# Patient Record
Sex: FEMALE | Race: WHITE | HISPANIC OR LATINO | Employment: OTHER | URBAN - METROPOLITAN AREA
[De-identification: names, ages, dates, MRNs, and addresses within clinical notes are randomized per-mention and may not be internally consistent; named-entity substitution may affect disease eponyms.]

---

## 2018-03-27 ENCOUNTER — TELEPHONE (OUTPATIENT)
Dept: FAMILY MEDICINE CLINIC | Facility: CLINIC | Age: 63
End: 2018-03-27

## 2018-03-27 ENCOUNTER — OFFICE VISIT (OUTPATIENT)
Dept: FAMILY MEDICINE CLINIC | Facility: CLINIC | Age: 63
End: 2018-03-27
Payer: MEDICARE

## 2018-03-27 VITALS
HEIGHT: 64 IN | BODY MASS INDEX: 30.92 KG/M2 | DIASTOLIC BLOOD PRESSURE: 78 MMHG | RESPIRATION RATE: 18 BRPM | OXYGEN SATURATION: 98 % | SYSTOLIC BLOOD PRESSURE: 130 MMHG | TEMPERATURE: 96.4 F | HEART RATE: 92 BPM | WEIGHT: 181.13 LBS

## 2018-03-27 DIAGNOSIS — E11.8 TYPE 2 DIABETES MELLITUS WITH COMPLICATION, WITHOUT LONG-TERM CURRENT USE OF INSULIN (HCC): ICD-10-CM

## 2018-03-27 DIAGNOSIS — R07.9 CHEST PAIN, UNSPECIFIED TYPE: ICD-10-CM

## 2018-03-27 DIAGNOSIS — I10 ESSENTIAL HYPERTENSION: ICD-10-CM

## 2018-03-27 DIAGNOSIS — Z12.31 ENCOUNTER FOR SCREENING MAMMOGRAM FOR BREAST CANCER: ICD-10-CM

## 2018-03-27 DIAGNOSIS — Z12.4 PAP SMEAR FOR CERVICAL CANCER SCREENING: ICD-10-CM

## 2018-03-27 DIAGNOSIS — E03.8 OTHER SPECIFIED HYPOTHYROIDISM: Primary | ICD-10-CM

## 2018-03-27 DIAGNOSIS — J45.20 MILD INTERMITTENT ASTHMA WITHOUT COMPLICATION: ICD-10-CM

## 2018-03-27 DIAGNOSIS — Z12.31 SCREENING MAMMOGRAM, ENCOUNTER FOR: ICD-10-CM

## 2018-03-27 PROBLEM — IMO0002: Status: ACTIVE | Noted: 2018-03-27

## 2018-03-27 LAB — SL AMB POCT HEMOGLOBIN AIC: 10.9

## 2018-03-27 PROCEDURE — 83036 HEMOGLOBIN GLYCOSYLATED A1C: CPT | Performed by: FAMILY MEDICINE

## 2018-03-27 PROCEDURE — 99204 OFFICE O/P NEW MOD 45 MIN: CPT | Performed by: FAMILY MEDICINE

## 2018-03-27 PROCEDURE — 93000 ELECTROCARDIOGRAM COMPLETE: CPT | Performed by: FAMILY MEDICINE

## 2018-03-27 RX ORDER — AMLODIPINE BESYLATE AND ATORVASTATIN CALCIUM 10; 10 MG/1; MG/1
1 TABLET, FILM COATED ORAL DAILY
Qty: 90 TABLET | Refills: 1 | Status: ON HOLD | OUTPATIENT
Start: 2018-03-27 | End: 2018-06-26

## 2018-03-27 RX ORDER — LEVOTHYROXINE SODIUM 0.05 MG/1
50 TABLET ORAL DAILY
Qty: 90 TABLET | Refills: 1 | Status: SHIPPED | OUTPATIENT
Start: 2018-03-27 | End: 2018-09-19 | Stop reason: SDUPTHER

## 2018-03-27 RX ORDER — AMLODIPINE BESYLATE AND ATORVASTATIN CALCIUM 10; 10 MG/1; MG/1
1 TABLET, FILM COATED ORAL
COMMUNITY
End: 2018-03-27 | Stop reason: SDUPTHER

## 2018-03-27 RX ORDER — ENALAPRIL MALEATE 20 MG/1
20 TABLET ORAL
COMMUNITY
End: 2018-03-27 | Stop reason: SDUPTHER

## 2018-03-27 RX ORDER — METFORMIN HYDROCHLORIDE 1000 MG/1
1000 TABLET, FILM COATED, EXTENDED RELEASE ORAL
COMMUNITY
End: 2018-03-27 | Stop reason: SDUPTHER

## 2018-03-27 RX ORDER — LEVOTHYROXINE SODIUM 0.05 MG/1
50 TABLET ORAL
COMMUNITY
End: 2018-03-27 | Stop reason: SDUPTHER

## 2018-03-27 RX ORDER — METFORMIN HYDROCHLORIDE 1000 MG/1
1000 TABLET, FILM COATED, EXTENDED RELEASE ORAL
Qty: 90 TABLET | Refills: 1 | Status: ON HOLD | OUTPATIENT
Start: 2018-03-27 | End: 2018-06-26

## 2018-03-27 RX ORDER — GABAPENTIN 300 MG/1
300 CAPSULE ORAL 3 TIMES DAILY
Qty: 90 CAPSULE | Refills: 1 | Status: SHIPPED | OUTPATIENT
Start: 2018-03-27 | End: 2018-05-02 | Stop reason: SDUPTHER

## 2018-03-27 RX ORDER — RANITIDINE 300 MG/1
300 TABLET ORAL DAILY
COMMUNITY
End: 2018-07-03

## 2018-03-27 RX ORDER — OMEPRAZOLE 20 MG/1
20 CAPSULE, DELAYED RELEASE ORAL DAILY
Qty: 90 CAPSULE | Refills: 1 | Status: SHIPPED | OUTPATIENT
Start: 2018-03-27 | End: 2018-06-30 | Stop reason: SDUPTHER

## 2018-03-27 RX ORDER — GEMFIBROZIL 600 MG/1
600 TABLET, FILM COATED ORAL 2 TIMES DAILY
COMMUNITY
End: 2018-09-19 | Stop reason: SDUPTHER

## 2018-03-27 RX ORDER — ENALAPRIL MALEATE 20 MG/1
20 TABLET ORAL DAILY
Qty: 90 TABLET | Refills: 1 | Status: SHIPPED | OUTPATIENT
Start: 2018-03-27 | End: 2018-09-19 | Stop reason: SDUPTHER

## 2018-03-27 RX ORDER — GLIPIZIDE 10 MG/1
10 TABLET, FILM COATED, EXTENDED RELEASE ORAL
COMMUNITY
End: 2018-03-27 | Stop reason: SDUPTHER

## 2018-03-27 RX ORDER — ALBUTEROL SULFATE 90 UG/1
1 AEROSOL, METERED RESPIRATORY (INHALATION) EVERY 4 HOURS PRN
COMMUNITY
End: 2021-12-19 | Stop reason: SDUPTHER

## 2018-03-27 RX ORDER — GLIPIZIDE 10 MG/1
10 TABLET, FILM COATED, EXTENDED RELEASE ORAL DAILY
Qty: 90 TABLET | Refills: 1 | Status: SHIPPED | OUTPATIENT
Start: 2018-03-27 | End: 2018-09-19 | Stop reason: SDUPTHER

## 2018-03-27 NOTE — ASSESSMENT & PLAN NOTE
Start Toujeo 10 units at night  Increase Humalin to 70 units am and 30 units pm  Continue Glipizide and Metformin

## 2018-03-27 NOTE — ASSESSMENT & PLAN NOTE
Controlled   Continue Amlodipine 10 mg and Enalapril 20 mg daily  BW ordered including kidney function and microalbumin

## 2018-03-27 NOTE — PROGRESS NOTES
Assessment/Plan:    Type II or unspecified type diabetes mellitus with other coma, uncontrolled  Start Toujeo 10 units at night  Increase Humalin to 70 units am and 30 units pm  Continue Glipizide and Metformin     Other specified hypothyroidism  Continue Synthroid 50 mcg  Check TSH with reflex T4    Asthma  Continue Advair     Essential hypertension  Controlled   Continue Amlodipine 10 mg and Enalapril 20 mg daily  BW ordered including kidney function and microalbumin          Problem List Items Addressed This Visit        Endocrine    Type II or unspecified type diabetes mellitus with other coma, uncontrolled     Start Toujeo 10 units at night  Increase Humalin to 70 units am and 30 units pm  Continue Glipizide and Metformin          Relevant Medications    insulin isophane (HumuLIN N,NovoLIN N) 100 Units/mL SUPN injection    Insulin Glargine (TOUJEO) injection pen 300 units/mL    gabapentin (NEURONTIN) 300 mg capsule    glipiZIDE (GLUCOTROL XL) 10 mg 24 hr tablet    metFORMIN (GLUMETZA) 1000 MG (MOD) 24 hr tablet    Other specified hypothyroidism - Primary     Continue Synthroid 50 mcg  Check TSH with reflex T4         Relevant Medications    levothyroxine 50 mcg tablet    Other Relevant Orders    CBC and differential    Comprehensive metabolic panel    Lipid panel    TSH, 3rd generation with T4 reflex       Respiratory    Asthma     Continue Advair          Relevant Medications    albuterol (PROVENTIL HFA,VENTOLIN HFA) 90 mcg/act inhaler    fluticasone-salmeterol (ADVAIR) 100-50 mcg/dose       Cardiovascular and Mediastinum    Essential hypertension     Controlled   Continue Amlodipine 10 mg and Enalapril 20 mg daily  BW ordered including kidney function and microalbumin          Relevant Medications    omeprazole (PriLOSEC) 20 mg delayed release capsule    enalapril (VASOTEC) 20 mg tablet    Other Relevant Orders    CBC and differential    Comprehensive metabolic panel    Lipid panel    Microalbumin / creatinine urine ratio       Other    Chest pain    Relevant Orders    ECG 12 lead      Other Visit Diagnoses     Encounter for screening mammogram for breast cancer        Relevant Orders    Mammo screening bilateral w cad    Pap smear for cervical cancer screening        Relevant Orders    Ambulatory referral to Obstetrics / Gynecology    Screening mammogram, encounter for        Relevant Orders    Mammo screening bilateral w 3d & cad            Subjective:      Patient ID: Dima Martins is a 65 y o  female  66 yo  female with uncontrolled DM HTN Asthma, new patient to the office here today to set up new PCP  Currently complaining of knee pain and tooth infection  She was seen by Dentist last week, who started Amoxicillin and planned a tooth extraction today  Knee Pain    The incident occurred more than 1 week ago  There was no injury mechanism  The pain is present in the right knee  The quality of the pain is described as cramping  The pain is at a severity of 5/10  The pain has been constant since onset  Associated symptoms include numbness  She reports no foreign bodies present  The symptoms are aggravated by movement and weight bearing  She has tried acetaminophen for the symptoms  The treatment provided mild relief  The following portions of the patient's history were reviewed and updated as appropriate:   She  has a past medical history of Asthma; Diabetes mellitus ([address] ); and Hypertension  She   Patient Active Problem List    Diagnosis Date Noted    Type II or unspecified type diabetes mellitus with other coma, uncontrolled 03/27/2018    Other specified hypothyroidism 03/27/2018    Chest pain 03/27/2018    Asthma 09/22/2009    Essential hypertension 09/22/2009     She  has a past surgical history that includes Tubal ligation; Knee surgery (Right); Breast surgery (Left); Cataract extraction, bilateral (Bilateral); and Retinal detachment surgery (Right)    Her family history includes Diabetes in her brother, father, and mother; Hypertension in her brother, father, and mother  She  reports that she has never smoked  She has never used smokeless tobacco  She reports that she does not drink alcohol or use drugs       Review of Systems   Cardiovascular: Positive for chest pain  Negative for palpitations  Gastrointestinal: Positive for constipation  Musculoskeletal: Positive for arthralgias and back pain  B/l knee pain    Neurological: Positive for numbness  All other systems reviewed and are negative  Objective:      /78 (BP Location: Right arm, Patient Position: Sitting, Cuff Size: Large)   Pulse 92   Temp (!) 96 4 °F (35 8 °C) (Tympanic)   Resp 18   Ht 5' 4 2" (1 631 m)   Wt 82 2 kg (181 lb 2 oz)   SpO2 98%   BMI 30 90 kg/m²          Physical Exam   Constitutional: She is oriented to person, place, and time  She appears well-developed  HENT:   Head: Normocephalic  Right Ear: External ear normal    Left Ear: External ear normal    Nose: Nose normal    Mouth/Throat: Oropharynx is clear and moist    Eyes: Conjunctivae and EOM are normal  Pupils are equal, round, and reactive to light  Neck: Normal range of motion  Neck supple  No thyromegaly present  Cardiovascular: Normal rate, regular rhythm and normal heart sounds  Pulmonary/Chest: Effort normal and breath sounds normal    Abdominal: Soft  There is no tenderness  There is no rebound and no guarding  Musculoskeletal: Normal range of motion  Neurological: She is alert and oriented to person, place, and time  She has normal reflexes  Skin: Skin is dry  Psychiatric: She has a normal mood and affect  Nursing note and vitals reviewed

## 2018-04-03 ENCOUNTER — HOSPITAL ENCOUNTER (OUTPATIENT)
Dept: MAMMOGRAPHY | Facility: HOSPITAL | Age: 63
Discharge: HOME | End: 2018-04-03

## 2018-04-03 ENCOUNTER — TELEPHONE (OUTPATIENT)
Dept: FAMILY MEDICINE CLINIC | Facility: CLINIC | Age: 63
End: 2018-04-03

## 2018-04-03 DIAGNOSIS — Z12.31 ENCOUNTER FOR SCREENING MAMMOGRAM FOR BREAST CANCER: ICD-10-CM

## 2018-04-03 DIAGNOSIS — E11.9 TYPE 2 DIABETES MELLITUS WITHOUT COMPLICATION, WITHOUT LONG-TERM CURRENT USE OF INSULIN (HCC): Primary | ICD-10-CM

## 2018-04-04 ENCOUNTER — TELEPHONE (OUTPATIENT)
Dept: FAMILY MEDICINE CLINIC | Facility: CLINIC | Age: 63
End: 2018-04-04

## 2018-04-05 DIAGNOSIS — Z79.4 TYPE 2 DIABETES MELLITUS WITHOUT COMPLICATION, WITH LONG-TERM CURRENT USE OF INSULIN (HCC): ICD-10-CM

## 2018-04-05 DIAGNOSIS — N64.4 BREAST PAIN: Primary | ICD-10-CM

## 2018-04-05 DIAGNOSIS — E11.9 TYPE 2 DIABETES MELLITUS WITHOUT COMPLICATION, WITH LONG-TERM CURRENT USE OF INSULIN (HCC): ICD-10-CM

## 2018-04-05 DIAGNOSIS — J45.20 MILD INTERMITTENT ASTHMA WITHOUT COMPLICATION: Primary | ICD-10-CM

## 2018-04-05 RX ORDER — INSULIN GLARGINE 100 [IU]/ML
10 INJECTION, SOLUTION SUBCUTANEOUS
Qty: 10 ML | Refills: 0 | Status: SHIPPED | OUTPATIENT
Start: 2018-04-05 | End: 2018-07-03

## 2018-04-05 RX ORDER — FLUTICASONE FUROATE AND VILANTEROL 100; 25 UG/1; UG/1
1 POWDER RESPIRATORY (INHALATION) DAILY
Qty: 3 EACH | Refills: 0 | Status: ON HOLD | OUTPATIENT
Start: 2018-04-05 | End: 2018-06-26

## 2018-05-01 ENCOUNTER — APPOINTMENT (OUTPATIENT)
Dept: LAB | Facility: CLINIC | Age: 63
End: 2018-05-01
Payer: MEDICARE

## 2018-05-01 ENCOUNTER — TRANSCRIBE ORDERS (OUTPATIENT)
Dept: LAB | Facility: CLINIC | Age: 63
End: 2018-05-01

## 2018-05-01 LAB
ALBUMIN SERPL BCP-MCNC: 3.3 G/DL (ref 3.5–5)
ALP SERPL-CCNC: 53 U/L (ref 46–116)
ALT SERPL W P-5'-P-CCNC: 23 U/L (ref 12–78)
ANION GAP SERPL CALCULATED.3IONS-SCNC: 5 MMOL/L (ref 4–13)
AST SERPL W P-5'-P-CCNC: 13 U/L (ref 5–45)
BASOPHILS # BLD AUTO: 0.06 THOUSANDS/ΜL (ref 0–0.1)
BASOPHILS NFR BLD AUTO: 1 % (ref 0–1)
BILIRUB SERPL-MCNC: 0.41 MG/DL (ref 0.2–1)
BUN SERPL-MCNC: 16 MG/DL (ref 5–25)
CALCIUM SERPL-MCNC: 9.2 MG/DL (ref 8.3–10.1)
CHLORIDE SERPL-SCNC: 101 MMOL/L (ref 100–108)
CHOLEST SERPL-MCNC: 222 MG/DL (ref 50–200)
CO2 SERPL-SCNC: 31 MMOL/L (ref 21–32)
CREAT SERPL-MCNC: 0.6 MG/DL (ref 0.6–1.3)
CREAT UR-MCNC: 130 MG/DL
EOSINOPHIL # BLD AUTO: 0.13 THOUSAND/ΜL (ref 0–0.61)
EOSINOPHIL NFR BLD AUTO: 2 % (ref 0–6)
ERYTHROCYTE [DISTWIDTH] IN BLOOD BY AUTOMATED COUNT: 13.4 % (ref 11.6–15.1)
GFR SERPL CREATININE-BSD FRML MDRD: 99 ML/MIN/1.73SQ M
GLUCOSE P FAST SERPL-MCNC: 211 MG/DL (ref 65–99)
HCT VFR BLD AUTO: 36 % (ref 34.8–46.1)
HDLC SERPL-MCNC: 48 MG/DL (ref 40–60)
HGB BLD-MCNC: 11.8 G/DL (ref 11.5–15.4)
LDLC SERPL CALC-MCNC: 139 MG/DL (ref 0–100)
LYMPHOCYTES # BLD AUTO: 2.17 THOUSANDS/ΜL (ref 0.6–4.47)
LYMPHOCYTES NFR BLD AUTO: 38 % (ref 14–44)
MCH RBC QN AUTO: 28.8 PG (ref 26.8–34.3)
MCHC RBC AUTO-ENTMCNC: 32.8 G/DL (ref 31.4–37.4)
MCV RBC AUTO: 88 FL (ref 82–98)
MICROALBUMIN UR-MCNC: 57.6 MG/L (ref 0–20)
MICROALBUMIN/CREAT 24H UR: 44 MG/G CREATININE (ref 0–30)
MONOCYTES # BLD AUTO: 0.41 THOUSAND/ΜL (ref 0.17–1.22)
MONOCYTES NFR BLD AUTO: 7 % (ref 4–12)
NEUTROPHILS # BLD AUTO: 2.95 THOUSANDS/ΜL (ref 1.85–7.62)
NEUTS SEG NFR BLD AUTO: 52 % (ref 43–75)
NONHDLC SERPL-MCNC: 174 MG/DL
NRBC BLD AUTO-RTO: 0 /100 WBCS
PLATELET # BLD AUTO: 284 THOUSANDS/UL (ref 149–390)
PMV BLD AUTO: 12.2 FL (ref 8.9–12.7)
POTASSIUM SERPL-SCNC: 4.2 MMOL/L (ref 3.5–5.3)
PROT SERPL-MCNC: 7.7 G/DL (ref 6.4–8.2)
RBC # BLD AUTO: 4.1 MILLION/UL (ref 3.81–5.12)
SODIUM SERPL-SCNC: 137 MMOL/L (ref 136–145)
T4 FREE SERPL-MCNC: 0.96 NG/DL (ref 0.76–1.46)
TRIGL SERPL-MCNC: 173 MG/DL
TSH SERPL DL<=0.05 MIU/L-ACNC: 3.95 UIU/ML (ref 0.36–3.74)
WBC # BLD AUTO: 5.73 THOUSAND/UL (ref 4.31–10.16)

## 2018-05-01 PROCEDURE — 36415 COLL VENOUS BLD VENIPUNCTURE: CPT | Performed by: FAMILY MEDICINE

## 2018-05-01 PROCEDURE — 80053 COMPREHEN METABOLIC PANEL: CPT | Performed by: FAMILY MEDICINE

## 2018-05-01 PROCEDURE — 84439 ASSAY OF FREE THYROXINE: CPT | Performed by: FAMILY MEDICINE

## 2018-05-01 PROCEDURE — 84443 ASSAY THYROID STIM HORMONE: CPT | Performed by: FAMILY MEDICINE

## 2018-05-01 PROCEDURE — 85025 COMPLETE CBC W/AUTO DIFF WBC: CPT | Performed by: FAMILY MEDICINE

## 2018-05-01 PROCEDURE — 80061 LIPID PANEL: CPT | Performed by: FAMILY MEDICINE

## 2018-05-01 PROCEDURE — 82043 UR ALBUMIN QUANTITATIVE: CPT | Performed by: FAMILY MEDICINE

## 2018-05-01 PROCEDURE — 82570 ASSAY OF URINE CREATININE: CPT | Performed by: FAMILY MEDICINE

## 2018-05-02 DIAGNOSIS — E11.8 TYPE 2 DIABETES MELLITUS WITH COMPLICATION, WITHOUT LONG-TERM CURRENT USE OF INSULIN (HCC): ICD-10-CM

## 2018-05-02 RX ORDER — GABAPENTIN 300 MG/1
CAPSULE ORAL
Qty: 90 CAPSULE | Refills: 1 | Status: ON HOLD | OUTPATIENT
Start: 2018-05-02 | End: 2018-06-26

## 2018-05-08 ENCOUNTER — DOCTOR'S OFFICE (OUTPATIENT)
Dept: URBAN - METROPOLITAN AREA CLINIC 136 | Facility: CLINIC | Age: 63
Setting detail: OPHTHALMOLOGY
End: 2018-05-08
Payer: COMMERCIAL

## 2018-05-08 ENCOUNTER — RX ONLY (RX ONLY)
Age: 63
End: 2018-05-08

## 2018-05-08 DIAGNOSIS — H35.379: ICD-10-CM

## 2018-05-08 DIAGNOSIS — Z79.4: ICD-10-CM

## 2018-05-08 DIAGNOSIS — Z96.1: ICD-10-CM

## 2018-05-08 DIAGNOSIS — H01.001: ICD-10-CM

## 2018-05-08 DIAGNOSIS — H01.005: ICD-10-CM

## 2018-05-08 DIAGNOSIS — E11.3293: ICD-10-CM

## 2018-05-08 DIAGNOSIS — H35.341: ICD-10-CM

## 2018-05-08 DIAGNOSIS — H01.004: ICD-10-CM

## 2018-05-08 DIAGNOSIS — H01.002: ICD-10-CM

## 2018-05-08 PROCEDURE — 92134 CPTRZ OPH DX IMG PST SGM RTA: CPT | Performed by: OPHTHALMOLOGY

## 2018-05-08 PROCEDURE — 92004 COMPRE OPH EXAM NEW PT 1/>: CPT | Performed by: OPHTHALMOLOGY

## 2018-05-08 ASSESSMENT — LID EXAM ASSESSMENTS
OS_BLEPHARITIS: 1+
OD_BLEPHARITIS: 1+

## 2018-05-08 ASSESSMENT — CONFRONTATIONAL VISUAL FIELD TEST (CVF)
OS_FINDINGS: SUPEROTEMPORAL DEFECT
OD_FINDINGS: INFEROTEMPORAL DEFECT, SUPERONASAL DEFECT

## 2018-05-09 ENCOUNTER — OFFICE VISIT (OUTPATIENT)
Dept: FAMILY MEDICINE CLINIC | Facility: CLINIC | Age: 63
End: 2018-05-09
Payer: MEDICARE

## 2018-05-09 VITALS
HEART RATE: 96 BPM | TEMPERATURE: 97.5 F | DIASTOLIC BLOOD PRESSURE: 86 MMHG | BODY MASS INDEX: 31.94 KG/M2 | HEIGHT: 64 IN | RESPIRATION RATE: 18 BRPM | WEIGHT: 187.06 LBS | SYSTOLIC BLOOD PRESSURE: 138 MMHG | OXYGEN SATURATION: 96 %

## 2018-05-09 DIAGNOSIS — Z12.11 COLON CANCER SCREENING: ICD-10-CM

## 2018-05-09 DIAGNOSIS — Z12.4 PAP SMEAR FOR CERVICAL CANCER SCREENING: ICD-10-CM

## 2018-05-09 DIAGNOSIS — E03.8 OTHER SPECIFIED HYPOTHYROIDISM: ICD-10-CM

## 2018-05-09 DIAGNOSIS — R07.89 OTHER CHEST PAIN: Primary | ICD-10-CM

## 2018-05-09 DIAGNOSIS — Z01.810 PREOP CARDIOVASCULAR EXAM: ICD-10-CM

## 2018-05-09 DIAGNOSIS — Z12.31 ENCOUNTER FOR SCREENING MAMMOGRAM FOR BREAST CANCER: ICD-10-CM

## 2018-05-09 DIAGNOSIS — I10 ESSENTIAL HYPERTENSION: ICD-10-CM

## 2018-05-09 PROBLEM — R00.2 PALPITATIONS: Status: ACTIVE | Noted: 2018-05-09

## 2018-05-09 PROBLEM — E11.9 TYPE 2 DIABETES MELLITUS WITHOUT COMPLICATION, WITHOUT LONG-TERM CURRENT USE OF INSULIN (HCC): Status: ACTIVE | Noted: 2018-03-27

## 2018-05-09 PROCEDURE — 99214 OFFICE O/P EST MOD 30 MIN: CPT | Performed by: FAMILY MEDICINE

## 2018-05-09 ASSESSMENT — REFRACTION_MANIFEST
OU_VA: 20/
OS_VA3: 20/
OS_VA2: 20/
OS_VA2: 20/
OU_VA: 20/
OD_VA1: 20/
OD_VA3: 20/
OS_VA3: 20/
OD_VA3: 20/
OD_VA2: 20/
OD_VA3: 20/
OD_VA2: 20/
OS_VA1: 20/
OD_VA2: 20/
OS_VA2: 20/
OD_VA1: 20/
OS_VA3: 20/
OS_VA1: 20/
OS_VA1: 20/
OU_VA: 20/
OD_VA1: 20/

## 2018-05-09 ASSESSMENT — REFRACTION_AUTOREFRACTION
OD_AXIS: 102
OD_SPHERE: -3.25
OD_CYLINDER: -2.50
OS_CYLINDER: -1.25
OS_AXIS: 060
OS_SPHERE: +1.50

## 2018-05-09 ASSESSMENT — REFRACTION_CURRENTRX
OD_OVR_VA: 20/
OS_OVR_VA: 20/

## 2018-05-09 ASSESSMENT — SPHEQUIV_DERIVED
OD_SPHEQUIV: -4.5
OS_SPHEQUIV: 0.875

## 2018-05-09 ASSESSMENT — VISUAL ACUITY
OS_BCVA: CF 3FT
OD_BCVA: 20/100

## 2018-05-09 NOTE — PROGRESS NOTES
Assessment/Plan:    No problem-specific Assessment & Plan notes found for this encounter  Problem List Items Addressed This Visit        Endocrine    Other specified hypothyroidism       Cardiovascular and Mediastinum    Essential hypertension    Relevant Orders    Ambulatory referral to Cardiology       Other    Chest pain - Primary    Relevant Orders    Ambulatory referral to Cardiology      Other Visit Diagnoses     Encounter for screening mammogram for breast cancer        Pap smear for cervical cancer screening        Colon cancer screening        Preop cardiovascular exam        Relevant Orders    Ambulatory referral to Cardiology            Subjective:      Patient ID: Awa Ansari is a 65 y o  female  64 yo  female here today for follow up of chronic conditions  She complains of increasing chest pain, defined as pressure in her chest, not radiated, last for several minutes, improves on its own  Accompanied by palpitations  States she often feels like her heart is fluttering in her chest    Patient is also requesting a preop for Right eye PPV/Removal of ILM by Dr Abelino Feng [facility], with general anesthesia on 6/5/18         The following portions of the patient's history were reviewed and updated as appropriate:   She  has a past medical history of Asthma; Diabetes mellitus ([facility] [address] ); and Hypertension  She   Patient Active Problem List    Diagnosis Date Noted    Palpitations 05/09/2018    Type 2 diabetes mellitus without complication, without long-term current use of insulin ([address] ) 03/27/2018    Other specified hypothyroidism 03/27/2018    Chest pain 03/27/2018    Asthma 09/22/2009    Essential hypertension 09/22/2009     She  has a past surgical history that includes Tubal ligation; Knee surgery (Right); Breast surgery (Left); Cataract extraction, bilateral (Bilateral); and Retinal detachment surgery (Right)    Her family history includes Diabetes in her brother, father, and mother; Hypertension in her brother, father, and mother  She  reports that she has never smoked  She has never used smokeless tobacco  She reports that she does not drink alcohol or use drugs    Current Outpatient Prescriptions   Medication Sig Dispense Refill    albuterol (PROVENTIL HFA,VENTOLIN HFA) 90 mcg/act inhaler Inhale 1 puff every 6 (six) hours      amLODIPine-atorvastatin (CADUET) 10-10 MG per tablet Take 1 tablet by mouth daily 90 tablet 1    enalapril (VASOTEC) 20 mg tablet Take 1 tablet (20 mg total) by mouth daily 90 tablet 1    fluticasone furoate-vilanterol (BREO ELLIPTA) Inhale 1 puff daily 3 each 0    fluticasone-salmeterol (ADVAIR) 100-50 mcg/dose Inhale 1 puff 2 (two) times a day 3 Inhaler 1    gabapentin (NEURONTIN) 300 mg capsule take 1 capsule by mouth three times a day 90 capsule 1    gemfibrozil (LOPID) 600 mg tablet Take 600 mg by mouth      glipiZIDE (GLUCOTROL XL) 10 mg 24 hr tablet Take 1 tablet (10 mg total) by mouth daily 90 tablet 1    insulin glargine (LANTUS) 100 units/mL subcutaneous injection Inject 10 Units under the skin daily at bedtime 10 mL 0    Insulin Glargine (TOUJEO) injection pen 300 units/mL Inject 10 Units under the skin daily at bedtime 3 pen 1    insulin isophane (HumuLIN N,NovoLIN N) 100 Units/mL SUPN injection 70 units in the am  30 units in the pm 5 pen 1    Insulin Pen Needle (BD PEN NEEDLE NATALEE U/F) 32G X 4 MM MISC USE ONE NEEDLE WITH INSULIN TOUJEO AT BEDTIME 100 each 3    Insulin Syringe-Needle U-100 (B-D INS SYRINGE 0 5CC/30GX1/2") 30G X 1/2" 0 5 ML MISC USE WITH INSULIN HUMULIN N VIAL TWICE A  each 3    levothyroxine 50 mcg tablet Take 1 tablet (50 mcg total) by mouth daily 90 tablet 1    metFORMIN (GLUMETZA) 1000 MG (MOD) 24 hr tablet Take 1 tablet (1,000 mg total) by mouth daily with breakfast 90 tablet 1    omeprazole (PriLOSEC) 20 mg delayed release capsule Take 1 capsule (20 mg total) by mouth daily 90 capsule 1    ranitidine (ZANTAC) 300 MG tablet Take 300 mg by mouth       No current facility-administered medications for this visit  Current Outpatient Prescriptions on File Prior to Visit   Medication Sig    albuterol (PROVENTIL HFA,VENTOLIN HFA) 90 mcg/act inhaler Inhale 1 puff every 6 (six) hours    amLODIPine-atorvastatin (CADUET) 10-10 MG per tablet Take 1 tablet by mouth daily    enalapril (VASOTEC) 20 mg tablet Take 1 tablet (20 mg total) by mouth daily    fluticasone furoate-vilanterol (BREO ELLIPTA) Inhale 1 puff daily    fluticasone-salmeterol (ADVAIR) 100-50 mcg/dose Inhale 1 puff 2 (two) times a day    gabapentin (NEURONTIN) 300 mg capsule take 1 capsule by mouth three times a day    gemfibrozil (LOPID) 600 mg tablet Take 600 mg by mouth    glipiZIDE (GLUCOTROL XL) 10 mg 24 hr tablet Take 1 tablet (10 mg total) by mouth daily    insulin glargine (LANTUS) 100 units/mL subcutaneous injection Inject 10 Units under the skin daily at bedtime    Insulin Glargine (TOUJEO) injection pen 300 units/mL Inject 10 Units under the skin daily at bedtime    insulin isophane (HumuLIN N,NovoLIN N) 100 Units/mL SUPN injection 70 units in the am  30 units in the pm    Insulin Pen Needle (BD PEN NEEDLE NATALEE U/F) 32G X 4 MM MISC USE ONE NEEDLE WITH INSULIN TOUJEO AT BEDTIME    Insulin Syringe-Needle U-100 (B-D INS SYRINGE 0 5CC/30GX1/2") 30G X 1/2" 0 5 ML MISC USE WITH INSULIN HUMULIN N VIAL TWICE A DAY    levothyroxine 50 mcg tablet Take 1 tablet (50 mcg total) by mouth daily    metFORMIN (GLUMETZA) 1000 MG (MOD) 24 hr tablet Take 1 tablet (1,000 mg total) by mouth daily with breakfast    omeprazole (PriLOSEC) 20 mg delayed release capsule Take 1 capsule (20 mg total) by mouth daily    ranitidine (ZANTAC) 300 MG tablet Take 300 mg by mouth     No current facility-administered medications on file prior to visit         Review of Systems   Constitutional: Positive for fatigue     Eyes: Positive for visual disturbance  Cardiovascular: Positive for chest pain and palpitations  Objective:      /86 (BP Location: Right arm, Patient Position: Sitting, Cuff Size: Standard)   Pulse 96   Temp 97 5 °F (36 4 °C) (Tympanic)   Resp 18   Ht 5' 4" (1 626 m)   Wt 84 9 kg (187 lb 1 oz)   SpO2 96%   BMI 32 11 kg/m²          Physical Exam   Constitutional: She is oriented to person, place, and time  She appears well-developed  HENT:   Head: Normocephalic  Right Ear: External ear normal    Left Ear: External ear normal    Nose: Nose normal    Mouth/Throat: Oropharynx is clear and moist    Eyes: Conjunctivae and EOM are normal  Pupils are equal, round, and reactive to light  Neck: Normal range of motion  Neck supple  No thyromegaly present  Cardiovascular: Normal rate, regular rhythm and normal heart sounds  Pulmonary/Chest: Effort normal and breath sounds normal    Abdominal: Soft  There is no tenderness  There is no rebound and no guarding  Musculoskeletal: Normal range of motion  Neurological: She is alert and oriented to person, place, and time  She has normal reflexes  Skin: Skin is dry  Psychiatric: She has a normal mood and affect  Nursing note and vitals reviewed

## 2018-05-15 ENCOUNTER — HOSPITAL ENCOUNTER (OUTPATIENT)
Dept: MAMMOGRAPHY | Facility: CLINIC | Age: 63
Discharge: HOME | End: 2018-05-15
Payer: MEDICARE

## 2018-05-15 ENCOUNTER — HOSPITAL ENCOUNTER (OUTPATIENT)
Dept: ULTRASOUND IMAGING | Facility: CLINIC | Age: 63
Discharge: HOME | End: 2018-05-15

## 2018-05-15 DIAGNOSIS — N64.4 BREAST PAIN: ICD-10-CM

## 2018-05-15 DIAGNOSIS — N64.4 MASTODYNIA: ICD-10-CM

## 2018-05-15 PROCEDURE — 76642 ULTRASOUND BREAST LIMITED: CPT

## 2018-05-15 PROCEDURE — 77066 DX MAMMO INCL CAD BI: CPT

## 2018-05-15 NOTE — PROGRESS NOTES
Met with patient and patient's daughter Demetrius Art and Dr Dontae Alfredo  regarding recommendation for;      __x___ RIGHT ______LEFT      _____Ultrasound guided  __x____Stereotactic  Breast biopsy  __x___Verbalized understanding        Blood thinners:  _____yes __x___no    Date stopped: ___n/a________    Biopsy teaching sheet given:  ___x____yes ______no; teaching sheet given in [place]

## 2018-05-18 RX ORDER — INSULIN HUMAN 100 [IU]/ML
INJECTION, SUSPENSION SUBCUTANEOUS
Refills: 0 | COMMUNITY
Start: 2018-04-04 | End: 2018-06-13 | Stop reason: SDUPTHER

## 2018-05-18 RX ORDER — METFORMIN HYDROCHLORIDE 500 MG/1
TABLET, EXTENDED RELEASE ORAL
Refills: 0 | COMMUNITY
Start: 2018-03-27 | End: 2018-11-06

## 2018-05-22 ENCOUNTER — CONSULT (OUTPATIENT)
Dept: FAMILY MEDICINE CLINIC | Facility: CLINIC | Age: 63
End: 2018-05-22
Payer: MEDICARE

## 2018-05-22 VITALS
BODY MASS INDEX: 31.67 KG/M2 | OXYGEN SATURATION: 97 % | WEIGHT: 185.5 LBS | RESPIRATION RATE: 18 BRPM | SYSTOLIC BLOOD PRESSURE: 140 MMHG | DIASTOLIC BLOOD PRESSURE: 76 MMHG | HEART RATE: 102 BPM | TEMPERATURE: 97.5 F | HEIGHT: 64 IN

## 2018-05-22 DIAGNOSIS — Z01.810 PREOP CARDIOVASCULAR EXAM: ICD-10-CM

## 2018-05-22 DIAGNOSIS — Z12.4 CERVICAL CANCER SCREENING: ICD-10-CM

## 2018-05-22 DIAGNOSIS — Z12.11 SCREENING FOR COLON CANCER: ICD-10-CM

## 2018-05-22 DIAGNOSIS — G89.29 CHRONIC BILATERAL LOW BACK PAIN WITHOUT SCIATICA: Primary | ICD-10-CM

## 2018-05-22 DIAGNOSIS — M54.50 CHRONIC BILATERAL LOW BACK PAIN WITHOUT SCIATICA: Primary | ICD-10-CM

## 2018-05-22 DIAGNOSIS — E11.9 TYPE 2 DIABETES MELLITUS WITHOUT COMPLICATION, WITHOUT LONG-TERM CURRENT USE OF INSULIN (HCC): ICD-10-CM

## 2018-05-22 PROCEDURE — 99214 OFFICE O/P EST MOD 30 MIN: CPT | Performed by: FAMILY MEDICINE

## 2018-05-22 NOTE — PROGRESS NOTES
Subjective:      Rigo Selby is a 65 y o  female who presents to the office today for a preoperative consultation at the request of surgeon eye doctor who plans on performing R cataract surgery on June 5  This consultation is requested for the specific conditions prompting preoperative evaluation (i e  because of potential affect on operative risk): Diabetes mellitus  The patient has the following known anesthesia issues: none  Patient has a bleeding risk of: no recent abnormal bleeding  Patient does not have objections to receiving blood products if needed  The following portions of the patient's history were reviewed and updated as appropriate:   She  has a past medical history of Asthma; Diabetes mellitus ([facility] ); and Hypertension  She   Patient Active Problem List    Diagnosis Date Noted    Chronic bilateral low back pain without sciatica 05/22/2018    Palpitations 05/09/2018    Preop cardiovascular exam 05/09/2018    Type 2 diabetes mellitus without complication, without long-term current use of insulin ([facility] ) 03/27/2018    Other specified hypothyroidism 03/27/2018    Chest pain 03/27/2018    Asthma 09/22/2009    Essential hypertension 09/22/2009     She  has a past surgical history that includes Tubal ligation; Knee surgery (Right); Breast surgery (Left); Cataract extraction, bilateral (Bilateral); and Retinal detachment surgery (Right)  Her family history includes Diabetes in her brother, father, and mother; Hypertension in her brother, father, and mother  She  reports that she has never smoked  She has never used smokeless tobacco  She reports that she does not drink alcohol or use drugs    Current Outpatient Prescriptions   Medication Sig Dispense Refill    albuterol (PROVENTIL HFA,VENTOLIN HFA) 90 mcg/act inhaler Inhale 1 puff every 6 (six) hours      amLODIPine-atorvastatin (CADUET) 10-10 MG per tablet Take 1 tablet by mouth daily 90 tablet 1    enalapril (VASOTEC) 20 mg tablet Take 1 tablet (20 mg total) by mouth daily 90 tablet 1    fluticasone furoate-vilanterol (BREO ELLIPTA) Inhale 1 puff daily 3 each 0    fluticasone-salmeterol (ADVAIR) 100-50 mcg/dose Inhale 1 puff 2 (two) times a day 3 Inhaler 1    gabapentin (NEURONTIN) 300 mg capsule take 1 capsule by mouth three times a day 90 capsule 1    gemfibrozil (LOPID) 600 mg tablet Take 600 mg by mouth      glipiZIDE (GLUCOTROL XL) 10 mg 24 hr tablet Take 1 tablet (10 mg total) by mouth daily 90 tablet 1    HUMULIN N 100 UNIT/ML subcutaneous injection   0    insulin glargine (LANTUS) 100 units/mL subcutaneous injection Inject 10 Units under the skin daily at bedtime 10 mL 0    Insulin Glargine (TOUJEO) injection pen 300 units/mL Inject 10 Units under the skin daily at bedtime 3 pen 1    insulin isophane (HumuLIN N,NovoLIN N) 100 Units/mL SUPN injection 70 units in the am  30 units in the pm 5 pen 1    Insulin Pen Needle (BD PEN NEEDLE NATALEE U/F) 32G X 4 MM MISC USE ONE NEEDLE WITH INSULIN TOUJEO AT BEDTIME 100 each 3    Insulin Syringe-Needle U-100 (B-D INS SYRINGE 0 5CC/30GX1/2") 30G X 1/2" 0 5 ML MISC USE WITH INSULIN HUMULIN N VIAL TWICE A  each 3    levothyroxine 50 mcg tablet Take 1 tablet (50 mcg total) by mouth daily 90 tablet 1    metFORMIN (GLUCOPHAGE-XR) 500 mg 24 hr tablet take 2 tablets by mouth once daily WITH BREAKFAST  0    metFORMIN (GLUMETZA) 1000 MG (MOD) 24 hr tablet Take 1 tablet (1,000 mg total) by mouth daily with breakfast 90 tablet 1    omeprazole (PriLOSEC) 20 mg delayed release capsule Take 1 capsule (20 mg total) by mouth daily 90 capsule 1    ranitidine (ZANTAC) 300 MG tablet Take 300 mg by mouth       No current facility-administered medications for this visit        Current Outpatient Prescriptions on File Prior to Visit   Medication Sig    albuterol (PROVENTIL HFA,VENTOLIN HFA) 90 mcg/act inhaler Inhale 1 puff every 6 (six) hours    amLODIPine-atorvastatin (CADUET) 10-10 MG per tablet Take 1 tablet by mouth daily    enalapril (VASOTEC) 20 mg tablet Take 1 tablet (20 mg total) by mouth daily    fluticasone furoate-vilanterol (BREO ELLIPTA) Inhale 1 puff daily    fluticasone-salmeterol (ADVAIR) 100-50 mcg/dose Inhale 1 puff 2 (two) times a day    gabapentin (NEURONTIN) 300 mg capsule take 1 capsule by mouth three times a day    gemfibrozil (LOPID) 600 mg tablet Take 600 mg by mouth    glipiZIDE (GLUCOTROL XL) 10 mg 24 hr tablet Take 1 tablet (10 mg total) by mouth daily    insulin glargine (LANTUS) 100 units/mL subcutaneous injection Inject 10 Units under the skin daily at bedtime    Insulin Glargine (TOUJEO) injection pen 300 units/mL Inject 10 Units under the skin daily at bedtime    insulin isophane (HumuLIN N,NovoLIN N) 100 Units/mL SUPN injection 70 units in the am  30 units in the pm    Insulin Pen Needle (BD PEN NEEDLE NATALEE U/F) 32G X 4 MM MISC USE ONE NEEDLE WITH INSULIN TOUJEO AT BEDTIME    Insulin Syringe-Needle U-100 (B-D INS SYRINGE 0 5CC/30GX1/2") 30G X 1/2" 0 5 ML MISC USE WITH INSULIN HUMULIN N VIAL TWICE A DAY    levothyroxine 50 mcg tablet Take 1 tablet (50 mcg total) by mouth daily    metFORMIN (GLUMETZA) 1000 MG (MOD) 24 hr tablet Take 1 tablet (1,000 mg total) by mouth daily with breakfast    omeprazole (PriLOSEC) 20 mg delayed release capsule Take 1 capsule (20 mg total) by mouth daily    ranitidine (ZANTAC) 300 MG tablet Take 300 mg by mouth     No current facility-administered medications on file prior to visit       Review of Systems  Pertinent items are noted in HPI  Objective:      Physical Exam  /76 (BP Location: Right arm, Patient Position: Sitting, Cuff Size: Large)   Pulse 102   Temp 97 5 °F (36 4 °C) (Tympanic)   Resp 18   Ht 5' 4" (1 626 m)   Wt 84 1 kg (185 lb 8 oz)   SpO2 97%   Breastfeeding?  No   BMI 31 84 kg/m²     General Appearance:    Alert, cooperative, no distress, appears stated age   Head:    Normocephalic, without obvious abnormality, atraumatic   Eyes:    PERRL, conjunctiva/corneas clear, EOM's intact, fundi     benign, both eyes   Ears:    Normal TM's and external ear canals, both ears   Nose:   Nares normal, septum midline, mucosa normal, no drainage    or sinus tenderness   Throat:   Lips, mucosa, and tongue normal; teeth and gums normal   Neck:   Supple, symmetrical, trachea midline, no adenopathy;     thyroid:  no enlargement/tenderness/nodules; no carotid    bruit or JVD   Back:     Symmetric, no curvature, ROM normal, no CVA tenderness   Lungs:     Clear to auscultation bilaterally, respirations unlabored   Chest Wall:    No tenderness or deformity    Heart:    Regular rate and rhythm, S1 and S2 normal, no murmur, rub   or gallop   Breast Exam:    No tenderness, masses, or nipple abnormality   Abdomen:     Soft, non-tender, bowel sounds active all four quadrants,     no masses, no organomegaly   Genitalia:    Normal female without lesion, discharge or tenderness   Rectal:    Normal tone, no masses or tenderness; guaiac negative stool   Extremities:   Extremities normal, atraumatic, no cyanosis or edema   Pulses:   2+ and symmetric all extremities   Skin:   Skin color, texture, turgor normal, no rashes or lesions   Lymph nodes:   Cervical, supraclavicular, and axillary nodes normal   Neurologic:   CNII-XII intact, normal strength, sensation and reflexes     throughout       Predictors of intubation difficulty:   Morbid obesity? no   Anatomically abnormal facies?  no   Prominent incisors? no   Receding mandible? no   Short, thick neck? no   Neck range of motion: normal      Cardiographics  ECG: normal sinus rhythm, no blocks or conduction defects, no ischemic changes  Echocardiogram: not done    Imaging  Chest x-ray: not done     Lab Review   Office Visit on 03/27/2018   Component Date Value     HGB A1C 03/27/2018 10 9     WBC 05/01/2018 5 73     RBC 05/01/2018 4 10     Hemoglobin 05/01/2018 11 8     Hematocrit 05/01/2018 36 0     MCV 05/01/2018 88     MCH 05/01/2018 28 8     MCHC 05/01/2018 32 8     RDW 05/01/2018 13 4     MPV 05/01/2018 12 2     Platelets 03/27/2904 284     nRBC 05/01/2018 0     Neutrophils Relative 05/01/2018 52     Lymphocytes Relative 05/01/2018 38     Monocytes Relative 05/01/2018 7     Eosinophils Relative 05/01/2018 2     Basophils Relative 05/01/2018 1     Neutrophils Absolute 05/01/2018 2 95     Lymphocytes Absolute 05/01/2018 2 17     Monocytes Absolute 05/01/2018 0 41     Eosinophils Absolute 05/01/2018 0 13     Basophils Absolute 05/01/2018 0 06     Sodium 05/01/2018 137     Potassium 05/01/2018 4 2     Chloride 05/01/2018 101     CO2 05/01/2018 31     Anion Gap 05/01/2018 5     BUN 05/01/2018 16     Creatinine 05/01/2018 0 60     Glucose, Fasting 05/01/2018 211*    Calcium 05/01/2018 9 2     AST 05/01/2018 13     ALT 05/01/2018 23     Alkaline Phosphatase 05/01/2018 53     Total Protein 05/01/2018 7 7     Albumin 05/01/2018 3 3*    Total Bilirubin 05/01/2018 0 41     eGFR 05/01/2018 99     Cholesterol 05/01/2018 222*    Triglycerides 05/01/2018 173*    HDL, Direct 05/01/2018 48     LDL Calculated 05/01/2018 139*    Non-HDL-Chol (CHOL-HDL) 05/01/2018 174     Creatinine, Ur 05/01/2018 130 0     Microalbum  ,U,Random 05/01/2018 57 6*    Microalb Creat Ratio 05/01/2018 44*    TSH 3RD GENERATON 05/01/2018 3 950*    Free T4 05/01/2018 0 96           Assessment:      65 y o  female with planned surgery as above  Known risk factors for perioperative complications: Diabetes mellitus    Difficulty with intubation is not anticipated  Cardiac Risk Estimation: per the Revised Cardiac Risk Index (Circ  100:1043, 1999), the patient's risk factors for cardiac complications include on insulin, putting her in: RCI RISK CLASS II (1 risk factor, risk of major cardiac compl  appr  1 3%)        Plan:      1  Preoperative workup as follows none    2  Change in medication regimen before surgery: discontinue NSAIDs (no insulin am of surgery ) 14 days before surgery, discontinue Metformin 24 hours before surgery and no insulin am of surgery  3  Prophylaxis for cardiac events with perioperative beta-blockers: should be considered, specific regimen per anesthesia  4  Invasive hemodynamic monitoring perioperatively: not indicated  5  Deep vein thrombosis prophylaxis postoperatively:regimen to be chosen by surgical team   6  Surveillance for postoperative MI with ECG immediately postoperatively and on postoperative days 1 and 2 AND troponin levels 24 hours postoperatively and on day 4 or hospital discharge (whichever comes first): not indicated  7  Other measures: Careful attention to perioperative glycemic control (check postop blood sugar)      At acceptable risk for proposed procedure

## 2018-05-23 ENCOUNTER — HOSPITAL ENCOUNTER (OUTPATIENT)
Dept: MAMMOGRAPHY | Facility: CLINIC | Age: 63
Discharge: HOME | End: 2018-05-23
Payer: MEDICARE

## 2018-05-23 ENCOUNTER — OFFICE VISIT (OUTPATIENT)
Dept: CARDIOLOGY CLINIC | Facility: CLINIC | Age: 63
End: 2018-05-23
Payer: MEDICARE

## 2018-05-23 VITALS — HEART RATE: 84 BPM | DIASTOLIC BLOOD PRESSURE: 72 MMHG | SYSTOLIC BLOOD PRESSURE: 136 MMHG

## 2018-05-23 VITALS
BODY MASS INDEX: 31.76 KG/M2 | HEIGHT: 64 IN | SYSTOLIC BLOOD PRESSURE: 126 MMHG | DIASTOLIC BLOOD PRESSURE: 62 MMHG | WEIGHT: 186 LBS | HEART RATE: 84 BPM | RESPIRATION RATE: 16 BRPM

## 2018-05-23 DIAGNOSIS — Z01.810 PREOP CARDIOVASCULAR EXAM: Primary | ICD-10-CM

## 2018-05-23 DIAGNOSIS — R07.9 CHEST PAIN, UNSPECIFIED TYPE: ICD-10-CM

## 2018-05-23 DIAGNOSIS — R00.2 PALPITATIONS: ICD-10-CM

## 2018-05-23 DIAGNOSIS — R92.0 MAMMOGRAPHIC MICROCALCIFICATION: ICD-10-CM

## 2018-05-23 DIAGNOSIS — E78.2 MIXED HYPERLIPIDEMIA: ICD-10-CM

## 2018-05-23 DIAGNOSIS — R92.1 BREAST CALCIFICATION SEEN ON MAMMOGRAM: ICD-10-CM

## 2018-05-23 DIAGNOSIS — I10 ESSENTIAL HYPERTENSION: ICD-10-CM

## 2018-05-23 PROCEDURE — 88361 TUMOR IMMUNOHISTOCHEM/COMPUT: CPT | Performed by: PATHOLOGY

## 2018-05-23 PROCEDURE — 88342 IMHCHEM/IMCYTCHM 1ST ANTB: CPT | Performed by: PATHOLOGY

## 2018-05-23 PROCEDURE — 19081 BX BREAST 1ST LESION STRTCTC: CPT

## 2018-05-23 PROCEDURE — 88341 IMHCHEM/IMCYTCHM EA ADD ANTB: CPT | Performed by: PATHOLOGY

## 2018-05-23 PROCEDURE — 88305 TISSUE EXAM BY PATHOLOGIST: CPT | Performed by: PATHOLOGY

## 2018-05-23 PROCEDURE — 99205 OFFICE O/P NEW HI 60 MIN: CPT | Performed by: INTERNAL MEDICINE

## 2018-05-23 PROCEDURE — 93000 ELECTROCARDIOGRAM COMPLETE: CPT | Performed by: INTERNAL MEDICINE

## 2018-05-23 RX ORDER — LIDOCAINE HYDROCHLORIDE 10 MG/ML
5 INJECTION, SOLUTION INFILTRATION; PERINEURAL ONCE
Status: COMPLETED | OUTPATIENT
Start: 2018-05-23 | End: 2018-05-23

## 2018-05-23 RX ORDER — LIDOCAINE HYDROCHLORIDE AND EPINEPHRINE BITARTRATE 20; .01 MG/ML; MG/ML
10 INJECTION, SOLUTION SUBCUTANEOUS ONCE
Status: COMPLETED | OUTPATIENT
Start: 2018-05-23 | End: 2018-05-23

## 2018-05-23 RX ORDER — PREDNISOLONE ACETATE 10 MG/ML
SUSPENSION/ DROPS OPHTHALMIC
Refills: 0 | Status: ON HOLD | COMMUNITY
Start: 2018-05-08 | End: 2018-08-02 | Stop reason: ALTCHOICE

## 2018-05-23 RX ORDER — ATROPINE SULFATE 10 MG/ML
2 SOLUTION/ DROPS OPHTHALMIC 2 TIMES DAILY
Refills: 0 | Status: ON HOLD | COMMUNITY
Start: 2018-05-09 | End: 2018-08-02 | Stop reason: ALTCHOICE

## 2018-05-23 RX ORDER — CIPROFLOXACIN HYDROCHLORIDE 3.5 MG/ML
SOLUTION/ DROPS TOPICAL
Refills: 0 | Status: ON HOLD | COMMUNITY
Start: 2018-05-08 | End: 2018-08-02 | Stop reason: ALTCHOICE

## 2018-05-23 RX ADMIN — LIDOCAINE HYDROCHLORIDE AND EPINEPHRINE 10 ML: 20; 10 INJECTION, SOLUTION INFILTRATION; PERINEURAL at 12:41

## 2018-05-23 RX ADMIN — LIDOCAINE HYDROCHLORIDE 5 ML: 10 INJECTION, SOLUTION INFILTRATION; PERINEURAL at 12:41

## 2018-05-23 NOTE — PROGRESS NOTES
Procedure type:    _____ultrasound guided __X___stereotactic    Breast:    _____Left ___X__Right    Location:    Needle:8ga Revolve    # of passes:5    Clip: Yogi    Performed by:Dr oTm Vernon    Pressure held for 5 minutes by:  Osmar Jett(PCA)    Steri Strips:    ___X__yes _____no    Band aid:    ___X__yes_____no    Tape and guaze:    ___X__yes _____no    Tolerated procedure:    __X___yes _____no

## 2018-05-23 NOTE — PROGRESS NOTES
Patient arrived via:    ___X__ambulatory    _____wheelchair    _____stretcher      Domestic violence screen    ____X__negative______positive    Breast Implants:    _______yes ______X__no

## 2018-05-23 NOTE — PROGRESS NOTES
POST LARGE CORE BREAST BIOPSY PATIENT INFORMATION      1  Place an ice pack inside your bra over the top of the dressing every hour for 20 minutes (20 minutes on, 60 minutes off) Do this until bedtime  2  Do not shower or bathe until the following morning       3  You may bathe your breast carefully with the steri-strips in place  Be careful    Not to loosen them  The steri-strips will fall off in 3-5 days  4  You may have mild discomfort, and you may have some bruising where the   Needle entered the skin  This should clear within 5-7 days  5  If you need medicine for discomfort, take acetaminophen products such as   Tylenol  You may also take Advil or Motrin products  6  Do not participate in strenuous activities such as-tennis, aerobics, skiing,  Weight lifting, etc  for 24 hours  Refrain from swimming for 72 hours  7  Wearing a bra for sleeping may be more comfortable for the first 24-48 hours  8  Watch for continued bleeding, pain or fever over 101     For procedures done at the [facility] call:  Ayleen Davenport RN at [phone] or  Francesco Breen RN at [phone]  After 4 PM call the Interventional Radiology Department at [phone] and ask to speak with the nurse on call  For procedures performed at [address] [place] [address] call: The Radiology Nurse at [phone]    After 4 PM call your physician, or go to the Emergency Department  9          The final results of your biopsy are usually available within one week  Ice pack given:    __x___yes _____no    Discharge instructions signed by patient:    __x___yes _____no    Discharge instructions given to patient:    ___x__yes _____no    Discharged via:    __x___amulatory    _____wheelchair    _____stretcher    Stable on discharge:    ___x__yes ____no

## 2018-05-23 NOTE — PROGRESS NOTES
Cardiology Follow Up    [address] [facility]  1955  [id number]  [address]  [address] [address]    1  Preop cardiovascular exam  POCT ECG   2  Chest pain, unspecified type  Echo stress test w contrast if indicated   3  Palpitations  Echo complete with contrast if indicated    Holter monitor - 48 hour   4  Essential hypertension     5  Mixed hyperlipidemia          History:  81-year-old Female with a past medical history significant for hypertension hypercholesterolemia and diabetes mellitus presents for evaluation of chest pain and palpitations  She initially complained to her primary physician on 05/09/2018 at which time cardiology referral was recommend She states the pain is mid and left sternal   It comes on at rest or with activity  It typically lasts up to a minute and resolves on its week ago  It is associated with shortness of breath  It is also associated with palpitations  She states at times she will note her heart beating fast for a number of seconds that comes and goes spontaneously does not occur every day  Denies orthopnea paroxysmal nocturnal dyspnea or syncope  History was obtained through an  and she was a relatively poor historian  Patient Active Problem List   Diagnosis    Type 2 diabetes mellitus without complication, without long-term current use of insulin ([facility] [address]    Asthma    Essential hypertension    Other specified hypothyroidism    Chest pain    Palpitations    Preop cardiovascular exam    Chronic bilateral low back pain without sciatica     Past Medical History:   Diagnosis Date    Asthma     Diabetes mellitus ([address] )     Hypertension      Social History     Social History    Marital status:      Spouse name: N/A    Number of children: N/A    Years of education: N/A     Occupational History    Not on file       Social History Main Topics    Smoking status: Never Smoker    Smokeless tobacco: Never Used    Alcohol use No    Drug use: No    Sexual activity: Not on file     Other Topics Concern    Not on file     Social History Narrative    No narrative on file      Family History   Problem Relation Age of Onset    Diabetes Mother     Hypertension Mother     Diabetes Father     Hypertension Father     Diabetes Brother     Hypertension Brother      Past Surgical History:   Procedure Laterality Date    BREAST SURGERY Left     CATARACT EXTRACTION, BILATERAL Bilateral     KNEE SURGERY Right     RETINAL DETACHMENT SURGERY Right     TUBAL LIGATION         Current Outpatient Prescriptions:     albuterol (PROVENTIL HFA,VENTOLIN HFA) 90 mcg/act inhaler, Inhale 1 puff every 6 (six) hours, Disp: , Rfl:     amLODIPine-atorvastatin (CADUET) 10-10 MG per tablet, Take 1 tablet by mouth daily, Disp: 90 tablet, Rfl: 1    atropine (ISOPTO ATROPINE) 1 % ophthalmic solution, , Disp: , Rfl: 0    ciprofloxacin (CILOXAN) 0 3 % ophthalmic solution, place 1 drop into right eye four times a day AFTER SURGERY, Disp: , Rfl: 0    enalapril (VASOTEC) 20 mg tablet, Take 1 tablet (20 mg total) by mouth daily, Disp: 90 tablet, Rfl: 1    fluticasone furoate-vilanterol (BREO ELLIPTA), Inhale 1 puff daily, Disp: 3 each, Rfl: 0    fluticasone-salmeterol (ADVAIR) 100-50 mcg/dose, Inhale 1 puff 2 (two) times a day, Disp: 3 Inhaler, Rfl: 1    gabapentin (NEURONTIN) 300 mg capsule, take 1 capsule by mouth three times a day, Disp: 90 capsule, Rfl: 1    glipiZIDE (GLUCOTROL XL) 10 mg 24 hr tablet, Take 1 tablet (10 mg total) by mouth daily, Disp: 90 tablet, Rfl: 1    HUMULIN N 100 UNIT/ML subcutaneous injection, , Disp: , Rfl: 0    Insulin Glargine (TOUJEO) injection pen 300 units/mL, Inject 10 Units under the skin daily at bedtime, Disp: 3 pen, Rfl: 1    insulin isophane (HumuLIN N,NovoLIN N) 100 Units/mL SUPN injection, 70 units in the am 30 units in the pm, Disp: 5 pen, Rfl: 1    Insulin Pen Needle (BD PEN NEEDLE NATALEE U/F) 32G X 4 MM MISC, USE ONE NEEDLE WITH INSULIN TOUJEO AT BEDTIME, Disp: 100 each, Rfl: 3    Insulin Syringe-Needle U-100 (B-D INS SYRINGE 0 5CC/30GX1/2") 30G X 1/2" 0 5 ML MISC, USE WITH INSULIN HUMULIN N VIAL TWICE A DAY, Disp: 100 each, Rfl: 3    levothyroxine 50 mcg tablet, Take 1 tablet (50 mcg total) by mouth daily, Disp: 90 tablet, Rfl: 1    metFORMIN (GLUCOPHAGE-XR) 500 mg 24 hr tablet, take 2 tablets by mouth once daily WITH BREAKFAST, Disp: , Rfl: 0    omeprazole (PriLOSEC) 20 mg delayed release capsule, Take 1 capsule (20 mg total) by mouth daily, Disp: 90 capsule, Rfl: 1    prednisoLONE acetate (PRED FORTE) 1 % ophthalmic suspension, instill 1 drop into right eye four times a day AFTER SURGERY, Disp: , Rfl: 0    gemfibrozil (LOPID) 600 mg tablet, Take 600 mg by mouth, Disp: , Rfl:     insulin glargine (LANTUS) 100 units/mL subcutaneous injection, Inject 10 Units under the skin daily at bedtime, Disp: 10 mL, Rfl: 0    metFORMIN (GLUMETZA) 1000 MG (MOD) 24 hr tablet, Take 1 tablet (1,000 mg total) by mouth daily with breakfast, Disp: 90 tablet, Rfl: 1    ranitidine (ZANTAC) 300 MG tablet, Take 300 mg by mouth, Disp: , Rfl:   No current facility-administered medications for this visit     Allergies   Allergen Reactions    Aspirin Hives    Tylenol With Codeine #3 [Acetaminophen-Codeine] Rash       Labs:  Office Visit on 03/27/2018   Component Date Value     HGB A1C 03/27/2018 10 9     WBC 05/01/2018 5 73     RBC 05/01/2018 4 10     Hemoglobin 05/01/2018 11 8     Hematocrit 05/01/2018 36 0     MCV 05/01/2018 88     MCH 05/01/2018 28 8     MCHC 05/01/2018 32 8     RDW 05/01/2018 13 4     MPV 05/01/2018 12 2     Platelets 44/50/4257 284     nRBC 05/01/2018 0     Neutrophils Relative 05/01/2018 52     Lymphocytes Relative 05/01/2018 38     Monocytes Relative 05/01/2018 7     Eosinophils Relative 05/01/2018 2     Basophils Relative 05/01/2018 1     Neutrophils Absolute 05/01/2018 2 95     Lymphocytes Absolute 05/01/2018 2 17     Monocytes Absolute 05/01/2018 0 41     Eosinophils Absolute 05/01/2018 0 13     Basophils Absolute 05/01/2018 0 06     Sodium 05/01/2018 137     Potassium 05/01/2018 4 2     Chloride 05/01/2018 101     CO2 05/01/2018 31     Anion Gap 05/01/2018 5     BUN 05/01/2018 16     Creatinine 05/01/2018 0 60     Glucose, Fasting 05/01/2018 211*    Calcium 05/01/2018 9 2     AST 05/01/2018 13     ALT 05/01/2018 23     Alkaline Phosphatase 05/01/2018 53     Total Protein 05/01/2018 7 7     Albumin 05/01/2018 3 3*    Total Bilirubin 05/01/2018 0 41     eGFR 05/01/2018 99     Cholesterol 05/01/2018 222*    Triglycerides 05/01/2018 173*    HDL, Direct 05/01/2018 48     LDL Calculated 05/01/2018 139*    Non-HDL-Chol (CHOL-HDL) 05/01/2018 174     Creatinine, Ur 05/01/2018 130 0     Microalbum  ,U,Random 05/01/2018 57 6*    Microalb Creat Ratio 05/01/2018 44*    TSH 3RD GENERATON 05/01/2018 3 950*    Free T4 05/01/2018 0 96      Imaging: Mammo Stereotactic Breast Biopsy Right (all Inc)    Result Date: 5/23/2018  Narrative: 71-year-old female, with increasing upper outer right breast calcifications  Mammo Stereotactic Breast Biopsy Right: May 23, 2018 - Check In #: [de-identified] Technologist: SANTIAGO Crystal (SANTIAGO)(M) Prior study comparison: May 15, 2018, mammo diagnostic bilateral W CAD performed at [address] [place] [address]  IMPRESSION:Stereotactic vacuum assisted biopsy of indeterminate upper outer right breast calcifications was performed as described above without incident  After informed consent was obtained, the patient was positioned on the stereotactic biopsy table and the upper outer right breast calcifications were identified with imaging in the craniocaudal projection  Digital stereotactic images were obtained and the coordinates for biopsy were set on the computer  Betadine was used as sterile prep and 1% lidocaine as local anesthetic  After a small skin incision was made, 8 gauge Mammotome needle was advanced into the breast from a superior approach  Pre and post-fire images were obtained revealing satisfactory positioning of the needle  The vacuum assisted rotation device was then used to obtain multiple core specimens  Radiography of the core specimens reveal calcification within several core samples  A MammoMARK ( bowtie shape) clip was deployed  The specimens were sent to Pathology for evaluation  The needle was removed and and adequate hemostasis applied to the puncture wound  The patient was taken to the mammography suite where lateral and craniocaudal views were obtained  These confirm satisfactory positioning of the biopsy clip  The biopsy was cleansed and a sterile Steri-strip was applied to the small puncture wound  Post-biopsy instructions were given to the patient  The patient tolerated the procedure well and left the department in satisfactory condition  Pathology Results: Pending Mammo Post Biopsy: Right Breast - May 23, 2018 - Check In #: [de-identified] CC and ML view(s) were taken of the right breast  Technologist: SANTIAGO Lazo (SANTIAGO)(FEDERICO) IMPRESSION:Post stereotactic biopsy  Recommendation: Pathology pending  Transcription Location: [address] [place] [address]: [id number]    Mammo Post Biopsy    Result Date: 5/23/2018  Narrative: 27-year-old female, with increasing upper outer right breast calcifications  Mammo Stereotactic Breast Biopsy Right: May 23, 2018 - Check In #: [de-identified] Technologist: SANTIAGO Lazo (SANTIAGO)(FEDERICO) Prior study comparison: May 15, 2018, mammo diagnostic bilateral W CAD performed at [address] [place] [address]  IMPRESSION:Stereotactic vacuum assisted biopsy of indeterminate upper outer right breast calcifications was performed as described above without incident   After informed consent was obtained, the patient was positioned on the stereotactic biopsy table and the upper outer right breast calcifications were identified with imaging in the craniocaudal projection  Digital stereotactic images were obtained and the coordinates for biopsy were set on the computer  Betadine was used as sterile prep and 1% lidocaine as local anesthetic  After a small skin incision was made, 8 gauge Mammotome needle was advanced into the breast from a superior approach  Pre and post-fire images were obtained revealing satisfactory positioning of the needle  The vacuum assisted rotation device was then used to obtain multiple core specimens  Radiography of the core specimens reveal calcification within several core samples  A MammoMARK ( bowtie shape) clip was deployed  The specimens were sent to Pathology for evaluation  The needle was removed and and adequate hemostasis applied to the puncture wound  The patient was taken to the mammography suite where lateral and craniocaudal views were obtained  These confirm satisfactory positioning of the biopsy clip  The biopsy was cleansed and a sterile Steri-strip was applied to the small puncture wound  Post-biopsy instructions were given to the patient  The patient tolerated the procedure well and left the department in satisfactory condition  Pathology Results: Pending Mammo Post Biopsy: Right Breast - May 23, 2018 - Check In #: [de-identified] CC and ML view(s) were taken of the right breast  Technologist: SANTIAGO Long (R)(M) IMPRESSION:Post stereotactic biopsy  Recommendation: Pathology pending  Transcription Location: [address] [place] [address]: [id number]    Mammo Diagnostic Bilateral W Cad    Result Date: 5/15/2018  Narrative: Patient History: Patient is postmenopausal  Family history of prostate cancer in brother, unknown cancer in maternal grandfather  Benign excisional biopsy of the left breast, 1980  Took hormonal contraceptives for 3 years  Patient has never smoked  Patient's BMI is 31 1   Reason for exam: clinical finding  Mammo Diagnostic Bilateral W CAD: May 15, 2018 - Check In #: [de-identified] Bilateral MLO and CC view(s) were taken  ML, spot compression MLO, spot compression CC, spot compression CC with magnification, and spot compression ML with magnification view(s) were taken of the left breast  Technologist: SANTIAGO Mcgovern T (R)(M) There are scattered fibroglandular densities  There is a palpable marker over the upper outer left breast without evident underlying abnormality  No suspicious mass, microcalcification, skin thickening or architectural distortion in the left breast  There is a grouping of calcifications in the upper outer right breast radiating to the nipple over an extent of 7 4 cm  These are coarse and heterogeneous in appearance and in a segmental distribution  In the absence of prior studies for comparison stereotactic biopsy of these calcifications is recommended  Targeted sonographic evaluation of the area of pain in the left breast 12 to 3 o'clock position left breast area of pain demonstrates a prominent island of fibroglandular tissue without solid mass or abnormal shadowing  I discussed these findings with the patient at time of examination  If prior studies arrive for comparison an addendum will be made to this report  US Breast Left Limited: May 15, 2018 - Check In #: [de-identified] Standard views  Technologist: RT Chau (R), RDMS ACR BI-RADS® Assessments: BiRad:4 - Suspicious (Overall) Diag Mammogram: BiRad:4 - suspicious abnormality in the right breast  Left breast Left Brst US: Left breast is BiRad:1 - negative  Recommendation: Stereotactic biopsy of the right breast  Clinical management of the left breast  Analyzed by CAD The patient is scheduled in a reminder system for screening mammography   Transcription Location: [address] [place], [place] [address]way: [id number] Risk Value(s): Tyrer-Cuzick 10 Year: 3 200%, Tyrer-Cuzick Lifetime: 7 600%, Myriad Table: 1 5%, DEBBIE 5 Year: 1 2%, NCI Lifetime: 6 1%    Us Breast Left Limited (diagnostic)    Result Date: 5/15/2018  Narrative: Patient History: Patient is postmenopausal  Family history of prostate cancer in brother, unknown cancer in maternal grandfather  Benign excisional biopsy of the left breast, 1980  Took hormonal contraceptives for 3 years  Patient has never smoked  Patient's BMI is 31 1  Reason for exam: clinical finding  Mammo Diagnostic Bilateral W CAD: May 15, 2018 - Check In #: [de-identified] Bilateral MLO and CC view(s) were taken  ML, spot compression MLO, spot compression CC, spot compression CC with magnification, and spot compression ML with magnification view(s) were taken of the left breast  Technologist: SANTIAGO Elliott (R)(M) There are scattered fibroglandular densities  There is a palpable marker over the upper outer left breast without evident underlying abnormality  No suspicious mass, microcalcification, skin thickening or architectural distortion in the left breast  There is a grouping of calcifications in the upper outer right breast radiating to the nipple over an extent of 7 4 cm  These are coarse and heterogeneous in appearance and in a segmental distribution  In the absence of prior studies for comparison stereotactic biopsy of these calcifications is recommended  Targeted sonographic evaluation of the area of pain in the left breast 12 to 3 o'clock position left breast area of pain demonstrates a prominent island of fibroglandular tissue without solid mass or abnormal shadowing  I discussed these findings with the patient at time of examination  If prior studies arrive for comparison an addendum will be made to this report  US Breast Left Limited: May 15, 2018 - Check In #: [de-identified] Standard views  Technologist: RT Kwasi (R), RDMS ACR BI-RADS® Assessments: BiRad:4 - Suspicious (Overall) Diag Mammogram: BiRad:4 - suspicious abnormality in the right breast  Left breast Left Brst US: Left breast is BiRad:1 - negative  Recommendation: Stereotactic biopsy of the right breast  Clinical management of the left breast  Analyzed by CAD The patient is scheduled in a reminder system for screening mammography  Transcription Location: [facility]: [id number] Risk Value(s): Tyrer-Cuzick 10 Year: 3 200%, Tyrer-Cuzick Lifetime: 7 600%, Myriad Table: 1 5%, DEBBIE 5 Year: 1 2%, NCI Lifetime: 6 1%      Review of Systems:  Review of Systems   Constitutional: Positive for appetite change and fatigue  HENT: Negative for hearing loss  Eyes: Positive for visual disturbance  Negative for discharge  Cardiovascular: Positive for chest pain  Negative for palpitations and leg swelling  Gastrointestinal: Positive for constipation  Negative for abdominal pain  Endocrine: Negative for polyuria  Genitourinary: Positive for frequency  Negative for dysuria  Musculoskeletal: Positive for back pain  Negative for myalgias  Skin: Negative for rash  Neurological: Negative for syncope  Hematological: Does not bruise/bleed easily  Psychiatric/Behavioral: Positive for decreased concentration and sleep disturbance  Negative for confusion  The patient is nervous/anxious  Physical Exam:  Physical Exam   Constitutional: She is oriented to person, place, and time  She appears well-developed and well-nourished  HENT:   Head: Normocephalic and atraumatic  Mouth/Throat: Oropharynx is clear and moist    Eyes: EOM are normal    Neck: Normal range of motion  Neck supple  No JVD present  Cardiovascular: Normal rate, regular rhythm, normal heart sounds and intact distal pulses  Pulmonary/Chest: Effort normal and breath sounds normal    Abdominal: Soft  Bowel sounds are normal    Musculoskeletal: Normal range of motion  Neurological: She is alert and oriented to person, place, and time  Skin: Skin is warm and dry  Psychiatric: She has a normal mood and affect   Her behavior is normal  Judgment and thought content normal  Nursing note and vitals reviewed  Discussion/Summary:  Claudio Avitia has multiple risk factors for coronary artery disease  She is a diabetic and is maintained on metformin and insulin  She has hypertension controlled by amlodipine and enalapril  She is also on June 5 resolved  Her chest pain has both typical and atypical features  In the setting of her risk factors I will order a stress echocardiogram to rule out obstructive coronary artery disease  I will also check a regular 2D echocardiogram at that time  In light of her palpitations I will place a 48 hour Holter monitor  I will then see her back to review all studies  I should note she had a preoperative assessment for a cataract removal June 5th  As this is relatively minor surgery I am not concerned that she have a workup pre or post cataract removal however we will get the studies as soon as possible  I will see her back to follow-up and make further recommendations

## 2018-05-25 NOTE — PROGRESS NOTES
Post procedure call completed 5/24/18 by Sina Zuniga      Bleeding: _____yes __x___no    Pain: _____yes ___x___no    Redness/Swelling: ______yes __x____no    Band aid removed: _x____yes _____no    Steri-Strips intact: ___x___yes _____no

## 2018-05-29 ENCOUNTER — TELEPHONE (OUTPATIENT)
Dept: FAMILY MEDICINE CLINIC | Facility: CLINIC | Age: 63
End: 2018-05-29

## 2018-05-29 ENCOUNTER — TELEPHONE (OUTPATIENT)
Dept: MAMMOGRAPHY | Facility: CLINIC | Age: 63
End: 2018-05-29

## 2018-05-29 NOTE — PROGRESS NOTES
Patient Past Medical History:  Asthma, HTN, DM, Hypothyroid, High cholesterol, Depression        Allergies: Tylenol w/codeine - HIVES        Past Breast History:     Previous Biopsy:   Yes___x___ No________(excisional)      Breast: Right____ Left___x___       Malignant______ Benign___x____(1980)      Treatments if applicable        Present Breast History:     Right____x______    Left_____________     Density_________    Calcifications______x______   Palpable______________      Patient notified of results on:  5/29/18    Date of Appointment with Surgeon Dr Sally Andres on 6/8/18 @ 2pm

## 2018-05-31 ENCOUNTER — TELEPHONE (OUTPATIENT)
Dept: FAMILY MEDICINE CLINIC | Facility: CLINIC | Age: 63
End: 2018-05-31

## 2018-05-31 DIAGNOSIS — G89.29 CHRONIC BILATERAL LOW BACK PAIN WITHOUT SCIATICA: Primary | ICD-10-CM

## 2018-05-31 DIAGNOSIS — M54.50 CHRONIC BILATERAL LOW BACK PAIN WITHOUT SCIATICA: Primary | ICD-10-CM

## 2018-05-31 NOTE — TELEPHONE ENCOUNTER
Radiology called RE: Physical Therapy Associated Dx is: Cervical Cancer Screening Z12 4 can you please change it on the order    Thank you

## 2018-06-05 ENCOUNTER — HOSPITAL ENCOUNTER (OUTPATIENT)
Dept: NON INVASIVE DIAGNOSTICS | Facility: HOSPITAL | Age: 63
Discharge: HOME | End: 2018-06-05
Attending: INTERNAL MEDICINE
Payer: MEDICARE

## 2018-06-05 DIAGNOSIS — R00.2 PALPITATIONS: ICD-10-CM

## 2018-06-05 DIAGNOSIS — R07.9 CHEST PAIN, UNSPECIFIED TYPE: ICD-10-CM

## 2018-06-05 PROCEDURE — 93225 XTRNL ECG REC<48 HRS REC: CPT

## 2018-06-05 PROCEDURE — 93350 STRESS TTE ONLY: CPT

## 2018-06-05 PROCEDURE — 93351 STRESS TTE COMPLETE: CPT | Performed by: INTERNAL MEDICINE

## 2018-06-05 PROCEDURE — 93306 TTE W/DOPPLER COMPLETE: CPT

## 2018-06-05 PROCEDURE — 93226 XTRNL ECG REC<48 HR SCAN A/R: CPT

## 2018-06-05 PROCEDURE — 93306 TTE W/DOPPLER COMPLETE: CPT | Performed by: INTERNAL MEDICINE

## 2018-06-06 ENCOUNTER — OFFICE VISIT (OUTPATIENT)
Dept: CARDIOLOGY CLINIC | Facility: CLINIC | Age: 63
End: 2018-06-06
Payer: MEDICARE

## 2018-06-06 ENCOUNTER — TELEPHONE (OUTPATIENT)
Dept: GASTROENTEROLOGY | Facility: MEDICAL CENTER | Age: 63
End: 2018-06-06

## 2018-06-06 VITALS
SYSTOLIC BLOOD PRESSURE: 128 MMHG | DIASTOLIC BLOOD PRESSURE: 60 MMHG | HEIGHT: 64 IN | HEART RATE: 88 BPM | WEIGHT: 191 LBS | BODY MASS INDEX: 32.61 KG/M2 | RESPIRATION RATE: 16 BRPM

## 2018-06-06 DIAGNOSIS — R07.9 CHEST PAIN, UNSPECIFIED TYPE: Primary | ICD-10-CM

## 2018-06-06 DIAGNOSIS — I10 ESSENTIAL HYPERTENSION: ICD-10-CM

## 2018-06-06 LAB
ARRHY DURING EX: NORMAL
CHEST PAIN STATEMENT: NORMAL
MAX DIASTOLIC BP: 67 MMHG
MAX HEART RATE: 153 BPM
MAX PREDICTED HEART RATE: 159 BPM
MAX. SYSTOLIC BP: 175 MMHG
PROTOCOL NAME: NORMAL
TARGET HR FORMULA: NORMAL
TIME IN EXERCISE PHASE: NORMAL

## 2018-06-06 PROCEDURE — 99214 OFFICE O/P EST MOD 30 MIN: CPT | Performed by: INTERNAL MEDICINE

## 2018-06-06 NOTE — PROGRESS NOTES
Cardiology Follow Up    [address] [facility]  1955  [phone]  [address]  [address]    1  Chest pain, unspecified type     2  Essential hypertension         Interval History:  She has no further chest pain  She has very infrequent palpitations  She denies shortness of breath orthopnea paroxysmal nocturnal dyspnea or syncope    Patient Active Problem List   Diagnosis    Type 2 diabetes mellitus without complication, without long-term current use of insulin ([facility] )    Asthma    Essential hypertension    Other specified hypothyroidism    Chest pain    Palpitations    Preop cardiovascular exam    Chronic bilateral low back pain without sciatica     Past Medical History:   Diagnosis Date    Asthma     Diabetes mellitus ([facility] )     Hypertension      Social History     Social History    Marital status:      Spouse name: N/A    Number of children: N/A    Years of education: N/A     Occupational History    Not on file       Social History Main Topics    Smoking status: Never Smoker    Smokeless tobacco: Never Used    Alcohol use No    Drug use: No    Sexual activity: Not on file     Other Topics Concern    Not on file     Social History Narrative    No narrative on file      Family History   Problem Relation Age of Onset    Diabetes Mother     Hypertension Mother     Diabetes Father     Hypertension Father     Diabetes Brother     Hypertension Brother      Past Surgical History:   Procedure Laterality Date    BREAST SURGERY Left     CATARACT EXTRACTION, BILATERAL Bilateral     KNEE SURGERY Right     RETINAL DETACHMENT SURGERY Right     TUBAL LIGATION         Current Outpatient Prescriptions:     albuterol (PROVENTIL HFA,VENTOLIN HFA) 90 mcg/act inhaler, Inhale 1 puff every 6 (six) hours, Disp: , Rfl:     amLODIPine-atorvastatin (CADUET) 10-10 MG per tablet, Take 1 tablet by mouth daily, Disp: 90 tablet, Rfl: 1    atropine (ISOPTO ATROPINE) 1 % ophthalmic solution, , Disp: , Rfl: 0    ciprofloxacin (CILOXAN) 0 3 % ophthalmic solution, place 1 drop into right eye four times a day AFTER SURGERY, Disp: , Rfl: 0    enalapril (VASOTEC) 20 mg tablet, Take 1 tablet (20 mg total) by mouth daily, Disp: 90 tablet, Rfl: 1    fluticasone furoate-vilanterol (BREO ELLIPTA), Inhale 1 puff daily, Disp: 3 each, Rfl: 0    fluticasone-salmeterol (ADVAIR) 100-50 mcg/dose, Inhale 1 puff 2 (two) times a day, Disp: 3 Inhaler, Rfl: 1    gabapentin (NEURONTIN) 300 mg capsule, take 1 capsule by mouth three times a day, Disp: 90 capsule, Rfl: 1    gemfibrozil (LOPID) 600 mg tablet, Take 600 mg by mouth, Disp: , Rfl:     glipiZIDE (GLUCOTROL XL) 10 mg 24 hr tablet, Take 1 tablet (10 mg total) by mouth daily, Disp: 90 tablet, Rfl: 1    HUMULIN N 100 UNIT/ML subcutaneous injection, , Disp: , Rfl: 0    insulin glargine (LANTUS) 100 units/mL subcutaneous injection, Inject 10 Units under the skin daily at bedtime, Disp: 10 mL, Rfl: 0    Insulin Pen Needle (BD PEN NEEDLE NATALEE U/F) 32G X 4 MM MISC, USE ONE NEEDLE WITH INSULIN TOUJEO AT BEDTIME, Disp: 100 each, Rfl: 3    Insulin Syringe-Needle U-100 (B-D INS SYRINGE 0 5CC/30GX1/2") 30G X 1/2" 0 5 ML MISC, USE WITH INSULIN HUMULIN N VIAL TWICE A DAY, Disp: 100 each, Rfl: 3    levothyroxine 50 mcg tablet, Take 1 tablet (50 mcg total) by mouth daily, Disp: 90 tablet, Rfl: 1    metFORMIN (GLUCOPHAGE-XR) 500 mg 24 hr tablet, take 2 tablets by mouth once daily WITH BREAKFAST, Disp: , Rfl: 0    metFORMIN (GLUMETZA) 1000 MG (MOD) 24 hr tablet, Take 1 tablet (1,000 mg total) by mouth daily with breakfast, Disp: 90 tablet, Rfl: 1    omeprazole (PriLOSEC) 20 mg delayed release capsule, Take 1 capsule (20 mg total) by mouth daily, Disp: 90 capsule, Rfl: 1    prednisoLONE acetate (PRED FORTE) 1 % ophthalmic suspension, instill 1 drop into right eye four times a day AFTER SURGERY, Disp: , Rfl: 0    ranitidine (ZANTAC) 300 MG tablet, Take 300 mg by mouth, Disp: , Rfl:     Insulin Glargine (TOUJEO) injection pen 300 units/mL, Inject 10 Units under the skin daily at bedtime, Disp: 3 pen, Rfl: 1  Allergies   Allergen Reactions    Aspirin Hives    Tylenol With Codeine #3 [Acetaminophen-Codeine] Rash       Labs:  Hospital Outpatient Visit on 06/05/2018   Component Date Value    Protocol Name 06/05/2018 ECHO SAVANA     Time In Exercise Phase 06/05/2018 00:04:46     MAX  SYSTOLIC BP 67/15/2928 247     Max Diastolic Bp 52/66/5919 67     Max Heart Rate 06/05/2018 153     Max Predicted Heart Rate 06/05/2018 159     Reason for Termination 06/05/2018                      Value:Fatigue  Dyspnea  Target Heart Rate Achieved      Test Indication 06/05/2018                      Value:Dyspnea with Exercise  Chest Discomfort      Target Hr Formular 06/05/2018 (220 - Age)*85%     Arrhy During Ex 06/05/2018 ventricular premature beats     Chest Pain Statement 06/05/2018 none    Hospital Outpatient Visit on 05/23/2018   Component Date Value    Case Report 05/23/2018                      Value:Surgical Pathology Report                         Case: [id number]                                   Authorizing Provider:  Jorgito Vieira MD         Collected:           05/23/2018 1259              Ordering Location:     [address] [place] [address] Breast Received:            05/23/2018  [facility]                                                                       Pathologist:           Ernie Cage MD                                                        Specimen:    Breast, Right Upper Outer, 8ga;sup cc;all cores with calcs;bowtie clip                     Final Diagnosis 05/23/2018                      Value: This result contains rich text formatting which cannot be displayed here   Note 05/23/2018                      Value: This result contains rich text formatting which cannot be displayed here   Additional Information 05/23/2018                      Value: This result contains rich text formatting which cannot be displayed here  [employer] Gross Description 05/23/2018                      Value: This result contains rich text formatting which cannot be displayed here   Clinical Information 05/23/2018                      Value:Fixed in formalin     Imaging: Mammo Stereotactic Breast Biopsy Right (all Inc)    Result Date: 5/23/2018  Narrative: 20-year-old female, with increasing upper outer right breast calcifications  Mammo Stereotactic Breast Biopsy Right: May 23, 2018 - Check In #: [de-identified] Technologist: SANTIAGO Chavez (SANTIAGO)(M) Prior study comparison: May 15, 2018, mammo diagnostic bilateral W CAD performed at [address] [place] [address]  IMPRESSION:Stereotactic vacuum assisted biopsy of indeterminate upper outer right breast calcifications was performed as described above without incident  After informed consent was obtained, the patient was positioned on the stereotactic biopsy table and the upper outer right breast calcifications were identified with imaging in the craniocaudal projection  Digital stereotactic images were obtained and the coordinates for biopsy were set on the computer  Betadine was used as sterile prep and 1% lidocaine as local anesthetic  After a small skin incision was made, 8 gauge Mammotome needle was advanced into the breast from a superior approach  Pre and post-fire images were obtained revealing satisfactory positioning of the needle  The vacuum assisted rotation device was then used to obtain multiple core specimens  Radiography of the core specimens reveal calcification within several core samples  A MammoMARK ( bowtie shape) clip was deployed  The specimens were sent to Pathology for evaluation  The needle was removed and and adequate hemostasis applied to the puncture wound    The patient was taken to the mammography suite where lateral and craniocaudal views were obtained  These confirm satisfactory positioning of the biopsy clip  The biopsy was cleansed and a sterile Steri-strip was applied to the small puncture wound  Post-biopsy instructions were given to the patient  The patient tolerated the procedure well and left the department in satisfactory condition  Pathology Results: Pending Mammo Post Biopsy: Right Breast - May 23, 2018 - Check In #: [de-identified] CC and ML view(s) were taken of the right breast  Technologist: SANTIAGO Mullen (SANTIAGO)(M) IMPRESSION:Post stereotactic biopsy  Recommendation: Pathology pending  Transcription Location: [address] [place] [address]: [address] [place] [address] Pathology Report    Result Date: 5/25/2018  Narrative: Mammo Pathology Letter: Right Breast - May 23, 2018 - Check In #: [de-identified] DEAR DR Alba Hanna Thank you for the recent referral of the above named patient to [place] [address] for right breast stereotactic biopsy  The results of her recent procedure have been made available to me from pathology  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, micropapillary type, 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  The pathology is concordant with the radiographic appearance  The recommendation is for  surgical consultation  Our nurse navigators, depending on your known office preferences, will ensure that the results and recommendations have been communicated to the patient  If there is any clarification needed, please do not hesitate to contact the [address] at [phone]  Thank you, FEDERICO Raphael  Transcription Location: [address] [place] [address]: [id number]    Mammo Post Biopsy    Result Date: 5/23/2018  Narrative: 61-year-old female, with increasing upper outer right breast calcifications   Mammo Stereotactic Breast Biopsy Right: May 23, 2018 - Check In #: [de-identified] Technologist: SANTIAGO Mullen (SANTIAGO)(M) Prior study comparison: May 15, 2018, mammo diagnostic bilateral W CAD performed at [address] [place] [address]  IMPRESSION:Stereotactic vacuum assisted biopsy of indeterminate upper outer right breast calcifications was performed as described above without incident  After informed consent was obtained, the patient was positioned on the stereotactic biopsy table and the upper outer right breast calcifications were identified with imaging in the craniocaudal projection  Digital stereotactic images were obtained and the coordinates for biopsy were set on the computer  Betadine was used as sterile prep and 1% lidocaine as local anesthetic  After a small skin incision was made, 8 gauge Mammotome needle was advanced into the breast from a superior approach  Pre and post-fire images were obtained revealing satisfactory positioning of the needle  The vacuum assisted rotation device was then used to obtain multiple core specimens  Radiography of the core specimens reveal calcification within several core samples  A MammoMARK ( bowtie shape) clip was deployed  The specimens were sent to Pathology for evaluation  The needle was removed and and adequate hemostasis applied to the puncture wound  The patient was taken to the mammography suite where lateral and craniocaudal views were obtained  These confirm satisfactory positioning of the biopsy clip  The biopsy was cleansed and a sterile Steri-strip was applied to the small puncture wound  Post-biopsy instructions were given to the patient  The patient tolerated the procedure well and left the department in satisfactory condition  Pathology Results: Pending Mammo Post Biopsy: Right Breast - May 23, 2018 - Check In #: [de-identified] CC and ML view(s) were taken of the right breast  Technologist: SANTIAGO Floyd (R)(M) IMPRESSION:Post stereotactic biopsy  Recommendation: Pathology pending   Transcription Location: [address] [place] [address]: [id number]    Mammo Diagnostic Bilateral W Cad    Addendum Date: 5/24/2018 Addendum:   ADDENDUM: ADDENDUM:Outside studies from 9/11/2015 are now available for comparison  There is no change to the original report following review of these images  Patient History: Patient is postmenopausal  Family history of prostate cancer in brother, unknown cancer in maternal grandfather  Benign excisional biopsy of the left breast, 1980  Took hormonal contraceptives for 3 years  Patient has never smoked  Patient's BMI is 31 1  Reason for exam: clinical finding  Mammo Diagnostic Bilateral W CAD: May 15, 2018 - Check In #: [de-identified] Bilateral MLO and CC view(s) were taken  ML, spot compression MLO, spot compression CC, spot compression CC with magnification, and spot compression ML with magnification view(s) were taken of the left breast  Technologist: SANTIAGO Hermosillo (R)(M) There are scattered fibroglandular densities  There is a palpable marker over the upper outer left breast without evident underlying abnormality  No suspicious mass, microcalcification, skin thickening or architectural distortion in the left breast  There is a grouping of calcifications in the upper outer right breast radiating to the nipple over an extent of 7 4 cm  These are coarse and heterogeneous in appearance and in a segmental distribution  In the absence of prior studies for comparison stereotactic biopsy of these calcifications is recommended  Targeted sonographic evaluation of the area of pain in the left breast 12 to 3 o'clock position left breast area of pain demonstrates a prominent island of fibroglandular tissue without solid mass or abnormal shadowing  I discussed these findings with the patient at time of examination  If prior studies arrive for comparison an addendum will be made to this report  US Breast Left Limited: May 15, 2018 - Check In #: [de-identified] Standard views   Technologist: RT Consuelo (R), KRYSTAMS ACR BI-RADS® Assessments: BiRad:4 - Suspicious (Overall) Diag Mammogram: BiRad:4 - suspicious abnormality in the right breast  Left breast Left Brst US: Left breast is BiRad:1 - negative  Recommendation: Stereotactic biopsy of the right breast  Clinical management of the left breast  Analyzed by CAD The patient is scheduled in a reminder system for screening mammography  Transcription Location: [address] Signing Station: [id number] Risk Value(s): Tyrer-Cuzick 10 Year: 3 200%, Tyrer-Cuzick Lifetime: 7 600%, Myriad Table: 1 5%, DEBBIE 5 Year: 1 2%, NCI Lifetime: 6 1%    Result Date: 5/24/2018  Narrative: Patient History: Patient is postmenopausal  Family history of prostate cancer in brother, unknown cancer in maternal grandfather  Benign excisional biopsy of the left breast, 1980  Took hormonal contraceptives for 3 years  Patient has never smoked  Patient's BMI is 31 1  Reason for exam: clinical finding  Mammo Diagnostic Bilateral W CAD: May 15, 2018 - Check In #: [de-identified] Bilateral MLO and CC view(s) were taken  ML, spot compression MLO, spot compression CC, spot compression CC with magnification, and spot compression ML with magnification view(s) were taken of the left breast  Technologist: SANTIAGO Mckeon (R)(M) There are scattered fibroglandular densities  There is a palpable marker over the upper outer left breast without evident underlying abnormality  No suspicious mass, microcalcification, skin thickening or architectural distortion in the left breast  There is a grouping of calcifications in the upper outer right breast radiating to the nipple over an extent of 7 4 cm  These are coarse and heterogeneous in appearance and in a segmental distribution  In the absence of prior studies for comparison stereotactic biopsy of these calcifications is recommended  Targeted sonographic evaluation of the area of pain in the left breast 12 to 3 o'clock position left breast area of pain demonstrates a prominent island of fibroglandular tissue without solid mass or abnormal shadowing   I discussed these findings with the patient at time of examination  If prior studies arrive for comparison an addendum will be made to this report  US Breast Left Limited: May 15, 2018 - Check In #: [de-identified] Standard views  Technologist: RT Lyla (R), GUME ACR BI-RADS® Assessments: BiRad:4 - Suspicious (Overall) Diag Mammogram: BiRad:4 - suspicious abnormality in the right breast  Left breast Left Brst US: Left breast is BiRad:1 - negative  Recommendation: Stereotactic biopsy of the right breast  Clinical management of the left breast  Analyzed by CAD The patient is scheduled in a reminder system for screening mammography  Transcription Location: [address] [place] [address] [place]: [id number] Risk Value(s): Tyrer-Cuzick 10 Year: 3 200%, Tyrer-Cuzick Lifetime: 7 600%, Myriad Table: 1 5%, DEBBIE 5 Year: 1 2%, NCI Lifetime: 6 1%    Us Breast Left Limited (diagnostic)    Addendum Date: 5/24/2018 Addendum:   ADDENDUM: ADDENDUM:Outside studies from 9/11/2015 are now available for comparison  There is no change to the original report following review of these images  Patient History: Patient is postmenopausal  Family history of prostate cancer in brother, unknown cancer in maternal grandfather  Benign excisional biopsy of the left breast, 1980  Took hormonal contraceptives for 3 years  Patient has never smoked  Patient's BMI is 31 1  Reason for exam: clinical finding  Mammo Diagnostic Bilateral W CAD: May 15, 2018 - Check In #: [de-identified] Bilateral MLO and CC view(s) were taken  ML, spot compression MLO, spot compression CC, spot compression CC with magnification, and spot compression ML with magnification view(s) were taken of the left breast  Technologist: SANTIAGO Nugent (R)(M) There are scattered fibroglandular densities  There is a palpable marker over the upper outer left breast without evident underlying abnormality    No suspicious mass, microcalcification, skin thickening or architectural distortion in the left breast  There is a grouping of calcifications in the upper outer right breast radiating to the nipple over an extent of 7 4 cm  These are coarse and heterogeneous in appearance and in a segmental distribution  In the absence of prior studies for comparison stereotactic biopsy of these calcifications is recommended  Targeted sonographic evaluation of the area of pain in the left breast 12 to 3 o'clock position left breast area of pain demonstrates a prominent island of fibroglandular tissue without solid mass or abnormal shadowing  I discussed these findings with the patient at time of examination  If prior studies arrive for comparison an addendum will be made to this report  US Breast Left Limited: May 15, 2018 - Check In #: [de-identified] Standard views  Technologist: Naseem Oliveira, RT (R), RDMS ACR BI-RADS® Assessments: BiRad:4 - Suspicious (Overall) Diag Mammogram: BiRad:4 - suspicious abnormality in the right breast  Left breast Left Brst US: Left breast is BiRad:1 - negative  Recommendation: Stereotactic biopsy of the right breast  Clinical management of the left breast  Analyzed by CAD The patient is scheduled in a reminder system for screening mammography  Transcription Location: [facility] Signing Station: [id number] Risk Value(s): Tyrer-Cuzick 10 Year: 3 200%, Tyrer-Cuzick Lifetime: 7 600%, Myriad Table: 1 5%, DEBBIE 5 Year: 1 2%, NCI Lifetime: 6 1%    Result Date: 5/24/2018  Narrative: Patient History: Patient is postmenopausal  Family history of prostate cancer in brother, unknown cancer in maternal grandfather  Benign excisional biopsy of the left breast, 1980  Took hormonal contraceptives for 3 years  Patient has never smoked  Patient's BMI is 31 1  Reason for exam: clinical finding  Mammo Diagnostic Bilateral W CAD: May 15, 2018 - Check In #: [de-identified] Bilateral MLO and CC view(s) were taken    ML, spot compression MLO, spot compression CC, spot compression CC with magnification, and spot compression ML with magnification view(s) were taken of the left breast  Technologist: SANTIAGO Nicholson T (R)(M) There are scattered fibroglandular densities  There is a palpable marker over the upper outer left breast without evident underlying abnormality  No suspicious mass, microcalcification, skin thickening or architectural distortion in the left breast  There is a grouping of calcifications in the upper outer right breast radiating to the nipple over an extent of 7 4 cm  These are coarse and heterogeneous in appearance and in a segmental distribution  In the absence of prior studies for comparison stereotactic biopsy of these calcifications is recommended  Targeted sonographic evaluation of the area of pain in the left breast 12 to 3 o'clock position left breast area of pain demonstrates a prominent island of fibroglandular tissue without solid mass or abnormal shadowing  I discussed these findings with the patient at time of examination  If prior studies arrive for comparison an addendum will be made to this report  US Breast Left Limited: May 15, 2018 - Check In #: [de-identified] Standard views  Technologist: RT Debbie (R), GUME ACR BI-RADS® Assessments: BiRad:4 - Suspicious (Overall) Diag Mammogram: BiRad:4 - suspicious abnormality in the right breast  Left breast Left Brst US: Left breast is BiRad:1 - negative  Recommendation: Stereotactic biopsy of the right breast  Clinical management of the left breast  Analyzed by CAD The patient is scheduled in a reminder system for screening mammography  Transcription Location: [address] [place] [address]: [id number] Risk Value(s): Tyrer-Cuzick 10 Year: 3 200%, Tyrer-Cuzick Lifetime: 7 600%, Myriad Table: 1 5%, DEBBIE 5 Year: 1 2%, NCI Lifetime: 6 1%      Review of Systems:  Review of Systems   Constitutional: Positive for fatigue  HENT: Negative for hearing loss  Eyes: Negative for discharge  Respiratory: Negative for shortness of breath      Cardiovascular: Negative for chest pain, palpitations and leg swelling  Gastrointestinal: Negative for abdominal pain  Endocrine: Negative for polyuria  Genitourinary: Positive for frequency  Negative for dysuria  Musculoskeletal: Negative for back pain and myalgias  Skin: Negative for rash  Neurological: Positive for headaches  Negative for syncope  Hematological: Does not bruise/bleed easily  Psychiatric/Behavioral: Negative for confusion and sleep disturbance  Physical Exam:  Physical Exam   Constitutional: She is oriented to person, place, and time  She appears well-developed and well-nourished  HENT:   Head: Normocephalic and atraumatic  Mouth/Throat: Oropharynx is clear and moist    Eyes: EOM are normal    Neck: Normal range of motion  Neck supple  No JVD present  Cardiovascular: Normal rate, regular rhythm, normal heart sounds and intact distal pulses  Pulmonary/Chest: Effort normal and breath sounds normal    Abdominal: Soft  Bowel sounds are normal    Musculoskeletal: Normal range of motion  Neurological: She is alert and oriented to person, place, and time  Skin: Skin is warm and dry  Psychiatric: She has a normal mood and affect  Her behavior is normal  Judgment and thought content normal    Nursing note and vitals reviewed  Discussion/Summary:  I reviewed her echocardiogram and her stress test with her  Echocardiogram showed normal left her size and function and no significant valvular pathology  Stress echocardiogram was negative for ischemia  She currently has her Holter monitor on  I told her I would look at this and call her by phone to review  I will see her again as needed

## 2018-06-08 ENCOUNTER — OFFICE VISIT (OUTPATIENT)
Dept: SURGICAL ONCOLOGY | Facility: CLINIC | Age: 63
End: 2018-06-08
Payer: MEDICARE

## 2018-06-08 VITALS
SYSTOLIC BLOOD PRESSURE: 120 MMHG | HEART RATE: 94 BPM | DIASTOLIC BLOOD PRESSURE: 78 MMHG | RESPIRATION RATE: 16 BRPM | HEIGHT: 64 IN | WEIGHT: 187 LBS | TEMPERATURE: 98.6 F | BODY MASS INDEX: 31.92 KG/M2

## 2018-06-08 DIAGNOSIS — D05.11 DUCTAL CARCINOMA IN SITU (DCIS) OF RIGHT BREAST: Primary | ICD-10-CM

## 2018-06-08 PROCEDURE — 99205 OFFICE O/P NEW HI 60 MIN: CPT | Performed by: SURGERY

## 2018-06-08 NOTE — PROGRESS NOTES
Surgical Oncology Follow Up       [facility]  [address]  1955  [id number]  [facility]  [address] [place] [address]    Chief Complaint   Patient presents with    Breast Problem       Assessment/Plan:    No problem-specific Assessment & Plan notes found for this encounter  Diagnoses and all orders for this visit:    Ductal carcinoma in situ (DCIS) of right breast  -     Case request operating room: LUMPECTOMY BREAST NEEDLE LOCALIZED; Standing  -     Basic metabolic panel; Future  -     APTT; Future  -     CBC and Platelet; Future  -     Protime-INR; Future  -     EKG 12 lead; Future  -     XR chest pa & lateral; Future  -     Case request operating room: LUMPECTOMY BREAST NEEDLE LOCALIZED    Other orders  -     Incentive spirometry; Standing  -     Insert and maintain IV line; Standing  -     Void On-Call to O R ; Standing  -     Place sequential compression device; Standing        Advance Care Planning/Advance Directives:  Discussed disease status, cancer treatment plans and/or cancer treatment goals with the patient  No history exists  History of Present Illness:   Patient is 20-year-old woman who was being worked up for breast pain  She underwent mammogram which cleared the left breast which is where she initially reported the symptoms  However she was found to have areas of calcification in the upper outer quadrant of the right breast   This led to biopsies which confirmed DCIS  She has had a history of left-sided breast biopsy in the past   She was 9 at age of menarche  Four pregnancies 4 live births  She is 22 refer strap was born  She is birth control pills in the past   She has never used hormone replacement therapy  Review of Systems   Constitutional: Negative  HENT: Negative      Eyes: Negative  Respiratory: Negative  Cardiovascular: Negative  Gastrointestinal: Negative  Endocrine: Negative  Genitourinary: Negative  Musculoskeletal: Negative  Skin: Negative  Allergic/Immunologic: Negative  Neurological: Negative  Hematological: Negative  Psychiatric/Behavioral: Negative  Patient Active Problem List   Diagnosis    Type 2 diabetes mellitus without complication, without long-term current use of insulin ([facility] )    Asthma    Essential hypertension    Other specified hypothyroidism    Chest pain    Palpitations    Preop cardiovascular exam    Chronic bilateral low back pain without sciatica     Past Medical History:   Diagnosis Date    Asthma     Diabetes mellitus ([facility] )     Hypertension      Past Surgical History:   Procedure Laterality Date    BREAST SURGERY Left     CATARACT EXTRACTION, BILATERAL Bilateral     KNEE SURGERY Right     RETINAL DETACHMENT SURGERY Right     TUBAL LIGATION       Family History   Problem Relation Age of Onset    Diabetes Mother     Hypertension Mother     Diabetes Father     Hypertension Father     Diabetes Brother     Hypertension Brother      Social History     Social History    Marital status:      Spouse name: N/A    Number of children: N/A    Years of education: N/A     Occupational History    Not on file       Social History Main Topics    Smoking status: Never Smoker    Smokeless tobacco: Never Used    Alcohol use No    Drug use: No    Sexual activity: Not on file     Other Topics Concern    Not on file     Social History Narrative    No narrative on file       Current Outpatient Prescriptions:     albuterol (PROVENTIL HFA,VENTOLIN HFA) 90 mcg/act inhaler, Inhale 1 puff every 6 (six) hours, Disp: , Rfl:     amLODIPine-atorvastatin (CADUET) 10-10 MG per tablet, Take 1 tablet by mouth daily, Disp: 90 tablet, Rfl: 1    enalapril (VASOTEC) 20 mg tablet, Take 1 tablet (20 mg total) by mouth daily, Disp: 90 tablet, Rfl: 1    fluticasone furoate-vilanterol (BREO ELLIPTA), Inhale 1 puff daily, Disp: 3 each, Rfl: 0    fluticasone-salmeterol (ADVAIR) 100-50 mcg/dose, Inhale 1 puff 2 (two) times a day, Disp: 3 Inhaler, Rfl: 1    gabapentin (NEURONTIN) 300 mg capsule, take 1 capsule by mouth three times a day, Disp: 90 capsule, Rfl: 1    gemfibrozil (LOPID) 600 mg tablet, Take 600 mg by mouth, Disp: , Rfl:     glipiZIDE (GLUCOTROL XL) 10 mg 24 hr tablet, Take 1 tablet (10 mg total) by mouth daily, Disp: 90 tablet, Rfl: 1    HUMULIN N 100 UNIT/ML subcutaneous injection, , Disp: , Rfl: 0    Insulin Glargine (TOUJEO) injection pen 300 units/mL, Inject 10 Units under the skin daily at bedtime, Disp: 3 pen, Rfl: 1    Insulin Pen Needle (BD PEN NEEDLE NATALEE U/F) 32G X 4 MM MISC, USE ONE NEEDLE WITH INSULIN TOUJEO AT BEDTIME, Disp: 100 each, Rfl: 3    Insulin Syringe-Needle U-100 (B-D INS SYRINGE 0 5CC/30GX1/2") 30G X 1/2" 0 5 ML MISC, USE WITH INSULIN HUMULIN N VIAL TWICE A DAY, Disp: 100 each, Rfl: 3    levothyroxine 50 mcg tablet, Take 1 tablet (50 mcg total) by mouth daily, Disp: 90 tablet, Rfl: 1    metFORMIN (GLUCOPHAGE-XR) 500 mg 24 hr tablet, take 2 tablets by mouth once daily WITH BREAKFAST, Disp: , Rfl: 0    metFORMIN (GLUMETZA) 1000 MG (MOD) 24 hr tablet, Take 1 tablet (1,000 mg total) by mouth daily with breakfast, Disp: 90 tablet, Rfl: 1    omeprazole (PriLOSEC) 20 mg delayed release capsule, Take 1 capsule (20 mg total) by mouth daily, Disp: 90 capsule, Rfl: 1    ranitidine (ZANTAC) 300 MG tablet, Take 300 mg by mouth, Disp: , Rfl:     atropine (ISOPTO ATROPINE) 1 % ophthalmic solution, , Disp: , Rfl: 0    ciprofloxacin (CILOXAN) 0 3 % ophthalmic solution, place 1 drop into right eye four times a day AFTER SURGERY, Disp: , Rfl: 0    insulin glargine (LANTUS) 100 units/mL subcutaneous injection, Inject 10 Units under the skin daily at bedtime, Disp: 10 mL, Rfl: 0    prednisoLONE acetate (PRED FORTE) 1 % ophthalmic suspension, instill 1 drop into right eye four times a day AFTER SURGERY, Disp: , Rfl: 0  Allergies   Allergen Reactions    Aspirin Hives    Tylenol With Codeine #3 [Acetaminophen-Codeine] Rash     Vitals:    06/08/18 1351   BP: 120/78   Pulse: 94   Resp: 16   Temp: 98 6 °F (37 °C)       Physical Exam   Constitutional: She is oriented to person, place, and time  She appears well-developed and well-nourished  HENT:   Head: Normocephalic and atraumatic  Right Ear: External ear normal    Left Ear: External ear normal    Eyes: Conjunctivae and EOM are normal  Pupils are equal, round, and reactive to light  Neck: Normal range of motion  Neck supple  Cardiovascular: Normal rate, regular rhythm, normal heart sounds and intact distal pulses  Pulmonary/Chest: Effort normal and breath sounds normal    Abdominal: Soft  Bowel sounds are normal    Musculoskeletal: Normal range of motion  Neurological: She is alert and oriented to person, place, and time  Skin: Skin is warm  Psychiatric: She has a normal mood and affect  Her behavior is normal  Judgment and thought content normal    Breasts: breasts appear normal, no suspicious masses, no skin or nipple changes or axillary nodes  Pathology:  Case Report   Surgical Pathology Report                         Case: [id number]                                    Authorizing Provider: Yeyo Boyle MD         Collected:           05/23/2018 1259               Ordering Location:     [facility] Received:            05/23/2018 1646                                      Center                                                                        Pathologist:           Patricia Villegas MD                                                         Specimen:    Breast, Right Upper Outer, 8ga;sup cc;all cores with calcs;bowtie clip                     Final Diagnosis   A    Breast, right upper outer quadrant with calcifications, biopsies:  -  Ductal carcinoma in situ, micropapillary type, low-intermediate nuclear grade, involving 5 of 7 cores, measuring up to 10mm  -  Comedonecrosis is not identified   -  Negative for features of invasive carcinoma  -  Immunohistochemical stains performed with appropriate controls highly intact myoepithelial cells staining for p63, calponin, and CK5/6 with absence of CK 5/6 staining in the atypical epithelium, supporting the diagnosis  Electronically signed by Agueda Benitez MD on 5/25/2018 at 12:29 PM   Note   Intradepartmental consultation was obtained with diagnostic agreement      The findings were communicated by telephone conversation to the [address] [place] [address] on 5/25/18 at 1225  CBC, Coags, BMP, Mg, Phos     Labs:      Imaging  Mammo Stereotactic Breast Biopsy Right (all Inc)    Result Date: 5/23/2018  Narrative: 69-year-old female, with increasing upper outer right breast calcifications  Mammo Stereotactic Breast Biopsy Right: May 23, 2018 - Check In #: [de-identified] Technologist: SANTIAGO Crystal (SANTIAGO)(M) Prior study comparison: May 15, 2018, mammo diagnostic bilateral W CAD performed at [address] [place] [address]  IMPRESSION:Stereotactic vacuum assisted biopsy of indeterminate upper outer right breast calcifications was performed as described above without incident  After informed consent was obtained, the patient was positioned on the stereotactic biopsy table and the upper outer right breast calcifications were identified with imaging in the craniocaudal projection  Digital stereotactic images were obtained and the coordinates for biopsy were set on the computer  Betadine was used as sterile prep and 1% lidocaine as local anesthetic  After a small skin incision was made, 8 gauge Mammotome needle was advanced into the breast from a superior approach  Pre and post-fire images were obtained revealing satisfactory positioning of the needle    The vacuum assisted rotation device was then used to obtain multiple core specimens  Radiography of the core specimens reveal calcification within several core samples  A MammoMARK ( bowtie shape) clip was deployed  The specimens were sent to Pathology for evaluation  The needle was removed and and adequate hemostasis applied to the puncture wound  The patient was taken to the mammography suite where lateral and craniocaudal views were obtained  These confirm satisfactory positioning of the biopsy clip  The biopsy was cleansed and a sterile Steri-strip was applied to the small puncture wound  Post-biopsy instructions were given to the patient  The patient tolerated the procedure well and left the department in satisfactory condition  Pathology Results: Pending Mammo Post Biopsy: Right Breast - May 23, 2018 - Check In #: [de-identified] CC and ML view(s) were taken of the right breast  Technologist: SANTIAGO Huber (SANTIAGO)(M) IMPRESSION:Post stereotactic biopsy  Recommendation: Pathology pending  Transcription Location: [address] [place], [place] [address]way: [address] [place] [address] Pathology Report    Result Date: 5/25/2018  Narrative: Mammo Pathology Letter: Right Breast - May 23, 2018 - Check In #: [de-identified] DEAR DR Clarice Callahan Thank you for the recent referral of the above named patient to [place] [address] for right breast stereotactic biopsy  The results of her recent procedure have been made available to me from pathology  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, micropapillary type, 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  The pathology is concordant with the radiographic appearance  The recommendation is for  surgical consultation  Our nurse navigators, depending on your known office preferences, will ensure that the results and recommendations have been communicated to the patient  If there is any clarification needed, please do not hesitate to contact the [address] at [phone]   Thank you, FEDERICO Nolasco  Transcription Location: [address] [place] [address]: [id number]    Mammo Post Biopsy    Result Date: 5/23/2018  Narrative: 67-year-old female, with increasing upper outer right breast calcifications  Mammo Stereotactic Breast Biopsy Right: May 23, 2018 - Check In #: [de-identified] Technologist: SANTIAGO Capps (SANTIAGO)(M) Prior study comparison: May 15, 2018, mammo diagnostic bilateral W CAD performed at [address] [place] [address]  IMPRESSION:Stereotactic vacuum assisted biopsy of indeterminate upper outer right breast calcifications was performed as described above without incident  After informed consent was obtained, the patient was positioned on the stereotactic biopsy table and the upper outer right breast calcifications were identified with imaging in the craniocaudal projection  Digital stereotactic images were obtained and the coordinates for biopsy were set on the computer  Betadine was used as sterile prep and 1% lidocaine as local anesthetic  After a small skin incision was made, 8 gauge Mammotome needle was advanced into the breast from a superior approach  Pre and post-fire images were obtained revealing satisfactory positioning of the needle  The vacuum assisted rotation device was then used to obtain multiple core specimens  Radiography of the core specimens reveal calcification within several core samples  A MammoMARK ( bowtie shape) clip was deployed  The specimens were sent to Pathology for evaluation  The needle was removed and and adequate hemostasis applied to the puncture wound  The patient was taken to the mammography suite where lateral and craniocaudal views were obtained  These confirm satisfactory positioning of the biopsy clip  The biopsy was cleansed and a sterile Steri-strip was applied to the small puncture wound  Post-biopsy instructions were given to the patient  The patient tolerated the procedure well and left the department in satisfactory condition   Pathology Results: Pending Mammo Post Biopsy: Right Breast - May 23, 2018 - Check In #: [de-identified] CC and ML view(s) were taken of the right breast  Technologist: SANTIAGO Callahan (SANTIAGO)(M) IMPRESSION:Post stereotactic biopsy  Recommendation: Pathology pending  Transcription Location: [address] [place] [address]: [id number]    Mammo Diagnostic Bilateral W Cad    Addendum Date: 5/24/2018 Addendum:   ADDENDUM: ADDENDUM:Outside studies from 9/11/2015 are now available for comparison  There is no change to the original report following review of these images  Patient History: Patient is postmenopausal  Family history of prostate cancer in brother, unknown cancer in maternal grandfather  Benign excisional biopsy of the left breast, 1980  Took hormonal contraceptives for 3 years  Patient has never smoked  Patient's BMI is 31 1  Reason for exam: clinical finding  Mammo Diagnostic Bilateral W CAD: May 15, 2018 - Check In #: [de-identified] Bilateral MLO and CC view(s) were taken  ML, spot compression MLO, spot compression CC, spot compression CC with magnification, and spot compression ML with magnification view(s) were taken of the left breast  Technologist: SANTIAGO Callahan (SANTIAGO)(M) There are scattered fibroglandular densities  There is a palpable marker over the upper outer left breast without evident underlying abnormality  No suspicious mass, microcalcification, skin thickening or architectural distortion in the left breast  There is a grouping of calcifications in the upper outer right breast radiating to the nipple over an extent of 7 4 cm  These are coarse and heterogeneous in appearance and in a segmental distribution  In the absence of prior studies for comparison stereotactic biopsy of these calcifications is recommended  Targeted sonographic evaluation of the area of pain in the left breast 12 to 3 o'clock position left breast area of pain demonstrates a prominent island of fibroglandular tissue without solid mass or abnormal shadowing   I discussed these findings with the patient at time of examination  If prior studies arrive for comparison an addendum will be made to this report  US Breast Left Limited: May 15, 2018 - Check In #: [de-identified] Standard views  Technologist: RT Kwasi (R), GUME ACR BI-RADS® Assessments: BiRad:4 - Suspicious (Overall) Diag Mammogram: BiRad:4 - suspicious abnormality in the right breast  Left breast Left Brst US: Left breast is BiRad:1 - negative  Recommendation: Stereotactic biopsy of the right breast  Clinical management of the left breast  Analyzed by CAD The patient is scheduled in a reminder system for screening mammography  Transcription Location: [facility] Signing Station: [id number] Risk Value(s): Tyrer-Cuzick 10 Year: 3 200%, Tyrer-Cuzick Lifetime: 7 600%, Myriad Table: 1 5%, DEBBIE 5 Year: 1 2%, NCI Lifetime: 6 1%    Result Date: 5/24/2018  Narrative: Patient History: Patient is postmenopausal  Family history of prostate cancer in brother, unknown cancer in maternal grandfather  Benign excisional biopsy of the left breast, 1980  Took hormonal contraceptives for 3 years  Patient has never smoked  Patient's BMI is 31 1  Reason for exam: clinical finding  Mammo Diagnostic Bilateral W CAD: May 15, 2018 - Check In #: [de-identified] Bilateral MLO and CC view(s) were taken  ML, spot compression MLO, spot compression CC, spot compression CC with magnification, and spot compression ML with magnification view(s) were taken of the left breast  Technologist: SANTIAGO Elliott (R)(M) There are scattered fibroglandular densities  There is a palpable marker over the upper outer left breast without evident underlying abnormality  No suspicious mass, microcalcification, skin thickening or architectural distortion in the left breast  There is a grouping of calcifications in the upper outer right breast radiating to the nipple over an extent of 7 4 cm    These are coarse and heterogeneous in appearance and in a segmental distribution  In the absence of prior studies for comparison stereotactic biopsy of these calcifications is recommended  Targeted sonographic evaluation of the area of pain in the left breast 12 to 3 o'clock position left breast area of pain demonstrates a prominent island of fibroglandular tissue without solid mass or abnormal shadowing  I discussed these findings with the patient at time of examination  If prior studies arrive for comparison an addendum will be made to this report  US Breast Left Limited: May 15, 2018 - Check In #: [de-identified] Standard views  Technologist: Phuong Matos, RT (R), RDMS ACR BI-RADS® Assessments: BiRad:4 - Suspicious (Overall) Diag Mammogram: BiRad:4 - suspicious abnormality in the right breast  Left breast Left Brst US: Left breast is BiRad:1 - negative  Recommendation: Stereotactic biopsy of the right breast  Clinical management of the left breast  Analyzed by CAD The patient is scheduled in a reminder system for screening mammography  Transcription Location: [address] [place] [address] [place]: [id number] Risk Value(s): Tyrer-Cuzick 10 Year: 3 200%, Tyrer-Cuzick Lifetime: 7 600%, Myriad Table: 1 5%, DEBBIE 5 Year: 1 2%, NCI Lifetime: 6 1%    Us Breast Left Limited (diagnostic)    Addendum Date: 5/24/2018 Addendum:   ADDENDUM: ADDENDUM:Outside studies from 9/11/2015 are now available for comparison  There is no change to the original report following review of these images  Patient History: Patient is postmenopausal  Family history of prostate cancer in brother, unknown cancer in maternal grandfather  Benign excisional biopsy of the left breast, 1980  Took hormonal contraceptives for 3 years  Patient has never smoked  Patient's BMI is 31 1  Reason for exam: clinical finding  Mammo Diagnostic Bilateral W CAD: May 15, 2018 - Check In #: [de-identified] Bilateral MLO and CC view(s) were taken    ML, spot compression MLO, spot compression CC, spot compression CC with magnification, and spot compression ML with magnification view(s) were taken of the left breast  Technologist: SANTIAGO Chin T (R)(M) There are scattered fibroglandular densities  There is a palpable marker over the upper outer left breast without evident underlying abnormality  No suspicious mass, microcalcification, skin thickening or architectural distortion in the left breast  There is a grouping of calcifications in the upper outer right breast radiating to the nipple over an extent of 7 4 cm  These are coarse and heterogeneous in appearance and in a segmental distribution  In the absence of prior studies for comparison stereotactic biopsy of these calcifications is recommended  Targeted sonographic evaluation of the area of pain in the left breast 12 to 3 o'clock position left breast area of pain demonstrates a prominent island of fibroglandular tissue without solid mass or abnormal shadowing  I discussed these findings with the patient at time of examination  If prior studies arrive for comparison an addendum will be made to this report  US Breast Left Limited: May 15, 2018 - Check In #: [de-identified] Standard views  Technologist: RT Aster (R), GUME ACR BI-RADS® Assessments: BiRad:4 - Suspicious (Overall) Diag Mammogram: BiRad:4 - suspicious abnormality in the right breast  Left breast Left Brst US: Left breast is BiRad:1 - negative  Recommendation: Stereotactic biopsy of the right breast  Clinical management of the left breast  Analyzed by CAD The patient is scheduled in a reminder system for screening mammography  Transcription Location: [facility] Signing Station: [id number] Risk Value(s): Justuser-Cuzick 10 Year: 3 200%, Tyrer-Cuzick Lifetime: 7 600%, Myriad Table: 1 5%, DEBBIE 5 Year: 1 2%, NCI Lifetime: 6 1%    Result Date: 5/24/2018  Narrative: Patient History: Patient is postmenopausal  Family history of prostate cancer in brother, unknown cancer in maternal grandfather  Benign excisional biopsy of the left breast, 1980   Took hormonal contraceptives for 3 years  Patient has never smoked  Patient's BMI is 31 1  Reason for exam: clinical finding  Mammo Diagnostic Bilateral W CAD: May 15, 2018 - Check In #: [de-identified] Bilateral MLO and CC view(s) were taken  ML, spot compression MLO, spot compression CC, spot compression CC with magnification, and spot compression ML with magnification view(s) were taken of the left breast  Technologist: SANTIAGO Joy T (R)(M) There are scattered fibroglandular densities  There is a palpable marker over the upper outer left breast without evident underlying abnormality  No suspicious mass, microcalcification, skin thickening or architectural distortion in the left breast  There is a grouping of calcifications in the upper outer right breast radiating to the nipple over an extent of 7 4 cm  These are coarse and heterogeneous in appearance and in a segmental distribution  In the absence of prior studies for comparison stereotactic biopsy of these calcifications is recommended  Targeted sonographic evaluation of the area of pain in the left breast 12 to 3 o'clock position left breast area of pain demonstrates a prominent island of fibroglandular tissue without solid mass or abnormal shadowing  I discussed these findings with the patient at time of examination  If prior studies arrive for comparison an addendum will be made to this report  US Breast Left Limited: May 15, 2018 - Check In #: [de-identified] Standard views  Technologist: RT Vee (R), GUME ACR BI-RADS® Assessments: BiRad:4 - Suspicious (Overall) Diag Mammogram: BiRad:4 - suspicious abnormality in the right breast  Left breast Left Brst US: Left breast is BiRad:1 - negative  Recommendation: Stereotactic biopsy of the right breast  Clinical management of the left breast  Analyzed by CAD The patient is scheduled in a reminder system for screening mammography   Transcription Location: [facility]: [id number] Risk Value(s): Tyrer-Cuzick 10 Year: 3 200%, Marquis Lifetime: 7 600%, Myriad Table: 1 5%, DEBBIE 5 Year: 1 2%, NCI Lifetime: 6 1%    I reviewed the above laboratory and imaging data  Discussion/Summary:  58-year-old woman with newly diagnosed right breast DCIS  Will set up for lumpectomy using needle localization, right breast   Risks and benefits of surgery along with rationale for this approach were discussed with patient and her daughter  They understand the plan and wish to proceed as outlined  All questions answered at this time

## 2018-06-08 NOTE — PATIENT INSTRUCTIONS
Lumpectomía del seno   LO QUE NECESITA SABER:   ¿Qué necesito saber sobre felicita lumpectomía? Felicita lumpectomía es felicita cirugía para extirpar felicita masa de pruitt seno  Es probable que [employer] extirpen el tejido del seno que rodea la masa  Felicita lumpectomía también se conoce nelli cirugía para conservar el seno o mastectomía parcial o por segmentos  ¿Cómo me preparo para felicita lumpectomía? · Es probable que usted necesite [place] o un ultrasonido antes de la [facility]  Estos exámenes podrían [employer] mismo día que pruitt cirugía o antes  Pruitt médico podría usar imágenes de estos exámenes para marcar la ubicación de la masa  La shaq le mostrará dónde hacer la incisión  · Pruitt médico le explicará cómo debe prepararse para la [facility]  Le puede indicar que no consuma ningún alimento ni bebida después de la medianoche del día de la [facility]  Hemphill Leader qué medicamentos puede ganesh el día de la [facility]  Es probable que usted tenga que dejar de ganesh anticoagulantes o aspirina varios días antes de pruitt cirugía  Póngase de acuerdo con alguien para que lo lleve a pruitt casa y que se quede con usted por 24 horas después de la [facility]  Esta persona puede ayudar a cuidarla y monitorearla en solo de algún problema  ¿Qué pasará Addis Awe lumpectomía? A usted le administrarán anestesia general para mantenerlo dormido y [place] de dolor fabio la [facility]  Podrían administrarle un antibiótico por vía intravenosa para evitar que contraiga felicita infección bacteriana  Pruitt médico le hará felicita incisión en pruitt seno y le quitará la masa  Es probable que [employer] quite tejido del seno o nódulos linfáticos que están cerca de la masa  Puede que le coloquen un drenaje cerca de la incisión para eliminar líquido adicional  [place] disminuirá la inflamación y le ayudará a pruitt incisión a sanar debidamente  Pruitt médico cerrará la incisión con puntos de sutura o cinta médica y la cubrirá con felicita venda  Es probable que también envuelva ambos senos con un vendaje ajustado  [place] podría disminuir la inflamación, el sangrado y el dolor  ¿Qué pasará después de felicita lumpectomía? Los médicos lo mantendrán bajo observación hasta que se despierte  Lo más probable es [facility] permitan irse a casa cuando se despierte y ya allen dolor esté controlado  También es probable que usted necesite pasar la noche en el hospital    ¿Cuáles son los riesgos de Laverda Tonya lumpectomía? Usted podría sangrar más de lo esperado o contraer felicita infección  Los nervios, vasos sanguíneos y músculos podrían sufrir daños fabio la [place]  Puede que usted tenga inflamación en el brazo más cercano a la lumpectomía o en el área donde le extirparon los nódulos linfáticos  Esta inflamación se conoce nelli línfedema  La linfedema podría causar hormigueo, adormecimiento, rigidez y debilidad en el brazo  [place] podría ser [place]  Se le podría formar un coágulo sanguíneo en el brazo o la pierna  El coágulo puede viajar al corazón, los pulmones o el cerebro  [place] podría poner en peligro allen ramsey  ACUERDOS SOBRE ALLEN CUIDADO:   Usted tiene el derecho de ayudar a planear allen cuidado  Aprenda todo lo que pueda sobre allen condición y nelli darle tratamiento  Discuta joe opciones de tratamiento con joe médicos para decidir el cuidado que usted desea recibir  Usted siempre tiene el derecho de rechazar el tratamiento  Esta información es sólo para uso en educación  [place] intención no es darle un consejo médico sobre enfermedades o tratamientos  Colsulte con allen Stormy Binder farmacéutico antes de seguir cualquier régimen médico para saber si es seguro y efectivo para usted  © 2017 [address] Information is for End User's use only and may not be sold, redistributed or otherwise used for commercial purposes  All illustrations and images included in CareNotes® are the copyrighted property of A D A M , Inc  or Zi Aragon    Lumpectomía del seno   LO QUE NECESITA SABER:   Después de felicita lumpectomía usted podría [employer]  inflamación o moretones en pruitt seno o el área donde se removieron los ganglios lifáticos  Usted podría tener dolor o dificultad para  pruitt brazo u hombro mas cercano a la lumpectomía  Es posible que usted necesite hacer ejercicios de estiramiento de brazos para mejorar joe síntomas y prevenir problemas de [place]  INSTRUCCIONES SOBRE EL ALLEGRA HOSPITALARIA:   Llame al 911 en solo de presentar lo siguiente:   · Usted se siente mareada, le hace falta el aire y tiene dolor de [place]  · Usted expectora teresa  · Usted tiene dificultad para respirar  Busque atención médica de inmediato si:   · La teresa empapa el vendaje  · Se desprenden los puntos de sutura  · El moretón de repente se vuelve más alfred  · Pruitt pierna o brazo está más alfred que lo normal y adolorido  Pregúntele a pruitt [place] vitaminas y minerales son adecuados para usted  · Usted tiene fiebre o escalofríos  · Pruitt herida está jose, inflamada o drena pus  · Usted tiene náuseas o está vomitando  · Usted tiene comezón en la piel, inflamación o un sarpullido  · Pruitt dolor no mejora después de ganesh pruitt medicamento para el dolor  · El drenaje se sale o kash de drenar líquido  · Pruitt drenaje tiene pus o un líquido maloliente saliendo del mismo  · Usted tiene preguntas o inquietudes acerca de pruitt condición o cuidado  Medicamentos:  Es posible que usted necesite alguno de los siguientes:  · Antibióticos  ayudan a evitar felicita infección bacteriana  · Un medicamento con receta para el dolor  podrían ser Kenyatta Sample  Pregunte al médico cómo debe ganesh carolin medicamento de forma panchal  Algunos medicamentos recetados para el dolor contienen acetaminofén  No tome otros medicamentos que contengan acetaminofén sin consultarlo con pruitt médico  Demasiado acetaminofeno puede causar daño al hígado  Los medicamentos recetados para el dolor podrían causar estreñimiento   Pregunte a pruitt médico nelli prevenir o tratar estreñimiento  · AINEs (Analgésicos antiinflamatorios no esteroides) nelli el ibuprofeno, ayudan a disminuir la inflamación, el dolor y la [place]  Los AINEs pueden causar sangrado estomacal o problemas renales en ciertas personas  Si usted gilmar un medicamento anticoagulante, siempre pregúntele a pruitt médico si los CHAD son seguros para usted  Siempre chin la etiqueta de carolin medicamento y [place] instrucciones  · [place] joe medicamentos nelli se le haya indicado  Consulte con pruitt médico si usted giselle que pruitt medicamento no le está ayudando o si presenta efectos secundarios  Infórmele si es alérgico a cualquier medicamento  Mantenga felicita lista actualizada de los [place], las vitaminas y los productos herbales que gilmar  Incluya los siguientes datos de los medicamentos: cantidad, frecuencia y motivo de administración  Traiga con usted la lista o los envases de la píldoras a joe citas de seguimiento  Lleve la lista de los medicamentos con usted en solo de felicita emergencia  Siga las instrucciones de pruitt médico sobre el cuidado de joe heridas:  Si le pusieron felicita venda [place], se la puede quitar dentro de 24 a 48 horas o según indicaciones  Pregúntele a pruitt médico cuándo puede mojarse la incisión  Es posible que deba ganesh un baño de esponja hasta que le retiren los drenajes  Lave cuidadosamente el área alrededor de la incisión con [place] y [place]  No hay problema si kash correr el agua y el jabón libremente por la incisión  Séquese el área suavemente y póngase felicita venda nueva y limpia según indicaciones  Cambie joe vendajes cuando se mojen o ensucien  Revise pruitt incisión todos los días para goyo si tiene enrojecimiento, pus o inflamación  Cuidados personales:   · Aplique hielo  en el seno de 15 a 20 minutos [address] indicaciones  Use un paquete de hielo o ponga hielo molido dentro de [employer]  Cúbrala con felicita toalla  El hielo ayuda a evitar daño al tejido y a disminuir la inflamación y el dolor  · Descanse  según las indicaciones  No levante nada pesado  No empuje ni jale con joe brazos  Empiece a caminar un poco alrededor de la casa  Camine gradualmente más cada día  Pregunte a pruitt médico cuándo puede retomar joe [employer]  · Vacíe pruitt [address] indicaciones  Es probable que tenga que anotar la cantidad que vacía de pruitt drenaje a diario  Pídale a pruitt médico más información sobre cómo vaciar pruitt drenaje  · Use un brasier de soporte  según las indicaciones  Espere hasta quitarse la venda ajustada para usar sostén  Es posible que le suministren un sostén de soporte o le indiquen que use un sostén deportivo  Un brasier de apoyo podría ayudarle a sostener las vendas en pruitt lugar  También puede ayudar con la inflamación y el dolor  No  use un sostén con encajes ni con varilla  Estos le pueden rozar contra pruitt incisión y causarle molestias  Ejercicios de estiramiento para los brazos:  Pruitt médico le mostrara cómo hacer el estiramiento para los brazos  Los ejercicios de estiramiento para el brazo pueden prevenir la rigidez [employer] brazos o los hombros  Es posible que tenga que esperar hasta después de que le retiren los drenajes para empezar con el estiramiento  No edgar ejercicios de estiramiento para el brazo hasta que pruitt médico lo autorice  Solicite a pruitt médico mayor información sobre los ejercicios de estiramiento para los brazos  Acuda a joe consultas de control con pruitt médico según le indicaron  Anote joe preguntas para que se acuerde de hacerlas fabio joe visitas  © 2017 [address] Information is for End User's use only and may not be sold, redistributed or otherwise used for commercial purposes  All illustrations and images included in CareNotes® are the copyrighted property of A D A M , Inc  or Zi Aragon  Esta información es sólo para uso en educación  Pruitt intención no es darle un consejo médico sobre enfermedades o tratamientos   Colsulte con pruitt Jonnie Gongora farmacéutico antes de seguir cualquier régimen médico para saber si es seguro y efectivo para usted  Lumpectomía del seno   CUIDADO AMBULATORIO:   Lo que usted necesita saber sobre felicita lumpectomía:  Lois Blade lumpectomía es felicita cirugía para extirpar felicita masa de pruitt seno  Es probable que [employer] extirpen el tejido del seno que rodea la masa  Felicita lumpectomía también se conoce nelli cirugía para conservar el seno o mastectomía parcial o por segmentos  Cómo prepararse para felicita lumpectomía:   · Es probable que usted necesite [place] o un ultrasonido antes de la [facility]  Estos exámenes podrían [employer] mismo día que pruitt cirugía o antes  Pruitt médico podría usar imágenes de estos exámenes para marcar la ubicación de la masa  La shaq le mostrará dónde hacer la incisión  · Pruitt médico le explicará cómo debe prepararse para la [facility]  Le puede indicar que no consuma ningún alimento ni bebida después de la medianoche del día de la [facility]  Carrolyn Marvin qué medicamentos puede ganesh el día de la [facility]  Es probable que usted tenga que dejar de ganesh anticoagulantes o aspirina varios días antes de pruitt cirugía  Póngase de acuerdo con alguien para que lo lleve a pruitt casa y que se quede con usted por 24 horas después de la [facility]  Esta persona puede ayudar a cuidarla y monitorearla en solo de algún problema  Qué pasará Towanda Crosby lumpectomía:  A usted le administrarán anestesia general para mantenerlo dormido y [place] de dolor fabio la [facility]  Podrían administrarle un antibiótico por vía intravenosa para evitar que contraiga felicita infección bacteriana  Pruitt médico le hará felicita incisión en pruitt seno y le quitará la masa  Es probable que [employer] quite tejido del seno o nódulos linfáticos que están cerca de la masa  Puede que le coloquen un drenaje cerca de la incisión para eliminar líquido adicional  [place] disminuirá la inflamación y le ayudará a pruitt incisión a sanar debidamente  Pruitt médico cerrará la incisión con puntos de sutura o cinta médica y la cubrirá con felicita venda  Es probable que también envuelva ambos senos con un vendaje ajustado  [place] podría disminuir la inflamación, el sangrado y el dolor  Qué pasará después de felicita lumpectomía:  Los médicos lo mantendrán bajo observación hasta que se despierte  Lo más probable es [facility] permitan irse a casa cuando se despierte y ya pruitt dolor esté controlado  También es probable que usted necesite pasar la noche en el hospital    Riesgos de Emmit Pattee lumpectomía:  Usted podría sangrar más de lo esperado o contraer felicita infección  Los nervios, vasos sanguíneos y músculos podrían sufrir daños fabio la [place]  Puede que usted tenga inflamación en el brazo más cercano a la lumpectomía o en el área donde le extirparon los nódulos linfáticos  Esta inflamación se conoce nelli línfedema  La linfedema podría causar hormigueo, adormecimiento, rigidez y debilidad en el brazo  [place] podría ser [place]  Se le podría formar un coágulo sanguíneo en el brazo o la pierna  El coágulo puede viajar al corazón, los pulmones o el cerebro  [place] podría poner en peligro pruitt ramsey  Llame al 911 en solo de presentar lo siguiente:   · Usted se siente mareada, le hace falta el aire y tiene dolor de [place]  · Usted expectora teresa  · Usted tiene dificultad para respirar  Busque atención médica de inmediato si:   · La teresa empapa el vendaje  · Se desprenden los puntos de sutura  · El moretón de repente se vuelve más alfred  · Pruitt pierna o brazo está más alfred que lo normal y adolorido  Pregúntele a pruitt [place] vitaminas y minerales son adecuados para usted  · Usted tiene fiebre o escalofríos  · Pruitt herida está jose, inflamada o drena pus  · Usted tiene náuseas o está vomitando  · Usted tiene comezón en la piel, inflamación o un sarpullido  · Pruitt dolor no mejora después de ganesh pruitt medicamento para el dolor  · El drenaje se sale o kash de drenar líquido       · Pruitt drenaje tiene pus o un líquido maloliente saliendo del mismo  · Usted tiene preguntas o inquietudes acerca de pruitt condición o cuidado  Medicamentos:  Es posible que usted necesite alguno de los siguientes:  · Antibióticos  ayudan a evitar felicita infección bacteriana  · Un medicamento con receta para el dolor  podrían ser Terry Clutter  Pregunte al médico cómo debe ganesh carolin medicamento de forma panchal  Algunos medicamentos recetados para el dolor contienen acetaminofén  No tome otros medicamentos que contengan acetaminofén sin consultarlo con pruitt médico  Demasiado acetaminofeno puede causar daño al hígado  Los medicamentos recetados para el dolor podrían causar estreñimiento  Pregunte a pruitt médico nelli prevenir o tratar estreñimiento  · AINEs (Analgésicos antiinflamatorios no esteroides) nelli el ibuprofeno, ayudan a disminuir la inflamación, el dolor y la [place]  Los AINEs pueden causar sangrado estomacal o problemas renales en ciertas personas  Si usted gilmar un medicamento anticoagulante, siempre pregúntele a pruitt médico si los CHAD son seguros para usted  Siempre chin la etiqueta de carolin medicamento y [place] instrucciones  · [place] joe medicamentos nelli se le haya indicado  Consulte con pruitt médico si usted giselle que pruitt medicamento no le está ayudando o si presenta efectos secundarios  Infórmele si es alérgico a cualquier medicamento  Mantenga felicita lista actualizada de los [place], las vitaminas y los productos herbales que gilmar  Incluya los siguientes datos de los medicamentos: cantidad, frecuencia y motivo de administración  Traiga con usted la lista o los envases de la píldoras a joe citas de seguimiento  Lleve la lista de los medicamentos con usted en solo de felicita emergencia  Siga las instrucciones de pruitt médico sobre el cuidado de joe heridas:  Si le pusieron felicita venda [place], se la puede quitar dentro de 24 a 48 horas o según indicaciones  Pregúntele a pruitt médico cuándo puede mojarse la incisión   Es posible que deba ganesh un baño de [employer] retiren los drenajes  Lave cuidadosamente el área alrededor de la incisión con [facility] y [place]  No hay problema si kash correr el agua y el jabón libremente por la incisión  Séquese el área suavemente y póngase felicita venda nueva y limpia según indicaciones  Cambie joe vendajes cuando se mojen o ensucien  Revise pruitt incisión todos los días para goyo si tiene enrojecimiento, pus o inflamación  Cuidados personales:   · Aplique hielo  en el seno de 15 a 20 minutos [address] indicaciones  Use un paquete de hielo o ponga hielo molido dentro de [employer]  Cúbrala con felicita toalla  El hielo ayuda a evitar daño al tejido y a disminuir la inflamación y el dolor  · Descanse  según las indicaciones  No levante nada pesado  No empuje ni jale con joe brazos  Empiece a caminar un poco alrededor de la casa  Camine gradualmente más cada día  Pregunte a pruitt médico cuándo puede retomar joe [employer]  · Vacíe pruitt [address]  indicaciones  Es probable que tenga que anotar la cantidad que vacía de pruitt drenaje a diario  Pídale a pruitt médico más información sobre cómo vaciar pruitt drenaje  · Use un brasier de soporte  según las indicaciones  Espere hasta quitarse la venda ajustada para usar sostén  Es posible que le suministren un sostén de soporte o le indiquen que use un sostén deportivo  Un brasier de apoyo podría ayudarle a sostener las vendas en pruitt lugar  También puede ayudar con la inflamación y el dolor  No  use un sostén con encajes ni con varilla  Estos le pueden rozar contra pruitt incisión y causarle molestias  Ejercicios de estiramiento para los brazos:  Pruitt médico le mostrara cómo hacer el estiramiento para los brazos  Los ejercicios de estiramiento para el brazo pueden prevenir la rigidez [employer] brazos o los hombros  Es posible que tenga que esperar hasta después de que le retiren los drenajes para empezar con el estiramiento   No edgar ejercicios de estiramiento para el brazo hasta que mccormick médico lo autorice  Solicite a mccormick médico mayor información sobre los ejercicios de estiramiento para los brazos  Acuda a joe consultas de control con mccormick médico según le indicaron  Anote joe preguntas para que se acuerde de hacerlas fabio joe visitas  © 2017 [address] Information is for End User's use only and may not be sold, redistributed or otherwise used for commercial purposes  All illustrations and images included in CareNotes® are the copyrighted property of A D A M , Inc  or Zi Aragon  Esta información es sólo para uso en educación  Mccormick intención no es darle un consejo médico sobre enfermedades o tratamientos  Colsulte con mccormick Hamer Guess farmacéutico antes de seguir cualquier régimen médico para saber si es seguro y efectivo para usted

## 2018-06-08 NOTE — LETTER
June 8, 2018     Chi Vicente, [address]    Patient: Renny Doshi   YOB: 1955   Date of Visit: 6/8/2018       Dear Dr Kali Sadler:    Thank you for referring Renny Doshi to me for evaluation  Below are my notes for this consultation  If you have questions, please do not hesitate to call me  I look forward to following your patient along with you  Sincerely,        Deborah Cho MD        CC: No Recipients  Deborah Cho MD  6/8/2018  2:33 PM  Sign at close encounter               Surgical Oncology Follow Up       [address]  1955  [id number]  [address] [facility] [address]    Chief Complaint   Patient presents with    Breast Problem       Assessment/Plan:    No problem-specific Assessment & Plan notes found for this encounter  Diagnoses and all orders for this visit:    Ductal carcinoma in situ (DCIS) of right breast  -     Case request operating room: LUMPECTOMY BREAST NEEDLE LOCALIZED; Standing  -     Basic metabolic panel; Future  -     APTT; Future  -     CBC and Platelet; Future  -     Protime-INR; Future  -     EKG 12 lead; Future  -     XR chest pa & lateral; Future  -     Case request operating room: LUMPECTOMY BREAST NEEDLE LOCALIZED    Other orders  -     Incentive spirometry; Standing  -     Insert and maintain IV line; Standing  -     Void On-Call to O R ; Standing  -     Place sequential compression device; Standing        Advance Care Planning/Advance Directives:  Discussed disease status, cancer treatment plans and/or cancer treatment goals with the patient  No history exists  History of Present Illness:   Patient is 59-year-old woman who was being worked up for breast pain   She underwent mammogram which cleared the left breast which is where she initially reported the symptoms  However she was found to have areas of calcification in the upper outer quadrant of the right breast   This led to biopsies which confirmed DCIS  She has had a history of left-sided breast biopsy in the past   She was 9 at age of menarche  Four pregnancies 4 live births  She is 22 refer strap was born  She is birth control pills in the past   She has never used hormone replacement therapy  Review of Systems   Constitutional: Negative  HENT: Negative  Eyes: Negative  Respiratory: Negative  Cardiovascular: Negative  Gastrointestinal: Negative  Endocrine: Negative  Genitourinary: Negative  Musculoskeletal: Negative  Skin: Negative  Allergic/Immunologic: Negative  Neurological: Negative  Hematological: Negative  Psychiatric/Behavioral: Negative  Patient Active Problem List   Diagnosis    Type 2 diabetes mellitus without complication, without long-term current use of insulin ([address] )    Asthma    Essential hypertension    Other specified hypothyroidism    Chest pain    Palpitations    Preop cardiovascular exam    Chronic bilateral low back pain without sciatica     Past Medical History:   Diagnosis Date    Asthma     Diabetes mellitus ([address] )     Hypertension      Past Surgical History:   Procedure Laterality Date    BREAST SURGERY Left     CATARACT EXTRACTION, BILATERAL Bilateral     KNEE SURGERY Right     RETINAL DETACHMENT SURGERY Right     TUBAL LIGATION       Family History   Problem Relation Age of Onset    Diabetes Mother     Hypertension Mother     Diabetes Father     Hypertension Father     Diabetes Brother     Hypertension Brother      Social History     Social History    Marital status:      Spouse name: N/A    Number of children: N/A    Years of education: N/A     Occupational History    Not on file       Social History Main Topics    Smoking status: Never Smoker    Smokeless tobacco: Never Used    Alcohol use No    Drug use: No    Sexual activity: Not on file     Other Topics Concern    Not on file     Social History Narrative    No narrative on file       Current Outpatient Prescriptions:     albuterol (PROVENTIL HFA,VENTOLIN HFA) 90 mcg/act inhaler, Inhale 1 puff every 6 (six) hours, Disp: , Rfl:     amLODIPine-atorvastatin (CADUET) 10-10 MG per tablet, Take 1 tablet by mouth daily, Disp: 90 tablet, Rfl: 1    enalapril (VASOTEC) 20 mg tablet, Take 1 tablet (20 mg total) by mouth daily, Disp: 90 tablet, Rfl: 1    fluticasone furoate-vilanterol (BREO ELLIPTA), Inhale 1 puff daily, Disp: 3 each, Rfl: 0    fluticasone-salmeterol (ADVAIR) 100-50 mcg/dose, Inhale 1 puff 2 (two) times a day, Disp: 3 Inhaler, Rfl: 1    gabapentin (NEURONTIN) 300 mg capsule, take 1 capsule by mouth three times a day, Disp: 90 capsule, Rfl: 1    gemfibrozil (LOPID) 600 mg tablet, Take 600 mg by mouth, Disp: , Rfl:     glipiZIDE (GLUCOTROL XL) 10 mg 24 hr tablet, Take 1 tablet (10 mg total) by mouth daily, Disp: 90 tablet, Rfl: 1    HUMULIN N 100 UNIT/ML subcutaneous injection, , Disp: , Rfl: 0    Insulin Glargine (TOUJEO) injection pen 300 units/mL, Inject 10 Units under the skin daily at bedtime, Disp: 3 pen, Rfl: 1    Insulin Pen Needle (BD PEN NEEDLE NATALEE U/F) 32G X 4 MM MISC, USE ONE NEEDLE WITH INSULIN TOUJEO AT BEDTIME, Disp: 100 each, Rfl: 3    Insulin Syringe-Needle U-100 (B-D INS SYRINGE 0 5CC/30GX1/2") 30G X 1/2" 0 5 ML MISC, USE WITH INSULIN HUMULIN N VIAL TWICE A DAY, Disp: 100 each, Rfl: 3    levothyroxine 50 mcg tablet, Take 1 tablet (50 mcg total) by mouth daily, Disp: 90 tablet, Rfl: 1    metFORMIN (GLUCOPHAGE-XR) 500 mg 24 hr tablet, take 2 tablets by mouth once daily WITH BREAKFAST, Disp: , Rfl: 0    metFORMIN (GLUMETZA) 1000 MG (MOD) 24 hr tablet, Take 1 tablet (1,000 mg total) by mouth daily with breakfast, Disp: 90 tablet, Rfl: 1   omeprazole (PriLOSEC) 20 mg delayed release capsule, Take 1 capsule (20 mg total) by mouth daily, Disp: 90 capsule, Rfl: 1    ranitidine (ZANTAC) 300 MG tablet, Take 300 mg by mouth, Disp: , Rfl:     atropine (ISOPTO ATROPINE) 1 % ophthalmic solution, , Disp: , Rfl: 0    ciprofloxacin (CILOXAN) 0 3 % ophthalmic solution, place 1 drop into right eye four times a day AFTER SURGERY, Disp: , Rfl: 0    insulin glargine (LANTUS) 100 units/mL subcutaneous injection, Inject 10 Units under the skin daily at bedtime, Disp: 10 mL, Rfl: 0    prednisoLONE acetate (PRED FORTE) 1 % ophthalmic suspension, instill 1 drop into right eye four times a day AFTER SURGERY, Disp: , Rfl: 0  Allergies   Allergen Reactions    Aspirin Hives    Tylenol With Codeine #3 [Acetaminophen-Codeine] Rash     Vitals:    06/08/18 1351   BP: 120/78   Pulse: 94   Resp: 16   Temp: 98 6 °F (37 °C)       Physical Exam   Constitutional: She is oriented to person, place, and time  She appears well-developed and well-nourished  HENT:   Head: Normocephalic and atraumatic  Right Ear: External ear normal    Left Ear: External ear normal    Eyes: Conjunctivae and EOM are normal  Pupils are equal, round, and reactive to light  Neck: Normal range of motion  Neck supple  Cardiovascular: Normal rate, regular rhythm, normal heart sounds and intact distal pulses  Pulmonary/Chest: Effort normal and breath sounds normal    Abdominal: Soft  Bowel sounds are normal    Musculoskeletal: Normal range of motion  Neurological: She is alert and oriented to person, place, and time  Skin: Skin is warm  Psychiatric: She has a normal mood and affect  Her behavior is normal  Judgment and thought content normal    Breasts: breasts appear normal, no suspicious masses, no skin or nipple changes or axillary nodes      Pathology:  Case Report   Surgical Pathology Report                         Case: [id number]                                    Authorizing Provider: Jason PresMD maria luz         Collected:           05/23/2018 1259               Ordering Location:     [facility] Breast Received:            05/23/2018 1646                                      Center                                                                        Pathologist:           Jaime Morris MD                                                         Specimen:    Breast, Right Upper Outer, 8ga;sup cc;all cores with calcs;bowtie clip                     Final Diagnosis   A  Breast, right upper outer quadrant with calcifications, biopsies:  -  Ductal carcinoma in situ, micropapillary type, lowintermediate nuclear grade, involving 5 of 7 cores, measuring up to 10mm  -  Comedonecrosis is not identified   -  Negative for features of invasive carcinoma  -  Immunohistochemical stains performed with appropriate controls highly intact myoepithelial cells staining for p63, calponin, and CK5/6 with absence of CK 5/6 staining in the atypical epithelium, supporting the diagnosis  Electronically signed by Jaime Morris MD on 5/25/2018 at 12:29 PM   Note   Intradepartmental consultation was obtained with diagnostic agreement      The findings were communicated by telephone conversation to the [address] [place] [address] on 5/25/18 at 1225  CBC, Coags, BMP, Mg, Phos     Labs:      Imaging  Mammo Stereotactic Breast Biopsy Right (all Inc)    Result Date: 5/23/2018  Narrative: 52-year-old female, with increasing upper outer right breast calcifications  Mammo Stereotactic Breast Biopsy Right: May 23, 2018 - Check In #: [de-identified] Technologist: SANTIAGO Chin (SANTIAGO)(M) Prior study comparison: May 15, 2018, mammo diagnostic bilateral W CAD performed at [address] [place] [address]  IMPRESSION:Stereotactic vacuum assisted biopsy of indeterminate upper outer right breast calcifications was performed as described above without incident   After informed consent was obtained, the patient was positioned on the stereotactic biopsy table and the upper outer right breast calcifications were identified with imaging in the craniocaudal projection  Digital stereotactic images were obtained and the coordinates for biopsy were set on the computer  Betadine was used as sterile prep and 1% lidocaine as local anesthetic  After a small skin incision was made, 8 gauge Mammotome needle was advanced into the breast from a superior approach  Pre and post-fire images were obtained revealing satisfactory positioning of the needle  The vacuum assisted rotation device was then used to obtain multiple core specimens  Radiography of the core specimens reveal calcification within several core samples  A MammoMARK ( bowtie shape) clip was deployed  The specimens were sent to Pathology for evaluation  The needle was removed and and adequate hemostasis applied to the puncture wound  The patient was taken to the mammography suite where lateral and craniocaudal views were obtained  These confirm satisfactory positioning of the biopsy clip  The biopsy was cleansed and a sterile Steri-strip was applied to the small puncture wound  Post-biopsy instructions were given to the patient  The patient tolerated the procedure well and left the department in satisfactory condition  Pathology Results: Pending Mammo Post Biopsy: Right Breast - May 23, 2018 - Check In #: [de-identified] CC and ML view(s) were taken of the right breast  Technologist: SANTIAGO Chahal (SANTIAGO)(M) IMPRESSION:Post stereotactic biopsy  Recommendation: Pathology pending  Transcription Location: [address] [place], [place] [address]way: [address] [place] [address] Pathology Report    Result Date: 5/25/2018  Narrative: Mammo Pathology Letter: Right Breast - May 23, 2018 - Check In #: [de-identified] DEAR DR Nicolasa Winchester Thank you for the recent referral of the above named patient to [facility] for right breast stereotactic biopsy   The results of her recent procedure have been made available to me from pathology  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, micropapillary type, 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  The pathology is concordant with the radiographic appearance  The recommendation is for  surgical consultation  Our nurse navigators, depending on your known office preferences, will ensure that the results and recommendations have been communicated to the patient  If there is any clarification needed, please do not hesitate to contact the [address] at [phone]  Thank you, FEDERICO Castañeda  Transcription Location: [address] [place] [address]: [id number]    Mammo Post Biopsy    Result Date: 5/23/2018  Narrative: 61-year-old female, with increasing upper outer right breast calcifications  Mammo Stereotactic Breast Biopsy Right: May 23, 2018 - Check In #: [de-identified] Technologist: SANTIAGO Cote)(M) Prior study comparison: May 15, 2018, mammo diagnostic bilateral W CAD performed at [address] [place] [address]  IMPRESSION:Stereotactic vacuum assisted biopsy of indeterminate upper outer right breast calcifications was performed as described above without incident  After informed consent was obtained, the patient was positioned on the stereotactic biopsy table and the upper outer right breast calcifications were identified with imaging in the craniocaudal projection  Digital stereotactic images were obtained and the coordinates for biopsy were set on the computer  Betadine was used as sterile prep and 1% lidocaine as local anesthetic  After a small skin incision was made, 8 gauge Mammotome needle was advanced into the breast from a superior approach  Pre and post-fire images were obtained revealing satisfactory positioning of the needle  The vacuum assisted rotation device was then used to obtain multiple core specimens  Radiography of the core specimens reveal calcification within several core samples   A MammoMARK ( bowtie shape) clip was deployed  The specimens were sent to Pathology for evaluation  The needle was removed and and adequate hemostasis applied to the puncture wound  The patient was taken to the mammography suite where lateral and craniocaudal views were obtained  These confirm satisfactory positioning of the biopsy clip  The biopsy was cleansed and a sterile Steri-strip was applied to the small puncture wound  Post-biopsy instructions were given to the patient  The patient tolerated the procedure well and left the department in satisfactory condition  Pathology Results: Pending Mammo Post Biopsy: Right Breast - May 23, 2018 - Check In #: [de-identified] CC and ML view(s) were taken of the right breast  Technologist: SANTIAGO Huber (SANTIAGO)(M) IMPRESSION:Post stereotactic biopsy  Recommendation: Pathology pending  Transcription Location: [address] [place] [address]: [id number]    Mammo Diagnostic Bilateral W Cad    Addendum Date: 5/24/2018 Addendum:   ADDENDUM: ADDENDUM:Outside studies from 9/11/2015 are now available for comparison  There is no change to the original report following review of these images  Patient History: Patient is postmenopausal  Family history of prostate cancer in brother, unknown cancer in maternal grandfather  Benign excisional biopsy of the left breast, 1980  Took hormonal contraceptives for 3 years  Patient has never smoked  Patient's BMI is 31 1  Reason for exam: clinical finding  Mammo Diagnostic Bilateral W CAD: May 15, 2018 - Check In #: [de-identified] Bilateral MLO and CC view(s) were taken  ML, spot compression MLO, spot compression CC, spot compression CC with magnification, and spot compression ML with magnification view(s) were taken of the left breast  Technologist: SANTIAGO Huber (SANTIAGO)(M) There are scattered fibroglandular densities  There is a palpable marker over the upper outer left breast without evident underlying abnormality    No suspicious mass, microcalcification, skin thickening or architectural distortion in the left breast  There is a grouping of calcifications in the upper outer right breast radiating to the nipple over an extent of 7 4 cm  These are coarse and heterogeneous in appearance and in a segmental distribution  In the absence of prior studies for comparison stereotactic biopsy of these calcifications is recommended  Targeted sonographic evaluation of the area of pain in the left breast 12 to 3 o'clock position left breast area of pain demonstrates a prominent island of fibroglandular tissue without solid mass or abnormal shadowing  I discussed these findings with the patient at time of examination  If prior studies arrive for comparison an addendum will be made to this report  US Breast Left Limited: May 15, 2018 - Check In #: [de-identified] Standard views  Technologist: Adriane Schwab, RT (R), RDMS ACR BI-RADS® Assessments: BiRad:4 - Suspicious (Overall) Diag Mammogram: BiRad:4 - suspicious abnormality in the right breast  Left breast Left Brst US: Left breast is BiRad:1 - negative  Recommendation: Stereotactic biopsy of the right breast  Clinical management of the left breast  Analyzed by CAD The patient is scheduled in a reminder system for screening mammography  Transcription Location: [facility] Signing Station: [id number] Risk Value(s): Tyrer-Cuzick 10 Year: 3 200%, Tyrer-Cuzick Lifetime: 7 600%, Myriad Table: 1 5%, DEBBIE 5 Year: 1 2%, NCI Lifetime: 6 1%    Result Date: 5/24/2018  Narrative: Patient History: Patient is postmenopausal  Family history of prostate cancer in brother, unknown cancer in maternal grandfather  Benign excisional biopsy of the left breast, 1980  Took hormonal contraceptives for 3 years  Patient has never smoked  Patient's BMI is 31 1  Reason for exam: clinical finding  Mammo Diagnostic Bilateral W CAD: May 15, 2018 - Check In #: [de-identified] Bilateral MLO and CC view(s) were taken    ML, spot compression MLO, spot compression CC, spot compression CC with magnification, and spot compression ML with magnification view(s) were taken of the left breast  Technologist: SANTIAGO Elliott (R)(M) There are scattered fibroglandular densities  There is a palpable marker over the upper outer left breast without evident underlying abnormality  No suspicious mass, microcalcification, skin thickening or architectural distortion in the left breast  There is a grouping of calcifications in the upper outer right breast radiating to the nipple over an extent of 7 4 cm  These are coarse and heterogeneous in appearance and in a segmental distribution  In the absence of prior studies for comparison stereotactic biopsy of these calcifications is recommended  Targeted sonographic evaluation of the area of pain in the left breast 12 to 3 o'clock position left breast area of pain demonstrates a prominent island of fibroglandular tissue without solid mass or abnormal shadowing  I discussed these findings with the patient at time of examination  If prior studies arrive for comparison an addendum will be made to this report  US Breast Left Limited: May 15, 2018 - Check In #: [de-identified] Standard views  Technologist: RT Kwasi (R), KRYSTAMS ACR BI-RADS® Assessments: BiRad:4 - Suspicious (Overall) Diag Mammogram: BiRad:4 - suspicious abnormality in the right breast  Left breast Left Brst US: Left breast is BiRad:1 - negative  Recommendation: Stereotactic biopsy of the right breast  Clinical management of the left breast  Analyzed by CAD The patient is scheduled in a reminder system for screening mammography  Transcription Location: [address] [place], [place] [address]way: [id number] Risk Value(s): Tyrer-Cuzick 10 Year: 3 200%, Tyrer-Cuzick Lifetime: 7 600%, Myriad Table: 1 5%, DEBBIE 5 Year: 1 2%, NCI Lifetime: 6 1%    Us Breast Left Limited (diagnostic)    Addendum Date: 5/24/2018 Addendum:   ADDENDUM: ADDENDUM:Outside studies from 9/11/2015 are now available for comparison    There is no change to the original report following review of these images  Patient History: Patient is postmenopausal  Family history of prostate cancer in brother, unknown cancer in maternal grandfather  Benign excisional biopsy of the left breast, 1980  Took hormonal contraceptives for 3 years  Patient has never smoked  Patient's BMI is 31 1  Reason for exam: clinical finding  Mammo Diagnostic Bilateral W CAD: May 15, 2018 - Check In #: [de-identified] Bilateral MLO and CC view(s) were taken  ML, spot compression MLO, spot compression CC, spot compression CC with magnification, and spot compression ML with magnification view(s) were taken of the left breast  Technologist: SANTIAGO Mullen T (R)(M) There are scattered fibroglandular densities  There is a palpable marker over the upper outer left breast without evident underlying abnormality  No suspicious mass, microcalcification, skin thickening or architectural distortion in the left breast  There is a grouping of calcifications in the upper outer right breast radiating to the nipple over an extent of 7 4 cm  These are coarse and heterogeneous in appearance and in a segmental distribution  In the absence of prior studies for comparison stereotactic biopsy of these calcifications is recommended  Targeted sonographic evaluation of the area of pain in the left breast 12 to 3 o'clock position left breast area of pain demonstrates a prominent island of fibroglandular tissue without solid mass or abnormal shadowing  I discussed these findings with the patient at time of examination  If prior studies arrive for comparison an addendum will be made to this report  US Breast Left Limited: May 15, 2018 - Check In #: [de-identified] Standard views  Technologist: RT Trena (R), KRYSTAMS ACR BI-RADS® Assessments: BiRad:4 - Suspicious (Overall) Diag Mammogram: BiRad:4 - suspicious abnormality in the right breast  Left breast Left Brst US: Left breast is BiRad:1 - negative   Recommendation: Stereotactic biopsy of the right breast  Clinical management of the left breast  Analyzed by CAD The patient is scheduled in a reminder system for screening mammography  Transcription Location: [facility] Signing Station: [id number] Risk Value(s): Tyrer-Cuzick 10 Year: 3 200%, Tyrer-Cuzick Lifetime: 7 600%, Myriad Table: 1 5%, DEBBIE 5 Year: 1 2%, NCI Lifetime: 6 1%    Result Date: 5/24/2018  Narrative: Patient History: Patient is postmenopausal  Family history of prostate cancer in brother, unknown cancer in maternal grandfather  Benign excisional biopsy of the left breast, 1980  Took hormonal contraceptives for 3 years  Patient has never smoked  Patient's BMI is 31 1  Reason for exam: clinical finding  Mammo Diagnostic Bilateral W CAD: May 15, 2018 - Check In #: [de-identified] Bilateral MLO and CC view(s) were taken  ML, spot compression MLO, spot compression CC, spot compression CC with magnification, and spot compression ML with magnification view(s) were taken of the left breast  Technologist: SANTIAGO Floyd (R)(M) There are scattered fibroglandular densities  There is a palpable marker over the upper outer left breast without evident underlying abnormality  No suspicious mass, microcalcification, skin thickening or architectural distortion in the left breast  There is a grouping of calcifications in the upper outer right breast radiating to the nipple over an extent of 7 4 cm  These are coarse and heterogeneous in appearance and in a segmental distribution  In the absence of prior studies for comparison stereotactic biopsy of these calcifications is recommended  Targeted sonographic evaluation of the area of pain in the left breast 12 to 3 o'clock position left breast area of pain demonstrates a prominent island of fibroglandular tissue without solid mass or abnormal shadowing  I discussed these findings with the patient at time of examination  If prior studies arrive for comparison an addendum will be made to this report  US Breast Left Limited:  May 15, 2018 - Check In #: [de-identified] Standard views  Technologist: Charles Ro RT (R), RDMS ACR BI-RADS® Assessments: BiRad:4 - Suspicious (Overall) Diag Mammogram: BiRad:4 - suspicious abnormality in the right breast  Left breast Left Brst US: Left breast is BiRad:1 - negative  Recommendation: Stereotactic biopsy of the right breast  Clinical management of the left breast  Analyzed by CAD The patient is scheduled in a reminder system for screening mammography  Transcription Location: [address] [place], [place] [address]way: [id number] Risk Value(s): Tyrer-Cuzick 10 Year: 3 200%, Tyrer-Cuzick Lifetime: 7 600%, Myriad Table: 1 5%, DEBBIE 5 Year: 1 2%, NCI Lifetime: 6 1%    I reviewed the above laboratory and imaging data  Discussion/Summary:  27-year-old woman with newly diagnosed right breast DCIS  Will set up for lumpectomy using needle localization, right breast   Risks and benefits of surgery along with rationale for this approach were discussed with patient and her daughter  They understand the plan and wish to proceed as outlined  All questions answered at this time

## 2018-06-11 ENCOUNTER — TELEPHONE (OUTPATIENT)
Dept: FAMILY MEDICINE CLINIC | Facility: CLINIC | Age: 63
End: 2018-06-11

## 2018-06-11 DIAGNOSIS — D05.11 DUCTAL CARCINOMA IN SITU (DCIS) OF RIGHT BREAST: Primary | ICD-10-CM

## 2018-06-12 ENCOUNTER — EVALUATION (OUTPATIENT)
Dept: PHYSICAL THERAPY | Facility: CLINIC | Age: 63
End: 2018-06-12
Payer: MEDICARE

## 2018-06-12 DIAGNOSIS — G89.29 CHRONIC BILATERAL LOW BACK PAIN WITHOUT SCIATICA: Primary | ICD-10-CM

## 2018-06-12 DIAGNOSIS — M54.50 CHRONIC BILATERAL LOW BACK PAIN WITHOUT SCIATICA: Primary | ICD-10-CM

## 2018-06-12 PROCEDURE — 97162 PT EVAL MOD COMPLEX 30 MIN: CPT | Performed by: PHYSICAL THERAPIST

## 2018-06-12 PROCEDURE — G8990 OTHER PT/OT CURRENT STATUS: HCPCS | Performed by: PHYSICAL THERAPIST

## 2018-06-12 PROCEDURE — G8991 OTHER PT/OT GOAL STATUS: HCPCS | Performed by: PHYSICAL THERAPIST

## 2018-06-12 NOTE — PROGRESS NOTES
PT Evaluation     Today's date: 2018  Patient name: Karin Celeste  : 1955  MRN: [id number]  Referring provider: Matilde Garcia MD  Dx:   Encounter Diagnosis     ICD-10-CM    1  Chronic bilateral low back pain without sciatica M54 5 Ambulatory referral to Physical Therapy    G89 29                   Assessment  Impairments: abnormal coordination, abnormal or restricted ROM, activity intolerance, impaired physical strength and pain with function    Assessment details: Pt is a pleasant 65 y o  female presenting to outpatient physical therapy with Chronic bilateral low back pain without sciatica  (primary encounter diagnosis)   Pt presents with pain, decreased range of motion, decreased strength, and decreased tolerance to activity  Pt displays probable movement impairment diagnoses of right hip hypomobility dysfunction and lumbar motor coordination dysfunction, owing to symptoms of low back pain  Pt reports cervical spine pain is another primary complaint, therefore, advised patient to discuss this with physician to discuss appropriateness of physical therapy treatments  Pt would benefit from skilled physical therapy to address limitations and to achieve goals  Thank you for this referral    Understanding of Dx/Px/POC: good   Prognosis: good    Goals  ST  Patient will report 25% decrease in pain in 4 weeks  2  Patient will demonstrate 25% improvement in ROM in 4 weeks  3  Patient will demonstrate 1/2 grade improvement in strength in 4 weeks  LT  Patient will be able to perform IADLS without restriction or pain by discharge  2  Patient will be independent in HEP by discharge  3  Patient will be able to return to recreational/work duties without restriction or pain by discharge        Plan  Patient would benefit from: PT eval and skilled PT  Planned modality interventions: cryotherapy and thermotherapy: hydrocollator packs  Planned therapy interventions: IADL retraining, body mechanics training, flexibility, functional ROM exercises, home exercise program, neuromuscular re-education, manual therapy, postural training, strengthening, stretching, therapeutic activities and therapeutic exercise  Frequency: 2x week  Duration in visits: 8  Duration in weeks: 4        Subjective Evaluation    History of Present Illness  Mechanism of injury: Pt presents to therapy with daughter for translation assistance during evaluation  States she has been having consistent low back pain over the past 5 5 years of unknown etiology, denying injury or trauma  Denies diagnostic imaging of spine  Reports she experiences pain while sleeping, which wakes her out of a sleep, with or without sleep  Reports she has trouble voiding bowels and bladder over the past several months  Reports she has had weight changes up and down, mostly increases in weight  Denies radiating symptoms, however, reports concurrent right knee pain  Reports she also experiences numbness/tingling in right knee > keft when sitting long periods of time  Pain  Current pain ratin  At best pain ratin  At worst pain rating: 10 (10/10 or more)  Location: central lumbar region  Quality: tight  Relieving factors: medications and ice          Objective     Postural Observations  Seated posture: fair        Palpation   Left   Hypertonic in the erector spinae, cervical paraspinals, levator scapulae, lumbar paraspinals and upper trapezius  Tenderness of the erector spinae, cervical paraspinals, levator scapulae, lumbar paraspinals, quadratus lumborum and upper trapezius  Right   No palpable tenderness to the erector spinae, lumbar paraspinals, quadratus lumborum and upper trapezius  Hypertonic in the erector spinae, cervical paraspinals, levator scapulae and lumbar paraspinals  Tenderness of the cervical paraspinals, levator scapulae and upper trapezius  Tenderness     Lumbar Spine  Tenderness in the spinous process       Left Hip   No tenderness in the PSIS  Right Hip   No tenderness in the PSIS  Neurological Testing     Reflexes   Left   Patellar (L4): trace (1+)  Achilles (S1): trace (1+)  Clonus sign: negative    Right   Patellar (L4): trace (1+)  Achilles (S1): trace (1+)  Clonus sign: negative    Additional Neurological Details  LOWER EXTREMITY DERMATOMES: Decreased right L3, decreased left L5, decreased right S1  LOWER EXTREMITY MYOTOMES: Decreased strength R > L, attributed to pain in low back    Active Range of Motion   Cervical/Thoracic Spine   Cervical    Flexion: 30 degrees with pain  Extension: 25 degrees   Left lateral flexion: 50 degrees   Right lateral flexion: 50 degrees   Left rotation: 58 degrees   Right rotation: 45 degrees     Lumbar   Flexion: 80 degrees with pain  Extension: 20 degrees   Left lateral flexion: 10 degrees with pain  Right lateral flexion: 13 degrees     Additional Active Range of Motion Details  CERVICAL AROM -  Flexion, extension, bilat lateral flexion measured with bubble inclinometer on crown; Rotation measured with goniometer and patient seated    LUMBAR AROM -  Flexion, extension, bilat lateral flexion measured with bubble inclinometer at L1; Rotation measured with goniometer and patient seated      Passive Range of Motion     Additional Passive Range of Motion Details  At IE 6/12: Decreased right hip passive mobility compared to left    Tests     Lumbar     Left   Positive passive SLR  Negative crossed SLR  Right   Positive passive SLR  Negative crossed SLR       Additional Tests Details  At IE 6/12:   SLR positive for bilat hamstring hypomobility; R > L HARRY tests for hip hypomobility  PA segmental mobility testing: WFL mobility, however, pain reported L1-5      Flowsheet Rows      Most Recent Value   PT/OT G-Codes   Current Score  38   Projected Score  44   Assessment Type  Evaluation   G code set  Other PT/OT Primary   Other PT Primary Current Status ()  CL   Other PT Primary Goal Status ()  CK          Precautions: asthma, rheumatoid arthritis, HTN, DMII, hypothyroidism    Daily Treatment Diary     Manual              Passive right hip ROM                                                                     Exercise Diary              bike                          Lumbar roll outs             Seated H/S str                          BKFO             SKTC             Ab brace             Ab brace w/alt UE & LE lifts             Bridges                          Quadruped alt UE & LE lifts             Quadruped UTR                                                                     Modalities              MHP (PRN)

## 2018-06-13 ENCOUNTER — CONSULT (OUTPATIENT)
Dept: FAMILY MEDICINE CLINIC | Facility: CLINIC | Age: 63
End: 2018-06-13
Payer: MEDICARE

## 2018-06-13 VITALS
RESPIRATION RATE: 18 BRPM | BODY MASS INDEX: 32.33 KG/M2 | SYSTOLIC BLOOD PRESSURE: 136 MMHG | WEIGHT: 189.38 LBS | OXYGEN SATURATION: 95 % | DIASTOLIC BLOOD PRESSURE: 72 MMHG | HEIGHT: 64 IN | TEMPERATURE: 96.8 F | HEART RATE: 84 BPM

## 2018-06-13 DIAGNOSIS — E11.8 TYPE 2 DIABETES MELLITUS WITH COMPLICATION, WITH LONG-TERM CURRENT USE OF INSULIN (HCC): Primary | ICD-10-CM

## 2018-06-13 DIAGNOSIS — F51.01 PRIMARY INSOMNIA: ICD-10-CM

## 2018-06-13 DIAGNOSIS — Z79.4 TYPE 2 DIABETES MELLITUS WITH COMPLICATION, WITH LONG-TERM CURRENT USE OF INSULIN (HCC): Primary | ICD-10-CM

## 2018-06-13 DIAGNOSIS — Z01.818 PREOP EXAMINATION: ICD-10-CM

## 2018-06-13 DIAGNOSIS — Z12.11 SCREENING FOR COLON CANCER: ICD-10-CM

## 2018-06-13 DIAGNOSIS — M54.2 NECK PAIN: ICD-10-CM

## 2018-06-13 DIAGNOSIS — I10 ESSENTIAL HYPERTENSION: ICD-10-CM

## 2018-06-13 DIAGNOSIS — K59.01 SLOW TRANSIT CONSTIPATION: ICD-10-CM

## 2018-06-13 DIAGNOSIS — E11.9 TYPE 2 DIABETES MELLITUS WITHOUT COMPLICATION, WITHOUT LONG-TERM CURRENT USE OF INSULIN (HCC): ICD-10-CM

## 2018-06-13 PROBLEM — K59.00 CONSTIPATION: Status: ACTIVE | Noted: 2018-06-13

## 2018-06-13 PROCEDURE — 99215 OFFICE O/P EST HI 40 MIN: CPT | Performed by: FAMILY MEDICINE

## 2018-06-13 PROCEDURE — 93227 XTRNL ECG REC<48 HR R&I: CPT | Performed by: INTERNAL MEDICINE

## 2018-06-13 RX ORDER — METOCLOPRAMIDE 5 MG/1
5 TABLET ORAL 4 TIMES DAILY
Qty: 120 TABLET | Refills: 2 | Status: SHIPPED | OUTPATIENT
Start: 2018-06-13 | End: 2018-06-21 | Stop reason: ALTCHOICE

## 2018-06-13 RX ORDER — DOCUSATE SODIUM 100 MG/1
100 CAPSULE, LIQUID FILLED ORAL 2 TIMES DAILY
Qty: 60 CAPSULE | Refills: 1 | Status: ON HOLD | OUTPATIENT
Start: 2018-06-13 | End: 2018-06-26

## 2018-06-13 RX ORDER — TRAZODONE HYDROCHLORIDE 50 MG/1
50 TABLET ORAL
Qty: 90 TABLET | Refills: 0 | Status: ON HOLD | OUTPATIENT
Start: 2018-06-13 | End: 2018-06-26

## 2018-06-13 RX ORDER — INSULIN HUMAN 100 [IU]/ML
INJECTION, SUSPENSION SUBCUTANEOUS
Qty: 30 ML | Refills: 5 | Status: SHIPPED | OUTPATIENT
Start: 2018-06-13 | End: 2019-05-29

## 2018-06-13 NOTE — PROGRESS NOTES
Subjective:      Renny Doshi is a 65 y o  female who presents to the office today for a preoperative consultation at the request of surgeon Angy Sherwood who plans on performing R eye PPV/Removal of ILM on June 19  This consultation is requested for the specific conditions prompting preoperative evaluation (i e  because of potential affect on operative risk): Uncontrolled DM  Planned anesthesia is general  The patient has the following known anesthesia issues: no history of complications with anesthesia  Patient has a bleeding risk of: no recent abnormal bleeding  Patient does not have objections to receiving blood products if needed  The following portions of the patient's history were reviewed and updated as appropriate:   She  has a past medical history of Asthma; Cancer ([facility] [address] ); Diabetes mellitus ([facility] [address] ); Hypercholesterolemia; Hypertension; Hypothyroid; and Rheumatoid arthritis ([facility] [address] )  She   Patient Active Problem List    Diagnosis Date Noted    Neck pain 06/13/2018    Constipation 06/13/2018    Primary insomnia 06/13/2018    Ductal carcinoma in situ (DCIS) of right breast 06/11/2018    Chronic bilateral low back pain without sciatica 05/22/2018    Palpitations 05/09/2018    Preop examination 05/09/2018    Type 2 diabetes mellitus with complication, with long-term current use of insulin ([facility] [address] ) 03/27/2018    Other specified hypothyroidism 03/27/2018    Chest pain 03/27/2018    Asthma 09/22/2009    Essential hypertension 09/22/2009     She  has a past surgical history that includes Tubal ligation; Knee surgery (Right); Breast surgery (Left); Cataract extraction, bilateral (Bilateral); and Retinal detachment surgery (Right)  Her family history includes Diabetes in her brother, father, and mother; Hypertension in her brother, father, and mother  She  reports that she has never smoked  She has never used smokeless tobacco  She reports that she does not drink alcohol or use drugs    Current Outpatient Prescriptions   Medication Sig Dispense Refill    albuterol (PROVENTIL HFA,VENTOLIN HFA) 90 mcg/act inhaler Inhale 1 puff every 6 (six) hours      amLODIPine-atorvastatin (CADUET) 10-10 MG per tablet Take 1 tablet by mouth daily 90 tablet 1    ciprofloxacin (CILOXAN) 0 3 % ophthalmic solution place 1 drop into right eye four times a day AFTER SURGERY  0    enalapril (VASOTEC) 20 mg tablet Take 1 tablet (20 mg total) by mouth daily 90 tablet 1    fluticasone furoate-vilanterol (BREO ELLIPTA) Inhale 1 puff daily 3 each 0    fluticasone-salmeterol (ADVAIR) 100-50 mcg/dose Inhale 1 puff 2 (two) times a day 3 Inhaler 1    gabapentin (NEURONTIN) 300 mg capsule take 1 capsule by mouth three times a day 90 capsule 1    gemfibrozil (LOPID) 600 mg tablet Take 600 mg by mouth      glipiZIDE (GLUCOTROL XL) 10 mg 24 hr tablet Take 1 tablet (10 mg total) by mouth daily 90 tablet 1    HUMULIN N 100 UNIT/ML subcutaneous injection 70 units in the am 30 units in the pm 30 mL 5    insulin glargine (LANTUS) 100 units/mL subcutaneous injection Inject 10 Units under the skin daily at bedtime 10 mL 0    Insulin Glargine (TOUJEO) injection pen 300 units/mL Inject 10 Units under the skin daily at bedtime 3 pen 1    Insulin Pen Needle (BD PEN NEEDLE NATALEE U/F) 32G X 4 MM MISC USE ONE NEEDLE WITH INSULIN TOUJEO AT BEDTIME 100 each 0    Insulin Syringe-Needle U-100 (B-D INS SYRINGE 0 5CC/30GX1/2") 30G X 1/2" 0 5 ML MISC USE WITH INSULIN HUMULIN N VIAL TWICE A  each 0    levothyroxine 50 mcg tablet Take 1 tablet (50 mcg total) by mouth daily 90 tablet 1    metFORMIN (GLUCOPHAGE-XR) 500 mg 24 hr tablet take 2 tablets by mouth once daily WITH BREAKFAST  0    omeprazole (PriLOSEC) 20 mg delayed release capsule Take 1 capsule (20 mg total) by mouth daily 90 capsule 1    prednisoLONE acetate (PRED FORTE) 1 % ophthalmic suspension instill 1 drop into right eye four times a day AFTER SURGERY  0    atropine (ISOPTO ATROPINE) 1 % ophthalmic solution   0    docusate sodium (COLACE) 100 mg capsule Take 1 capsule (100 mg total) by mouth 2 (two) times a day 60 capsule 1    metFORMIN (GLUMETZA) 1000 MG (MOD) 24 hr tablet Take 1 tablet (1,000 mg total) by mouth daily with breakfast 90 tablet 1    metoclopramide (REGLAN) 5 mg tablet Take 1 tablet (5 mg total) by mouth 4 (four) times a day 120 tablet 2    ranitidine (ZANTAC) 300 MG tablet Take 300 mg by mouth      traZODone (DESYREL) 50 mg tablet Take 1 tablet (50 mg total) by mouth daily at bedtime 90 tablet 0     No current facility-administered medications for this visit        Current Outpatient Prescriptions on File Prior to Visit   Medication Sig    albuterol (PROVENTIL HFA,VENTOLIN HFA) 90 mcg/act inhaler Inhale 1 puff every 6 (six) hours    amLODIPine-atorvastatin (CADUET) 10-10 MG per tablet Take 1 tablet by mouth daily    ciprofloxacin (CILOXAN) 0 3 % ophthalmic solution place 1 drop into right eye four times a day AFTER SURGERY    enalapril (VASOTEC) 20 mg tablet Take 1 tablet (20 mg total) by mouth daily    fluticasone furoate-vilanterol (BREO ELLIPTA) Inhale 1 puff daily    fluticasone-salmeterol (ADVAIR) 100-50 mcg/dose Inhale 1 puff 2 (two) times a day    gabapentin (NEURONTIN) 300 mg capsule take 1 capsule by mouth three times a day    gemfibrozil (LOPID) 600 mg tablet Take 600 mg by mouth    glipiZIDE (GLUCOTROL XL) 10 mg 24 hr tablet Take 1 tablet (10 mg total) by mouth daily    insulin glargine (LANTUS) 100 units/mL subcutaneous injection Inject 10 Units under the skin daily at bedtime    Insulin Glargine (TOUJEO) injection pen 300 units/mL Inject 10 Units under the skin daily at bedtime    levothyroxine 50 mcg tablet Take 1 tablet (50 mcg total) by mouth daily    metFORMIN (GLUCOPHAGE-XR) 500 mg 24 hr tablet take 2 tablets by mouth once daily WITH BREAKFAST    omeprazole (PriLOSEC) 20 mg delayed release capsule Take 1 capsule (20 mg total) by mouth daily    prednisoLONE acetate (PRED FORTE) 1 % ophthalmic suspension instill 1 drop into right eye four times a day AFTER SURGERY    [DISCONTINUED] HUMULIN N 100 UNIT/ML subcutaneous injection     [DISCONTINUED] Insulin Pen Needle (BD PEN NEEDLE NATALEE U/F) 32G X 4 MM MISC USE ONE NEEDLE WITH INSULIN TOUJEO AT BEDTIME    [DISCONTINUED] Insulin Syringe-Needle U-100 (B-D INS SYRINGE 0 5CC/30GX1/2") 30G X 1/2" 0 5 ML MISC USE WITH INSULIN HUMULIN N VIAL TWICE A DAY    atropine (ISOPTO ATROPINE) 1 % ophthalmic solution     metFORMIN (GLUMETZA) 1000 MG (MOD) 24 hr tablet Take 1 tablet (1,000 mg total) by mouth daily with breakfast    ranitidine (ZANTAC) 300 MG tablet Take 300 mg by mouth     No current facility-administered medications on file prior to visit           Review of Systems  A comprehensive review of systems was negative except for: Gastrointestinal: positive for constipation, dyspepsia and nausea       Objective:      Physical Exam  /72 (BP Location: Right arm, Patient Position: Sitting, Cuff Size: Large)   Pulse 84   Temp (!) 96 8 °F (36 °C) (Tympanic)   Resp 18   Ht 5' 4" (1 626 m)   Wt 85 9 kg (189 lb 6 oz)   SpO2 95%   BMI 32 51 kg/m²     General Appearance:    Alert, cooperative, no distress, appears stated age   Head:    Normocephalic, without obvious abnormality, atraumatic   Eyes:    PERRL, conjunctiva/corneas clear, EOM's intact, fundi     benign, both eyes   Ears:    Normal TM's and external ear canals, both ears   Nose:   Nares normal, septum midline, mucosa normal, no drainage    or sinus tenderness   Throat:   Lips, mucosa, and tongue normal; teeth and gums normal   Neck:   Supple, symmetrical, trachea midline, no adenopathy;     thyroid:  no enlargement/tenderness/nodules; no carotid    bruit or JVD   Back:     Symmetric, no curvature, ROM normal, no CVA tenderness   Lungs:     Clear to auscultation bilaterally, respirations unlabored   Chest Wall:    No tenderness or deformity Heart:    Regular rate and rhythm, S1 and S2 normal, no murmur, rub   or gallop   Breast Exam:    No tenderness, masses, or nipple abnormality   Abdomen:     Soft, non-tender, bowel sounds active all four quadrants,     no masses, no organomegaly   Genitalia:    Normal female without lesion, discharge or tenderness   Rectal:    Normal tone, no masses or tenderness; guaiac negative stool   Extremities:   Extremities normal, atraumatic, no cyanosis or edema   Pulses:   2+ and symmetric all extremities   Skin:   Skin color, texture, turgor normal, no rashes or lesions   Lymph nodes:   Cervical, supraclavicular, and axillary nodes normal   Neurologic:   CNII-XII intact, normal strength, sensation and reflexes     throughout       Predictors of intubation difficulty:   Morbid obesity? no   Anatomically abnormal facies? no   Prominent incisors? no   Receding mandible? no   Short, thick neck? no   Neck range of motion: normal   Dentition: Loose teeth present (top left ) and Missing teeth (bottom )    Cardiographics  EC18 Normal sinus rythm   Echocardiogram: normal and reviewed by myself        Lab Review   Hospital Outpatient Visit on 2018   Component Date Value    Protocol Name 2018 ECHO SAVANA     Time In Exercise Phase 2018 00:04:46     MAX   SYSTOLIC BP  036     Max Diastolic Bp  67     Max Heart Rate 2018 153     Max Predicted Heart Rate 2018 159     Reason for Termination 2018                      Value:Fatigue  Dyspnea  Target Heart Rate Achieved      Test Indication 2018                      Value:Dyspnea with Exercise  Chest Discomfort      Target Hr Formular 2018 (220 - Age)*85%     Arrhy During Ex 2018 ventricular premature beats     Chest Pain Statement 2018 none    Hospital Outpatient Visit on 2018   Component Date Value    Case Report 2018                      Value:Surgical Pathology Report Case: [id number]                                   Authorizing Provider:  Valentin Plummer MD         Collected:           05/23/2018 1259              Ordering Location:     [address] [place] [address] Breast Received:            05/23/2018 1519 [facility]                                                                       Pathologist:           Ely Sheldon MD                                                        Specimen:    Breast, Right Upper Outer, 8ga;sup cc;all cores with calcs;bowtie clip                     Final Diagnosis 05/23/2018                      Value: This result contains rich text formatting which cannot be displayed here   Note 05/23/2018                      Value: This result contains rich text formatting which cannot be displayed here   Additional Information 05/23/2018                      Value: This result contains rich text formatting which cannot be displayed here  Jack Jackson Gross Description 05/23/2018                      Value: This result contains rich text formatting which cannot be displayed here   Clinical Information 05/23/2018                      Value:Fixed in formalin          Assessment:      65 y o  female with planned surgery as above  Known risk factors for perioperative complications: Diabetes mellitus    Difficulty with intubation is not anticipated  Cardiac Risk Estimation: per the Revised Cardiac Risk Index (Circ  100:[id number], 1999), the patient's risk factors for cardiac complications include on insulin, putting her in: RCI RISK CLASS II (1 risk factor, risk of major cardiac compl  appr  1 3%)        Plan:      1  Preoperative workup as follows echocardiography to exclude impaired ventricular function, ECG, hemoglobin, hematocrit  2  Change in medication regimen before surgery: discontinue ASA 14 days before surgery and no Humalog on the am of surgery   3   Prophylaxis for cardiac events with perioperative beta-blockers: should be considered, specific regimen per anesthesia  4  Invasive hemodynamic monitoring perioperatively: not indicated  5  Deep vein thrombosis prophylaxis postoperatively:regimen to be chosen by surgical team   6  Surveillance for postoperative MI with ECG immediately postoperatively and on postoperative days 1 and 2 AND troponin levels 24 hours postoperatively and on day 4 or hospital discharge (whichever comes first): not indicated    7  Other measures: check BG

## 2018-06-19 ENCOUNTER — AMBUL SURGICAL CARE (OUTPATIENT)
Dept: URBAN - METROPOLITAN AREA SURGERY 32 | Facility: SURGERY | Age: 63
Setting detail: OPHTHALMOLOGY
End: 2018-06-19
Payer: COMMERCIAL

## 2018-06-19 ENCOUNTER — APPOINTMENT (OUTPATIENT)
Dept: PHYSICAL THERAPY | Facility: CLINIC | Age: 63
End: 2018-06-19
Payer: MEDICARE

## 2018-06-19 DIAGNOSIS — H35.341: ICD-10-CM

## 2018-06-19 PROCEDURE — G8907 PT DOC NO EVENTS ON DISCHARG: HCPCS | Performed by: OPHTHALMOLOGY

## 2018-06-19 PROCEDURE — G8918 PT W/O PREOP ORDER IV AB PRO: HCPCS | Performed by: OPHTHALMOLOGY

## 2018-06-19 PROCEDURE — 67042 VIT FOR MACULAR HOLE: CPT | Performed by: OPHTHALMOLOGY

## 2018-06-20 ENCOUNTER — RX ONLY (RX ONLY)
Age: 63
End: 2018-06-20

## 2018-06-20 ENCOUNTER — DOCTOR'S OFFICE (OUTPATIENT)
Dept: URBAN - METROPOLITAN AREA CLINIC 136 | Facility: CLINIC | Age: 63
Setting detail: OPHTHALMOLOGY
End: 2018-06-20
Payer: COMMERCIAL

## 2018-06-20 DIAGNOSIS — H35.341: ICD-10-CM

## 2018-06-20 PROCEDURE — 99024 POSTOP FOLLOW-UP VISIT: CPT | Performed by: OPHTHALMOLOGY

## 2018-06-21 ENCOUNTER — APPOINTMENT (OUTPATIENT)
Dept: LAB | Facility: HOSPITAL | Age: 63
End: 2018-06-21
Attending: SURGERY
Payer: MEDICARE

## 2018-06-21 ENCOUNTER — HOSPITAL ENCOUNTER (OUTPATIENT)
Dept: RADIOLOGY | Facility: HOSPITAL | Age: 63
Discharge: HOME | End: 2018-06-21
Attending: SURGERY
Payer: MEDICARE

## 2018-06-21 DIAGNOSIS — D05.11 DUCTAL CARCINOMA IN SITU (DCIS) OF RIGHT BREAST: ICD-10-CM

## 2018-06-21 LAB
ANION GAP SERPL CALCULATED.3IONS-SCNC: 9 MMOL/L (ref 4–13)
APTT PPP: 26 SECONDS (ref 24–36)
BUN SERPL-MCNC: 18 MG/DL (ref 5–25)
CALCIUM SERPL-MCNC: 9.3 MG/DL (ref 8.3–10.1)
CHLORIDE SERPL-SCNC: 101 MMOL/L (ref 100–108)
CO2 SERPL-SCNC: 28 MMOL/L (ref 21–32)
CREAT SERPL-MCNC: 0.78 MG/DL (ref 0.6–1.3)
ERYTHROCYTE [DISTWIDTH] IN BLOOD BY AUTOMATED COUNT: 13.4 % (ref 11.6–15.1)
GFR SERPL CREATININE-BSD FRML MDRD: 82 ML/MIN/1.73SQ M
GLUCOSE SERPL-MCNC: 350 MG/DL (ref 65–140)
HCT VFR BLD AUTO: 36.9 % (ref 34.8–46.1)
HGB BLD-MCNC: 12.1 G/DL (ref 11.5–15.4)
INR PPP: 0.97 (ref 0.86–1.17)
MCH RBC QN AUTO: 29.2 PG (ref 26.8–34.3)
MCHC RBC AUTO-ENTMCNC: 32.8 G/DL (ref 31.4–37.4)
MCV RBC AUTO: 89 FL (ref 82–98)
PLATELET # BLD AUTO: 266 THOUSANDS/UL (ref 149–390)
PMV BLD AUTO: 11.7 FL (ref 8.9–12.7)
POTASSIUM SERPL-SCNC: 3.8 MMOL/L (ref 3.5–5.3)
PROTHROMBIN TIME: 13 SECONDS (ref 11.8–14.2)
RBC # BLD AUTO: 4.15 MILLION/UL (ref 3.81–5.12)
SODIUM SERPL-SCNC: 138 MMOL/L (ref 136–145)
WBC # BLD AUTO: 8.53 THOUSAND/UL (ref 4.31–10.16)

## 2018-06-21 PROCEDURE — 71046 X-RAY EXAM CHEST 2 VIEWS: CPT

## 2018-06-21 PROCEDURE — 85730 THROMBOPLASTIN TIME PARTIAL: CPT

## 2018-06-21 PROCEDURE — 80048 BASIC METABOLIC PNL TOTAL CA: CPT

## 2018-06-21 PROCEDURE — 36415 COLL VENOUS BLD VENIPUNCTURE: CPT

## 2018-06-21 PROCEDURE — 85610 PROTHROMBIN TIME: CPT

## 2018-06-21 PROCEDURE — 85027 COMPLETE CBC AUTOMATED: CPT

## 2018-06-21 ASSESSMENT — VISUAL ACUITY
OS_BCVA: 20/HM
OD_BCVA: 20/100

## 2018-06-21 ASSESSMENT — REFRACTION_AUTOREFRACTION
OD_SPHERE: -3.25
OS_SPHERE: +1.50
OS_CYLINDER: -1.25
OD_AXIS: 102
OS_AXIS: 060
OD_CYLINDER: -2.50

## 2018-06-21 ASSESSMENT — SPHEQUIV_DERIVED
OS_SPHEQUIV: 0.875
OD_SPHEQUIV: -4.5

## 2018-06-21 NOTE — PRE-PROCEDURE INSTRUCTIONS
Pre-Surgery Instructions:   Medication Instructions    albuterol (PROVENTIL HFA,VENTOLIN HFA) 90 mcg/act inhaler Instructed patient per Anesthesia Guidelines   amLODIPine-atorvastatin (CADUET) 10-10 MG per tablet Instructed patient per Anesthesia Guidelines   atropine (ISOPTO ATROPINE) 1 % ophthalmic solution Instructed patient per Anesthesia Guidelines   ciprofloxacin (CILOXAN) 0 3 % ophthalmic solution Instructed patient per Anesthesia Guidelines   docusate sodium (COLACE) 100 mg capsule Instructed patient per Anesthesia Guidelines   enalapril (VASOTEC) 20 mg tablet Instructed patient per Anesthesia Guidelines   fluticasone furoate-vilanterol (BREO ELLIPTA) Instructed patient per Anesthesia Guidelines   gabapentin (NEURONTIN) 300 mg capsule Instructed patient per Anesthesia Guidelines   gemfibrozil (LOPID) 600 mg tablet Instructed patient per Anesthesia Guidelines   glipiZIDE (GLUCOTROL XL) 10 mg 24 hr tablet Instructed patient per Anesthesia Guidelines   HUMULIN N 100 UNIT/ML subcutaneous injection Instructed patient per Anesthesia Guidelines   insulin glargine (LANTUS) 100 units/mL subcutaneous injection Instructed patient per Anesthesia Guidelines   Insulin Glargine (TOUJEO) injection pen 300 units/mL Instructed patient per Anesthesia Guidelines   levothyroxine 50 mcg tablet Instructed patient per Anesthesia Guidelines   metFORMIN (GLUCOPHAGE-XR) 500 mg 24 hr tablet Instructed patient per Anesthesia Guidelines   metFORMIN (GLUMETZA) 1000 MG (MOD) 24 hr tablet Instructed patient per Anesthesia Guidelines   omeprazole (PriLOSEC) 20 mg delayed release capsule Instructed patient per Anesthesia Guidelines   prednisoLONE acetate (PRED FORTE) 1 % ophthalmic suspension Instructed patient per Anesthesia Guidelines   ranitidine (ZANTAC) 300 MG tablet Instructed patient per Anesthesia Guidelines      traZODone (DESYREL) 50 mg tablet Instructed patient per Anesthesia Guidelines  TOLD TO TAKE FULL DOSE LONG ACTING INSULIN NIGHT BEFORE PROCEDURE  WILL TAKE OMEPRAZOLE, LEVOTHYROXINE, AMLODIPINE, INHALERS AM OF SURG TINY SIP WATER  INSTR ON YASMANY CALL,  ASC LOC , BRING PHOTO ID/MED LIST/INS   INFO ,SHOWER REV , STOP ASA/NSAID/VIT 7 DAY PREOP

## 2018-06-25 ENCOUNTER — TELEPHONE (OUTPATIENT)
Dept: CARDIOLOGY CLINIC | Facility: CLINIC | Age: 63
End: 2018-06-25

## 2018-06-25 ENCOUNTER — ANESTHESIA EVENT (OUTPATIENT)
Dept: PERIOP | Facility: HOSPITAL | Age: 63
End: 2018-06-25
Payer: MEDICARE

## 2018-06-25 NOTE — TELEPHONE ENCOUNTER
Evangelista Holland said she had a note that Jolie saw Dr Sarah Roland on 5/23 and 6/6  Created form & paged doctor  Rerouting to diagnostic doc - Please review and if you can, append the cardiac clearance letter and sign

## 2018-06-26 ENCOUNTER — HOSPITAL ENCOUNTER (OUTPATIENT)
Dept: RADIOLOGY | Facility: HOSPITAL | Age: 63
Discharge: HOME | End: 2018-06-26
Payer: MEDICARE

## 2018-06-26 ENCOUNTER — HOSPITAL ENCOUNTER (OUTPATIENT)
Dept: RADIOLOGY | Facility: HOSPITAL | Age: 63
Discharge: HOME | End: 2018-06-26
Attending: SURGERY
Payer: MEDICARE

## 2018-06-26 ENCOUNTER — APPOINTMENT (OUTPATIENT)
Dept: MAMMOGRAPHY | Facility: HOSPITAL | Age: 63
End: 2018-06-26
Payer: MEDICARE

## 2018-06-26 ENCOUNTER — HOSPITAL ENCOUNTER (OUTPATIENT)
Facility: HOSPITAL | Age: 63
Setting detail: OUTPATIENT SURGERY
Discharge: HOME | End: 2018-06-26
Attending: SURGERY | Admitting: SURGERY
Payer: MEDICARE

## 2018-06-26 ENCOUNTER — ANESTHESIA (OUTPATIENT)
Dept: PERIOP | Facility: HOSPITAL | Age: 63
End: 2018-06-26
Payer: MEDICARE

## 2018-06-26 VITALS
HEART RATE: 89 BPM | TEMPERATURE: 98.4 F | RESPIRATION RATE: 18 BRPM | HEIGHT: 64 IN | SYSTOLIC BLOOD PRESSURE: 151 MMHG | BODY MASS INDEX: 31.92 KG/M2 | WEIGHT: 187 LBS | DIASTOLIC BLOOD PRESSURE: 64 MMHG | OXYGEN SATURATION: 94 %

## 2018-06-26 DIAGNOSIS — D05.11 DUCTAL CARCINOMA IN SITU (DCIS) OF RIGHT BREAST: ICD-10-CM

## 2018-06-26 LAB
GLUCOSE SERPL-MCNC: 128 MG/DL (ref 65–140)
GLUCOSE SERPL-MCNC: 150 MG/DL (ref 65–140)

## 2018-06-26 PROCEDURE — 19301 PARTIAL MASTECTOMY: CPT | Performed by: SURGERY

## 2018-06-26 PROCEDURE — 88361 TUMOR IMMUNOHISTOCHEM/COMPUT: CPT | Performed by: PATHOLOGY

## 2018-06-26 PROCEDURE — 88307 TISSUE EXAM BY PATHOLOGIST: CPT | Performed by: PATHOLOGY

## 2018-06-26 PROCEDURE — 19281 PERQ DEVICE BREAST 1ST IMAG: CPT

## 2018-06-26 PROCEDURE — 82948 REAGENT STRIP/BLOOD GLUCOSE: CPT

## 2018-06-26 RX ORDER — SODIUM CHLORIDE, SODIUM LACTATE, POTASSIUM CHLORIDE, CALCIUM CHLORIDE 600; 310; 30; 20 MG/100ML; MG/100ML; MG/100ML; MG/100ML
125 INJECTION, SOLUTION INTRAVENOUS CONTINUOUS
Status: DISCONTINUED | OUTPATIENT
Start: 2018-06-26 | End: 2018-06-26 | Stop reason: HOSPADM

## 2018-06-26 RX ORDER — LIDOCAINE HYDROCHLORIDE 10 MG/ML
INJECTION, SOLUTION INFILTRATION; PERINEURAL AS NEEDED
Status: DISCONTINUED | OUTPATIENT
Start: 2018-06-26 | End: 2018-06-26 | Stop reason: SURG

## 2018-06-26 RX ORDER — CEFAZOLIN SODIUM 1 G/3ML
INJECTION, POWDER, FOR SOLUTION INTRAMUSCULAR; INTRAVENOUS AS NEEDED
Status: DISCONTINUED | OUTPATIENT
Start: 2018-06-26 | End: 2018-06-26 | Stop reason: SURG

## 2018-06-26 RX ORDER — OXYCODONE HYDROCHLORIDE AND ACETAMINOPHEN 5; 325 MG/1; MG/1
1 TABLET ORAL EVERY 4 HOURS PRN
Status: DISCONTINUED | OUTPATIENT
Start: 2018-06-26 | End: 2018-06-26 | Stop reason: HOSPADM

## 2018-06-26 RX ORDER — ONDANSETRON 2 MG/ML
4 INJECTION INTRAMUSCULAR; INTRAVENOUS EVERY 4 HOURS PRN
Status: DISCONTINUED | OUTPATIENT
Start: 2018-06-26 | End: 2018-06-26 | Stop reason: HOSPADM

## 2018-06-26 RX ORDER — ONDANSETRON 2 MG/ML
INJECTION INTRAMUSCULAR; INTRAVENOUS AS NEEDED
Status: DISCONTINUED | OUTPATIENT
Start: 2018-06-26 | End: 2018-06-26 | Stop reason: SURG

## 2018-06-26 RX ORDER — PROPOFOL 10 MG/ML
INJECTION, EMULSION INTRAVENOUS AS NEEDED
Status: DISCONTINUED | OUTPATIENT
Start: 2018-06-26 | End: 2018-06-26 | Stop reason: SURG

## 2018-06-26 RX ORDER — LIDOCAINE HYDROCHLORIDE 10 MG/ML
5 INJECTION, SOLUTION INFILTRATION; PERINEURAL ONCE
Status: DISCONTINUED | OUTPATIENT
Start: 2018-06-26 | End: 2018-06-27 | Stop reason: HOSPADM

## 2018-06-26 RX ORDER — MAGNESIUM HYDROXIDE 1200 MG/15ML
LIQUID ORAL AS NEEDED
Status: DISCONTINUED | OUTPATIENT
Start: 2018-06-26 | End: 2018-06-26 | Stop reason: HOSPADM

## 2018-06-26 RX ORDER — FENTANYL CITRATE 50 UG/ML
INJECTION, SOLUTION INTRAMUSCULAR; INTRAVENOUS AS NEEDED
Status: DISCONTINUED | OUTPATIENT
Start: 2018-06-26 | End: 2018-06-26 | Stop reason: SURG

## 2018-06-26 RX ORDER — BUPIVACAINE HYDROCHLORIDE 2.5 MG/ML
INJECTION, SOLUTION INFILTRATION; PERINEURAL AS NEEDED
Status: DISCONTINUED | OUTPATIENT
Start: 2018-06-26 | End: 2018-06-26 | Stop reason: HOSPADM

## 2018-06-26 RX ORDER — EPHEDRINE SULFATE 50 MG/ML
INJECTION, SOLUTION INTRAVENOUS AS NEEDED
Status: DISCONTINUED | OUTPATIENT
Start: 2018-06-26 | End: 2018-06-26 | Stop reason: SURG

## 2018-06-26 RX ORDER — LIDOCAINE HYDROCHLORIDE 10 MG/ML
INJECTION, SOLUTION EPIDURAL; INFILTRATION; INTRACAUDAL; PERINEURAL AS NEEDED
Status: DISCONTINUED | OUTPATIENT
Start: 2018-06-26 | End: 2018-06-26 | Stop reason: HOSPADM

## 2018-06-26 RX ORDER — FENTANYL CITRATE/PF 50 MCG/ML
25 SYRINGE (ML) INJECTION
Status: DISCONTINUED | OUTPATIENT
Start: 2018-06-26 | End: 2018-06-26 | Stop reason: HOSPADM

## 2018-06-26 RX ADMIN — FENTANYL CITRATE 25 MCG: 50 INJECTION, SOLUTION INTRAMUSCULAR; INTRAVENOUS at 13:53

## 2018-06-26 RX ADMIN — LIDOCAINE HYDROCHLORIDE 50 MG: 10 INJECTION, SOLUTION INFILTRATION; PERINEURAL at 12:08

## 2018-06-26 RX ADMIN — EPHEDRINE SULFATE 5 MG: 50 INJECTION, SOLUTION INTRAMUSCULAR; INTRAVENOUS; SUBCUTANEOUS at 12:41

## 2018-06-26 RX ADMIN — FENTANYL CITRATE 25 MCG: 50 INJECTION, SOLUTION INTRAMUSCULAR; INTRAVENOUS at 12:06

## 2018-06-26 RX ADMIN — SODIUM CHLORIDE, SODIUM LACTATE, POTASSIUM CHLORIDE, AND CALCIUM CHLORIDE: .6; .31; .03; .02 INJECTION, SOLUTION INTRAVENOUS at 13:45

## 2018-06-26 RX ADMIN — ONDANSETRON 4 MG: 2 INJECTION INTRAMUSCULAR; INTRAVENOUS at 12:30

## 2018-06-26 RX ADMIN — EPHEDRINE SULFATE 5 MG: 50 INJECTION, SOLUTION INTRAMUSCULAR; INTRAVENOUS; SUBCUTANEOUS at 12:13

## 2018-06-26 RX ADMIN — CEFAZOLIN 2000 MG: 1 INJECTION, POWDER, FOR SOLUTION INTRAVENOUS at 12:18

## 2018-06-26 RX ADMIN — PROPOFOL 180 MG: 10 INJECTION, EMULSION INTRAVENOUS at 12:08

## 2018-06-26 RX ADMIN — OXYCODONE HYDROCHLORIDE AND ACETAMINOPHEN 1 TABLET: 5; 325 TABLET ORAL at 16:22

## 2018-06-26 RX ADMIN — DEXAMETHASONE SODIUM PHOSPHATE 5 MG: 10 INJECTION INTRAMUSCULAR; INTRAVENOUS at 12:30

## 2018-06-26 RX ADMIN — FENTANYL CITRATE 50 MCG: 50 INJECTION, SOLUTION INTRAMUSCULAR; INTRAVENOUS at 12:46

## 2018-06-26 RX ADMIN — SODIUM CHLORIDE, SODIUM LACTATE, POTASSIUM CHLORIDE, AND CALCIUM CHLORIDE 125 ML/HR: .6; .31; .03; .02 INJECTION, SOLUTION INTRAVENOUS at 10:58

## 2018-06-26 RX ADMIN — FENTANYL CITRATE 25 MCG: 50 INJECTION, SOLUTION INTRAMUSCULAR; INTRAVENOUS at 12:30

## 2018-06-26 RX ADMIN — FENTANYL CITRATE 25 MCG: 50 INJECTION, SOLUTION INTRAMUSCULAR; INTRAVENOUS at 12:12

## 2018-06-26 NOTE — ANESTHESIA PREPROCEDURE EVALUATION
Review of Systems/Medical History    Chart reviewed  No history of anesthetic complications     Cardiovascular  Hyperlipidemia, Hypertension controlled,    Pulmonary  Asthma ,        GI/Hepatic  Negative GI/hepatic ROS          Negative  ROS        Endo/Other  Diabetes poorly controlled Insulin, History of thyroid disease , hypothyroidism,   Comment: Recent eye surgery (PPV) on 6/19/18 Obesity (BMI 32)    GYN       Hematology  Negative hematology ROS      Musculoskeletal  Rheumatoid arthritis , Back pain , cervical pain and lumbar pain,   Arthritis     Neurology  Negative neurology ROS      Psychology   Negative psychology ROS              Physical Exam    Airway    Mallampati score: II  TM Distance: >3 FB  Neck ROM: full     Dental   Comment: Left bottom teeth secure as per patient  ,     Cardiovascular      Pulmonary      Other Findings        Anesthesia Plan  ASA Score- 3     Anesthesia Type- general with ASA Monitors  Additional Monitors:   Airway Plan: LMA  Comment: Called Dr Hugo Falcon's office regarding her recent eye surgery (PPV with ILM removal)  Dr Elliot Julian says to continue eye drops today and it is OK for patient to be in supine, upright position for the surgery  Patient has appointment with Dr Elliot Julian tomorrow  GA with LMA, IV, antiemetics  Plan Factors-    Induction- intravenous  Postoperative Plan-     Informed Consent- Anesthetic plan and risks discussed with patient  I personally reviewed this patient with the CRNA  Discussed and agreed on the Anesthesia Plan with the CRNA  Nikita Pope

## 2018-06-26 NOTE — OP NOTE
OPERATIVE REPORT  PATIENT NAME: Marta Dietz    :  1955  MRN: [id number]  Pt Location: [facility] OR ROOM 06    SURGERY DATE: 2018    Surgeon(s) and Role:     * Sky Mancuso MD - Primary     * Alethea Love MD - Assisting    Preop Diagnosis:  Ductal carcinoma in situ (DCIS) of right breast [D05 11]    Post-Op Diagnosis Codes:     * Ductal carcinoma in situ (DCIS) of right breast [D05 11]    Procedure(s) (LRB):  RIGHT BREAST NEEDLE LOCALIZATION X2 WITH RIGHT BREAST LUMPECTOMY ( NEEDLE LOC @ 1000) (Right)   ONCOPLASTIC CLOSURE OF LUMPECTOMY CAVITY    Specimen(s):  ID Type Source Tests Collected by Time Destination   1 : RIGHT BREAST LUMPECTOMY Tissue Breast, Right TISSUE EXAM Sky Mancuso MD 2018 [id number]        Estimated Blood Loss:   Minimal    Drains:       Anesthesia Type:   General    Operative Indications:  Ductal carcinoma in situ (DCIS) of right breast [D05 11]      Operative Findings:  Bracketed needle localization performed  Both wires in their entirety removed along with area in question between them  Complications:   None    Procedure and Technique:  Patient is a 58-year-old woman recently diagnosed with DCIS involving the right breast, extending from the area behind the nipple area complex at the 10 o'clock position back towards the axilla for expanse of about 7 cm  She comes in for bracketed lumpectomy  Once the wires were placed in the radiology department, she is brought to the OR and identified by proper time-out  Following this she was intubated by the anesthesia team and prepped draped usual fashion  Given the span of the areas to be excised we opted to excise the breast tissue along the course of the involved duct  This was at approximately the 10 o'clock aspect radially respect to the nipple-areolar complex  We therefore elected to excise the skin surrounding both wires along the 10 o'clock axis    The skin was anesthetized and incised sharply to include an ellipse of skin with the 2 wires way through it  We then performed the lumpectomy in hemostatic fashion using cautery as well as appropriate traction and counter traction to remove a fairly large wedge shape lumpectomy specimen including both wires  Upon removal, the specimen was oriented with a margin market and sent to [facility] to confirm resection of the areas in question  Cautery was used to control bleeding in the lumpectomy cavity  We then closed in oncoplastic fashion  This was done by closing the deeper layers with 2-0 Vicryl sutures to reapproximate the radial excision  This was followed by 3-0 Vicryl suture used for the more superficial layers  This was followed by 3-0 Vicryl for the dermis  This was followed by 4-0 Monocryl to close the skin in running subcuticular fashion  The Histacryl dressing was then applied in the usual fashion before the patient was awakened and transferred to the recovery room in stable condition  Sponge and instrument count were correct at the end of the case     I was present for the entire procedure    Patient Disposition:  PACU     SIGNATURE: Jayson Quesada MD  DATE: June 26, 2018  TIME: 2:47 PM

## 2018-06-26 NOTE — PERIOPERATIVE NURSING NOTE
VSS, pt denies pain or nausea, resting quietly, assessment unchanged, report called, no questions, pt transferred to [facility]

## 2018-06-26 NOTE — DISCHARGE INSTRUCTIONS
POST-OPERATIVE BREAST CARE INSTRUCTIONS    Wound Closure/Dressing: You have skin glue closing your incision and two small sutures which will be removed in the office  You may shower normally, do not scrub your incision      Call the office with    1  Redness, swelling, heat, unusual drainage or heavy bleeding from wound  2  Fever greater than 101 °  3  Pain not relieved by medication      Please call to follow up with Dr Shruti Mcnulty in 2 weeks  Do not drive 20FG after anesthesia  Do not drive while on narcotic pain medication

## 2018-06-26 NOTE — H&P (VIEW-ONLY)
Surgical Oncology Follow Up       [facility] SURGICAL ONCOLOGY [place]  [address]  1955  [id number]  [facility] SURGICAL ONCOLOGY [place]  [address] [place] [address]    Chief Complaint   Patient presents with    Breast Problem       Assessment/Plan:    No problem-specific Assessment & Plan notes found for this encounter  Diagnoses and all orders for this visit:    Ductal carcinoma in situ (DCIS) of right breast  -     Case request operating room: LUMPECTOMY BREAST NEEDLE LOCALIZED; Standing  -     Basic metabolic panel; Future  -     APTT; Future  -     CBC and Platelet; Future  -     Protime-INR; Future  -     EKG 12 lead; Future  -     XR chest pa & lateral; Future  -     Case request operating room: LUMPECTOMY BREAST NEEDLE LOCALIZED    Other orders  -     Incentive spirometry; Standing  -     Insert and maintain IV line; Standing  -     Void On-Call to O R ; Standing  -     Place sequential compression device; Standing        Advance Care Planning/Advance Directives:  Discussed disease status, cancer treatment plans and/or cancer treatment goals with the patient  No history exists  History of Present Illness:   Patient is 71-year-old woman who was being worked up for breast pain  She underwent mammogram which cleared the left breast which is where she initially reported the symptoms  However she was found to have areas of calcification in the upper outer quadrant of the right breast   This led to biopsies which confirmed DCIS  She has had a history of left-sided breast biopsy in the past   She was 9 at age of menarche  Four pregnancies 4 live births  She is 22 refer strap was born  She is birth control pills in the past   She has never used hormone replacement therapy  Review of Systems   Constitutional: Negative  HENT: Negative      Eyes: Negative  Respiratory: Negative  Cardiovascular: Negative  Gastrointestinal: Negative  Endocrine: Negative  Genitourinary: Negative  Musculoskeletal: Negative  Skin: Negative  Allergic/Immunologic: Negative  Neurological: Negative  Hematological: Negative  Psychiatric/Behavioral: Negative  Patient Active Problem List   Diagnosis    Type 2 diabetes mellitus without complication, without long-term current use of insulin ([facility] )    Asthma    Essential hypertension    Other specified hypothyroidism    Chest pain    Palpitations    Preop cardiovascular exam    Chronic bilateral low back pain without sciatica     Past Medical History:   Diagnosis Date    Asthma     Diabetes mellitus ([facility] )     Hypertension      Past Surgical History:   Procedure Laterality Date    BREAST SURGERY Left     CATARACT EXTRACTION, BILATERAL Bilateral     KNEE SURGERY Right     RETINAL DETACHMENT SURGERY Right     TUBAL LIGATION       Family History   Problem Relation Age of Onset    Diabetes Mother     Hypertension Mother     Diabetes Father     Hypertension Father     Diabetes Brother     Hypertension Brother      Social History     Social History    Marital status:      Spouse name: N/A    Number of children: N/A    Years of education: N/A     Occupational History    Not on file       Social History Main Topics    Smoking status: Never Smoker    Smokeless tobacco: Never Used    Alcohol use No    Drug use: No    Sexual activity: Not on file     Other Topics Concern    Not on file     Social History Narrative    No narrative on file       Current Outpatient Prescriptions:     albuterol (PROVENTIL HFA,VENTOLIN HFA) 90 mcg/act inhaler, Inhale 1 puff every 6 (six) hours, Disp: , Rfl:     amLODIPine-atorvastatin (CADUET) 10-10 MG per tablet, Take 1 tablet by mouth daily, Disp: 90 tablet, Rfl: 1    enalapril (VASOTEC) 20 mg tablet, Take 1 tablet (20 mg total) by mouth daily, Disp: 90 tablet, Rfl: 1    fluticasone furoate-vilanterol (BREO ELLIPTA), Inhale 1 puff daily, Disp: 3 each, Rfl: 0    fluticasone-salmeterol (ADVAIR) 100-50 mcg/dose, Inhale 1 puff 2 (two) times a day, Disp: 3 Inhaler, Rfl: 1    gabapentin (NEURONTIN) 300 mg capsule, take 1 capsule by mouth three times a day, Disp: 90 capsule, Rfl: 1    gemfibrozil (LOPID) 600 mg tablet, Take 600 mg by mouth, Disp: , Rfl:     glipiZIDE (GLUCOTROL XL) 10 mg 24 hr tablet, Take 1 tablet (10 mg total) by mouth daily, Disp: 90 tablet, Rfl: 1    HUMULIN N 100 UNIT/ML subcutaneous injection, , Disp: , Rfl: 0    Insulin Glargine (TOUJEO) injection pen 300 units/mL, Inject 10 Units under the skin daily at bedtime, Disp: 3 pen, Rfl: 1    Insulin Pen Needle (BD PEN NEEDLE NATALEE U/F) 32G X 4 MM MISC, USE ONE NEEDLE WITH INSULIN TOUJEO AT BEDTIME, Disp: 100 each, Rfl: 3    Insulin Syringe-Needle U-100 (B-D INS SYRINGE 0 5CC/30GX1/2") 30G X 1/2" 0 5 ML MISC, USE WITH INSULIN HUMULIN N VIAL TWICE A DAY, Disp: 100 each, Rfl: 3    levothyroxine 50 mcg tablet, Take 1 tablet (50 mcg total) by mouth daily, Disp: 90 tablet, Rfl: 1    metFORMIN (GLUCOPHAGE-XR) 500 mg 24 hr tablet, take 2 tablets by mouth once daily WITH BREAKFAST, Disp: , Rfl: 0    metFORMIN (GLUMETZA) 1000 MG (MOD) 24 hr tablet, Take 1 tablet (1,000 mg total) by mouth daily with breakfast, Disp: 90 tablet, Rfl: 1    omeprazole (PriLOSEC) 20 mg delayed release capsule, Take 1 capsule (20 mg total) by mouth daily, Disp: 90 capsule, Rfl: 1    ranitidine (ZANTAC) 300 MG tablet, Take 300 mg by mouth, Disp: , Rfl:     atropine (ISOPTO ATROPINE) 1 % ophthalmic solution, , Disp: , Rfl: 0    ciprofloxacin (CILOXAN) 0 3 % ophthalmic solution, place 1 drop into right eye four times a day AFTER SURGERY, Disp: , Rfl: 0    insulin glargine (LANTUS) 100 units/mL subcutaneous injection, Inject 10 Units under the skin daily at bedtime, Disp: 10 mL, Rfl: 0    prednisoLONE acetate (PRED FORTE) 1 % ophthalmic suspension, instill 1 drop into right eye four times a day AFTER SURGERY, Disp: , Rfl: 0  Allergies   Allergen Reactions    Aspirin Hives    Tylenol With Codeine #3 [Acetaminophen-Codeine] Rash     Vitals:    06/08/18 1351   BP: 120/78   Pulse: 94   Resp: 16   Temp: 98 6 °F (37 °C)       Physical Exam   Constitutional: She is oriented to person, place, and time  She appears well-developed and well-nourished  HENT:   Head: Normocephalic and atraumatic  Right Ear: External ear normal    Left Ear: External ear normal    Eyes: Conjunctivae and EOM are normal  Pupils are equal, round, and reactive to light  Neck: Normal range of motion  Neck supple  Cardiovascular: Normal rate, regular rhythm, normal heart sounds and intact distal pulses  Pulmonary/Chest: Effort normal and breath sounds normal    Abdominal: Soft  Bowel sounds are normal    Musculoskeletal: Normal range of motion  Neurological: She is alert and oriented to person, place, and time  Skin: Skin is warm  Psychiatric: She has a normal mood and affect  Her behavior is normal  Judgment and thought content normal    Breasts: breasts appear normal, no suspicious masses, no skin or nipple changes or axillary nodes  Pathology:  Case Report   Surgical Pathology Report                         Case: [id number]                                    Authorizing Provider: Garry Dent MD         Collected:           05/23/2018 1259               Ordering Location:     [facility] Received:            05/23/2018 1646                                      Center                                                                        Pathologist:           Lluvia Alcaraz MD                                                         Specimen:    Breast, Right Upper Outer, 8ga;sup cc;all cores with calcs;bowtie clip                     Final Diagnosis   A    Breast, right upper outer quadrant with calcifications, biopsies:  -  Ductal carcinoma in situ, micropapillary type, lowintermediate nuclear grade, involving 5 of 7 cores, measuring up to 10mm  -  Comedonecrosis is not identified   -  Negative for features of invasive carcinoma  -  Immunohistochemical stains performed with appropriate controls highly intact myoepithelial cells staining for p63, calponin, and CK5/6 with absence of CK 5/6 staining in the atypical epithelium, supporting the diagnosis  Electronically signed by Lluvia Alcaraz MD on 5/25/2018 at 12:29 PM   Note   Intradepartmental consultation was obtained with diagnostic agreement      The findings were communicated by telephone conversation to the [address] [place] [address] on 5/25/18 at 1225  CBC, Coags, BMP, Mg, Phos     Labs:      Imaging  Mammo Stereotactic Breast Biopsy Right (all Inc)    Result Date: 5/23/2018  Narrative: 29-year-old female, with increasing upper outer right breast calcifications  Mammo Stereotactic Breast Biopsy Right: May 23, 2018 - Check In #: [de-identified] Technologist: SANTIAGO Francis (SANTIAGO)(M) Prior study comparison: May 15, 2018, mammo diagnostic bilateral W CAD performed at [address] [place] [address]  IMPRESSION:Stereotactic vacuum assisted biopsy of indeterminate upper outer right breast calcifications was performed as described above without incident  After informed consent was obtained, the patient was positioned on the stereotactic biopsy table and the upper outer right breast calcifications were identified with imaging in the craniocaudal projection  Digital stereotactic images were obtained and the coordinates for biopsy were set on the computer  Betadine was used as sterile prep and 1% lidocaine as local anesthetic  After a small skin incision was made, 8 gauge Mammotome needle was advanced into the breast from a superior approach  Pre and post-fire images were obtained revealing satisfactory positioning of the needle    The vacuum assisted rotation device was then used to obtain multiple core specimens  Radiography of the core specimens reveal calcification within several core samples  A MammoMARK ( bowtie shape) clip was deployed  The specimens were sent to Pathology for evaluation  The needle was removed and and adequate hemostasis applied to the puncture wound  The patient was taken to the mammography suite where lateral and craniocaudal views were obtained  These confirm satisfactory positioning of the biopsy clip  The biopsy was cleansed and a sterile Steri-strip was applied to the small puncture wound  Post-biopsy instructions were given to the patient  The patient tolerated the procedure well and left the department in satisfactory condition  Pathology Results: Pending Mammo Post Biopsy: Right Breast - May 23, 2018 - Check In #: [de-identified] CC and ML view(s) were taken of the right breast  Technologist: SANTIAGO Elliott)(FEDERICO) IMPRESSION:Post stereotactic biopsy  Recommendation: Pathology pending  Transcription Location: [address] [place], [place] [address]way: [address] [place] [address] Pathology Report    Result Date: 5/25/2018  Narrative: Mammo Pathology Letter: Right Breast - May 23, 2018 - Check In #: [de-identified] DEAR DR Britta Duff Thank you for the recent referral of the above named patient to [facility] for right breast stereotactic biopsy  The results of her recent procedure have been made available to me from pathology  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, micropapillary type, 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  The pathology is concordant with the radiographic appearance  The recommendation is for  surgical consultation  Our nurse navigators, depending on your known office preferences, will ensure that the results and recommendations have been communicated to the patient  If there is any clarification needed, please do not hesitate to contact the [address] at [phone]   Thank you, FEDERICO [place]  Transcription Location: [address]: [id number]    Mammo Post Biopsy    Result Date: 5/23/2018  Narrative: 52-year-old female, with increasing upper outer right breast calcifications  Mammo Stereotactic Breast Biopsy Right: May 23, 2018 - Check In #: [de-identified] Technologist: Gaetana Meckel, R T (R)(M) Prior study comparison: May 15, 2018, mammo diagnostic bilateral W CAD performed at [address] [place] [address]  IMPRESSION:Stereotactic vacuum assisted biopsy of indeterminate upper outer right breast calcifications was performed as described above without incident  After informed consent was obtained, the patient was positioned on the stereotactic biopsy table and the upper outer right breast calcifications were identified with imaging in the craniocaudal projection  Digital stereotactic images were obtained and the coordinates for biopsy were set on the computer  Betadine was used as sterile prep and 1% lidocaine as local anesthetic  After a small skin incision was made, 8 gauge Mammotome needle was advanced into the breast from a superior approach  Pre and post-fire images were obtained revealing satisfactory positioning of the needle  The vacuum assisted rotation device was then used to obtain multiple core specimens  Radiography of the core specimens reveal calcification within several core samples  A MammoMARK ( bowtie shape) clip was deployed  The specimens were sent to Pathology for evaluation  The needle was removed and and adequate hemostasis applied to the puncture wound  The patient was taken to the mammography suite where lateral and craniocaudal views were obtained  These confirm satisfactory positioning of the biopsy clip  The biopsy was cleansed and a sterile Steri-strip was applied to the small puncture wound  Post-biopsy instructions were given to the patient  The patient tolerated the procedure well and left the department in satisfactory condition   Pathology Results: Pending Mammo Post Biopsy: Right Breast - May 23, 2018 - Check In #: [de-identified] CC and ML view(s) were taken of the right breast  Technologist: SANTIAGO Mckeon (SANTIAGO)(M) IMPRESSION:Post stereotactic biopsy  Recommendation: Pathology pending  Transcription Location: [address] [place] [address]: [id number]    Mammo Diagnostic Bilateral W Cad    Addendum Date: 5/24/2018 Addendum:   ADDENDUM: ADDENDUM:Outside studies from 9/11/2015 are now available for comparison  There is no change to the original report following review of these images  Patient History: Patient is postmenopausal  Family history of prostate cancer in brother, unknown cancer in maternal grandfather  Benign excisional biopsy of the left breast, 1980  Took hormonal contraceptives for 3 years  Patient has never smoked  Patient's BMI is 31 1  Reason for exam: clinical finding  Mammo Diagnostic Bilateral W CAD: May 15, 2018 - Check In #: [de-identified] Bilateral MLO and CC view(s) were taken  ML, spot compression MLO, spot compression CC, spot compression CC with magnification, and spot compression ML with magnification view(s) were taken of the left breast  Technologist: SANTIAGO Mckeon (SANTIAGO)(M) There are scattered fibroglandular densities  There is a palpable marker over the upper outer left breast without evident underlying abnormality  No suspicious mass, microcalcification, skin thickening or architectural distortion in the left breast  There is a grouping of calcifications in the upper outer right breast radiating to the nipple over an extent of 7 4 cm  These are coarse and heterogeneous in appearance and in a segmental distribution  In the absence of prior studies for comparison stereotactic biopsy of these calcifications is recommended  Targeted sonographic evaluation of the area of pain in the left breast 12 to 3 o'clock position left breast area of pain demonstrates a prominent island of fibroglandular tissue without solid mass or abnormal shadowing   I discussed these findings with the patient at time of examination  If prior studies arrive for comparison an addendum will be made to this report  US Breast Left Limited: May 15, 2018 - Check In #: [de-identified] Standard views  Technologist: RT Sj (SANTIAGO), GUME ACR BI-RADS® Assessments: BiRad:4 - Suspicious (Overall) Diag Mammogram: BiRad:4 - suspicious abnormality in the right breast  Left breast Left Brst US: Left breast is BiRad:1 - negative  Recommendation: Stereotactic biopsy of the right breast  Clinical management of the left breast  Analyzed by CAD The patient is scheduled in a reminder system for screening mammography  Transcription Location: Pending sale to [facility] Signing Station: [id number] Risk Value(s): Tyrer-Cuzick 10 Year: 3 200%, Tyrer-Cuzick Lifetime: 7 600%, Myriad Table: 1 5%, DEBBIE 5 Year: 1 2%, NCI Lifetime: 6 1%    Result Date: 5/24/2018  Narrative: Patient History: Patient is postmenopausal  Family history of prostate cancer in brother, unknown cancer in maternal grandfather  Benign excisional biopsy of the left breast, 1980  Took hormonal contraceptives for 3 years  Patient has never smoked  Patient's BMI is 31 1  Reason for exam: clinical finding  Mammo Diagnostic Bilateral W CAD: May 15, 2018 - Check In #: [de-identified] Bilateral MLO and CC view(s) were taken  ML, spot compression MLO, spot compression CC, spot compression CC with magnification, and spot compression ML with magnification view(s) were taken of the left breast  Technologist: SANTIAGO Whiteside (R)(M) There are scattered fibroglandular densities  There is a palpable marker over the upper outer left breast without evident underlying abnormality  No suspicious mass, microcalcification, skin thickening or architectural distortion in the left breast  There is a grouping of calcifications in the upper outer right breast radiating to the nipple over an extent of 7 4 cm    These are coarse and heterogeneous in appearance and in a segmental distribution  In the absence of prior studies for comparison stereotactic biopsy of these calcifications is recommended  Targeted sonographic evaluation of the area of pain in the left breast 12 to 3 o'clock position left breast area of pain demonstrates a prominent island of fibroglandular tissue without solid mass or abnormal shadowing  I discussed these findings with the patient at time of examination  If prior studies arrive for comparison an addendum will be made to this report  US Breast Left Limited: May 15, 2018 - Check In #: [de-identified] Standard views  Technologist: Ernestina Armstrong, RT (R), RDMS ACR BI-RADS® Assessments: BiRad:4 - Suspicious (Overall) Diag Mammogram: BiRad:4 - suspicious abnormality in the right breast  Left breast Left Brst US: Left breast is BiRad:1 - negative  Recommendation: Stereotactic biopsy of the right breast  Clinical management of the left breast  Analyzed by CAD The patient is scheduled in a reminder system for screening mammography  Transcription Location: [facility]: [id number] Risk Value(s): Tyrer-Cuzick 10 Year: 3 200%, Tyrer-Cuzick Lifetime: 7 600%, Myriad Table: 1 5%, DEBBIE 5 Year: 1 2%, NCI Lifetime: 6 1%    Us Breast Left Limited (diagnostic)    Addendum Date: 5/24/2018 Addendum:   ADDENDUM: ADDENDUM:Outside studies from 9/11/2015 are now available for comparison  There is no change to the original report following review of these images  Patient History: Patient is postmenopausal  Family history of prostate cancer in brother, unknown cancer in maternal grandfather  Benign excisional biopsy of the left breast, 1980  Took hormonal contraceptives for 3 years  Patient has never smoked  Patient's BMI is 31 1  Reason for exam: clinical finding  Mammo Diagnostic Bilateral W CAD: May 15, 2018 - Check In #: [de-identified] Bilateral MLO and CC view(s) were taken    ML, spot compression MLO, spot compression CC, spot compression CC with magnification, and spot compression ML with magnification view(s) were taken of the left breast  Technologist: SANTIAGO Joy T (R)(M) There are scattered fibroglandular densities  There is a palpable marker over the upper outer left breast without evident underlying abnormality  No suspicious mass, microcalcification, skin thickening or architectural distortion in the left breast  There is a grouping of calcifications in the upper outer right breast radiating to the nipple over an extent of 7 4 cm  These are coarse and heterogeneous in appearance and in a segmental distribution  In the absence of prior studies for comparison stereotactic biopsy of these calcifications is recommended  Targeted sonographic evaluation of the area of pain in the left breast 12 to 3 o'clock position left breast area of pain demonstrates a prominent island of fibroglandular tissue without solid mass or abnormal shadowing  I discussed these findings with the patient at time of examination  If prior studies arrive for comparison an addendum will be made to this report  US Breast Left Limited: May 15, 2018 - Check In #: [de-identified] Standard views  Technologist: RT Vee (R), GUME ACR BI-RADS® Assessments: BiRad:4 - Suspicious (Overall) Diag Mammogram: BiRad:4 - suspicious abnormality in the right breast  Left breast Left Brst US: Left breast is BiRad:1 - negative  Recommendation: Stereotactic biopsy of the right breast  Clinical management of the left breast  Analyzed by CAD The patient is scheduled in a reminder system for screening mammography  Transcription Location: [facility] Signing Station: [id number] Risk Value(s): Justuser-Cutimck 10 Year: 3 200%, Justuser-Cutimck Lifetime: 7 600%, Myriad Table: 1 5%, DEBBIE 5 Year: 1 2%, NCI Lifetime: 6 1%    Result Date: 5/24/2018  Narrative: Patient History: Patient is postmenopausal  Family history of prostate cancer in brother, unknown cancer in maternal grandfather  Benign excisional biopsy of the left breast, 1980   Took hormonal contraceptives for 3 years  Patient has never smoked  Patient's BMI is 31 1  Reason for exam: clinical finding  Mammo Diagnostic Bilateral W CAD: May 15, 2018 - Check In #: [de-identified] Bilateral MLO and CC view(s) were taken  ML, spot compression MLO, spot compression CC, spot compression CC with magnification, and spot compression ML with magnification view(s) were taken of the left breast  Technologist: SANTIAGO Hermosillo (R)(M) There are scattered fibroglandular densities  There is a palpable marker over the upper outer left breast without evident underlying abnormality  No suspicious mass, microcalcification, skin thickening or architectural distortion in the left breast  There is a grouping of calcifications in the upper outer right breast radiating to the nipple over an extent of 7 4 cm  These are coarse and heterogeneous in appearance and in a segmental distribution  In the absence of prior studies for comparison stereotactic biopsy of these calcifications is recommended  Targeted sonographic evaluation of the area of pain in the left breast 12 to 3 o'clock position left breast area of pain demonstrates a prominent island of fibroglandular tissue without solid mass or abnormal shadowing  I discussed these findings with the patient at time of examination  If prior studies arrive for comparison an addendum will be made to this report  US Breast Left Limited: May 15, 2018 - Check In #: [de-identified] Standard views  Technologist: RT Consuelo (R), GUME ACR BI-RADS® Assessments: BiRad:4 - Suspicious (Overall) Diag Mammogram: BiRad:4 - suspicious abnormality in the right breast  Left breast Left Brst US: Left breast is BiRad:1 - negative  Recommendation: Stereotactic biopsy of the right breast  Clinical management of the left breast  Analyzed by CAD The patient is scheduled in a reminder system for screening mammography   Transcription Location: [address] [place] [address]: [id number] Risk Value(s): Marquis 10 Year: 3 200%, Marquis Lifetime: 7 600%, Myriad Table: 1 5%, DEBBIE 5 Year: 1 2%, NCI Lifetime: 6 1%    I reviewed the above laboratory and imaging data  Discussion/Summary:  62-year-old woman with newly diagnosed right breast DCIS  Will set up for lumpectomy using needle localization, right breast   Risks and benefits of surgery along with rationale for this approach were discussed with patient and her daughter  They understand the plan and wish to proceed as outlined  All questions answered at this time

## 2018-06-26 NOTE — ANESTHESIA POSTPROCEDURE EVALUATION
Post-Op Assessment Note      CV Status:  Stable    Mental Status:  Alert and awake    Hydration Status:  Euvolemic    PONV Controlled:  Controlled    Airway Patency:  Patent    Post Op Vitals Reviewed: Yes          Staff: CRNA           /62 (06/26/18 1419)    Temp (!) 96 8 °F (36 °C) (06/26/18 1419)    Pulse 80 (06/26/18 1419)   Resp 21 (06/26/18 1419)    SpO2 100 % (06/26/18 1419)

## 2018-06-27 ENCOUNTER — RX ONLY (RX ONLY)
Age: 63
End: 2018-06-27

## 2018-06-27 ENCOUNTER — DOCTOR'S OFFICE (OUTPATIENT)
Dept: URBAN - METROPOLITAN AREA CLINIC 136 | Facility: CLINIC | Age: 63
Setting detail: OPHTHALMOLOGY
End: 2018-06-27
Payer: COMMERCIAL

## 2018-06-27 DIAGNOSIS — D05.11 BREAST NEOPLASM, TIS (DCIS), RIGHT: Primary | ICD-10-CM

## 2018-06-27 DIAGNOSIS — H35.341: ICD-10-CM

## 2018-06-27 PROCEDURE — 99024 POSTOP FOLLOW-UP VISIT: CPT | Performed by: OPHTHALMOLOGY

## 2018-06-27 RX ORDER — HYDROCODONE BITARTRATE AND ACETAMINOPHEN 5; 325 MG/1; MG/1
1 TABLET ORAL EVERY 6 HOURS PRN
Qty: 12 TABLET | Refills: 0 | Status: SHIPPED | OUTPATIENT
Start: 2018-06-27 | End: 2018-07-24

## 2018-06-27 ASSESSMENT — REFRACTION_MANIFEST
OD_VA2: 20/
OS_VA3: 20/
OS_VA3: 20/
OD_VA3: 20/
OD_VA2: 20/
OU_VA: 20/
OS_VA1: 20/
OU_VA: 20/
OD_VA2: 20/
OD_VA1: 20/
OD_VA3: 20/
OS_VA2: 20/
OU_VA: 20/
OD_VA1: 20/
OS_VA3: 20/
OS_VA1: 20/
OD_VA3: 20/
OD_VA1: 20/
OS_VA2: 20/
OS_VA2: 20/
OS_VA1: 20/

## 2018-06-27 ASSESSMENT — REFRACTION_AUTOREFRACTION
OD_AXIS: 102
OD_CYLINDER: -2.50
OD_SPHERE: -3.25
OS_SPHERE: +1.50
OS_CYLINDER: -1.25
OS_AXIS: 060

## 2018-06-27 ASSESSMENT — CONFRONTATIONAL VISUAL FIELD TEST (CVF)
OS_FINDINGS: SUPEROTEMPORAL DEFECT
OD_FINDINGS: INFEROTEMPORAL DEFECT, SUPERONASAL DEFECT

## 2018-06-27 ASSESSMENT — SPHEQUIV_DERIVED
OD_SPHEQUIV: -4.5
OS_SPHEQUIV: 0.875

## 2018-06-27 ASSESSMENT — LID EXAM ASSESSMENTS
OS_BLEPHARITIS: 1+
OD_BLEPHARITIS: 1+

## 2018-06-27 ASSESSMENT — REFRACTION_CURRENTRX
OD_OVR_VA: 20/
OS_OVR_VA: 20/
OS_OVR_VA: 20/
OD_OVR_VA: 20/
OS_OVR_VA: 20/
OD_OVR_VA: 20/

## 2018-06-27 ASSESSMENT — VISUAL ACUITY
OS_BCVA: 20/HM
OD_BCVA: 20/100

## 2018-06-30 DIAGNOSIS — I10 ESSENTIAL HYPERTENSION: ICD-10-CM

## 2018-07-03 ENCOUNTER — OFFICE VISIT (OUTPATIENT)
Dept: SURGICAL ONCOLOGY | Facility: CLINIC | Age: 63
End: 2018-07-03

## 2018-07-03 VITALS
HEIGHT: 64 IN | DIASTOLIC BLOOD PRESSURE: 80 MMHG | HEART RATE: 88 BPM | RESPIRATION RATE: 16 BRPM | TEMPERATURE: 98.8 F | SYSTOLIC BLOOD PRESSURE: 142 MMHG | WEIGHT: 180 LBS | BODY MASS INDEX: 30.73 KG/M2

## 2018-07-03 DIAGNOSIS — D05.11 DUCTAL CARCINOMA IN SITU (DCIS) OF RIGHT BREAST: Primary | ICD-10-CM

## 2018-07-03 PROCEDURE — 99024 POSTOP FOLLOW-UP VISIT: CPT | Performed by: SURGERY

## 2018-07-03 NOTE — PROGRESS NOTES
Surgical Oncology Follow Up       [address] [facility] [address] [address]  [address] [place] [address]  1955  [id number]  [address] [facility]ER [facility] [address] [place] [address] Road [address] [phone]    Chief Complaint   Patient presents with    Post-op     Patient is here for post op breast lumpectomy       Assessment/Plan:    No problem-specific Assessment & Plan notes found for this encounter  There are no diagnoses linked to this encounter  Advance Care Planning/Advance Directives:  Discussed disease status, cancer treatment plans and/or cancer treatment goals with the patient  No history exists  History of Present Illness:   Right breas DCIS status post lumpectomy June 2018   -Interval History:  Patient was concerned about some fluid at the surgical site over the last several days  No fevers or chills  Review of Systems:  Review of Systems   Skin: Positive for wound  Patient Active Problem List   Diagnosis    Type 2 diabetes mellitus with complication, with long-term current use of insulin ([address] )    Asthma    Essential hypertension    Other specified hypothyroidism    Chest pain    Palpitations    Preop examination    Chronic bilateral low back pain without sciatica    Ductal carcinoma in situ (DCIS) of right breast    Neck pain    Constipation    Primary insomnia     Past Medical History:   Diagnosis Date    Asthma     Cancer ([address] )     BREAST    Diabetes mellitus ([address] )     Hypercholesterolemia     Hypertension     Hypothyroid     Rheumatoid arthritis ([address] )      Past Surgical History:   Procedure Laterality Date    BREAST LUMPECTOMY Right 6/26/2018    Procedure: RIGHT BREAST NEEDLE LOCALIZATION X2 WITH RIGHT BREAST LUMPECTOMY ( NEEDLE LOC @ 1000);   Surgeon: Juanis Morejon MD; [facility]: [facility];  Service: Surgical Oncology    BREAST SURGERY Left     CATARACT EXTRACTION, BILATERAL Bilateral     KNEE SURGERY Right     RETINAL DETACHMENT SURGERY Right     TUBAL LIGATION       Family History   Problem Relation Age of Onset    Diabetes Mother     Hypertension Mother     Diabetes Father     Hypertension Father     Diabetes Brother     Hypertension Brother      Social History     Social History    Marital status:      Spouse name: N/A    Number of children: N/A    Years of education: N/A     Occupational History    Not on file       Social History Main Topics    Smoking status: Never Smoker    Smokeless tobacco: Never Used    Alcohol use No    Drug use: No    Sexual activity: Yes     Other Topics Concern    Not on file     Social History Narrative    No narrative on file       Current Outpatient Prescriptions:     albuterol (PROVENTIL HFA,VENTOLIN HFA) 90 mcg/act inhaler, Inhale 1 puff 2 (two) times a day  , Disp: , Rfl:     atropine (ISOPTO ATROPINE) 1 % ophthalmic solution, Administer 2 drops to the right eye 2 (two) times a day  , Disp: , Rfl: 0    ciprofloxacin (CILOXAN) 0 3 % ophthalmic solution, place 1 drop into right eye four times a day AFTER SURGERY, Disp: , Rfl: 0    enalapril (VASOTEC) 20 mg tablet, Take 1 tablet (20 mg total) by mouth daily, Disp: 90 tablet, Rfl: 1    gemfibrozil (LOPID) 600 mg tablet, Take 600 mg by mouth daily  , Disp: , Rfl:     glipiZIDE (GLUCOTROL XL) 10 mg 24 hr tablet, Take 1 tablet (10 mg total) by mouth daily, Disp: 90 tablet, Rfl: 1    HUMULIN N 100 UNIT/ML subcutaneous injection, 70 units in the am 30 units in the pm, Disp: 30 mL, Rfl: 5    HYDROcodone-acetaminophen (NORCO) 5-325 mg per tablet, Take 1 tablet by mouth every 6 (six) hours as needed for pain Max Daily Amount: 4 tablets, Disp: 12 tablet, Rfl: 0    Insulin Glargine (TOUJEO) injection pen 300 units/mL, Inject 10 Units under the skin daily at bedtime, Disp: 3 pen, Rfl: 1    levothyroxine 50 mcg tablet, Take 1 tablet (50 mcg total) by mouth daily, Disp: 90 tablet, Rfl: 1    metFORMIN (GLUCOPHAGE-XR) 500 mg 24 hr tablet, take 2 tablets by mouth once daily WITH BREAKFAST, Disp: , Rfl: 0    omeprazole (PriLOSEC) 20 mg delayed release capsule, Take 1 capsule (20 mg total) by mouth daily, Disp: 90 capsule, Rfl: 1    prednisoLONE acetate (PRED FORTE) 1 % ophthalmic suspension, instill 1 drop into right eye four times a day AFTER SURGERY, Disp: , Rfl: 0  Allergies   Allergen Reactions    Aspirin Hives    Tylenol With Codeine #3 [Acetaminophen-Codeine] Rash     Vitals:    07/03/18 1413   BP: 142/80   Pulse: 88   Resp: 16   Temp: 98 8 °F (37 1 °C)       Physical Exam   Skin:   Right breast incision c/d/i         Results:  Labs:  CBC, Coags, BMP, Mg, Phos     Imaging  Xr Chest Pa & Lateral    Result Date: 6/25/2018  Narrative: CHEST INDICATION:   D05 11: Intraductal carcinoma in situ of right breast  COMPARISON:  Two-view chest 8/4/2010 EXAM PERFORMED/VIEWS:  XR CHEST PA & LATERAL FINDINGS: Cardiomediastinal silhouette appears unremarkable  The lungs are clear  No pneumothorax or pleural effusion  Osseous structures appear within normal limits for patient age  Impression: No acute cardiopulmonary disease  Workstation performed: [id number]     Mammo Needle Localization Right [employer])    Result Date: 6/27/2018  Narrative: Mammo Needle Localization Right [employer]): June 26, 2018 - Check In #: [de-identified] CC and LM view(s) were taken of the right breast  Technologist: Zoila Camilo RT (R)(M) Indication: 65year old; right breast DCIS with extensive calcifications for bracketing procedure  Preoperative needle localization with bracketing  Comparison: Prior imaging studies of the right breast from 5/23/2018   Procedure: After verifying patient and side of interest and initialing side of concern (time out procedure), utilizing digital mammographic guidance and sterile technique, a needle localization bracketing procedure of the calcifications and stereotactic clip in the right upper outer quadrant was performed from a lateral approach  The localizing wires were positioned in order to bracket the calcifications  The patient tolerated the procedure well, leaving the department in satisfactory condition, with annotated guidance images available on PACS  Impression: Successful needle localization bracketing procedure of multiple scattered right upper outer breast calcifications  Transcription Location: [address] [place] [address]: [id number]    Mammo Breast Specimen (no Charge)    Result Date: 6/27/2018  Narrative: Mammo Breast Specimen No Charge: Right Breast - June 26, 2018 - Check In #: [de-identified] CC and ML view(s) were taken of the right breast  Technologist: RT Tomeka (SANTIAGO)(M) Digital mammography of right breast surgical specimen reveals that the stereotactic clip in question and all calcifications are located within the biopsy specimen  The specimen also includes both localizing wires  These findings were immediately called to the operating room upon receipt of the specimen radiographs  Transcription Location: [address] [place] [address]: [id number]    I reviewed the above laboratory and imaging data  Discussion/Summary:  Status post right breast lumpectomy  Wound looks good  Plan on 1 week follow-up for path review

## 2018-07-03 NOTE — LETTER
July 3, 2018     Paulo Griffin MD  [address] [facility]  [address] [facility]  [address]    Patient: Anant Olivia   YOB: 1955   Date of Visit: 7/3/2018       Dear Dr Sunitha Martinez:    Thank you for referring Anant Olivia to me for evaluation  Below are my notes for this consultation  If you have questions, please do not hesitate to call me  I look forward to following your patient along with you  Sincerely,        Mayuri Watts MD        CC: No Recipients  Mayuri Watts MD  7/3/2018  3:06 PM  Sign at close encounter     Surgical Oncology Follow Up       [address]  [place] [id number]  [address]  1955  [id number]  [address] [place] [address] [facility]ER [facility] Kandi Haver  [address]  [address] [id number] [phone]    Chief Complaint   Patient presents with    Post-op     Patient is here for post op breast lumpectomy       Assessment/Plan:    No problem-specific Assessment & Plan notes found for this encounter  There are no diagnoses linked to this encounter  Advance Care Planning/Advance Directives:  Discussed disease status, cancer treatment plans and/or cancer treatment goals with the patient  No history exists  History of Present Illness:   Right breas DCIS status post lumpectomy June 2018   -Interval History:  Patient was concerned about some fluid at the surgical site over the last several days  No fevers or chills  Review of Systems:  Review of Systems   Skin: Positive for wound         Patient Active Problem List   Diagnosis    Type 2 diabetes mellitus with complication, with long-term current use of insulin ([address] )    Asthma    Essential hypertension    Other specified hypothyroidism    Chest pain    Palpitations    Preop examination    Chronic bilateral low back pain without sciatica    Ductal carcinoma in situ (DCIS) of right breast    Neck pain    Constipation    Primary insomnia     Past Medical History:   Diagnosis Date    Asthma     Cancer ([facility] )     BREAST    Diabetes mellitus ([facility] )     Hypercholesterolemia     Hypertension     Hypothyroid     Rheumatoid arthritis ([facility] )      Past Surgical History:   Procedure Laterality Date    BREAST LUMPECTOMY Right 6/26/2018    Procedure: RIGHT BREAST NEEDLE LOCALIZATION X2 WITH RIGHT BREAST LUMPECTOMY ( NEEDLE LOC @ 1000); Surgeon: Celestino Hilton MD;  [facility];  Service: Surgical Oncology    BREAST SURGERY Left     CATARACT EXTRACTION, BILATERAL Bilateral     KNEE SURGERY Right     RETINAL DETACHMENT SURGERY Right     TUBAL LIGATION       Family History   Problem Relation Age of Onset    Diabetes Mother     Hypertension Mother     Diabetes Father     Hypertension Father     Diabetes Brother     Hypertension Brother      Social History     Social History    Marital status:      Spouse name: N/A    Number of children: N/A    Years of education: N/A     Occupational History    Not on file       Social History Main Topics    Smoking status: Never Smoker    Smokeless tobacco: Never Used    Alcohol use No    Drug use: No    Sexual activity: Yes     Other Topics Concern    Not on file     Social History Narrative    No narrative on file       Current Outpatient Prescriptions:     albuterol (PROVENTIL HFA,VENTOLIN HFA) 90 mcg/act inhaler, Inhale 1 puff 2 (two) times a day  , Disp: , Rfl:     atropine (ISOPTO ATROPINE) 1 % ophthalmic solution, Administer 2 drops to the right eye 2 (two) times a day  , Disp: , Rfl: 0    ciprofloxacin (CILOXAN) 0 3 % ophthalmic solution, place 1 drop into right eye four times a day AFTER SURGERY, Disp: , Rfl: 0    enalapril (VASOTEC) 20 mg tablet, Take 1 tablet (20 mg total) by mouth daily, Disp: 90 tablet, Rfl: 1    gemfibrozil (LOPID) 600 mg tablet, Take 600 mg by mouth daily  , Disp: , Rfl:     glipiZIDE (GLUCOTROL XL) 10 mg 24 hr tablet, Take 1 tablet (10 mg total) by mouth daily, Disp: 90 tablet, Rfl: 1    HUMULIN N 100 UNIT/ML subcutaneous injection, 70 units in the am 30 units in the pm, Disp: 30 mL, Rfl: 5    HYDROcodone-acetaminophen (NORCO) 5-325 mg per tablet, Take 1 tablet by mouth every 6 (six) hours as needed for pain Max Daily Amount: 4 tablets, Disp: 12 tablet, Rfl: 0    Insulin Glargine (TOUJEO) injection pen 300 units/mL, Inject 10 Units under the skin daily at bedtime, Disp: 3 pen, Rfl: 1    levothyroxine 50 mcg tablet, Take 1 tablet (50 mcg total) by mouth daily, Disp: 90 tablet, Rfl: 1    metFORMIN (GLUCOPHAGE-XR) 500 mg 24 hr tablet, take 2 tablets by mouth once daily WITH BREAKFAST, Disp: , Rfl: 0    omeprazole (PriLOSEC) 20 mg delayed release capsule, Take 1 capsule (20 mg total) by mouth daily, Disp: 90 capsule, Rfl: 1    prednisoLONE acetate (PRED FORTE) 1 % ophthalmic suspension, instill 1 drop into right eye four times a day AFTER SURGERY, Disp: , Rfl: 0  Allergies   Allergen Reactions    Aspirin Hives    Tylenol With Codeine #3 [Acetaminophen-Codeine] Rash     Vitals:    07/03/18 1413   BP: 142/80   Pulse: 88   Resp: 16   Temp: 98 8 °F (37 1 °C)       Physical Exam   Skin:   Right breast incision c/d/i         Results:  Labs:  CBC, Coags, BMP, Mg, Phos     Imaging  Xr Chest Pa & Lateral    Result Date: 6/25/2018  Narrative: CHEST INDICATION:   D05 11: Intraductal carcinoma in situ of right breast  COMPARISON:  Two-view chest 8/4/2010 EXAM PERFORMED/VIEWS:  XR CHEST PA & LATERAL FINDINGS: Cardiomediastinal silhouette appears unremarkable  The lungs are clear  No pneumothorax or pleural effusion  Osseous structures appear within normal limits for patient age  Impression: No acute cardiopulmonary disease   Workstation performed: [id number]     Mammo Needle Localization Right [employer]    Result Date: 6/27/2018  Narrative: Mammo Needle Localization Right [employer]): June 26, 2018 - Check In #: [de-identified] CC and LM view(s) were taken of the right breast  Technologist: RT Tomeka (R)(M) Indication: 65year old; right breast DCIS with extensive calcifications for bracketing procedure  Preoperative needle localization with bracketing  Comparison: Prior imaging studies of the right breast from 5/23/2018  Procedure: After verifying patient and side of interest and initialing side of concern (time out procedure), utilizing digital mammographic guidance and sterile technique, a needle localization bracketing procedure of the calcifications and stereotactic clip in the right upper outer quadrant was performed from a lateral approach  The localizing wires were positioned in order to bracket the calcifications  The patient tolerated the procedure well, leaving the department in satisfactory condition, with annotated guidance images available on PACS  Impression: Successful needle localization bracketing procedure of multiple scattered right upper outer breast calcifications  Transcription Location: [address] [place] [address]: [id number]    Mammo Breast Specimen (no Charge)    Result Date: 6/27/2018  Narrative: Mammo Breast Specimen No Charge: Right Breast - June 26, 2018 - Check In #: [de-identified] CC and ML view(s) were taken of the right breast  Technologist: RT Tomeka (R)(M) Digital mammography of right breast surgical specimen reveals that the stereotactic clip in question and all calcifications are located within the biopsy specimen  The specimen also includes both localizing wires  These findings were immediately called to the operating room upon receipt of the specimen radiographs  Transcription Location: [address] [place] [address]: [id number]    I reviewed the above laboratory and imaging data  Discussion/Summary:  Status post right breast lumpectomy  Wound looks good  Plan on 1 week follow-up for path review

## 2018-07-05 DIAGNOSIS — E11.8 TYPE 2 DIABETES MELLITUS WITH COMPLICATION, WITHOUT LONG-TERM CURRENT USE OF INSULIN (HCC): ICD-10-CM

## 2018-07-05 RX ORDER — OMEPRAZOLE 20 MG/1
CAPSULE, DELAYED RELEASE ORAL
Qty: 90 CAPSULE | Refills: 2 | Status: SHIPPED | OUTPATIENT
Start: 2018-07-05 | End: 2018-11-08 | Stop reason: HOSPADM

## 2018-07-05 RX ORDER — GABAPENTIN 300 MG/1
CAPSULE ORAL
Qty: 90 CAPSULE | Refills: 1 | Status: SHIPPED | OUTPATIENT
Start: 2018-07-05 | End: 2018-12-05

## 2018-07-10 PROBLEM — C50.911 MALIGNANT NEOPLASM OF RIGHT FEMALE BREAST (HCC): Status: ACTIVE | Noted: 2018-06-26

## 2018-07-13 ENCOUNTER — OFFICE VISIT (OUTPATIENT)
Dept: SURGICAL ONCOLOGY | Facility: CLINIC | Age: 63
End: 2018-07-13

## 2018-07-13 VITALS
TEMPERATURE: 98.8 F | HEIGHT: 64 IN | HEART RATE: 106 BPM | RESPIRATION RATE: 16 BRPM | BODY MASS INDEX: 31.58 KG/M2 | DIASTOLIC BLOOD PRESSURE: 70 MMHG | SYSTOLIC BLOOD PRESSURE: 130 MMHG | WEIGHT: 185 LBS

## 2018-07-13 DIAGNOSIS — C50.411 MALIGNANT NEOPLASM OF UPPER-OUTER QUADRANT OF RIGHT BREAST IN FEMALE, ESTROGEN RECEPTOR POSITIVE (HCC): Primary | ICD-10-CM

## 2018-07-13 DIAGNOSIS — Z17.0 MALIGNANT NEOPLASM OF UPPER-OUTER QUADRANT OF RIGHT BREAST IN FEMALE, ESTROGEN RECEPTOR POSITIVE (HCC): Primary | ICD-10-CM

## 2018-07-13 PROCEDURE — 99024 POSTOP FOLLOW-UP VISIT: CPT | Performed by: SURGERY

## 2018-07-13 NOTE — PROGRESS NOTES
Surgical Oncology Follow Up       [address] [facility]ICAL [facility]COLOGY [place]  [address]  1955  [id number]  [facility] SURGICAL ONCOLOGY [place]  [address] [facility] [address]    Chief Complaint   Patient presents with    Post-op     Patient is here for a post-op lumpectomy right breast       Assessment/Plan:    No problem-specific Assessment & Plan notes found for this encounter  Diagnoses and all orders for this visit:    Malignant neoplasm of upper-outer quadrant of right breast in female, estrogen receptor positive ([facility] )  -     Case request operating room: LUMPECTOMY BREAST WITH BIOPSY LYMPH NODE SENTINEL; Standing  -     Basic metabolic panel; Future  -     APTT; Future  -     CBC and Platelet; Future  -     Protime-INR; Future  -     NM lymphatic breast; Future  -     Case request operating room: LUMPECTOMY BREAST WITH BIOPSY LYMPH NODE SENTINEL    Other orders  -     Incentive spirometry; Standing  -     Insert and maintain IV line; Standing  -     Void On-Call to O R ; Standing  -     Place sequential compression device; Standing        Advance Care Planning/Advance Directives:  Discussed disease status, cancer treatment plans and/or cancer treatment goals with the patient  Malignant neoplasm of right female breast ([facility] )    5/23/2018 Biopsy     Right breast core biopsy         6/26/2018 Initial Diagnosis     Malignant neoplasm of right female breast ([facility] )         6/26/2018 Surgery     RIGHT BREAST NEEDLE LOCALIZATION X2 WITH RIGHT BREAST LUMPECTOMY ( NEEDLE LOC @ 1000) (Right)   ONCOPLASTIC CLOSURE OF LUMPECTOMY CAVITY            -Interval History: Patient is here for a postop check s/p right breast lumpectomy  No complaints to report  Review of Systems:  Review of Systems   Constitutional: Negative  HENT: Negative  Eyes: Negative  Respiratory: Negative  Cardiovascular: Negative  Gastrointestinal: Negative  Endocrine: Negative  Genitourinary: Negative  Musculoskeletal: Negative  Skin: Negative  Allergic/Immunologic: Negative  Neurological: Negative  Hematological: Negative  Psychiatric/Behavioral: Negative  Patient Active Problem List   Diagnosis    Type 2 diabetes mellitus with complication, with long-term current use of insulin ([facility] )    Asthma    Essential hypertension    Other specified hypothyroidism    Chest pain    Palpitations    Preop examination    Chronic bilateral low back pain without sciatica    Ductal carcinoma in situ (DCIS) of right breast    Neck pain    Constipation    Primary insomnia    Malignant neoplasm of right female breast [facility])     Past Medical History:   Diagnosis Date    Asthma     Cancer ([facility] )     BREAST    Diabetes mellitus ([facility] )     Hypercholesterolemia     Hypertension     Hypothyroid     Rheumatoid arthritis ([place] [address] )      Past Surgical History:   Procedure Laterality Date    BREAST LUMPECTOMY Right 6/26/2018    Procedure: RIGHT BREAST NEEDLE LOCALIZATION X2 WITH RIGHT BREAST LUMPECTOMY ( NEEDLE LOC @ 1000); Surgeon: Tracy Blake MD;  [facility]: [facility];  Service: Surgical Oncology    BREAST SURGERY Left     CATARACT EXTRACTION, BILATERAL Bilateral     KNEE SURGERY Right     RETINAL DETACHMENT SURGERY Right     TUBAL LIGATION       Family History   Problem Relation Age of Onset    Diabetes Mother     Hypertension Mother     Diabetes Father     Hypertension Father     Diabetes Brother     Hypertension Brother      Social History     Social History    Marital status:      Spouse name: N/A    Number of children: N/A    Years of education: N/A     Occupational History    Not on file       Social History Main Topics    Smoking status: Never Smoker    Smokeless tobacco: Never Used    Alcohol use No    Drug use: No    Sexual activity: Yes     Other Topics Concern    Not on file     Social History Narrative    No narrative on file       Current Outpatient Prescriptions:     albuterol (PROVENTIL HFA,VENTOLIN HFA) 90 mcg/act inhaler, Inhale 1 puff 2 (two) times a day  , Disp: , Rfl:     atropine (ISOPTO ATROPINE) 1 % ophthalmic solution, Administer 2 drops to the right eye 2 (two) times a day  , Disp: , Rfl: 0    ciprofloxacin (CILOXAN) 0 3 % ophthalmic solution, place 1 drop into right eye four times a day AFTER SURGERY, Disp: , Rfl: 0    enalapril (VASOTEC) 20 mg tablet, Take 1 tablet (20 mg total) by mouth daily, Disp: 90 tablet, Rfl: 1    gabapentin (NEURONTIN) 300 mg capsule, take 1 capsule by mouth three times a day, Disp: 90 capsule, Rfl: 1    gemfibrozil (LOPID) 600 mg tablet, Take 600 mg by mouth daily  , Disp: , Rfl:     glipiZIDE (GLUCOTROL XL) 10 mg 24 hr tablet, Take 1 tablet (10 mg total) by mouth daily, Disp: 90 tablet, Rfl: 1    HUMULIN N 100 UNIT/ML subcutaneous injection, 70 units in the am 30 units in the pm, Disp: 30 mL, Rfl: 5    HYDROcodone-acetaminophen (NORCO) 5-325 mg per tablet, Take 1 tablet by mouth every 6 (six) hours as needed for pain Max Daily Amount: 4 tablets, Disp: 12 tablet, Rfl: 0    Insulin Glargine (TOUJEO) injection pen 300 units/mL, Inject 10 Units under the skin daily at bedtime, Disp: 3 pen, Rfl: 1    levothyroxine 50 mcg tablet, Take 1 tablet (50 mcg total) by mouth daily, Disp: 90 tablet, Rfl: 1    metFORMIN (GLUCOPHAGE-XR) 500 mg 24 hr tablet, take 2 tablets by mouth once daily WITH BREAKFAST, Disp: , Rfl: 0    omeprazole (PriLOSEC) 20 mg delayed release capsule, take 1 capsule by mouth once daily, Disp: 90 capsule, Rfl: 2    prednisoLONE acetate (PRED FORTE) 1 % ophthalmic suspension, instill 1 drop into right eye four times a day AFTER SURGERY, Disp: , Rfl: 0  Allergies   Allergen Reactions    Aspirin Hives    Tylenol With Codeine #3 [Acetaminophen-Codeine] Rash     Vitals:    07/13/18 1338   BP: 130/70 Pulse: (!) 106   Resp: 16   Temp: 98 8 °F (37 1 °C)       Physical Exam   Cardiovascular: Normal rate, regular rhythm, normal heart sounds and intact distal pulses  Pulmonary/Chest: Effort normal and breath sounds normal    Skin:   Right breast incision well healed  Results:  Labs:    History of Present Illness:   Case Report   Surgical Pathology Report                         Case: [id number]                                    Authorizing Provider: Emanuel Boothe MD        Collected:           06/26/2018 1235               Ordering Location:     [address]      Received:            06/26/2018 [facility]                                      Hospital Operating Room                                                       Pathologist:           Essie Pond MD                                                          Specimen:    Breast, Right, RIGHT BREAST LUMPECTOMY                                                     Addendum   THE FINAL DIAGNOSIS REMAINS UNCHANGED - this is to update the synoptic report with receptor studies given below and to state that the 8th ed   AJCC Prognostic Stage (use AJCC update) is [place]      Results of immunohistochemical assay performed on invasive carcinoma block A89  are as follows:  Test Description                          Result                             Prognostic Interpretation  Estrogen Receptor/ER                 100%                               Positive    Primary Antibody: SP1     Internal control: Positive                        Staining Intensity: Strong    External control: Positive                         Raymundo Score*: 8  Progesterone Receptor/Pg           45-55 %                           Positive    Primary Antibody: 1E2    Internal control: Positive                         Staining Intensity: Strong    External control: Positive/Negative        Raymundo Score*: 6  HER2 by IHC                                     3+ Positive    Primary Antibody: 4B5  HER2 by Sayra Herrera                Not Indicated  Appropriate positive and negative controls were reviewed  Fixative: Formalin   Duration of fixation (hours): 80 hours, does not meet specified requirements of latest ASCO/CAP guidelines (6  72 hours)  Cold Ischemia Time (minutes): Not stated  Sample Adequacy: Adequate  These immunohistochemical tests are FDA-cleared and performed at [facility] in [place], [place] and interpreted by Dr Alanna Baltazar  An electronic copy of this report will be kept on file in the Medical Records Department at [facility]   * The Raymundo score is a semi quantitative system that takes into consideration the proportion of positive cells (scored on a scale of 0-5) and staining intensity (scored on a scale of (0-3)  The proportion and intensity are summed to produce total scores of 0 or 2 through 8  A score of 0-2 is regarded as negative while 3-8 as positive  Addendum electronically signed by Bharath Velez MD on 7/6/2018 at  4:17 PM   Final Diagnosis   INVASIVE BREAST CARCINOMA STAGING SUMMARY  1  Specimen Identification  - Procedure: wire-guided lumpectomy  - Lymph node sampling: no lymph nodes submitted, no lymph nodes found  - Specimen laterality: right  - Tumor site (quadrant; position/o'clock):  upper outer quadrant  2  Invasive Tumor:  - Histologic type: Invasive breast carcinoma of no special type (ductal NST/invasive ductal carcinoma)  - Tumor size (pT1c):  14 millimeters (A89-1)  - Grade (G2): [place] histologic score = 3+2+2 = 7 of 9; Grade II of III      * glandular (acinar) / tubular differentiation: < 10%, score 3   * nuclear pleomorphism: nuclear grade 2, score 2   * mitoses ~ 4/mm2, score 2   - Tumor focality:  multifocal   - Macroscopic and microscopic extent of tumor: invasive carcinoma is confined to breast glandular parenchyma   * skin:  present, uninvolved by invasive carcinoma   * nipple: not present for evaluation  * skeletal muscle:  not present for evaluation  3  Lymphovascular invasion:  no unequivocal lymph-vascular invasion is identified  4  Dermal lymphovascular invasion: not seen  5  In situ tumor  - Ductal carcinoma in situ (DCIS): Present as an extensive component (~85% of total tumor)  * size (extent):  DCIS spans 2 6 cm maximal dimension (A62-1) and is present on 27 of 136 total slides examined  * architectural pattern(s):  cribriform & micropapillary patterns   * nuclear grade: nuclear grade 2 of 3   * necrosis:  central comedo type necrosis is present  - Lobular carcinoma in situ (LCIS):  not seen  6  Margins:  - Invasive carcinoma:   * invasive carcinoma is present at the superior lumpectomy margin (orange-inked, A84-1)  * all other lumpectomy margins are free of invasive carcinoma  - DCIS:    * DCIS is present within 0 2 millimeters of the superior lumpectomy margin (orange-inked, A26-1)   * DCIS is present within 0 2 millimeters of the medial lumpectomy margin (yellow-inked, A3-1)   * all other lumpectomy margins are free of DCIS by > 2 millimeters  7  Lymph nodes (pNX): no lymph nodes submitted, no lymph nodes found   - Number of lymph nodes with macrometastases (>2 mm): N/A  - Number of lymph nodes with micrometastases (>0 2 mm to 2 mm and/or >200 cells): N/A  - Number of lymph nodes with isolated tumor cells (<0 2 mm and <200 cells): N/A  - Size of largest metastatic deposit (mm):  N/A  - Extranodal extension: N/A  - Number of lymph nodes examined: none (0)  - Number of sentinel lymph nodes examined: none (0)  - Method of evaluation of sentinel lymph nodes: N/A  8  Treatment effect, response to presurgical (neoadjuvant) therapy: N/A  - In the breast: N/A  - In the lymph nodes: N/A  9  Additional pathologic findings: non-proliferative fibrocystic changes, without atypia, including microcysts, apocrine metaplasia & fibroadenomatoid stromal hyperplasia    10  Microcalcifications: present in DCIS  11  Ancillary studies:  estrogen, progesterone & Her-2/negrita assays of invasive carcinoma are currently pending and will be described in an addendum report  - Repeat HER2 testing (2013 ASCO/CAP Recommendations):  response is pending receptor assay completion   - Best representative tumor block:  A84, A89  - Sufficient tumor present for:   * Agendia Mammaprint/Blueprint (1 cm2 of invasive tumor in aggregate): Yes  * MI Profile/Foundation One (at least 5 x 5 mm of tumor): Yes  12  Clinical history:  DCIS by core needle biopsy  13  Pathologic stage classification (pTNM, AJCC 8th Edition): at least Stage IA - pT1c, pNX, G2   14  8th ed  AJCC Prognostic Stage (use AJCC update):  to be stated in the receptor addendum report  Final Diagnoses Listed for each Specimen of this Case  A  Right breast, wire-guided & oriented lumpectomy (243 g): - Invasive breast carcinoma, NST (aka ductal)  * [place] grade 2 of 3 (total score = 2+2+2 = 6 of 9)   -- tubule formation < 10%, score 3   -- nuclear grade 2 of 3, score 2   -- mitoses ~ 4/mm2, score 2    * invasive carcinoma is multifocally present (A23-1, A32-1, A84-1, A89-1, A100-1), largest focus is 14 millimeters in greatest dimension (A89-1, this focus is graded)  * superior lumpectomy margin (orange-inked) is POSITIVE for invasive carcinoma (A84-1)   * all other lumpectomy margins are free of invasive carcinoma  * estrogen, progesterone & Her-2/negrita receptor studies are undertaken, to be described in an addendum report  - Ductal carcinoma in-situ (DCIS): Present as an extensive component (~85% of total tumor)  * DCIS is co-located with invasive carcinoma surrounding prior needle biopsy site  * DCIS spans 2 6 cm maximal dimension (A62-1) and is present on 27 of 136 total slides examined  * DCIS has cribriform & micropapillary patterns, nuclear grade 2 of 3, with central comedo-type necrosis     * DCIS is present within 0 2 millimeters of the superior lumpectomy margin (orange-inked, A26-1)   * DCIS is present within 0 2 millimeters of the medial lumpectomy margin (yellow-inked, A3-1)   * all other lumpectomy margins are free of DCIS by > 2 millimeters  - Lymph-vascular invasion: no lymph-vascular invasion is unequivocally identified  - Microcalcifications: present in DCIS  - Best representative tumor block: A84 & A89  - Sufficient tumor present for:   * Agend[place] Mammaprint/Blueprint (1 cm2 of invasive tumor in aggregate): Yes    * MI Profile/Foundation One (at least 5 x 5 mm of tumor): Yes   - Additional pathologic findings:  non-proliferative fibrocystic changes, without atypia, including microcysts, apocrine metaplasia & fibroadenomatoid stromal hyperplasia  Electronically signed by Renu Herbert MD on 7/3/2018 at  4:07 PM         Imaging  Xr Chest Pa & Lateral    Result Date: 6/25/2018  Narrative: CHEST INDICATION:   D05 11: Intraductal carcinoma in situ of right breast  COMPARISON:  Two-view chest 8/4/2010 EXAM PERFORMED/VIEWS:  XR CHEST PA & LATERAL FINDINGS: Cardiomediastinal silhouette appears unremarkable  The lungs are clear  No pneumothorax or pleural effusion  Osseous structures appear within normal limits for patient age  Impression: No acute cardiopulmonary disease  Workstation performed: [id number]     Mammo Needle Localization Right [employer])    Result Date: 6/27/2018  Narrative: Mammo Needle Localization Right [employer]): June 26, 2018 - Check In #: [de-identified] CC and LM view(s) were taken of the right breast  Technologist: RT Tomeka (R)(M) Indication: 65year old; right breast DCIS with extensive calcifications for bracketing procedure  Preoperative needle localization with bracketing  Comparison: Prior imaging studies of the right breast from 5/23/2018   Procedure: After verifying patient and side of interest and initialing side of concern (time out procedure), utilizing digital mammographic guidance and sterile technique, a needle localization bracketing procedure of the calcifications and stereotactic clip in the right upper outer quadrant was performed from a lateral approach  The localizing wires were positioned in order to bracket the calcifications  The patient tolerated the procedure well, leaving the department in satisfactory condition, with annotated guidance images available on PACS  Impression: Successful needle localization bracketing procedure of multiple scattered right upper outer breast calcifications  Transcription Location: [address] [place] [address]: [id number]    Mammo Breast Specimen (no Charge)    Result Date: 6/27/2018  Narrative: Mammo Breast Specimen No Charge: Right Breast - June 26, 2018 - Check In #: [de-identified] CC and ML view(s) were taken of the right breast  Technologist: RT Zara (SANTIAGO)(M) Digital mammography of right breast surgical specimen reveals that the stereotactic clip in question and all calcifications are located within the biopsy specimen  The specimen also includes both localizing wires  These findings were immediately called to the operating room upon receipt of the specimen radiographs  Transcription Location: [address] [place] [address]: [id number]    I reviewed the above laboratory and imaging data  Discussion/Summary:  Stage I right breast cancer status post lumpectomy for DCIS  Incidentally found cancer, with positive superior margin  Will set up for re-excision of positive margin, along with sentinel node biopsy  Rationale for this along with risks and benefits of procedure discussed with patient and family  They understand the plan and wish to proceed as outlined

## 2018-07-13 NOTE — LETTER
July 13, 2018     Tracey Tijerina MD  [address] [facility]  [address] [facility]  [address]    Patient: Billy Bobo   YOB: 1955   Date of Visit: 7/13/2018       Dear Dr Coronado Marker:    Thank you for referring Billy Bobo to me for evaluation  Below are my notes for this consultation  If you have questions, please do not hesitate to call me  I look forward to following your patient along with you  Sincerely,        Mundo Siddiqi MD        CC: No Recipients  Mundo Siddiqi MD  7/13/2018  2:30 PM  Sign at close encounter     Surgical Oncology Follow Up       [address]  1955  [id number]  [address] [facility] [address]    Chief Complaint   Patient presents with    Post-op     Patient is here for a post-op lumpectomy right breast       Assessment/Plan:    No problem-specific Assessment & Plan notes found for this encounter  Diagnoses and all orders for this visit:    Malignant neoplasm of upper-outer quadrant of right breast in female, estrogen receptor positive ([facility] [address] )  -     Case request operating room: LUMPECTOMY BREAST WITH BIOPSY LYMPH NODE SENTINEL; Standing  -     Basic metabolic panel; Future  -     APTT; Future  -     CBC and Platelet; Future  -     Protime-INR; Future  -     NM lymphatic breast; Future  -     Case request operating room: LUMPECTOMY BREAST WITH BIOPSY LYMPH NODE SENTINEL    Other orders  -     Incentive spirometry; Standing  -     Insert and maintain IV line; Standing  -     Void On-Call to O R ; Standing  -     Place sequential compression device; Standing        Advance Care Planning/Advance Directives:  Discussed disease status, cancer treatment plans and/or cancer treatment goals with the patient          Malignant neoplasm of right female breast ([address] ) 5/23/2018 Biopsy     Right breast core biopsy         6/26/2018 Initial Diagnosis     Malignant neoplasm of right female breast ([facility] [address] )         6/26/2018 Surgery     RIGHT BREAST NEEDLE LOCALIZATION X2 WITH RIGHT BREAST LUMPECTOMY ( NEEDLE LOC @ 1000) (Right)   ONCOPLASTIC CLOSURE OF LUMPECTOMY CAVITY            -Interval History: Patient is here for a postop check s/p right breast lumpectomy  No complaints to report  Review of Systems:  Review of Systems   Constitutional: Negative  HENT: Negative  Eyes: Negative  Respiratory: Negative  Cardiovascular: Negative  Gastrointestinal: Negative  Endocrine: Negative  Genitourinary: Negative  Musculoskeletal: Negative  Skin: Negative  Allergic/Immunologic: Negative  Neurological: Negative  Hematological: Negative  Psychiatric/Behavioral: Negative  Patient Active Problem List   Diagnosis    Type 2 diabetes mellitus with complication, with long-term current use of insulin ([facility] [address] )    Asthma    Essential hypertension    Other specified hypothyroidism    Chest pain    Palpitations    Preop examination    Chronic bilateral low back pain without sciatica    Ductal carcinoma in situ (DCIS) of right breast    Neck pain    Constipation    Primary insomnia    Malignant neoplasm of right female breast [facility])     Past Medical History:   Diagnosis Date    Asthma     Cancer ([facility] [address] )     BREAST    Diabetes mellitus ([facility] [address] )     Hypercholesterolemia     Hypertension     Hypothyroid     Rheumatoid arthritis ([facility] [address] )      Past Surgical History:   Procedure Laterality Date    BREAST LUMPECTOMY Right 6/26/2018    Procedure: RIGHT BREAST NEEDLE LOCALIZATION X2 WITH RIGHT BREAST LUMPECTOMY ( NEEDLE LOC @ 1000);   Surgeon: Mayuri Watts MD;  [facility]: [facility];  Service: Surgical Oncology    BREAST SURGERY Left     CATARACT EXTRACTION, BILATERAL Bilateral     KNEE SURGERY Right     RETINAL DETACHMENT SURGERY Right     TUBAL LIGATION       Family History   Problem Relation Age of Onset    Diabetes Mother     Hypertension Mother     Diabetes Father     Hypertension Father     Diabetes Brother     Hypertension Brother      Social History     Social History    Marital status:      Spouse name: N/A    Number of children: N/A    Years of education: N/A     Occupational History    Not on file       Social History Main Topics    Smoking status: Never Smoker    Smokeless tobacco: Never Used    Alcohol use No    Drug use: No    Sexual activity: Yes     Other Topics Concern    Not on file     Social History Narrative    No narrative on file       Current Outpatient Prescriptions:     albuterol (PROVENTIL HFA,VENTOLIN HFA) 90 mcg/act inhaler, Inhale 1 puff 2 (two) times a day  , Disp: , Rfl:     atropine (ISOPTO ATROPINE) 1 % ophthalmic solution, Administer 2 drops to the right eye 2 (two) times a day  , Disp: , Rfl: 0    ciprofloxacin (CILOXAN) 0 3 % ophthalmic solution, place 1 drop into right eye four times a day AFTER SURGERY, Disp: , Rfl: 0    enalapril (VASOTEC) 20 mg tablet, Take 1 tablet (20 mg total) by mouth daily, Disp: 90 tablet, Rfl: 1    gabapentin (NEURONTIN) 300 mg capsule, take 1 capsule by mouth three times a day, Disp: 90 capsule, Rfl: 1    gemfibrozil (LOPID) 600 mg tablet, Take 600 mg by mouth daily  , Disp: , Rfl:     glipiZIDE (GLUCOTROL XL) 10 mg 24 hr tablet, Take 1 tablet (10 mg total) by mouth daily, Disp: 90 tablet, Rfl: 1    HUMULIN N 100 UNIT/ML subcutaneous injection, 70 units in the am 30 units in the pm, Disp: 30 mL, Rfl: 5    HYDROcodone-acetaminophen (NORCO) 5-325 mg per tablet, Take 1 tablet by mouth every 6 (six) hours as needed for pain Max Daily Amount: 4 tablets, Disp: 12 tablet, Rfl: 0    Insulin Glargine (TOUJEO) injection pen 300 units/mL, Inject 10 Units under the skin daily at bedtime, Disp: 3 pen, Rfl: 1    levothyroxine 50 mcg tablet, Take 1 tablet (50 mcg total) by mouth daily, Disp: 90 tablet, Rfl: 1    metFORMIN (GLUCOPHAGE-XR) 500 mg 24 hr tablet, take 2 tablets by mouth once daily WITH BREAKFAST, Disp: , Rfl: 0    omeprazole (PriLOSEC) 20 mg delayed release capsule, take 1 capsule by mouth once daily, Disp: 90 capsule, Rfl: 2    prednisoLONE acetate (PRED FORTE) 1 % ophthalmic suspension, instill 1 drop into right eye four times a day AFTER SURGERY, Disp: , Rfl: 0  Allergies   Allergen Reactions    Aspirin Hives    Tylenol With Codeine #3 [Acetaminophen-Codeine] Rash     Vitals:    07/13/18 1338   BP: 130/70   Pulse: (!) 106   Resp: 16   Temp: 98 8 °F (37 1 °C)       Physical Exam   Cardiovascular: Normal rate, regular rhythm, normal heart sounds and intact distal pulses  Pulmonary/Chest: Effort normal and breath sounds normal    Skin:   Right breast incision well healed  Results:  Labs:    History of Present Illness:   Case Report   Surgical Pathology Report                         Case: [id number]                                    Authorizing Provider: Abbe Saab MD        Collected:           06/26/2018 [facility]               Ordering Location:     [address]      Received:            06/26/2018 [address] [place] [address] Operating Room                                                       Pathologist:           Jose Mcgraw MD                                                          Specimen:    Breast, Right, RIGHT BREAST LUMPECTOMY                                                     Addendum   THE FINAL DIAGNOSIS REMAINS UNCHANGED - this is to update the synoptic report with receptor studies given below and to state that the 8th ed   AJCC Prognostic Stage (use AJCC update) is IA      Results of immunohistochemical assay performed on invasive carcinoma block A89  are as follows:  Test Description                          Result                             Prognostic Interpretation  Estrogen Receptor/ER 100%                               Positive    Primary Antibody: SP1     Internal control: Positive                        Staining Intensity: Strong    External control: Positive                         Raymundo Score*: 8  Progesterone Receptor/Pg           45-55 %                           Positive    Primary Antibody: 1E2    Internal control: Positive                         Staining Intensity: Strong    External control: Positive/Negative        Raymundo Score*: 6  HER2 by IHC                                     3+                                Positive    Primary Antibody: 4B5  HER2 by FISH                Not Indicated  Appropriate positive and negative controls were reviewed  Fixative: Formalin   Duration of fixation (hours): 80 hours, does not meet specified requirements of latest ASCO/CAP guidelines (6  72 hours)  Cold Ischemia Time (minutes): Not stated  Sample Adequacy: Adequate  These immunohistochemical tests are FDA-cleared and performed at [facility] in [place], [place] and interpreted by Dr Bonnie Gallagher  An electronic copy of this report will be kept on file in the Medical Records Department at Located within [facility]   * The Raymundo score is a semi quantitative system that takes into consideration the proportion of positive cells (scored on a scale of 0-5) and staining intensity (scored on a scale of (0-3)  The proportion and intensity are summed to produce total scores of 0 or 2 through 8  A score of 0-2 is regarded as negative while 3-8 as positive  Addendum electronically signed by Juanis Rock MD on 7/6/2018 at  4:17 PM   Final Diagnosis   INVASIVE BREAST CARCINOMA STAGING SUMMARY  1  Specimen Identification  - Procedure: wire-guided lumpectomy  - Lymph node sampling: no lymph nodes submitted, no lymph nodes found  - Specimen laterality: right  - Tumor site (quadrant; position/o'clock):  upper outer quadrant  2  Invasive Tumor:  - Histologic type:  Invasive breast carcinoma of no special type (ductal NST/invasive ductal carcinoma)  - Tumor size (pT1c):  14 millimeters (A89-1)  - Grade (G2): Carlisle histologic score = 3+2+2 = 7 of 9; Grade II of III  * glandular (acinar) / tubular differentiation: < 10%, score 3   * nuclear pleomorphism: nuclear grade 2, score 2   * mitoses ~ 4/mm2, score 2   - Tumor focality:  multifocal   - Macroscopic and microscopic extent of tumor: invasive carcinoma is confined to breast glandular parenchyma   * skin:  present, uninvolved by invasive carcinoma   * nipple: not present for evaluation  * skeletal muscle:  not present for evaluation  3  Lymphovascular invasion:  no unequivocal lymph-vascular invasion is identified  4  Dermal lymphovascular invasion: not seen  5  In situ tumor  - Ductal carcinoma in situ (DCIS): Present as an extensive component (~85% of total tumor)  * size (extent):  DCIS spans 2 6 cm maximal dimension (A62-1) and is present on 27 of 136 total slides examined  * architectural pattern(s):  cribriform & micropapillary patterns   * nuclear grade: nuclear grade 2 of 3   * necrosis:  central comedo type necrosis is present  - Lobular carcinoma in situ (LCIS):  not seen  6  Margins:  - Invasive carcinoma:   * invasive carcinoma is present at the superior lumpectomy margin (orange-inked, A84-1)  * all other lumpectomy margins are free of invasive carcinoma  - DCIS:    * DCIS is present within 0 2 millimeters of the superior lumpectomy margin (orange-inked, A26-1)   * DCIS is present within 0 2 millimeters of the medial lumpectomy margin (yellow-inked, A3-1)   * all other lumpectomy margins are free of DCIS by > 2 millimeters    7  Lymph nodes (pNX): no lymph nodes submitted, no lymph nodes found   - Number of lymph nodes with macrometastases (>2 mm): N/A  - Number of lymph nodes with micrometastases (>0 2 mm to 2 mm and/or >200 cells): N/A  - Number of lymph nodes with isolated tumor cells (<0 2 mm and <200 cells): N/A  - Size of largest metastatic deposit (mm):  N/A  - Extranodal extension: N/A  - Number of lymph nodes examined: none (0)  - Number of sentinel lymph nodes examined: none (0)  - Method of evaluation of sentinel lymph nodes: N/A  8  Treatment effect, response to presurgical (neoadjuvant) therapy: N/A  - In the breast: N/A  - In the lymph nodes: N/A  9  Additional pathologic findings: non-proliferative fibrocystic changes, without atypia, including microcysts, apocrine metaplasia & fibroadenomatoid stromal hyperplasia  10  Microcalcifications: present in DCIS  11  Ancillary studies:  estrogen, progesterone & Her-2/negrita assays of invasive carcinoma are currently pending and will be described in an addendum report  - Repeat HER2 testing (2013 ASCO/CAP Recommendations):  response is pending receptor assay completion   - Best representative tumor block:  A84, A89  - Sufficient tumor present for:   * Agendia Mammaprint/Blueprint (1 cm2 of invasive tumor in aggregate): Yes  * MI Profile/Foundation One (at least 5 x 5 mm of tumor): Yes  12  Clinical history:  DCIS by core needle biopsy  13  Pathologic stage classification (pTNM, AJCC 8th Edition): at least Stage IA - pT1c, pNX, G2   14  8th ed  AJCC Prognostic Stage (use AJCC update):  to be stated in the receptor addendum report  Final Diagnoses Listed for each Specimen of this Case  A  Right breast, wire-guided & oriented lumpectomy (243 g): - Invasive breast carcinoma, NST (aka ductal)  * Ana Paula grade 2 of 3 (total score = 2+2+2 = 6 of 9)   -- tubule formation < 10%, score 3   -- nuclear grade 2 of 3, score 2   -- mitoses ~ 4/mm2, score 2    * invasive carcinoma is multifocally present (A23-1, A32-1, A84-1, A89-1, A100-1), largest focus is 14 millimeters in greatest dimension (A89-1, this focus is graded)     * superior lumpectomy margin (orange-inked) is POSITIVE for invasive carcinoma (A84-1)   * all other lumpectomy margins are free of invasive carcinoma  * estrogen, progesterone & Her-2/negrita receptor studies are undertaken, to be described in an addendum report  - Ductal carcinoma in-situ (DCIS): Present as an extensive component (~85% of total tumor)  * DCIS is co-located with invasive carcinoma surrounding prior needle biopsy site  * DCIS spans 2 6 cm maximal dimension (A62-1) and is present on 27 of 136 total slides examined  * DCIS has cribriform & micropapillary patterns, nuclear grade 2 of 3, with central comedo-type necrosis  * DCIS is present within 0 2 millimeters of the superior lumpectomy margin (orange-inked, A26-1)   * DCIS is present within 0 2 millimeters of the medial lumpectomy margin (yellow-inked, A3-1)   * all other lumpectomy margins are free of DCIS by > 2 millimeters  - Lymph-vascular invasion: no lymph-vascular invasion is unequivocally identified  - Microcalcifications: present in DCIS  - Best representative tumor block: A84 & A89  - Sufficient tumor present for:   * Agendia Mammaprint/Blueprint (1 cm2 of invasive tumor in aggregate): Yes    * MI Profile/Foundation One (at least 5 x 5 mm of tumor): Yes   - Additional pathologic findings:  non-proliferative fibrocystic changes, without atypia, including microcysts, apocrine metaplasia & fibroadenomatoid stromal hyperplasia  Electronically signed by Silviano Ross MD on 7/3/2018 at  4:07 PM         Imaging  Xr Chest Pa & Lateral    Result Date: 6/25/2018  Narrative: CHEST INDICATION:   D05 11: Intraductal carcinoma in situ of right breast  COMPARISON:  Two-view chest 8/4/2010 EXAM PERFORMED/VIEWS:  XR CHEST PA & LATERAL FINDINGS: Cardiomediastinal silhouette appears unremarkable  The lungs are clear  No pneumothorax or pleural effusion  Osseous structures appear within normal limits for patient age  Impression: No acute cardiopulmonary disease   Workstation performed: [id number]     Mammo Needle Localization Right [employer])    Result Date: 6/27/2018  Narrative: Mammo Needle Localization Right [employer]): June 26, 2018 - Check In #: [de-identified] CC and LM view(s) were taken of the right breast  Technologist: RT Christopher (R)(M) Indication: 65year old; right breast DCIS with extensive calcifications for bracketing procedure  Preoperative needle localization with bracketing  Comparison: Prior imaging studies of the right breast from 5/23/2018  Procedure: After verifying patient and side of interest and initialing side of concern (time out procedure), utilizing digital mammographic guidance and sterile technique, a needle localization bracketing procedure of the calcifications and stereotactic clip in the right upper outer quadrant was performed from a lateral approach  The localizing wires were positioned in order to bracket the calcifications  The patient tolerated the procedure well, leaving the department in satisfactory condition, with annotated guidance images available on PACS  Impression: Successful needle localization bracketing procedure of multiple scattered right upper outer breast calcifications  Transcription Location: [address] [place] [address]: [id number]    Mammo Breast Specimen (no Charge)    Result Date: 6/27/2018  Narrative: Mammo Breast Specimen No Charge: Right Breast - June 26, 2018 - Check In #: [de-identified] CC and ML view(s) were taken of the right breast  Technologist: RT Christopher (R)(M) Digital mammography of right breast surgical specimen reveals that the stereotactic clip in question and all calcifications are located within the biopsy specimen  The specimen also includes both localizing wires  These findings were immediately called to the operating room upon receipt of the specimen radiographs  Transcription Location: [address] [place] [address]: [id number]    I reviewed the above laboratory and imaging data  Discussion/Summary:  Stage I right breast cancer status post lumpectomy for DCIS  Incidentally found cancer, with positive superior margin    Will set up for re-excision of positive margin, along with sentinel node biopsy  Rationale for this along with risks and benefits of procedure discussed with patient and family  They understand the plan and wish to proceed as outlined

## 2018-07-13 NOTE — PATIENT INSTRUCTIONS
Lumpectomía del seno   LO QUE NECESITA SABER:   ¿Qué necesito saber sobre felicita lumpectomía? Felicita lumpectomía es felicita cirugía para extirpar felicita masa de pruitt seno  Es probable que [employer] extirpen el tejido del seno que rodea la masa  Felicita lumpectomía también se conoce nelli cirugía para conservar el seno o mastectomía parcial o por segmentos  ¿Cómo me preparo para felicita lumpectomía? · Es probable que usted necesite [address] o un ultrasonido antes de la [facility] [place]  Estos exámenes podrían [employer] mismo día que pruitt cirugía o antes  Pruitt médico podría usar imágenes de estos exámenes para marcar la ubicación de la masa  La shaq le mostrará dónde hacer la incisión  · Pruitt médico le explicará cómo debe prepararse para la [facility] [place]  Le puede indicar que no consuma ningún alimento ni bebida después de la medianoche del día de la [facility] [place]  [place] qué medicamentos puede ganesh el día de la [facility] [place]  Es probable que usted tenga que dejar de ganesh anticoagulantes o aspirina varios días antes de pruitt cirugía  Póngase de acuerdo con alguien para que lo lleve a pruitt casa y que se quede con usted por 24 horas después de la [facility] [place]  Esta persona puede ayudar a cuidarla y monitorearla en solo de algún problema  ¿Qué pasará [place] lumpectomía? A usted le administrarán anestesia general para mantenerlo dormido y [place] de dolor fabio la [facility] [place]  Podrían administrarle un antibiótico por vía intravenosa para evitar que contraiga felicita infección bacteriana  Pruitt médico le hará felicita incisión en pruitt seno y le quitará la masa  Es probable que [employer] quite tejido del seno o nódulos linfáticos que están cerca de la masa  Puede que le coloquen un drenaje cerca de la incisión para eliminar líquido adicional  [place] disminuirá la inflamación y le ayudará a pruitt incisión a sanar debidamente  Pruitt médico cerrará la incisión con puntos de sutura o cinta médica y la cubrirá con felicita venda  Es probable que también envuelva ambos senos con un vendaje ajustado  [place] podría disminuir la inflamación, el sangrado y el dolor  ¿Qué pasará después de felicita lumpectomía? Los médicos lo mantendrán bajo observación hasta que se despierte  Lo más probable es [facility] permitan irse a casa cuando se despierte y ya allen dolor esté controlado  También es probable que usted necesite pasar la noche en el hospital    ¿Cuáles son los riesgos de [place] lumpectomía? Usted podría sangrar más de lo esperado o contraer felicita infección  Los nervios, vasos sanguíneos y músculos podrían sufrir daños fabio la [place]  Puede que usted tenga inflamación en el brazo más cercano a la lumpectomía o en el área donde le extirparon los nódulos linfáticos  Esta inflamación se conoce nelli línfedema  La linfedema podría causar hormigueo, adormecimiento, rigidez y debilidad en el brazo  [place] podría ser [place]  Se le podría formar un coágulo sanguíneo en el brazo o la pierna  El coágulo puede viajar al corazón, los pulmones o el cerebro  [place] podría poner en peligro allen ramsey  ACUERDOS SOBRE ALLEN CUIDADO:   Usted tiene el derecho de ayudar a planear allen cuidado  Aprenda todo lo que pueda sobre allen condición y nelli darle tratamiento  Discuta joe opciones de tratamiento con joe médicos para decidir el cuidado que usted desea recibir  Usted siempre tiene el derecho de rechazar el tratamiento  Esta información es sólo para uso en educación  [place] intención no es darle un consejo médico sobre enfermedades o tratamientos  Colsulte con allen Catherne Clement farmacéutico antes de seguir cualquier régimen médico para saber si es seguro y efectivo para usted  © 2017 [address] Information is for End User's use only and may not be sold, redistributed or otherwise used for commercial purposes  All illustrations and images included in CareNotes® are the copyrighted property of A D A M , Inc  or Zi Aragon    Biopsia de ganglio linfático centinela en pacientes con cáncer de mama   LO QUE NECESITA SABER: La información que se obtiene de felicita biopsia de ganglio linfático centinela ([address]) puede ayudar a pruitt médico a decidir qué otros tratamientos necesita  Usted podría presentar inflamación y moretones en el sitio de la biopsia  [place] es normal y es de [place]  El brazo más cercano al área de la biopsia podría estar adolorido  [place] debería mejorar dentro de 48 a 72 horas  INSTRUCCIONES SOBRE EL ALLEGRA HOSPITALARIA:   Busque atención médica de inmediato si:   · La teresa empapa el vendaje  · Se desprenden los puntos de sutura  · Pruitt moretón repentinamente se hace más alfred y se siente firme  Pregúntele a pruitt [place] vitaminas y minerales son adecuados para usted  · Usted tiene fiebre o escalofríos  · Pruitt herida está jose, inflamada o drena pus  · Usted tiene náuseas o está vomitando  · Usted tiene comezón en la piel, inflamación o un sarpullido  · El dolor no mejora después de ganesh medicamentos para el dolor  · Usted tiene preguntas o inquietudes acerca de pruitt condición o cuidado  Medicamentos:  Es posible que usted necesite alguno de los siguientes:  · AINEs (Analgésicos antiinflamatorios no esteroides) nelli el ibuprofeno, ayudan a disminuir la inflamación, el dolor y la fiebre  [place] medicamento esta disponible con o sin felicita receta médica  Los AINEs pueden causar sangrado estomacal o problemas renales en ciertas personas  Si usted gilmar un medicamento anticoagulante, siempre pregúntele a pruitt médico si los CHAD son seguros para usted  Siempre fabiola la etiqueta de carolin medicamento y [place] instrucciones  · Acetaminofeno:  maryan el dolor y baja la fiebre  Está disponible sin receta médica  Pregunte la cantidad y la frecuencia con que debe tomarlos  [facility]   Fabiola las etiquetas de todos los demás medicamentos que esté usando para saber si también contienen acetaminofén, o pregunte a pruitt médico o farmacéutico  El acetaminofén puede causar daño en el hígado cuando no se gilmar de forma correcta  No use más de 4 gramos (4000 miligramos) en total de acetaminofeno en un día  · Un medicamento con receta para el dolor  podrían ser Alessandro Adams  Pregunte al médico cómo debe ganesh carolin medicamento de forma panchal  Algunos medicamentos recetados para el dolor contienen acetaminofén  No tome otros medicamentos que contengan acetaminofén sin consultarlo con pruitt médico  Demasiado acetaminofeno puede causar daño al hígado  Los medicamentos recetados para el dolor podrían causar estreñimiento  Pregunte a pruitt médico nelli prevenir o tratar estreñimiento  · [place] joe medicamentos nelli se le haya indicado  Consulte con pruitt médico si usted giselle que pruitt medicamento no le está ayudando o si presenta efectos secundarios  Infórmele si es alérgico a cualquier medicamento  Mantenga felicita lista actualizada de los [place], las vitaminas y los productos herbales que gilmar  Incluya los siguientes datos de los medicamentos: cantidad, frecuencia y motivo de administración  Traiga con usted la lista o los envases de la píldoras a joe citas de seguimiento  Lleve la lista de los medicamentos con usted en solo de felicita emergencia  Cuide la herida de pruitt incisión nelli le indiquen:  Pregunte a pruitt médico cuándo se puede mojar la herida  Lave cuidadosamente el área alrededor de la herida con [place] Fisher y [place]  Puede dejar correr ligeramente el agua y jabón sobre la herida  No  frote pruitt herida  Séquese el área suavemente y póngase felicita venda nueva y limpia según indicaciones  [place] joe vendajes cuando se mojen o ensucien  Si usted tiene tiras de [address], déjelas caerse por si solas  Pueden pasar de 10 a 14 días para que se caigan  Revise pruitt herida diariamente en busca de signos de infección, nelli enrojecimiento, inflamación o pus  No aplique polvos ni lociones en la incisión  Si le extrajeron un ganglio linfático de pruitt axila, pregúntele a pruitt médico cuándo puede usar desodorante     Cuidados personales:   · Aplique hielo  en la herida de 15 a 20 minutos cada hora o nelli se le indique  Use un paquete de hielo o ponga hielo molido dentro de [employer]  Cúbralo con felicita [place]alla antes de aplicarlo sobre pruitt piel  El hielo ayuda a evitar daño al tejido y a disminuir la inflamación y el dolor  · Eleve  el brazo que esté más cerca del área de la biopsia con la mayor frecuencia posible  [place] va a disminuir inflamación y el dolor  Ponga el brazo sobre almohadas o sábanas para mantenerlo elevado por encima del nivel del corazón cómodamente  · No realice actividades agotadoras  por 24 a 48 horas  Las actividades agotadoras incluyen levantar cosas pesadas, practicar deportes o correr  Si le extrajeron ganglios linfáticos de pruitt axila, no use pruitt brazo para empujar o jalar algo  Estas actividades podrían ejercer demasiada presión [employer] herida  Descanse y camine un poco alrededor de la casa  Pregunte a pruitt médico cuándo puede retomar joe [employer]  · [place] bastantes líquidos  según las indicaciones  [place] le ayudará a eliminar el líquido de contraste de pruitt cuerpo  Pregunte cuánto líquido debe ganesh cada día y cuáles líquidos son los más adecuados para usted  Pregunte a pruitt médico nelli evitar el linfedema y la infección:  El linfedema es la acumulación de líquido en los tejido de grasa debajo de pruitt piel  El linfedema podría ocurrir en el brazo más cercano al área donde se extirparon los ganglios linfáticos  [place] en pruitt piel puede empeorar el linfedema  Pregunte a pruitt médico nelli puede disminuir pruitt riesgo de infecciones en la piel y el linfedema  Acuda a joe consultas de control con pruitt médico según le indicaron  [place] joe preguntas para que se acuerde de hacerlas fabio joe visitas  © 2017 [address] Information is for End User's use only and may not be sold, redistributed or otherwise used for commercial purposes   All illustrations and images included in CareNotes® are the copyrighted property of A  D A M , Inc  or Zi Margo  Esta información es sólo para uso en educación  Pruitt intención no es darle un consejo médico sobre enfermedades o tratamientos  Colsulte con pruitt Dewain Racer farmacéutico antes de seguir cualquier régimen médico para saber si es seguro y efectivo para usted  Biopsia de ganglio linfático centinela en pacientes con cáncer de mama   LO QUE NECESITA SABER:   ¿Qué roseanne saber acerca de felicita biopsia de ganglio linfático centinela ([address])? Un ganglio linfático centinela (GLC) generalmente es el ganglio linfático más cercano al tumor  Normalmente se encuentra en la axila o a lo janae del esternón o de la clavícula  Felicita biopsia es un procedimiento que se [place] para encontrar y extraer un [address]  Fabio la biopsia, el GLC será examinado para células de cáncer  Si la prueba es positiva, es posible que el cáncer de mama se haya extendido fuera de pruitt seno  Esta información puede ayudar a pruitt médico a decidir qué otros tratamientos usted necesita  ¿Cómo me preparo para felicita BGLC?   · Usted podría necesitar un escán nuclear antes de pruitt procedimiento  Fabio un escán nuclear, los médicos inyectarán felicita pequeña cantidad de líquido radioactivo en el seno  El líquido radioactivo se moverá a la ubicación de joe ganglios linfáticos y ayudará a que se vean mejor en las imágenes  Felicita cámara tomará imágenes de los ganglios linfáticos  Verneita Gabriela a pruitt médico a planear pruitt procedimiento  · Pruitt médico hablará con usted acerca de cómo prepararse para pruitt procedimiento  Es posible que le diga que no coma o tome nada después de la medianoche del día de pruitt procedimiento  Le dirán qué medicamentos ganesh o no ganesh el día de pruitt procedimiento  Podrían administrarle líquido de contraste fabio pruitt biopsia  Informe a pruitt médico si alguna vez tuvo felicita reacción alérgica a un medio de [place]  [place] a alguien que lo lleve a casa y se quede con usted después de pruitt procedimiento    Margurette Half fabio felicita [address]?   · Podrían administrarle un antibiótico por vía intravenosa para evitar que contraiga felicita infección bacteriana  Informe a pruitt médico si usted ha tenido felicita reacción alérgica a algún antibiótico  Es posible que le administren anestesia general para mantenerlo dormido y sin dolor fabio el procedimiento  O en pruitt lugar podrían administrarle anestesia local para adormecer el área  Con la anestesia local, usted todavía podría sentir presión o molestia fabio el procedimiento, kashif no debería sentir dolor  · Pruitt médico le inyectará un líquido de [place]  park, líquido radioactivo o ambos cerca del tumor  El líquido se moverá hacia el ganglio linfático centinela ([address])  Pruitt médico podría usar un instrumento que le ayude a encontrar el [address]  Él realizará esto moviendo suavemente el instrumento sobre pruitt piel  El instrumento mostrará imágenes del GLC en un monitor  Pruitt médico hará felicita incisión pequeña sobre la piel que cubre al GLC  La incisión generalmente es en la axila o el pecho  El [address] y revisado por células cancerosas  Si se encuentra cáncer, pruitt médico podría extraer varios ganglios linfáticos adicionales para examinarlos  Es posible que le cierren la incisión con puntos de sutura o tiras de cinta médica y que la cubran con un vendaje  ¿Qué sucederá después de felicita [address]? Los médicos lo mantendrán bajo observación hasta que se despierte  Es posible que usted pueda ir a casa felicita vez que se despierte y que el dolor esté controlado  Pruitt orina y joe evacuaciones intestinales podrían estar azules por 24 a 50 horas después de pruitt procedimiento  [place] se debe al líquido de [employer] juan fabio el procedimiento  Usted podría presentar inflamación y moretones en el sitio de la biopsia  [place] es normal y es de [place]  El brazo más cercano al área de la biopsia podría estar adolorido  [place] debería mejorar dentro de 48 a 72 horas  ¿Cuáles son los riesgos de Philemon Shane [facility]?   Usted podría sangrar más de lo esperado o contraer felicita infección  Usted podría presentar felicita condición que se llama linfedema  El linfedema es la inflamación del tejido en el brazo más cercano al sitio donde se extrajo el GLC  Es posible que usted tenga molestias o dolor de larga duración en el [place]  La piel del brazo podría volverse gruesa o dura de [employer]  Joe nervios podrían sufrir daños fabio el procedimiento  [place] podría causar entumecimiento o sensación de hormigueo en el brazo  También podría causar dificultad para  el [place]  Usted puede sufrir felicita reacción alérgica al líquido de [place]  [place] podría requerir [place] u otros tratamientos  ACUERDOS SOBRE PRUITT CUIDADO:   Usted tiene el derecho de ayudar a planear pruitt cuidado  Aprenda todo lo que pueda sobre pruitt condición y nelli darle tratamiento  Discuta joe opciones de tratamiento con joe médicos para decidir el cuidado que usted desea recibir  Usted siempre tiene el derecho de rechazar el tratamiento  Esta información es sólo para uso en educación  Pruitt intención no es darle un consejo médico sobre enfermedades o tratamientos  Colsulte con pruitt Dorna Mumtaz farmacéutico antes de seguir cualquier régimen médico para saber si es seguro y efectivo para usted  © 2017 [address] [facility] Information is for End User's use only and may not be sold, redistributed or otherwise used for commercial purposes  All illustrations and images included in CareNotes® are the copyrighted property of A D A M , Inc  or Zi Aragon  Biopsia de ganglio linfático centinela en pacientes con cáncer de mama   CUIDADO AMBULATORIO:   Qué roseanne saber acerca de felicita biopsia de ganglio linfático centinela ([facility]):  Un ganglio linfático centinela (GLC) generalmente es el ganglio linfático más cercano al tumor  Normalmente se encuentra en la axila o a lo janae del esternón o de la clavícula  Felicita biopsia es un procedimiento que se [place] para encontrar y extraer un [address]  Fabio la biopsia, el GLC será examinado para células de cáncer  Si la prueba es positiva, es posible que el cáncer de mama se haya extendido fuera de pruitt seno  Esta información puede ayudar a pruitt médico a decidir qué otros tratamientos usted necesita  Cómo prepararse para felicita BGLC:   · Usted podría necesitar un escán nuclear antes de pruitt procedimiento  Fabio un escán nuclear, los médicos inyectarán felicita pequeña cantidad de líquido radioactivo en el seno  El líquido radioactivo se moverá a la ubicación de joe ganglios linfáticos y ayudará a que se vean mejor en las imágenes  Felicita cámara tomará imágenes de los ganglios linfáticos  Priyanka [place] a pruitt médico a planear pruitt procedimiento  · Pruitt médico hablará con usted acerca de cómo prepararse para pruitt procedimiento  Es posible que le diga que no coma o tome nada después de la medianoche del día de pruitt procedimiento  Le dirán qué medicamentos ganesh o no ganesh el día de pruitt procedimiento  Podrían administrarle líquido de contraste fabio pruitt biopsia  Informe a pruitt médico si alguna vez tuvo felicita reacción alérgica a un medio de [place]  [place] a alguien que lo lleve a casa y se quede con usted después de pruitt procedimiento  Isela Fortdio fabio felicita BGLC:   · Podrían administrarle un antibiótico por vía intravenosa para evitar que contraiga felicita infección bacteriana  Informe a pruitt médico si usted ha tenido felicita reacción alérgica a algún antibiótico  Es posible que le administren anestesia general para mantenerlo dormido y sin dolor fabio el procedimiento  O en pruitt lugar podrían administrarle anestesia local para adormecer el área  Con la anestesia local, usted todavía podría sentir presión o molestia fabio el procedimiento, kashif no debería sentir dolor  · Pruitt médico le inyectará un líquido de [place]  park, líquido radioactivo o ambos cerca del tumor  El líquido se moverá hacia el ganglio linfático centinela ([address])   Pruitt médico podría usar un instrumento que le ayude a encontrar el [address]  Él realizará esto moviendo suavemente el instrumento sobre pruitt piel  El instrumento mostrará imágenes del GLC en un monitor  Pruitt médico hará felicita incisión pequeña sobre la piel que cubre al GLC  La incisión generalmente es en la axila o el pecho  El [address] y revisado por células cancerosas  Si se encuentra cáncer, pruitt médico podría extraer varios ganglios linfáticos adicionales para examinarlos  Es posible que le cierren la incisión con puntos de sutura o tiras de cinta médica y que la cubran con un vendaje  Mehdi Ok después de felicita BGLC:  Los médicos lo mantendrán bajo observación hasta que se despierte  Es posible que usted pueda ir a casa felicita vez que se despierte y que el dolor esté controlado  Pruitt orina y tia evacuaciones intestinales podrían estar azules por 24 a 50 horas después de pruitt procedimiento  [place] se debe al líquido de [employer] juan fabio el procedimiento  Usted podría presentar inflamación y moretones en el sitio de la biopsia  [place] es normal y es de [place]  El brazo más cercano al área de la biopsia podría estar adolorido  [place] debería mejorar dentro de 48 a 72 horas  Riesgos de la BGLC:  Usted podría sangrar más de lo esperado o contraer felicita infección  Usted podría presentar felicita condición que se llama linfedema  El linfedema es la inflamación del tejido en el brazo más cercano al sitio donde se extrajo el GLC  Es posible que usted tenga molestias o dolor de larga duración en el [place]  La piel del brazo podría volverse gruesa o dura de [employer]  Tia nervios podrían sufrir daños fabio el procedimiento  [place] podría causar entumecimiento o sensación de hormigueo en el brazo  También podría causar dificultad para  el [place]  Usted puede sufrir felicita reacción alérgica al líquido de [place]  [place] podría requerir [place] u otros tratamientos  Busque atención médica de inmediato si:   · La teresa empapa el vendaje      · Se desprenden los puntos de sutura  · Pruitt moretón repentinamente se hace más alfred y se siente firme  Pregúntele a pruitt Felisha Wal[place] vitaminas y minerales son adecuados para usted  · Usted tiene fiebre o escalofríos  · Pruitt herida está jose, inflamada o drena pus  · Usted tiene náuseas o está vomitando  · Usted tiene comezón en la piel, inflamación o un sarpullido  · El dolor no mejora después de ganesh medicamentos para el dolor  · Usted tiene preguntas o inquietudes acerca de pruitt condición o cuidado  Medicamentos:  Es posible que usted necesite alguno de los siguientes:  · AINEs (Analgésicos antiinflamatorios no esteroides) nelli el ibuprofeno, ayudan a disminuir la inflamación, el dolor y la fiebre  Zaria medicamento esta disponible con o sin felicita receta médica  Los AINEs pueden causar sangrado estomacal o problemas renales en ciertas personas  Si usted gilmar un medicamento anticoagulante, siempre pregúntele a pruitt médico si los CHAD son seguros para usted  Siempre fabiola la etiqueta de zaria medicamento y [place] instrucciones  · Acetaminofeno:  maryan el dolor y baja la fiebre  Está disponible sin receta médica  Pregunte la cantidad y la frecuencia con que debe tomarlos  [address]  Fabiola las etiquetas de todos los demás medicamentos que esté usando para saber si también contienen acetaminofén, o pregunte a pruitt médico o farmacéutico  El acetaminofén puede causar daño en el hígado cuando no se gilmar de forma correcta  No use más de 4 gramos (4000 miligramos) en total de acetaminofeno en un día  · Un medicamento con receta para el dolor  podrían ser Krystal Gibbs  Pregunte al médico cómo debe ganesh zaria medicamento de forma panchal  Algunos medicamentos recetados para el dolor contienen acetaminofén  No tome otros medicamentos que contengan acetaminofén sin consultarlo con pruitt médico  Demasiado acetaminofeno puede causar daño al hígado  Los medicamentos recetados para el dolor podrían causar estreñimiento   Pregunte a pruitt médico nelli prevenir o tratar estreñimiento  · [place] joe medicamentos nelli se le haya indicado  Consulte con pruitt médico si usted giselle que pruitt medicamento no le está ayudando o si presenta efectos secundarios  Infórmele si es alérgico a cualquier medicamento  Mantenga felicita lista actualizada de los [place], las vitaminas y los productos herbales que gilmar  Incluya los siguientes datos de los medicamentos: cantidad, frecuencia y motivo de administración  Traiga con usted la lista o los envases de la píldoras a joe citas de seguimiento  Lleve la lista de los medicamentos con usted en solo de felicita emergencia  Cuide la herida de pruitt incisión nelli le indiquen:  Pregunte a pruitt médico cuándo se puede mojar la herida  Lave cuidadosamente el área alrededor de la herida con [place] y [place]  Puede dejar correr ligeramente el agua y jabón sobre la herida  No  frote pruitt herida  Séquese el área suavemente y póngase felicita venda nueva y limpia según indicaciones  [place] joe vendajes cuando se mojen o ensucien  Si usted tiene tiras de [address], déjelas caerse por si solas  Pueden pasar de 10 a 14 días para que se caigan  Revise pruitt herida diariamente en busca de signos de infección, nelli enrojecimiento, inflamación o pus  No aplique polvos ni lociones en la incisión  Si le extrajeron un ganglio linfático de pruitt axila, pregúntele a pruitt médico cuándo puede usar desodorante  Cuidados personales:   · Aplique hielo  en la herida de 15 a 20 minutos cada hora o nelli se le indique  Use un paquete de hielo o ponga hielo molido dentro de [employer]  Cúbralo con felicita toalla antes de aplicarlo sobre pruitt piel  El hielo ayuda a evitar daño al tejido y a disminuir la inflamación y el dolor  · Eleve  el brazo que esté más cerca del área de la biopsia con la mayor frecuencia posible  [place] va a disminuir inflamación y el dolor  Ponga el brazo sobre almohadas o sábanas para mantenerlo elevado por encima del nivel del corazón cómodamente  · No realice actividades agotadoras  por 24 a 48 horas  Las actividades agotadoras incluyen levantar cosas pesadas, practicar deportes o correr  Si le extrajeron ganglios linfáticos de mccormick axila, no use mccormick brazo para empujar o jalar algo  Estas actividades podrían ejercer demasiada presión [employer] herida  Descanse y camine un poco alrededor de la casa  Pregunte a mccormick médico cuándo puede retomar joe [employer]  · [place] bastantes líquidos  según las indicaciones  [place] le ayudará a eliminar el líquido de contraste de mccormick cuerpo  Pregunte cuánto líquido debe ganesh cada día y cuáles líquidos son los más adecuados para usted  Pregunte a mccormick médico nelli evitar el linfedema y la infección:  El linfedema es la acumulación de líquido en los tejido de grasa debajo de mccormick piel  El linfedema podría ocurrir en el brazo más cercano al área donde se extirparon los ganglios linfáticos  Pitney Seymour en mccormick piel puede empeorar el linfedema  Pregunte a mccormick médico nelli puede disminuir mccormick riesgo de infecciones en la piel y el linfedema  Acuda a joe consultas de control con mccormick médico según le indicaron  Anote joe preguntas para que se acuerde de hacerlas fabio joe visitas  © 2017 [facility] [place] [employer] Information is for End User's use only and may not be sold, redistributed or otherwise used for commercial purposes  All illustrations and images included in CareNotes® are the copyrighted property of A D A M , Inc  or Zi Aragon  Esta información es sólo para uso en educación  Mccormick intención no es darle un consejo médico sobre enfermedades o tratamientos  Colsulte con mccormick Joaquim Healy farmacéutico antes de seguir cualquier régimen médico para saber si es seguro y efectivo para usted  Linfogammagrafía   LO QUE NECESITA SABER:   ¿Qué necesito saber sobre la linfogammagrafía?   La linfogammagrafía es un procedimiento para observar el sistema linfático  Zaria procedimiento identifica el primer ganglio linfático que recibe el drenaje del sitio de pruitt tumor  [place] se conoce nelli ganglio centinela  El ganglio centinela le indica a los médicos si el cáncer se ha propagado  La linfogammagrafía puede mostrar si hay felicita obstrucción del sistema linfático que está causando linfedema  El procedimiento [employer] ayuda a pruitt médico a planear la cirugía u otros tratamientos que pueda necesitar  La linfogammagrafía se puede realizar antes o fabio la [place]  ¿Cómo me puedo preparar para la linfogammagrafía? No entre a la darrin de centellograma con ningún objeto metálico  El metal interfiere con las imágenes  Informe al médico y técnico de radiología si usted giselle o sabe que está [place] si usted está dando de lactar  ¿Qué sucederá fabio la linfogammagrafía? Un material radioactivo (trazador o marcador radioactivo) se inyecta en diferentes partes del cuerpo  Es posible que lo inyecten alrededor del tumor o entre joe dedos de las acrlotta y pies  El sitio de la inyección depende de la razón del procedimiento  Se usará felicita cámara gamma o tomografía por emisión de positrones (PET) para capturar las imágenes del trazador radioactivo  A usted le indicarán que permanezca quieto lo más que pueda mientras romeo las imágenes  La cámara puede lou vueltas alrededor [de-identified] o puede que le indiquen que se mueva en diferentes posiciones  Es posible que sea necesario que la cámara esté muy cerca de pruitt cuerpo para poder obtener imágenes claras  La linfogammagrafía puede demorarse varias horas  ACUERDOS SOBRE PRUITT CUIDADO:   Usted tiene el derecho de ayudar a planear pruitt cuidado  Aprenda todo lo que pueda sobre pruitt condición y nelli darle tratamiento  Discuta joe opciones de tratamiento con joe médicos para decidir el cuidado que usted desea recibir  Usted siempre tiene el derecho de rechazar el tratamiento  Esta información es sólo para uso en educación   Pruitt intención no es darle un consejo [facility] o tratamientos  Colsulte con pruitt Wolf Cranker farmacéutico antes de seguir cualquier régimen médico para saber si es seguro y efectivo para usted  © 2017 [address] Information is for End User's use only and may not be sold, redistributed or otherwise used for commercial purposes  All illustrations and images included in CareNotes® are the copyrighted property of A D A M , Inc  or Zi Aragon  Linfogammagrafía   LO QUE NECESITA SABER:   La linfogammagrafía es un procedimiento para observar el sistema linfático  Zaria procedimiento identifica el primer ganglio linfático que recibe el drenaje del sitio de pruitt tumor  [place] se conoce nelli ganglio centinela  El ganglio centinela le indica a los médicos si el cáncer se ha propagado  La linfogammagrafía puede mostrar si hay felicita obstrucción del sistema linfático que está causando linfedema  El procedimiento [employer] ayuda a pruitt médico a planear la cirugía u otros tratamientos que pueda necesitar  La linfogammagrafía se puede realizar antes o fabio la [place]  INSTRUCCIONES SOBRE EL ALLEGRA HOSPITALARIA:   No alimente a pruitt bebé con [place] por las siguientes 12 horas después del procedimiento:  K[place]die permite que el trazador radioactivo sea eliminado de pruitt cuerpo completamente  La radiación puede ser transmitida a pruitt bebé a través de [place]  Consuma abundantes líquidos y orine con frecuencia:  [place] le ayuda a eliminar el trazador radioactivo de pruitt cuerpo  Acuda a joe consultas de control con pruitt médico según le indicaron  Anote joe preguntas para que se acuerde de hacerlas fabio joe visitas  Pregúntele a pruitt Brenda Rumps vitaminas y minerales son adecuados para usted  · En el sitio de la inyección usted tiene enrojecimiento o dolor que no se mejora  · Usted tiene preguntas o inquietudes acerca de pruitt condición o cuidado    © 2017 [address] Information is for End User's use only and may not be sold, redistributed or otherwise used for commercial purposes  All illustrations and images included in CareNotes® are the copyrighted property of A D A M , Inc  or Zi Aragon  Esta información es sólo para uso en educación  Pruitt intención no es darle un consejo médico sobre enfermedades o tratamientos  Colsulte con pruitt Padilla Ahle farmacéutico antes de seguir cualquier régimen médico para saber si es seguro y efectivo para usted

## 2018-07-16 ENCOUNTER — APPOINTMENT (EMERGENCY)
Dept: RADIOLOGY | Facility: HOSPITAL | Age: 63
End: 2018-07-16
Payer: MEDICARE

## 2018-07-16 ENCOUNTER — HOSPITAL ENCOUNTER (EMERGENCY)
Facility: HOSPITAL | Age: 63
Discharge: HOME | End: 2018-07-16
Attending: EMERGENCY MEDICINE | Admitting: EMERGENCY MEDICINE
Payer: MEDICARE

## 2018-07-16 VITALS
OXYGEN SATURATION: 96 % | WEIGHT: 184.97 LBS | DIASTOLIC BLOOD PRESSURE: 77 MMHG | SYSTOLIC BLOOD PRESSURE: 171 MMHG | RESPIRATION RATE: 20 BRPM | BODY MASS INDEX: 31.75 KG/M2 | TEMPERATURE: 97.8 F | HEART RATE: 105 BPM

## 2018-07-16 DIAGNOSIS — M25.561 CHRONIC PAIN OF RIGHT KNEE: ICD-10-CM

## 2018-07-16 DIAGNOSIS — J45.901 ASTHMA EXACERBATION: Primary | ICD-10-CM

## 2018-07-16 DIAGNOSIS — G89.29 CHRONIC PAIN OF RIGHT KNEE: ICD-10-CM

## 2018-07-16 PROCEDURE — 71046 X-RAY EXAM CHEST 2 VIEWS: CPT

## 2018-07-16 PROCEDURE — 99283 EMERGENCY DEPT VISIT LOW MDM: CPT

## 2018-07-16 PROCEDURE — 94640 AIRWAY INHALATION TREATMENT: CPT

## 2018-07-16 PROCEDURE — 73560 X-RAY EXAM OF KNEE 1 OR 2: CPT

## 2018-07-16 RX ORDER — ALBUTEROL SULFATE 2.5 MG/3ML
5 SOLUTION RESPIRATORY (INHALATION) ONCE
Status: COMPLETED | OUTPATIENT
Start: 2018-07-16 | End: 2018-07-16

## 2018-07-16 RX ORDER — ALBUTEROL SULFATE 2.5 MG/3ML
2.5 SOLUTION RESPIRATORY (INHALATION) EVERY 4 HOURS PRN
Qty: 75 ML | Refills: 0 | Status: SHIPPED | OUTPATIENT
Start: 2018-07-16 | End: 2018-07-24 | Stop reason: SDUPTHER

## 2018-07-16 RX ORDER — PREDNISONE 20 MG/1
40 TABLET ORAL DAILY
Qty: 10 TABLET | Refills: 0 | Status: SHIPPED | OUTPATIENT
Start: 2018-07-16 | End: 2018-08-17

## 2018-07-16 RX ADMIN — ALBUTEROL SULFATE 5 MG: 2.5 SOLUTION RESPIRATORY (INHALATION) at 17:16

## 2018-07-16 RX ADMIN — IPRATROPIUM BROMIDE 0.5 MG: 0.5 SOLUTION RESPIRATORY (INHALATION) at 17:16

## 2018-07-16 RX ADMIN — PREDNISONE 50 MG: 20 TABLET ORAL at 17:15

## 2018-07-16 NOTE — DISCHARGE INSTRUCTIONS
Asma   LO QUE NECESITA SABER:   El asma es felicita enfermedad pulmonar que dificulta la respiración  La inflamación crónica y las reacciones a los desencadenantes estrechan las vías respiratorias en los pulmones  El asma puede ser de peligro mortal si no se mantiene bajo control  INSTRUCCIONES SOBRE EL ALLEGRA HOSPITALARIA:   Regrese a la darrin de emergencias si:   · Usted tiene falta de aliento severa  · Tia labios y [place] se ponen azules o grises  · La piel alrededor de pruitt jayda y costillas se hunde con cada respiración  · Usted tiene falta de [place], incluso después de ganesh pruitt medicamento a corto plazo según lo indicado  · Las lecturas numéricas del medidor de pruitt flujo arsen están en la harsh jose de pruitt plan de acción para el asma  Pregúntele a pruitt Zhane Embs vitaminas y minerales son adecuados para usted  · A usted se le acaba el medicamento antes de la fecha de pruitt próximo abastecimiento  · Tia síntomas empeoran  · Usted necesita ganesh más medicamento que lo acostumbrado para controlar tia síntomas  · Usted tiene preguntas o inquietudes acerca de pruitt condición o cuidado  Medicamentos:   · Medicamentos,  disminuyen la inflamación, abren las vías respiratorias y facilitan pruitt respiración  Los medicamentos se pueden inhalar, ganesh en forma de píldora o ser inyectados  Los medicamentos a corto plazo alivian tia síntomas con Vivi Cables  Los medicamentos a janae plazo sirven para evitar ataques en un futuro  Es posible que además necesite medicamento para ayudar a controlar las alergias  Pregúntele a pruitt médico por más información sobre el medicamento que le están dando y cómo tomarlo de felicita forma [place]  · [place] tia medicamentos nelli se le haya indicado  Consulte con pruitt médico si usted giselle que pruitt medicamento no le está ayudando o si presenta efectos secundarios  Infórmele si es alérgico a algún medicamento   Mantenga felicita lista actualizada de los [place], las vitaminas y los productos herbales que gilmar  Incluya los siguientes datos de los medicamentos: cantidad, frecuencia y motivo de administración  Traiga con usted la lista o los envases de la píldoras a joe citas de seguimiento  Lleve la lista de los medicamentos con usted en solo de felicita emergencia  Acuda a joe consultas de control con pruitt médico según le indicaron  Usted necesitará regresar para asegurarse que pruitt medicamento está funcionando y joe síntomas están bajo control  Es posible que lo refieran a un especialista del asma  A usted podrían pedirle que [address] [place] [address] Street un registro de los valores de pruitt flujo arsen y que lo traiga a joe citas  Anote joe preguntas para que se acuerde de hacerlas fabio joe visitas  Controle joe síntomas y evite ataques futuros:   · Siga pruitt plan de acción para el asma  [place] es un plan por escrito que usted y pruitt médico alba creado  Explica cuáles medicamentos necesita usted y cuándo cambiar las dosis si fuera necesario  Además explica nelli usted puede monitorear joe síntomas y usar un medidor del flujo arsen  El medidor mide qué tan elaina están funcionando los pulmones  · Controle otras afecciones de [place] , nelli las [place], el reflujo estomacal y la apnea de sueño  · Identifique y evite factores desencadenantes  Estos podrían [employer], los ácaros del [facility], el moho y las cucarachas  · No fume o esté alrededor de personas que fuman  La nicotina y otras sustancias químicas que contienen los cigarrillos y cigarros pueden dañar los pulmones  Pida información a pruitt médico si usted actualmente fuma y necesita ayuda para dejar de fumar  Los cigarrillos electrónicos o tabaco sin humo todavía contienen nicotina  Consulte con pruitt médico antes de [employer]  · Pregunte sobre la vacuna contra la gripe  La gripe puede empeorar pruitt asma  Puede que usted necesite recibir felicita vacuna anual contra la gripe    © 2017 [address] Information is for End User's use only and may not be sold, redistributed or otherwise used for commercial purposes  All illustrations and images included in CareNotes® are the copyrighted property of A D A M , Inc  or Zi Aragon  Esta información es sólo para uso en educación  Pruitt intención no es darle un consejo médico sobre enfermedades o tratamientos  Colsulte con pruitt [place] farmacéutico antes de seguir cualquier régimen médico para saber si es seguro y efectivo para usted

## 2018-07-16 NOTE — ED PROVIDER NOTES
History  Chief Complaint   Patient presents with    Asthma     asthma symptoms for about 1 week  used inhaler breathing treatment around 1pm without relief  chest congestion   Knee Swelling     right knee swelling  difficulty bearing weight  no injuries/falls     64 yo female with asthma, last required admission 4 years at [facility], and has had occasional ED visits while living [place] since then, c/o onset of cough, dyspnea, chest tightness  No fever, no chills  Patient also asked for evaluation of right knee pain, chronically painful, seems intermittently swollen, no acute change today  History provided by:  Patient  Cough   Cough characteristics:  Harsh  Sputum characteristics:  Nondescript  Severity:  Moderate  Onset quality:  Gradual  Duration:  1 week  Timing:  Sporadic  Progression:  Worsening  Chronicity:  New  Relieved by:  Nothing  Ineffective treatments:  Beta-agonist inhaler  Associated symptoms: shortness of breath    Associated symptoms: no chest pain, no chills, no fever, no headaches, no rash, no sore throat and no wheezing    Shortness of breath:     Severity:  Moderate    Onset quality:  Gradual    Duration:  1 week    Timing:  Constant    Progression:  Worsening      Prior to Admission Medications   Prescriptions Last Dose Informant Patient Reported? Taking?    HUMULIN N 100 UNIT/ML subcutaneous injection   No No   Si units in the am 30 units in the pm   HYDROcodone-acetaminophen (NORCO) 5-325 mg per tablet   No No   Sig: Take 1 tablet by mouth every 6 (six) hours as needed for pain Max Daily Amount: 4 tablets   Insulin Glargine (TOUJEO) injection pen 300 units/mL  Self No No   Sig: Inject 10 Units under the skin daily at bedtime   albuterol (PROVENTIL HFA,VENTOLIN HFA) 90 mcg/act inhaler  Self Yes No   Sig: Inhale 1 puff 2 (two) times a day     atropine (ISOPTO ATROPINE) 1 % ophthalmic solution  Self Yes No   Sig: Administer 2 drops to the right eye 2 (two) times a day ciprofloxacin (CILOXAN) 0 3 % ophthalmic solution  Self Yes No   Sig: place 1 drop into right eye four times a day AFTER SURGERY   enalapril (VASOTEC) 20 mg tablet  Self No No   Sig: Take 1 tablet (20 mg total) by mouth daily   gabapentin (NEURONTIN) 300 mg capsule   No No   Sig: take 1 capsule by mouth three times a day   gemfibrozil (LOPID) 600 mg tablet  Self Yes No   Sig: Take 600 mg by mouth daily     glipiZIDE (GLUCOTROL XL) 10 mg 24 hr tablet  Self No No   Sig: Take 1 tablet (10 mg total) by mouth daily   levothyroxine 50 mcg tablet  Self No No   Sig: Take 1 tablet (50 mcg total) by mouth daily   metFORMIN (GLUCOPHAGE-XR) 500 mg 24 hr tablet  Self Yes No   Sig: take 2 tablets by mouth once daily WITH BREAKFAST   omeprazole (PriLOSEC) 20 mg delayed release capsule   No No   Sig: take 1 capsule by mouth once daily   prednisoLONE acetate (PRED FORTE) 1 % ophthalmic suspension  Self Yes No   Sig: instill 1 drop into right eye four times a day AFTER SURGERY      Facility-Administered Medications: None       Past Medical History:   Diagnosis Date    Asthma     Cancer ([facility]     BREAST    Diabetes mellitus ([facility] [facility]     Hypercholesterolemia     Hypertension     Hypothyroid     Rheumatoid arthritis ([facility] [address] [facility] )        Past Surgical History:   Procedure Laterality Date    BREAST LUMPECTOMY Right 6/26/2018    Procedure: RIGHT BREAST NEEDLE LOCALIZATION X2 WITH RIGHT BREAST LUMPECTOMY ( NEEDLE LOC @ 1000); Surgeon: Giovana Herrera MD;  [facility];  Service: Surgical Oncology    BREAST SURGERY Left     CATARACT EXTRACTION, BILATERAL Bilateral     KNEE SURGERY Right     RETINAL DETACHMENT SURGERY Right     TUBAL LIGATION         Family History   Problem Relation Age of Onset    Diabetes Mother     Hypertension Mother     Diabetes Father     Hypertension Father     Diabetes Brother     Hypertension Brother      I have reviewed and agree with the history as documented      Social History   Substance Use Topics    Smoking status: Never Smoker    Smokeless tobacco: Never Used    Alcohol use No        Review of Systems   Constitutional: Negative for appetite change, chills and fever  HENT: Negative for sore throat  Respiratory: Positive for cough and shortness of breath  Negative for wheezing  Cardiovascular: Negative for chest pain and palpitations  Gastrointestinal: Negative for abdominal pain, diarrhea, nausea and vomiting  Genitourinary: Negative for dysuria and hematuria  Musculoskeletal: Positive for arthralgias (right knee)  Negative for neck pain  Skin: Negative for rash  Neurological: Negative for dizziness, weakness and headaches  Psychiatric/Behavioral: Negative for suicidal ideas  All other systems reviewed and are negative  Physical Exam  Physical Exam   Constitutional: She is oriented to person, place, and time  She appears well-developed and well-nourished  No distress  HENT:   Head: Normocephalic and atraumatic  Right Ear: External ear normal    Left Ear: External ear normal    Nose: Nose normal    Eyes: Conjunctivae and EOM are normal  Pupils are equal, round, and reactive to light  Neck: Normal range of motion  Neck supple  Cardiovascular: Normal rate and regular rhythm  Pulmonary/Chest: Effort normal  Tachypnea noted  She has decreased breath sounds  She has wheezes  Abdominal: Soft  Musculoskeletal: Normal range of motion  Right knee: She exhibits effusion (although similar to that on the left)  She exhibits normal range of motion, no deformity, no erythema and no bony tenderness  Tenderness (around patella) found  Neurological: She is alert and oriented to person, place, and time  She has normal strength  Gait normal    Skin: Skin is warm and dry  No rash noted  She is not diaphoretic  Psychiatric: She has a normal mood and affect  Her behavior is normal  Judgment and thought content normal    Nursing note and vitals reviewed        Vital Signs  ED Triage Vitals [07/16/18 1700]   Temperature Pulse Respirations Blood Pressure SpO2   97 8 °F (36 6 °C) 105 20 (!) 171/77 96 %      Temp Source Heart Rate Source Patient Position - Orthostatic VS BP Location FiO2 (%)   Temporal Monitor Sitting Right arm --      Pain Score       8           Vitals:    07/16/18 1700   BP: (!) 171/77   Pulse: 105   Patient Position - Orthostatic VS: Sitting       Visual Acuity      ED Medications  Medications   ipratropium (ATROVENT) 0 02 % inhalation solution 0 5 mg (0 5 mg Nebulization Given 7/16/18 1716)   albuterol inhalation solution 5 mg (5 mg Nebulization Given 7/16/18 1716)   predniSONE tablet 50 mg (50 mg Oral Given 7/16/18 1715)       Diagnostic Studies  Results Reviewed     None                 XR knee 1 or 2 vw right   ED Interpretation by Randolph Campbell MD (07/16 1829)   Patellar tendinitis      XR chest 2 views    (Results Pending)              Procedures  Procedures       Phone Contacts  ED Phone Contact    ED Course  ED Course as of Jul 16 1831 Mon Jul 16, 2018   1807 She feels much better, and air movement is much improved after single treatment  MDM  CritCare Time    Disposition  Final diagnoses:   Asthma exacerbation   Chronic pain of right knee     Time reflects when diagnosis was documented in both MDM as applicable and the Disposition within this note     Time User Action Codes Description Comment    7/16/2018  6:10 PM Keyona Flores [[id number]] Asthma exacerbation     7/16/2018  6:11 PM Keyona Flores [[id number],  [id number]] Chronic pain of right knee       ED Disposition     ED Disposition Condition Comment    Discharge  [facility] discharge to home/self care      Condition at discharge: Good        Follow-up Information     Follow up With Specialties Details Why [address] [facility] Call For followup [address]  [facility] [place] [address]  [phone]          Patient's Medications   Discharge Prescriptions    ALBUTEROL (2 5 MG/3 ML) 0 083 % NEBULIZER SOLUTION    Take 1 vial (2 5 mg total) by nebulization every 4 (four) hours as needed for wheezing or shortness of breath       Start Date: 7/16/2018 End Date: --       Order Dose: 2 5 mg       Quantity: 75 mL    Refills: 0    PREDNISONE 20 MG TABLET    Take 2 tablets (40 mg total) by mouth daily       Start Date: 7/16/2018 End Date: --       Order Dose: 40 mg       Quantity: 10 tablet    Refills: 0     No discharge procedures on file      ED Provider  Electronically Signed by           Bernard Mendoza MD  07/16/18 7861

## 2018-07-18 ENCOUNTER — APPOINTMENT (OUTPATIENT)
Dept: LAB | Facility: CLINIC | Age: 63
End: 2018-07-18
Payer: MEDICARE

## 2018-07-18 ENCOUNTER — TRANSCRIBE ORDERS (OUTPATIENT)
Dept: LAB | Facility: CLINIC | Age: 63
End: 2018-07-18

## 2018-07-18 DIAGNOSIS — Z17.0 MALIGNANT NEOPLASM OF UPPER-OUTER QUADRANT OF RIGHT BREAST IN FEMALE, ESTROGEN RECEPTOR POSITIVE (HCC): ICD-10-CM

## 2018-07-18 DIAGNOSIS — C50.411 MALIGNANT NEOPLASM OF UPPER-OUTER QUADRANT OF RIGHT BREAST IN FEMALE, ESTROGEN RECEPTOR POSITIVE (HCC): ICD-10-CM

## 2018-07-18 LAB
ANION GAP SERPL CALCULATED.3IONS-SCNC: 3 MMOL/L (ref 4–13)
APTT PPP: 26 SECONDS (ref 24–36)
BUN SERPL-MCNC: 16 MG/DL (ref 5–25)
CALCIUM SERPL-MCNC: 8.8 MG/DL (ref 8.3–10.1)
CHLORIDE SERPL-SCNC: 107 MMOL/L (ref 100–108)
CO2 SERPL-SCNC: 30 MMOL/L (ref 21–32)
CREAT SERPL-MCNC: 0.67 MG/DL (ref 0.6–1.3)
ERYTHROCYTE [DISTWIDTH] IN BLOOD BY AUTOMATED COUNT: 13.4 % (ref 11.6–15.1)
GFR SERPL CREATININE-BSD FRML MDRD: 95 ML/MIN/1.73SQ M
GLUCOSE P FAST SERPL-MCNC: 126 MG/DL (ref 65–99)
HCT VFR BLD AUTO: 37.9 % (ref 34.8–46.1)
HGB BLD-MCNC: 11.8 G/DL (ref 11.5–15.4)
INR PPP: 1 (ref 0.86–1.17)
MCH RBC QN AUTO: 28.4 PG (ref 26.8–34.3)
MCHC RBC AUTO-ENTMCNC: 31.1 G/DL (ref 31.4–37.4)
MCV RBC AUTO: 91 FL (ref 82–98)
PLATELET # BLD AUTO: 282 THOUSANDS/UL (ref 149–390)
PMV BLD AUTO: 12.2 FL (ref 8.9–12.7)
POTASSIUM SERPL-SCNC: 3.6 MMOL/L (ref 3.5–5.3)
PROTHROMBIN TIME: 13.3 SECONDS (ref 11.8–14.2)
RBC # BLD AUTO: 4.16 MILLION/UL (ref 3.81–5.12)
SODIUM SERPL-SCNC: 140 MMOL/L (ref 136–145)
WBC # BLD AUTO: 7.28 THOUSAND/UL (ref 4.31–10.16)

## 2018-07-18 PROCEDURE — 80048 BASIC METABOLIC PNL TOTAL CA: CPT

## 2018-07-18 PROCEDURE — 85027 COMPLETE CBC AUTOMATED: CPT

## 2018-07-18 PROCEDURE — 36415 COLL VENOUS BLD VENIPUNCTURE: CPT

## 2018-07-18 PROCEDURE — 85730 THROMBOPLASTIN TIME PARTIAL: CPT

## 2018-07-18 PROCEDURE — 85610 PROTHROMBIN TIME: CPT

## 2018-07-19 PROBLEM — C50.411 MALIGNANT NEOPLASM OF UPPER-OUTER QUADRANT OF RIGHT BREAST IN FEMALE, ESTROGEN RECEPTOR POSITIVE (HCC): Status: ACTIVE | Noted: 2018-07-19

## 2018-07-19 PROBLEM — Z17.0 MALIGNANT NEOPLASM OF UPPER-OUTER QUADRANT OF RIGHT BREAST IN FEMALE, ESTROGEN RECEPTOR POSITIVE (HCC): Status: ACTIVE | Noted: 2018-07-19

## 2018-07-24 ENCOUNTER — OFFICE VISIT (OUTPATIENT)
Dept: FAMILY MEDICINE CLINIC | Facility: CLINIC | Age: 63
End: 2018-07-24
Payer: MEDICARE

## 2018-07-24 VITALS
RESPIRATION RATE: 18 BRPM | BODY MASS INDEX: 31.45 KG/M2 | SYSTOLIC BLOOD PRESSURE: 136 MMHG | DIASTOLIC BLOOD PRESSURE: 80 MMHG | HEART RATE: 85 BPM | OXYGEN SATURATION: 97 % | HEIGHT: 64 IN | WEIGHT: 184.25 LBS | TEMPERATURE: 97.6 F

## 2018-07-24 DIAGNOSIS — J45.21 MILD INTERMITTENT ASTHMA WITH ACUTE EXACERBATION: Primary | ICD-10-CM

## 2018-07-24 PROCEDURE — 99214 OFFICE O/P EST MOD 30 MIN: CPT | Performed by: FAMILY MEDICINE

## 2018-07-24 RX ORDER — METHYLPREDNISOLONE SODIUM SUCCINATE 125 MG/2ML
125 INJECTION, POWDER, LYOPHILIZED, FOR SOLUTION INTRAMUSCULAR; INTRAVENOUS ONCE
Status: COMPLETED | OUTPATIENT
Start: 2018-07-24 | End: 2018-07-24

## 2018-07-24 RX ORDER — METHYLPREDNISOLONE SODIUM SUCCINATE 125 MG/2ML
125 INJECTION, POWDER, LYOPHILIZED, FOR SOLUTION INTRAMUSCULAR; INTRAVENOUS ONCE
Status: DISCONTINUED | OUTPATIENT
Start: 2018-07-24 | End: 2018-07-24

## 2018-07-24 RX ORDER — ALBUTEROL SULFATE 2.5 MG/3ML
2.5 SOLUTION RESPIRATORY (INHALATION) EVERY 4 HOURS PRN
Qty: 75 ML | Refills: 0 | Status: SHIPPED | OUTPATIENT
Start: 2018-07-24 | End: 2019-03-22 | Stop reason: SDUPTHER

## 2018-07-24 RX ORDER — IPRATROPIUM BROMIDE AND ALBUTEROL SULFATE 2.5; .5 MG/3ML; MG/3ML
3 SOLUTION RESPIRATORY (INHALATION) ONCE
Status: COMPLETED | OUTPATIENT
Start: 2018-07-24 | End: 2018-07-24

## 2018-07-24 RX ORDER — FLUTICASONE FUROATE AND VILANTEROL 100; 25 UG/1; UG/1
1 POWDER RESPIRATORY (INHALATION) DAILY
Qty: 1 INHALER | Refills: 3 | Status: SHIPPED | OUTPATIENT
Start: 2018-07-24 | End: 2018-08-21

## 2018-07-24 RX ADMIN — METHYLPREDNISOLONE SODIUM SUCCINATE 125 MG: 125 INJECTION, POWDER, LYOPHILIZED, FOR SOLUTION INTRAMUSCULAR; INTRAVENOUS at 14:03

## 2018-07-24 RX ADMIN — IPRATROPIUM BROMIDE AND ALBUTEROL SULFATE 3 ML: 2.5; .5 SOLUTION RESPIRATORY (INHALATION) at 12:07

## 2018-07-24 NOTE — PRE-PROCEDURE INSTRUCTIONS
Pre-Surgery Instructions:   Medication Instructions    albuterol (2 5 mg/3 mL) 0 083 % nebulizer solution Instructed patient per Anesthesia Guidelines   albuterol (PROVENTIL HFA,VENTOLIN HFA) 90 mcg/act inhaler Instructed patient per Anesthesia Guidelines   atropine (ISOPTO ATROPINE) 1 % ophthalmic solution Instructed patient per Anesthesia Guidelines   ciprofloxacin (CILOXAN) 0 3 % ophthalmic solution Instructed patient per Anesthesia Guidelines   enalapril (VASOTEC) 20 mg tablet Instructed patient per Anesthesia Guidelines   fluticasone-vilanterol (BREO ELLIPTA) 100-25 mcg/inh inhaler Instructed patient per Anesthesia Guidelines   gabapentin (NEURONTIN) 300 mg capsule ANESTHESIA GUIDELINES     gemfibrozil (LOPID) 600 mg tablet Instructed patient per Anesthesia Guidelines   glipiZIDE (GLUCOTROL XL) 10 mg 24 hr tablet Instructed patient per Anesthesia Guidelines   HUMULIN N 100 UNIT/ML subcutaneous injection Instructed patient per Anesthesia Guidelines   Insulin Glargine (TOUJEO) injection pen 300 units/mL Instructed patient per Anesthesia Guidelines   levothyroxine 50 mcg tablet Instructed patient per Anesthesia Guidelines   metFORMIN (GLUCOPHAGE-XR) 500 mg 24 hr tablet Instructed patient per Anesthesia Guidelines   omeprazole (PriLOSEC) 20 mg delayed release capsule Instructed patient per Anesthesia Guidelines   prednisoLONE acetate (PRED FORTE) 1 % ophthalmic suspension Instructed patient per Anesthesia Guidelines   predniSONE 20 mg tablet Instructed patient per Anesthesia Guidelines  Spoke to pt daughter who translated  Medication list reviewed & instructed  As of 7 26 18 pt instructed on tylenol only  Last dose of enalapril Tuesday 7 31 18  Am DOS pt to take gabapentin, levothyroxine,omeprazole, prednisone, breo, neb or inhaler PRN with 1-2 sips of water  Showering instructions given by office, reviewed at time of call     All instructions verbally understood by pt daughter  No further questions  ACE/ARB Med Class     Do not take this medication the day before and the morning of the day of surgery/procedure  Antiepileptic Med Class     Continue to take this medication on your normal schedule  If this is an oral medication and you take it in the morning, then you may take this medicine with a sip of water  Inhalational Med Class     Continue to take these inhaler medications on your normal schedule up to and including the day of surgery  Insulin Med Class     Pre-Surgery/Procedure Instructions for Adult Patients who Take Medicine for Diabetes or to Control their Blood Sugar     Day Before Surgery/Procedure  Use the directions based on the type of medicine you take for your diabetes  1  If you are having a procedure that does not require a bowel prep:  ? Pre-Mixed Insulin (Intermediate Acting: Humalog 75/25, Humulin 70/30  Novolog 70/30, Regular Insulin)  § Take ½ your regular dose the evening before your procedure  ? Rapid/Fast Acting Insulin/Long Acting Insulin (Humalog U200, NovoLog, Apidra, Lantus, Levemir, Caroline Parsley, [place])  § Take your FULL regular dose the day before procedure  ? Oral Diabetic Medicines including Glipizide/Glimepiride/Glucotrol (sulfonylurea)  § Take your regular dose with dinner the evening before your procedure  2  If you are having a procedure (e g  Colonoscopy) that requires a bowel prep and you are allowed to have at least a clear liquid diet:  ? Pre-Mixed Insulin (Intermediate Acting: Humalog 75/25, Humulin 70/30, Novolog 70/30, Regular Insulin)  § Take ½ your regular dose the evening before your procedure  ? Rapid/Fast Acting Insulin (Humalog U200, NovoLog, Apidra, Fiasp)  § Take ½ your regular dose the evening before your procedure  ? Long Acting Insulin (Lantus, Levemir, Caroline Parsley)  § Take your FULL regular dose the day before procedure    ? Oral Glipizide/Glimepiride/Glucotrol (sulfonylurea)  § Take ½ your regular dose the evening before your procedure  ? Oral Diabetic Medicines that are NOT Glipizide/Glimepiride/Glucotrol  § Take your regular dose with dinner in the evening before your procedure      Day of Surgery/Procedure  · Long Acting Insulin (Lantus, Levemir, Pam Good)  ? If you usually take your Long-Acting Insulin in the morning, take the full dose as scheduled  · With the exception of the morning Long-Acting Insulin noted above, DO NOT take ANY diabetic medicine on the day of your procedure unless you were instructed by the doctor who manages your diabetic medicines  · Continue to check your blood sugars  · If you have an insulin pump then consult with your Endocrinologist for instructions  · If you cannot see your Endocrinologist, on the day of the procedure set your insulin pump to your basal rate only  Please bring your insulin pump supplies to the hospital      This Educational material has been approved by the Patient Education Advisory Committee  Date prepared: 1/17/2018          Expiration date: 1/17/2019        Approval Number:                     NonStatin Med Class     Continue to take this medication on your normal schedule  If this is an oral medication and you take it in the morning, then you may take this medicine with a sip of water  Steroids Med Class     Continue to take this medication on your normal schedule  If this is an oral medication and you take it in the morning, then you may take this medicine with a sip of water  Thyroxine Med Class     Continue to take this medication on your normal schedule  If this is an oral medication and you take it in the morning, then you may take this medicine with a sip of water

## 2018-07-24 NOTE — PROGRESS NOTES
Assessment/Plan:    Asthma  DuoNeb given today  Solumedrol 125 mg IM given today  Refilled Albuterol solution  Start Breo  Referred to Pulmonary          Problem List Items Addressed This Visit        Respiratory    Asthma - Primary     DuoNeb given today  Solumedrol 125 mg IM given today  Refilled Albuterol solution  Start Breo  Referred to Pulmonary          Relevant Medications    ipratropium-albuterol (DUO-NEB) 0 5-2 5 mg/3 mL inhalation solution 3 mL (Completed)    methylPREDNISolone sodium succinate (Solu-MEDROL) injection 125 mg    Other Relevant Orders    Spirometry    Ambulatory referral to Pulmonology            Subjective:      Patient ID: Akash Reyes is a 65 y o  female  66 yo  female seen in the ED for Asthma exacerbation here today for follow up  Currently complains of dry cough and SOB  Has been using albuterol inhaler 3 times a day  No fever, chills, chest pain  The following portions of the patient's history were reviewed and updated as appropriate:   She  has a past medical history of Asthma; Cancer ([facility] [address] ); Diabetes mellitus ([address] ); Hypercholesterolemia; Hypertension; Hypothyroid; and Rheumatoid arthritis ([address] )    She   Patient Active Problem List    Diagnosis Date Noted    Malignant neoplasm of upper-outer quadrant of right breast in female, estrogen receptor positive ([address] ) 07/19/2018    Malignant neoplasm of right female breast ([address] ) 06/26/2018    Neck pain 06/13/2018    Constipation 06/13/2018    Primary insomnia 06/13/2018    Ductal carcinoma in situ (DCIS) of right breast 06/11/2018    Chronic bilateral low back pain without sciatica 05/22/2018    Palpitations 05/09/2018    Preop examination 05/09/2018    Type 2 diabetes mellitus with complication, with long-term current use of insulin ([address] ) 03/27/2018    Other specified hypothyroidism 03/27/2018    Chest pain 03/27/2018    Asthma 09/22/2009    Essential hypertension 09/22/2009     She  has a past surgical history that includes Tubal ligation; Knee surgery (Right); Cataract extraction, bilateral (Bilateral); Retinal detachment surgery (Right); Breast surgery (Left); and Breast lumpectomy (Right, 6/26/2018)  Her family history includes Diabetes in her brother, father, and mother; Hypertension in her brother, father, and mother  She  reports that she has never smoked  She has never used smokeless tobacco  She reports that she does not drink alcohol or use drugs    Current Outpatient Prescriptions   Medication Sig Dispense Refill    albuterol (2 5 mg/3 mL) 0 083 % nebulizer solution Take 1 vial (2 5 mg total) by nebulization every 4 (four) hours as needed for wheezing or shortness of breath 75 mL 0    albuterol (PROVENTIL HFA,VENTOLIN HFA) 90 mcg/act inhaler Inhale 1 puff 2 (two) times a day        atropine (ISOPTO ATROPINE) 1 % ophthalmic solution Administer 2 drops to the right eye 2 (two) times a day    0    ciprofloxacin (CILOXAN) 0 3 % ophthalmic solution place 1 drop into right eye four times a day AFTER SURGERY  0    enalapril (VASOTEC) 20 mg tablet Take 1 tablet (20 mg total) by mouth daily 90 tablet 1    gabapentin (NEURONTIN) 300 mg capsule take 1 capsule by mouth three times a day 90 capsule 1    gemfibrozil (LOPID) 600 mg tablet Take 600 mg by mouth daily        glipiZIDE (GLUCOTROL XL) 10 mg 24 hr tablet Take 1 tablet (10 mg total) by mouth daily 90 tablet 1    HUMULIN N 100 UNIT/ML subcutaneous injection 70 units in the am 30 units in the pm 30 mL 5    HYDROcodone-acetaminophen (NORCO) 5-325 mg per tablet Take 1 tablet by mouth every 6 (six) hours as needed for pain Max Daily Amount: 4 tablets 12 tablet 0    Insulin Glargine (TOUJEO) injection pen 300 units/mL Inject 10 Units under the skin daily at bedtime 3 pen 1    levothyroxine 50 mcg tablet Take 1 tablet (50 mcg total) by mouth daily 90 tablet 1    metFORMIN (GLUCOPHAGE-XR) 500 mg 24 hr tablet take 2 tablets by mouth once daily WITH BREAKFAST  0    omeprazole (PriLOSEC) 20 mg delayed release capsule take 1 capsule by mouth once daily 90 capsule 2    prednisoLONE acetate (PRED FORTE) 1 % ophthalmic suspension instill 1 drop into right eye four times a day AFTER SURGERY  0    predniSONE 20 mg tablet Take 2 tablets (40 mg total) by mouth daily 10 tablet 0     Current Facility-Administered Medications   Medication Dose Route Frequency Provider Last Rate Last Dose    methylPREDNISolone sodium succinate (Solu-MEDROL) injection 125 mg  125 mg Intravenous Once Tracey Tijerina MD         Current Outpatient Prescriptions on File Prior to Visit   Medication Sig    albuterol (2 5 mg/3 mL) 0 083 % nebulizer solution Take 1 vial (2 5 mg total) by nebulization every 4 (four) hours as needed for wheezing or shortness of breath    albuterol (PROVENTIL HFA,VENTOLIN HFA) 90 mcg/act inhaler Inhale 1 puff 2 (two) times a day      atropine (ISOPTO ATROPINE) 1 % ophthalmic solution Administer 2 drops to the right eye 2 (two) times a day      ciprofloxacin (CILOXAN) 0 3 % ophthalmic solution place 1 drop into right eye four times a day AFTER SURGERY    enalapril (VASOTEC) 20 mg tablet Take 1 tablet (20 mg total) by mouth daily    gabapentin (NEURONTIN) 300 mg capsule take 1 capsule by mouth three times a day    gemfibrozil (LOPID) 600 mg tablet Take 600 mg by mouth daily      glipiZIDE (GLUCOTROL XL) 10 mg 24 hr tablet Take 1 tablet (10 mg total) by mouth daily    HUMULIN N 100 UNIT/ML subcutaneous injection 70 units in the am 30 units in the pm    HYDROcodone-acetaminophen (NORCO) 5-325 mg per tablet Take 1 tablet by mouth every 6 (six) hours as needed for pain Max Daily Amount: 4 tablets    Insulin Glargine (TOUJEO) injection pen 300 units/mL Inject 10 Units under the skin daily at bedtime    levothyroxine 50 mcg tablet Take 1 tablet (50 mcg total) by mouth daily    metFORMIN (GLUCOPHAGE-XR) 500 mg 24 hr tablet take 2 tablets by mouth once daily WITH BREAKFAST    omeprazole (PriLOSEC) 20 mg delayed release capsule take 1 capsule by mouth once daily    prednisoLONE acetate (PRED FORTE) 1 % ophthalmic suspension instill 1 drop into right eye four times a day AFTER SURGERY    predniSONE 20 mg tablet Take 2 tablets (40 mg total) by mouth daily     No current facility-administered medications on file prior to visit           Review of Systems      Objective:      /80 (BP Location: Left arm, Patient Position: Sitting, Cuff Size: Standard)   Pulse 85   Temp 97 6 °F (36 4 °C) (Tympanic)   Resp 18   Ht 5' 4" (1 626 m)   Wt 83 6 kg (184 lb 4 oz)   SpO2 97%   BMI 31 63 kg/m²          Physical Exam

## 2018-07-24 NOTE — ASSESSMENT & PLAN NOTE
DuoNeb given today  Solumedrol 125 mg IM given today  Refilled Albuterol solution  Start Breo  Referred to Pulmonary

## 2018-08-01 ENCOUNTER — ANESTHESIA EVENT (OUTPATIENT)
Dept: PERIOP | Facility: HOSPITAL | Age: 63
End: 2018-08-01
Payer: MEDICARE

## 2018-08-02 ENCOUNTER — APPOINTMENT (OUTPATIENT)
Dept: RADIOLOGY | Facility: HOSPITAL | Age: 63
End: 2018-08-02
Attending: SURGERY
Payer: MEDICARE

## 2018-08-02 ENCOUNTER — HOSPITAL ENCOUNTER (OUTPATIENT)
Facility: HOSPITAL | Age: 63
Setting detail: OUTPATIENT SURGERY
Discharge: HOME | End: 2018-08-02
Attending: SURGERY | Admitting: SURGERY
Payer: MEDICARE

## 2018-08-02 ENCOUNTER — ANESTHESIA (OUTPATIENT)
Dept: PERIOP | Facility: HOSPITAL | Age: 63
End: 2018-08-02
Payer: MEDICARE

## 2018-08-02 VITALS
HEIGHT: 64 IN | WEIGHT: 184 LBS | SYSTOLIC BLOOD PRESSURE: 140 MMHG | DIASTOLIC BLOOD PRESSURE: 62 MMHG | OXYGEN SATURATION: 95 % | BODY MASS INDEX: 31.41 KG/M2 | TEMPERATURE: 97.8 F | HEART RATE: 75 BPM | RESPIRATION RATE: 16 BRPM

## 2018-08-02 DIAGNOSIS — Z17.0 MALIGNANT NEOPLASM OF UPPER-OUTER QUADRANT OF RIGHT BREAST IN FEMALE, ESTROGEN RECEPTOR POSITIVE (HCC): ICD-10-CM

## 2018-08-02 DIAGNOSIS — C50.411 MALIGNANT NEOPLASM OF UPPER-OUTER QUADRANT OF RIGHT FEMALE BREAST, UNSPECIFIED ESTROGEN RECEPTOR STATUS (HCC): Primary | ICD-10-CM

## 2018-08-02 DIAGNOSIS — C50.411 MALIGNANT NEOPLASM OF UPPER-OUTER QUADRANT OF RIGHT BREAST IN FEMALE, ESTROGEN RECEPTOR POSITIVE (HCC): ICD-10-CM

## 2018-08-02 LAB
GLUCOSE SERPL-MCNC: 119 MG/DL (ref 65–140)
GLUCOSE SERPL-MCNC: 135 MG/DL (ref 65–140)

## 2018-08-02 PROCEDURE — 38900 IO MAP OF SENT LYMPH NODE: CPT | Performed by: SURGERY

## 2018-08-02 PROCEDURE — 38792 RA TRACER ID OF SENTINL NODE: CPT | Performed by: SURGERY

## 2018-08-02 PROCEDURE — 38525 BIOPSY/REMOVAL LYMPH NODES: CPT | Performed by: SURGERY

## 2018-08-02 PROCEDURE — 82948 REAGENT STRIP/BLOOD GLUCOSE: CPT

## 2018-08-02 PROCEDURE — 88307 TISSUE EXAM BY PATHOLOGIST: CPT | Performed by: PATHOLOGY

## 2018-08-02 PROCEDURE — A9541 TC99M SULFUR COLLOID: HCPCS

## 2018-08-02 PROCEDURE — 78195 LYMPH SYSTEM IMAGING: CPT

## 2018-08-02 PROCEDURE — 19301 PARTIAL MASTECTOMY: CPT | Performed by: SURGERY

## 2018-08-02 RX ORDER — ONDANSETRON 2 MG/ML
4 INJECTION INTRAMUSCULAR; INTRAVENOUS ONCE AS NEEDED
Status: DISCONTINUED | OUTPATIENT
Start: 2018-08-02 | End: 2018-08-02 | Stop reason: HOSPADM

## 2018-08-02 RX ORDER — SODIUM CHLORIDE, SODIUM LACTATE, POTASSIUM CHLORIDE, CALCIUM CHLORIDE 600; 310; 30; 20 MG/100ML; MG/100ML; MG/100ML; MG/100ML
20 INJECTION, SOLUTION INTRAVENOUS CONTINUOUS
Status: DISCONTINUED | OUTPATIENT
Start: 2018-08-02 | End: 2018-08-03 | Stop reason: HOSPADM

## 2018-08-02 RX ORDER — MIDAZOLAM HYDROCHLORIDE 1 MG/ML
INJECTION INTRAMUSCULAR; INTRAVENOUS AS NEEDED
Status: DISCONTINUED | OUTPATIENT
Start: 2018-08-02 | End: 2018-08-02 | Stop reason: SURG

## 2018-08-02 RX ORDER — PROPOFOL 10 MG/ML
INJECTION, EMULSION INTRAVENOUS AS NEEDED
Status: DISCONTINUED | OUTPATIENT
Start: 2018-08-02 | End: 2018-08-02 | Stop reason: SURG

## 2018-08-02 RX ORDER — ISOSULFAN BLUE 50 MG/5ML
INJECTION, SOLUTION SUBCUTANEOUS AS NEEDED
Status: DISCONTINUED | OUTPATIENT
Start: 2018-08-02 | End: 2018-08-02 | Stop reason: HOSPADM

## 2018-08-02 RX ORDER — FENTANYL CITRATE 50 UG/ML
INJECTION, SOLUTION INTRAMUSCULAR; INTRAVENOUS AS NEEDED
Status: DISCONTINUED | OUTPATIENT
Start: 2018-08-02 | End: 2018-08-02 | Stop reason: SURG

## 2018-08-02 RX ORDER — MAGNESIUM HYDROXIDE 1200 MG/15ML
LIQUID ORAL AS NEEDED
Status: DISCONTINUED | OUTPATIENT
Start: 2018-08-02 | End: 2018-08-02 | Stop reason: HOSPADM

## 2018-08-02 RX ORDER — CEFAZOLIN SODIUM 1 G/3ML
INJECTION, POWDER, FOR SOLUTION INTRAMUSCULAR; INTRAVENOUS AS NEEDED
Status: DISCONTINUED | OUTPATIENT
Start: 2018-08-02 | End: 2018-08-02 | Stop reason: SURG

## 2018-08-02 RX ORDER — ONDANSETRON 2 MG/ML
INJECTION INTRAMUSCULAR; INTRAVENOUS AS NEEDED
Status: DISCONTINUED | OUTPATIENT
Start: 2018-08-02 | End: 2018-08-02 | Stop reason: SURG

## 2018-08-02 RX ORDER — LIDOCAINE HYDROCHLORIDE 10 MG/ML
INJECTION, SOLUTION INFILTRATION; PERINEURAL AS NEEDED
Status: DISCONTINUED | OUTPATIENT
Start: 2018-08-02 | End: 2018-08-02 | Stop reason: SURG

## 2018-08-02 RX ORDER — ALBUMIN, HUMAN INJ 5% 5 %
SOLUTION INTRAVENOUS CONTINUOUS PRN
Status: DISCONTINUED | OUTPATIENT
Start: 2018-08-02 | End: 2018-08-02 | Stop reason: SURG

## 2018-08-02 RX ORDER — OXYCODONE HYDROCHLORIDE AND ACETAMINOPHEN 5; 325 MG/1; MG/1
1 TABLET ORAL EVERY 4 HOURS PRN
Status: DISCONTINUED | OUTPATIENT
Start: 2018-08-02 | End: 2018-08-03 | Stop reason: HOSPADM

## 2018-08-02 RX ORDER — FENTANYL CITRATE/PF 50 MCG/ML
25 SYRINGE (ML) INJECTION
Status: DISCONTINUED | OUTPATIENT
Start: 2018-08-02 | End: 2018-08-02 | Stop reason: HOSPADM

## 2018-08-02 RX ORDER — SODIUM CHLORIDE, SODIUM LACTATE, POTASSIUM CHLORIDE, CALCIUM CHLORIDE 600; 310; 30; 20 MG/100ML; MG/100ML; MG/100ML; MG/100ML
75 INJECTION, SOLUTION INTRAVENOUS CONTINUOUS
Status: DISCONTINUED | OUTPATIENT
Start: 2018-08-02 | End: 2018-08-03 | Stop reason: HOSPADM

## 2018-08-02 RX ORDER — TRAMADOL HYDROCHLORIDE 50 MG/1
50 TABLET ORAL EVERY 6 HOURS PRN
Qty: 20 TABLET | Refills: 0 | Status: SHIPPED | OUTPATIENT
Start: 2018-08-02 | End: 2018-08-12

## 2018-08-02 RX ORDER — ROCURONIUM BROMIDE 10 MG/ML
INJECTION, SOLUTION INTRAVENOUS AS NEEDED
Status: DISCONTINUED | OUTPATIENT
Start: 2018-08-02 | End: 2018-08-02 | Stop reason: SURG

## 2018-08-02 RX ORDER — GLYCOPYRROLATE 0.2 MG/ML
INJECTION INTRAMUSCULAR; INTRAVENOUS AS NEEDED
Status: DISCONTINUED | OUTPATIENT
Start: 2018-08-02 | End: 2018-08-02 | Stop reason: SURG

## 2018-08-02 RX ADMIN — FENTANYL CITRATE 50 MCG: 50 INJECTION, SOLUTION INTRAMUSCULAR; INTRAVENOUS at 10:12

## 2018-08-02 RX ADMIN — ALBUMIN (HUMAN): 12.5 SOLUTION INTRAVENOUS at 08:58

## 2018-08-02 RX ADMIN — DEXAMETHASONE SODIUM PHOSPHATE 10 MG: 10 INJECTION INTRAMUSCULAR; INTRAVENOUS at 08:20

## 2018-08-02 RX ADMIN — MIDAZOLAM 2 MG: 1 INJECTION INTRAMUSCULAR; INTRAVENOUS at 07:58

## 2018-08-02 RX ADMIN — LIDOCAINE HYDROCHLORIDE 100 MG: 10 INJECTION, SOLUTION INFILTRATION; PERINEURAL at 08:09

## 2018-08-02 RX ADMIN — GLYCOPYRROLATE 0.4 MG: 0.2 INJECTION, SOLUTION INTRAMUSCULAR; INTRAVENOUS at 09:59

## 2018-08-02 RX ADMIN — CEFAZOLIN 2000 MG: 1 INJECTION, POWDER, FOR SOLUTION INTRAVENOUS at 08:05

## 2018-08-02 RX ADMIN — FENTANYL CITRATE 25 MCG: 50 INJECTION, SOLUTION INTRAMUSCULAR; INTRAVENOUS at 10:18

## 2018-08-02 RX ADMIN — SODIUM CHLORIDE, SODIUM LACTATE, POTASSIUM CHLORIDE, AND CALCIUM CHLORIDE: .6; .31; .03; .02 INJECTION, SOLUTION INTRAVENOUS at 07:55

## 2018-08-02 RX ADMIN — NEOSTIGMINE METHYLSULFATE 4 MG: 1 INJECTION, SOLUTION INTRAMUSCULAR; INTRAVENOUS; SUBCUTANEOUS at 09:59

## 2018-08-02 RX ADMIN — OXYCODONE HYDROCHLORIDE AND ACETAMINOPHEN 1 TABLET: 5; 325 TABLET ORAL at 11:49

## 2018-08-02 RX ADMIN — PROPOFOL 180 MG: 10 INJECTION, EMULSION INTRAVENOUS at 08:09

## 2018-08-02 RX ADMIN — FENTANYL CITRATE 100 MCG: 50 INJECTION, SOLUTION INTRAMUSCULAR; INTRAVENOUS at 08:09

## 2018-08-02 RX ADMIN — ONDANSETRON 4 MG: 2 INJECTION INTRAMUSCULAR; INTRAVENOUS at 07:58

## 2018-08-02 RX ADMIN — FENTANYL CITRATE 25 MCG: 50 INJECTION, SOLUTION INTRAMUSCULAR; INTRAVENOUS at 10:15

## 2018-08-02 RX ADMIN — ROCURONIUM BROMIDE 40 MG: 10 INJECTION INTRAVENOUS at 08:09

## 2018-08-02 RX ADMIN — ONDANSETRON 4 MG: 2 INJECTION INTRAMUSCULAR; INTRAVENOUS at 09:59

## 2018-08-02 RX ADMIN — GLYCOPYRROLATE 0.1 MG: 0.2 INJECTION, SOLUTION INTRAMUSCULAR; INTRAVENOUS at 07:58

## 2018-08-02 NOTE — OP NOTE
OPERATIVE REPORT  PATIENT NAME: Colleen Vides    :  1955  MRN: [id number]  Pt Location: BE OR ROOM 07    SURGERY DATE: 2018    Surgeon(s) and Role:     * Marlene Luna MD - Primary     * Baldo Mark MD - Assisting    Preop Diagnosis:  Malignant neoplasm of upper-outer quadrant of right breast in female, estrogen receptor positive ([address] ) [C50 411, Z17 0]    Post-Op Diagnosis Codes:     * Malignant neoplasm of upper-outer quadrant of right breast in female, estrogen receptor positive ([address] ) [C50 411, Z17 0]    Procedure(s) (LRB):  LYMPHOSCINITGRAPHY INTRAOPERATIVE LYMPHATIC MAPPING , RIGHT SENTINEL NODE BIOPSY, REEXCISION  RIGHT BREAST LUMPECTOMY CAVITY (SUPERIOR MARGIN) (Right)  INJECTION OF LYMPHAZURIN BLUE    Specimen(s):  ID Type Source Tests Collected by Time Destination   1 : right axillia sentinel node, stitch at hottest part Tissue Lymph Node, [place] TISSUE EXAM Marlene Luna MD 2018 0900    2 : right breast lumpectomy reexcision Tissue Breast, Right TISSUE EXAM Marlene Luna MD 2018 1243        Estimated Blood Loss:   50 mL    Drains:       Anesthesia Type:   Choice    Operative Indications:  Malignant neoplasm of upper-outer quadrant of right breast in female, estrogen receptor positive ([address] ) [C50 411, Z17 0]      Operative Findings:  Right axillary sentinel node maximum count 329    Complications:   None    Procedure and Technique:  Patient is a 45-year-old woman who recently underwent right breast lumpectomy for DCIS  She was found have a focus of invasive cancer along the superior margin, which was positive  She therefore comes in for reexcision  Additionally given this diagnosis she comes in for sentinel node biopsy staging  She had been seen in the nuclear medicine suite where she underwent injection of radioactive technetium in the breast   Lymphoscintigraphy revealed an area of uptake in the right axilla      He was then brought into the OR and identified by a proper time-out  Following this she was intubated by the anesthesia team   She was then prepped and draped with the right breast axilla exposed  0 5 cc of ice soft and blue was injected in the dermal compartment right breast   5 minutes massage was then performed  We then made an incision in the right axilla over the site identified by lymphoscintigraphy  Cautery was used to dissect through the dermis and subcu tissue  Using a gamma probe were able to localize the sentinel node packet with a maximum count of 145  This was dissected out from the surrounding tissue in hemostatic fashion  Closure was then performed using 3-0 Vicryl for the dermis followed by 4-0 Monocryl for the subcuticular skin closure  Histacryl was applied to the incision  There is no significant radio activity in the axilla upon removal of the node packet  We then turned our attention to the lumpectomy site  The old incision was reincised sharply  Cautery was used to dissect through the dermis and subcutaneous tissue to get us down to the lumpectomy cavity  The walls of the cavity were visualized  We then excised the superior wall/margin in hemostatic fashion with cautery and sent it off for pathology  Was marked in the usual way with the margin marker kit  Hemostasis was achieved in the cavity using cautery  We then closed using 2-0 Vicryl close the deep layers followed by 3-0 Vicryl to close the dermis followed by 4-0 Monocryl to close the skin in subcuticular fashion  Benzoin and Steri-Strips were then applied on this incision before the patient was awakened and transferred to the recovery room in stable condition  Sponge instrument count were correct at the end the case     I was present for the entire procedure    Patient Disposition:  PACU     SIGNATURE: Giovana Herrera MD  DATE: August 2, 2018  TIME: 10:03 AM

## 2018-08-02 NOTE — DISCHARGE INSTRUCTIONS
Take Tramadol as prescribed for post op pain    The white steri strips over your incision will fall off on their own  It is okay to get them wet, just pat dry gently  Return to hospital for fever, vomiting, increasing redness or pain around your incision, or purulent or foul smelling drainage from your incision  Lumpectomía del seno   LO QUE NECESITA SABER:   Después de felicita lumpectomía usted podría tener inflamación o moretones en pruitt seno o el área donde se removieron los ganglios lifáticos  Usted podría tener dolor o dificultad para  pruitt brazo u hombro mas cercano a la lumpectomía  Es posible que usted necesite hacer ejercicios de estiramiento de brazos para mejorar joe síntomas y prevenir problemas de Mindy Peggy  INSTRUCCIONES SOBRE EL ALLEGRA HOSPITALARIA:   Llame al 911 en solo de presentar lo siguiente:   · Usted se siente mareada, le hace falta el aire y tiene dolor de [place]  · Usted expectora teresa  · Usted tiene dificultad para respirar  Busque atención médica de inmediato si:   · La teresa empapa el vendaje  · Se desprenden los puntos de sutura  · El moretón de repente se vuelve más alfred  · Pruitt pierna o brazo está más alfred que lo normal y adolorido  Pregúntele a pruitt [place] vitaminas y minerales son adecuados para usted  · Usted tiene fiebre o escalofríos  · Pruitt herida está jose, inflamada o drena pus  · Usted tiene náuseas o está vomitando  · Usted tiene comezón en la piel, inflamación o un sarpullido  · Pruitt dolor no mejora después de ganesh pruitt medicamento para el dolor  · El drenaje se sale o kash de drenar líquido  · Pruitt drenaje tiene pus o un líquido maloliente saliendo del mismo  · Usted tiene preguntas o inquietudes acerca de pruitt condición o cuidado  Medicamentos:  Es posible que usted necesite alguno de los siguientes:  · Antibióticos  ayudan a evitar felicita infección bacteriana       · Un medicamento con receta para el dolor  podrían ser administrados  Pregunte al médico cómo debe ganesh carolin medicamento de forma panchal  Algunos medicamentos recetados para el dolor contienen acetaminofén  No tome otros medicamentos que contengan acetaminofén sin consultarlo con pruitt médico  Demasiado acetaminofeno puede causar daño al hígado  Los medicamentos recetados para el dolor podrían causar estreñimiento  Pregunte a pruitt médico nelli prevenir o tratar estreñimiento  · AINEs (Analgésicos antiinflamatorios no esteroides) nelli el ibuprofeno, ayudan a disminuir la inflamación, el dolor y la [place]  Los AINEs pueden causar sangrado estomacal o problemas renales en ciertas personas  Si usted gilmar un medicamento anticoagulante, siempre pregúntele a pruitt médico si los CHAD son seguros para usted  Siempre chin la etiqueta de carolin medicamento y [place] instrucciones  · [place] joe medicamentos nelli se le haya indicado  Consulte con pruitt médico si usted giselle que pruitt medicamento no le está ayudando o si presenta efectos secundarios  Infórmele si es alérgico a cualquier medicamento  Mantenga felicita lista actualizada de los [place], las vitaminas y los productos herbales que gilmar  Incluya los siguientes datos de los medicamentos: cantidad, frecuencia y motivo de administración  Traiga con usted la lista o los envases de la píldoras a joe citas de seguimiento  Lleve la lista de los medicamentos con usted en solo de felicita emergencia  Siga las instrucciones de pruitt médico sobre el cuidado de joe heridas:  Si le pusieron felicita venda [place], se la puede quitar dentro de 24 a 48 horas o según indicaciones  Pregúntele a pruitt médico cuándo puede mojarse la incisión  Es posible que deba ganesh un baño de esponja hasta que le retiren los drenajes  Lave cuidadosamente el área alrededor de la incisión con Jovani Pinky y [place]  No hay problema si kash correr el agua y el jabón libremente por la incisión  Séquese el área suavemente y póngase felicita venda nueva y limpia según indicaciones   Cambie joe vendajes cuando se mojen o ensucien  Revise pruitt incisión todos los días para goyo si tiene enrojecimiento, pus o inflamación  Cuidados personales:   · Aplique hielo  en el seno de 15 a 20 minutos [address] indicaciones  Use un paquete de hielo o ponga hielo molido dentro de [employer]  Cúbrala con felicita toalla  El hielo ayuda a evitar daño al tejido y a disminuir la inflamación y el dolor  · Descanse  según las indicaciones  No levante nada pesado  No empuje ni jale con joe brazos  Empiece a caminar un poco alrededor de la casa  Camine gradualmente más cada día  Pregunte a pruitt médico cuándo puede retomar joe [employer]  · Vacíe pruitt [address] indicaciones  Es probable que tenga que anotar la cantidad que vacía de pruitt drenaje a diario  [place]dale a pruitt médico más información sobre cómo vaciar pruitt drenaje  · Use un brasier de soporte  según las indicaciones  Espere hasta quitarse la venda ajustada para usar sostén  Es posible que le suministren un sostén de soporte o le indiquen que use un sostén deportivo  Un brasier de apoyo podría ayudarle a sostener las vendas en pruitt lugar  También puede ayudar con la inflamación y el dolor  No  use un sostén con encajes ni con varilla  Estos le pueden rozar contra pruitt incisión y causarle molestias  Ejercicios de estiramiento para los brazos:  Pruitt médico le mostrara cómo hacer el estiramiento para los brazos  Los ejercicios de estiramiento para el brazo pueden prevenir la rigidez [employer] brazos o los hombros  Es posible que tenga que esperar hasta después de que le retiren los drenajes para empezar con el estiramiento  No edgar ejercicios de estiramiento para el brazo hasta que pruitt médico lo autorice  Solicite a pruitt médico mayor información sobre los ejercicios de estiramiento para los brazos  Acuda a joe consultas de control con pruitt médico según le indicaron  Anote joe preguntas para que se acuerde de hacerlas fabio joe visitas     © 2017 [address]  Information is for End User's use only and may not be sold, redistributed or otherwise used for commercial purposes  All illustrations and images included in CareNotes® are the copyrighted property of [employer]  or Zi Aragon  Esta información es sólo para uso en educación  Pruitt intención no es darle un consejo médico sobre enfermedades o tratamientos  Colsulte con pruitt Estela Boers farmacéutico antes de seguir cualquier régimen médico para saber si es seguro y efectivo para usted

## 2018-08-02 NOTE — H&P (VIEW-ONLY)
Surgical Oncology Follow Up       [facility] SURGICAL ONCOLOGY [place]  [address]  1955  [id number]  [facility] SURGICAL ONCOLOGY [place]  [address] [place] [address]    Chief Complaint   Patient presents with    Post-op     Patient is here for a post-op lumpectomy right breast       Assessment/Plan:    No problem-specific Assessment & Plan notes found for this encounter  Diagnoses and all orders for this visit:    Malignant neoplasm of upper-outer quadrant of right breast in female, estrogen receptor positive ([facility] )  -     Case request operating room: LUMPECTOMY BREAST WITH BIOPSY LYMPH NODE SENTINEL; Standing  -     Basic metabolic panel; Future  -     APTT; Future  -     CBC and Platelet; Future  -     Protime-INR; Future  -     NM lymphatic breast; Future  -     Case request operating room: LUMPECTOMY BREAST WITH BIOPSY LYMPH NODE SENTINEL    Other orders  -     Incentive spirometry; Standing  -     Insert and maintain IV line; Standing  -     Void On-Call to O R ; Standing  -     Place sequential compression device; Standing        Advance Care Planning/Advance Directives:  Discussed disease status, cancer treatment plans and/or cancer treatment goals with the patient  Malignant neoplasm of right female breast ([facility] )    5/23/2018 Biopsy     Right breast core biopsy         6/26/2018 Initial Diagnosis     Malignant neoplasm of right female breast ([facility] )         6/26/2018 Surgery     RIGHT BREAST NEEDLE LOCALIZATION X2 WITH RIGHT BREAST LUMPECTOMY ( NEEDLE LOC @ 1000) (Right)   ONCOPLASTIC CLOSURE OF LUMPECTOMY CAVITY            -Interval History: Patient is here for a postop check s/p right breast lumpectomy  No complaints to report  Review of Systems:  Review of Systems   Constitutional: Negative  HENT: Negative  Eyes: Negative  Respiratory: Negative  Cardiovascular: Negative  Gastrointestinal: Negative  Endocrine: Negative  Genitourinary: Negative  Musculoskeletal: Negative  Skin: Negative  Allergic/Immunologic: Negative  Neurological: Negative  Hematological: Negative  Psychiatric/Behavioral: Negative  Patient Active Problem List   Diagnosis    Type 2 diabetes mellitus with complication, with long-term current use of insulin ([facility] )    Asthma    Essential hypertension    Other specified hypothyroidism    Chest pain    Palpitations    Preop examination    Chronic bilateral low back pain without sciatica    Ductal carcinoma in situ (DCIS) of right breast    Neck pain    Constipation    Primary insomnia    Malignant neoplasm of right female breast [facility])     Past Medical History:   Diagnosis Date    Asthma     Cancer ([facility] )     BREAST    Diabetes mellitus ([facility] )     Hypercholesterolemia     Hypertension     Hypothyroid     Rheumatoid arthritis ([place] [address] )      Past Surgical History:   Procedure Laterality Date    BREAST LUMPECTOMY Right 6/26/2018    Procedure: RIGHT BREAST NEEDLE LOCALIZATION X2 WITH RIGHT BREAST LUMPECTOMY ( NEEDLE LOC @ 1000); Surgeon: Jami Yo MD;  [facility]: [facility];  Service: Surgical Oncology    BREAST SURGERY Left     CATARACT EXTRACTION, BILATERAL Bilateral     KNEE SURGERY Right     RETINAL DETACHMENT SURGERY Right     TUBAL LIGATION       Family History   Problem Relation Age of Onset    Diabetes Mother     Hypertension Mother     Diabetes Father     Hypertension Father     Diabetes Brother     Hypertension Brother      Social History     Social History    Marital status:      Spouse name: N/A    Number of children: N/A    Years of education: N/A     Occupational History    Not on file       Social History Main Topics    Smoking status: Never Smoker    Smokeless tobacco: Never Used    Alcohol use No    Drug use: No    Sexual activity: Yes     Other Topics Concern    Not on file     Social History Narrative    No narrative on file       Current Outpatient Prescriptions:     albuterol (PROVENTIL HFA,VENTOLIN HFA) 90 mcg/act inhaler, Inhale 1 puff 2 (two) times a day  , Disp: , Rfl:     atropine (ISOPTO ATROPINE) 1 % ophthalmic solution, Administer 2 drops to the right eye 2 (two) times a day  , Disp: , Rfl: 0    ciprofloxacin (CILOXAN) 0 3 % ophthalmic solution, place 1 drop into right eye four times a day AFTER SURGERY, Disp: , Rfl: 0    enalapril (VASOTEC) 20 mg tablet, Take 1 tablet (20 mg total) by mouth daily, Disp: 90 tablet, Rfl: 1    gabapentin (NEURONTIN) 300 mg capsule, take 1 capsule by mouth three times a day, Disp: 90 capsule, Rfl: 1    gemfibrozil (LOPID) 600 mg tablet, Take 600 mg by mouth daily  , Disp: , Rfl:     glipiZIDE (GLUCOTROL XL) 10 mg 24 hr tablet, Take 1 tablet (10 mg total) by mouth daily, Disp: 90 tablet, Rfl: 1    HUMULIN N 100 UNIT/ML subcutaneous injection, 70 units in the am 30 units in the pm, Disp: 30 mL, Rfl: 5    HYDROcodone-acetaminophen (NORCO) 5-325 mg per tablet, Take 1 tablet by mouth every 6 (six) hours as needed for pain Max Daily Amount: 4 tablets, Disp: 12 tablet, Rfl: 0    Insulin Glargine (TOUJEO) injection pen 300 units/mL, Inject 10 Units under the skin daily at bedtime, Disp: 3 pen, Rfl: 1    levothyroxine 50 mcg tablet, Take 1 tablet (50 mcg total) by mouth daily, Disp: 90 tablet, Rfl: 1    metFORMIN (GLUCOPHAGE-XR) 500 mg 24 hr tablet, take 2 tablets by mouth once daily WITH BREAKFAST, Disp: , Rfl: 0    omeprazole (PriLOSEC) 20 mg delayed release capsule, take 1 capsule by mouth once daily, Disp: 90 capsule, Rfl: 2    prednisoLONE acetate (PRED FORTE) 1 % ophthalmic suspension, instill 1 drop into right eye four times a day AFTER SURGERY, Disp: , Rfl: 0  Allergies   Allergen Reactions    Aspirin Hives    Tylenol With Codeine #3 [Acetaminophen-Codeine] Rash     Vitals:    07/13/18 1338   BP: 130/70 Pulse: (!) 106   Resp: 16   Temp: 98 8 °F (37 1 °C)       Physical Exam   Cardiovascular: Normal rate, regular rhythm, normal heart sounds and intact distal pulses  Pulmonary/Chest: Effort normal and breath sounds normal    Skin:   Right breast incision well healed  Results:  Labs:    History of Present Illness:   Case Report   Surgical Pathology Report                         Case: [id number]                                    Authorizing Provider: Kezia Johnson MD        Collected:           06/26/2018 1235               Ordering Location:     [address]      Received:            06/26/2018 [address] [place] [address] Operating Room                                                       Pathologist:           Betzy Hess MD                                                          Specimen:    Breast, Right, RIGHT BREAST LUMPECTOMY                                                     Addendum   THE FINAL DIAGNOSIS REMAINS UNCHANGED - this is to update the synoptic report with receptor studies given below and to state that the 8th ed   AJCC Prognostic Stage (use AJCC update) is IA      Results of immunohistochemical assay performed on invasive carcinoma block A89  are as follows:  Test Description                          Result                             Prognostic Interpretation  Estrogen Receptor/ER                 100%                               Positive    Primary Antibody: SP1     Internal control: Positive                        Staining Intensity: Strong    External control: Positive                         Raymundo Score*: 8  Progesterone Receptor/Pg           45-55 %                           Positive    Primary Antibody: 1E2    Internal control: Positive                         Staining Intensity: Strong    External control: Positive/Negative        Raymundo Score*: 6  HER2 by IHC                                     3+ Positive    Primary Antibody: 4B5  HER2 by Sayra Herrera                Not Indicated  Appropriate positive and negative controls were reviewed  Fixative: Formalin   Duration of fixation (hours): 80 hours, does not meet specified requirements of latest ASCO/CAP guidelines (6  72 hours)  Cold Ischemia Time (minutes): Not stated  Sample Adequacy: Adequate  These immunohistochemical tests are FDA-cleared and performed at [facility] in [place], [place] and interpreted by Dr Yris Mejia  An electronic copy of this report will be kept on file in the Medical Records Department at [facility]   * The Raymundo score is a semi quantitative system that takes into consideration the proportion of positive cells (scored on a scale of 0-5) and staining intensity (scored on a scale of (0-3)  The proportion and intensity are summed to produce total scores of 0 or 2 through 8  A score of 0-2 is regarded as negative while 3-8 as positive  Addendum electronically signed by Suzy Chauhan MD on 7/6/2018 at  4:17 PM   Final Diagnosis   INVASIVE BREAST CARCINOMA STAGING SUMMARY  1  Specimen Identification  - Procedure: wire-guided lumpectomy  - Lymph node sampling: no lymph nodes submitted, no lymph nodes found  - Specimen laterality: right  - Tumor site (quadrant; position/o'clock):  upper outer quadrant  2  Invasive Tumor:  - Histologic type: Invasive breast carcinoma of no special type (ductal NST/invasive ductal carcinoma)  - Tumor size (pT1c):  14 millimeters (A89-1)  - Grade (G2): Bartlett histologic score = 3+2+2 = 7 of 9; Grade II of III      * glandular (acinar) / tubular differentiation: < 10%, score 3   * nuclear pleomorphism: nuclear grade 2, score 2   * mitoses ~ 4/mm2, score 2   - Tumor focality:  multifocal   - Macroscopic and microscopic extent of tumor: invasive carcinoma is confined to breast glandular parenchyma   * skin:  present, uninvolved by invasive carcinoma   * nipple: not present for evaluation  * skeletal muscle:  not present for evaluation  3  Lymphovascular invasion:  no unequivocal lymph-vascular invasion is identified  4  Dermal lymphovascular invasion: not seen  5  In situ tumor  - Ductal carcinoma in situ (DCIS): Present as an extensive component (~85% of total tumor)  * size (extent):  DCIS spans 2 6 cm maximal dimension (A62-1) and is present on 27 of 136 total slides examined  * architectural pattern(s):  cribriform & micropapillary patterns   * nuclear grade: nuclear grade 2 of 3   * necrosis:  central comedo type necrosis is present  - Lobular carcinoma in situ (LCIS):  not seen  6  Margins:  - Invasive carcinoma:   * invasive carcinoma is present at the superior lumpectomy margin (orange-inked, A84-1)  * all other lumpectomy margins are free of invasive carcinoma  - DCIS:    * DCIS is present within 0 2 millimeters of the superior lumpectomy margin (orange-inked, A26-1)   * DCIS is present within 0 2 millimeters of the medial lumpectomy margin (yellow-inked, A3-1)   * all other lumpectomy margins are free of DCIS by > 2 millimeters  7  Lymph nodes (pNX): no lymph nodes submitted, no lymph nodes found   - Number of lymph nodes with macrometastases (>2 mm): N/A  - Number of lymph nodes with micrometastases (>0 2 mm to 2 mm and/or >200 cells): N/A  - Number of lymph nodes with isolated tumor cells (<0 2 mm and <200 cells): N/A  - Size of largest metastatic deposit (mm):  N/A  - Extranodal extension: N/A  - Number of lymph nodes examined: none (0)  - Number of sentinel lymph nodes examined: none (0)  - Method of evaluation of sentinel lymph nodes: N/A  8  Treatment effect, response to presurgical (neoadjuvant) therapy: N/A  - In the breast: N/A  - In the lymph nodes: N/A  9  Additional pathologic findings: non-proliferative fibrocystic changes, without atypia, including microcysts, apocrine metaplasia & fibroadenomatoid stromal hyperplasia    10  Microcalcifications: present in DCIS  11  Ancillary studies:  estrogen, progesterone & Her-2/negrita assays of invasive carcinoma are currently pending and will be described in an addendum report  - Repeat HER2 testing (2013 ASCO/CAP Recommendations):  response is pending receptor assay completion   - Best representative tumor block:  A84, A89  - Sufficient tumor present for:   * Agendia Mammaprint/Blueprint (1 cm2 of invasive tumor in aggregate): Yes  * MI Profile/Foundation One (at least 5 x 5 mm of tumor): Yes  12  Clinical history:  DCIS by core needle biopsy  13  Pathologic stage classification (pTNM, AJCC 8th Edition): at least Stage IA - pT1c, pNX, G2   14  8th ed  AJCC Prognostic Stage (use AJCC update):  to be stated in the receptor addendum report  Final Diagnoses Listed for each Specimen of this Case  A  Right breast, wire-guided & oriented lumpectomy (243 g): - Invasive breast carcinoma, NST (aka ductal)  * Litchfield grade 2 of 3 (total score = 2+2+2 = 6 of 9)   -- tubule formation < 10%, score 3   -- nuclear grade 2 of 3, score 2   -- mitoses ~ 4/mm2, score 2    * invasive carcinoma is multifocally present (A23-1, A32-1, A84-1, A89-1, A100-1), largest focus is 14 millimeters in greatest dimension (A89-1, this focus is graded)  * superior lumpectomy margin (orange-inked) is POSITIVE for invasive carcinoma (A84-1)   * all other lumpectomy margins are free of invasive carcinoma  * estrogen, progesterone & Her-2/negrita receptor studies are undertaken, to be described in an addendum report  - Ductal carcinoma in-situ (DCIS): Present as an extensive component (~85% of total tumor)  * DCIS is co-located with invasive carcinoma surrounding prior needle biopsy site  * DCIS spans 2 6 cm maximal dimension (A62-1) and is present on 27 of 136 total slides examined  * DCIS has cribriform & micropapillary patterns, nuclear grade 2 of 3, with central comedo-type necrosis     * DCIS is present within 0 2 millimeters of the superior lumpectomy margin (orange-inked, A26-1)   * DCIS is present within 0 2 millimeters of the medial lumpectomy margin (yellow-inked, A3-1)   * all other lumpectomy margins are free of DCIS by > 2 millimeters  - Lymph-vascular invasion: no lymph-vascular invasion is unequivocally identified  - Microcalcifications: present in DCIS  - Best representative tumor block: A84 & A89  - Sufficient tumor present for:   * Agendia Mammaprint/Blueprint (1 cm2 of invasive tumor in aggregate): Yes    * MI Profile/Foundation One (at least 5 x 5 mm of tumor): Yes   - Additional pathologic findings:  non-proliferative fibrocystic changes, without atypia, including microcysts, apocrine metaplasia & fibroadenomatoid stromal hyperplasia  Electronically signed by Manuelito Rivera MD on 7/3/2018 at  4:07 PM         Imaging  Xr Chest Pa & Lateral    Result Date: 6/25/2018  Narrative: CHEST INDICATION:   D05 11: Intraductal carcinoma in situ of right breast  COMPARISON:  Two-view chest 8/4/2010 EXAM PERFORMED/VIEWS:  XR CHEST PA & LATERAL FINDINGS: Cardiomediastinal silhouette appears unremarkable  The lungs are clear  No pneumothorax or pleural effusion  Osseous structures appear within normal limits for patient age  Impression: No acute cardiopulmonary disease  Workstation performed: [id number]     Mammo Needle Localization Right [employer])    Result Date: 6/27/2018  Narrative: Mammo Needle Localization Right [employer]): June 26, 2018 - Check In #: [de-identified] CC and LM view(s) were taken of the right breast  Technologist: Blake Haines, RT (R)(M) Indication: 65year old; right breast DCIS with extensive calcifications for bracketing procedure  Preoperative needle localization with bracketing  Comparison: Prior imaging studies of the right breast from 5/23/2018   Procedure: After verifying patient and side of interest and initialing side of concern (time out procedure), utilizing digital mammographic guidance and sterile technique, a needle localization bracketing procedure of the calcifications and stereotactic clip in the right upper outer quadrant was performed from a lateral approach  The localizing wires were positioned in order to bracket the calcifications  The patient tolerated the procedure well, leaving the department in satisfactory condition, with annotated guidance images available on PACS  Impression: Successful needle localization bracketing procedure of multiple scattered right upper outer breast calcifications  Transcription Location: [address] [place] [address]: [id number]    Mammo Breast Specimen (no Charge)    Result Date: 6/27/2018  Narrative: Mammo Breast Specimen No Charge: Right Breast - June 26, 2018 - Check In #: [de-identified] CC and ML view(s) were taken of the right breast  Technologist: Sherri Sanders, RT (R)(M) Digital mammography of right breast surgical specimen reveals that the stereotactic clip in question and all calcifications are located within the biopsy specimen  The specimen also includes both localizing wires  These findings were immediately called to the operating room upon receipt of the specimen radiographs  Transcription Location: [address] [place] [address]: [id number]    I reviewed the above laboratory and imaging data  Discussion/Summary:  Stage I right breast cancer status post lumpectomy for DCIS  Incidentally found cancer, with positive superior margin  Will set up for re-excision of positive margin, along with sentinel node biopsy  Rationale for this along with risks and benefits of procedure discussed with patient and family  They understand the plan and wish to proceed as outlined

## 2018-08-02 NOTE — ANESTHESIA PREPROCEDURE EVALUATION
Review of Systems/Medical History  Patient summary reviewed  Chart reviewed  No history of anesthetic complications     Cardiovascular  EKG reviewed, Hyperlipidemia, Hypertension controlled,   Comment: ECG CONCLUSIONS: The stress ECG was negative for ischemia  Arrhythmia during stress: isolated premature ventricular beats      STRESS 2D ECHO RESULTS:     BASELINE: There were no regional wall motion abnormalities  Left ventricular size was normal  Overall left ventricular systolic function was normal  Estimated left ventricular ejection fraction was 65 %       PEAK STRESS: There were no regional wall motion abnormalities  There was an appropriate reduction in left ventricular size  There was an appropriate augmentation in LV function      ECHO CONCLUSIONS: There was no echocardiographic evidence for stress-induced ischemia  The image quality was good  ,  Pulmonary  Negative pulmonary ROS Asthma ,        GI/Hepatic  Negative GI/hepatic ROS          Negative  ROS        Endo/Other  Negative endo/other ROS Diabetes poorly controlled Insulin, History of thyroid disease , hypothyroidism,   Obesity (BMI 32)    GYN  Negative gynecology ROS          Hematology  Negative hematology ROS      Musculoskeletal  Negative musculoskeletal ROS Rheumatoid arthritis , Back pain , cervical pain and lumbar pain,   Arthritis     Neurology  Negative neurology ROS      Psychology     Chronic pain,            Physical Exam    Airway    Mallampati score: II  TM Distance: >3 FB  Neck ROM: full     Dental   Comment: Left bottom teeth secure as per patient  ,     Cardiovascular      Pulmonary      Other Findings        Anesthesia Plan  ASA Score- 3     Anesthesia Type- general with ASA Monitors  Additional Monitors:   Airway Plan:         Plan Factors-    Induction- intravenous  Postoperative Plan-     Informed Consent- Anesthetic plan and risks discussed with patient  I personally reviewed this patient with the CRNA   Discussed and agreed on the Anesthesia Plan with the CRNA  Cindi Patel

## 2018-08-08 ENCOUNTER — TELEPHONE (OUTPATIENT)
Dept: GASTROENTEROLOGY | Facility: MEDICAL CENTER | Age: 63
End: 2018-08-08

## 2018-08-08 ENCOUNTER — OFFICE VISIT (OUTPATIENT)
Dept: GASTROENTEROLOGY | Facility: MEDICAL CENTER | Age: 63
End: 2018-08-08
Payer: MEDICARE

## 2018-08-08 VITALS
DIASTOLIC BLOOD PRESSURE: 60 MMHG | WEIGHT: 185 LBS | BODY MASS INDEX: 31.58 KG/M2 | SYSTOLIC BLOOD PRESSURE: 120 MMHG | TEMPERATURE: 98.2 F | HEART RATE: 80 BPM | HEIGHT: 64 IN

## 2018-08-08 DIAGNOSIS — Z12.11 SCREENING FOR COLON CANCER: ICD-10-CM

## 2018-08-08 DIAGNOSIS — R13.19 ESOPHAGEAL DYSPHAGIA: ICD-10-CM

## 2018-08-08 DIAGNOSIS — R10.13 EPIGASTRIC PAIN: Primary | ICD-10-CM

## 2018-08-08 DIAGNOSIS — K59.04 CHRONIC IDIOPATHIC CONSTIPATION: ICD-10-CM

## 2018-08-08 DIAGNOSIS — K44.9 HIATAL HERNIA: ICD-10-CM

## 2018-08-08 PROCEDURE — 99204 OFFICE O/P NEW MOD 45 MIN: CPT | Performed by: INTERNAL MEDICINE

## 2018-08-08 RX ORDER — POLYETHYLENE GLYCOL 3350 17 G/17G
17 POWDER, FOR SOLUTION ORAL 2 TIMES DAILY
Qty: 60 EACH | Refills: 5 | Status: SHIPPED | OUTPATIENT
Start: 2018-08-08 | End: 2018-09-11

## 2018-08-08 NOTE — LETTER
August 12, 2018     Melvi Hill MD  [address] [facility]  [address] [facility]  [address] [facility]    Patient: Juve Keenan   YOB: 1955   Date of Visit: 8/8/2018       Dear Dr Bienvenido Gillespie:    Thank you for referring Juve Keenan to me for evaluation  Below are my notes for this consultation  If you have questions, please do not hesitate to call me  I look forward to following your patient along with you  Sincerely,        Mikhail Lacey MD        CC: No Recipients  Mikhail Lacey MD  8/12/2018  5:18 PM  Sign at close encounter  [facility] - Outpatient Consultation  Robi Hodges 65 y o  female MRN: [id number]  Encounter: [id number]          ASSESSMENT AND PLAN:      1  Screening for colon cancer- she will need further evaluation of colorectal cancer screening  She is due for screening colonoscopy  This will be scheduled at our outpatient endoscopy center  - Ambulatory referral to Gastroenterology  - Case request operating room: EGD AND COLONOSCOPY; Standing  - polyethylene glycol (GOLYTELY) 4000 mL solution; Take 4,000 mL by mouth once for 1 dose  Dispense: 4000 mL; Refill: 0  - Case request operating room: EGD AND COLONOSCOPY    2  Epigastric pain  3  Hiatal hernia  4  Esophageal dysphagia    Due to history of epigastric pain in setting of history breast cancer she needs endoscopic evaluation especially due to dysphagia which is esophageal in origin more associated with solids  She has been on steroids for asthma this may be as cause for her dysphagia due to candidiasis  Will plan for EGD evaluation  Due to epigastric pain which may be secondary to peptic ulcer disease we will plan for EGD evaluation to rule out H pylori  A epigastric pain also may be from biliary origin so will check for ultrasound of right upper quadrant      She does have history of hiatal hernia this will be reexamined during her EGD evaluation to see if she needs any further management for this as she does have long standing history of reflux   - US liver; Future  - Case request operating room: EGD AND COLONOSCOPY; Standing  - polyethylene glycol (GOLYTELY) 4000 mL solution; Take 4,000 mL by mouth once for 1 dose  Dispense: 4000 mL; Refill: 0  - Case request operating room: EGD AND COLONOSCOPY    5  Chronic idiopathic constipation-she does have history of chronic constipation associated straining and [place] stool type 1 bowel movement  Will plan to treat her conservatively with MiraLax b i d  and up titrate to other medication if MiraLax is not effective  I would like for her to start MiraLax several days prior to her colonoscopy to help with her bowel preparation  She does have history of hypothyroidism last free T4 levels are within normal limits  - polyethylene glycol (MIRALAX) 17 g packet; Take 17 g by mouth 2 (two) times a day  Dispense: 60 each; Refill: 5    ______________________________________________________________________    HPI:     She is a 61-year-old female with history of breast cancer, history of diabetes, history of chronic constipation, new onset epigastric pain, history of reflux/hiatal hernia, history of new onset dysphagia presents to establish care  She is also due for her screening colonoscopy  Denies any family history of colon cancer  She does have constipation associated straining and bowel movement every 2 days  She has [place] stool type 1  She was recently diagnosed with hypothyroidism which may have been playing a role in her constipation  Last free T4 levels are within normal limits  She also has history of hiatal hernia unclear the size of the hernia  She does have history of longstanding reflux and currently on PPI therapy omeprazole 20 mg daily  She also has new onset epigastric pain and dysphagia  Dysphagia is more to solids than liquids    She is has used steroids recently so candidiasis may be also potential cause for dysphagia  Otherwise doing well  REVIEW OF SYSTEMS:    CONSTITUTIONAL: Denies any fever, chills, rigors, and weight loss  HEENT: No earache or tinnitus  Denies hearing loss or visual disturbances  CARDIOVASCULAR: No chest pain or palpitations  RESPIRATORY: Denies any cough, hemoptysis, shortness of breath or dyspnea on exertion  GASTROINTESTINAL: As noted in the History of Present Illness  GENITOURINARY: No problems with urination  Denies any hematuria or dysuria  NEUROLOGIC: No dizziness or vertigo, denies headaches  MUSCULOSKELETAL: Denies any muscle or joint pain  SKIN: Denies skin rashes or itching  ENDOCRINE: Denies excessive thirst  Denies intolerance to heat or cold  PSYCHOSOCIAL: Denies depression or anxiety  Denies any recent memory loss  Historical Information   Past Medical History:   Diagnosis Date    Asthma     Cancer [facility] )     BREAST    [facility]     Hypercholesterolemia     Hypertension     Hypothyroid     Rheumatoid arthritis ([facility] )      Past Surgical History:   Procedure Laterality Date    BREAST LUMPECTOMY Right 6/26/2018    Procedure: RIGHT BREAST NEEDLE LOCALIZATION X2 WITH RIGHT BREAST LUMPECTOMY ( NEEDLE LOC @ 1000); Surgeon: Cristy Ballard MD;  [facility];  Service: Surgical Oncology    BREAST LUMPECTOMY Right 8/2/2018    Procedure: LYMPHOSCINITGRAPHY INTRAOPERATIVE LYMPHATIC MAPPING , RIGHT SENTINEL NODE BIOPSY, REEXCISION  RIGHT BREAST LUMPECTOMY CAVITY (SUPERIOR MARGIN);   Surgeon: Cristy Ballard MD;  [facility];  Service: Surgical Oncology    BREAST SURGERY Left     CATARACT EXTRACTION, BILATERAL Bilateral     KNEE SURGERY Right     RETINAL DETACHMENT SURGERY Right     TUBAL LIGATION       Social History   History   Alcohol Use No     History   Drug Use No     History   Smoking Status    Never Smoker   Smokeless Tobacco    Never Used     Family History   Problem Relation Age of Onset    Diabetes Mother    [facility] January Hypertension Mother     Diabetes Father     Hypertension Father     Diabetes Brother     Hypertension Brother        Meds/Allergies       Current Outpatient Prescriptions:     albuterol (2 5 mg/3 mL) 0 083 % nebulizer solution    albuterol (PROVENTIL HFA,VENTOLIN HFA) 90 mcg/act inhaler    enalapril (VASOTEC) 20 mg tablet    fluticasone-vilanterol (BREO ELLIPTA) 100-25 mcg/inh inhaler    gabapentin (NEURONTIN) 300 mg capsule    gemfibrozil (LOPID) 600 mg tablet    glipiZIDE (GLUCOTROL XL) 10 mg 24 hr tablet    HUMULIN N 100 UNIT/ML subcutaneous injection    Insulin Glargine (TOUJEO) injection pen 300 units/mL    levothyroxine 50 mcg tablet    metFORMIN (GLUCOPHAGE-XR) 500 mg 24 hr tablet    omeprazole (PriLOSEC) 20 mg delayed release capsule    polyethylene glycol (GOLYTELY) 4000 mL solution    polyethylene glycol (MIRALAX) 17 g packet    predniSONE 20 mg tablet    traMADol (ULTRAM) 50 mg tablet    Allergies   Allergen Reactions    Aspirin Hives    Tylenol With Codeine #3 [Acetaminophen-Codeine] Rash           Objective     Blood pressure 120/60, pulse 80, temperature 98 2 °F (36 8 °C), temperature source Tympanic, height 5' 4" (1 626 m), weight 83 9 kg (185 lb), not currently breastfeeding  Body mass index is 31 76 kg/m²  PHYSICAL EXAM:      General Appearance:   Alert, cooperative, no distress   HEENT:   Normocephalic, atraumatic, anicteric      Neck:  Supple, symmetrical, trachea midline   Lungs:   Clear to auscultation bilaterally; no rales, rhonchi or wheezing; respirations unlabored    Heart[de-identified]   Regular rate and rhythm; no murmur, rub, or gallop     Abdomen:   Soft, non-tender, non-distended; normal bowel sounds; no masses, no organomegaly    Genitalia:   Deferred    Rectal:   Deferred    Extremities:  No cyanosis, clubbing or edema    Pulses:  2+ and symmetric    Skin:  No jaundice, rashes, or lesions    Lymph nodes:  No palpable cervical lymphadenopathy        Lab Results:   No visits with results within 1 Day(s) from this visit  Latest known visit with results is:   Admission on 08/02/2018, Discharged on 08/02/2018   Component Date Value    POC Glucose 08/02/2018 135     Case Report 08/02/2018                      Value:Surgical Pathology Report                         Case: [id number]                                   Authorizing Provider:  Familia Tolbert MD        Collected:           08/02/2018 0900              Ordering Location:     [address] [place] [address]      Received:            08/02/2018 [address] Operating Room                                                      Pathologist:           Pastor Bonnie MD                                                            Specimens:   A) - Lymph Node, [place], right axillia sentinel node, stitch at hottest part                      B) - Breast, Right, right breast lumpectomy reexcision                                     Final Diagnosis 08/02/2018                      Value: This result contains rich text formatting which cannot be displayed here   Additional Information 08/02/2018                      Value: This result contains rich text formatting which cannot be displayed here  [facility] Description 08/02/2018                      Value: This result contains rich text formatting which cannot be displayed here   POC Glucose 08/02/2018 119          Radiology Results:   Xr Chest 2 Views    Result Date: 7/16/2018  Narrative: CHEST INDICATION:   asthma   "congestion x 1 week  hx of surgery to the right breast " COMPARISON:  Two-view chest 6/21/2018  EXAM PERFORMED/VIEWS:  XR CHEST PA & LATERAL  The frontal view was performed utilizing dual energy radiographic technique  FINDINGS: Cardiomediastinal silhouette appears unremarkable  The lungs are clear  No pneumothorax or pleural effusion  Osseous structures appear within normal limits for patient age       Impression: No acute cardiopulmonary disease  Workstation performed: [id number]     Xr Knee 1 Or 2 Vw Right    Result Date: 7/16/2018  Narrative: RIGHT KNEE INDICATION:   knee pain  COMPARISON:  Right knee x-ray dated July 21, 2009  VIEWS:  XR KNEE 1 OR 2 VW RIGHT FINDINGS: There is no acute fracture or dislocation  There is no joint effusion  No significant degenerative changes  No lytic or blastic lesions are seen  There are atherosclerotic calcifications  Soft tissues are otherwise unremarkable  Impression: No acute osseous abnormality  Workstation performed: [id number]     Nm Lymphatic Breast    Result Date: 8/2/2018  Narrative: SENTINEL NODE LYMPHOSCINTIGRAPHY INDICATION: Right breast carcinoma FINDINGS: 0 55 mCi Tc-99m sulfur colloid (0 6 cc volume) was administered in divided doses by Dr Len Aguilera in the right breast   Scintigraphic images were obtained over the right hemithorax and axilla in multiple projections  Right axillary node identified  Using scintigraphic guidance, the corresponding skin site was marked with an indelible marker by Dr Len Aguilera  The patient was transferred to the operating room in satisfactory condition  Impression: [place] lymph node localized to right axilla   Workstation performed: [id number]

## 2018-08-08 NOTE — PATIENT INSTRUCTIONS
Estreñimiento   LO QUE NECESITA SABER:   El estreñimiento sucede cuando usted tiene evacuaciones intestinales duras y secas o pasa más tiempo del normal entre ellas  INSTRUCCIONES SOBRE EL ALLEGRA HOSPITALARIA:   Regrese a la darrin de emergencias si:   · Usted tiene teresa en pruitt evacuaciones intestinales  · Usted tiene fiebre y dolor abdominal con el estreñimiento  Pregúntele a pruitt Berneice Penta vitaminas y minerales son adecuados para usted  · El estreñimiento empeora  · Usted comienza a vomitar  · Usted tiene preguntas o inquietudes acerca de pruitt condición o cuidado  Medicamentos:   · Un medicamento o un suplemento de fibra  podría ayudarle a ablandar las evacuaciones intestinales  Un laxante podría aflojar o ayudar a relajar joe intestinos para que pueda tener felicita evacuación intestinal  También es probable que le den medicamentos para aumentar el líquido en joe intestinos  El líquido puede llegar a movilizar las evacuaciones intestinales a través de joe intestinos  · IXL joe medicamentos nelli se le haya indicado  Consulte con pruitt médico si usted giselle que pruitt medicamento no le está ayudando o si presenta efectos secundarios  Infórmele si es alérgico a algún medicamento  Mantenga felicita lista actualizada de los [place], las vitaminas y los productos herbales que gilmar  Incluya los siguientes datos de los medicamentos: cantidad, frecuencia y motivo de administración  Traiga con usted la lista o los envases de la píldoras a joe citas de seguimiento  Lleve la lista de los medicamentos con usted en solo de felicita emergencia  Controle pruitt estreñimiento:   · IXL líquidos nelli se le haya indicado  Puede que necesite beber más líquidos para ayudar a ablandar las evacuaciones y evacuar el intestino  Pregunte cuánto líquido debe ganesh cada día y cuáles líquidos son los más adecuados para usted  · Coma alimentos altos en fibras    [place] podría ayudar a disminuir el estreñimiento al aumentar el volumen de las evacuaciones intestinales  Alimentos altos en [address] frutas, vegetales, panes y cereales integrales, y frijoles  Pruitt médico o dietista puede ayudarle a crear un plan alimenticio con alto contenido de [place]  · Realice actividad física con regularidad  La actividad física regular puede ayudarle a estimular joe intestinos  Pregunte cuáles son los ejercicios más adecuados para usted  · Programe felicita hora cada día para tener felicita evacuación intestinal   [place] podría ayudar a pruitt cuerpo a acostumbrarse a tener evacuaciones intestinales regulares  Inclínese hacia adelante mientras está sentado en el inodoro para facilitar la expulsión de la evacuación intestinal  [place]nt[place] en el inodoro al menos por 10 minutos, incluso si usted no tiene felicita evacuación intestinal   Acuda a joe consultas de control con pruitt médico según le indicaron  Anote joe preguntas para que se acuerde de hacerlas fabio joe visitas  © 2017 [address] Information is for End User's use only and may not be sold, redistributed or otherwise used for commercial purposes  All illustrations and images included in CareNotes® are the copyrighted property of A D A M , Inc  or Zi Margo  Esta información es sólo para uso en educación  Pruitt intención no es darle un consejo médico sobre enfermedades o tratamientos  Colsulte con pruitt Bary Tanya farmacéutico antes de seguir cualquier régimen médico para saber si es seguro y efectivo para usted  Take MiraLax once daily than 2x daily if once daily does not help   Metamucil 15- 20g daily       Pt is scheduled with dr Lexi Gonzalez 9/5/18 at [facility] col/egd , pt was given instruction for golytely in room by ma  Pt is diabetic needs am appt, is aware she will get a call with time of arrival day before

## 2018-08-12 NOTE — PROGRESS NOTES
[facility] - Outpatient Consultation  Mook Hodges 65 y o  female MRN: [id number]  Encounter: [id number]          ASSESSMENT AND PLAN:      1  Screening for colon cancer- she will need further evaluation of colorectal cancer screening  She is due for screening colonoscopy  This will be scheduled at our outpatient endoscopy center  - Ambulatory referral to Gastroenterology  - Case request operating room: EGD AND COLONOSCOPY; Standing  - polyethylene glycol (GOLYTELY) 4000 mL solution; Take 4,000 mL by mouth once for 1 dose  Dispense: 4000 mL; Refill: 0  - Case request operating room: EGD AND COLONOSCOPY    2  Epigastric pain  3  Hiatal hernia  4  Esophageal dysphagia    Due to history of epigastric pain in setting of history breast cancer she needs endoscopic evaluation especially due to dysphagia which is esophageal in origin more associated with solids  She has been on steroids for asthma this may be as cause for her dysphagia due to candidiasis  Will plan for EGD evaluation  Due to epigastric pain which may be secondary to peptic ulcer disease we will plan for EGD evaluation to rule out H pylori  A epigastric pain also may be from biliary origin so will check for ultrasound of right upper quadrant  She does have history of hiatal hernia this will be reexamined during her EGD evaluation to see if she needs any further management for this as she does have long standing history of reflux   - US liver; Future  - Case request operating room: EGD AND COLONOSCOPY; Standing  - polyethylene glycol (GOLYTELY) 4000 mL solution; Take 4,000 mL by mouth once for 1 dose  Dispense: 4000 mL; Refill: 0  - Case request operating room: EGD AND COLONOSCOPY    5  Chronic idiopathic constipation-she does have history of chronic constipation associated straining and [place] stool type 1 bowel movement    Will plan to treat her conservatively with MiraLax b i d  and up titrate to other medication if MiraLax is not effective  I would like for her to start MiraLax several days prior to her colonoscopy to help with her bowel preparation  She does have history of hypothyroidism last free T4 levels are within normal limits  - polyethylene glycol (MIRALAX) 17 g packet; Take 17 g by mouth 2 (two) times a day  Dispense: 60 each; Refill: 5  -discussed increasing Metamucil 15-20 g daily and getting adequate fluid intake 6-8 cups daily  ______________________________________________________________________    HPI:     She is a 61-year-old female with history of breast cancer, history of diabetes, history of chronic constipation, new onset epigastric pain, history of reflux/hiatal hernia, history of new onset dysphagia presents to establish care  She is also due for her screening colonoscopy  Denies any family history of colon cancer  She does have constipation associated straining and bowel movement every 2 days  She has [place] stool type 1  She was recently diagnosed with hypothyroidism which may have been playing a role in her constipation  Last free T4 levels are within normal limits  She also has history of hiatal hernia unclear the size of the hernia  She does have history of longstanding reflux and currently on PPI therapy omeprazole 20 mg daily  She also has new onset epigastric pain and dysphagia  Dysphagia is more to solids than liquids  She is has used steroids recently so candidiasis may be also potential cause for dysphagia  Otherwise doing well  REVIEW OF SYSTEMS:    CONSTITUTIONAL: Denies any fever, chills, rigors, and weight loss  HEENT: No earache or tinnitus  Denies hearing loss or visual disturbances  CARDIOVASCULAR: No chest pain or palpitations  RESPIRATORY: Denies any cough, hemoptysis, shortness of breath or dyspnea on exertion  GASTROINTESTINAL: As noted in the History of Present Illness  GENITOURINARY: No problems with urination   Denies any hematuria or dysuria  NEUROLOGIC: No dizziness or vertigo, denies headaches  MUSCULOSKELETAL: Denies any muscle or joint pain  SKIN: Denies skin rashes or itching  ENDOCRINE: Denies excessive thirst  Denies intolerance to heat or cold  PSYCHOSOCIAL: Denies depression or anxiety  Denies any recent memory loss  Historical Information   Past Medical History:   Diagnosis Date    Asthma     Cancer ([facility] [address] )     BREAST    Diabetes mellitus ([facility] )     Hypercholesterolemia     Hypertension     Hypothyroid     Rheumatoid arthritis ([facility] )      Past Surgical History:   Procedure Laterality Date    BREAST LUMPECTOMY Right 6/26/2018    Procedure: RIGHT BREAST NEEDLE LOCALIZATION X2 WITH RIGHT BREAST LUMPECTOMY ( NEEDLE LOC @ 1000); Surgeon: Sky Mancuso MD;  Location: [facility];  Service: Surgical Oncology    BREAST LUMPECTOMY Right 8/2/2018    Procedure: LYMPHOSCINITGRAPHY INTRAOPERATIVE LYMPHATIC MAPPING , RIGHT SENTINEL NODE BIOPSY, REEXCISION  RIGHT BREAST LUMPECTOMY CAVITY (SUPERIOR MARGIN);   Surgeon: Sky Mancuso MD;  Location: [facility];  Service: Surgical Oncology    BREAST SURGERY Left     CATARACT EXTRACTION, BILATERAL Bilateral     KNEE SURGERY Right     RETINAL DETACHMENT SURGERY Right     TUBAL LIGATION       Social History   History   Alcohol Use No     History   Drug Use No     History   Smoking Status    Never Smoker   Smokeless Tobacco    Never Used     Family History   Problem Relation Age of Onset    Diabetes Mother     Hypertension Mother     Diabetes Father     Hypertension Father     Diabetes Brother     Hypertension Brother        Meds/Allergies       Current Outpatient Prescriptions:     albuterol (2 5 mg/3 mL) 0 083 % nebulizer solution    albuterol (PROVENTIL HFA,VENTOLIN HFA) 90 mcg/act inhaler    enalapril (VASOTEC) 20 mg tablet    fluticasone-vilanterol (BREO ELLIPTA) 100-25 mcg/inh inhaler    gabapentin (NEURONTIN) 300 mg capsule    gemfibrozil (LOPID) 600 mg tablet    glipiZIDE (GLUCOTROL XL) 10 mg 24 hr tablet    HUMULIN N 100 UNIT/ML subcutaneous injection    Insulin Glargine (TOUJEO) injection pen 300 units/mL    levothyroxine 50 mcg tablet    metFORMIN (GLUCOPHAGE-XR) 500 mg 24 hr tablet    omeprazole (PriLOSEC) 20 mg delayed release capsule    polyethylene glycol (GOLYTELY) 4000 mL solution    polyethylene glycol (MIRALAX) 17 g packet    predniSONE 20 mg tablet    traMADol (ULTRAM) 50 mg tablet    Allergies   Allergen Reactions    Aspirin Hives    Tylenol With Codeine #3 [Acetaminophen-Codeine] Rash           Objective     Blood pressure 120/60, pulse 80, temperature 98 2 °F (36 8 °C), temperature source Tympanic, height 5' 4" (1 626 m), weight 83 9 kg (185 lb), not currently breastfeeding  Body mass index is 31 76 kg/m²  PHYSICAL EXAM:      General Appearance:   Alert, cooperative, no distress   HEENT:   Normocephalic, atraumatic, anicteric      Neck:  Supple, symmetrical, trachea midline   Lungs:   Clear to auscultation bilaterally; no rales, rhonchi or wheezing; respirations unlabored    Heart[de-identified]   Regular rate and rhythm; no murmur, rub, or gallop  Abdomen:   Soft, non-tender, non-distended; normal bowel sounds; no masses, no organomegaly    Genitalia:   Deferred    Rectal:   Deferred    Extremities:  No cyanosis, clubbing or edema    Pulses:  2+ and symmetric    Skin:  No jaundice, rashes, or lesions    Lymph nodes:  No palpable cervical lymphadenopathy        Lab Results:   No visits with results within 1 Day(s) from this visit     Latest known visit with results is:   Admission on 08/02/2018, Discharged on 08/02/2018   Component Date Value    POC Glucose 08/02/2018 135     Case Report 08/02/2018                      Value:Surgical Pathology Report                         Case: [id number]                                   Authorizing Provider:  Darion Tanner MD        Collected:           08/02/2018 0900              Ordering Location: [address] [facility]      Received:            08/02/2018 [address]  Operating Room                                                      Pathologist:           Jesus Oliva MD                                                            Specimens:   A) - Lymph Node, [place], right axillia sentinel node, stitch at hottest part                      B) - Breast, Right, right breast lumpectomy reexcision                                     Final Diagnosis 08/02/2018                      Value: This result contains rich text formatting which cannot be displayed here   Additional Information 08/02/2018                      Value: This result contains rich text formatting which cannot be displayed here  Levora Hunter Gross Description 08/02/2018                      Value: This result contains rich text formatting which cannot be displayed here   POC Glucose 08/02/2018 119          Radiology Results:   Xr Chest 2 Views    Result Date: 7/16/2018  Narrative: CHEST INDICATION:   asthma   "congestion x 1 week  hx of surgery to the right breast " COMPARISON:  Two-view chest 6/21/2018  EXAM PERFORMED/VIEWS:  XR CHEST PA & LATERAL  The frontal view was performed utilizing dual energy radiographic technique  FINDINGS: Cardiomediastinal silhouette appears unremarkable  The lungs are clear  No pneumothorax or pleural effusion  Osseous structures appear within normal limits for patient age  Impression: No acute cardiopulmonary disease  Workstation performed: [id number]     Xr Knee 1 Or 2 Vw Right    Result Date: 7/16/2018  Narrative: RIGHT KNEE INDICATION:   knee pain  COMPARISON:  Right knee x-ray dated July 21, 2009  VIEWS:  XR KNEE 1 OR 2 VW RIGHT FINDINGS: There is no acute fracture or dislocation  There is no joint effusion  No significant degenerative changes  No lytic or blastic lesions are seen  There are atherosclerotic calcifications  Soft tissues are otherwise unremarkable  Impression: No acute osseous abnormality  Workstation performed: [id number]     Nm Lymphatic Breast    Result Date: 8/2/2018  Narrative: SENTINEL NODE LYMPHOSCINTIGRAPHY INDICATION: Right breast carcinoma FINDINGS: 0 55 mCi Tc-99m sulfur colloid (0 6 cc volume) was administered in divided doses by Dr Ivana Lazo in the right breast   Scintigraphic images were obtained over the right hemithorax and axilla in multiple projections  Right axillary node identified  Using scintigraphic guidance, the corresponding skin site was marked with an indelible marker by Dr Ivana Lazo  The patient was transferred to the operating room in satisfactory condition  Impression: [place] lymph node localized to right axilla   Workstation performed: [id number]

## 2018-08-14 ENCOUNTER — RX ONLY (RX ONLY)
Age: 63
End: 2018-08-14

## 2018-08-14 ENCOUNTER — DOCTOR'S OFFICE (OUTPATIENT)
Dept: URBAN - METROPOLITAN AREA CLINIC 136 | Facility: CLINIC | Age: 63
Setting detail: OPHTHALMOLOGY
End: 2018-08-14
Payer: COMMERCIAL

## 2018-08-14 DIAGNOSIS — H35.341: ICD-10-CM

## 2018-08-14 DIAGNOSIS — Z96.1: ICD-10-CM

## 2018-08-14 DIAGNOSIS — E11.3293: ICD-10-CM

## 2018-08-14 DIAGNOSIS — H35.379: ICD-10-CM

## 2018-08-14 PROBLEM — H01.004 BLEPHARITIS; RIGHT UPPER LID, RIGHT LOWER LID, LEFT UPPER LID, LEFT LOWER LID: Status: ACTIVE | Noted: 2018-05-08

## 2018-08-14 PROBLEM — H01.001 BLEPHARITIS; RIGHT UPPER LID, RIGHT LOWER LID, LEFT UPPER LID, LEFT LOWER LID: Status: ACTIVE | Noted: 2018-05-08

## 2018-08-14 PROBLEM — H01.005 BLEPHARITIS; RIGHT UPPER LID, RIGHT LOWER LID, LEFT UPPER LID, LEFT LOWER LID: Status: ACTIVE | Noted: 2018-05-08

## 2018-08-14 PROBLEM — H01.002 BLEPHARITIS; RIGHT UPPER LID, RIGHT LOWER LID, LEFT UPPER LID, LEFT LOWER LID: Status: ACTIVE | Noted: 2018-05-08

## 2018-08-14 PROCEDURE — 99024 POSTOP FOLLOW-UP VISIT: CPT | Performed by: OPHTHALMOLOGY

## 2018-08-14 PROCEDURE — 92134 CPTRZ OPH DX IMG PST SGM RTA: CPT | Performed by: OPHTHALMOLOGY

## 2018-08-14 ASSESSMENT — CONFRONTATIONAL VISUAL FIELD TEST (CVF)
OD_FINDINGS: INFEROTEMPORAL DEFECT, SUPERONASAL DEFECT
OS_FINDINGS: SUPEROTEMPORAL DEFECT

## 2018-08-14 ASSESSMENT — LID EXAM ASSESSMENTS
OD_BLEPHARITIS: 1+
OS_BLEPHARITIS: 1+

## 2018-08-15 ASSESSMENT — REFRACTION_MANIFEST
OS_VA1: 20/
OU_VA: 20/
OD_VA1: 20/
OD_VA2: 20/
OD_VA1: 20/
OS_VA2: 20/
OS_VA3: 20/
OS_VA1: 20/
OD_VA2: 20/
OU_VA: 20/
OS_VA3: 20/
OD_VA2: 20/
OD_VA3: 20/
OS_VA2: 20/
OD_VA1: 20/
OD_VA3: 20/
OD_VA3: 20/
OS_VA1: 20/
OS_VA3: 20/
OU_VA: 20/
OS_VA2: 20/

## 2018-08-15 ASSESSMENT — REFRACTION_CURRENTRX
OS_OVR_VA: 20/
OD_OVR_VA: 20/
OS_OVR_VA: 20/
OS_OVR_VA: 20/

## 2018-08-15 ASSESSMENT — VISUAL ACUITY
OD_BCVA: 20/50-1
OS_BCVA: 20/250

## 2018-08-15 ASSESSMENT — REFRACTION_AUTOREFRACTION
OD_AXIS: 102
OS_AXIS: 060
OD_CYLINDER: -2.50
OS_SPHERE: +1.50
OD_SPHERE: -3.25
OS_CYLINDER: -1.25

## 2018-08-15 ASSESSMENT — SPHEQUIV_DERIVED
OD_SPHEQUIV: -4.5
OS_SPHEQUIV: 0.875

## 2018-08-17 ENCOUNTER — OFFICE VISIT (OUTPATIENT)
Dept: SURGICAL ONCOLOGY | Facility: CLINIC | Age: 63
End: 2018-08-17

## 2018-08-17 VITALS
HEART RATE: 94 BPM | TEMPERATURE: 98.6 F | DIASTOLIC BLOOD PRESSURE: 84 MMHG | HEIGHT: 64 IN | WEIGHT: 185 LBS | SYSTOLIC BLOOD PRESSURE: 130 MMHG | BODY MASS INDEX: 31.58 KG/M2 | RESPIRATION RATE: 16 BRPM

## 2018-08-17 DIAGNOSIS — C50.911 CANCER OF RIGHT BREAST, STAGE 1, ESTROGEN RECEPTOR POSITIVE (HCC): Primary | ICD-10-CM

## 2018-08-17 DIAGNOSIS — Z17.0 CANCER OF RIGHT BREAST, STAGE 1, ESTROGEN RECEPTOR POSITIVE (HCC): Primary | ICD-10-CM

## 2018-08-17 PROCEDURE — 99024 POSTOP FOLLOW-UP VISIT: CPT | Performed by: SURGERY

## 2018-08-17 NOTE — PROGRESS NOTES
Surgical Oncology Follow Up       [address] [place]  [address]  1955  [id number]  [facility] [place]  [address] [address]    Chief Complaint   Patient presents with    Post-op     Patient is here post-op re-excision right breast lumpectomy  Assessment/Plan:    No problem-specific Assessment & Plan notes found for this encounter  There are no diagnoses linked to this encounter  Advance Care Planning/Advance Directives:  Discussed disease status, cancer treatment plans and/or cancer treatment goals with the patient  Malignant neoplasm of right female breast ([facility] [address] )    5/23/2018 Biopsy     Right breast core biopsy         6/26/2018 Initial Diagnosis     Malignant neoplasm of right female breast ([facility] [address] )         6/26/2018 Surgery     RIGHT BREAST NEEDLE LOCALIZATION X2 WITH RIGHT BREAST LUMPECTOMY ( NEEDLE LOC @ 1000) (Right)   ONCOPLASTIC CLOSURE OF LUMPECTOMY CAVITY            History of Present Illness:  Stage IA right breast cancer status post lumpectomy, then lumpectomy re-excision and sentinel node biopsy July/August 2018    -Interval History:  Patient is here for postop check  She has no complaints to report after her surgery  She is doing well  Review of Systems:  Review of Systems   Constitutional: Negative  HENT: Negative  Eyes: Negative  Respiratory: Negative  Cardiovascular: Negative  Gastrointestinal: Negative  Endocrine: Negative  Genitourinary: Negative  Musculoskeletal: Negative  Skin: Negative  Allergic/Immunologic: Negative  Neurological: Negative  Hematological: Negative  Psychiatric/Behavioral: Negative          Patient Active Problem List   Diagnosis    Type 2 diabetes mellitus with complication, with long-term current use of insulin ([facility] [address] [facility] )    Asthma    Essential hypertension    Other specified hypothyroidism    Chest pain    Palpitations    Preop examination    Chronic bilateral low back pain without sciatica    Ductal carcinoma in situ (DCIS) of right breast    Neck pain    Constipation    Primary insomnia    Malignant neoplasm of right female breast (HCC)    Malignant neoplasm of upper-outer quadrant of right breast in female, estrogen receptor positive ([facility] )    Epigastric pain    Hiatal hernia    Screening for colon cancer    Esophageal dysphagia     Past Medical History:   Diagnosis Date    Asthma     Cancer ([facility] )     BREAST    Diabetes mellitus ([facility] )     Hypercholesterolemia     Hypertension     Hypothyroid     Rheumatoid arthritis ([facility] )      Past Surgical History:   Procedure Laterality Date    BREAST LUMPECTOMY Right 6/26/2018    Procedure: RIGHT BREAST NEEDLE LOCALIZATION X2 WITH RIGHT BREAST LUMPECTOMY ( NEEDLE LOC @ 1000); Surgeon: Giovana Herrera MD;  [facility];  Service: Surgical Oncology    BREAST LUMPECTOMY Right 8/2/2018    Procedure: LYMPHOSCINITGRAPHY INTRAOPERATIVE LYMPHATIC MAPPING , RIGHT SENTINEL NODE BIOPSY, REEXCISION  RIGHT BREAST LUMPECTOMY CAVITY (SUPERIOR MARGIN); Surgeon: Giovana Herrera MD;  [facility];  Service: Surgical Oncology    BREAST SURGERY Left     CATARACT EXTRACTION, BILATERAL Bilateral     KNEE SURGERY Right     RETINAL DETACHMENT SURGERY Right     TUBAL LIGATION       Family History   Problem Relation Age of Onset    Diabetes Mother     Hypertension Mother     Diabetes Father     Hypertension Father     Diabetes Brother     Hypertension Brother      Social History     Social History    Marital status:      Spouse name: N/A    Number of children: N/A    Years of education: N/A     Occupational History    Not on file       Social History Main Topics    Smoking status: Never Smoker    Smokeless tobacco: Never Used    Alcohol use No    Drug use: No    Sexual activity: Yes     Other Topics Concern    Not on file     Social History Narrative    No narrative on file       Current Outpatient Prescriptions:     albuterol (2 5 mg/3 mL) 0 083 % nebulizer solution, Take 1 vial (2 5 mg total) by nebulization every 4 (four) hours as needed for wheezing or shortness of breath, Disp: 75 mL, Rfl: 0    albuterol (PROVENTIL HFA,VENTOLIN HFA) 90 mcg/act inhaler, Inhale 1 puff 2 (two) times a day  , Disp: , Rfl:     enalapril (VASOTEC) 20 mg tablet, Take 1 tablet (20 mg total) by mouth daily, Disp: 90 tablet, Rfl: 1    fluticasone-vilanterol (BREO ELLIPTA) 100-25 mcg/inh inhaler, Inhale 1 puff daily Rinse mouth after use , Disp: 1 Inhaler, Rfl: 3    gabapentin (NEURONTIN) 300 mg capsule, take 1 capsule by mouth three times a day, Disp: 90 capsule, Rfl: 1    gemfibrozil (LOPID) 600 mg tablet, Take 600 mg by mouth daily  , Disp: , Rfl:     glipiZIDE (GLUCOTROL XL) 10 mg 24 hr tablet, Take 1 tablet (10 mg total) by mouth daily, Disp: 90 tablet, Rfl: 1    HUMULIN N 100 UNIT/ML subcutaneous injection, 70 units in the am 30 units in the pm, Disp: 30 mL, Rfl: 5    Insulin Glargine (TOUJEO) injection pen 300 units/mL, Inject 10 Units under the skin daily at bedtime, Disp: 3 pen, Rfl: 1    levothyroxine 50 mcg tablet, Take 1 tablet (50 mcg total) by mouth daily, Disp: 90 tablet, Rfl: 1    metFORMIN (GLUCOPHAGE-XR) 500 mg 24 hr tablet, take 2 tablets by mouth once daily WITH BREAKFAST, Disp: , Rfl: 0    omeprazole (PriLOSEC) 20 mg delayed release capsule, take 1 capsule by mouth once daily, Disp: 90 capsule, Rfl: 2    polyethylene glycol (MIRALAX) 17 g packet, Take 17 g by mouth 2 (two) times a day, Disp: 60 each, Rfl: 5  Allergies   Allergen Reactions    Aspirin Hives    Tylenol With Codeine #3 [Acetaminophen-Codeine] Rash     Vitals:    08/17/18 1514   BP: 130/84   Pulse: 94   Resp: 16   Temp: 98 6 °F (37 °C)       Physical Exam   Skin:   Right breast incision and right axillary incisions clean dry and intact  Vitals reviewed  Results:  Labs:  Case Report   Surgical Pathology Report                         Case: [id number]                                    Authorizing Provider: Jolanta Oneill MD        Collected:           08/02/2018 0900               Ordering Location:     [facility]      Received:            08/02/2018 [facility]                                      Hospital Operating Room                                                       Pathologist:           Lillie Grissom MD                                                             Specimens:   A) - Lymph Node, [place], right axillia sentinel node, stitch at hottest part                       B) - Breast, Right, right breast lumpectomy reexcision                                     Final Diagnosis   A  Submitted as right axillary sentinel node:  - Seven lymph nodes are identified showing no metastatic tumor      B  Right breast lumpectomy reexcision:  - Abundant reactive changes are seen around the previous lumpectomy cavity   - No residual atypia or malignancy is seen      Note: These findings, taken together with those in the prior lumpectomy ([id number]) yield the following:  - Pathologic stage classification (pTNM, AJCC 8th Edition): at least Stage IA - pT1c, pN0, G2   - 8th ed  AJCC Prognostic Stage (use AJCC update): IA     Refer to synoptic report on [id number] for additional specifics on the prior lumpectomy containing tumor      Interpretation performed at [facility], [address] [facility], [facility], [address] [facility]   Electronically signed by Lillie Grissom MD on 8/7/2018 at  3:12 PM   Additional Information   All controls performed with the immunohistochemical stains reported above reacted appropriately  These tests were developed and their performance characteristics determined by [facility] or [facility]  They may not be cleared or approved by the [place]   Food and Drug Administration  The FDA has determined that such clearance or approval is not necessary  These tests are used for clinical purposes  They should not be regarded as investigational or for research  This laboratory has been approved by [facility], designated as a high-complexity laboratory and is qualified to perform these tests  Case Report   Surgical Pathology Report                         Case: [id number]                                    Authorizing Provider: Kezia Johnson MD        Collected:           06/26/2018 1235               Ordering Location:     [address]      Received:            06/26/2018 1418                                      Hospital Operating Room                                                       Pathologist:           Betzy Hess MD                                                          Specimen:    Breast, Right, RIGHT BREAST LUMPECTOMY                                                     Addendum   THE FINAL DIAGNOSIS REMAINS UNCHANGED - this is to update the synoptic report with receptor studies given below and to state that the 8th ed   AJCC Prognostic Stage (use AJCC update) is IA      Results of immunohistochemical assay performed on invasive carcinoma block A89  are as follows:  Test Description                          Result                             Prognostic Interpretation  Estrogen Receptor/ER                 100%                               Positive    Primary Antibody: SP1     Internal control: Positive                        Staining Intensity: Strong    External control: Positive                         Raymundo Score*: 8  Progesterone Receptor/Pg           45-55 %                           Positive    Primary Antibody: 1E2    Internal control: Positive                         Staining Intensity: Strong    External control: Positive/Negative        Raymundo Score*: 6  HER2 by IHC                                     3+ Positive    Primary Antibody: 4B5  HER2 by [facility]                Not Indicated  Appropriate positive and negative controls were reviewed  Fixative: Formalin   Duration of fixation (hours): 80 hours, does not meet specified requirements of latest ASCO/CAP guidelines (6 - 72 hours)  Cold Ischemia Time (minutes): Not stated  Sample Adequacy: Adequate  These immunohistochemical tests are FDA-cleared and performed at [facility] in [place], [place] and interpreted by Dr Roman Cullen  An electronic copy of this report will be kept on file in the Medical Records Department at [facility]   * The Raymundo score is a semi quantitative system that takes into consideration the proportion of positive cells (scored on a scale of 0-5) and staining intensity (scored on a scale of (0-3)  The proportion and intensity are summed to produce total scores of 0 or 2 through 8  A score of 0-2 is regarded as negative while 3-8 as positive  Addendum electronically signed by Deon Garcia MD on 7/6/2018 at  4:17 PM   Final Diagnosis   INVASIVE BREAST CARCINOMA STAGING SUMMARY  1  Specimen Identification  - Procedure: wire-guided lumpectomy  - Lymph node sampling: no lymph nodes submitted, no lymph nodes found  - Specimen laterality: right  - Tumor site (quadrant; position/o'clock):  upper outer quadrant  2  Invasive Tumor:  - Histologic type: Invasive breast carcinoma of no special type (ductal NST/invasive ductal carcinoma)  - Tumor size (pT1c):  14 millimeters (A89-1)  - Grade (G2): Ana Paula histologic score = 3+2+2 = 7 of 9; Grade II of III      * glandular (acinar) / tubular differentiation: < 10%, score 3   * nuclear pleomorphism: nuclear grade 2, score 2   * mitoses ~ 4/mm2, score 2   - Tumor focality:  multifocal   - Macroscopic and microscopic extent of tumor: invasive carcinoma is confined to breast glandular parenchyma   * skin:  present, uninvolved by invasive carcinoma   * nipple: not present for evaluation  * skeletal muscle:  not present for evaluation  3  Lymphovascular invasion:  no unequivocal lymph-vascular invasion is identified  4  Dermal lymphovascular invasion: not seen  5  In situ tumor  - Ductal carcinoma in situ (DCIS): Present as an extensive component (~85% of total tumor)  * size (extent):  DCIS spans 2 6 cm maximal dimension (A62-1) and is present on 27 of 136 total slides examined  * architectural pattern(s):  cribriform & micropapillary patterns   * nuclear grade: nuclear grade 2 of 3   * necrosis:  central comedo type necrosis is present  - Lobular carcinoma in situ (LCIS):  not seen  6  Margins:  - Invasive carcinoma:   * invasive carcinoma is present at the superior lumpectomy margin (orange-inked, A84-1)  * all other lumpectomy margins are free of invasive carcinoma  - DCIS:    * DCIS is present within 0 2 millimeters of the superior lumpectomy margin (orange-inked, A26-1)   * DCIS is present within 0 2 millimeters of the medial lumpectomy margin (yellow-inked, A3-1)   * all other lumpectomy margins are free of DCIS by > 2 millimeters  7  Lymph nodes (pNX): no lymph nodes submitted, no lymph nodes found   - Number of lymph nodes with macrometastases (>2 mm): N/A  - Number of lymph nodes with micrometastases (>0 2 mm to 2 mm and/or >200 cells): N/A  - Number of lymph nodes with isolated tumor cells (<0 2 mm and <200 cells): N/A  - Size of largest metastatic deposit (mm):  N/A  - Extranodal extension: N/A  - Number of lymph nodes examined: none (0)  - Number of sentinel lymph nodes examined: none (0)  - Method of evaluation of sentinel lymph nodes: N/A  8  Treatment effect, response to presurgical (neoadjuvant) therapy: N/A  - In the breast: N/A  - In the lymph nodes: N/A  9  Additional pathologic findings: non-proliferative fibrocystic changes, without atypia, including microcysts, apocrine metaplasia & fibroadenomatoid stromal hyperplasia    10  Microcalcifications: present in DCIS  11  Ancillary studies:  estrogen, progesterone & Her-2/negrita assays of invasive carcinoma are currently pending and will be described in an addendum report  - Repeat HER2 testing (2013 ASCO/CAP Recommendations):  response is pending receptor assay completion   - Best representative tumor block:  A84, A89  - Sufficient tumor present for:   * Agendia Mammaprint/Blueprint (1 cm2 of invasive tumor in aggregate): Yes  * MI Profile/Foundation One (at least 5 x 5 mm of tumor): Yes  12  Clinical history:  DCIS by core needle biopsy  13  Pathologic stage classification (pTNM, AJCC 8th Edition): at least Stage IA - pT1c, pNX, G2   14  8th ed  AJCC Prognostic Stage (use AJCC update):  to be stated in the receptor addendum report  Final Diagnoses Listed for each Specimen of this Case  A  Right breast, wire-guided & oriented lumpectomy (243 g): - Invasive breast carcinoma, NST (aka ductal)  * Solomon grade 2 of 3 (total score = 2+2+2 = 6 of 9)   -- tubule formation < 10%, score 3   -- nuclear grade 2 of 3, score 2   -- mitoses ~ 4/mm2, score 2    * invasive carcinoma is multifocally present (A23-1, A32-1, A84-1, A89-1, A100-1), largest focus is 14 millimeters in greatest dimension (A89-1, this focus is graded)  * superior lumpectomy margin (orange-inked) is POSITIVE for invasive carcinoma (A84-1)   * all other lumpectomy margins are free of invasive carcinoma  * estrogen, progesterone & Her-2/negrita receptor studies are undertaken, to be described in an addendum report  - Ductal carcinoma in-situ (DCIS): Present as an extensive component (~85% of total tumor)  * DCIS is co-located with invasive carcinoma surrounding prior needle biopsy site  * DCIS spans 2 6 cm maximal dimension (A62-1) and is present on 27 of 136 total slides examined  * DCIS has cribriform & micropapillary patterns, nuclear grade 2 of 3, with central comedo-type necrosis     * DCIS is present within 0 2 millimeters of the superior lumpectomy margin (orange-inked, A26-1)   * DCIS is present within 0 2 millimeters of the medial lumpectomy margin (yellow-inked, A3-1)   * all other lumpectomy margins are free of DCIS by > 2 millimeters  - Lymph-vascular invasion: no lymph-vascular invasion is unequivocally identified  - Microcalcifications: present in DCIS  - Best representative tumor block: A84 & A89  - Sufficient tumor present for:   * Agendia Mammaprint/Blueprint (1 cm2 of invasive tumor in aggregate): Yes    * MI Profile/Foundation One (at least 5 x 5 mm of tumor): Yes   - Additional pathologic findings:  non-proliferative fibrocystic changes, without atypia, including microcysts, apocrine metaplasia & fibroadenomatoid stromal hyperplasia  Electronically signed by Tonya Guillen MD on 7/3/2018 at  4:07 PM         Imaging  Nm Lymphatic Breast    Result Date: 8/2/2018  Narrative: SENTINEL NODE LYMPHOSCINTIGRAPHY INDICATION: Right breast carcinoma FINDINGS: 0 55 mCi Tc-99m sulfur colloid (0 6 cc volume) was administered in divided doses by Dr Kris Driscoll in the right breast   Scintigraphic images were obtained over the right hemithorax and axilla in multiple projections  Right axillary node identified  Using scintigraphic guidance, the corresponding skin site was marked with an indelible marker by Dr Kris Driscoll  The patient was transferred to the operating room in satisfactory condition  Impression: [place] lymph node localized to right axilla  Workstation performed: [id number]     I reviewed the above laboratory and imaging data  Discussion/Summary:  New diagnosis stage IA right breast cancer, status post lumpectomy initially for DCIS, subsequently found to have foci breast cancer with positive margins mandating reexcision as well as sentinel node biopsy  She is doing well after surgery  Plan on follow-up in 6 months  Will make appropriate referral to medical and radiation oncology for adjuvant treatment

## 2018-08-17 NOTE — LETTER
August 17, 2018     Michelle Nugent MD  [address] [facility]  [address] [facility]  [address] [facility]    Patient: Lina Tran   YOB: 1955   Date of Visit: 8/17/2018       Dear Dr Radha Tan:    Thank you for referring Lina Tran to me for evaluation  Below are my notes for this consultation  If you have questions, please do not hesitate to call me  I look forward to following your patient along with you  Sincerely,        Tony Schwartz MD        CC: No Recipients  Tony Schwartz MD  8/17/2018  3:35 PM  Sign at close encounter     Surgical Oncology Follow Up       [address]  1955  [id number]  [address] [facility] [address]    Chief Complaint   Patient presents with    Post-op     Patient is here post-op re-excision right breast lumpectomy  Assessment/Plan:    No problem-specific Assessment & Plan notes found for this encounter  There are no diagnoses linked to this encounter  Advance Care Planning/Advance Directives:  Discussed disease status, cancer treatment plans and/or cancer treatment goals with the patient  Malignant neoplasm of right female breast ([facility] [address] )    5/23/2018 Biopsy     Right breast core biopsy         6/26/2018 Initial Diagnosis     Malignant neoplasm of right female breast ([facility] [address] )         6/26/2018 Surgery     RIGHT BREAST NEEDLE LOCALIZATION X2 WITH RIGHT BREAST LUMPECTOMY ( NEEDLE LOC @ 1000) (Right)   ONCOPLASTIC CLOSURE OF LUMPECTOMY CAVITY            History of Present Illness:  Stage IA right breast cancer status post lumpectomy, then lumpectomy re-excision and sentinel node biopsy July/August 2018    -Interval History:  Patient is here for postop check  She has no complaints to report after her surgery  She is doing well      Review of Systems:  Review of Systems   Constitutional: Negative  HENT: Negative  Eyes: Negative  Respiratory: Negative  Cardiovascular: Negative  Gastrointestinal: Negative  Endocrine: Negative  Genitourinary: Negative  Musculoskeletal: Negative  Skin: Negative  Allergic/Immunologic: Negative  Neurological: Negative  Hematological: Negative  Psychiatric/Behavioral: Negative  Patient Active Problem List   Diagnosis    Type 2 diabetes mellitus with complication, with long-term current use of insulin ([facility] [address] )    Asthma    Essential hypertension    Other specified hypothyroidism    Chest pain    Palpitations    Preop examination    Chronic bilateral low back pain without sciatica    Ductal carcinoma in situ (DCIS) of right breast    Neck pain    Constipation    Primary insomnia    Malignant neoplasm of right female breast ([facility] [address] )    Malignant neoplasm of upper-outer quadrant of right breast in female, estrogen receptor positive ([facility] )    Epigastric pain    Hiatal hernia    Screening for colon cancer    Esophageal dysphagia     Past Medical History:   Diagnosis Date    Asthma     Cancer ([facility] )     BREAST    Diabetes mellitus ([facility] )     Hypercholesterolemia     Hypertension     Hypothyroid     Rheumatoid arthritis ([facility] )      Past Surgical History:   Procedure Laterality Date    BREAST LUMPECTOMY Right 6/26/2018    Procedure: RIGHT BREAST NEEDLE LOCALIZATION X2 WITH RIGHT BREAST LUMPECTOMY ( NEEDLE LOC @ 1000); Surgeon: Mundo Siddiqi MD;  [facility]: [facility];  Service: Surgical Oncology    BREAST LUMPECTOMY Right 8/2/2018    Procedure: LYMPHOSCINITGRAPHY INTRAOPERATIVE LYMPHATIC MAPPING , RIGHT SENTINEL NODE BIOPSY, REEXCISION  RIGHT BREAST LUMPECTOMY CAVITY (SUPERIOR MARGIN);   Surgeon: Mundo Siddiqi MD;  [facility];  Service: Surgical Oncology    BREAST SURGERY Left     CATARACT EXTRACTION, BILATERAL Bilateral     KNEE SURGERY Right     RETINAL DETACHMENT SURGERY Right     TUBAL LIGATION       Family History   Problem Relation Age of Onset    Diabetes Mother     Hypertension Mother     Diabetes Father     Hypertension Father     Diabetes Brother     Hypertension Brother      Social History     Social History    Marital status:      Spouse name: N/A    Number of children: N/A    Years of education: N/A     Occupational History    Not on file       Social History Main Topics    Smoking status: Never Smoker    Smokeless tobacco: Never Used    Alcohol use No    Drug use: No    Sexual activity: Yes     Other Topics Concern    Not on file     Social History Narrative    No narrative on file       Current Outpatient Prescriptions:     albuterol (2 5 mg/3 mL) 0 083 % nebulizer solution, Take 1 vial (2 5 mg total) by nebulization every 4 (four) hours as needed for wheezing or shortness of breath, Disp: 75 mL, Rfl: 0    albuterol (PROVENTIL HFA,VENTOLIN HFA) 90 mcg/act inhaler, Inhale 1 puff 2 (two) times a day  , Disp: , Rfl:     enalapril (VASOTEC) 20 mg tablet, Take 1 tablet (20 mg total) by mouth daily, Disp: 90 tablet, Rfl: 1    fluticasone-vilanterol (BREO ELLIPTA) 100-25 mcg/inh inhaler, Inhale 1 puff daily Rinse mouth after use , Disp: 1 Inhaler, Rfl: 3    gabapentin (NEURONTIN) 300 mg capsule, take 1 capsule by mouth three times a day, Disp: 90 capsule, Rfl: 1    gemfibrozil (LOPID) 600 mg tablet, Take 600 mg by mouth daily  , Disp: , Rfl:     glipiZIDE (GLUCOTROL XL) 10 mg 24 hr tablet, Take 1 tablet (10 mg total) by mouth daily, Disp: 90 tablet, Rfl: 1    HUMULIN N 100 UNIT/ML subcutaneous injection, 70 units in the am 30 units in the pm, Disp: 30 mL, Rfl: 5    Insulin Glargine (TOUJEO) injection pen 300 units/mL, Inject 10 Units under the skin daily at bedtime, Disp: 3 pen, Rfl: 1    levothyroxine 50 mcg tablet, Take 1 tablet (50 mcg total) by mouth daily, Disp: 90 tablet, Rfl: 1    metFORMIN (GLUCOPHAGE-XR) 500 mg 24 hr tablet, take 2 tablets by mouth once daily WITH BREAKFAST, Disp: , Rfl: 0    omeprazole (PriLOSEC) 20 mg delayed release capsule, take 1 capsule by mouth once daily, Disp: 90 capsule, Rfl: 2    polyethylene glycol (MIRALAX) 17 g packet, Take 17 g by mouth 2 (two) times a day, Disp: 60 each, Rfl: 5  Allergies   Allergen Reactions    Aspirin Hives    Tylenol With Codeine #3 [Acetaminophen-Codeine] Rash     Vitals:    08/17/18 1514   BP: 130/84   Pulse: 94   Resp: 16   Temp: 98 6 °F (37 °C)       Physical Exam   Skin:   Right breast incision and right axillary incisions clean dry and intact  Vitals reviewed  Results:  Labs:  Case Report   Surgical Pathology Report                         Case: [id number]                                    Authorizing Provider: Arpan Orozco MD        Collected:           08/02/2018 0900               Ordering Location:     [facility]      Received:            08/02/2018 1059                                      Hospital Operating Room                                                       Pathologist:           Felice Polk MD                                                             Specimens:   A) - Lymph Node, [place], right axillia sentinel node, stitch at hottest part                       B) - Breast, Right, right breast lumpectomy reexcision                                     Final Diagnosis   A  Submitted as right axillary sentinel node:  - Seven lymph nodes are identified showing no metastatic tumor      B  Right breast lumpectomy reexcision:  - Abundant reactive changes are seen around the previous lumpectomy cavity   - No residual atypia or malignancy is seen      Note: These findings, taken together with those in the prior lumpectomy ([id number]) yield the following:  - Pathologic stage classification (pTNM, AJCC 8th Edition): at least Stage IA - pT1c, pN0, G2   - 8th ed   AJCC Prognostic Stage (use AJCC update): IA     Refer to synoptic report on [id number] for additional specifics on the prior lumpectomy containing tumor      Interpretation performed at [facility], [address] [facility], [facility]   Electronically signed by Kodi Yoon MD on 8/7/2018 at  3:12 PM   Additional Information   All controls performed with the immunohistochemical stains reported above reacted appropriately  These tests were developed and their performance characteristics determined by [facility] or [facility]  They may not be cleared or approved by the [place]  Food and Drug Administration  The FDA has determined that such clearance or approval is not necessary  These tests are used for clinical purposes  They should not be regarded as investigational or for research  This laboratory has been approved by [place] [address], designated as a high-complexity laboratory and is qualified to perform these tests  Case Report   Surgical Pathology Report                         Case: [id number]                                    Authorizing Provider: Ghassan Church MD        Collected:           06/26/2018 1235               Ordering Location:     [address]      Received:            06/26/2018 [facility]                                      Hospital Operating Room                                                       Pathologist:           Tejinder Coker MD                                                          Specimen:    Breast, Right, RIGHT BREAST LUMPECTOMY                                                     Addendum   THE FINAL DIAGNOSIS REMAINS UNCHANGED - this is to update the synoptic report with receptor studies given below and to state that the 8th ed   AJCC Prognostic Stage (use AJCC update) is IA      Results of immunohistochemical assay performed on invasive carcinoma block A89  are as follows:  Test Description                          Result                             Prognostic Interpretation  Estrogen Receptor/ER 100%                               Positive    Primary Antibody: SP1     Internal control: Positive                        Staining Intensity: Strong    External control: Positive                         Raymundo Score*: 8  Progesterone Receptor/Pg           45-55 %                           Positive    Primary Antibody: 1E2    Internal control: Positive                         Staining Intensity: Strong    External control: Positive/Negative        Raymundo Score*: 6  HER2 by IHC                                     3+                                Positive    Primary Antibody: 4B5  HER2 by FISH                Not Indicated  Appropriate positive and negative controls were reviewed  Fixative: Formalin   Duration of fixation (hours): 80 hours, does not meet specified requirements of latest ASCO/CAP guidelines (6  72 hours)  Cold Ischemia Time (minutes): Not stated  Sample Adequacy: Adequate  These immunohistochemical tests are FDA-cleared and performed at [facility] in [place], [place] and interpreted by Dr Omari Padron  An electronic copy of this report will be kept on file in the Medical Records Department at [facility]   * The Raymundo score is a semi quantitative system that takes into consideration the proportion of positive cells (scored on a scale of 0-5) and staining intensity (scored on a scale of (0-3)  The proportion and intensity are summed to produce total scores of 0 or 2 through 8  A score of 0-2 is regarded as negative while 3-8 as positive  Addendum electronically signed by Renu Herbert MD on 7/6/2018 at  4:17 PM   Final Diagnosis   INVASIVE BREAST CARCINOMA STAGING SUMMARY  1  Specimen Identification  - Procedure: wire-guided lumpectomy  - Lymph node sampling: no lymph nodes submitted, no lymph nodes found  - Specimen laterality: right  - Tumor site (quadrant; position/o'clock):  upper outer quadrant  2  Invasive Tumor:  - Histologic type:  Invasive breast carcinoma of no special type (ductal NST/invasive ductal carcinoma)  - Tumor size (pT1c):  14 millimeters (A89-1)  - Grade (G2): [place] histologic score = 3+2+2 = 7 of 9; Grade II of III  * glandular (acinar) / tubular differentiation: < 10%, score 3   * nuclear pleomorphism: nuclear grade 2, score 2   * mitoses ~ 4/mm2, score 2   - Tumor focality:  multifocal   - Macroscopic and microscopic extent of tumor: invasive carcinoma is confined to breast glandular parenchyma   * skin:  present, uninvolved by invasive carcinoma   * nipple: not present for evaluation  * skeletal muscle:  not present for evaluation  3  Lymphovascular invasion:  no unequivocal lymph-vascular invasion is identified  4  Dermal lymphovascular invasion: not seen  5  In situ tumor  - Ductal carcinoma in situ (DCIS): Present as an extensive component (~85% of total tumor)  * size (extent):  DCIS spans 2 6 cm maximal dimension (A62-1) and is present on 27 of 136 total slides examined  * architectural pattern(s):  cribriform & micropapillary patterns   * nuclear grade: nuclear grade 2 of 3   * necrosis:  central comedo type necrosis is present  - Lobular carcinoma in situ (LCIS):  not seen  6  Margins:  - Invasive carcinoma:   * invasive carcinoma is present at the superior lumpectomy margin (orange-inked, A84-1)  * all other lumpectomy margins are free of invasive carcinoma  - DCIS:    * DCIS is present within 0 2 millimeters of the superior lumpectomy margin (orange-inked, A26-1)   * DCIS is present within 0 2 millimeters of the medial lumpectomy margin (yellow-inked, A3-1)   * all other lumpectomy margins are free of DCIS by > 2 millimeters    7  Lymph nodes (pNX): no lymph nodes submitted, no lymph nodes found   - Number of lymph nodes with macrometastases (>2 mm): N/A  - Number of lymph nodes with micrometastases (>0 2 mm to 2 mm and/or >200 cells): N/A  - Number of lymph nodes with isolated tumor cells (<0 2 mm and <200 cells): N/A  - Size of largest metastatic deposit (mm):  N/A  - Extranodal extension: N/A  - Number of lymph nodes examined: none (0)  - Number of sentinel lymph nodes examined: none (0)  - Method of evaluation of sentinel lymph nodes: N/A  8  Treatment effect, response to presurgical (neoadjuvant) therapy: N/A  - In the breast: N/A  - In the lymph nodes: N/A  9  Additional pathologic findings: non-proliferative fibrocystic changes, without atypia, including microcysts, apocrine metaplasia & fibroadenomatoid stromal hyperplasia  10  Microcalcifications: present in DCIS  11  Ancillary studies:  estrogen, progesterone & Her-2/negrita assays of invasive carcinoma are currently pending and will be described in an addendum report  - Repeat HER2 testing (2013 ASCO/CAP Recommendations):  response is pending receptor assay completion   - Best representative tumor block:  A84, A89  - Sufficient tumor present for:   * Agendia Mammaprint/Blueprint (1 cm2 of invasive tumor in aggregate): Yes  * MI Profile/Foundation One (at least 5 x 5 mm of tumor): Yes  12  Clinical history:  DCIS by core needle biopsy  13  Pathologic stage classification (pTNM, AJCC 8th Edition): at least Stage IA - pT1c, pNX, G2   14  8th ed  AJCC Prognostic Stage (use AJCC update):  to be stated in the receptor addendum report  Final Diagnoses Listed for each Specimen of this Case  A  Right breast, wire-guided & oriented lumpectomy (243 g): - Invasive breast carcinoma, NST (aka ductal)  * [place] grade 2 of 3 (total score = 2+2+2 = 6 of 9)   -- tubule formation < 10%, score 3   -- nuclear grade 2 of 3, score 2   -- mitoses ~ 4/mm2, score 2    * invasive carcinoma is multifocally present (A23-1, A32-1, A84-1, A89-1, A100-1), largest focus is 14 millimeters in greatest dimension (A89-1, this focus is graded)     * superior lumpectomy margin (orange-inked) is POSITIVE for invasive carcinoma (A84-1)   * all other lumpectomy margins are free of invasive carcinoma  * estrogen, progesterone & Her-2/negrita receptor studies are undertaken, to be described in an addendum report  - Ductal carcinoma in-situ (DCIS): Present as an extensive component (~85% of total tumor)  * DCIS is co-located with invasive carcinoma surrounding prior needle biopsy site  * DCIS spans 2 6 cm maximal dimension (A62-1) and is present on 27 of 136 total slides examined  * DCIS has cribriform & micropapillary patterns, nuclear grade 2 of 3, with central comedo-type necrosis  * DCIS is present within 0 2 millimeters of the superior lumpectomy margin (orange-inked, A26-1)   * DCIS is present within 0 2 millimeters of the medial lumpectomy margin (yellow-inked, A3-1)   * all other lumpectomy margins are free of DCIS by > 2 millimeters  - Lymph-vascular invasion: no lymph-vascular invasion is unequivocally identified  - Microcalcifications: present in DCIS  - Best representative tumor block: A84 & A89  - Sufficient tumor present for:   * Agendia Mammaprint/Blueprint (1 cm2 of invasive tumor in aggregate): Yes    * MI Profile/Foundation One (at least 5 x 5 mm of tumor): Yes   - Additional pathologic findings:  non-proliferative fibrocystic changes, without atypia, including microcysts, apocrine metaplasia & fibroadenomatoid stromal hyperplasia  Electronically signed by Antonio Pena MD on 7/3/2018 at  4:07 PM         Imaging  Nm Lymphatic Breast    Result Date: 8/2/2018  Narrative: SENTINEL NODE LYMPHOSCINTIGRAPHY INDICATION: Right breast carcinoma FINDINGS: 0 55 mCi Tc-99m sulfur colloid (0 6 cc volume) was administered in divided doses by Dr Miller Blanchard in the right breast   Scintigraphic images were obtained over the right hemithorax and axilla in multiple projections  Right axillary node identified  Using scintigraphic guidance, the corresponding skin site was marked with an indelible marker by Dr Miller Blanchard    The patient was transferred to the operating room in satisfactory condition  Impression: [place] lymph node localized to right axilla  Workstation performed: [id number]     I reviewed the above laboratory and imaging data  Discussion/Summary:  New diagnosis stage IA right breast cancer, status post lumpectomy initially for DCIS, subsequently found to have foci breast cancer with positive margins mandating reexcision as well as sentinel node biopsy  She is doing well after surgery  Plan on follow-up in 6 months  Will make appropriate referral to medical and radiation oncology for adjuvant treatment

## 2018-08-21 ENCOUNTER — RADIATION ONCOLOGY CONSULT (OUTPATIENT)
Dept: RADIATION ONCOLOGY | Facility: CLINIC | Age: 63
End: 2018-08-21
Attending: RADIOLOGY
Payer: MEDICARE

## 2018-08-21 ENCOUNTER — TELEPHONE (OUTPATIENT)
Dept: SURGICAL ONCOLOGY | Facility: CLINIC | Age: 63
End: 2018-08-21

## 2018-08-21 ENCOUNTER — CLINICAL SUPPORT (OUTPATIENT)
Dept: RADIATION ONCOLOGY | Facility: CLINIC | Age: 63
End: 2018-08-21
Payer: MEDICARE

## 2018-08-21 VITALS
SYSTOLIC BLOOD PRESSURE: 112 MMHG | BODY MASS INDEX: 31.09 KG/M2 | HEART RATE: 82 BPM | WEIGHT: 186.6 LBS | TEMPERATURE: 98 F | HEIGHT: 65 IN | OXYGEN SATURATION: 97 % | DIASTOLIC BLOOD PRESSURE: 62 MMHG | RESPIRATION RATE: 18 BRPM

## 2018-08-21 DIAGNOSIS — C50.411 MALIGNANT NEOPLASM OF UPPER-OUTER QUADRANT OF RIGHT BREAST IN FEMALE, ESTROGEN RECEPTOR POSITIVE (HCC): Primary | ICD-10-CM

## 2018-08-21 DIAGNOSIS — Z17.0 MALIGNANT NEOPLASM OF UPPER-OUTER QUADRANT OF RIGHT BREAST IN FEMALE, ESTROGEN RECEPTOR POSITIVE (HCC): Primary | ICD-10-CM

## 2018-08-21 DIAGNOSIS — Z17.0 MALIGNANT NEOPLASM OF RIGHT BREAST IN FEMALE, ESTROGEN RECEPTOR POSITIVE, UNSPECIFIED SITE OF BREAST (HCC): Primary | ICD-10-CM

## 2018-08-21 DIAGNOSIS — C50.911 MALIGNANT NEOPLASM OF RIGHT BREAST IN FEMALE, ESTROGEN RECEPTOR POSITIVE, UNSPECIFIED SITE OF BREAST (HCC): Primary | ICD-10-CM

## 2018-08-21 PROCEDURE — 99215 OFFICE O/P EST HI 40 MIN: CPT | Performed by: RADIOLOGY

## 2018-08-21 NOTE — PROGRESS NOTES
Freddie Agarwaltron  1955  Ms Yolie Oseguera is a 65 y o  female    Chief Complaint   Patient presents with    Consult     radiation oncology       Cancer Staging  No matching staging information was found for the patient  [facility] # [id number]    65year old patient recently diagnosed with right breast cancer, seen for radiation consult  Referred by Dr Dai Art  History of benign excisional biopsy left breast in   Family history of prostate cancer in brother, unknown cancer in maternal grandfather  No family history of breast cancer  The patient underwent bilateral diagnostic  mammogram and ultrasound on 5/15/18 for complaints of left breast pain  The left breast was cleared, but calcifications were found in the upper outer quadrant of the right breast and stereotactic biopsy was recommended  18 Right breast biopsy: DCIS, ER/TX positive    18 Dr Dai Art consult    18 Right breast lumpectomy  ER/TX positive, HER2 3+ positive  Invasive carcinoma, 14 mm, grade 2, no LVI and extensive component of DCIS (~85% of total tumor) spans 2 6 cm  No LCIS  IDC present at superior margin  18 Right breast lumpectomy reexcision, SLN biopsy:  No residual malignancy seen, 0/7 negative LN's     18 Dr Dai Art post-op f/u  Refer to medical and radiation oncology    She has had right breast pinkness for the past 2 days  She states the right breast feels heavy  Small open area to distal aspect right axillary incision  No discharge/drainage noted by patient         18 scheduled to see Dr Verlena Apgar          Malignant neoplasm of right female breast [facility])    2018 Initial Diagnosis     Malignant neoplasm of right female breast ([address] )         2018 Biopsy     Right breast core biopsy:  DCIS, micropapillary type, lowintermediate nuclear grade  ER 90-95% positive,  TX 75-80% positive           2018 Surgery     RIGHT BREAST NEEDLE LOCALIZATION X2 WITH RIGHT BREAST LUMPECTOMY ( NEEDLE LOC @ 1000) (Right)   ONCOPLASTIC CLOSURE OF LUMPECTOMY CAVITY    Invasive breast carcinoma, NST (aka ductal)  * [place] grade 2 of 3 (total score = 2+2+2 = 6 of 9)   -- tubule formation < 10%, score 3   -- nuclear grade 2 of 3, score 2   -- mitoses ~ 4/mm2, score 2    * invasive carcinoma is multifocally present (A23-1, A32-1, A84-1, A89-1, A100-1), largest focus is 14 millimeters in greatest dimension (A89-1, this focus is graded)  * superior lumpectomy margin (orange-inked) is POSITIVE for invasive carcinoma (A84-1)   * all other lumpectomy margins are free of invasive carcinoma  * estrogen, progesterone & Her-2/negrita receptor studies are undertaken, to be described in an addendum report  - Ductal carcinoma in-situ (DCIS): Present as an extensive component (~85% of total tumor)  * DCIS is co-located with invasive carcinoma surrounding prior needle biopsy site  * DCIS spans 2 6 cm maximal dimension (A62-1) and is present on 27 of 136 total slides examined  * DCIS has cribriform & micropapillary patterns, nuclear grade 2 of 3, with central comedo-type necrosis  * DCIS is present within 0 2 millimeters of the superior lumpectomy margin (orange-inked, A26-1)   * DCIS is present within 0 2 millimeters of the medial lumpectomy margin (yellow-inked, A3-1)   * all other lumpectomy margins are free of DCIS by > 2 millimeters  - Lymph-vascular invasion: no lymph-vascular invasion is unequivocally identified  - Microcalcifications: present in DCIS  % positive, ER 45-55% positive, HER2 by IHC 3+ positive    - Dr Reveles [place]           8/2/2018 Surgery     Right breast lumpectomy reexcision, right axilla SLN biopsy:  A  Submitted as right axillary sentinel node:  - Seven lymph nodes are identified showing no metastatic tumor      B   Right breast lumpectomy reexcision:  - Abundant reactive changes are seen around the previous lumpectomy cavity   - No residual atypia or malignancy is seen               8/2/2018 -  Cancer Staged     Stage IA - pT1c, pN0, G2  Clinical Trial: no    Screening  Tobacco  Current tobacco user: no  If yes, brief counseling provided: NA    Hypertension  Hypertension screening performed: yes  Normotensive:  yes  If no, referred to PCP: n/a    Depression Screening  Screened for depression using PHQ-2: yes    Screened for depression using PHQ-9:  no  Screening positive or negative:  negative  If score >4, was any of the following actions taken? Additional evaluation for depression, suicide risk assesment, referral to PCP or psychiatry, medication started:  n/a    Advanced Care Planning for Patients >65 years  Advanced Care Planning Discussed:  n/a  Patient named surrogate decision maker or care plan in chart: n/a    OB/GYN History:  The patient underwent menarche at 7 years  Menopause Status Pre, Bobbi, Post, Unknown and No Answer  No LMP recorded  Patient is postmenopausal   Menopause at unknown   Menopause Reason  Hormone replacement therapy: no   Years used n/a   4   Para 4   Age at first delivery being 25 years  Nursing: not applicable  Birth control pills: yes    Years used x3 years  Gyncological comment   [unfilled]      Health Maintenance   Topic Date Due    HIV SCREENING  1955    Hepatitis C Screening  1955    CRC Screening: Colonoscopy  1955    Diabetic Foot Exam  10/29/1966    DM Eye Exam  10/29/1966    Pneumococcal PPSV23 Highest Risk Adult (1 of 3 - PCV13) 10/29/1975    DTaP,Tdap,and Td Vaccines (1 - Tdap) 10/29/1977    PAP SMEAR  10/29/1977    PT PLAN OF CARE  2018    INFLUENZA VACCINE  2018    HEMOGLOBIN A1C  2018    URINE MICROALBUMIN  2019    Depression Screening PHQ-9  2019    MAMMOGRAM  05/15/2020       Patient Active Problem List   Diagnosis    Type 2 diabetes mellitus with complication, with long-term current use of insulin (HCC)    Asthma    Essential hypertension    Other specified hypothyroidism    Chest pain    Palpitations    Preop examination    Chronic bilateral low back pain without sciatica    Ductal carcinoma in situ (DCIS) of right breast    Neck pain    Constipation    Primary insomnia    Malignant neoplasm of right female breast (HCC)    Malignant neoplasm of upper-outer quadrant of right breast in female, estrogen receptor positive ([facility] [address]    Epigastric pain    Hiatal hernia    Screening for colon cancer    Esophageal dysphagia     Past Medical History:   Diagnosis Date    Asthma     Breast cancer ([facility]     Cancer ([facility]     BREAST    Diabetes mellitus ([facility]     Hiatal hernia     Hypercholesterolemia     Hypertension     Hypothyroid     Rheumatoid arthritis ([facility] [address] )      Past Surgical History:   Procedure Laterality Date    BREAST BIOPSY      BREAST LUMPECTOMY Right 6/26/2018    Procedure: RIGHT BREAST NEEDLE LOCALIZATION X2 WITH RIGHT BREAST LUMPECTOMY ( NEEDLE LOC @ 1000); Surgeon: Mar Bone MD;  Location: [facility];  Service: Surgical Oncology    BREAST LUMPECTOMY Right 8/2/2018    Procedure: LYMPHOSCINITGRAPHY INTRAOPERATIVE LYMPHATIC MAPPING , RIGHT SENTINEL NODE BIOPSY, REEXCISION  RIGHT BREAST LUMPECTOMY CAVITY (SUPERIOR MARGIN); Surgeon: Mar Bone MD;  Location: [facility];  Service: Surgical Oncology    BREAST SURGERY Left     CATARACT EXTRACTION, BILATERAL Bilateral     KNEE SURGERY Right     RETINAL DETACHMENT SURGERY Right     TUBAL LIGATION       Family History   Problem Relation Age of Onset    Diabetes Mother     Hypertension Mother     Diabetes Father     Hypertension Father     Diabetes Brother     Hypertension Brother     Prostate cancer Brother     Cancer Maternal Grandfather      Social History     Social History    Marital status:      Spouse name: N/A    Number of children: N/A    Years of education: N/A     Occupational History    Not on file       Social History Main Topics    Smoking status: Never Smoker    Smokeless tobacco: Never Used    Alcohol use No    Drug use: No    Sexual activity: Yes     Other Topics Concern    Not on file     Social History Narrative    No narrative on file       Current Outpatient Prescriptions:     albuterol (2 5 mg/3 mL) 0 083 % nebulizer solution, Take 1 vial (2 5 mg total) by nebulization every 4 (four) hours as needed for wheezing or shortness of breath, Disp: 75 mL, Rfl: 0    albuterol (PROVENTIL HFA,VENTOLIN HFA) 90 mcg/act inhaler, Inhale 1 puff 2 (two) times a day  , Disp: , Rfl:     enalapril (VASOTEC) 20 mg tablet, Take 1 tablet (20 mg total) by mouth daily, Disp: 90 tablet, Rfl: 1    gemfibrozil (LOPID) 600 mg tablet, Take 600 mg by mouth daily  , Disp: , Rfl:     glipiZIDE (GLUCOTROL XL) 10 mg 24 hr tablet, Take 1 tablet (10 mg total) by mouth daily, Disp: 90 tablet, Rfl: 1    HUMULIN N 100 UNIT/ML subcutaneous injection, 70 units in the am 30 units in the pm, Disp: 30 mL, Rfl: 5    Insulin Glargine (TOUJEO) injection pen 300 units/mL, Inject 10 Units under the skin daily at bedtime, Disp: 3 pen, Rfl: 1    levothyroxine 50 mcg tablet, Take 1 tablet (50 mcg total) by mouth daily, Disp: 90 tablet, Rfl: 1    metFORMIN (GLUCOPHAGE-XR) 500 mg 24 hr tablet, take 2 tablets by mouth once daily WITH BREAKFAST, Disp: , Rfl: 0    omeprazole (PriLOSEC) 20 mg delayed release capsule, take 1 capsule by mouth once daily, Disp: 90 capsule, Rfl: 2    polyethylene glycol (MIRALAX) 17 g packet, Take 17 g by mouth 2 (two) times a day, Disp: 60 each, Rfl: 5    gabapentin (NEURONTIN) 300 mg capsule, take 1 capsule by mouth three times a day (Patient not taking: Reported on 8/21/2018), Disp: 90 capsule, Rfl: 1    Allergies   Allergen Reactions    Aspirin Hives    Tylenol With Codeine #3 [Acetaminophen-Codeine] Rash       Review of Systems:  Review of Systems   Constitutional: Positive for appetite change (decreased due to anxiety) and fatigue (#6/10)     HENT: Negative  Eyes: Negative  Respiratory: Negative  Cardiovascular: Negative  Gastrointestinal: Positive for constipation  Endocrine: Negative  Genitourinary: Negative  Musculoskeletal: Positive for arthralgias and joint swelling (right knee)  Skin: Positive for color change (right breast pinkness; redness to right axilla incision)  Allergic/Immunologic: Negative  Neurological: Positive for headaches (occasional)  Hematological: Negative  Psychiatric/Behavioral: Negative  Vitals:    08/21/18 1325   BP: 112/62   Pulse: 82   Resp: 18   Temp: 98 °F (36 7 °C)   TempSrc: Tympanic   SpO2: 97%   Weight: 84 6 kg (186 lb 9 6 oz)   Height: 5' 5 25" (1 657 m)            Imaging:Nm Lymphatic Breast    Result Date: 8/2/2018  Narrative: SENTINEL NODE LYMPHOSCINTIGRAPHY INDICATION: Right breast carcinoma FINDINGS: 0 55 mCi Tc-99m sulfur colloid (0 6 cc volume) was administered in divided doses by Dr Radha Brooks in the right breast   Scintigraphic images were obtained over the right hemithorax and axilla in multiple projections  Right axillary node identified  Using scintigraphic guidance, the corresponding skin site was marked with an indelible marker by Dr Radha Brooks  The patient was transferred to the operating room in satisfactory condition  Impression: Gans lymph node localized to right axilla   Workstation performed: [id number]       Teaching:  NCI RT packet with RT to breast information given

## 2018-08-21 NOTE — PROGRESS NOTES
Consultation - Radiation Oncology     PWY:[id number] : 1955  Encounter: [id number]  Patient Information: [address]  Chief Complaint   Patient presents with    Consult     radiation oncology     Cancer Staging  Malignant neoplasm of upper-outer quadrant of right breast in female, estrogen receptor positive ([facility] [address] )  Staging form: Breast, AJCC 8th Edition  - Clinical: No stage assigned - Unsigned  - Pathologic: Stage IA (pT1c, pN0, cM0, G2, ER: Positive, UT: Positive, HER2: Positive) - Signed by Alban Park MD on 2018           History of Present Illness   Billy Bobo is a 65y o  year old female who presented with abnormal mammogram in May of 2018 when she presented with left breast pain  Diagnostic mammogram and ultrasound was performed on 5/15/2018              There is a grouping of calcifications in the upper outer right   breast radiating to the nipple over an extent of 7 4 cm  These   are coarse and heterogeneous in appearance and in a segmental   distribution  In the absence of prior studies for comparison   stereotactic biopsy of these calcifications is recommended      Targeted sonographic evaluation of the area of pain in the left   breast 12 to 3 o'clock position left breast area of pain   demonstrates a prominent island of fibroglandular tissue without   solid mass or abnormal shadowing            18 Right breast biopsy: DCIS, ER/UT positive     18 Dr Chaudhary Minus consult     18 Right breast lumpectomy  ER/UT positive, HER2 3+ positive  Invasive carcinoma, 14 mm, grade 2, no LVI and extensive component of DCIS (~85% of total tumor) spans 2 6 cm  No LCIS  IDC present at superior margin       18 Right breast lumpectomy reexcision, SLN biopsy:  No residual malignancy seen, 0/7 negative LN's      18 Dr Chaudhary Minus post-op f/u  Refer to medical and radiation oncology     She has had right breast pinkness for the past 2 days   She states the right breast feels heavy  Small open area to distal aspect right axillary incision  No discharge/drainage noted by patient          9/7/18 scheduled to see Dr Pack Stage       Family history of prostate cancer in brother, unknown cancer in maternal grandfather  No family history of breast cancer  CicerOOs # [id number] use for Albanian interpretation      Historical Information      Malignant neoplasm of right female breast ([facility] [address] )    5/23/2018 Initial Diagnosis     M[facility] right [facility] breast [facility]         5/23/2018 Biopsy     Right breast core biopsy:  DCIS, micropapillary type, lowintermediate nuclear grade  ER 90-95% positive,  NH 75-80% positive           6/26/2018 Surgery     RIGHT BREAST NEEDLE LOCALIZATION X2 WITH RIGHT BREAST LUMPECTOMY ( NEEDLE LOC @ 1000) (Right)   ONCOPLASTIC CLOSURE OF LUMPECTOMY CAVITY    Invasive breast carcinoma, NST (aka ductal)  * [place] grade 2 of 3 (total score = 2+2+2 = 6 of 9)   -- tubule formation < 10%, score 3   -- nuclear grade 2 of 3, score 2   -- mitoses ~ 4/mm2, score 2    * invasive carcinoma is multifocally present (A23-1, A32-1, A84-1, A89-1, A100-1), largest focus is 14 millimeters in greatest dimension (A89-1, this focus is graded)  * superior lumpectomy margin (orange-inked) is POSITIVE for invasive carcinoma (A84-1)   * all other lumpectomy margins are free of invasive carcinoma  * estrogen, progesterone & Her-2/negrita receptor studies are undertaken, to be described in an addendum report  - Ductal carcinoma in-situ (DCIS): Present as an extensive component (~85% of total tumor)  * DCIS is co-located with invasive carcinoma surrounding prior needle biopsy site  * DCIS spans 2 6 cm maximal dimension (A62-1) and is present on 27 of 136 total slides examined  * DCIS has cribriform & micropapillary patterns, nuclear grade 2 of 3, with central comedo-type necrosis     * DCIS is present within 0 2 millimeters of the superior lumpectomy margin (orange-inked, A26-1)   * DCIS is present within 0 2 millimeters of the medial lumpectomy margin (yellow-inked, A3-1)   * all other lumpectomy margins are free of DCIS by > 2 millimeters  - Lymph-vascular invasion: no lymph-vascular invasion is unequivocally identified  - Microcalcifications: present in DCIS  % positive, ER 45-55% positive, HER2 by IHC 3+ positive    - Dr Dai Art           8/2/2018 Surgery     Right breast lumpectomy reexcision, right axilla SLN biopsy:  A  Submitted as right axillary sentinel node:  - Seven lymph nodes are identified showing no metastatic tumor      B  Right breast lumpectomy reexcision:  - Abundant reactive changes are seen around the previous lumpectomy cavity   - No residual atypia or malignancy is seen               8/2/2018 -  Cancer Staged     Stage IA - pT1c, pN0, G2  Past Medical History:   Diagnosis Date    Asthma     [facility]     Cancer ([facility] )     BREAST    Diabetes mellitus ([facility] )     Hiatal hernia     Hypercholesterolemia     Hypertension     Hypothyroid     Rheumatoid arthritis ([facility] )      Past Surgical History:   Procedure Laterality Date    BREAST BIOPSY      BREAST LUMPECTOMY Right 6/26/2018    Procedure: RIGHT BREAST NEEDLE LOCALIZATION X2 WITH RIGHT BREAST LUMPECTOMY ( NEEDLE LOC @ 1000); Surgeon: Giovana Herrera MD;  Location: [facility];  Service: Surgical Oncology    BREAST LUMPECTOMY Right 8/2/2018    Procedure: LYMPHOSCINITGRAPHY INTRAOPERATIVE LYMPHATIC MAPPING , RIGHT SENTINEL NODE BIOPSY, REEXCISION  RIGHT BREAST LUMPECTOMY CAVITY (SUPERIOR MARGIN);   Surgeon: Giovana Herrera MD;  Location: [facility];  Service: Surgical Oncology    BREAST SURGERY Left     CATARACT EXTRACTION, BILATERAL Bilateral     KNEE SURGERY Right     RETINAL DETACHMENT SURGERY Right     TUBAL LIGATION         Family History   Problem Relation Age of Onset    Diabetes Mother     Hypertension Mother     Diabetes Father    Deeprudencio H[place] Hypertension Father     Diabetes Brother     Hypertension Brother     Prostate cancer Brother     Cancer Maternal Grandfather        Social History   History   Alcohol Use No     History   Drug Use No     History   Smoking Status    Never Smoker   Smokeless Tobacco    Never Used         Meds/Allergies     Current Outpatient Prescriptions:     albuterol (2 5 mg/3 mL) 0 083 % nebulizer solution, Take 1 vial (2 5 mg total) by nebulization every 4 (four) hours as needed for wheezing or shortness of breath, Disp: 75 mL, Rfl: 0    albuterol (PROVENTIL HFA,VENTOLIN HFA) 90 mcg/act inhaler, Inhale 1 puff 2 (two) times a day  , Disp: , Rfl:     enalapril (VASOTEC) 20 mg tablet, Take 1 tablet (20 mg total) by mouth daily, Disp: 90 tablet, Rfl: 1    gemfibrozil (LOPID) 600 mg tablet, Take 600 mg by mouth daily  , Disp: , Rfl:     glipiZIDE (GLUCOTROL XL) 10 mg 24 hr tablet, Take 1 tablet (10 mg total) by mouth daily, Disp: 90 tablet, Rfl: 1    HUMULIN N 100 UNIT/ML subcutaneous injection, 70 units in the am 30 units in the pm, Disp: 30 mL, Rfl: 5    Insulin Glargine (TOUJEO) injection pen 300 units/mL, Inject 10 Units under the skin daily at bedtime, Disp: 3 pen, Rfl: 1    levothyroxine 50 mcg tablet, Take 1 tablet (50 mcg total) by mouth daily, Disp: 90 tablet, Rfl: 1    metFORMIN (GLUCOPHAGE-XR) 500 mg 24 hr tablet, take 2 tablets by mouth once daily WITH BREAKFAST, Disp: , Rfl: 0    omeprazole (PriLOSEC) 20 mg delayed release capsule, take 1 capsule by mouth once daily, Disp: 90 capsule, Rfl: 2    polyethylene glycol (MIRALAX) 17 g packet, Take 17 g by mouth 2 (two) times a day, Disp: 60 each, Rfl: 5    gabapentin (NEURONTIN) 300 mg capsule, take 1 capsule by mouth three times a day (Patient not taking: Reported on 8/21/2018), Disp: 90 capsule, Rfl: 1  Allergies   Allergen Reactions    Aspirin Hives    Tylenol With Codeine #3 [Acetaminophen-Codeine] Rash         Review of Systems  Constitutional: Positive for appetite change (decreased due to anxiety) and fatigue (#6/10)  HENT: Negative  Eyes: Negative  Respiratory: Negative  Cardiovascular: Negative  Gastrointestinal: Positive for constipation  Endocrine: Negative  Genitourinary: Negative  Musculoskeletal: Positive for arthralgias and joint swelling (right knee)  Skin: Positive for color change (right breast pinkness; redness to right axilla incision)  Allergic/Immunologic: Negative  Neurological: Positive for headaches (occasional)  Hematological: Negative  Psychiatric/Behavioral: Negative    Screening  Tobacco  Current tobacco user: no  If yes, brief counseling provided: NA     Hypertension  Hypertension screening performed: yes  Normotensive:  yes  If no, referred to PCP: n/a     Depression Screening  Screened for depression using PHQ-2: yes     Screened for depression using PHQ-9:  no  Screening positive or negative:  negative  If score >4, was any of the following actions taken? Additional evaluation for depression, suicide risk assesment, referral to PCP or psychiatry, medication started:  n/a     Advanced Care Planning for Patients >65 years  Advanced Care Planning Discussed:  n/a  Patient named surrogate decision maker or care plan in chart: n/a     OB/GYN History:  The patient underwent menarche at 7 years  Menopause Status Pre, Bobbi, Post, Unknown and No Answer  No LMP recorded  Patient is postmenopausal   Menopause at unknown   Menopause Reason  Hormone replacement therapy: no   Years used n/a   4   Para 4   Age at first delivery being 25 years  Nursing: not applicable  Birth control pills: yes    Years used x3 years    OBJECTIVE:   /62   Pulse 82   Temp 98 °F (36 7 °C) (Tympanic)   Resp 18   Ht 5' 5 25" (1 657 m)   Wt 84 6 kg (186 lb 9 6 oz)   SpO2 97%   BMI 30 81 kg/m²   Pain Assessment:  1  Performance Status: ECOG/Zubrod/WHO: 1 - Symptomatic but completely ambulatory    Physical Exam Constitutional: She appears well-developed  HENT:   Head: Normocephalic  Hair is normal    Mouth/Throat: Oropharynx is clear and moist    Eyes: EOM are normal  No scleral icterus  Neck: Neck supple  No spinous process tenderness present  No edema present  Cardiovascular: Normal rate and regular rhythm  Pulmonary/Chest: Effort normal and breath sounds normal  No respiratory distress  She has no wheezes  She exhibits no tenderness  The patient has edema of the right breast   She has some diffuse mild erythema in the breast with an area around her axillary incision with +2 erythema over approximately 2 centimeters  The distal portion of her scar in the axilla is open however no drainage  Abdominal: Soft  Bowel sounds are normal  She exhibits no distension, no ascites and no mass  There is no hepatosplenomegaly  There is no tenderness  There is no rebound  Genitourinary: No breast swelling or tenderness  Musculoskeletal: Normal range of motion  She exhibits no edema or tenderness  Lymphadenopathy:     She has no cervical adenopathy  She has no axillary adenopathy  Neurological: She is alert  She has normal strength  No cranial nerve deficit  Coordination and gait normal    Skin: Skin is intact  Psychiatric: She has a normal mood and affect   Her speech is normal and behavior is normal           RESULTS  Lab Results    Chemistry        Component Value Date/Time     07/18/2018 0806    K 3 6 07/18/2018 0806     07/18/2018 0806    CO2 30 07/18/2018 0806    BUN 16 07/18/2018 0806    CREATININE 0 67 07/18/2018 0806        Component Value Date/Time    CALCIUM 8 8 07/18/2018 0806    ALKPHOS 53 05/01/2018 0857    AST 13 05/01/2018 0857    ALT 23 05/01/2018 0857    BILITOT 0 41 05/01/2018 0857            Lab Results   Component Value Date    WBC 7 28 07/18/2018    HGB 11 8 07/18/2018    HCT 37 9 07/18/2018    MCV 91 07/18/2018     07/18/2018         Imaging Studies  Nm Lymphatic Breast    Result Date: 8/2/2018  Narrative: SENTINEL NODE LYMPHOSCINTIGRAPHY INDICATION: Right breast carcinoma FINDINGS: 0 55 mCi Tc-99m sulfur colloid (0 6 cc volume) was administered in divided doses by Dr Yong Gonsales in the right breast   Scintigraphic images were obtained over the right hemithorax and axilla in multiple projections  Right axillary node identified  Using scintigraphic guidance, the corresponding skin site was marked with an indelible marker by Dr Yong Gonsales  The patient was transferred to the operating room in satisfactory condition  Impression: [place] lymph node localized to right axilla  Workstation performed: [id number]         Pathology:    Final Diagnosis   A  Submitted as right axillary sentinel node:  - Seven lymph nodes are identified showing no metastatic tumor      B  Right breast lumpectomy reexcision:  - Abundant reactive changes are seen around the previous lumpectomy cavity   - No residual atypia or malignancy is seen      Note: These findings, taken together with those in the prior lumpectomy ([id number]) yield the following:  - Pathologic stage classification (pTNM, AJCC 8th Edition): at least Stage IA - pT1c, pN0, G2   - 8th ed  AJCC Prognostic Stage (use AJCC update): IA     THE FINAL DIAGNOSIS REMAINS UNCHANGED - this is to update the synoptic report with receptor studies given below and to state that the 8th ed   AJCC Prognostic Stage (use AJCC update) is IA      Results of immunohistochemical assay performed on invasive carcinoma block A89  are as follows:  Test Description                          Result                             Prognostic Interpretation  Estrogen Receptor/ER                 100%                               Positive    Primary Antibody: SP1     Internal control: Positive                        Staining Intensity: Strong    External control: Positive                         Raymundo Score*: 8  Progesterone Receptor/Pg           45-55 % Positive    Primary Antibody: 1E2    Internal control: Positive                         Staining Intensity: Strong    External control: Positive/Negative        Raymundo Score*: 6  HER2 by IHC                                     3+                                Positive  INVASIVE BREAST CARCINOMA STAGING SUMMARY  1  Specimen Identification  - Procedure: wire-guided lumpectomy  - Lymph node sampling: no lymph nodes submitted, no lymph nodes found  - Specimen laterality: right  - Tumor site (quadrant; position/o'clock):  upper outer quadrant  2  Invasive Tumor:  - Histologic type: Invasive breast carcinoma of no special type (ductal NST/invasive ductal carcinoma)  - Tumor size (pT1c):  14 millimeters (A89-1)  - Grade (G2): [place] histologic score = 3+2+2 = 7 of 9; Grade II of III  * glandular (acinar) / tubular differentiation: < 10%, score 3   * nuclear pleomorphism: nuclear grade 2, score 2   * mitoses ~ 4/mm2, score 2   - Tumor focality:  multifocal   - Macroscopic and microscopic extent of tumor: invasive carcinoma is confined to breast glandular parenchyma   * skin:  present, uninvolved by invasive carcinoma   * nipple: not present for evaluation  * skeletal muscle:  not present for evaluation  3  Lymphovascular invasion:  no unequivocal lymph-vascular invasion is identified  4  Dermal lymphovascular invasion: not seen  5  In situ tumor  - Ductal carcinoma in situ (DCIS): Present as an extensive component (~85% of total tumor)  * size (extent):  DCIS spans 2 6 cm maximal dimension (A62-1) and is present on 27 of 136 total slides examined  * architectural pattern(s):  cribriform & micropapillary patterns   * nuclear grade: nuclear grade 2 of 3   * necrosis:  central comedo type necrosis is present  - Lobular carcinoma in situ (LCIS):  not seen  6  Margins:  - Invasive carcinoma:   * invasive carcinoma is present at the superior lumpectomy margin (orange-inked, A84-1)     * all other lumpectomy margins are free of invasive carcinoma  - DCIS:    * DCIS is present within 0 2 millimeters of the superior lumpectomy margin (orange-inked, A26-1)   * DCIS is present within 0 2 millimeters of the medial lumpectomy margin (yellow-inked, A3-1)   * all other lumpectomy margins are free of DCIS by > 2 millimeters  7  Lymph nodes (pNX): no lymph nodes submitted, no lymph nodes found   - Number of lymph nodes with macrometastases (>2 mm): N/A  - Number of lymph nodes with micrometastases (>0 2 mm to 2 mm and/or >200 cells): N/A  - Number of lymph nodes with isolated tumor cells (<0 2 mm and <200 cells): N/A  - Size of largest metastatic deposit (mm):  N/A  - Extranodal extension: N/A  - Number of lymph nodes examined: none (0)  - Number of sentinel lymph nodes examined: none (0)  - Method of evaluation of sentinel lymph nodes: N/A  8  Treatment effect, response to presurgical (neoadjuvant) therapy: N/A  - In the breast: N/A  - In the lymph nodes: N/A  9  Additional pathologic findings: non-proliferative fibrocystic changes, without atypia, including microcysts, apocrine metaplasia & fibroadenomatoid stromal hyperplasia  10  Microcalcifications: present in DCIS  11  Ancillary studies:  estrogen, progesterone & Her-2/negrita assays of invasive carcinoma are currently pending and will be described in an addendum report  - Repeat HER2 testing (2013 ASCO/CAP Recommendations):  response is pending receptor assay completion   - Best representative tumor block:  A84, A89  - Sufficient tumor present for:   * Agendia Mammaprint/Blueprint (1 cm2 of invasive tumor in aggregate): Yes  * MI Profile/Foundation One (at least 5 x 5 mm of tumor): Yes  12  Clinical history:  DCIS by core needle biopsy  13  Pathologic stage classification (pTNM, AJCC 8th Edition): at least Stage IA - pT1c, pNX, G2   14  8th ed  AJCC Prognostic Stage (use AJCC update):  to be stated in the receptor addendum report    Final Diagnoses Listed for each Specimen of this Case  A  Right breast, wire-guided & oriented lumpectomy (243 g): - Invasive breast carcinoma, NST (aka ductal)  * [place] grade 2 of 3 (total score = 2+2+2 = 6 of 9)   -- tubule formation < 10%, score 3   -- nuclear grade 2 of 3, score 2   -- mitoses ~ 4/mm2, score 2    * invasive carcinoma is multifocally present (A23-1, A32-1, A84-1, A89-1, A100-1), largest focus is 14 millimeters in greatest dimension (A89-1, this focus is graded)  * superior lumpectomy margin (orange-inked) is POSITIVE for invasive carcinoma (A84-1)   * all other lumpectomy margins are free of invasive carcinoma  * estrogen, progesterone & Her-2/negrita receptor studies are undertaken, to be described in an addendum report  - Ductal carcinoma in-situ (DCIS): Present as an extensive component (~85% of total tumor)  * DCIS is co-located with invasive carcinoma surrounding prior needle biopsy site  * DCIS spans 2 6 cm maximal dimension (A62-1) and is present on 27 of 136 total slides examined  * DCIS has cribriform & micropapillary patterns, nuclear grade 2 of 3, with central comedo-type necrosis  * DCIS is present within 0 2 millimeters of the superior lumpectomy margin (orange-inked, A26-1)   * DCIS is present within 0 2 millimeters of the medial lumpectomy margin (yellow-inked, A3-1)   * all other lumpectomy margins are free of DCIS by > 2 millimeters  - Lymph-vascular invasion: no lymph-vascular invasion is unequivocally identified  - Microcalcifications: present in DCIS  - Best representative tumor block: A84 & A89  - Sufficient tumor present for:   * [place] Mammaprint/Blueprint (1 cm2 of invasive tumor in aggregate): Yes    * MI Profile/Foundation One (at least 5 x 5 mm of tumor): Yes   - Additional pathologic findings:  non-proliferative fibrocystic changes, without atypia, including microcysts, apocrine metaplasia & fibroadenomatoid stromal hyperplasia  ASSESSMENT  1   Malignant neoplasm of upper-outer quadrant of right breast in female, estrogen receptor positive ([facility] [address] [facility]       Cancer Staging  Malignant neoplasm of upper-outer quadrant of right breast in female, estrogen receptor positive ([facility]  Staging form: Breast, AJCC 8th Edition  - Clinical: No stage assigned - Unsigned  - Pathologic: Stage IA (pT1c, pN0, cM0, G2, ER: Positive, IN: Positive, HER2: Positive) - Signed by Cherie Castaneda MD on 8/21/2018        PLAN/DISCUSSION  No orders of the defined types were placed in this encounter  Leslee Roca is a 65y o  year old female with T1c N0 grade 2 invasive ductal carcinoma  Her tumor is ERPR and her 2 positive  She has evidence of extensive DCIS spanning approximately 2 6 centimeters  Her initial margin of resection was positive however she underwent re-excision with final negative margins  In addition 7 lymph nodes return negative for malignancy  I reviewed with the patient and her daughter utilizing the  line the rationale and procedure involved with adjuvant radiation therapy  We discussed treatment of the right breast   Both standard fractionation as well as a hypo fractionated course was reviewed with her depending on her dosimetric parameters  A boost would be applied to the primary site for an additional 1000 centigray  The possible side effects were reviewed with her  We did discuss in light of her 2 positivity she may be offered chemotherapy and has an appointment to see Dr Lamar Herrera  on 9/7/2018  She understands that should she require chemotherapy this would proceed her radiation treatment  She is agreeable to the above outlined plan  Of note I have referred her back to Dr Barragan as he saw the patient several days ago and the patient notes that she has erythema and swelling of the breast   We did discuss antibiotic treatment  However she will be seen by Dr Abdirizak Mcmanus tomorrow morning    She does have a small area of her incision which is open however no drainage noted  We will await the recommendation from Medical Oncology prior to scheduling her simulation  She is aware that should she require chemotherapy she will return for simulation 2 weeks after her last dose of chemotherapy and initiate radiation treatment 4 weeks post chemo  Should she not receive systemic therapy when she is healed from her surgery she can return for simulation planning  Aayush Garcia MD  8/21/2018,2:36 PM      Portions of the record may have been created with voice recognition software   Occasional wrong word or "sound a like" substitutions may have occurred due to the inherent limitations of voice recognition software   Read the chart carefully and recognize, using context, where substitutions have occurred

## 2018-08-22 ENCOUNTER — OFFICE VISIT (OUTPATIENT)
Dept: SURGICAL ONCOLOGY | Facility: CLINIC | Age: 63
End: 2018-08-22

## 2018-08-22 VITALS
TEMPERATURE: 98.4 F | RESPIRATION RATE: 14 BRPM | DIASTOLIC BLOOD PRESSURE: 84 MMHG | WEIGHT: 186 LBS | SYSTOLIC BLOOD PRESSURE: 136 MMHG | BODY MASS INDEX: 30.99 KG/M2 | HEIGHT: 65 IN | HEART RATE: 84 BPM

## 2018-08-22 DIAGNOSIS — C50.411 MALIGNANT NEOPLASM OF UPPER-OUTER QUADRANT OF RIGHT BREAST IN FEMALE, ESTROGEN RECEPTOR POSITIVE (HCC): ICD-10-CM

## 2018-08-22 DIAGNOSIS — Z17.0 MALIGNANT NEOPLASM OF UPPER-OUTER QUADRANT OF RIGHT BREAST IN FEMALE, ESTROGEN RECEPTOR POSITIVE (HCC): ICD-10-CM

## 2018-08-22 DIAGNOSIS — L03.111 CELLULITIS OF RIGHT AXILLA: Primary | ICD-10-CM

## 2018-08-22 PROCEDURE — 99024 POSTOP FOLLOW-UP VISIT: CPT | Performed by: NURSE PRACTITIONER

## 2018-08-22 RX ORDER — CEPHALEXIN 500 MG/1
500 CAPSULE ORAL EVERY 6 HOURS SCHEDULED
Qty: 28 CAPSULE | Refills: 0 | Status: SHIPPED | OUTPATIENT
Start: 2018-08-22 | End: 2018-08-29

## 2018-08-22 NOTE — PROGRESS NOTES
Surgical Oncology Follow Up       [address] [place] [address],[address] [place] [address]  CANCER CARE [facility] SURGICAL ONCOLOGY [address] [place] [address] [place]  1955  [id number]  [address] [place] [address],[address] [place] [address]  CANCER [facility] SURGICAL ONCOLOGY [place]  [place] [id number]    Chief Complaint   Patient presents with    Follow-up     breast redness       Assessment/Plan:  1  Malignant neoplasm of upper-outer quadrant of right breast in female, estrogen receptor positive ([address] )  - f/u with Dr Jorge Alberto Grossman as previously scheduled    2  Cellulitis of right axilla    - cephalexin (KEFLEX) 500 mg capsule; Take 1 capsule (500 mg total) by mouth every 6 (six) hours for 7 days  Dispense: 28 capsule; Refill: 0  - Call if develop fevers/chills, or if redness does not improve  - Apply antibiotic ointment to right axillary incision    Discussion/Summary:  Patient is a 30-year-old female who underwent a right lumpectomy and sentinel node biopsy by Dr Jorge Alberto Grossman on 6/26/18  Two days ago, she reports that her right breast began to turn a pink color  She was seen by radiation oncology yesterday and was encouraged to follow up with our office  She denies fevers or chills  She does state that the breast feels warm and is slightly painful  On exam, there is erythema of the right breast as well as edema  The right axillary incision skin is not well-approximated at the distal aspect of the incision  I have recommended a course of oral antibiotics  I have also recommended that the patient wash the right axillary incision with soap and water, dry the area, and apply antibiotic ointment  I have instructed the patient to call if her symptoms do not improve or if she were to up to develops fevers or chills  She does have a follow-up appointment scheduled with Medical Oncology in a couple of weeks and will have an exam performed again at that time   She will contact us with any new symptoms or if the erythema does not resolve  All of her and her daughter's questions were answered   Paige Brandt used- #[id number]    History of Present Illness:        Malignant neoplasm of right female breast ([facility] )    5/23/2018 Initial Diagnosis     Malignant neoplasm of right female breast ([facility] [address] )         5/23/2018 Biopsy     Right breast core biopsy:  DCIS, micropapillary type, low-intermediate nuclear grade  ER 90-95% positive,  NH 75-80% positive           6/26/2018 Surgery     RIGHT BREAST NEEDLE LOCALIZATION X2 WITH RIGHT BREAST LUMPECTOMY ( NEEDLE LOC @ 1000) (Right)   ONCOPLASTIC CLOSURE OF LUMPECTOMY CAVITY    Invasive breast carcinoma, NST (aka ductal)  * [place] grade 2 of 3 (total score = 2+2+2 = 6 of 9)   -- tubule formation < 10%, score 3   -- nuclear grade 2 of 3, score 2   -- mitoses ~ 4/mm2, score 2    * invasive carcinoma is multifocally present (A23-1, A32-1, A84-1, A89-1, A100-1), largest focus is 14 millimeters in greatest dimension (A89-1, this focus is graded)  * superior lumpectomy margin (orange-inked) is POSITIVE for invasive carcinoma (A84-1)   * all other lumpectomy margins are free of invasive carcinoma  * estrogen, progesterone & Her-2/negrita receptor studies are undertaken, to be described in an addendum report  - Ductal carcinoma in-situ (DCIS): Present as an extensive component (~85% of total tumor)  * DCIS is co-located with invasive carcinoma surrounding prior needle biopsy site  * DCIS spans 2 6 cm maximal dimension (A62-1) and is present on 27 of 136 total slides examined  * DCIS has cribriform & micropapillary patterns, nuclear grade 2 of 3, with central comedo-type necrosis  * DCIS is present within 0 2 millimeters of the superior lumpectomy margin (orange-inked, A26-1)   * DCIS is present within 0 2 millimeters of the medial lumpectomy margin (yellow-inked, A3-1)   * all other lumpectomy margins are free of DCIS by > 2 millimeters    - Lymph-vascular invasion: no lymph-vascular invasion is unequivocally identified  - Microcalcifications: present in DCIS  % positive, ER 45-55% positive, HER2 by IHC 3+ positive    - Dr Juid Valladares           8/2/2018 Surgery     Right breast lumpectomy reexcision, right axilla SLN biopsy:  A  Submitted as right axillary sentinel node:  - Seven lymph nodes are identified showing no metastatic tumor      B  Right breast lumpectomy reexcision:  - Abundant reactive changes are seen around the previous lumpectomy cavity   - No residual atypia or malignancy is seen               8/2/2018 -  Cancer Staged     Stage IA - pT1c, pN0, G2                -Interval History: Patient presents today after being referred back by radiation oncology for a reddnened right breast  She underwent a right lumpectomy by Dr Judi Valladares on 8/2  Review of Systems:  Review of Systems   Constitutional: Negative for chills and fever  Skin: Positive for color change and wound         Patient Active Problem List   Diagnosis    Type 2 diabetes mellitus with complication, with long-term current use of insulin ([address] )    Asthma    Essential hypertension    Other specified hypothyroidism    Chest pain    Palpitations    Preop examination    Chronic bilateral low back pain without sciatica    Ductal carcinoma in situ (DCIS) of right breast    Neck pain    Constipation    Primary insomnia    Malignant neoplasm of right female breast ([address] )    Malignant neoplasm of upper-outer quadrant of right breast in female, estrogen receptor positive ([address] )    Epigastric pain    Hiatal hernia    Screening for colon cancer    Esophageal dysphagia     Past Medical History:   Diagnosis Date    Asthma     Breast cancer ([address] )     Cancer ([address] )     BREAST    Diabetes mellitus ([address] )     Hiatal hernia     Hypercholesterolemia     Hypertension     Hypothyroid     Rheumatoid arthritis ([address] )      Past Surgical History:   Procedure Laterality Date    BREAST BIOPSY      BREAST LUMPECTOMY Right 6/26/2018    Procedure: RIGHT BREAST NEEDLE LOCALIZATION X2 WITH RIGHT BREAST LUMPECTOMY ( NEEDLE LOC @ 1000); Surgeon: Obi Mccauley MD;  Location: [facility];  Service: Surgical Oncology    BREAST LUMPECTOMY Right 8/2/2018    Procedure: LYMPHOSCINITGRAPHY INTRAOPERATIVE LYMPHATIC MAPPING , RIGHT SENTINEL NODE BIOPSY, REEXCISION  RIGHT BREAST LUMPECTOMY CAVITY (SUPERIOR MARGIN); Surgeon: Obi Mccauley MD;  [facility]: [facility];  Service: Surgical Oncology    BREAST SURGERY Left     CATARACT EXTRACTION, BILATERAL Bilateral     KNEE SURGERY Right     RETINAL DETACHMENT SURGERY Right     TUBAL LIGATION       Family History   Problem Relation Age of Onset    Diabetes Mother     Hypertension Mother     Diabetes Father     Hypertension Father     Diabetes Brother     Hypertension Brother     Prostate cancer Brother     Cancer Maternal Grandfather      Social History     Social History    Marital status:      Spouse name: N/A    Number of children: N/A    Years of education: N/A     Occupational History    Not on file       Social History Main Topics    Smoking status: Never Smoker    Smokeless tobacco: Never Used    Alcohol use No    Drug use: No    Sexual activity: Yes     Other Topics Concern    Not on file     Social History Narrative    No narrative on file       Current Outpatient Prescriptions:     albuterol (2 5 mg/3 mL) 0 083 % nebulizer solution, Take 1 vial (2 5 mg total) by nebulization every 4 (four) hours as needed for wheezing or shortness of breath, Disp: 75 mL, Rfl: 0    albuterol (PROVENTIL HFA,VENTOLIN HFA) 90 mcg/act inhaler, Inhale 1 puff 2 (two) times a day  , Disp: , Rfl:     enalapril (VASOTEC) 20 mg tablet, Take 1 tablet (20 mg total) by mouth daily, Disp: 90 tablet, Rfl: 1    gabapentin (NEURONTIN) 300 mg capsule, take 1 capsule by mouth three times a day, Disp: 90 capsule, Rfl: 1    gemfibrozil (LOPID) 600 mg tablet, Take 600 mg by mouth daily  , Disp: , Rfl:     glipiZIDE (GLUCOTROL XL) 10 mg 24 hr tablet, Take 1 tablet (10 mg total) by mouth daily, Disp: 90 tablet, Rfl: 1    HUMULIN N 100 UNIT/ML subcutaneous injection, 70 units in the am 30 units in the pm, Disp: 30 mL, Rfl: 5    Insulin Glargine (TOUJEO) injection pen 300 units/mL, Inject 10 Units under the skin daily at bedtime, Disp: 3 pen, Rfl: 1    levothyroxine 50 mcg tablet, Take 1 tablet (50 mcg total) by mouth daily, Disp: 90 tablet, Rfl: 1    metFORMIN (GLUCOPHAGE-XR) 500 mg 24 hr tablet, take 2 tablets by mouth once daily WITH BREAKFAST, Disp: , Rfl: 0    omeprazole (PriLOSEC) 20 mg delayed release capsule, take 1 capsule by mouth once daily, Disp: 90 capsule, Rfl: 2    polyethylene glycol (MIRALAX) 17 g packet, Take 17 g by mouth 2 (two) times a day, Disp: 60 each, Rfl: 5  Allergies   Allergen Reactions    Aspirin Hives    Tylenol With Codeine #3 [Acetaminophen-Codeine] Rash     Vitals:    08/22/18 1246   BP: 136/84   Pulse: 84   Resp: 14   Temp: 98 4 °F (36 9 °C)       Physical Exam   Constitutional: She is oriented to person, place, and time  She appears well-developed and well-nourished  No distress  HENT:   Head: Normocephalic  Pulmonary/Chest: Effort normal    Right breast and right axilla surgical incision  Right breast edematous with mild erythema noted  Prominent erythema right axillary incision  The distal aspect of the skin is not well approximated  Wound intact beneath skin surface  No tunneling  Scant amount of what appears to be granulation tissue noted at small open area  No purulent discharge noted  Examined by myself and Dr Dameon Cardoza  Neurological: She is alert and oriented to person, place, and time  Skin: Skin is warm  No rash noted  There is erythema  Psychiatric: She has a normal mood and affect  Vitals reviewed  Advance Care Planning/Advance Directives:  Discussed disease status, cancer treatment plans and/or cancer treatment goals with the patient

## 2018-09-02 DIAGNOSIS — E11.8 TYPE 2 DIABETES MELLITUS WITH COMPLICATION, WITHOUT LONG-TERM CURRENT USE OF INSULIN (HCC): ICD-10-CM

## 2018-09-02 RX ORDER — GABAPENTIN 300 MG/1
CAPSULE ORAL
Qty: 90 CAPSULE | Refills: 1 | Status: CANCELLED | OUTPATIENT
Start: 2018-09-02

## 2018-09-04 ENCOUNTER — OFFICE VISIT (OUTPATIENT)
Dept: FAMILY MEDICINE CLINIC | Facility: CLINIC | Age: 63
End: 2018-09-04
Payer: MEDICARE

## 2018-09-04 ENCOUNTER — ANESTHESIA EVENT (OUTPATIENT)
Dept: GASTROENTEROLOGY | Facility: MEDICAL CENTER | Age: 63
End: 2018-09-04
Payer: MEDICARE

## 2018-09-04 VITALS
OXYGEN SATURATION: 97 % | BODY MASS INDEX: 31.33 KG/M2 | RESPIRATION RATE: 18 BRPM | TEMPERATURE: 97.5 F | SYSTOLIC BLOOD PRESSURE: 126 MMHG | HEART RATE: 89 BPM | HEIGHT: 64 IN | DIASTOLIC BLOOD PRESSURE: 74 MMHG | WEIGHT: 183.5 LBS

## 2018-09-04 DIAGNOSIS — M25.561 CHRONIC PAIN OF RIGHT KNEE: ICD-10-CM

## 2018-09-04 DIAGNOSIS — G89.29 CHRONIC PAIN OF RIGHT KNEE: ICD-10-CM

## 2018-09-04 DIAGNOSIS — E11.8 TYPE 2 DIABETES MELLITUS WITH COMPLICATION, WITH LONG-TERM CURRENT USE OF INSULIN (HCC): ICD-10-CM

## 2018-09-04 DIAGNOSIS — E11.8 TYPE 2 DIABETES MELLITUS WITH COMPLICATION, WITHOUT LONG-TERM CURRENT USE OF INSULIN (HCC): Primary | ICD-10-CM

## 2018-09-04 DIAGNOSIS — Z12.4 PAP SMEAR FOR CERVICAL CANCER SCREENING: ICD-10-CM

## 2018-09-04 DIAGNOSIS — Z79.4 TYPE 2 DIABETES MELLITUS WITH COMPLICATION, WITH LONG-TERM CURRENT USE OF INSULIN (HCC): ICD-10-CM

## 2018-09-04 DIAGNOSIS — E11.9 TYPE 2 DIABETES MELLITUS WITHOUT COMPLICATION, WITHOUT LONG-TERM CURRENT USE OF INSULIN (HCC): ICD-10-CM

## 2018-09-04 DIAGNOSIS — Z12.11 COLON CANCER SCREENING: ICD-10-CM

## 2018-09-04 LAB — SL AMB POCT HEMOGLOBIN AIC: 11.5

## 2018-09-04 PROCEDURE — 83036 HEMOGLOBIN GLYCOSYLATED A1C: CPT | Performed by: FAMILY MEDICINE

## 2018-09-04 PROCEDURE — 99215 OFFICE O/P EST HI 40 MIN: CPT | Performed by: FAMILY MEDICINE

## 2018-09-04 NOTE — ASSESSMENT & PLAN NOTE
Lab Results   Component Value Date    HGBA1C 11 5 09/04/2018       No results for input(s): POCGLU in the last 72 hours  Blood Sugar Average: Last 72 hrs:  Discussed low carbohydrate diet  Counseled on portion control  Counseled on how to read food labels  Counseled on keep a food and BG journal  Counseled on importance of exercise  Start with 10 minute walk and slowly increase both duration and intensity   Consider joining the [facility] that is income based so you can do aqua aerobics or use the gym  Over 50% of 40 minute encounter was spent counseling on above  Referred to Endocrine  Referred to diabetic educator and nutritionist   Increase nighttime dose of Toujeo to 35, every 2 days if FBG above 150 increase insulin by 2 units

## 2018-09-04 NOTE — PROGRESS NOTES
Diabetic Foot Exam    Patient's shoes and socks removed  Right Foot/Ankle   Right Foot Inspection  Skin Exam: dry skin and erythema no warmth, no callus, no maceration, no abnormal color, no pre-ulcer, no ulcer and no callus                          Toe Exam: ROM and strength within normal limits  Sensory   Vibration: intact  Proprioception: intact   Monofilament testing: intact  Vascular  Capillary refills: < 3 seconds  The right DP pulse is 2+  The right PT pulse is 2+  Left Foot/Ankle  Left Foot Inspection  Skin Exam: dry skin and erythemano warmth, no maceration, normal color, no pre-ulcer, no ulcer and no callus                         Toe Exam: ROM and strength within normal limits                   Sensory   Vibration: intact  Proprioception: intact  Monofilament: intact  Vascular  Capillary refills: < 3 seconds  The left DP pulse is 1+  The left PT pulse is 1+  Assessment/Plan:    No problem-specific Assessment & Plan notes found for this encounter  Problem List Items Addressed This Visit        Endocrine    Type 2 diabetes mellitus with complication, with long-term current use of insulin ([facility] )     Lab Results   Component Value Date    HGBA1C 11 5 09/04/2018       No results for input(s): POCGLU in the last 72 hours  Blood Sugar Average: Last 72 hrs:  Discussed low carbohydrate diet  Counseled on portion control  Counseled on how to read food labels  Counseled on keep a food and BG journal  Counseled on importance of exercise  Start with 10 minute walk and slowly increase both duration and intensity   Consider joining the [facility] that is income based so you can do aqua aerobics or use the gym  Over 50% of 40 minute encounter was spent counseling on above  Referred to Endocrine  Referred to diabetic educator and nutritionist   Increase nighttime dose of Toujeo to 35, every 2 days if FBG above 150 increase insulin by 2 units          Relevant Medications    Insulin Glargine (TOUJEO) 300 units/mL CONCETRATED U-300 injection pen       Other    Chronic pain of right knee     Rest  Elevate  Ice 15 minutes BID  Knee sleeve   Referred to Ortho          Relevant Orders    Ambulatory referral to Orthopedic Surgery      Other Visit Diagnoses     Type 2 diabetes mellitus with complication, without long-term current use of insulin (HCC)    -  Primary    Relevant Medications    Insulin Glargine (TOUJEO) 300 units/mL CONCETRATED U-300 injection pen    Other Relevant Orders    Ambulatory referral to Diabetic Education    Ambulatory referral to medical nutrition therapy for diabetes    Glucometer test strips    Colon cancer screening        Relevant Orders    Ambulatory referral to Gastroenterology    Pap smear for cervical cancer screening        Relevant Orders    Ambulatory referral to Obstetrics / Gynecology    Type 2 diabetes mellitus without complication, without long-term current use of insulin (HCC)        Relevant Medications    Insulin Glargine (TOUJEO) 300 units/mL CONCETRATED U-300 injection pen    Other Relevant Orders    POCT hemoglobin A1c (Completed)            Subjective:      Patient ID: Laura Conrad is a 65 y o  female  HPI    The following portions of the patient's history were reviewed and updated as appropriate:   She  has a past medical history of Asthma; Breast cancer ([address] ); Cancer ([address] ); Diabetes mellitus ([address] ); Hiatal hernia; Hypercholesterolemia; Hypertension; Hypothyroid; and Rheumatoid arthritis ([address] )    She   Patient Active Problem List    Diagnosis Date Noted    Chronic pain of right knee 09/04/2018    Encounter for diabetic foot exam ([address] ) 09/04/2018    Cellulitis of right axilla 08/22/2018    Epigastric pain 08/08/2018    Hiatal hernia 08/08/2018    Screening for colon cancer 08/08/2018    Esophageal dysphagia 08/08/2018    Malignant neoplasm of upper-outer quadrant of right breast in female, estrogen receptor positive ([address] ) 07/19/2018    [facility] of [facility] ) 06/26/2018    Neck pain 06/13/2018    Constipation 06/13/2018    Primary insomnia 06/13/2018    Ductal carcinoma in situ (DCIS) of right breast 06/11/2018    Chronic bilateral low back pain without sciatica 05/22/2018    Palpitations 05/09/2018    Preop examination 05/09/2018    Type 2 diabetes mellitus with complication, with long-term current use of insulin ([facility] ) 03/27/2018    Other specified hypothyroidism 03/27/2018    Chest pain 03/27/2018    Asthma 09/22/2009    Essential hypertension 09/22/2009     She  has a past surgical history that includes Tubal ligation; Knee surgery (Right); Cataract extraction, bilateral (Bilateral); Retinal detachment surgery (Right); Breast surgery (Left); Breast lumpectomy (Right, 6/26/2018); Breast lumpectomy (Right, 8/2/2018); and Breast biopsy  Her family history includes Cancer in her maternal grandfather; Diabetes in her brother, father, and mother; Hypertension in her brother, father, and mother; Prostate cancer in her brother  She  reports that she has never smoked  She has never used smokeless tobacco  She reports that she does not drink alcohol or use drugs    Current Outpatient Prescriptions   Medication Sig Dispense Refill    albuterol (2 5 mg/3 mL) 0 083 % nebulizer solution Take 1 vial (2 5 mg total) by nebulization every 4 (four) hours as needed for wheezing or shortness of breath 75 mL 0    albuterol (PROVENTIL HFA,VENTOLIN HFA) 90 mcg/act inhaler Inhale 1 puff 2 (two) times a day        enalapril (VASOTEC) 20 mg tablet Take 1 tablet (20 mg total) by mouth daily 90 tablet 1    gabapentin (NEURONTIN) 300 mg capsule take 1 capsule by mouth three times a day 90 capsule 1    gemfibrozil (LOPID) 600 mg tablet Take 600 mg by mouth daily        glipiZIDE (GLUCOTROL XL) 10 mg 24 hr tablet Take 1 tablet (10 mg total) by mouth daily 90 tablet 1    HUMULIN N 100 UNIT/ML subcutaneous injection 70 units in the am 30 units in the pm 30 mL 5  Insulin Glargine (TOUJEO) 300 units/mL CONCETRATED U-300 injection pen Inject 35 Units under the skin daily at bedtime 5 pen 3    levothyroxine 50 mcg tablet Take 1 tablet (50 mcg total) by mouth daily 90 tablet 1    metFORMIN (GLUCOPHAGE-XR) 500 mg 24 hr tablet take 2 tablets by mouth once daily WITH BREAKFAST  0    omeprazole (PriLOSEC) 20 mg delayed release capsule take 1 capsule by mouth once daily 90 capsule 2    polyethylene glycol (MIRALAX) 17 g packet Take 17 g by mouth 2 (two) times a day 60 each 5     No current facility-administered medications for this visit  Current Outpatient Prescriptions on File Prior to Visit   Medication Sig    albuterol (2 5 mg/3 mL) 0 083 % nebulizer solution Take 1 vial (2 5 mg total) by nebulization every 4 (four) hours as needed for wheezing or shortness of breath    albuterol (PROVENTIL HFA,VENTOLIN HFA) 90 mcg/act inhaler Inhale 1 puff 2 (two) times a day      enalapril (VASOTEC) 20 mg tablet Take 1 tablet (20 mg total) by mouth daily    gabapentin (NEURONTIN) 300 mg capsule take 1 capsule by mouth three times a day    gemfibrozil (LOPID) 600 mg tablet Take 600 mg by mouth daily      glipiZIDE (GLUCOTROL XL) 10 mg 24 hr tablet Take 1 tablet (10 mg total) by mouth daily    HUMULIN N 100 UNIT/ML subcutaneous injection 70 units in the am 30 units in the pm    levothyroxine 50 mcg tablet Take 1 tablet (50 mcg total) by mouth daily    metFORMIN (GLUCOPHAGE-XR) 500 mg 24 hr tablet take 2 tablets by mouth once daily WITH BREAKFAST    omeprazole (PriLOSEC) 20 mg delayed release capsule take 1 capsule by mouth once daily    polyethylene glycol (MIRALAX) 17 g packet Take 17 g by mouth 2 (two) times a day    [DISCONTINUED] Insulin Glargine (TOUJEO) injection pen 300 units/mL Inject 10 Units under the skin daily at bedtime     No current facility-administered medications on file prior to visit           Review of Systems      Objective:      /74 (BP Location: Right arm, Patient Position: Sitting, Cuff Size: Standard)   Pulse 89   Temp 97 5 °F (36 4 °C) (Tympanic)   Resp 18   Ht 5' 4" (1 626 m)   Wt 83 2 kg (183 lb 8 oz)   SpO2 97%   BMI 31 50 kg/m²          Physical Exam   Cardiovascular:   Pulses:       Dorsalis pedis pulses are 2+ on the right side, and 1+ on the left side  Posterior tibial pulses are 2+ on the right side, and 1+ on the left side  Feet:   Right Foot:   Skin Integrity: Positive for erythema and dry skin  Negative for ulcer, skin breakdown, warmth or callus  Left Foot:   Skin Integrity: Positive for erythema and dry skin  Negative for ulcer, skin breakdown, warmth or callus

## 2018-09-04 NOTE — PROGRESS NOTES
Assessment/Plan:    Chronic pain of right knee  Rest  Elevate  Ice 15 minutes BID  Knee sleeve   Referred to Ortho     Type 2 diabetes mellitus with complication, with long-term current use of insulin ([facility])  Lab Results   Component Value Date    HGBA1C 11 5 09/04/2018       No results for input(s): POCGLU in the last 72 hours  Blood Sugar Average: Last 72 hrs:  Discussed low carbohydrate diet  Counseled on portion control  Counseled on how to read food labels  Counseled on keep a food and BG journal  Counseled on importance of exercise  Start with 10 minute walk and slowly increase both duration and intensity  Consider joining the [facility] that is income based so you can do aqua aerobics or use the gym  Over 50% of 40 minute encounter was spent counseling on above  Referred to Endocrine  Referred to diabetic educator and nutritionist   Increase nighttime dose of Toujeo to 35, every 2 days if FBG above 150 increase insulin by 2 units          Problem List Items Addressed This Visit        Endocrine    Type 2 diabetes mellitus with complication, with long-term current use of insulin ([facility] )     Lab Results   Component Value Date    HGBA1C 11 5 09/04/2018       No results for input(s): POCGLU in the last 72 hours  Blood Sugar Average: Last 72 hrs:  Discussed low carbohydrate diet  Counseled on portion control  Counseled on how to read food labels  Counseled on keep a food and BG journal  Counseled on importance of exercise  Start with 10 minute walk and slowly increase both duration and intensity   Consider joining [facility] that is income based so you can do aqua aerobics or use the gym  Over 50% of 40 minute encounter was spent counseling on above  Referred to Endocrine  Referred to diabetic educator and nutritionist   Increase nighttime dose of Toujeo to 35, every 2 days if FBG above 150 increase insulin by 2 units          Relevant Medications    Insulin Glargine (TOUJEO) 300 units/mL CONCETRATED U-300 injection pen Other    Chronic pain of right knee     Rest  Elevate  Ice 15 minutes BID  Knee sleeve   Referred to Ortho          Relevant Orders    Ambulatory referral to Orthopedic Surgery      Other Visit Diagnoses     Type 2 diabetes mellitus with complication, without long-term current use of insulin (HCC)    -  Primary    Relevant Medications    Insulin Glargine (TOUJEO) 300 units/mL CONCETRATED U-300 injection pen    Other Relevant Orders    Ambulatory referral to Diabetic Education    Ambulatory referral to medical nutrition therapy for diabetes    Glucometer test strips    Colon cancer screening        Relevant Orders    Ambulatory referral to Gastroenterology    Pap smear for cervical cancer screening        Relevant Orders    Ambulatory referral to Obstetrics / Gynecology    Type 2 diabetes mellitus without complication, without long-term current use of insulin (HCC)        Relevant Medications    Insulin Glargine (TOUJEO) 300 units/mL CONCETRATED U-300 injection pen    Other Relevant Orders    POCT hemoglobin A1c (Completed)            Subjective:      Patient ID: Akash Reyes is a 65 y o  female  Diabetes   She presents for her follow-up diabetic visit  She has type 2 diabetes mellitus  Her disease course has been worsening  There are no hypoglycemic associated symptoms  Associated symptoms include foot paresthesias and polyuria  There are no hypoglycemic complications  Symptoms are stable  Risk factors for coronary artery disease include dyslipidemia, hypertension and sedentary lifestyle  Current diabetic treatment includes insulin injections  She is compliant with treatment most of the time  Her weight is stable  When asked about meal planning, she reported none  She has not had a previous visit with a dietitian  She rarely participates in exercise  An ACE inhibitor/angiotensin II receptor blocker is being taken  She does not see a podiatrist Eye exam is current     Knee Pain    The incident occurred more than 1 week ago  There was no injury mechanism  The pain is present in the right knee  The quality of the pain is described as aching  The pain is at a severity of 7/10  The pain has been constant since onset  She reports no foreign bodies present  The symptoms are aggravated by movement, weight bearing and palpation  She has tried acetaminophen and NSAIDs for the symptoms  The treatment provided mild relief  The following portions of the patient's history were reviewed and updated as appropriate:   She  has a past medical history of Asthma; Breast cancer ([facility] ); Cancer ([facility] ); Diabetes mellitus ([facility] ); Hiatal hernia; Hypercholesterolemia; Hypertension; Hypothyroid; and Rheumatoid arthritis ([facility] )  She   Patient Active Problem List    Diagnosis Date Noted    Chronic pain of right knee 09/04/2018    Encounter for diabetic foot exam ([facility] ) 09/04/2018    Cellulitis of right axilla 08/22/2018    Epigastric pain 08/08/2018    Hiatal hernia 08/08/2018    Screening for colon cancer 08/08/2018    Esophageal dysphagia 08/08/2018    Malignant neoplasm of upper-outer quadrant of right breast in female, estrogen receptor positive ([facility] ) 07/19/2018    Malignant neoplasm of right female breast ([facility] ) 06/26/2018    Neck pain 06/13/2018    Constipation 06/13/2018    Primary insomnia 06/13/2018    Ductal carcinoma in situ (DCIS) of right breast 06/11/2018    Chronic bilateral low back pain without sciatica 05/22/2018    Palpitations 05/09/2018    Preop examination 05/09/2018    Type 2 diabetes mellitus with complication, with long-term current use of insulin ([facility] ) 03/27/2018    Other specified hypothyroidism 03/27/2018    Chest pain 03/27/2018    Asthma 09/22/2009    Essential hypertension 09/22/2009     She  has a past surgical history that includes Tubal ligation; Knee surgery (Right); Cataract extraction, bilateral (Bilateral); Retinal detachment surgery (Right); Breast surgery (Left);  Breast lumpectomy (Right, 6/26/2018); Breast lumpectomy (Right, 8/2/2018); and Breast biopsy  Her family history includes Cancer in her maternal grandfather; Diabetes in her brother, father, and mother; Hypertension in her brother, father, and mother; Prostate cancer in her brother  She  reports that she has never smoked  She has never used smokeless tobacco  She reports that she does not drink alcohol or use drugs  Current Outpatient Prescriptions   Medication Sig Dispense Refill    albuterol (2 5 mg/3 mL) 0 083 % nebulizer solution Take 1 vial (2 5 mg total) by nebulization every 4 (four) hours as needed for wheezing or shortness of breath 75 mL 0    albuterol (PROVENTIL HFA,VENTOLIN HFA) 90 mcg/act inhaler Inhale 1 puff 2 (two) times a day        enalapril (VASOTEC) 20 mg tablet Take 1 tablet (20 mg total) by mouth daily 90 tablet 1    gabapentin (NEURONTIN) 300 mg capsule take 1 capsule by mouth three times a day 90 capsule 1    gemfibrozil (LOPID) 600 mg tablet Take 600 mg by mouth daily        glipiZIDE (GLUCOTROL XL) 10 mg 24 hr tablet Take 1 tablet (10 mg total) by mouth daily 90 tablet 1    HUMULIN N 100 UNIT/ML subcutaneous injection 70 units in the am 30 units in the pm 30 mL 5    Insulin Glargine (TOUJEO) 300 units/mL CONCETRATED U-300 injection pen Inject 35 Units under the skin daily at bedtime 5 pen 3    levothyroxine 50 mcg tablet Take 1 tablet (50 mcg total) by mouth daily 90 tablet 1    metFORMIN (GLUCOPHAGE-XR) 500 mg 24 hr tablet take 2 tablets by mouth once daily WITH BREAKFAST  0    omeprazole (PriLOSEC) 20 mg delayed release capsule take 1 capsule by mouth once daily 90 capsule 2    polyethylene glycol (MIRALAX) 17 g packet Take 17 g by mouth 2 (two) times a day 60 each 5     No current facility-administered medications for this visit        Current Outpatient Prescriptions on File Prior to Visit   Medication Sig    albuterol (2 5 mg/3 mL) 0 083 % nebulizer solution Take 1 vial (2 5 mg total) by nebulization every 4 (four) hours as needed for wheezing or shortness of breath    albuterol (PROVENTIL HFA,VENTOLIN HFA) 90 mcg/act inhaler Inhale 1 puff 2 (two) times a day      enalapril (VASOTEC) 20 mg tablet Take 1 tablet (20 mg total) by mouth daily    gabapentin (NEURONTIN) 300 mg capsule take 1 capsule by mouth three times a day    gemfibrozil (LOPID) 600 mg tablet Take 600 mg by mouth daily      glipiZIDE (GLUCOTROL XL) 10 mg 24 hr tablet Take 1 tablet (10 mg total) by mouth daily    HUMULIN N 100 UNIT/ML subcutaneous injection 70 units in the am 30 units in the pm    levothyroxine 50 mcg tablet Take 1 tablet (50 mcg total) by mouth daily    metFORMIN (GLUCOPHAGE-XR) 500 mg 24 hr tablet take 2 tablets by mouth once daily WITH BREAKFAST    omeprazole (PriLOSEC) 20 mg delayed release capsule take 1 capsule by mouth once daily    polyethylene glycol (MIRALAX) 17 g packet Take 17 g by mouth 2 (two) times a day    [DISCONTINUED] Insulin Glargine (TOUJEO) injection pen 300 units/mL Inject 10 Units under the skin daily at bedtime     No current facility-administered medications on file prior to visit       Review of Systems   Endocrine: Positive for polyuria  Musculoskeletal: Positive for arthralgias and back pain  All other systems reviewed and are negative  Objective:      /74 (BP Location: Right arm, Patient Position: Sitting, Cuff Size: Standard)   Pulse 89   Temp 97 5 °F (36 4 °C) (Tympanic)   Resp 18   Ht 5' 4" (1 626 m)   Wt 83 2 kg (183 lb 8 oz)   SpO2 97%   BMI 31 50 kg/m²          Physical Exam   Constitutional: She is oriented to person, place, and time  She appears well-developed  HENT:   Head: Normocephalic  Right Ear: External ear normal    Left Ear: External ear normal    Nose: Nose normal    Mouth/Throat: Oropharynx is clear and moist    Eyes: Conjunctivae and EOM are normal  Pupils are equal, round, and reactive to light     Neck: Normal range of motion  Neck supple  No thyromegaly present  Cardiovascular: Normal rate, regular rhythm and normal heart sounds  Pulmonary/Chest: Effort normal and breath sounds normal    Abdominal: Soft  There is no tenderness  There is no rebound and no guarding  Musculoskeletal: She exhibits tenderness  Right knee: She exhibits swelling  Tenderness found  Medial joint line and lateral joint line tenderness noted  Neurological: She is alert and oriented to person, place, and time  She has normal reflexes  Skin: Skin is dry  Psychiatric: She has a normal mood and affect  Nursing note and vitals reviewed

## 2018-09-05 ENCOUNTER — ANESTHESIA (OUTPATIENT)
Dept: GASTROENTEROLOGY | Facility: MEDICAL CENTER | Age: 63
End: 2018-09-05
Payer: MEDICARE

## 2018-09-05 ENCOUNTER — HOSPITAL ENCOUNTER (OUTPATIENT)
Facility: MEDICAL CENTER | Age: 63
Setting detail: OUTPATIENT SURGERY
Discharge: HOME | End: 2018-09-05
Attending: INTERNAL MEDICINE | Admitting: INTERNAL MEDICINE
Payer: MEDICARE

## 2018-09-05 VITALS
RESPIRATION RATE: 16 BRPM | DIASTOLIC BLOOD PRESSURE: 56 MMHG | HEART RATE: 86 BPM | SYSTOLIC BLOOD PRESSURE: 114 MMHG | TEMPERATURE: 98.8 F | OXYGEN SATURATION: 94 %

## 2018-09-05 DIAGNOSIS — R10.13 EPIGASTRIC PAIN: ICD-10-CM

## 2018-09-05 DIAGNOSIS — Z12.11 SCREENING FOR COLON CANCER: ICD-10-CM

## 2018-09-05 DIAGNOSIS — K44.9 HIATAL HERNIA: ICD-10-CM

## 2018-09-05 DIAGNOSIS — R13.19 ESOPHAGEAL DYSPHAGIA: ICD-10-CM

## 2018-09-05 LAB — GLUCOSE SERPL-MCNC: 199 MG/DL (ref 65–140)

## 2018-09-05 PROCEDURE — 82948 REAGENT STRIP/BLOOD GLUCOSE: CPT

## 2018-09-05 PROCEDURE — 88305 TISSUE EXAM BY PATHOLOGIST: CPT | Performed by: PATHOLOGY

## 2018-09-05 PROCEDURE — 43239 EGD BIOPSY SINGLE/MULTIPLE: CPT | Performed by: INTERNAL MEDICINE

## 2018-09-05 PROCEDURE — 45385 COLONOSCOPY W/LESION REMOVAL: CPT | Performed by: INTERNAL MEDICINE

## 2018-09-05 PROCEDURE — 45380 COLONOSCOPY AND BIOPSY: CPT | Performed by: INTERNAL MEDICINE

## 2018-09-05 PROCEDURE — 88342 IMHCHEM/IMCYTCHM 1ST ANTB: CPT | Performed by: PATHOLOGY

## 2018-09-05 RX ORDER — SODIUM CHLORIDE 9 MG/ML
125 INJECTION, SOLUTION INTRAVENOUS CONTINUOUS
Status: DISCONTINUED | OUTPATIENT
Start: 2018-09-05 | End: 2018-09-05 | Stop reason: HOSPADM

## 2018-09-05 RX ORDER — PROPOFOL 10 MG/ML
INJECTION, EMULSION INTRAVENOUS AS NEEDED
Status: DISCONTINUED | OUTPATIENT
Start: 2018-09-05 | End: 2018-09-05 | Stop reason: SURG

## 2018-09-05 RX ADMIN — PROPOFOL 50 MG: 10 INJECTION, EMULSION INTRAVENOUS at 07:43

## 2018-09-05 RX ADMIN — PROPOFOL 150 MG: 10 INJECTION, EMULSION INTRAVENOUS at 07:39

## 2018-09-05 RX ADMIN — PROPOFOL 50 MG: 10 INJECTION, EMULSION INTRAVENOUS at 08:05

## 2018-09-05 RX ADMIN — PROPOFOL 50 MG: 10 INJECTION, EMULSION INTRAVENOUS at 08:01

## 2018-09-05 RX ADMIN — SODIUM CHLORIDE 125 ML/HR: 0.9 INJECTION, SOLUTION INTRAVENOUS at 07:36

## 2018-09-05 RX ADMIN — PROPOFOL 50 MG: 10 INJECTION, EMULSION INTRAVENOUS at 07:56

## 2018-09-05 RX ADMIN — PROPOFOL 50 MG: 10 INJECTION, EMULSION INTRAVENOUS at 07:52

## 2018-09-05 NOTE — ANESTHESIA PREPROCEDURE EVALUATION
Review of Systems/Medical History          Cardiovascular  Hyperlipidemia, Hypertension ,    Pulmonary  Asthma ,        GI/Hepatic     Hiatal hernia,             Endo/Other  Diabetes Insulin, History of thyroid disease , hypothyroidism,      GYN       Hematology  Negative hematology ROS      Musculoskeletal  Rheumatoid arthritis ,        Neurology   Psychology           Physical Exam    Airway    Mallampati score: II  TM Distance: >3 FB  Neck ROM: full     Dental   No notable dental hx     Cardiovascular  Rhythm: regular, Rate: normal, Cardiovascular exam normal    Pulmonary  Pulmonary exam normal     Other Findings        Anesthesia Plan  ASA Score- 3     Anesthesia Type- IV sedation with anesthesia with ASA Monitors  Additional Monitors:   Airway Plan:         Plan Factors-    Induction- intravenous  Postoperative Plan-     Informed Consent- Anesthetic plan and risks discussed with patient

## 2018-09-05 NOTE — PROCEDURES
Colonoscopy Procedure Note    Procedure: Colonoscopy    Sedation: Monitored anesthesia care, check anesthesia records      ASA Class: 2    INDICATIONS: screening for colon cancer     POST-OP DIAGNOSIS: See the impression below    Procedure Details     Prior colonoscopy: No prior colonoscopy  Informed consent was obtained for the procedure, including sedation  Risks of perforation, hemorrhage, adverse drug reaction and aspiration were discussed  The patient was placed in the left lateral decubitus position  Based on the pre-procedure assessment, including review of the patient's medical history, medications, allergies, and review of systems, she had been deemed to be an appropriate candidate for conscious sedation; she was therefore sedated with the medications listed below  The patient was monitored continuously with telemetry, pulse oximetry, blood pressure monitoring, and direct observations  A rectal examination was performed  The colonoscope was inserted into the rectum and advanced under direct vision to the cecum, which was identified by the ileocecal valve and appendiceal orifice  The quality of the colonic preparation was good  A careful inspection was made as the colonoscope was withdrawn, including a retroflexed view of the rectum; findings and interventions are described below  Findings:  16 mm  Ascending colon polyp found removed by snare cautery polypectomy  Clip placed at the site of the polypectomy  3 mm polyp seen in the ascending colon removed by cold biopsy polypectomy  5 mm polyps in the rectum removed by cold snare polypectomy  2 mm polyp seen in rectum removed by cold biopsy polypectomy           Complications: None; patient tolerated the procedure well  Impression:     multiple colonic polyps removed     Recommendations:  Repeat colonoscopy in 3 years if polyp is an adenoma

## 2018-09-05 NOTE — PROCEDURES
ESOPHAGOGASTRODUODENOSCOPY    PROCEDURE: EGD    SEDATION: Monitored anesthesia care, check anesthesia records    ASA Class: 2    INDICATIONS:  Epigastric pain, dysphagia    CONSENT:  Informed consent was obtained for the procedure, including sedation after explaining the risks and benefits of the procedure  Risks including but not limited to bleeding, perforation, infection, and missed lesion  PREPARATION:   Telemetry, pulse oximetry, blood pressure were monitored throughout the procedure  Patient was identified by myself both verbally and by visual inspection of ID band  DESCRIPTION:   Patient was placed in the left lateral decubitus position and was sedated with the above medication  The gastroscope was introduced in to the oropharynx and the esophagus was intubated under direct visualization  Scope was passed down the esophagus up to 2nd part of the duodenum  A careful inspection was made as the gastroscope was withdrawn, including a retroflexed view of the stomach; findings and interventions are described below  FINDINGS:    #1  Esophagus- normal with small hiatal hernia measuring approximately 1-2 cm  Proximal esophageal biopsies performed for eosinophilic esophagitis  #2  Stomach-  Normal biopsy performed for further evaluation for H pylori  Biopsies taken from stomach and antrum  #3  Duodenum-   Normal random biopsies performed from D1 and D2  IMPRESSIONS:        Normal with small hiatal hernia    RECOMMENDATIONS:      follow-up biopsy results    COMPLICATIONS:  None; patient tolerated the procedure well            DISPOSITION: PACU           CONDITION: Stable

## 2018-09-05 NOTE — H&P
History and Physical - [facility]  Hung Cartagena Carroll 65 y o  female MRN: [id number]                  HPI: Awa Ansari is a 65y o  year old female who presents for EGD and colonoscopy evaluation  REVIEW OF SYSTEMS: Per the HPI, and otherwise unremarkable  Historical Information   Past Medical History:   Diagnosis Date    Asthma     Breast cancer ([facility] )     Cancer ([facility] )     BREAST    Diabetes mellitus ([place] [address] )     Hiatal hernia     Hypercholesterolemia     Hypertension     Hypothyroid     Rheumatoid arthritis ([place] [address] )      Past Surgical History:   Procedure Laterality Date    BREAST BIOPSY      BREAST LUMPECTOMY Right 6/26/2018    Procedure: RIGHT BREAST NEEDLE LOCALIZATION X2 WITH RIGHT BREAST LUMPECTOMY ( NEEDLE LOC @ 1000); Surgeon: Nimo Greco MD;  Location: [facility];  Service: Surgical Oncology    BREAST LUMPECTOMY Right 8/2/2018    Procedure: LYMPHOSCINITGRAPHY INTRAOPERATIVE LYMPHATIC MAPPING , RIGHT SENTINEL NODE BIOPSY, REEXCISION  RIGHT BREAST LUMPECTOMY CAVITY (SUPERIOR MARGIN);   Surgeon: Nimo Greco MD;  Location: [facility];  Service: Surgical Oncology    BREAST SURGERY Left     CATARACT EXTRACTION, BILATERAL Bilateral     KNEE SURGERY Right     RETINAL DETACHMENT SURGERY Right     TUBAL LIGATION       Social History   History   Alcohol Use No     History   Drug Use No     History   Smoking Status    Never Smoker   Smokeless Tobacco    Never Used     Family History   Problem Relation Age of Onset    Diabetes Mother     Hypertension Mother     Diabetes Father     Hypertension Father     Diabetes Brother     Hypertension Brother     Prostate cancer Brother     Cancer Maternal Grandfather        Meds/Allergies     Prescriptions Prior to Admission   Medication    enalapril (VASOTEC) 20 mg tablet    gabapentin (NEURONTIN) 300 mg capsule    gemfibrozil (LOPID) 600 mg tablet    glipiZIDE (GLUCOTROL XL) 10 mg 24 hr tablet    HUMULIN N 100 UNIT/ML subcutaneous injection    Insulin Glargine (TOUJEO) 300 units/mL CONCETRATED U-300 injection pen    levothyroxine 50 mcg tablet    metFORMIN (GLUCOPHAGE-XR) 500 mg 24 hr tablet    omeprazole (PriLOSEC) 20 mg delayed release capsule    polyethylene glycol (MIRALAX) 17 g packet    albuterol (2 5 mg/3 mL) 0 083 % nebulizer solution    albuterol (PROVENTIL HFA,VENTOLIN HFA) 90 mcg/act inhaler       Allergies   Allergen Reactions    Aspirin Hives    Tylenol With Codeine #3 [Acetaminophen-Codeine] Rash       Objective     not currently breastfeeding  PHYSICAL EXAM    Gen: NAD  CV: RRR  CHEST: Clear  ABD: soft, NT/ND  EXT: no edema      ASSESSMENT/PLAN:  This is a 65y o  year old female here for   Epigastric pain and screening colonoscopy and she is stable and optimized for her procedure

## 2018-09-05 NOTE — DISCHARGE INSTRUCTIONS
Normal with small hiatal hernia   follow-up biopsy results   repeat colonoscopy in 3 years   if there is any rectal bleeding please  Go to ER immediately                Endoscopia superior   LO QUE NECESITA SABER:   Michelle endoscopia superior también se conoce nelli endoscopia gastrointestinal (GI) superior o esofagogastroduodenoscopia (EGD)  Usted podría sentirse inflamado, con gases o sentir molestia abdominal después de pruitt procedimiento  Pruitt garganta podría estar adolorida por 24 a 36 horas  Usted podría eructar o expulsar gas debido al aire que todavía está en pruitt cuerpo  INSTRUCCIONES SOBRE EL ALLEGRA HOSPITALARIA:   Llame al 911 en solo de presentar lo siguiente:   · Tiene dolor en el pecho o dificultad para respirar de forma repentina  Busque atención médica de inmediato si:   · Está mareado o siente que se va a desmayar  · Usted tiene dificultad para tragar  · Tia evacuaciones intestinales son Lugene Downer o negras  · Pruitt abdomen está bob y firme y usted siente dolor intenso  · Usted vomita teresa  Pregúntele a pruitt Zhane Embs vitaminas y minerales son adecuados para usted  · Usted se siente lleno o hinchado y no puede eructar o expulsar gas  · Usted no ha tenido michelle evacuación intestinal después de 3 días de pruitt procedimiento  · Usted tiene dolor de jayda  · Usted tiene fiebre o escalofríos  · Usted tiene náuseas o está vomitando  · Usted tiene salpullido o urticaria  · Usted tiene preguntas o inquietudes acerca de pruitt endoscopia  Alivie el dolor de garganta:  Chupe pastillas para la garganta o hielo triturado  Charles gárgaras con michelle pequeña cantidad de agua tibia con sal  Mezcle 1 cucharadita de sal y 1 taza de agua tibia para hacer agua salada  Alivie el gas y la molestia de la inflamación:  Acuéstese sobre pruitt costado derecho con michelle almohada térmica sobre pruitt abdomen  Camine un poco para ayudar a expulsar el gas   Coma comidas pequeñas hasta que se alivie de la inflamación  Descanse después de mccormick procedimiento:  A usted le alba administrado medicamento para relajarse  No  maneje o tome decisiones importantes hasta el día siguiente de mccormick procedimiento  Regrese a joe actividades normales según le indiquen  Usted generalmente puede regresar al Bartolo Frankel al día siguiente de mccormick procedimiento  Acuda a joe consultas de control con mccormick médico según le indicaron  Anote joe preguntas para que se acuerde de hacerlas fabio joe visitas  © 2017 chandra Vicente Hannikamlesh  Information is for End User's use only and may not be sold, redistributed or otherwise used for commercial purposes  All illustrations and images included in CareNotes® are the copyrighted property of A D A M , Inc  or Zi Aragon  Esta información es sólo para uso en educación  Mccormick intención no es darle un consejo médico sobre enfermedades o tratamientos  Colsulte con mccormick Dewain High farmacéutico antes de seguir cualquier régimen médico para saber si es seguro y efectivo para usted  Colonoscopia   LO QUE NECESITA SABER:   Felicita colonoscopia es un procedimiento para examinar con un endoscopio el interior de mccormick colon (intestino)  Fabio felicita colonoscopia, es posible que le retiren pólipos o crecimientos de tejidos  Es normal que se sienta inflamado o que tenga molestia abdominal  Usted debería estar expulsando los gases  Si tiene hemorroides o si le removieron pólipos, usted podría presentar felicita pequeña cantidad de sangrado  INSTRUCCIONES SOBRE EL ALLEGRA HOSPITALARIA:   Busque atención médica de inmediato si:   · Usted presenta felicita cantidad alfred de teresa jose brillante en joe evacuaciones intestinales  · Mccormick abdomen está bob y firme y usted siente dolor intenso  · Usted tiene dificultad repentina para respirar  Pregúntele a mccormick Ailyn Juan Antonio vitaminas y minerales son adecuados para usted  · Usted presenta sarpullido o urticaria      · Usted tiene fiebre dentro de las 24 horas después de pruitt procedimiento  · Usted no ha tenido felicita evacuación intestinal después de 3 días de pruitt procedimiento  · Usted tiene preguntas o inquietudes acerca de pruitt condición o cuidado  Actividad:   · No levante nada, no se esfuerce o corra  por 3 días después de pruitt procedimiento  · Descanse después de pruitt procedimiento  A usted le alba administrado medicamento para relajarse  No  maneje o tome decisiones importantes hasta el día siguiente de pruitt procedimiento  Regrese a joe actividades normales según le indiquen  · Alivie los gases y la incomodidad de la inflamación  acostándose en pruitt costado derecho con felicita almohada térmica sobre pruitt abdomen  Es posible que necesite caminar un poco para ayudar a eliminar los gases  Coma comidas pequeñas hasta que se alivie de la inflamación  Si a usted le removieron pólipos:  Por 7 días después de pruitt procedimiento:  · No  tome aspirina  · No  realice paseos largos en shayy  Ayude a prevenir el estreñimiento:   · Consuma alimentos saludables y variados  Los alimentos saludables incluyen fruta, vegetales, panes integrales, productos lácteos bajo en grasa, frijoles, emer sin grasa, y pescado  Pregunte si necesita seguir felicita dieta especial  Pruitt médico puede recomendarle que coma alimentos ricos en fibra, nelli frijoles cocidos  La fibra lo ayuda a tener evacuaciones intestinales regulares  · [address] se diana haya indicado  Los adultos deberían de beber entre 9 a 13 vasos de 8 onzas de líquidos cada día  Pregunte cuál es la cantidad [facility] para usted  Para [facility], los mejores líquidos son Gazelle Risen, y [place]  · Ejercítese según indicaciones  Consulte con pruitt médico acerca de cuál es el mejor régimen de ejercicio para usted  El ejercicio puede ayudar a prevenir estreñimiento, reducir pruitt presión arterial y [employer]  Acuda a joe consultas de control con pruitt médico según le indicaron    Anote joe preguntas para que se acuerde de hacerlas fabio tia visitas  © 2017 [address] Information is for End User's use only and may not be sold, redistributed or otherwise used for commercial purposes  All illustrations and images included in CareNotes® are the copyrighted property of A D A M , Inc  or Zi Aragon  Esta información es sólo para uso en educación  Mccormick intención no es darle un consejo médico sobre enfermedades o tratamientos  Colsulte con mccormick Joaquim Healy farmacéutico antes de seguir cualquier régimen médico para saber si es seguro y efectivo para usted  Hernia hiatal   LO QUE NECESITA SABER:   Felicita hernia hiatal es felicita condición que provoca que parte de mccormick estómago se abulte a través del hiato (apertura pequeña) en mccormick diafragma  La parte del estómago podría moverse hacia arriba y [place], o podría quedarse atrapado en el diafragma  INSTRUCCIONES SOBRE EL ALLEGRA HOSPITALARIA:   Busque atención médica de inmediato si:   · Usted tiene dolor abdominal intenso  · Usted trata de vomitar kashif no sale nada  · Usted siente dolor jerry en el pecho y dificultad repentina para respirar  · Tia heces son negras o tienen teresa  · Mccormick vómito parece nelli café molido o contiene teresa  Pregúntele a mccormick Sasha Remedies vitaminas y minerales son adecuados para usted  · Tia síntomas están empeorando  · Usted tiene náusea y está vomitando  · Usted pierde peso sin proponérselo  · Usted tiene preguntas o inquietudes acerca de mccormick condición o cuidado  Medicamentos:   · Medicamentos,  podrían administrase para [employer] síntomas de la [place]  Estos medicamentos ayudan a disminuir u obstruir el ácido estomacal  Es posible que también le den medicamentos ayudan a hacer más estrecho el esfínter esofágico     · [place] tia medicamentos nelli se le haya indicado  Consulte con mccormick médico si usted giselle que mccormick medicamento no le está ayudando o si presenta efectos secundarios   Infórmele si es alérgico a cualquier medicamento  Mantenga felicita lista actualizada de los [employer], las vitaminas y los productos herbales que gilmar  Incluya los siguientes datos de los medicamentos: cantidad, frecuencia y motivo de administración  Traiga con usted la lista o los envases de la píldoras a joe citas de seguimiento  Lleve la lista de los medicamentos con usted en solo de felicita emergencia  Acuda a joe consultas de control con mccormick médico según le indicaron  Anote joe preguntas para que se acuerde de hacerlas fabio joe visitas  Cuidado personal:   · Evite los alimentos que empeoran joe síntomas  Estos podrían [employer], jugos de fruta, alcohol, cafeína, chocolate y [place]  · Coma porciones pequeñas fabio el día  Las porciones [employer] dan a mccormick estómago menos alimentos que digerir  · Evite acostarse e inclinarse después de comer  No consuma alimentos entre 2 y 3 horas antes de [place]  Scio disminuye mccormick riesgo de reflujo  · Mantenga un peso saludable  Si usted tiene sobrepeso, la pérdida de peso podría ayudarle a [employer]  · Duerma con mccormick hector elevada  al menos 6 pulgadas  · No fume  El fumar puede aumentar joe síntomas de [place]  © 2017 [address]  Information is for End User's use only and may not be sold, redistributed or otherwise used for commercial purposes  All illustrations and images included in CareNotes® are the copyrighted property of A D A M , Inc  or Zi Aragon  Esta información es sólo para uso en educación  Mccormick intención no es darle un consejo médico sobre enfermedades o tratamientos  Colsulte con mccormick Cheryl Harrison farmacéutico antes de seguir cualquier régimen médico para saber si es seguro y efectivo para usted  Pólipos colorrectales   LO QUE NECESITA SABER:   Los pólipos colorrectales son pequeñas protuberancias de tejido que se encuentran en el forro del colon y recto   Sofia Nicho de los pólipos son hiperplásticos y típicamente son benignos (no cancerosos)  Ciertos tipos de pólipos llamados adenomatosos, podrían convertirse en cáncer  INSTRUCCIONES SOBRE EL ALLEGRA HOSPITALARIA:   Edgar felicita manish de seguimiento con pruitt médico o gastroenterólogo nelli le indiquen:  Es probable que usted tenga que regresar para hacerse pruebas adicionales nelli otra colonoscopia  Anote joe preguntas para que se acuerde de hacerlas fabio joe visitas  Reduzca pruitt riesgo de pólipos colorrectales:   · Consuma alimentos saludables y variados:  Los alimentos saludables incluyen fruta, vegetales, panes integrales, productos lácteos bajo en grasa, frijoles, emre sin grasa, y pescado  Pregunte si necesita seguir felicita dieta especial     · Mantenga un peso saludable:  Pregunte al médico si necesita perder peso y cuánto  Pida ayuda con un programa para bajar de [place]  · Ejercicio:  Honey Maser lentamente y edgar más a medida que se sienta más jerry  Consulte con pruitt médico antes de empezar un régimen de ejercicios  · Limite el consumo de alcohol  Pruitt riesgo de tener pólipos aumenta cuanto más se narinder  · No fume:  Si usted fuma, nunca es demasiado tarde para dejar de hacerlo  Pida información para dejar de fumar  Para [de-identified] y más información:   · [address]  [address], [place] [address]  Phone: [phone]  Web Address: Radha Jordan  [facility] gov  Comuníquese con pruitt médico o gastroenterólogo si:   · Usted tiene fiebre  · Usted tiene escalofríos, tos o se siente débil y adolorido  · Usted tiene dolor abdominal que no desaparece o aumenta después de ganesh pruitt medicamento  · Pruitt abdomen se encuentra inflamado  · Usted pierde peso sin proponérselo  · Usted tiene preguntas o inquietudes acerca de pruitt condición o cuidado  Busque atención médica de inmediato o llame al 911 si:   · Usted tiene falta de aire repentina       · Tiene el ritmo cardíaco acelerado, la respiración acelerada o está demasiado mareado o débil para pararse  · Usted tiene dolor abdominal intenso  · Usted ve teresa en joe deposiciones  © 2017 [address] Information is for End User's use only and may not be sold, redistributed or otherwise used for commercial purposes  All illustrations and images included in CareNotes® are the copyrighted property of A D A M , Inc  or Zi Aragon  Esta información es sólo para uso en educación  Mccormick intención no es darle un consejo médico sobre enfermedades o tratamientos  Colsulte con mccormick Zelpha Roch farmacéutico antes de seguir cualquier régimen médico para saber si es seguro y efectivo para usted

## 2018-09-07 ENCOUNTER — OFFICE VISIT (OUTPATIENT)
Dept: HEMATOLOGY ONCOLOGY | Facility: CLINIC | Age: 63
End: 2018-09-07
Payer: MEDICARE

## 2018-09-07 ENCOUNTER — DOCUMENTATION (OUTPATIENT)
Dept: HEMATOLOGY ONCOLOGY | Facility: CLINIC | Age: 63
End: 2018-09-07

## 2018-09-07 ENCOUNTER — OFFICE VISIT (OUTPATIENT)
Dept: SURGICAL ONCOLOGY | Facility: CLINIC | Age: 63
End: 2018-09-07

## 2018-09-07 ENCOUNTER — PREP FOR PROCEDURE (OUTPATIENT)
Dept: SURGICAL ONCOLOGY | Facility: CLINIC | Age: 63
End: 2018-09-07

## 2018-09-07 VITALS
DIASTOLIC BLOOD PRESSURE: 70 MMHG | BODY MASS INDEX: 30.49 KG/M2 | RESPIRATION RATE: 18 BRPM | OXYGEN SATURATION: 94 % | SYSTOLIC BLOOD PRESSURE: 128 MMHG | WEIGHT: 183 LBS | TEMPERATURE: 97.3 F | HEIGHT: 65 IN | HEART RATE: 87 BPM

## 2018-09-07 DIAGNOSIS — Z17.0 MALIGNANT NEOPLASM OF UPPER-OUTER QUADRANT OF RIGHT BREAST IN FEMALE, ESTROGEN RECEPTOR POSITIVE (HCC): Primary | ICD-10-CM

## 2018-09-07 DIAGNOSIS — Z17.0 CANCER OF RIGHT BREAST, STAGE 1, ESTROGEN RECEPTOR POSITIVE (HCC): ICD-10-CM

## 2018-09-07 DIAGNOSIS — C50.911 CANCER OF RIGHT BREAST, STAGE 1, ESTROGEN RECEPTOR POSITIVE (HCC): ICD-10-CM

## 2018-09-07 DIAGNOSIS — C50.411 MALIGNANT NEOPLASM OF UPPER-OUTER QUADRANT OF RIGHT BREAST IN FEMALE, ESTROGEN RECEPTOR POSITIVE (HCC): Primary | ICD-10-CM

## 2018-09-07 DIAGNOSIS — C50.919 MALIGNANT NEOPLASM OF FEMALE BREAST, UNSPECIFIED ESTROGEN RECEPTOR STATUS, UNSPECIFIED LATERALITY, UNSPECIFIED SITE OF BREAST (HCC): Primary | ICD-10-CM

## 2018-09-07 DIAGNOSIS — D05.11 DUCTAL CARCINOMA IN SITU (DCIS) OF RIGHT BREAST: Primary | ICD-10-CM

## 2018-09-07 DIAGNOSIS — C50.911 MALIGNANT NEOPLASM OF RIGHT FEMALE BREAST, UNSPECIFIED ESTROGEN RECEPTOR STATUS, UNSPECIFIED SITE OF BREAST (HCC): Primary | ICD-10-CM

## 2018-09-07 PROCEDURE — 99024 POSTOP FOLLOW-UP VISIT: CPT | Performed by: SURGERY

## 2018-09-07 PROCEDURE — 99205 OFFICE O/P NEW HI 60 MIN: CPT | Performed by: INTERNAL MEDICINE

## 2018-09-07 NOTE — PROGRESS NOTES
Chemo teach and consenting done with daughter present  All Chemotherapy instructions given to patient in [place] and [facility] [place]

## 2018-09-07 NOTE — PROGRESS NOTES
Hematology / Oncology Outpatient Consult Note    Chacha Zepeda 65 y o  female [id number]         Date:  9/7/2018    Assessment / Plan:  A 67-year-old postmenopausal woman with newly diagnosed stage IA right breast cancer, grade 2, % positive, [place] 45% positive, HER2 3+ disease  She underwent lumpectomy with sentinel lymph node biopsy, resulting in LILIANA  She presents today with her daughter to discuss adjuvant treatment options  We had extensive discussion regarding the diagnosis, staging information, tumor phenotype, prognosis and treatment options  Since she has HER2 positive disease, adjuvant chemotherapy and trastuzumab is highly indicated  I recommended her to have weekly paclitaxel and trastuzumab x12, followed by trastuzumab monotherapy for a year  Side effects of this regimen was thoroughly discussed, including but not limited to some alopecia, minimal nausea, minimal risk of infection, neuropathy, allergic reaction as well as cardiac toxicity  She and her daughter are in agreement with my recommendation  I am going to ask Dr Pedro Fish to place a Port-A-Cath  I am going to obtain MUGA scan for initial cardiac monitoring  She is going to start 1st cycle of adjuvant chemotherapy in September 25, 2018 at [facility]  All the patient and her daughter's questions were answered to their satisfaction  Subjective:     HPI:  A 67-year-old postmenopausal woman who was recently found to have abnormality in her right breast   She lived in [facility]   After she found breast abnormality, she came to [facility] to be with her daughter  She underwent right breast biopsy in May 23, 2018 which showed ductal carcinoma in situ, ER 90% positive, KS 75% positive  She underwent lumpectomy by Dr Pedro Fish in June 26, 2018  She had 1 4 cm of invasive ductal carcinoma, grade 2  Lymphovascular invasion was not present   This was % positive, KS 45% positive, HER2 3+ disease  Because of the invasive carcinoma was found, she underwent sentinel lymph node biopsy as well as reexcision in August 2, 2018  She had no evidence of malignancy in the reexcised specimen  Seven lymph nodes were all negative for metastatic disease  She presents today to discuss adjuvant treatment options with her daughter  She feels well  She has no complaint of pain  Her weight is stable  She has no respiratory symptoms  She has many comorbidities, including diabetes, hypertension, asthma as well as hypothyroidism  She has no family history of breast or ovarian cancer  She is a lifetime never smoker  Interval History:          Objective:     Primary Diagnosis:    Right breast cancer, stage IA (pT1c, pN0, M0) grade 2, % positive, [place] 45% positive, HER2 3+ disease  Diagnosed in June 2018  Cancer Staging:  Cancer Staging  Malignant neoplasm of upper-outer quadrant of right breast in female, estrogen receptor positive ([facility] [address] )  Staging form: Breast, AJCC 8th Edition  - Clinical: No stage assigned - Unsigned  - Pathologic: Stage IA (pT1c, pN0, cM0, G2, ER: Positive, NY: Positive, HER2: Positive) - Signed by Obed England MD on 8/21/2018        Previous Hematologic/ Oncologic Treatment:         Current Hematologic/ Oncologic Treatment:      Adjuvant chemotherapy with weekly paclitaxel and trastuzumab x12  First cycle to be started in September 25, 2018  Disease Status:     LILIANA status post lumpectomy with sentinel lymph node biopsy  Test Results:    Pathology:    1 4 cm of invasive ductal carcinoma, grade 2  No evidence of lymphovascular invasion  Seven sentinel lymph nodes were all negative for metastatic disease  % positive, [place] 45% positive, her 2 3+ disease  Stage IA (pT1c, pN0, M0)    Radiology:    Chest x-ray was negative for pulmonary disease  Laboratory:    See below for CBC and CMP      Physical Exam:      General Appearance:    Alert, oriented        Eyes:    PERRL Ears:    Normal external ear canals, both ears   Nose:   Nares normal, septum midline   Throat:   Mucosa moist  Pharynx without injection  Neck:   Supple       Lungs:     Clear to auscultation bilaterally   Chest Wall:    No tenderness or deformity    Heart:    Regular rate and rhythm       Abdomen:     Soft, non-tender, bowel sounds +, no organomegaly           Extremities:   Extremities no cyanosis or edema       Skin:   no rash or icterus  Lymph nodes:   Cervical, supraclavicular, and axillary nodes normal   Neurologic:   CNII-XII intact, normal strength, sensation and reflexes     Throughout          Breast exam:   Lumpectomy scar at outer upper quadrant of the right breast with no palpable abnormality  Left breast exam is negative  ROS: Review of Systems   All other systems reviewed and are negative  Imaging: No results found  Labs:   Lab Results   Component Value Date    WBC 7 28 07/18/2018    HGB 11 8 07/18/2018    HCT 37 9 07/18/2018    MCV 91 07/18/2018     07/18/2018     Lab Results   Component Value Date     07/18/2018    K 3 6 07/18/2018     07/18/2018    CO2 30 07/18/2018    BUN 16 07/18/2018    CREATININE 0 67 07/18/2018    GLUF 126 (H) 07/18/2018    CALCIUM 8 8 07/18/2018    AST 13 05/01/2018    ALT 23 05/01/2018    ALKPHOS 53 05/01/2018    EGFR 95 07/18/2018         Vital Sign:    Body surface area is 1 88 meters squared      Wt Readings from Last 3 Encounters:   09/07/18 83 kg (183 lb)   09/04/18 83 2 kg (183 lb 8 oz)   08/22/18 84 4 kg (186 lb)        Temp Readings from Last 3 Encounters:   09/07/18 (!) 97 3 °F (36 3 °C) (Tympanic)   09/05/18 98 8 °F (37 1 °C) (Temporal)   09/04/18 97 5 °F (36 4 °C) (Tympanic)        BP Readings from Last 3 Encounters:   09/07/18 128/70   09/05/18 114/56   09/04/18 126/74         Pulse Readings from Last 3 Encounters:   09/07/18 87   09/05/18 86   09/04/18 89     @LASTSAO2(3)@    Active Problems:   Patient Active Problem List   Diagnosis    Type 2 diabetes mellitus with complication, with long-term current use of insulin ([facility] )    Asthma    Essential hypertension    Other specified hypothyroidism    Chest pain    Palpitations    Preop examination    Chronic bilateral low back pain without sciatica    Ductal carcinoma in situ (DCIS) of right breast    Neck pain    Constipation    Primary insomnia    Malignant neoplasm of right female breast ([facility] [address] )    Malignant neoplasm of upper-outer quadrant of right breast in female, estrogen receptor positive ([facility] )    Epigastric pain    Hiatal hernia    Screening for colon cancer    Esophageal dysphagia    Cellulitis of right axilla    Chronic pain of right knee    Encounter for diabetic foot exam ([place] [address] )       Past Medical History:   Past Medical History:   Diagnosis Date    Asthma     Breast cancer ([place] [address] )     Cancer ([place] [address] )     BREAST    Diabetes mellitus ([place] [address] )     blood sugar 199 @ 735    Hiatal hernia     Hypercholesterolemia     Hypertension     Hypothyroid     Rheumatoid arthritis ([place] [address] )        Surgical History:   Past Surgical History:   Procedure Laterality Date    BREAST BIOPSY      BREAST LUMPECTOMY Right 6/26/2018    Procedure: RIGHT BREAST NEEDLE LOCALIZATION X2 WITH RIGHT BREAST LUMPECTOMY ( NEEDLE LOC @ 1000); Surgeon: Cristy Ballard MD;  [facility];  Service: Surgical Oncology    BREAST LUMPECTOMY Right 8/2/2018    Procedure: LYMPHOSCINITGRAPHY INTRAOPERATIVE LYMPHATIC MAPPING , RIGHT SENTINEL NODE BIOPSY, REEXCISION  RIGHT BREAST LUMPECTOMY CAVITY (SUPERIOR MARGIN); Surgeon: Cristy Ballard MD;  Location:  MAIN OR;  Service: Surgical Oncology    BREAST SURGERY Left     CATARACT EXTRACTION, BILATERAL Bilateral     EGD AND COLONOSCOPY N/A 9/5/2018    Procedure: EGD AND COLONOSCOPY;  Surgeon: Shorty Pearson MD;  Location: [facility];   Service: Gastroenterology    KNEE SURGERY Right     RETINAL DETACHMENT SURGERY Right     TUBAL LIGATION         Family History:    Family History   Problem Relation Age of Onset    Diabetes Mother     Hypertension Mother     Diabetes Father     Hypertension Father     Diabetes Brother     Hypertension Brother     Prostate cancer Brother     Cancer Maternal Grandfather        Cancer-related family history includes Cancer in her maternal grandfather; Prostate cancer in her brother  Social History:   Social History     Social History    Marital status:      Spouse name: N/A    Number of children: N/A    Years of education: N/A     Occupational History    Not on file       Social History Main Topics    Smoking status: Never Smoker    Smokeless tobacco: Never Used    Alcohol use No    Drug use: No    Sexual activity: Yes     Other Topics Concern    Not on file     Social History Narrative    No narrative on file       Current Medications:   Current Outpatient Prescriptions   Medication Sig Dispense Refill    albuterol (2 5 mg/3 mL) 0 083 % nebulizer solution Take 1 vial (2 5 mg total) by nebulization every 4 (four) hours as needed for wheezing or shortness of breath 75 mL 0    albuterol (PROVENTIL HFA,VENTOLIN HFA) 90 mcg/act inhaler Inhale 1 puff 2 (two) times a day        enalapril (VASOTEC) 20 mg tablet Take 1 tablet (20 mg total) by mouth daily 90 tablet 1    gabapentin (NEURONTIN) 300 mg capsule take 1 capsule by mouth three times a day 90 capsule 1    gemfibrozil (LOPID) 600 mg tablet Take 600 mg by mouth daily        glipiZIDE (GLUCOTROL XL) 10 mg 24 hr tablet Take 1 tablet (10 mg total) by mouth daily 90 tablet 1    HUMULIN N 100 UNIT/ML subcutaneous injection 70 units in the am 30 units in the pm 30 mL 5    Insulin Glargine (TOUJEO) 300 units/mL CONCETRATED U-300 injection pen Inject 35 Units under the skin daily at bedtime 5 pen 3    levothyroxine 50 mcg tablet Take 1 tablet (50 mcg total) by mouth daily 90 tablet 1    metFORMIN (GLUCOPHAGE-XR) 500 mg 24 hr tablet take 2 tablets by mouth once daily WITH BREAKFAST  0    omeprazole (PriLOSEC) 20 mg delayed release capsule take 1 capsule by mouth once daily 90 capsule 2    polyethylene glycol (MIRALAX) 17 g packet Take 17 g by mouth 2 (two) times a day 60 each 5     No current facility-administered medications for this visit  Allergies:    Allergies   Allergen Reactions    Aspirin Hives    Tylenol With Codeine #3 [Acetaminophen-Codeine] Rash

## 2018-09-07 NOTE — LETTER
September 7, 2018     Juanis Morejon MD  [place] [address]    Patient: Christopher Griffin   YOB: 1955   Date of Visit: 9/7/2018       Dear Dr April Razo: Thank you for referring Christopher Griffin to me for evaluation  Below are my notes for this consultation  If you have questions, please do not hesitate to call me  I look forward to following your patient along with you  Sincerely,        Mirtha Nguyen MD        CC: MD Morena Michel MD Royanne Flatter, MD  9/7/2018 11:29 AM  Sign at close encounter  Hematology / Oncology Outpatient Consult Note    Gian Hodges 65 y o  female [id number]         Date:  9/7/2018    Assessment / Plan:  A 61-year-old postmenopausal woman with newly diagnosed stage IA right breast cancer, grade 2, % positive, ND 45% positive, HER2 3+ disease  She underwent lumpectomy with sentinel lymph node biopsy, resulting in LILIANA  She presents today with her daughter to discuss adjuvant treatment options  We had extensive discussion regarding the diagnosis, staging information, tumor phenotype, prognosis and treatment options  Since she has HER2 positive disease, adjuvant chemotherapy and trastuzumab is highly indicated  I recommended her to have weekly paclitaxel and trastuzumab x12, followed by trastuzumab monotherapy for a year  Side effects of this regimen was thoroughly discussed, including but not limited to some alopecia, minimal nausea, minimal risk of infection, neuropathy, allergic reaction as well as cardiac toxicity  She and her daughter are in agreement with my recommendation  I am going to ask Dr April Razo to place a Port-A-Cath  I am going to obtain MUGA scan for initial cardiac monitoring  She is going to start 1st cycle of adjuvant chemotherapy in September 25, 2018 at [facility]    All the patient and her daughter's questions were answered to their satisfaction  Subjective:     HPI:  A 28-year-old postmenopausal woman who was recently found to have abnormality in her right breast   She lived in [facility]   After she found breast abnormality, she came to [facility] to be with her daughter  She underwent right breast biopsy in May 23, 2018 which showed ductal carcinoma in situ, ER 90% positive, ME 75% positive  She underwent lumpectomy by Dr Mary Zaidi in June 26, 2018  She had 1 4 cm of invasive ductal carcinoma, grade 2  Lymphovascular invasion was not present  This was % positive, ME 45% positive, HER2 3+ disease  Because of the invasive carcinoma was found, she underwent sentinel lymph node biopsy as well as reexcision in August 2, 2018  She had no evidence of malignancy in the reexcised specimen  Seven lymph nodes were all negative for metastatic disease  She presents today to discuss adjuvant treatment options with her daughter  She feels well  She has no complaint of pain  Her weight is stable  She has no respiratory symptoms  She has many comorbidities, including diabetes, hypertension, asthma as well as hypothyroidism  She has no family history of breast or ovarian cancer  She is a lifetime never smoker  Interval History:          Objective:     Primary Diagnosis:    Right breast cancer, stage IA (pT1c, pN0, M0) grade 2, % positive, ME 45% positive, HER2 3+ disease  Diagnosed in June 2018  Cancer Staging:  Cancer Staging  Malignant neoplasm of upper-outer quadrant of right breast in female, estrogen receptor positive ([facility] [address] )  Staging form: Breast, AJCC 8th Edition  - Clinical: No stage assigned - Unsigned  - Pathologic: Stage IA (pT1c, pN0, cM0, G2, ER: Positive, ME: Positive, HER2: Positive) - Signed by Paige Yoon MD on 8/21/2018        Previous Hematologic/ Oncologic Treatment:         Current Hematologic/ Oncologic Treatment:      Adjuvant chemotherapy with weekly paclitaxel and trastuzumab x12     First cycle to be started in September 25, 2018  Disease Status:     LILIANA status post lumpectomy with sentinel lymph node biopsy  Test Results:    Pathology:    1 4 cm of invasive ductal carcinoma, grade 2  No evidence of lymphovascular invasion  Seven sentinel lymph nodes were all negative for metastatic disease  % positive, CT 45% positive, her 2 3+ disease  Stage IA (pT1c, pN0, M0)    Radiology:    Chest x-ray was negative for pulmonary disease  Laboratory:    See below for CBC and CMP  Physical Exam:      General Appearance:    Alert, oriented        Eyes:    PERRL   Ears:    Normal external ear canals, both ears   Nose:   Nares normal, septum midline   Throat:   Mucosa moist  Pharynx without injection  Neck:   Supple       Lungs:     Clear to auscultation bilaterally   Chest Wall:    No tenderness or deformity    Heart:    Regular rate and rhythm       Abdomen:     Soft, non-tender, bowel sounds +, no organomegaly           Extremities:   Extremities no cyanosis or edema       Skin:   no rash or icterus  Lymph nodes:   Cervical, supraclavicular, and axillary nodes normal   Neurologic:   CNII-XII intact, normal strength, sensation and reflexes     Throughout          Breast exam:   Lumpectomy scar at outer upper quadrant of the right breast with no palpable abnormality  Left breast exam is negative  ROS: Review of Systems   All other systems reviewed and are negative  Imaging: No results found        Labs:   Lab Results   Component Value Date    WBC 7 28 07/18/2018    HGB 11 8 07/18/2018    HCT 37 9 07/18/2018    MCV 91 07/18/2018     07/18/2018     Lab Results   Component Value Date     07/18/2018    K 3 6 07/18/2018     07/18/2018    CO2 30 07/18/2018    BUN 16 07/18/2018    CREATININE 0 67 07/18/2018    GLUF 126 (H) 07/18/2018    CALCIUM 8 8 07/18/2018    AST 13 05/01/2018    ALT 23 05/01/2018    ALKPHOS 53 05/01/2018    EGFR 95 07/18/2018         Vital Sign:    Body surface area is 1 88 meters squared  Wt Readings from Last 3 Encounters:   09/07/18 83 kg (183 lb)   09/04/18 83 2 kg (183 lb 8 oz)   08/22/18 84 4 kg (186 lb)        Temp Readings from Last 3 Encounters:   09/07/18 (!) 97 3 °F (36 3 °C) (Tympanic)   09/05/18 98 8 °F (37 1 °C) (Temporal)   09/04/18 97 5 °F (36 4 °C) (Tympanic)        BP Readings from Last 3 Encounters:   09/07/18 128/70   09/05/18 114/56   09/04/18 126/74         Pulse Readings from Last 3 Encounters:   09/07/18 87   09/05/18 86   09/04/18 89     @LASTSAO2(3)@    Active Problems:   Patient Active Problem List   Diagnosis    Type 2 diabetes mellitus with complication, with long-term current use of insulin (HCC)    Asthma    Essential hypertension    Other specified hypothyroidism    Chest pain    Palpitations    Preop examination    Chronic bilateral low back pain without sciatica    Ductal carcinoma in situ (DCIS) of right breast    Neck pain    Constipation    Primary insomnia    Malignant neoplasm of right female breast (HCC)    Malignant neoplasm of upper-outer quadrant of right breast in female, estrogen receptor positive ([address] )    Epigastric pain    Hiatal hernia    Screening for colon cancer    Esophageal dysphagia    Cellulitis of right axilla    Chronic pain of right knee    Encounter for diabetic foot exam ([address] )       Past Medical History:   Past Medical History:   Diagnosis Date    Asthma     Breast cancer ([address] )     Cancer ([address] )     BREAST    Diabetes mellitus ([address] )     blood sugar 199 @ 735    Hiatal hernia     Hypercholesterolemia     Hypertension     Hypothyroid     Rheumatoid arthritis ([address] )        Surgical History:   Past Surgical History:   Procedure Laterality Date    BREAST BIOPSY      BREAST LUMPECTOMY Right 6/26/2018    Procedure: RIGHT BREAST NEEDLE LOCALIZATION X2 WITH RIGHT BREAST LUMPECTOMY ( NEEDLE LOC @ 1000);   Surgeon: Giovana Herrera MD;  Location: [facility];  Service: Surgical Oncology    BREAST LUMPECTOMY Right 8/2/2018    Procedure: LYMPHOSCINITGRAPHY INTRAOPERATIVE LYMPHATIC MAPPING , RIGHT SENTINEL NODE BIOPSY, REEXCISION  RIGHT BREAST LUMPECTOMY CAVITY (SUPERIOR MARGIN); Surgeon: Angella Gilmore MD;  Location:  MAIN OR;  Service: Surgical Oncology    BREAST SURGERY Left     CATARACT EXTRACTION, BILATERAL Bilateral     EGD AND COLONOSCOPY N/A 9/5/2018    Procedure: EGD AND COLONOSCOPY;  Surgeon: Ramirez Lares MD;  Location: [facility] GI LAB; Service: Gastroenterology    KNEE SURGERY Right     RETINAL DETACHMENT SURGERY Right     TUBAL LIGATION         Family History:    Family History   Problem Relation Age of Onset    Diabetes Mother     Hypertension Mother     Diabetes Father     Hypertension Father     Diabetes Brother     Hypertension Brother     Prostate cancer Brother     Cancer Maternal Grandfather        Cancer-related family history includes Cancer in her maternal grandfather; Prostate cancer in her brother  Social History:   Social History     Social History    Marital status:      Spouse name: N/A    Number of children: N/A    Years of education: N/A     Occupational History    Not on file       Social History Main Topics    Smoking status: Never Smoker    Smokeless tobacco: Never Used    Alcohol use No    Drug use: No    Sexual activity: Yes     Other Topics Concern    Not on file     Social History Narrative    No narrative on file       Current Medications:   Current Outpatient Prescriptions   Medication Sig Dispense Refill    albuterol (2 5 mg/3 mL) 0 083 % nebulizer solution Take 1 vial (2 5 mg total) by nebulization every 4 (four) hours as needed for wheezing or shortness of breath 75 mL 0    albuterol (PROVENTIL HFA,VENTOLIN HFA) 90 mcg/act inhaler Inhale 1 puff 2 (two) times a day        enalapril (VASOTEC) 20 mg tablet Take 1 tablet (20 mg total) by mouth daily 90 tablet 1    gabapentin (NEURONTIN) 300 mg capsule take 1 capsule by mouth three times a day 90 capsule 1    gemfibrozil (LOPID) 600 mg tablet Take 600 mg by mouth daily        glipiZIDE (GLUCOTROL XL) 10 mg 24 hr tablet Take 1 tablet (10 mg total) by mouth daily 90 tablet 1    HUMULIN N 100 UNIT/ML subcutaneous injection 70 units in the am 30 units in the pm 30 mL 5    Insulin Glargine (TOUJEO) 300 units/mL CONCETRATED U-300 injection pen Inject 35 Units under the skin daily at bedtime 5 pen 3    levothyroxine 50 mcg tablet Take 1 tablet (50 mcg total) by mouth daily 90 tablet 1    metFORMIN (GLUCOPHAGE-XR) 500 mg 24 hr tablet take 2 tablets by mouth once daily WITH BREAKFAST  0    omeprazole (PriLOSEC) 20 mg delayed release capsule take 1 capsule by mouth once daily 90 capsule 2    polyethylene glycol (MIRALAX) 17 g packet Take 17 g by mouth 2 (two) times a day 60 each 5     No current facility-administered medications for this visit  Allergies:    Allergies   Allergen Reactions    Aspirin Hives    Tylenol With Codeine #3 [Acetaminophen-Codeine] Rash

## 2018-09-07 NOTE — PROGRESS NOTES
Surgical Oncology Follow Up       [address] [facility] [place]  [address] Hodges  1955  [id number]  [facility] SURGICAL ONCOLOGY [facility] [address]    No chief complaint on file  Assessment/Plan:    No problem-specific Assessment & Plan notes found for this encounter  There are no diagnoses linked to this encounter  Advance Care Planning/Advance Directives:  Discussed disease status, cancer treatment plans and/or cancer treatment goals with the patient  Malignant neoplasm of right female breast ([facility] [address] )    5/23/2018 Initial Diagnosis     Malignant neoplasm of right female breast ([facility] [address] )         5/23/2018 Biopsy     Right breast core biopsy:  DCIS, micropapillary type, low-intermediate nuclear grade  ER 90-95% positive,  VA 75-80% positive           6/26/2018 Surgery     RIGHT BREAST NEEDLE LOCALIZATION X2 WITH RIGHT BREAST LUMPECTOMY ( NEEDLE LOC @ 1000) (Right)   ONCOPLASTIC CLOSURE OF LUMPECTOMY CAVITY    Invasive breast carcinoma, NST (aka ductal)  * Ana Paula grade 2 of 3 (total score = 2+2+2 = 6 of 9)   -- tubule formation < 10%, score 3   -- nuclear grade 2 of 3, score 2   -- mitoses ~ 4/mm2, score 2    * invasive carcinoma is multifocally present (A23-1, A32-1, A84-1, A89-1, A100-1), largest focus is 14 millimeters in greatest dimension (A89-1, this focus is graded)  * superior lumpectomy margin (orange-inked) is POSITIVE for invasive carcinoma (A84-1)   * all other lumpectomy margins are free of invasive carcinoma  * estrogen, progesterone & Her-2/negrita receptor studies are undertaken, to be described in an addendum report  - Ductal carcinoma in-situ (DCIS): Present as an extensive component (~85% of total tumor)  * DCIS is co-located with invasive carcinoma surrounding prior needle biopsy site     * DCIS spans 2 6 cm maximal dimension (A62-1) and is present on 27 of 136 total slides examined  * DCIS has cribriform & micropapillary patterns, nuclear grade 2 of 3, with central comedo-type necrosis  * DCIS is present within 0 2 millimeters of the superior lumpectomy margin (orange-inked, A26-1)   * DCIS is present within 0 2 millimeters of the medial lumpectomy margin (yellow-inked, A3-1)   * all other lumpectomy margins are free of DCIS by > 2 millimeters  - Lymph-vascular invasion: no lymph-vascular invasion is unequivocally identified  - Microcalcifications: present in DCIS  % positive, ER 45-55% positive, HER2 by IHC 3+ positive    - Dr Jenni Vaughn           8/2/2018 Surgery     Right breast lumpectomy reexcision, right axilla SLN biopsy:  A  Submitted as right axillary sentinel node:  - Seven lymph nodes are identified showing no metastatic tumor      B  Right breast lumpectomy reexcision:  - Abundant reactive changes are seen around the previous lumpectomy cavity   - No residual atypia or malignancy is seen               8/2/2018 -  Cancer Staged     Stage IA - pT1c, pN0, G2  History of Present Illness:   Right breast cancer  -Interval History:  Patient here discussed port placement  She saw Dr Taco Sanford edema this morning  He plans to start chemotherapy  Review of Systems:  Review of Systems   Constitutional: Negative  HENT: Negative  Eyes: Negative  Respiratory: Negative  Cardiovascular: Negative  Gastrointestinal: Negative  Endocrine: Negative  Genitourinary: Negative  Musculoskeletal: Negative  Skin: Negative  Allergic/Immunologic: Negative  Neurological: Negative  Hematological: Negative  Psychiatric/Behavioral: Negative  All other systems reviewed and are negative        Patient Active Problem List   Diagnosis    Type 2 diabetes mellitus with complication, with long-term current use of insulin ([facility] [address] )    Asthma    Essential hypertension    Other specified hypothyroidism    Chest pain    Palpitations    Preop examination    Chronic bilateral low back pain without sciatica    Ductal carcinoma in situ (DCIS) of right breast    Neck pain    Constipation    Primary insomnia    Malignant neoplasm of right female breast (HCC)    Malignant neoplasm of upper-outer quadrant of right breast in female, estrogen receptor positive ([facility] [address] )    Epigastric pain    Hiatal hernia    Screening for colon cancer    Esophageal dysphagia    Cellulitis of right axilla    Chronic pain of right knee    Encounter for diabetic foot exam ([facility] [address] )     Past Medical History:   Diagnosis Date    Asthma     Breast cancer ([facility] [address] )     Cancer ([facility] [address] )     BREAST    Diabetes mellitus ([facility] [address] )     blood sugar 199 @ 735    Hiatal hernia     Hypercholesterolemia     Hypertension     Hypothyroid     Rheumatoid arthritis ([facility] [address] )      Past Surgical History:   Procedure Laterality Date    BREAST BIOPSY      BREAST LUMPECTOMY Right 6/26/2018    Procedure: RIGHT BREAST NEEDLE LOCALIZATION X2 WITH RIGHT BREAST LUMPECTOMY ( NEEDLE LOC @ 1000); Surgeon: Tony Schwartz MD;  Location: [facility] OR;  Service: Surgical Oncology    BREAST LUMPECTOMY Right 8/2/2018    Procedure: LYMPHOSCINITGRAPHY INTRAOPERATIVE LYMPHATIC MAPPING , RIGHT SENTINEL NODE BIOPSY, REEXCISION  RIGHT BREAST LUMPECTOMY CAVITY (SUPERIOR MARGIN); Surgeon: Tony Schwartz MD;  Location: [facility] OR;  Service: Surgical Oncology    BREAST SURGERY Left     CATARACT EXTRACTION, BILATERAL Bilateral     EGD AND COLONOSCOPY N/A 9/5/2018    Procedure: EGD AND COLONOSCOPY;  Surgeon: Autry Jeans, MD;  Location: [facility] GI LAB;   Service: Gastroenterology    KNEE SURGERY Right     RETINAL DETACHMENT SURGERY Right     TUBAL LIGATION       Family History   Problem Relation Age of Onset    Diabetes Mother     Hypertension Mother     Diabetes Father     Hypertension Father     Diabetes Brother     Hypertension Brother     Prostate cancer Brother     Cancer Maternal Grandfather      Social History     Social History    Marital status:      Spouse name: N/A    Number of children: N/A    Years of education: N/A     Occupational History    Not on file       Social History Main Topics    Smoking status: Never Smoker    Smokeless tobacco: Never Used    Alcohol use No    Drug use: No    Sexual activity: Yes     Other Topics Concern    Not on file     Social History Narrative    No narrative on file       Current Outpatient Prescriptions:     albuterol (2 5 mg/3 mL) 0 083 % nebulizer solution, Take 1 vial (2 5 mg total) by nebulization every 4 (four) hours as needed for wheezing or shortness of breath, Disp: 75 mL, Rfl: 0    albuterol (PROVENTIL HFA,VENTOLIN HFA) 90 mcg/act inhaler, Inhale 1 puff 2 (two) times a day  , Disp: , Rfl:     enalapril (VASOTEC) 20 mg tablet, Take 1 tablet (20 mg total) by mouth daily, Disp: 90 tablet, Rfl: 1    gabapentin (NEURONTIN) 300 mg capsule, take 1 capsule by mouth three times a day, Disp: 90 capsule, Rfl: 1    gemfibrozil (LOPID) 600 mg tablet, Take 600 mg by mouth daily  , Disp: , Rfl:     glipiZIDE (GLUCOTROL XL) 10 mg 24 hr tablet, Take 1 tablet (10 mg total) by mouth daily, Disp: 90 tablet, Rfl: 1    HUMULIN N 100 UNIT/ML subcutaneous injection, 70 units in the am 30 units in the pm, Disp: 30 mL, Rfl: 5    Insulin Glargine (TOUJEO) 300 units/mL CONCETRATED U-300 injection pen, Inject 35 Units under the skin daily at bedtime, Disp: 5 pen, Rfl: 3    levothyroxine 50 mcg tablet, Take 1 tablet (50 mcg total) by mouth daily, Disp: 90 tablet, Rfl: 1    metFORMIN (GLUCOPHAGE-XR) 500 mg 24 hr tablet, take 2 tablets by mouth once daily WITH BREAKFAST, Disp: , Rfl: 0    omeprazole (PriLOSEC) 20 mg delayed release capsule, take 1 capsule by mouth once daily, Disp: 90 capsule, Rfl: 2    polyethylene glycol (MIRALAX) 17 g packet, Take 17 g by mouth 2 (two) times a day, Disp: 60 each, Rfl: 5  Allergies   Allergen Reactions    Aspirin Hives    Tylenol With Codeine #3 [Acetaminophen-Codeine] Rash     There were no vitals filed for this visit  Physical Exam   Constitutional: She appears well-developed and well-nourished  HENT:   Head: Normocephalic and atraumatic  Right Ear: External ear normal    Left Ear: External ear normal    Eyes: Conjunctivae are normal  Pupils are equal, round, and reactive to light  Neck: Normal range of motion  Neck supple  Cardiovascular: Normal rate, regular rhythm and normal heart sounds  Pulmonary/Chest: Effort normal and breath sounds normal    Abdominal: Soft  Bowel sounds are normal          Results:  Labs:  none    Imaging  No results found  I reviewed the above laboratory and imaging data  Discussion/Summary:  Right breast cancer, in need of left port  Risks and benefits of surgery including infection, bleeding, pneumothorax, discussed with patient  She understands the plan wishes to proceed with left chest port placement

## 2018-09-08 ENCOUNTER — APPOINTMENT (OUTPATIENT)
Dept: LAB | Facility: HOSPITAL | Age: 63
End: 2018-09-08
Attending: INTERNAL MEDICINE
Payer: MEDICARE

## 2018-09-08 LAB
BASOPHILS # BLD AUTO: 0.08 THOUSANDS/ΜL (ref 0–0.1)
BASOPHILS NFR BLD AUTO: 1 % (ref 0–1)
EOSINOPHIL # BLD AUTO: 0.23 THOUSAND/ΜL (ref 0–0.61)
EOSINOPHIL NFR BLD AUTO: 3 % (ref 0–6)
ERYTHROCYTE [DISTWIDTH] IN BLOOD BY AUTOMATED COUNT: 12.9 % (ref 11.6–15.1)
HCT VFR BLD AUTO: 38.8 % (ref 34.8–46.1)
HGB BLD-MCNC: 12.6 G/DL (ref 11.5–15.4)
IMM GRANULOCYTES # BLD AUTO: 0.01 THOUSAND/UL (ref 0–0.2)
IMM GRANULOCYTES NFR BLD AUTO: 0 % (ref 0–2)
LYMPHOCYTES # BLD AUTO: 2.76 THOUSANDS/ΜL (ref 0.6–4.47)
LYMPHOCYTES NFR BLD AUTO: 35 % (ref 14–44)
MCH RBC QN AUTO: 28.7 PG (ref 26.8–34.3)
MCHC RBC AUTO-ENTMCNC: 32.5 G/DL (ref 31.4–37.4)
MCV RBC AUTO: 88 FL (ref 82–98)
MONOCYTES # BLD AUTO: 0.51 THOUSAND/ΜL (ref 0.17–1.22)
MONOCYTES NFR BLD AUTO: 7 % (ref 4–12)
NEUTROPHILS # BLD AUTO: 4.27 THOUSANDS/ΜL (ref 1.85–7.62)
NEUTS SEG NFR BLD AUTO: 54 % (ref 43–75)
NRBC BLD AUTO-RTO: 0 /100 WBCS
PLATELET # BLD AUTO: 279 THOUSANDS/UL (ref 149–390)
PMV BLD AUTO: 11.8 FL (ref 8.9–12.7)
RBC # BLD AUTO: 4.39 MILLION/UL (ref 3.81–5.12)
WBC # BLD AUTO: 7.86 THOUSAND/UL (ref 4.31–10.16)

## 2018-09-08 PROCEDURE — 85025 COMPLETE CBC W/AUTO DIFF WBC: CPT | Performed by: INTERNAL MEDICINE

## 2018-09-08 PROCEDURE — 36415 COLL VENOUS BLD VENIPUNCTURE: CPT | Performed by: INTERNAL MEDICINE

## 2018-09-11 DIAGNOSIS — A04.8 H. PYLORI INFECTION: Primary | ICD-10-CM

## 2018-09-11 RX ORDER — CLARITHROMYCIN 500 MG/1
500 TABLET, COATED ORAL EVERY 12 HOURS SCHEDULED
Qty: 28 TABLET | Refills: 0 | Status: SHIPPED | OUTPATIENT
Start: 2018-09-11 | End: 2018-09-25

## 2018-09-11 RX ORDER — OMEPRAZOLE 40 MG/1
40 CAPSULE, DELAYED RELEASE ORAL 2 TIMES DAILY
Qty: 28 CAPSULE | Refills: 0 | Status: SHIPPED | OUTPATIENT
Start: 2018-09-11 | End: 2018-10-31 | Stop reason: SDUPTHER

## 2018-09-11 RX ORDER — AMOXICILLIN 500 MG/1
1000 CAPSULE ORAL EVERY 12 HOURS SCHEDULED
Qty: 56 CAPSULE | Refills: 0 | Status: SHIPPED | OUTPATIENT
Start: 2018-09-11 | End: 2018-09-25

## 2018-09-11 NOTE — PRE-PROCEDURE INSTRUCTIONS
Pre-Surgery Instructions:   Medication Instructions    albuterol (2 5 mg/3 mL) 0 083 % nebulizer solution Instructed patient per Anesthesia Guidelines   albuterol (PROVENTIL HFA,VENTOLIN HFA) 90 mcg/act inhaler Instructed patient per Anesthesia Guidelines   enalapril (VASOTEC) 20 mg tablet Instructed patient per Anesthesia Guidelines   gabapentin (NEURONTIN) 300 mg capsule Instructed patient per Anesthesia Guidelines   gemfibrozil (LOPID) 600 mg tablet Instructed patient per Anesthesia Guidelines   glipiZIDE (GLUCOTROL XL) 10 mg 24 hr tablet Instructed patient per Anesthesia Guidelines   HUMULIN N 100 UNIT/ML subcutaneous injection Instructed patient per Anesthesia Guidelines   Insulin Glargine (TOUJEO) 300 units/mL CONCETRATED U-300 injection pen Instructed patient per Anesthesia Guidelines   levothyroxine 50 mcg tablet Instructed patient per Anesthesia Guidelines   metFORMIN (GLUCOPHAGE-XR) 500 mg 24 hr tablet Instructed patient per Anesthesia Guidelines   omeprazole (PriLOSEC) 20 mg delayed release capsule Instructed patient per Anesthesia Guidelines  Do not take this medication the day before and the morning of the day of surgery/procedure  Antiepileptic Med Class     Continue to take this medication on your normal schedule  If this is an oral medication and you take it in the morning, then you may take this medicine with a sip of water  Inhalational Med Class     Continue to take these inhaler medications on your normal schedule up to and including the day of surgery  Insulin Med Class     Pre-Surgery/Procedure Instructions for Adult Patients who Take Medicine for Diabetes or to Control their Blood Sugar     Day Before Surgery/Procedure  Use the directions based on the type of medicine you take for your diabetes  1  If you are having a procedure that does not require a bowel prep:  ? Pre-Mixed Insulin (Intermediate Acting: Humalog 75/25, Humulin 70/30   Novolog 70/30, Regular Insulin)  § Take ½ your regular dose the evening before your procedure  ? Rapid/Fast Acting Insulin/Long Acting Insulin (Humalog U200, NovoLog, Apidra, Lantus, Levemir, Hildy Filbert, [place])  § Take your FULL regular dose the day before procedure  ? Oral Diabetic Medicines including Glipizide/Glimepiride/Glucotrol (sulfonylurea)  § Take your regular dose with dinner the evening before your procedure  2  If you are having a procedure (e g  Colonoscopy) that requires a bowel prep and you are allowed to have at least a clear liquid diet:  ? Pre-Mixed Insulin (Intermediate Acting: Humalog 75/25, Humulin 70/30, Novolog 70/30, Regular Insulin)  § Take ½ your regular dose the evening before your procedure  ? Rapid/Fast Acting Insulin (Humalog U200, NovoLog, Apidra, Fiasp)  § Take ½ your regular dose the evening before your procedure  ? Long Acting Insulin (Lantus, Levemir, Hildy Filbert)  § Take your FULL regular dose the day before procedure  ? Oral Glipizide/Glimepiride/Glucotrol (sulfonylurea)  § Take ½ your regular dose the evening before your procedure  ? Oral Diabetic Medicines that are NOT Glipizide/Glimepiride/Glucotrol  § Take your regular dose with dinner in the evening before your procedure      Day of Surgery/Procedure  · Long Acting Insulin (Lantus, Levemir, Hildy Filbert)  ? If you usually take your Long-Acting Insulin in the morning, take the full dose as scheduled  · With the exception of the morning Long-Acting Insulin noted above, DO NOT take ANY diabetic medicine on the day of your procedure unless you were instructed by the doctor who manages your diabetic medicines  · Continue to check your blood sugars  · If you have an insulin pump then consult with your Endocrinologist for instructions  · If you cannot see your Endocrinologist, on the day of the procedure set your insulin pump to your basal rate only   Please bring your insulin pump supplies to the hospital      This Educational material has been approved by the Patient Education Advisory Committee  Date prepared: 1/17/2018          Expiration date: 1/17/2019        Approval Number:                     NonStatin Med Class     Continue to take this medication on your normal schedule  If this is an oral medication and you take it in the morning, then you may take this medicine with a sip of water  Thyroxine Med Class     Continue to take this medication on your normal schedule  If this is an oral medication and you take it in the morning, then you may take this medicine with a sip of water

## 2018-09-11 NOTE — PRE-PROCEDURE INSTRUCTIONS
Pre-Surgery Instructions:   Medication Instructions    albuterol (2 5 mg/3 mL) 0 083 % nebulizer solution Instructed patient per Anesthesia Guidelines   albuterol (PROVENTIL HFA,VENTOLIN HFA) 90 mcg/act inhaler Instructed patient per Anesthesia Guidelines   enalapril (VASOTEC) 20 mg tablet Instructed patient per Anesthesia Guidelines   gabapentin (NEURONTIN) 300 mg capsule Instructed patient per Anesthesia Guidelines   gemfibrozil (LOPID) 600 mg tablet Instructed patient per Anesthesia Guidelines   glipiZIDE (GLUCOTROL XL) 10 mg 24 hr tablet Instructed patient per Anesthesia Guidelines   HUMULIN N 100 UNIT/ML subcutaneous injection Instructed patient per Anesthesia Guidelines   Insulin Glargine (TOUJEO) 300 units/mL CONCETRATED U-300 injection pen Instructed patient per Anesthesia Guidelines   levothyroxine 50 mcg tablet Instructed patient per Anesthesia Guidelines   metFORMIN (GLUCOPHAGE-XR) 500 mg 24 hr tablet Instructed patient per Anesthesia Guidelines   omeprazole (PriLOSEC) 20 mg delayed release capsule Instructed patient per Anesthesia Guidelines

## 2018-09-12 ENCOUNTER — ANESTHESIA EVENT (OUTPATIENT)
Dept: PERIOP | Facility: HOSPITAL | Age: 63
End: 2018-09-12
Payer: MEDICARE

## 2018-09-13 ENCOUNTER — APPOINTMENT (OUTPATIENT)
Dept: RADIOLOGY | Facility: HOSPITAL | Age: 63
End: 2018-09-13
Attending: SURGERY
Payer: MEDICARE

## 2018-09-13 ENCOUNTER — HOSPITAL ENCOUNTER (OUTPATIENT)
Facility: HOSPITAL | Age: 63
Setting detail: OUTPATIENT SURGERY
Discharge: HOME | End: 2018-09-13
Attending: SURGERY | Admitting: SURGERY
Payer: MEDICARE

## 2018-09-13 ENCOUNTER — APPOINTMENT (OUTPATIENT)
Dept: RADIOLOGY | Facility: HOSPITAL | Age: 63
End: 2018-09-13
Payer: MEDICARE

## 2018-09-13 ENCOUNTER — ANESTHESIA (OUTPATIENT)
Dept: PERIOP | Facility: HOSPITAL | Age: 63
End: 2018-09-13
Payer: MEDICARE

## 2018-09-13 VITALS
DIASTOLIC BLOOD PRESSURE: 65 MMHG | SYSTOLIC BLOOD PRESSURE: 124 MMHG | BODY MASS INDEX: 30.82 KG/M2 | OXYGEN SATURATION: 95 % | HEART RATE: 82 BPM | WEIGHT: 185 LBS | RESPIRATION RATE: 18 BRPM | HEIGHT: 65 IN | TEMPERATURE: 97 F

## 2018-09-13 LAB
GLUCOSE SERPL-MCNC: 130 MG/DL (ref 65–140)
GLUCOSE SERPL-MCNC: 163 MG/DL (ref 65–140)
HCV AB SER QL: NORMAL

## 2018-09-13 PROCEDURE — 86803 HEPATITIS C AB TEST: CPT | Performed by: SURGERY

## 2018-09-13 PROCEDURE — 77001 FLUOROGUIDE FOR VEIN DEVICE: CPT

## 2018-09-13 PROCEDURE — 82948 REAGENT STRIP/BLOOD GLUCOSE: CPT

## 2018-09-13 PROCEDURE — C1788 PORT, INDWELLING, IMP: HCPCS | Performed by: SURGERY

## 2018-09-13 PROCEDURE — 36561 INSERT TUNNELED CV CATH: CPT | Performed by: SURGERY

## 2018-09-13 PROCEDURE — 71045 X-RAY EXAM CHEST 1 VIEW: CPT

## 2018-09-13 DEVICE — PORT HEMODIAL INTERMED PROFILE 8FR KIT: Type: IMPLANTABLE DEVICE | Site: CHEST | Status: FUNCTIONAL

## 2018-09-13 RX ORDER — BUPIVACAINE HYDROCHLORIDE 5 MG/ML
INJECTION, SOLUTION EPIDURAL; INTRACAUDAL AS NEEDED
Status: DISCONTINUED | OUTPATIENT
Start: 2018-09-13 | End: 2018-09-13 | Stop reason: HOSPADM

## 2018-09-13 RX ORDER — FENTANYL CITRATE/PF 50 MCG/ML
25 SYRINGE (ML) INJECTION
Status: DISCONTINUED | OUTPATIENT
Start: 2018-09-13 | End: 2018-09-13 | Stop reason: HOSPADM

## 2018-09-13 RX ORDER — SODIUM CHLORIDE, SODIUM LACTATE, POTASSIUM CHLORIDE, CALCIUM CHLORIDE 600; 310; 30; 20 MG/100ML; MG/100ML; MG/100ML; MG/100ML
125 INJECTION, SOLUTION INTRAVENOUS CONTINUOUS
Status: DISCONTINUED | OUTPATIENT
Start: 2018-09-13 | End: 2018-09-13 | Stop reason: HOSPADM

## 2018-09-13 RX ORDER — METOCLOPRAMIDE HYDROCHLORIDE 5 MG/ML
10 INJECTION INTRAMUSCULAR; INTRAVENOUS ONCE AS NEEDED
Status: DISCONTINUED | OUTPATIENT
Start: 2018-09-13 | End: 2018-09-13 | Stop reason: HOSPADM

## 2018-09-13 RX ORDER — ACETAMINOPHEN 325 MG/1
650 TABLET ORAL ONCE
Status: COMPLETED | OUTPATIENT
Start: 2018-09-13 | End: 2018-09-13

## 2018-09-13 RX ORDER — CEFAZOLIN SODIUM 1 G/3ML
INJECTION, POWDER, FOR SOLUTION INTRAMUSCULAR; INTRAVENOUS AS NEEDED
Status: DISCONTINUED | OUTPATIENT
Start: 2018-09-13 | End: 2018-09-13 | Stop reason: SURG

## 2018-09-13 RX ORDER — PROPOFOL 10 MG/ML
INJECTION, EMULSION INTRAVENOUS AS NEEDED
Status: DISCONTINUED | OUTPATIENT
Start: 2018-09-13 | End: 2018-09-13 | Stop reason: SURG

## 2018-09-13 RX ORDER — ONDANSETRON 2 MG/ML
INJECTION INTRAMUSCULAR; INTRAVENOUS AS NEEDED
Status: DISCONTINUED | OUTPATIENT
Start: 2018-09-13 | End: 2018-09-13 | Stop reason: SURG

## 2018-09-13 RX ORDER — MIDAZOLAM HYDROCHLORIDE 1 MG/ML
INJECTION INTRAMUSCULAR; INTRAVENOUS AS NEEDED
Status: DISCONTINUED | OUTPATIENT
Start: 2018-09-13 | End: 2018-09-13 | Stop reason: SURG

## 2018-09-13 RX ADMIN — CEFAZOLIN 2000 MG: 330 INJECTION, POWDER, FOR SOLUTION INTRAMUSCULAR; INTRAVENOUS at 08:04

## 2018-09-13 RX ADMIN — MIDAZOLAM 2 MG: 1 INJECTION INTRAMUSCULAR; INTRAVENOUS at 07:51

## 2018-09-13 RX ADMIN — SODIUM CHLORIDE, SODIUM LACTATE, POTASSIUM CHLORIDE, AND CALCIUM CHLORIDE: .6; .31; .03; .02 INJECTION, SOLUTION INTRAVENOUS at 07:44

## 2018-09-13 RX ADMIN — ACETAMINOPHEN 650 MG: 325 TABLET, FILM COATED ORAL at 10:32

## 2018-09-13 RX ADMIN — PROPOFOL 150 MG: 10 INJECTION, EMULSION INTRAVENOUS at 07:57

## 2018-09-13 RX ADMIN — ONDANSETRON 4 MG: 2 INJECTION INTRAMUSCULAR; INTRAVENOUS at 08:14

## 2018-09-13 NOTE — PERIOPERATIVE NURSING NOTE
VSS, pt denies pain or nausea, assessment unchanged, report called, no questions, pt transferred to [facility]

## 2018-09-13 NOTE — ANESTHESIA POSTPROCEDURE EVALUATION
Post-Op Assessment Note      CV Status:  Stable    Mental Status:  Alert and awake    Hydration Status:  Euvolemic    PONV Controlled:  Controlled    Airway Patency:  Patent    Post Op Vitals Reviewed: Yes          Staff: CRNA           /70 (09/13/18 0839)    Temp (!) 97 1 °F (36 2 °C) (09/13/18 0839)    Pulse 68 (09/13/18 0839)   Resp 15 (09/13/18 0839)    SpO2 95 % (09/13/18 0839)

## 2018-09-13 NOTE — OP NOTE
OPERATIVE REPORT  PATIENT NAME: Buzz Marin    :  1955  MRN: [id number]  Pt Location: BE OR ROOM 07    SURGERY DATE: 2018    Surgeon(s) and Role:     * Liyah Shin MD - Primary    Preop Diagnosis:  Malignant neoplasm of right female breast, unspecified estrogen receptor status, unspecified site of breast ([facility] [address] [address] ) [C50 911]    Post-Op Diagnosis Codes:     * Malignant neoplasm of right female breast, unspecified estrogen receptor status, unspecified site of breast ([facility] [address] ) [C50 911]    Procedure(s) (LRB):  INSERTION VENOUS PORT (PORT-A-CATH) (Left)    Specimen(s):  * No specimens in log *    Estimated Blood Loss:   Minimal    Drains:       Anesthesia Type:   General    Operative Indications:  Malignant neoplasm of right female breast, unspecified estrogen receptor status, unspecified site of breast ([facility] ) [C50 911]      Operative Findings:  n/a    Complications:   None    Procedure and Technique:  The patient was brought into the operating room and identified by a proper timeout  Following this, she was sedated by the anesthesia team   A shoulder roll was positioned between the scapulae  The patient was then put in Tredelenburg position  The patient was then prepped and draped with the left chest and neck exposed  The left deltopectoral groove was palpated  It was then anesthetized with 30 cc of lidocaine  Following this, a left subclavian vein stick was performed  A wire was threaded through the needle into the vein  This was confirmed fluoroscopically  The needle was withdrawn leaving the wire in place  An incision was then made to include the wire, and to allow for creation of a port pocket inferomedially  A dilator sheath was then threaded over the wire into the vessel  The wire and the dilator were then withdrawn leaving the sheath in place  A pre-flushed catheter was then threaded through the sheath into the SVC    Fluoroscopic guidance was used to position the catheter at the SVC/right atrial junction  The catheter was then cut that the correct length and attached to a pre-flushed port  The port pocket was created along the inferomedial aspect of the incision  The port was then placed in the pocket and anchored using 3-0 prolene sutures  The system was flushed with heparinized saline  Once we were sure of hemostasis, closure was performed using 3-0 Vicryl to close the subcutaneous fat, followed by 3-0 Vicryl to close the dermis in interrupted fashion, followed by 4-0 Monocryl placed in running subcuticular fashion to close the skin  Benzoin and Steri-Strips were applied followed by a 4 x 4 dressing and Tegaderm gauze  The patient tolerated the procedure well without any difficulties     I was present for the entire procedure    Patient Disposition:  PACU     SIGNATURE: Sky Mancuso MD  DATE: September 13, 2018  TIME: 8:43 AM

## 2018-09-13 NOTE — DISCHARGE INSTRUCTIONS
Remove Tegaderm after 48 hr   OK to take a shower now mild Tegaderm in place  Okay to get Steri-Strips wet after Tegaderm was removed afterwards  Use ice pack for 24-48 hours

## 2018-09-13 NOTE — H&P (VIEW-ONLY)
Surgical Oncology Follow Up       [address] [place]  [address]  1955  [id number]  [facility] [place]  [address] [place] [address]    No chief complaint on file  Assessment/Plan:    No problem-specific Assessment & Plan notes found for this encounter  There are no diagnoses linked to this encounter  Advance Care Planning/Advance Directives:  Discussed disease status, cancer treatment plans and/or cancer treatment goals with the patient  Malignant neoplasm of right female breast ([facility] )    5/23/2018 Initial Diagnosis     Malignant neoplasm of right female breast ([facility] )         5/23/2018 Biopsy     Right breast core biopsy:  DCIS, micropapillary type, lowintermediate nuclear grade  ER 90-95% positive,  MI 75-80% positive           6/26/2018 Surgery     RIGHT BREAST NEEDLE LOCALIZATION X2 WITH RIGHT BREAST LUMPECTOMY ( NEEDLE LOC @ 1000) (Right)   ONCOPLASTIC CLOSURE OF LUMPECTOMY CAVITY    Invasive breast carcinoma, NST (aka ductal)  * Ana Paula grade 2 of 3 (total score = 2+2+2 = 6 of 9)   -- tubule formation < 10%, score 3   -- nuclear grade 2 of 3, score 2   -- mitoses ~ 4/mm2, score 2    * invasive carcinoma is multifocally present (A23-1, A32-1, A84-1, A89-1, A100-1), largest focus is 14 millimeters in greatest dimension (A89-1, this focus is graded)  * superior lumpectomy margin (orange-inked) is POSITIVE for invasive carcinoma (A84-1)   * all other lumpectomy margins are free of invasive carcinoma  * estrogen, progesterone & Her-2/negrita receptor studies are undertaken, to be described in an addendum report  - Ductal carcinoma in-situ (DCIS): Present as an extensive component (~85% of total tumor)  * DCIS is co-located with invasive carcinoma surrounding prior needle biopsy site     * DCIS spans 2 6 cm maximal dimension (A62-1) and is present on 27 of 136 total slides examined  * DCIS has cribriform & micropapillary patterns, nuclear grade 2 of 3, with central comedo-type necrosis  * DCIS is present within 0 2 millimeters of the superior lumpectomy margin (orange-inked, A26-1)   * DCIS is present within 0 2 millimeters of the medial lumpectomy margin (yellow-inked, A3-1)   * all other lumpectomy margins are free of DCIS by > 2 millimeters  - Lymph-vascular invasion: no lymph-vascular invasion is unequivocally identified  - Microcalcifications: present in DCIS  % positive, ER 45-55% positive, HER2 by IHC 3+ positive    - Dr George Camargo           8/2/2018 Surgery     Right breast lumpectomy reexcision, right axilla SLN biopsy:  A  Submitted as right axillary sentinel node:  - Seven lymph nodes are identified showing no metastatic tumor      B  Right breast lumpectomy reexcision:  - Abundant reactive changes are seen around the previous lumpectomy cavity   - No residual atypia or malignancy is seen               8/2/2018 -  Cancer Staged     Stage IA - pT1c, pN0, G2  History of Present Illness:   Right breast cancer  -Interval History:  Patient here discussed port placement  She saw Dr Shikha cesar this morning  He plans to start chemotherapy  Review of Systems:  Review of Systems   Constitutional: Negative  HENT: Negative  Eyes: Negative  Respiratory: Negative  Cardiovascular: Negative  Gastrointestinal: Negative  Endocrine: Negative  Genitourinary: Negative  Musculoskeletal: Negative  Skin: Negative  Allergic/Immunologic: Negative  Neurological: Negative  Hematological: Negative  Psychiatric/Behavioral: Negative  All other systems reviewed and are negative        Patient Active Problem List   Diagnosis    Type 2 diabetes mellitus with complication, with long-term current use of insulin ([facility] )    Asthma    Essential hypertension    Other specified hypothyroidism    Chest pain    Palpitations    Preop examination    Chronic bilateral low back pain without sciatica    Ductal carcinoma in situ (DCIS) of right breast    Neck pain    Constipation    Primary insomnia    Malignant neoplasm of right female breast (HCC)    Malignant neoplasm of upper-outer quadrant of right breast in female, estrogen receptor positive ([facility] )    Epigastric pain    Hiatal hernia    Screening for colon cancer    Esophageal dysphagia    Cellulitis of right axilla    Chronic pain of right knee    Encounter for diabetic foot exam ([facility] )     Past Medical History:   Diagnosis Date    Asthma     Breast cancer ([facility] )     Cancer ([facility] )     BREAST    Diabetes mellitus ([facility] )     blood sugar 199 @ 735    Hiatal hernia     Hypercholesterolemia     Hypertension     Hypothyroid     Rheumatoid arthritis ([facility] )      Past Surgical History:   Procedure Laterality Date    BREAST BIOPSY      BREAST LUMPECTOMY Right 6/26/2018    Procedure: RIGHT BREAST NEEDLE LOCALIZATION X2 WITH RIGHT BREAST LUMPECTOMY ( NEEDLE LOC @ 1000); Surgeon: Mayuri Watts MD;  Location: [facility] OR;  Service: Surgical Oncology    BREAST LUMPECTOMY Right 8/2/2018    Procedure: LYMPHOSCINITGRAPHY INTRAOPERATIVE LYMPHATIC MAPPING , RIGHT SENTINEL NODE BIOPSY, REEXCISION  RIGHT BREAST LUMPECTOMY CAVITY (SUPERIOR MARGIN); Surgeon: Mayuri Watts MD;  Location:  MAIN OR;  Service: Surgical Oncology    BREAST SURGERY Left     CATARACT EXTRACTION, BILATERAL Bilateral     EGD AND COLONOSCOPY N/A 9/5/2018    Procedure: EGD AND COLONOSCOPY;  Surgeon: Quinton Damon MD;  Location: [facility] GI LAB;   Service: Gastroenterology    KNEE SURGERY Right     RETINAL DETACHMENT SURGERY Right     TUBAL LIGATION       Family History   Problem Relation Age of Onset    Diabetes Mother     Hypertension Mother     Diabetes Father     Hypertension Father     Diabetes Brother     Hypertension Brother     Prostate cancer Brother     Cancer Maternal Grandfather      Social History     Social History    Marital status:      Spouse name: N/A    Number of children: N/A    Years of education: N/A     Occupational History    Not on file       Social History Main Topics    Smoking status: Never Smoker    Smokeless tobacco: Never Used    Alcohol use No    Drug use: No    Sexual activity: Yes     Other Topics Concern    Not on file     Social History Narrative    No narrative on file       Current Outpatient Prescriptions:     albuterol (2 5 mg/3 mL) 0 083 % nebulizer solution, Take 1 vial (2 5 mg total) by nebulization every 4 (four) hours as needed for wheezing or shortness of breath, Disp: 75 mL, Rfl: 0    albuterol (PROVENTIL HFA,VENTOLIN HFA) 90 mcg/act inhaler, Inhale 1 puff 2 (two) times a day  , Disp: , Rfl:     enalapril (VASOTEC) 20 mg tablet, Take 1 tablet (20 mg total) by mouth daily, Disp: 90 tablet, Rfl: 1    gabapentin (NEURONTIN) 300 mg capsule, take 1 capsule by mouth three times a day, Disp: 90 capsule, Rfl: 1    gemfibrozil (LOPID) 600 mg tablet, Take 600 mg by mouth daily  , Disp: , Rfl:     glipiZIDE (GLUCOTROL XL) 10 mg 24 hr tablet, Take 1 tablet (10 mg total) by mouth daily, Disp: 90 tablet, Rfl: 1    HUMULIN N 100 UNIT/ML subcutaneous injection, 70 units in the am 30 units in the pm, Disp: 30 mL, Rfl: 5    Insulin Glargine (TOUJEO) 300 units/mL CONCETRATED U-300 injection pen, Inject 35 Units under the skin daily at bedtime, Disp: 5 pen, Rfl: 3    levothyroxine 50 mcg tablet, Take 1 tablet (50 mcg total) by mouth daily, Disp: 90 tablet, Rfl: 1    metFORMIN (GLUCOPHAGE-XR) 500 mg 24 hr tablet, take 2 tablets by mouth once daily WITH BREAKFAST, Disp: , Rfl: 0    omeprazole (PriLOSEC) 20 mg delayed release capsule, take 1 capsule by mouth once daily, Disp: 90 capsule, Rfl: 2    polyethylene glycol (MIRALAX) 17 g packet, Take 17 g by mouth 2 (two) times a day, Disp: 60 each, Rfl: 5  Allergies   Allergen Reactions    Aspirin Hives    Tylenol With Codeine #3 [Acetaminophen-Codeine] Rash     There were no vitals filed for this visit  Physical Exam   Constitutional: She appears well-developed and well-nourished  HENT:   Head: Normocephalic and atraumatic  Right Ear: External ear normal    Left Ear: External ear normal    Eyes: Conjunctivae are normal  Pupils are equal, round, and reactive to light  Neck: Normal range of motion  Neck supple  Cardiovascular: Normal rate, regular rhythm and normal heart sounds  Pulmonary/Chest: Effort normal and breath sounds normal    Abdominal: Soft  Bowel sounds are normal          Results:  Labs:  none    Imaging  No results found  I reviewed the above laboratory and imaging data  Discussion/Summary:  Right breast cancer, in need of left port  Risks and benefits of surgery including infection, bleeding, pneumothorax, discussed with patient  She understands the plan wishes to proceed with left chest port placement

## 2018-09-13 NOTE — ANESTHESIA PREPROCEDURE EVALUATION
Review of Systems/Medical History  Patient summary reviewed  Chart reviewed  History of anesthetic complications PONV    Cardiovascular  Hyperlipidemia, Hypertension ,   Comment: Stress test neg  Echo 65%EF,  Pulmonary  Asthma , well controlled/ stable ,        GI/Hepatic     Hiatal hernia,             Endo/Other  Diabetes well controlled , History of thyroid disease , hypothyroidism,      GYN    Breast cancer        Hematology  Negative hematology ROS      Musculoskeletal  Rheumatoid arthritis Severity: mild,   Arthritis     Neurology  Negative neurology ROS      Psychology   Negative psychology ROS              Physical Exam    Airway    Mallampati score: III  TM Distance: >3 FB  Neck ROM: full     Dental       Cardiovascular      Pulmonary      Other Findings  Mult missing teeth  Poor dentition  Denies loose teetjh      Anesthesia Plan  ASA Score- 2     Anesthesia Type- general with ASA Monitors  Additional Monitors:   Airway Plan: LMA  Plan Factors-  Patient did not smoke on day of surgery  Induction- intravenous  Postoperative Plan-     Informed Consent- Anesthetic plan and risks discussed with patient  I personally reviewed this patient with the CRNA  Discussed and agreed on the Anesthesia Plan with the CRNA  Rk Huston

## 2018-09-15 DIAGNOSIS — E11.8 TYPE 2 DIABETES MELLITUS WITH COMPLICATION, WITHOUT LONG-TERM CURRENT USE OF INSULIN (HCC): ICD-10-CM

## 2018-09-15 DIAGNOSIS — F51.01 PRIMARY INSOMNIA: ICD-10-CM

## 2018-09-17 RX ORDER — TRAZODONE HYDROCHLORIDE 50 MG/1
TABLET ORAL
Qty: 90 TABLET | Refills: 0 | Status: SHIPPED | OUTPATIENT
Start: 2018-09-17 | End: 2018-12-10

## 2018-09-17 RX ORDER — METFORMIN HYDROCHLORIDE 500 MG/1
TABLET, EXTENDED RELEASE ORAL
Qty: 180 TABLET | Refills: 1 | Status: SHIPPED | OUTPATIENT
Start: 2018-09-17 | End: 2019-03-19 | Stop reason: SDUPTHER

## 2018-09-18 DIAGNOSIS — E78.5 HYPERLIPIDEMIA, UNSPECIFIED HYPERLIPIDEMIA TYPE: Primary | ICD-10-CM

## 2018-09-18 DIAGNOSIS — E11.8 TYPE 2 DIABETES MELLITUS WITH COMPLICATION, WITHOUT LONG-TERM CURRENT USE OF INSULIN (HCC): ICD-10-CM

## 2018-09-19 ENCOUNTER — HOSPITAL ENCOUNTER (OUTPATIENT)
Dept: NUCLEAR MEDICINE | Facility: HOSPITAL | Age: 63
Discharge: HOME | End: 2018-09-19
Attending: INTERNAL MEDICINE
Payer: MEDICARE

## 2018-09-19 ENCOUNTER — APPOINTMENT (OUTPATIENT)
Dept: LAB | Facility: HOSPITAL | Age: 63
End: 2018-09-19
Attending: INTERNAL MEDICINE
Payer: MEDICARE

## 2018-09-19 DIAGNOSIS — C50.911 CANCER OF RIGHT BREAST, STAGE 1, ESTROGEN RECEPTOR POSITIVE (HCC): ICD-10-CM

## 2018-09-19 DIAGNOSIS — E11.8 TYPE 2 DIABETES MELLITUS WITH COMPLICATION, WITHOUT LONG-TERM CURRENT USE OF INSULIN (HCC): ICD-10-CM

## 2018-09-19 DIAGNOSIS — E03.8 OTHER SPECIFIED HYPOTHYROIDISM: ICD-10-CM

## 2018-09-19 DIAGNOSIS — Z17.0 CANCER OF RIGHT BREAST, STAGE 1, ESTROGEN RECEPTOR POSITIVE (HCC): ICD-10-CM

## 2018-09-19 DIAGNOSIS — I10 ESSENTIAL HYPERTENSION: ICD-10-CM

## 2018-09-19 DIAGNOSIS — C50.911 MALIGNANT NEOPLASM OF RIGHT FEMALE BREAST, UNSPECIFIED ESTROGEN RECEPTOR STATUS, UNSPECIFIED SITE OF BREAST (HCC): ICD-10-CM

## 2018-09-19 LAB
ALBUMIN SERPL BCP-MCNC: 3.3 G/DL (ref 3.5–5)
ALP SERPL-CCNC: 74 U/L (ref 46–116)
ALT SERPL W P-5'-P-CCNC: 23 U/L (ref 12–78)
ANION GAP SERPL CALCULATED.3IONS-SCNC: 7 MMOL/L (ref 4–13)
APTT PPP: 27 SECONDS (ref 24–36)
AST SERPL W P-5'-P-CCNC: 11 U/L (ref 5–45)
BILIRUB SERPL-MCNC: 0.39 MG/DL (ref 0.2–1)
BUN SERPL-MCNC: 16 MG/DL (ref 5–25)
CALCIUM SERPL-MCNC: 9.1 MG/DL (ref 8.3–10.1)
CHLORIDE SERPL-SCNC: 102 MMOL/L (ref 100–108)
CO2 SERPL-SCNC: 29 MMOL/L (ref 21–32)
CREAT SERPL-MCNC: 0.69 MG/DL (ref 0.6–1.3)
GFR SERPL CREATININE-BSD FRML MDRD: 94 ML/MIN/1.73SQ M
GLUCOSE P FAST SERPL-MCNC: 274 MG/DL (ref 65–99)
INR PPP: 1.01 (ref 0.86–1.17)
POTASSIUM SERPL-SCNC: 4.1 MMOL/L (ref 3.5–5.3)
PROT SERPL-MCNC: 7.6 G/DL (ref 6.4–8.2)
PROTHROMBIN TIME: 13.4 SECONDS (ref 11.8–14.2)
SODIUM SERPL-SCNC: 138 MMOL/L (ref 136–145)

## 2018-09-19 PROCEDURE — 85610 PROTHROMBIN TIME: CPT

## 2018-09-19 PROCEDURE — 78472 GATED HEART PLANAR SINGLE: CPT

## 2018-09-19 PROCEDURE — 80053 COMPREHEN METABOLIC PANEL: CPT

## 2018-09-19 PROCEDURE — 85730 THROMBOPLASTIN TIME PARTIAL: CPT

## 2018-09-19 PROCEDURE — A9560 TC99M LABELED RBC: HCPCS

## 2018-09-20 RX ORDER — GLIPIZIDE 10 MG/1
TABLET, FILM COATED, EXTENDED RELEASE ORAL
Qty: 90 TABLET | Refills: 1 | Status: SHIPPED | OUTPATIENT
Start: 2018-09-20 | End: 2019-03-19 | Stop reason: SDUPTHER

## 2018-09-20 RX ORDER — AMLODIPINE BESYLATE AND ATORVASTATIN CALCIUM 10; 10 MG/1; MG/1
TABLET, FILM COATED ORAL
Qty: 90 TABLET | Refills: 1 | Status: SHIPPED | OUTPATIENT
Start: 2018-09-20 | End: 2020-07-24

## 2018-09-20 RX ORDER — GEMFIBROZIL 600 MG/1
600 TABLET, FILM COATED ORAL 2 TIMES DAILY
Qty: 60 TABLET | Refills: 3 | Status: SHIPPED | OUTPATIENT
Start: 2018-09-20 | End: 2019-01-18 | Stop reason: SDUPTHER

## 2018-09-20 RX ORDER — ENALAPRIL MALEATE 20 MG/1
TABLET ORAL
Qty: 90 TABLET | Refills: 1 | Status: SHIPPED | OUTPATIENT
Start: 2018-09-20 | End: 2019-03-19 | Stop reason: SDUPTHER

## 2018-09-20 RX ORDER — LEVOTHYROXINE SODIUM 0.05 MG/1
TABLET ORAL
Qty: 90 TABLET | Refills: 1 | Status: SHIPPED | OUTPATIENT
Start: 2018-09-20 | End: 2019-03-19 | Stop reason: SDUPTHER

## 2018-09-20 NOTE — TELEPHONE ENCOUNTER
Pt has medicare and Pharmacy is faxing us form Medicare part B detailed written order needs to be sign by PCP  Also pharmacist informe me that Amlodipine-atorvastatin is not cover by insurance requires [place]    Could be cover separately  [facility] pharmacy benefit # [phone]  ID # [id number]

## 2018-09-21 ENCOUNTER — TELEPHONE (OUTPATIENT)
Dept: FAMILY MEDICINE CLINIC | Facility: CLINIC | Age: 63
End: 2018-09-21

## 2018-09-21 DIAGNOSIS — I10 ESSENTIAL HYPERTENSION: Primary | ICD-10-CM

## 2018-09-21 DIAGNOSIS — E78.00 HYPERCHOLESTEROLEMIA: ICD-10-CM

## 2018-09-21 RX ORDER — ATORVASTATIN CALCIUM 10 MG/1
10 TABLET, FILM COATED ORAL DAILY
Qty: 90 TABLET | Refills: 1 | Status: SHIPPED | OUTPATIENT
Start: 2018-09-21 | End: 2018-11-08 | Stop reason: HOSPADM

## 2018-09-21 RX ORDER — AMLODIPINE BESYLATE 10 MG/1
10 TABLET ORAL DAILY
Qty: 90 TABLET | Refills: 1 | Status: SHIPPED | OUTPATIENT
Start: 2018-09-21 | End: 2018-09-25 | Stop reason: ALTCHOICE

## 2018-09-21 NOTE — TELEPHONE ENCOUNTER
Medicare Part B Detailed Written order 3 forms were faxed back sign by Dr Meenakshi Ying this morning to [employer]

## 2018-09-21 NOTE — TELEPHONE ENCOUNTER
Atorvastatin treats cholesterol  Gemfibrozil targets tryglicerides  At low dose of atorvastatin risk for rhabdo is low  Sent amlodipine and atorvastatin separately

## 2018-09-24 RX ORDER — SODIUM CHLORIDE 9 MG/ML
20 INJECTION, SOLUTION INTRAVENOUS CONTINUOUS
Status: DISCONTINUED | OUTPATIENT
Start: 2018-09-25 | End: 2018-09-28 | Stop reason: HOSPADM

## 2018-09-25 ENCOUNTER — HOSPITAL ENCOUNTER (OUTPATIENT)
Dept: INFUSION CENTER | Facility: CLINIC | Age: 63
Discharge: HOME | End: 2018-09-25
Payer: MEDICARE

## 2018-09-25 ENCOUNTER — TELEPHONE (OUTPATIENT)
Dept: HEMATOLOGY ONCOLOGY | Facility: CLINIC | Age: 63
End: 2018-09-25

## 2018-09-25 VITALS
RESPIRATION RATE: 18 BRPM | DIASTOLIC BLOOD PRESSURE: 79 MMHG | WEIGHT: 185.41 LBS | SYSTOLIC BLOOD PRESSURE: 146 MMHG | TEMPERATURE: 98.7 F | BODY MASS INDEX: 30.89 KG/M2 | HEART RATE: 83 BPM | HEIGHT: 65 IN

## 2018-09-25 DIAGNOSIS — Z45.2 ENCOUNTER FOR CARE RELATED TO VASCULAR ACCESS PORT: Primary | ICD-10-CM

## 2018-09-25 PROCEDURE — 96417 CHEMO IV INFUS EACH ADDL SEQ: CPT

## 2018-09-25 PROCEDURE — 96367 TX/PROPH/DG ADDL SEQ IV INF: CPT

## 2018-09-25 PROCEDURE — 96413 CHEMO IV INFUSION 1 HR: CPT

## 2018-09-25 PROCEDURE — 96415 CHEMO IV INFUSION ADDL HR: CPT

## 2018-09-25 RX ORDER — LIDOCAINE AND PRILOCAINE 25; 25 MG/G; MG/G
CREAM TOPICAL AS NEEDED
Qty: 30 G | Refills: 0 | Status: SHIPPED | OUTPATIENT
Start: 2018-09-25 | End: 2018-12-26

## 2018-09-25 RX ADMIN — SODIUM CHLORIDE 20 ML/HR: 0.9 INJECTION, SOLUTION INTRAVENOUS at 08:58

## 2018-09-25 RX ADMIN — TRASTUZUMAB 332 MG: 150 INJECTION, POWDER, LYOPHILIZED, FOR SOLUTION INTRAVENOUS at 11:37

## 2018-09-25 RX ADMIN — PACLITAXEL 153 MG: 6 INJECTION, SOLUTION INTRAVENOUS at 10:34

## 2018-09-25 RX ADMIN — SODIUM CHLORIDE, PRESERVATIVE FREE 300 UNITS: 5 INJECTION INTRAVENOUS at 13:45

## 2018-09-25 RX ADMIN — DEXAMETHASONE SODIUM PHOSPHATE 10 MG: 10 INJECTION, SOLUTION INTRAMUSCULAR; INTRAVENOUS at 09:20

## 2018-09-25 RX ADMIN — FAMOTIDINE 20 MG: 10 INJECTION INTRAVENOUS at 08:58

## 2018-09-25 RX ADMIN — DIPHENHYDRAMINE HYDROCHLORIDE 25 MG: 50 INJECTION, SOLUTION INTRAMUSCULAR; INTRAVENOUS at 09:39

## 2018-09-25 NOTE — PROGRESS NOTES
Patient tolerated her first chemotherapy well without adverse reaction  She did report some tingling in her hands and feet as well as a headache about an hour into her herceptin infusion  Patient is also diabetic  The herceptin was stopped  Patient ate some chicken fingers provided by her daughters and reported that her hands and feet felt better and her head was improving  Earnestine Gomes RN was notified  Herceptin was continued and tolerated  Patient left ambulatory without a headache or tingling in hands or feet

## 2018-09-25 NOTE — PLAN OF CARE

## 2018-09-26 ENCOUNTER — DOCUMENTATION (OUTPATIENT)
Dept: HEMATOLOGY ONCOLOGY | Facility: CLINIC | Age: 63
End: 2018-09-26

## 2018-09-26 NOTE — PROGRESS NOTES
Oncology RN Navigator:   Referral received from [address] [place] [address] , Cancer counselor  Called patient and daughter for purpose of navigation outreach  Spoke to "Mary Robledo" pt's daughter regarding medications, specifically antibiotic prescribed recently, the proper way to take, the schedule, etc  Also discussed appointments, transportation concerns and cancer support services  Sent patient education material and cancer support services in both [place] and [place] to the patients home  Encouraged to call with any questions or concerns  My direct office contact information was offered  Will follow up with patient prn and as needed  Assessment:    Potential Barriers:    Care Coordination: XX    Communication/Education: XX    Cultural/Christian/Spiritual:    Health Promotion:    Insurance/Medical Costs:    Logistical: XX    Psychosocial/Distress/Behavioral:    Work/School:    Interventions:    Referrals:  GEORGE Lauren      Comments:

## 2018-09-26 NOTE — CANCER SUPPORT CONFERENCE
This MSW was out of the office yesterday  MSW [place] a message for [place]  MSW asked patient to call her regarding first chemo treatment  Laurel FRIEND  Infusion nurse request that patient be guided and supported by MSW also contacted Breast Cancer Navigator Blake FRIEND, MSW provided Blake Zarate with patient's information

## 2018-09-27 ENCOUNTER — HOSPITAL ENCOUNTER (EMERGENCY)
Facility: HOSPITAL | Age: 63
Discharge: HOME | End: 2018-09-27
Attending: EMERGENCY MEDICINE | Admitting: EMERGENCY MEDICINE
Payer: MEDICARE

## 2018-09-27 VITALS
BODY MASS INDEX: 31.09 KG/M2 | DIASTOLIC BLOOD PRESSURE: 72 MMHG | OXYGEN SATURATION: 97 % | HEART RATE: 73 BPM | SYSTOLIC BLOOD PRESSURE: 144 MMHG | TEMPERATURE: 97.6 F | RESPIRATION RATE: 16 BRPM | WEIGHT: 185 LBS

## 2018-09-27 DIAGNOSIS — T50.905A MEDICATION SIDE EFFECT, INITIAL ENCOUNTER: Primary | ICD-10-CM

## 2018-09-27 DIAGNOSIS — M79.10 MYALGIA: ICD-10-CM

## 2018-09-27 LAB
ALBUMIN SERPL BCP-MCNC: 3.4 G/DL (ref 3.5–5)
ALP SERPL-CCNC: 73 U/L (ref 46–116)
ALT SERPL W P-5'-P-CCNC: 35 U/L (ref 12–78)
ANION GAP SERPL CALCULATED.3IONS-SCNC: 8 MMOL/L (ref 4–13)
AST SERPL W P-5'-P-CCNC: 34 U/L (ref 5–45)
BASOPHILS # BLD AUTO: 0.06 THOUSANDS/ΜL (ref 0–0.1)
BASOPHILS NFR BLD AUTO: 1 % (ref 0–1)
BILIRUB SERPL-MCNC: 0.4 MG/DL (ref 0.2–1)
BUN SERPL-MCNC: 25 MG/DL (ref 5–25)
CALCIUM SERPL-MCNC: 8.9 MG/DL (ref 8.3–10.1)
CHLORIDE SERPL-SCNC: 103 MMOL/L (ref 100–108)
CO2 SERPL-SCNC: 28 MMOL/L (ref 21–32)
CREAT SERPL-MCNC: 0.57 MG/DL (ref 0.6–1.3)
EOSINOPHIL # BLD AUTO: 0.04 THOUSAND/ΜL (ref 0–0.61)
EOSINOPHIL NFR BLD AUTO: 1 % (ref 0–6)
ERYTHROCYTE [DISTWIDTH] IN BLOOD BY AUTOMATED COUNT: 13 % (ref 11.6–15.1)
GFR SERPL CREATININE-BSD FRML MDRD: 100 ML/MIN/1.73SQ M
GLUCOSE SERPL-MCNC: 168 MG/DL (ref 65–140)
GLUCOSE SERPL-MCNC: 199 MG/DL (ref 65–140)
HCT VFR BLD AUTO: 35.1 % (ref 34.8–46.1)
HGB BLD-MCNC: 11.7 G/DL (ref 11.5–15.4)
IMM GRANULOCYTES # BLD AUTO: 0.01 THOUSAND/UL (ref 0–0.2)
IMM GRANULOCYTES NFR BLD AUTO: 0 % (ref 0–2)
LYMPHOCYTES # BLD AUTO: 3.24 THOUSANDS/ΜL (ref 0.6–4.47)
LYMPHOCYTES NFR BLD AUTO: 53 % (ref 14–44)
MCH RBC QN AUTO: 28.9 PG (ref 26.8–34.3)
MCHC RBC AUTO-ENTMCNC: 33.3 G/DL (ref 31.4–37.4)
MCV RBC AUTO: 87 FL (ref 82–98)
MONOCYTES # BLD AUTO: 0.23 THOUSAND/ΜL (ref 0.17–1.22)
MONOCYTES NFR BLD AUTO: 4 % (ref 4–12)
NEUTROPHILS # BLD AUTO: 2.55 THOUSANDS/ΜL (ref 1.85–7.62)
NEUTS SEG NFR BLD AUTO: 41 % (ref 43–75)
NRBC BLD AUTO-RTO: 0 /100 WBCS
PLATELET # BLD AUTO: 233 THOUSANDS/UL (ref 149–390)
PMV BLD AUTO: 11.9 FL (ref 8.9–12.7)
POTASSIUM SERPL-SCNC: 4.1 MMOL/L (ref 3.5–5.3)
PROT SERPL-MCNC: 7.5 G/DL (ref 6.4–8.2)
RBC # BLD AUTO: 4.05 MILLION/UL (ref 3.81–5.12)
SODIUM SERPL-SCNC: 139 MMOL/L (ref 136–145)
WBC # BLD AUTO: 6.13 THOUSAND/UL (ref 4.31–10.16)

## 2018-09-27 PROCEDURE — 85025 COMPLETE CBC W/AUTO DIFF WBC: CPT | Performed by: EMERGENCY MEDICINE

## 2018-09-27 PROCEDURE — 82948 REAGENT STRIP/BLOOD GLUCOSE: CPT

## 2018-09-27 PROCEDURE — 99283 EMERGENCY DEPT VISIT LOW MDM: CPT

## 2018-09-27 PROCEDURE — 96361 HYDRATE IV INFUSION ADD-ON: CPT

## 2018-09-27 PROCEDURE — 96375 TX/PRO/DX INJ NEW DRUG ADDON: CPT

## 2018-09-27 PROCEDURE — 36415 COLL VENOUS BLD VENIPUNCTURE: CPT | Performed by: EMERGENCY MEDICINE

## 2018-09-27 PROCEDURE — 96374 THER/PROPH/DIAG INJ IV PUSH: CPT

## 2018-09-27 PROCEDURE — 80053 COMPREHEN METABOLIC PANEL: CPT | Performed by: EMERGENCY MEDICINE

## 2018-09-27 RX ORDER — HYDROMORPHONE HCL/PF 1 MG/ML
0.5 SYRINGE (ML) INJECTION ONCE
Status: COMPLETED | OUTPATIENT
Start: 2018-09-27 | End: 2018-09-27

## 2018-09-27 RX ORDER — HYDROCODONE BITARTRATE AND ACETAMINOPHEN 5; 325 MG/1; MG/1
1 TABLET ORAL EVERY 4 HOURS PRN
Qty: 15 TABLET | Refills: 0 | Status: SHIPPED | OUTPATIENT
Start: 2018-09-27 | End: 2018-11-19

## 2018-09-27 RX ORDER — ONDANSETRON 2 MG/ML
4 INJECTION INTRAMUSCULAR; INTRAVENOUS ONCE
Status: COMPLETED | OUTPATIENT
Start: 2018-09-27 | End: 2018-09-27

## 2018-09-27 RX ADMIN — HYDROMORPHONE HYDROCHLORIDE 0.5 MG: 1 INJECTION, SOLUTION INTRAMUSCULAR; INTRAVENOUS; SUBCUTANEOUS at 20:17

## 2018-09-27 RX ADMIN — ONDANSETRON 4 MG: 2 INJECTION INTRAMUSCULAR; INTRAVENOUS at 20:17

## 2018-09-27 RX ADMIN — SODIUM CHLORIDE 1000 ML: 0.9 INJECTION, SOLUTION INTRAVENOUS at 20:20

## 2018-09-28 NOTE — ED PROVIDER NOTES
History  Chief Complaint   Patient presents with    Headache     patient reports HA and body aches since chemotherapy on Tuesday  patient denies nausea and vomiting  denies sensitivity to light and sound  20-year-old female with a history of asthma, diabetes, breast cancer presents to the emergency department complaining of a 2 day history of diffuse body aches and headache  Patient started chemotherapy 2 days ago  She received Taxol and herceptin  During the herceptin infusion she did complain of headache which eventually resolved  Her headache started up almost upon returning home and has been present for the last 2 days along with diffuse body aches  No fevers or chills, nausea or vomiting  No chest pain or shortness of breath  History provided by:  Patient and relative   used: No    Headache   Pain location:  Occipital  Quality:  Unable to specify  Radiates to:  Does not radiate  Severity currently:  10/10  Severity at highest:  10/10  Onset quality:  Gradual  Duration:  2 days  Timing:  Constant  Progression:  Waxing and waning  Chronicity:  New  Relieved by:  Nothing  Worsened by:  Nothing  Ineffective treatments:  Acetaminophen  Associated symptoms: myalgias    Associated symptoms: no abdominal pain, no back pain, no blurred vision, no congestion, no cough, no diarrhea, no dizziness, no drainage, no ear pain, no eye pain, no facial pain, no fatigue, no fever, no focal weakness, no hearing loss, no loss of balance, no nausea, no near-syncope, no neck pain, no neck stiffness, no numbness, no paresthesias, no photophobia, no seizures, no sinus pressure, no sore throat, no swollen glands, no syncope, no tingling, no URI, no visual change, no vomiting and no weakness        Prior to Admission Medications   Prescriptions Last Dose Informant Patient Reported? Taking?    SANG MICROLET LANCETS lancets   No Yes   Sig: Testing three times a day   Blood Glucose Monitoring Suppl (SANG CONTOUR MONITOR) w/Device KIT   No Yes   Sig: Testing blood sugars three times a day   HUMULIN N 100 UNIT/ML subcutaneous injection  Family Member No Yes   Si units in the am 30 units in the pm   Insulin Glargine (TOUJEO) 300 units/mL CONCETRATED U-300 injection pen   No Yes   Sig: Inject 35 Units under the skin daily at bedtime   Patient taking differently: Inject 10 Units under the skin daily at bedtime     Insulin Syringe-Needle U-100 30G X 1/2" 1 ML MISC   No Yes   Sig: Using twice a day   albuterol (2 5 mg/3 mL) 0 083 % nebulizer solution  Family Member No Yes   Sig: Take 1 vial (2 5 mg total) by nebulization every 4 (four) hours as needed for wheezing or shortness of breath   albuterol (PROVENTIL HFA,VENTOLIN HFA) 90 mcg/act inhaler  Family Member Yes Yes   Sig: Inhale 1 puff every 4 (four) hours as needed     amLODIPine-atorvastatin (CADUET) 10-10 MG per tablet   No Yes   Sig: take 1 tablet by mouth once daily   atorvastatin (LIPITOR) 10 mg tablet   No Yes   Sig: Take 1 tablet (10 mg total) by mouth daily   enalapril (VASOTEC) 20 mg tablet   No Yes   Sig: take 1 tablet by mouth once daily   gabapentin (NEURONTIN) 300 mg capsule  Family Member No Yes   Sig: take 1 capsule by mouth three times a day   Patient taking differently: as needed   gemfibrozil (LOPID) 600 mg tablet   No Yes   Sig: Take 1 tablet (600 mg total) by mouth 2 (two) times a day   glipiZIDE (GLUCOTROL XL) 10 mg 24 hr tablet   No Yes   Sig: take 1 tablet by mouth once daily   glucose blood (SANG CONTOUR TEST) test strip   No Yes   Sig: Testing three times a day   levothyroxine 50 mcg tablet   No Yes   Sig: take 1 tablet by mouth once daily   lidocaine-prilocaine (EMLA) cream   No Yes   Sig: Apply topically as needed for mild pain Apply prior to port access     metFORMIN (GLUCOPHAGE-XR) 500 mg 24 hr tablet  Family Member Yes Yes   Sig: take 2 tablets by mouth once daily WITH BREAKFAST   metFORMIN (GLUCOPHAGE-XR) 500 mg 24 hr tablet No Yes   Sig: take 2 tablets by mouth once daily WITH BREAKFAST   omeprazole (PriLOSEC) 20 mg delayed release capsule  Family Member No Yes   Sig: take 1 capsule by mouth once daily   omeprazole (PriLOSEC) 40 MG capsule   No Yes   Sig: Take 1 capsule (40 mg total) by mouth 2 (two) times a day for 14 days   traZODone (DESYREL) 50 mg tablet   No Yes   Sig: take 1 tablet by mouth once daily at bedtime      Facility-Administered Medications: None       Past Medical History:   Diagnosis Date    Abdominal infection ([facility] [place] [facility] )     h-pylori    Asthma     Breast cancer ([facility] [place] [facility] )     Cancer ([facility] [place] [facility] )     BREAST    Diabetes mellitus ([place] [address] )     blood sugar 199 @ 735    Hiatal hernia     Hypercholesterolemia     Hypertension     Hypothyroid     Renal disorder     Rheumatoid arthritis ([place] [address] )        Past Surgical History:   Procedure Laterality Date    BREAST BIOPSY      BREAST LUMPECTOMY Right 6/26/2018    Procedure: RIGHT BREAST NEEDLE LOCALIZATION X2 WITH RIGHT BREAST LUMPECTOMY ( NEEDLE LOC @ 1000); Surgeon: Lynn Jain MD;  [facility];  Service: Surgical Oncology    BREAST LUMPECTOMY Right 8/2/2018    Procedure: LYMPHOSCINITGRAPHY INTRAOPERATIVE LYMPHATIC MAPPING , RIGHT SENTINEL NODE BIOPSY, REEXCISION  RIGHT BREAST LUMPECTOMY CAVITY (SUPERIOR MARGIN); Surgeon: Lynn Jain MD;  [facility];  Service: Surgical Oncology    BREAST SURGERY Left     CATARACT EXTRACTION, BILATERAL Bilateral     EGD AND COLONOSCOPY N/A 9/5/2018    Procedure: EGD AND COLONOSCOPY;  Surgeon: Opal Cooks, MD;  Location: [facility]; Service: Gastroenterology    [place] GUIDED CENTRAL VENOUS ACCESS REPLACEMENT  9/13/2018    KNEE SURGERY Right     RETINAL DETACHMENT SURGERY Right     TUBAL LIGATION      TUNNELED VENOUS PORT PLACEMENT Left 9/13/2018    Procedure: INSERTION VENOUS PORT (PORT-A-CATH);   Surgeon: Lynn Jain MD;  [facility];  Service: Surgical Oncology       Family History   Problem Relation Age of Onset    Diabetes Mother     Hypertension Mother     Diabetes Father     Hypertension Father     Diabetes Brother     Hypertension Brother     Prostate cancer Brother     Cancer Maternal Grandfather      I have reviewed and agree with the history as documented  Social History   Substance Use Topics    Smoking status: Never Smoker    Smokeless tobacco: Never Used    Alcohol use No        Review of Systems   Constitutional: Negative  Negative for chills, diaphoresis, fatigue and fever  HENT: Negative  Negative for congestion, ear pain, hearing loss, postnasal drip, rhinorrhea, sinus pressure and sore throat  Eyes: Negative  Negative for blurred vision, photophobia, pain, discharge, redness and itching  Respiratory: Negative  Negative for apnea, cough, chest tightness, shortness of breath and wheezing  Cardiovascular: Negative for chest pain, palpitations, leg swelling, syncope and near-syncope  Gastrointestinal: Negative  Negative for abdominal pain, diarrhea, nausea and vomiting  Endocrine: Negative  Genitourinary: Negative  Negative for flank pain, frequency and urgency  Musculoskeletal: Positive for myalgias  Negative for arthralgias, back pain, joint swelling, neck pain and neck stiffness  Skin: Negative  Allergic/Immunologic: Negative  Neurological: Positive for headaches  Negative for dizziness, tremors, focal weakness, seizures, syncope, facial asymmetry, speech difficulty, weakness, light-headedness, numbness, paresthesias and loss of balance  Hematological: Negative  All other systems reviewed and are negative  Physical Exam  Physical Exam   Constitutional: She is oriented to person, place, and time  She appears well-developed and well-nourished  Non-toxic appearance  She does not have a sickly appearance  She does not appear ill  No distress  HENT:   Head: Normocephalic and atraumatic     Right Ear: Tympanic membrane and external ear normal    Left Ear: Tympanic membrane and external ear normal    Nose: Nose normal    Mouth/Throat: Oropharynx is clear and moist  No oropharyngeal exudate  Eyes: Pupils are equal, round, and reactive to light  Conjunctivae and EOM are normal  Right eye exhibits no discharge  Left eye exhibits no discharge  No scleral icterus  Neck: Normal range of motion  Neck supple  Cardiovascular: Normal rate, regular rhythm and normal heart sounds  Exam reveals no gallop and no friction rub  No murmur heard  Pulmonary/Chest: Effort normal and breath sounds normal  No stridor  No respiratory distress  She has no wheezes  She has no rales  She exhibits no tenderness  Abdominal: Soft  Bowel sounds are normal  She exhibits no distension and no mass  There is no tenderness  No hernia  Musculoskeletal: Normal range of motion  She exhibits no edema, tenderness or deformity  Lymphadenopathy:     She has no cervical adenopathy  Neurological: She is alert and oriented to person, place, and time  She has normal reflexes  She displays normal reflexes  She exhibits normal muscle tone  Skin: Skin is warm and dry  No rash noted  She is not diaphoretic  No erythema  No pallor  Psychiatric: She has a normal mood and affect  Nursing note and vitals reviewed        Vital Signs  ED Triage Vitals   Temperature Pulse Respirations Blood Pressure SpO2   09/27/18 1919 09/27/18 1919 09/27/18 1919 09/27/18 1919 09/27/18 1919   97 9 °F (36 6 °C) 88 19 137/69 97 %      Temp Source Heart Rate Source Patient Position - Orthostatic VS BP Location FiO2 (%)   09/27/18 1919 09/27/18 2109 09/27/18 1919 09/27/18 1919 --   Oral Monitor Sitting Left arm       Pain Score       09/27/18 2016       Worst Possible Pain           Vitals:    09/27/18 1919 09/27/18 2109   BP: 137/69 144/72   Pulse: 88 73   Patient Position - Orthostatic VS: Sitting Lying       Visual Acuity      ED Medications  Medications   sodium chloride 0 9 % bolus 1,000 mL (0 mL Intravenous Stopped 9/27/18 2111)   HYDROmorphone (DILAUDID) injection 0 5 mg (0 5 mg Intravenous Given 9/27/18 2017)   ondansetron (ZOFRAN) injection 4 mg (4 mg Intravenous Given 9/27/18 2017)       Diagnostic Studies  Results Reviewed     Procedure Component Value Units Date/Time    Fingerstick Glucose (POCT) [[id number]]  (Abnormal) Collected:  09/27/18 2123    Lab Status:  Final result Updated:  09/27/18 2124     POC Glucose 168 (H) mg/dl     Comprehensive metabolic panel [[id number]]  (Abnormal) Collected:  09/27/18 2016    Lab Status:  Final result Specimen:  Blood from Hand, Left Updated:  09/27/18 2101     Sodium 139 mmol/L      Potassium 4 1 mmol/L      Chloride 103 mmol/L      CO2 28 mmol/L      ANION GAP 8 mmol/L      BUN 25 mg/dL      Creatinine 0 57 (L) mg/dL      Glucose 199 (H) mg/dL      Calcium 8 9 mg/dL      AST 34 U/L      ALT 35 U/L      Alkaline Phosphatase 73 U/L      Total Protein 7 5 g/dL      Albumin 3 4 (L) g/dL      Total Bilirubin 0 40 mg/dL      eGFR 100 ml/min/1 73sq m     Narrative:         National Kidney Disease Education Program recommendations are as follows:  GFR calculation is accurate only with a steady state creatinine  Chronic Kidney disease less than 60 ml/min/1 73 sq  meters  Kidney failure less than 15 ml/min/1 73 sq  meters      CBC and differential [[id number]]  (Abnormal) Collected:  09/27/18 2016    Lab Status:  Final result Specimen:  Blood from Hand, Left Updated:  09/27/18 2027     WBC 6 13 Thousand/uL      RBC 4 05 Million/uL      Hemoglobin 11 7 g/dL      Hematocrit 35 1 %      MCV 87 fL      MCH 28 9 pg      MCHC 33 3 g/dL      RDW 13 0 %      MPV 11 9 fL      Platelets 916 Thousands/uL      nRBC 0 /100 WBCs      Neutrophils Relative 41 (L) %      Immat GRANS % 0 %      Lymphocytes Relative 53 (H) %      Monocytes Relative 4 %      Eosinophils Relative 1 %      Basophils Relative 1 %      Neutrophils Absolute 2 55 Thousands/µL      Immature Grans Absolute 0 01 Thousand/uL      Lymphocytes Absolute 3 24 Thousands/µL      Monocytes Absolute 0 23 Thousand/µL      Eosinophils Absolute 0 04 Thousand/µL      Basophils Absolute 0 06 Thousands/µL     POCT urinalysis dipstick [[id number]]     Lab Status:  No result Specimen:  Urine                  No orders to display              Procedures  Procedures       Phone Contacts  ED Phone Contact    ED Course  ED Course as of Sep 27 2210   Thu Sep 27, 2018   2206 Patient's pain is improved  Stable for discharge  Patient and patient's daughter updated regarding lab results  MDM  Number of Diagnoses or Management Options  Diagnosis management comments: 60-year-old female recently started chemotherapy for breast cancer 2 days ago  She started having headaches and muscle aches  She did initially turned red after the infusion according to her daughter  Currently she has no rash  She has had no fevers  No vomiting  On exam she is alert in no acute distress  Her physical exam here is normal  On review of Taxol and herceptin, some of the side effects seem to be part of the herceptin  Will do basic labs to rule out any acute infection and treat pain       Amount and/or Complexity of Data Reviewed  Clinical lab tests: ordered and reviewed  Independent visualization of images, tracings, or specimens: yes      CritCare Time    Disposition  Final diagnoses:   Medication side effect, initial encounter   Myalgia     Time reflects when diagnosis was documented in both MDM as applicable and the Disposition within this note     Time User Action Codes Description Comment    9/27/2018 10:08 PM Brendon Sharma 27  7XXA] Medication side effect, initial encounter     9/27/2018 10:08 PM Mehnaz BOONE Add [M79 1] Myalgia       ED Disposition     ED Disposition Condition Comment    Discharge  [facility] discharge to home/self care      Condition at discharge: Good        Follow-up Information Follow up With Specialties Details Why Fabby Hazel MD Family Medicine Schedule an appointment as soon as possible for a visit in 2 days If symptoms worsen or return to the emergency department [facility]  [address]  [facility] [address] [place] [address]            Patient's Medications   Discharge Prescriptions    HYDROCODONE-ACETAMINOPHEN (NORCO) 5-325 MG PER TABLET    Take 1 tablet by mouth every 4 (four) hours as needed for pain Max Daily Amount: 6 tablets       Start Date: 9/27/2018 End Date: --       Order Dose: 1 tablet       Quantity: 15 tablet    Refills: 0     No discharge procedures on file      ED Provider  Electronically Signed by           Khanh Adames DO  09/27/18 8738

## 2018-09-28 NOTE — DISCHARGE INSTRUCTIONS
Dolor músculoesquelético   LO QUE NECESITA SABER:   El dolor muscular puede ser sordo, molesto o [place]  También uede sentir dolor o sensibilidad al tacto  Puede ocurrir en cualquier harsh del cuerpo y a menudo es por valdez hecho ejercicio  El dolor muscular puede proceder de felicita lesión, nelli un esguince, tendinitis o felicita fractura ósea  El dolor muscular puede ser también resultado de condiciones médicas nelli polimiositis (miopatía inflamatoria idiopática), fibromialgia y trastornos del tejido conjuntivo  INSTRUCCIONES SOBRE EL [facility]RA HOSPITALARIA:   Cuidado personal:   · Descanse  nelli se le indique y evite la actividad que le cause dolor  Podrá volver a la actividad normal cuando pueda moverse sin sentir dolor  Siga las indicaciones adecuadas para el descanso y la [place]  Felicita vez desaparecidos los síntomas, tendrá de sufrir lesiones fabio 3 semanas después  · El hielo  a la harsh del músculo que le duele para disminuir el dolor y la hinchazón  Use un paquete de hielo o ponga cubitos de hielo en felicita bolsa plástica y envuélvala con felicita toalla  Siempre debe valdez felicita elana entre el hielo y la piel  Aplique el hielo con la frecuencia que se le indique fabio las primeras 24 a 48 horas  · La compresión  con felicita tablilla, soporte o venda elástica ayuda a disminuir el dolor y la hinchazón  Puede que la necesite para el dolor muscular en los brazos o piernas  Deborah Carola, soporte o venda elástica también ayudarán a proteger la harsh dolorida cuando usted se mueve  · Eleve  la pierna o el brazo que le duele para reducir la hinchazón y el dolor  Eleve el miembro mientras esté sentado o acostado  Apoye la pierna con dolor sobre almohadones para mantenerla por encima del corazón  Medicamentos:   · AINEs (Analgésicos antiinflamatorios no esteroides)  pueden disminuir la inflamación y el dolor o la fiebre  Zaria medicamento esta disponible con o sin felicita receta médica   Los AINEs pueden causar sangrado estomacal o problemas renales en ciertas personas  Si usted gilmar un medicamento anticoagulante, siempre pregúntele a pruitt médico si los CHAD son seguros para usted  Siempre chin la etiqueta de carolin medicamento y [place] instrucciones  · El acetaminofén  sirve para reducir el dolor  Está disponible sin receta médica  Pregunte a pruitt médico cuánto ganesh y cuándo tomarlos  [phone]  El acetaminofén puede causar daño en el hígado cuando no se gilmar de forma correcta  No tome más de un medicamento que contenga acetaminofén a menos que se lo indiquen  · Relajantes musculares  ayudar a relajar los músculos y reducir el dolor y los espasmos musculares  · Esteroides  se pueden administrar para reducir el enrojecimiento, el dolor y la hinchazón  · Los [place] joe medicamentos nelli se le haya indicado  Consulte con pruitt médico si usted giselle que pruitt medicamento no le está ayudando o si presenta efectos secundarios  Infórmele si es alérgico a cualquier medicamento  Mantenga felicita lista actualizada de los [place], las vitaminas y los productos herbales que gilmar  Incluya los siguientes datos de los medicamentos: cantidad, frecuencia y motivo de administración  Traiga con usted la lista o los envases de la píldoras a joe citas de seguimiento  Lleve la lista de los medicamentos con usted en solo de felicita emergencia  Acuda a joe consultas de control con pruitt médico según le indicaron  Jaime vez tenga que hacerse más pruebas para que los médicos puedan descubrir la causa de pruitt dolor muscular  Es posible que necesite fisioterapia para aprender algunos ejercicios de fortalecimiento muscular  Anote joe preguntas para que se acuerde de hacerlas fabio joe visitas  Pregúntele a pruitt [place] vitaminas y minerales son adecuados para usted  · Usted tiene fiebre  · Kristi Diego dormir a causa del dolor  · La harsh dolorida se hace más sensible, rojiza y caliente al tacto       · Usted tiene marquez capacidad de movimiento en la harsh dolorida  · Usted tiene preguntas o inquietudes acerca de mccormick condición o cuidado  Regrese a la darrin de emergencias si:   · Nota un dolor de mayor intensidad al  la harsh muscular que le duele  · Ha perdido sensibilidad en la harsh muscular que le duele  · Se le ha hinchado la harsh dolorida o la hinchazón ha aumentado  Puede que sienta la piel tirante  · Ha aumentado el dolor muscular o el dolor no mejora con el [place]  © 2017 [address] Information is for End User's use only and may not be sold, redistributed or otherwise used for commercial purposes  All illustrations and images included in CareNotes® are the copyrighted property of A D A M , Inc  or Zi Aragon  Esta información es sólo para uso en educación  Mccormick intención no es darle un consejo médico sobre enfermedades o tratamientos  Colsulte con mccormick Loly Son farmacéutico antes de seguir cualquier régimen médico para saber si es seguro y efectivo para usted

## 2018-10-01 ENCOUNTER — APPOINTMENT (OUTPATIENT)
Dept: LAB | Facility: HOSPITAL | Age: 63
End: 2018-10-01
Attending: INTERNAL MEDICINE
Payer: MEDICARE

## 2018-10-01 RX ORDER — SODIUM CHLORIDE 9 MG/ML
20 INJECTION, SOLUTION INTRAVENOUS CONTINUOUS
Status: DISCONTINUED | OUTPATIENT
Start: 2018-10-02 | End: 2018-10-05 | Stop reason: HOSPADM

## 2018-10-02 ENCOUNTER — HOSPITAL ENCOUNTER (OUTPATIENT)
Dept: INFUSION CENTER | Facility: CLINIC | Age: 63
Discharge: HOME | End: 2018-10-02
Payer: MEDICARE

## 2018-10-02 ENCOUNTER — TELEPHONE (OUTPATIENT)
Dept: HEMATOLOGY ONCOLOGY | Facility: CLINIC | Age: 63
End: 2018-10-02

## 2018-10-02 VITALS
RESPIRATION RATE: 18 BRPM | DIASTOLIC BLOOD PRESSURE: 79 MMHG | SYSTOLIC BLOOD PRESSURE: 129 MMHG | TEMPERATURE: 98.9 F | BODY MASS INDEX: 30.74 KG/M2 | WEIGHT: 184.53 LBS | HEIGHT: 65 IN | HEART RATE: 91 BPM

## 2018-10-02 PROCEDURE — 96367 TX/PROPH/DG ADDL SEQ IV INF: CPT

## 2018-10-02 PROCEDURE — 96417 CHEMO IV INFUS EACH ADDL SEQ: CPT

## 2018-10-02 PROCEDURE — 96413 CHEMO IV INFUSION 1 HR: CPT

## 2018-10-02 RX ADMIN — DIPHENHYDRAMINE HYDROCHLORIDE 25 MG: 50 INJECTION, SOLUTION INTRAMUSCULAR; INTRAVENOUS at 08:36

## 2018-10-02 RX ADMIN — Medication 300 UNITS: at 12:16

## 2018-10-02 RX ADMIN — SODIUM CHLORIDE 20 ML/HR: 0.9 INJECTION, SOLUTION INTRAVENOUS at 08:36

## 2018-10-02 RX ADMIN — FAMOTIDINE 20 MG: 10 INJECTION INTRAVENOUS at 09:24

## 2018-10-02 RX ADMIN — DEXAMETHASONE SODIUM PHOSPHATE 10 MG: 10 INJECTION, SOLUTION INTRAMUSCULAR; INTRAVENOUS at 08:59

## 2018-10-02 RX ADMIN — PACLITAXEL 153 MG: 6 INJECTION, SOLUTION INTRAVENOUS at 10:07

## 2018-10-02 RX ADMIN — TRASTUZUMAB 166 MG: 150 INJECTION, POWDER, LYOPHILIZED, FOR SOLUTION INTRAVENOUS at 11:34

## 2018-10-02 NOTE — TELEPHONE ENCOUNTER
Pt has chemo tues and has a question about her procedure she said she is having done the next day and is this ok   There is a language barrier so I had a hard time understanding

## 2018-10-02 NOTE — TELEPHONE ENCOUNTER
Pt scheduled for Day 15 of chemo on 10/9, and scheduled to have biopsy with [facility] on 10/10  Dr Claude Junes would like patient to receive Herceptin ONLY on 10/9 and to hold Taxol  Pt to resume normal Day 15 cycle the next week  Michel Stable verbalized understanding  No further questions

## 2018-10-02 NOTE — TELEPHONE ENCOUNTER
Per Dr Katie Eubanks will hold Олег Broussard on Day 8 on 10/9 AND Day 15 on 10/16 in preparation for biopsy on 10/17  Timeout done with [place] infusion

## 2018-10-02 NOTE — PLAN OF CARE
Problem: Potential for Falls  Goal: Patient will remain free of falls  INTERVENTIONS:  - Assess patient frequently for physical needs  -  Identify cognitive and physical deficits and behaviors that affect risk of falls    -  [place] fall precautions as indicated by assessment   - Educate patient/family on patient safety including physical limitations  - Instruct patient to call for assistance with activity based on assessment  - Modify environment to reduce risk of injury  - Consider OT/PT consult to assist with strengthening/mobility   Outcome: Progressing

## 2018-10-02 NOTE — PROGRESS NOTES
Pt tolerated treatment today without incident  Taxol was titrated according to protocol due to pt having a delayed reaction at home to Taxol and daughter took her to the ER  Pt and daughter understand that if pt has symptoms of reaction at home again, she should again report to the ER

## 2018-10-03 ENCOUNTER — OFFICE VISIT (OUTPATIENT)
Dept: OBGYN CLINIC | Facility: MEDICAL CENTER | Age: 63
End: 2018-10-03
Payer: MEDICARE

## 2018-10-03 ENCOUNTER — APPOINTMENT (OUTPATIENT)
Dept: RADIOLOGY | Facility: CLINIC | Age: 63
End: 2018-10-03
Payer: MEDICARE

## 2018-10-03 VITALS
BODY MASS INDEX: 30.26 KG/M2 | SYSTOLIC BLOOD PRESSURE: 121 MMHG | HEIGHT: 65 IN | DIASTOLIC BLOOD PRESSURE: 68 MMHG | HEART RATE: 99 BPM | WEIGHT: 181.6 LBS

## 2018-10-03 DIAGNOSIS — M25.561 CHRONIC PAIN OF RIGHT KNEE: ICD-10-CM

## 2018-10-03 DIAGNOSIS — M25.561 CHRONIC PAIN OF RIGHT KNEE: Primary | ICD-10-CM

## 2018-10-03 DIAGNOSIS — G89.29 CHRONIC PAIN OF RIGHT KNEE: ICD-10-CM

## 2018-10-03 DIAGNOSIS — G89.29 CHRONIC PAIN OF RIGHT KNEE: Primary | ICD-10-CM

## 2018-10-03 PROCEDURE — 99203 OFFICE O/P NEW LOW 30 MIN: CPT | Performed by: FAMILY MEDICINE

## 2018-10-03 PROCEDURE — 73564 X-RAY EXAM KNEE 4 OR MORE: CPT

## 2018-10-03 NOTE — PATIENT INSTRUCTIONS
Explained the patient that she has evidence of arthritis as well as iliotibial band syndrome  I recommended trial physical therapy prior to consider injection as her pain today mostly is over her iliotibial band  At next visit we have reassessment we will consider corticosteroid injection  Treatment for knee osteoarthritis depends on severity of cartilage wear and pain levels  First line for mild arthritis is exercise to strengthen the knee and allow better joint movement, 5-10% weight loss if overweight, and topical anti-inflammatories such as diclofenac gel or topical analgesic pain relief creams such as capsaicin  Symptoms at this stage usually improve in 3 months  Moderate to severe arthritis or arthritis not responding to first line treatments may require oral medicine such as anti-inflammatories (NSAIDs) such as ibuprofen/motrin, and if failing treatment with oral NSAIDs, then may consider depression medicines such as duloxetine (cymbalta) which also have been shown to work on pain receptors and help to control pain  Other analgesics such as tylenol (acetaminophen) may be used but do not appear to offer significant pain relief  Supplements such as glucosamine and chondroitin supplements  Some studies do show benefit from taking glucosamine sulfate (1500 mg/day) and chondroitin (800 mg/day) but evidence is controversial  I recommend against a type of glucosamine known as "glucosamine hydrochloride" which appears not as effective as glucosamine sulfate  If no improvement with oral medicines, then patients may consider steroid injection into the knee joint which provides pain relief  Steroid injections can be given every 3 months  Any sooner than that can  result in possible deterioration or cartilage in your joint  Other injections are available such as as viscosupplementation or gel shots into the knee which provide nutrients for the cartilage and can result in pain reduction    These viscosupplementation injections are generally considered every 6 months but are controversial   The American Orthopedic Society recommends against viscosupplementation injection; however, the [employer] recommends for these injections  Beyond these injections there are other controversial treatments including stem cell as well as platelet rich plasma injection which are out of pocket usually up to a 1000 dollars per injection  Lastly, if you fail conservative measures and have persistent pain, then we may consider referral to surgeon for knee replacement  (JULIEN Byers Held 2018)

## 2018-10-03 NOTE — PROGRESS NOTES
1  Chronic pain of right knee  Ambulatory referral to Orthopedic Surgery    XR knee 4+ vw right injury    [facility]     Orders Placed This Encounter   Procedures    XR knee 4+ vw right injury    [facility]        Imaging Studies (I personally reviewed images in PACS and report):  X-ray right knee 10/03/2018:  Mild to moderate medial compartment osteoarthritis  IMPRESSION:  IT band syndrome  Right knee arthritis    Will follow up after physical therapy trial and evaluate for contribution of arthritis to her knee pain and consider corticosteroid injection at that time  Return in about 4 weeks (around 10/31/2018)  Patient Instructions   Explained the patient that she has evidence of arthritis as well as iliotibial band syndrome  I recommended trial physical therapy prior to consider injection as her pain today mostly is over her iliotibial band  At next visit we have reassessment we will consider corticosteroid injection  Treatment for knee osteoarthritis depends on severity of cartilage wear and pain levels  First line for mild arthritis is exercise to strengthen the knee and allow better joint movement, 5-10% weight loss if overweight, and topical anti-inflammatories such as diclofenac gel or topical analgesic pain relief creams such as capsaicin  Symptoms at this stage usually improve in 3 months  Moderate to severe arthritis or arthritis not responding to first line treatments may require oral medicine such as anti-inflammatories (NSAIDs) such as ibuprofen/motrin, and if failing treatment with oral NSAIDs, then may consider depression medicines such as duloxetine (cymbalta) which also have been shown to work on pain receptors and help to control pain  Other analgesics such as tylenol (acetaminophen) may be used but do not appear to offer significant pain relief  Supplements such as glucosamine and chondroitin supplements   Some studies do show benefit from taking glucosamine sulfate (1500 mg/day) and chondroitin (800 mg/day) but evidence is controversial  I recommend against a type of glucosamine known as "glucosamine hydrochloride" which appears not as effective as glucosamine sulfate  If no improvement with oral medicines, then patients may consider steroid injection into the knee joint which provides pain relief  Steroid injections can be given every 3 months  Any sooner than that can  result in possible deterioration or cartilage in your joint  Other injections are available such as as viscosupplementation or gel shots into the knee which provide nutrients for the cartilage and can result in pain reduction  These viscosupplementation injections are generally considered every 6 months but are controversial   The [address] recommends against viscosupplementation injection; however, the [employer] [facility] recommends for these injections  Beyond these injections there are other controversial treatments including stem cell as well as platelet rich plasma injection which are out of pocket usually up to a 1000 dollars per injection  Lastly, if you fail conservative measures and have persistent pain, then we may consider referral to surgeon for knee replacement  ([facility] Giselle Limb 2018)  CHIEF COMPLAINT:  Right Knee Pain     HPI:  Stefanie Aguila is a 65 y o  female  who presents for       Visit  10/3/18  Patient presents with 6 month history of right knee pain located lateral knee associated with swelling but no erythema or warmth  Patient reports knee pain is aggravated when walking up a flight of stairs or getting in and out of a motor vehicle  She also states feeling intermittent catching and locking of the knee most notable after prolonged sitting  Patient reports history of meniscal repair about 30 years ago after an injury sustained while running     Currently patient is applying heat and using gabapentin for pain with minimal relief  Review of Systems   Constitutional: Negative for chills, fever and unexpected weight change  HENT: Negative for hearing loss, nosebleeds and sore throat  Eyes: Negative for pain, redness and visual disturbance  Respiratory: Negative for cough, shortness of breath and wheezing  Cardiovascular: Negative for chest pain, palpitations and leg swelling  Gastrointestinal: Negative for abdominal distention, nausea and vomiting  Endocrine: Negative for polydipsia and polyuria  Genitourinary: Negative for dysuria and hematuria  Skin: Negative for rash and wound  Neurological: Negative for dizziness, numbness and headaches  Psychiatric/Behavioral: Negative for decreased concentration and suicidal ideas  Following history reviewed and update:    Past Medical History:   Diagnosis Date    Abdominal infection ([facility] )     h-pylori    Asthma     Breast cancer ([place] [address] )     Cancer ([place] [address] )     BREAST    Diabetes mellitus ([facility] )     blood sugar 199 @ 735    Hiatal hernia     Hypercholesterolemia     Hypertension     Hypothyroid     Renal disorder     Rheumatoid arthritis ([facility] )      Past Surgical History:   Procedure Laterality Date    BREAST BIOPSY      BREAST LUMPECTOMY Right 6/26/2018    Procedure: RIGHT BREAST NEEDLE LOCALIZATION X2 WITH RIGHT BREAST LUMPECTOMY ( NEEDLE LOC @ 1000); Surgeon: Memo Hernandez MD;  [facility];  Service: Surgical Oncology    BREAST LUMPECTOMY Right 8/2/2018    Procedure: LYMPHOSCINITGRAPHY INTRAOPERATIVE LYMPHATIC MAPPING , RIGHT SENTINEL NODE BIOPSY, REEXCISION  RIGHT BREAST LUMPECTOMY CAVITY (SUPERIOR MARGIN); Surgeon: Memo Hernandez MD;  [facility];  Service: Surgical Oncology    BREAST SURGERY Left     CATARACT EXTRACTION, BILATERAL Bilateral     EGD AND COLONOSCOPY N/A 9/5/2018    Procedure: EGD AND COLONOSCOPY;  Surgeon: Renan Tsai MD;  [facility];   Service: Gastroenterology    [facility] GUIDED CENTRAL VENOUS ACCESS REPLACEMENT  9/13/2018    KNEE SURGERY Right     RETINAL DETACHMENT SURGERY Right     TUBAL LIGATION      TUNNELED VENOUS PORT PLACEMENT Left 9/13/2018    Procedure: INSERTION VENOUS PORT (PORT-A-CATH); Surgeon: Tunde Abarca MD;  [facility];  Service: Surgical Oncology     Social History   History   Alcohol Use No     History   Drug Use No     History   Smoking Status    Never Smoker   Smokeless Tobacco    Never Used     Family History   Problem Relation Age of Onset    Diabetes Mother     Hypertension Mother     Diabetes Father     Hypertension Father     Diabetes Brother     Hypertension Brother     Prostate cancer Brother    R[address]ch Dr  in [address] told patient not to take aspirin r/t kidneys     Tylenol With Codeine #3 [Acetaminophen-Codeine] Rash          Physical Exam  Constitutional:  see vital signs  Gen: well-developed, normocephalic/atraumatic, well-groomed  Eyes: No inflammation or discharge of conjunctiva or lids; sclera clear   Pharynx: no inflammation, lesion, or mass of lips  Neck: supple, no masses, non-distended  MSK: no inflammation, lesion, mass, or clubbing of nails and digits except for other than mentioned below  SKIN: no visible rashes or skin lesions  Pulmonary/Chest: Effort normal  No respiratory distress     NEURO: cranial nerves grossly intact  PSYCH:  Alert and oriented to person, place, and time; recent and remote memory intact; mood normal, no depression, anxiety, or agitation, judgment and insight good and intact     Ortho Exam    RIGHT KNEE:  Erythema: no  Swelling: no  Increased Warmth: no  Tenderness:   Lateral joint line and IT band at the femoral condyle   Flexion: intact  Extension: intact  Patellar Displacement:  Patellar Tilt:  Patellar Apprehension: negative  Patellar Grind Diaz's: negative  Lachman's: negative  Drawer: negative  Varus laxity: negative  Valgus laxity: negative  [facility]:   Negative        Procedures

## 2018-10-08 RX ORDER — SODIUM CHLORIDE 9 MG/ML
20 INJECTION, SOLUTION INTRAVENOUS CONTINUOUS
Status: DISCONTINUED | OUTPATIENT
Start: 2018-10-09 | End: 2018-10-12 | Stop reason: HOSPADM

## 2018-10-09 ENCOUNTER — HOSPITAL ENCOUNTER (OUTPATIENT)
Dept: INFUSION CENTER | Facility: CLINIC | Age: 63
Discharge: HOME | End: 2018-10-09
Payer: MEDICARE

## 2018-10-09 VITALS
HEART RATE: 89 BPM | HEIGHT: 65 IN | TEMPERATURE: 99.4 F | SYSTOLIC BLOOD PRESSURE: 135 MMHG | BODY MASS INDEX: 30.52 KG/M2 | RESPIRATION RATE: 18 BRPM | DIASTOLIC BLOOD PRESSURE: 74 MMHG | WEIGHT: 183.2 LBS

## 2018-10-09 LAB
ALBUMIN SERPL BCP-MCNC: 3 G/DL (ref 3.5–5.7)
ALP SERPL-CCNC: 64 U/L (ref 46–116)
ALT SERPL W P-5'-P-CCNC: 30 U/L (ref 12–78)
ANION GAP SERPL CALCULATED.3IONS-SCNC: 14 MMOL/L (ref 4–13)
AST SERPL W P-5'-P-CCNC: 22 U/L (ref 5–45)
BILIRUB SERPL-MCNC: 0.5 MG/DL (ref 0.2–1)
BUN SERPL-MCNC: 14 MG/DL (ref 5–25)
CALCIUM SERPL-MCNC: 9 MG/DL (ref 8.3–10.1)
CHLORIDE SERPL-SCNC: 100 MMOL/L (ref 98–108)
CO2 SERPL-SCNC: 27 MMOL/L (ref 21–32)
CREAT SERPL-MCNC: 0.3 MG/DL (ref 0.6–1.3)
EOSINOPHIL # BLD AUTO: 0.11 THOUSAND/UL (ref 0–0.61)
EOSINOPHIL NFR BLD MANUAL: 2 % (ref 0–6)
ERYTHROCYTE [DISTWIDTH] IN BLOOD BY AUTOMATED COUNT: 13.2 % (ref 11.6–15.1)
GFR SERPL CREATININE-BSD FRML MDRD: 124 ML/MIN/1.73SQ M
GLUCOSE SERPL-MCNC: 336 MG/DL (ref 65–140)
HCT VFR BLD AUTO: 36.4 % (ref 34.8–46.1)
HGB BLD-MCNC: 11.4 G/DL (ref 11.5–15.4)
LYMPHOCYTES # BLD AUTO: 1.68 THOUSAND/UL (ref 0.6–4.47)
LYMPHOCYTES # BLD AUTO: 30 % (ref 14–44)
MCH RBC QN AUTO: 27.3 PG (ref 26.8–34.3)
MCHC RBC AUTO-ENTMCNC: 31.3 G/DL (ref 31.4–37.4)
MCV RBC AUTO: 87 FL (ref 82–98)
MONOCYTES # BLD AUTO: 0.34 THOUSAND/UL (ref 0–1.22)
MONOCYTES NFR BLD AUTO: 6 % (ref 4–12)
NEUTS BAND NFR BLD MANUAL: 0 % (ref 0–8)
NEUTS SEG # BLD: 3.47 THOUSAND/UL (ref 1.81–6.82)
NEUTS SEG NFR BLD AUTO: 62 % (ref 43–75)
PLATELET # BLD AUTO: 274 THOUSANDS/UL (ref 149–390)
PLATELET BLD QL SMEAR: ADEQUATE
PMV BLD AUTO: 8.6 FL (ref 8.9–12.7)
POTASSIUM SERPL-SCNC: 4.1 MMOL/L (ref 3.5–5.3)
PROT SERPL-MCNC: 6.7 G/DL (ref 6.4–8.2)
RBC # BLD AUTO: 4.17 MILLION/UL (ref 3.81–5.12)
RBC MORPH BLD: NORMAL
SODIUM SERPL-SCNC: 141 MMOL/L (ref 136–145)
TOTAL CELLS COUNTED SPEC: 100
WBC # BLD AUTO: 5.6 THOUSAND/UL (ref 4.31–10.16)
WBC NRBC COR # BLD: 5.6 THOUSAND/UL (ref 4.31–10.16)

## 2018-10-09 PROCEDURE — 85007 BL SMEAR W/DIFF WBC COUNT: CPT | Performed by: INTERNAL MEDICINE

## 2018-10-09 PROCEDURE — 96413 CHEMO IV INFUSION 1 HR: CPT

## 2018-10-09 PROCEDURE — 80053 COMPREHEN METABOLIC PANEL: CPT | Performed by: INTERNAL MEDICINE

## 2018-10-09 PROCEDURE — 85027 COMPLETE CBC AUTOMATED: CPT | Performed by: INTERNAL MEDICINE

## 2018-10-09 PROCEDURE — 96417 CHEMO IV INFUS EACH ADDL SEQ: CPT

## 2018-10-09 PROCEDURE — 96367 TX/PROPH/DG ADDL SEQ IV INF: CPT

## 2018-10-09 RX ADMIN — SODIUM CHLORIDE 20 ML/HR: 0.9 INJECTION, SOLUTION INTRAVENOUS at 08:25

## 2018-10-09 RX ADMIN — DEXAMETHASONE SODIUM PHOSPHATE 10 MG: 10 INJECTION, SOLUTION INTRAMUSCULAR; INTRAVENOUS at 10:20

## 2018-10-09 RX ADMIN — DIPHENHYDRAMINE HYDROCHLORIDE 25 MG: 50 INJECTION, SOLUTION INTRAMUSCULAR; INTRAVENOUS at 09:30

## 2018-10-09 RX ADMIN — Medication 300 UNITS: at 12:50

## 2018-10-09 RX ADMIN — FAMOTIDINE 20 MG: 10 INJECTION INTRAVENOUS at 09:57

## 2018-10-09 RX ADMIN — TRASTUZUMAB 166 MG: 150 INJECTION, POWDER, LYOPHILIZED, FOR SOLUTION INTRAVENOUS at 12:06

## 2018-10-09 RX ADMIN — PACLITAXEL 153 MG: 6 INJECTION, SOLUTION INTRAVENOUS at 10:45

## 2018-10-09 NOTE — PLAN OF CARE
Problem: Potential for Falls  Goal: Patient will remain free of falls  INTERVENTIONS:  - Assess patient frequently for physical needs  -  Identify cognitive and physical deficits and behaviors that affect risk of falls    -  [place] fall precautions as indicated by assessment   - Educate patient/family on patient safety including physical limitations  - Instruct patient to call for assistance with activity based on assessment  - Modify environment to reduce risk of injury  - Consider OT/PT consult to assist with strengthening/mobility   Outcome: Progressing

## 2018-10-09 NOTE — PROGRESS NOTES
Patient here for Herceptin only per order  Patient's daughter called and spoke to RN at [facility] Dr Domingo Kenny office ([phone]) to get clarification regarding appt 10/17  Per RN I spoke to, she stated that patient has appt on 10/17 for pre-op consultation with doctor only, no surgery would be completed that date  Called and spoke to Lara RN at Dr Emmie Bonner office, explained the conversation I had with [facility] RN and that it was for consultation only  Per Lara, we will give full cycle of chemo today, will send order  Central labs drawn, awaiting results prior to starting chemo  Explained to both patient and daughter that we will be giving both taxel and herceptin today per Dr Emmie Bonner office since she will not be having biopsy on 10/17  Patient and daughter understand

## 2018-10-15 ENCOUNTER — APPOINTMENT (OUTPATIENT)
Dept: LAB | Facility: HOSPITAL | Age: 63
End: 2018-10-15
Attending: INTERNAL MEDICINE
Payer: MEDICARE

## 2018-10-15 DIAGNOSIS — C50.411 PRIMARY MALIGNANT NEOPLASM OF UPPER OUTER QUADRANT OF FEMALE BREAST, RIGHT (HCC): Primary | ICD-10-CM

## 2018-10-15 LAB
ALBUMIN SERPL BCP-MCNC: 3.3 G/DL (ref 3.5–5)
ALP SERPL-CCNC: 82 U/L (ref 46–116)
ALT SERPL W P-5'-P-CCNC: 46 U/L (ref 12–78)
ANION GAP SERPL CALCULATED.3IONS-SCNC: 7 MMOL/L (ref 4–13)
AST SERPL W P-5'-P-CCNC: 21 U/L (ref 5–45)
BASOPHILS # BLD AUTO: 0.06 THOUSANDS/ΜL (ref 0–0.1)
BASOPHILS NFR BLD AUTO: 1 % (ref 0–1)
BILIRUB SERPL-MCNC: 0.16 MG/DL (ref 0.2–1)
BUN SERPL-MCNC: 15 MG/DL (ref 5–25)
CALCIUM SERPL-MCNC: 9.6 MG/DL (ref 8.3–10.1)
CHLORIDE SERPL-SCNC: 101 MMOL/L (ref 100–108)
CO2 SERPL-SCNC: 28 MMOL/L (ref 21–32)
CREAT SERPL-MCNC: 0.78 MG/DL (ref 0.6–1.3)
EOSINOPHIL # BLD AUTO: 0.08 THOUSAND/ΜL (ref 0–0.61)
EOSINOPHIL NFR BLD AUTO: 1 % (ref 0–6)
ERYTHROCYTE [DISTWIDTH] IN BLOOD BY AUTOMATED COUNT: 13.2 % (ref 11.6–15.1)
GFR SERPL CREATININE-BSD FRML MDRD: 82 ML/MIN/1.73SQ M
GLUCOSE SERPL-MCNC: 345 MG/DL (ref 65–140)
HCT VFR BLD AUTO: 35.7 % (ref 34.8–46.1)
HGB BLD-MCNC: 11.7 G/DL (ref 11.5–15.4)
IMM GRANULOCYTES # BLD AUTO: 0.08 THOUSAND/UL (ref 0–0.2)
IMM GRANULOCYTES NFR BLD AUTO: 1 % (ref 0–2)
LYMPHOCYTES # BLD AUTO: 2.34 THOUSANDS/ΜL (ref 0.6–4.47)
LYMPHOCYTES NFR BLD AUTO: 37 % (ref 14–44)
MCH RBC QN AUTO: 28.9 PG (ref 26.8–34.3)
MCHC RBC AUTO-ENTMCNC: 32.8 G/DL (ref 31.4–37.4)
MCV RBC AUTO: 88 FL (ref 82–98)
MONOCYTES # BLD AUTO: 0.31 THOUSAND/ΜL (ref 0.17–1.22)
MONOCYTES NFR BLD AUTO: 5 % (ref 4–12)
NEUTROPHILS # BLD AUTO: 3.47 THOUSANDS/ΜL (ref 1.85–7.62)
NEUTS SEG NFR BLD AUTO: 55 % (ref 43–75)
NRBC BLD AUTO-RTO: 0 /100 WBCS
PLATELET # BLD AUTO: 378 THOUSANDS/UL (ref 149–390)
PMV BLD AUTO: 11 FL (ref 8.9–12.7)
POTASSIUM SERPL-SCNC: 4.3 MMOL/L (ref 3.5–5.3)
PROT SERPL-MCNC: 7.5 G/DL (ref 6.4–8.2)
RBC # BLD AUTO: 4.05 MILLION/UL (ref 3.81–5.12)
SODIUM SERPL-SCNC: 136 MMOL/L (ref 136–145)
WBC # BLD AUTO: 6.34 THOUSAND/UL (ref 4.31–10.16)

## 2018-10-15 PROCEDURE — 85025 COMPLETE CBC W/AUTO DIFF WBC: CPT

## 2018-10-15 PROCEDURE — 80053 COMPREHEN METABOLIC PANEL: CPT

## 2018-10-15 PROCEDURE — 36415 COLL VENOUS BLD VENIPUNCTURE: CPT

## 2018-10-15 RX ORDER — SODIUM CHLORIDE 9 MG/ML
20 INJECTION, SOLUTION INTRAVENOUS CONTINUOUS
Status: DISCONTINUED | OUTPATIENT
Start: 2018-10-16 | End: 2018-10-19 | Stop reason: HOSPADM

## 2018-10-16 ENCOUNTER — TELEPHONE (OUTPATIENT)
Dept: FAMILY MEDICINE CLINIC | Facility: CLINIC | Age: 63
End: 2018-10-16

## 2018-10-16 ENCOUNTER — HOSPITAL ENCOUNTER (OUTPATIENT)
Dept: INFUSION CENTER | Facility: CLINIC | Age: 63
Discharge: HOME | End: 2018-10-16
Payer: MEDICARE

## 2018-10-16 VITALS
DIASTOLIC BLOOD PRESSURE: 77 MMHG | RESPIRATION RATE: 18 BRPM | BODY MASS INDEX: 30.38 KG/M2 | HEART RATE: 91 BPM | SYSTOLIC BLOOD PRESSURE: 127 MMHG | WEIGHT: 180.78 LBS | TEMPERATURE: 98.5 F

## 2018-10-16 PROCEDURE — 96367 TX/PROPH/DG ADDL SEQ IV INF: CPT

## 2018-10-16 PROCEDURE — 96417 CHEMO IV INFUS EACH ADDL SEQ: CPT

## 2018-10-16 PROCEDURE — 96413 CHEMO IV INFUSION 1 HR: CPT

## 2018-10-16 RX ADMIN — TRASTUZUMAB 166 MG: 150 INJECTION, POWDER, LYOPHILIZED, FOR SOLUTION INTRAVENOUS at 11:10

## 2018-10-16 RX ADMIN — FAMOTIDINE 20 MG: 10 INJECTION INTRAVENOUS at 09:28

## 2018-10-16 RX ADMIN — SODIUM CHLORIDE 20 ML/HR: 0.9 INJECTION, SOLUTION INTRAVENOUS at 08:44

## 2018-10-16 RX ADMIN — PACLITAXEL 153 MG: 6 INJECTION, SOLUTION INTRAVENOUS at 09:59

## 2018-10-16 RX ADMIN — DEXAMETHASONE SODIUM PHOSPHATE 10 MG: 10 INJECTION, SOLUTION INTRAMUSCULAR; INTRAVENOUS at 08:44

## 2018-10-16 RX ADMIN — Medication 300 UNITS: at 11:49

## 2018-10-16 RX ADMIN — DIPHENHYDRAMINE HYDROCHLORIDE 25 MG: 50 INJECTION, SOLUTION INTRAMUSCULAR; INTRAVENOUS at 09:07

## 2018-10-16 NOTE — PROGRESS NOTES
She can use an insulin sliding scale with her Humulin as follows  Inject under the skin 3 (three) times a day with meals 6 units if -200 8 units for -250 10 units for -300 12 units for -350 14 units for -400 16 units 401-450 18 units for  and above

## 2018-10-16 NOTE — PROGRESS NOTES
Pt arrived to unit today without complaint  Pt's CBC results from 10/15/18 within approved parameters for chemotherapy today  It was noted that pt's serum glucose=345 on 10/16 and 336 on 10/9  Pt is diabetic  Pt states she is administering insulin and taking oral antidiabetes medications as prescribed by her family MD, Dr Karen Vela  Pt does admit to forgetting to take her AM insulin today  Pt's daughter called and she did bring in pt's insulin and glucose monitor  Pt checked her BS at 1020 and it was 406  Pt then self administered insulin from home  Pt is receiving Dexamethasone 10mg IVPB weekly as a premed for her chemotherapy  Pt and her daughter were educated re: potential for elevated blood sugar as a result  Pt encouraged to consult with her primary MD to evaluate need for adjustments of insulin and oral antidiabetics  Additionally, this RN called office of Dr Karen Vela  I spoke with medical assistant, Jah Veliz, and brought to her attention pt's recent blood sugar results and current chemo regimen including weekly Dexamethasone  Jah Veliz stated she would pass all information to Dr Karen Vela and Dr Karen Vela would be in touch with the patient or myself  This was all communicated to pt and her daughter today in infusion center  Both verbalized understanding  Pt's chemotherapy was administered today as ordered, pt tolerated well without adverse reaction  Pt left unit in stable condition, AVS in Bahamian given to pt

## 2018-10-16 NOTE — PLAN OF CARE
Problem: Potential for Falls  Goal: Patient will remain free of falls  INTERVENTIONS:  - Assess patient frequently for physical needs  -  Identify cognitive and physical deficits and behaviors that affect risk of falls    -  [place] fall precautions as indicated by assessment   - Educate patient/family on patient safety including physical limitations  - Instruct patient to call for assistance with activity based on assessment  - Modify environment to reduce risk of injury  - Consider OT/PT consult to assist with strengthening/mobility   Outcome: Progressing

## 2018-10-17 ENCOUNTER — TELEPHONE (OUTPATIENT)
Dept: HEMATOLOGY ONCOLOGY | Facility: CLINIC | Age: 63
End: 2018-10-17

## 2018-10-17 NOTE — TELEPHONE ENCOUNTER
Received call from patient that she is having procedure on 11/30 and wanted us to be aware  Due to language barrier, I called [address]  I spoke to one of the RN's, Ernestine Abbasi who verified that patient is having Hysterscopy, and D&C done on 11/30  Based on that, Dr Jamal Cevallos will hold patients dose of Taxol on 11/27  Pt will be safe to be given Hercepin that day  Will call [facility] and time out order and give Dr Ankit Ramos recommendations

## 2018-10-17 NOTE — TELEPHONE ENCOUNTER
CALL FROM DR MEEHAN    GIVING US A HEADS UP   PT WILL BE HAVING SURG ON 11/30/18   AND HE JUST WANTS US TO BE AWARE BC OF SCHED CHEMO FOR THE PT

## 2018-10-22 ENCOUNTER — APPOINTMENT (OUTPATIENT)
Dept: LAB | Facility: HOSPITAL | Age: 63
End: 2018-10-22
Attending: INTERNAL MEDICINE
Payer: MEDICARE

## 2018-10-22 DIAGNOSIS — C50.411 MALIGNANT NEOPLASM OF UPPER-OUTER QUADRANT OF RIGHT FEMALE BREAST, UNSPECIFIED ESTROGEN RECEPTOR STATUS (HCC): Primary | ICD-10-CM

## 2018-10-22 LAB
ALBUMIN SERPL BCP-MCNC: 3.5 G/DL (ref 3.5–5)
ALP SERPL-CCNC: 75 U/L (ref 46–116)
ALT SERPL W P-5'-P-CCNC: 45 U/L (ref 12–78)
ANION GAP SERPL CALCULATED.3IONS-SCNC: 8 MMOL/L (ref 4–13)
AST SERPL W P-5'-P-CCNC: 30 U/L (ref 5–45)
BASOPHILS # BLD AUTO: 0.07 THOUSANDS/ΜL (ref 0–0.1)
BASOPHILS NFR BLD AUTO: 1 % (ref 0–1)
BILIRUB SERPL-MCNC: 0.28 MG/DL (ref 0.2–1)
BUN SERPL-MCNC: 22 MG/DL (ref 5–25)
CALCIUM SERPL-MCNC: 9.6 MG/DL (ref 8.3–10.1)
CHLORIDE SERPL-SCNC: 100 MMOL/L (ref 100–108)
CO2 SERPL-SCNC: 26 MMOL/L (ref 21–32)
CREAT SERPL-MCNC: 0.78 MG/DL (ref 0.6–1.3)
EOSINOPHIL # BLD AUTO: 0.07 THOUSAND/ΜL (ref 0–0.61)
EOSINOPHIL NFR BLD AUTO: 1 % (ref 0–6)
ERYTHROCYTE [DISTWIDTH] IN BLOOD BY AUTOMATED COUNT: 13.8 % (ref 11.6–15.1)
GFR SERPL CREATININE-BSD FRML MDRD: 82 ML/MIN/1.73SQ M
GLUCOSE SERPL-MCNC: 412 MG/DL (ref 65–140)
HCT VFR BLD AUTO: 35.1 % (ref 34.8–46.1)
HGB BLD-MCNC: 11.4 G/DL (ref 11.5–15.4)
IMM GRANULOCYTES # BLD AUTO: 0.08 THOUSAND/UL (ref 0–0.2)
IMM GRANULOCYTES NFR BLD AUTO: 1 % (ref 0–2)
LYMPHOCYTES # BLD AUTO: 2.46 THOUSANDS/ΜL (ref 0.6–4.47)
LYMPHOCYTES NFR BLD AUTO: 36 % (ref 14–44)
MCH RBC QN AUTO: 28.7 PG (ref 26.8–34.3)
MCHC RBC AUTO-ENTMCNC: 32.5 G/DL (ref 31.4–37.4)
MCV RBC AUTO: 88 FL (ref 82–98)
MONOCYTES # BLD AUTO: 0.35 THOUSAND/ΜL (ref 0.17–1.22)
MONOCYTES NFR BLD AUTO: 5 % (ref 4–12)
NEUTROPHILS # BLD AUTO: 3.9 THOUSANDS/ΜL (ref 1.85–7.62)
NEUTS SEG NFR BLD AUTO: 56 % (ref 43–75)
NRBC BLD AUTO-RTO: 0 /100 WBCS
PLATELET # BLD AUTO: 374 THOUSANDS/UL (ref 149–390)
PMV BLD AUTO: 11.4 FL (ref 8.9–12.7)
POTASSIUM SERPL-SCNC: 4.3 MMOL/L (ref 3.5–5.3)
PROT SERPL-MCNC: 7.7 G/DL (ref 6.4–8.2)
RBC # BLD AUTO: 3.97 MILLION/UL (ref 3.81–5.12)
SODIUM SERPL-SCNC: 134 MMOL/L (ref 136–145)
WBC # BLD AUTO: 6.93 THOUSAND/UL (ref 4.31–10.16)

## 2018-10-22 PROCEDURE — 36415 COLL VENOUS BLD VENIPUNCTURE: CPT

## 2018-10-22 PROCEDURE — 85025 COMPLETE CBC W/AUTO DIFF WBC: CPT

## 2018-10-22 PROCEDURE — 80053 COMPREHEN METABOLIC PANEL: CPT

## 2018-10-22 RX ORDER — SODIUM CHLORIDE 9 MG/ML
20 INJECTION, SOLUTION INTRAVENOUS CONTINUOUS
Status: DISCONTINUED | OUTPATIENT
Start: 2018-10-23 | End: 2018-10-26 | Stop reason: HOSPADM

## 2018-10-23 ENCOUNTER — HOSPITAL ENCOUNTER (OUTPATIENT)
Dept: INFUSION CENTER | Facility: CLINIC | Age: 63
Discharge: HOME | End: 2018-10-23
Payer: MEDICARE

## 2018-10-23 VITALS
BODY MASS INDEX: 30.23 KG/M2 | SYSTOLIC BLOOD PRESSURE: 107 MMHG | RESPIRATION RATE: 18 BRPM | WEIGHT: 181.44 LBS | HEIGHT: 65 IN | HEART RATE: 93 BPM | TEMPERATURE: 99 F | DIASTOLIC BLOOD PRESSURE: 71 MMHG

## 2018-10-23 PROCEDURE — 96413 CHEMO IV INFUSION 1 HR: CPT

## 2018-10-23 PROCEDURE — 96367 TX/PROPH/DG ADDL SEQ IV INF: CPT

## 2018-10-23 PROCEDURE — 96417 CHEMO IV INFUS EACH ADDL SEQ: CPT

## 2018-10-23 RX ADMIN — FAMOTIDINE 20 MG: 10 INJECTION INTRAVENOUS at 09:16

## 2018-10-23 RX ADMIN — DIPHENHYDRAMINE HYDROCHLORIDE 25 MG: 50 INJECTION, SOLUTION INTRAMUSCULAR; INTRAVENOUS at 08:31

## 2018-10-23 RX ADMIN — TRASTUZUMAB 166 MG: 150 INJECTION, POWDER, LYOPHILIZED, FOR SOLUTION INTRAVENOUS at 11:25

## 2018-10-23 RX ADMIN — DEXAMETHASONE SODIUM PHOSPHATE 10 MG: 10 INJECTION, SOLUTION INTRAMUSCULAR; INTRAVENOUS at 08:56

## 2018-10-23 RX ADMIN — SODIUM CHLORIDE 20 ML/HR: 0.9 INJECTION, SOLUTION INTRAVENOUS at 08:31

## 2018-10-23 RX ADMIN — PACLITAXEL 153 MG: 6 INJECTION, SOLUTION INTRAVENOUS at 10:15

## 2018-10-23 RX ADMIN — Medication 300 UNITS: at 12:07

## 2018-10-23 NOTE — PROGRESS NOTES
Pt's daughter states that pt is on a new insulin sliding scale by the family doctor  Pt states her blood sugar this morning was 305 and she had 12 units of insulin  Pt's labs within parameters for treatment today

## 2018-10-23 NOTE — PLAN OF CARE
Problem: Potential for Falls  Goal: Patient will remain free of falls  INTERVENTIONS:  - Assess patient frequently for physical needs  -  Identify cognitive and physical deficits and behaviors that affect risk of falls    -  [place] fall precautions as indicated by assessment   - Educate patient/family on patient safety including physical limitations  - Instruct patient to call for assistance with activity based on assessment  - Modify environment to reduce risk of injury  - Consider OT/PT consult to assist with strengthening/mobility   Outcome: Progressing

## 2018-10-25 ENCOUNTER — OFFICE VISIT (OUTPATIENT)
Dept: HEMATOLOGY ONCOLOGY | Facility: CLINIC | Age: 63
End: 2018-10-25
Payer: MEDICARE

## 2018-10-25 VITALS
HEIGHT: 65 IN | HEART RATE: 94 BPM | TEMPERATURE: 97 F | BODY MASS INDEX: 30.66 KG/M2 | DIASTOLIC BLOOD PRESSURE: 84 MMHG | OXYGEN SATURATION: 95 % | WEIGHT: 184 LBS | SYSTOLIC BLOOD PRESSURE: 144 MMHG | RESPIRATION RATE: 18 BRPM

## 2018-10-25 DIAGNOSIS — C50.911 MALIGNANT NEOPLASM OF RIGHT BREAST IN FEMALE, ESTROGEN RECEPTOR POSITIVE, UNSPECIFIED SITE OF BREAST (HCC): Primary | ICD-10-CM

## 2018-10-25 DIAGNOSIS — Z17.0 MALIGNANT NEOPLASM OF RIGHT BREAST IN FEMALE, ESTROGEN RECEPTOR POSITIVE, UNSPECIFIED SITE OF BREAST (HCC): Primary | ICD-10-CM

## 2018-10-25 PROCEDURE — 99214 OFFICE O/P EST MOD 30 MIN: CPT | Performed by: INTERNAL MEDICINE

## 2018-10-25 NOTE — PROGRESS NOTES
Hematology / Oncology Outpatient Follow Up Note    Herb Hodges 65 y o  female :1955 [facility]:[id number]         Date:  10/25/2018    Assessment / Plan:  A 72-year-old postmenopausal woman with stage IA right breast cancer, grade 2, % positive, TX 45% positive, HER2 3+ disease  She underwent lumpectomy with sentinel lymph node biopsy, resulting in LILIANA  She is currently on adjuvant chemotherapy with weekly paclitaxel and trastuzumab with minimal toxicity  Clinically, she has no evidence recurrent disease  I recommended her to continue with current regimen weekly  I will see her again in a month for routine follow-up  She and her daughter are in agreement with my recommendations                          Subjective:      HPI:  A 72-year-old postmenopausal woman who was recently found to have abnormality in her right breast   She lived in [facility]   After she found breast abnormality, she came to [facility] to be with her daughter  She underwent right breast biopsy in May 23, 2018 which showed ductal carcinoma in situ, ER 90% positive, TX 75% positive  She underwent lumpectomy by Dr Elda Ackerman in 2018  She had 1 4 cm of invasive ductal carcinoma, grade 2  Lymphovascular invasion was not present  This was % positive, TX 45% positive, HER2 3+ disease  Because of the invasive carcinoma was found, she underwent sentinel lymph node biopsy as well as reexcision in 2018  She had no evidence of malignancy in the reexcised specimen  Seven lymph nodes were all negative for metastatic disease  She presents today to discuss adjuvant treatment options with her daughter  She feels well  She has no complaint of pain  Her weight is stable  She has no respiratory symptoms  She has many comorbidities, including diabetes, hypertension, asthma as well as hypothyroidism  She has no family history of breast or ovarian cancer    She is a lifetime never smoker            Interval History:  A 67-year-old postmenopausal woman with stage IA right breast cancer, grade 2, % positive, GA 45% positive, HER2 3+ disease  She underwent lumpectomy with sentinel lymph node biopsy, resulting in LILIANA  She started adjuvant chemotherapy with paclitaxel and trastuzumab in late September 2018  So far, she had 5 weekly treatment  She has been tolerating treatment very well  She has very minimal nausea without any vomiting  She has no history of neutropenic fever  She has no skin rash  She recently notice minimal neuropathy in the finger tips  She denied bone pain  She has no respiratory symptoms  Her performance status is normal            Objective:      Primary Diagnosis:     Right breast cancer, stage IA (pT1c, pN0, M0) grade 2, % positive, GA 45% positive, HER2 3+ disease  Diagnosed in June 2018      Cancer Staging:  Cancer Staging  Malignant neoplasm of upper-outer quadrant of right breast in female, estrogen receptor positive ([facility] )  Staging form: Breast, AJCC 8th Edition  - Clinical: No stage assigned - Unsigned  - Pathologic: Stage IA (pT1c, pN0, cM0, G2, ER: Positive, GA: Positive, HER2: Positive) - Signed by Miya Irving MD on 8/21/2018           Previous Hematologic/ Oncologic Treatment:            Current Hematologic/ Oncologic Treatment:       Adjuvant chemotherapy with weekly paclitaxel and trastuzumab x12    6th cycle to be given in October 30th 2018        Disease Status:      LILIANA status post lumpectomy with sentinel lymph node biopsy      Test Results:     Pathology:     1 4 cm of invasive ductal carcinoma, grade 2  No evidence of lymphovascular invasion  Seven sentinel lymph nodes were all negative for metastatic disease  % positive, GA 45% positive, her 2 3+ disease  Stage IA (pT1c, pN0, M0)     Radiology:     Chest x-ray was negative for pulmonary disease    MUGA scan showed ejection fraction 70% in September 2018      Laboratory:     See below for CBC and CMP      Physical Exam:        General Appearance:    Alert, oriented          Eyes:    PERRL   Ears:    Normal external ear canals, both ears   Nose:   Nares normal, septum midline   Throat:   Mucosa moist  Pharynx without injection  Neck:   Supple         Lungs:     Clear to auscultation bilaterally   Chest Wall:    No tenderness or deformity    Heart:    Regular rate and rhythm         Abdomen:     Soft, non-tender, bowel sounds +, no organomegaly               Extremities:   Extremities no cyanosis or edema         Skin:   no rash or icterus  Lymph nodes:   Cervical, supraclavicular, and axillary nodes normal   Neurologic:   CNII-XII intact, normal strength, sensation and reflexes     Throughout             Breast exam:   Lumpectomy scar at outer upper quadrant of the right breast with no palpable abnormality  Left breast exam is negative  ROS: Review of Systems   Neurological:        Minimal neuropathy   All other systems reviewed and are negative  Imaging: Xr Knee 4+ Vw Right Injury    Result Date: 10/5/2018  Narrative: RIGHT KNEE INDICATION:   M25 561: Pain in right knee  G89 29: Other chronic pain  COMPARISON:  7/16/2018 VIEWS:  XR KNEE 4+ VW RIGHT INJURY FINDINGS: There is no acute fracture or dislocation  There is no joint effusion  No significant degenerative changes  Enthesophyte formation quadriceps insertion on the patella  No lytic or blastic lesions are seen  Soft tissues are unremarkable  Impression: No acute osseous abnormality   Workstation performed: [id number]         Labs:   Lab Results   Component Value Date    WBC 6 93 10/22/2018    HGB 11 4 (L) 10/22/2018    HCT 35 1 10/22/2018    MCV 88 10/22/2018     10/22/2018     Lab Results   Component Value Date     (L) 10/22/2018    K 4 3 10/22/2018     10/22/2018    CO2 26 10/22/2018    BUN 22 10/22/2018    CREATININE 0 78 10/22/2018    GLUF 274 (H) 09/19/2018    CALCIUM 9 6 10/22/2018    AST 30 10/22/2018    ALT 45 10/22/2018    ALKPHOS 75 10/22/2018    EGFR 82 10/22/2018         Current Medications: Reviewed  Allergies: Reviewed  PMH/FH/SH:  Reviewed      Vital Sign:    Body surface area is 1 9 meters squared      Wt Readings from Last 3 Encounters:   10/25/18 83 5 kg (184 lb)   10/23/18 82 3 kg (181 lb 7 oz)   10/16/18 82 kg (180 lb 12 4 oz)        Temp Readings from Last 3 Encounters:   10/25/18 (!) 97 °F (36 1 °C) (Tympanic)   10/23/18 99 °F (37 2 °C) (Temporal)   10/16/18 98 5 °F (36 9 °C) (Temporal)        BP Readings from Last 3 Encounters:   10/25/18 144/84   10/23/18 107/71   10/16/18 127/77         Pulse Readings from Last 3 Encounters:   10/25/18 94   10/23/18 93   10/16/18 91     @LASTSAO2(3)@

## 2018-10-29 ENCOUNTER — APPOINTMENT (OUTPATIENT)
Dept: LAB | Facility: HOSPITAL | Age: 63
End: 2018-10-29
Attending: INTERNAL MEDICINE
Payer: MEDICARE

## 2018-10-29 DIAGNOSIS — Z17.0 MALIGNANT NEOPLASM OF RIGHT BREAST IN FEMALE, ESTROGEN RECEPTOR POSITIVE, UNSPECIFIED SITE OF BREAST (HCC): ICD-10-CM

## 2018-10-29 DIAGNOSIS — C50.911 MALIGNANT NEOPLASM OF RIGHT BREAST IN FEMALE, ESTROGEN RECEPTOR POSITIVE, UNSPECIFIED SITE OF BREAST (HCC): ICD-10-CM

## 2018-10-29 DIAGNOSIS — A04.8 H. PYLORI INFECTION: ICD-10-CM

## 2018-10-29 LAB
ALBUMIN SERPL BCP-MCNC: 3.4 G/DL (ref 3.5–5)
ALP SERPL-CCNC: 73 U/L (ref 46–116)
ALT SERPL W P-5'-P-CCNC: 31 U/L (ref 12–78)
ANION GAP SERPL CALCULATED.3IONS-SCNC: 7 MMOL/L (ref 4–13)
AST SERPL W P-5'-P-CCNC: 11 U/L (ref 5–45)
BILIRUB SERPL-MCNC: 0.27 MG/DL (ref 0.2–1)
BUN SERPL-MCNC: 21 MG/DL (ref 5–25)
CALCIUM SERPL-MCNC: 9.7 MG/DL (ref 8.3–10.1)
CHLORIDE SERPL-SCNC: 99 MMOL/L (ref 100–108)
CO2 SERPL-SCNC: 28 MMOL/L (ref 21–32)
CREAT SERPL-MCNC: 0.81 MG/DL (ref 0.6–1.3)
GFR SERPL CREATININE-BSD FRML MDRD: 78 ML/MIN/1.73SQ M
GLUCOSE SERPL-MCNC: 462 MG/DL (ref 65–140)
POTASSIUM SERPL-SCNC: 4.5 MMOL/L (ref 3.5–5.3)
PROT SERPL-MCNC: 7.4 G/DL (ref 6.4–8.2)
SODIUM SERPL-SCNC: 134 MMOL/L (ref 136–145)

## 2018-10-29 PROCEDURE — 87338 HPYLORI STOOL AG IA: CPT

## 2018-10-29 PROCEDURE — 80053 COMPREHEN METABOLIC PANEL: CPT

## 2018-10-29 RX ORDER — SODIUM CHLORIDE 9 MG/ML
20 INJECTION, SOLUTION INTRAVENOUS CONTINUOUS
Status: DISCONTINUED | OUTPATIENT
Start: 2018-10-30 | End: 2018-11-02 | Stop reason: HOSPADM

## 2018-10-30 ENCOUNTER — HOSPITAL ENCOUNTER (OUTPATIENT)
Dept: INFUSION CENTER | Facility: CLINIC | Age: 63
Discharge: HOME | End: 2018-10-30
Payer: MEDICARE

## 2018-10-30 VITALS
DIASTOLIC BLOOD PRESSURE: 68 MMHG | HEART RATE: 103 BPM | BODY MASS INDEX: 29.72 KG/M2 | WEIGHT: 178.35 LBS | RESPIRATION RATE: 18 BRPM | HEIGHT: 65 IN | TEMPERATURE: 97.6 F | SYSTOLIC BLOOD PRESSURE: 128 MMHG

## 2018-10-30 LAB — H PYLORI AG STL QL IA: POSITIVE

## 2018-10-30 PROCEDURE — 96417 CHEMO IV INFUS EACH ADDL SEQ: CPT

## 2018-10-30 PROCEDURE — 96367 TX/PROPH/DG ADDL SEQ IV INF: CPT

## 2018-10-30 PROCEDURE — 96375 TX/PRO/DX INJ NEW DRUG ADDON: CPT

## 2018-10-30 PROCEDURE — 36593 DECLOT VASCULAR DEVICE: CPT

## 2018-10-30 PROCEDURE — 96413 CHEMO IV INFUSION 1 HR: CPT

## 2018-10-30 RX ADMIN — DEXAMETHASONE SODIUM PHOSPHATE 10 MG: 10 INJECTION, SOLUTION INTRAMUSCULAR; INTRAVENOUS at 09:01

## 2018-10-30 RX ADMIN — PACLITAXEL 153 MG: 6 INJECTION, SOLUTION INTRAVENOUS at 11:07

## 2018-10-30 RX ADMIN — TRASTUZUMAB 166 MG: 150 INJECTION, POWDER, LYOPHILIZED, FOR SOLUTION INTRAVENOUS at 12:12

## 2018-10-30 RX ADMIN — SODIUM CHLORIDE 20 ML/HR: 0.9 INJECTION, SOLUTION INTRAVENOUS at 08:50

## 2018-10-30 RX ADMIN — Medication 300 UNITS: at 12:45

## 2018-10-30 RX ADMIN — ALTEPLASE 2 MG: 2.2 INJECTION, POWDER, LYOPHILIZED, FOR SOLUTION INTRAVENOUS at 10:06

## 2018-10-30 RX ADMIN — DIPHENHYDRAMINE HYDROCHLORIDE 25 MG: 50 INJECTION, SOLUTION INTRAMUSCULAR; INTRAVENOUS at 09:14

## 2018-10-30 RX ADMIN — FAMOTIDINE 20 MG: 10 INJECTION INTRAVENOUS at 09:31

## 2018-10-30 NOTE — PLAN OF CARE
Problem: PAIN - ADULT  Goal: Verbalizes/displays adequate comfort level or baseline comfort level  Interventions:  - Encourage patient to monitor pain and request assistance  - Assess pain using appropriate pain scale  - Administer analgesics based on type and severity of pain and evaluate response  - Implement non-pharmacological measures as appropriate and evaluate response  - Consider cultural and social influences on pain and pain management  - Notify physician/advanced practitioner if interventions unsuccessful or patient reports new pain  Outcome: Progressing      Problem: INFECTION - ADULT  Goal: Absence or prevention of progression during hospitalization  INTERVENTIONS:  - Assess and monitor for signs and symptoms of infection  - Monitor lab/diagnostic results  - Monitor all insertion sites, i e  indwelling lines, tubes, and drains  - Monitor endotracheal (as able) and nasal secretions for changes in amount and color  - [place] appropriate cooling/warming therapies per order  - Administer medications as ordered  - Instruct and encourage patient and family to use good hand hygiene technique  - Identify and instruct in appropriate isolation precautions for identified infection/condition  Outcome: Progressing    Goal: Absence of fever/infection during neutropenic period  INTERVENTIONS:  - Monitor WBC  - Implement neutropenic guidelines  Outcome: Progressing      Problem: Knowledge Deficit  Goal: Patient/family/caregiver demonstrates understanding of disease process, treatment plan, medications, and discharge instructions  Complete learning assessment and assess knowledge base    Interventions:  - Provide teaching at level of understanding  - Provide teaching via preferred learning methods  Outcome: Progressing

## 2018-10-30 NOTE — PROGRESS NOTES
Pt noted with itchy mildly raised pink rash only on head  Pt  States this rash started a few days ago  Pt  Also states she is not sleeping well  RN spoke to Christopher Garcia and instructed to tell pt  To see PCP for insomnia  Christopher Garcia will note rash  Pt  Instructed to call physician with increasing rash  Pt  Will try Benadryl for itch  Labs reviewed and within normal parameters for treatment  Port accessed noting sluggish blood return

## 2018-10-30 NOTE — PROGRESS NOTES
No blood return noted in [address] a cath  Peripheral IV access obtained in Lt  Hand  Taxol infusing at this time

## 2018-10-30 NOTE — PROGRESS NOTES
Pt  Tolerated Taxol and Herceptin without adverse event  Future appointments reviewed  AVS provided

## 2018-10-31 ENCOUNTER — OFFICE VISIT (OUTPATIENT)
Dept: OBGYN CLINIC | Facility: MEDICAL CENTER | Age: 63
End: 2018-10-31
Payer: MEDICARE

## 2018-10-31 ENCOUNTER — TELEPHONE (OUTPATIENT)
Dept: GASTROENTEROLOGY | Facility: CLINIC | Age: 63
End: 2018-10-31

## 2018-10-31 VITALS
BODY MASS INDEX: 29.66 KG/M2 | SYSTOLIC BLOOD PRESSURE: 143 MMHG | HEIGHT: 65 IN | WEIGHT: 178 LBS | DIASTOLIC BLOOD PRESSURE: 84 MMHG | HEART RATE: 102 BPM

## 2018-10-31 DIAGNOSIS — Z85.3 HISTORY OF BREAST CANCER: ICD-10-CM

## 2018-10-31 DIAGNOSIS — A04.8 H. PYLORI INFECTION: ICD-10-CM

## 2018-10-31 DIAGNOSIS — M25.561 RIGHT KNEE PAIN, UNSPECIFIED CHRONICITY: Primary | ICD-10-CM

## 2018-10-31 PROCEDURE — 99214 OFFICE O/P EST MOD 30 MIN: CPT | Performed by: FAMILY MEDICINE

## 2018-10-31 RX ORDER — LANCETS 30 GAUGE
EACH MISCELLANEOUS
COMMUNITY
Start: 2018-09-20 | End: 2020-01-14 | Stop reason: CLARIF

## 2018-10-31 RX ORDER — METRONIDAZOLE 250 MG/1
250 TABLET ORAL
Qty: 56 TABLET | Refills: 0 | Status: SHIPPED | OUTPATIENT
Start: 2018-10-31 | End: 2018-11-06

## 2018-10-31 RX ORDER — TETRACYCLINE HYDROCHLORIDE 500 MG/1
500 CAPSULE ORAL
Qty: 56 CAPSULE | Refills: 0 | Status: SHIPPED | OUTPATIENT
Start: 2018-10-31 | End: 2018-11-14

## 2018-10-31 RX ORDER — LIDOCAINE AND PRILOCAINE 25; 25 MG/G; MG/G
CREAM TOPICAL
COMMUNITY
Start: 2018-09-25 | End: 2018-11-08 | Stop reason: HOSPADM

## 2018-10-31 RX ORDER — BISMUTH SUBSALICYLATE 262 MG/1
524 TABLET, CHEWABLE ORAL
Qty: 112 TABLET | Refills: 0 | Status: SHIPPED | OUTPATIENT
Start: 2018-10-31 | End: 2018-11-06

## 2018-10-31 RX ORDER — OMEPRAZOLE 40 MG/1
40 CAPSULE, DELAYED RELEASE ORAL 2 TIMES DAILY
Qty: 28 CAPSULE | Refills: 0 | Status: SHIPPED | OUTPATIENT
Start: 2018-10-31 | End: 2018-12-05 | Stop reason: SDUPTHER

## 2018-10-31 NOTE — PATIENT INSTRUCTIONS
Explained the patient that due to her history of current breast cancer and right-sided knee pain without any significant arthritis on her x-rays I will order MRI to evaluate for occult metastasis  At this time I recommended against any corticosteroid injection due to unclear etiology of pain but also due to history of elevated blood sugars and due to her current history of cancer  I did explain that corticosteroid injection could result in diminished immune system response and reduced ability to fight cancer  Explained that she will require clearance from Oncology in the future prior to corticosteroid injection  Patient and daughter expressed understanding agreed to plan

## 2018-11-05 ENCOUNTER — APPOINTMENT (OUTPATIENT)
Dept: LAB | Facility: HOSPITAL | Age: 63
DRG: 176 | End: 2018-11-05
Attending: INTERNAL MEDICINE
Payer: MEDICARE

## 2018-11-05 RX ORDER — SODIUM CHLORIDE 9 MG/ML
20 INJECTION, SOLUTION INTRAVENOUS CONTINUOUS
Status: DISCONTINUED | OUTPATIENT
Start: 2018-11-06 | End: 2018-11-09 | Stop reason: HOSPADM

## 2018-11-06 ENCOUNTER — DOCUMENTATION (OUTPATIENT)
Dept: HEMATOLOGY ONCOLOGY | Facility: CLINIC | Age: 63
End: 2018-11-06

## 2018-11-06 ENCOUNTER — APPOINTMENT (INPATIENT)
Dept: NON INVASIVE DIAGNOSTICS | Facility: HOSPITAL | Age: 63
DRG: 176 | End: 2018-11-06
Payer: MEDICARE

## 2018-11-06 ENCOUNTER — HOSPITAL ENCOUNTER (INPATIENT)
Facility: HOSPITAL | Age: 63
LOS: 2 days | Discharge: HOME | DRG: 176 | End: 2018-11-08
Attending: EMERGENCY MEDICINE | Admitting: INTERNAL MEDICINE
Payer: MEDICARE

## 2018-11-06 ENCOUNTER — APPOINTMENT (EMERGENCY)
Dept: CT IMAGING | Facility: HOSPITAL | Age: 63
DRG: 176 | End: 2018-11-06
Payer: MEDICARE

## 2018-11-06 ENCOUNTER — HOSPITAL ENCOUNTER (OUTPATIENT)
Dept: INFUSION CENTER | Facility: CLINIC | Age: 63
Discharge: HOME | DRG: 176 | End: 2018-11-06
Payer: MEDICARE

## 2018-11-06 VITALS
WEIGHT: 176.59 LBS | SYSTOLIC BLOOD PRESSURE: 123 MMHG | HEIGHT: 65 IN | HEART RATE: 106 BPM | RESPIRATION RATE: 16 BRPM | DIASTOLIC BLOOD PRESSURE: 78 MMHG | TEMPERATURE: 97.1 F | BODY MASS INDEX: 29.42 KG/M2

## 2018-11-06 DIAGNOSIS — I26.99 MULTIPLE PULMONARY EMBOLI (HCC): Primary | ICD-10-CM

## 2018-11-06 DIAGNOSIS — I26.99 BILATERAL PULMONARY EMBOLISM (HCC): ICD-10-CM

## 2018-11-06 DIAGNOSIS — R10.9 ABDOMINAL PAIN: ICD-10-CM

## 2018-11-06 LAB
ALBUMIN SERPL BCP-MCNC: 3.7 G/DL (ref 3.5–5)
ALP SERPL-CCNC: 77 U/L (ref 46–116)
ALT SERPL W P-5'-P-CCNC: 54 U/L (ref 12–78)
ANION GAP SERPL CALCULATED.3IONS-SCNC: 9 MMOL/L (ref 4–13)
AST SERPL W P-5'-P-CCNC: 17 U/L (ref 5–45)
BACTERIA UR QL AUTO: ABNORMAL /HPF
BASOPHILS # BLD AUTO: 0.1 THOUSANDS/ΜL (ref 0–0.1)
BASOPHILS NFR BLD AUTO: 2 % (ref 0–1)
BILIRUB SERPL-MCNC: 0.22 MG/DL (ref 0.2–1)
BILIRUB UR QL STRIP: NEGATIVE
BUN SERPL-MCNC: 20 MG/DL (ref 5–25)
CALCIUM SERPL-MCNC: 9.8 MG/DL (ref 8.3–10.1)
CHLORIDE SERPL-SCNC: 99 MMOL/L (ref 100–108)
CLARITY UR: CLEAR
CO2 SERPL-SCNC: 27 MMOL/L (ref 21–32)
COLOR UR: YELLOW
CREAT SERPL-MCNC: 0.84 MG/DL (ref 0.6–1.3)
EOSINOPHIL # BLD AUTO: 0.11 THOUSAND/ΜL (ref 0–0.61)
EOSINOPHIL NFR BLD AUTO: 2 % (ref 0–6)
ERYTHROCYTE [DISTWIDTH] IN BLOOD BY AUTOMATED COUNT: 14.3 % (ref 11.6–15.1)
GFR SERPL CREATININE-BSD FRML MDRD: 75 ML/MIN/1.73SQ M
GLUCOSE SERPL-MCNC: 120 MG/DL (ref 65–140)
GLUCOSE SERPL-MCNC: 194 MG/DL (ref 65–140)
GLUCOSE SERPL-MCNC: 419 MG/DL (ref 65–140)
GLUCOSE UR STRIP-MCNC: ABNORMAL MG/DL
HCT VFR BLD AUTO: 36.2 % (ref 34.8–46.1)
HGB BLD-MCNC: 11.7 G/DL (ref 11.5–15.4)
HGB UR QL STRIP.AUTO: NEGATIVE
IMM GRANULOCYTES # BLD AUTO: 0.12 THOUSAND/UL (ref 0–0.2)
IMM GRANULOCYTES NFR BLD AUTO: 2 % (ref 0–2)
KETONES UR STRIP-MCNC: NEGATIVE MG/DL
LACTATE SERPL-SCNC: 1.4 MMOL/L (ref 0.5–2)
LACTATE SERPL-SCNC: 2.4 MMOL/L (ref 0.5–2)
LEUKOCYTE ESTERASE UR QL STRIP: ABNORMAL
LIPASE SERPL-CCNC: 97 U/L (ref 73–393)
LYMPHOCYTES # BLD AUTO: 2.07 THOUSANDS/ΜL (ref 0.6–4.47)
LYMPHOCYTES NFR BLD AUTO: 39 % (ref 14–44)
MCH RBC QN AUTO: 28.6 PG (ref 26.8–34.3)
MCHC RBC AUTO-ENTMCNC: 32.3 G/DL (ref 31.4–37.4)
MCV RBC AUTO: 89 FL (ref 82–98)
MONOCYTES # BLD AUTO: 0.29 THOUSAND/ΜL (ref 0.17–1.22)
MONOCYTES NFR BLD AUTO: 5 % (ref 4–12)
NEUTROPHILS # BLD AUTO: 2.66 THOUSANDS/ΜL (ref 1.85–7.62)
NEUTS SEG NFR BLD AUTO: 50 % (ref 43–75)
NITRITE UR QL STRIP: NEGATIVE
NON-SQ EPI CELLS URNS QL MICRO: ABNORMAL /HPF
NRBC BLD AUTO-RTO: 0 /100 WBCS
PH UR STRIP.AUTO: 5 [PH] (ref 4.5–8)
PLATELET # BLD AUTO: 442 THOUSANDS/UL (ref 149–390)
PMV BLD AUTO: 11.5 FL (ref 8.9–12.7)
POTASSIUM SERPL-SCNC: 3.8 MMOL/L (ref 3.5–5.3)
PROT SERPL-MCNC: 8 G/DL (ref 6.4–8.2)
PROT UR STRIP-MCNC: NEGATIVE MG/DL
RBC # BLD AUTO: 4.09 MILLION/UL (ref 3.81–5.12)
RBC #/AREA URNS AUTO: ABNORMAL /HPF
SODIUM SERPL-SCNC: 135 MMOL/L (ref 136–145)
SP GR UR STRIP.AUTO: 1.01 (ref 1–1.03)
TROPONIN I SERPL-MCNC: <0.02 NG/ML
UROBILINOGEN UR QL STRIP.AUTO: 0.2 E.U./DL
WBC # BLD AUTO: 5.35 THOUSAND/UL (ref 4.31–10.16)
WBC #/AREA URNS AUTO: ABNORMAL /HPF

## 2018-11-06 PROCEDURE — 71275 CT ANGIOGRAPHY CHEST: CPT

## 2018-11-06 PROCEDURE — 85025 COMPLETE CBC W/AUTO DIFF WBC: CPT | Performed by: EMERGENCY MEDICINE

## 2018-11-06 PROCEDURE — 93005 ELECTROCARDIOGRAM TRACING: CPT

## 2018-11-06 PROCEDURE — 80053 COMPREHEN METABOLIC PANEL: CPT | Performed by: EMERGENCY MEDICINE

## 2018-11-06 PROCEDURE — 36415 COLL VENOUS BLD VENIPUNCTURE: CPT | Performed by: EMERGENCY MEDICINE

## 2018-11-06 PROCEDURE — 83690 ASSAY OF LIPASE: CPT | Performed by: EMERGENCY MEDICINE

## 2018-11-06 PROCEDURE — 96374 THER/PROPH/DIAG INJ IV PUSH: CPT

## 2018-11-06 PROCEDURE — 81001 URINALYSIS AUTO W/SCOPE: CPT

## 2018-11-06 PROCEDURE — 99285 EMERGENCY DEPT VISIT HI MDM: CPT

## 2018-11-06 PROCEDURE — 84484 ASSAY OF TROPONIN QUANT: CPT | Performed by: EMERGENCY MEDICINE

## 2018-11-06 PROCEDURE — 96375 TX/PRO/DX INJ NEW DRUG ADDON: CPT

## 2018-11-06 PROCEDURE — 82948 REAGENT STRIP/BLOOD GLUCOSE: CPT

## 2018-11-06 PROCEDURE — 99223 1ST HOSP IP/OBS HIGH 75: CPT | Performed by: INTERNAL MEDICINE

## 2018-11-06 PROCEDURE — 74177 CT ABD & PELVIS W/CONTRAST: CPT

## 2018-11-06 PROCEDURE — 93970 EXTREMITY STUDY: CPT

## 2018-11-06 PROCEDURE — 83605 ASSAY OF LACTIC ACID: CPT | Performed by: EMERGENCY MEDICINE

## 2018-11-06 PROCEDURE — 96361 HYDRATE IV INFUSION ADD-ON: CPT

## 2018-11-06 RX ORDER — ONDANSETRON 2 MG/ML
4 INJECTION INTRAMUSCULAR; INTRAVENOUS EVERY 6 HOURS PRN
Status: DISCONTINUED | OUTPATIENT
Start: 2018-11-06 | End: 2018-11-08 | Stop reason: HOSPADM

## 2018-11-06 RX ORDER — LISINOPRIL 20 MG/1
20 TABLET ORAL DAILY
Status: DISCONTINUED | OUTPATIENT
Start: 2018-11-07 | End: 2018-11-08 | Stop reason: HOSPADM

## 2018-11-06 RX ORDER — KETOROLAC TROMETHAMINE 30 MG/ML
15 INJECTION, SOLUTION INTRAMUSCULAR; INTRAVENOUS ONCE
Status: COMPLETED | OUTPATIENT
Start: 2018-11-06 | End: 2018-11-06

## 2018-11-06 RX ORDER — ALBUTEROL SULFATE 90 UG/1
1 AEROSOL, METERED RESPIRATORY (INHALATION) EVERY 4 HOURS PRN
Status: DISCONTINUED | OUTPATIENT
Start: 2018-11-06 | End: 2018-11-08 | Stop reason: HOSPADM

## 2018-11-06 RX ORDER — HYDROCODONE BITARTRATE AND ACETAMINOPHEN 5; 325 MG/1; MG/1
1 TABLET ORAL EVERY 4 HOURS PRN
Status: DISCONTINUED | OUTPATIENT
Start: 2018-11-06 | End: 2018-11-08 | Stop reason: HOSPADM

## 2018-11-06 RX ORDER — TRAZODONE HYDROCHLORIDE 50 MG/1
50 TABLET ORAL
Status: DISCONTINUED | OUTPATIENT
Start: 2018-11-06 | End: 2018-11-08 | Stop reason: HOSPADM

## 2018-11-06 RX ORDER — DOCUSATE SODIUM 100 MG/1
100 CAPSULE, LIQUID FILLED ORAL 2 TIMES DAILY
Status: DISCONTINUED | OUTPATIENT
Start: 2018-11-06 | End: 2018-11-08 | Stop reason: HOSPADM

## 2018-11-06 RX ORDER — PANTOPRAZOLE SODIUM 40 MG/1
40 TABLET, DELAYED RELEASE ORAL
Status: DISCONTINUED | OUTPATIENT
Start: 2018-11-06 | End: 2018-11-08 | Stop reason: HOSPADM

## 2018-11-06 RX ORDER — LEVOTHYROXINE SODIUM 0.05 MG/1
50 TABLET ORAL DAILY
Status: DISCONTINUED | OUTPATIENT
Start: 2018-11-07 | End: 2018-11-08 | Stop reason: HOSPADM

## 2018-11-06 RX ORDER — ONDANSETRON 2 MG/ML
4 INJECTION INTRAMUSCULAR; INTRAVENOUS ONCE
Status: COMPLETED | OUTPATIENT
Start: 2018-11-06 | End: 2018-11-06

## 2018-11-06 RX ORDER — SODIUM CHLORIDE 9 MG/ML
75 INJECTION, SOLUTION INTRAVENOUS CONTINUOUS
Status: DISCONTINUED | OUTPATIENT
Start: 2018-11-06 | End: 2018-11-07

## 2018-11-06 RX ORDER — INSULIN GLARGINE 100 [IU]/ML
5 INJECTION, SOLUTION SUBCUTANEOUS EVERY EVENING
Status: DISCONTINUED | OUTPATIENT
Start: 2018-11-06 | End: 2018-11-08 | Stop reason: HOSPADM

## 2018-11-06 RX ORDER — ALBUTEROL SULFATE 2.5 MG/3ML
2.5 SOLUTION RESPIRATORY (INHALATION) EVERY 4 HOURS PRN
Status: DISCONTINUED | OUTPATIENT
Start: 2018-11-06 | End: 2018-11-08 | Stop reason: HOSPADM

## 2018-11-06 RX ORDER — POLYETHYLENE GLYCOL 3350 17 G/17G
17 POWDER, FOR SOLUTION ORAL DAILY PRN
Status: DISCONTINUED | OUTPATIENT
Start: 2018-11-06 | End: 2018-11-07

## 2018-11-06 RX ADMIN — IOHEXOL 50 ML: 240 INJECTION, SOLUTION INTRATHECAL; INTRAVASCULAR; INTRAVENOUS; ORAL at 12:07

## 2018-11-06 RX ADMIN — ONDANSETRON 4 MG: 2 INJECTION INTRAMUSCULAR; INTRAVENOUS at 10:16

## 2018-11-06 RX ADMIN — INSULIN LISPRO 1 UNITS: 100 INJECTION, SOLUTION INTRAVENOUS; SUBCUTANEOUS at 17:40

## 2018-11-06 RX ADMIN — HYDROCODONE BITARTRATE AND ACETAMINOPHEN 1 TABLET: 5; 325 TABLET ORAL at 17:58

## 2018-11-06 RX ADMIN — DOCUSATE SODIUM 100 MG: 100 CAPSULE, LIQUID FILLED ORAL at 17:40

## 2018-11-06 RX ADMIN — KETOROLAC TROMETHAMINE 15 MG: 30 INJECTION, SOLUTION INTRAMUSCULAR at 10:16

## 2018-11-06 RX ADMIN — ENOXAPARIN SODIUM 80 MG: 80 INJECTION SUBCUTANEOUS at 21:05

## 2018-11-06 RX ADMIN — PANTOPRAZOLE SODIUM 40 MG: 40 TABLET, DELAYED RELEASE ORAL at 17:40

## 2018-11-06 RX ADMIN — IOHEXOL 100 ML: 350 INJECTION, SOLUTION INTRAVENOUS at 12:07

## 2018-11-06 RX ADMIN — SODIUM CHLORIDE 1000 ML: 0.9 INJECTION, SOLUTION INTRAVENOUS at 10:18

## 2018-11-06 RX ADMIN — ALBUTEROL SULFATE 1 PUFF: 90 AEROSOL, METERED RESPIRATORY (INHALATION) at 17:40

## 2018-11-06 RX ADMIN — SODIUM CHLORIDE 1000 ML: 0.9 INJECTION, SOLUTION INTRAVENOUS at 10:54

## 2018-11-06 RX ADMIN — INSULIN GLARGINE 5 UNITS: 100 INJECTION, SOLUTION SUBCUTANEOUS at 21:06

## 2018-11-06 RX ADMIN — SODIUM CHLORIDE 75 ML/HR: 0.9 INJECTION, SOLUTION INTRAVENOUS at 14:30

## 2018-11-06 RX ADMIN — ENOXAPARIN SODIUM 120 MG: 120 INJECTION SUBCUTANEOUS at 13:26

## 2018-11-06 NOTE — SOCIAL WORK
Met with patient at bedside in order to discuss d/c role  Patient is Lebanese speaking only  Patient admitted as a result of abdominal pain  Patient is currently receiving chemofor breast cancer  At the time of this assessment patient was alert and oriented times 3  Patient lives with her 2 daughters, third floor apartment   Patient identify her oldest daughter Mary Castaneda as emergency   Patient has been on disability SSD for the last 5 years due to chronic medical problems  Patient is independent with all ADL;S, does not drives, de[ends on family  Patient denies any use of illicit drugs and/or alcohol intake  Patient denies any psychiatric diagnosis,  Patient is currently under medical care at [address] [place] [address], [address] [place] [address] and uses [facility]  Patient's daughter will assist with transport at d/c       CM reviewed d/c planning process including the following: identifying help at home, patient preference for d/c planning needs, availability of treatment team to discuss questions or concerns patient and/or family may have regarding understanding medications and recognizing signs and symptoms once discharged  CM also encouraged patient to follow up with all recommended appointments after discharge  Patient advised of importance for patient and family to participate in managing patients medical well being

## 2018-11-06 NOTE — H&P
History and Physical - [facility]    Patient Information: Ivory Hodges 59 y o  female MRN: [id number]  Unit/Bed#: ED 08 Encounter: [id number]  Admitting Physician: Rk Diana DO  PCP: Shaista Goldman MD  Date of Admission:  11/06/18    Assessment/Plan:  1  Bilateral scattered segmental and subsegmental pulmonary embolism- likely related to hypercoagulable state due to cancer  Unable to get hypercoag panel as pt already received lovenox  Pt is luckily hemodynamically stable with out evidence of heart strain on ct  Will continue lovenox  Will likely use xarelto or eliquis depending on which is covered by insurance  Will continue lovenox for until know the price of medications  Will also check lower ext dopplers  2  Abdominal fullness/constipation- will start bowel regimen    3  Lactic acidosis- resolved  Likely due to clinical dehydration    4  Hyponatremia- likely hypovolemic hyponatremia  Will continue low rate of Iv fluids and check sodium in am    5  Type 2 diabetes- will write for insulin sliding scale  Hold glucotrol and metformin  Continue tuojeo of 5 units  Adjust as needed    6  htn- will continue home medications    7  Hypothyroid- continue synthroid    8  H/o bca- pt is following with Dr Oziel Morris for chemo  Will follow up outpt  VTE Prophylaxis: Enoxaparin (Lovenox)  / sequential compression device   Code Status: full code    Anticipated Length of Stay:  Patient will be admitted on an Inpatient basis with an anticipated length of stay of  Greater than 2 midnights  Chief Complaint:   Abdominal bloating with shortness of breath    History of Present Illness:    Ori Leslie is a 59 y o  female who presents with mainly abdominal complaints  She states she feels very bloated and has been constipated  She did have a bm yesterday  She states its not really pain but more just bloating  She also was a little nauseated   In Addition to this she has also been experiencing sob  She was found to have bilateral PEs  She was started on lovenox in the ed  Pt states she still feels very bloated but now it is due to drinking the contrast for the ct abd  She does have a history of bca and is currently on chemo  She follows with Dr Fiona Falcon  No f/c no cp no v/d  Review of Systems:    Review of Systems   Constitutional: Positive for fatigue  HENT: Negative  Eyes: Negative  Respiratory: Positive for shortness of breath  Cardiovascular: Negative for chest pain  Gastrointestinal: Positive for constipation and nausea  Bloated   Endocrine: Negative  Genitourinary: Negative  Musculoskeletal: Negative  Skin: Negative  Allergic/Immunologic: Negative  Neurological: Negative  Hematological: Negative  Psychiatric/Behavioral: Negative  Past Medical and Surgical History:     Past Medical History:   Diagnosis Date    Abdominal infection ([facility] )     h-pylori    Asthma     Breast cancer ([facility] )     Cancer ([facility] )     BREAST    Diabetes mellitus ([facility] )     blood sugar 199 @ 735    Hiatal hernia     Hypercholesterolemia     Hypertension     Hypothyroid     Renal disorder     Rheumatoid arthritis ([facility] )        Past Surgical History:   Procedure Laterality Date    BREAST BIOPSY      BREAST LUMPECTOMY Right 6/26/2018    Procedure: RIGHT BREAST NEEDLE LOCALIZATION X2 WITH RIGHT BREAST LUMPECTOMY ( NEEDLE LOC @ 1000); Surgeon: Apoorva Guillen MD;  Location: [facility];  Service: Surgical Oncology    BREAST LUMPECTOMY Right 8/2/2018    Procedure: LYMPHOSCINITGRAPHY INTRAOPERATIVE LYMPHATIC MAPPING , RIGHT SENTINEL NODE BIOPSY, REEXCISION  RIGHT BREAST LUMPECTOMY CAVITY (SUPERIOR MARGIN);   Surgeon: Apoorva Guillen MD;  Location: [facility];  Service: Surgical Oncology    BREAST SURGERY Left     CATARACT EXTRACTION, BILATERAL Bilateral     EGD AND COLONOSCOPY N/A 9/5/2018    Procedure: EGD AND COLONOSCOPY;  Surgeon: Mario Zarco MD; Location: [facility]; Service: Gastroenterology    [facility] GUIDED CENTRAL VENOUS ACCESS REPLACEMENT  9/13/2018    KNEE SURGERY Right     RETINAL DETACHMENT SURGERY Right     TUBAL LIGATION      TUNNELED VENOUS PORT PLACEMENT Left 9/13/2018    Procedure: INSERTION VENOUS PORT (PORT-A-CATH); Surgeon: Shahla Cullen MD;  [facility]: [facility];  Service: Surgical Oncology       Meds/Allergies:    Prior to Admission medications    Medication Sig Start Date End Date Taking?  Authorizing Provider   albuterol (2 5 mg/3 mL) 0 083 % nebulizer solution Take 1 vial (2 5 mg total) by nebulization every 4 (four) hours as needed for wheezing or shortness of breath 7/24/18  Yes Genesis Wells MD   albuterol (PROVENTIL HFA,VENTOLIN HFA) 90 mcg/act inhaler Inhale 1 puff every 4 (four) hours as needed     Yes Historical Provider, MD   amLODIPine-atorvastatin (CADUET) 10-10 MG per tablet take 1 tablet by mouth once daily 9/20/18  Yes Genesis Wells MD   atorvastatin (LIPITOR) 10 mg tablet Take 1 tablet (10 mg total) by mouth daily 9/21/18  Yes Genesis Wells MD   SANG MICROLET LANCETS lancets Testing three times a day 9/20/18  Yes Genesis Wells MD   Blood Glucose Monitoring Suppl [facility] BLOOD GLUCOSE METER) w/Device KIT Testing blood sugars three times a day 9/20/18  Yes Historical Provider, MD   Blood Glucose Monitoring Suppl (SANG CONTOUR MONITOR) w/Device KIT Testing blood sugars three times a day 9/20/18  Yes Genesis Wells MD   enalapril (VASOTEC) 20 mg tablet take 1 tablet by mouth once daily 9/20/18  Yes Genesis Wells MD   gabapentin (NEURONTIN) 300 mg capsule take 1 capsule by mouth three times a day  Patient taking differently: as needed 7/5/18  Yes Genesis Wells MD   gemfibrozil (LOPID) 600 mg tablet Take 1 tablet (600 mg total) by mouth 2 (two) times a day 9/20/18  Yes Genesis Wells MD   glipiZIDE (GLUCOTROL XL) 10 mg 24 hr tablet take 1 tablet by mouth once daily 9/20/18  Yes Genesis Wells MD   glucose blood (SANG CONTOUR TEST) test strip Testing three times a day 9/20/18  Yes Erin Pond MD   HUMULIN N 100 UNIT/ML subcutaneous injection 70 units in the am 30 units in the pm 6/13/18  Yes Erin Pond MD   HYDROcodone-acetaminophen [facility]) 5-325 mg per tablet Take 1 tablet by mouth every 4 (four) hours as needed for pain Max Daily Amount: 6 tablets 9/27/18  Yes Shaheen Lighter, DO   Insulin Glargine (TOUJEO) 300 units/mL CONCETRATED U-300 injection pen Inject 35 Units under the skin daily at bedtime  Patient taking differently: Inject 10 Units under the skin daily at bedtime   9/4/18  Yes Erin Pond MD   Insulin Syringe-Needle U-100 (B-D INS SYRINGE 0 5CC/30GX1/2") 30G X 1/2" 0 5 ML MISC  9/21/18  Yes Historical Provider, MD   Insulin Syringe-Needle U-100 30G 0 3 ML MISC Using twice a day 9/20/18  Yes Historical Provider, MD   Insulin Syringe-Needle U-100 30G X 1/2" 1 ML MISC Using twice a day 9/20/18  Yes Erin Pond MD   Lancets MISC Testing three times a day 9/20/18  Yes Historical Provider, MD   levothyroxine 50 mcg tablet take 1 tablet by mouth once daily 9/20/18  Yes Erin Pond MD   lidocaine-prilocaine (EMLA) cream Apply topically as needed for mild pain Apply prior to port access   9/25/18  Yes Ulices Estrada MD   lidocaine-prilocaine (EMLA) cream Apply topically 9/25/18  Yes Historical Provider, MD   metFORMIN (GLUCOPHAGE-XR) 500 mg 24 hr tablet take 2 tablets by mouth once daily WITH BREAKFAST 9/17/18  Yes Erin Pond MD   omeprazole (PriLOSEC) 20 mg delayed release capsule take 1 capsule by mouth once daily 7/5/18  Yes Erin Pond MD   omeprazole (PriLOSEC) 40 MG capsule Take 1 capsule (40 mg total) by mouth 2 (two) times a day for 14 days 10/31/18 11/14/18 Yes Valdene Robin, PA-C   tetracycline (ACHROMYCIN,SUMYCIN) 500 MG capsule Take 1 capsule (500 mg total) by mouth 4 (four) times a day (before meals and at bedtime) for 14 days 10/31/18 11/14/18 Yes Lenora Kelly, OSCAR   traZODone (DESYREL) 50 mg tablet take 1 tablet by mouth once daily at bedtime 9/17/18  Yes Charan Lemons MD   bismuth subsalicylate (PEPTO BISMOL) 262 MG chewable tablet Chew 2 tablets (524 mg total) 4 (four) times a day (before meals and at bedtime) for 14 days 10/31/18 11/6/18  Nasim Johnson PA-C   glucose blood test strip USE TO TEST BLOOD SUGARS 3 TIMES DAILY 9/21/18 11/6/18  Historical Provider, MD   metFORMIN (GLUCOPHAGE-XR) 500 mg 24 hr tablet take 2 tablets by mouth once daily WITH BREAKFAST 3/27/18 11/6/18  Historical Provider, MD   metroNIDAZOLE (FLAGYL) 250 mg tablet Take 1 tablet (250 mg total) by mouth 4 (four) times a day (before meals and at bedtime) for 14 days 10/31/18 11/6/18  Nasim Johnson PA-C     I have reviewed home medications with patient personally  Allergies: Allergies   Allergen Reactions    Aspirin Hives     Dr  in [address] told patient not to take aspirin r/t kidneys     Tylenol With Codeine #3 [Acetaminophen-Codeine] Rash       Social History:     Marital Status:      Substance Use History:   History   Alcohol Use No     History   Smoking Status    Never Smoker   Smokeless Tobacco    Never Used     History   Drug Use No       Family History:    Family History   Problem Relation Age of Onset    Diabetes Mother     Hypertension Mother     Diabetes Father     Hypertension Father     Diabetes Brother     Hypertension Brother     Prostate cancer Brother     Cancer Maternal Grandfather        Physical Exam:     Vitals:   Blood Pressure: 148/65 (11/06/18 1226)  Pulse: 82 (11/06/18 1226)  Temperature: 98 3 °F (36 8 °C) (11/06/18 0945)  Temp Source: Oral (11/06/18 0945)  Respirations: 16 (11/06/18 1226)  Weight - Scale: 80 3 kg (177 lb 0 5 oz) (11/06/18 0945)  SpO2: 98 % (11/06/18 1226)    Physical Exam   Constitutional: She is oriented to person, place, and time  No distress  HENT:   Head: Normocephalic and atraumatic     Eyes: Pupils are equal, round, and reactive to light  EOM are normal    Neck: Normal range of motion  Neck supple  Cardiovascular: Normal rate, regular rhythm and normal heart sounds  Exam reveals no gallop and no friction rub  No murmur heard  Pulmonary/Chest: Effort normal    Slight decrease at bases bilateral     Abdominal: Soft  Bowel sounds are normal  She exhibits no distension  There is no tenderness  Musculoskeletal: Normal range of motion  Neurological: She is alert and oriented to person, place, and time  Skin: Skin is warm and dry  She is not diaphoretic  Psychiatric: She has a normal mood and affect  Additional Data:     Lab Results: I have personally reviewed pertinent reports  Lactic acid was 2 4 now 1 4  Lipase 97  Trop 0 02    Results from last 7 days  Lab Units 11/06/18  1009   WBC Thousand/uL 5 35   HEMOGLOBIN g/dL 11 7   HEMATOCRIT % 36 2   PLATELETS Thousands/uL 442*   NEUTROS PCT % 50   LYMPHS PCT % 39   MONOS PCT % 5   EOS PCT % 2       Results from last 7 days  Lab Units 11/06/18  1009   POTASSIUM mmol/L 3 8   CHLORIDE mmol/L 99*   CO2 mmol/L 27   BUN mg/dL 20   CREATININE mg/dL 0 84   CALCIUM mg/dL 9 8   ALK PHOS U/L 77   ALT U/L 54   AST U/L 17           Imaging: I have personally reviewed pertinent reports  Ct Pe Study W Abdomen Pelvis W Contrast    Result Date: 11/6/2018  Narrative: CT PULMONARY ANGIOGRAM OF THE CHEST AND CT ABDOMEN AND PELVIS WITH INTRAVENOUS CONTRAST INDICATION:   Dyspnea on exertion  Abdominal pain  History of breast cancer  COMPARISON:  None  TECHNIQUE:  CT examination of the chest, abdomen and pelvis was performed  Thin section CT angiographic technique was used in the chest in order to evaluate for pulmonary embolus and coronal 3D MIP postprocessing was performed on the acquisition scanner  Axial, sagittal, and coronal 2D reformatted images were created from the source data and submitted for interpretation   Radiation dose length product (DLP) for this visit: 1202 mGy-cm   This examination, like all CT scans performed in the [facility], was performed utilizing techniques to minimize radiation dose exposure, including the use of iterative reconstruction and automated exposure control  IV Contrast:  50 mL of iohexol (OMNIPAQUE) 100 mL of iohexol (OMNIPAQUE) Enteric Contrast:  Enteric contrast was not administered  FINDINGS: CHEST PULMONARY ARTERIAL TREE:  Image numbers reported below are taken from series 2  Multiple segmental filling defects in the right upper lobe (image 122)  Segmental pulmonary arterial filling defect in the superior right lower lobe (image 144)  Subsegmental filling defects in the lateral and posterior basal right lower lobe (image 188)  Segmental filling defect in the posterior lingula (image 118)  Subsegmental filling defect within the posterior basal left lower lobe (image 165)  LUNGS: No acute consolidation  No interstitial thickening  No endobronchial lesions  No suspicious pulmonary nodules or masses  PLEURA:  Unremarkable  HEART/AORTA: Normal heart size  No disproportionate enlargement of the right heart  No abnormal bowing of the interventricular septum  Normal RV to LV ratio  No pericardial thickening or effusion  Thoracic aorta is normal for age  MEDIASTINUM AND BEATRIZ:  Unremarkable  CHEST WALL AND LOWER NECK:   Postsurgical/posttreatment changes at the right breast and axilla  No pathologically enlarged axillary lymphadenopathy  ABDOMEN LIVER/BILIARY TREE:  Mild hepatomegaly with diffuse hepatic steatosis  Contour remains smooth  No focal hepatic masses or biliary ductal dilatation  GALLBLADDER:  No calcified gallstones  No pericholecystic inflammatory change  SPLEEN:  Unremarkable  PANCREAS:  Focal, disproportionate fatty involution at the pancreatic neck  No pancreatic masses  No ductal dilatation  ADRENAL GLANDS:  Unremarkable  KIDNEYS/URETERS:  Unremarkable  No hydronephrosis   STOMACH AND BOWEL:  There is mild distal colonic diverticulosis without evidence of acute diverticulitis  APPENDIX:  A normal appendix was visualized  ABDOMINOPELVIC CAVITY:  No ascites or free intraperitoneal air  No lymphadenopathy  VESSELS:  Unremarkable for patient's age  PELVIS REPRODUCTIVE ORGANS:  Enlarged uterus  Posterior subserosal fibroid measuring 2 5 cm at the body  No suspicious adnexal masses  URINARY BLADDER: Normal when accounting for degree of nondistention  ABDOMINAL WALL/INGUINAL REGIONS:  Unremarkable  OSSEOUS STRUCTURES:  No acute fracture or destructive osseous lesion  Impression: 1  Scattered bilateral segmental and subsegmental pulmonary emboli  No CT evidence for right heart strain  2   No other acute thoracic or abdominopelvic findings  3   Posttreatment changes at the right breast and axilla  No evidence for metastatic disease  I personally discussed this study with [id number] on 11/6/2018 at 12:35 PM  Workstation performed: [id number]       Allscripts / Epic Records Reviewed:  Yes

## 2018-11-06 NOTE — PLAN OF CARE
Problem: PAIN - ADULT  Goal: Verbalizes/displays adequate comfort level or baseline comfort level  Interventions:  - Encourage patient to monitor pain and request assistance  - Assess pain using appropriate pain scale  - Administer analgesics based on type and severity of pain and evaluate response  - Implement non-pharmacological measures as appropriate and evaluate response  - Consider cultural and social influences on pain and pain management  - Notify physician/advanced practitioner if interventions unsuccessful or patient reports new pain  Outcome: Progressing      Problem: INFECTION - ADULT  Goal: Absence or prevention of progression during hospitalization  INTERVENTIONS:  - Assess and monitor for signs and symptoms of infection  - Monitor lab/diagnostic results  - Monitor all insertion sites, i e  indwelling lines, tubes, and drains  - Monitor endotracheal (as able) and nasal secretions for changes in amount and color  - [place] appropriate cooling/warming therapies per order  - Administer medications as ordered  - Instruct and encourage patient and family to use good hand hygiene technique  - Identify and instruct in appropriate isolation precautions for identified infection/condition  Outcome: Progressing    Goal: Absence of fever/infection during neutropenic period  INTERVENTIONS:  - Monitor WBC  - Implement neutropenic guidelines  Outcome: Progressing      Problem: Knowledge Deficit  Goal: Patient/family/caregiver demonstrates understanding of disease process, treatment plan, medications, and discharge instructions  Complete learning assessment and assess knowledge base    Interventions:  - Provide teaching at level of understanding  - Provide teaching via preferred learning methods  Outcome: Progressing

## 2018-11-06 NOTE — PROGRESS NOTES
TIME OUT    Hold chemotherapy today due to abdominal pain 9/10 and send patient to ED for evaluation  Received call from Sky Myers at [phone] on 11/6/2018  Order received  Pharmacist made aware

## 2018-11-06 NOTE — PROGRESS NOTES
Pt  Verbalized continued itchy rash on head, and now patchy pink areas on bilateral hands and a patch on Lt arm above antecubital area  Pt  Also verbalized continuous pain in abdomen stating it feels like a deep muscular pain  Pt  States it started 5 days ago  Pt  Also states she is not sleeping  Call to office of Dr Rashmi Dent who instructed to hold treatment and send pt to ER  Pt  Agreed to go to ER also reported BARNETT that started the same time as abdomen pain  Report called to ER  Pt  Discharged at this time to [facility] ER with daughter  Spoke with Shani Love,  who will follow up with  at [facility] to discuss any needs or concerns

## 2018-11-06 NOTE — ED NOTES
Patient transported to [facility] [address], RN  11/06/18 1145

## 2018-11-06 NOTE — ED PROVIDER NOTES
History  Chief Complaint   Patient presents with    Abdominal Pain     Pt has been complaining of abdominal pain x5 days  Pt denies any naisea/vomiting but is reporting having trouble moving her bowls  Pt is also complaining of dyspnea upon exertion  Pt is currently recieving chemo for breast cancer     59 y o  F w/ h/o breast CA on chemo, DM, HTN p/w abd pain x 5 days  Pt was supposed to have chemo today, but was referred to the ED instead for her abd pain  Also reports she feels constipated  Last normal BM was yesterday  Also having nausea  History provided by:  Relative and patient   used: No    Abdominal Pain   Pain location: Upper  Pain quality: bloating and pressure    Pain radiates to:  Does not radiate  Duration:  5 days  Timing:  Constant  Progression:  Unchanged  Chronicity:  New  Relieved by:  None tried  Worsened by:  Nothing  Ineffective treatments:  None tried  Associated symptoms: constipation (Last normal BM was yesterday), nausea and shortness of breath    Associated symptoms: no chills, no cough, no diarrhea, no dysuria, no fever, no hematuria, no vaginal bleeding, no vaginal discharge and no vomiting        Prior to Admission Medications   Prescriptions Last Dose Informant Patient Reported? Taking?    CINEPASS MICROLET LANCETS lancets   No Yes   Sig: Testing three times a day   Blood Glucose Monitoring Suppl ([employer] BLOOD GLUCOSE METER) w/Device KIT   Yes Yes   Sig: Testing blood sugars three times a day   Blood Glucose Monitoring Suppl (SANG CONTOUR MONITOR) w/Device KIT   No Yes   Sig: Testing blood sugars three times a day   HUMULIN N 100 UNIT/ML subcutaneous injection  Family Member No Yes   Si units in the am 30 units in the pm   HYDROcodone-acetaminophen (NORCO) 5-325 mg per tablet   No Yes   Sig: Take 1 tablet by mouth every 4 (four) hours as needed for pain Max Daily Amount: 6 tablets   Insulin Glargine (TOUJEO) 300 units/mL CONCETRATED U-300 injection pen   No Yes   Sig: Inject 35 Units under the skin daily at bedtime   Patient taking differently: Inject 10 Units under the skin daily at bedtime     Insulin Syringe-Needle U-100 (B-D INS SYRINGE 0 5CC/30GX1/2") 30G X 1/2" 0 5 ML MISC   Yes Yes   Insulin Syringe-Needle U-100 30G 0 3 ML MISC   Yes Yes   Sig: Using twice a day   Insulin Syringe-Needle U-100 30G X 1/2" 1 ML MISC   No Yes   Sig: Using twice a day   Lancets MISC   Yes Yes   Sig: Testing three times a day   albuterol (2 5 mg/3 mL) 0 083 % nebulizer solution  Family Member No Yes   Sig: Take 1 vial (2 5 mg total) by nebulization every 4 (four) hours as needed for wheezing or shortness of breath   albuterol (PROVENTIL HFA,VENTOLIN HFA) 90 mcg/act inhaler  Family Member Yes Yes   Sig: Inhale 1 puff every 4 (four) hours as needed     amLODIPine-atorvastatin (CADUET) 10-10 MG per tablet   No Yes   Sig: take 1 tablet by mouth once daily   atorvastatin (LIPITOR) 10 mg tablet   No Yes   Sig: Take 1 tablet (10 mg total) by mouth daily   enalapril (VASOTEC) 20 mg tablet   No Yes   Sig: take 1 tablet by mouth once daily   gabapentin (NEURONTIN) 300 mg capsule  Family Member No Yes   Sig: take 1 capsule by mouth three times a day   Patient taking differently: as needed   gemfibrozil (LOPID) 600 mg tablet   No Yes   Sig: Take 1 tablet (600 mg total) by mouth 2 (two) times a day   glipiZIDE (GLUCOTROL XL) 10 mg 24 hr tablet   No Yes   Sig: take 1 tablet by mouth once daily   glucose blood (SANG CONTOUR TEST) test strip   No Yes   Sig: Testing three times a day   levothyroxine 50 mcg tablet   No Yes   Sig: take 1 tablet by mouth once daily   lidocaine-prilocaine (EMLA) cream   No Yes   Sig: Apply topically as needed for mild pain Apply prior to port access     lidocaine-prilocaine (EMLA) cream   Yes Yes   Sig: Apply topically   metFORMIN (GLUCOPHAGE-XR) 500 mg 24 hr tablet   No Yes   Sig: take 2 tablets by mouth once daily WITH BREAKFAST   omeprazole (PriLOSEC) 20 mg delayed release capsule  Family Member No Yes   Sig: take 1 capsule by mouth once daily   omeprazole (PriLOSEC) 40 MG capsule   No Yes   Sig: Take 1 capsule (40 mg total) by mouth 2 (two) times a day for 14 days   tetracycline (ACHROMYCIN,SUMYCIN) 500 MG capsule   No Yes   Sig: Take 1 capsule (500 mg total) by mouth 4 (four) times a day (before meals and at bedtime) for 14 days   traZODone (DESYREL) 50 mg tablet   No Yes   Sig: take 1 tablet by mouth once daily at bedtime      Facility-Administered Medications: None       Past Medical History:   Diagnosis Date    Abdominal infection ([facility] )     h-pylori    Asthma     Breast cancer ([facility] )     Cancer ([facility] )     BREAST    Diabetes mellitus ([facility] )     blood sugar 199 @ 735    Hiatal hernia     Hypercholesterolemia     Hypertension     Hypothyroid     Renal disorder     Rheumatoid arthritis ([facility] )        Past Surgical History:   Procedure Laterality Date    BREAST BIOPSY      BREAST LUMPECTOMY Right 6/26/2018    Procedure: RIGHT BREAST NEEDLE LOCALIZATION X2 WITH RIGHT BREAST LUMPECTOMY ( NEEDLE LOC @ 1000); Surgeon: Darlin Moore MD;  Location: [facility];  Service: Surgical Oncology    BREAST LUMPECTOMY Right 8/2/2018    Procedure: LYMPHOSCINITGRAPHY INTRAOPERATIVE LYMPHATIC MAPPING , RIGHT SENTINEL NODE BIOPSY, REEXCISION  RIGHT BREAST LUMPECTOMY CAVITY (SUPERIOR MARGIN); Surgeon: Darlin Moore MD;  [facility]: [facility];  Service: Surgical Oncology    BREAST SURGERY Left     CATARACT EXTRACTION, BILATERAL Bilateral     EGD AND COLONOSCOPY N/A 9/5/2018    Procedure: EGD AND COLONOSCOPY;  Surgeon: Alba Xavier MD;  Location: [facility]; Service: Gastroenterology    [facility] GUIDED CENTRAL VENOUS ACCESS REPLACEMENT  9/13/2018    KNEE SURGERY Right     RETINAL DETACHMENT SURGERY Right     TUBAL LIGATION      TUNNELED VENOUS PORT PLACEMENT Left 9/13/2018    Procedure: INSERTION VENOUS PORT (PORT-A-CATH);   Surgeon: Darlin Moore MD;  [facility];  Service: Surgical Oncology       Family History   Problem Relation Age of Onset    Diabetes Mother     Hypertension Mother     Diabetes Father     Hypertension Father     Diabetes Brother     Hypertension Brother     Prostate cancer Brother     Cancer Maternal Grandfather      I have reviewed and agree with the history as documented  Social History   Substance Use Topics    Smoking status: Never Smoker    Smokeless tobacco: Never Used    Alcohol use No        Review of Systems   Constitutional: Negative for appetite change, chills and fever  HENT: Negative for congestion and rhinorrhea  Respiratory: Positive for shortness of breath  Negative for cough  Gastrointestinal: Positive for abdominal pain, constipation (Last normal BM was yesterday) and nausea  Negative for abdominal distention, blood in stool, diarrhea and vomiting  Genitourinary: Negative for dysuria, flank pain, frequency, hematuria, urgency, vaginal bleeding and vaginal discharge  Musculoskeletal: Negative for back pain  All other systems reviewed and are negative  Physical Exam  Physical Exam   Constitutional: She is oriented to person, place, and time  She appears well-developed and well-nourished  No distress  HENT:   Head: Normocephalic  Mouth/Throat: Oropharynx is clear and moist and mucous membranes are normal    Neck: Normal range of motion  Neck supple  Cardiovascular: Regular rhythm, normal heart sounds and intact distal pulses  Tachycardia present  Exam reveals no gallop and no friction rub  No murmur heard  Pulmonary/Chest: Effort normal and breath sounds normal  No respiratory distress  She has no wheezes  She has no rales  Abdominal: Soft  Normal appearance and bowel sounds are normal  She exhibits no distension and no mass  There is no hepatosplenomegaly  There is tenderness in the right upper quadrant, epigastric area and left upper quadrant   There is no rigidity, no rebound, no guarding, no CVA tenderness, no tenderness at McBurney's point and negative Mooney's sign  Lymphadenopathy:     She has no cervical adenopathy  Neurological: She is alert and oriented to person, place, and time  Skin: Skin is warm, dry and intact  No rash noted  No pallor  Nursing note and vitals reviewed  Vital Signs  ED Triage Vitals   Temperature Pulse Respirations Blood Pressure SpO2   11/06/18 0945 11/06/18 0945 11/06/18 0945 11/06/18 0945 11/06/18 0945   98 3 °F (36 8 °C) (!) 114 16 166/73 96 %      Temp Source Heart Rate Source Patient Position - Orthostatic VS BP Location FiO2 (%)   11/06/18 0945 11/06/18 0945 11/06/18 0945 11/06/18 0945 --   Oral Monitor Sitting Left arm       Pain Score       11/06/18 1016       9           Vitals:    11/06/18 0945 11/06/18 1102 11/06/18 1115 11/06/18 1226   BP: 166/73 156/68 156/68 148/65   Pulse: (!) 114 80 86 82   Patient Position - Orthostatic VS: Sitting Lying Lying Lying       Visual Acuity      ED Medications  Medications   sodium chloride 0 9 % bolus 1,000 mL (0 mL Intravenous Stopped 11/6/18 1322)   ondansetron (ZOFRAN) injection 4 mg (4 mg Intravenous Given 11/6/18 1016)   ketorolac (TORADOL) injection 15 mg (15 mg Intravenous Given 11/6/18 1016)   sodium chloride 0 9 % bolus 1,000 mL (0 mL Intravenous Stopped 11/6/18 1323)   iohexol (OMNIPAQUE) 350 MG/ML injection (MULTI-DOSE) 100 mL (100 mL Intravenous Given 11/6/18 1207)   iohexol (OMNIPAQUE) 240 MG/ML solution 50 mL (50 mL Oral Given 11/6/18 1207)   enoxaparin (LOVENOX) subcutaneous injection 120 mg (120 mg Subcutaneous Given 11/6/18 1326)       Diagnostic Studies  Results Reviewed     Procedure Component Value Units Date/Time    Lactic acid, plasma [[id number]]  (Normal) Collected:  11/06/18 1216    Lab Status:  Final result Specimen:  Blood from Arm, Left Updated:  11/06/18 1239     LACTIC ACID 1 4 mmol/L     Narrative:         Result may be elevated if tourniquet was used during collection  Urine Microscopic [[id number]]  (Abnormal) Collected:  11/06/18 1128    Lab Status:  Final result Specimen:  Urine from Urine, Clean Catch Updated:  11/06/18 1102     RBC, UA 0-1 (A) /hpf      WBC, UA 0-1 (A) /hpf      Epithelial Cells Occasional /hpf      Bacteria, UA Occasional /hpf     Lactic acid, plasma [[id number]]  (Abnormal) Collected:  11/06/18 1009    Lab Status:  Final result Specimen:  Blood from Arm, Left Updated:  11/06/18 1039     LACTIC ACID 2 4 (HH) mmol/L     Narrative:         Result may be elevated if tourniquet was used during collection  Comprehensive metabolic panel [[id number]]  (Abnormal) Collected:  11/06/18 1009    Lab Status:  Final result Specimen:  Blood from Arm, Left Updated:  11/06/18 1037     Sodium 135 (L) mmol/L      Potassium 3 8 mmol/L      Chloride 99 (L) mmol/L      CO2 27 mmol/L      ANION GAP 9 mmol/L      BUN 20 mg/dL      Creatinine 0 84 mg/dL      Glucose 419 (H) mg/dL      Calcium 9 8 mg/dL      AST 17 U/L      ALT 54 U/L      Alkaline Phosphatase 77 U/L      Total Protein 8 0 g/dL      Albumin 3 7 g/dL      Total Bilirubin 0 22 mg/dL      eGFR 75 ml/min/1 73sq m     Narrative:         National Kidney Disease Education Program recommendations are as follows:  GFR calculation is accurate only with a steady state creatinine  Chronic Kidney disease less than 60 ml/min/1 73 sq  meters  Kidney failure less than 15 ml/min/1 73 sq  meters      Lipase [[id number]]  (Normal) Collected:  11/06/18 1009    Lab Status:  Final result Specimen:  Blood from Arm, Left Updated:  11/06/18 1037     Lipase 97 u/L     Troponin I [[id number]]  (Normal) Collected:  11/06/18 1009    Lab Status:  Final result Specimen:  Blood from Arm, Left Updated:  11/06/18 1037     Troponin I <0 02 ng/mL     POCT urinalysis dipstick [[id number]]  (Abnormal) Resulted:  11/06/18 1018    Lab Status:  Final result Specimen:  Urine Updated:  11/06/18 1018    CBC and differential [[id number]]  (Abnormal) Collected:  11/06/18 1009    Lab Status:  Final result Specimen:  Blood from Arm, Left Updated:  11/06/18 1016     WBC 5 35 Thousand/uL      RBC 4 09 Million/uL      Hemoglobin 11 7 g/dL      Hematocrit 36 2 %      MCV 89 fL      MCH 28 6 pg      MCHC 32 3 g/dL      RDW 14 3 %      MPV 11 5 fL      Platelets 812 (H) Thousands/uL      nRBC 0 /100 WBCs      Neutrophils Relative 50 %      Immat GRANS % 2 %      Lymphocytes Relative 39 %      Monocytes Relative 5 %      Eosinophils Relative 2 %      Basophils Relative 2 (H) %      Neutrophils Absolute 2 66 Thousands/µL      Immature Grans Absolute 0 12 Thousand/uL      Lymphocytes Absolute 2 07 Thousands/µL      Monocytes Absolute 0 29 Thousand/µL      Eosinophils Absolute 0 11 Thousand/µL      Basophils Absolute 0 10 Thousands/µL     ED Urine Macroscopic [[id number]]  (Abnormal) Collected:  11/06/18 1128    Lab Status:  Final result Specimen:  Urine Updated:  11/06/18 1015     Color, UA Yellow     Clarity, UA Clear     pH, UA 5 0     Leukocytes, UA Elevated glucose may cause decreased leukocyte values  See urine microscopic for [facility] result/ (A)     Nitrite, UA Negative     Protein, UA Negative mg/dl      Glucose, UA >=1000 (1%) (A) mg/dl      Ketones, UA Negative mg/dl      Urobilinogen, UA 0 2 E U /dl      Bilirubin, UA Negative     Blood, UA Negative     Specific Gravity, UA 1 015    Narrative:       [facility] RESULT                 CT pe study w abdomen pelvis w contrast   Final Result by Mercedes Gipson MD (11/06 1240)      1  Scattered bilateral segmental and subsegmental pulmonary emboli  No CT evidence for right heart strain  2   No other acute thoracic or abdominopelvic findings  3   Posttreatment changes at the right breast and axilla  No evidence for metastatic disease        I personally discussed this study with Yash Jauregui on 11/6/2018 at 12:35 PM       Workstation performed: [id number]                    Procedures  ECG 12 Lead Documentation  Date/Time: 11/6/2018 9:55 AM  Performed by: Ghanshyam Ackerman by: Brittany Bui     Indications / Diagnosis:  Abd pain  ECG reviewed by me, the ED Provider: yes    Patient location:  Bedside  Previous ECG:     Previous ECG:  Compared to current    Comparison ECG info:  8/4/2010  Rate:     ECG rate:  102    ECG rate assessment: tachycardic    Rhythm:     Rhythm: sinus tachycardia    Ectopy:     Ectopy: none    QRS:     QRS axis:  Normal    QRS intervals:  Normal  ST segments:     ST segments:  Normal  T waves:     T waves: normal      CriticalCare Time  Performed by: Ghanshyam Ackerman by: Brittany Bui     Critical care provider statement:     Critical care time (minutes):  45    Critical care time was exclusive of:  Separately billable procedures and treating other patients and teaching time    Critical care was necessary to treat or prevent imminent or life-threatening deterioration of the following conditions:  Respiratory failure    Critical care was time spent personally by me on the following activities:  Blood draw for specimens, obtaining history from patient or surrogate, development of treatment plan with patient or surrogate, discussions with consultants, evaluation of patient's response to treatment, examination of patient, ordering and performing treatments and interventions, ordering and review of laboratory studies and ordering and review of radiographic studies    I assumed direction of critical care for this patient from another provider in my specialty: no    Comments:      Pt with multiple PEs requiring anticoagulation           Phone Contacts  ED Phone Contact    ED Course  ED Course as of Nov 06 1352   Tue Nov 06, 2018   1040 LACTIC ACID: (!!) 2 4   1246 Tiger Text sent to KIERA                                [facility]  Number of Diagnoses or Management Options     Amount and/or Complexity of Data Reviewed  Clinical lab tests: reviewed and ordered  Tests in the radiology section of CPT®: ordered and reviewed  Tests in the medicine section of CPT®: ordered and reviewed      CritCare Time    Disposition  Final diagnoses:   Multiple pulmonary emboli (HCC)   Abdominal pain     Time reflects when diagnosis was documented in both MDM as applicable and the Disposition within this note     Time User Action Codes Description Comment    11/6/2018 12:46 PM Candelaria Bourgeois [I26 [id number]] Multiple pulmonary emboli ([address] )     11/6/2018 12:46 PM Cecy Bourgeois [R10 9] Abdominal pain       ED Disposition     ED Disposition Condition Comment    Admit  Case was discussed with Dr Nikko Ventura and the patient's admission status was agreed to be Admission Status: inpatient status to the service of Dr Nikko Ventura  Follow-up Information    None         Patient's Medications   Discharge Prescriptions    No medications on file     No discharge procedures on file      ED Provider  Electronically Signed by           Augustin Abel 25, DO  11/06/18 8725

## 2018-11-06 NOTE — PROGRESS NOTES
Timeout done on 11/6/2018 with Ruth Ann Skinner RN  Pt to hold Day 22 of treatment today (11/6) due to 9/10 abdominal pain and go to ED for evaluation, per Dr Siu

## 2018-11-06 NOTE — PLAN OF CARE
Problem: DISCHARGE PLANNING - CARE MANAGEMENT  Goal: Discharge to post-acute care or home with appropriate resources  INTERVENTIONS:  - Conduct assessment to determine patient/family and health care team treatment goals, and need for post-acute services based on payer coverage, community resources, and patient preferences, and barriers to discharge  - Address psychosocial, clinical, and financial barriers to discharge as identified in assessment in conjunction with the patient/family and health care team  - Arrange appropriate level of post-acute services according to patient's   needs and preference and payer coverage in collaboration with the physician and health care team  - Communicate with and update the patient/family, physician, and health care team regarding progress on the discharge plan  - Arrange appropriate transportation to post-acute venues  - Patient d/c with apropriate resources om=nce reaches medical stability  Outcome: Progressing

## 2018-11-07 LAB
ANION GAP SERPL CALCULATED.3IONS-SCNC: 8 MMOL/L (ref 4–13)
ATRIAL RATE: 102 BPM
BUN SERPL-MCNC: 13 MG/DL (ref 5–25)
CALCIUM SERPL-MCNC: 8.5 MG/DL (ref 8.3–10.1)
CHLORIDE SERPL-SCNC: 106 MMOL/L (ref 100–108)
CO2 SERPL-SCNC: 25 MMOL/L (ref 21–32)
CREAT SERPL-MCNC: 0.56 MG/DL (ref 0.6–1.3)
ERYTHROCYTE [DISTWIDTH] IN BLOOD BY AUTOMATED COUNT: 14.6 % (ref 11.6–15.1)
GFR SERPL CREATININE-BSD FRML MDRD: 100 ML/MIN/1.73SQ M
GLUCOSE SERPL-MCNC: 115 MG/DL (ref 65–140)
GLUCOSE SERPL-MCNC: 125 MG/DL (ref 65–140)
GLUCOSE SERPL-MCNC: 134 MG/DL (ref 65–140)
GLUCOSE SERPL-MCNC: 225 MG/DL (ref 65–140)
GLUCOSE SERPL-MCNC: 95 MG/DL (ref 65–140)
HCT VFR BLD AUTO: 28.6 % (ref 34.8–46.1)
HGB BLD-MCNC: 9.3 G/DL (ref 11.5–15.4)
INR PPP: 1.03 (ref 0.86–1.17)
MCH RBC QN AUTO: 29.1 PG (ref 26.8–34.3)
MCHC RBC AUTO-ENTMCNC: 32.5 G/DL (ref 31.4–37.4)
MCV RBC AUTO: 89 FL (ref 82–98)
P AXIS: 64 DEGREES
PLATELET # BLD AUTO: 347 THOUSANDS/UL (ref 149–390)
PMV BLD AUTO: 11 FL (ref 8.9–12.7)
POTASSIUM SERPL-SCNC: 3.3 MMOL/L (ref 3.5–5.3)
PR INTERVAL: 142 MS
PROTHROMBIN TIME: 13.6 SECONDS (ref 11.8–14.2)
QRS AXIS: 3 DEGREES
QRSD INTERVAL: 74 MS
QT INTERVAL: 314 MS
QTC INTERVAL: 409 MS
RBC # BLD AUTO: 3.2 MILLION/UL (ref 3.81–5.12)
SODIUM SERPL-SCNC: 139 MMOL/L (ref 136–145)
T WAVE AXIS: 57 DEGREES
VENTRICULAR RATE: 102 BPM
WBC # BLD AUTO: 5.46 THOUSAND/UL (ref 4.31–10.16)

## 2018-11-07 PROCEDURE — 80048 BASIC METABOLIC PNL TOTAL CA: CPT | Performed by: INTERNAL MEDICINE

## 2018-11-07 PROCEDURE — 85027 COMPLETE CBC AUTOMATED: CPT | Performed by: INTERNAL MEDICINE

## 2018-11-07 PROCEDURE — 85610 PROTHROMBIN TIME: CPT | Performed by: INTERNAL MEDICINE

## 2018-11-07 PROCEDURE — 93010 ELECTROCARDIOGRAM REPORT: CPT | Performed by: INTERNAL MEDICINE

## 2018-11-07 PROCEDURE — 82948 REAGENT STRIP/BLOOD GLUCOSE: CPT

## 2018-11-07 PROCEDURE — 99232 SBSQ HOSP IP/OBS MODERATE 35: CPT | Performed by: INTERNAL MEDICINE

## 2018-11-07 PROCEDURE — 93970 EXTREMITY STUDY: CPT | Performed by: SURGERY

## 2018-11-07 RX ORDER — POLYETHYLENE GLYCOL 3350 17 G/17G
17 POWDER, FOR SOLUTION ORAL DAILY
Status: DISCONTINUED | OUTPATIENT
Start: 2018-11-07 | End: 2018-11-08 | Stop reason: HOSPADM

## 2018-11-07 RX ORDER — POTASSIUM CHLORIDE 20 MEQ/1
40 TABLET, EXTENDED RELEASE ORAL ONCE
Status: COMPLETED | OUTPATIENT
Start: 2018-11-07 | End: 2018-11-07

## 2018-11-07 RX ADMIN — PANTOPRAZOLE SODIUM 40 MG: 40 TABLET, DELAYED RELEASE ORAL at 17:45

## 2018-11-07 RX ADMIN — ENOXAPARIN SODIUM 80 MG: 80 INJECTION SUBCUTANEOUS at 09:40

## 2018-11-07 RX ADMIN — PANTOPRAZOLE SODIUM 40 MG: 40 TABLET, DELAYED RELEASE ORAL at 05:54

## 2018-11-07 RX ADMIN — ATORVASTATIN CALCIUM: 10 TABLET, FILM COATED ORAL at 09:42

## 2018-11-07 RX ADMIN — DOCUSATE SODIUM 100 MG: 100 CAPSULE, LIQUID FILLED ORAL at 17:45

## 2018-11-07 RX ADMIN — INSULIN LISPRO 2 UNITS: 100 INJECTION, SOLUTION INTRAVENOUS; SUBCUTANEOUS at 22:28

## 2018-11-07 RX ADMIN — INSULIN GLARGINE 5 UNITS: 100 INJECTION, SOLUTION SUBCUTANEOUS at 22:29

## 2018-11-07 RX ADMIN — ENOXAPARIN SODIUM 80 MG: 80 INJECTION SUBCUTANEOUS at 22:28

## 2018-11-07 RX ADMIN — POTASSIUM CHLORIDE 40 MEQ: 1500 TABLET, EXTENDED RELEASE ORAL at 09:39

## 2018-11-07 RX ADMIN — POLYETHYLENE GLYCOL (3350) 17 G: 17 POWDER, FOR SOLUTION ORAL at 09:41

## 2018-11-07 RX ADMIN — LISINOPRIL 20 MG: 20 TABLET ORAL at 09:39

## 2018-11-07 RX ADMIN — DOCUSATE SODIUM 100 MG: 100 CAPSULE, LIQUID FILLED ORAL at 09:37

## 2018-11-07 RX ADMIN — LEVOTHYROXINE SODIUM 50 MCG: 50 TABLET ORAL at 05:54

## 2018-11-07 NOTE — UTILIZATION REVIEW
Initial Clinical Review    Admission: Date/Time/Statement: 11/6/18 @ 1253     Orders Placed This Encounter   Procedures    Inpatient Admission (expected length of stay for this patient is greater than two midnights)     Standing Status:   Standing     Number of Occurrences:   1     Order Specific Question:   Admitting Physician     Answer:   Patrick Pederson     Order Specific Question:   Level of Care     Answer:   Med Surg [16]     Order Specific Question:   Estimated length of stay     Answer:   More than 2 Midnights     Order Specific Question:   Certification     Answer:   I certify that inpatient services are medically necessary for this patient for a duration of greater than two midnights  See H&P and MD Progress Notes for additional information about the patient's course of treatment  ED: Date/Time/Mode of Arrival:   ED Arrival Information     Expected Arrival Acuity Means of Arrival Escorted By Service Admission Type    - 11/6/2018 09:37 Urgent Walk-In Family Member General Medicine Urgent    Arrival Complaint    abd pain          Chief Complaint:   Chief Complaint   Patient presents with    Abdominal Pain     Pt has been complaining of abdominal pain x5 days  Pt denies any naisea/vomiting but is reporting having trouble moving her bowls  Pt is also complaining of dyspnea upon exertion  Pt is currently recieving chemo for breast cancer       History of Illness: Fahad Camarena is a 59 y o  female who presents with mainly abdominal complaints  She states she feels very bloated and has been constipated  She did have a bm yesterday  She states its not really pain but more just bloating  She also was a little nauseated  In Addition to this she has also been experiencing sob  She was found to have bilateral PEs  She was started on lovenox in the ed  Pt states she still feels very bloated but now it is due to drinking the contrast for the ct abd                 She does have a history of bca and is currently on chemo  She follows with Dr Darrell Uriostegui  No f/c no cp no v/d      ED Vital Signs:   ED Triage Vitals   Temperature Pulse Respirations Blood Pressure SpO2   11/06/18 0945 11/06/18 0945 11/06/18 0945 11/06/18 0945 11/06/18 0945   98 3 °F (36 8 °C) (!) 114 16 166/73 96 %      Temp Source Heart Rate Source Patient Position - Orthostatic VS BP Location FiO2 (%)   11/06/18 0945 11/06/18 0945 11/06/18 0945 11/06/18 0945 --   Oral Monitor Sitting Left arm       Pain Score       11/06/18 1016       9        Wt Readings from Last 1 Encounters:   11/06/18 79 8 kg (176 lb)       Vital Signs (abnormal):    above    Abnormal Labs/Diagnostic Test Results:   Lactic  Acid   2 4  Na  135  Chloride   99  BS  419  CT  abd/pelvis:    Scattered bilateral segmental and subsegmental pulmonary emboli   No CT evidence for right heart strain  2   No other acute thoracic or abdominopelvic findings  3   Posttreatment changes at the right breast and axilla   No evidence for metastatic disease      ED Treatment:   Medication Administration from 11/06/2018 0937 to 11/06/2018 1708       Date/Time Order Dose Route Action Action by Comments     11/06/2018 1322 sodium chloride 0 9 % bolus 1,000 mL 0 mL Intravenous Stopped Holly Brooks RN      11/06/2018 1018 sodium chloride 0 9 % bolus 1,000 mL 1,000 mL Intravenous New Bag Holly Brooks RN      11/06/2018 1016 ondansetron (ZOFRAN) injection 4 mg 4 mg Intravenous Given Holly Brooks RN      11/06/2018 1016 ketorolac (TORADOL) injection 15 mg 15 mg Intravenous Given Holly Brooks RN      11/06/2018 1323 sodium chloride 0 9 % bolus 1,000 mL 0 mL Intravenous Stopped Holly Brooks RN      11/06/2018 1054 sodium chloride 0 9 % bolus 1,000 mL 1,000 mL Intravenous New Bag Holly Brooks RN      11/06/2018 1207 iohexol (OMNIPAQUE) 350 MG/ML injection (MULTI-DOSE) 100 mL 100 mL Intravenous Given Shabnam Funk      11/06/2018 1207 iohexol (OMNIPAQUE) 240 MG/ML solution 50 mL 50 mL Oral Given Brian Андрей      11/06/2018 1326 enoxaparin (LOVENOX) subcutaneous injection 120 mg 120 mg Subcutaneous Given Ottoniel Christianson RN      11/06/2018 1430 sodium chloride 0 9 % infusion 75 mL/hr Intravenous New Bag Ottoniel Christianson RN           Past Medical/Surgical History: Active Ambulatory Problems     Diagnosis Date Noted    Type 2 diabetes mellitus with complication, with long-term current use of insulin ([facility] ) 03/27/2018    Asthma 09/22/2009    Essential hypertension 09/22/2009    Other specified hypothyroidism 03/27/2018    Chest pain 03/27/2018    Palpitations 05/09/2018    Preop examination 05/09/2018    Chronic bilateral low back pain without sciatica 05/22/2018    Ductal carcinoma in situ (DCIS) of right breast 06/11/2018    Neck pain 06/13/2018    Constipation 06/13/2018    Primary insomnia 06/13/2018    Malignant neoplasm of right female breast ([facility] ) 06/26/2018    Malignant neoplasm of upper-outer quadrant of right breast in female, estrogen receptor positive ([facility] ) 07/19/2018    Epigastric pain 08/08/2018    Hiatal hernia 08/08/2018    Screening for colon cancer 08/08/2018    Esophageal dysphagia 08/08/2018    Cellulitis of right axilla 08/22/2018    Chronic pain of right knee 09/04/2018    Encounter for diabetic foot exam ([facility] ) 09/04/2018    Hypercholesterolemia 09/21/2018    Hypothyroid     Hypertension      Resolved Ambulatory Problems     Diagnosis Date Noted    No Resolved Ambulatory Problems     Past Medical History:   Diagnosis Date    Abdominal infection ([facility] )     Asthma     Breast cancer ([facility] )     Cancer ([facility] )     Diabetes mellitus ([facility] )     Hiatal hernia     Hypercholesterolemia     Hypertension     Hypothyroid     Renal disorder     Rheumatoid arthritis ([facility] )        Admitting Diagnosis: Abdominal pain [R10 9]  Multiple pulmonary emboli ([facility] ) [I26 99]    Age/Sex: 59 y o  female    Assessment/Plan:   Bilateral scattered segmental and subsegmental pulmonary embolism- likely related to hypercoagulable state due to cancer  Unable to get hypercoag panel as pt already received lovenox  Pt is luckily hemodynamically stable with out evidence of heart strain on ct  Will continue lovenox  Will likely use xarelto or eliquis depending on which is covered by insurance  Will continue lovenox for until know the price of medications  Will also check lower ext dopplers       2  Abdominal fullness/constipation- will start bowel regimen     3  Lactic acidosis- resolved  Likely due to clinical dehydration     4  Hyponatremia- likely hypovolemic hyponatremia  Will continue low rate of Iv fluids and check sodium in am     5  Type 2 diabetes- will write for insulin sliding scale  Hold glucotrol and metformin  Continue tuojeo of 5 units  Adjust as needed     6  htn- will continue home medications     7  Hypothyroid- continue synthroid     8  H/o bca- pt is following with Dr Barbara Sharif for chemo  Will follow up outpt  Anticipated Length of Stay:  Patient will be admitted on an Inpatient basis with an anticipated length of stay of  Greater than 2 midnights        Admission Orders:   IP   11/6  @   1253  Scheduled Meds:   Current Facility-Administered Medications:  albuterol 1 puff Inhalation Q4H PRN Mckenzie Ambron, DO   albuterol 2 5 mg Nebulization Q4H PRN Mckenzie Ambron, DO   amLODIPine-atorvastatin (CADUET 10/10) combo dose  Oral Daily Mckenzie Ambron, DO   docusate sodium 100 mg Oral BID Mckenzie Ambron, DO   enoxaparin 1 mg/kg Subcutaneous Q12H AARON Mckenzie Ambron, DO   HYDROcodone-acetaminophen 1 tablet Oral Q4H PRN Mckenzie Ambron, DO   insulin glargine 5 Units Subcutaneous QPM Mckenzie Ambron, DO   insulin lispro 1-5 Units Subcutaneous TID AC Mckenzie Ambron, DO   insulin lispro 1-5 Units Subcutaneous HS Mckenzie Ambron, DO   levothyroxine 50 mcg Oral Daily Mckenzie Ambron, DO   lisinopril 20 mg Oral Daily Mckenzie Wong, DO   ondansetron 4 mg Intravenous Q6H PRN Mckenzie Ambron, DO   pantoprazole 40 mg Oral BID AC Mckenzie Ambron, DO   polyethylene glycol 17 g Oral Daily Mckenzie Wong, DO   traZODone 50 mg Oral HS Mckenzie Wong DO     Facility-Administered Medications Ordered in Other Encounters:  dexamethasone (DECADRON) IVPB (ONC use only) 10 mg Intravenous Once Toshia Todd MD   diphenhydrAMINE (BENADRYL) IVPB (ONC use only) 25 mg Intravenous Once Toshia Todd MD   famotidine 20 mg Intravenous Once Toshia Todd MD   PACLItaxel (TAXOL) chemo IVPB 153 mg Intravenous Once Toshia Todd MD   sodium chloride 20 mL/hr Intravenous Continuous Toshia Todd MD   trastuzumab (HERCEPTIN) chemo infusion 166 mg Intravenous Once Toshia Todd MD     Continuous Infusions:    PRN Meds:   albuterol    albuterol    HYDROcodone-acetaminophen    ondansetron     VAS  B/L  LE    PROGRESS  NOTE   11/7  Bilateral scattered segmental and subsegmental pulmonary embolism- likely related to hypercoagulable state due to cancer  Unable to get hypercoag panel as pt already received lovenox  Pt will need ac life long  Pt is luckily hemodynamically stable with out evidence of heart strain on ct  Will continue lovenox  Lower ext dopplers positive for acute dvt bilateral lower ext in the paired peroneal veins  Question if can transition to xarelto or eliquis  Pt may have to use lovenox for the next 3 to 6 months prior to transition to xarelto or eliquis       2  Constipation- continue bowel regimen  Pt to use miralax today  Will change it to daily     3  Lactic acidosis- resolved  Likely due to clinical dehydration     4  Hyponatremia due to hypovolemic hyponatremia- resolved with IV fluids  Will d/c further iv fluids     5  Type 2 diabetes- continue insulin sliding scale  Hold glucotrol and metformin  Continue tuojeo of 5 units  Adjust as needed     6  htn- stable continue current treatment     7   Hypothyroid- continue synthroid     8  H/o bca- pt is following with Dr Shamika Melgoza for chemo  Will follow up outpt

## 2018-11-07 NOTE — PROGRESS NOTES
[address] Internal Medicine Progress Note  Patient: Jordin Claros 59 y o  female   MRN: [id number]  PCP: Charan Lemons MD  Unit/Bed#: E2 -01 Encounter: [id number]  Date Of Visit: 11/07/18     Addendum: spoke with her oncologist, Dr Jeri Arevalo  Okay to use eliquis as she does not have met bca and thrombosis could be due to treatment  Spoke with case management to evaluate cost for pt    Addendum: eliquis is not covered  Will see if xarelto is covered  If it isn't lovenox/coumadin will be last choice      Assessment/plan  1  Bilateral scattered segmental and subsegmental pulmonary embolism- likely related to hypercoagulable state due to cancer  Unable to get hypercoag panel as pt already received lovenox  Pt will need ac life long  Pt is luckily hemodynamically stable with out evidence of heart strain on ct  Will continue lovenox  Lower ext dopplers positive for acute dvt bilateral lower ext in the paired peroneal veins  Question if can transition to xarelto or eliquis  Pt may have to use lovenox for the next 3 to 6 months prior to transition to xarelto or eliquis       2  Constipation- continue bowel regimen  Pt to use miralax today  Will change it to daily     3  Lactic acidosis- resolved  Likely due to clinical dehydration     4  Hyponatremia due to hypovolemic hyponatremia- resolved with IV fluids  Will d/c further iv fluids     5  Type 2 diabetes- continue insulin sliding scale  Hold glucotrol and metformin  Continue tuojeo of 5 units  Adjust as needed     6  htn- stable continue current treatment     7  Hypothyroid- continue synthroid     8  H/o bca- pt is following with Dr Jeri Arevalo for chemo  Will follow up outpt  9  Anemia- likely dilutional  Will monitor    10  Hypokalemia- will replace    Subjective:   Pt seen and examined  Used interpretation line number [id number]  Pt still constipated today  She still feels bloated  No f/c no cp no worsening sob  She is currently on room air   No n/v/d no true abdominal pain  Objective:     Vitals: Blood pressure 138/66, pulse 80, temperature (!) 97 2 °F (36 2 °C), temperature source Tympanic, resp  rate 16, height 5' 5" (1 651 m), weight 79 8 kg (176 lb), SpO2 97 %, not currently breastfeeding  ,Body mass index is 29 29 kg/m²  Lab, Imaging and other studies:    Results from last 7 days  Lab Units 11/07/18  0437   WBC Thousand/uL 5 46   HEMOGLOBIN g/dL 9 3*   HEMATOCRIT % 28 6*   PLATELETS Thousands/uL 347       Results from last 7 days  Lab Units 11/07/18  0437 11/06/18  1009   POTASSIUM mmol/L 3 3* 3 8   CHLORIDE mmol/L 106 99*   CO2 mmol/L 25 27   BUN mg/dL 13 20   CREATININE mg/dL 0 56* 0 84   CALCIUM mg/dL 8 5 9 8   ALK PHOS U/L  --  77   ALT U/L  --  54   AST U/L  --  17       Results from last 7 days  Lab Units 11/06/18  1009   TROPONIN I ng/mL <0 02     No results found for: Rosi Gabriel, SPUTUMCULTUR      Ct Pe Study W Abdomen Pelvis W Contrast    Result Date: 11/6/2018  Narrative: CT PULMONARY ANGIOGRAM OF THE CHEST AND CT ABDOMEN AND PELVIS WITH INTRAVENOUS CONTRAST INDICATION:   Dyspnea on exertion  Abdominal pain  History of breast cancer  COMPARISON:  None  TECHNIQUE:  CT examination of the chest, abdomen and pelvis was performed  Thin section CT angiographic technique was used in the chest in order to evaluate for pulmonary embolus and coronal 3D MIP postprocessing was performed on the acquisition scanner  Axial, sagittal, and coronal 2D reformatted images were created from the source data and submitted for interpretation  Radiation dose length product (DLP) for this visit:  1202 mGy-cm   This examination, like all CT scans performed in the [facility], was performed utilizing techniques to minimize radiation dose exposure, including the use of iterative reconstruction and automated exposure control   IV Contrast:  50 mL of iohexol (OMNIPAQUE) 100 mL of iohexol (OMNIPAQUE) Enteric Contrast:  Enteric contrast was not administered  FINDINGS: CHEST PULMONARY ARTERIAL TREE:  Image numbers reported below are taken from series 2  Multiple segmental filling defects in the right upper lobe (image 122)  Segmental pulmonary arterial filling defect in the superior right lower lobe (image 144)  Subsegmental filling defects in the lateral and posterior basal right lower lobe (image 188)  Segmental filling defect in the posterior lingula (image 118)  Subsegmental filling defect within the posterior basal left lower lobe (image 165)  LUNGS: No acute consolidation  No interstitial thickening  No endobronchial lesions  No suspicious pulmonary nodules or masses  PLEURA:  Unremarkable  HEART/AORTA: Normal heart size  No disproportionate enlargement of the right heart  No abnormal bowing of the interventricular septum  Normal RV to LV ratio  No pericardial thickening or effusion  Thoracic aorta is normal for age  MEDIASTINUM AND BEATRIZ:  Unremarkable  CHEST WALL AND LOWER NECK:   Postsurgical/posttreatment changes at the right breast and axilla  No pathologically enlarged axillary lymphadenopathy  ABDOMEN LIVER/BILIARY TREE:  Mild hepatomegaly with diffuse hepatic steatosis  Contour remains smooth  No focal hepatic masses or biliary ductal dilatation  GALLBLADDER:  No calcified gallstones  No pericholecystic inflammatory change  SPLEEN:  Unremarkable  PANCREAS:  Focal, disproportionate fatty involution at the pancreatic neck  No pancreatic masses  No ductal dilatation  ADRENAL GLANDS:  Unremarkable  KIDNEYS/URETERS:  Unremarkable  No hydronephrosis  STOMACH AND BOWEL:  There is mild distal colonic diverticulosis without evidence of acute diverticulitis  APPENDIX:  A normal appendix was visualized  ABDOMINOPELVIC CAVITY:  No ascites or free intraperitoneal air  No lymphadenopathy  VESSELS:  Unremarkable for patient's age  PELVIS REPRODUCTIVE ORGANS:  Enlarged uterus  Posterior subserosal fibroid measuring 2 5 cm at the body    No suspicious adnexal masses  URINARY BLADDER: Normal when accounting for degree of nondistention  ABDOMINAL WALL/INGUINAL REGIONS:  Unremarkable  OSSEOUS STRUCTURES:  No acute fracture or destructive osseous lesion  Impression: 1  Scattered bilateral segmental and subsegmental pulmonary emboli  No CT evidence for right heart strain  2   No other acute thoracic or abdominopelvic findings  3   Posttreatment changes at the right breast and axilla  No evidence for metastatic disease  I personally discussed this study with [id number] Ler[address]ura Shania on 11/6/2018 at 12:35 PM  Workstation performed: [id number]     Vas Lower Limb Venous Duplex Study, Complete Bilateral    Result Date: 11/6/2018  Narrative:  [facility] REPORT CLINICAL: Indications: Patient presents with recent discovery of pulmonary embolism and physician wants to determine potential source  Patient reports  bilateral lower extremity pain (right>left)  She is currently on chemo  Risk Factors The patient has history of malignancy, HTN, Diabetes (NIDDM) and HLD  FINDINGS:  Segment   Right                                        Left                Impression              Valve   Reflux Time  Impression              FV Prox                           Reflux      [id number]                          Peroneal  Occlusive Subsegmental                       Occlusive Subsegmental     CONCLUSION: RIGHT LOWER LIMB: Evidence of acute occlusive deep vein thrombosis in the paired peroneal veins from the distal to proximal calf  No evidence of superficial thrombophlebitis noted  Popliteal, posterior tibial and anterior tibial arterial Doppler waveforms are triphasic/biphasic  Incidental reflux is noted in the proximal femoral vein  LEFT LOWER LIMB: Evidence of acute occlusive deep vein thrombosis in the paired peroneal veins from the distal to mid calf  No evidence of superficial thrombophlebitis noted   Popliteal, posterior tibial and anterior tibial arterial Doppler waveforms are triphasic/biphasic    Technical findings were given to Dr Soniya Salvador at the time of the exam       Scheduled Meds:   Current Facility-Administered Medications:  albuterol 1 puff Inhalation Q4H PRN Mckenzie Ambron, DO    albuterol 2 5 mg Nebulization Q4H PRN Mckenzie Ambron, DO    amLODIPine-atorvastatin (CADUET 10/10) combo dose  Oral Daily Mckenzie Ambron, DO    docusate sodium 100 mg Oral BID Mckenzie Ambron, DO    enoxaparin 1 mg/kg Subcutaneous Q12H [address] Mckenzie Ambron, DO    HYDROcodone-acetaminophen 1 tablet Oral Q4H PRN Mckenzie Ambron, DO    insulin glargine 5 Units Subcutaneous QPM Mckenzie Ambron, DO    insulin lispro 1-5 Units Subcutaneous TID AC Mckenzie Ambron, DO    insulin lispro 1-5 Units Subcutaneous HS Mckenzie Ambron, DO    levothyroxine 50 mcg Oral Daily Mckenzie Ambron, DO    lisinopril 20 mg Oral Daily Mckenzie Ambron, DO    ondansetron 4 mg Intravenous Q6H PRN Mckenzie Ambron, DO    pantoprazole 40 mg Oral BID AC Mckenzie Ambron, DO    polyethylene glycol 17 g Oral Daily PRN Mckenzie Ambron, DO    sodium chloride 75 mL/hr Intravenous Continuous Mckenzie Ambron, DO Last Rate: 75 mL/hr (11/06/18 1430)   traZODone 50 mg Oral HS Mckenzie Ambron, DO      Facility-Administered Medications Ordered in Other Encounters:  dexamethasone (DECADRON) IVPB (ONC use only) 10 mg Intravenous Once Talha Canales MD   diphenhydrAMINE (BENADRYL) IVPB (ONC use only) 25 mg Intravenous Once Talha Canales MD   famotidine 20 mg Intravenous Once Talha Canales MD   PACLItaxel (TAXOL) chemo IVPB 153 mg Intravenous Once Talha Canales MD   sodium chloride 20 mL/hr Intravenous Continuous Talha Canales MD   trastuzumab (HERCEPTIN) chemo infusion 166 mg Intravenous Once Talha Canales MD     Continuous Infusions:   sodium chloride 75 mL/hr Last Rate: 75 mL/hr (11/06/18 1430)     PRN Meds:   albuterol    albuterol    HYDROcodone-acetaminophen    ondansetron    polyethylene glycol      Physical exam:  Physical Exam  General appearance: alert and oriented, in no acute distress  Head: Normocephalic, without obvious abnormality, atraumatic  Eyes: conjunctivae/corneas clear  PERRL, EOM's intact  Fundi benign    Neck: no adenopathy, no carotid bruit, no JVD, supple, symmetrical, trachea midline and thyroid not enlarged, symmetric, no tenderness/mass/nodules  Lungs: clear to auscultation bilaterally  Heart: regular rate and rhythm, S1, S2 normal, no murmur, click, rub or gallop  Abdomen: soft, non-tender; bowel sounds normal; no masses,  no organomegaly  Extremities: extremities normal, warm and well-perfused; no cyanosis, clubbing, or edema  Pulses: 2+ and symmetric  Skin: Skin color, texture, turgor normal  No rashes or lesions  Neurologic: Grossly normal      VTE Pharmacologic Prophylaxis: Enoxaparin (Lovenox)  VTE Mechanical Prophylaxis: sequential compression device    Counseling / Coordination of Care  Total floor / unit time spent today 20 minutes      Current Length of Stay: 1 day(s)    Current Patient Status: Inpatient     Code Status: Level 1 - Full Code

## 2018-11-08 ENCOUNTER — TRANSITIONAL CARE MANAGEMENT (OUTPATIENT)
Dept: FAMILY MEDICINE CLINIC | Facility: CLINIC | Age: 63
End: 2018-11-08

## 2018-11-08 VITALS
SYSTOLIC BLOOD PRESSURE: 156 MMHG | RESPIRATION RATE: 16 BRPM | WEIGHT: 176 LBS | BODY MASS INDEX: 29.32 KG/M2 | HEART RATE: 79 BPM | HEIGHT: 65 IN | OXYGEN SATURATION: 98 % | TEMPERATURE: 96.9 F | DIASTOLIC BLOOD PRESSURE: 79 MMHG

## 2018-11-08 PROBLEM — R10.9 ABDOMINAL PAIN: Status: ACTIVE | Noted: 2018-11-08

## 2018-11-08 LAB
ALBUMIN SERPL BCP-MCNC: 2.8 G/DL (ref 3.5–5)
ALP SERPL-CCNC: 64 U/L (ref 46–116)
ALT SERPL W P-5'-P-CCNC: 39 U/L (ref 12–78)
ANION GAP SERPL CALCULATED.3IONS-SCNC: 9 MMOL/L (ref 4–13)
AST SERPL W P-5'-P-CCNC: 17 U/L (ref 5–45)
BILIRUB SERPL-MCNC: 0.15 MG/DL (ref 0.2–1)
BUN SERPL-MCNC: 11 MG/DL (ref 5–25)
CALCIUM SERPL-MCNC: 8.9 MG/DL (ref 8.3–10.1)
CHLORIDE SERPL-SCNC: 104 MMOL/L (ref 100–108)
CO2 SERPL-SCNC: 25 MMOL/L (ref 21–32)
CREAT SERPL-MCNC: 0.6 MG/DL (ref 0.6–1.3)
ERYTHROCYTE [DISTWIDTH] IN BLOOD BY AUTOMATED COUNT: 14.6 % (ref 11.6–15.1)
GFR SERPL CREATININE-BSD FRML MDRD: 98 ML/MIN/1.73SQ M
GLUCOSE SERPL-MCNC: 269 MG/DL (ref 65–140)
GLUCOSE SERPL-MCNC: 285 MG/DL (ref 65–140)
GLUCOSE SERPL-MCNC: 298 MG/DL (ref 65–140)
HCT VFR BLD AUTO: 29.7 % (ref 34.8–46.1)
HGB BLD-MCNC: 9.6 G/DL (ref 11.5–15.4)
MCH RBC QN AUTO: 28.7 PG (ref 26.8–34.3)
MCHC RBC AUTO-ENTMCNC: 32.3 G/DL (ref 31.4–37.4)
MCV RBC AUTO: 89 FL (ref 82–98)
PLATELET # BLD AUTO: 387 THOUSANDS/UL (ref 149–390)
PMV BLD AUTO: 10.9 FL (ref 8.9–12.7)
POTASSIUM SERPL-SCNC: 3.8 MMOL/L (ref 3.5–5.3)
PROT SERPL-MCNC: 6.4 G/DL (ref 6.4–8.2)
RBC # BLD AUTO: 3.35 MILLION/UL (ref 3.81–5.12)
SODIUM SERPL-SCNC: 138 MMOL/L (ref 136–145)
WBC # BLD AUTO: 5.41 THOUSAND/UL (ref 4.31–10.16)

## 2018-11-08 PROCEDURE — 85027 COMPLETE CBC AUTOMATED: CPT | Performed by: INTERNAL MEDICINE

## 2018-11-08 PROCEDURE — 99239 HOSP IP/OBS DSCHRG MGMT >30: CPT | Performed by: NURSE PRACTITIONER

## 2018-11-08 PROCEDURE — 82948 REAGENT STRIP/BLOOD GLUCOSE: CPT

## 2018-11-08 PROCEDURE — 80053 COMPREHEN METABOLIC PANEL: CPT | Performed by: INTERNAL MEDICINE

## 2018-11-08 RX ORDER — AMLODIPINE BESYLATE 10 MG/1
10 TABLET ORAL DAILY
Status: DISCONTINUED | OUTPATIENT
Start: 2018-11-09 | End: 2018-11-08 | Stop reason: HOSPADM

## 2018-11-08 RX ORDER — BISMUTH SUBSALICYLATE 262 MG/1
524 TABLET, CHEWABLE ORAL
Qty: 112 TABLET | Refills: 0 | Status: SHIPPED | OUTPATIENT
Start: 2018-11-08 | End: 2019-01-21 | Stop reason: ALTCHOICE

## 2018-11-08 RX ORDER — METRONIDAZOLE 250 MG/1
250 TABLET ORAL 4 TIMES DAILY
Qty: 56 TABLET | Refills: 0 | Status: SHIPPED | OUTPATIENT
Start: 2018-11-08 | End: 2018-11-23

## 2018-11-08 RX ORDER — AMLODIPINE BESYLATE 10 MG/1
10 TABLET ORAL DAILY
Status: DISCONTINUED | OUTPATIENT
Start: 2018-11-08 | End: 2018-11-08

## 2018-11-08 RX ORDER — POLYETHYLENE GLYCOL 3350 17 G/17G
17 POWDER, FOR SOLUTION ORAL DAILY
Qty: 14 EACH | Refills: 0 | Status: SHIPPED | OUTPATIENT
Start: 2018-11-09 | End: 2019-05-14 | Stop reason: ALTCHOICE

## 2018-11-08 RX ORDER — CLARITHROMYCIN 500 MG/1
500 TABLET, COATED ORAL EVERY 12 HOURS SCHEDULED
Status: DISCONTINUED | OUTPATIENT
Start: 2018-11-08 | End: 2018-11-08

## 2018-11-08 RX ORDER — AMOXICILLIN 500 MG/1
1000 CAPSULE ORAL EVERY 12 HOURS SCHEDULED
Status: DISCONTINUED | OUTPATIENT
Start: 2018-11-08 | End: 2018-11-08

## 2018-11-08 RX ORDER — AMOXICILLIN 500 MG/1
1000 TABLET, FILM COATED ORAL 2 TIMES DAILY
Qty: 56 TABLET | Refills: 0 | Status: SHIPPED | OUTPATIENT
Start: 2018-11-08 | End: 2018-11-23

## 2018-11-08 RX ORDER — DOCUSATE SODIUM 100 MG/1
100 CAPSULE, LIQUID FILLED ORAL 2 TIMES DAILY
Qty: 10 CAPSULE | Refills: 0 | Status: SHIPPED | OUTPATIENT
Start: 2018-11-08 | End: 2018-12-26

## 2018-11-08 RX ADMIN — INSULIN LISPRO 2 UNITS: 100 INJECTION, SOLUTION INTRAVENOUS; SUBCUTANEOUS at 12:18

## 2018-11-08 RX ADMIN — CLARITHROMYCIN 500 MG: 500 TABLET ORAL at 12:20

## 2018-11-08 RX ADMIN — LISINOPRIL 20 MG: 20 TABLET ORAL at 08:31

## 2018-11-08 RX ADMIN — AMOXICILLIN 1000 MG: 500 CAPSULE ORAL at 12:20

## 2018-11-08 RX ADMIN — POLYETHYLENE GLYCOL (3350) 17 G: 17 POWDER, FOR SOLUTION ORAL at 08:33

## 2018-11-08 RX ADMIN — ENOXAPARIN SODIUM 80 MG: 80 INJECTION SUBCUTANEOUS at 08:32

## 2018-11-08 RX ADMIN — DOCUSATE SODIUM 100 MG: 100 CAPSULE, LIQUID FILLED ORAL at 08:30

## 2018-11-08 RX ADMIN — ATORVASTATIN CALCIUM: 10 TABLET, FILM COATED ORAL at 08:28

## 2018-11-08 RX ADMIN — INSULIN LISPRO 2 UNITS: 100 INJECTION, SOLUTION INTRAVENOUS; SUBCUTANEOUS at 09:27

## 2018-11-08 RX ADMIN — PANTOPRAZOLE SODIUM 40 MG: 40 TABLET, DELAYED RELEASE ORAL at 05:38

## 2018-11-08 RX ADMIN — LEVOTHYROXINE SODIUM 50 MCG: 50 TABLET ORAL at 05:38

## 2018-11-08 NOTE — NURSING NOTE
Patient discharged via wheelchair accompanied by PCA and family  IV DC and intact  Discharge instructions reviewed with daughter and patient  Daughter was able to translate to patient  Both parties understood instructions  Prescriptions given and reviewed  No further questions at this time  All belongings accounted for and sent with patient

## 2018-11-08 NOTE — ASSESSMENT & PLAN NOTE
· Currently treated with paclitaxel and trastuzumab   · Last dose on 11/6 held due to abdominal pain  · S/p right breast lumpectomy, Stage 1a

## 2018-11-08 NOTE — DISCHARGE INSTR - AVS FIRST PAGE
Complete your treatment for the H  Pylori infection with Omeprazole, Pepto Bismol, Tetracycline, and Metronidazole for 14 days before having your stool rechecked  Follow up with the GI doctor as scheduled  Follow up with your oncologist as scheduled  Drink plenty of water and stay hydrated  You can continue to take Colace for a stool softener and Miralax as needed for constipation  Remain on the Xarelto (blood thinner) as prescribed   15 mg every 12 hours for 21 days, then start 20 mg daily  Do not stop taking this medication unless instructed to do so by a physician

## 2018-11-08 NOTE — DISCHARGE INSTRUCTIONS
Helicobacter pylori   CUIDADO AMBULATORIO:   El Helicobacter pylori (H  pylori)  es felicita clase de bacteria que causa felicita infección en el revestimiento del estómago y parte angie del intestino  Las personas generalmente contraen la infección bacteriana en la [facility] no presentan síntomas hasta la edad HUNDVÅG  Podría propagarse al ganesh agua sucia o consumir alimentos que no están limpios o elaina cocinados  Signos y síntomas de felicita infección por H  pylori:  La mayoría de las personas que contraen la infección por H  pylori nunca tienen síntomas  En solo de presentar síntomas, éstos pueden ir y venir, y durar de unos minutos a horas  Es posible que se sienta mejor por un corto tiempo después de comer un alimento o ganesh pruitt medicamento  Puede presentar cualquiera de los siguientes signos o síntomas:  · Dolor sordo o quemante en pruitt estómago o garganta    · Náuseas, vómito, sensación de llenura o eruptar    · Pérdida del apetito o de peso    · Dolor en la noche o cuando tiene pruitt estómago vacío  Busque atención médica de inmediato si:   · Usted tiene evacuaciones intestinales sanguinolentas, vómito con teresa, o vómito parecido al café molido  · Usted de repente tiene un dolor de estómago melissa que no desaparece o se propaga a pruitt espalda  Pregúntele a pruitt Analy [place] vitaminas y minerales son adecuados para usted  · Tia síntomas no mejoran con el tratamiento  · Usted pierde peso sin proponérselo  · Usted tiene sensación de llenura después de comer solo felicita pequeña cantidad de comida  · Usted tiene preguntas o inquietudes acerca de pruitt condición o cuidado  Medicamentos:  No tome ningún medicamento que contenga aspirina o ibuprofeno  Estos medicamentos pueden provocar sangrado estomacal  Es posible que usted necesite alguno de los siguientes:  · Antibióticos  ayudan a combatir la bacteria  Es posible que necesite ganesh carolin medicamento fabio 10 a 14 días   Pruitt médico le recetará por lo menos 2 antibióticos al mismo tiempo  · Los medicamentos contra las úlceras  ayudan a disminuir la cantidad de ácido que normalmente es producido por el estómago  Estos medicamentos ayudan a aliviar el dolor y curar o prevenir las úlceras  · El bismuto  es un líquido o comprimido que se puede utilizar para disminuir la acidez,el malestar estomacal, o la diarrea  También puede disminuir la inflamación del estómago y ayudar a combatir la infección si otros medicamentos no alba funcionado  [place] también puede proteger las úlceras del ácido estomacal para que puedan sanar  Maneje o prevenga felicita infección por H  pylori:   · Lávese elaina las mano con agua tibia y Mark  Lávese las carlotta antes de comer y después de ir al baño  · Maneje los alimentos adecuadamente  Enjuague los alimentos muy elaina antes de cocinarlos o consumirlos  Cocine elaina los alimentos  El manejo adecuado ayudará a aniquilar cualquier bacteria que pueda estar en los alimentos  · Hanna agua no contaminada de un sitio seguro  Solo hanna agua que ha sido filtrada o purificada  · Pregunte [address] [place]  Cuando no se romeo de la [employer], los medicamentos antiinflamatorios no esteroides pueden causar sangrado estomacal y problemas renales  Pruitt médico le puede solicitar que evite los analgésicos debido a que estos pueden empeorar joe síntomas  · No fume  La nicotina y otros químicos en cigarrillos y cigarros podría empeorar joe síntomas y [place] causar daño a los pulmones  Pida información a pruitt médico si usted actualmente fuma y necesita ayuda para dejar de fumar  Los cigarrillos electrónicos o tabaco sin humo todavía contienen nicotina  Consulte con pruitt médico antes de [employer]  · No consuma alcohol  El alcohol podría empeorar joe síntomas de [place]  Acuda a joe consultas de control con pruitt médico según le indicaron    Es posible que necesite que le sudheer más exámenes después del tratamiento para revisar si pruitt infección williamson desaparecido  Podría necesitar diferentes antibióticos para combatir la infección  Anote joe preguntas para que se acuerde de hacerlas fabio joe visitas  © 2017 [address] Information is for End User's use only and may not be sold, redistributed or otherwise used for commercial purposes  All illustrations and images included in CareNotes® are the copyrighted property of A D A M  Inc  or Zi Aragon  Esta información es sólo para uso en educación  Mccormick intención no es darle un consejo médico sobre enfermedades o tratamientos  Colsulte con mccormick Ladena Creed farmacéutico antes de seguir cualquier régimen médico para saber si es seguro y efectivo para usted  Estreñimiento   LO QUE NECESITA SABER:   El estreñimiento sucede cuando usted tiene evacuaciones intestinales duras y secas o pasa más tiempo del normal entre ellas  INSTRUCCIONES SOBRE EL ALLEGRA HOSPITALARIA:   Busque atención médica de inmediato si:   · Usted tiene teresa en mccormick evacuaciones intestinales  · Usted tiene fiebre y dolor abdominal con el estreñimiento  Pregúntele a mccormick Reyne Rave vitaminas y minerales son adecuados para usted  · El estreñimiento empeora  · Usted comienza a vomitar  · Usted tiene preguntas o inquietudes acerca de mccormick condición o cuidado  Medicamentos:   · Un medicamento o un suplemento de [place]  El jugo de ciruelas podría ayudarle a ablandar las evacuaciones intestinales  Un laxante podría aflojar o ayudar a relajar joe intestinos para que pueda tener felicita evacuación intestinal  También es probable que le den medicamentos para aumentar el líquido en joe intestinos  El líquido puede llegar a movilizar las evacuaciones intestinales a través de joe intestinos  · [place] joe medicamentos nelli se le haya indicado  Consulte con mccormick médico si usted giselle que mccormick medicamento no le está ayudando o si presenta efectos secundarios  Infórmele si es alérgico a algún medicamento  Mantenga felicita lista actualizada de los [place], las vitaminas y los productos herbales que gilmar  Incluya los siguientes datos de los medicamentos: cantidad, frecuencia y motivo de administración  Traiga con usted la lista o los envases de la píldoras a joe citas de seguimiento  Lleve la lista de los medicamentos con usted en solo de felicita emergencia  Controle pruitt estreñimiento:   · [place] líquidos nelli se le haya indicado  Puede que necesite beber más líquidos para ayudar a ablandar las evacuaciones y evacuar el intestino  Pregunte cuánto líquido debe ganesh cada día y cuáles líquidos son los más adecuados para usted  · Coma alimentos altos en fibras  [place] podría ayudar a disminuir el estreñimiento al aumentar el volumen de las evacuaciones intestinales  Alimentos altos en [address] frutas, vegetales, panes y cereales integrales, y frijoles  Pruitt médico o dietista puede ayudarle a crear un plan alimenticio con alto contenido de [place]  · Realice actividad física con regularidad  La actividad física regular puede ayudarle a estimular joe intestinos  Pregunte cuáles son los ejercicios más adecuados para usted  · Programe felicita hora cada día para tener felicita evacuación intestinal   [place] podría ayudar a pruitt cuerpo a acostumbrarse a tener evacuaciones intestinales regulares  Inclínese hacia adelante mientras está sentado en el inodoro para facilitar la expulsión de la evacuación intestinal  Siéntese en el inodoro al menos por 10 minutos, incluso si usted no tiene felicita evacuación intestinal   Acuda a joe consultas de control con pruitt médico según le indicaron  Anote joe preguntas para que se acuerde de hacerlas fabio joe visitas  © 2017 [address] Information is for End User's use only and may not be sold, redistributed or otherwise used for commercial purposes   All illustrations and images included in CareNotes® are the copyrighted property of Celine SORIANO  or [employer]  Esta información es sólo para uso en educación  Pruitt intención no es darle un consejo médico sobre enfermedades o tratamientos  Colsulte con pruitt [place] farmacéutico antes de seguir cualquier régimen médico para saber si es seguro y efectivo para usted  La hipercoagulabilidad   LO QUE NECESITA SABER:   La hipercoagulabilidad es felicita afección que hace que la teresa se coagule más fácilmente de lo normal  La hipercoagulabilidad puede ser Lorelle Lease afección adquirida o heredada  La hipercoagulabilidad adquirida está causada por felicita enfermedad u otra afección  Los ejemplos incluyen obesidad, [place], uso de píldoras anticonceptivas o cáncer  La coagulación hereditaria es causada por genes que se williamson heredado de rosa de joe Gladis  Estos genes causan problemas en la coagulación de la teresa  Es posible que no tenga ningún tipo de signo o síntoma de hipercoagulabilidad hasta tener un coágulo de [place]  INSTRUCCIONES SOBRE EL ALLEGRA HOSPITALARIA:   Llame al 911 o pídale a alguien más que llame en cualquiera de los siguientes casos:   · Usted tiene alguno de los siguientes signos de derrame cerebral:      ¨ Adormecimiento o caída de un lado de pruitt morro     ¨ Debilidad en un brazo o felicita pierna    ¨ Confusión o debilidad para hablar    ¨ Mareos o dolor de hector intenso, o pérdida de la visión  · Usted tiene alguno de los siguientes signos de un ataque cardíaco:      ¨ Estornudos, presión, o dolor en pruitt pecho que dura mas de 5 minutos o regresa  ¨ Malestar o dolor en pruitt espalda, jayda, mandíbula, abdomen, o brazo     ¨ Dificultad para respirar    ¨ Náuseas o vómito    ¨ Siente un desvanecimiento o tiene sudores fríos especialmente en el pecho o dificultad para respirar  Busque atención médica de inmediato si:   · Pruitt brazo o pierna se siente caliente, sensible y [place]  Se podría goyo inflamado y meza  Pregúntele a pruitt Gregg Rob vitaminas y minerales son adecuados para usted     · Usted tiene fiebre o escalofríos  · Usted tiene venas duras o protuberantes en joe brazos o piernas  · Usted tiene cambios en la piel nelli lesiones o manchas rojizas-azuladas  · Usted tiene preguntas o inquietudes acerca de pruitt condición o cuidado  Medicamentos:  Es posible que usted necesite alguno de los siguientes:  · Los anticoagulantes    ayudan a prevenir la formación de coágulos  Unos ejemplos de anticoagulantes incluyen la heparina y Loral Jakeugenia  Los coágulos pueden ocasionar derrames cerebrales, ataques al corazón y [place]  Karyn Darby son pautas generales de seguridad para seguir mientras está tomando un anticoagulante:    ¨ Esté pendiente de sangrados y moretones mientras esté tomando anticoagulantes  Esté atento a cualquier sangrado de las encías o nariz  Vigile que no haya teresa en pruitt orina y movimientos intestinales  Use un paño o toalla suave para pruitt piel y un cepillo de dientes de cerdas suaves para cepillarse joe dientes  [place] puede evitar que pruitt piel o encías sangren  Si usted se afeita, use felicita rasuradora eléctrica  No practique deportes de contacto  ¨ Infórmele a pruitt odontólogo y a los médicos que lo atienden que usted gilmar anticoagulantes  Lleve un brazalete o un collar que indique que usted gilmar [employer]  ¨ No empiece ni suspenda ningún medicamento a menos que pruitt médico se lo indique  Muchos medicamentos no se pueden usar junto con los anticoagulantes  ¨ Infórmele a pruitt médico de inmediato si olvida ganesh el medicamento o gilmar demasiado  ¨ La warfarina  es un anticoagulante que podría  tener que ganesh  Usted debería estar consiente de las siguientes cosas en solo que usted tome Loral Jaksch  § Existen algunos alimentos y [employer] que pueden afectar la cantidad de warfarina en pruitt teresa  No realice cambios mayores a pruitt alimentación mientras gilmar warfarina (warfarin)   La warfarina funciona mejor si usted come aproximadamente la misma cantidad de vitamina K todos los días  La vitamina K se encuentra en las hortalizas de hoja julius y algunos otros alimentos  Solicite más información acerca de lo que tiene que comer cuando usted gilmar warfarina  § Usted necesitará acudir con pruitt médico para programar citas de seguimiento cuando usted esté tomando Dellis Iha  Usted deberá hacerse análisis de teresa periódicamente  Estos exámenes se usan para determinar la cantidad de medicamento que usted necesita  · Los medicamentos antiplaquetarios , nelli la aspirina, [facility] a prevenir coágulos de [place]  Gause joe antiplaquetarios exactamente nelli se le haya indicado  Estos medicamentos podrían causar mas probabilidad para sangrado y para desarrollar moretones  Si le alba indicado el uso de aspirina, no tome acetaminofén o ibuprofeno en pruitt lugar  · Gause joe medicamentos nelli se le haya indicado  Consulte con pruitt médico si usted giselle que pruitt medicamento no le está ayudando o si presenta efectos secundarios  Infórmele si es alérgico a cualquier medicamento  Mantenga felicita lista actualizada de los [place], las vitaminas y los productos herbales que gilmar  Incluya los siguientes datos de los medicamentos: cantidad, frecuencia y motivo de administración  Traiga con usted la lista o los envases de la píldoras a joe citas de seguimiento  Lleve la lista de los medicamentos con usted en solo de felicita emergencia  Prevenga un coágulo:   · Cambiar la posición del cuerpo a menudo fabio viajes y en el [place]  Si viaja en avión, levántese y camine felicita vez por hora  Moverse y estirarse en el asiento varias veces por hora  Ejercite las piernas en el trabajo y [place] viaje para aumentar el flujo de teresa a través de las piernas    ¨ Apunte los dedos del pie hacia la espinilla  Luego apúntelos al suelo  [place] esto 10 veces cada hora  ¨ Acerque las rodillas a pruitt pecho y sosténgalas fabio 15 segundos  Luego enderece la pierna  [place] esto 10 veces cada hora      · Cambie de posición en la cama cada 2 horas  Si usted necesita ayuda para moverse, pregúntele a otros miembros de la zachary para ayudarle a cambiar de posición  · Consuma alimentos saludables y variados  Alimentos saludables pueden ayudarle a controlar pruitt peso y [de-identified] problemas de [place]  Los ejemplos incluyen a las frutas, verduras, panes de grano entero, productos lácteos bajos en grasa, frijoles, emre [place] y pescado  Pregunte si necesita seguir felicita dieta especial     · [place] suficiente líquidos  Los líquidos pueden ayudar a evitar la deshidratación  Pregunte cuánto líquido debe ganesh cada día y cuáles líquidos son los más adecuados para usted  Debe beber líquidos extras antes de viajes en avión o en auto largos  · [place] suficiente ejercicio  Consulte con pruitt médico acerca de cuál es el mejor régimen de ejercicio para usted  El ejercicio puede ayudarle a perder peso, a mantener pruitt corazón saludable y a ayudar a controlar otras enfermedades  · No fume  La nicotina y otros químicos en los cigarrillos y cigarros pueden provocar daño al corazón y a los pulmones  Pida información a pruitt médico si usted actualmente fuma y necesita ayuda para dejar de fumar  Los cigarrillos electrónicos o tabaco sin humo todavía contienen nicotina  Consulte con pruitt médico antes de [employer]  · Use medias de compresión nelli se le indique  Estas son medias elásticas compresivas que aprietan la parte inferior de joe piernas para mejorar la circulación  [place] ayuda a prevenir que se formen coágulos  Pregúntele a pruitt médico dónde comprar estos productos  · Hable con pruitt médico antes de [place]  Puede que necesite ganesh medicamentos para prevenir coágulos de teresa fabio el Amauri Gabriela  Acuda a joe consultas de control con pruitt médico según le indicaron  Anote joe preguntas para que se acuerde de hacerlas fabio joe visitas     © 2017 [address] Information is for End User's use only and may not be sold, redistributed or otherwise used for commercial purposes  All illustrations and images included in CareNotes® are the copyrighted property of A D A M [employer] Inc  or Zi Aragon  Esta información es sólo para uso en educación  Pruitt intención no es darle un consejo médico sobre enfermedades o tratamientos  Colsulte con pruitt [place] farmacéutico antes de seguir cualquier régimen médico para saber si es seguro y efectivo para usted

## 2018-11-08 NOTE — ASSESSMENT & PLAN NOTE
Lab Results   Component Value Date    HGBA1C 11 5 09/04/2018       Recent Labs      11/07/18   1114  11/07/18   1623  11/07/18   2117  11/08/18   0729   POCGLU  125  134  225*  269*       Blood Sugar Average: Last 72 hrs:  (P) 166   · Home regimen with Toujeo / Humulin N / Glipizide / Metformin  · Continue home regimen

## 2018-11-08 NOTE — DISCHARGE SUMMARY
[address] Internal Medicine  Discharge- [address] [facility] 1955, 59 y o  female MRN: [id number]    Unit/Bed#: E2 -01 Encounter: [id number]    Primary Care Provider: Ofelia Syed MD   Date and time admitted to hospital: 11/6/2018  9:40 AM        * Bilateral pulmonary embolism ([address] )   Assessment & Plan    · POA shortness of breath  · Chest CT + bilateral PE / LE venous doppler + bilateral DVT  · Initiated therapy with Lovenox  · Xarelto rx approved by [employer], will continue on this for anti-coagulation     Abdominal pain   Assessment & Plan    · POA abdominal pain / bloating / constipation  · H  Pylori + on 10/29  · Took one dose of treatment (Omeprazole and Tetracycline) prior to arrival  · On bowel regimen here, had small stool x 2  · GI note reflects patient is to be on quadruple therapy (PPI / bismuth salicylate / metronidazole / tetracycline)  Patient reports only on tetracycline and omeprazole  ASA listed as allergy for patient  Patient clarified that she is not allergic    Added bismuth subsalicylate and metronidazole to her regimen and instructed her to complete the 14 days of therapy before repeating stool antigen test   · To follow up with GI as an outpatient  · Continue stool softener and osmotic laxative as needed     Ductal carcinoma in situ (DCIS) of right breast   Assessment & Plan    · Currently treated with paclitaxel and trastuzumab   · Last dose on 11/6 held due to abdominal pain  · S/p right breast lumpectomy, Stage 1a     Other specified hypothyroidism   Assessment & Plan    · Maintained on levothyroxine 50 mcg daily       Essential hypertension   Assessment & Plan    · On enalapril / amlodipine (Caduet) per home regimen       Type 2 diabetes mellitus with complication, with long-term current use of insulin [facility])   Assessment & Plan    Lab Results   Component Value Date    HGBA1C 11 5 09/04/2018       Recent Labs      11/07/18   1114  11/07/18   1623  11/07/18   2117 11/08/18   0729   POCGLU  125  134  225*  269*       Blood Sugar Average: Last 72 hrs:  (P) 166   · Home regimen with Toujeo / Humulin N / Glipizide / Metformin  · Continue home regimen           Discharging Physician / Practitioner: Sylvia Bella  PCP: Princess Ansari MD  Admission Date:   Admission Orders     Ordered        11/06/18 1253  Inpatient Admission (expected length of stay for this patient is greater than two midnights)  Once             Discharge Date: 11/08/18    Resolved Problems  Date Reviewed: 11/8/2018    None          Consultations During Hospital Stay:  · None    Procedures Performed:     · CT PE study w/ abdomen & pelvis w/ contrast:   IMPRESSION:     1   Scattered bilateral segmental and subsegmental pulmonary emboli  No CT evidence for right heart strain      2  No other acute thoracic or abdominopelvic findings      3  Posttreatment changes at the right breast and axilla  No evidence for metastatic disease  · Venous doppler, bilateral lower extremities:  Impression:  RIGHT LOWER LIMB:  Evidence of acute occlusive deep vein thrombosis in the paired peroneal veins  from the distal to proximal calf  No evidence of superficial thrombophlebitis noted  Popliteal, posterior tibial and anterior tibial arterial Doppler waveforms are  triphasic/biphasic  Incidental reflux is noted in the proximal femoral vein  LEFT LOWER LIMB:  Evidence of acute occlusive deep vein thrombosis in the paired peroneal veins  from the distal to mid calf  No evidence of superficial thrombophlebitis noted  Popliteal, posterior tibial and anterior tibial arterial Doppler waveforms are  triphasic/biphasic  Significant Findings / Test Results:     · Bilateral PE, bilateral lower extremity DVT  · + stool antigen for H  Pylori on 10/29/18    Incidental Findings:   · None     Test Results Pending at Discharge (will require follow up):    · None     Outpatient Tests Requested:  · Repeat H  Pylori stool antigen after completion of therapy    Complications:  None    Reason for Admission: Abdominal pain / shortness of breath    Hospital Course:     Stefanie Aguila is a 59 y o  female patient who originally presented to the hospital on 11/6/2018 due to abdominal pain and dyspnea  Patient presented to the ER with a five day history of abdominal pain and constipation  She denied any nausea or vomiting  She also endorsed dyspnea with exertion  Pertinent to her history, she is currently undergoing chemotherapy for Stage 1a right IDC of the right breast   She presented for her scheduled chemo on 11/6 with 9 out of 10 abdominal pain and was sent to the emergency room and her chemo was held  Also pertinent, she had a recent GI evaluation with endoscopy in September and it appears was treated with triple therapy for H  Pylori  She had a repeat stool antigen that was positive, and quadruple therapy was ordered  Per patient, she was only prescribed tetracycline and omeprazole  She took one dose of this therapy prior to her ER arrival   Her evaluation in the ER revealed bilateral pulmonary emboli  Her CT had no findings to explain her abdominal pain  She also had an elevated lactic acid with no other findings to suggest sepsis  She was admitted for further evaluation and management  She was hydrated with IV fluids  Her anticoagulation therapy was initiated with weight based Lovenox therapy  Case Management was consulted for appropriate therapy that would be covered by her insurance  A venous doppler of the lower extremities was performed which demonstrated bilateral DVT's  A diabetic diet was offered  Her last A1c in September was 11 5  She was given basal insulin with a sliding scale while here  A bowel regimen with colace and miralax were initiated  The patient did pass some small stools while here  She was tolerated her diet  Today, she felt well enough to be discharged home    Case management reported that Xarelto would be covered by her insurance with a minimal co-pay  The prescriptions were submitted  She was also prescribed metronidazole and bismuth subsalicylate to be taken with her omeprazole and tetracycline for quadruple therapy for her H  Pylori  She was encouraged to continue to OTC bowel regimen and follow up with GI  She is to follow up with oncology as well as scheduled  Please see above list of diagnoses and related plan for additional information  Condition at Discharge: stable     Discharge Day Visit / Exam:     Subjective:  Patient has residual continued abdominal discomfort and bloating  She reports two small stools  Denies nausea and is tolerating her diet  Denies shortness of breath or pain  Vitals: Blood Pressure: 156/79 (11/08/18 0700)  Pulse: 79 (11/08/18 0700)  Temperature: (!) 96 9 °F (36 1 °C) (11/08/18 0700)  Temp Source: Tympanic (11/08/18 0700)  Respirations: 16 (11/08/18 0700)  Height: 5' 5" (165 1 cm) (11/06/18 1758)  Weight - Scale: 79 8 kg (176 lb) (11/06/18 1758)  SpO2: 98 % (11/08/18 0700)  Exam:   Physical Exam   Constitutional: She is oriented to person, place, and time  She appears well-developed and well-nourished  No distress  Eyes: Conjunctivae are normal  No scleral icterus  Cardiovascular: Normal rate, regular rhythm, normal heart sounds and intact distal pulses  No murmur heard  Pulmonary/Chest: Effort normal and breath sounds normal  No respiratory distress  She has no wheezes  She has no rales  Abdominal: Soft  Bowel sounds are normal  She exhibits no distension  There is no tenderness  Musculoskeletal: Normal range of motion  She exhibits no edema or tenderness  Neurological: She is alert and oriented to person, place, and time  Skin: Skin is warm and dry  She is not diaphoretic  Psychiatric: She has a normal mood and affect  Her behavior is normal    Nursing note and vitals reviewed        Discussion with Family: plan of care with patient and daughter    Discharge instructions/Information to patient and family:   See after visit summary for information provided to patient and family  Provisions for Follow-Up Care:  See after visit summary for information related to follow-up care and any pertinent home health orders  Disposition:     Home    For Discharges to [facility]:   · Not Applicable to this Patient - Not Applicable to this Patient    Planned Readmission: None     Discharge Statement:  I spent 60 minutes discharging the patient  This time was spent on the day of discharge  I had direct contact with the patient on the day of discharge  Greater than 50% of the total time was spent examining patient, answering all patient questions, arranging and discussing plan of care with patient as well as directly providing post-discharge instructions  Additional time then spent on discharge activities  Discharge Medications:  See after visit summary for reconciled discharge medications provided to patient and family        ** Please Note: This note has been constructed using a voice recognition system **

## 2018-11-08 NOTE — ASSESSMENT & PLAN NOTE
· POA abdominal pain / bloating / constipation  · H  Pylori + on 10/29  · Took one dose of treatment (Omeprazole and Tetracycline) prior to arrival  · On bowel regimen here, had small stool x 2  · GI note reflects patient is to be on quadruple therapy (PPI / bismuth salicylate / metronidazole / tetracycline)  Patient reports only on tetracycline and omeprazole  ASA listed as allergy for patient  Patient clarified that she is not allergic    Added bismuth subsalicylate and metronidazole to her regimen and instructed her to complete the 14 days of therapy before repeating stool antigen test   · To follow up with GI as an outpatient  · Continue stool softener and osmotic laxative as needed

## 2018-11-08 NOTE — ASSESSMENT & PLAN NOTE
· POA shortness of breath  · Chest CT + bilateral PE / LE venous doppler + bilateral DVT  · Initiated therapy with Lovenox  · Xarelto rx approved by [employer], will continue on this for anti-coagulation

## 2018-11-09 ENCOUNTER — TELEPHONE (OUTPATIENT)
Dept: HEMATOLOGY ONCOLOGY | Facility: CLINIC | Age: 63
End: 2018-11-09

## 2018-11-09 NOTE — TELEPHONE ENCOUNTER
Spoke with Mandi England  Surgery for 11/30 cancelled due to anticoagulant use  Pt to see us 11/29 and will discuss with dr Josie Delgado when she could possibly have surgery  I stated I would call her following that appointment

## 2018-11-09 NOTE — TELEPHONE ENCOUNTER
Spoke to daughter, Adair Isidro  Pt was recently discharged from hospital and is getting treated for a blood clot  Pt currently on anticoagulants  Dr Jessica Walton does not think the patient should have surgical procedure she is scheduled for, and would like the daughter to call [facility] to reschedule this  Adair Isidro states she has already reached out to that office and is waiting for a call back  She said she would call me back when she hears from them

## 2018-11-09 NOTE — TELEPHONE ENCOUNTER
Abdirizak Wood called from the DR  Office she needs some kind of document updating the descion on not having the surgery   Please call Abdirizak Wood at [phone] opt @ 2

## 2018-11-12 ENCOUNTER — APPOINTMENT (OUTPATIENT)
Dept: LAB | Facility: HOSPITAL | Age: 63
End: 2018-11-12
Attending: INTERNAL MEDICINE
Payer: MEDICARE

## 2018-11-12 LAB
BASOPHILS # BLD AUTO: 0.12 THOUSANDS/ΜL (ref 0–0.1)
BASOPHILS NFR BLD AUTO: 2 % (ref 0–1)
EOSINOPHIL # BLD AUTO: 0.15 THOUSAND/ΜL (ref 0–0.61)
EOSINOPHIL NFR BLD AUTO: 2 % (ref 0–6)
ERYTHROCYTE [DISTWIDTH] IN BLOOD BY AUTOMATED COUNT: 14.7 % (ref 11.6–15.1)
HCT VFR BLD AUTO: 33.6 % (ref 34.8–46.1)
HGB BLD-MCNC: 10.8 G/DL (ref 11.5–15.4)
IMM GRANULOCYTES # BLD AUTO: 0.08 THOUSAND/UL (ref 0–0.2)
IMM GRANULOCYTES NFR BLD AUTO: 1 % (ref 0–2)
LYMPHOCYTES # BLD AUTO: 2.54 THOUSANDS/ΜL (ref 0.6–4.47)
LYMPHOCYTES NFR BLD AUTO: 39 % (ref 14–44)
MCH RBC QN AUTO: 28.7 PG (ref 26.8–34.3)
MCHC RBC AUTO-ENTMCNC: 32.1 G/DL (ref 31.4–37.4)
MCV RBC AUTO: 89 FL (ref 82–98)
MONOCYTES # BLD AUTO: 0.57 THOUSAND/ΜL (ref 0.17–1.22)
MONOCYTES NFR BLD AUTO: 9 % (ref 4–12)
NEUTROPHILS # BLD AUTO: 2.98 THOUSANDS/ΜL (ref 1.85–7.62)
NEUTS SEG NFR BLD AUTO: 47 % (ref 43–75)
NRBC BLD AUTO-RTO: 0 /100 WBCS
PLATELET # BLD AUTO: 409 THOUSANDS/UL (ref 149–390)
PMV BLD AUTO: 11.1 FL (ref 8.9–12.7)
RBC # BLD AUTO: 3.76 MILLION/UL (ref 3.81–5.12)
WBC # BLD AUTO: 6.44 THOUSAND/UL (ref 4.31–10.16)

## 2018-11-12 PROCEDURE — 36415 COLL VENOUS BLD VENIPUNCTURE: CPT | Performed by: INTERNAL MEDICINE

## 2018-11-12 PROCEDURE — 85025 COMPLETE CBC W/AUTO DIFF WBC: CPT | Performed by: INTERNAL MEDICINE

## 2018-11-12 RX ORDER — SODIUM CHLORIDE 9 MG/ML
20 INJECTION, SOLUTION INTRAVENOUS CONTINUOUS
Status: DISCONTINUED | OUTPATIENT
Start: 2018-11-13 | End: 2018-11-16 | Stop reason: HOSPADM

## 2018-11-13 ENCOUNTER — TELEPHONE (OUTPATIENT)
Dept: HEMATOLOGY ONCOLOGY | Facility: CLINIC | Age: 63
End: 2018-11-13

## 2018-11-13 ENCOUNTER — HOSPITAL ENCOUNTER (OUTPATIENT)
Dept: INFUSION CENTER | Facility: CLINIC | Age: 63
Discharge: HOME | End: 2018-11-13
Payer: MEDICARE

## 2018-11-13 VITALS
WEIGHT: 180.78 LBS | RESPIRATION RATE: 16 BRPM | BODY MASS INDEX: 30.08 KG/M2 | DIASTOLIC BLOOD PRESSURE: 76 MMHG | HEART RATE: 92 BPM | TEMPERATURE: 98.9 F | SYSTOLIC BLOOD PRESSURE: 128 MMHG

## 2018-11-13 PROCEDURE — 96413 CHEMO IV INFUSION 1 HR: CPT

## 2018-11-13 PROCEDURE — 96417 CHEMO IV INFUS EACH ADDL SEQ: CPT

## 2018-11-13 PROCEDURE — 96375 TX/PRO/DX INJ NEW DRUG ADDON: CPT

## 2018-11-13 PROCEDURE — 96367 TX/PROPH/DG ADDL SEQ IV INF: CPT

## 2018-11-13 RX ADMIN — SODIUM CHLORIDE 20 ML/HR: 0.9 INJECTION, SOLUTION INTRAVENOUS at 08:35

## 2018-11-13 RX ADMIN — PACLITAXEL 153 MG: 6 INJECTION, SOLUTION INTRAVENOUS at 09:40

## 2018-11-13 RX ADMIN — DIPHENHYDRAMINE HYDROCHLORIDE 25 MG: 50 INJECTION, SOLUTION INTRAMUSCULAR; INTRAVENOUS at 09:05

## 2018-11-13 RX ADMIN — Medication 300 UNITS: at 11:21

## 2018-11-13 RX ADMIN — TRASTUZUMAB 166 MG: 150 INJECTION, POWDER, LYOPHILIZED, FOR SOLUTION INTRAVENOUS at 10:46

## 2018-11-13 RX ADMIN — DEXAMETHASONE SODIUM PHOSPHATE 10 MG: 10 INJECTION, SOLUTION INTRAMUSCULAR; INTRAVENOUS at 08:52

## 2018-11-13 RX ADMIN — FAMOTIDINE 20 MG: 10 INJECTION, SOLUTION INTRAVENOUS at 09:24

## 2018-11-13 NOTE — TELEPHONE ENCOUNTER
Yoselin Rizo from [facility] needs to know if the patient can be treated once she just left the hospital, she said  Please call and advise  Thanks!

## 2018-11-13 NOTE — PROGRESS NOTES
Pt  Verbalizes constipation; states LBM 3 days ago and will take laxative ordered when she gets home today  Pt  States she is taking Xarelto as ordered  Labs reviewed as ordered and within normal parameters  Spoke with Dr Keena Lima who stated ok to proceed with treatment as long as lab parameters met

## 2018-11-13 NOTE — PLAN OF CARE
Problem: PAIN - ADULT  Goal: Verbalizes/displays adequate comfort level or baseline comfort level  Interventions:  - Encourage patient to monitor pain and request assistance  - Assess pain using appropriate pain scale  - Administer analgesics based on type and severity of pain and evaluate response  - Implement non-pharmacological measures as appropriate and evaluate response  - Consider cultural and social influences on pain and pain management  - Notify physician/advanced practitioner if interventions unsuccessful or patient reports new pain  Outcome: Progressing      Problem: INFECTION - ADULT  Goal: Absence or prevention of progression during hospitalization  INTERVENTIONS:  - Assess and monitor for signs and symptoms of infection  - Monitor lab/diagnostic results  - Monitor all insertion sites, i e  indwelling lines, tubes, and drains  - Monitor endotracheal (as able) and nasal secretions for changes in amount and color  - [place] appropriate cooling/warming therapies per order  - Administer medications as ordered  - Instruct and encourage patient and family to use good hand hygiene technique  - Identify and instruct in appropriate isolation precautions for identified infection/condition  Outcome: Progressing    Goal: Absence of fever/infection during neutropenic period  INTERVENTIONS:  - Monitor WBC  - Implement neutropenic guidelines  Outcome: Progressing      Problem: Knowledge Deficit  Goal: Patient/family/caregiver demonstrates understanding of disease process, treatment plan, medications, and discharge instructions  Complete learning assessment and assess knowledge base    Interventions:  - Provide teaching at level of understanding  - Provide teaching via preferred learning methods  Outcome: Progressing

## 2018-11-14 ENCOUNTER — HOSPITAL ENCOUNTER (OUTPATIENT)
Dept: MRI IMAGING | Facility: HOSPITAL | Age: 63
Discharge: HOME | End: 2018-11-14
Attending: FAMILY MEDICINE
Payer: MEDICARE

## 2018-11-14 DIAGNOSIS — Z85.3 HISTORY OF BREAST CANCER: ICD-10-CM

## 2018-11-14 DIAGNOSIS — M25.561 RIGHT KNEE PAIN, UNSPECIFIED CHRONICITY: ICD-10-CM

## 2018-11-14 PROCEDURE — 73721 MRI JNT OF LWR EXTRE W/O DYE: CPT

## 2018-11-19 ENCOUNTER — APPOINTMENT (EMERGENCY)
Dept: RADIOLOGY | Facility: HOSPITAL | Age: 63
End: 2018-11-19
Payer: MEDICARE

## 2018-11-19 ENCOUNTER — DOCUMENTATION (OUTPATIENT)
Dept: HEMATOLOGY ONCOLOGY | Facility: CLINIC | Age: 63
End: 2018-11-19

## 2018-11-19 ENCOUNTER — APPOINTMENT (EMERGENCY)
Dept: CT IMAGING | Facility: HOSPITAL | Age: 63
End: 2018-11-19
Payer: MEDICARE

## 2018-11-19 ENCOUNTER — HOSPITAL ENCOUNTER (EMERGENCY)
Facility: HOSPITAL | Age: 63
Discharge: HOME | End: 2018-11-19
Attending: EMERGENCY MEDICINE
Payer: MEDICARE

## 2018-11-19 VITALS
TEMPERATURE: 97.9 F | OXYGEN SATURATION: 96 % | SYSTOLIC BLOOD PRESSURE: 107 MMHG | DIASTOLIC BLOOD PRESSURE: 59 MMHG | WEIGHT: 180 LBS | BODY MASS INDEX: 29.95 KG/M2 | RESPIRATION RATE: 18 BRPM | HEART RATE: 91 BPM

## 2018-11-19 DIAGNOSIS — R07.89 CHEST WALL PAIN: Primary | ICD-10-CM

## 2018-11-19 DIAGNOSIS — M79.10 MYALGIA: ICD-10-CM

## 2018-11-19 DIAGNOSIS — Z17.0 MALIGNANT NEOPLASM OF RIGHT BREAST IN FEMALE, ESTROGEN RECEPTOR POSITIVE, UNSPECIFIED SITE OF BREAST (HCC): Primary | ICD-10-CM

## 2018-11-19 DIAGNOSIS — C50.911 MALIGNANT NEOPLASM OF RIGHT BREAST IN FEMALE, ESTROGEN RECEPTOR POSITIVE, UNSPECIFIED SITE OF BREAST (HCC): Primary | ICD-10-CM

## 2018-11-19 DIAGNOSIS — R07.9 CHEST PAIN: ICD-10-CM

## 2018-11-19 LAB
ALBUMIN SERPL BCP-MCNC: 3.6 G/DL (ref 3.5–5)
ALP SERPL-CCNC: 87 U/L (ref 46–116)
ALT SERPL W P-5'-P-CCNC: 39 U/L (ref 12–78)
ANION GAP SERPL CALCULATED.3IONS-SCNC: 11 MMOL/L (ref 4–13)
APTT PPP: 30 SECONDS (ref 26–38)
AST SERPL W P-5'-P-CCNC: 14 U/L (ref 5–45)
BASOPHILS # BLD AUTO: 0.13 THOUSANDS/ΜL (ref 0–0.1)
BASOPHILS NFR BLD AUTO: 2 % (ref 0–1)
BILIRUB SERPL-MCNC: 0.34 MG/DL (ref 0.2–1)
BUN SERPL-MCNC: 21 MG/DL (ref 5–25)
CALCIUM SERPL-MCNC: 10.1 MG/DL (ref 8.3–10.1)
CHLORIDE SERPL-SCNC: 99 MMOL/L (ref 100–108)
CO2 SERPL-SCNC: 24 MMOL/L (ref 21–32)
CREAT SERPL-MCNC: 0.83 MG/DL (ref 0.6–1.3)
EOSINOPHIL # BLD AUTO: 0.17 THOUSAND/ΜL (ref 0–0.61)
EOSINOPHIL NFR BLD AUTO: 2 % (ref 0–6)
ERYTHROCYTE [DISTWIDTH] IN BLOOD BY AUTOMATED COUNT: 14.5 % (ref 11.6–15.1)
GFR SERPL CREATININE-BSD FRML MDRD: 76 ML/MIN/1.73SQ M
GLUCOSE SERPL-MCNC: 368 MG/DL (ref 65–140)
HCT VFR BLD AUTO: 36.1 % (ref 34.8–46.1)
HGB BLD-MCNC: 11.8 G/DL (ref 11.5–15.4)
IMM GRANULOCYTES # BLD AUTO: 0.07 THOUSAND/UL (ref 0–0.2)
IMM GRANULOCYTES NFR BLD AUTO: 1 % (ref 0–2)
INR PPP: 1.66 (ref 0.86–1.17)
LYMPHOCYTES # BLD AUTO: 3.03 THOUSANDS/ΜL (ref 0.6–4.47)
LYMPHOCYTES NFR BLD AUTO: 40 % (ref 14–44)
MCH RBC QN AUTO: 29 PG (ref 26.8–34.3)
MCHC RBC AUTO-ENTMCNC: 32.7 G/DL (ref 31.4–37.4)
MCV RBC AUTO: 89 FL (ref 82–98)
MONOCYTES # BLD AUTO: 0.41 THOUSAND/ΜL (ref 0.17–1.22)
MONOCYTES NFR BLD AUTO: 5 % (ref 4–12)
NEUTROPHILS # BLD AUTO: 3.75 THOUSANDS/ΜL (ref 1.85–7.62)
NEUTS SEG NFR BLD AUTO: 50 % (ref 43–75)
NRBC BLD AUTO-RTO: 0 /100 WBCS
PLATELET # BLD AUTO: 387 THOUSANDS/UL (ref 149–390)
PMV BLD AUTO: 11.7 FL (ref 8.9–12.7)
POTASSIUM SERPL-SCNC: 4.1 MMOL/L (ref 3.5–5.3)
PROT SERPL-MCNC: 7.7 G/DL (ref 6.4–8.2)
PROTHROMBIN TIME: 19.7 SECONDS (ref 11.8–14.2)
RBC # BLD AUTO: 4.07 MILLION/UL (ref 3.81–5.12)
SODIUM SERPL-SCNC: 134 MMOL/L (ref 136–145)
TROPONIN I SERPL-MCNC: <0.02 NG/ML
TROPONIN I SERPL-MCNC: <0.02 NG/ML
WBC # BLD AUTO: 7.56 THOUSAND/UL (ref 4.31–10.16)

## 2018-11-19 PROCEDURE — 80053 COMPREHEN METABOLIC PANEL: CPT | Performed by: EMERGENCY MEDICINE

## 2018-11-19 PROCEDURE — 85610 PROTHROMBIN TIME: CPT | Performed by: EMERGENCY MEDICINE

## 2018-11-19 PROCEDURE — 96374 THER/PROPH/DIAG INJ IV PUSH: CPT

## 2018-11-19 PROCEDURE — 96361 HYDRATE IV INFUSION ADD-ON: CPT

## 2018-11-19 PROCEDURE — 93005 ELECTROCARDIOGRAM TRACING: CPT

## 2018-11-19 PROCEDURE — 36415 COLL VENOUS BLD VENIPUNCTURE: CPT | Performed by: EMERGENCY MEDICINE

## 2018-11-19 PROCEDURE — 71275 CT ANGIOGRAPHY CHEST: CPT

## 2018-11-19 PROCEDURE — 99285 EMERGENCY DEPT VISIT HI MDM: CPT

## 2018-11-19 PROCEDURE — 84484 ASSAY OF TROPONIN QUANT: CPT | Performed by: EMERGENCY MEDICINE

## 2018-11-19 PROCEDURE — 85730 THROMBOPLASTIN TIME PARTIAL: CPT | Performed by: EMERGENCY MEDICINE

## 2018-11-19 PROCEDURE — 71046 X-RAY EXAM CHEST 2 VIEWS: CPT

## 2018-11-19 PROCEDURE — 85025 COMPLETE CBC W/AUTO DIFF WBC: CPT | Performed by: EMERGENCY MEDICINE

## 2018-11-19 RX ORDER — HYDROCODONE BITARTRATE AND ACETAMINOPHEN 5; 325 MG/1; MG/1
1 TABLET ORAL EVERY 4 HOURS PRN
Qty: 15 TABLET | Refills: 0 | Status: SHIPPED | OUTPATIENT
Start: 2018-11-19 | End: 2018-11-29

## 2018-11-19 RX ORDER — SODIUM CHLORIDE 9 MG/ML
20 INJECTION, SOLUTION INTRAVENOUS CONTINUOUS
Status: DISCONTINUED | OUTPATIENT
Start: 2018-11-20 | End: 2018-11-23 | Stop reason: HOSPADM

## 2018-11-19 RX ORDER — TETRACYCLINE HYDROCHLORIDE 500 MG/1
500 CAPSULE ORAL 4 TIMES DAILY
COMMUNITY
End: 2018-12-10

## 2018-11-19 RX ADMIN — SODIUM CHLORIDE 1000 ML: 0.9 INJECTION, SOLUTION INTRAVENOUS at 17:03

## 2018-11-19 RX ADMIN — IOHEXOL 85 ML: 350 INJECTION, SOLUTION INTRAVENOUS at 18:36

## 2018-11-19 RX ADMIN — MORPHINE SULFATE 2 MG: 2 INJECTION, SOLUTION INTRAMUSCULAR; INTRAVENOUS at 17:05

## 2018-11-19 NOTE — PROGRESS NOTES
TIME OUT   Spoke with Ely Gordon RN this am, [phone]  She reported originally paclitaxol to be held from treatment 11/20/2018, R/T patient having a D&C performed  Patient reported she will not be having procedure, so therefore Dr Lluvia King would like her to have both the Paclitaxol & Herceptin 11/19/2018, New orders will follow  Spoke with Pharmacy, Kiara Augustin and informed of same

## 2018-11-19 NOTE — ED PROVIDER NOTES
History  Chief Complaint   Patient presents with    Chest Pain     Pt reports "squeezing" CP and shortness of breath since this afternoon, denies NV, abdominal pain ongoing x2 weeks, Hx PE and DVT taking anticoagulant as prescribed, Hx cancer  59-year-old female with a history of breast cancer currently being treated with chemotherapy, last chemotherapy 1 week ago, history of bilateral lower extremity DVT and pulmonary embolus on Xarelto presents to the emergency department with acute onset of substernal chest pressure and shortness of breath that started at 2:00 p m  while at rest   The pain seems to be worse with deep inspiration  No diaphoresis  Patient has been taking her Xarelto  She did not take anything for the pain at home          History provided by:  Patient and relative   used: Yes    Chest Pain   Pain location:  Substernal area  Pain quality: tightness    Pain radiates to:  Does not radiate  Pain radiates to the back: no    Pain severity:  Unable to specify  Onset quality:  Sudden  Duration:  2 hours  Timing:  Constant  Progression:  Unchanged  Chronicity:  New  Context: at rest    Context: not breathing, no movement and no stress    Relieved by:  None tried  Worsened by:  Deep breathing  Ineffective treatments:  None tried  Associated symptoms: shortness of breath    Associated symptoms: no abdominal pain, no altered mental status, no anorexia, no anxiety, no back pain, no claudication, no cough, no diaphoresis, no dizziness, no dysphagia, no fatigue, no fever, no headache, no heartburn, no lower extremity edema, no nausea, no near-syncope, no numbness, no orthopnea, no palpitations, no PND, no syncope, not vomiting and no weakness    Shortness of breath:     Severity:  Unable to specify    Onset quality:  Sudden    Duration:  2 hours    Timing:  Constant    Progression:  Unchanged  Risk factors: diabetes mellitus, hypertension and prior DVT/PE    Risk factors: no coronary artery disease        Prior to Admission Medications   Prescriptions Last Dose Informant Patient Reported? Taking?    SANG MICROLET LANCETS lancets   No Yes   Sig: Testing three times a day   Blood Glucose Monitoring Suppl ([employer] BLOOD GLUCOSE METER) w/Device KIT   Yes Yes   Sig: Testing blood sugars three times a day   Blood Glucose Monitoring Suppl (SANG CONTOUR MONITOR) w/Device KIT   No Yes   Sig: Testing blood sugars three times a day   HUMULIN N 100 UNIT/ML subcutaneous injection  Family Member No Yes   Si units in the am 30 units in the pm   HYDROcodone-acetaminophen (NORCO) 5-325 mg per tablet   No Yes   Sig: Take 1 tablet by mouth every 4 (four) hours as needed for pain Max Daily Amount: 6 tablets   Insulin Glargine (TOUJEO) 300 units/mL CONCETRATED U-300 injection pen   No Yes   Sig: Inject 35 Units under the skin daily at bedtime   Patient taking differently: Inject 10 Units under the skin daily at bedtime     Insulin Syringe-Needle U-100 (B-D INS SYRINGE 0 5CC/30GX1/2") 30G X 1/2" 0 5 ML MISC   Yes Yes   Insulin Syringe-Needle U-100 30G 0 3 ML MISC   Yes Yes   Sig: Using twice a day   Insulin Syringe-Needle U-100 30G X 1/2" 1 ML MISC   No Yes   Sig: Using twice a day   Lancets MISC   Yes Yes   Sig: Testing three times a day   albuterol (2 5 mg/3 mL) 0 083 % nebulizer solution  Family Member No Yes   Sig: Take 1 vial (2 5 mg total) by nebulization every 4 (four) hours as needed for wheezing or shortness of breath   albuterol (PROVENTIL HFA,VENTOLIN HFA) 90 mcg/act inhaler  Family Member Yes Yes   Sig: Inhale 1 puff every 4 (four) hours as needed     amLODIPine-atorvastatin (CADUET) 10-10 MG per tablet   No Yes   Sig: take 1 tablet by mouth once daily   amoxicillin (AMOXIL) 500 MG tablet   No Yes   Sig: Take 2 tablets (1,000 mg total) by mouth 2 (two) times a day for 15 days   bismuth subsalicylate (PEPTO BISMOL) 262 MG chewable tablet   No Yes   Sig: Chew 2 tablets (524 mg total) 4 (four) times a day (before meals and at bedtime)   docusate sodium (COLACE) 100 mg capsule   No Yes   Sig: Take 1 capsule (100 mg total) by mouth 2 (two) times a day   enalapril (VASOTEC) 20 mg tablet   No Yes   Sig: take 1 tablet by mouth once daily   gabapentin (NEURONTIN) 300 mg capsule  Family Member No Yes   Sig: take 1 capsule by mouth three times a day   Patient taking differently: as needed   gemfibrozil (LOPID) 600 mg tablet   No Yes   Sig: Take 1 tablet (600 mg total) by mouth 2 (two) times a day   glipiZIDE (GLUCOTROL XL) 10 mg 24 hr tablet   No Yes   Sig: take 1 tablet by mouth once daily   glucose blood (SANG CONTOUR TEST) test strip   No Yes   Sig: Testing three times a day   levothyroxine 50 mcg tablet   No Yes   Sig: take 1 tablet by mouth once daily   lidocaine-prilocaine (EMLA) cream   No Yes   Sig: Apply topically as needed for mild pain Apply prior to port access     metFORMIN (GLUCOPHAGE-XR) 500 mg 24 hr tablet   No Yes   Sig: take 2 tablets by mouth once daily WITH BREAKFAST   metroNIDAZOLE (FLAGYL) 250 mg tablet   No Yes   Sig: Take 1 tablet (250 mg total) by mouth 4 (four) times a day for 15 days   omeprazole (PriLOSEC) 40 MG capsule   No Yes   Sig: Take 1 capsule (40 mg total) by mouth 2 (two) times a day for 14 days   polyethylene glycol (MIRALAX) 17 g packet   No Yes   Sig: Take 17 g by mouth daily   rivaroxaban (XARELTO) 15 mg tablet   No Yes   Sig: Take 1 tablet (15 mg total) by mouth 2 (two) times a day with meals for 21 days   rivaroxaban (XARELTO) 20 mg tablet   No Yes   Sig: Take 1 tablet (20 mg total) by mouth daily with breakfast   tetracycline (ACHROMYCIN,SUMYCIN) 500 MG capsule   Yes Yes   Sig: Take 500 mg by mouth 4 (four) times a day   traZODone (DESYREL) 50 mg tablet   No Yes   Sig: take 1 tablet by mouth once daily at bedtime      Facility-Administered Medications: None       Past Medical History:   Diagnosis Date    Abdominal infection ([address] )     h-pylori    Asthma     [facility] [address] [facility]     Cancer [facility] [address] [facility]     BREAST    Diabetes mellitus ([facility] [address] )     blood sugar 199 @ 735    Hiatal hernia     Hypercholesterolemia     Hypertension     Hypothyroid     Renal disorder     Rheumatoid arthritis ([facility] [address] )        Past Surgical History:   Procedure Laterality Date    BREAST BIOPSY      BREAST LUMPECTOMY Right 6/26/2018    Procedure: RIGHT BREAST NEEDLE LOCALIZATION X2 WITH RIGHT BREAST LUMPECTOMY ( NEEDLE LOC @ 1000); Surgeon: Maddison Tam MD;  [facility];  Service: Surgical Oncology    BREAST LUMPECTOMY Right 8/2/2018    Procedure: LYMPHOSCINITGRAPHY INTRAOPERATIVE LYMPHATIC MAPPING , RIGHT SENTINEL NODE BIOPSY, REEXCISION  RIGHT BREAST LUMPECTOMY CAVITY (SUPERIOR MARGIN); Surgeon: Maddison Tam MD;  [facility];  Service: Surgical Oncology    BREAST SURGERY Left     CATARACT EXTRACTION, BILATERAL Bilateral     EGD AND COLONOSCOPY N/A 9/5/2018    Procedure: EGD AND COLONOSCOPY;  Surgeon: Mitzi Car MD;  [facility]; Service: Gastroenterology    [facility] GUIDED CENTRAL VENOUS ACCESS REPLACEMENT  9/13/2018    KNEE SURGERY Right     MAMMO NEEDLE LOCALIZATION RIGHT (ALL INC) Right 6/26/2018    MAMMO STEREOTACTIC BREAST BIOPSY RIGHT (ALL INC) Right 5/23/2018    RETINAL DETACHMENT SURGERY Right     TUBAL LIGATION      TUNNELED VENOUS PORT PLACEMENT Left 9/13/2018    Procedure: INSERTION VENOUS PORT (PORT-A-CATH); Surgeon: Maddison Tam MD;  [facility];  Service: Surgical Oncology       Family History   Problem Relation Age of Onset    Diabetes Mother     Hypertension Mother     Diabetes Father     Hypertension Father     Diabetes Brother     Hypertension Brother     Prostate cancer Brother     Cancer Maternal Grandfather      I have reviewed and agree with the history as documented      Social History   Substance Use Topics    Smoking status: Never Smoker    Smokeless tobacco: Never Used    Alcohol use No        Review of Systems Constitutional: Negative  Negative for chills, diaphoresis, fatigue and fever  HENT: Negative  Negative for congestion, rhinorrhea, sore throat and trouble swallowing  Eyes: Negative  Negative for discharge, redness and itching  Respiratory: Positive for shortness of breath  Negative for apnea, cough, chest tightness and wheezing  Cardiovascular: Positive for chest pain  Negative for palpitations, orthopnea, claudication, leg swelling, syncope, PND and near-syncope  Gastrointestinal: Negative  Negative for abdominal pain, anorexia, heartburn, nausea and vomiting  Endocrine: Negative  Genitourinary: Negative  Negative for flank pain, frequency and urgency  Musculoskeletal: Negative  Negative for back pain  Skin: Negative  Allergic/Immunologic: Negative  Neurological: Negative  Negative for dizziness, syncope, weakness, light-headedness, numbness and headaches  Hematological: Negative  All other systems reviewed and are negative  Physical Exam  Physical Exam   Constitutional: She is oriented to person, place, and time  She appears well-developed and well-nourished  Non-toxic appearance  She does not have a sickly appearance  She does not appear ill  No distress  HENT:   Head: Normocephalic and atraumatic  Right Ear: External ear normal    Left Ear: External ear normal    Mouth/Throat: Oropharynx is clear and moist    Eyes: Pupils are equal, round, and reactive to light  Conjunctivae are normal  Right eye exhibits no discharge  Left eye exhibits no discharge  No scleral icterus  Neck: Normal range of motion  Neck supple  No JVD present  Cardiovascular: Regular rhythm and normal heart sounds  Tachycardia present  Exam reveals no gallop and no friction rub  No murmur heard  Pulmonary/Chest: Effort normal and breath sounds normal  No stridor  No respiratory distress  She has no wheezes  She has no rales  She exhibits no tenderness  Abdominal: Soft   Bowel sounds are normal  She exhibits no distension and no mass  There is no tenderness  No hernia  Musculoskeletal: Normal range of motion  She exhibits no edema, tenderness or deformity  Lymphadenopathy:     She has no cervical adenopathy  Neurological: She is alert and oriented to person, place, and time  She has normal reflexes  She displays normal reflexes  She exhibits normal muscle tone  Skin: Skin is warm and dry  No rash noted  She is not diaphoretic  No erythema  No pallor  Psychiatric: She has a normal mood and affect  Nursing note and vitals reviewed        Vital Signs  ED Triage Vitals [11/19/18 1556]   Temperature Pulse Respirations Blood Pressure SpO2   97 9 °F (36 6 °C) 99 18 144/66 97 %      Temp Source Heart Rate Source Patient Position - Orthostatic VS BP Location FiO2 (%)   Oral Monitor Lying Left arm --      Pain Score       8           Vitals:    11/19/18 1556 11/19/18 1658 11/19/18 1816 11/19/18 1923   BP: 144/66 138/66 143/66 155/70   Pulse: 99 92 83 88   Patient Position - Orthostatic VS: Lying Lying Lying Lying       Visual Acuity      ED Medications  Medications   sodium chloride 0 9 % bolus 1,000 mL (0 mL Intravenous Stopped 11/19/18 1817)   morphine injection 2 mg (2 mg Intravenous Given 11/19/18 1705)   iohexol (OMNIPAQUE) 350 MG/ML injection (SINGLE-DOSE) 85 mL (85 mL Intravenous Given 11/19/18 1836)       Diagnostic Studies  Results Reviewed     Procedure Component Value Units Date/Time    Troponin I [[id number]]  (Normal) Collected:  11/19/18 2019    Lab Status:  Final result Specimen:  Blood from Arm, Left Updated:  11/19/18 2042     Troponin I <0 02 ng/mL     Troponin I [[id number]]  (Normal) Collected:  11/19/18 1613    Lab Status:  Final result Specimen:  Blood from Arm, Left Updated:  11/19/18 1639     Troponin I <0 02 ng/mL     Comprehensive metabolic panel [[id number]]  (Abnormal) Collected:  11/19/18 1613    Lab Status:  Final result Specimen:  Blood from Arm, Left Updated: 11/19/18 1637     Sodium 134 (L) mmol/L      Potassium 4 1 mmol/L      Chloride 99 (L) mmol/L      CO2 24 mmol/L      ANION GAP 11 mmol/L      BUN 21 mg/dL      Creatinine 0 83 mg/dL      Glucose 368 (H) mg/dL      Calcium 10 1 mg/dL      AST 14 U/L      ALT 39 U/L      Alkaline Phosphatase 87 U/L      Total Protein 7 7 g/dL      Albumin 3 6 g/dL      Total Bilirubin 0 34 mg/dL      eGFR 76 ml/min/1 73sq m     Narrative:         National Kidney Disease Education Program recommendations are as follows:  GFR calculation is accurate only with a steady state creatinine  Chronic Kidney disease less than 60 ml/min/1 73 sq  meters  Kidney failure less than 15 ml/min/1 73 sq  meters      Protime-INR [[id number]]  (Abnormal) Collected:  11/19/18 1613    Lab Status:  Final result Specimen:  Blood from Arm, Left Updated:  11/19/18 1634     Protime 19 7 (H) seconds      INR 1 66 (H)    APTT [[id number]]  (Normal) Collected:  11/19/18 1613    Lab Status:  Final result Specimen:  Blood from Arm, Left Updated:  11/19/18 1634     PTT 30 seconds     CBC and differential [[id number]]  (Abnormal) Collected:  11/19/18 1613    Lab Status:  Final result Specimen:  Blood from Arm, Left Updated:  11/19/18 1623     WBC 7 56 Thousand/uL      RBC 4 07 Million/uL      Hemoglobin 11 8 g/dL      Hematocrit 36 1 %      MCV 89 fL      MCH 29 0 pg      MCHC 32 7 g/dL      RDW 14 5 %      MPV 11 7 fL      Platelets 405 Thousands/uL      nRBC 0 /100 WBCs      Neutrophils Relative 50 %      Immat GRANS % 1 %      Lymphocytes Relative 40 %      Monocytes Relative 5 %      Eosinophils Relative 2 %      Basophils Relative 2 (H) %      Neutrophils Absolute 3 75 Thousands/µL      Immature Grans Absolute 0 07 Thousand/uL      Lymphocytes Absolute 3 03 Thousands/µL      Monocytes Absolute 0 41 Thousand/µL      Eosinophils Absolute 0 17 Thousand/µL      Basophils Absolute 0 13 (H) Thousands/µL                  CTA ED chest PE study   Final Result by Elisa Negrete Juan F Johnson MD (11/19 1939)      1  Scattered bilateral segmental and subsegmental pulmonary emboli without significant interval change  No findings for new pulmonary embolus  2  3 mm pulmonary nodule in the left lower lobe, stable  Based on current Fleischner Society 2017 Guidelines on incidental pulmonary nodule, patients with a known malignancy are at increased risk of metastasis and should receive followup CT at intervals    appropriate for the type of cancer and its risk of pulmonary metastases  A follow-up chest CT may be considered in 3 months  Workstation performed: [id number]         X-ray chest 2 views   Final Result by Belle Gambino MD (11/19 1648)      No acute cardiopulmonary disease  Workstation performed: [id number]                    Procedures  Procedures       Phone Contacts  ED Phone Contact    ED Course         HEART Risk Score      Most Recent Value   History  0 Filed at: 11/19/2018 1952   ECG  0 Filed at: 11/19/2018 1952   Age  1 Filed at: 11/19/2018 1952   Risk Factors  1 Filed at: 11/19/2018 1952   Troponin  0 Filed at: 11/19/2018 1952   Heart Score Risk Calculator   History  0 Filed at: 11/19/2018 1952   ECG  0 Filed at: 11/19/2018 1952   Age  1 Filed at: 11/19/2018 1952   Risk Factors  1 Filed at: 11/19/2018 1952   Troponin  0 Filed at: 11/19/2018 1952   HEART Score  2 Filed at: 11/19/2018 1952   HEART Score  2 Filed at: 11/19/2018 1952                            MDM  Number of Diagnoses or Management Options  Diagnosis management comments: 28-year-old female presents with sudden onset of substernal chest pressure and shortness of breath while at rest at about 2:00 p m  [place] Patient does have a history of DVT and PE and is currently on Xarelto  On exam she appears well in no distress    She is slightly tachycardic on exam   Will do cardiac workup, given her history will also do chest CT to rule out the possibility of new pulmonary embolus       Amount and/or Complexity of Data Reviewed  Clinical lab tests: ordered and reviewed  Tests in the radiology section of CPT®: ordered and reviewed  Review and summarize past medical records: yes  Independent visualization of images, tracings, or specimens: yes      CritCare Time    Disposition  Final diagnoses:   Chest wall pain   Chest pain     Time reflects when diagnosis was documented in both MDM as applicable and the Disposition within this note     Time User Action Codes Description Comment    11/19/2018  8:44 PM Alvetonny Melendez Add [R07 89] Chest wall pain     11/19/2018  8:44 PM Shaina Melendez Add [A69 90] Myalgia     11/19/2018  8:45 PM Aneita Counts A Add [R07 9] Chest pain       ED Disposition     ED Disposition Condition Comment    Discharge  [address] [facility] Ext discharge to home/self care  Condition at discharge: Good        Follow-up Information     Follow up With Specialties Details Why Elva Guevara MD [facility] Schedule an appointment as soon as possible for a visit in 1 day  [address]  [address] [facility]            Patient's Medications   Discharge Prescriptions    No medications on file       Outpatient Discharge Orders  Stress test only, exercise   Standing Status: Future  Standing Exp   Date: 11/19/22         ED Provider  Electronically Signed by           Boris Sky DO  11/19/18 2048

## 2018-11-19 NOTE — PROGRESS NOTES
Timeout [facility] with Fabrizio St[facility] RN [facility]19[facility]2018 [facility] [address]  Pt's surgery with [facility] cancelled  Pt to receive Paclitaxel and Herceptin doses tomorrow 11/2018  Order obtained and sent to [place] infusion

## 2018-11-19 NOTE — ED PROCEDURE NOTE
PROCEDURE  ECG 12 Lead Documentation  Date/Time: 11/19/2018 5:02 PM  Performed by: Arlyn Booker  Authorized by: Arlyn Booker     Indications / Diagnosis:  Chest pain/shortness of breath  ECG reviewed by me, the ED Provider: yes    Patient location:  ED  Interpretation:     Interpretation: abnormal    Rate:     ECG rate:  100    ECG rate assessment: tachycardic    Rhythm:     Rhythm: sinus tachycardia    Ectopy:     Ectopy: none    Conduction:     Conduction: normal    ST segments:     ST segments:  Normal  T waves:     T waves: normal           Abdi Justin DO  11/19/18 1702

## 2018-11-20 ENCOUNTER — HOSPITAL ENCOUNTER (OUTPATIENT)
Dept: INFUSION CENTER | Facility: CLINIC | Age: 63
Discharge: HOME | End: 2018-11-20
Payer: MEDICARE

## 2018-11-20 ENCOUNTER — OFFICE VISIT (OUTPATIENT)
Dept: FAMILY MEDICINE CLINIC | Facility: CLINIC | Age: 63
End: 2018-11-20
Payer: MEDICARE

## 2018-11-20 VITALS
SYSTOLIC BLOOD PRESSURE: 130 MMHG | RESPIRATION RATE: 18 BRPM | HEART RATE: 97 BPM | TEMPERATURE: 98 F | WEIGHT: 176.48 LBS | HEIGHT: 65 IN | BODY MASS INDEX: 29.4 KG/M2 | DIASTOLIC BLOOD PRESSURE: 74 MMHG

## 2018-11-20 VITALS
DIASTOLIC BLOOD PRESSURE: 88 MMHG | SYSTOLIC BLOOD PRESSURE: 156 MMHG | TEMPERATURE: 97.6 F | HEART RATE: 114 BPM | WEIGHT: 176.5 LBS | OXYGEN SATURATION: 96 % | BODY MASS INDEX: 29.41 KG/M2 | HEIGHT: 65 IN | RESPIRATION RATE: 18 BRPM

## 2018-11-20 DIAGNOSIS — E11.8 TYPE 2 DIABETES MELLITUS WITH COMPLICATION, WITH LONG-TERM CURRENT USE OF INSULIN (HCC): ICD-10-CM

## 2018-11-20 DIAGNOSIS — K59.01 SLOW TRANSIT CONSTIPATION: ICD-10-CM

## 2018-11-20 DIAGNOSIS — I26.99 BILATERAL PULMONARY EMBOLISM (HCC): Primary | ICD-10-CM

## 2018-11-20 DIAGNOSIS — Z79.4 TYPE 2 DIABETES MELLITUS WITH COMPLICATION, WITH LONG-TERM CURRENT USE OF INSULIN (HCC): ICD-10-CM

## 2018-11-20 DIAGNOSIS — R10.84 GENERALIZED ABDOMINAL PAIN: ICD-10-CM

## 2018-11-20 PROBLEM — Z01.818 PREOP EXAMINATION: Status: RESOLVED | Noted: 2018-05-09 | Resolved: 2018-11-20

## 2018-11-20 PROCEDURE — 96417 CHEMO IV INFUS EACH ADDL SEQ: CPT

## 2018-11-20 PROCEDURE — 96367 TX/PROPH/DG ADDL SEQ IV INF: CPT

## 2018-11-20 PROCEDURE — 99495 TRANSJ CARE MGMT MOD F2F 14D: CPT | Performed by: PHYSICIAN ASSISTANT

## 2018-11-20 PROCEDURE — 96413 CHEMO IV INFUSION 1 HR: CPT

## 2018-11-20 RX ADMIN — DEXAMETHASONE SODIUM PHOSPHATE 10 MG: 10 INJECTION, SOLUTION INTRAMUSCULAR; INTRAVENOUS at 09:48

## 2018-11-20 RX ADMIN — SODIUM CHLORIDE 20 ML/HR: 0.9 INJECTION, SOLUTION INTRAVENOUS at 08:59

## 2018-11-20 RX ADMIN — FAMOTIDINE 20 MG: 10 INJECTION, SOLUTION INTRAVENOUS at 09:01

## 2018-11-20 RX ADMIN — DIPHENHYDRAMINE HYDROCHLORIDE 25 MG: 50 INJECTION, SOLUTION INTRAMUSCULAR; INTRAVENOUS at 09:17

## 2018-11-20 RX ADMIN — TRASTUZUMAB 160 MG: 150 INJECTION, POWDER, LYOPHILIZED, FOR SOLUTION INTRAVENOUS at 11:21

## 2018-11-20 RX ADMIN — Medication 300 UNITS: at 12:01

## 2018-11-20 RX ADMIN — PACLITAXEL 150 MG: 6 INJECTION, SOLUTION INTRAVENOUS at 10:09

## 2018-11-20 NOTE — ASSESSMENT & PLAN NOTE
She will be seeing pulmonology tomorrow  She did not take loading dose of xarelto and is doing 20mg daily  I did resend the loading dose over

## 2018-11-20 NOTE — ASSESSMENT & PLAN NOTE
Lab Results   Component Value Date    HGBA1C 11 5 09/04/2018       No results for input(s): POCGLU in the last 72 hours  Blood Sugar Average: Last 72 hrs:  I reviewed with her that she is supposed to be taking toujeo 35 units  We discussed taking gabapentin TID for neuropathy

## 2018-11-20 NOTE — PLAN OF CARE

## 2018-11-20 NOTE — ASSESSMENT & PLAN NOTE
Continue on colace and I discussed with her that she should be taking miralax also    She was given medication list print out to make sure she has medications

## 2018-11-20 NOTE — PROGRESS NOTES
Assessment/Plan:     Constipation  Continue on colace and I discussed with her that she should be taking miralax also  She was given medication list print out to make sure she has medications    Bilateral pulmonary embolism [facility])  She will be seeing pulmonology tomorrow  She did not take loading dose of xarelto and is doing 20mg daily  I did resend the loading dose over  Abdominal pain  Discussed that abdomen pain is likely from h  Pylori but it may be related to chemo also  She needs to complete therapy for h  Pylori  She refuses to take pepto-bismol with it however  Type 2 diabetes mellitus with complication, with long-term current use of insulin ([facility])  Lab Results   Component Value Date    HGBA1C 11 5 09/04/2018       No results for input(s): POCGLU in the last 72 hours  Blood Sugar Average: Last 72 hrs:  I reviewed with her that she is supposed to be taking toujeo 35 units  We discussed taking gabapentin TID for neuropathy  Problem List Items Addressed This Visit        Endocrine    Type 2 diabetes mellitus with complication, with long-term current use of insulin ([facility] )     Lab Results   Component Value Date    HGBA1C 11 5 09/04/2018       No results for input(s): POCGLU in the last 72 hours  Blood Sugar Average: Last 72 hrs:  I reviewed with her that she is supposed to be taking toujeo 35 units  We discussed taking gabapentin TID for neuropathy  Respiratory    Bilateral pulmonary embolism ([facility] ) - Primary     She will be seeing pulmonology tomorrow  She did not take loading dose of xarelto and is doing 20mg daily  I did resend the loading dose over  Relevant Medications    rivaroxaban (XARELTO) 15 mg tablet       Other    Constipation     Continue on colace and I discussed with her that she should be taking miralax also    She was given medication list print out to make sure she has medications         Abdominal pain     Discussed that abdomen pain is likely from h  Pylori but it may be related to chemo also  She needs to complete therapy for h  Pylori  She refuses to take pepto-bismol with it however  Subjective:     Patient ID: Laurie Hernandez is a 59 y o  female  TCM Call (since 10/21/2018)     None      TCM Call (since 10/21/2018)     None      Addendum call was made on 11/8 to patient and appointment was scheduled at that time and the SHEREEN VINCENT note is under episode  HPI  60 y/o F here for follow up from [facility] [place] from 11/6-11/8 for abdominal pain and dyspnea  She was having constipation for 5 days prior  She is undergoing chemotherapy right now for 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  She had positive EGD for h  Pylori in September and stool antigen was positive again and quadruple therapy started  She was also place on miralax and colace for constipation  She thinks she is taking the colace  but unsure about the miralax  She is getting constipation from the pepto so she is not taking it  She had venous dopler that showed bilateral DVT  She was rx xarelot  Yesterday she was seen in ED for chest pain  She did not have any new  PE found on CT  CT did still show scattered bilateral pulmonoary emboli  She did have 3mm nodule in left lower lobe though and CT of chest was recommended in 3 months  She is getting SOB with walking currently and wants to use PRN oxygen  She is getting pain in both legs but right leg is worse  It is worse at the knee  She has appointment tomorrow to review MRI of right knee  She did have surgery years ago  She was rx hydrocodone but did not start it yet  She gets swelling at knee and weakness      She also has diabetes  Blood sugar is 200-300 with food  It is higher after doing chemotherapy  She is doing 10 units of toujeo and 70 units humulin AM dn 30 Pm  She does have numbness of hands and feet which started with chemo    She thinks she may have banged left foot into something and 3rd toe has a healing cut  Review of Systems   Constitutional: Positive for fatigue  Negative for chills and fever  Eyes: Positive for visual disturbance  Respiratory: Positive for shortness of breath  Negative for cough  Cardiovascular: Positive for chest pain  Negative for leg swelling  Gastrointestinal: Positive for abdominal pain, constipation and nausea  Genitourinary: Negative for difficulty urinating  Musculoskeletal: Positive for arthralgias  Skin: Negative for rash  Neurological: Positive for dizziness and numbness  Objective:     Physical Exam   Constitutional: She is oriented to person, place, and time  She appears well-developed and well-nourished  No distress  HENT:   Head: Normocephalic and atraumatic  Right Ear: External ear normal    Left Ear: External ear normal    Eyes: Conjunctivae are normal    Neck: Normal range of motion  Neck supple  No thyromegaly present  Cardiovascular: Normal rate, regular rhythm and normal heart sounds  Pulses are no weak pulses  No murmur heard  Pulses:       Dorsalis pedis pulses are 2+ on the right side, and 2+ on the left side  Posterior tibial pulses are 2+ on the right side  Pulmonary/Chest: Effort normal and breath sounds normal  No respiratory distress  She has no wheezes  Abdominal: She exhibits no distension and no mass  There is tenderness (mild epigastric tenderness)  There is no rebound and no guarding  Musculoskeletal: Normal range of motion  She exhibits tenderness (tender over right calf and right knee medially with mild swelling right knee)  Feet:   Right Foot:   Skin Integrity: Negative for ulcer, skin breakdown, erythema, warmth, callus or dry skin  Left Foot:   Skin Integrity: Negative for ulcer, skin breakdown, erythema, warmth, callus or dry skin  Lymphadenopathy:     She has no cervical adenopathy  Neurological: She is alert and oriented to person, place, and time     Psychiatric: She has a normal mood and affect  Her behavior is normal    Nursing note and vitals reviewed  Patient's shoes and socks removed  Right Foot/Ankle   Right Foot Inspection  Skin Exam: skin normal and skin intact no dry skin, no warmth, no callus, no erythema, no maceration, no abnormal color, no pre-ulcer, no ulcer and no callus                          Toe Exam: ROM and strength within normal limits  Sensory   Vibration: absent    Monofilament testing: absent  Vascular    The right DP pulse is 2+  The right PT pulse is 2+  Left Foot/Ankle  Left Foot Inspection  Skin Exam: skin normal and skin intactno dry skin, no warmth, no erythema, no maceration, normal color, no pre-ulcer, no ulcer and no callus                         Toe Exam: ROM and strength within normal limits                   Sensory   Vibration: absent    Monofilament: absent  Vascular    The left DP pulse is 2+  Assign Risk Category:  No deformity present; Loss of protective sensation; No weak pulses       Risk: 2    Vitals:    11/20/18 1427   BP: 156/88   BP Location: Left arm   Patient Position: Sitting   Cuff Size: Standard   Pulse: (!) 114   Resp: 18   Temp: 97 6 °F (36 4 °C)   TempSrc: Tympanic   SpO2: 96%   Weight: 80 1 kg (176 lb 8 oz)   Height: 5' 5" (1 651 m)       Transitional Care Management Review:  Louis Horowitz is a 59 y o  female here for TCM follow up       During the TCM phone call patient stated:    TCM Call (since 10/21/2018)     None      TCM Call (since 10/21/2018)     None          Emily Mtz PA-C

## 2018-11-20 NOTE — ED PROCEDURE NOTE
PROCEDURE  ECG 12 Lead Documentation  Date/Time: 11/19/2018 8:30 PM  Performed by: Parish Mckeon  Authorized by: Parish Mckeon     Indications / Diagnosis:  [place]  ECG reviewed by me, the ED Provider: yes    Patient location:  ED  Interpretation:     Interpretation: normal    Rate:     ECG rate assessment: normal    Rhythm:     Rhythm: sinus rhythm    Ectopy:     Ectopy: none    Conduction:     Conduction: normal    ST segments:     ST segments:  Normal  T waves:     T waves: normal           Vidya Mar DO  11/19/18 2031

## 2018-11-20 NOTE — ASSESSMENT & PLAN NOTE
Discussed that abdomen pain is likely from h  Pylori but it may be related to chemo also  She needs to complete therapy for h  Pylori  She refuses to take pepto-bismol with it however

## 2018-11-20 NOTE — DISCHARGE INSTRUCTIONS
Dolor de pecho   LO QUE NECESITA SABER:   El dolor en el pecho puede ser provocado por felicita variedad de condiciones, algunas no tan serias y otras que son de peligro mortal  Ector Fellers ser un síntoma de un problema digestivo, nelli la acidez o felicita úlcera estomacal  Un ataque de ansiedad o felicita emoción jerry, nelli el enojo, también pueden provocar dolor de [place]  Felicita infección, inflamación o fractura en un hueso o cartílago en el pecho podría provocar dolor o molestia  En ocasiones el dolor torácico o la presión en el pecho pueden ser el resultado de myrna circulación de la teresa al corazón (angina)  El dolor de pecho también puede ser causado por trastornos potencialmente mortales nelli un ataque al corazón o un coágulo de [employer]  INSTRUCCIONES SOBRE EL ALLEGRA HOSPITALARIA:   Llame al 911 si presenta:   · Usted tiene alguno de los siguientes signos de un ataque cardíaco:      ¨ Estornudos, presión, o dolor en pruitt pecho que dura mas de 5 minutos o regresa  ¨ Malestar o dolor en pruitt espalda, jayda, mandíbula, abdomen, o brazo     ¨ Dificultad para respirar    ¨ Náuseas o vómito    ¨ Siente un desvanecimiento o tiene sudores fríos especialmente en el pecho o dificultad para respirar  Regrese a la darrin de emergencias si:   · La inflamación en pruitt pecho empeora, aun con tratamiento  · Usted tose o vomita teresa  · Tia heces son negras o tienen teresa  · Usted no puede dejar de vomitar o le duele al tragar  Pregúntele a pruitt [place] vitaminas y minerales son adecuados para usted  · Usted tiene preguntas o inquietudes acerca de pruitt condición o cuidado  Medicamentos:   · Medicamentos,  pueden administrarse para tratar la causa del dolor de pecho  Por ejemplo, analgésicos, medicamentos para la ansiedad o medicamentos para aumentar el flujo de teresa al corazón       · No tome ciertos medicamentos sin antes preguntarle a pruitt médico   Estos incluyen CHAD, suplementos vitamínicos o a base de hierbas u hormonas (estrógeno o progestágeno)  · Babb joe medicamentos nelli se le haya indicado  Consulte con pruitt médico si usted giselle que pruitt medicamento no le está ayudando o si presenta efectos secundarios  Infórmele si es alérgico a cualquier medicamento  Mantenga felicita lista actualizada de los [place], las vitaminas y los productos herbales que gilmar  Incluya los siguientes datos de los medicamentos: cantidad, frecuencia y motivo de administración  Traiga con usted la lista o los envases de la píldoras a joe citas de seguimiento  Lleve la lista de los medicamentos con usted en solo de felicita emergencia  Programe felicita manish con pruitt médico dentro de 67 horas o nelli se le indique:  Es posible que deba regresar para hacerse más pruebas para encontrar la causa del dolor de [place]  Es probable que lo refieran a un especialista, nelli un cardiólogo o un gastroenterólogo  Anote joe preguntas para que se acuerde de hacerlas fabio joe visitas  Consejos para vivir saludable:  Los siguientes son consejos generales de julia  Si pruitt dolor de pecho es causado por un problema cardíaco, pruitt médico le dará consejos específicos a seguir  · No fume  La nicotina y otros químicos contenidos en los cigarrillos y cigarros pueden causar daño a joe pulmones y el corazón  Pida información a pruitt médico si usted actualmente fuma y necesita ayuda para dejar de fumar  Los cigarrillos electrónicos o tabaco sin humo todavía contienen nicotina  Consulte con pruitt médico antes de [employer]  · Consuma felicita variedad de alimentos saludables y bajos en grasas  Los alimentos saludables incluyen frutas, verduras, pan integral, productos lácteos bajos en grasa, frijoles, emre magras y pescado  Pida más información acerca de felicita dieta saludable para el corazón  · Pregunte acerca de la [place]  Pruitt médico le dirá cuáles actividades limitar y cuáles evitar   Pregunte cuándo [employer], regresar a pruitt trabajo y [employer]  Pida más información acerca de un plan de ejercicio adecuado para usted  · Mantenga un peso saludable  Consulte con mccormick médico cuánto debería pesar  [place] que lo ayude a crear un plan para bajar de peso si tiene sobrepeso  · Póngase la vacuna de la gripe y la neumonía  Todos los adultos deberían recibir la vacuna de la influenza (gripe)  Vacúnese cada año cheema pronto nelli la vacuna esté disponible  La vacuna neumocócica se le aplica a adultos de 72 o más años de [place]  La vacuna se aplica cada 5 años para prevenir enfermedades neumocócicas, nelli la neumonía  © 2017 [address] Information is for End User's use only and may not be sold, redistributed or otherwise used for commercial purposes  All illustrations and images included in CareNotes® are the copyrighted property of A D A M , Inc  or Zi Aragon  Esta información es sólo para uso en educación  Mccormick intención no es darle un consejo médico sobre enfermedades o tratamientos  Colsulte con mccormick Lesleigh Madonna farmacéutico antes de seguir cualquier régimen médico para saber si es seguro y efectivo para usted

## 2018-11-21 ENCOUNTER — OFFICE VISIT (OUTPATIENT)
Dept: PULMONOLOGY | Facility: CLINIC | Age: 63
End: 2018-11-21
Payer: MEDICARE

## 2018-11-21 ENCOUNTER — TRANSITIONAL CARE MANAGEMENT (OUTPATIENT)
Dept: FAMILY MEDICINE CLINIC | Facility: CLINIC | Age: 63
End: 2018-11-21

## 2018-11-21 ENCOUNTER — OFFICE VISIT (OUTPATIENT)
Dept: OBGYN CLINIC | Facility: MEDICAL CENTER | Age: 63
End: 2018-11-21
Payer: MEDICARE

## 2018-11-21 VITALS
HEART RATE: 97 BPM | WEIGHT: 178 LBS | SYSTOLIC BLOOD PRESSURE: 149 MMHG | HEIGHT: 65 IN | DIASTOLIC BLOOD PRESSURE: 82 MMHG | BODY MASS INDEX: 29.66 KG/M2

## 2018-11-21 VITALS
RESPIRATION RATE: 16 BRPM | DIASTOLIC BLOOD PRESSURE: 80 MMHG | HEART RATE: 96 BPM | WEIGHT: 172 LBS | HEIGHT: 65 IN | OXYGEN SATURATION: 97 % | TEMPERATURE: 97.6 F | SYSTOLIC BLOOD PRESSURE: 132 MMHG | BODY MASS INDEX: 28.66 KG/M2

## 2018-11-21 DIAGNOSIS — J45.40 MODERATE PERSISTENT ASTHMA WITHOUT COMPLICATION: Primary | ICD-10-CM

## 2018-11-21 DIAGNOSIS — S83.281A ACUTE LATERAL MENISCUS TEAR OF RIGHT KNEE, INITIAL ENCOUNTER: Primary | ICD-10-CM

## 2018-11-21 LAB
ATRIAL RATE: 100 BPM
ATRIAL RATE: 92 BPM
P AXIS: 14 DEGREES
P AXIS: 50 DEGREES
PR INTERVAL: 148 MS
PR INTERVAL: 154 MS
QRS AXIS: 1 DEGREES
QRS AXIS: 5 DEGREES
QRSD INTERVAL: 72 MS
QRSD INTERVAL: 74 MS
QT INTERVAL: 340 MS
QT INTERVAL: 342 MS
QTC INTERVAL: 422 MS
QTC INTERVAL: 438 MS
T WAVE AXIS: 54 DEGREES
T WAVE AXIS: 61 DEGREES
VENTRICULAR RATE: 100 BPM
VENTRICULAR RATE: 92 BPM

## 2018-11-21 PROCEDURE — 99213 OFFICE O/P EST LOW 20 MIN: CPT | Performed by: FAMILY MEDICINE

## 2018-11-21 PROCEDURE — 99215 OFFICE O/P EST HI 40 MIN: CPT | Performed by: INTERNAL MEDICINE

## 2018-11-21 PROCEDURE — 93010 ELECTROCARDIOGRAM REPORT: CPT | Performed by: INTERNAL MEDICINE

## 2018-11-21 RX ORDER — FLUTICASONE FUROATE AND VILANTEROL 100; 25 UG/1; UG/1
1 POWDER RESPIRATORY (INHALATION) DAILY
Qty: 2 INHALER | Refills: 8 | Status: SHIPPED | OUTPATIENT
Start: 2018-11-21 | End: 2019-01-21 | Stop reason: ALTCHOICE

## 2018-11-21 NOTE — PATIENT INSTRUCTIONS
Explained the patient that she does have evidence of a posterior lateral meniscal tear of her knee  This does appear to be the source of her pain at this time  Unfortunately she is going through breast cancer is on chemotherapy currently  As such I recommended against corticosteroid injection at this time  Explained that she may follow up with her oncologist to determine if she would be a candidate for intra-articular corticosteroid injection and that this would not be contraindicated in the setting of current breast cancer  I have recommended she go to physical therapy as well as start hinged knee brace  Alternatively if not a candidate for corticosteroid injection I explained she may have some relief with viscosupplementation injection or gel shots  Patient also voices concern today as starting physical therapy in the setting of recent bilateral lower extremity acute DVTs as evidence by ultrasound on 11/06/2018  She explains that she is worried if clots will move  I recommend she follow-up with her primary care doctor to determine safety of starting physical therapy for her knee

## 2018-11-21 NOTE — PROGRESS NOTES
Pulmonary outpatient note   Lalita Morelos 59 y o  female MRN: [id number]  11/21/2018      Assessment and plan:  Lalita Morelos has the following medical problems: 1  Asthma  Patient had moderate persistent asthma for many years  She is not taking inhaled cortical steroids  She has multiple hospitalizations for her asthma without ICU  I prescribed Breo, gave her showed and review her technique which was good  I referred her to pulmonary function test and 6 min walk test to evaluate the severity of the airflow obstruction, the response to bronchodilators and the need of oxygen  Also referred to Manjinderu allergy test since he has a dog in her house and she is saying she is probably allergic to dose  2   Lung nodule  Seen on a CT scan on November 2018   3 mm  Follow-up in few months will be considered in her next appointment  3   Pulmonary embolism  Was diagnosed in November 2018  She is taking Xarelto  Patient has breast cancer and is under chemotherapy  That is probably a provoked PE secondary to cancer  Length of therapy for the PE should be decided by Hematology  Follow-up in 3 months with PFTs, response to Breo, blood tests  Sheron Wu MD/PhD,  [facility]      Return in about 3 months (around 2/21/2019)  History of Present Illness   This is 59y o  year old female with previous medical history of asthma since early childhood with multiple hospitalizations in [facility] none of them in ICU allergic to    She is taking albuterol 2 to 3 times a week  She is having daily symptoms  She has seasonal allergies  She has a dog in the house that she is saying she might be allergic to  She has breast cancer and she is status post lumpectomy and lymph node dissection in September 2018 and currently chemotherapy  This is stage IA right side, grade 2, ER positive     She was admitted to the hospital in November 2018 due to shortness of breath and found to have pulmonary embolism  She is currently taking Xarelto  Social history:  She never smoked, she does not consume alcohol    Occupational history:  She worked in a [address]  in [place] which [employer]  Review of Systems   Constitutional: Negative  HENT: Negative  Eyes: Negative  Respiratory: Positive for chest tightness, shortness of breath and wheezing  Cardiovascular: Negative  Gastrointestinal: Negative  Endocrine: Negative  Genitourinary: Negative  Musculoskeletal: Negative  Allergic/Immunologic: Negative  Neurological: Negative  Hematological: Negative  Psychiatric/Behavioral: Negative  Historical Information   Past Medical History:   Diagnosis Date    Abdominal infection ([facility] )     h-pylori    Asthma     Breast cancer ([facility] )     [facility]     BREAST    Diabetes mellitus ([place] [address] )     blood sugar 199 @ 735    Hiatal hernia     Hypercholesterolemia     Hypertension     Hypothyroid     Renal disorder     Rheumatoid arthritis ([facility] )      Past Surgical History:   Procedure Laterality Date    BREAST BIOPSY      BREAST LUMPECTOMY Right 6/26/2018    Procedure: RIGHT BREAST NEEDLE LOCALIZATION X2 WITH RIGHT BREAST LUMPECTOMY ( NEEDLE LOC @ 1000); Surgeon: Marcela Louie MD;  Location:  MAIN OR;  Service: Surgical Oncology    BREAST LUMPECTOMY Right 8/2/2018    Procedure: LYMPHOSCINITGRAPHY INTRAOPERATIVE LYMPHATIC MAPPING , RIGHT SENTINEL NODE BIOPSY, REEXCISION  RIGHT BREAST LUMPECTOMY CAVITY (SUPERIOR MARGIN); Surgeon: Marcela Louie MD;  Location:  MAIN OR;  Service: Surgical Oncology    BREAST SURGERY Left     CATARACT EXTRACTION, BILATERAL Bilateral     EGD AND COLONOSCOPY N/A 9/5/2018    Procedure: EGD AND COLONOSCOPY;  Surgeon: Tommie Holstein, MD;  Location[facility];   Service: Gastroenterology    [facility] GUIDED CENTRAL VENOUS ACCESS REPLACEMENT  9/13/2018    KNEE SURGERY Right     MAMMO NEEDLE LOCALIZATION RIGHT (ALL INC) Right 6/26/2018    MAMMO STEREOTACTIC BREAST BIOPSY RIGHT (ALL INC) Right 5/23/2018    RETINAL DETACHMENT SURGERY Right     TUBAL LIGATION      TUNNELED VENOUS PORT PLACEMENT Left 9/13/2018    Procedure: INSERTION VENOUS PORT (PORT-A-CATH);   Surgeon: Bessie Anderson MD;  [facility]: [facility] MAIN [facility];  Service: Surgical Oncology     Family History   Problem Relation Age of Onset    Diabetes Mother     Hypertension Mother     Diabetes Father     Hypertension Father     Diabetes Brother     Hypertension Brother     Prostate cancer Brother     Cancer Maternal Grandfather        Meds/Allergies     Current Outpatient Prescriptions:     albuterol (2 5 mg/3 mL) 0 083 % nebulizer solution, Take 1 vial (2 5 mg total) by nebulization every 4 (four) hours as needed for wheezing or shortness of breath, Disp: 75 mL, Rfl: 0    albuterol (PROVENTIL HFA,VENTOLIN HFA) 90 mcg/act inhaler, Inhale 1 puff every 4 (four) hours as needed  , Disp: , Rfl:     amLODIPine-atorvastatin (CADUET) 10-10 MG per tablet, take 1 tablet by mouth once daily, Disp: 90 tablet, Rfl: 1    amoxicillin (AMOXIL) 500 MG tablet, Take 2 tablets (1,000 mg total) by mouth 2 (two) times a day for 15 days, Disp: 56 tablet, Rfl: 0    SANG MICROLET LANCETS lancets, Testing three times a day, Disp: 100 each, Rfl: 3    bismuth subsalicylate (PEPTO BISMOL) 262 MG chewable tablet, Chew 2 tablets (524 mg total) 4 (four) times a day (before meals and at bedtime), Disp: 112 tablet, Rfl: 0    Blood Glucose Monitoring Suppl (ACURA BLOOD GLUCOSE METER) w/Device KIT, Testing blood sugars three times a day, Disp: , Rfl:     Blood Glucose Monitoring Suppl (SANG CONTOUR MONITOR) w/Device KIT, Testing blood sugars three times a day, Disp: 1 kit, Rfl: 0    docusate sodium (COLACE) 100 mg capsule, Take 1 capsule (100 mg total) by mouth 2 (two) times a day, Disp: 10 capsule, Rfl: 0    enalapril (VASOTEC) 20 mg tablet, take 1 tablet by mouth once daily, Disp: 90 tablet, Rfl: 1    gabapentin (NEURONTIN) 300 mg capsule, take 1 capsule by mouth three times a day (Patient taking differently: as needed), Disp: 90 capsule, Rfl: 1    gemfibrozil (LOPID) 600 mg tablet, Take 1 tablet (600 mg total) by mouth 2 (two) times a day, Disp: 60 tablet, Rfl: 3    glipiZIDE (GLUCOTROL XL) 10 mg 24 hr tablet, take 1 tablet by mouth once daily, Disp: 90 tablet, Rfl: 1    glucose blood (SANG CONTOUR TEST) test strip, Testing three times a day, Disp: 100 each, Rfl: 3    HUMULIN N 100 UNIT/ML subcutaneous injection, 70 units in the am 30 units in the pm, Disp: 30 mL, Rfl: 5    HYDROcodone-acetaminophen (NORCO) 5-325 mg per tablet, Take 1 tablet by mouth every 4 (four) hours as needed for pain for up to 10 days Max Daily Amount: 6 tablets, Disp: 15 tablet, Rfl: 0    Insulin Glargine (TOUJEO) 300 units/mL CONCETRATED U-300 injection pen, Inject 35 Units under the skin daily at bedtime (Patient taking differently: Inject 10 Units under the skin daily at bedtime  ), Disp: 5 pen, Rfl: 3    Insulin Syringe-Needle U-100 (B-D INS SYRINGE 0 5CC/30GX1/2") 30G X 1/2" 0 5 ML MISC, , Disp: , Rfl:     Insulin Syringe-Needle U-100 30G 0 3 ML MISC, Using twice a day, Disp: , Rfl:     Insulin Syringe-Needle U-100 30G X 1/2" 1 ML MISC, Using twice a day, Disp: 100 each, Rfl: 3    Lancets MISC, Testing three times a day, Disp: , Rfl:     levothyroxine 50 mcg tablet, take 1 tablet by mouth once daily, Disp: 90 tablet, Rfl: 1    lidocaine-prilocaine (EMLA) cream, Apply topically as needed for mild pain Apply prior to port access  , Disp: 30 g, Rfl: 0    metFORMIN (GLUCOPHAGE-XR) 500 mg 24 hr tablet, take 2 tablets by mouth once daily WITH BREAKFAST, Disp: 180 tablet, Rfl: 1    metroNIDAZOLE (FLAGYL) 250 mg tablet, Take 1 tablet (250 mg total) by mouth 4 (four) times a day for 15 days, Disp: 56 tablet, Rfl: 0    polyethylene glycol (MIRALAX) 17 g packet, Take 17 g by mouth daily, Disp: 14 each, Rfl: 0    rivaroxaban (XARELTO) 15 mg tablet, Take 1 tablet (15 mg total) by mouth 2 (two) times a day with meals for 21 days, Disp: 42 tablet, Rfl: 0    [START ON 11/29/2018] rivaroxaban (XARELTO) 20 mg tablet, Take 1 tablet (20 mg total) by mouth daily with breakfast, Disp: 30 tablet, Rfl: 3    tetracycline (ACHROMYCIN,SUMYCIN) 500 MG capsule, Take 500 mg by mouth 4 (four) times a day, Disp: , Rfl:     traZODone (DESYREL) 50 mg tablet, take 1 tablet by mouth once daily at bedtime, Disp: 90 tablet, Rfl: 0    fluticasone-vilanterol (BREO ELLIPTA) 100-25 mcg/inh inhaler, Inhale 1 puff daily Rinse mouth after use , Disp: 2 Inhaler, Rfl: 8    omeprazole (PriLOSEC) 40 MG capsule, Take 1 capsule (40 mg total) by mouth 2 (two) times a day for 14 days, Disp: 28 capsule, Rfl: 0  No current facility-administered medications for this visit  Facility-Administered Medications Ordered in Other Visits:     sodium chloride 0 9 % infusion, 20 mL/hr, Intravenous, Continuous, Kiran Harrell MD, Stopped at 11/20/18 1204  Allergies   Allergen Reactions    Aspirin Hives     Dr  in [address] told patient not to take aspirin r/t kidneys     Tylenol With Codeine #3 [Acetaminophen-Codeine] Rash       Vitals: Blood pressure 132/80, pulse 96, temperature 97 6 °F (36 4 °C), temperature source Tympanic, resp  rate 16, height 5' 5" (1 651 m), weight 78 kg (172 lb), SpO2 97 %, not currently breastfeeding  Body mass index is 28 62 kg/m²  Oxygen Therapy  SpO2: 97 %    Physical Exam  Physical Exam   Constitutional: She is oriented to person, place, and time  She appears well-developed and well-nourished  HENT:   Head: Normocephalic  Eyes: Pupils are equal, round, and reactive to light  Neck: Normal range of motion  Cardiovascular: Normal rate, regular rhythm and normal heart sounds  Exam reveals no gallop and no friction rub  No murmur heard  Pulmonary/Chest: Effort normal and breath sounds normal  No respiratory distress  She has no wheezes  She has no rales  Abdominal: Soft  Musculoskeletal: Normal range of motion  She exhibits no edema  Neurological: She is alert and oriented to person, place, and time  Skin: Skin is warm  Psychiatric: She has a normal mood and affect  Labs: I have personally reviewed pertinent lab results  Lab Results   Component Value Date    WBC 7 56 11/19/2018    HGB 11 8 11/19/2018    HCT 36 1 11/19/2018    MCV 89 11/19/2018     11/19/2018     Lab Results   Component Value Date    CALCIUM 10 1 11/19/2018    K 4 1 11/19/2018    CO2 24 11/19/2018    CL 99 (L) 11/19/2018    BUN 21 11/19/2018    CREATININE 0 83 11/19/2018     No results found for: IGE  Lab Results   Component Value Date    ALT 39 11/19/2018    AST 14 11/19/2018    ALKPHOS 87 11/19/2018       Imaging and other studies:   I have personally reviewed pertinent imaging studies in PACS  The patient had chest CT in November 2018 that showed scattered bilateral segmental and subsegmental pulmonary emboli and 3 mm pulmonary nodule in the left lower lobe, stable        Lashanda Caballero MD/PhD,  [facility]

## 2018-11-21 NOTE — PROGRESS NOTES
1  Acute lateral meniscus tear of right knee, initial encounter   Physical Therapy     Orders Placed This Encounter   Procedures    [facility]        Imaging Studies (I personally reviewed images in PACS and report):  MRI right knee:  IMPRESSION:   Large complex of the lateral meniscus posterior horn and body (series 6 images 13 through 17 and series 4 images 8 through 11 )   Bony structures are normal without evidence of metastases  Past diagnostics:  X-ray right knee 10/03/2018:  Mild to moderate medial compartment osteoarthritis  IMPRESSION:  Right knee complex posterior lateral meniscal tear    PMH significant:  current breast cancer on chemotherapy  Recent bilateral lower extremity DVT by ultrasound 11/06/2018    Return in about 6 weeks (around 1/2/2019)  Patient Instructions   Explained the patient that she does have evidence of a posterior lateral meniscal tear of her knee  This does appear to be the source of her pain at this time  Unfortunately she is going through breast cancer is on chemotherapy currently  As such I recommended against corticosteroid injection at this time  Explained that she may follow up with her oncologist to determine if she would be a candidate for intra-articular corticosteroid injection and that this would not be contraindicated in the setting of current breast cancer  I have recommended she go to physical therapy as well as start hinged knee brace  Alternatively if not a candidate for corticosteroid injection I explained she may have some relief with viscosupplementation injection or gel shots  Patient also voices concern today as starting physical therapy in the setting of recent bilateral lower extremity acute DVTs as evidence by ultrasound on 11/06/2018  She explains that she is worried if clots will move  I recommend she follow-up with her primary care doctor to determine safety of starting physical therapy for her knee            CHIEF COMPLAINT:  Right Knee Pain follow-up    HPI:  Laurie Hernandez is a 59 y o  female  who presents for       Visit  10/3/18  Patient presents with 6 month history of right knee pain located lateral knee associated with swelling but no erythema or warmth  Patient reports knee pain is aggravated when walking up a flight of stairs or getting in and out of a motor vehicle  She also states feeling intermittent catching and locking of the knee most notable after prolonged sitting  Patient reports history of meniscal repair about 30 years ago after an injury sustained while running  Currently patient is applying heat and using gabapentin for pain with minimal relief  10/31/2018: Follow-up evaluation of right knee pain:  Uncontrolled  Patient has persistent pain  Last visit patient was sent to physical therapy but has been noncompliant  Points anterior knee as source of her pain  Patient does have past medical history significant for current breast cancer on chemotherapy  Interpretation today provided by medical assistance Prasanna Levi    11/21/2018: Follow-up evaluation right knee pain:  Controlled  Patient continues to point to the lateral aspect of her knee as source of her pain  Last visit she had MRI ordered which did confirm a posterior lateral meniscal tear of her knee  She does have past medical history significant for ongoing cancer under current treatment with chemotherapy as well as recent development of acute bilateral DVTs found on base duplex Doppler 11/06/2018  Review of Systems   Constitutional: Negative for chills and fever  Respiratory: Negative for shortness of breath  Neurological: Negative for weakness           Following history reviewed and update:    Past Medical History:   Diagnosis Date    [facility] )     h-pylori    Asthma     [facility] cancer [facility]     Cancer [facility]     BREAST    Diabetes mellitus ([facility] )     blood sugar 199 @ 735    Hiatal hernia  Hypercholesterolemia     Hypertension     Hypothyroid     Renal disorder     [facility]      Past Surgical History:   Procedure Laterality Date    BREAST BIOPSY      BREAST LUMPECTOMY Right 6/26/2018    Procedure: RIGHT BREAST NEEDLE LOCALIZATION X2 WITH RIGHT BREAST LUMPECTOMY ( NEEDLE LOC @ 1000); Surgeon: Judith Nicholas MD;  [facility]: [facility];  Service: Surgical Oncology    BREAST LUMPECTOMY Right 8/2/2018    Procedure: LYMPHOSCINITGRAPHY INTRAOPERATIVE LYMPHATIC MAPPING , RIGHT SENTINEL NODE BIOPSY, REEXCISION  RIGHT BREAST LUMPECTOMY CAVITY (SUPERIOR MARGIN); Surgeon: Judith Nicholas MD;  Location: [facility];  Service: Surgical Oncology    BREAST SURGERY Left     CATARACT EXTRACTION, BILATERAL Bilateral     EGD AND COLONOSCOPY N/A 9/5/2018    Procedure: EGD AND COLONOSCOPY;  Surgeon: Sugey Eldridge MD;  Location[facility]; Service: Gastroenterology    [place] GUIDED CENTRAL VENOUS ACCESS REPLACEMENT  9/13/2018    KNEE SURGERY Right     MAMMO NEEDLE LOCALIZATION RIGHT (ALL INC) Right 6/26/2018    MAMMO STEREOTACTIC BREAST BIOPSY RIGHT (ALL INC) Right 5/23/2018    RETINAL DETACHMENT SURGERY Right     TUBAL LIGATION      TUNNELED VENOUS PORT PLACEMENT Left 9/13/2018    Procedure: INSERTION VENOUS PORT (PORT-A-CATH);   Surgeon: Judith Nicholas MD;  [facility]: [facility];  Service: Surgical Oncology     Social History   History   Alcohol Use No     History   Drug Use No     History   Smoking Status    Never Smoker   Smokeless Tobacco    Never Used     Family History   Problem Relation Age of Onset    Diabetes Mother     Hypertension Mother     Diabetes Father     Hypertension Father     Diabetes Brother     Hypertension Brother     Prostate cancer Brother    Mya Jarvis Dr  in [facility] told patient not to take aspirin r/t kidneys     Tylenol With Codeine #3 [Acetaminophen-Codeine] Rash          Physical Exam  Constitutional:  see vital signs  Gen: well-developed, normocephalic/atraumatic, well-groomed  Eyes: No inflammation or discharge of conjunctiva or lids; sclera clear   Pharynx: no inflammation, lesion, or mass of lips  Neck: supple, no masses, non-distended  MSK: no inflammation, lesion, mass, or clubbing of nails and digits except for other than mentioned below  SKIN: no visible rashes or skin lesions  Pulmonary/Chest: Effort normal  No respiratory distress     NEURO: cranial nerves grossly intact  PSYCH:  Alert and oriented to person, place, and time; recent and remote memory intact; mood normal, no depression, anxiety, or agitation, judgment and insight good and intact      Ortho Exam    RIGHT KNEE:  Erythema: no  Swelling: no  Increased Warmth: no  Tenderness: + greatest tenderness lateral joint line  Flexion: intact  Extension: intact  Patellar Displacement:  Patellar Tilt:  Patellar Apprehension: negative  Patellar Grind Diaz's: negative  Lachman's: negative  Drawer: negative  Varus laxity: negative  Valgus laxity: negative  Jessica: + pain lateral joint line     Procedures

## 2018-11-21 NOTE — LETTER
November 21, 2018     Cristobal Bo MD  [facility]  [address]    Patient: Amairani Perdue   YOB: 1955   Date of Visit: 11/21/2018       Dear Dr Jason Lai:    Thank you for referring Amairani Perdue to me for evaluation  Below are my notes for this consultation  If you have questions, please do not hesitate to call me  I look forward to following your patient along with you  Sincerely,        Jose Woods MD        CC: No Recipients  Jose Woods MD  11/21/2018 11:48 AM  Sign at close encounter  Pulmonary outpatient note   Amairani Perdue 59 y o  female MRN: [id number]  11/21/2018      Assessment and plan:  Amairani Perdue has the following medical problems: 1  Asthma  Patient had moderate persistent asthma for many years  She is not taking inhaled cortical steroids  She has multiple hospitalizations for her asthma without ICU  I prescribed Breo, gave her showed and review her technique which was good  I referred her to pulmonary function test and 6 min walk test to evaluate the severity of the airflow obstruction, the response to bronchodilators and the need of oxygen  Also referred to [place] allergy test since he has a dog in her house and she is saying she is probably allergic to dose  2   Lung nodule  Seen on a CT scan on November 2018   3 mm  Follow-up in few months will be considered in her next appointment  3   Pulmonary embolism  Was diagnosed in November 2018  She is taking Xarelto  Patient has breast cancer and is under chemotherapy  That is probably a provoked PE secondary to cancer  Length of therapy for the PE should be decided by Hematology  Follow-up in 3 months with PFTs, response to Breo, blood tests  Jose Woods MD/PhD,  [facility]      Return in about 3 months (around 2/21/2019)      History of Present Illness   This is 59y o  year old female with previous medical history of asthma since early childhood with multiple hospitalizations in [facility] none of them in ICU allergic to    She is taking albuterol 2 to 3 times a week  She is having daily symptoms  She has seasonal allergies  She has a dog in the house that she is saying she might be allergic to  She has breast cancer and she is status post lumpectomy and lymph node dissection in September 2018 and currently chemotherapy  This is stage IA right side, grade 2, ER positive  She was admitted to the hospital in November 2018 due to shortness of breath and found to have pulmonary embolism  She is currently taking Xarelto  Social history:  She never smoked, she does not consume alcohol    Occupational history:  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  Review of Systems   Constitutional: Negative  HENT: Negative  Eyes: Negative  Respiratory: Positive for chest tightness, shortness of breath and wheezing  Cardiovascular: Negative  Gastrointestinal: Negative  Endocrine: Negative  Genitourinary: Negative  Musculoskeletal: Negative  Allergic/Immunologic: Negative  Neurological: Negative  Hematological: Negative  Psychiatric/Behavioral: Negative  Historical Information   Past Medical History:   Diagnosis Date    [facility]dominal infection ([email or web address]     h-pylori    Asthma     [facility] cancer [facility]     Cancer ([facility] )     BREAST    Diabetes mellitus ([facility] )     blood sugar 199 @ 735    Hiatal hernia     Hypercholesterolemia     Hypertension     Hypothyroid     Renal disorder     Rheumatoid arthritis ([facility] )      Past Surgical History:   Procedure Laterality Date    BREAST BIOPSY      BREAST LUMPECTOMY Right 6/26/2018    Procedure: RIGHT BREAST NEEDLE LOCALIZATION X2 WITH RIGHT BREAST LUMPECTOMY ( NEEDLE LOC @ 1000);   Surgeon: Fox Sandoval MD;  Location:  MAIN OR;  Service: Surgical Oncology    BREAST LUMPECTOMY Right 8/2/2018    Procedure: LYMPHOSCINITGRAPHY INTRAOPERATIVE LYMPHATIC MAPPING , RIGHT SENTINEL NODE BIOPSY, REEXCISION  RIGHT BREAST LUMPECTOMY CAVITY (SUPERIOR MARGIN); Surgeon: Mark Cyr MD;  Location: [facility] MAIN [facility];  Service: Surgical Oncology    BREAST SURGERY Left     CATARACT EXTRACTION, BILATERAL Bilateral     EGD AND COLONOSCOPY N/A 9/5/2018    Procedure: EGD AND COLONOSCOPY;  Surgeon: Ofe Leslie MD;  Location: [facility] GI [facility]; Service: Gastroenterology    [place] GUIDED CENTRAL VENOUS ACCESS REPLACEMENT  9/13/2018    KNEE SURGERY Right     MAMMO NEEDLE LOCALIZATION RIGHT (ALL INC) Right 6/26/2018    MAMMO STEREOTACTIC BREAST BIOPSY RIGHT (ALL INC) Right 5/23/2018    RETINAL DETACHMENT SURGERY Right     TUBAL LIGATION      TUNNELED VENOUS PORT PLACEMENT Left 9/13/2018    Procedure: INSERTION VENOUS PORT (PORT-A-CATH);   Surgeon: Mark Cyr MD;  [facility]: [facility];  Service: Surgical Oncology     Family History   Problem Relation Age of Onset    Diabetes Mother     Hypertension Mother     Diabetes Father     Hypertension Father     Diabetes Brother     Hypertension Brother     Prostate cancer Brother     Cancer Maternal Grandfather        Meds/Allergies     Current Outpatient Prescriptions:     albuterol (2 5 mg/3 mL) 0 083 % nebulizer solution, Take 1 vial (2 5 mg total) by nebulization every 4 (four) hours as needed for wheezing or shortness of breath, Disp: 75 mL, Rfl: 0    albuterol (PROVENTIL HFA,VENTOLIN HFA) 90 mcg/act inhaler, Inhale 1 puff every 4 (four) hours as needed  , Disp: , Rfl:     amLODIPine-atorvastatin (CADUET) 10-10 MG per tablet, take 1 tablet by mouth once daily, Disp: 90 tablet, Rfl: 1    amoxicillin (AMOXIL) 500 MG tablet, Take 2 tablets (1,000 mg total) by mouth 2 (two) times a day for 15 days, Disp: 56 tablet, Rfl: 0    SANG MICROLET LANCETS lancets, Testing three times a day, Disp: 100 each, Rfl: 3    bismuth subsalicylate (PEPTO BISMOL) 262 MG chewable tablet, Chew 2 tablets (524 mg total) 4 (four) times a day (before meals and at bedtime), Disp: 112 tablet, Rfl: 0    Blood Glucose Monitoring Suppl (ACURA BLOOD GLUCOSE METER) w/Device KIT, Testing blood sugars three times a day, Disp: , Rfl:     Blood Glucose Monitoring Suppl (SANG CONTOUR MONITOR) w/Device KIT, Testing blood sugars three times a day, Disp: 1 kit, Rfl: 0    docusate sodium (COLACE) 100 mg capsule, Take 1 capsule (100 mg total) by mouth 2 (two) times a day, Disp: 10 capsule, Rfl: 0    enalapril (VASOTEC) 20 mg tablet, take 1 tablet by mouth once daily, Disp: 90 tablet, Rfl: 1    gabapentin (NEURONTIN) 300 mg capsule, take 1 capsule by mouth three times a day (Patient taking differently: as needed), Disp: 90 capsule, Rfl: 1    gemfibrozil (LOPID) 600 mg tablet, Take 1 tablet (600 mg total) by mouth 2 (two) times a day, Disp: 60 tablet, Rfl: 3    glipiZIDE (GLUCOTROL XL) 10 mg 24 hr tablet, take 1 tablet by mouth once daily, Disp: 90 tablet, Rfl: 1    glucose blood (SANG CONTOUR TEST) test strip, Testing three times a day, Disp: 100 each, Rfl: 3    HUMULIN N 100 UNIT/ML subcutaneous injection, 70 units in the am 30 units in the pm, Disp: 30 mL, Rfl: 5    HYDROcodone-acetaminophen (NORCO) 5-325 mg per tablet, Take 1 tablet by mouth every 4 (four) hours as needed for pain for up to 10 days Max Daily Amount: 6 tablets, Disp: 15 tablet, Rfl: 0    Insulin Glargine (TOUJEO) 300 units/mL CONCETRATED U-300 injection pen, Inject 35 Units under the skin daily at bedtime (Patient taking differently: Inject 10 Units under the skin daily at bedtime  ), Disp: 5 pen, Rfl: 3    Insulin Syringe-Needle U-100 (B-D INS SYRINGE 0 5CC/30GX1/2") 30G X 1/2" 0 5 ML MISC, , Disp: , Rfl:     Insulin Syringe-Needle U-100 30G 0 3 ML MISC, Using twice a day, Disp: , Rfl:     Insulin Syringe-Needle U-100 30G X 1/2" 1 ML MISC, Using twice a day, Disp: 100 each, Rfl: 3    Lancets MISC, Testing three times a day, Disp: , Rfl:    levothyroxine 50 mcg tablet, take 1 tablet by mouth once daily, Disp: 90 tablet, Rfl: 1    lidocaine-prilocaine (EMLA) cream, Apply topically as needed for mild pain Apply prior to port access  , Disp: 30 g, Rfl: 0    metFORMIN (GLUCOPHAGE-XR) 500 mg 24 hr tablet, take 2 tablets by mouth once daily WITH BREAKFAST, Disp: 180 tablet, Rfl: 1    metroNIDAZOLE (FLAGYL) 250 mg tablet, Take 1 tablet (250 mg total) by mouth 4 (four) times a day for 15 days, Disp: 56 tablet, Rfl: 0    polyethylene glycol (MIRALAX) 17 g packet, Take 17 g by mouth daily, Disp: 14 each, Rfl: 0    rivaroxaban (XARELTO) 15 mg tablet, Take 1 tablet (15 mg total) by mouth 2 (two) times a day with meals for 21 days, Disp: 42 tablet, Rfl: 0    [START ON 11/29/2018] rivaroxaban (XARELTO) 20 mg tablet, Take 1 tablet (20 mg total) by mouth daily with breakfast, Disp: 30 tablet, Rfl: 3    tetracycline (ACHROMYCIN,SUMYCIN) 500 MG capsule, Take 500 mg by mouth 4 (four) times a day, Disp: , Rfl:     traZODone (DESYREL) 50 mg tablet, take 1 tablet by mouth once daily at bedtime, Disp: 90 tablet, Rfl: 0    fluticasone-vilanterol (BREO ELLIPTA) 100-25 mcg/inh inhaler, Inhale 1 puff daily Rinse mouth after use , Disp: 2 Inhaler, Rfl: 8    omeprazole (PriLOSEC) 40 MG capsule, Take 1 capsule (40 mg total) by mouth 2 (two) times a day for 14 days, Disp: 28 capsule, Rfl: 0  No current facility-administered medications for this visit  Facility-Administered Medications Ordered in Other Visits:     sodium chloride 0 9 % infusion, 20 mL/hr, Intravenous, Continuous, Liz Kirk MD, Stopped at 11/20/18 1204  Allergies   Allergen Reactions    Aspirin Hives     Dr  in [address] told patient not to take aspirin r/t kidneys     Tylenol With Codeine #3 [Acetaminophen-Codeine] Rash       Vitals: Blood pressure 132/80, pulse 96, temperature 97 6 °F (36 4 °C), temperature source Tympanic, resp   rate 16, height 5' 5" (1 651 m), weight 78 kg (172 lb), SpO2 97 %, not currently breastfeeding  Body mass index is 28 62 kg/m²  Oxygen Therapy  SpO2: 97 %    Physical Exam  Physical Exam   Constitutional: She is oriented to person, place, and time  She appears well-developed and well-nourished  HENT:   Head: Normocephalic  Eyes: Pupils are equal, round, and reactive to light  Neck: Normal range of motion  Cardiovascular: Normal rate, regular rhythm and normal heart sounds  Exam reveals no gallop and no friction rub  No murmur heard  Pulmonary/Chest: Effort normal and breath sounds normal  No respiratory distress  She has no wheezes  She has no rales  Abdominal: Soft  Musculoskeletal: Normal range of motion  She exhibits no edema  Neurological: She is alert and oriented to person, place, and time  Skin: Skin is warm  Psychiatric: She has a normal mood and affect  Labs: I have personally reviewed pertinent lab results  Lab Results   Component Value Date    WBC 7 56 11/19/2018    HGB 11 8 11/19/2018    HCT 36 1 11/19/2018    MCV 89 11/19/2018     11/19/2018     Lab Results   Component Value Date    CALCIUM 10 1 11/19/2018    K 4 1 11/19/2018    CO2 24 11/19/2018    CL 99 (L) 11/19/2018    BUN 21 11/19/2018    CREATININE 0 83 11/19/2018     No results found for: IGE  Lab Results   Component Value Date    ALT 39 11/19/2018    AST 14 11/19/2018    ALKPHOS 87 11/19/2018       Imaging and other studies:   I have personally reviewed pertinent imaging studies in PACS  The patient had chest CT in November 2018 that showed scattered bilateral segmental and subsegmental pulmonary emboli and 3 mm pulmonary nodule in the left lower lobe, stable        Jeanette Irving MD/PhD,  [facility]

## 2018-11-26 ENCOUNTER — APPOINTMENT (OUTPATIENT)
Dept: LAB | Facility: HOSPITAL | Age: 63
End: 2018-11-26
Attending: INTERNAL MEDICINE
Payer: MEDICARE

## 2018-11-26 DIAGNOSIS — J45.40 MODERATE PERSISTENT ASTHMA WITHOUT COMPLICATION: ICD-10-CM

## 2018-11-26 LAB
ALBUMIN SERPL BCP-MCNC: 3.7 G/DL (ref 3.5–5)
ALP SERPL-CCNC: 75 U/L (ref 46–116)
ALT SERPL W P-5'-P-CCNC: 30 U/L (ref 12–78)
ANION GAP SERPL CALCULATED.3IONS-SCNC: 11 MMOL/L (ref 4–13)
AST SERPL W P-5'-P-CCNC: 12 U/L (ref 5–45)
BASOPHILS # BLD AUTO: 0.08 THOUSANDS/ΜL (ref 0–0.1)
BASOPHILS NFR BLD AUTO: 2 % (ref 0–1)
BILIRUB SERPL-MCNC: 0.4 MG/DL (ref 0.2–1)
BUN SERPL-MCNC: 22 MG/DL (ref 5–25)
CALCIUM SERPL-MCNC: 10 MG/DL (ref 8.3–10.1)
CHLORIDE SERPL-SCNC: 97 MMOL/L (ref 100–108)
CO2 SERPL-SCNC: 27 MMOL/L (ref 21–32)
CREAT SERPL-MCNC: 0.89 MG/DL (ref 0.6–1.3)
EOSINOPHIL # BLD AUTO: 0.12 THOUSAND/ΜL (ref 0–0.61)
EOSINOPHIL NFR BLD AUTO: 2 % (ref 0–6)
ERYTHROCYTE [DISTWIDTH] IN BLOOD BY AUTOMATED COUNT: 14.8 % (ref 11.6–15.1)
GFR SERPL CREATININE-BSD FRML MDRD: 70 ML/MIN/1.73SQ M
GLUCOSE SERPL-MCNC: 477 MG/DL (ref 65–140)
HCT VFR BLD AUTO: 35.2 % (ref 34.8–46.1)
HGB BLD-MCNC: 11.2 G/DL (ref 11.5–15.4)
IMM GRANULOCYTES # BLD AUTO: 0.03 THOUSAND/UL (ref 0–0.2)
IMM GRANULOCYTES NFR BLD AUTO: 1 % (ref 0–2)
LYMPHOCYTES # BLD AUTO: 2.61 THOUSANDS/ΜL (ref 0.6–4.47)
LYMPHOCYTES NFR BLD AUTO: 48 % (ref 14–44)
MCH RBC QN AUTO: 29.1 PG (ref 26.8–34.3)
MCHC RBC AUTO-ENTMCNC: 31.8 G/DL (ref 31.4–37.4)
MCV RBC AUTO: 91 FL (ref 82–98)
MONOCYTES # BLD AUTO: 0.22 THOUSAND/ΜL (ref 0.17–1.22)
MONOCYTES NFR BLD AUTO: 4 % (ref 4–12)
NEUTROPHILS # BLD AUTO: 2.35 THOUSANDS/ΜL (ref 1.85–7.62)
NEUTS SEG NFR BLD AUTO: 43 % (ref 43–75)
NRBC BLD AUTO-RTO: 0 /100 WBCS
PLATELET # BLD AUTO: 315 THOUSANDS/UL (ref 149–390)
PMV BLD AUTO: 12.1 FL (ref 8.9–12.7)
POTASSIUM SERPL-SCNC: 4.3 MMOL/L (ref 3.5–5.3)
PROT SERPL-MCNC: 7.8 G/DL (ref 6.4–8.2)
RBC # BLD AUTO: 3.85 MILLION/UL (ref 3.81–5.12)
SODIUM SERPL-SCNC: 135 MMOL/L (ref 136–145)
WBC # BLD AUTO: 5.41 THOUSAND/UL (ref 4.31–10.16)

## 2018-11-26 PROCEDURE — 86003 ALLG SPEC IGE CRUDE XTRC EA: CPT

## 2018-11-26 PROCEDURE — 80053 COMPREHEN METABOLIC PANEL: CPT

## 2018-11-26 PROCEDURE — 82785 ASSAY OF IGE: CPT

## 2018-11-26 PROCEDURE — 85025 COMPLETE CBC W/AUTO DIFF WBC: CPT

## 2018-11-26 PROCEDURE — 36415 COLL VENOUS BLD VENIPUNCTURE: CPT

## 2018-11-26 RX ORDER — SODIUM CHLORIDE 9 MG/ML
20 INJECTION, SOLUTION INTRAVENOUS CONTINUOUS
Status: DISCONTINUED | OUTPATIENT
Start: 2018-11-27 | End: 2018-11-30 | Stop reason: HOSPADM

## 2018-11-27 ENCOUNTER — TELEPHONE (OUTPATIENT)
Dept: HEMATOLOGY ONCOLOGY | Facility: CLINIC | Age: 63
End: 2018-11-27

## 2018-11-27 ENCOUNTER — HOSPITAL ENCOUNTER (OUTPATIENT)
Dept: INFUSION CENTER | Facility: CLINIC | Age: 63
Discharge: HOME | End: 2018-11-27
Payer: MEDICARE

## 2018-11-27 VITALS
SYSTOLIC BLOOD PRESSURE: 118 MMHG | RESPIRATION RATE: 18 BRPM | WEIGHT: 173.72 LBS | TEMPERATURE: 99.6 F | HEIGHT: 65 IN | BODY MASS INDEX: 28.94 KG/M2 | DIASTOLIC BLOOD PRESSURE: 72 MMHG | HEART RATE: 93 BPM

## 2018-11-27 LAB
A ALTERNATA IGE QN: <0.1 KUA/I
A FUMIGATUS IGE QN: <0.1 KUA/I
ALLERGEN COMMENT: ABNORMAL
BERMUDA GRASS IGE QN: 0.77 KUA/I
BOXELDER IGE QN: 0.76 KUA/I
C HERBARUM IGE QN: <0.1 KUA/I
CAT DANDER IGE QN: 1.7 KUA/I
CMN PIGWEED IGE QN: 0.68 KUA/I
COMMON RAGWEED IGE QN: 0.76 KUA/I
COTTONWOOD IGE QN: 0.79 KUA/I
D FARINAE IGE QN: 47.9 KUA/I
D PTERONYSS IGE QN: 73.8 KUA/I
DOG DANDER IGE QN: 38 KUA/I
LONDON PLANE IGE QN: 0.73 KUA/I
MOUSE URINE PROT IGE QN: <0.1 KUA/I
MT JUNIPER IGE QN: 0.57 KUA/I
MUGWORT IGE QN: 0.63 KUA/I
P NOTATUM IGE QN: <0.1 KUA/I
ROACH IGE QN: 5.5 KUA/I
SHEEP SORREL IGE QN: 0.68 KUA/I
SILVER BIRCH IGE QN: 0.49 KUA/I
TIMOTHY IGE QN: 0.71 KUA/I
TOTAL IGE SMQN RAST: 863 KU/L (ref 0–113)
WALNUT IGE QN: 0.8 KUA/I
WHITE ASH IGE QN: 0.79 KUA/I
WHITE ELM IGE QN: 0.82 KUA/I
WHITE MULBERRY IGE QN: 0.46 KUA/I
WHITE OAK IGE QN: 0.68 KUA/I

## 2018-11-27 PROCEDURE — 96413 CHEMO IV INFUSION 1 HR: CPT

## 2018-11-27 PROCEDURE — 96417 CHEMO IV INFUS EACH ADDL SEQ: CPT

## 2018-11-27 PROCEDURE — 96367 TX/PROPH/DG ADDL SEQ IV INF: CPT

## 2018-11-27 RX ADMIN — TRASTUZUMAB 160 MG: 150 INJECTION, POWDER, LYOPHILIZED, FOR SOLUTION INTRAVENOUS at 11:28

## 2018-11-27 RX ADMIN — DIPHENHYDRAMINE HYDROCHLORIDE 25 MG: 50 INJECTION, SOLUTION INTRAMUSCULAR; INTRAVENOUS at 09:09

## 2018-11-27 RX ADMIN — DEXAMETHASONE SODIUM PHOSPHATE 10 MG: 10 INJECTION, SOLUTION INTRAMUSCULAR; INTRAVENOUS at 09:46

## 2018-11-27 RX ADMIN — Medication 300 UNITS: at 12:01

## 2018-11-27 RX ADMIN — FAMOTIDINE 20 MG: 10 INJECTION, SOLUTION INTRAVENOUS at 09:30

## 2018-11-27 RX ADMIN — SODIUM CHLORIDE 20 ML/HR: 0.9 INJECTION, SOLUTION INTRAVENOUS at 09:08

## 2018-11-27 RX ADMIN — PACLITAXEL 150 MG: 6 INJECTION, SOLUTION INTRAVENOUS at 10:23

## 2018-11-27 NOTE — TELEPHONE ENCOUNTER
Paperwork refaxed to the number provided by pt's daughter  Pt's daughter aware if Elma Croft does not get it to call me and I will call them

## 2018-11-27 NOTE — PROGRESS NOTES
Patient tolerated her chemotherapy well without adverse reaction  [address] [place] [address] will let Dr Keena Lima know that pt may need a procedure on her knee  Preston Hyde also came to help that patient and her daughter with possible FMLA

## 2018-11-27 NOTE — TELEPHONE ENCOUNTER
Daughter stopped by and said that her work never received the paperwork at [facility] # [phone] fax #[phone]

## 2018-11-29 ENCOUNTER — OFFICE VISIT (OUTPATIENT)
Dept: HEMATOLOGY ONCOLOGY | Facility: CLINIC | Age: 63
End: 2018-11-29
Payer: MEDICARE

## 2018-11-29 VITALS
WEIGHT: 176 LBS | TEMPERATURE: 97.8 F | OXYGEN SATURATION: 98 % | BODY MASS INDEX: 29.32 KG/M2 | HEART RATE: 97 BPM | DIASTOLIC BLOOD PRESSURE: 82 MMHG | SYSTOLIC BLOOD PRESSURE: 140 MMHG | RESPIRATION RATE: 18 BRPM | HEIGHT: 65 IN

## 2018-11-29 DIAGNOSIS — Z17.0 MALIGNANT NEOPLASM OF RIGHT BREAST IN FEMALE, ESTROGEN RECEPTOR POSITIVE, UNSPECIFIED SITE OF BREAST (HCC): Primary | ICD-10-CM

## 2018-11-29 DIAGNOSIS — C50.911 MALIGNANT NEOPLASM OF RIGHT BREAST IN FEMALE, ESTROGEN RECEPTOR POSITIVE, UNSPECIFIED SITE OF BREAST (HCC): Primary | ICD-10-CM

## 2018-11-29 PROCEDURE — 99215 OFFICE O/P EST HI 40 MIN: CPT | Performed by: INTERNAL MEDICINE

## 2018-11-29 NOTE — PROGRESS NOTES
Hematology / Oncology Outpatient Follow Up Note    Jolie Mulligan 59 y o  female :1955 ROSA ISELA:[id number]         Date:  2018    Assessment / Plan:  A 20-year-old postmenopausal woman with stage IA right breast cancer, grade 2, % positive, CA 45% positive, HER2 3+ disease   She underwent lumpectomy with sentinel lymph node biopsy, resulting in LILIANA  She is currently on adjuvant chemotherapy with weekly paclitaxel and trastuzumab with minimal toxicity except slowly progressive neuropathy  She developed bilateral pulmonary embolism as well as bilateral distal lower extremity DVT which could be provoked by systemic chemotherapy  She is on rivaroxaban  Clinically, she has no evidence recurrent disease  I recommended her to continue with weekly paclitaxel and trastuzumab until 2018 followed by trastuzumab monotherapy 6 milligram/kilogram every 3 weeks  I am going to order echocardiogram for cardiac monitoring  I will refer her to radiation oncologist for adjuvant radiation therapy  I recommended her to continue with rivaroxaban 20 mg daily until she see me in late 2019  She may not need to stay on anticoagulation indefinitely since this appeared to be provoked  She delayed D and C  she is also considering right knee surgery  Surgical procedure should be delayed after the at least 3-4 months of anticoagulation is completed  When I see her again in 2019, I would put her on aromatase inhibitor    She and her daughter are in agreement with my recommendations         Subjective:      HPI:  A 20-year-old postmenopausal woman who was recently found to have abnormality in her right breast   She lived in [facility] she found breast abnormality, she came to [facility] to be with her daughter  Maco Mack underwent right breast biopsy in May 23, 2018 which showed ductal carcinoma in situ, ER 90% positive, CA 75% positive   She underwent lumpectomy by Dr Ayde Bonilla in  26, 2018   She had 1 4 cm of invasive ductal carcinoma, grade 2   Lymphovascular invasion was not present  This was % positive, CA 45% positive, HER2 3+ disease   Because of the invasive carcinoma was found, she underwent sentinel lymph node biopsy as well as reexcision in August 2, 2018   She had no evidence of malignancy in the reexcised specimen   Seven lymph nodes were all negative for metastatic disease  Carter Kessler presents today to discuss adjuvant treatment options with her daughter  Carter Kessler feels well  She has no complaint of pain  Her weight is stable   She has no respiratory symptoms   She has many comorbidities, including diabetes, hypertension, asthma as well as hypothyroidism   She has no family history of breast or ovarian cancer  Carter Kessler is a lifetime never smoker            Interval History:  A 58-year-old postmenopausal woman with stage IA right breast cancer, grade 2, % positive, CA 45% positive, HER2 3+ disease   She underwent lumpectomy with sentinel lymph node biopsy, resulting in LILIANA  She started adjuvant chemotherapy with paclitaxel and trastuzumab in late September 2018  In 2nd week of November 2018, she developed chest pain  She was hospitalized and found to have bilateral pulmonary embolism as well as bilateral distal lower extremity DVT  Since then, she has been on rivaroxaban  She is currently on rivaroxaban 20 mg daily with no bleeding symptoms  She has mild exertional shortness of breath without any cough, sputum production or hemoptysis  She is maintaining her weight  She has gradually progressing neuropathy in upper lower extremity  She has no skin rash  Her performance status is 0 to 1/4 on the ECOG scale                                        Objective:      Primary Diagnosis:     1  Right breast cancer, stage IA (pT1c, pN0, M0) grade 2, % positive, CA 45% positive, HER2 3+ disease   Diagnosed in June 2018    2    Pulmonary embolism and bilateral distal lower extremity DVT, diagnosed in November 2018      Cancer Staging:  Cancer Staging  Malignant neoplasm of upper-outer quadrant of right breast in female, estrogen receptor positive ([facility] [address] )  Staging form: Breast, AJCC 8th Edition  - Clinical: No stage assigned - Unsigned  - Pathologic: Stage IA (pT1c, pN0, cM0, G2, ER: Positive, MS: Positive, HER2: Positive) - Signed by Melissa Nobles MD on 8/21/2018           Previous Hematologic/ Oncologic Treatment:            Current Hematologic/ Oncologic Treatment:       1  Adjuvant chemotherapy with weekly paclitaxel and trastuzumab x12   10th cycle to be given in December 4, 2018  2    Rivaroxaban since November 2018              Disease Status:      LILIANA status post lumpectomy with sentinel lymph node biopsy      Test Results:     Pathology:     1 4 cm of invasive ductal carcinoma, grade 2   No evidence of lymphovascular invasion   Seven sentinel lymph nodes were all negative for metastatic disease   % positive, MS 45% positive, HER2 3+ disease   Stage IA (pT1c, pN0, M0)     Radiology:     CT scan of chest with PE protocol in early November 2018 showed bilateral scattered pulmonary embolism  Doppler ultrasound showed bilateral distal lower extremity DVT  MUGA scan showed ejection fraction 70% in September 2018      Laboratory:     See below for CBC and CMP      Physical Exam:        General Appearance:    Alert, oriented          Eyes:    PERRL   Ears:    Normal external ear canals, both ears   Nose:   Nares normal, septum midline   Throat:   Mucosa moist  Pharynx without injection  Neck:   Supple         Lungs:     Clear to auscultation bilaterally   Chest Wall:    No tenderness or deformity    Heart:    Regular rate and rhythm         Abdomen:     Soft, non-tender, bowel sounds +, no organomegaly               Extremities:   Extremities no cyanosis or edema         Skin:   no rash or icterus      Lymph nodes:   Cervical, supraclavicular, and axillary nodes normal Neurologic:   CNII-XII intact, normal strength, sensation and reflexes     Throughout             Breast exam:   Lumpectomy scar at outer upper quadrant of the right breast with no palpable abnormality   Left breast exam is negative  ROS: Review of Systems   Respiratory:        Mild exertional shortness of breath  All other systems reviewed and are negative  Imaging: X-ray Chest 2 Views    Result Date: 11/19/2018  Narrative: CHEST INDICATION:   chest pain  COMPARISON:  September 13, 2018  EXAM PERFORMED/VIEWS:  XR CHEST PA & LATERAL FINDINGS:  Left subclavian chest port with its tip at the cavoatrial junction  Cardiomediastinal silhouette appears unremarkable  The lungs are clear  No pneumothorax or pleural effusion  Osseous structures appear within normal limits for patient age  Impression: No acute cardiopulmonary disease  Workstation performed: [id number]     Mri Knee Right  Wo Contrast    Result Date: 11/15/2018  Narrative: MRI RIGHT KNEE INDICATION:   M25 561: Pain in right knee Z85 3: Personal history of malignant neoplasm of breast   Dr Ortega Edu note from 10/31/2018 was reviewed, which states patient has history of breast cancer, and lateral knee pain with swelling  This MR was performed to evaluate for source of pain, particularly for possible metastasis  Patient has a remote history of meniscal repair about 30 years ago  COMPARISON: Right knee plain films from 10/3/2018  TECHNIQUE:    MR sequences were obtained of the right knee including:  Localizer, axial T2 fat sat, coronal T1/T2 fat sat, sagittal PD/T2 fat sat  Images were acquired on a 1 5 Lida unit  Gadolinium was not used  FINDINGS: SUBCUTANEOUS TISSUES: Normal JOINT EFFUSION: None  BAKER'S CYST: None  MENISCI: Large complex of the lateral meniscus posterior horn and body (series 6 images 13 through 17 and series 4 images 8 through 11 ) Medial meniscus is normal  CRUCIATE LIGAMENTS: Intact   EXTENSOR APPARATUS: Intact  COLLATERAL LIGAMENTS: Intact  ARTICULAR SURFACES: Normal  BONES: Normal   Specifically, there is no evidence of bone metastases  MUSCULATURE:  Intact  Impression: Large complex of the lateral meniscus posterior horn and body (series 6 images 13 through 17 and series 4 images 8 through 11 ) Bony structures are normal without evidence of metastases  Workstation performed: [id number]     Ct Pe Study W Abdomen Pelvis W Contrast    Result Date: 11/6/2018  Narrative: CT PULMONARY ANGIOGRAM OF THE CHEST AND CT ABDOMEN AND PELVIS WITH INTRAVENOUS CONTRAST INDICATION:   Dyspnea on exertion  Abdominal pain  History of breast cancer  COMPARISON:  None  TECHNIQUE:  CT examination of the chest, abdomen and pelvis was performed  Thin section CT angiographic technique was used in the chest in order to evaluate for pulmonary embolus and coronal 3D MIP postprocessing was performed on the acquisition scanner  Axial, sagittal, and coronal 2D reformatted images were created from the source data and submitted for interpretation  Radiation dose length product (DLP) for this visit:  1202 mGy-cm   This examination, like all CT scans performed in the [facility], was performed utilizing techniques to minimize radiation dose exposure, including the use of iterative reconstruction and automated exposure control  IV Contrast:  50 mL of iohexol (OMNIPAQUE) 100 mL of iohexol (OMNIPAQUE) Enteric Contrast:  Enteric contrast was not administered  FINDINGS: CHEST PULMONARY ARTERIAL TREE:  Image numbers reported below are taken from series 2  Multiple segmental filling defects in the right upper lobe (image 122)  Segmental pulmonary arterial filling defect in the superior right lower lobe (image 144)  Subsegmental filling defects in the lateral and posterior basal right lower lobe (image 188)  Segmental filling defect in the posterior lingula (image 118)    Subsegmental filling defect within the posterior basal left lower lobe (image 165)  LUNGS: No acute consolidation  No interstitial thickening  No endobronchial lesions  No suspicious pulmonary nodules or masses  PLEURA:  Unremarkable  HEART/AORTA: Normal heart size  No disproportionate enlargement of the right heart  No abnormal bowing of the interventricular septum  Normal RV to LV ratio  No pericardial thickening or effusion  Thoracic aorta is normal for age  MEDIASTINUM AND BEATRIZ:  Unremarkable  CHEST WALL AND LOWER NECK:   Postsurgical/posttreatment changes at the right breast and axilla  No pathologically enlarged axillary lymphadenopathy  ABDOMEN LIVER/BILIARY TREE:  Mild hepatomegaly with diffuse hepatic steatosis  Contour remains smooth  No focal hepatic masses or biliary ductal dilatation  GALLBLADDER:  No calcified gallstones  No pericholecystic inflammatory change  SPLEEN:  Unremarkable  PANCREAS:  Focal, disproportionate fatty involution at the pancreatic neck  No pancreatic masses  No ductal dilatation  ADRENAL GLANDS:  Unremarkable  KIDNEYS/URETERS:  Unremarkable  No hydronephrosis  STOMACH AND BOWEL:  There is mild distal colonic diverticulosis without evidence of acute diverticulitis  APPENDIX:  A normal appendix was visualized  ABDOMINOPELVIC CAVITY:  No ascites or free intraperitoneal air  No lymphadenopathy  VESSELS:  Unremarkable for patient's age  PELVIS REPRODUCTIVE ORGANS:  Enlarged uterus  Posterior subserosal fibroid measuring 2 5 cm at the body  No suspicious adnexal masses  URINARY BLADDER: Normal when accounting for degree of nondistention  ABDOMINAL WALL/INGUINAL REGIONS:  Unremarkable  OSSEOUS STRUCTURES:  No acute fracture or destructive osseous lesion  Impression: 1  Scattered bilateral segmental and subsegmental pulmonary emboli  No CT evidence for right heart strain  2   No other acute thoracic or abdominopelvic findings  3   Posttreatment changes at the right breast and axilla  No evidence for metastatic disease   I personally discussed this study with Yash Jauregui on 11/6/2018 at 12:35 PM  Workstation performed: [id number]     Cta Ed Chest Pe Study    Result Date: 11/19/2018  Narrative: CTA - CHEST WITH IV CONTRAST - PULMONARY ANGIOGRAM INDICATION:   Chest pain/shortness of breath/history of PE  History of breast cancer  COMPARISON: CT pulmonary angiogram 11/6/2018 TECHNIQUE: CTA examination of the chest was performed using angiographic technique according to a protocol specifically tailored to evaluate for pulmonary embolism  Axial, sagittal, and coronal 2D reformatted images were created from the source data and  submitted for interpretation  In addition, coronal 3D MIP postprocessing was performed on the acquisition scanner  Radiation dose length product (DLP) for this visit:  480 mGy-cm   This examination, like all CT scans performed in the [facility], was performed utilizing techniques to minimize radiation dose exposure, including the use of iterative reconstruction and automated exposure control  IV Contrast:  85 mL of iohexol (OMNIPAQUE)  FINDINGS: PULMONARY ARTERIAL TREE:  Multiple segmental and subsegmental filling defects compatible with bilateral pulmonary emboli  For example, see images 84, 87, 88, 110, 128, 135 and 152 series 3  Findings are not significantly changed from the prior exam   No findings for new pulmonary embolus  No saddle embolus  Main pulmonary artery is mildly prominent at 3 1 cm in diameter which suggests pulmonary artery hypertension  LUNGS: 3 mm nodule in the left lower lobe laterally, image 76 series 3, stable  A few scattered calcified granulomata  There is no tracheal or endobronchial lesion  PLEURA:  Small calcified pleural plaque in the right lower lobe posterior medially  HEART/GREAT VESSELS:  The heart is normal size  No RV dilatation  No pericardial effusion  Thoracic aorta is normal caliber  MEDIASTINUM AND BEATRIZ:  Unremarkable   CHEST WALL AND LOWER NECK:   Skin thickening along the right breast, similar to the prior exam   No significant axillary lymphadenopathy  VISUALIZED STRUCTURES IN THE UPPER ABDOMEN:  Unremarkable  OSSEOUS STRUCTURES:  No acute fracture or destructive osseous lesion  Impression: 1  Scattered bilateral segmental and subsegmental pulmonary emboli without significant interval change  No findings for new pulmonary embolus  2  3 mm pulmonary nodule in the left lower lobe, stable  Based on current Fleischner Society 2017 Guidelines on incidental pulmonary nodule, patients with a known malignancy are at increased risk of metastasis and should receive followup CT at intervals  appropriate for the type of cancer and its risk of pulmonary metastases  A follow-up chest CT may be considered in 3 months  Workstation performed: [id number]     Vas Lower Limb Venous Duplex Study, Complete Bilateral    Result Date: 11/7/2018  Narrative:  [facility] REPORT CLINICAL: Indications: Patient presents with recent discovery of pulmonary embolism and physician wants to determine potential source  Patient reports  bilateral lower extremity pain (right>left)  She is currently on chemo  Risk Factors The patient has history of malignancy, HTN, Diabetes (NIDDM) and HLD  FINDINGS:  Segment   Right                                        Left                Impression              Valve   Reflux Time  Impression              FV Prox                           Reflux      0 [id number]                          Peroneal  Occlusive Subsegmental                       Occlusive Subsegmental     CONCLUSION:  Impression: RIGHT LOWER LIMB: Evidence of acute occlusive deep vein thrombosis in the paired peroneal veins from the distal to proximal calf  No evidence of superficial thrombophlebitis noted  Popliteal, posterior tibial and anterior tibial arterial Doppler waveforms are triphasic/biphasic  Incidental reflux is noted in the proximal femoral vein    LEFT LOWER LIMB: Evidence of acute occlusive deep vein thrombosis in the paired peroneal veins from the distal to mid calf  No evidence of superficial thrombophlebitis noted  Popliteal, posterior tibial and anterior tibial arterial Doppler waveforms are triphasic/biphasic  Technical findings were given to Dr Gregg Almanzar at the time of the exam   SIGNATURE: Electronically Signed by: Emelia Anderson MD, [address] on 2018-11-07 09:26:58 PM        Labs:   Lab Results   Component Value Date    WBC 5 41 11/26/2018    HGB 11 2 (L) 11/26/2018    HCT 35 2 11/26/2018    MCV 91 11/26/2018     11/26/2018     Lab Results   Component Value Date    K 4 3 11/26/2018    CL 97 (L) 11/26/2018    CO2 27 11/26/2018    BUN 22 11/26/2018    CREATININE 0 89 11/26/2018    GLUF 274 (H) 09/19/2018    CALCIUM 10 0 11/26/2018    AST 12 11/26/2018    ALT 30 11/26/2018    ALKPHOS 75 11/26/2018    EGFR 70 11/26/2018       Current Medications: Reviewed  Allergies: Reviewed  PMH/FH/SH:  Reviewed      Vital Sign:    Body surface area is 1 87 meters squared      Wt Readings from Last 3 Encounters:   11/29/18 79 8 kg (176 lb)   11/27/18 78 8 kg (173 lb 11 6 oz)   11/21/18 80 7 kg (178 lb)        Temp Readings from Last 3 Encounters:   11/29/18 97 8 °F (36 6 °C) (Tympanic)   11/27/18 99 6 °F (37 6 °C) (Temporal)   11/21/18 97 6 °F (36 4 °C) (Tympanic)        BP Readings from Last 3 Encounters:   11/29/18 140/82   11/27/18 118/72   11/21/18 149/82         Pulse Readings from Last 3 Encounters:   11/29/18 97   11/27/18 93   11/21/18 97     @LASTSAO2(3)@

## 2018-12-03 ENCOUNTER — APPOINTMENT (OUTPATIENT)
Dept: LAB | Facility: HOSPITAL | Age: 63
End: 2018-12-03
Attending: INTERNAL MEDICINE
Payer: MEDICARE

## 2018-12-03 LAB
BASOPHILS # BLD AUTO: 0.05 THOUSANDS/ΜL (ref 0–0.1)
BASOPHILS NFR BLD AUTO: 1 % (ref 0–1)
EOSINOPHIL # BLD AUTO: 0.05 THOUSAND/ΜL (ref 0–0.61)
EOSINOPHIL NFR BLD AUTO: 1 % (ref 0–6)
ERYTHROCYTE [DISTWIDTH] IN BLOOD BY AUTOMATED COUNT: 15.2 % (ref 11.6–15.1)
HCT VFR BLD AUTO: 33.5 % (ref 34.8–46.1)
HGB BLD-MCNC: 10.6 G/DL (ref 11.5–15.4)
IMM GRANULOCYTES # BLD AUTO: 0.02 THOUSAND/UL (ref 0–0.2)
IMM GRANULOCYTES NFR BLD AUTO: 1 % (ref 0–2)
LYMPHOCYTES # BLD AUTO: 2.33 THOUSANDS/ΜL (ref 0.6–4.47)
LYMPHOCYTES NFR BLD AUTO: 55 % (ref 14–44)
MCH RBC QN AUTO: 28.8 PG (ref 26.8–34.3)
MCHC RBC AUTO-ENTMCNC: 31.6 G/DL (ref 31.4–37.4)
MCV RBC AUTO: 91 FL (ref 82–98)
MONOCYTES # BLD AUTO: 0.22 THOUSAND/ΜL (ref 0.17–1.22)
MONOCYTES NFR BLD AUTO: 5 % (ref 4–12)
NEUTROPHILS # BLD AUTO: 1.58 THOUSANDS/ΜL (ref 1.85–7.62)
NEUTS SEG NFR BLD AUTO: 37 % (ref 43–75)
NRBC BLD AUTO-RTO: 0 /100 WBCS
PLATELET # BLD AUTO: 310 THOUSANDS/UL (ref 149–390)
PMV BLD AUTO: 11.5 FL (ref 8.9–12.7)
RBC # BLD AUTO: 3.68 MILLION/UL (ref 3.81–5.12)
WBC # BLD AUTO: 4.25 THOUSAND/UL (ref 4.31–10.16)

## 2018-12-03 PROCEDURE — 85025 COMPLETE CBC W/AUTO DIFF WBC: CPT | Performed by: INTERNAL MEDICINE

## 2018-12-03 PROCEDURE — 36415 COLL VENOUS BLD VENIPUNCTURE: CPT | Performed by: INTERNAL MEDICINE

## 2018-12-03 RX ORDER — SODIUM CHLORIDE 9 MG/ML
20 INJECTION, SOLUTION INTRAVENOUS CONTINUOUS
Status: DISCONTINUED | OUTPATIENT
Start: 2018-12-04 | End: 2018-12-07 | Stop reason: HOSPADM

## 2018-12-04 ENCOUNTER — DOCUMENTATION (OUTPATIENT)
Dept: HEMATOLOGY ONCOLOGY | Facility: CLINIC | Age: 63
End: 2018-12-04

## 2018-12-04 ENCOUNTER — TELEPHONE (OUTPATIENT)
Dept: HEMATOLOGY ONCOLOGY | Facility: CLINIC | Age: 63
End: 2018-12-04

## 2018-12-04 ENCOUNTER — TELEPHONE (OUTPATIENT)
Dept: OBGYN CLINIC | Facility: MEDICAL CENTER | Age: 63
End: 2018-12-04

## 2018-12-04 ENCOUNTER — HOSPITAL ENCOUNTER (OUTPATIENT)
Dept: INFUSION CENTER | Facility: CLINIC | Age: 63
Discharge: HOME | End: 2018-12-04
Payer: MEDICARE

## 2018-12-04 VITALS
TEMPERATURE: 99.7 F | RESPIRATION RATE: 18 BRPM | SYSTOLIC BLOOD PRESSURE: 123 MMHG | DIASTOLIC BLOOD PRESSURE: 78 MMHG | HEART RATE: 101 BPM

## 2018-12-04 DIAGNOSIS — R52 PAIN: Primary | ICD-10-CM

## 2018-12-04 PROCEDURE — 96413 CHEMO IV INFUSION 1 HR: CPT

## 2018-12-04 PROCEDURE — 96417 CHEMO IV INFUS EACH ADDL SEQ: CPT

## 2018-12-04 PROCEDURE — 96367 TX/PROPH/DG ADDL SEQ IV INF: CPT

## 2018-12-04 PROCEDURE — 96375 TX/PRO/DX INJ NEW DRUG ADDON: CPT

## 2018-12-04 RX ORDER — HYDROCODONE BITARTRATE AND ACETAMINOPHEN 5; 325 MG/1; MG/1
1 TABLET ORAL EVERY 6 HOURS PRN
COMMUNITY
End: 2018-12-10

## 2018-12-04 RX ADMIN — SODIUM CHLORIDE 20 ML/HR: 0.9 INJECTION, SOLUTION INTRAVENOUS at 09:46

## 2018-12-04 RX ADMIN — DEXAMETHASONE SODIUM PHOSPHATE 10 MG: 10 INJECTION, SOLUTION INTRAMUSCULAR; INTRAVENOUS at 10:27

## 2018-12-04 RX ADMIN — PACLITAXEL 150 MG: 6 INJECTION, SOLUTION INTRAVENOUS at 11:03

## 2018-12-04 RX ADMIN — TRASTUZUMAB 160 MG: 150 INJECTION, POWDER, LYOPHILIZED, FOR SOLUTION INTRAVENOUS at 12:09

## 2018-12-04 RX ADMIN — DIPHENHYDRAMINE HYDROCHLORIDE 25 MG: 50 INJECTION, SOLUTION INTRAMUSCULAR; INTRAVENOUS at 10:12

## 2018-12-04 RX ADMIN — FAMOTIDINE 20 MG: 10 INJECTION, SOLUTION INTRAVENOUS at 09:47

## 2018-12-04 RX ADMIN — Medication 300 UNITS: at 12:55

## 2018-12-04 NOTE — PLAN OF CARE
Problem: INFECTION - ADULT  Goal: Absence or prevention of progression during hospitalization  INTERVENTIONS:  - Assess and monitor for signs and symptoms of infection  - Monitor lab/diagnostic results  - Monitor all insertion sites, i e  indwelling lines, tubes, and drains  - Monitor endotracheal (as able) and nasal secretions for changes in amount and color  - [place] appropriate cooling/warming therapies per order  - Administer medications as ordered  - Instruct and encourage patient and family to use good hand hygiene technique  - Identify and instruct in appropriate isolation precautions for identified infection/condition  Outcome: Progressing    Goal: Absence of fever/infection during neutropenic period  INTERVENTIONS:  - Monitor WBC  - Implement neutropenic guidelines  Outcome: Progressing

## 2018-12-04 NOTE — TELEPHONE ENCOUNTER
Ashley please see documentation noted from today regarding report of worsening PN, abdominal pain, constipation etc

## 2018-12-04 NOTE — TELEPHONE ENCOUNTER
Received and e-mail from ANDREW Hendricks requesting that [address] speaking only patient be contacted in needs of supportive care and assistance with pain management, emotional distress, education and had other medical issues  Called patient, no answer, left voicemail message with information on my role as Nurse Navigator and that I received information that she is requesting assistance as noted above  Left my contact information and requested return call  Awaiting return call  If patient does not return call I will meet with her during her scheduled infusion next week

## 2018-12-04 NOTE — TELEPHONE ENCOUNTER
Spoke with Dr Char Johns  Palliative Referral made  Can you contact the patient and let her know I placed this?

## 2018-12-04 NOTE — PROGRESS NOTES
Patient is complaining of neuropathy, Dr Dean Srivastava made aware via his nurse Elizabeth Montilla RN  Ok to proceed with treatment today  Patient only has two more doses of Paclitaxel

## 2018-12-04 NOTE — PROGRESS NOTES
Oncology Navigator Visit  Breast Nurse Navigator    Received return call from patient, daughter on call as well  Spoke directly to patient who is Swedish speaking only, she is reporting 10/10 pain to right knee meniscus tear site  Pt reporting surgery is on hold until cancer treatment has been completed  Pt reports OTC pain medications, acetaminophen and ibuprofen have not helped with pain at all  Also feels the pain is even worse with worsening peripheral neuropathy that is reported to be running further up her legs as worse in hands causing her to have very little to no sensation, per patient at times she feels like her feet are hard and wrinkled when she touches them  Having issues with feeling she is going to trip and fall  Informed patient that I will call her Orthopedic doctor Dr Cleopatra Kramer to report and request prescription strength pain medication, possibly Tramadol or MS Contin as pt reports she did get knee pain relief with Morphine while she was in the hospital       I did call Dr Cleopatra Kramer and left detailed message with phone Brien Moyer as noted above, message to be given to doctor who will call me or patient with response  I did advise that patient is Cam[place] speaking only and to call with  on line, can also call me to relay message or daughter to discuss  Request acknowledged  Also patient asking for better management of worsening and functionally limiting peripheral neuropathy  I advised that I will report to Dr Cassandria Lombard and request intervention to assist with better management (may consider increasing Gabapentin dose, OT referral or palliative care referral for management)    Patient also reporting abdominal discomfort for about the last 3 weeks, describing pain location in abdomen around low rib area feels like someone has their hand in there and squeezing and causing her to have difficulty with eating  Pt denies nausea   Early satiety reported, can tolerate light foods and julia at this time  Advised that I will report to Dr Miguel Tomas  Patient with constipation, unable to report on when she had last bowel movement but did say its been an issues for a while  I advised to purchase Senna S and Miralax,  Instructed to start with 2 Senna S in the morning since its been a while with no bowel movement  Instructed if no BM take 2 Senna S then next day in the morning with 1 dose of miralax, if still no BM 2 Senna in the evening as well  Advised once has BM as long as does not get diarrhea should continue 1 to 2 SennaS daily or every other day to manage constipation, if stool loose of diarrhea discontinue until return to normal then resume as needed  Instructed if these medications do not work or starts to have increasing severe abdominal pain to call to report and or seek immediate medical attention Pt agreeable and verbalized understanding            Potential Barriers:    Care Coordination:    Communication/Education:    Cultural/Islam/Spiritual:    Health Promotion:    Insurance/Medical Costs:    Logistical:    Psychosocial/Distress/Behavioral:  Work/School:    Interventions:    Referrals:        Comments:

## 2018-12-04 NOTE — TELEPHONE ENCOUNTER
Linda Dirver  [phone]  Dr Fuller is calling in saying that the patient is in a great deal of pain  She said that patient has been taking over the counter meds for her Rt knee pain but nothing is working  The patient is requesting something stronger please send to pharmacy on file

## 2018-12-05 ENCOUNTER — OFFICE VISIT (OUTPATIENT)
Dept: FAMILY MEDICINE CLINIC | Facility: CLINIC | Age: 63
End: 2018-12-05
Payer: MEDICARE

## 2018-12-05 ENCOUNTER — TELEPHONE (OUTPATIENT)
Dept: HEMATOLOGY ONCOLOGY | Facility: CLINIC | Age: 63
End: 2018-12-05

## 2018-12-05 VITALS
HEIGHT: 65 IN | OXYGEN SATURATION: 99 % | SYSTOLIC BLOOD PRESSURE: 120 MMHG | RESPIRATION RATE: 18 BRPM | WEIGHT: 172.56 LBS | HEART RATE: 101 BPM | DIASTOLIC BLOOD PRESSURE: 90 MMHG | BODY MASS INDEX: 28.75 KG/M2 | TEMPERATURE: 96.7 F

## 2018-12-05 DIAGNOSIS — A04.8 H. PYLORI INFECTION: ICD-10-CM

## 2018-12-05 DIAGNOSIS — Z79.4 TYPE 2 DIABETES MELLITUS WITH COMPLICATION, WITH LONG-TERM CURRENT USE OF INSULIN (HCC): ICD-10-CM

## 2018-12-05 DIAGNOSIS — M25.561 CHRONIC PAIN OF RIGHT KNEE: ICD-10-CM

## 2018-12-05 DIAGNOSIS — E11.9 TYPE 2 DIABETES MELLITUS WITHOUT COMPLICATION, WITHOUT LONG-TERM CURRENT USE OF INSULIN (HCC): Primary | ICD-10-CM

## 2018-12-05 DIAGNOSIS — E03.8 OTHER SPECIFIED HYPOTHYROIDISM: ICD-10-CM

## 2018-12-05 DIAGNOSIS — E11.8 TYPE 2 DIABETES MELLITUS WITH COMPLICATION, WITH LONG-TERM CURRENT USE OF INSULIN (HCC): ICD-10-CM

## 2018-12-05 DIAGNOSIS — G89.29 CHRONIC PAIN OF RIGHT KNEE: ICD-10-CM

## 2018-12-05 DIAGNOSIS — E11.8 TYPE 2 DIABETES MELLITUS WITH COMPLICATION, WITHOUT LONG-TERM CURRENT USE OF INSULIN (HCC): ICD-10-CM

## 2018-12-05 LAB — SL AMB POCT HEMOGLOBIN AIC: 12.9

## 2018-12-05 PROCEDURE — 99213 OFFICE O/P EST LOW 20 MIN: CPT | Performed by: FAMILY MEDICINE

## 2018-12-05 PROCEDURE — 83036 HEMOGLOBIN GLYCOSYLATED A1C: CPT | Performed by: FAMILY MEDICINE

## 2018-12-05 RX ORDER — DULOXETIN HYDROCHLORIDE 20 MG/1
20 CAPSULE, DELAYED RELEASE ORAL 2 TIMES DAILY
Qty: 60 CAPSULE | Refills: 1 | Status: SHIPPED | OUTPATIENT
Start: 2018-12-05 | End: 2018-12-10

## 2018-12-05 RX ORDER — OMEPRAZOLE 40 MG/1
40 CAPSULE, DELAYED RELEASE ORAL 2 TIMES DAILY
Qty: 28 CAPSULE | Refills: 0 | Status: SHIPPED | OUTPATIENT
Start: 2018-12-05 | End: 2019-04-09

## 2018-12-05 NOTE — PROGRESS NOTES
Assessment/Plan:    Type 2 diabetes mellitus with complication, with long-term current use of insulin ([facility])  Lab Results   Component Value Date    HGBA1C 12 9 12/05/2018       No results for input(s): POCGLU in the last 72 hours  Blood Sugar Average: Last 72 hrs:  Reviewed importance of using insulin daily as prescribed and not less units at her will and checking BG daily  Insisted on need for low carbohydrate diet          Problem List Items Addressed This Visit        Endocrine    Type 2 diabetes mellitus with complication, with long-term current use of insulin ([facility])     Lab Results   Component Value Date    HGBA1C 12 9 12/05/2018       No results for input(s): POCGLU in the last 72 hours  Blood Sugar Average: Last 72 hrs:  Reviewed importance of using insulin daily as prescribed and not less units at her will and checking BG daily  Insisted on need for low carbohydrate diet          Relevant Medications    DULoxetine (CYMBALTA) 20 mg capsule    Insulin Glargine (TOUJEO) 300 units/mL CONCETRATED U-300 injection pen      Other Visit Diagnoses     Type 2 diabetes mellitus without complication, without long-term current use of insulin ([facility])    -  Primary    Relevant Medications    DULoxetine (CYMBALTA) 20 mg capsule    Insulin Glargine (TOUJEO) 300 units/mL CONCETRATED U-300 injection pen    Other Relevant Orders    POCT hemoglobin A1c (Completed)    H  pylori infection        Relevant Medications    omeprazole (PriLOSEC) 40 MG capsule    Type 2 diabetes mellitus with complication, without long-term current use of insulin ([facility])        Relevant Medications    Insulin Glargine (TOUJEO) 300 units/mL CONCETRATED U-300 injection pen            Subjective:      Patient ID: Tracie Neil is a 59 y o  female      A 60-year-old postmenopausal female with multiple comorbidity icluding HTN, uncontrolled DM, Hyperlipidemia, hypothyroidism, found to have right breast    She underwent right breast biopsy in May 23, 2018 which showed ductal carcinoma in situ, ER 90% positive, ME 75% positive  She underwent lumpectomy by Dr Po Cleveland in June 26, 2018  She had 1 4 cm of invasive ductal carcinoma, grade 2  Lymphovascular invasion was not present  Because of the invasive carcinoma was found, she underwent sentinel lymph node biopsy as well as reexcision in August 2, 2018  She had no evidence of malignancy in the reexcised specimen  Seven lymph nodes were all negative for metastatic disease  She is receiving chemo therapy at this time and will likely start adjuvant radiation  She complains of pain in her R knee, for which she has seen Ortho, and found to have a posterior lateral meniscal tear of her knee  She has not started PT and is not using the knee brace as recommended by Ortho  Her weight is stable  She complains of constipation with a bowel movement every 3 days  The following portions of the patient's history were reviewed and updated as appropriate:   She  has a past medical history of Abdominal infection ([address] ); Asthma; Breast cancer ([address] ); Cancer ([address] ); Diabetes mellitus ([address] ); Hiatal hernia; Hypercholesterolemia; Hypertension; Hypothyroid; Renal disorder; and Rheumatoid arthritis ([address] )    She   Patient Active Problem List    Diagnosis Date Noted    Abdominal pain 11/08/2018    Bilateral pulmonary embolism ([address] ) 11/06/2018    Hypothyroid     Hypertension     Hypercholesterolemia 09/21/2018    Chronic pain of right knee 09/04/2018    Encounter for diabetic foot exam ([facility] ) 09/04/2018    Cellulitis of right axilla 08/22/2018    Epigastric pain 08/08/2018    Hiatal hernia 08/08/2018    Screening for colon cancer 08/08/2018    Esophageal dysphagia 08/08/2018    Malignant neoplasm of upper-outer quadrant of right breast in female, estrogen receptor positive ([address] ) 07/19/2018    Malignant neoplasm of right female breast ([address] ) 06/26/2018    Neck pain 06/13/2018    Constipation 06/13/2018    Primary insomnia 06/13/2018    Ductal carcinoma in situ (DCIS) of right breast 06/11/2018    Chronic bilateral low back pain without sciatica 05/22/2018    Palpitations 05/09/2018    Type 2 diabetes mellitus with complication, with long-term current use of insulin ([facility] [address] ) 03/27/2018    Other specified hypothyroidism 03/27/2018    Chest pain 03/27/2018    Asthma 09/22/2009    Essential hypertension 09/22/2009     She  has a past surgical history that includes Tubal ligation; Knee surgery (Right); Cataract extraction, bilateral (Bilateral); Retinal detachment surgery (Right); Breast surgery (Left); Breast lumpectomy (Right, 6/26/2018); Breast lumpectomy (Right, 8/2/2018); Breast biopsy; EGD AND COLONOSCOPY (N/A, 9/5/2018); Tunneled venous port placement (Left, 9/13/2018); FL guided central venous access device insertion (9/13/2018); Mammo stereotactic breast biopsy right (all inc) (Right, 5/23/2018); and Mammo needle localization right (all inc) (Right, 6/26/2018)  Her family history includes Cancer in her maternal grandfather; Diabetes in her brother, father, and mother; Hypertension in her brother, father, and mother; Prostate cancer in her brother  She  reports that she has never smoked  She has never used smokeless tobacco  She reports that she does not drink alcohol or use drugs    Current Outpatient Prescriptions   Medication Sig Dispense Refill    albuterol (2 5 mg/3 mL) 0 083 % nebulizer solution Take 1 vial (2 5 mg total) by nebulization every 4 (four) hours as needed for wheezing or shortness of breath 75 mL 0    albuterol (PROVENTIL HFA,VENTOLIN HFA) 90 mcg/act inhaler Inhale 1 puff every 4 (four) hours as needed        amLODIPine-atorvastatin (CADUET) 10-10 MG per tablet take 1 tablet by mouth once daily 90 tablet 1    SANG MICROLET LANCETS lancets Testing three times a day 100 each 3    bismuth subsalicylate (PEPTO BISMOL) 262 MG chewable tablet Chew 2 tablets (524 mg total) 4 (four) times a day (before meals and at bedtime) 112 tablet 0    Blood Glucose Monitoring Suppl (ACURA BLOOD GLUCOSE METER) w/Device KIT Testing blood sugars three times a day      Blood Glucose Monitoring Suppl (SANG CONTOUR MONITOR) w/Device KIT Testing blood sugars three times a day 1 kit 0    docusate sodium (COLACE) 100 mg capsule Take 1 capsule (100 mg total) by mouth 2 (two) times a day 10 capsule 0    DULoxetine (CYMBALTA) 20 mg capsule Take 1 capsule (20 mg total) by mouth 2 (two) times a day 60 capsule 1    enalapril (VASOTEC) 20 mg tablet take 1 tablet by mouth once daily 90 tablet 1    fluticasone-vilanterol (BREO ELLIPTA) 100-25 mcg/inh inhaler Inhale 1 puff daily Rinse mouth after use  2 Inhaler 8    gemfibrozil (LOPID) 600 mg tablet Take 1 tablet (600 mg total) by mouth 2 (two) times a day 60 tablet 3    glipiZIDE (GLUCOTROL XL) 10 mg 24 hr tablet take 1 tablet by mouth once daily 90 tablet 1    glucose blood (SANG CONTOUR TEST) test strip Testing three times a day 100 each 3    HUMULIN N 100 UNIT/ML subcutaneous injection 70 units in the am 30 units in the pm 30 mL 5    HYDROcodone-acetaminophen (NORCO) 5-325 mg per tablet Take 1 tablet by mouth every 6 (six) hours as needed for pain Ordered by Galina Ash      Insulin Glargine (TOUJEO) 300 units/mL CONCETRATED U-300 injection pen Inject 35 Units under the skin daily at bedtime 5 pen 0    Insulin Syringe-Needle U-100 (B-D INS SYRINGE 0 5CC/30GX1/2") 30G X 1/2" 0 5 ML MISC       Insulin Syringe-Needle U-100 30G 0 3 ML MISC Using twice a day      Insulin Syringe-Needle U-100 30G X 1/2" 1 ML MISC Using twice a day 100 each 3    Lancets MISC Testing three times a day      levothyroxine 50 mcg tablet take 1 tablet by mouth once daily 90 tablet 1    lidocaine-prilocaine (EMLA) cream Apply topically as needed for mild pain Apply prior to port access   30 g 0    metFORMIN (GLUCOPHAGE-XR) 500 mg 24 hr tablet take 2 tablets by mouth once daily WITH BREAKFAST 180 tablet 1    omeprazole (PriLOSEC) 40 MG capsule Take 1 capsule (40 mg total) by mouth 2 (two) times a day for 14 days 28 capsule 0    polyethylene glycol (MIRALAX) 17 g packet Take 17 g by mouth daily 14 each 0    rivaroxaban (XARELTO) 15 mg tablet Take 1 tablet (15 mg total) by mouth 2 (two) times a day with meals for 21 days 42 tablet 0    rivaroxaban (XARELTO) 20 mg tablet Take 1 tablet (20 mg total) by mouth daily with breakfast 30 tablet 3    tetracycline (ACHROMYCIN,SUMYCIN) 500 MG capsule Take 500 mg by mouth 4 (four) times a day      traZODone (DESYREL) 50 mg tablet take 1 tablet by mouth once daily at bedtime 90 tablet 0     No current facility-administered medications for this visit        Facility-Administered Medications Ordered in Other Visits   Medication Dose Route Frequency Provider Last Rate Last Dose    sodium chloride 0 9 % infusion  20 mL/hr Intravenous Continuous Oneida Ponce MD   Stopped at 12/04/18 1254     Current Outpatient Prescriptions on File Prior to Visit   Medication Sig    albuterol (2 5 mg/3 mL) 0 083 % nebulizer solution Take 1 vial (2 5 mg total) by nebulization every 4 (four) hours as needed for wheezing or shortness of breath    albuterol (PROVENTIL HFA,VENTOLIN HFA) 90 mcg/act inhaler Inhale 1 puff every 4 (four) hours as needed      amLODIPine-atorvastatin (CADUET) 10-10 MG per tablet take 1 tablet by mouth once daily    SANG MICROLET LANCETS lancets Testing three times a day    bismuth subsalicylate (PEPTO BISMOL) 262 MG chewable tablet Chew 2 tablets (524 mg total) 4 (four) times a day (before meals and at bedtime)    Blood Glucose Monitoring Suppl (ACURA BLOOD GLUCOSE METER) w/Device KIT Testing blood sugars three times a day    Blood Glucose Monitoring Suppl (SANG CONTOUR MONITOR) w/Device KIT Testing blood sugars three times a day    docusate sodium (COLACE) 100 mg capsule Take 1 capsule (100 mg total) by mouth 2 (two) times a day    enalapril (VASOTEC) 20 mg tablet take 1 tablet by mouth once daily    fluticasone-vilanterol (BREO ELLIPTA) 100-25 mcg/inh inhaler Inhale 1 puff daily Rinse mouth after use   gemfibrozil (LOPID) 600 mg tablet Take 1 tablet (600 mg total) by mouth 2 (two) times a day    glipiZIDE (GLUCOTROL XL) 10 mg 24 hr tablet take 1 tablet by mouth once daily    glucose blood (SANG CONTOUR TEST) test strip Testing three times a day    HUMULIN N 100 UNIT/ML subcutaneous injection 70 units in the am 30 units in the pm    HYDROcodone-acetaminophen (NORCO) 5-325 mg per tablet Take 1 tablet by mouth every 6 (six) hours as needed for pain Ordered by Nida Blair    Insulin Syringe-Needle U-100 (B-D INS SYRINGE 0 5CC/30GX1/2") 30G X 1/2" 0 5 ML MISC     Insulin Syringe-Needle U-100 30G 0 3 ML MISC Using twice a day    Insulin Syringe-Needle U-100 30G X 1/2" 1 ML MISC Using twice a day    Lancets MISC Testing three times a day    levothyroxine 50 mcg tablet take 1 tablet by mouth once daily    lidocaine-prilocaine (EMLA) cream Apply topically as needed for mild pain Apply prior to port access      metFORMIN (GLUCOPHAGE-XR) 500 mg 24 hr tablet take 2 tablets by mouth once daily WITH BREAKFAST    polyethylene glycol (MIRALAX) 17 g packet Take 17 g by mouth daily    rivaroxaban (XARELTO) 15 mg tablet Take 1 tablet (15 mg total) by mouth 2 (two) times a day with meals for 21 days    rivaroxaban (XARELTO) 20 mg tablet Take 1 tablet (20 mg total) by mouth daily with breakfast    tetracycline (ACHROMYCIN,SUMYCIN) 500 MG capsule Take 500 mg by mouth 4 (four) times a day    traZODone (DESYREL) 50 mg tablet take 1 tablet by mouth once daily at bedtime    [DISCONTINUED] Insulin Glargine (TOUJEO) 300 units/mL CONCETRATED U-300 injection pen Inject 35 Units under the skin daily at bedtime (Patient taking differently: Inject 10 Units under the skin daily at bedtime  )     Current Facility-Administered Medications on File Prior to Visit   Medication    sodium chloride 0 9 % infusion     Review of Systems   Constitutional: Positive for appetite change and fatigue  Musculoskeletal: Positive for arthralgias  All other systems reviewed and are negative  Objective:      /90 (BP Location: Right arm, Patient Position: Sitting, Cuff Size: Standard)   Pulse 101   Temp (!) 96 7 °F (35 9 °C) (Tympanic)   Resp 18   Ht 5' 4 5" (1 638 m)   Wt 78 3 kg (172 lb 9 oz)   SpO2 99%   BMI 29 16 kg/m²          Physical Exam   Constitutional: She is oriented to person, place, and time  She appears well-developed  HENT:   Head: Normocephalic  Right Ear: External ear normal    Left Ear: External ear normal    Nose: Nose normal    Mouth/Throat: Oropharynx is clear and moist    Eyes: Pupils are equal, round, and reactive to light  Conjunctivae and EOM are normal    Neck: Normal range of motion  Neck supple  No thyromegaly present  Cardiovascular: Normal rate, regular rhythm and normal heart sounds  Pulmonary/Chest: Effort normal and breath sounds normal    Abdominal: Soft  There is no tenderness  There is no rebound and no guarding  Musculoskeletal: Normal range of motion  Neurological: She is alert and oriented to person, place, and time  She has normal reflexes  Skin: Skin is dry  Psychiatric: She has a normal mood and affect  Nursing note and vitals reviewed

## 2018-12-05 NOTE — TELEPHONE ENCOUNTER
Daughter called to say that they never received her paperwork and can we please not use a 1 when faxing it and to just start with a 8   Can we please refax it

## 2018-12-05 NOTE — TELEPHONE ENCOUNTER
Called patient to make aware of Palliative Care referral made for assistance with pain management  Also to discuss what was ordered for pain last as Dr Reyna Beatty noted pt was ordered Hydrocodone yesterday by another provider, Called to clarify with pt as I noted pt last given hydrocodone by ED physician on 11/19/2018  No answer, left voicemail message to return call to discuss  Awaiting return call

## 2018-12-05 NOTE — TELEPHONE ENCOUNTER
Sent [facility] an e-mail this morning regarding pt status and referral for palliative care  Received call from [facility], update on patient status and needs as well as referral and request made for appointment as soon as possible to assist with pain management for knee pain as well as neuropathy  [place] to check with scheduling for appointment

## 2018-12-06 NOTE — ASSESSMENT & PLAN NOTE
Lab Results   Component Value Date    HGBA1C 12 9 12/05/2018       No results for input(s): POCGLU in the last 72 hours      Blood Sugar Average: Last 72 hrs:  Reviewed importance of using insulin daily as prescribed and not less units at her will and checking BG daily  Insisted on need for low carbohydrate diet

## 2018-12-06 NOTE — TELEPHONE ENCOUNTER
Forms were faxed to the number provided again and emailed to them  We have to use 9 and 1 to fax on our fax machine

## 2018-12-10 ENCOUNTER — APPOINTMENT (OUTPATIENT)
Dept: LAB | Facility: HOSPITAL | Age: 63
End: 2018-12-10
Attending: INTERNAL MEDICINE
Payer: MEDICARE

## 2018-12-10 ENCOUNTER — OFFICE VISIT (OUTPATIENT)
Dept: PALLIATIVE MEDICINE | Facility: CLINIC | Age: 63
End: 2018-12-10
Payer: MEDICARE

## 2018-12-10 ENCOUNTER — SOCIAL WORK (OUTPATIENT)
Dept: PALLIATIVE MEDICINE | Facility: CLINIC | Age: 63
End: 2018-12-10
Payer: MEDICARE

## 2018-12-10 VITALS
RESPIRATION RATE: 18 BRPM | SYSTOLIC BLOOD PRESSURE: 100 MMHG | BODY MASS INDEX: 28.92 KG/M2 | OXYGEN SATURATION: 98 % | HEIGHT: 64 IN | HEART RATE: 103 BPM | DIASTOLIC BLOOD PRESSURE: 62 MMHG | WEIGHT: 169.4 LBS | TEMPERATURE: 97.7 F

## 2018-12-10 DIAGNOSIS — F51.01 PRIMARY INSOMNIA: ICD-10-CM

## 2018-12-10 DIAGNOSIS — K59.01 SLOW TRANSIT CONSTIPATION: ICD-10-CM

## 2018-12-10 DIAGNOSIS — C50.911 MALIGNANT NEOPLASM OF RIGHT BREAST IN FEMALE, ESTROGEN RECEPTOR POSITIVE, UNSPECIFIED SITE OF BREAST (HCC): Primary | ICD-10-CM

## 2018-12-10 DIAGNOSIS — C50.911 MALIGNANT NEOPLASM OF RIGHT BREAST IN FEMALE, ESTROGEN RECEPTOR POSITIVE, UNSPECIFIED SITE OF BREAST (HCC): ICD-10-CM

## 2018-12-10 DIAGNOSIS — Z51.5 PALLIATIVE CARE PATIENT: ICD-10-CM

## 2018-12-10 DIAGNOSIS — M25.561 CHRONIC PAIN OF RIGHT KNEE: ICD-10-CM

## 2018-12-10 DIAGNOSIS — G89.29 CHRONIC PAIN OF RIGHT KNEE: ICD-10-CM

## 2018-12-10 DIAGNOSIS — Z71.89 COMPLEX CARE COORDINATION: Primary | ICD-10-CM

## 2018-12-10 DIAGNOSIS — G89.3 CANCER RELATED PAIN: ICD-10-CM

## 2018-12-10 DIAGNOSIS — F32.A DEPRESSION, UNSPECIFIED DEPRESSION TYPE: ICD-10-CM

## 2018-12-10 DIAGNOSIS — Z17.0 MALIGNANT NEOPLASM OF RIGHT BREAST IN FEMALE, ESTROGEN RECEPTOR POSITIVE, UNSPECIFIED SITE OF BREAST (HCC): Primary | ICD-10-CM

## 2018-12-10 DIAGNOSIS — Z17.0 MALIGNANT NEOPLASM OF RIGHT BREAST IN FEMALE, ESTROGEN RECEPTOR POSITIVE, UNSPECIFIED SITE OF BREAST (HCC): ICD-10-CM

## 2018-12-10 LAB
ALBUMIN SERPL BCP-MCNC: 3.5 G/DL (ref 3.5–5)
ALP SERPL-CCNC: 66 U/L (ref 46–116)
ALT SERPL W P-5'-P-CCNC: 29 U/L (ref 12–78)
ANION GAP SERPL CALCULATED.3IONS-SCNC: 11 MMOL/L (ref 4–13)
AST SERPL W P-5'-P-CCNC: 11 U/L (ref 5–45)
BILIRUB SERPL-MCNC: 0.38 MG/DL (ref 0.2–1)
BUN SERPL-MCNC: 21 MG/DL (ref 5–25)
CALCIUM SERPL-MCNC: 9.8 MG/DL (ref 8.3–10.1)
CHLORIDE SERPL-SCNC: 100 MMOL/L (ref 100–108)
CO2 SERPL-SCNC: 25 MMOL/L (ref 21–32)
CREAT SERPL-MCNC: 0.93 MG/DL (ref 0.6–1.3)
GFR SERPL CREATININE-BSD FRML MDRD: 66 ML/MIN/1.73SQ M
GLUCOSE SERPL-MCNC: 425 MG/DL (ref 65–140)
POTASSIUM SERPL-SCNC: 4 MMOL/L (ref 3.5–5.3)
PROT SERPL-MCNC: 7.2 G/DL (ref 6.4–8.2)
SODIUM SERPL-SCNC: 136 MMOL/L (ref 136–145)

## 2018-12-10 PROCEDURE — 80053 COMPREHEN METABOLIC PANEL: CPT

## 2018-12-10 PROCEDURE — 99499 UNLISTED E&M SERVICE: CPT

## 2018-12-10 PROCEDURE — 99215 OFFICE O/P EST HI 40 MIN: CPT | Performed by: INTERNAL MEDICINE

## 2018-12-10 PROCEDURE — 36415 COLL VENOUS BLD VENIPUNCTURE: CPT

## 2018-12-10 RX ORDER — OXYCODONE HYDROCHLORIDE 5 MG/1
5 TABLET ORAL EVERY 6 HOURS PRN
Qty: 45 TABLET | Refills: 0 | Status: SHIPPED | OUTPATIENT
Start: 2018-12-10 | End: 2019-01-02 | Stop reason: SDUPTHER

## 2018-12-10 RX ORDER — SODIUM CHLORIDE 9 MG/ML
20 INJECTION, SOLUTION INTRAVENOUS CONTINUOUS
Status: DISCONTINUED | OUTPATIENT
Start: 2018-12-11 | End: 2018-12-14 | Stop reason: HOSPADM

## 2018-12-10 RX ORDER — LACTULOSE 20 G/30ML
10 SOLUTION ORAL 2 TIMES DAILY
Qty: 1 BOTTLE | Refills: 0 | Status: SHIPPED | OUTPATIENT
Start: 2018-12-10 | End: 2019-05-14 | Stop reason: ALTCHOICE

## 2018-12-10 RX ORDER — MIRTAZAPINE 15 MG/1
15 TABLET, FILM COATED ORAL
Qty: 30 TABLET | Refills: 0 | Status: SHIPPED | OUTPATIENT
Start: 2018-12-10 | End: 2019-01-21 | Stop reason: ALTCHOICE

## 2018-12-10 NOTE — PROGRESS NOTES
Palliative and Supportive Care   Jolie Mulligan 59 y o  female [id number]    Assessment/Plan:  1  Malignant neoplasm of right breast in female, estrogen receptor positive, unspecified site of breast ([address] )    2  Slow transit constipation    3  Primary insomnia    4  Cancer related pain    5  Depression, unspecified depression type    6  Palliative care patient    7  Chronic pain of right knee      - D/C Vicodin, Trazodone and Cymbalta (was not taking the latter 2 medications)  - Start OxyIR 5 mg- 1/2 tablet to a full tablet q6H PRN for pain  - Lidocaine gel to affected area  - Start Remeron qHS for sleep, appetite, and depressive symptoms  - Lactulose for constipation BID until regular and then PRN  - Reviewed medications at length  She has not been using mediations as prescribed and she has multiple duplicate prescriptions and 2 unfinished bottles of antibiotics from early this fall  Discussed use of pill box and marking medications to ease in use     -  Reviewed home safety as she resides on the 3rd floor- will continue to work with [employer] Thomason to ensure safety in the home  - Follow up in 2 weeks  ( [id number] used for this encounter)    Requested Prescriptions     Signed Prescriptions Disp Refills    oxyCODONE (ROXICODONE) 5 mg immediate release tablet 45 tablet 0     Sig: Take 1 tablet (5 mg total) by mouth every 6 (six) hours as needed for moderate pain Max Daily Amount: 20 mg    mirtazapine (REMERON) 15 mg tablet 30 tablet 0     Sig: Take 1 tablet (15 mg total) by mouth daily at bedtime    lactulose 20 g/30 mL 1 Bottle 0     Sig: Take 15 mL (10 g total) by mouth 2 (two) times a day    lidocaine 4 % topical gel 10 g 0     Sig: Apply topically 4 (four) times a day as needed (to affected area for pain)     Medications Discontinued During This Encounter   Medication Reason    tetracycline (ACHROMYCIN,SUMYCIN) 500 MG capsule     HYDROcodone-acetaminophen (NORCO) 5-325 mg per tablet     DULoxetine (CYMBALTA) 20 mg capsule     traZODone (DESYREL) 50 mg tablet        Representatives have queried the patient's controlled substance dispensing history in the Prescription Drug Monitoring Program in compliance with regulations before I have prescribed any controlled substances  The prescription history is consistent with prescribed therapy and our practice policies  65+ minutes were spent face to face with Brittani Colon and her daughter with greater than 50% of the time spent in counseling or coordination of care including discussions of diagnostic results, impression, and recommendations, risks and benefits of treatment, instructions for disease self management, treatment instructions, follow up requirements, risk factors and risk reduction of disease, patient and family counseling/involvement in care and compliance with treatment regimen   All of the patient's questions were answered during this discussion  No Follow-up on file  Subjective:   Chief Complaint  New consultation for:  symptom management, emotional support in the setting of serious illness  HPI     Brittani Colon is a 59 y o  female referred to palliative medicine for pain, depression, and insomnia in the setting of breast cancer  She recently moved here from [facility] and is primarily [place] speaking  Her daughter is with her but requests the use of the language line for this encounter  Patient has multiple complaints  She has chest pain around the site of her lumpectomy and lymph node resection  It is described as pressure deep in her chest pain with some surrounding achy feeling  She also has b/l leg pain and a known meniscal tear of her right knee  She has been prescribed Vicodin and it helps with t he pain but she feels "light headed" and "dizzy" when she takes it  She has been quite confused with her medications and after detailed medication review she is not taking her medications as directed   She has not been taking her Cymbalta or Trazodone as she was unsure what they were for and she had 2 bottles of uncompleted antibiotics that were prescribed in early fall  Long time spent doing medication reconciliation and devising safety plan for her medications  Patient currently uses a cane for ambulation but struggles with this  She resides in a 3rd floor walk-up apartment  Her daughter is working on [employer] paperwork to be able to assist her to and from doctor's appointments and treatments  Did not discuss advanced care planning at this time  The following portions of the medical history were reviewed: past medical history, problem list, medication list, and social history      Current Outpatient Prescriptions:     albuterol (2 5 mg/3 mL) 0 083 % nebulizer solution, Take 1 vial (2 5 mg total) by nebulization every 4 (four) hours as needed for wheezing or shortness of breath, Disp: 75 mL, Rfl: 0    albuterol (PROVENTIL HFA,VENTOLIN HFA) 90 mcg/act inhaler, Inhale 1 puff every 4 (four) hours as needed  , Disp: , Rfl:     amLODIPine-atorvastatin (CADUET) 10-10 MG per tablet, take 1 tablet by mouth once daily, Disp: 90 tablet, Rfl: 1    SANG MICROLET LANCETS lancets, Testing three times a day, Disp: 100 each, Rfl: 3    bismuth subsalicylate (PEPTO BISMOL) 262 MG chewable tablet, Chew 2 tablets (524 mg total) 4 (four) times a day (before meals and at bedtime), Disp: 112 tablet, Rfl: 0    Blood Glucose Monitoring Suppl (ACURA BLOOD GLUCOSE METER) w/Device KIT, Testing blood sugars three times a day, Disp: , Rfl:     Blood Glucose Monitoring Suppl (SANG CONTOUR MONITOR) w/Device KIT, Testing blood sugars three times a day, Disp: 1 kit, Rfl: 0    docusate sodium (COLACE) 100 mg capsule, Take 1 capsule (100 mg total) by mouth 2 (two) times a day, Disp: 10 capsule, Rfl: 0    enalapril (VASOTEC) 20 mg tablet, take 1 tablet by mouth once daily, Disp: 90 tablet, Rfl: 1    fluticasone-vilanterol (BREO ELLIPTA) 100-25 mcg/inh inhaler, Inhale 1 puff daily Rinse mouth after use , Disp: 2 Inhaler, Rfl: 8    gemfibrozil (LOPID) 600 mg tablet, Take 1 tablet (600 mg total) by mouth 2 (two) times a day, Disp: 60 tablet, Rfl: 3    glipiZIDE (GLUCOTROL XL) 10 mg 24 hr tablet, take 1 tablet by mouth once daily, Disp: 90 tablet, Rfl: 1    glucose blood (SANG CONTOUR TEST) test strip, Testing three times a day, Disp: 100 each, Rfl: 3    HUMULIN N 100 UNIT/ML subcutaneous injection, 70 units in the am 30 units in the pm, Disp: 30 mL, Rfl: 5    Insulin Glargine (TOUJEO) 300 units/mL CONCETRATED U-300 injection pen, Inject 35 Units under the skin daily at bedtime, Disp: 5 pen, Rfl: 0    Insulin Syringe-Needle U-100 (B-D INS SYRINGE 0 5CC/30GX1/2") 30G X 1/2" 0 5 ML MISC, , Disp: , Rfl:     Insulin Syringe-Needle U-100 30G 0 3 ML MISC, Using twice a day, Disp: , Rfl:     Insulin Syringe-Needle U-100 30G X 1/2" 1 ML MISC, Using twice a day, Disp: 100 each, Rfl: 3    lactulose 20 g/30 mL, Take 15 mL (10 g total) by mouth 2 (two) times a day, Disp: 1 Bottle, Rfl: 0    Lancets MISC, Testing three times a day, Disp: , Rfl:     levothyroxine 50 mcg tablet, take 1 tablet by mouth once daily, Disp: 90 tablet, Rfl: 1    lidocaine 4 % topical gel, Apply topically 4 (four) times a day as needed (to affected area for pain), Disp: 10 g, Rfl: 0    lidocaine-prilocaine (EMLA) cream, Apply topically as needed for mild pain Apply prior to port access  , Disp: 30 g, Rfl: 0    metFORMIN (GLUCOPHAGE-XR) 500 mg 24 hr tablet, take 2 tablets by mouth once daily WITH BREAKFAST, Disp: 180 tablet, Rfl: 1    mirtazapine (REMERON) 15 mg tablet, Take 1 tablet (15 mg total) by mouth daily at bedtime, Disp: 30 tablet, Rfl: 0    omeprazole (PriLOSEC) 40 MG capsule, Take 1 capsule (40 mg total) by mouth 2 (two) times a day for 14 days, Disp: 28 capsule, Rfl: 0    oxyCODONE (ROXICODONE) 5 mg immediate release tablet, Take 1 tablet (5 mg total) by mouth every 6 (six) hours as needed for moderate pain Max Daily Amount: 20 mg, Disp: 45 tablet, Rfl: 0    polyethylene glycol (MIRALAX) 17 g packet, Take 17 g by mouth daily, Disp: 14 each, Rfl: 0    rivaroxaban (XARELTO) 15 mg tablet, Take 1 tablet (15 mg total) by mouth 2 (two) times a day with meals for 21 days, Disp: 42 tablet, Rfl: 0    rivaroxaban (XARELTO) 20 mg tablet, Take 1 tablet (20 mg total) by mouth daily with breakfast, Disp: 30 tablet, Rfl: 3  No current facility-administered medications for this visit  Facility-Administered Medications Ordered in Other Visits:     [START ON 12/11/2018] dexamethasone (DECADRON) 10 mg in sodium chloride 0 9 % 50 mL IVPB, 10 mg, Intravenous, Once, Mukesh Cleary MD  [facility]  [START ON 12/11/2018] diphenhydrAMINE (BENADRYL) 25 mg in sodium chloride 0 9 % 50 mL IVPB, 25 mg, Intravenous, Once, Mukesh Cleary MD  [facility]  [START ON 12/11/2018] famotidine (PEPCID) 20 mg in sodium chloride 0 9 % 52 mL IVPB, 20 mg, Intravenous, Once, Mukesh Cleary MD  [facility]  [START ON 12/11/2018] heparin lock flush 100 units/mL injection 300 Units, 300 Units, Intracatheter, Q1H PRN, Mukesh Cleary MD  [facility]  [START ON 12/11/2018] PACLitaxel (TAXOL) 150 mg in sodium chloride 0 9 % 250 mL chemo IVPB, 150 mg, Intravenous, Once, Mukesh Cleary MD  [facility]  [START ON 12/11/2018] sodium chloride 0 9 % infusion, 20 mL/hr, Intravenous, Continuous, Mukesh Cleary MD  [facility]  [START ON 12/11/2018] trastuzumab (HERCEPTIN) 160 mg in sodium chloride 0 9 % 250 mL chemo infusion, 160 mg, Intravenous, Once, Mukesh Cleary MD  Review of Systems   Constitutional: Positive for activity change and appetite change  Gastrointestinal: Positive for constipation  Negative for nausea  Musculoskeletal: Positive for gait problem and joint swelling  Neurological: Positive for weakness  Psychiatric/Behavioral: Positive for sleep disturbance  The patient is nervous/anxious  All other systems reviewed and are negative        All other systems negative    Objective:  Vital Signs  /62 (BP Location: Left arm, Patient Position: Sitting, Cuff Size: Standard)   Pulse 103   Temp 97 7 °F (36 5 °C) (Oral)   Resp 18   Ht 5' 4" (1 626 m)   Wt 76 8 kg (169 lb 6 4 oz)   SpO2 98%   BMI 29 08 kg/m²    Physical Exam   Constitutional: She is oriented to person, place, and time  She appears well-nourished  No distress  Chronically ill appearing   HENT:   Head: Normocephalic and atraumatic  Right Ear: External ear normal    Left Ear: External ear normal    Nose: Nose normal    Mouth/Throat: Oropharynx is clear and moist    Eyes: EOM are normal  Right eye exhibits no discharge  Left eye exhibits no discharge  No scleral icterus  Neck: Neck supple  No JVD present  No tracheal deviation present  Cardiovascular: Normal rate, regular rhythm and intact distal pulses  Pulmonary/Chest: Effort normal and breath sounds normal  No respiratory distress  She has no wheezes  She has no rales  She exhibits tenderness  Abdominal: Bowel sounds are normal  She exhibits no distension and no mass  There is no tenderness  Musculoskeletal: She exhibits tenderness  She exhibits no edema or deformity  Neurological: She is alert and oriented to person, place, and time  No cranial nerve deficit  Coordination normal    Skin: Skin is warm and dry  She is not diaphoretic  Psychiatric: She has a normal mood and affect  Her behavior is normal  Judgment and thought content normal    Nursing note and vitals reviewed

## 2018-12-10 NOTE — PATIENT INSTRUCTIONS
STOP duloxetine (Cymbalta), STOP Trazodone, Stop Vicodin  Start Oxycodone- please cut pills in half  You can take 1/2 a tablet for moderate pain or a full pill for severe pain  Start Remeron at bedtime  Need to get a pill box!

## 2018-12-11 ENCOUNTER — HOSPITAL ENCOUNTER (OUTPATIENT)
Dept: INFUSION CENTER | Facility: CLINIC | Age: 63
Discharge: HOME | End: 2018-12-11
Payer: MEDICARE

## 2018-12-11 ENCOUNTER — APPOINTMENT (OUTPATIENT)
Dept: RADIATION ONCOLOGY | Facility: CLINIC | Age: 63
End: 2018-12-11
Attending: RADIOLOGY
Payer: MEDICARE

## 2018-12-11 ENCOUNTER — DOCUMENTATION (OUTPATIENT)
Dept: HEMATOLOGY ONCOLOGY | Facility: CLINIC | Age: 63
End: 2018-12-11

## 2018-12-11 ENCOUNTER — CLINICAL SUPPORT (OUTPATIENT)
Dept: RADIATION ONCOLOGY | Facility: CLINIC | Age: 63
End: 2018-12-11
Payer: MEDICARE

## 2018-12-11 ENCOUNTER — TRANSCRIBE ORDERS (OUTPATIENT)
Dept: ADMINISTRATIVE | Facility: HOSPITAL | Age: 63
End: 2018-12-11

## 2018-12-11 VITALS
TEMPERATURE: 98.5 F | WEIGHT: 171 LBS | RESPIRATION RATE: 18 BRPM | SYSTOLIC BLOOD PRESSURE: 132 MMHG | BODY MASS INDEX: 29.19 KG/M2 | OXYGEN SATURATION: 96 % | DIASTOLIC BLOOD PRESSURE: 68 MMHG | HEIGHT: 64 IN | HEART RATE: 98 BPM

## 2018-12-11 VITALS
WEIGHT: 168.87 LBS | BODY MASS INDEX: 28.14 KG/M2 | HEART RATE: 91 BPM | RESPIRATION RATE: 18 BRPM | HEIGHT: 65 IN | SYSTOLIC BLOOD PRESSURE: 143 MMHG | TEMPERATURE: 97.8 F | DIASTOLIC BLOOD PRESSURE: 63 MMHG

## 2018-12-11 DIAGNOSIS — Z17.0 MALIGNANT NEOPLASM OF RIGHT BREAST IN FEMALE, ESTROGEN RECEPTOR POSITIVE, UNSPECIFIED SITE OF BREAST (HCC): Primary | ICD-10-CM

## 2018-12-11 DIAGNOSIS — C50.911 MALIGNANT NEOPLASM OF RIGHT BREAST IN FEMALE, ESTROGEN RECEPTOR POSITIVE, UNSPECIFIED SITE OF BREAST (HCC): Primary | ICD-10-CM

## 2018-12-11 PROCEDURE — 96367 TX/PROPH/DG ADDL SEQ IV INF: CPT

## 2018-12-11 PROCEDURE — 96413 CHEMO IV INFUSION 1 HR: CPT

## 2018-12-11 PROCEDURE — 77334 RADIATION TREATMENT AID(S): CPT | Performed by: RADIOLOGY

## 2018-12-11 PROCEDURE — 96417 CHEMO IV INFUS EACH ADDL SEQ: CPT

## 2018-12-11 PROCEDURE — 96375 TX/PRO/DX INJ NEW DRUG ADDON: CPT

## 2018-12-11 PROCEDURE — 77290 THER RAD SIMULAJ FIELD CPLX: CPT | Performed by: RADIOLOGY

## 2018-12-11 PROCEDURE — 99215 OFFICE O/P EST HI 40 MIN: CPT | Performed by: RADIOLOGY

## 2018-12-11 RX ADMIN — DEXAMETHASONE SODIUM PHOSPHATE 10 MG: 10 INJECTION, SOLUTION INTRAMUSCULAR; INTRAVENOUS at 09:29

## 2018-12-11 RX ADMIN — DIPHENHYDRAMINE HYDROCHLORIDE 25 MG: 50 INJECTION, SOLUTION INTRAMUSCULAR; INTRAVENOUS at 09:45

## 2018-12-11 RX ADMIN — TRASTUZUMAB 160 MG: 150 INJECTION, POWDER, LYOPHILIZED, FOR SOLUTION INTRAVENOUS at 11:55

## 2018-12-11 RX ADMIN — FAMOTIDINE 20 MG: 10 INJECTION, SOLUTION INTRAVENOUS at 10:07

## 2018-12-11 RX ADMIN — PACLITAXEL 150 MG: 6 INJECTION, SOLUTION INTRAVENOUS at 10:44

## 2018-12-11 RX ADMIN — Medication 300 UNITS: at 12:30

## 2018-12-11 RX ADMIN — SODIUM CHLORIDE 20 ML/HR: 0.9 INJECTION, SOLUTION INTRAVENOUS at 09:27

## 2018-12-11 NOTE — PROGRESS NOTES
Pt was scheduled to have an ECHO performed yesterday, but did not show for appointment d/t a conflicting doctor's appointment with palliative care  Gretta Joiner RN made aware  Pt's daughter rescheduled ECHO for 12/27/18

## 2018-12-11 NOTE — PROGRESS NOTES
Oncology Navigator Visit  Breast Nurse Navigator    Met patient today at [facility] during treatment, daughter also present  Patient was seen by Palliative care 12/10/2018  Assessed patients understanding of medication changes and when and how to take new medications ordered for pain management, depression and constipation  Patient has not yet picked medications from the pharmacy as they were just ordered yesterday and has not yet heard from the pharmacy  I advised that she call the pharmacy today to see if they are ready to   Instructed on purpose, dosing and frequency, possible side effects on Oxycodone, as well as how to manage possible side effects and maintain safety  Also instructed on Lactulose and why it was ordered to assist with constipation  Patient verbalized understanding and stated she will use until she is able to have a bowel movement, reporting she is having infrequent small hard bowel movements  I reinforced use of lactulose or stool softener ongoing as needed to manage constipation  Instructed patient that with chemotherapy, decreased activity second to pain and decreased food intake constipation may continue to be a problem and its better to maintain a bowel regimen to avoid worsening constipation that can leader to bigger concerns  Patient aware to take Remeron at bedtime to help with appetite, depressive symptoms and sleep  Teach-back, patient was able to repeat  correct reason ordered as well as dosing and frequency for oxycodone, remeron and lactulose  Provided my contact information to patient again and encouraged to call me as needed with any questions, needs or concerns  I will follow up with patient before the end of this week to assure she picked up medications and taking as ordered, tolerating with no issues            Potential Barriers:    Care Coordination:    Communication/Education:    Cultural/[place]/Spiritual:    Health Promotion:    Insurance/Medical Costs:    Logistical:    Psychosocial/Distress/Behavioral:    Work/School:    Interventions:    Referrals:        Comments:

## 2018-12-11 NOTE — PROGRESS NOTES
Follow-up - Radiation Oncology   Jolie Mulligan 1955 59 y o  female [id number]      History of Present Illness   Cancer Staging  Malignant neoplasm of upper-outer quadrant of right breast in female, estrogen receptor positive ([facility] [address] )  Staging form: Breast, AJCC 8th Edition  - Clinical: No stage assigned - Unsigned  - Pathologic: Stage IA (pT1c, pN0, cM0, G2, ER: Positive, SD: Positive, HER2: Positive) - Signed by Annita Burgos MD on 8/21/2018      Vita Mcallister is a 59y o  year old female with a history of right breast carcinoma      Interval History:    58 y/o female initially seen on 8/21/18 by Dr Arnaud Unger for radiation therapy consult for right breast cancer       9/7/18 consultation with Dr Inder Hoff   -  Recommended weekly paclitaxel and trastuzumab x12, followed by trastuzumab monotherapy for a year      She initiated systemic therapy on 9/25/18 11/6 - 11/8/18 presented to ED with shortness of breath and abdominal pain, diagnosed with bilateral pulmonary emboli and bilateral distal lower extremity DVT        12/10/18 Initial visit with palliative and supportive care with Dr Eva Celaya  - seen for constipation, insomnia, cancer related pain, depression, chronic right knee pain     She has finished her weekly Taxol and Herceptin today     Al Wiggins states that she has peripheral neuropathy in her hands and feet from chemotherapy    She denies any arm edema    She is seen today to initiate radiation planning          She has also been followed by Nena Boyd Breast Nurse Navigator         Future:  12/28/18 Dr Cassy Hayden  2/15/19 Dr Jim Forrest  2/28/19 Dr Inder Hoff           Screening  Tobacco  Current tobacco user: no  If yes, brief counseling provided: NA     Hypertension  Hypertension screening performed: yes  Normotensive:  yes  If no, referred to PCP: n/a     Depression Screening  Screened for depression using PHQ-2: yes     Screened for depression using PHQ-9:  yes  Screening positive or negative: negative  If score >4, was any of the following actions taken? Additional evaluation for depression, suicide risk assesment, referral to PCP or psychiatry, medication started:  n/a     Advanced Care Planning for Patients >65 years  Advanced Care Planning Discussed:  n/a  Patient named surrogate decision maker or care plan in chart: n/a      Historical Information      Malignant neoplasm of right female breast ([facility] [address] )    5/23/2018 Initial Diagnosis     Malignant neoplasm of right female breast ([facility] )         5/23/2018 Biopsy     Right breast core biopsy:  DCIS, micropapillary type, lowintermediate nuclear grade  ER 90-95% positive,  NV 75-80% positive           6/26/2018 Surgery     RIGHT BREAST NEEDLE LOCALIZATION X2 WITH RIGHT BREAST LUMPECTOMY ( NEEDLE LOC @ 1000) (Right)   ONCOPLASTIC CLOSURE OF LUMPECTOMY CAVITY    Invasive breast carcinoma, NST (aka ductal)  * Ana Paula grade 2 of 3 (total score = 2+2+2 = 6 of 9)   -- tubule formation < 10%, score 3   -- nuclear grade 2 of 3, score 2   -- mitoses ~ 4/mm2, score 2    * invasive carcinoma is multifocally present (A23-1, A32-1, A84-1, A89-1, A100-1), largest focus is 14 millimeters in greatest dimension (A89-1, this focus is graded)  * superior lumpectomy margin (orange-inked) is POSITIVE for invasive carcinoma (A84-1)   * all other lumpectomy margins are free of invasive carcinoma  * estrogen, progesterone & Her-2/negrita receptor studies are undertaken, to be described in an addendum report  - Ductal carcinoma in-situ (DCIS): Present as an extensive component (~85% of total tumor)  * DCIS is co-located with invasive carcinoma surrounding prior needle biopsy site  * DCIS spans 2 6 cm maximal dimension (A62-1) and is present on 27 of 136 total slides examined  * DCIS has cribriform & micropapillary patterns, nuclear grade 2 of 3, with central comedo-type necrosis     * DCIS is present within 0 2 millimeters of the superior lumpectomy margin (orange-inked, A26-1)   * DCIS is present within 0 2 millimeters of the medial lumpectomy margin (yellow-inked, A3-1)   * all other lumpectomy margins are free of DCIS by > 2 millimeters  - Lymph-vascular invasion: no lymph-vascular invasion is unequivocally identified  - Microcalcifications: present in DCIS  % positive, ER 45-55% positive, HER2 by IHC 3+ positive    - Dr Erica Stapleton           8/2/2018 Surgery     Right breast lumpectomy reexcision, right axilla SLN biopsy:  A  Submitted as right axillary sentinel node:  - Seven lymph nodes are identified showing no metastatic tumor      B  Right breast lumpectomy reexcision:  - Abundant reactive changes are seen around the previous lumpectomy cavity   - No residual atypia or malignancy is seen               8/2/2018 -  Cancer Staged     Stage IA - pT1c, pN0, G2            9/25/2018 -  Chemotherapy     Weekly Paclitaxol and trastuzumab x12, until December 11, 2018 followed by trastuzumab monotherapy 6 milligram/kilogram every 3 weeks    - Dr Eric Bo              Past Medical History:   Diagnosis Date    Abdominal infection ([facility] [address] )     h-pylori    Asthma     Breast cancer ([facility] [address] )     Cancer ([facility] [address] )     BREAST    Diabetes mellitus ([facility] [address] )     blood sugar 199 @ 735    Hiatal hernia     Hypercholesterolemia     Hypertension     Hypothyroid     Renal disorder     Rheumatoid arthritis ([facility] [address] )      Past Surgical History:   Procedure Laterality Date    BREAST BIOPSY      BREAST LUMPECTOMY Right 6/26/2018    Procedure: RIGHT BREAST NEEDLE LOCALIZATION X2 WITH RIGHT BREAST LUMPECTOMY ( NEEDLE LOC @ 1000); Surgeon: Darlin Moore MD;  Location: [facility];  Service: Surgical Oncology    BREAST LUMPECTOMY Right 8/2/2018    Procedure: LYMPHOSCINITGRAPHY INTRAOPERATIVE LYMPHATIC MAPPING , RIGHT SENTINEL NODE BIOPSY, REEXCISION  RIGHT BREAST LUMPECTOMY CAVITY (SUPERIOR MARGIN);   Surgeon: Darlin Moore MD;  [facility]: [facility];  Service: Surgical Oncology    BREAST SURGERY Left     CATARACT EXTRACTION, BILATERAL Bilateral     EGD AND COLONOSCOPY N/A 9/5/2018    Procedure: EGD AND COLONOSCOPY;  Surgeon: Leslie Richmond MD;  Location: [facility]; Service: Gastroenterology    [place] GUIDED CENTRAL VENOUS ACCESS REPLACEMENT  9/13/2018    KNEE SURGERY Right     MAMMO NEEDLE LOCALIZATION RIGHT (ALL INC) Right 6/26/2018    MAMMO STEREOTACTIC BREAST BIOPSY RIGHT (ALL INC) Right 5/23/2018    RETINAL DETACHMENT SURGERY Right     TUBAL LIGATION      TUNNELED VENOUS PORT PLACEMENT Left 9/13/2018    Procedure: INSERTION VENOUS PORT (PORT-A-CATH);   Surgeon: Zack Alaniz MD;  [facility]: [facility];  Service: Surgical Oncology       Social History   History   Alcohol Use No     History   Drug Use No     History   Smoking Status    Never Smoker   Smokeless Tobacco    Never Used         Meds/Allergies     Current Outpatient Prescriptions:     albuterol (2 5 mg/3 mL) 0 083 % nebulizer solution, Take 1 vial (2 5 mg total) by nebulization every 4 (four) hours as needed for wheezing or shortness of breath, Disp: 75 mL, Rfl: 0    albuterol (PROVENTIL HFA,VENTOLIN HFA) 90 mcg/act inhaler, Inhale 1 puff every 4 (four) hours as needed  , Disp: , Rfl:     amLODIPine-atorvastatin (CADUET) 10-10 MG per tablet, take 1 tablet by mouth once daily, Disp: 90 tablet, Rfl: 1    docusate sodium (COLACE) 100 mg capsule, Take 1 capsule (100 mg total) by mouth 2 (two) times a day, Disp: 10 capsule, Rfl: 0    enalapril (VASOTEC) 20 mg tablet, take 1 tablet by mouth once daily, Disp: 90 tablet, Rfl: 1    fluticasone-vilanterol (BREO ELLIPTA) 100-25 mcg/inh inhaler, Inhale 1 puff daily Rinse mouth after use , Disp: 2 Inhaler, Rfl: 8    gemfibrozil (LOPID) 600 mg tablet, Take 1 tablet (600 mg total) by mouth 2 (two) times a day, Disp: 60 tablet, Rfl: 3    glipiZIDE (GLUCOTROL XL) 10 mg 24 hr tablet, take 1 tablet by mouth once daily, Disp: 90 tablet, Rfl: 1    HUMULIN N 100 UNIT/ML subcutaneous injection, 70 units in the am 30 units in the pm, Disp: 30 mL, Rfl: 5    Insulin Glargine (TOUJEO) 300 units/mL CONCETRATED U-300 injection pen, Inject 35 Units under the skin daily at bedtime, Disp: 5 pen, Rfl: 0    levothyroxine 50 mcg tablet, take 1 tablet by mouth once daily, Disp: 90 tablet, Rfl: 1    lidocaine-prilocaine (EMLA) cream, Apply topically as needed for mild pain Apply prior to port access  , Disp: 30 g, Rfl: 0    metFORMIN (GLUCOPHAGE-XR) 500 mg 24 hr tablet, take 2 tablets by mouth once daily WITH BREAKFAST, Disp: 180 tablet, Rfl: 1    mirtazapine (REMERON) 15 mg tablet, Take 1 tablet (15 mg total) by mouth daily at bedtime, Disp: 30 tablet, Rfl: 0    omeprazole (PriLOSEC) 40 MG capsule, Take 1 capsule (40 mg total) by mouth 2 (two) times a day for 14 days, Disp: 28 capsule, Rfl: 0    oxyCODONE (ROXICODONE) 5 mg immediate release tablet, Take 1 tablet (5 mg total) by mouth every 6 (six) hours as needed for moderate pain Max Daily Amount: 20 mg, Disp: 45 tablet, Rfl: 0    polyethylene glycol (MIRALAX) 17 g packet, Take 17 g by mouth daily, Disp: 14 each, Rfl: 0    rivaroxaban (XARELTO) 15 mg tablet, Take 1 tablet (15 mg total) by mouth 2 (two) times a day with meals for 21 days, Disp: 42 tablet, Rfl: 0    SANG MICROLET LANCETS lancets, Testing three times a day, Disp: 100 each, Rfl: 3    bismuth subsalicylate (PEPTO BISMOL) 262 MG chewable tablet, Chew 2 tablets (524 mg total) 4 (four) times a day (before meals and at bedtime) (Patient not taking: Reported on 12/11/2018 ), Disp: 112 tablet, Rfl: 0    Blood Glucose Monitoring Suppl (ACURA BLOOD GLUCOSE METER) w/Device KIT, Testing blood sugars three times a day, Disp: , Rfl:     Blood Glucose Monitoring Suppl (SANG CONTOUR MONITOR) w/Device KIT, Testing blood sugars three times a day, Disp: 1 kit, Rfl: 0    glucose blood (SANG CONTOUR TEST) test strip, Testing three times a day, Disp: 100 each, Rfl: 3    Insulin Syringe-Needle U-100 (B-D INS SYRINGE 0 5CC/30GX1/2") 30G X 1/2" 0 5 ML MISC, , Disp: , Rfl:     Insulin Syringe-Needle U-100 30G 0 3 ML MISC, Using twice a day, Disp: , Rfl:     Insulin Syringe-Needle U-100 30G X 1/2" 1 ML MISC, Using twice a day, Disp: 100 each, Rfl: 3    lactulose 20 g/30 mL, Take 15 mL (10 g total) by mouth 2 (two) times a day, Disp: 1 Bottle, Rfl: 0    Lancets MISC, Testing three times a day, Disp: , Rfl:     lidocaine 4 % topical gel, Apply topically 4 (four) times a day as needed (to affected area for pain), Disp: 10 g, Rfl: 0  No current facility-administered medications for this visit  Facility-Administered Medications Ordered in Other Visits:     heparin lock flush 100 units/mL injection 300 Units, 300 Units, Intracatheter, Q1H PRN, Paula Son MD, 300 Units at 12/11/18 1230    sodium chloride 0 9 % infusion, 20 mL/hr, Intravenous, Continuous, Paula Son MD, Stopped at 12/11/18 1230  Allergies   Allergen Reactions    Aspirin Hives       in [address] told patient not to take aspirin r/t kidneys     Tylenol With Codeine #3 [Acetaminophen-Codeine] Rash         Review of Systems      Constitutional: Positive for fatigue and unexpected weight change (decreased approximately 15 pounds during chemotherapy)  HENT: Positive for trouble swallowing (dry foods and some pills)  Eyes: Positive for visual disturbance (right eye with blurred vision)  Respiratory: Positive for shortness of breath (with walking) and wheezing (sometimes)  Cardiovascular: Positive for leg swelling (occasional mild b/l LE )  Gastrointestinal: Positive for constipation  BM's every 4-5 days   Endocrine: Negative  Genitourinary: Negative  Musculoskeletal: Positive for gait problem (ambulates with cane)  Right knee pain   Skin: Negative  alopecia   Allergic/Immunologic: Negative  Neurological: Positive for dizziness (sometimes) and numbness (fingertips and feet)  Hematological: Negative  Psychiatric/Behavioral: Negative  OBJECTIVE:   /68   Pulse 98   Temp 98 5 °F (36 9 °C) (Tympanic)   Resp 18   Ht 5' 4" (1 626 m)   Wt 77 6 kg (171 lb)   SpO2 96%   BMI 29 35 kg/m²   Pain Assessment:  0  ECOG/Zubrod/WHO: 1 - Symptomatic but completely ambulatory    Physical Exam   Constitutional: She appears well-developed  Alopecia   HENT:   Head: Normocephalic  Hair is normal    Mouth/Throat: Oropharynx is clear and moist    Eyes: EOM are normal  No scleral icterus  Neck: Neck supple  No spinous process tenderness present  No edema present  Cardiovascular: Normal rate and regular rhythm  Pulmonary/Chest: Effort normal and breath sounds normal  No respiratory distress  She has no wheezes  She exhibits no tenderness  There is no supraclavicular axillary adenopathy palpable bilaterally  No suspicious lesions palpable in either breast   There is no right breast edema  She has good range of motion the right upper extremity without evidence of lymphedema  Abdominal: Soft  Bowel sounds are normal  She exhibits no distension, no ascites and no mass  There is no hepatosplenomegaly  There is no tenderness  There is no rebound  Genitourinary: No breast swelling or tenderness  Musculoskeletal: Normal range of motion  She exhibits no edema or tenderness  She is ambulating with a cane   Lymphadenopathy:     She has no cervical adenopathy  She has no axillary adenopathy  Neurological: She is alert  She has normal strength  No cranial nerve deficit  Coordination and gait normal    Skin: Skin is intact  Psychiatric: She has a normal mood and affect   Her speech is normal and behavior is normal            RESULTS    Lab Results:   Recent Results (from the past 672 hour(s))   ECG 12 lead    Collection Time: 11/19/18  3:55 PM   Result Value Ref Range    Ventricular Rate 100 BPM    Atrial Rate 100 BPM    OK Interval 148 ms    QRSD Interval 72 ms    QT Interval 340 ms    QTC Interval 438 ms    P [place] 14 degrees    QRS Axis 5 degrees    T Wave Axis 54 degrees   Comprehensive metabolic panel    Collection Time: 11/19/18  4:13 PM   Result Value Ref Range    Sodium 134 (L) 136 - 145 mmol/L    Potassium 4 1 3 5 - 5 3 mmol/L    Chloride 99 (L) 100 - 108 mmol/L    CO2 24 21 - 32 mmol/L    ANION GAP 11 4 - 13 mmol/L    BUN 21 5 - 25 mg/dL    Creatinine 0 83 0 60 - 1 30 mg/dL    Glucose 368 (H) 65 - 140 mg/dL    Calcium 10 1 8 3 - 10 1 mg/dL    AST 14 5 - 45 U/L    ALT 39 12 - 78 U/L    Alkaline Phosphatase 87 46 - 116 U/L    Total Protein 7 7 6 4 - 8 2 g/dL    Albumin 3 6 3 5 - 5 0 g/dL    Total Bilirubin 0 34 0 20 - 1 00 mg/dL    eGFR 76 ml/min/1 73sq m   CBC and differential    Collection Time: 11/19/18  4:13 PM   Result Value Ref Range    WBC 7 56 4 31 - 10 16 Thousand/uL    RBC 4 07 3 81 - 5 12 Million/uL    Hemoglobin 11 8 11 5 - 15 4 g/dL    Hematocrit 36 1 34 8 - 46 1 %    MCV 89 82 - 98 fL    MCH 29 0 26 8 - 34 3 pg    MCHC 32 7 31 4 - 37 4 g/dL    RDW 14 5 11 6 - 15 1 %    MPV 11 7 8 9 - 12 7 fL    Platelets 633 993 - 549 Thousands/uL    nRBC 0 /100 WBCs    Neutrophils Relative 50 43 - 75 %    Immat GRANS % 1 0 - 2 %    Lymphocytes Relative 40 14 - 44 %    Monocytes Relative 5 4 - 12 %    Eosinophils Relative 2 0 - 6 %    Basophils Relative 2 (H) 0 - 1 %    Neutrophils Absolute 3 75 1 85 - 7 62 Thousands/µL    Immature Grans Absolute 0 07 0 00 - 0 20 Thousand/uL    Lymphocytes Absolute 3 03 0 60 - 4 47 Thousands/µL    Monocytes Absolute 0 41 0 17 - 1 22 Thousand/µL    Eosinophils Absolute 0 17 0 00 - 0 61 Thousand/µL    Basophils Absolute 0 13 (H) 0 00 - 0 10 Thousands/µL   Troponin I    Collection Time: 11/19/18  4:13 PM   Result Value Ref Range    Troponin I <0 02 <=0 04 ng/mL   Protime-INR    Collection Time: 11/19/18  4:13 PM   Result Value Ref Range    Protime 19 7 (H) 11 8 - 14 2 seconds    INR 1 66 (H) 0 86 - 1 17   APTT    Collection Time: 11/19/18  4:13 PM   Result Value Ref Range    PTT 30 26 - 38 seconds   Troponin I    Collection Time: 11/19/18  8:19 PM   Result Value Ref Range    Troponin I <0 02 <=0 04 ng/mL   ECG 12 lead    Collection Time: 11/19/18  8:26 PM   Result Value Ref Range    Ventricular Rate 92 BPM    Atrial Rate 92 BPM    OK Interval 154 ms    QRSD Interval 74 ms    QT Interval 342 ms    QTC Interval 422 ms    P Axis 50 degrees    QRS Axis 1 degrees    T Wave Axis 61 degrees   CBC and differential    Collection Time: 11/26/18  4:53 PM   Result Value Ref Range    WBC 5 41 4 31 - 10 16 Thousand/uL    RBC 3 85 3 81 - 5 12 Million/uL    Hemoglobin 11 2 (L) 11 5 - 15 4 g/dL    Hematocrit 35 2 34 8 - 46 1 %    MCV 91 82 - 98 fL    MCH 29 1 26 8 - 34 3 pg    MCHC 31 8 31 4 - 37 4 g/dL    RDW 14 8 11 6 - 15 1 %    MPV 12 1 8 9 - 12 7 fL    Platelets 258 617 - 667 Thousands/uL    nRBC 0 /100 WBCs    Neutrophils Relative 43 43 - 75 %    Immat GRANS % 1 0 - 2 %    Lymphocytes Relative 48 (H) 14 - 44 %    Monocytes Relative 4 4 - 12 %    Eosinophils Relative 2 0 - 6 %    Basophils Relative 2 (H) 0 - 1 %    Neutrophils Absolute 2 35 1 85 - 7 62 Thousands/µL    Immature Grans Absolute 0 03 0 00 - 0 20 Thousand/uL    Lymphocytes Absolute 2 61 0 60 - 4 47 Thousands/µL    Monocytes Absolute 0 22 0 17 - 1 22 Thousand/µL    Eosinophils Absolute 0 12 0 00 - 0 61 Thousand/µL    Basophils Absolute 0 08 0 00 - 0 10 Thousands/µL   Comprehensive metabolic panel    Collection Time: 11/26/18  4:53 PM   Result Value Ref Range    Sodium 135 (L) 136 - 145 mmol/L    Potassium 4 3 3 5 - 5 3 mmol/L    Chloride 97 (L) 100 - 108 mmol/L    CO2 27 21 - 32 mmol/L    ANION GAP 11 4 - 13 mmol/L    BUN 22 5 - 25 mg/dL    Creatinine 0 89 0 60 - 1 30 mg/dL    Glucose 477 (H) 65 - 140 mg/dL    Calcium 10 0 8 3 - 10 1 mg/dL    AST 12 5 - 45 U/L    ALT 30 12 - 78 U/L    Alkaline Phosphatase 75 46 - 116 U/L    Total Protein 7 8 6 4 - 8 2 g/dL    Albumin 3 7 3 5 - 5 0 g/dL    Total Bilirubin 0 40 0 20 - 1 00 mg/dL    eGFR 70 ml/min/1 73sq m   [facility] Allergy Panel, Adult    Collection Time: 11/26/18  4:53 PM   Result Value Ref Range    A  ALTERNATA <0 10 0 00 - 0 09 kUA/I    A  FUMIGATUS <0 10 0 00 - 0 09 kUA/I    [place] Grass 0 77 (H) 0 00 - 0 09 kUA/I    [place]  0 76 (H) 0 00 - 0 09 kUA/I    Cat Epithellium-Dander 1 70 (H) 0 00 - 0 09 kUA/I    C HERBARUM <0 10 0 00 - 0 09 kUA/I    Cockroach 5 50 (H) 0 00 - 0 09 kUA/I    Common Silver Birch 0 49 (H) 0 00 - 0 09 kUA/I    [place] 0 79 (H) 0 00 - 0 09 kUA/I    D  farinae 47 90 (H) 0 00 - 0 09 kUA/I    D  pteronyssinus 73 80 (H) 0 00 - 0 09 kUA/I    Dog Dander 38 00 (H) 0 00 - 0 09 kUA/I    Elm IgE 0 82 (H) 0 00 - 0 09 kUA/I    [place]ountain [place] Tree 0 57 (H) 0 00 - 0 09 kUA/I    Mugwort 0 63 (H) 0 00 - 0 09 kUA/I    [place] Tree 0 46 (H) 0 00 - 0 09 kUA/I    Oak 0 68 (H) 0 00 - 0 09 kUA/I    P CHRYSOGENUM <0 10 0 00 - 0 09 kUA/I    Rough Pigweed  IgE 0 68 (H) 0 00 - 0 09 kUA/I    Common Ragweed 0 76 (H) 0 00 - 0 09 kUA/I    Sheep Sorrel IgE 0 68 (H) 0 00 - 0 09 kUA/I    [place] Tree 0 73 (H) 0 00 - 0 09 kUA/I    [place] Grass 0 71 (H) 0 00 - 0 09 kUA/I    [place] Tree 0 80 (H) 0 00 - 0 09 kUA/I    White [place] Tree 0 79 (H) 0 00 - 0 09 kUA/I    IgE 863 (H) 0 - 113 kU/l    Allergen Comment See Below     MOUSE URINE <0 10 0 00 - 0 09 kUA/I   CBC and differential    Collection Time: 12/03/18  2:22 PM   Result Value Ref Range    WBC 4 25 (L) 4 31 - 10 16 Thousand/uL    RBC 3 68 (L) 3 81 - 5 12 Million/uL    Hemoglobin 10 6 (L) 11 5 - 15 4 g/dL    Hematocrit 33 5 (L) 34 8 - 46 1 %    MCV 91 82 - 98 fL    MCH 28 8 26 8 - 34 3 pg    MCHC 31 6 31 4 - 37 4 g/dL    RDW 15 2 (H) 11 6 - 15 1 %    MPV 11 5 8 9 - 12 7 fL    Platelets 159 051 - 643 Thousands/uL    nRBC 0 /100 WBCs    Neutrophils Relative 37 (L) 43 - 75 %    Immat GRANS % 1 0 - 2 %    Lymphocytes Relative 55 (H) 14 - 44 %    Monocytes Relative 5 4 - 12 %    Eosinophils Relative 1 0 - 6 %    Basophils Relative 1 0 - 1 %    Neutrophils Absolute 1 58 (L) 1 85 - 7 62 Thousands/µL    Immature Grans Absolute 0 02 0 00 - 0 20 Thousand/uL    Lymphocytes Absolute 2 33 0 60 - 4 47 Thousands/µL    Monocytes Absolute 0 22 0 17 - 1 22 Thousand/µL    Eosinophils Absolute 0 05 0 00 - 0 61 Thousand/µL    Basophils Absolute 0 05 0 00 - 0 10 Thousands/µL   POCT hemoglobin A1c    Collection Time: 12/05/18  9:12 AM   Result Value Ref Range    Hemoglobin A1C 12 9    CBC and differential    Collection Time: 12/10/18  2:54 PM   Result Value Ref Range    WBC 5 22 4 31 - 10 16 Thousand/uL    RBC 3 91 3 81 - 5 12 Million/uL    Hemoglobin 11 5 11 5 - 15 4 g/dL    Hematocrit 35 6 34 8 - 46 1 %    MCV 91 82 - 98 fL    MCH 29 4 26 8 - 34 3 pg    MCHC 32 3 31 4 - 37 4 g/dL    RDW 15 3 (H) 11 6 - 15 1 %    MPV 11 0 8 9 - 12 7 fL    Platelets 592 (H) 735 - 390 Thousands/uL    nRBC 0 /100 WBCs    Neutrophils Relative 44 43 - 75 %    Immat GRANS % 1 0 - 2 %    Lymphocytes Relative 46 (H) 14 - 44 %    Monocytes Relative 6 4 - 12 %    Eosinophils Relative 1 0 - 6 %    Basophils Relative 2 (H) 0 - 1 %    Neutrophils Absolute 2 28 1 85 - 7 62 Thousands/µL    Immature Grans Absolute 0 05 0 00 - 0 20 Thousand/uL    Lymphocytes Absolute 2 46 0 60 - 4 47 Thousands/µL    Monocytes Absolute 0 31 0 17 - 1 22 Thousand/µL    Eosinophils Absolute 0 04 0 00 - 0 61 Thousand/µL    Basophils Absolute 0 08 0 00 - 0 10 Thousands/µL   Comprehensive metabolic panel    Collection Time: 12/10/18  2:54 PM   Result Value Ref Range    Sodium 136 136 - 145 mmol/L    Potassium 4 0 3 5 - 5 3 mmol/L    Chloride 100 100 - 108 mmol/L    CO2 25 21 - 32 mmol/L    ANION GAP 11 4 - 13 mmol/L    BUN 21 5 - 25 mg/dL    Creatinine 0 93 0 60 - 1 30 mg/dL    Glucose 425 (H) 65 - 140 mg/dL    Calcium 9 8 8 3 - 10 1 mg/dL    AST 11 5 - 45 U/L    ALT 29 12 - 78 U/L    Alkaline Phosphatase 66 46 - 116 U/L    Total Protein 7 2 6 4 - 8 2 g/dL    Albumin 3 5 3 5 - 5 0 g/dL    Total Bilirubin 0 38 0 20 - 1 00 mg/dL    eGFR 66 ml/min/1 73sq m       Imaging Studies:X-ray Chest 2 Views    Result Date: 11/19/2018  Narrative: CHEST INDICATION:   chest pain  COMPARISON:  September 13, 2018  EXAM PERFORMED/VIEWS:  XR CHEST PA & LATERAL FINDINGS:  Left subclavian chest port with its tip at the cavoatrial junction  Cardiomediastinal silhouette appears unremarkable  The lungs are clear  No pneumothorax or pleural effusion  Osseous structures appear within normal limits for patient age  Impression: No acute cardiopulmonary disease  Workstation performed: [id number]     Mri Knee Right  Wo Contrast    Result Date: 11/15/2018  Narrative: MRI RIGHT KNEE INDICATION:   M25 561: Pain in right knee Z85 3: Personal history of malignant neoplasm of breast   Dr Dae Cortez note from 10/31/2018 was reviewed, which states patient has history of breast cancer, and lateral knee pain with swelling  This MR was performed to evaluate for source of pain, particularly for possible metastasis  Patient has a remote history of meniscal repair about 30 years ago  COMPARISON: Right knee plain films from 10/3/2018  TECHNIQUE:    MR sequences were obtained of the right knee including:  Localizer, axial T2 fat sat, coronal T1/T2 fat sat, sagittal PD/T2 fat sat  Images were acquired on a 1 5 Lida unit  Gadolinium was not used  FINDINGS: SUBCUTANEOUS TISSUES: Normal JOINT EFFUSION: None  BAKER'S CYST: None  MENISCI: Large complex of the lateral meniscus posterior horn and body (series 6 images 13 through 17 and series 4 images 8 through 11 ) Medial meniscus is normal  CRUCIATE LIGAMENTS: Intact  EXTENSOR APPARATUS: Intact  COLLATERAL LIGAMENTS: Intact  ARTICULAR SURFACES: Normal  BONES: Normal   Specifically, there is no evidence of bone metastases  MUSCULATURE:  Intact       Impression: Large complex of the lateral meniscus posterior horn and body (series 6 images 13 through 17 and series 4 images 8 through 11 ) Bony structures are normal without evidence of metastases  Workstation performed: [id number]     Cta Ed Chest Pe Study    Result Date: 11/19/2018  Narrative: CTA - CHEST WITH IV CONTRAST - PULMONARY ANGIOGRAM INDICATION:   Chest pain/shortness of breath/history of PE  History of breast cancer  COMPARISON: CT pulmonary angiogram 11/6/2018 TECHNIQUE: CTA examination of the chest was performed using angiographic technique according to a protocol specifically tailored to evaluate for pulmonary embolism  Axial, sagittal, and coronal 2D reformatted images were created from the source data and  submitted for interpretation  In addition, coronal 3D MIP postprocessing was performed on the acquisition scanner  Radiation dose length product (DLP) for this visit:  480 mGy-cm   This examination, like all CT scans performed in the [facility], was performed utilizing techniques to minimize radiation dose exposure, including the use of iterative reconstruction and automated exposure control  IV Contrast:  85 mL of iohexol (OMNIPAQUE)  FINDINGS: PULMONARY ARTERIAL TREE:  Multiple segmental and subsegmental filling defects compatible with bilateral pulmonary emboli  For example, see images 84, 87, 88, 110, 128, 135 and 152 series 3  Findings are not significantly changed from the prior exam   No findings for new pulmonary embolus  No saddle embolus  Main pulmonary artery is mildly prominent at 3 1 cm in diameter which suggests pulmonary artery hypertension  LUNGS: 3 mm nodule in the left lower lobe laterally, image 76 series 3, stable  A few scattered calcified granulomata  There is no tracheal or endobronchial lesion  PLEURA:  Small calcified pleural plaque in the right lower lobe posterior medially  HEART/GREAT VESSELS:  The heart is normal size  No RV dilatation  No pericardial effusion  Thoracic aorta is normal caliber  MEDIASTINUM AND BEATRIZ:  Unremarkable   CHEST WALL AND LOWER NECK:   Skin thickening along the right breast, similar to the prior exam   No significant axillary lymphadenopathy  VISUALIZED STRUCTURES IN THE UPPER ABDOMEN:  Unremarkable  OSSEOUS STRUCTURES:  No acute fracture or destructive osseous lesion  Impression: 1  Scattered bilateral segmental and subsegmental pulmonary emboli without significant interval change  No findings for new pulmonary embolus  2  3 mm pulmonary nodule in the left lower lobe, stable  Based on current Fleischner Society 2017 Guidelines on incidental pulmonary nodule, patients with a known malignancy are at increased risk of metastasis and should receive followup CT at intervals  appropriate for the type of cancer and its risk of pulmonary metastases  A follow-up chest CT may be considered in 3 months  Workstation performed: [id number]           Assessment/Plan:  No orders of the defined types were placed in this encounter  Evangelista Hugo is a 59y o  year old female with stage I right breast carcinoma status post breast conservation surgery  Her tumor is ER/IN positive  Her 2 also positive  She completed her adjuvant chemotherapy course today  She will go on to continue with Herceptin  She is being seen today for evaluation and simulation for her upcoming adjuvant radiation therapy directed to the right breast   I have once again reviewed with her along with her  the plan for adjuvant radiation therapy  She will have treatment to the right whole breast followed by a boost   The possible side effects have been reviewed with her  She will undergo CT simulation planning today  She understands that her radiation treatment will initiate 4 weeks post chemotherapy  She is agreeable to the above outlined plan        Sulema Horne MD  12/11/2018,1:50 PM    Portions of the record may have been created with voice recognition software   Occasional wrong word or "sound a like" substitutions may have occurred due to the inherent limitations of voice recognition software   Read the chart carefully and recognize, using context, where substitutions have occurred

## 2018-12-11 NOTE — PLAN OF CARE
Problem: Potential for Falls  Goal: Patient will remain free of falls  INTERVENTIONS:  - Assess patient frequently for physical needs  -  Identify cognitive and physical deficits and behaviors that affect risk of falls    -  [place] fall precautions as indicated by assessment   - Educate patient/family on patient safety including physical limitations  - Instruct patient to call for assistance with activity based on assessment  - Modify environment to reduce risk of injury  - Consider OT/PT consult to assist with strengthening/mobility   Outcome: Progressing

## 2018-12-11 NOTE — PROGRESS NOTES
[facility] joined Dr Verito Jacques, patient, and daughter to offer support and guidance  Patient lives with her two daughters and grandson (8)  Patient lives in a third floor apartment that has not elevators  Patient experiences significant pain in her right knee which leads to difficulty walking/doing stairs  Patient recently acquired a 4 point cane but this has not adequately supported her and she requires a Rolator  A Rolator would allow her to take rests when needed  Daughter is trying to acquire a new residence for patient  [facility] will draft a letter stating the medical necessity of the patient to move residence  [facility] will mail patient information on how to schedule an appointment with the  [employer]  [facility] also gave patient information for the [facility]; that has a Nepalese speaking support group  Daughter has requested time off of work to provide care for mom  She will know soon if this time off request has been granted  Daughter and patient are agreeable to start utilizing a pill container to verify that patient is taking medications correctly

## 2018-12-11 NOTE — PROGRESS NOTES
Jadon Johnnie  1955   Ms Giovanni Conrad is a 59 y o  female       Chief Complaint   Patient presents with    Follow-up     radiation oncology       Cancer Staging  Malignant neoplasm of upper-outer quadrant of right breast in female, estrogen receptor positive ([facility] )  Staging form: Breast, AJCC 8th Edition  - Clinical: No stage assigned - Unsigned  - Pathologic: Stage IA (pT1c, pN0, cM0, G2, ER: Positive, FL: Positive, HER2: Positive) - Signed by Adan Prieto MD on 8/21/2018         Malignant neoplasm of right female breast ([facility] )    5/23/2018 Initial Diagnosis     Malignant neoplasm of right female breast ([facility] )         5/23/2018 Biopsy     Right breast core biopsy:  DCIS, micropapillary type, lowintermediate nuclear grade  ER 90-95% positive,  FL 75-80% positive           6/26/2018 Surgery     RIGHT BREAST NEEDLE LOCALIZATION X2 WITH RIGHT BREAST LUMPECTOMY ( NEEDLE LOC @ 1000) (Right)   ONCOPLASTIC CLOSURE OF LUMPECTOMY CAVITY    Invasive breast carcinoma, NST (aka ductal)  * [place] grade 2 of 3 (total score = 2+2+2 = 6 of 9)   -- tubule formation < 10%, score 3   -- nuclear grade 2 of 3, score 2   -- mitoses ~ 4/mm2, score 2    * invasive carcinoma is multifocally present (A23-1, A32-1, A84-1, A89-1, A100-1), largest focus is 14 millimeters in greatest dimension (A89-1, this focus is graded)  * superior lumpectomy margin (orange-inked) is POSITIVE for invasive carcinoma (A84-1)   * all other lumpectomy margins are free of invasive carcinoma  * estrogen, progesterone & Her-2/negrita receptor studies are undertaken, to be described in an addendum report  - Ductal carcinoma in-situ (DCIS): Present as an extensive component (~85% of total tumor)  * DCIS is co-located with invasive carcinoma surrounding prior needle biopsy site  * DCIS spans 2 6 cm maximal dimension (A62-1) and is present on 27 of 136 total slides examined     * DCIS has cribriform & micropapillary patterns, nuclear grade 2 of 3, with central comedo-type necrosis  * DCIS is present within 0 2 millimeters of the superior lumpectomy margin (orange-inked, A26-1)   * DCIS is present within 0 2 millimeters of the medial lumpectomy margin (yellow-inked, A3-1)   * all other lumpectomy margins are free of DCIS by > 2 millimeters  - Lymph-vascular invasion: no lymph-vascular invasion is unequivocally identified  - Microcalcifications: present in DCIS  % positive, ER 45-55% positive, HER2 by IHC 3+ positive    - Dr Annabelle Yen           8/2/2018 Surgery     Right breast lumpectomy reexcision, right axilla SLN biopsy:  A  Submitted as right axillary sentinel node:  - Seven lymph nodes are identified showing no metastatic tumor      B  Right breast lumpectomy reexcision:  - Abundant reactive changes are seen around the previous lumpectomy cavity   - No residual atypia or malignancy is seen               8/2/2018 -  Cancer Staged     Stage IA - pT1c, pN0, G2            9/25/2018 -  Chemotherapy     Weekly Paclitaxol and trastuzumab x12, until December 11, 2018 followed by trastuzumab monotherapy 6 milligram/kilogram every 3 weeks    - Dr Manohar Royal              Clinical Trial: no      Interval History:  58 y/o female initially seen on 8/21/18 by Dr Melchor Bell for radiation therapy consult for right breast cancer  9/7/18 consultation with Dr Manohar Royal   -  Recommended weekly paclitaxel and trastuzumab x12, followed by trastuzumab monotherapy for a year  She initiated systemic therapy on 9/25/18 11/6 - 11/8/18 presented to ED with shortness of breath and abdominal pain, diagnosed with bilateral pulmonary emboli and bilateral distal lower extremity DVT  12/10/18 Initial visit with palliative and supportive care with Dr Katrin Feliciano  - seen for constipation, insomnia, cancer related pain, depression, chronic right knee pain    She has finished her weekly Taxol and Herceptin today  She is seen today to initiate radiation planning         She has also been followed by Jennifer Mckeon, Breast Nurse Navigator  Future:  12/28/18 Dr Monique Irvin  2/15/19 Dr Concetta Florez  2/28/19 Dr Josep Kamara        Screening  Tobacco  Current tobacco user: no  If yes, brief counseling provided: NA    Hypertension  Hypertension screening performed: yes  Normotensive:  yes  If no, referred to PCP: n/a    Depression Screening  Screened for depression using PHQ-2: yes    Screened for depression using PHQ-9:  yes  Screening positive or negative:  negative  If score >4, was any of the following actions taken?    Additional evaluation for depression, suicide risk assesment, referral to PCP or psychiatry, medication started:  [id number] [place] [address] for Patients >65 years  Advanced Care Planning Discussed:  n/a  Patient named surrogate decision maker or care plan in chart: n/a    [unfilled]  Health Maintenance   Topic Date Due    DM Eye Exam  10/29/1966    Pneumococcal PPSV23 Highest Risk Adult (1 of 3 - PCV13) 10/29/1975    DTaP,Tdap,and Td Vaccines (1 - Tdap) 10/29/1977    PAP SMEAR  10/29/1977    INFLUENZA VACCINE  12/05/2019 (Originally 7/1/2018)    URINE MICROALBUMIN  05/01/2019    HEMOGLOBIN A1C  06/05/2019    Diabetic Foot Exam  11/26/2019    MAMMOGRAM  05/15/2020    CRC Screening: Colonoscopy  09/05/2028    Hepatitis C Screening  Completed       Patient Active Problem List   Diagnosis    Type 2 diabetes mellitus with complication, with long-term current use of insulin ([address] )    Asthma    Essential hypertension    Other specified hypothyroidism    Chest pain    Palpitations    Chronic bilateral low back pain without sciatica    Ductal carcinoma in situ (DCIS) of right breast    Neck pain    Constipation    Primary insomnia    Malignant neoplasm of right female breast ([address] )    Malignant neoplasm of upper-outer quadrant of right breast in female, estrogen receptor positive ([address] )    Epigastric pain    Hiatal hernia    Screening for colon cancer    Esophageal dysphagia    Cellulitis of right axilla    Chronic pain of right knee    Encounter for diabetic foot exam ([facility] )    Hypercholesterolemia    Hypothyroid    Hypertension    Bilateral pulmonary embolism (HCC)    Abdominal pain    Palliative care patient     Past Medical History:   Diagnosis Date    Abdominal infection ([facility] )     h-pylori    Asthma     Breast cancer ([facility]     Cancer ([facility] )     BREAST    Diabetes mellitus ([facility] )     blood sugar 199 @ 735    Hiatal hernia     Hypercholesterolemia     Hypertension     Hypothyroid     Renal disorder     Rheumatoid arthritis ([facility] )      Past Surgical History:   Procedure Laterality Date    BREAST BIOPSY      BREAST LUMPECTOMY Right 6/26/2018    Procedure: RIGHT BREAST NEEDLE LOCALIZATION X2 WITH RIGHT BREAST LUMPECTOMY ( NEEDLE LOC @ 1000); Surgeon: Tony Luna MD;  [facility]: [facility];  Service: Surgical Oncology    BREAST LUMPECTOMY Right 8/2/2018    Procedure: LYMPHOSCINITGRAPHY INTRAOPERATIVE LYMPHATIC MAPPING , RIGHT SENTINEL NODE BIOPSY, REEXCISION  RIGHT BREAST LUMPECTOMY CAVITY (SUPERIOR MARGIN); Surgeon: Tony Luna MD;  [facility];  Service: Surgical Oncology    BREAST SURGERY Left     CATARACT EXTRACTION, BILATERAL Bilateral     EGD AND COLONOSCOPY N/A 9/5/2018    Procedure: EGD AND COLONOSCOPY;  Surgeon: Anselmo Jeffries MD;  Location: [facility]; Service: Gastroenterology    [facility] GUIDED CENTRAL VENOUS ACCESS REPLACEMENT  9/13/2018    KNEE SURGERY Right     MAMMO NEEDLE LOCALIZATION RIGHT (ALL INC) Right 6/26/2018    MAMMO STEREOTACTIC BREAST BIOPSY RIGHT (ALL INC) Right 5/23/2018    RETINAL DETACHMENT SURGERY Right     TUBAL LIGATION      TUNNELED VENOUS PORT PLACEMENT Left 9/13/2018    Procedure: INSERTION VENOUS PORT (PORT-A-CATH);   Surgeon: Tony Luna MD;  [facility];  Service: Surgical Oncology     Family History   Problem Relation Age of Onset    Diabetes Mother     Hypertension Mother     Diabetes Father     Hypertension Father     Diabetes Brother     Hypertension Brother     Prostate cancer Brother     Cancer Maternal Grandfather      Social History     Social History    Marital status:      Spouse name: N/A    Number of children: N/A    Years of education: N/A     Occupational History    Not on file       Social History Main Topics    Smoking status: Never Smoker    Smokeless tobacco: Never Used    Alcohol use No    Drug use: No    Sexual activity: Not on file     Other Topics Concern    Not on file     Social History Narrative    No narrative on file       Current Outpatient Prescriptions:     albuterol (2 5 mg/3 mL) 0 083 % nebulizer solution, Take 1 vial (2 5 mg total) by nebulization every 4 (four) hours as needed for wheezing or shortness of breath, Disp: 75 mL, Rfl: 0    albuterol (PROVENTIL HFA,VENTOLIN HFA) 90 mcg/act inhaler, Inhale 1 puff every 4 (four) hours as needed  , Disp: , Rfl:     amLODIPine-atorvastatin (CADUET) 10-10 MG per tablet, take 1 tablet by mouth once daily, Disp: 90 tablet, Rfl: 1    docusate sodium (COLACE) 100 mg capsule, Take 1 capsule (100 mg total) by mouth 2 (two) times a day, Disp: 10 capsule, Rfl: 0    enalapril (VASOTEC) 20 mg tablet, take 1 tablet by mouth once daily, Disp: 90 tablet, Rfl: 1    fluticasone-vilanterol (BREO ELLIPTA) 100-25 mcg/inh inhaler, Inhale 1 puff daily Rinse mouth after use , Disp: 2 Inhaler, Rfl: 8    gemfibrozil (LOPID) 600 mg tablet, Take 1 tablet (600 mg total) by mouth 2 (two) times a day, Disp: 60 tablet, Rfl: 3    glipiZIDE (GLUCOTROL XL) 10 mg 24 hr tablet, take 1 tablet by mouth once daily, Disp: 90 tablet, Rfl: 1    HUMULIN N 100 UNIT/ML subcutaneous injection, 70 units in the am 30 units in the pm, Disp: 30 mL, Rfl: 5    Insulin Glargine (TOUJEO) 300 units/mL CONCETRATED U-300 injection pen, Inject 35 Units under the skin daily at bedtime, Disp: 5 pen, Rfl: 0    levothyroxine 50 mcg tablet, take 1 tablet by mouth once daily, Disp: 90 tablet, Rfl: 1    lidocaine-prilocaine (EMLA) cream, Apply topically as needed for mild pain Apply prior to port access  , Disp: 30 g, Rfl: 0    metFORMIN (GLUCOPHAGE-XR) 500 mg 24 hr tablet, take 2 tablets by mouth once daily WITH BREAKFAST, Disp: 180 tablet, Rfl: 1    mirtazapine (REMERON) 15 mg tablet, Take 1 tablet (15 mg total) by mouth daily at bedtime, Disp: 30 tablet, Rfl: 0    omeprazole (PriLOSEC) 40 MG capsule, Take 1 capsule (40 mg total) by mouth 2 (two) times a day for 14 days, Disp: 28 capsule, Rfl: 0    oxyCODONE (ROXICODONE) 5 mg immediate release tablet, Take 1 tablet (5 mg total) by mouth every 6 (six) hours as needed for moderate pain Max Daily Amount: 20 mg, Disp: 45 tablet, Rfl: 0    polyethylene glycol (MIRALAX) 17 g packet, Take 17 g by mouth daily, Disp: 14 each, Rfl: 0    rivaroxaban (XARELTO) 15 mg tablet, Take 1 tablet (15 mg total) by mouth 2 (two) times a day with meals for 21 days, Disp: 42 tablet, Rfl: 0    SANG MICROLET LANCETS lancets, Testing three times a day, Disp: 100 each, Rfl: 3    bismuth subsalicylate (PEPTO BISMOL) 262 MG chewable tablet, Chew 2 tablets (524 mg total) 4 (four) times a day (before meals and at bedtime) (Patient not taking: Reported on 12/11/2018 ), Disp: 112 tablet, Rfl: 0    Blood Glucose Monitoring Suppl (ACURA BLOOD GLUCOSE METER) w/Device KIT, Testing blood sugars three times a day, Disp: , Rfl:     Blood Glucose Monitoring Suppl (SANG CONTOUR MONITOR) w/Device KIT, Testing blood sugars three times a day, Disp: 1 kit, Rfl: 0    glucose blood (SANG CONTOUR TEST) test strip, Testing three times a day, Disp: 100 each, Rfl: 3    Insulin Syringe-Needle U-100 (B-D INS SYRINGE 0 5CC/30GX1/2") 30G X 1/2" 0 5 ML MISC, , Disp: , Rfl:     Insulin Syringe-Needle U-100 30G 0 3 ML MISC, Using twice a day, Disp: , Rfl:     Insulin Syringe-Needle U-100 30G X 1/2" 1 ML MISC, Using twice a day, Disp: 100 each, Rfl: 3    lactulose 20 g/30 mL, Take 15 mL (10 g total) by mouth 2 (two) times a day, Disp: 1 Bottle, Rfl: 0    Lancets MISC, Testing three times a day, Disp: , Rfl:     lidocaine 4 % topical gel, Apply topically 4 (four) times a day as needed (to affected area for pain), Disp: 10 g, Rfl: 0  No current facility-administered medications for this visit  Facility-Administered Medications Ordered in Other Visits:     heparin lock flush 100 units/mL injection 300 Units, 300 Units, Intracatheter, Q1H PRN, Oracio Brown MD, 300 Units at 12/11/18 1230    sodium chloride 0 9 % infusion, 20 mL/hr, Intravenous, Continuous, Oracio Brown MD, Stopped at 12/11/18 1230  Allergies   Allergen Reactions    Aspirin Hives       in [address] told patient not to take aspirin r/t kidneys     Tylenol With Codeine #3 [Acetaminophen-Codeine] Rash       Review of Systems:  Review of Systems   Constitutional: Positive for fatigue and unexpected weight change (decreased approximately 15 pounds during chemotherapy)  HENT: Positive for trouble swallowing (dry foods and some pills)  Eyes: Positive for visual disturbance (right eye with blurred vision)  Respiratory: Positive for shortness of breath (with walking) and wheezing (sometimes)  Cardiovascular: Positive for leg swelling (occasional mild b/l LE )  Gastrointestinal: Positive for constipation  BM's every 4-5 days   Endocrine: Negative  Genitourinary: Negative  Musculoskeletal: Positive for gait problem (ambulates with cane)  Right knee pain   Skin: Negative  alopecia   Allergic/Immunologic: Negative  Neurological: Positive for dizziness (sometimes) and numbness (fingertips and feet)  Hematological: Negative  Psychiatric/Behavioral: Negative          Vitals:    12/11/18 1245   BP: 132/68   Pulse: 98   Resp: 18   Temp: 98 5 °F (36 9 °C)   TempSrc: Tympanic   SpO2: 96%   Weight: 77 6 kg (171 lb)   Height: 5' 4" (1 626 m)            Imaging:X-ray Chest 2 Views    Result Date: 11/19/2018  Narrative: CHEST INDICATION:   chest pain  COMPARISON:  September 13, 2018  EXAM PERFORMED/VIEWS:  XR CHEST PA & LATERAL FINDINGS:  Left subclavian chest port with its tip at the cavoatrial junction  Cardiomediastinal silhouette appears unremarkable  The lungs are clear  No pneumothorax or pleural effusion  Osseous structures appear within normal limits for patient age  Impression: No acute cardiopulmonary disease  Workstation performed: [id number]     Mri Knee Right  Wo Contrast    Result Date: 11/15/2018  Narrative: MRI RIGHT KNEE INDICATION:   M25 561: Pain in right knee Z85 3: Personal history of malignant neoplasm of breast   Dr Ortega Edu note from 10/31/2018 was reviewed, which states patient has history of breast cancer, and lateral knee pain with swelling  This MR was performed to evaluate for source of pain, particularly for possible metastasis  Patient has a remote history of meniscal repair about 30 years ago  COMPARISON: Right knee plain films from 10/3/2018  TECHNIQUE:    MR sequences were obtained of the right knee including:  Localizer, axial T2 fat sat, coronal T1/T2 fat sat, sagittal PD/T2 fat sat  Images were acquired on a 1 5 Lida unit  Gadolinium was not used  FINDINGS: SUBCUTANEOUS TISSUES: Normal JOINT EFFUSION: None  BAKER'S CYST: None  MENISCI: Large complex of the lateral meniscus posterior horn and body (series 6 images 13 through 17 and series 4 images 8 through 11 ) Medial meniscus is normal  CRUCIATE LIGAMENTS: Intact  EXTENSOR APPARATUS: Intact  COLLATERAL LIGAMENTS: Intact  ARTICULAR SURFACES: Normal  BONES: Normal   Specifically, there is no evidence of bone metastases  MUSCULATURE:  Intact  Impression: Large complex of the lateral meniscus posterior horn and body (series 6 images 13 through 17 and series 4 images 8 through 11 ) Bony structures are normal without evidence of metastases  Workstation performed: [id number]     Cta Ed Chest Pe Study    Result Date: 11/19/2018  Narrative: CTA - CHEST WITH IV CONTRAST - PULMONARY ANGIOGRAM INDICATION:   Chest pain/shortness of breath/history of PE  History of breast cancer  COMPARISON: CT pulmonary angiogram 11/6/2018 TECHNIQUE: CTA examination of the chest was performed using angiographic technique according to a protocol specifically tailored to evaluate for pulmonary embolism  Axial, sagittal, and coronal 2D reformatted images were created from the source data and  submitted for interpretation  In addition, coronal 3D MIP postprocessing was performed on the acquisition scanner  Radiation dose length product (DLP) for this visit:  480 mGy-cm   This examination, like all CT scans performed in the [facility], was performed utilizing techniques to minimize radiation dose exposure, including the use of iterative reconstruction and automated exposure control  IV Contrast:  85 mL of iohexol (OMNIPAQUE)  FINDINGS: PULMONARY ARTERIAL TREE:  Multiple segmental and subsegmental filling defects compatible with bilateral pulmonary emboli  For example, see images 84, 87, 88, 110, 128, 135 and 152 series 3  Findings are not significantly changed from the prior exam   No findings for new pulmonary embolus  No saddle embolus  Main pulmonary artery is mildly prominent at 3 1 cm in diameter which suggests pulmonary artery hypertension  LUNGS: 3 mm nodule in the left lower lobe laterally, image 76 series 3, stable  A few scattered calcified granulomata  There is no tracheal or endobronchial lesion  PLEURA:  Small calcified pleural plaque in the right lower lobe posterior medially  HEART/GREAT VESSELS:  The heart is normal size  No RV dilatation  No pericardial effusion  Thoracic aorta is normal caliber  MEDIASTINUM AND BEATRIZ:  Unremarkable   CHEST WALL AND LOWER NECK:   Skin thickening along the right breast, similar to the prior exam   No significant axillary lymphadenopathy  VISUALIZED STRUCTURES IN THE UPPER ABDOMEN:  Unremarkable  OSSEOUS STRUCTURES:  No acute fracture or destructive osseous lesion  Impression: 1  Scattered bilateral segmental and subsegmental pulmonary emboli without significant interval change  No findings for new pulmonary embolus  2  3 mm pulmonary nodule in the left lower lobe, stable  Based on current Fleischner Society 2017 Guidelines on incidental pulmonary nodule, patients with a known malignancy are at increased risk of metastasis and should receive followup CT at intervals  appropriate for the type of cancer and its risk of pulmonary metastases  A follow-up chest CT may be considered in 3 months   Workstation performed: [id number]       Teaching:  NCI RT packet in Frisian given

## 2018-12-11 NOTE — PROGRESS NOTES
Pt tolerated chemotherapy treatment well without complications  Pt discharged in stable condition and pt and daughter both aware to return next week for maintenance herceptin  AVS provided

## 2018-12-12 ENCOUNTER — HOSPITAL ENCOUNTER (OUTPATIENT)
Dept: PULMONOLOGY | Facility: HOSPITAL | Age: 63
Discharge: HOME | End: 2018-12-12
Attending: INTERNAL MEDICINE
Payer: MEDICARE

## 2018-12-12 DIAGNOSIS — J45.40 MODERATE PERSISTENT ASTHMA WITHOUT COMPLICATION: ICD-10-CM

## 2018-12-12 PROCEDURE — 94010 BREATHING CAPACITY TEST: CPT | Performed by: INTERNAL MEDICINE

## 2018-12-12 PROCEDURE — 94726 PLETHYSMOGRAPHY LUNG VOLUMES: CPT | Performed by: INTERNAL MEDICINE

## 2018-12-12 PROCEDURE — 94726 PLETHYSMOGRAPHY LUNG VOLUMES: CPT

## 2018-12-12 PROCEDURE — 94729 DIFFUSING CAPACITY: CPT

## 2018-12-12 PROCEDURE — 94010 BREATHING CAPACITY TEST: CPT

## 2018-12-12 PROCEDURE — 94760 N-INVAS EAR/PLS OXIMETRY 1: CPT

## 2018-12-12 PROCEDURE — 94729 DIFFUSING CAPACITY: CPT | Performed by: INTERNAL MEDICINE

## 2018-12-12 RX ORDER — ALBUTEROL SULFATE 2.5 MG/3ML
2.5 SOLUTION RESPIRATORY (INHALATION) ONCE AS NEEDED
Status: DISCONTINUED | OUTPATIENT
Start: 2018-12-12 | End: 2018-12-12

## 2018-12-14 ENCOUNTER — TELEPHONE (OUTPATIENT)
Dept: HEMATOLOGY ONCOLOGY | Facility: CLINIC | Age: 63
End: 2018-12-14

## 2018-12-14 PROCEDURE — 77334 RADIATION TREATMENT AID(S): CPT | Performed by: RADIOLOGY

## 2018-12-14 PROCEDURE — 77300 RADIATION THERAPY DOSE PLAN: CPT | Performed by: RADIOLOGY

## 2018-12-14 PROCEDURE — 77295 3-D RADIOTHERAPY PLAN: CPT | Performed by: RADIOLOGY

## 2018-12-14 NOTE — TELEPHONE ENCOUNTER
Breast Nurse Navigator Call    Called patient to follow up on management of previously reported symptoms of pain, constipation and depression, sleep disturbance  Patient reporting that her knee pain is much more tolerable since she started taking Oxycodone  Constipation also managed with taking Lactulose which she reported she is taking daily and having regular bowel movements at this time  Encourage to continue use as needed to maintain regular bowel movements and avoid constipation  Pt reporting she is still having some abdominal discomfort feels like her abdomen is bloated, which usually happens a few days after chemo and improves on its own  Patient denies feeling like she has gas, she does report she is taking omeprazole as ordered 40 mg twice a day  Instructed to call to report if discomfort worsens or does not improve  Patient reports she is NOT taking Remeron due to fear of feeling dizzy and confused  Pt reports she is sleeping better and in regards to depression, states that since her pain has improved and her symptoms overall are improving as well as her desire to leave the house more and socialized and be in a different environment that her depressive symptoms are minimal   I encouraged patient to try medication at bedtime as ordered but if unwilling to take medication as ordered, needs to be attentive to emotions and report any worsening symptoms  Encouraged patient to continue to walk and move about as tolerated as well as leaving home for social purposes other than just treatment, patient is looking forward to spending time with family members over the holidays  Patient denies any other symptoms or concerns at this time  She states she does have my contact information  I advised patient to call me as needed with any needs or concerns and that I will call her after the holidays to follow up,  Patient agreeable

## 2018-12-17 RX ORDER — SODIUM CHLORIDE 9 MG/ML
20 INJECTION, SOLUTION INTRAVENOUS CONTINUOUS
Status: DISCONTINUED | OUTPATIENT
Start: 2018-12-18 | End: 2018-12-21 | Stop reason: HOSPADM

## 2018-12-18 ENCOUNTER — HOSPITAL ENCOUNTER (OUTPATIENT)
Dept: INFUSION CENTER | Facility: CLINIC | Age: 63
Discharge: HOME | End: 2018-12-18
Payer: MEDICARE

## 2018-12-18 VITALS
BODY MASS INDEX: 28.61 KG/M2 | OXYGEN SATURATION: 99 % | RESPIRATION RATE: 16 BRPM | DIASTOLIC BLOOD PRESSURE: 71 MMHG | SYSTOLIC BLOOD PRESSURE: 153 MMHG | HEART RATE: 106 BPM | TEMPERATURE: 98.4 F | WEIGHT: 166.67 LBS

## 2018-12-18 PROCEDURE — 96413 CHEMO IV INFUSION 1 HR: CPT

## 2018-12-18 RX ADMIN — SODIUM CHLORIDE 20 ML/HR: 0.9 INJECTION, SOLUTION INTRAVENOUS at 08:50

## 2018-12-18 RX ADMIN — Medication 300 UNITS: at 10:46

## 2018-12-18 RX ADMIN — TRASTUZUMAB 450 MG: 150 INJECTION, POWDER, LYOPHILIZED, FOR SOLUTION INTRAVENOUS at 09:35

## 2018-12-18 NOTE — PROGRESS NOTES
Pt  Denies new symptoms or concerns at this time  BARNETT noted   02 Sat on RA 99 %  Echo scheduled for 12/27/18

## 2018-12-18 NOTE — PLAN OF CARE
Problem: PAIN - ADULT  Goal: Verbalizes/displays adequate comfort level or baseline comfort level  Interventions:  - Encourage patient to monitor pain and request assistance  - Assess pain using appropriate pain scale  - Administer analgesics based on type and severity of pain and evaluate response  - Implement non-pharmacological measures as appropriate and evaluate response  - Consider cultural and social influences on pain and pain management  - Notify physician/advanced practitioner if interventions unsuccessful or patient reports new pain  Outcome: Progressing      Problem: INFECTION - ADULT  Goal: Absence or prevention of progression during hospitalization  INTERVENTIONS:  - Assess and monitor for signs and symptoms of infection  - Monitor lab/diagnostic results  - Monitor all insertion sites, i e  indwelling lines, tubes, and drains  - Monitor endotracheal (as able) and nasal secretions for changes in amount and color  - [place] appropriate cooling/warming therapies per order  - Administer medications as ordered  - Instruct and encourage patient and family to use good hand hygiene technique  - Identify and instruct in appropriate isolation precautions for identified infection/condition  Outcome: Progressing    Goal: Absence of fever/infection during neutropenic period  INTERVENTIONS:  - Monitor WBC  - Implement neutropenic guidelines  Outcome: Progressing      Problem: Knowledge Deficit  Goal: Patient/family/caregiver demonstrates understanding of disease process, treatment plan, medications, and discharge instructions  Complete learning assessment and assess knowledge base    Interventions:  - Provide teaching at level of understanding  - Provide teaching via preferred learning methods  Outcome: Progressing

## 2018-12-26 ENCOUNTER — SOCIAL WORK (OUTPATIENT)
Dept: PALLIATIVE MEDICINE | Facility: CLINIC | Age: 63
End: 2018-12-26
Payer: MEDICARE

## 2018-12-26 ENCOUNTER — OFFICE VISIT (OUTPATIENT)
Dept: PALLIATIVE MEDICINE | Facility: CLINIC | Age: 63
End: 2018-12-26
Payer: MEDICARE

## 2018-12-26 VITALS
DIASTOLIC BLOOD PRESSURE: 76 MMHG | HEIGHT: 64 IN | WEIGHT: 169.4 LBS | TEMPERATURE: 98 F | BODY MASS INDEX: 28.92 KG/M2 | SYSTOLIC BLOOD PRESSURE: 140 MMHG | HEART RATE: 98 BPM | OXYGEN SATURATION: 97 % | RESPIRATION RATE: 20 BRPM

## 2018-12-26 DIAGNOSIS — K59.01 SLOW TRANSIT CONSTIPATION: ICD-10-CM

## 2018-12-26 DIAGNOSIS — G89.3 CANCER RELATED PAIN: Primary | ICD-10-CM

## 2018-12-26 DIAGNOSIS — G89.29 CHRONIC PAIN OF RIGHT KNEE: ICD-10-CM

## 2018-12-26 DIAGNOSIS — R10.13 EPIGASTRIC PAIN: ICD-10-CM

## 2018-12-26 DIAGNOSIS — M25.561 CHRONIC PAIN OF RIGHT KNEE: ICD-10-CM

## 2018-12-26 DIAGNOSIS — D05.11 DUCTAL CARCINOMA IN SITU (DCIS) OF RIGHT BREAST: ICD-10-CM

## 2018-12-26 DIAGNOSIS — Z51.5 PALLIATIVE CARE PATIENT: ICD-10-CM

## 2018-12-26 DIAGNOSIS — Z71.89 COMPLEX CARE COORDINATION: Primary | ICD-10-CM

## 2018-12-26 PROCEDURE — 99499 UNLISTED E&M SERVICE: CPT

## 2018-12-26 PROCEDURE — 99214 OFFICE O/P EST MOD 30 MIN: CPT | Performed by: INTERNAL MEDICINE

## 2018-12-26 NOTE — PROGRESS NOTES
LSW met with patient and daughter to continue to provide resources and support  They do not believe that they have received a call back from [address] [facility]  LSW contacted [facility] and they verified that Jonh Paredesbush does financially qualify for services  Patient/daughter needs to contact [facility] to schedule a home visit  Patient informed of this information via letter    LSW and Dr Kristian Alamo wrote a letter on patient's behalf regarding the medical need to have an apartment that has no stair or has an elevator  This letter was also mailed to patient        Requested that patient/daughter contact office for further support

## 2018-12-26 NOTE — PROGRESS NOTES
Palliative and Supportive Care   Jolie Mulligan 59 y o  female [id number]    Assessment/Plan:  1  Cancer related pain    2  Ductal carcinoma in situ (DCIS) of right breast    3  Slow transit constipation    4  Epigastric pain    5  Chronic pain of right knee    6  Palliative care patient      - reviewed medications- better understanding now that she has a pill box for organization  - PRN lidocaine to knee  - continue other medications as is  - okay to use OTC antacids for gas pains if needed  - follow up in 1 month    Requested Prescriptions     Signed Prescriptions Disp Refills    lidocaine 4 % topical gel 10 g 0     Sig: Apply topically 4 (four) times a day as needed (to affected area for pain)     Medications Discontinued During This Encounter   Medication Reason    docusate sodium (COLACE) 100 mg capsule     lidocaine-prilocaine (EMLA) cream     lidocaine 4 % topical gel Reorder       Representatives have queried the patient's controlled substance dispensing history in the Prescription Drug Monitoring Program in compliance with regulations before I have prescribed any controlled substances  The prescription history is consistent with prescribed therapy and our practice policies  30 minutes were spent face to face with Tracie Neil and her daughter with greater than 50% of the time spent in counseling or coordination of care including discussions of risks and benefits of treatment, instructions for disease self management, treatment instructions, follow up requirements and compliance with treatment regimen   All of the patient's questions were answered during this discussion  No Follow-up on file  Subjective:   Chief Complaint  Follow up visit for:  symptom management  HPI     Tracie Neil is a 59 y o  female with breast cancer seen today for follow up on symptom management  Overall, improvement since our initial visit   Her bowel movements are regular and mood is improved (although, not taking the Remeron)  She has used the oxycodone a few times with relief  She questions if she could use a knee stabilizer for her chronic pain  Still using a cane for ambulation  She is hopeful for orthopaedic intervention when she is done with chemotherapy  Reinforced medication regimen and addressed questions/concerns to their satisfaction  The following portions of the medical history were reviewed: past medical history, problem list, medication list, and social history      Current Outpatient Prescriptions:     albuterol (2 5 mg/3 mL) 0 083 % nebulizer solution, Take 1 vial (2 5 mg total) by nebulization every 4 (four) hours as needed for wheezing or shortness of breath, Disp: 75 mL, Rfl: 0    albuterol (PROVENTIL HFA,VENTOLIN HFA) 90 mcg/act inhaler, Inhale 1 puff every 4 (four) hours as needed  , Disp: , Rfl:     amLODIPine-atorvastatin (CADUET) 10-10 MG per tablet, take 1 tablet by mouth once daily, Disp: 90 tablet, Rfl: 1    SANG MICROLET LANCETS lancets, Testing three times a day, Disp: 100 each, Rfl: 3    bismuth subsalicylate (PEPTO BISMOL) 262 MG chewable tablet, Chew 2 tablets (524 mg total) 4 (four) times a day (before meals and at bedtime) (Patient not taking: Reported on 12/11/2018 ), Disp: 112 tablet, Rfl: 0    Blood Glucose Monitoring Suppl (SimpleDeal BLOOD GLUCOSE METER) w/Device KIT, Testing blood sugars three times a day, Disp: , Rfl:     Blood Glucose Monitoring Suppl (SANG CONTOUR MONITOR) w/Device KIT, Testing blood sugars three times a day, Disp: 1 kit, Rfl: 0    enalapril (VASOTEC) 20 mg tablet, take 1 tablet by mouth once daily, Disp: 90 tablet, Rfl: 1    fluticasone-vilanterol (BREO ELLIPTA) 100-25 mcg/inh inhaler, Inhale 1 puff daily Rinse mouth after use , Disp: 2 Inhaler, Rfl: 8    gemfibrozil (LOPID) 600 mg tablet, Take 1 tablet (600 mg total) by mouth 2 (two) times a day, Disp: 60 tablet, Rfl: 3    glipiZIDE (GLUCOTROL XL) 10 mg 24 hr tablet, take 1 tablet by mouth once daily, Disp: 90 tablet, Rfl: 1    glucose blood (SANG CONTOUR TEST) test strip, Testing three times a day, Disp: 100 each, Rfl: 3    HUMULIN N 100 UNIT/ML subcutaneous injection, 70 units in the am 30 units in the pm, Disp: 30 mL, Rfl: 5    Insulin Glargine (TOUJEO) 300 units/mL CONCETRATED U-300 injection pen, Inject 35 Units under the skin daily at bedtime, Disp: 5 pen, Rfl: 0    Insulin Syringe-Needle U-100 (B-D INS SYRINGE 0 5CC/30GX1/2") 30G X 1/2" 0 5 ML MISC, , Disp: , Rfl:     Insulin Syringe-Needle U-100 30G 0 3 ML MISC, Using twice a day, Disp: , Rfl:     Insulin Syringe-Needle U-100 30G X 1/2" 1 ML MISC, Using twice a day, Disp: 100 each, Rfl: 3    lactulose 20 g/30 mL, Take 15 mL (10 g total) by mouth 2 (two) times a day, Disp: 1 Bottle, Rfl: 0    Lancets MISC, Testing three times a day, Disp: , Rfl:     levothyroxine 50 mcg tablet, take 1 tablet by mouth once daily, Disp: 90 tablet, Rfl: 1    lidocaine 4 % topical gel, Apply topically 4 (four) times a day as needed (to affected area for pain), Disp: 10 g, Rfl: 0    metFORMIN (GLUCOPHAGE-XR) 500 mg 24 hr tablet, take 2 tablets by mouth once daily WITH BREAKFAST, Disp: 180 tablet, Rfl: 1    mirtazapine (REMERON) 15 mg tablet, Take 1 tablet (15 mg total) by mouth daily at bedtime, Disp: 30 tablet, Rfl: 0    omeprazole (PriLOSEC) 40 MG capsule, Take 1 capsule (40 mg total) by mouth 2 (two) times a day for 14 days, Disp: 28 capsule, Rfl: 0    oxyCODONE (ROXICODONE) 5 mg immediate release tablet, Take 1 tablet (5 mg total) by mouth every 6 (six) hours as needed for moderate pain Max Daily Amount: 20 mg, Disp: 45 tablet, Rfl: 0    polyethylene glycol (MIRALAX) 17 g packet, Take 17 g by mouth daily, Disp: 14 each, Rfl: 0    rivaroxaban (XARELTO) 15 mg tablet, Take 1 tablet (15 mg total) by mouth 2 (two) times a day with meals for 21 days, Disp: 42 tablet, Rfl: 0  Review of Systems   Gastrointestinal: Negative for constipation  Musculoskeletal: Positive for arthralgias  All other systems reviewed and are negative  All other systems negative    Objective:  Vital Signs  There were no vitals taken for this visit  Physical Exam   Constitutional: She is oriented to person, place, and time  She appears well-nourished  No distress  Chronically ill appearing   HENT:   Head: Normocephalic and atraumatic  Right Ear: External ear normal    Left Ear: External ear normal    Nose: Nose normal    Mouth/Throat: Oropharynx is clear and moist    Eyes: EOM are normal  Right eye exhibits no discharge  Left eye exhibits no discharge  No scleral icterus  Neck: Neck supple  No JVD present  No tracheal deviation present  Cardiovascular: Normal rate, regular rhythm and intact distal pulses  Pulmonary/Chest: Effort normal and breath sounds normal  No respiratory distress  She has no wheezes  She has no rales  Abdominal: Bowel sounds are normal  She exhibits no distension and no mass  There is no tenderness  Musculoskeletal: She exhibits tenderness  She exhibits no edema or deformity  +crepitus of right knee   Neurological: She is alert and oriented to person, place, and time  No cranial nerve deficit  Coordination normal    Skin: Skin is warm and dry  She is not diaphoretic  Psychiatric: She has a normal mood and affect  Her behavior is normal  Judgment and thought content normal    Nursing note and vitals reviewed

## 2018-12-27 ENCOUNTER — HOSPITAL ENCOUNTER (OUTPATIENT)
Dept: NON INVASIVE DIAGNOSTICS | Facility: HOSPITAL | Age: 63
Discharge: HOME | End: 2018-12-27
Payer: MEDICARE

## 2018-12-27 DIAGNOSIS — C50.911 MALIGNANT NEOPLASM OF RIGHT BREAST IN FEMALE, ESTROGEN RECEPTOR POSITIVE, UNSPECIFIED SITE OF BREAST (HCC): ICD-10-CM

## 2018-12-27 DIAGNOSIS — Z17.0 MALIGNANT NEOPLASM OF RIGHT BREAST IN FEMALE, ESTROGEN RECEPTOR POSITIVE, UNSPECIFIED SITE OF BREAST (HCC): ICD-10-CM

## 2018-12-27 PROCEDURE — 93306 TTE W/DOPPLER COMPLETE: CPT | Performed by: INTERNAL MEDICINE

## 2018-12-27 PROCEDURE — 93306 TTE W/DOPPLER COMPLETE: CPT

## 2019-01-02 ENCOUNTER — APPOINTMENT (OUTPATIENT)
Dept: RADIATION ONCOLOGY | Facility: CLINIC | Age: 64
End: 2019-01-02
Attending: RADIOLOGY
Payer: MEDICARE

## 2019-01-02 ENCOUNTER — TELEPHONE (OUTPATIENT)
Dept: PALLIATIVE MEDICINE | Facility: CLINIC | Age: 64
End: 2019-01-02

## 2019-01-02 DIAGNOSIS — Z17.0 MALIGNANT NEOPLASM OF RIGHT BREAST IN FEMALE, ESTROGEN RECEPTOR POSITIVE, UNSPECIFIED SITE OF BREAST (HCC): ICD-10-CM

## 2019-01-02 DIAGNOSIS — G89.3 CANCER RELATED PAIN: ICD-10-CM

## 2019-01-02 DIAGNOSIS — C50.911 MALIGNANT NEOPLASM OF RIGHT BREAST IN FEMALE, ESTROGEN RECEPTOR POSITIVE, UNSPECIFIED SITE OF BREAST (HCC): ICD-10-CM

## 2019-01-02 RX ORDER — OXYCODONE HYDROCHLORIDE 5 MG/1
5 TABLET ORAL EVERY 6 HOURS PRN
Qty: 45 TABLET | Refills: 0 | Status: SHIPPED | OUTPATIENT
Start: 2019-01-02 | End: 2019-01-21 | Stop reason: ALTCHOICE

## 2019-01-02 NOTE — TELEPHONE ENCOUNTER
Patient on oxycodone not hydrocodone/acetaminophen  However, she is due for a refill on this and this was e-prescribed to the pharmacy listed

## 2019-01-07 RX ORDER — SODIUM CHLORIDE 9 MG/ML
20 INJECTION, SOLUTION INTRAVENOUS CONTINUOUS
Status: DISCONTINUED | OUTPATIENT
Start: 2019-01-08 | End: 2019-01-11 | Stop reason: HOSPADM

## 2019-01-08 ENCOUNTER — TELEPHONE (OUTPATIENT)
Dept: HEMATOLOGY ONCOLOGY | Facility: CLINIC | Age: 64
End: 2019-01-08

## 2019-01-08 ENCOUNTER — HOSPITAL ENCOUNTER (OUTPATIENT)
Dept: INFUSION CENTER | Facility: CLINIC | Age: 64
Discharge: HOME | End: 2019-01-08
Payer: MEDICARE

## 2019-01-08 ENCOUNTER — APPOINTMENT (OUTPATIENT)
Dept: RADIATION ONCOLOGY | Facility: CLINIC | Age: 64
End: 2019-01-08
Attending: RADIOLOGY
Payer: MEDICARE

## 2019-01-08 VITALS
TEMPERATURE: 98.7 F | HEART RATE: 97 BPM | RESPIRATION RATE: 18 BRPM | WEIGHT: 166.45 LBS | BODY MASS INDEX: 28.57 KG/M2 | DIASTOLIC BLOOD PRESSURE: 65 MMHG | SYSTOLIC BLOOD PRESSURE: 111 MMHG

## 2019-01-08 PROCEDURE — 96413 CHEMO IV INFUSION 1 HR: CPT

## 2019-01-08 PROCEDURE — 77280 THER RAD SIMULAJ FIELD SMPL: CPT | Performed by: RADIOLOGY

## 2019-01-08 RX ADMIN — SODIUM CHLORIDE 20 ML/HR: 0.9 INJECTION, SOLUTION INTRAVENOUS at 13:25

## 2019-01-08 RX ADMIN — TRASTUZUMAB 450 MG: 150 INJECTION, POWDER, LYOPHILIZED, FOR SOLUTION INTRAVENOUS at 13:36

## 2019-01-08 NOTE — PLAN OF CARE
Problem: PAIN - ADULT  Goal: Verbalizes/displays adequate comfort level or baseline comfort level  Interventions:  - Encourage patient to monitor pain and request assistance  - Assess pain using appropriate pain scale  - Administer analgesics based on type and severity of pain and evaluate response  - Implement non-pharmacological measures as appropriate and evaluate response  - Consider cultural and social influences on pain and pain management  - Notify physician/advanced practitioner if interventions unsuccessful or patient reports new pain  Outcome: Progressing      Problem: INFECTION - ADULT  Goal: Absence or prevention of progression during hospitalization  INTERVENTIONS:  - Assess and monitor for signs and symptoms of infection  - Monitor lab/diagnostic results  - Monitor all insertion sites, i e  indwelling lines, tubes, and drains  - Monitor endotracheal (as able) and nasal secretions for changes in amount and color  - [place] appropriate cooling/warming therapies per order  - Administer medications as ordered  - Instruct and encourage patient and family to use good hand hygiene technique  - Identify and instruct in appropriate isolation precautions for identified infection/condition  Outcome: Progressing    Goal: Absence of fever/infection during neutropenic period  INTERVENTIONS:  - Monitor WBC  - Implement neutropenic guidelines  Outcome: Progressing      Problem: Knowledge Deficit  Goal: Patient/family/caregiver demonstrates understanding of disease process, treatment plan, medications, and discharge instructions  Complete learning assessment and assess knowledge base    Interventions:  - Provide teaching at level of understanding  - Provide teaching via preferred learning methods  Outcome: Progressing

## 2019-01-08 NOTE — TELEPHONE ENCOUNTER
Breast Nurse Navigator    Planned to meet patient to follow up with current condition and see how she is going during scheduled infusion appointment today  Patient had an appointment with RT at 0730 then 0800 with infusion but did not show up  I called the patient, no answer, left VM message regarding missed appointment, I then called daughter, who stated she forgot patient had an appointment today and would like to reschedule  After this call when I went back to infusion daughter had already called infusion center and rescheduled appointment for Infusion today at 1pm     I asked daughter how the patient is doing  Per Luciana Link her mother is not feeling so well  Reporting her knee pain is really bad and the medication she is taking at 5 mg is not working, she is stating and requesting something stronger, doesn't feel that 5 mg is enough  I advised that I will report ineffective pain control to palliative care so they can re-evaluate pain management  Also daughter reporting patient having stomach pressure, pain again up by her ribs and around abdomen, patient unable to eat much  I asked how her constipation is, again very constipated with small infrequent bowel movements  Patient reporting she is not taking lactulose as it is very sweet and causes her blood sugar levels to increase  Asked what patient is taking for constipation, patient reports not taking anything  I instructed on the importance on maintaining a bowel regimen to avoid ongoing constipation, abdominal discomfort and to improve food intake  Instructed patient to drink more water and start morning bowel regimen of Miralax 1 dose and to purchase Senna S which I explained is both a stool softener and mild laxative    I advised to take 2 along with Miralax in the morning and if no bowel movement take 2 in the evening until has a bowel movement, instructed once bowels are moving regularly can decrease senna s down to 1 or 2 a day whichever works best to maintain regular bowel movements  I explained that even though she starts to have bowel movements daily that she should not discontinue bowel regimen as constipation will return, best to manage by taking something to manage constipation daily  Did advised if has diarrhea, stop stool softener/laxative until resolves then resume  Daughter verbalized understanding  Patient is also reporting increased anxiety, denies depression  Is not taking Remeron, as per patient when she did try it she got very dizzy and couldn't do anything for a while until is left her system  Patient also with increased shortness of breathe with activity, may also be due or worsened by reported anxiety  Patient also with edema to hands and feet  Is scheduled to see Pulmonary doctor for evaluation on 01/23/2019  Daughter feels she can benefit from oxygen as needed  I educated on deep breathing exercises and why  Advised to take 10 slow deep breathes every 2 hours throughout the day to exercise and expand the lungs  Explained since patient is minimally active the lungs do not get the opportunity to fully expand which often times makes minimal activity difficult and results in some shortness of breath because lungs are mostly at rest throughout the day and at night  Advised to start slow with the deep breathing exercises with caution not to go too fast and cause hyperventilation dizziness  Daughter verbalized understanding  This conversation occurred with both patient and daughter via daughters speaker phone  Advised that I will update palliative care with her concerns on information discussed  Agreeable

## 2019-01-09 ENCOUNTER — APPOINTMENT (OUTPATIENT)
Dept: RADIATION ONCOLOGY | Facility: CLINIC | Age: 64
End: 2019-01-09
Attending: RADIOLOGY
Payer: MEDICARE

## 2019-01-09 PROCEDURE — 77412 RADIATION TX DELIVERY LVL 3: CPT | Performed by: RADIOLOGY

## 2019-01-09 NOTE — TELEPHONE ENCOUNTER
Thank you for this update  Angus Kern- would you be able to make a home visit to assess how they are keeping/dispensing medications and to get a better idea of how things are going at home  I have had 2 appointments with her and I worry about her health literacy being prohibitive to a lot of her understanding of how to manage her medication regimen and her understanding of her diseases  Micheline Ulloa- can we get a follow up- sooner than later? Thank you all

## 2019-01-10 PROCEDURE — 77331 SPECIAL RADIATION DOSIMETRY: CPT | Performed by: RADIOLOGY

## 2019-01-10 PROCEDURE — 77412 RADIATION TX DELIVERY LVL 3: CPT | Performed by: RADIOLOGY

## 2019-01-10 NOTE — TELEPHONE ENCOUNTER
Tony Stanton, can you make a hv appt  for me at 1pm on Wednesday the 16th? I can test her for oxygen qualification then as well   Meryle Nares

## 2019-01-11 PROCEDURE — 77412 RADIATION TX DELIVERY LVL 3: CPT | Performed by: RADIOLOGY

## 2019-01-14 ENCOUNTER — APPOINTMENT (OUTPATIENT)
Dept: RADIATION ONCOLOGY | Facility: CLINIC | Age: 64
End: 2019-01-14
Attending: RADIOLOGY
Payer: MEDICARE

## 2019-01-14 PROCEDURE — 77412 RADIATION TX DELIVERY LVL 3: CPT | Performed by: RADIOLOGY

## 2019-01-15 DIAGNOSIS — C50.411 MALIGNANT NEOPLASM OF UPPER-OUTER QUADRANT OF RIGHT FEMALE BREAST, UNSPECIFIED ESTROGEN RECEPTOR STATUS (HCC): Primary | ICD-10-CM

## 2019-01-15 PROCEDURE — 77417 THER RADIOLOGY PORT IMAGE(S): CPT | Performed by: RADIOLOGY

## 2019-01-15 PROCEDURE — 77336 RADIATION PHYSICS CONSULT: CPT | Performed by: RADIOLOGY

## 2019-01-15 PROCEDURE — 77412 RADIATION TX DELIVERY LVL 3: CPT | Performed by: RADIOLOGY

## 2019-01-16 ENCOUNTER — APPOINTMENT (OUTPATIENT)
Dept: LAB | Facility: CLINIC | Age: 64
End: 2019-01-16
Attending: RADIOLOGY
Payer: MEDICARE

## 2019-01-16 DIAGNOSIS — C50.411 MALIGNANT NEOPLASM OF UPPER-OUTER QUADRANT OF RIGHT FEMALE BREAST, UNSPECIFIED ESTROGEN RECEPTOR STATUS (HCC): ICD-10-CM

## 2019-01-16 LAB
BASOPHILS # BLD AUTO: 0.06 THOUSANDS/ΜL (ref 0–0.1)
BASOPHILS NFR BLD AUTO: 1 % (ref 0–1)
EOSINOPHIL # BLD AUTO: 0.19 THOUSAND/ΜL (ref 0–0.61)
EOSINOPHIL NFR BLD AUTO: 3 % (ref 0–6)
ERYTHROCYTE [DISTWIDTH] IN BLOOD BY AUTOMATED COUNT: 13.9 % (ref 11.6–15.1)
HCT VFR BLD AUTO: 36.3 % (ref 34.8–46.1)
HGB BLD-MCNC: 12 G/DL (ref 11.5–15.4)
LYMPHOCYTES # BLD AUTO: 2.02 THOUSANDS/ΜL (ref 0.6–4.47)
LYMPHOCYTES NFR BLD AUTO: 29 % (ref 14–44)
MCH RBC QN AUTO: 29.3 PG (ref 26.8–34.3)
MCHC RBC AUTO-ENTMCNC: 33.1 G/DL (ref 31.4–37.4)
MCV RBC AUTO: 89 FL (ref 82–98)
MONOCYTES # BLD AUTO: 0.41 THOUSAND/ΜL (ref 0.17–1.22)
MONOCYTES NFR BLD AUTO: 6 % (ref 4–12)
NEUTROPHILS # BLD AUTO: 4.18 THOUSANDS/ΜL (ref 1.85–7.62)
NEUTS SEG NFR BLD AUTO: 61 % (ref 43–75)
PLATELET # BLD AUTO: 273 THOUSANDS/UL (ref 149–390)
PMV BLD AUTO: 12 FL (ref 8.9–12.7)
RBC # BLD AUTO: 4.09 MILLION/UL (ref 3.81–5.12)
WBC # BLD AUTO: 6.86 THOUSAND/UL (ref 4.31–10.16)

## 2019-01-16 PROCEDURE — 85025 COMPLETE CBC W/AUTO DIFF WBC: CPT

## 2019-01-16 PROCEDURE — 36415 COLL VENOUS BLD VENIPUNCTURE: CPT

## 2019-01-16 PROCEDURE — 77412 RADIATION TX DELIVERY LVL 3: CPT | Performed by: RADIOLOGY

## 2019-01-17 ENCOUNTER — APPOINTMENT (OUTPATIENT)
Dept: RADIATION ONCOLOGY | Facility: CLINIC | Age: 64
End: 2019-01-17
Attending: RADIOLOGY
Payer: MEDICARE

## 2019-01-17 PROCEDURE — 77412 RADIATION TX DELIVERY LVL 3: CPT | Performed by: RADIOLOGY

## 2019-01-18 ENCOUNTER — APPOINTMENT (OUTPATIENT)
Dept: RADIATION ONCOLOGY | Facility: CLINIC | Age: 64
End: 2019-01-18
Attending: RADIOLOGY
Payer: MEDICARE

## 2019-01-18 DIAGNOSIS — E78.5 HYPERLIPIDEMIA, UNSPECIFIED HYPERLIPIDEMIA TYPE: ICD-10-CM

## 2019-01-18 PROCEDURE — 77412 RADIATION TX DELIVERY LVL 3: CPT | Performed by: RADIOLOGY

## 2019-01-18 RX ORDER — GEMFIBROZIL 600 MG/1
TABLET, FILM COATED ORAL
Qty: 60 TABLET | Refills: 3 | Status: ON HOLD | OUTPATIENT
Start: 2019-01-18 | End: 2019-05-19 | Stop reason: SDUPTHER

## 2019-01-21 ENCOUNTER — APPOINTMENT (OUTPATIENT)
Dept: LAB | Facility: CLINIC | Age: 64
End: 2019-01-21
Payer: MEDICARE

## 2019-01-21 ENCOUNTER — APPOINTMENT (OUTPATIENT)
Dept: RADIATION ONCOLOGY | Facility: CLINIC | Age: 64
End: 2019-01-21
Attending: RADIOLOGY
Payer: MEDICARE

## 2019-01-21 ENCOUNTER — OFFICE VISIT (OUTPATIENT)
Dept: PULMONOLOGY | Facility: CLINIC | Age: 64
End: 2019-01-21
Payer: MEDICARE

## 2019-01-21 VITALS
HEIGHT: 64 IN | WEIGHT: 164 LBS | OXYGEN SATURATION: 95 % | HEART RATE: 95 BPM | RESPIRATION RATE: 16 BRPM | BODY MASS INDEX: 28 KG/M2 | SYSTOLIC BLOOD PRESSURE: 108 MMHG | DIASTOLIC BLOOD PRESSURE: 70 MMHG | TEMPERATURE: 97.9 F

## 2019-01-21 DIAGNOSIS — R91.1 LUNG NODULE: ICD-10-CM

## 2019-01-21 DIAGNOSIS — J45.40 MODERATE PERSISTENT ASTHMA WITHOUT COMPLICATION: Primary | ICD-10-CM

## 2019-01-21 DIAGNOSIS — Z17.0 MALIGNANT NEOPLASM OF RIGHT BREAST IN FEMALE, ESTROGEN RECEPTOR POSITIVE, UNSPECIFIED SITE OF BREAST (HCC): ICD-10-CM

## 2019-01-21 DIAGNOSIS — C50.911 MALIGNANT NEOPLASM OF RIGHT BREAST IN FEMALE, ESTROGEN RECEPTOR POSITIVE, UNSPECIFIED SITE OF BREAST (HCC): ICD-10-CM

## 2019-01-21 LAB
ALBUMIN SERPL BCP-MCNC: 3.4 G/DL (ref 3.5–5.7)
ALP SERPL-CCNC: 68 U/L (ref 46–116)
ALT SERPL W P-5'-P-CCNC: 25 U/L (ref 12–78)
ANION GAP SERPL CALCULATED.3IONS-SCNC: 10 MMOL/L (ref 4–13)
AST SERPL W P-5'-P-CCNC: 22 U/L (ref 5–45)
BILIRUB SERPL-MCNC: 0.6 MG/DL (ref 0.2–1)
BUN SERPL-MCNC: 15 MG/DL (ref 5–25)
CALCIUM SERPL-MCNC: 9.8 MG/DL (ref 8.3–10.1)
CHLORIDE SERPL-SCNC: 103 MMOL/L (ref 98–108)
CO2 SERPL-SCNC: 30 MMOL/L (ref 21–32)
CREAT SERPL-MCNC: 0.6 MG/DL (ref 0.6–1.3)
GFR SERPL CREATININE-BSD FRML MDRD: 98 ML/MIN/1.73SQ M
GLUCOSE SERPL-MCNC: 281 MG/DL (ref 65–140)
POTASSIUM SERPL-SCNC: 4 MMOL/L (ref 3.5–5.3)
PROT SERPL-MCNC: 7.1 G/DL (ref 6.4–8.2)
SODIUM SERPL-SCNC: 143 MMOL/L (ref 136–145)

## 2019-01-21 PROCEDURE — 77412 RADIATION TX DELIVERY LVL 3: CPT | Performed by: RADIOLOGY

## 2019-01-21 PROCEDURE — 94618 PULMONARY STRESS TESTING: CPT | Performed by: INTERNAL MEDICINE

## 2019-01-21 PROCEDURE — 36415 COLL VENOUS BLD VENIPUNCTURE: CPT

## 2019-01-21 PROCEDURE — 99215 OFFICE O/P EST HI 40 MIN: CPT | Performed by: INTERNAL MEDICINE

## 2019-01-21 PROCEDURE — 80053 COMPREHEN METABOLIC PANEL: CPT

## 2019-01-21 RX ORDER — LORATADINE 10 MG/1
10 TABLET ORAL DAILY
Qty: 30 TABLET | Refills: 3 | Status: SHIPPED | OUTPATIENT
Start: 2019-01-21 | End: 2019-04-09

## 2019-01-21 NOTE — PROGRESS NOTES
Pulmonary outpatient note   Martina Cortez 59 y o  female MRN: [id number]  1/21/2019      Assessment and plan:  Martina Cortez has the following medical problems:  1  Asthma  Moderate persistent, partially controlled  Patient is taking Breo  She is highly allergic to dust and dogs  She has 2 dogs in her house  She removed of the carpets from her house  I told her that she will need to limit her exposure to the dogs and maybe even give them away if she is staying symptomatic  I told her to stop the Rande Seller and I gave her trilogy  Reviewed her technique which was not good  I corrected her and reviewed her technique which was better  Also prescribed Claritin  If she remains symptomatic despite this therapy we will consider anti IgE treatment  I will have to check if this is not contraindicated in patients with radiation/chemotherapy  2   Lung nodules  Small nodules seen on chest CT November 2018, 3 mm  I will schedule another chest CT to evaluate this lung nodules given her history of breast cancer and her symptoms with shortness of breath  3   Pulmonary embolism  Taking Xarelto  She is being followed by Hematology/Oncology for this  Length of therapy will be decided by them  4   Chest pain  The patient complaints of chest pain or chest tightness during exercise  We tried 6 minutes walk test then the office but she could not complete it due to chest pain  She previously had echocardiograph which was normal and stress test in June 2010 did not show any signs of ischemia  She did not see her cardiologist after this tests  I told her she needs to see her cardiologist regarding this symptoms and summary of the tests she had  Dexter Martínez MD/PhD,  [facility]      No Follow-up on file      History of Present Illness  This is 59y o  year old female with previous medical history of asthma since early childhood with multiple hospitalizations in [facility] none of them in ICU  She was diagnosed with stage IA right breast cancer  She underwent lumpectomy and sentinel lymph node biopsy  She was receiving adjuvant chemotherapy until December 2018  Currently she is receiving radiation therapy  I prescribed in the last appointment Breo that she is taking twice daily  She is still complaining of shortness of breath when doing physical activity including walking  She is complaining of wheezing  I referred her to allergy test that showed severe allergy to dust, dogs, cats, cockroaches, trees and grass with high IgE level 850  She was also diagnosed with pulmonary embolism in November 2018 and is taking Xarelto                                                Social history:  She never smoked, she does not consume alcohol      Occupational history:  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  Review of Systems   Constitutional: Positive for fatigue  HENT: Negative  Eyes: Negative  Respiratory: Positive for shortness of breath and wheezing  Cardiovascular: Negative  Gastrointestinal: Positive for diarrhea  Endocrine: Negative  Genitourinary: Negative  Musculoskeletal:        Right knee pain   Skin: Negative  Allergic/Immunologic: Positive for environmental allergies and immunocompromised state  Neurological: Negative  Hematological: Negative  Psychiatric/Behavioral: Negative          Historical Information   Past Medical History:   Diagnosis Date    Abdominal infection ([facility] )     h-pylori    Asthma     Breast cancer ([facility] )     Cancer ([facility] )     BREAST    [facility]     blood sugar 199 @ 735    Hiatal hernia     Hypercholesterolemia     Hypertension     Hypothyroid     Renal disorder     Rheumatoid arthritis ([facility] )      Past Surgical History:   Procedure Laterality Date    BREAST BIOPSY      BREAST LUMPECTOMY Right 6/26/2018    Procedure: RIGHT BREAST NEEDLE LOCALIZATION X2 WITH RIGHT BREAST LUMPECTOMY ( NEEDLE LOC @ 1000); Surgeon: Memo Hernandez MD;  [facility];  Service: Surgical Oncology    BREAST LUMPECTOMY Right 8/2/2018    Procedure: LYMPHOSCINITGRAPHY INTRAOPERATIVE LYMPHATIC MAPPING , RIGHT SENTINEL NODE BIOPSY, REEXCISION  RIGHT BREAST LUMPECTOMY CAVITY (SUPERIOR MARGIN); Surgeon: Memo Hernandez MD;  [facility];  Service: Surgical Oncology    BREAST SURGERY Left     CATARACT EXTRACTION, BILATERAL Bilateral     EGD AND COLONOSCOPY N/A 9/5/2018    Procedure: EGD AND COLONOSCOPY;  Surgeon: Renan Tsai MD;  Location: [facility] LAB; Service: Gastroenterology    [facility] GUIDED CENTRAL VENOUS ACCESS REPLACEMENT  9/13/2018    KNEE SURGERY Right     MAMMO NEEDLE LOCALIZATION RIGHT (ALL INC) Right 6/26/2018    MAMMO STEREOTACTIC BREAST BIOPSY RIGHT (ALL INC) Right 5/23/2018    RETINAL DETACHMENT SURGERY Right     TUBAL LIGATION      TUNNELED VENOUS PORT PLACEMENT Left 9/13/2018    Procedure: INSERTION VENOUS PORT (PORT-A-CATH);   Surgeon: Memo Hernandez MD;  [facility];  Service: Surgical Oncology     Family History   Problem Relation Age of Onset    Diabetes Mother     Hypertension Mother     Diabetes Father     Hypertension Father     Diabetes Brother     Hypertension Brother     Prostate cancer Brother     Cancer Maternal Grandfather        Meds/Allergies     Current Outpatient Prescriptions:     albuterol (2 5 mg/3 mL) 0 083 % nebulizer solution, Take 1 vial (2 5 mg total) by nebulization every 4 (four) hours as needed for wheezing or shortness of breath, Disp: 75 mL, Rfl: 0    albuterol (PROVENTIL HFA,VENTOLIN HFA) 90 mcg/act inhaler, Inhale 1 puff every 4 (four) hours as needed  , Disp: , Rfl:     amLODIPine-atorvastatin (CADUET) 10-10 MG per tablet, take 1 tablet by mouth once daily, Disp: 90 tablet, Rfl: 1    SANG MICROLET LANCETS lancets, Testing three times a day, Disp: 100 each, Rfl: 3    Blood Glucose Monitoring Suppl [facility] BLOOD GLUCOSE METER) w/Device KIT, Testing blood sugars three times a day, Disp: , Rfl:     Blood Glucose Monitoring Suppl (SANG CONTOUR MONITOR) w/Device KIT, Testing blood sugars three times a day, Disp: 1 kit, Rfl: 0    enalapril (VASOTEC) 20 mg tablet, take 1 tablet by mouth once daily, Disp: 90 tablet, Rfl: 1    fluticasone-vilanterol (BREO ELLIPTA) 100-25 mcg/inh inhaler, Inhale 1 puff daily Rinse mouth after use , Disp: 2 Inhaler, Rfl: 8    gemfibrozil (LOPID) 600 mg tablet, take 1 tablet by mouth twice a day, Disp: 60 tablet, Rfl: 3    glipiZIDE (GLUCOTROL XL) 10 mg 24 hr tablet, take 1 tablet by mouth once daily, Disp: 90 tablet, Rfl: 1    glucose blood (SANG CONTOUR TEST) test strip, Testing three times a day, Disp: 100 each, Rfl: 3    HUMULIN N 100 UNIT/ML subcutaneous injection, 70 units in the am 30 units in the pm, Disp: 30 mL, Rfl: 5    Insulin Glargine (TOUJEO) 300 units/mL CONCETRATED U-300 injection pen, Inject 35 Units under the skin daily at bedtime, Disp: 5 pen, Rfl: 0    Insulin Syringe-Needle U-100 (B-D INS SYRINGE 0 5CC/30GX1/2") 30G X 1/2" 0 5 ML MISC, , Disp: , Rfl:     Insulin Syringe-Needle U-100 30G 0 3 ML MISC, Using twice a day, Disp: , Rfl:     Insulin Syringe-Needle U-100 30G X 1/2" 1 ML MISC, Using twice a day, Disp: 100 each, Rfl: 3    lactulose 20 g/30 mL, Take 15 mL (10 g total) by mouth 2 (two) times a day, Disp: 1 Bottle, Rfl: 0    Lancets MISC, Testing three times a day, Disp: , Rfl:     levothyroxine 50 mcg tablet, take 1 tablet by mouth once daily, Disp: 90 tablet, Rfl: 1    metFORMIN (GLUCOPHAGE-XR) 500 mg 24 hr tablet, take 2 tablets by mouth once daily WITH BREAKFAST, Disp: 180 tablet, Rfl: 1    polyethylene glycol (MIRALAX) 17 g packet, Take 17 g by mouth daily, Disp: 14 each, Rfl: 0    lidocaine 4 % topical gel, Apply topically 4 (four) times a day as needed (to affected area for pain) (Patient not taking: Reported on 1/21/2019 ), Disp: 10 g, Rfl: 0    omeprazole (PriLOSEC) 40 MG capsule, Take 1 capsule (40 mg total) by mouth 2 (two) times a day for 14 days, Disp: 28 capsule, Rfl: 0    rivaroxaban (XARELTO) 15 mg tablet, Take 1 tablet (15 mg total) by mouth 2 (two) times a day with meals for 21 days, Disp: 42 tablet, Rfl: 0  Allergies   Allergen Reactions    Aspirin Hives       in [address] told patient not to take aspirin r/t kidneys     Tylenol With Codeine #3 [Acetaminophen-Codeine] Rash       Vitals: Blood pressure 108/70, pulse 95, temperature 97 9 °F (36 6 °C), resp  rate 16, height 5' 4" (1 626 m), weight 74 4 kg (164 lb), SpO2 95 %, not currently breastfeeding  Body mass index is 28 15 kg/m²  Oxygen Therapy  SpO2: 95 %  Oxygen Therapy: None (Room air)    Physical Exam  Physical Exam   Constitutional: She is oriented to person, place, and time  She appears well-developed and well-nourished  No distress  HENT:   Head: Normocephalic and atraumatic  Eyes: Pupils are equal, round, and reactive to light  EOM are normal    Neck: Normal range of motion  Neck supple  No JVD present  Cardiovascular: Normal rate, regular rhythm and normal heart sounds  Pulmonary/Chest: Effort normal  No respiratory distress  She has wheezes  She has no rales  Abdominal: Soft  She exhibits no distension  Musculoskeletal: Normal range of motion  She exhibits no edema  Neurological: She is alert and oriented to person, place, and time  Skin: Skin is warm  Psychiatric: She has a normal mood and affect  Labs: I have personally reviewed pertinent lab results    Lab Results   Component Value Date    WBC 6 86 01/16/2019    HGB 12 0 01/16/2019    HCT 36 3 01/16/2019    MCV 89 01/16/2019     01/16/2019     Lab Results   Component Value Date    CALCIUM 9 8 12/10/2018    K 4 0 12/10/2018    CO2 25 12/10/2018     12/10/2018    BUN 21 12/10/2018    CREATININE 0 93 12/10/2018     Lab Results   Component Value Date     (H) 11/26/2018 Lab Results   Component Value Date    ALT 29 12/10/2018    AST 11 12/10/2018    ALKPHOS 66 12/10/2018       Imaging and other studies:   I have personally reviewed pertinent imaging studies in PACS  IMPRESSIONS:  1  Normal left ventricular size and systolic and diastolic function, EF around 60%  2  No significant chamber hypertrophy or enlargement  3  Aortic valve sclerosis, no aortic valve stenosis or regurgitation  4  Mild mitral valve leaflet sclerosis, trace mitral valve regurgitation  5  Trace to mild tricuspid valve regurgitation  6  No obvious pulmonary hypertension  7  No pericardial effusion  IMPRESSIONS:  Normal study after maximal exercise without reproduction of symptoms  Left ventricular systolic function was normal   Pulmonary function testing: The patient had pulmonary function test on December 2018 that showed hyperinflated lungs with % of predicted and % of predicted, normal spirometry and normal diffusion capacity  She could not complete 6 minutes walk test due to her knee pain      Eliseo Garcia MD/PhD,  [facility]

## 2019-01-22 ENCOUNTER — APPOINTMENT (OUTPATIENT)
Dept: RADIATION ONCOLOGY | Facility: CLINIC | Age: 64
End: 2019-01-22
Attending: RADIOLOGY
Payer: MEDICARE

## 2019-01-22 PROCEDURE — 77417 THER RADIOLOGY PORT IMAGE(S): CPT | Performed by: RADIOLOGY

## 2019-01-22 PROCEDURE — 77336 RADIATION PHYSICS CONSULT: CPT | Performed by: RADIOLOGY

## 2019-01-22 PROCEDURE — 77412 RADIATION TX DELIVERY LVL 3: CPT | Performed by: RADIOLOGY

## 2019-01-23 ENCOUNTER — APPOINTMENT (OUTPATIENT)
Dept: RADIATION ONCOLOGY | Facility: CLINIC | Age: 64
End: 2019-01-23
Attending: RADIOLOGY
Payer: MEDICARE

## 2019-01-23 PROCEDURE — 77412 RADIATION TX DELIVERY LVL 3: CPT | Performed by: RADIOLOGY

## 2019-01-24 ENCOUNTER — APPOINTMENT (OUTPATIENT)
Dept: RADIATION ONCOLOGY | Facility: CLINIC | Age: 64
End: 2019-01-24
Attending: RADIOLOGY
Payer: MEDICARE

## 2019-01-24 PROCEDURE — 77412 RADIATION TX DELIVERY LVL 3: CPT | Performed by: RADIOLOGY

## 2019-01-25 ENCOUNTER — APPOINTMENT (OUTPATIENT)
Dept: RADIATION ONCOLOGY | Facility: CLINIC | Age: 64
End: 2019-01-25
Attending: RADIOLOGY
Payer: MEDICARE

## 2019-01-25 PROCEDURE — 77412 RADIATION TX DELIVERY LVL 3: CPT | Performed by: RADIOLOGY

## 2019-01-28 ENCOUNTER — APPOINTMENT (OUTPATIENT)
Dept: RADIATION ONCOLOGY | Facility: CLINIC | Age: 64
End: 2019-01-28
Attending: RADIOLOGY
Payer: MEDICARE

## 2019-01-28 PROCEDURE — 77412 RADIATION TX DELIVERY LVL 3: CPT | Performed by: RADIOLOGY

## 2019-01-28 RX ORDER — SODIUM CHLORIDE 9 MG/ML
20 INJECTION, SOLUTION INTRAVENOUS CONTINUOUS
Status: DISCONTINUED | OUTPATIENT
Start: 2019-01-29 | End: 2019-02-01 | Stop reason: HOSPADM

## 2019-01-29 ENCOUNTER — HOSPITAL ENCOUNTER (OUTPATIENT)
Dept: INFUSION CENTER | Facility: CLINIC | Age: 64
Discharge: HOME | End: 2019-01-29
Payer: MEDICARE

## 2019-01-29 ENCOUNTER — APPOINTMENT (OUTPATIENT)
Dept: RADIATION ONCOLOGY | Facility: CLINIC | Age: 64
End: 2019-01-29
Attending: RADIOLOGY
Payer: MEDICARE

## 2019-01-29 ENCOUNTER — DOCUMENTATION (OUTPATIENT)
Dept: HEMATOLOGY ONCOLOGY | Facility: CLINIC | Age: 64
End: 2019-01-29

## 2019-01-29 VITALS
DIASTOLIC BLOOD PRESSURE: 71 MMHG | SYSTOLIC BLOOD PRESSURE: 124 MMHG | HEART RATE: 91 BPM | BODY MASS INDEX: 27.47 KG/M2 | RESPIRATION RATE: 18 BRPM | TEMPERATURE: 97.3 F | WEIGHT: 160.05 LBS

## 2019-01-29 PROCEDURE — 77334 RADIATION TREATMENT AID(S): CPT | Performed by: RADIOLOGY

## 2019-01-29 PROCEDURE — 96413 CHEMO IV INFUSION 1 HR: CPT

## 2019-01-29 PROCEDURE — 77417 THER RADIOLOGY PORT IMAGE(S): CPT | Performed by: RADIOLOGY

## 2019-01-29 PROCEDURE — 77412 RADIATION TX DELIVERY LVL 3: CPT | Performed by: RADIOLOGY

## 2019-01-29 PROCEDURE — 77290 THER RAD SIMULAJ FIELD CPLX: CPT | Performed by: RADIOLOGY

## 2019-01-29 PROCEDURE — 77336 RADIATION PHYSICS CONSULT: CPT | Performed by: RADIOLOGY

## 2019-01-29 PROCEDURE — 77387 GUIDANCE FOR RADJ TX DLVR: CPT | Performed by: RADIOLOGY

## 2019-01-29 RX ADMIN — TRASTUZUMAB 450 MG: 150 INJECTION, POWDER, LYOPHILIZED, FOR SOLUTION INTRAVENOUS at 09:08

## 2019-01-29 RX ADMIN — SODIUM CHLORIDE 20 ML/HR: 0.9 INJECTION, SOLUTION INTRAVENOUS at 08:46

## 2019-01-29 NOTE — PROGRESS NOTES
Met patient prior to treatment today  Patient stating she is doing fair  Continues with knee pain and limitations as well as abdominal pain but that it is not as bad as previously reported but still limits her  Patient was taken back to radiation for treatment  I continued to speak with her daughter who is reporting that an application was filled out and submitted so she can move in with her mother temporarily and assist her with all her needs as a caregiver but that it was denied and she was told it can be appealed but a letter of medical necessity was needed  Daughter unsure who this letter should come from  I advised that due to her multiple health issues this letter should be coming from patients primary care doctor  I provided daughter with my contact information again which she saved on her cell phone, encouraged to call with any questions, concerns or needed  I will follow up with patient and daughter as needed

## 2019-01-29 NOTE — PLAN OF CARE
Problem: Potential for Falls  Goal: Patient will remain free of falls  INTERVENTIONS:  - Assess patient frequently for physical needs  -  Identify cognitive and physical deficits and behaviors that affect risk of falls    -  [place] fall precautions as indicated by assessment   - Educate patient/family on patient safety including physical limitations  - Instruct patient to call for assistance with activity based on assessment  - Modify environment to reduce risk of injury  - Consider OT/PT consult to assist with strengthening/mobility   Outcome: Progressing

## 2019-01-30 ENCOUNTER — APPOINTMENT (OUTPATIENT)
Dept: RADIATION ONCOLOGY | Facility: CLINIC | Age: 64
End: 2019-01-30
Attending: RADIOLOGY
Payer: MEDICARE

## 2019-01-30 PROCEDURE — 77412 RADIATION TX DELIVERY LVL 3: CPT | Performed by: RADIOLOGY

## 2019-01-30 PROCEDURE — 77387 GUIDANCE FOR RADJ TX DLVR: CPT | Performed by: RADIOLOGY

## 2019-01-31 ENCOUNTER — APPOINTMENT (OUTPATIENT)
Dept: RADIATION ONCOLOGY | Facility: CLINIC | Age: 64
End: 2019-01-31
Payer: MEDICARE

## 2019-01-31 ENCOUNTER — APPOINTMENT (OUTPATIENT)
Dept: RADIATION ONCOLOGY | Facility: CLINIC | Age: 64
End: 2019-01-31
Attending: RADIOLOGY
Payer: MEDICARE

## 2019-01-31 DIAGNOSIS — E11.9 TYPE 2 DIABETES MELLITUS WITHOUT COMPLICATION, WITHOUT LONG-TERM CURRENT USE OF INSULIN (HCC): ICD-10-CM

## 2019-01-31 PROCEDURE — 77412 RADIATION TX DELIVERY LVL 3: CPT | Performed by: RADIOLOGY

## 2019-01-31 RX ORDER — DULOXETIN HYDROCHLORIDE 20 MG/1
CAPSULE, DELAYED RELEASE ORAL
Qty: 60 CAPSULE | Refills: 1 | Status: SHIPPED | OUTPATIENT
Start: 2019-01-31 | End: 2019-03-30 | Stop reason: SDUPTHER

## 2019-02-01 ENCOUNTER — RADIATION THERAPY TREATMENT (OUTPATIENT)
Dept: RADIATION ONCOLOGY | Facility: CLINIC | Age: 64
End: 2019-02-01
Attending: RADIOLOGY
Payer: MEDICARE

## 2019-02-01 PROCEDURE — 77412 RADIATION TX DELIVERY LVL 3: CPT | Performed by: RADIOLOGY

## 2019-02-04 ENCOUNTER — APPOINTMENT (OUTPATIENT)
Dept: RADIATION ONCOLOGY | Facility: CLINIC | Age: 64
End: 2019-02-04
Attending: RADIOLOGY
Payer: MEDICARE

## 2019-02-04 PROCEDURE — 77412 RADIATION TX DELIVERY LVL 3: CPT | Performed by: RADIOLOGY

## 2019-02-05 ENCOUNTER — APPOINTMENT (OUTPATIENT)
Dept: RADIATION ONCOLOGY | Facility: CLINIC | Age: 64
End: 2019-02-05
Attending: RADIOLOGY
Payer: MEDICARE

## 2019-02-05 PROCEDURE — 77417 THER RADIOLOGY PORT IMAGE(S): CPT | Performed by: RADIOLOGY

## 2019-02-05 PROCEDURE — 77336 RADIATION PHYSICS CONSULT: CPT | Performed by: RADIOLOGY

## 2019-02-05 PROCEDURE — 77412 RADIATION TX DELIVERY LVL 3: CPT | Performed by: RADIOLOGY

## 2019-02-06 ENCOUNTER — APPOINTMENT (OUTPATIENT)
Dept: RADIATION ONCOLOGY | Facility: CLINIC | Age: 64
End: 2019-02-06
Attending: RADIOLOGY
Payer: MEDICARE

## 2019-02-06 ENCOUNTER — OFFICE VISIT (OUTPATIENT)
Dept: FAMILY MEDICINE CLINIC | Facility: CLINIC | Age: 64
End: 2019-02-06

## 2019-02-06 VITALS
HEIGHT: 64 IN | TEMPERATURE: 97 F | OXYGEN SATURATION: 95 % | SYSTOLIC BLOOD PRESSURE: 110 MMHG | HEART RATE: 88 BPM | RESPIRATION RATE: 18 BRPM | BODY MASS INDEX: 27.49 KG/M2 | DIASTOLIC BLOOD PRESSURE: 68 MMHG | WEIGHT: 161 LBS

## 2019-02-06 DIAGNOSIS — B37.9 YEAST INFECTION: ICD-10-CM

## 2019-02-06 DIAGNOSIS — R35.0 URINARY FREQUENCY: Primary | ICD-10-CM

## 2019-02-06 LAB
SL AMB  POCT GLUCOSE, UA: 2000
SL AMB LEUKOCYTE ESTERASE,UA: NEGATIVE
SL AMB POCT BILIRUBIN,UA: ABNORMAL
SL AMB POCT BLOOD,UA: ABNORMAL
SL AMB POCT CLARITY,UA: CLEAR
SL AMB POCT COLOR,UA: YELLOW
SL AMB POCT KETONES,UA: ABNORMAL
SL AMB POCT NITRITE,UA: NEGATIVE
SL AMB POCT PH,UA: 6
SL AMB POCT SPECIFIC GRAVITY,UA: 1.03
SL AMB POCT URINE PROTEIN: ABNORMAL
SL AMB POCT UROBILINOGEN: 0.2

## 2019-02-06 PROCEDURE — 81002 URINALYSIS NONAUTO W/O SCOPE: CPT | Performed by: PHYSICIAN ASSISTANT

## 2019-02-06 PROCEDURE — 77412 RADIATION TX DELIVERY LVL 3: CPT | Performed by: RADIOLOGY

## 2019-02-06 PROCEDURE — 87086 URINE CULTURE/COLONY COUNT: CPT | Performed by: PHYSICIAN ASSISTANT

## 2019-02-06 PROCEDURE — 77295 3-D RADIOTHERAPY PLAN: CPT | Performed by: RADIOLOGY

## 2019-02-06 PROCEDURE — 77300 RADIATION THERAPY DOSE PLAN: CPT | Performed by: RADIOLOGY

## 2019-02-06 PROCEDURE — 99213 OFFICE O/P EST LOW 20 MIN: CPT | Performed by: PHYSICIAN ASSISTANT

## 2019-02-06 PROCEDURE — 77334 RADIATION TREATMENT AID(S): CPT | Performed by: RADIOLOGY

## 2019-02-06 RX ORDER — FLUCONAZOLE 150 MG/1
150 TABLET ORAL ONCE
Qty: 1 TABLET | Refills: 0 | Status: SHIPPED | OUTPATIENT
Start: 2019-02-06 | End: 2019-02-06

## 2019-02-06 NOTE — PROGRESS NOTES
Assessment/Plan:    Informed patient that she had a lot of sugar in her urine today  This is likely the cause of her urinary symptoms  Dysuria may be from a yeast infection, so will treat for that at this time  Will send the urine out for culture to see if there is a bacterial infection, that this time believe that her symptoms are more likely caused by her diabetes rather than infection  Diagnoses and all orders for this visit:    Urinary frequency  -     POCT urine dip  -     Urine culture    Yeast infection  -     fluconazole (DIFLUCAN) 150 mg tablet; Take 1 tablet (150 mg total) by mouth once for 1 dose          Subjective:      Patient ID: Venice Nissen is a 59 y o  female  81-year-old female presenting with daughter who translated for concerns of increased urinary frequency and urgency  Patient is a diabetic, and is currently on insulin  For the last week patient has noticed an increased in urinary frequency  States when the symptoms started she did have symptoms of dysuria  She is getting episodes of urgency with her urination  Denies any hematuria, suprapubic pain, flank pain  Denies any fevers or chills, nausea, vomiting  Has not been taking anything over-the-counter  The following portions of the patient's history were reviewed and updated as appropriate:   She  has a past medical history of Abdominal infection ([address] ); Asthma; Breast cancer ([address] ); Cancer ([address] ); Diabetes mellitus ([address] ); Hiatal hernia; Hypercholesterolemia; Hypertension; Hypothyroid; Renal disorder; and Rheumatoid arthritis ([address] )    She   Patient Active Problem List    Diagnosis Date Noted    Palliative care patient 12/10/2018    Abdominal pain 11/08/2018    Bilateral pulmonary embolism ([address] ) 11/06/2018    Hypothyroid     Hypertension     Hypercholesterolemia 09/21/2018    Chronic pain of right knee 09/04/2018    Encounter for diabetic foot exam ([address] ) 09/04/2018    Cellulitis of right axilla 08/22/2018    Epigastric pain 08/08/2018    Hiatal hernia 08/08/2018    Screening for colon cancer 08/08/2018    Esophageal dysphagia 08/08/2018    Malignant neoplasm of upper-outer quadrant of right breast in female, estrogen receptor positive ([facility] ) 07/19/2018    [facility] 06/26/2018    Neck pain 06/13/2018    Constipation 06/13/2018    Primary insomnia 06/13/2018    Ductal carcinoma in situ (DCIS) of right breast 06/11/2018    Chronic bilateral low back pain without sciatica 05/22/2018    Palpitations 05/09/2018    Type 2 diabetes mellitus with complication, with long-term current use of insulin ([facility] ) 03/27/2018    Other specified hypothyroidism 03/27/2018    Chest pain 03/27/2018    Asthma 09/22/2009    Essential hypertension 09/22/2009     She  has a past surgical history that includes Tubal ligation; Knee surgery (Right); Cataract extraction, bilateral (Bilateral); Retinal detachment surgery (Right); Breast surgery (Left); Breast lumpectomy (Right, 6/26/2018); Breast lumpectomy (Right, 8/2/2018); Breast biopsy; EGD AND COLONOSCOPY (N/A, 9/5/2018); Tunneled venous port placement (Left, 9/13/2018); FL guided central venous access device insertion (9/13/2018); Mammo stereotactic breast biopsy right (all inc) (Right, 5/23/2018); and Mammo needle localization right (all inc) (Right, 6/26/2018)  Her family history includes Cancer in her maternal grandfather; Diabetes in her brother, father, and mother; Hypertension in her brother, father, and mother; Prostate cancer in her brother  She  reports that she has never smoked  She has never used smokeless tobacco  She reports that she does not drink alcohol or use drugs    Current Outpatient Prescriptions   Medication Sig Dispense Refill    albuterol (2 5 mg/3 mL) 0 083 % nebulizer solution Take 1 vial (2 5 mg total) by nebulization every 4 (four) hours as needed for wheezing or shortness of breath 75 mL 0    albuterol (PROVENTIL HFA,VENTOLIN HFA) 90 mcg/act inhaler Inhale 1 puff every 4 (four) hours as needed        amLODIPine-atorvastatin (CADUET) 10-10 MG per tablet take 1 tablet by mouth once daily 90 tablet 1    SANG MICROLET LANCETS lancets Testing three times a day 100 each 3    Blood Glucose Monitoring Suppl (ACURA BLOOD GLUCOSE METER) w/Device KIT Testing blood sugars three times a day      Blood Glucose Monitoring Suppl (SANG CONTOUR MONITOR) w/Device KIT Testing blood sugars three times a day 1 kit 0    DULoxetine (CYMBALTA) 20 mg capsule take 1 capsule by mouth twice a day 60 capsule 1    enalapril (VASOTEC) 20 mg tablet take 1 tablet by mouth once daily 90 tablet 1    fluconazole (DIFLUCAN) 150 mg tablet Take 1 tablet (150 mg total) by mouth once for 1 dose 1 tablet 0    fluticasone-umeclidinium-vilanterol (TRELEGY ELLIPTA) 100-62 5-25 MCG/INH inhaler Inhale 1 puff daily Rinse mouth after use   2 Inhaler 4    gemfibrozil (LOPID) 600 mg tablet take 1 tablet by mouth twice a day 60 tablet 3    glipiZIDE (GLUCOTROL XL) 10 mg 24 hr tablet take 1 tablet by mouth once daily 90 tablet 1    glucose blood (SANG CONTOUR TEST) test strip Testing three times a day 100 each 3    HUMULIN N 100 UNIT/ML subcutaneous injection 70 units in the am 30 units in the pm 30 mL 5    Insulin Glargine (TOUJEO) 300 units/mL CONCETRATED U-300 injection pen Inject 35 Units under the skin daily at bedtime 5 pen 0    Insulin Syringe-Needle U-100 (B-D INS SYRINGE 0 5CC/30GX1/2") 30G X 1/2" 0 5 ML MISC       Insulin Syringe-Needle U-100 30G 0 3 ML MISC Using twice a day      Insulin Syringe-Needle U-100 30G X 1/2" 1 ML MISC Using twice a day 100 each 3    lactulose 20 g/30 mL Take 15 mL (10 g total) by mouth 2 (two) times a day 1 Bottle 0    Lancets MISC Testing three times a day      levothyroxine 50 mcg tablet take 1 tablet by mouth once daily 90 tablet 1    lidocaine 4 % topical gel Apply topically 4 (four) times a day as needed (to affected area for pain) (Patient not taking: Reported on 1/21/2019 ) 10 g 0    loratadine (CLARITIN) 10 mg tablet Take 1 tablet (10 mg total) by mouth daily 30 tablet 3    metFORMIN (GLUCOPHAGE-XR) 500 mg 24 hr tablet take 2 tablets by mouth once daily WITH BREAKFAST 180 tablet 1    omeprazole (PriLOSEC) 40 MG capsule Take 1 capsule (40 mg total) by mouth 2 (two) times a day for 14 days 28 capsule 0    polyethylene glycol (MIRALAX) 17 g packet Take 17 g by mouth daily 14 each 0    rivaroxaban (XARELTO) 15 mg tablet Take 1 tablet (15 mg total) by mouth 2 (two) times a day with meals for 21 days 42 tablet 0     No current facility-administered medications for this visit  She is allergic to aspirin and tylenol with codeine #3 [acetaminophen-codeine]       Review of Systems   Constitutional: Negative for appetite change, chills, diaphoresis, fatigue and fever  Gastrointestinal: Negative for abdominal pain, nausea and vomiting  Genitourinary: Positive for dysuria, frequency and urgency  Negative for flank pain, hematuria and vaginal discharge  Neurological: Negative for dizziness, light-headedness and headaches  Objective:      /68   Pulse 88   Temp (!) 97 °F (36 1 °C) (Tympanic)   Resp 18   Ht 5' 4" (1 626 m)   Wt 73 kg (161 lb)   SpO2 95%   BMI 27 64 kg/m²          Physical Exam   Constitutional: She appears well-developed and well-nourished  No distress  Cardiovascular: Normal rate, regular rhythm and normal heart sounds  Exam reveals no gallop and no friction rub  No murmur heard  Pulmonary/Chest: Effort normal and breath sounds normal  No respiratory distress  She has no wheezes  She has no rales  Abdominal: Soft  Bowel sounds are normal  She exhibits no distension  There is no tenderness  There is no rebound and no guarding  Skin: She is not diaphoretic  Nursing note and vitals reviewed

## 2019-02-07 ENCOUNTER — APPOINTMENT (OUTPATIENT)
Dept: RADIATION ONCOLOGY | Facility: CLINIC | Age: 64
End: 2019-02-07
Attending: RADIOLOGY
Payer: MEDICARE

## 2019-02-07 LAB — BACTERIA UR CULT: NORMAL

## 2019-02-07 PROCEDURE — 77412 RADIATION TX DELIVERY LVL 3: CPT | Performed by: RADIOLOGY

## 2019-02-08 ENCOUNTER — APPOINTMENT (OUTPATIENT)
Dept: RADIATION ONCOLOGY | Facility: CLINIC | Age: 64
End: 2019-02-08
Payer: MEDICARE

## 2019-02-08 PROCEDURE — 77412 RADIATION TX DELIVERY LVL 3: CPT | Performed by: RADIOLOGY

## 2019-02-11 ENCOUNTER — APPOINTMENT (OUTPATIENT)
Dept: RADIATION ONCOLOGY | Facility: CLINIC | Age: 64
End: 2019-02-11
Attending: RADIOLOGY
Payer: MEDICARE

## 2019-02-11 PROCEDURE — 77412 RADIATION TX DELIVERY LVL 3: CPT | Performed by: RADIOLOGY

## 2019-02-12 ENCOUNTER — APPOINTMENT (OUTPATIENT)
Dept: RADIATION ONCOLOGY | Facility: CLINIC | Age: 64
End: 2019-02-12
Attending: RADIOLOGY
Payer: MEDICARE

## 2019-02-12 PROCEDURE — 77336 RADIATION PHYSICS CONSULT: CPT | Performed by: RADIOLOGY

## 2019-02-12 PROCEDURE — 77412 RADIATION TX DELIVERY LVL 3: CPT | Performed by: RADIOLOGY

## 2019-02-13 ENCOUNTER — APPOINTMENT (OUTPATIENT)
Dept: RADIATION ONCOLOGY | Facility: CLINIC | Age: 64
End: 2019-02-13
Attending: RADIOLOGY
Payer: MEDICARE

## 2019-02-13 PROCEDURE — 77331 SPECIAL RADIATION DOSIMETRY: CPT | Performed by: RADIOLOGY

## 2019-02-13 PROCEDURE — 77412 RADIATION TX DELIVERY LVL 3: CPT | Performed by: RADIOLOGY

## 2019-02-14 ENCOUNTER — APPOINTMENT (OUTPATIENT)
Dept: RADIATION ONCOLOGY | Facility: CLINIC | Age: 64
End: 2019-02-14
Attending: RADIOLOGY
Payer: MEDICARE

## 2019-02-14 PROCEDURE — 77412 RADIATION TX DELIVERY LVL 3: CPT | Performed by: RADIOLOGY

## 2019-02-15 ENCOUNTER — APPOINTMENT (OUTPATIENT)
Dept: RADIATION ONCOLOGY | Facility: CLINIC | Age: 64
End: 2019-02-15
Attending: RADIOLOGY
Payer: MEDICARE

## 2019-02-15 ENCOUNTER — OFFICE VISIT (OUTPATIENT)
Dept: SURGICAL ONCOLOGY | Facility: CLINIC | Age: 64
End: 2019-02-15
Payer: MEDICARE

## 2019-02-15 VITALS
RESPIRATION RATE: 16 BRPM | SYSTOLIC BLOOD PRESSURE: 112 MMHG | WEIGHT: 158 LBS | TEMPERATURE: 97.2 F | DIASTOLIC BLOOD PRESSURE: 80 MMHG | HEIGHT: 64 IN | HEART RATE: 76 BPM | BODY MASS INDEX: 26.98 KG/M2

## 2019-02-15 DIAGNOSIS — C50.911 MALIGNANT NEOPLASM OF RIGHT BREAST IN FEMALE, ESTROGEN RECEPTOR POSITIVE, UNSPECIFIED SITE OF BREAST (HCC): Primary | ICD-10-CM

## 2019-02-15 DIAGNOSIS — Z17.0 MALIGNANT NEOPLASM OF RIGHT BREAST IN FEMALE, ESTROGEN RECEPTOR POSITIVE, UNSPECIFIED SITE OF BREAST (HCC): Primary | ICD-10-CM

## 2019-02-15 DIAGNOSIS — D05.11 DUCTAL CARCINOMA IN SITU (DCIS) OF RIGHT BREAST: ICD-10-CM

## 2019-02-15 PROCEDURE — 77412 RADIATION TX DELIVERY LVL 3: CPT | Performed by: RADIOLOGY

## 2019-02-15 PROCEDURE — 99213 OFFICE O/P EST LOW 20 MIN: CPT | Performed by: SURGERY

## 2019-02-15 NOTE — PROGRESS NOTES
Surgical Oncology Follow Up       [address] [facility] SURGICAL ONCOLOGY [place]  [address] [facility] [address]    Jolie Gastontron  1955  [id number]  [address] [facility] SURGICAL ONCOLOGY [place]  [address] [id number]    Chief Complaint   Patient presents with    Follow-up     Patient is here for a 6 month breast follow up  Assessment/Plan:    No problem-specific Assessment & Plan notes found for this encounter  Diagnoses and all orders for this visit:    Malignant neoplasm of right breast in female, estrogen receptor positive, unspecified site of breast ([facility] 75 )  -     Mammo diagnostic bilateral w 3d & cad; Future    Ductal carcinoma in situ (DCIS) of right breast        Advance Care Planning/Advance Directives:  Discussed disease status, cancer treatment plans and/or cancer treatment goals with the patient  Malignant neoplasm of right female breast ([facility] )    5/23/2018 Initial Diagnosis     Malignant neoplasm of right female breast ([place] [address] )         5/23/2018 Biopsy     Right breast core biopsy:  DCIS, micropapillary type, low-intermediate nuclear grade  ER 90-95% positive,  LA 75-80% positive           6/26/2018 Surgery     RIGHT BREAST NEEDLE LOCALIZATION X2 WITH RIGHT BREAST LUMPECTOMY ( NEEDLE LOC @ 1000) (Right)   ONCOPLASTIC CLOSURE OF LUMPECTOMY CAVITY    Invasive breast carcinoma, NST (aka ductal)  * [place] grade 2 of 3 (total score = 2+2+2 = 6 of 9)   -- tubule formation < 10%, score 3   -- nuclear grade 2 of 3, score 2   -- mitoses ~ 4/mm2, score 2    * invasive carcinoma is multifocally present (A23-1, A32-1, A84-1, A89-1, A100-1), largest focus is 14 millimeters in greatest dimension (A89-1, this focus is graded)  * superior lumpectomy margin (orange-inked) is POSITIVE for invasive carcinoma (A84-1)   * all other lumpectomy margins are free of invasive carcinoma     * estrogen, progesterone & Her-2/negrita receptor studies are undertaken, to be described in an addendum report  - Ductal carcinoma in-situ (DCIS): Present as an extensive component (~85% of total tumor)  * DCIS is co-located with invasive carcinoma surrounding prior needle biopsy site  * DCIS spans 2 6 cm maximal dimension (A62-1) and is present on 27 of 136 total slides examined  * DCIS has cribriform & micropapillary patterns, nuclear grade 2 of 3, with central comedo-type necrosis  * DCIS is present within 0 2 millimeters of the superior lumpectomy margin (orange-inked, A26-1)   * DCIS is present within 0 2 millimeters of the medial lumpectomy margin (yellow-inked, A3-1)   * all other lumpectomy margins are free of DCIS by > 2 millimeters  - Lymph-vascular invasion: no lymph-vascular invasion is unequivocally identified  - Microcalcifications: present in DCIS  % positive, ER 45-55% positive, HER2 by IHC 3+ positive    - Dr Iris Manuel           8/2/2018 Surgery     Right breast lumpectomy reexcision, right axilla SLN biopsy:  A  Submitted as right axillary sentinel node:  - Seven lymph nodes are identified showing no metastatic tumor      B  Right breast lumpectomy reexcision:  - Abundant reactive changes are seen around the previous lumpectomy cavity   - No residual atypia or malignancy is seen               8/2/2018 -  Cancer Staged     Stage IA - pT1c, pN0, G2            9/25/2018 -  Chemotherapy     Weekly Paclitaxol and trastuzumab x12, until December 11, 2018 followed by trastuzumab monotherapy 6 milligram/kilogram every 3 weeks    - Dr Flo Acevedo              History of Present Illness:  Stage I right breast cancer status post lumpectomy June 2018, followed by re-excision of lumpectomy cavity August 2018, status post chemotherapy, presently on last week of radiation therapy     -Interval History:  Patient is here for surveillance visit with no new complaints to report  She is to complete radiation therapy next week      Review of Systems:  Review of Systems   Constitutional: Negative  HENT: Negative  Eyes: Negative  Respiratory: Negative  Cardiovascular: Negative  Gastrointestinal: Negative  Endocrine: Negative  Genitourinary: Negative  Allergic/Immunologic: Negative  Neurological: Negative  Hematological: Negative  Psychiatric/Behavioral: Negative  Patient Active Problem List   Diagnosis    Type 2 diabetes mellitus with complication, with long-term current use of insulin ([facility] )    Asthma    Essential hypertension    Other specified hypothyroidism    Chest pain    Palpitations    Chronic bilateral low back pain without sciatica    Neck pain    Constipation    Primary insomnia    Malignant neoplasm of right female breast ([facility] )    Malignant neoplasm of upper-outer quadrant of right breast in female, estrogen receptor positive ([facility] )    Epigastric pain    Hiatal hernia    Screening for colon cancer    Esophageal dysphagia    Cellulitis of right axilla    Chronic pain of right knee    Encounter for diabetic foot exam ([place] [address] )    Hypercholesterolemia    Hypothyroid    Hypertension    Bilateral pulmonary embolism (HCC)    Abdominal pain    Palliative care patient     Past Medical History:   Diagnosis Date    Abdominal infection ([place] [address] )     h-pylori    Asthma     Breast cancer ([place] [address] )     Cancer ([place] [address] )     BREAST    Diabetes mellitus ([place] [address] )     blood sugar 199 @ 735    Hiatal hernia     Hypercholesterolemia     Hypertension     Hypothyroid     Renal disorder     Rheumatoid arthritis ([facility] )      Past Surgical History:   Procedure Laterality Date    BREAST BIOPSY      BREAST LUMPECTOMY Right 6/26/2018    Procedure: RIGHT BREAST NEEDLE LOCALIZATION X2 WITH RIGHT BREAST LUMPECTOMY ( NEEDLE LOC @ 1000);   Surgeon: Brina Bhatti MD;  [facility]: [facility];  Service: Surgical Oncology    BREAST LUMPECTOMY Right 8/2/2018    Procedure: LYMPHOSCINITGRAPHY INTRAOPERATIVE LYMPHATIC MAPPING , RIGHT SENTINEL NODE BIOPSY, REEXCISION  RIGHT BREAST LUMPECTOMY CAVITY (SUPERIOR MARGIN); Surgeon: Judith Nicholas MD;  [facility]: [facility];  Service: Surgical Oncology    BREAST SURGERY Left     CATARACT EXTRACTION, BILATERAL Bilateral     EGD AND COLONOSCOPY N/A 9/5/2018    Procedure: EGD AND COLONOSCOPY;  Surgeon: Sugey Eldridge MD;  Location: [facility] GI LAB; Service: Gastroenterology    [place] GUIDED CENTRAL VENOUS ACCESS REPLACEMENT  9/13/2018    KNEE SURGERY Right     MAMMO NEEDLE LOCALIZATION RIGHT (ALL INC) Right 6/26/2018    MAMMO STEREOTACTIC BREAST BIOPSY RIGHT (ALL INC) Right 5/23/2018    RETINAL DETACHMENT SURGERY Right     TUBAL LIGATION      TUNNELED VENOUS PORT PLACEMENT Left 9/13/2018    Procedure: INSERTION VENOUS PORT (PORT-A-CATH);   Surgeon: Judith Nicholas MD;  [facility];  Service: Surgical Oncology     Family History   Problem Relation Age of Onset    Diabetes Mother     Hypertension Mother     Diabetes Father     Hypertension Father     Diabetes Brother     Hypertension Brother     Prostate cancer Brother     Cancer Maternal Grandfather      Social History     Socioeconomic History    Marital status:      Spouse name: Not on file    Number of children: Not on file    Years of education: Not on file    Highest education level: Not on file   Occupational History    Not on file   Social Needs    Financial resource strain: Not on file    Food insecurity:     Worry: Not on file     Inability: Not on file    Transportation needs:     Medical: Not on file     Non-medical: Not on file   Tobacco Use    Smoking status: Never Smoker    Smokeless tobacco: Never Used   Substance and Sexual Activity    Alcohol use: No    Drug use: No    Sexual activity: Not on file   Lifestyle    Physical activity:     Days per week: Not on file     Minutes per session: Not on file    Stress: Not on file   Relationships    Social connections:     Talks on phone: Not on file     Gets together: Not on file     Attends Faith service: Not on file     Active member of club or organization: Not on file     Attends meetings of clubs or organizations: Not on file     Relationship status: Not on file    Intimate partner violence:     Fear of current or ex partner: Not on file     Emotionally abused: Not on file     Physically abused: Not on file     Forced sexual activity: Not on file   Other Topics Concern    Not on file   Social History Narrative    Not on file       Current Outpatient Medications:     albuterol (2 5 mg/3 mL) 0 083 % nebulizer solution, Take 1 vial (2 5 mg total) by nebulization every 4 (four) hours as needed for wheezing or shortness of breath, Disp: 75 mL, Rfl: 0    albuterol (PROVENTIL HFA,VENTOLIN HFA) 90 mcg/act inhaler, Inhale 1 puff every 4 (four) hours as needed  , Disp: , Rfl:     amLODIPine-atorvastatin (CADUET) 10-10 MG per tablet, take 1 tablet by mouth once daily, Disp: 90 tablet, Rfl: 1    SANG MICROLET LANCETS lancets, Testing three times a day, Disp: 100 each, Rfl: 3    Blood Glucose Monitoring Suppl ([employer] BLOOD GLUCOSE METER) w/Device KIT, Testing blood sugars three times a day, Disp: , Rfl:     Blood Glucose Monitoring Suppl (SANG CONTOUR MONITOR) w/Device KIT, Testing blood sugars three times a day, Disp: 1 kit, Rfl: 0    DULoxetine (CYMBALTA) 20 mg capsule, take 1 capsule by mouth twice a day, Disp: 60 capsule, Rfl: 1    enalapril (VASOTEC) 20 mg tablet, take 1 tablet by mouth once daily, Disp: 90 tablet, Rfl: 1    fluticasone-umeclidinium-vilanterol (TRELEGY ELLIPTA) 100-62 5-25 MCG/INH inhaler, Inhale 1 puff daily Rinse mouth after use , Disp: 2 Inhaler, Rfl: 4    gemfibrozil (LOPID) 600 mg tablet, take 1 tablet by mouth twice a day, Disp: 60 tablet, Rfl: 3    glipiZIDE (GLUCOTROL XL) 10 mg 24 hr tablet, take 1 tablet by mouth once daily, Disp: 90 tablet, Rfl: 1    glucose blood (SANG CONTOUR TEST) test strip, Testing three times a day, Disp: 100 each, Rfl: 3    HUMULIN N 100 UNIT/ML subcutaneous injection, 70 units in the am 30 units in the pm, Disp: 30 mL, Rfl: 5    Insulin Glargine (TOUJEO) 300 units/mL CONCETRATED U-300 injection pen, Inject 35 Units under the skin daily at bedtime, Disp: 5 pen, Rfl: 0    Insulin Syringe-Needle U-100 (B-D INS SYRINGE 0 5CC/30GX1/2") 30G X 1/2" 0 5 ML MISC, , Disp: , Rfl:     Insulin Syringe-Needle U-100 30G 0 3 ML MISC, Using twice a day, Disp: , Rfl:     Insulin Syringe-Needle U-100 30G X 1/2" 1 ML MISC, Using twice a day, Disp: 100 each, Rfl: 3    lactulose 20 g/30 mL, Take 15 mL (10 g total) by mouth 2 (two) times a day, Disp: 1 Bottle, Rfl: 0    Lancets MISC, Testing three times a day, Disp: , Rfl:     levothyroxine 50 mcg tablet, take 1 tablet by mouth once daily, Disp: 90 tablet, Rfl: 1    lidocaine 4 % topical gel, Apply topically 4 (four) times a day as needed (to affected area for pain), Disp: 10 g, Rfl: 0    loratadine (CLARITIN) 10 mg tablet, Take 1 tablet (10 mg total) by mouth daily, Disp: 30 tablet, Rfl: 3    metFORMIN (GLUCOPHAGE-XR) 500 mg 24 hr tablet, take 2 tablets by mouth once daily WITH BREAKFAST, Disp: 180 tablet, Rfl: 1    polyethylene glycol (MIRALAX) 17 g packet, Take 17 g by mouth daily, Disp: 14 each, Rfl: 0    omeprazole (PriLOSEC) 40 MG capsule, Take 1 capsule (40 mg total) by mouth 2 (two) times a day for 14 days, Disp: 28 capsule, Rfl: 0    rivaroxaban (XARELTO) 15 mg tablet, Take 1 tablet (15 mg total) by mouth 2 (two) times a day with meals for 21 days, Disp: 42 tablet, Rfl: 0  Allergies   Allergen Reactions    Aspirin Hives     Dr  in [place] told patient not to take aspirin r/t kidneys     Tylenol With Codeine #3 [Acetaminophen-Codeine] Rash     Vitals:    02/15/19 1406   BP: 112/80   Pulse: 76   Resp: 16   Temp: (!) 97 2 °F (36 2 °C)       Physical Exam   Constitutional: She is oriented to person, place, and time   She appears well-developed and well-nourished  HENT:   Head: Normocephalic and atraumatic  Right Ear: External ear normal    Left Ear: External ear normal    Eyes: Pupils are equal, round, and reactive to light  EOM are normal    Neck: Normal range of motion  Neck supple  Cardiovascular: Normal rate, regular rhythm, normal heart sounds and intact distal pulses  Pulmonary/Chest: Effort normal and breath sounds normal    Abdominal: Bowel sounds are normal    Neurological: She is alert and oriented to person, place, and time  Skin: Skin is warm and dry  Breasts: breasts appear normal, no suspicious masses, no skin or nipple changes or axillary nodes  Right breast shows evidence of radiation therapy, with some erythema of the skin consistent with treatment  Psychiatric: She has a normal mood and affect  Her behavior is normal  Judgment and thought content normal          Results:  Labs:  none    Imaging  No results found  I reviewed the above laboratory and imaging data  Discussion/Summary:  Stage I right breast cancer completing radiation therapy  She had uneventful lumpectomy and chemotherapy last year  She is due for mammogram in May of this year  Given that she is just completing radiation therapy, will hold off on the mammogram till June of this year  Will follow up in 6 months for surveillance

## 2019-02-15 NOTE — LETTER
February 15, 2019     Erin Pond MD  [address] [facility]  [address] [facility]  [address]    Patient: Jesus Garibay   YOB: 1955   Date of Visit: 2/15/2019       Dear Dr Serafin Barfield:    Thank you for referring Jesus Garibay to me for evaluation  Below are my notes for this consultation  If you have questions, please do not hesitate to call me  I look forward to following your patient along with you  Sincerely,        Alyssa Townsend MD        CC: MD Yomaira Cedeno MD Lawence Alfred, MD  2/15/2019  2:28 PM  Sign at close encounter     Surgical Oncology Follow Up       [address]  1955  [id number]  [facility]  [address] [facility]  [phone]  [phone]    Chief Complaint   Patient presents with    Follow-up     Patient is here for a 6 month breast follow up  Assessment/Plan:    No problem-specific Assessment & Plan notes found for this encounter  Diagnoses and all orders for this visit:    Malignant neoplasm of right breast in female, estrogen receptor positive, unspecified site of breast ([facility] [address] )  -     Mammo diagnostic bilateral w 3d & cad; Future    Ductal carcinoma in situ (DCIS) of right breast        Advance Care Planning/Advance Directives:  Discussed disease status, cancer treatment plans and/or cancer treatment goals with the patient          Malignant neoplasm of right female breast ([facility] [address] )    5/23/2018 Initial Diagnosis     Malignant neoplasm of right female breast ([facility] [address] )         5/23/2018 Biopsy     Right breast core biopsy:  DCIS, micropapillary type, lowintermediate nuclear grade  ER 90-95% positive,  LA 75-80% positive           6/26/2018 Surgery     RIGHT BREAST NEEDLE LOCALIZATION X2 WITH RIGHT BREAST LUMPECTOMY ( NEEDLE LOC @ 1000) (Right) ONCOPLASTIC CLOSURE OF LUMPECTOMY CAVITY    Invasive breast carcinoma, NST (aka ductal)  * [place] grade 2 of 3 (total score = 2+2+2 = 6 of 9)   -- tubule formation < 10%, score 3   -- nuclear grade 2 of 3, score 2   -- mitoses ~ 4/mm2, score 2    * invasive carcinoma is multifocally present (A23-1, A32-1, A84-1, A89-1, A100-1), largest focus is 14 millimeters in greatest dimension (A89-1, this focus is graded)  * superior lumpectomy margin (orange-inked) is POSITIVE for invasive carcinoma (A84-1)   * all other lumpectomy margins are free of invasive carcinoma  * estrogen, progesterone & Her-2/negrita receptor studies are undertaken, to be described in an addendum report  - Ductal carcinoma in-situ (DCIS): Present as an extensive component (~85% of total tumor)  * DCIS is co-located with invasive carcinoma surrounding prior needle biopsy site  * DCIS spans 2 6 cm maximal dimension (A62-1) and is present on 27 of 136 total slides examined  * DCIS has cribriform & micropapillary patterns, nuclear grade 2 of 3, with central comedo-type necrosis  * DCIS is present within 0 2 millimeters of the superior lumpectomy margin (orange-inked, A26-1)   * DCIS is present within 0 2 millimeters of the medial lumpectomy margin (yellow-inked, A3-1)   * all other lumpectomy margins are free of DCIS by > 2 millimeters  - Lymph-vascular invasion: no lymph-vascular invasion is unequivocally identified  - Microcalcifications: present in DCIS  % positive, ER 45-55% positive, HER2 by IHC 3+ positive    - Dr Maricruz Harrison           8/2/2018 Surgery     Right breast lumpectomy reexcision, right axilla SLN biopsy:  A  Submitted as right axillary sentinel node:  - Seven lymph nodes are identified showing no metastatic tumor      B   Right breast lumpectomy reexcision:  - Abundant reactive changes are seen around the previous lumpectomy cavity   - No residual atypia or malignancy is seen               8/2/2018 -  Cancer Staged     Stage IA - pT1c, pN0, G2            9/25/2018 -  Chemotherapy     Weekly Paclitaxol and trastuzumab x12, until December 11, 2018 followed by trastuzumab monotherapy 6 milligram/kilogram every 3 weeks    - Dr Rachell Lovell              History of Present Illness:  Stage I right breast cancer status post lumpectomy June 2018, followed by re-excision of lumpectomy cavity August 2018, status post chemotherapy, presently on last week of radiation therapy     -Interval History:  Patient is here for surveillance visit with no new complaints to report  She is to complete radiation therapy next week  Review of Systems:  Review of Systems   Constitutional: Negative  HENT: Negative  Eyes: Negative  Respiratory: Negative  Cardiovascular: Negative  Gastrointestinal: Negative  Endocrine: Negative  Genitourinary: Negative  Allergic/Immunologic: Negative  Neurological: Negative  Hematological: Negative  Psychiatric/Behavioral: Negative          Patient Active Problem List   Diagnosis    Type 2 diabetes mellitus with complication, with long-term current use of insulin ([address] )    Asthma    Essential hypertension    Other specified hypothyroidism    Chest pain    Palpitations    Chronic bilateral low back pain without sciatica    Neck pain    Constipation    Primary insomnia    Malignant neoplasm of right female breast ([address]    Malignant neoplasm of upper-outer quadrant of right breast in female, estrogen receptor positive ([address] )    Epigastric pain    Hiatal hernia    Screening for colon cancer    Esophageal dysphagia    Cellulitis of right axilla    Chronic pain of right knee    Encounter for diabetic foot exam ([address] )    Hypercholesterolemia    Hypothyroid    Hypertension    Bilateral pulmonary embolism (HCC)    Abdominal pain    Palliative care patient     Past Medical History:   Diagnosis Date    Abdominal infection ([address] )     h-pylori    Asthma     Breast cancer ([facility] [address] )     Cancer ([facility] [address] )     BREAST    Diabetes mellitus ([facility] [address] )     blood sugar 199 @ 735    Hiatal hernia     Hypercholesterolemia     Hypertension     Hypothyroid     Renal disorder     Rheumatoid arthritis ([facility] [address] )      Past Surgical History:   Procedure Laterality Date    BREAST BIOPSY      BREAST LUMPECTOMY Right 6/26/2018    Procedure: RIGHT BREAST NEEDLE LOCALIZATION X2 WITH RIGHT BREAST LUMPECTOMY ( NEEDLE LOC @ 1000); Surgeon: Leida Martínez MD;  [facility];  Service: Surgical Oncology    BREAST LUMPECTOMY Right 8/2/2018    Procedure: LYMPHOSCINITGRAPHY INTRAOPERATIVE LYMPHATIC MAPPING , RIGHT SENTINEL NODE BIOPSY, REEXCISION  RIGHT BREAST LUMPECTOMY CAVITY (SUPERIOR MARGIN); Surgeon: Leida Martínez MD;  [facility];  Service: Surgical Oncology    BREAST SURGERY Left     CATARACT EXTRACTION, BILATERAL Bilateral     EGD AND COLONOSCOPY N/A 9/5/2018    Procedure: EGD AND COLONOSCOPY;  Surgeon: Shilpi Morrissey MD;  Location: [facility]; Service: Gastroenterology    [facility] GUIDED CENTRAL VENOUS ACCESS REPLACEMENT  9/13/2018    KNEE SURGERY Right     MAMMO NEEDLE LOCALIZATION RIGHT (ALL INC) Right 6/26/2018    MAMMO STEREOTACTIC BREAST BIOPSY RIGHT (ALL INC) Right 5/23/2018    RETINAL DETACHMENT SURGERY Right     TUBAL LIGATION      TUNNELED VENOUS PORT PLACEMENT Left 9/13/2018    Procedure: INSERTION VENOUS PORT (PORT-A-CATH);   Surgeon: Leida Martínez MD;  [facility];  Service: Surgical Oncology     Family History   Problem Relation Age of Onset    Diabetes Mother     Hypertension Mother     Diabetes Father     Hypertension Father     Diabetes Brother     Hypertension Brother     Prostate cancer Brother     Cancer Maternal Grandfather      Social History     Socioeconomic History    Marital status:      Spouse name: Not on file    Number of children: Not on file    Years of education: Not on file    Highest education level: Not on file Occupational History    Not on file   Social Needs    Financial resource strain: Not on file    Food insecurity:     Worry: Not on file     Inability: Not on file    Transportation needs:     Medical: Not on file     Non-medical: Not on file   Tobacco Use    Smoking status: Never Smoker    Smokeless tobacco: Never Used   Substance and Sexual Activity    Alcohol use: No    Drug use: No    Sexual activity: Not on file   Lifestyle    Physical activity:     Days per week: Not on file     Minutes per session: Not on file    Stress: Not on file   Relationships    Social connections:     Talks on phone: Not on file     Gets together: Not on file     Attends Congregation service: Not on file     Active member of club or organization: Not on file     Attends meetings of clubs or organizations: Not on file     Relationship status: Not on file    Intimate partner violence:     Fear of current or ex partner: Not on file     Emotionally abused: Not on file     Physically abused: Not on file     Forced sexual activity: Not on file   Other Topics Concern    Not on file   Social History Narrative    Not on file       Current Outpatient Medications:     albuterol (2 5 mg/3 mL) 0 083 % nebulizer solution, Take 1 vial (2 5 mg total) by nebulization every 4 (four) hours as needed for wheezing or shortness of breath, Disp: 75 mL, Rfl: 0    albuterol (PROVENTIL HFA,VENTOLIN HFA) 90 mcg/act inhaler, Inhale 1 puff every 4 (four) hours as needed  , Disp: , Rfl:     amLODIPine-atorvastatin (CADUET) 10-10 MG per tablet, take 1 tablet by mouth once daily, Disp: 90 tablet, Rfl: 1    SANG MICROLET LANCETS lancets, Testing three times a day, Disp: 100 each, Rfl: 3    Blood Glucose Monitoring Suppl (ACURA BLOOD GLUCOSE METER) w/Device KIT, Testing blood sugars three times a day, Disp: , Rfl:     Blood Glucose Monitoring Suppl (SANG CONTOUR MONITOR) w/Device KIT, Testing blood sugars three times a day, Disp: 1 kit, Rfl: 0   DULoxetine (CYMBALTA) 20 mg capsule, take 1 capsule by mouth twice a day, Disp: 60 capsule, Rfl: 1    enalapril (VASOTEC) 20 mg tablet, take 1 tablet by mouth once daily, Disp: 90 tablet, Rfl: 1    fluticasone-umeclidinium-vilanterol (TRELEGY ELLIPTA) 100-62 5-25 MCG/INH inhaler, Inhale 1 puff daily Rinse mouth after use , Disp: 2 Inhaler, Rfl: 4    gemfibrozil (LOPID) 600 mg tablet, take 1 tablet by mouth twice a day, Disp: 60 tablet, Rfl: 3    glipiZIDE (GLUCOTROL XL) 10 mg 24 hr tablet, take 1 tablet by mouth once daily, Disp: 90 tablet, Rfl: 1    glucose blood (SANG CONTOUR TEST) test strip, Testing three times a day, Disp: 100 each, Rfl: 3    HUMULIN N 100 UNIT/ML subcutaneous injection, 70 units in the am 30 units in the pm, Disp: 30 mL, Rfl: 5    Insulin Glargine (TOUJEO) 300 units/mL CONCETRATED U-300 injection pen, Inject 35 Units under the skin daily at bedtime, Disp: 5 pen, Rfl: 0    Insulin Syringe-Needle U-100 (B-D INS SYRINGE 0 5CC/30GX1/2") 30G X 1/2" 0 5 ML MISC, , Disp: , Rfl:     Insulin Syringe-Needle U-100 30G 0 3 ML MISC, Using twice a day, Disp: , Rfl:     Insulin Syringe-Needle U-100 30G X 1/2" 1 ML MISC, Using twice a day, Disp: 100 each, Rfl: 3    lactulose 20 g/30 mL, Take 15 mL (10 g total) by mouth 2 (two) times a day, Disp: 1 Bottle, Rfl: 0    Lancets MISC, Testing three times a day, Disp: , Rfl:     levothyroxine 50 mcg tablet, take 1 tablet by mouth once daily, Disp: 90 tablet, Rfl: 1    lidocaine 4 % topical gel, Apply topically 4 (four) times a day as needed (to affected area for pain), Disp: 10 g, Rfl: 0    loratadine (CLARITIN) 10 mg tablet, Take 1 tablet (10 mg total) by mouth daily, Disp: 30 tablet, Rfl: 3    metFORMIN (GLUCOPHAGE-XR) 500 mg 24 hr tablet, take 2 tablets by mouth once daily WITH BREAKFAST, Disp: 180 tablet, Rfl: 1    polyethylene glycol (MIRALAX) 17 g packet, Take 17 g by mouth daily, Disp: 14 each, Rfl: 0    omeprazole (PriLOSEC) 40 MG capsule, Take 1 capsule (40 mg total) by mouth 2 (two) times a day for 14 days, Disp: 28 capsule, Rfl: 0    rivaroxaban (XARELTO) 15 mg tablet, Take 1 tablet (15 mg total) by mouth 2 (two) times a day with meals for 21 days, Disp: 42 tablet, Rfl: 0  Allergies   Allergen Reactions    Aspirin Hives     Dr  in [place] told patient not to take aspirin r/t kidneys     Tylenol With Codeine #3 [Acetaminophen-Codeine] Rash     Vitals:    02/15/19 1406   BP: 112/80   Pulse: 76   Resp: 16   Temp: (!) 97 2 °F (36 2 °C)       Physical Exam   Constitutional: She is oriented to person, place, and time  She appears well-developed and well-nourished  HENT:   Head: Normocephalic and atraumatic  Right Ear: External ear normal    Left Ear: External ear normal    Eyes: Pupils are equal, round, and reactive to light  EOM are normal    Neck: Normal range of motion  Neck supple  Cardiovascular: Normal rate, regular rhythm, normal heart sounds and intact distal pulses  Pulmonary/Chest: Effort normal and breath sounds normal    Abdominal: Bowel sounds are normal    Neurological: She is alert and oriented to person, place, and time  Skin: Skin is warm and dry  Breasts: breasts appear normal, no suspicious masses, no skin or nipple changes or axillary nodes  Right breast shows evidence of radiation therapy, with some erythema of the skin consistent with treatment  Psychiatric: She has a normal mood and affect  Her behavior is normal  Judgment and thought content normal          Results:  Labs:  none    Imaging  No results found  I reviewed the above laboratory and imaging data  Discussion/Summary:  Stage I right breast cancer completing radiation therapy  She had uneventful lumpectomy and chemotherapy last year  She is due for mammogram in May of this year  Given that she is just completing radiation therapy, will hold off on the mammogram till June of this year  Will follow up in 6 months for surveillance

## 2019-02-18 ENCOUNTER — APPOINTMENT (OUTPATIENT)
Dept: RADIATION ONCOLOGY | Facility: CLINIC | Age: 64
End: 2019-02-18
Attending: RADIOLOGY
Payer: MEDICARE

## 2019-02-18 PROCEDURE — 77412 RADIATION TX DELIVERY LVL 3: CPT | Performed by: RADIOLOGY

## 2019-02-19 ENCOUNTER — APPOINTMENT (OUTPATIENT)
Dept: RADIATION ONCOLOGY | Facility: CLINIC | Age: 64
End: 2019-02-19
Attending: RADIOLOGY
Payer: MEDICARE

## 2019-02-19 ENCOUNTER — HOSPITAL ENCOUNTER (OUTPATIENT)
Dept: INFUSION CENTER | Facility: CLINIC | Age: 64
Discharge: HOME | End: 2019-02-19
Payer: MEDICARE

## 2019-02-19 VITALS
HEART RATE: 87 BPM | DIASTOLIC BLOOD PRESSURE: 79 MMHG | TEMPERATURE: 98.1 F | RESPIRATION RATE: 16 BRPM | BODY MASS INDEX: 27.93 KG/M2 | WEIGHT: 162.7 LBS | SYSTOLIC BLOOD PRESSURE: 134 MMHG

## 2019-02-19 PROCEDURE — 77336 RADIATION PHYSICS CONSULT: CPT | Performed by: RADIOLOGY

## 2019-02-19 PROCEDURE — 36593 DECLOT VASCULAR DEVICE: CPT

## 2019-02-19 PROCEDURE — 96413 CHEMO IV INFUSION 1 HR: CPT

## 2019-02-19 PROCEDURE — 77412 RADIATION TX DELIVERY LVL 3: CPT | Performed by: RADIOLOGY

## 2019-02-19 RX ORDER — SODIUM CHLORIDE 9 MG/ML
20 INJECTION, SOLUTION INTRAVENOUS CONTINUOUS
Status: DISCONTINUED | OUTPATIENT
Start: 2019-02-19 | End: 2019-02-22 | Stop reason: HOSPADM

## 2019-02-19 RX ADMIN — SODIUM CHLORIDE 20 ML/HR: 0.9 INJECTION, SOLUTION INTRAVENOUS at 08:15

## 2019-02-19 RX ADMIN — TRASTUZUMAB 450 MG: 150 INJECTION, POWDER, LYOPHILIZED, FOR SOLUTION INTRAVENOUS at 09:17

## 2019-02-19 RX ADMIN — ALTEPLASE 2 MG: 2.2 INJECTION, POWDER, LYOPHILIZED, FOR SOLUTION INTRAVENOUS at 08:30

## 2019-02-19 NOTE — PLAN OF CARE
Problem: PAIN - ADULT  Goal: Verbalizes/displays adequate comfort level or baseline comfort level  Description  Interventions:  - Encourage patient to monitor pain and request assistance  - Assess pain using appropriate pain scale  - Administer analgesics based on type and severity of pain and evaluate response  - Implement non-pharmacological measures as appropriate and evaluate response  - Consider cultural and social influences on pain and pain management  - Notify physician/advanced practitioner if interventions unsuccessful or patient reports new pain  Outcome: Progressing     Problem: INFECTION - ADULT  Goal: Absence or prevention of progression during hospitalization  Description  INTERVENTIONS:  - Assess and monitor for signs and symptoms of infection  - Monitor lab/diagnostic results  - Monitor all insertion sites, i e  indwelling lines, tubes, and drains  - Monitor endotracheal (as able) and nasal secretions for changes in amount and color  - [place] appropriate cooling/warming therapies per order  - Administer medications as ordered  - Instruct and encourage patient and family to use good hand hygiene technique  - Identify and instruct in appropriate isolation precautions for identified infection/condition  Outcome: Progressing  Goal: Absence of fever/infection during neutropenic period  Description  INTERVENTIONS:  - Monitor WBC  - Implement neutropenic guidelines  Outcome: Progressing     Problem: Knowledge Deficit  Goal: Patient/family/caregiver demonstrates understanding of disease process, treatment plan, medications, and discharge instructions  Description  Complete learning assessment and assess knowledge base    Interventions:  - Provide teaching at level of understanding  - Provide teaching via preferred learning methods  Outcome: Progressing

## 2019-02-19 NOTE — PROGRESS NOTES
Pt  Tolerated Herceptin without adverse event  Future appointments reviewed  AVS provided  Pt  Discharged with her daughter in stable condition  Pt  Has appointment with radiation oncology

## 2019-02-27 ENCOUNTER — OFFICE VISIT (OUTPATIENT)
Dept: CARDIOLOGY CLINIC | Facility: CLINIC | Age: 64
End: 2019-02-27
Payer: MEDICARE

## 2019-02-27 VITALS
HEART RATE: 94 BPM | SYSTOLIC BLOOD PRESSURE: 122 MMHG | DIASTOLIC BLOOD PRESSURE: 70 MMHG | HEIGHT: 64 IN | BODY MASS INDEX: 27.31 KG/M2 | WEIGHT: 160 LBS

## 2019-02-27 DIAGNOSIS — R07.9 CHEST PAIN, UNSPECIFIED TYPE: Primary | ICD-10-CM

## 2019-02-27 DIAGNOSIS — R00.2 PALPITATIONS: ICD-10-CM

## 2019-02-27 DIAGNOSIS — I10 ESSENTIAL HYPERTENSION: ICD-10-CM

## 2019-02-27 PROCEDURE — 99214 OFFICE O/P EST MOD 30 MIN: CPT | Performed by: INTERNAL MEDICINE

## 2019-02-27 PROCEDURE — 93000 ELECTROCARDIOGRAM COMPLETE: CPT | Performed by: INTERNAL MEDICINE

## 2019-02-27 NOTE — PROGRESS NOTES
Cardiology Follow Up    Jolie Mulligan  1955  [id number]  [address]  [address] [id number] [phone]    1  Chest pain, unspecified type  POCT ECG    NM myocardial perfusion spect (rx stress and/or rest)   2  Essential hypertension     3  Palpitations         Interval History:  Presents for re-evaluation of chest pain  History obtained from daughter who acted as an  for her mother  Had a stress echocardiogram in June of 2018 for atypical chest pain and this was negative for ischemia  Recent recently complained or pulmonologist about chest pain with exertion  Upon speaking with her today she also has chest pain with rest   Chest pain with lying flat  And at times exerts herself without any discomfort  She describes it as pressure-like  He I should also note that she has a history of asthma breast cancer and a pulmonary embolism in November    She denies orthopnea paroxysmal    Patient Active Problem List   Diagnosis    Type 2 diabetes mellitus with complication, with long-term current use of insulin ([address] )    Asthma    Essential hypertension    Other specified hypothyroidism    Chest pain    Palpitations    Chronic bilateral low back pain without sciatica    Neck pain    Constipation    Primary insomnia    Malignant neoplasm of right female breast ([address] )    Malignant neoplasm of upper-outer quadrant of right breast in female, estrogen receptor positive ([address] )    Epigastric pain    Hiatal hernia    Screening for colon cancer    Esophageal dysphagia    Cellulitis of right axilla    Chronic pain of right knee    Encounter for diabetic foot exam ([address] )    Hypercholesterolemia    Hypothyroid    Hypertension    Bilateral pulmonary embolism ([address] )    Abdominal pain    Palliative care patient     Past Medical History:   Diagnosis Date    Abdominal infection ([address] )     h-pylori    Asthma     Breast cancer ([place] [address] )     Cancer ([place] [address] )     BREAST    Diabetes mellitus ([place] [address] )     blood sugar 199 @ 735    Hiatal hernia     Hypercholesterolemia     Hypertension     Hypothyroid     Renal disorder     Rheumatoid arthritis ([place] [address] )      Social History     Socioeconomic History    Marital status:      Spouse name: Not on file    Number of children: Not on file    Years of education: Not on file    Highest education level: Not on file   Occupational History    Not on file   Social Needs    Financial resource strain: Not on file    Food insecurity:     Worry: Not on file     Inability: Not on file    Transportation needs:     Medical: Not on file     Non-medical: Not on file   Tobacco Use    Smoking status: Never Smoker    Smokeless tobacco: Never Used   Substance and Sexual Activity    Alcohol use: No    Drug use: No    Sexual activity: Not on file   Lifestyle    Physical activity:     Days per week: Not on file     Minutes per session: Not on file    Stress: Not on file   Relationships    Social connections:     Talks on phone: Not on file     Gets together: Not on file     Attends Tenriism service: Not on file     Active member of club or organization: Not on file     Attends meetings of clubs or organizations: Not on file     Relationship status: Not on file    Intimate partner violence:     Fear of current or ex partner: Not on file     Emotionally abused: Not on file     Physically abused: Not on file     Forced sexual activity: Not on file   Other Topics Concern    Not on file   Social History Narrative    Not on file      Family History   Problem Relation Age of Onset    Diabetes Mother     Hypertension Mother     Diabetes Father     Hypertension Father     Diabetes Brother     Hypertension Brother     Prostate cancer Brother     Cancer Maternal Grandfather      Past Surgical History:   Procedure Laterality Date    BREAST BIOPSY      BREAST LUMPECTOMY Right 6/26/2018    Procedure: RIGHT BREAST NEEDLE LOCALIZATION X2 WITH RIGHT BREAST LUMPECTOMY ( NEEDLE LOC @ 1000); Surgeon: Donis Cespedes MD;  [facility];  Service: Surgical Oncology    BREAST LUMPECTOMY Right 8/2/2018    Procedure: LYMPHOSCINITGRAPHY INTRAOPERATIVE LYMPHATIC MAPPING , RIGHT SENTINEL NODE BIOPSY, REEXCISION  RIGHT BREAST LUMPECTOMY CAVITY (SUPERIOR MARGIN); Surgeon: Donis Cespedes MD;  [facility];  Service: Surgical Oncology    BREAST SURGERY Left     CATARACT EXTRACTION, BILATERAL Bilateral     EGD AND COLONOSCOPY N/A 9/5/2018    Procedure: EGD AND COLONOSCOPY;  Surgeon: Kodak Fisher MD;  Location[facility]; Service: Gastroenterology    [facility] GUIDED CENTRAL VENOUS ACCESS REPLACEMENT  9/13/2018    KNEE SURGERY Right     MAMMO NEEDLE LOCALIZATION RIGHT (ALL INC) Right 6/26/2018    MAMMO STEREOTACTIC BREAST BIOPSY RIGHT (ALL INC) Right 5/23/2018    RETINAL DETACHMENT SURGERY Right     TUBAL LIGATION      TUNNELED VENOUS PORT PLACEMENT Left 9/13/2018    Procedure: INSERTION VENOUS PORT (PORT-A-CATH);   Surgeon: Donis Cespedes MD;  [facility];  Service: Surgical Oncology       Current Outpatient Medications:     albuterol (2 5 mg/3 mL) 0 083 % nebulizer solution, Take 1 vial (2 5 mg total) by nebulization every 4 (four) hours as needed for wheezing or shortness of breath, Disp: 75 mL, Rfl: 0    albuterol (PROVENTIL HFA,VENTOLIN HFA) 90 mcg/act inhaler, Inhale 1 puff every 4 (four) hours as needed  , Disp: , Rfl:     amLODIPine-atorvastatin (CADUET) 10-10 MG per tablet, take 1 tablet by mouth once daily, Disp: 90 tablet, Rfl: 1    enalapril (VASOTEC) 20 mg tablet, take 1 tablet by mouth once daily, Disp: 90 tablet, Rfl: 1    glipiZIDE (GLUCOTROL XL) 10 mg 24 hr tablet, take 1 tablet by mouth once daily, Disp: 90 tablet, Rfl: 1    HUMULIN N 100 UNIT/ML subcutaneous injection, 70 units in the am 30 units in the pm, Disp: 30 mL, Rfl: 5    Insulin Glargine (TOUJEO) 300 units/mL CONCETRATED U-300 injection pen, Inject 35 Units under the skin daily at bedtime, Disp: 5 pen, Rfl: 0    lactulose 20 g/30 mL, Take 15 mL (10 g total) by mouth 2 (two) times a day, Disp: 1 Bottle, Rfl: 0    levothyroxine 50 mcg tablet, take 1 tablet by mouth once daily, Disp: 90 tablet, Rfl: 1    metFORMIN (GLUCOPHAGE-XR) 500 mg 24 hr tablet, take 2 tablets by mouth once daily WITH BREAKFAST, Disp: 180 tablet, Rfl: 1    omeprazole (PriLOSEC) 40 MG capsule, Take 1 capsule (40 mg total) by mouth 2 (two) times a day for 14 days, Disp: 28 capsule, Rfl: 0    polyethylene glycol (MIRALAX) 17 g packet, Take 17 g by mouth daily, Disp: 14 each, Rfl: 0    rivaroxaban (XARELTO) 20 mg tablet, Take 20 mg by mouth daily, Disp: , Rfl:     SANG MICROLET LANCETS lancets, Testing three times a day, Disp: 100 each, Rfl: 3    Blood Glucose Monitoring Suppl (Singly BLOOD GLUCOSE METER) w/Device KIT, Testing blood sugars three times a day, Disp: , Rfl:     Blood Glucose Monitoring Suppl (SANG CONTOUR MONITOR) w/Device KIT, Testing blood sugars three times a day, Disp: 1 kit, Rfl: 0    DULoxetine (CYMBALTA) 20 mg capsule, take 1 capsule by mouth twice a day (Patient not taking: Reported on 2/27/2019), Disp: 60 capsule, Rfl: 1    fluticasone-umeclidinium-vilanterol (TRELEGY ELLIPTA) 100-62 5-25 MCG/INH inhaler, Inhale 1 puff daily Rinse mouth after use   (Patient not taking: Reported on 2/27/2019), Disp: 2 Inhaler, Rfl: 4    gemfibrozil (LOPID) 600 mg tablet, take 1 tablet by mouth twice a day (Patient not taking: Reported on 2/27/2019), Disp: 60 tablet, Rfl: 3    glucose blood (SANG CONTOUR TEST) test strip, Testing three times a day, Disp: 100 each, Rfl: 3    Insulin Syringe-Needle U-100 (B-D INS SYRINGE 0 5CC/30GX1/2") 30G X 1/2" 0 5 ML MISC, , Disp: , Rfl:     Insulin Syringe-Needle U-100 30G 0 3 ML MISC, Using twice a day, Disp: , Rfl:     Insulin Syringe-Needle U-100 30G X 1/2" 1 ML MISC, Using twice a day, Disp: 100 each, Rfl: 3    Lancets MISC, Testing three times a day, Disp: , Rfl:     lidocaine 4 % topical gel, Apply topically 4 (four) times a day as needed (to affected area for pain) (Patient not taking: Reported on 2/27/2019), Disp: 10 g, Rfl: 0    loratadine (CLARITIN) 10 mg tablet, Take 1 tablet (10 mg total) by mouth daily (Patient not taking: Reported on 2/27/2019), Disp: 30 tablet, Rfl: 3    rivaroxaban (XARELTO) 15 mg tablet, Take 1 tablet (15 mg total) by mouth 2 (two) times a day with meals for 21 days (Patient not taking: Reported on 2/27/2019), Disp: 42 tablet, Rfl: 0  Allergies   Allergen Reactions    Aspirin Hives       in [address] told patient not to take aspirin r/t kidneys     Tylenol With Codeine #3 [Acetaminophen-Codeine] Rash       Labs:  Office Visit on 02/06/2019   Component Date Value    LEUKOCYTE ESTERASE,UA 02/06/2019 NEGATIVE     NITRITE,UA 02/06/2019 NEGATIVE     SL AMB POCT UROBILINOGEN 02/06/2019 0 2     POCT URINE PROTEIN 02/06/2019 TRACE      PH,UA 02/06/2019 6 0     BLOOD,UA 02/06/2019 TRACE     SPECIFIC GRAVITY,UA 02/06/2019 1 030     KETONES,UA 02/06/2019 MODERATE     BILIRUBIN,UA 02/06/2019 +     GLUCOSE, UA 02/06/2019 2,000      COLOR,UA 02/06/2019 YELLOW     CLARITY,UA 02/06/2019 CLEAR     Urine Culture 02/06/2019 No Growth <1000 cfu/mL    Appointment on 01/21/2019   Component Date Value    Sodium 01/21/2019 143     Potassium 01/21/2019 4 0     Chloride 01/21/2019 103     CO2 01/21/2019 30     ANION GAP 01/21/2019 10     BUN 01/21/2019 15     Creatinine 01/21/2019 0 60     Glucose 01/21/2019 281*    Calcium 01/21/2019 9 8     AST 01/21/2019 22     ALT 01/21/2019 25     Alkaline Phosphatase 01/21/2019 68     Total Protein 01/21/2019 7 1     Albumin 01/21/2019 3 4*    Total Bilirubin 01/21/2019 0 60     eGFR 01/21/2019 98    Ancillary Orders on 01/18/2019   Component Date Value    WBC 01/21/2019 5 92     RBC 01/21/2019 3 97     Hemoglobin 01/21/2019 11 6     Hematocrit 01/21/2019 35 5     MCV 01/21/2019 89     MCH 01/21/2019 29 2     MCHC 01/21/2019 32 7     RDW 01/21/2019 13 2     MPV 01/21/2019 11 9     Platelets 90/55/9993 264    Appointment on 01/16/2019   Component Date Value    WBC 01/16/2019 6 86     RBC 01/16/2019 4 09     Hemoglobin 01/16/2019 12 0     Hematocrit 01/16/2019 36 3     MCV 01/16/2019 89     MCH 01/16/2019 29 3     MCHC 01/16/2019 33 1     RDW 01/16/2019 13 9     MPV 01/16/2019 12 0     Platelets 91/96/1792 273     Neutrophils Relative 01/16/2019 61     Lymphocytes Relative 01/16/2019 29     Monocytes Relative 01/16/2019 6     Eosinophils Relative 01/16/2019 3     Basophils Relative 01/16/2019 1     Neutrophils Absolute 01/16/2019 4 18     Lymphocytes Absolute 01/16/2019 2 02     Monocytes Absolute 01/16/2019 0 41     Eosinophils Absolute 01/16/2019 0 19     Basophils Absolute 01/16/2019 0 06      Imaging: No results found  Review of Systems:  Review of Systems   Constitutional: Positive for fatigue  HENT: Negative for hearing loss  Eyes: Negative for discharge  Respiratory: Negative for shortness of breath  Cardiovascular: Negative for chest pain, palpitations and leg swelling  Gastrointestinal: Negative for abdominal pain  Endocrine: Negative for polyuria  Genitourinary: Positive for frequency  Negative for dysuria  Musculoskeletal: Negative for back pain and myalgias  Skin: Negative for rash  Neurological: Positive for headaches  Negative for syncope  Hematological: Does not bruise/bleed easily  Psychiatric/Behavioral: Negative for confusion and sleep disturbance  Physical Exam:  Physical Exam   Constitutional: She is oriented to person, place, and time  She appears well-developed and well-nourished  HENT:   Head: Normocephalic and atraumatic     Mouth/Throat: Oropharynx is clear and moist    Eyes: EOM are normal    Neck: Normal range of motion  Neck supple  No JVD present  Cardiovascular: Normal rate, regular rhythm, normal heart sounds and intact distal pulses  Pulmonary/Chest: Effort normal and breath sounds normal    Abdominal: Soft  Bowel sounds are normal    Musculoskeletal: Normal range of motion  Neurological: She is alert and oriented to person, place, and time  Skin: Skin is warm and dry  Psychiatric: She has a normal mood and affect  Her behavior is normal  Judgment and thought content normal    Nursing note and vitals reviewed  Discussion/Summary:  Chest pain has features that are predominantly atypical   Episode of chest discomfort when doing 6 minutes walk for pulmonologist   Normal stress echocardiogram in June of 2018  At this point I will have her do a adenosine and nuclear stress test to make sure or the exercise echocardiogram was not false-positive I feel relatively strongly that this pain is noncardiac although it occurs at times with exertion and I would prefer not to do a cardiac catheterization  If the adenosine nuclear stress test is negative we will then status for with more certainty that this pain is noncardiac  Her Holter monitor results had showed some PVCs and she gets occasional palpitations  On if the stress test is normal I think at this time no further treatment other than avoiding caffeine will be recommended  I will call her with the results and make further recommendations following the study

## 2019-02-28 ENCOUNTER — OFFICE VISIT (OUTPATIENT)
Dept: HEMATOLOGY ONCOLOGY | Facility: CLINIC | Age: 64
End: 2019-02-28
Payer: MEDICARE

## 2019-02-28 VITALS
SYSTOLIC BLOOD PRESSURE: 120 MMHG | HEIGHT: 64 IN | DIASTOLIC BLOOD PRESSURE: 70 MMHG | HEART RATE: 100 BPM | BODY MASS INDEX: 27.31 KG/M2 | WEIGHT: 160 LBS | RESPIRATION RATE: 16 BRPM | TEMPERATURE: 96.7 F | OXYGEN SATURATION: 96 %

## 2019-02-28 DIAGNOSIS — Z17.0 MALIGNANT NEOPLASM OF UPPER-OUTER QUADRANT OF RIGHT BREAST IN FEMALE, ESTROGEN RECEPTOR POSITIVE (HCC): Primary | ICD-10-CM

## 2019-02-28 DIAGNOSIS — C50.411 MALIGNANT NEOPLASM OF UPPER-OUTER QUADRANT OF RIGHT BREAST IN FEMALE, ESTROGEN RECEPTOR POSITIVE (HCC): Primary | ICD-10-CM

## 2019-02-28 PROCEDURE — 99215 OFFICE O/P EST HI 40 MIN: CPT | Performed by: INTERNAL MEDICINE

## 2019-02-28 RX ORDER — ANASTROZOLE 1 MG/1
1 TABLET ORAL DAILY
Qty: 90 TABLET | Refills: 1 | Status: SHIPPED | OUTPATIENT
Start: 2019-02-28 | End: 2019-11-08 | Stop reason: SDUPTHER

## 2019-02-28 NOTE — PROGRESS NOTES
Hematology / Oncology Outpatient Follow Up Note    Jolie Mulligan 59 y o  female :1955 [id number]         Date:  2019    Assessment / Plan:  A 44-year-old postmenopausal woman with stage IA right breast cancer, grade 2, % positive, CO 45% positive, HER2 3+ disease   She underwent lumpectomy with sentinel lymph node biopsy, resulting in LILIANA  She completed adjuvant chemotherapy with weekly paclitaxel and trastuzumab in 2018 without cardiac toxicity  However, she has neuropathy  She is currently on trastuzumab monotherapy every 3 weeks with absolutely no cardiac toxicity  Clinically, she has no evidence recurrent disease  I recommended her to start adjuvant hormonal therapy with anastrozole 1 mg once a day  Side effects of anastrozole was thoroughly discussed, including but not limited to hot flashes, musculoskeletal symptom  She understood and wished to proceed  Regarding the anticoagulation for pulmonary embolism which is likely to be provoked, I recommended her to stay on rivaroxaban 20 mg daily until we are able to remove the Port-A-Cath  If she need to have surgical procedure, she after hold rivaroxaban for 2 days prior to the procedure  She and her daughter understood  I will see her again in 3 months with another echocardiogram for cardiac monitoring                                                                         Subjective:      HPI:  A 44-year-old postmenopausal woman who was recently found to have abnormality in her right breast   She lived in [place] [address] she found breast abnormality, she came to [facility] to be with her daughter  Kathy Donis underwent right breast biopsy in May 23, 2018 which showed ductal carcinoma in situ, ER 90% positive, CO 75% positive   She underwent lumpectomy by Dr Annabelle Yen in 2018   She had 1 4 cm of invasive ductal carcinoma, grade 2   Lymphovascular invasion was not present   This was % positive, CO 45% positive, HER2 3+ disease   Because of the invasive carcinoma was found, she underwent sentinel lymph node biopsy as well as reexcision in August 2, 2018   She had no evidence of malignancy in the reexcised specimen   Seven lymph nodes were all negative for metastatic disease  Betsy De La Cruz presents today to discuss adjuvant treatment options with her daughter  Betsy De La Cruz feels well  She has no complaint of pain  Her weight is stable   She has no respiratory symptoms   She has many comorbidities, including diabetes, hypertension, asthma as well as hypothyroidism   She has no family history of breast or ovarian cancer  Betsy De La Cruz is a lifetime never smoker            Interval History:  A 67-year-old postmenopausal woman with stage IA right breast cancer, grade 2, % positive, ID 45% positive, HER2 3+ disease   She underwent lumpectomy with sentinel lymph node biopsy, resulting in LILIANA  She was treated with adjuvant chemotherapy with weekly paclitaxel and trastuzumab which was completed in December 2018  During the adjuvant chemotherapy, she developed pulmonary embolism  Therefore, she is currently on rivaroxaban  Her respiratory symptom has resolved  She is currently on trastuzumab monotherapy as adjuvant treatment  She recently finished adjuvant radiation therapy with moderate skin toxicity  She presents today for follow-up  She has no cardiac toxicity with recent ejection fraction of 60%  She denied any pain  She is eating well  She is maintaining her weight  Her performance status is normal                                                                        Objective:      Primary Diagnosis:     1  Right breast cancer, stage IA (pT1c, pN0, M0) grade 2, % positive, ID 45% positive, HER2 3+ disease   Diagnosed in June 2018    2    Pulmonary embolism and bilateral distal lower extremity DVT, diagnosed in November 2018      Cancer Staging:  Cancer Staging  Malignant neoplasm of upper-outer quadrant of right breast in female, estrogen receptor positive ([facility] [address] )  Staging form: Breast, AJCC 8th Edition  - Clinical: No stage assigned - Unsigned  - Pathologic: Stage IA (pT1c, pN0, cM0, G2, ER: Positive, WA: Positive, HER2: Positive) - Signed by Emile Husain MD on 8/21/2018           Previous Hematologic/ Oncologic Treatment:       Adjuvant chemotherapy with weekly paclitaxel and trastuzumab times 12  Completed in December 2018      Current Hematologic/ Oncologic Treatment:       1  Rivaroxaban since November 2018  2    Adjuvant trastuzumab monotherapy since December 2018  3    Adjuvant hormonal therapy with anastrozole 1 mg once a day starting on March 2019         Disease Status:      LILIANA status post lumpectomy with sentinel lymph node biopsy      Test Results:     Pathology:     1 4 cm of invasive ductal carcinoma, grade 2   No evidence of lymphovascular invasion   Seven sentinel lymph nodes were all negative for metastatic disease   % positive, WA 45% positive, HER2 3+ disease   Stage IA (pT1c, pN0, M0)     Radiology:     CT scan of chest with PE protocol in early November 2018 showed bilateral scattered pulmonary embolism  Doppler ultrasound showed bilateral distal lower extremity DVT  Echocardiogram in December 2018 showed ejection fraction 60%     Laboratory:     See below for CBC and CMP      Physical Exam:        General Appearance:    Alert, oriented          Eyes:    PERRL   Ears:    Normal external ear canals, both ears   Nose:   Nares normal, septum midline   Throat:   Mucosa moist  Pharynx without injection  Neck:   Supple         Lungs:     Clear to auscultation bilaterally   Chest Wall:    No tenderness or deformity    Heart:    Regular rate and rhythm         Abdomen:     Soft, non-tender, bowel sounds +, no organomegaly               Extremities:   Extremities no cyanosis or edema         Skin:   no rash or icterus      Lymph nodes:   Cervical, supraclavicular, and axillary nodes normal   Neurologic:   CNII-XII intact, normal strength, sensation and reflexes     Throughout             Breast exam:   Lumpectomy scar at outer upper quadrant of the right breast with no palpable abnormality   Left breast exam is negative                 ROS: Review of Systems   Neurological:        Neuropathy   All other systems reviewed and are negative  Imaging: No results found  Labs:   Lab Results   Component Value Date    WBC 5 92 01/21/2019    HGB 11 6 01/21/2019    HCT 35 5 01/21/2019    MCV 89 01/21/2019     01/21/2019     Lab Results   Component Value Date    K 4 0 01/21/2019     01/21/2019    CO2 30 01/21/2019    BUN 15 01/21/2019    CREATININE 0 60 01/21/2019    GLUF 274 (H) 09/19/2018    CALCIUM 9 8 01/21/2019    AST 22 01/21/2019    ALT 25 01/21/2019    ALKPHOS 68 01/21/2019    EGFR 98 01/21/2019           Current Medications: Reviewed  Allergies: Reviewed  PMH/FH/SH:  Reviewed      Vital Sign:    Body surface area is 1 78 meters squared      Wt Readings from Last 3 Encounters:   02/28/19 72 6 kg (160 lb)   02/27/19 72 6 kg (160 lb)   02/19/19 73 8 kg (162 lb 11 2 oz)        Temp Readings from Last 3 Encounters:   02/28/19 (!) 96 7 °F (35 9 °C) (Tympanic)   02/19/19 98 1 °F (36 7 °C) (Tympanic)   02/15/19 (!) 97 2 °F (36 2 °C)        BP Readings from Last 3 Encounters:   02/28/19 120/70   02/27/19 122/70   02/19/19 134/79         Pulse Readings from Last 3 Encounters:   02/28/19 100   02/27/19 94   02/19/19 87     @LASTSAO2(3)@

## 2019-03-05 ENCOUNTER — OFFICE VISIT (OUTPATIENT)
Dept: PULMONOLOGY | Facility: CLINIC | Age: 64
End: 2019-03-05
Payer: MEDICARE

## 2019-03-05 VITALS
SYSTOLIC BLOOD PRESSURE: 118 MMHG | BODY MASS INDEX: 26.66 KG/M2 | OXYGEN SATURATION: 97 % | TEMPERATURE: 97.8 F | HEIGHT: 65 IN | DIASTOLIC BLOOD PRESSURE: 64 MMHG | WEIGHT: 160 LBS | HEART RATE: 89 BPM | RESPIRATION RATE: 16 BRPM

## 2019-03-05 DIAGNOSIS — J45.40 MODERATE PERSISTENT ASTHMA WITHOUT COMPLICATION: Primary | ICD-10-CM

## 2019-03-05 PROCEDURE — 99215 OFFICE O/P EST HI 40 MIN: CPT | Performed by: INTERNAL MEDICINE

## 2019-03-05 RX ORDER — LORATADINE 10 MG/1
10 TABLET ORAL DAILY
Qty: 30 TABLET | Refills: 3 | Status: SHIPPED | OUTPATIENT
Start: 2019-03-05 | End: 2019-05-14 | Stop reason: ALTCHOICE

## 2019-03-05 RX ORDER — BUDESONIDE AND FORMOTEROL FUMARATE DIHYDRATE 160; 4.5 UG/1; UG/1
2 AEROSOL RESPIRATORY (INHALATION) 2 TIMES DAILY
Qty: 2 INHALER | Refills: 6 | Status: SHIPPED | OUTPATIENT
Start: 2019-03-05 | End: 2019-05-20 | Stop reason: HOSPADM

## 2019-03-05 NOTE — PROGRESS NOTES
Pulmonary outpatient note   Myles Kim 59 y o  female MRN: [id number]  3/5/2019      Assessment and plan:  Myles Kim has the following medical problems:  1  Asthma  Moderate persistent partially controlled  Patient was prescribed with trilogy but took it only every other day  She is highly allergic to dust and dogs  I switched trilogy to Symbicort, gave her samples, showed and reviewed her technique which is not very good  Also prescribed Claritin  I told her that if she is not controlled on the Symbicort we can reconsider anti IgE treatment since her IgE level is 800 and she is allergic to a lot of agents  I told her to call me if she is not doing better with the Symbicort  I also added Claritin and told her to take it once daily  2   Lung nodules  Seen on chest CT from November 2018  With the patient history of breast cancer she is scheduled for a chest CT on March 12, 2019     3   Chest pain  The patient was seen by Cardiology who was thought that her symptoms are not secondary to cardiac disease and referred the patient to adenosine and nuclear stress tests  4   Pulmonary embolism  On Xarelto, continue per Hematology Oncology until at least the rose catheter is out  Follow-up in 3 months with new chest CT, clinical response to Symbicort, Cardiology results  Kiel Alva MD/PhD,  [facility]      No follow-ups on file  History of Present Illness  This is 59y o  year old female with previous medical history of moderate persistent asthma, stage IA right breast cancer status post lumpectomy, sentinel lymph node biopsy, chemotherapy and radiation  I saw her on January 2019 and switch her from [place] to [facility]  Added Claritin  Referred for chest CT for lung nodules that were seen on November 2018 that was not done yet  And referred to cardiologist that saw her and recommended stress test for chest pain    Apparently she is taking the trilogy only 3 times a week since she does not like the powder  She is also using the albuterol 3 times a day  Social history:   Never smoked  Does not drink alcohol  Occupational history:   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[facility]   No occupational risk factors  Review of Systems   Constitutional: Negative  HENT: Negative  Eyes: Negative  Respiratory: Positive for cough and shortness of breath  Cardiovascular: Positive for chest pain  Gastrointestinal: Negative  Endocrine: Negative  Genitourinary: Negative  Musculoskeletal: Negative  Skin: Negative  Allergic/Immunologic: Negative  Neurological: Negative  Hematological: Negative  Psychiatric/Behavioral: Negative  Historical Information   Past Medical History:   Diagnosis Date    [facility]     h-pylori    Asthma     [facility]     Cancer [facility] )     BREAST    Diabetes mellitus ([facility] )     blood sugar 199 @ 735    Hiatal hernia     Hypercholesterolemia     Hypertension     Hypothyroid     Renal disorder     Rheumatoid arthritis ([facility] )      Past Surgical History:   Procedure Laterality Date    BREAST BIOPSY      BREAST LUMPECTOMY Right 6/26/2018    Procedure: RIGHT BREAST NEEDLE LOCALIZATION X2 WITH RIGHT BREAST LUMPECTOMY ( NEEDLE LOC @ 1000); Surgeon: Tony Luna MD;  [facility];  Service: Surgical Oncology    BREAST LUMPECTOMY Right 8/2/2018    Procedure: LYMPHOSCINITGRAPHY INTRAOPERATIVE LYMPHATIC MAPPING , RIGHT SENTINEL NODE BIOPSY, REEXCISION  RIGHT BREAST LUMPECTOMY CAVITY (SUPERIOR MARGIN); Surgeon: Tony Luna MD;  [facility];  Service: Surgical Oncology    BREAST SURGERY Left     CATARACT EXTRACTION, BILATERAL Bilateral     EGD AND COLONOSCOPY N/A 9/5/2018    Procedure: EGD AND COLONOSCOPY;  Surgeon: Anselmo Jeffries MD;  Location: [facility] LAB;   Service: Gastroenterology    [facility] GUIDED CENTRAL VENOUS ACCESS REPLACEMENT  9/13/2018    KNEE SURGERY Right     MAMMO NEEDLE LOCALIZATION RIGHT (ALL INC) Right 6/26/2018    MAMMO STEREOTACTIC BREAST BIOPSY RIGHT (ALL INC) Right 5/23/2018    RETINAL DETACHMENT SURGERY Right     TUBAL LIGATION      TUNNELED VENOUS PORT PLACEMENT Left 9/13/2018    Procedure: INSERTION VENOUS PORT (PORT-A-CATH);   Surgeon: Milton Julian MD;  [facility];  Service: Surgical Oncology     Family History   Problem Relation Age of Onset    Diabetes Mother     Hypertension Mother     Diabetes Father     Hypertension Father     Diabetes Brother     Hypertension Brother     Prostate cancer Brother     Cancer Maternal Grandfather        Meds/Allergies     Current Outpatient Medications:     albuterol (2 5 mg/3 mL) 0 083 % nebulizer solution, Take 1 vial (2 5 mg total) by nebulization every 4 (four) hours as needed for wheezing or shortness of breath, Disp: 75 mL, Rfl: 0    albuterol (PROVENTIL HFA,VENTOLIN HFA) 90 mcg/act inhaler, Inhale 1 puff every 4 (four) hours as needed  , Disp: , Rfl:     amLODIPine-atorvastatin (CADUET) 10-10 MG per tablet, take 1 tablet by mouth once daily, Disp: 90 tablet, Rfl: 1    anastrozole (ARIMIDEX) 1 mg tablet, Take 1 tablet (1 mg total) by mouth daily, Disp: 90 tablet, Rfl: 1    SANG MICROLET LANCETS lancets, Testing three times a day, Disp: 100 each, Rfl: 3    Blood Glucose Monitoring Suppl ([employer] BLOOD GLUCOSE METER) w/Device KIT, Testing blood sugars three times a day, Disp: , Rfl:     Blood Glucose Monitoring Suppl (SANG CONTOUR MONITOR) w/Device KIT, Testing blood sugars three times a day, Disp: 1 kit, Rfl: 0    DULoxetine (CYMBALTA) 20 mg capsule, take 1 capsule by mouth twice a day, Disp: 60 capsule, Rfl: 1    enalapril (VASOTEC) 20 mg tablet, take 1 tablet by mouth once daily, Disp: 90 tablet, Rfl: 1    fluticasone-umeclidinium-vilanterol (TRELEGY ELLIPTA) 100-62 5-25 MCG/INH inhaler, Inhale 1 puff daily Rinse mouth after use , Disp: 2 Inhaler, Rfl: 4   gemfibrozil (LOPID) 600 mg tablet, take 1 tablet by mouth twice a day, Disp: 60 tablet, Rfl: 3    glipiZIDE (GLUCOTROL XL) 10 mg 24 hr tablet, take 1 tablet by mouth once daily, Disp: 90 tablet, Rfl: 1    glucose blood (SANG CONTOUR TEST) test strip, Testing three times a day, Disp: 100 each, Rfl: 3    HUMULIN N 100 UNIT/ML subcutaneous injection, 70 units in the am 30 units in the pm, Disp: 30 mL, Rfl: 5    Insulin Glargine (TOUJEO) 300 units/mL CONCETRATED U-300 injection pen, Inject 35 Units under the skin daily at bedtime, Disp: 5 pen, Rfl: 0    Insulin Syringe-Needle U-100 (B-D INS SYRINGE 0 5CC/30GX1/2") 30G X 1/2" 0 5 ML MISC, , Disp: , Rfl:     Insulin Syringe-Needle U-100 30G 0 3 ML MISC, Using twice a day, Disp: , Rfl:     Insulin Syringe-Needle U-100 30G X 1/2" 1 ML MISC, Using twice a day, Disp: 100 each, Rfl: 3    lactulose 20 g/30 mL, Take 15 mL (10 g total) by mouth 2 (two) times a day, Disp: 1 Bottle, Rfl: 0    Lancets MISC, Testing three times a day, Disp: , Rfl:     levothyroxine 50 mcg tablet, take 1 tablet by mouth once daily, Disp: 90 tablet, Rfl: 1    lidocaine 4 % topical gel, Apply topically 4 (four) times a day as needed (to affected area for pain), Disp: 10 g, Rfl: 0    loratadine (CLARITIN) 10 mg tablet, Take 1 tablet (10 mg total) by mouth daily, Disp: 30 tablet, Rfl: 3    metFORMIN (GLUCOPHAGE-XR) 500 mg 24 hr tablet, take 2 tablets by mouth once daily WITH BREAKFAST, Disp: 180 tablet, Rfl: 1    omeprazole (PriLOSEC) 40 MG capsule, Take 1 capsule (40 mg total) by mouth 2 (two) times a day for 14 days, Disp: 28 capsule, Rfl: 0    polyethylene glycol (MIRALAX) 17 g packet, Take 17 g by mouth daily, Disp: 14 each, Rfl: 0    rivaroxaban (XARELTO) 15 mg tablet, Take 1 tablet (15 mg total) by mouth 2 (two) times a day with meals for 21 days (Patient not taking: Reported on 2/27/2019), Disp: 42 tablet, Rfl: 0    rivaroxaban (XARELTO) 20 mg tablet, Take 20 mg by mouth daily, Disp: , Rfl:   Allergies   Allergen Reactions    Aspirin Hives     Dr  in [facility] [place] told patient not to take aspirin r/t kidneys     Tylenol With Codeine #3 [Acetaminophen-Codeine] Rash       Vitals: not currently breastfeeding  There is no height or weight on file to calculate BMI  Physical Exam  Physical Exam   Constitutional: She is oriented to person, place, and time  She appears well-developed and well-nourished  No distress  HENT:   Head: Normocephalic and atraumatic  Eyes: Pupils are equal, round, and reactive to light  EOM are normal    Neck: Normal range of motion  Neck supple  No JVD present  Cardiovascular: Normal rate, regular rhythm and normal heart sounds  Exam reveals no friction rub  No murmur heard  Pulmonary/Chest: Effort normal and breath sounds normal  No stridor  No respiratory distress  She has no wheezes  She has no rales  Abdominal: Soft  She exhibits no distension  Musculoskeletal: Normal range of motion  She exhibits no edema or deformity  Neurological: She is alert and oriented to person, place, and time  Skin: Skin is warm  She is not diaphoretic  Psychiatric: She has a normal mood and affect  Labs: I have personally reviewed pertinent lab results    Lab Results   Component Value Date    WBC 5 92 01/21/2019    HGB 11 6 01/21/2019    HCT 35 5 01/21/2019    MCV 89 01/21/2019     01/21/2019     Lab Results   Component Value Date    CALCIUM 9 8 01/21/2019    K 4 0 01/21/2019    CO2 30 01/21/2019     01/21/2019    BUN 15 01/21/2019    CREATININE 0 60 01/21/2019     Lab Results   Component Value Date     (H) 11/26/2018     Lab Results   Component Value Date    ALT 25 01/21/2019    AST 22 01/21/2019    ALKPHOS 68 01/21/2019       Imaging and other studies:   I have personally reviewed pertinent imaging studies in PACS:  Chest CT November 2018:  Scattered bilateral segmental and subsegmental pulmonary emboli without significant interval change  No findings for new pulmonary embolus  Also seen scattered 3 mm pulmonary nodules  Pulmonary function testing:   PFTs December 2018:  Normal spirometry, increased TLC and RV and normal diffusion capacity      Sheron Wu MD/PhD,  [facility]

## 2019-03-05 NOTE — LETTER
March 5, 2019     Shun Pandya MD  [facility]  [address]    Patient: Evangelista Hugo   YOB: 1955   Date of Visit: 3/5/2019       Dear Dr Nanci Guan:    Thank you for referring Evangelista Hugo to me for evaluation  Below are my notes for this consultation  If you have questions, please do not hesitate to call me  I look forward to following your patient along with you  Sincerely,        Bg Gale MD        CC: No Recipients  Bg Gale MD  3/5/2019 11:46 AM  Sign at close encounter  Pulmonary outpatient note   Evangelista Hugo 59 y o  female MRN: [id number]  3/5/2019      Assessment and plan:  Evangelista Hugo has the following medical problems:  1  Asthma  Moderate persistent partially controlled  Patient was prescribed with trilogy but took it only every other day  She is highly allergic to dust and dogs  I switched trilogy to Symbicort, gave her samples, showed and reviewed her technique which is not very good  Also prescribed Claritin  I told her that if she is not controlled on the Symbicort we can reconsider anti IgE treatment since her IgE level is 800 and she is allergic to a lot of agents  I told her to call me if she is not doing better with the Symbicort  2   Lung nodules  Seen on chest CT from November 2018  With the patient history of breast cancer she is scheduled for a chest CT on March 12, 2019     3   Chest pain  The patient was seen by Cardiology who was thought that her symptoms are not secondary to cardiac disease and referred the patient to adenosine and nuclear stress tests  4   Pulmonary embolism  On Xarelto, continue per Hematology Oncology until at least the rose catheter is out  Follow-up in 3 months with new chest CT, clinical response to Symbicort, Cardiology results  Bg Gale MD/PhD,  [facility]      No follow-ups on file      History of Present Illness  This is 59y o  year old female with previous medical history of moderate persistent asthma, stage IA right breast cancer status post lumpectomy, sentinel lymph node biopsy, chemotherapy and radiation  I saw her on January 2019 and switch her from [place] to [facility]  Added Claritin  Referred for chest CT for lung nodules that were seen on November 2018 that was not done yet  And referred to cardiologist that saw her and recommended stress test for chest pain  Apparently she is taking the trilogy only 3 times a week since she does not like the powder  She is also using the albuterol 3 times a day  Social history:   Never smoked  Does not drink alcohol  Occupational history:   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[employer]   No occupational risk factors  Review of Systems   Constitutional: Negative  HENT: Negative  Eyes: Negative  Respiratory: Positive for cough and shortness of breath  Cardiovascular: Positive for chest pain  Gastrointestinal: Negative  Endocrine: Negative  Genitourinary: Negative  Musculoskeletal: Negative  Skin: Negative  Allergic/Immunologic: Negative  Neurological: Negative  Hematological: Negative  Psychiatric/Behavioral: Negative  Historical Information   Past Medical History:   Diagnosis Date    [facility] )     h-pylori    Asthma     Breast cancer [facility]     Cancer ([facility] )     BREAST    Diabetes mellitus ([facility] )     blood sugar 199 @ 735    Hiatal hernia     Hypercholesterolemia     Hypertension     Hypothyroid     Renal disorder     Rheumatoid arthritis ([facility] )      Past Surgical History:   Procedure Laterality Date    BREAST BIOPSY      BREAST LUMPECTOMY Right 6/26/2018    Procedure: RIGHT BREAST NEEDLE LOCALIZATION X2 WITH RIGHT BREAST LUMPECTOMY ( NEEDLE LOC @ 1000);   Surgeon: Kaye Jacobo MD;  [facility];  Service: Surgical Oncology    BREAST LUMPECTOMY Right 8/2/2018    Procedure: Billie Santos INTRAOPERATIVE LYMPHATIC MAPPING , RIGHT SENTINEL NODE BIOPSY, REEXCISION  RIGHT BREAST LUMPECTOMY CAVITY (SUPERIOR MARGIN); Surgeon: Miriam Atkinson MD;  [facility];  Service: Surgical Oncology    BREAST SURGERY Left     CATARACT EXTRACTION, BILATERAL Bilateral     EGD AND COLONOSCOPY N/A 9/5/2018    Procedure: EGD AND COLONOSCOPY;  Surgeon: Danica Elizondo MD;  Location: [facility] LAB; Service: Gastroenterology    [facility] GUIDED CENTRAL VENOUS ACCESS REPLACEMENT  9/13/2018    KNEE SURGERY Right     MAMMO NEEDLE LOCALIZATION RIGHT (ALL INC) Right 6/26/2018    MAMMO STEREOTACTIC BREAST BIOPSY RIGHT (ALL INC) Right 5/23/2018    RETINAL DETACHMENT SURGERY Right     TUBAL LIGATION      TUNNELED VENOUS PORT PLACEMENT Left 9/13/2018    Procedure: INSERTION VENOUS PORT (PORT-A-CATH);   Surgeon: Miriam Atkinson MD;  [facility];  Service: Surgical Oncology     Family History   Problem Relation Age of Onset    Diabetes Mother     Hypertension Mother     Diabetes Father     Hypertension Father     Diabetes Brother     Hypertension Brother     Prostate cancer Brother     Cancer Maternal Grandfather        Meds/Allergies     Current Outpatient Medications:     albuterol (2 5 mg/3 mL) 0 083 % nebulizer solution, Take 1 vial (2 5 mg total) by nebulization every 4 (four) hours as needed for wheezing or shortness of breath, Disp: 75 mL, Rfl: 0    albuterol (PROVENTIL HFA,VENTOLIN HFA) 90 mcg/act inhaler, Inhale 1 puff every 4 (four) hours as needed  , Disp: , Rfl:     amLODIPine-atorvastatin (CADUET) 10-10 MG per tablet, take 1 tablet by mouth once daily, Disp: 90 tablet, Rfl: 1    anastrozole (ARIMIDEX) 1 mg tablet, Take 1 tablet (1 mg total) by mouth daily, Disp: 90 tablet, Rfl: 1    SANG MICROLET LANCETS lancets, Testing three times a day, Disp: 100 each, Rfl: 3    Blood Glucose Monitoring Suppl ([employer] BLOOD GLUCOSE METER) w/Device KIT, Testing blood sugars three times a day, Disp: , Rfl:     Blood Glucose Monitoring Suppl ([employer] CONTOUR MONITOR) w/Device KIT, Testing blood sugars three times a day, Disp: 1 kit, Rfl: 0    DULoxetine (CYMBALTA) 20 mg capsule, take 1 capsule by mouth twice a day, Disp: 60 capsule, Rfl: 1    enalapril (VASOTEC) 20 mg tablet, take 1 tablet by mouth once daily, Disp: 90 tablet, Rfl: 1    fluticasone-umeclidinium-vilanterol (TRELEGY ELLIPTA) 100-62 5-25 MCG/INH inhaler, Inhale 1 puff daily Rinse mouth after use , Disp: 2 Inhaler, Rfl: 4    gemfibrozil (LOPID) 600 mg tablet, take 1 tablet by mouth twice a day, Disp: 60 tablet, Rfl: 3    glipiZIDE (GLUCOTROL XL) 10 mg 24 hr tablet, take 1 tablet by mouth once daily, Disp: 90 tablet, Rfl: 1    glucose blood (SANG CONTOUR TEST) test strip, Testing three times a day, Disp: 100 each, Rfl: 3    HUMULIN N 100 UNIT/ML subcutaneous injection, 70 units in the am 30 units in the pm, Disp: 30 mL, Rfl: 5    Insulin Glargine (TOUJEO) 300 units/mL CONCETRATED U-300 injection pen, Inject 35 Units under the skin daily at bedtime, Disp: 5 pen, Rfl: 0    Insulin Syringe-Needle U-100 (B-D INS SYRINGE 0 5CC/30GX1/2") 30G X 1/2" 0 5 ML MISC, , Disp: , Rfl:     Insulin Syringe-Needle U-100 30G 0 3 ML MISC, Using twice a day, Disp: , Rfl:     Insulin Syringe-Needle U-100 30G X 1/2" 1 ML MISC, Using twice a day, Disp: 100 each, Rfl: 3    lactulose 20 g/30 mL, Take 15 mL (10 g total) by mouth 2 (two) times a day, Disp: 1 Bottle, Rfl: 0    Lancets MISC, Testing three times a day, Disp: , Rfl:     levothyroxine 50 mcg tablet, take 1 tablet by mouth once daily, Disp: 90 tablet, Rfl: 1    lidocaine 4 % topical gel, Apply topically 4 (four) times a day as needed (to affected area for pain), Disp: 10 g, Rfl: 0    loratadine (CLARITIN) 10 mg tablet, Take 1 tablet (10 mg total) by mouth daily, Disp: 30 tablet, Rfl: 3    metFORMIN (GLUCOPHAGE-XR) 500 mg 24 hr tablet, take 2 tablets by mouth once daily WITH BREAKFAST, Disp: 180 tablet, Rfl: 1    omeprazole (PriLOSEC) 40 MG capsule, Take 1 capsule (40 mg total) by mouth 2 (two) times a day for 14 days, Disp: 28 capsule, Rfl: 0    polyethylene glycol (MIRALAX) 17 g packet, Take 17 g by mouth daily, Disp: 14 each, Rfl: 0    rivaroxaban (XARELTO) 15 mg tablet, Take 1 tablet (15 mg total) by mouth 2 (two) times a day with meals for 21 days (Patient not taking: Reported on 2/27/2019), Disp: 42 tablet, Rfl: 0    rivaroxaban (XARELTO) 20 mg tablet, Take 20 mg by mouth daily, Disp: , Rfl:   Allergies   Allergen Reactions    Aspirin Hives     Dr  in [address] told patient not to take aspirin r/t kidneys     Tylenol With Codeine #3 [Acetaminophen-Codeine] Rash       Vitals: not currently breastfeeding  There is no height or weight on file to calculate BMI  Physical Exam  Physical Exam   Constitutional: She is oriented to person, place, and time  She appears well-developed and well-nourished  No distress  HENT:   Head: Normocephalic and atraumatic  Eyes: Pupils are equal, round, and reactive to light  EOM are normal    Neck: Normal range of motion  Neck supple  No JVD present  Cardiovascular: Normal rate, regular rhythm and normal heart sounds  Exam reveals no friction rub  No murmur heard  Pulmonary/Chest: Effort normal and breath sounds normal  No stridor  No respiratory distress  She has no wheezes  She has no rales  Abdominal: Soft  She exhibits no distension  Musculoskeletal: Normal range of motion  She exhibits no edema or deformity  Neurological: She is alert and oriented to person, place, and time  Skin: Skin is warm  She is not diaphoretic  Psychiatric: She has a normal mood and affect  Labs: I have personally reviewed pertinent lab results    Lab Results   Component Value Date    WBC 5 92 01/21/2019    HGB 11 6 01/21/2019    HCT 35 5 01/21/2019    MCV 89 01/21/2019     01/21/2019     Lab Results   Component Value Date    CALCIUM 9 8 01/21/2019    K 4 0 01/21/2019    CO2 30 01/21/2019     01/21/2019    BUN 15 01/21/2019    CREATININE 0 60 01/21/2019     Lab Results   Component Value Date     (H) 11/26/2018     Lab Results   Component Value Date    ALT 25 01/21/2019    AST 22 01/21/2019    ALKPHOS 68 01/21/2019       Imaging and other studies:   I have personally reviewed pertinent imaging studies in PACS:  Chest CT November 2018:  Scattered bilateral segmental and subsegmental pulmonary emboli without significant interval change  No findings for new pulmonary embolus  Also seen scattered 3 mm pulmonary nodules  Pulmonary function testing:   PFTs December 2018:  Normal spirometry, increased TLC and RV and normal diffusion capacity      Libra Simmons MD/PhD,  [facility]

## 2019-03-11 RX ORDER — SODIUM CHLORIDE 9 MG/ML
20 INJECTION, SOLUTION INTRAVENOUS CONTINUOUS
Status: DISCONTINUED | OUTPATIENT
Start: 2019-03-12 | End: 2019-03-15 | Stop reason: HOSPADM

## 2019-03-12 ENCOUNTER — HOSPITAL ENCOUNTER (OUTPATIENT)
Dept: INFUSION CENTER | Facility: CLINIC | Age: 64
Discharge: HOME | End: 2019-03-12
Payer: MEDICARE

## 2019-03-12 VITALS
TEMPERATURE: 97.6 F | BODY MASS INDEX: 26.47 KG/M2 | RESPIRATION RATE: 20 BRPM | DIASTOLIC BLOOD PRESSURE: 82 MMHG | WEIGHT: 158.07 LBS | SYSTOLIC BLOOD PRESSURE: 125 MMHG | HEART RATE: 89 BPM

## 2019-03-12 DIAGNOSIS — E78.00 HYPERCHOLESTEROLEMIA: ICD-10-CM

## 2019-03-12 PROCEDURE — 96413 CHEMO IV INFUSION 1 HR: CPT

## 2019-03-12 RX ORDER — ATORVASTATIN CALCIUM 10 MG/1
TABLET, FILM COATED ORAL
Qty: 90 TABLET | Refills: 1 | OUTPATIENT
Start: 2019-03-12

## 2019-03-12 RX ADMIN — TRASTUZUMAB 450 MG: 150 INJECTION, POWDER, LYOPHILIZED, FOR SOLUTION INTRAVENOUS at 08:30

## 2019-03-12 RX ADMIN — SODIUM CHLORIDE 20 ML/HR: 0.9 INJECTION, SOLUTION INTRAVENOUS at 08:26

## 2019-03-12 NOTE — PLAN OF CARE
Problem: Potential for Falls  Goal: Patient will remain free of falls  Description  INTERVENTIONS:  - Assess patient frequently for physical needs  -  Identify cognitive and physical deficits and behaviors that affect risk of falls    -  [place] fall precautions as indicated by assessment   - Educate patient/family on patient safety including physical limitations  - Instruct patient to call for assistance with activity based on assessment  - Modify environment to reduce risk of injury  - Consider OT/PT consult to assist with strengthening/mobility  Outcome: Progressing

## 2019-03-13 ENCOUNTER — OFFICE VISIT (OUTPATIENT)
Dept: OBGYN CLINIC | Facility: MEDICAL CENTER | Age: 64
End: 2019-03-13
Payer: MEDICARE

## 2019-03-13 VITALS
HEIGHT: 65 IN | DIASTOLIC BLOOD PRESSURE: 77 MMHG | BODY MASS INDEX: 26.3 KG/M2 | HEART RATE: 93 BPM | SYSTOLIC BLOOD PRESSURE: 115 MMHG

## 2019-03-13 DIAGNOSIS — E11.8 TYPE 2 DIABETES MELLITUS WITH COMPLICATION, WITH LONG-TERM CURRENT USE OF INSULIN (HCC): ICD-10-CM

## 2019-03-13 DIAGNOSIS — Z79.4 TYPE 2 DIABETES MELLITUS WITH COMPLICATION, WITH LONG-TERM CURRENT USE OF INSULIN (HCC): ICD-10-CM

## 2019-03-13 DIAGNOSIS — S83.281A ACUTE LATERAL MENISCUS TEAR OF RIGHT KNEE, INITIAL ENCOUNTER: Primary | ICD-10-CM

## 2019-03-13 PROCEDURE — 99213 OFFICE O/P EST LOW 20 MIN: CPT | Performed by: FAMILY MEDICINE

## 2019-03-13 NOTE — PATIENT INSTRUCTIONS
Recommended f/u with PCP for better A1C control and also provided referral for Endocrinology  Explained that she does have evidence of a complex lateral meniscal tear which does appear to be the root cause of her pain on her examination today; however, I explained that her severely uncontrolled diabetes with A1c of 12 could result in severe complication including infection of her knee with surgical intervention  I recommended she seek specialty care for better control of her diabetes  I explained that I will speak with while the surgeons about her case and will have her follow-up if surgery is an option

## 2019-03-13 NOTE — PROGRESS NOTES
1  Acute lateral meniscus tear of right knee, initial encounter     2  Type 2 diabetes mellitus with complication, with long-term current use of insulin ([facility] )  Ambulatory referral to Endocrinology     Orders Placed This Encounter   Procedures    Ambulatory referral to Endocrinology        Imaging Studies (I personally reviewed images in PACS and report):    Past diagnostics:  MRI right knee:  IMPRESSION:   Large complex of the lateral meniscus posterior horn and body (series 6 images 13 through 17 and series 4 images 8 through 11 )   Bony structures are normal without evidence of metastases  X-ray right knee 10/03/2018:  Mild to moderate medial compartment osteoarthritis  IMPRESSION:  Right knee complex posterior lateral meniscal tear    PMH significant:  Severely uncontrolled diabetes-hemoglobin A1c 12  current breast cancer on chemotherapy  Recent bilateral lower extremity DVT by ultrasound 11/06/2018    Return if symptoms worsen or fail to improve  Patient Instructions   Recommended f/u with PCP for better A1C control and also provided referral for Endocrinology  Explained that she does have evidence of a complex lateral meniscal tear which does appear to be the root cause of her pain on her examination today; however, I explained that her severely uncontrolled diabetes with A1c of 12 could result in severe complication including infection of her knee with surgical intervention  I recommended she seek specialty care for better control of her diabetes  I explained that I will speak with while the surgeons about her case and will have her follow-up if surgery is an option  CHIEF COMPLAINT:  Right Knee Pain follow-up and follow-up diabetes    HPI:  Tommie Summers is a 59 y o  female  who presents for       Visit  10/3/18  Patient presents with 6 month history of right knee pain located lateral knee associated with swelling but no erythema or warmth     Patient reports knee pain is aggravated when walking up a flight of stairs or getting in and out of a motor vehicle  She also states feeling intermittent catching and locking of the knee most notable after prolonged sitting  Patient reports history of meniscal repair about 30 years ago after an injury sustained while running  Currently patient is applying heat and using gabapentin for pain with minimal relief  10/31/2018: Follow-up evaluation of right knee pain:  Uncontrolled  Patient has persistent pain  Last visit patient was sent to physical therapy but has been noncompliant  Points anterior knee as source of her pain  Patient does have past medical history significant for current breast cancer on chemotherapy  Interpretation today provided by medical assistance Dorrie Phalen    11/21/2018: Follow-up evaluation right knee pain:  Controlled  Patient continues to point to the lateral aspect of her knee as source of her pain  Last visit she had MRI ordered which did confirm a posterior lateral meniscal tear of her knee  She does have past medical history significant for ongoing cancer under current treatment with chemotherapy as well as recent development of acute bilateral DVTs found on base duplex Doppler 11/06/2018 03/13/2019: Follow-up evaluation right knee pain:  Uncontrolled  Points to lateral as well as medial aspect as source of pain  Lateral greater than medial   She has had MRI in the past showing lateral complex tear of her meniscus  She is currently past medical history significant for chemotherapy for current cancer as well as bilateral DVT history and severely uncontrolled diabetes with A1c from December 2018 of 12  She denies any new injury since her last visit  Today she inquires about surgical intervention  translation provided in examination room  Follow-up diabetes:  Severely uncontrolled  Hemoglobin A1c 12  Currently taking insulin twice a day  Seen primary care doctor    States not seeing specialist for diabetes  Review of Systems   Constitutional: Negative for chills and fever  Neurological: Positive for numbness (Bilateral lower extremities)  Negative for weakness  Following history reviewed and update:    Past Medical History:   Diagnosis Date    Abdominal infection ([facility] )     h-pylori    Asthma     Breast cancer ([facility] )     Cancer ([facility] )     BREAST    [facility]     blood sugar 199 @ 735    Hiatal hernia     Hypercholesterolemia     Hypertension     Hypothyroid     Renal disorder     Rheumatoid arthritis ([facility] )      Past Surgical History:   Procedure Laterality Date    BREAST BIOPSY      BREAST LUMPECTOMY Right 6/26/2018    Procedure: RIGHT BREAST NEEDLE LOCALIZATION X2 WITH RIGHT BREAST LUMPECTOMY ( NEEDLE LOC @ 1000); Surgeon: Leida Martínez MD;  Location: [facility];  Service: Surgical Oncology    BREAST LUMPECTOMY Right 8/2/2018    Procedure: LYMPHOSCINITGRAPHY INTRAOPERATIVE LYMPHATIC MAPPING , RIGHT SENTINEL NODE BIOPSY, REEXCISION  RIGHT BREAST LUMPECTOMY CAVITY (SUPERIOR MARGIN); Surgeon: Leida Martínez MD;  Location: [facility];  Service: Surgical Oncology    BREAST SURGERY Left     CATARACT EXTRACTION, BILATERAL Bilateral     EGD AND COLONOSCOPY N/A 9/5/2018    Procedure: EGD AND COLONOSCOPY;  Surgeon: Shilpi Morrissey MD;  Location: [facility] GI LAB; Service: Gastroenterology    [place]in GUIDED CENTRAL VENOUS ACCESS REPLACEMENT  9/13/2018    KNEE SURGERY Right     MAMMO NEEDLE LOCALIZATION RIGHT (ALL INC) Right 6/26/2018    MAMMO STEREOTACTIC BREAST BIOPSY RIGHT (ALL INC) Right 5/23/2018    RETINAL DETACHMENT SURGERY Right     TUBAL LIGATION      TUNNELED VENOUS PORT PLACEMENT Left 9/13/2018    Procedure: INSERTION VENOUS PORT (PORT-A-CATH);   Surgeon: Leida Martínez MD;  [facility];  Service: Surgical Oncology     Social History   Social History     Substance and Sexual Activity   Alcohol Use No     Social History     Substance and Sexual Activity   Drug Use No     Social History     Tobacco Use   Smoking Status Never Smoker   Smokeless Tobacco Never Used     Family History   Problem Relation Age of Onset    Diabetes Mother     Hypertension Mother     Diabetes Father     Hypertension Father     Diabetes Brother     Hypertension Brother     Prostate cancer Brother    Lynnell Apley Dr  in [address] told patient not to take aspirin r/t kidneys     Tylenol With Codeine #3 [Acetaminophen-Codeine] Rash          Physical Exam  Constitutional:  see vital signs  Gen: well-developed, normocephalic/atraumatic, well-groomed  Eyes: No inflammation or discharge of conjunctiva or lids; sclera clear   Pharynx: no inflammation, lesion, or mass of lips  Neck: supple, no masses, non-distended  MSK: no inflammation, lesion, mass, or clubbing of nails and digits except for other than mentioned below  SKIN: no visible rashes or skin lesions  Pulmonary/Chest: Effort normal  No respiratory distress     NEURO: cranial nerves grossly intact  PSYCH:  Alert and oriented to person, place, and time; recent and remote memory intact; mood normal, no depression, anxiety, or agitation, judgment and insight good and intact      Ortho Exam  RIGHT KNEE:  Erythema: no  Swelling: no  Increased Warmth: no  Tenderness: + lateral joint line  Flexion: intact  Extension: intact  Lachman's: negative  Drawer: negative  Varus laxity: negative  Valgus laxity: negative  [facility]: + lateral joint line pain and crepitus    Procedures

## 2019-03-19 DIAGNOSIS — E11.8 TYPE 2 DIABETES MELLITUS WITH COMPLICATION, WITHOUT LONG-TERM CURRENT USE OF INSULIN (HCC): ICD-10-CM

## 2019-03-19 DIAGNOSIS — E03.8 OTHER SPECIFIED HYPOTHYROIDISM: ICD-10-CM

## 2019-03-19 DIAGNOSIS — I10 ESSENTIAL HYPERTENSION: ICD-10-CM

## 2019-03-20 ENCOUNTER — TELEPHONE (OUTPATIENT)
Dept: HEMATOLOGY ONCOLOGY | Facility: CLINIC | Age: 64
End: 2019-03-20

## 2019-03-20 NOTE — TELEPHONE ENCOUNTER
Patient is complaining of high blood pressure and headaches and think it is from the oral chemo pill she has been on for a month

## 2019-03-20 NOTE — TELEPHONE ENCOUNTER
Spoke with patient daughter Thong Mandel and inquired more into patient's symptoms  Asked patient to review with her mother what her blood pressures were running, what medication for blood pressure the PCP put her on, are blood pressures controlled now/what are the numbers, where is the pain in her head from the headaches, how frequent are the headaches, what is the intensity/duration, how frequently do they occur, does over the counter medication such as tylenol or ibuprofen resolve the headaches? Thong Mandel did not have answers to these questions so I encouraged her to call the patient and review this information  She will then call back

## 2019-03-20 NOTE — TELEPHONE ENCOUNTER
Spoke to Dr Susanna Aguirre and per his instruction is is fine for the patient to take Tylenol with her medication  The patients daughter stated that her mother BP was 145/89 and she is getting headaches when she takes the medication that Dr Susanna Aguirre is given her mother for her treatment  She was instructed to call us of the headaches get worse and Tylenol no longer works

## 2019-03-22 ENCOUNTER — TELEPHONE (OUTPATIENT)
Dept: OTHER | Facility: OTHER | Age: 64
End: 2019-03-22

## 2019-03-22 DIAGNOSIS — J45.21 MILD INTERMITTENT ASTHMA WITH ACUTE EXACERBATION: ICD-10-CM

## 2019-03-22 RX ORDER — ALBUTEROL SULFATE 2.5 MG/3ML
2.5 SOLUTION RESPIRATORY (INHALATION) EVERY 4 HOURS PRN
Qty: 180 VIAL | Refills: 5 | Status: SHIPPED | OUTPATIENT
Start: 2019-03-22 | End: 2019-05-06 | Stop reason: SDUPTHER

## 2019-03-22 NOTE — TELEPHONE ENCOUNTER
Hudson Espinosa 1955  CONFIDENTIALTY NOTICE: This fax transmission is intended only for the addressee  It contains information that is legally privileged,  confidential or otherwise protected from use or disclosure  If you are not the intended recipient, you are strictly prohibited from reviewing,  disclosing, copying using or disseminating any of this information or taking any action in reliance on or regarding this information  If you have  received this fax in error, please notify us immediately by telephone so that we can arrange for its return to us  Page: 1 of 2  Call Id: [id number]  Health Call  Standard Call Report  Health Call  Patient Name: Hudson Espinosa  Gender: Female  : 1955  Age: 59 Y 3 M 25 D  Return Phone  Number: [phone] (Home)  Address: [address] [place] [address], [place] [address]  City/State/Zip: [address] [place] [address]  Practice Name: [address] [place] [address]  Practice Charged:  Physician:  Rahul Salazar Name: Aura Tangela  Relationship To  Patient: Daughter  Return Phone Number: [phone] (Home)  Presenting Problem: " My mom need a refill of her  Albuterol "  Service Type: Triage  Charged Service 1: Agnes Avalos U  38  Name and  Number:  Nurse Assessment  Nurse: Hussain Hartmann Date/Time: 3/22/2019 3:58:27 PM  Type of assessment required:  ---General (Adult or Child)  Duration of Current S/S  ---5 days  Location/Radiation  ---Respiratory  Temperature (F) and route:  ---Denies  Symptom Specific Meds (Dose/Time):  ---None  Other S/S  ---Coughing up green mucous  Having an asthma attack every six hours  Pain Scale on scale of 1-10, 10 being the worst:  ---Patient reports chest pain even without coughing  Symptom progression:  ---worse  Intake and Output  ---WNL  Last Exam/Treatment:  Jolie Gaffney 1955  CONFIDENTIALTY NOTICE: This fax transmission is intended only for the addressee   It contains information that is legally privileged,  confidential or otherwise protected from use or disclosure  If you are not the intended recipient, you are strictly prohibited from reviewing,  disclosing, copying using or disseminating any of this information or taking any action in reliance on or regarding this information  If you have  received this fax in error, please notify us immediately by telephone so that we can arrange for its return to us  Page: 2 of 2  Call Id: [id number]  Nurse Assessment  ---Feb - Well Visit  Protocols  Protocol Title Nurse Date/Time  Cough - Acute Productive Sandhya Marino 3/22/2019 4:01:35 PM  Question Caller Affirmed  Disp  Time Disposition Final User  3/22/2019 2:43:50 PM Send to [address] [place] [address]  3/22/2019 4:02:50 PM See Physician within [address] [facility]  3/22/2019 4:04:22 PM RN Triaged [facility] Advice Given Per Protocol  SEE PHYSICIAN WITHIN 24 HOURS: * IF OFFICE WILL BE CLOSED AND NO PCP TRIAGE: You need to be seen within the  next 24 hours  An urgent care center is often a good source of care if your doctor's office is closed  COUGH MEDICINES: * OTC  COUGH SYRUPS: The most common cough suppressant in OTC cough medications is dextromethorphan  Often the letters 'DM' appear  in the name  * OTC COUGH DROPS: Cough drops can help a lot, especially for mild coughs  They reduce coughing by soothing your  irritated throat and removing that tickle sensation in the back of the throat  Cough drops also have the advantage of portability - you  can carry them with you  * HOME REMEDY - HARD CANDY: Hard candy works just as well as medicine-flavored OTC cough  drops  People who have diabetes should use sugar-free candy  * HOME REMEDY - HONEY: This old home remedy has been shown  to help decrease coughing at night  The adult dosage is 2 teaspoons (10 ml) at bedtime  Honey should not be given to infants under  one year of age  HUMIDIFIER: If the air is dry, use a humidifier in the bedroom   (Reason: dry air makes coughs worse) PREVENT  DEHYDRATION: * Drink adequate liquids  * This will help soothe an irritated or dry throat and loosen up the phlegm  AVOID  TOBACCO SMOKE: Smoking or being exposed to smoke makes coughs much worse  CALL BACK IF: * You become worse  CARE  ADVICE given per Cough - Acute Productive (Adult) guideline    Caller Understands: Yes  Caller Disagree/Comply: Disagree  PreDisposition: Unsure

## 2019-03-22 NOTE — TELEPHONE ENCOUNTER
Patient just wanted Albuterol Nebulizer vials called in  I stated that it is very important for her to be seen first in an ED if she is having chest pain and such frequent attacks  She was unsure if she was going to go

## 2019-03-25 RX ORDER — GLIPIZIDE 10 MG/1
TABLET, FILM COATED, EXTENDED RELEASE ORAL
Qty: 90 TABLET | Refills: 1 | Status: SHIPPED | OUTPATIENT
Start: 2019-03-25 | End: 2019-05-29

## 2019-03-25 RX ORDER — ALBUTEROL SULFATE 2.5 MG/3ML
SOLUTION RESPIRATORY (INHALATION)
Qty: 75 ML | Refills: 0 | Status: SHIPPED | OUTPATIENT
Start: 2019-03-25 | End: 2019-04-07 | Stop reason: SDUPTHER

## 2019-03-25 RX ORDER — AMLODIPINE BESYLATE 10 MG/1
TABLET ORAL
Qty: 90 TABLET | Refills: 1 | Status: SHIPPED | OUTPATIENT
Start: 2019-03-25 | End: 2019-05-20 | Stop reason: HOSPADM

## 2019-03-25 RX ORDER — ENALAPRIL MALEATE 20 MG/1
TABLET ORAL
Qty: 90 TABLET | Refills: 1 | Status: SHIPPED | OUTPATIENT
Start: 2019-03-25 | End: 2019-09-26 | Stop reason: SDUPTHER

## 2019-03-25 RX ORDER — LEVOTHYROXINE SODIUM 0.05 MG/1
TABLET ORAL
Qty: 90 TABLET | Refills: 1 | Status: SHIPPED | OUTPATIENT
Start: 2019-03-25 | End: 2019-09-26 | Stop reason: SDUPTHER

## 2019-03-25 RX ORDER — METFORMIN HYDROCHLORIDE 500 MG/1
TABLET, EXTENDED RELEASE ORAL
Qty: 180 TABLET | Refills: 1 | Status: SHIPPED | OUTPATIENT
Start: 2019-03-25 | End: 2019-05-29

## 2019-03-29 ENCOUNTER — TELEPHONE (OUTPATIENT)
Dept: OBGYN CLINIC | Facility: HOSPITAL | Age: 64
End: 2019-03-29

## 2019-03-30 DIAGNOSIS — I10 ESSENTIAL HYPERTENSION: ICD-10-CM

## 2019-03-30 DIAGNOSIS — E11.9 TYPE 2 DIABETES MELLITUS WITHOUT COMPLICATION, WITHOUT LONG-TERM CURRENT USE OF INSULIN (HCC): ICD-10-CM

## 2019-04-01 RX ORDER — OMEPRAZOLE 20 MG/1
CAPSULE, DELAYED RELEASE ORAL
Qty: 90 CAPSULE | Refills: 2 | Status: SHIPPED | OUTPATIENT
Start: 2019-04-01 | End: 2019-10-22

## 2019-04-01 RX ORDER — SODIUM CHLORIDE 9 MG/ML
20 INJECTION, SOLUTION INTRAVENOUS CONTINUOUS
Status: DISCONTINUED | OUTPATIENT
Start: 2019-04-02 | End: 2019-04-05 | Stop reason: HOSPADM

## 2019-04-01 RX ORDER — DULOXETIN HYDROCHLORIDE 20 MG/1
CAPSULE, DELAYED RELEASE ORAL
Qty: 60 CAPSULE | Refills: 1 | Status: SHIPPED | OUTPATIENT
Start: 2019-04-01 | End: 2019-04-04

## 2019-04-02 ENCOUNTER — HOSPITAL ENCOUNTER (OUTPATIENT)
Dept: INFUSION CENTER | Facility: CLINIC | Age: 64
Discharge: HOME | End: 2019-04-02
Payer: MEDICARE

## 2019-04-02 ENCOUNTER — HOSPITAL ENCOUNTER (EMERGENCY)
Facility: HOSPITAL | Age: 64
Discharge: HOME | End: 2019-04-02
Attending: EMERGENCY MEDICINE | Admitting: EMERGENCY MEDICINE
Payer: MEDICARE

## 2019-04-02 ENCOUNTER — APPOINTMENT (EMERGENCY)
Dept: RADIOLOGY | Facility: HOSPITAL | Age: 64
End: 2019-04-02
Payer: MEDICARE

## 2019-04-02 VITALS
DIASTOLIC BLOOD PRESSURE: 68 MMHG | HEART RATE: 88 BPM | BODY MASS INDEX: 26.41 KG/M2 | WEIGHT: 158.73 LBS | SYSTOLIC BLOOD PRESSURE: 134 MMHG | TEMPERATURE: 97.1 F | RESPIRATION RATE: 18 BRPM

## 2019-04-02 VITALS
WEIGHT: 162.48 LBS | DIASTOLIC BLOOD PRESSURE: 62 MMHG | TEMPERATURE: 97.9 F | HEART RATE: 83 BPM | OXYGEN SATURATION: 95 % | RESPIRATION RATE: 16 BRPM | BODY MASS INDEX: 27.04 KG/M2 | SYSTOLIC BLOOD PRESSURE: 132 MMHG

## 2019-04-02 DIAGNOSIS — J40 BRONCHITIS: Primary | ICD-10-CM

## 2019-04-02 LAB
ALBUMIN SERPL BCP-MCNC: 3.1 G/DL (ref 3.5–5)
ALP SERPL-CCNC: 67 U/L (ref 46–116)
ALT SERPL W P-5'-P-CCNC: 21 U/L (ref 12–78)
ANION GAP SERPL CALCULATED.3IONS-SCNC: 10 MMOL/L (ref 4–13)
AST SERPL W P-5'-P-CCNC: 11 U/L (ref 5–45)
BASOPHILS # BLD AUTO: 0.08 THOUSANDS/ΜL (ref 0–0.1)
BASOPHILS NFR BLD AUTO: 1 % (ref 0–1)
BILIRUB SERPL-MCNC: 0.3 MG/DL (ref 0.2–1)
BUN SERPL-MCNC: 16 MG/DL (ref 5–25)
CALCIUM SERPL-MCNC: 9.2 MG/DL (ref 8.3–10.1)
CHLORIDE SERPL-SCNC: 103 MMOL/L (ref 100–108)
CO2 SERPL-SCNC: 28 MMOL/L (ref 21–32)
CREAT SERPL-MCNC: 0.72 MG/DL (ref 0.6–1.3)
EOSINOPHIL # BLD AUTO: 0.32 THOUSAND/ΜL (ref 0–0.61)
EOSINOPHIL NFR BLD AUTO: 5 % (ref 0–6)
ERYTHROCYTE [DISTWIDTH] IN BLOOD BY AUTOMATED COUNT: 13.3 % (ref 11.6–15.1)
GFR SERPL CREATININE-BSD FRML MDRD: 90 ML/MIN/1.73SQ M
GLUCOSE SERPL-MCNC: 313 MG/DL (ref 65–140)
HCT VFR BLD AUTO: 33.8 % (ref 34.8–46.1)
HGB BLD-MCNC: 11 G/DL (ref 11.5–15.4)
IMM GRANULOCYTES # BLD AUTO: 0.01 THOUSAND/UL (ref 0–0.2)
IMM GRANULOCYTES NFR BLD AUTO: 0 % (ref 0–2)
LYMPHOCYTES # BLD AUTO: 1.75 THOUSANDS/ΜL (ref 0.6–4.47)
LYMPHOCYTES NFR BLD AUTO: 27 % (ref 14–44)
MCH RBC QN AUTO: 28.6 PG (ref 26.8–34.3)
MCHC RBC AUTO-ENTMCNC: 32.5 G/DL (ref 31.4–37.4)
MCV RBC AUTO: 88 FL (ref 82–98)
MONOCYTES # BLD AUTO: 0.5 THOUSAND/ΜL (ref 0.17–1.22)
MONOCYTES NFR BLD AUTO: 8 % (ref 4–12)
NEUTROPHILS # BLD AUTO: 3.87 THOUSANDS/ΜL (ref 1.85–7.62)
NEUTS SEG NFR BLD AUTO: 59 % (ref 43–75)
NRBC BLD AUTO-RTO: 0 /100 WBCS
PLATELET # BLD AUTO: 267 THOUSANDS/UL (ref 149–390)
PMV BLD AUTO: 11 FL (ref 8.9–12.7)
POTASSIUM SERPL-SCNC: 3.6 MMOL/L (ref 3.5–5.3)
PROT SERPL-MCNC: 7.1 G/DL (ref 6.4–8.2)
RBC # BLD AUTO: 3.84 MILLION/UL (ref 3.81–5.12)
SODIUM SERPL-SCNC: 141 MMOL/L (ref 136–145)
TROPONIN I SERPL-MCNC: <0.02 NG/ML
WBC # BLD AUTO: 6.53 THOUSAND/UL (ref 4.31–10.16)

## 2019-04-02 PROCEDURE — 84484 ASSAY OF TROPONIN QUANT: CPT | Performed by: EMERGENCY MEDICINE

## 2019-04-02 PROCEDURE — 71046 X-RAY EXAM CHEST 2 VIEWS: CPT

## 2019-04-02 PROCEDURE — 80053 COMPREHEN METABOLIC PANEL: CPT | Performed by: EMERGENCY MEDICINE

## 2019-04-02 PROCEDURE — 94640 AIRWAY INHALATION TREATMENT: CPT

## 2019-04-02 PROCEDURE — 96413 CHEMO IV INFUSION 1 HR: CPT

## 2019-04-02 PROCEDURE — 36415 COLL VENOUS BLD VENIPUNCTURE: CPT | Performed by: EMERGENCY MEDICINE

## 2019-04-02 PROCEDURE — 99284 EMERGENCY DEPT VISIT MOD MDM: CPT | Performed by: EMERGENCY MEDICINE

## 2019-04-02 PROCEDURE — 85025 COMPLETE CBC W/AUTO DIFF WBC: CPT | Performed by: EMERGENCY MEDICINE

## 2019-04-02 PROCEDURE — 99284 EMERGENCY DEPT VISIT MOD MDM: CPT

## 2019-04-02 PROCEDURE — 93005 ELECTROCARDIOGRAM TRACING: CPT

## 2019-04-02 RX ORDER — ALBUTEROL SULFATE 2.5 MG/3ML
5 SOLUTION RESPIRATORY (INHALATION) ONCE
Status: COMPLETED | OUTPATIENT
Start: 2019-04-02 | End: 2019-04-02

## 2019-04-02 RX ORDER — PREDNISONE 20 MG/1
40 TABLET ORAL DAILY
Qty: 10 TABLET | Refills: 0 | Status: SHIPPED | OUTPATIENT
Start: 2019-04-02 | End: 2019-04-07

## 2019-04-02 RX ORDER — AZITHROMYCIN 250 MG/1
TABLET, FILM COATED ORAL
Qty: 6 TABLET | Refills: 0 | Status: SHIPPED | OUTPATIENT
Start: 2019-04-02 | End: 2019-04-06

## 2019-04-02 RX ADMIN — ALBUTEROL SULFATE 5 MG: 2.5 SOLUTION RESPIRATORY (INHALATION) at 14:30

## 2019-04-02 RX ADMIN — SODIUM CHLORIDE 20 ML/HR: 0.9 INJECTION, SOLUTION INTRAVENOUS at 10:39

## 2019-04-02 RX ADMIN — IPRATROPIUM BROMIDE 0.5 MG: 0.5 SOLUTION RESPIRATORY (INHALATION) at 14:30

## 2019-04-02 RX ADMIN — TRASTUZUMAB 450 MG: 150 INJECTION, POWDER, LYOPHILIZED, FOR SOLUTION INTRAVENOUS at 10:50

## 2019-04-02 NOTE — PROGRESS NOTES
Pt arrived to unit without complaint  Next ECHO scheduled for May 14  Pt tolerated Herceptin today without incident    Pt and daughter were provided with AVS

## 2019-04-02 NOTE — PLAN OF CARE
Problem: Potential for Falls  Goal: Patient will remain free of falls  Description  INTERVENTIONS:  - Assess patient frequently for physical needs  -  Identify cognitive and physical deficits and behaviors that affect risk of falls    -  [place] fall precautions as indicated by assessment   - Educate patient/family on patient safety including physical limitations  - Instruct patient to call for assistance with activity based on assessment  - Modify environment to reduce risk of injury  - Consider OT/PT consult to assist with strengthening/mobility  Outcome: Progressing

## 2019-04-04 ENCOUNTER — OFFICE VISIT (OUTPATIENT)
Dept: FAMILY MEDICINE CLINIC | Facility: CLINIC | Age: 64
End: 2019-04-04

## 2019-04-04 VITALS
RESPIRATION RATE: 18 BRPM | DIASTOLIC BLOOD PRESSURE: 84 MMHG | TEMPERATURE: 97.8 F | SYSTOLIC BLOOD PRESSURE: 132 MMHG | WEIGHT: 162 LBS | HEIGHT: 65 IN | OXYGEN SATURATION: 96 % | BODY MASS INDEX: 26.99 KG/M2 | HEART RATE: 86 BPM

## 2019-04-04 DIAGNOSIS — E11.8 TYPE 2 DIABETES MELLITUS WITH COMPLICATION, WITH LONG-TERM CURRENT USE OF INSULIN (HCC): ICD-10-CM

## 2019-04-04 DIAGNOSIS — R93.89 ABNORMAL CHEST X-RAY: Primary | ICD-10-CM

## 2019-04-04 DIAGNOSIS — Z79.4 TYPE 2 DIABETES MELLITUS WITH COMPLICATION, WITH LONG-TERM CURRENT USE OF INSULIN (HCC): ICD-10-CM

## 2019-04-04 DIAGNOSIS — J45.21 MILD INTERMITTENT ASTHMA WITH ACUTE EXACERBATION: ICD-10-CM

## 2019-04-04 DIAGNOSIS — Z12.4 PAP SMEAR FOR CERVICAL CANCER SCREENING: ICD-10-CM

## 2019-04-04 LAB
ATRIAL RATE: 87 BPM
P AXIS: 47 DEGREES
PR INTERVAL: 152 MS
QRS AXIS: 14 DEGREES
QRSD INTERVAL: 74 MS
QT INTERVAL: 340 MS
QTC INTERVAL: 409 MS
T WAVE AXIS: 62 DEGREES
VENTRICULAR RATE: 87 BPM

## 2019-04-04 PROCEDURE — 93010 ELECTROCARDIOGRAM REPORT: CPT | Performed by: INTERNAL MEDICINE

## 2019-04-04 PROCEDURE — 99215 OFFICE O/P EST HI 40 MIN: CPT | Performed by: FAMILY MEDICINE

## 2019-04-05 PROBLEM — R93.89 ABNORMAL CHEST X-RAY: Status: ACTIVE | Noted: 2019-04-05

## 2019-04-05 RX ORDER — DULOXETIN HYDROCHLORIDE 30 MG/1
30 CAPSULE, DELAYED RELEASE ORAL 2 TIMES DAILY
Qty: 60 CAPSULE | Refills: 2 | Status: SHIPPED | OUTPATIENT
Start: 2019-04-05 | End: 2019-05-29

## 2019-04-07 DIAGNOSIS — J45.21 MILD INTERMITTENT ASTHMA WITH ACUTE EXACERBATION: ICD-10-CM

## 2019-04-08 RX ORDER — ALBUTEROL SULFATE 2.5 MG/3ML
SOLUTION RESPIRATORY (INHALATION)
Qty: 75 ML | Refills: 0 | Status: SHIPPED | OUTPATIENT
Start: 2019-04-08 | End: 2019-04-25 | Stop reason: SDUPTHER

## 2019-04-09 ENCOUNTER — CLINICAL SUPPORT (OUTPATIENT)
Dept: RADIATION ONCOLOGY | Facility: CLINIC | Age: 64
End: 2019-04-09
Attending: RADIOLOGY
Payer: MEDICARE

## 2019-04-09 VITALS
DIASTOLIC BLOOD PRESSURE: 84 MMHG | HEART RATE: 90 BPM | BODY MASS INDEX: 26.56 KG/M2 | RESPIRATION RATE: 18 BRPM | HEIGHT: 65 IN | WEIGHT: 159.4 LBS | TEMPERATURE: 97.7 F | OXYGEN SATURATION: 97 % | SYSTOLIC BLOOD PRESSURE: 132 MMHG

## 2019-04-09 VITALS — HEIGHT: 65 IN | BODY MASS INDEX: 26.96 KG/M2

## 2019-04-09 DIAGNOSIS — Z17.0 MALIGNANT NEOPLASM OF RIGHT BREAST IN FEMALE, ESTROGEN RECEPTOR POSITIVE, UNSPECIFIED SITE OF BREAST (HCC): Primary | ICD-10-CM

## 2019-04-09 DIAGNOSIS — C50.911 MALIGNANT NEOPLASM OF RIGHT BREAST IN FEMALE, ESTROGEN RECEPTOR POSITIVE, UNSPECIFIED SITE OF BREAST (HCC): Primary | ICD-10-CM

## 2019-04-09 PROCEDURE — 99215 OFFICE O/P EST HI 40 MIN: CPT | Performed by: RADIOLOGY

## 2019-04-11 ENCOUNTER — TELEPHONE (OUTPATIENT)
Dept: HEMATOLOGY ONCOLOGY | Facility: CLINIC | Age: 64
End: 2019-04-11

## 2019-04-17 DIAGNOSIS — C50.411 MALIGNANT NEOPLASM OF UPPER-OUTER QUADRANT OF RIGHT BREAST IN FEMALE, ESTROGEN RECEPTOR POSITIVE (HCC): ICD-10-CM

## 2019-04-17 DIAGNOSIS — Z17.0 MALIGNANT NEOPLASM OF UPPER-OUTER QUADRANT OF RIGHT BREAST IN FEMALE, ESTROGEN RECEPTOR POSITIVE (HCC): ICD-10-CM

## 2019-04-17 RX ORDER — SODIUM CHLORIDE 9 MG/ML
20 INJECTION, SOLUTION INTRAVENOUS ONCE
Status: CANCELLED | OUTPATIENT
Start: 2019-05-14

## 2019-04-17 RX ORDER — SODIUM CHLORIDE 9 MG/ML
20 INJECTION, SOLUTION INTRAVENOUS ONCE
Status: CANCELLED | OUTPATIENT
Start: 2019-06-04

## 2019-04-22 RX ORDER — SODIUM CHLORIDE 9 MG/ML
20 INJECTION, SOLUTION INTRAVENOUS CONTINUOUS
Status: DISCONTINUED | OUTPATIENT
Start: 2019-04-23 | End: 2019-04-26 | Stop reason: HOSPADM

## 2019-04-23 ENCOUNTER — HOSPITAL ENCOUNTER (OUTPATIENT)
Dept: INFUSION CENTER | Facility: CLINIC | Age: 64
Discharge: HOME | End: 2019-04-23
Payer: MEDICARE

## 2019-04-23 ENCOUNTER — DOCUMENTATION (OUTPATIENT)
Dept: SURGICAL ONCOLOGY | Facility: CLINIC | Age: 64
End: 2019-04-23

## 2019-04-23 VITALS
TEMPERATURE: 98 F | HEART RATE: 80 BPM | BODY MASS INDEX: 26.78 KG/M2 | SYSTOLIC BLOOD PRESSURE: 140 MMHG | DIASTOLIC BLOOD PRESSURE: 80 MMHG | RESPIRATION RATE: 16 BRPM | WEIGHT: 160.94 LBS

## 2019-04-23 PROCEDURE — 96413 CHEMO IV INFUSION 1 HR: CPT

## 2019-04-23 RX ADMIN — SODIUM CHLORIDE 20 ML/HR: 0.9 INJECTION, SOLUTION INTRAVENOUS at 08:24

## 2019-04-23 RX ADMIN — TRASTUZUMAB 450 MG: 150 INJECTION, POWDER, LYOPHILIZED, FOR SOLUTION INTRAVENOUS at 09:01

## 2019-04-24 ENCOUNTER — HOSPITAL ENCOUNTER (OUTPATIENT)
Dept: NUCLEAR MEDICINE | Facility: HOSPITAL | Age: 64
Discharge: HOME | End: 2019-04-24
Attending: INTERNAL MEDICINE
Payer: MEDICARE

## 2019-04-24 ENCOUNTER — HOSPITAL ENCOUNTER (OUTPATIENT)
Dept: NON INVASIVE DIAGNOSTICS | Facility: HOSPITAL | Age: 64
Discharge: HOME | End: 2019-04-24
Attending: INTERNAL MEDICINE
Payer: MEDICARE

## 2019-04-24 DIAGNOSIS — R07.9 CHEST PAIN, UNSPECIFIED TYPE: ICD-10-CM

## 2019-04-24 PROCEDURE — 93017 CV STRESS TEST TRACING ONLY: CPT

## 2019-04-24 PROCEDURE — A9502 TC99M TETROFOSMIN: HCPCS

## 2019-04-24 PROCEDURE — 78452 HT MUSCLE IMAGE SPECT MULT: CPT

## 2019-04-24 RX ADMIN — REGADENOSON 0.4 MG: 0.08 INJECTION, SOLUTION INTRAVENOUS at 13:22

## 2019-04-25 ENCOUNTER — TELEPHONE (OUTPATIENT)
Dept: CARDIOLOGY CLINIC | Facility: CLINIC | Age: 64
End: 2019-04-25

## 2019-04-25 DIAGNOSIS — E11.8 TYPE 2 DIABETES MELLITUS WITH COMPLICATION, WITHOUT LONG-TERM CURRENT USE OF INSULIN (HCC): ICD-10-CM

## 2019-04-25 DIAGNOSIS — J45.21 MILD INTERMITTENT ASTHMA WITH ACUTE EXACERBATION: ICD-10-CM

## 2019-04-25 PROCEDURE — 93016 CV STRESS TEST SUPVJ ONLY: CPT | Performed by: INTERNAL MEDICINE

## 2019-04-25 PROCEDURE — 93018 CV STRESS TEST I&R ONLY: CPT | Performed by: INTERNAL MEDICINE

## 2019-04-25 PROCEDURE — 78452 HT MUSCLE IMAGE SPECT MULT: CPT | Performed by: INTERNAL MEDICINE

## 2019-04-25 RX ORDER — INSULIN GLARGINE 300 U/ML
INJECTION, SOLUTION SUBCUTANEOUS
Qty: 7.5 ML | Refills: 0 | Status: SHIPPED | OUTPATIENT
Start: 2019-04-25 | End: 2019-05-29 | Stop reason: SDUPTHER

## 2019-04-25 RX ORDER — ALBUTEROL SULFATE 2.5 MG/3ML
SOLUTION RESPIRATORY (INHALATION)
Qty: 75 ML | Refills: 0 | Status: SHIPPED | OUTPATIENT
Start: 2019-04-25 | End: 2019-05-06 | Stop reason: SDUPTHER

## 2019-04-26 LAB
CHEST PAIN STATEMENT: NORMAL
MAX DIASTOLIC BP: 70 MMHG
MAX HEART RATE: 112 BPM
MAX PREDICTED HEART RATE: 158 BPM
MAX. SYSTOLIC BP: 174 MMHG
PROTOCOL NAME: NORMAL
REASON FOR TERMINATION: NORMAL
TARGET HR FORMULA: NORMAL
TEST INDICATION: NORMAL
TIME IN EXERCISE PHASE: NORMAL

## 2019-05-02 ENCOUNTER — TELEPHONE (OUTPATIENT)
Dept: FAMILY MEDICINE CLINIC | Facility: CLINIC | Age: 64
End: 2019-05-02

## 2019-05-04 DIAGNOSIS — J45.21 MILD INTERMITTENT ASTHMA WITH ACUTE EXACERBATION: ICD-10-CM

## 2019-05-06 DIAGNOSIS — J45.21 MILD INTERMITTENT ASTHMA WITH ACUTE EXACERBATION: ICD-10-CM

## 2019-05-06 RX ORDER — ALBUTEROL SULFATE 2.5 MG/3ML
SOLUTION RESPIRATORY (INHALATION)
Qty: 75 ML | Refills: 0 | Status: SHIPPED | OUTPATIENT
Start: 2019-05-06 | End: 2019-08-20

## 2019-05-06 RX ORDER — ALBUTEROL SULFATE 2.5 MG/3ML
2.5 SOLUTION RESPIRATORY (INHALATION) EVERY 4 HOURS PRN
Qty: 180 VIAL | Refills: 5 | Status: SHIPPED | OUTPATIENT
Start: 2019-05-06 | End: 2019-05-20 | Stop reason: HOSPADM

## 2019-05-14 ENCOUNTER — HOSPITAL ENCOUNTER (OUTPATIENT)
Dept: INFUSION CENTER | Facility: CLINIC | Age: 64
Discharge: HOME | End: 2019-05-14
Payer: MEDICARE

## 2019-05-14 ENCOUNTER — HOSPITAL ENCOUNTER (OUTPATIENT)
Dept: NON INVASIVE DIAGNOSTICS | Facility: HOSPITAL | Age: 64
Discharge: HOME | End: 2019-05-14
Attending: INTERNAL MEDICINE
Payer: MEDICARE

## 2019-05-14 VITALS
SYSTOLIC BLOOD PRESSURE: 142 MMHG | WEIGHT: 164.68 LBS | RESPIRATION RATE: 16 BRPM | HEART RATE: 97 BPM | BODY MASS INDEX: 27.4 KG/M2 | DIASTOLIC BLOOD PRESSURE: 80 MMHG | TEMPERATURE: 97.2 F

## 2019-05-14 DIAGNOSIS — C50.411 MALIGNANT NEOPLASM OF UPPER-OUTER QUADRANT OF RIGHT BREAST IN FEMALE, ESTROGEN RECEPTOR POSITIVE (HCC): ICD-10-CM

## 2019-05-14 DIAGNOSIS — Z17.0 MALIGNANT NEOPLASM OF UPPER-OUTER QUADRANT OF RIGHT BREAST IN FEMALE, ESTROGEN RECEPTOR POSITIVE (HCC): Primary | ICD-10-CM

## 2019-05-14 DIAGNOSIS — C50.411 MALIGNANT NEOPLASM OF UPPER-OUTER QUADRANT OF RIGHT BREAST IN FEMALE, ESTROGEN RECEPTOR POSITIVE (HCC): Primary | ICD-10-CM

## 2019-05-14 DIAGNOSIS — Z17.0 MALIGNANT NEOPLASM OF UPPER-OUTER QUADRANT OF RIGHT BREAST IN FEMALE, ESTROGEN RECEPTOR POSITIVE (HCC): ICD-10-CM

## 2019-05-14 PROCEDURE — 93306 TTE W/DOPPLER COMPLETE: CPT | Performed by: INTERNAL MEDICINE

## 2019-05-14 PROCEDURE — 93306 TTE W/DOPPLER COMPLETE: CPT

## 2019-05-14 PROCEDURE — 96413 CHEMO IV INFUSION 1 HR: CPT

## 2019-05-14 RX ORDER — SODIUM CHLORIDE 9 MG/ML
20 INJECTION, SOLUTION INTRAVENOUS ONCE
Status: COMPLETED | OUTPATIENT
Start: 2019-05-14 | End: 2019-05-14

## 2019-05-14 RX ADMIN — TRASTUZUMAB 434 MG: 150 INJECTION, POWDER, LYOPHILIZED, FOR SOLUTION INTRAVENOUS at 09:17

## 2019-05-14 RX ADMIN — SODIUM CHLORIDE 20 ML/HR: 0.9 INJECTION, SOLUTION INTRAVENOUS at 08:43

## 2019-05-14 NOTE — PROGRESS NOTES
Patient arrived on unit for treatment  Denies any discomforts  Tolerated Herceptin  Aware of next appointment   AVS given to patient

## 2019-05-18 ENCOUNTER — APPOINTMENT (EMERGENCY)
Dept: RADIOLOGY | Facility: HOSPITAL | Age: 64
DRG: 206 | End: 2019-05-18
Payer: MEDICARE

## 2019-05-18 ENCOUNTER — APPOINTMENT (EMERGENCY)
Dept: CT IMAGING | Facility: HOSPITAL | Age: 64
DRG: 206 | End: 2019-05-18
Payer: MEDICARE

## 2019-05-18 ENCOUNTER — HOSPITAL ENCOUNTER (INPATIENT)
Facility: HOSPITAL | Age: 64
LOS: 2 days | Discharge: HOME | DRG: 206 | End: 2019-05-20
Attending: EMERGENCY MEDICINE | Admitting: INTERNAL MEDICINE
Payer: MEDICARE

## 2019-05-18 DIAGNOSIS — J70.0 RADIATION PNEUMONITIS (HCC): ICD-10-CM

## 2019-05-18 DIAGNOSIS — J18.9 PNEUMONIA OF RIGHT UPPER LOBE DUE TO INFECTIOUS ORGANISM: ICD-10-CM

## 2019-05-18 DIAGNOSIS — J45.21 MILD INTERMITTENT ASTHMA WITH ACUTE EXACERBATION: ICD-10-CM

## 2019-05-18 DIAGNOSIS — J45.901 ACUTE ASTHMA EXACERBATION: Primary | ICD-10-CM

## 2019-05-18 LAB
ANION GAP SERPL CALCULATED.3IONS-SCNC: 12 MMOL/L (ref 4–13)
BASOPHILS # BLD AUTO: 0.08 THOUSANDS/ΜL (ref 0–0.1)
BASOPHILS NFR BLD AUTO: 2 % (ref 0–1)
BUN SERPL-MCNC: 13 MG/DL (ref 5–25)
CALCIUM SERPL-MCNC: 9.5 MG/DL (ref 8.3–10.1)
CHLORIDE SERPL-SCNC: 99 MMOL/L (ref 100–108)
CO2 SERPL-SCNC: 26 MMOL/L (ref 21–32)
CREAT SERPL-MCNC: 0.63 MG/DL (ref 0.6–1.3)
EOSINOPHIL # BLD AUTO: 0.42 THOUSAND/ΜL (ref 0–0.61)
EOSINOPHIL NFR BLD AUTO: 8 % (ref 0–6)
ERYTHROCYTE [DISTWIDTH] IN BLOOD BY AUTOMATED COUNT: 13.2 % (ref 11.6–15.1)
GFR SERPL CREATININE-BSD FRML MDRD: 96 ML/MIN/1.73SQ M
GLUCOSE SERPL-MCNC: 300 MG/DL (ref 65–140)
GLUCOSE SERPL-MCNC: 485 MG/DL (ref 65–140)
HCT VFR BLD AUTO: 36.5 % (ref 34.8–46.1)
HGB BLD-MCNC: 11.7 G/DL (ref 11.5–15.4)
IMM GRANULOCYTES # BLD AUTO: 0.02 THOUSAND/UL (ref 0–0.2)
IMM GRANULOCYTES NFR BLD AUTO: 0 % (ref 0–2)
LACTATE SERPL-SCNC: 1.3 MMOL/L (ref 0.5–2)
LYMPHOCYTES # BLD AUTO: 1.49 THOUSANDS/ΜL (ref 0.6–4.47)
LYMPHOCYTES NFR BLD AUTO: 28 % (ref 14–44)
MCH RBC QN AUTO: 28.5 PG (ref 26.8–34.3)
MCHC RBC AUTO-ENTMCNC: 32.1 G/DL (ref 31.4–37.4)
MCV RBC AUTO: 89 FL (ref 82–98)
MONOCYTES # BLD AUTO: 0.35 THOUSAND/ΜL (ref 0.17–1.22)
MONOCYTES NFR BLD AUTO: 7 % (ref 4–12)
NEUTROPHILS # BLD AUTO: 3.05 THOUSANDS/ΜL (ref 1.85–7.62)
NEUTS SEG NFR BLD AUTO: 55 % (ref 43–75)
NRBC BLD AUTO-RTO: 0 /100 WBCS
NT-PROBNP SERPL-MCNC: 48 PG/ML
PLATELET # BLD AUTO: 278 THOUSANDS/UL (ref 149–390)
PMV BLD AUTO: 11.1 FL (ref 8.9–12.7)
POTASSIUM SERPL-SCNC: 3.7 MMOL/L (ref 3.5–5.3)
RBC # BLD AUTO: 4.1 MILLION/UL (ref 3.81–5.12)
SODIUM SERPL-SCNC: 137 MMOL/L (ref 136–145)
TROPONIN I SERPL-MCNC: <0.02 NG/ML
WBC # BLD AUTO: 5.41 THOUSAND/UL (ref 4.31–10.16)

## 2019-05-18 PROCEDURE — 99285 EMERGENCY DEPT VISIT HI MDM: CPT | Performed by: EMERGENCY MEDICINE

## 2019-05-18 PROCEDURE — 96374 THER/PROPH/DIAG INJ IV PUSH: CPT

## 2019-05-18 PROCEDURE — 36415 COLL VENOUS BLD VENIPUNCTURE: CPT | Performed by: EMERGENCY MEDICINE

## 2019-05-18 PROCEDURE — 87205 SMEAR GRAM STAIN: CPT | Performed by: PHYSICIAN ASSISTANT

## 2019-05-18 PROCEDURE — 87147 CULTURE TYPE IMMUNOLOGIC: CPT | Performed by: PHYSICIAN ASSISTANT

## 2019-05-18 PROCEDURE — 85025 COMPLETE CBC W/AUTO DIFF WBC: CPT | Performed by: EMERGENCY MEDICINE

## 2019-05-18 PROCEDURE — 87070 CULTURE OTHR SPECIMN AEROBIC: CPT | Performed by: PHYSICIAN ASSISTANT

## 2019-05-18 PROCEDURE — 82948 REAGENT STRIP/BLOOD GLUCOSE: CPT

## 2019-05-18 PROCEDURE — 87081 CULTURE SCREEN ONLY: CPT | Performed by: PHYSICIAN ASSISTANT

## 2019-05-18 PROCEDURE — 99285 EMERGENCY DEPT VISIT HI MDM: CPT

## 2019-05-18 PROCEDURE — 96375 TX/PRO/DX INJ NEW DRUG ADDON: CPT

## 2019-05-18 PROCEDURE — 71046 X-RAY EXAM CHEST 2 VIEWS: CPT

## 2019-05-18 PROCEDURE — 80048 BASIC METABOLIC PNL TOTAL CA: CPT | Performed by: EMERGENCY MEDICINE

## 2019-05-18 PROCEDURE — 84484 ASSAY OF TROPONIN QUANT: CPT | Performed by: EMERGENCY MEDICINE

## 2019-05-18 PROCEDURE — 84145 PROCALCITONIN (PCT): CPT | Performed by: PHYSICIAN ASSISTANT

## 2019-05-18 PROCEDURE — 83880 ASSAY OF NATRIURETIC PEPTIDE: CPT | Performed by: EMERGENCY MEDICINE

## 2019-05-18 PROCEDURE — 99223 1ST HOSP IP/OBS HIGH 75: CPT | Performed by: PHYSICIAN ASSISTANT

## 2019-05-18 PROCEDURE — 94640 AIRWAY INHALATION TREATMENT: CPT

## 2019-05-18 PROCEDURE — 87631 RESP VIRUS 3-5 TARGETS: CPT | Performed by: PHYSICIAN ASSISTANT

## 2019-05-18 PROCEDURE — 71250 CT THORAX DX C-: CPT

## 2019-05-18 PROCEDURE — 83605 ASSAY OF LACTIC ACID: CPT | Performed by: EMERGENCY MEDICINE

## 2019-05-18 RX ORDER — BUDESONIDE AND FORMOTEROL FUMARATE DIHYDRATE 160; 4.5 UG/1; UG/1
2 AEROSOL RESPIRATORY (INHALATION) 2 TIMES DAILY
Status: DISCONTINUED | OUTPATIENT
Start: 2019-05-18 | End: 2019-05-18

## 2019-05-18 RX ORDER — ANASTROZOLE 1 MG/1
1 TABLET ORAL DAILY
Status: DISCONTINUED | OUTPATIENT
Start: 2019-05-19 | End: 2019-05-20 | Stop reason: HOSPADM

## 2019-05-18 RX ORDER — INSULIN GLARGINE 100 [IU]/ML
22 INJECTION, SOLUTION SUBCUTANEOUS
Status: DISCONTINUED | OUTPATIENT
Start: 2019-05-18 | End: 2019-05-20 | Stop reason: HOSPADM

## 2019-05-18 RX ORDER — ALBUTEROL SULFATE 90 UG/1
1 AEROSOL, METERED RESPIRATORY (INHALATION) EVERY 4 HOURS PRN
Status: DISCONTINUED | OUTPATIENT
Start: 2019-05-18 | End: 2019-05-20 | Stop reason: HOSPADM

## 2019-05-18 RX ORDER — FLUTICASONE FUROATE AND VILANTEROL 100; 25 UG/1; UG/1
1 POWDER RESPIRATORY (INHALATION) DAILY
Status: DISCONTINUED | OUTPATIENT
Start: 2019-05-19 | End: 2019-05-20 | Stop reason: HOSPADM

## 2019-05-18 RX ORDER — ALBUTEROL SULFATE 2.5 MG/3ML
5 SOLUTION RESPIRATORY (INHALATION) ONCE
Status: COMPLETED | OUTPATIENT
Start: 2019-05-18 | End: 2019-05-18

## 2019-05-18 RX ORDER — ALBUTEROL SULFATE 2.5 MG/3ML
2.5 SOLUTION RESPIRATORY (INHALATION) EVERY 4 HOURS PRN
Status: DISCONTINUED | OUTPATIENT
Start: 2019-05-18 | End: 2019-05-19

## 2019-05-18 RX ORDER — METHYLPREDNISOLONE SODIUM SUCCINATE 125 MG/2ML
80 INJECTION, POWDER, LYOPHILIZED, FOR SOLUTION INTRAMUSCULAR; INTRAVENOUS ONCE
Status: COMPLETED | OUTPATIENT
Start: 2019-05-18 | End: 2019-05-18

## 2019-05-18 RX ORDER — AZITHROMYCIN 250 MG/1
500 TABLET, FILM COATED ORAL EVERY 24 HOURS
Status: DISCONTINUED | OUTPATIENT
Start: 2019-05-19 | End: 2019-05-19

## 2019-05-18 RX ORDER — IBUPROFEN 600 MG/1
600 TABLET ORAL EVERY 6 HOURS PRN
Status: DISCONTINUED | OUTPATIENT
Start: 2019-05-18 | End: 2019-05-20 | Stop reason: HOSPADM

## 2019-05-18 RX ORDER — AMLODIPINE BESYLATE 10 MG/1
10 TABLET ORAL DAILY
Status: DISCONTINUED | OUTPATIENT
Start: 2019-05-19 | End: 2019-05-20 | Stop reason: HOSPADM

## 2019-05-18 RX ORDER — LEVOTHYROXINE SODIUM 0.05 MG/1
50 TABLET ORAL
Status: DISCONTINUED | OUTPATIENT
Start: 2019-05-19 | End: 2019-05-20 | Stop reason: HOSPADM

## 2019-05-18 RX ORDER — PANTOPRAZOLE SODIUM 20 MG/1
20 TABLET, DELAYED RELEASE ORAL
Status: DISCONTINUED | OUTPATIENT
Start: 2019-05-19 | End: 2019-05-20 | Stop reason: HOSPADM

## 2019-05-18 RX ADMIN — ALBUTEROL SULFATE 5 MG: 2.5 SOLUTION RESPIRATORY (INHALATION) at 15:52

## 2019-05-18 RX ADMIN — INSULIN LISPRO 5 UNITS: 100 INJECTION, SOLUTION INTRAVENOUS; SUBCUTANEOUS at 22:12

## 2019-05-18 RX ADMIN — INSULIN GLARGINE 22 UNITS: 100 INJECTION, SOLUTION SUBCUTANEOUS at 22:11

## 2019-05-18 RX ADMIN — CEFTRIAXONE 1000 MG: 1 INJECTION, POWDER, FOR SOLUTION INTRAMUSCULAR; INTRAVENOUS at 17:38

## 2019-05-18 RX ADMIN — METHYLPREDNISOLONE SODIUM SUCCINATE 80 MG: 125 INJECTION, POWDER, FOR SOLUTION INTRAMUSCULAR; INTRAVENOUS at 16:05

## 2019-05-18 RX ADMIN — IPRATROPIUM BROMIDE 0.5 MG: 0.5 SOLUTION RESPIRATORY (INHALATION) at 15:52

## 2019-05-18 RX ADMIN — AZITHROMYCIN MONOHYDRATE 500 MG: 500 INJECTION, POWDER, LYOPHILIZED, FOR SOLUTION INTRAVENOUS at 18:20

## 2019-05-19 DIAGNOSIS — E78.5 HYPERLIPIDEMIA, UNSPECIFIED HYPERLIPIDEMIA TYPE: ICD-10-CM

## 2019-05-19 LAB
ANION GAP SERPL CALCULATED.3IONS-SCNC: 11 MMOL/L (ref 4–13)
BUN SERPL-MCNC: 16 MG/DL (ref 5–25)
CALCIUM SERPL-MCNC: 9.9 MG/DL (ref 8.3–10.1)
CHLORIDE SERPL-SCNC: 103 MMOL/L (ref 100–108)
CO2 SERPL-SCNC: 24 MMOL/L (ref 21–32)
CREAT SERPL-MCNC: 0.83 MG/DL (ref 0.6–1.3)
FLUAV AG SPEC QL: NOT DETECTED
FLUBV AG SPEC QL: NOT DETECTED
GFR SERPL CREATININE-BSD FRML MDRD: 76 ML/MIN/1.73SQ M
GLUCOSE SERPL-MCNC: 285 MG/DL (ref 65–140)
GLUCOSE SERPL-MCNC: 289 MG/DL (ref 65–140)
GLUCOSE SERPL-MCNC: 309 MG/DL (ref 65–140)
GLUCOSE SERPL-MCNC: 346 MG/DL (ref 65–140)
GLUCOSE SERPL-MCNC: 350 MG/DL (ref 65–140)
GLUCOSE SERPL-MCNC: 353 MG/DL (ref 65–140)
GLUCOSE SERPL-MCNC: 376 MG/DL (ref 65–140)
MAGNESIUM SERPL-MCNC: 1.2 MG/DL (ref 1.6–2.6)
PHOSPHATE SERPL-MCNC: 3.4 MG/DL (ref 2.3–4.1)
POTASSIUM SERPL-SCNC: 3.8 MMOL/L (ref 3.5–5.3)
PROCALCITONIN SERPL-MCNC: <0.05 NG/ML
PROCALCITONIN SERPL-MCNC: <0.05 NG/ML
RSV B RNA SPEC QL NAA+PROBE: NOT DETECTED
SODIUM SERPL-SCNC: 138 MMOL/L (ref 136–145)

## 2019-05-19 PROCEDURE — 80048 BASIC METABOLIC PNL TOTAL CA: CPT | Performed by: PHYSICIAN ASSISTANT

## 2019-05-19 PROCEDURE — 94640 AIRWAY INHALATION TREATMENT: CPT

## 2019-05-19 PROCEDURE — 84100 ASSAY OF PHOSPHORUS: CPT | Performed by: PHYSICIAN ASSISTANT

## 2019-05-19 PROCEDURE — 84145 PROCALCITONIN (PCT): CPT | Performed by: PHYSICIAN ASSISTANT

## 2019-05-19 PROCEDURE — 99223 1ST HOSP IP/OBS HIGH 75: CPT | Performed by: INTERNAL MEDICINE

## 2019-05-19 PROCEDURE — 83735 ASSAY OF MAGNESIUM: CPT | Performed by: PHYSICIAN ASSISTANT

## 2019-05-19 PROCEDURE — 94760 N-INVAS EAR/PLS OXIMETRY 1: CPT

## 2019-05-19 PROCEDURE — 82948 REAGENT STRIP/BLOOD GLUCOSE: CPT

## 2019-05-19 PROCEDURE — 99232 SBSQ HOSP IP/OBS MODERATE 35: CPT | Performed by: HOSPITALIST

## 2019-05-19 RX ORDER — IPRATROPIUM BROMIDE AND ALBUTEROL SULFATE 2.5; .5 MG/3ML; MG/3ML
3 SOLUTION RESPIRATORY (INHALATION)
Status: DISCONTINUED | OUTPATIENT
Start: 2019-05-19 | End: 2019-05-19

## 2019-05-19 RX ORDER — IPRATROPIUM BROMIDE AND ALBUTEROL SULFATE 2.5; .5 MG/3ML; MG/3ML
3 SOLUTION RESPIRATORY (INHALATION) 3 TIMES DAILY
Status: DISCONTINUED | OUTPATIENT
Start: 2019-05-19 | End: 2019-05-20 | Stop reason: HOSPADM

## 2019-05-19 RX ORDER — IPRATROPIUM BROMIDE AND ALBUTEROL SULFATE 2.5; .5 MG/3ML; MG/3ML
SOLUTION RESPIRATORY (INHALATION)
Status: COMPLETED
Start: 2019-05-19 | End: 2019-05-19

## 2019-05-19 RX ORDER — PREDNISONE 20 MG/1
60 TABLET ORAL DAILY
Status: DISCONTINUED | OUTPATIENT
Start: 2019-05-20 | End: 2019-05-20 | Stop reason: HOSPADM

## 2019-05-19 RX ADMIN — IBUPROFEN 600 MG: 600 TABLET ORAL at 01:01

## 2019-05-19 RX ADMIN — IPRATROPIUM BROMIDE AND ALBUTEROL SULFATE 3 ML: 2.5; .5 SOLUTION RESPIRATORY (INHALATION) at 04:20

## 2019-05-19 RX ADMIN — IBUPROFEN 600 MG: 600 TABLET ORAL at 22:08

## 2019-05-19 RX ADMIN — IPRATROPIUM BROMIDE AND ALBUTEROL SULFATE 3 ML: 2.5; .5 SOLUTION RESPIRATORY (INHALATION) at 19:09

## 2019-05-19 RX ADMIN — ALBUTEROL SULFATE 1 PUFF: 90 AEROSOL, METERED RESPIRATORY (INHALATION) at 18:34

## 2019-05-19 RX ADMIN — RIVAROXABAN 20 MG: 20 TABLET, FILM COATED ORAL at 09:05

## 2019-05-19 RX ADMIN — IPRATROPIUM BROMIDE AND ALBUTEROL SULFATE 3 ML: 2.5; .5 SOLUTION RESPIRATORY (INHALATION) at 15:10

## 2019-05-19 RX ADMIN — INSULIN LISPRO 6 UNITS: 100 INJECTION, SOLUTION INTRAVENOUS; SUBCUTANEOUS at 11:46

## 2019-05-19 RX ADMIN — FLUTICASONE FUROATE AND VILANTEROL TRIFENATATE 1 PUFF: 100; 25 POWDER RESPIRATORY (INHALATION) at 09:04

## 2019-05-19 RX ADMIN — INSULIN GLARGINE 22 UNITS: 100 INJECTION, SOLUTION SUBCUTANEOUS at 22:02

## 2019-05-19 RX ADMIN — ANASTROZOLE 1 MG: 1 TABLET, COATED ORAL at 09:05

## 2019-05-19 RX ADMIN — INSULIN LISPRO 5 UNITS: 100 INJECTION, SOLUTION INTRAVENOUS; SUBCUTANEOUS at 17:11

## 2019-05-19 RX ADMIN — LEVOTHYROXINE SODIUM 50 MCG: 50 TABLET ORAL at 06:18

## 2019-05-19 RX ADMIN — PANTOPRAZOLE SODIUM 20 MG: 20 TABLET, DELAYED RELEASE ORAL at 06:18

## 2019-05-19 RX ADMIN — AMLODIPINE BESYLATE 10 MG: 10 TABLET ORAL at 09:04

## 2019-05-19 RX ADMIN — INSULIN LISPRO 5 UNITS: 100 INJECTION, SOLUTION INTRAVENOUS; SUBCUTANEOUS at 17:12

## 2019-05-19 RX ADMIN — INSULIN LISPRO 4 UNITS: 100 INJECTION, SOLUTION INTRAVENOUS; SUBCUTANEOUS at 08:43

## 2019-05-19 RX ADMIN — INSULIN LISPRO 5 UNITS: 100 INJECTION, SOLUTION INTRAVENOUS; SUBCUTANEOUS at 11:46

## 2019-05-19 RX ADMIN — INSULIN LISPRO 5 UNITS: 100 INJECTION, SOLUTION INTRAVENOUS; SUBCUTANEOUS at 08:44

## 2019-05-19 RX ADMIN — INSULIN LISPRO 2 UNITS: 100 INJECTION, SOLUTION INTRAVENOUS; SUBCUTANEOUS at 22:02

## 2019-05-19 RX ADMIN — INSULIN HUMAN 6 UNITS: 100 INJECTION, SOLUTION PARENTERAL at 03:09

## 2019-05-20 ENCOUNTER — TRANSITIONAL CARE MANAGEMENT (OUTPATIENT)
Dept: FAMILY MEDICINE CLINIC | Facility: CLINIC | Age: 64
End: 2019-05-20

## 2019-05-20 VITALS
HEART RATE: 86 BPM | OXYGEN SATURATION: 96 % | RESPIRATION RATE: 18 BRPM | SYSTOLIC BLOOD PRESSURE: 118 MMHG | DIASTOLIC BLOOD PRESSURE: 60 MMHG | WEIGHT: 163.8 LBS | BODY MASS INDEX: 27.26 KG/M2 | TEMPERATURE: 97.7 F

## 2019-05-20 PROBLEM — J70.0 RADIATION PNEUMONITIS (HCC): Status: ACTIVE | Noted: 2019-05-18

## 2019-05-20 LAB
GLUCOSE SERPL-MCNC: 182 MG/DL (ref 65–140)
MRSA NOSE QL CULT: NORMAL

## 2019-05-20 PROCEDURE — 94640 AIRWAY INHALATION TREATMENT: CPT

## 2019-05-20 PROCEDURE — 99239 HOSP IP/OBS DSCHRG MGMT >30: CPT | Performed by: PHYSICIAN ASSISTANT

## 2019-05-20 PROCEDURE — 82948 REAGENT STRIP/BLOOD GLUCOSE: CPT

## 2019-05-20 PROCEDURE — 94760 N-INVAS EAR/PLS OXIMETRY 1: CPT

## 2019-05-20 RX ORDER — PREDNISONE 10 MG/1
10 TABLET ORAL DAILY
Qty: 126 TABLET | Refills: 0 | Status: SHIPPED | OUTPATIENT
Start: 2019-05-20 | End: 2019-05-29

## 2019-05-20 RX ORDER — GEMFIBROZIL 600 MG/1
TABLET, FILM COATED ORAL
Qty: 60 TABLET | Refills: 3 | Status: SHIPPED | OUTPATIENT
Start: 2019-05-20 | End: 2019-08-20

## 2019-05-20 RX ORDER — FLUTICASONE FUROATE AND VILANTEROL 100; 25 UG/1; UG/1
1 POWDER RESPIRATORY (INHALATION) DAILY
Qty: 1 INHALER | Refills: 5 | Status: SHIPPED | OUTPATIENT
Start: 2019-05-20 | End: 2019-05-29

## 2019-05-20 RX ADMIN — INSULIN LISPRO 5 UNITS: 100 INJECTION, SOLUTION INTRAVENOUS; SUBCUTANEOUS at 08:49

## 2019-05-20 RX ADMIN — FLUTICASONE FUROATE AND VILANTEROL TRIFENATATE 1 PUFF: 100; 25 POWDER RESPIRATORY (INHALATION) at 08:49

## 2019-05-20 RX ADMIN — PREDNISONE 60 MG: 20 TABLET ORAL at 08:49

## 2019-05-20 RX ADMIN — LEVOTHYROXINE SODIUM 50 MCG: 50 TABLET ORAL at 05:21

## 2019-05-20 RX ADMIN — PANTOPRAZOLE SODIUM 20 MG: 20 TABLET, DELAYED RELEASE ORAL at 05:21

## 2019-05-20 RX ADMIN — IPRATROPIUM BROMIDE AND ALBUTEROL SULFATE 3 ML: 2.5; .5 SOLUTION RESPIRATORY (INHALATION) at 07:26

## 2019-05-20 RX ADMIN — AMLODIPINE BESYLATE 10 MG: 10 TABLET ORAL at 08:49

## 2019-05-20 RX ADMIN — INSULIN LISPRO 1 UNITS: 100 INJECTION, SOLUTION INTRAVENOUS; SUBCUTANEOUS at 08:49

## 2019-05-20 RX ADMIN — RIVAROXABAN 20 MG: 20 TABLET, FILM COATED ORAL at 08:49

## 2019-05-20 RX ADMIN — ANASTROZOLE 1 MG: 1 TABLET, COATED ORAL at 08:49

## 2019-05-22 LAB
BACTERIA SPT RESP CULT: ABNORMAL
BACTERIA SPT RESP CULT: ABNORMAL
GRAM STN SPEC: ABNORMAL

## 2019-05-24 ENCOUNTER — OFFICE VISIT (OUTPATIENT)
Dept: HEMATOLOGY ONCOLOGY | Facility: CLINIC | Age: 64
End: 2019-05-24
Payer: MEDICARE

## 2019-05-24 VITALS
DIASTOLIC BLOOD PRESSURE: 68 MMHG | WEIGHT: 170.8 LBS | TEMPERATURE: 97.5 F | HEIGHT: 66 IN | SYSTOLIC BLOOD PRESSURE: 134 MMHG | BODY MASS INDEX: 27.45 KG/M2 | OXYGEN SATURATION: 96 % | HEART RATE: 85 BPM | RESPIRATION RATE: 18 BRPM

## 2019-05-24 DIAGNOSIS — Z17.0 MALIGNANT NEOPLASM OF UPPER-OUTER QUADRANT OF RIGHT BREAST IN FEMALE, ESTROGEN RECEPTOR POSITIVE (HCC): Primary | ICD-10-CM

## 2019-05-24 DIAGNOSIS — C50.411 MALIGNANT NEOPLASM OF UPPER-OUTER QUADRANT OF RIGHT BREAST IN FEMALE, ESTROGEN RECEPTOR POSITIVE (HCC): Primary | ICD-10-CM

## 2019-05-24 PROCEDURE — 99214 OFFICE O/P EST MOD 30 MIN: CPT | Performed by: INTERNAL MEDICINE

## 2019-05-24 RX ORDER — SODIUM CHLORIDE 9 MG/ML
20 INJECTION, SOLUTION INTRAVENOUS ONCE
Status: CANCELLED | OUTPATIENT
Start: 2019-08-06

## 2019-05-24 RX ORDER — SODIUM CHLORIDE 9 MG/ML
20 INJECTION, SOLUTION INTRAVENOUS ONCE
Status: CANCELLED | OUTPATIENT
Start: 2019-07-16

## 2019-05-24 RX ORDER — SODIUM CHLORIDE 9 MG/ML
20 INJECTION, SOLUTION INTRAVENOUS ONCE
Status: CANCELLED | OUTPATIENT
Start: 2019-06-25

## 2019-05-27 DIAGNOSIS — E11.9 TYPE 2 DIABETES MELLITUS WITHOUT COMPLICATION, WITHOUT LONG-TERM CURRENT USE OF INSULIN (HCC): ICD-10-CM

## 2019-05-28 ENCOUNTER — OFFICE VISIT (OUTPATIENT)
Dept: FAMILY MEDICINE CLINIC | Facility: CLINIC | Age: 64
End: 2019-05-28

## 2019-05-28 VITALS
HEIGHT: 66 IN | BODY MASS INDEX: 26.43 KG/M2 | SYSTOLIC BLOOD PRESSURE: 122 MMHG | DIASTOLIC BLOOD PRESSURE: 70 MMHG | RESPIRATION RATE: 18 BRPM | WEIGHT: 164.44 LBS | HEART RATE: 91 BPM | TEMPERATURE: 97.5 F | OXYGEN SATURATION: 95 %

## 2019-05-28 DIAGNOSIS — E11.9 CONTROLLED TYPE 2 DIABETES MELLITUS WITHOUT COMPLICATION, WITHOUT LONG-TERM CURRENT USE OF INSULIN (HCC): ICD-10-CM

## 2019-05-28 DIAGNOSIS — Z12.4 CERVICAL CANCER SCREENING: Primary | ICD-10-CM

## 2019-05-28 DIAGNOSIS — J70.0 RADIATION PNEUMONITIS (HCC): ICD-10-CM

## 2019-05-28 DIAGNOSIS — Z79.4 TYPE 2 DIABETES MELLITUS WITH COMPLICATION, WITH LONG-TERM CURRENT USE OF INSULIN (HCC): ICD-10-CM

## 2019-05-28 DIAGNOSIS — E11.8 TYPE 2 DIABETES MELLITUS WITH COMPLICATION, WITH LONG-TERM CURRENT USE OF INSULIN (HCC): ICD-10-CM

## 2019-05-28 PROCEDURE — 83036 HEMOGLOBIN GLYCOSYLATED A1C: CPT | Performed by: FAMILY MEDICINE

## 2019-05-28 PROCEDURE — 99496 TRANSJ CARE MGMT HIGH F2F 7D: CPT | Performed by: FAMILY MEDICINE

## 2019-05-28 RX ORDER — DULOXETIN HYDROCHLORIDE 20 MG/1
CAPSULE, DELAYED RELEASE ORAL
Qty: 60 CAPSULE | Refills: 1 | Status: SHIPPED | OUTPATIENT
Start: 2019-05-28 | End: 2019-05-29

## 2019-05-28 NOTE — PROGRESS NOTES
Assessment/Plan:     Type 2 diabetes mellitus with complication, with long-term current use of insulin (HCC)  Lab Results   Component Value Date    HGBA1C 12.9 12/05/2018       No results for input(s): POCGLU in the last 72 hours.    Blood Sugar Average: Last 72 hrs:  Slightly improved compared to prior  Has appointment with Endo tomorrow so will defer any change in treatment to Endo  Has been on high dose Prednisone over the last 2 weeks     Radiation pneumonitis (HCC)  Complete Prednisone taper as ordered by Pulmonology  Follow up with Pulmonology as scheduled          Problem List Items Addressed This Visit        Endocrine    Type 2 diabetes mellitus with complication, with long-term current use of insulin (HCC)     Lab Results   Component Value Date    HGBA1C 12.9 12/05/2018       No results for input(s): POCGLU in the last 72 hours.    Blood Sugar Average: Last 72 hrs:  Slightly improved compared to prior  Has appointment with Endo tomorrow so will defer any change in treatment to Endo  Has been on high dose Prednisone over the last 2 weeks             Respiratory    Radiation pneumonitis (HCC)     Complete Prednisone taper as ordered by Pulmonology  Follow up with Pulmonology as scheduled            Other Visit Diagnoses     Cervical cancer screening    -  Primary    Relevant Orders    Ambulatory referral to Gynecology    Controlled type 2 diabetes mellitus without complication, without long-term current use of insulin (HCC)        Relevant Orders    POCT hemoglobin A1c           Subjective:     Patient ID: Jolie Mulligan is a 63 y.o. female.     63 y.o.  female patient who presented to the hospital on 5/18/2019 due to SOB and difficulty catching her breath for about 6 weeksf. CT chest done in the ED was consistent with radiation pneumonitis. She was seen and evaluated by Pulmonary and started on long prednisone taper, starting on 60mg once daily and taper 10mg every week until follow up  appointment with Pulmonologist, Dr. Barreto as an outpatient. For her asthma, Pulmonology recommending continued Breo once daily as patient was unable to tolerate Symbicort. Patient  follows up with Dr. Savage outpatient for continued management and treatment of breast cancer, last seen by them 4 days ago, recommended she stay on trastuzumab every 3 weeks until August 6, 2019.  She will continue with anastrozole 1 mg once a day.  She will also stay her to stay on rivaroxaban until she sees [facility] in 3 months with repeat CT scan of the chest for PE protocol.  She is here today for follow up s/p inpatient stay.  Patient states her SOB has better compared to when she first was admitted, but still has wheezing and difficulty catching her breath when going up steps. Symptoms improve greatly with Albuterol nebs.   Complains of bilateral extremity swelling worse as the day goes by. Denies calf pain, chest pain SOB at rest.       Review of Systems   Respiratory: Positive for cough, shortness of breath and wheezing.         As per HPI   Cardiovascular: Positive for leg swelling.   All other systems reviewed and are negative.        Objective:     Physical Exam   Constitutional: She is oriented to person, place, and time. She appears well-developed.   HENT:   Head: Normocephalic.   Right Ear: External ear normal.   Left Ear: External ear normal.   Nose: Nose normal.   Mouth/Throat: Oropharynx is clear and moist.   Eyes: Pupils are equal, round, and reactive to light. Conjunctivae and EOM are normal.   Neck: Normal range of motion. Neck supple. No thyromegaly present.   Cardiovascular: Normal rate, regular rhythm and normal heart sounds.   Pulmonary/Chest: Effort normal and breath sounds normal.   Abdominal: Soft. There is no tenderness. There is no rebound and no guarding.   Musculoskeletal: Normal range of motion.   Neurological: She is alert and oriented to person, place, and time. She has normal reflexes.   Skin: Skin is  "dry.   Psychiatric: She has a normal mood and affect.   Nursing note and vitals reviewed.        Vitals:    05/28/19 1009   BP: 122/70   BP Location: Left arm   Patient Position: Sitting   Cuff Size: Standard   Pulse: 91   Resp: 18   Temp: 97.5 °F (36.4 °C)   TempSrc: Temporal   SpO2: 95%   Weight: 74.6 kg (164 lb 7 oz)   Height: 5' 6\" (1.676 m)       Transitional Care Management Review:  Jolie Mulligan is a 63 y.o. female here for TCM follow up.     During the TCM phone call patient stated:    TCM Call (since 4/27/2019)     Date and time call was made  5/20/2019 11:40 AM    Hospital care reviewed  Records reviewed    Patient [facility]ial[facility]    Date of Admission  05/18/19    Date of discharge  05/20/19    Diagnosis  acute asthma exacerbation    Disposition  Home    Were the patients medications reviewed and updated  No pt was currently out and did not have all her meds or her list with her.  pt was advised to bring list and/or meds to TCM appt scheduled.    Current Symptoms  None      TCM Call (since 4/27/2019)     Post hospital issues  None    Scheduled for follow up?  Yes    Did you obtain your prescribed medications  Yes    Do you need help managing your prescriptions or medications  No    Is transportation to your appointment needed  No    I have advised the patient to call PCP with any new or worsening symptoms  NATHALIE Jones MD      "

## 2019-05-28 NOTE — ASSESSMENT & PLAN NOTE
Lab Results   Component Value Date    HGBA1C 12.9 12/05/2018       No results for input(s): POCGLU in the last 72 hours.    Blood Sugar Average: Last 72 hrs:  Slightly improved compared to prior  Has appointment with Endo tomorrow so will defer any change in treatment to Endo  Has been on high dose Prednisone over the last 2 weeks

## 2019-05-29 ENCOUNTER — CONSULT (OUTPATIENT)
Dept: ENDOCRINOLOGY | Facility: CLINIC | Age: 64
End: 2019-05-29
Payer: MEDICARE

## 2019-05-29 VITALS
HEART RATE: 89 BPM | DIASTOLIC BLOOD PRESSURE: 70 MMHG | BODY MASS INDEX: 26.87 KG/M2 | HEIGHT: 66 IN | SYSTOLIC BLOOD PRESSURE: 110 MMHG | WEIGHT: 167.2 LBS

## 2019-05-29 DIAGNOSIS — E11.8 TYPE 2 DIABETES MELLITUS WITH COMPLICATION, WITH LONG-TERM CURRENT USE OF INSULIN (HCC): ICD-10-CM

## 2019-05-29 DIAGNOSIS — Z79.4 TYPE 2 DIABETES MELLITUS WITH COMPLICATION, WITH LONG-TERM CURRENT USE OF INSULIN (HCC): ICD-10-CM

## 2019-05-29 DIAGNOSIS — E11.65 TYPE 2 DIABETES MELLITUS WITH HYPERGLYCEMIA, WITH LONG-TERM CURRENT USE OF INSULIN (HCC): Primary | ICD-10-CM

## 2019-05-29 DIAGNOSIS — E11.8 TYPE 2 DIABETES MELLITUS WITH COMPLICATION, WITHOUT LONG-TERM CURRENT USE OF INSULIN (HCC): ICD-10-CM

## 2019-05-29 DIAGNOSIS — E78.00 HYPERCHOLESTEROLEMIA: ICD-10-CM

## 2019-05-29 DIAGNOSIS — E03.8 OTHER SPECIFIED HYPOTHYROIDISM: ICD-10-CM

## 2019-05-29 DIAGNOSIS — Z79.4 TYPE 2 DIABETES MELLITUS WITH HYPERGLYCEMIA, WITH LONG-TERM CURRENT USE OF INSULIN (HCC): Primary | ICD-10-CM

## 2019-05-29 DIAGNOSIS — I10 ESSENTIAL HYPERTENSION: ICD-10-CM

## 2019-05-29 PROCEDURE — 99215 OFFICE O/P EST HI 40 MIN: CPT | Performed by: INTERNAL MEDICINE

## 2019-05-31 DIAGNOSIS — E11.8 TYPE 2 DIABETES MELLITUS WITH COMPLICATION, WITH LONG-TERM CURRENT USE OF INSULIN (HCC): Primary | ICD-10-CM

## 2019-05-31 DIAGNOSIS — Z79.4 TYPE 2 DIABETES MELLITUS WITH COMPLICATION, WITH LONG-TERM CURRENT USE OF INSULIN (HCC): Primary | ICD-10-CM

## 2019-06-03 ENCOUNTER — TELEPHONE (OUTPATIENT)
Dept: PULMONOLOGY | Facility: CLINIC | Age: 64
End: 2019-06-03

## 2019-06-04 ENCOUNTER — OFFICE VISIT (OUTPATIENT)
Dept: DIABETES SERVICES | Facility: CLINIC | Age: 64
End: 2019-06-04
Payer: MEDICARE

## 2019-06-04 ENCOUNTER — HOSPITAL ENCOUNTER (OUTPATIENT)
Dept: INFUSION CENTER | Facility: CLINIC | Age: 64
Discharge: HOME | End: 2019-06-04
Payer: MEDICARE

## 2019-06-04 VITALS
WEIGHT: 169.09 LBS | SYSTOLIC BLOOD PRESSURE: 152 MMHG | TEMPERATURE: 97.9 F | RESPIRATION RATE: 18 BRPM | DIASTOLIC BLOOD PRESSURE: 77 MMHG | BODY MASS INDEX: 27.29 KG/M2 | HEART RATE: 94 BPM

## 2019-06-04 VITALS — BODY MASS INDEX: 27.19 KG/M2 | HEIGHT: 66 IN | WEIGHT: 169.2 LBS

## 2019-06-04 DIAGNOSIS — E78.00 HYPERCHOLESTEROLEMIA: ICD-10-CM

## 2019-06-04 DIAGNOSIS — E11.65 TYPE 2 DIABETES MELLITUS WITH HYPERGLYCEMIA, WITH LONG-TERM CURRENT USE OF INSULIN (HCC): ICD-10-CM

## 2019-06-04 DIAGNOSIS — C50.411 MALIGNANT NEOPLASM OF UPPER-OUTER QUADRANT OF RIGHT BREAST IN FEMALE, ESTROGEN RECEPTOR POSITIVE (HCC): Primary | ICD-10-CM

## 2019-06-04 DIAGNOSIS — I10 ESSENTIAL HYPERTENSION: ICD-10-CM

## 2019-06-04 DIAGNOSIS — Z79.4 TYPE 2 DIABETES MELLITUS WITH HYPERGLYCEMIA, WITH LONG-TERM CURRENT USE OF INSULIN (HCC): ICD-10-CM

## 2019-06-04 DIAGNOSIS — Z17.0 MALIGNANT NEOPLASM OF UPPER-OUTER QUADRANT OF RIGHT BREAST IN FEMALE, ESTROGEN RECEPTOR POSITIVE (HCC): Primary | ICD-10-CM

## 2019-06-04 PROCEDURE — 97802 MEDICAL NUTRITION INDIV IN: CPT | Performed by: DIETITIAN, REGISTERED

## 2019-06-04 PROCEDURE — 96413 CHEMO IV INFUSION 1 HR: CPT

## 2019-06-04 RX ORDER — SODIUM CHLORIDE 9 MG/ML
20 INJECTION, SOLUTION INTRAVENOUS ONCE
Status: COMPLETED | OUTPATIENT
Start: 2019-06-04 | End: 2019-06-04

## 2019-06-04 RX ADMIN — TRASTUZUMAB 434 MG: 150 INJECTION, POWDER, LYOPHILIZED, FOR SOLUTION INTRAVENOUS at 09:13

## 2019-06-04 RX ADMIN — SODIUM CHLORIDE 20 ML/HR: 0.9 INJECTION, SOLUTION INTRAVENOUS at 08:49

## 2019-06-04 NOTE — PLAN OF CARE
Problem: Potential for Falls  Goal: Patient will remain free of falls  Description  INTERVENTIONS:  - Assess patient frequently for physical needs  -  Identify cognitive and physical deficits and behaviors that affect risk of falls    -  [place] fall precautions as indicated by assessment   - Educate patient/family on patient safety including physical limitations  - Instruct patient to call for assistance with activity based on assessment  - Modify environment to reduce risk of injury  - Consider OT/PT consult to assist with strengthening/mobility  Outcome: Progressing

## 2019-06-06 ENCOUNTER — TELEPHONE (OUTPATIENT)
Dept: PULMONOLOGY | Facility: CLINIC | Age: 64
End: 2019-06-06

## 2019-06-06 ENCOUNTER — OFFICE VISIT (OUTPATIENT)
Dept: PULMONOLOGY | Facility: CLINIC | Age: 64
End: 2019-06-06
Payer: MEDICARE

## 2019-06-06 VITALS
WEIGHT: 168.8 LBS | SYSTOLIC BLOOD PRESSURE: 144 MMHG | BODY MASS INDEX: 28.82 KG/M2 | TEMPERATURE: 98.2 F | HEART RATE: 97 BPM | HEIGHT: 64 IN | OXYGEN SATURATION: 94 % | DIASTOLIC BLOOD PRESSURE: 72 MMHG | RESPIRATION RATE: 16 BRPM

## 2019-06-06 DIAGNOSIS — J45.50 SEVERE PERSISTENT ASTHMA WITHOUT COMPLICATION: Primary | ICD-10-CM

## 2019-06-06 DIAGNOSIS — J45.21 MILD INTERMITTENT ASTHMA WITH ACUTE EXACERBATION: ICD-10-CM

## 2019-06-06 PROCEDURE — 99215 OFFICE O/P EST HI 40 MIN: CPT | Performed by: INTERNAL MEDICINE

## 2019-06-06 RX ORDER — BUDESONIDE 0.5 MG/2ML
0.5 INHALANT ORAL 2 TIMES DAILY
Qty: 60 VIAL | Refills: 5 | Status: SHIPPED | OUTPATIENT
Start: 2019-06-06 | End: 2020-04-29

## 2019-06-06 RX ORDER — ALBUTEROL SULFATE 2.5 MG/3ML
2.5 SOLUTION RESPIRATORY (INHALATION) EVERY 6 HOURS PRN
Qty: 90 VIAL | Refills: 5 | Status: SHIPPED | OUTPATIENT
Start: 2019-06-06 | End: 2019-09-27 | Stop reason: SDUPTHER

## 2019-06-06 RX ORDER — PREDNISONE 20 MG/1
20 TABLET ORAL DAILY
Qty: 30 TABLET | Refills: 3 | Status: SHIPPED | OUTPATIENT
Start: 2019-06-06 | End: 2019-08-20

## 2019-06-07 ENCOUNTER — LAB (OUTPATIENT)
Dept: LAB | Facility: HOSPITAL | Age: 64
End: 2019-06-07
Attending: INTERNAL MEDICINE
Payer: MEDICARE

## 2019-06-07 DIAGNOSIS — E11.65 TYPE 2 DIABETES MELLITUS WITH HYPERGLYCEMIA, WITH LONG-TERM CURRENT USE OF INSULIN (HCC): ICD-10-CM

## 2019-06-07 DIAGNOSIS — E03.8 OTHER SPECIFIED HYPOTHYROIDISM: ICD-10-CM

## 2019-06-07 DIAGNOSIS — Z79.4 TYPE 2 DIABETES MELLITUS WITH HYPERGLYCEMIA, WITH LONG-TERM CURRENT USE OF INSULIN (HCC): ICD-10-CM

## 2019-06-07 DIAGNOSIS — E78.00 HYPERCHOLESTEROLEMIA: ICD-10-CM

## 2019-06-07 DIAGNOSIS — I10 ESSENTIAL HYPERTENSION: ICD-10-CM

## 2019-06-07 LAB
ALBUMIN SERPL BCP-MCNC: 3.1 G/DL (ref 3.5–5)
ALP SERPL-CCNC: 66 U/L (ref 46–116)
ALT SERPL W P-5'-P-CCNC: 25 U/L (ref 12–78)
ANION GAP SERPL CALCULATED.3IONS-SCNC: 11 MMOL/L (ref 4–13)
AST SERPL W P-5'-P-CCNC: 11 U/L (ref 5–45)
BILIRUB SERPL-MCNC: 0.45 MG/DL (ref 0.2–1)
BUN SERPL-MCNC: 13 MG/DL (ref 5–25)
CALCIUM SERPL-MCNC: 9.4 MG/DL (ref 8.3–10.1)
CHLORIDE SERPL-SCNC: 104 MMOL/L (ref 100–108)
CHOLEST SERPL-MCNC: 265 MG/DL (ref 50–200)
CO2 SERPL-SCNC: 25 MMOL/L (ref 21–32)
CREAT SERPL-MCNC: 0.68 MG/DL (ref 0.6–1.3)
CREAT UR-MCNC: 330 MG/DL
EST. AVERAGE GLUCOSE BLD GHB EST-MCNC: 260 MG/DL
GFR SERPL CREATININE-BSD FRML MDRD: 94 ML/MIN/1.73SQ M
GLUCOSE P FAST SERPL-MCNC: 235 MG/DL (ref 65–99)
HBA1C MFR BLD: 10.7 % (ref 4.2–6.3)
HDLC SERPL-MCNC: 53 MG/DL (ref 40–60)
LDLC SERPL CALC-MCNC: 174 MG/DL (ref 0–100)
MICROALBUMIN UR-MCNC: 68.8 MG/L (ref 0–20)
MICROALBUMIN/CREAT 24H UR: 21 MG/G CREATININE (ref 0–30)
POTASSIUM SERPL-SCNC: 3.8 MMOL/L (ref 3.5–5.3)
PROT SERPL-MCNC: 7.3 G/DL (ref 6.4–8.2)
SODIUM SERPL-SCNC: 140 MMOL/L (ref 136–145)
T4 FREE SERPL-MCNC: 1.01 NG/DL (ref 0.76–1.46)
TRIGL SERPL-MCNC: 188 MG/DL
TSH SERPL DL<=0.05 MIU/L-ACNC: 2.39 UIU/ML (ref 0.36–3.74)

## 2019-06-07 PROCEDURE — 82043 UR ALBUMIN QUANTITATIVE: CPT

## 2019-06-07 PROCEDURE — 84443 ASSAY THYROID STIM HORMONE: CPT

## 2019-06-07 PROCEDURE — 82570 ASSAY OF URINE CREATININE: CPT

## 2019-06-07 PROCEDURE — 84439 ASSAY OF FREE THYROXINE: CPT

## 2019-06-07 PROCEDURE — 80053 COMPREHEN METABOLIC PANEL: CPT

## 2019-06-07 PROCEDURE — 80061 LIPID PANEL: CPT

## 2019-06-07 PROCEDURE — 83036 HEMOGLOBIN GLYCOSYLATED A1C: CPT

## 2019-06-10 ENCOUNTER — TELEPHONE (OUTPATIENT)
Dept: ENDOCRINOLOGY | Facility: CLINIC | Age: 64
End: 2019-06-10

## 2019-06-12 ENCOUNTER — TREATMENT (OUTPATIENT)
Dept: PULMONOLOGY | Facility: CLINIC | Age: 64
End: 2019-06-12

## 2019-06-13 ENCOUNTER — TREATMENT (OUTPATIENT)
Dept: PULMONOLOGY | Facility: CLINIC | Age: 64
End: 2019-06-13

## 2019-06-13 ENCOUNTER — TELEPHONE (OUTPATIENT)
Dept: PULMONOLOGY | Facility: CLINIC | Age: 64
End: 2019-06-13

## 2019-06-13 DIAGNOSIS — T78.2XXA ANAPHYLAXIS, INITIAL ENCOUNTER: Primary | ICD-10-CM

## 2019-06-13 RX ORDER — EPINEPHRINE 0.3 MG/.3ML
0.3 INJECTION SUBCUTANEOUS ONCE
Qty: 0.6 ML | Refills: 0 | Status: SHIPPED | OUTPATIENT
Start: 2019-06-13 | End: 2020-03-17

## 2019-06-13 RX ORDER — EPINEPHRINE 1 MG/ML
0.3 INJECTION, SOLUTION, CONCENTRATE INTRAVENOUS AS NEEDED
Status: CANCELLED | OUTPATIENT
Start: 2019-06-25

## 2019-06-17 ENCOUNTER — TELEPHONE (OUTPATIENT)
Dept: PULMONOLOGY | Facility: CLINIC | Age: 64
End: 2019-06-17

## 2019-06-18 ENCOUNTER — HOSPITAL ENCOUNTER (OUTPATIENT)
Dept: MAMMOGRAPHY | Facility: CLINIC | Age: 64
Discharge: HOME | End: 2019-06-18
Payer: MEDICARE

## 2019-06-18 DIAGNOSIS — Z17.0 MALIGNANT NEOPLASM OF RIGHT BREAST IN FEMALE, ESTROGEN RECEPTOR POSITIVE, UNSPECIFIED SITE OF BREAST (HCC): ICD-10-CM

## 2019-06-18 DIAGNOSIS — C50.911 MALIGNANT NEOPLASM OF RIGHT BREAST IN FEMALE, ESTROGEN RECEPTOR POSITIVE, UNSPECIFIED SITE OF BREAST (HCC): ICD-10-CM

## 2019-06-18 PROCEDURE — 77066 DX MAMMO INCL CAD BI: CPT

## 2019-06-18 PROCEDURE — G0279 TOMOSYNTHESIS, MAMMO: HCPCS

## 2019-06-20 ENCOUNTER — TELEPHONE (OUTPATIENT)
Dept: PULMONOLOGY | Facility: CLINIC | Age: 64
End: 2019-06-20

## 2019-06-21 ENCOUNTER — HOSPITAL ENCOUNTER (EMERGENCY)
Facility: HOSPITAL | Age: 64
Discharge: HOME | End: 2019-06-21
Attending: EMERGENCY MEDICINE | Admitting: EMERGENCY MEDICINE
Payer: MEDICARE

## 2019-06-21 ENCOUNTER — APPOINTMENT (EMERGENCY)
Dept: RADIOLOGY | Facility: HOSPITAL | Age: 64
End: 2019-06-21
Payer: MEDICARE

## 2019-06-21 ENCOUNTER — APPOINTMENT (EMERGENCY)
Dept: NON INVASIVE DIAGNOSTICS | Facility: HOSPITAL | Age: 64
End: 2019-06-21
Payer: MEDICARE

## 2019-06-21 VITALS
WEIGHT: 169.09 LBS | BODY MASS INDEX: 29.02 KG/M2 | DIASTOLIC BLOOD PRESSURE: 53 MMHG | OXYGEN SATURATION: 96 % | TEMPERATURE: 98.7 F | HEART RATE: 88 BPM | RESPIRATION RATE: 18 BRPM | SYSTOLIC BLOOD PRESSURE: 115 MMHG

## 2019-06-21 DIAGNOSIS — M54.16 LUMBAR RADICULOPATHY: Primary | ICD-10-CM

## 2019-06-21 PROCEDURE — 99284 EMERGENCY DEPT VISIT MOD MDM: CPT

## 2019-06-21 PROCEDURE — 93970 EXTREMITY STUDY: CPT | Performed by: SURGERY

## 2019-06-21 PROCEDURE — 93970 EXTREMITY STUDY: CPT

## 2019-06-21 PROCEDURE — 72100 X-RAY EXAM L-S SPINE 2/3 VWS: CPT

## 2019-06-21 PROCEDURE — 99284 EMERGENCY DEPT VISIT MOD MDM: CPT | Performed by: EMERGENCY MEDICINE

## 2019-06-21 RX ORDER — DIAZEPAM 5 MG/1
5 TABLET ORAL ONCE
Status: COMPLETED | OUTPATIENT
Start: 2019-06-21 | End: 2019-06-21

## 2019-06-21 RX ORDER — METHOCARBAMOL 500 MG/1
500 TABLET, FILM COATED ORAL 2 TIMES DAILY
Qty: 20 TABLET | Refills: 0 | Status: SHIPPED | OUTPATIENT
Start: 2019-06-21 | End: 2019-08-20

## 2019-06-21 RX ORDER — TRAMADOL HYDROCHLORIDE 50 MG/1
50 TABLET ORAL ONCE
Status: COMPLETED | OUTPATIENT
Start: 2019-06-21 | End: 2019-06-21

## 2019-06-21 RX ORDER — METHOCARBAMOL 500 MG/1
500 TABLET, FILM COATED ORAL ONCE
Status: COMPLETED | OUTPATIENT
Start: 2019-06-21 | End: 2019-06-21

## 2019-06-21 RX ORDER — LIDOCAINE 50 MG/G
1 PATCH TOPICAL ONCE
Status: DISCONTINUED | OUTPATIENT
Start: 2019-06-21 | End: 2019-06-21 | Stop reason: HOSPADM

## 2019-06-21 RX ADMIN — LIDOCAINE 1 PATCH: 50 PATCH TOPICAL at 12:48

## 2019-06-21 RX ADMIN — TRAMADOL HYDROCHLORIDE 50 MG: 50 TABLET, COATED ORAL at 10:17

## 2019-06-21 RX ADMIN — METHOCARBAMOL TABLETS 500 MG: 500 TABLET, COATED ORAL at 12:50

## 2019-06-21 RX ADMIN — DIAZEPAM 5 MG: 5 TABLET ORAL at 10:17

## 2019-06-24 DIAGNOSIS — E11.8 TYPE 2 DIABETES MELLITUS WITH COMPLICATION, WITH LONG-TERM CURRENT USE OF INSULIN (HCC): ICD-10-CM

## 2019-06-24 DIAGNOSIS — Z79.4 TYPE 2 DIABETES MELLITUS WITH COMPLICATION, WITH LONG-TERM CURRENT USE OF INSULIN (HCC): ICD-10-CM

## 2019-06-24 RX ORDER — DULOXETIN HYDROCHLORIDE 30 MG/1
CAPSULE, DELAYED RELEASE ORAL
Qty: 60 CAPSULE | Refills: 2 | Status: SHIPPED | OUTPATIENT
Start: 2019-06-24 | End: 2019-09-03 | Stop reason: SDUPTHER

## 2019-06-25 ENCOUNTER — HOSPITAL ENCOUNTER (OUTPATIENT)
Dept: INFUSION CENTER | Facility: CLINIC | Age: 64
Discharge: HOME | End: 2019-06-25
Payer: MEDICARE

## 2019-06-25 VITALS
WEIGHT: 169.75 LBS | BODY MASS INDEX: 29.14 KG/M2 | RESPIRATION RATE: 18 BRPM | TEMPERATURE: 97.3 F | DIASTOLIC BLOOD PRESSURE: 85 MMHG | SYSTOLIC BLOOD PRESSURE: 142 MMHG | HEART RATE: 86 BPM

## 2019-06-25 DIAGNOSIS — J45.50 SEVERE PERSISTENT ASTHMA WITHOUT COMPLICATION: Primary | ICD-10-CM

## 2019-06-25 DIAGNOSIS — Z17.0 MALIGNANT NEOPLASM OF UPPER-OUTER QUADRANT OF RIGHT BREAST IN FEMALE, ESTROGEN RECEPTOR POSITIVE (HCC): ICD-10-CM

## 2019-06-25 DIAGNOSIS — C50.411 MALIGNANT NEOPLASM OF UPPER-OUTER QUADRANT OF RIGHT BREAST IN FEMALE, ESTROGEN RECEPTOR POSITIVE (HCC): Primary | ICD-10-CM

## 2019-06-25 DIAGNOSIS — Z17.0 MALIGNANT NEOPLASM OF UPPER-OUTER QUADRANT OF RIGHT BREAST IN FEMALE, ESTROGEN RECEPTOR POSITIVE (HCC): Primary | ICD-10-CM

## 2019-06-25 DIAGNOSIS — C50.411 MALIGNANT NEOPLASM OF UPPER-OUTER QUADRANT OF RIGHT BREAST IN FEMALE, ESTROGEN RECEPTOR POSITIVE (HCC): ICD-10-CM

## 2019-06-25 PROCEDURE — 96413 CHEMO IV INFUSION 1 HR: CPT

## 2019-06-25 RX ORDER — SODIUM CHLORIDE 9 MG/ML
20 INJECTION, SOLUTION INTRAVENOUS ONCE
Status: DISCONTINUED | OUTPATIENT
Start: 2019-06-25 | End: 2019-06-28 | Stop reason: HOSPADM

## 2019-06-25 RX ORDER — SODIUM CHLORIDE 9 MG/ML
20 INJECTION, SOLUTION INTRAVENOUS ONCE
Status: CANCELLED | OUTPATIENT
Start: 2019-06-25

## 2019-06-25 RX ORDER — SODIUM CHLORIDE 9 MG/ML
20 INJECTION, SOLUTION INTRAVENOUS ONCE
Status: COMPLETED | OUTPATIENT
Start: 2019-06-25 | End: 2019-06-25

## 2019-06-25 RX ADMIN — TRASTUZUMAB 450 MG: 150 INJECTION, POWDER, LYOPHILIZED, FOR SOLUTION INTRAVENOUS at 10:35

## 2019-06-25 RX ADMIN — SODIUM CHLORIDE 20 ML/HR: 0.9 INJECTION, SOLUTION INTRAVENOUS at 09:00

## 2019-06-25 NOTE — PROGRESS NOTES
Patient tolerated Herceptin infusion well  Denies any discomfort  Pt is aware of all future appointments   Refused AVS

## 2019-06-25 NOTE — PROGRESS NOTES
Per Dr Marie Hunger office Xolair not given today pending patient's insurance prior auth to obtain an epi pen  Pt advise to call and reschedule once she has her epi pen  Verbalized understanding

## 2019-07-05 ENCOUNTER — HOSPITAL ENCOUNTER (OUTPATIENT)
Dept: INFUSION CENTER | Facility: CLINIC | Age: 64
Discharge: HOME | End: 2019-07-05
Payer: MEDICARE

## 2019-07-05 VITALS
TEMPERATURE: 98.1 F | DIASTOLIC BLOOD PRESSURE: 77 MMHG | HEART RATE: 79 BPM | RESPIRATION RATE: 18 BRPM | SYSTOLIC BLOOD PRESSURE: 149 MMHG

## 2019-07-05 DIAGNOSIS — J45.50 SEVERE PERSISTENT ASTHMA WITHOUT COMPLICATION: Primary | ICD-10-CM

## 2019-07-05 PROCEDURE — 96372 THER/PROPH/DIAG INJ SC/IM: CPT

## 2019-07-05 RX ORDER — EPINEPHRINE 1 MG/ML
0.3 INJECTION, SOLUTION, CONCENTRATE INTRAVENOUS AS NEEDED
Status: CANCELLED | OUTPATIENT
Start: 2019-07-19

## 2019-07-05 RX ADMIN — OMALIZUMAB 375 MG: 150 INJECTION, SOLUTION SUBCUTANEOUS at 12:44

## 2019-07-05 NOTE — PLAN OF CARE
Problem: Potential for Falls  Goal: Patient will remain free of falls  Description  INTERVENTIONS:  - Assess patient frequently for physical needs  -  Identify cognitive and physical deficits and behaviors that affect risk of falls    -  [place] fall precautions as indicated by assessment   - Educate patient/family on patient safety including physical limitations  - Instruct patient to call for assistance with activity based on assessment  - Modify environment to reduce risk of injury  - Consider OT/PT consult to assist with strengthening/mobility  Outcome: Progressing

## 2019-07-05 NOTE — PROGRESS NOTES
Patient tolerated her xolair injection well and remained at the infusion center for 2 hours of observation  She did not have any adverse reactions  Patient had epi pen at the bedside  Schedule and use of an epi pen were reviewed with patient and her daughter

## 2019-07-16 ENCOUNTER — HOSPITAL ENCOUNTER (OUTPATIENT)
Dept: INFUSION CENTER | Facility: CLINIC | Age: 64
Discharge: HOME | End: 2019-07-16
Payer: MEDICARE

## 2019-07-16 VITALS
BODY MASS INDEX: 28.95 KG/M2 | RESPIRATION RATE: 18 BRPM | HEART RATE: 80 BPM | SYSTOLIC BLOOD PRESSURE: 151 MMHG | TEMPERATURE: 96.7 F | WEIGHT: 168.65 LBS | DIASTOLIC BLOOD PRESSURE: 79 MMHG

## 2019-07-16 DIAGNOSIS — C50.411 MALIGNANT NEOPLASM OF UPPER-OUTER QUADRANT OF RIGHT BREAST IN FEMALE, ESTROGEN RECEPTOR POSITIVE (HCC): Primary | ICD-10-CM

## 2019-07-16 DIAGNOSIS — Z17.0 MALIGNANT NEOPLASM OF UPPER-OUTER QUADRANT OF RIGHT BREAST IN FEMALE, ESTROGEN RECEPTOR POSITIVE (HCC): Primary | ICD-10-CM

## 2019-07-16 PROCEDURE — 96413 CHEMO IV INFUSION 1 HR: CPT

## 2019-07-16 RX ORDER — SODIUM CHLORIDE 9 MG/ML
20 INJECTION, SOLUTION INTRAVENOUS ONCE
Status: COMPLETED | OUTPATIENT
Start: 2019-07-16 | End: 2019-07-16

## 2019-07-16 RX ADMIN — TRASTUZUMAB 450 MG: 150 INJECTION, POWDER, LYOPHILIZED, FOR SOLUTION INTRAVENOUS at 08:46

## 2019-07-16 RX ADMIN — SODIUM CHLORIDE 20 ML/HR: 9 INJECTION, SOLUTION INTRAVENOUS at 08:31

## 2019-07-16 NOTE — PROGRESS NOTES
Patient tolerated Herceptin infusion well  Denies any discomfort  Pt is aware of all future appointments

## 2019-07-16 NOTE — PLAN OF CARE
Problem: Potential for Falls  Goal: Patient will remain free of falls  Description  INTERVENTIONS:  - Assess patient frequently for physical needs  -  Identify cognitive and physical deficits and behaviors that affect risk of falls    -  [place] fall precautions as indicated by assessment   - Educate patient/family on patient safety including physical limitations  - Instruct patient to call for assistance with activity based on assessment  - Modify environment to reduce risk of injury  - Consider OT/PT consult to assist with strengthening/mobility  Outcome: Progressing

## 2019-07-19 ENCOUNTER — HOSPITAL ENCOUNTER (OUTPATIENT)
Dept: INFUSION CENTER | Facility: CLINIC | Age: 64
Discharge: HOME | End: 2019-07-19
Payer: MEDICARE

## 2019-07-19 VITALS
HEART RATE: 80 BPM | SYSTOLIC BLOOD PRESSURE: 142 MMHG | TEMPERATURE: 97.2 F | RESPIRATION RATE: 18 BRPM | DIASTOLIC BLOOD PRESSURE: 76 MMHG

## 2019-07-19 DIAGNOSIS — J45.50 SEVERE PERSISTENT ASTHMA WITHOUT COMPLICATION: Primary | ICD-10-CM

## 2019-07-19 DIAGNOSIS — C50.411 MALIGNANT NEOPLASM OF UPPER-OUTER QUADRANT OF RIGHT BREAST IN FEMALE, ESTROGEN RECEPTOR POSITIVE (HCC): ICD-10-CM

## 2019-07-19 DIAGNOSIS — Z17.0 MALIGNANT NEOPLASM OF UPPER-OUTER QUADRANT OF RIGHT BREAST IN FEMALE, ESTROGEN RECEPTOR POSITIVE (HCC): ICD-10-CM

## 2019-07-19 PROCEDURE — 96372 THER/PROPH/DIAG INJ SC/IM: CPT

## 2019-07-19 RX ORDER — EPINEPHRINE 1 MG/ML
0.3 INJECTION, SOLUTION, CONCENTRATE INTRAVENOUS AS NEEDED
Status: DISCONTINUED | OUTPATIENT
Start: 2019-07-19 | End: 2019-07-22 | Stop reason: HOSPADM

## 2019-07-19 RX ORDER — EPINEPHRINE 1 MG/ML
0.3 INJECTION, SOLUTION, CONCENTRATE INTRAVENOUS AS NEEDED
Status: CANCELLED | OUTPATIENT
Start: 2019-08-02

## 2019-07-19 RX ADMIN — OMALIZUMAB 375 MG: 150 INJECTION, SOLUTION SUBCUTANEOUS at 10:06

## 2019-07-19 NOTE — PROGRESS NOTES
Pt  Discharge without adverse event  Pt  States she has epi pen  Pt  Understands delayed reaction may be treated with epi pen  Future appointments reviewed  AVS provided

## 2019-07-19 NOTE — PROGRESS NOTES
RN received return call from [facility] stating per Dr Kwame Cr, may observe 30 minutes; beacon order to follow

## 2019-07-19 NOTE — PROGRESS NOTES
Pt denies new symptoms or concerns today  Xolair ordered today for severe asthma  RN questioned observation time after Xolair speaking to [employer]  Ivy plans to discuss with attending physician and will call back

## 2019-07-28 DIAGNOSIS — E11.9 TYPE 2 DIABETES MELLITUS WITHOUT COMPLICATION, WITHOUT LONG-TERM CURRENT USE OF INSULIN (HCC): ICD-10-CM

## 2019-07-29 RX ORDER — DULOXETIN HYDROCHLORIDE 20 MG/1
CAPSULE, DELAYED RELEASE ORAL
Qty: 60 CAPSULE | Refills: 1 | OUTPATIENT
Start: 2019-07-29

## 2019-08-02 ENCOUNTER — HOSPITAL ENCOUNTER (OUTPATIENT)
Dept: INFUSION CENTER | Facility: CLINIC | Age: 64
Discharge: HOME | End: 2019-08-02
Payer: MEDICARE

## 2019-08-02 VITALS
HEART RATE: 88 BPM | DIASTOLIC BLOOD PRESSURE: 81 MMHG | TEMPERATURE: 96.9 F | SYSTOLIC BLOOD PRESSURE: 133 MMHG | RESPIRATION RATE: 16 BRPM

## 2019-08-02 DIAGNOSIS — J45.50 SEVERE PERSISTENT ASTHMA WITHOUT COMPLICATION: Primary | ICD-10-CM

## 2019-08-02 PROCEDURE — 96372 THER/PROPH/DIAG INJ SC/IM: CPT

## 2019-08-02 RX ORDER — EPINEPHRINE 1 MG/ML
0.3 INJECTION, SOLUTION, CONCENTRATE INTRAVENOUS AS NEEDED
Status: DISCONTINUED | OUTPATIENT
Start: 2019-08-02 | End: 2019-08-05 | Stop reason: HOSPADM

## 2019-08-02 RX ORDER — EPINEPHRINE 1 MG/ML
0.3 INJECTION, SOLUTION, CONCENTRATE INTRAVENOUS AS NEEDED
Status: CANCELLED | OUTPATIENT
Start: 2019-08-14

## 2019-08-02 RX ADMIN — OMALIZUMAB 375 MG: 150 INJECTION, SOLUTION SUBCUTANEOUS at 15:18

## 2019-08-02 NOTE — PROGRESS NOTES
Patient arrived on unit for Xolair  Denies any discomforts  Tolerated 3 injections 1 in R and 2  L arms  Remained 30 minutes for observation  No discomforts  Aware of next appointment   AVS given to patient

## 2019-08-06 ENCOUNTER — HOSPITAL ENCOUNTER (OUTPATIENT)
Dept: INFUSION CENTER | Facility: CLINIC | Age: 64
Discharge: HOME | End: 2019-08-06
Payer: MEDICARE

## 2019-08-06 VITALS
HEART RATE: 82 BPM | DIASTOLIC BLOOD PRESSURE: 76 MMHG | WEIGHT: 169.31 LBS | BODY MASS INDEX: 29.06 KG/M2 | TEMPERATURE: 96.4 F | RESPIRATION RATE: 16 BRPM | SYSTOLIC BLOOD PRESSURE: 149 MMHG

## 2019-08-06 DIAGNOSIS — Z17.0 MALIGNANT NEOPLASM OF UPPER-OUTER QUADRANT OF RIGHT BREAST IN FEMALE, ESTROGEN RECEPTOR POSITIVE (HCC): Primary | ICD-10-CM

## 2019-08-06 DIAGNOSIS — C50.411 MALIGNANT NEOPLASM OF UPPER-OUTER QUADRANT OF RIGHT BREAST IN FEMALE, ESTROGEN RECEPTOR POSITIVE (HCC): Primary | ICD-10-CM

## 2019-08-06 PROCEDURE — 96413 CHEMO IV INFUSION 1 HR: CPT

## 2019-08-06 RX ORDER — SODIUM CHLORIDE 9 MG/ML
20 INJECTION, SOLUTION INTRAVENOUS ONCE
Status: COMPLETED | OUTPATIENT
Start: 2019-08-06 | End: 2019-08-06

## 2019-08-06 RX ADMIN — TRASTUZUMAB 450 MG: 150 INJECTION, POWDER, LYOPHILIZED, FOR SOLUTION INTRAVENOUS at 09:05

## 2019-08-06 RX ADMIN — SODIUM CHLORIDE 20 ML/HR: 0.9 INJECTION, SOLUTION INTRAVENOUS at 08:45

## 2019-08-06 NOTE — PLAN OF CARE
Problem: Potential for Falls  Goal: Patient will remain free of falls  Description  INTERVENTIONS:  - Assess patient frequently for physical needs  -  Identify cognitive and physical deficits and behaviors that affect risk of falls    -  [place] fall precautions as indicated by assessment   - Educate patient/family on patient safety including physical limitations  - Instruct patient to call for assistance with activity based on assessment  - Modify environment to reduce risk of injury  - Consider OT/PT consult to assist with strengthening/mobility  Outcome: Progressing

## 2019-08-06 NOTE — PROGRESS NOTES
Patient tolerated Herceptin infusion well  Offers no complaints  Pt is aware of all future appointments

## 2019-08-12 PROBLEM — E11.9 ENCOUNTER FOR DIABETIC FOOT EXAM (HCC): Status: RESOLVED | Noted: 2018-09-04 | Resolved: 2019-08-12

## 2019-08-12 PROBLEM — Z51.5 PALLIATIVE CARE PATIENT: Status: RESOLVED | Noted: 2018-12-10 | Resolved: 2019-08-12

## 2019-08-12 PROBLEM — R10.9 ABDOMINAL PAIN: Status: RESOLVED | Noted: 2018-11-08 | Resolved: 2019-08-12

## 2019-08-12 PROBLEM — K59.00 CONSTIPATION: Status: RESOLVED | Noted: 2018-06-13 | Resolved: 2019-08-12

## 2019-08-12 PROBLEM — F51.01 PRIMARY INSOMNIA: Status: RESOLVED | Noted: 2018-06-13 | Resolved: 2019-08-12

## 2019-08-12 PROBLEM — R93.89 ABNORMAL CHEST X-RAY: Status: RESOLVED | Noted: 2019-04-05 | Resolved: 2019-08-12

## 2019-08-12 PROBLEM — R10.13 EPIGASTRIC PAIN: Status: RESOLVED | Noted: 2018-08-08 | Resolved: 2019-08-12

## 2019-08-14 ENCOUNTER — HOSPITAL ENCOUNTER (OUTPATIENT)
Dept: INFUSION CENTER | Facility: CLINIC | Age: 64
Discharge: HOME | End: 2019-08-14

## 2019-08-16 ENCOUNTER — OFFICE VISIT (OUTPATIENT)
Dept: SURGICAL ONCOLOGY | Facility: CLINIC | Age: 64
End: 2019-08-16
Payer: MEDICARE

## 2019-08-16 VITALS
TEMPERATURE: 98.3 F | HEART RATE: 65 BPM | RESPIRATION RATE: 16 BRPM | BODY MASS INDEX: 29.18 KG/M2 | SYSTOLIC BLOOD PRESSURE: 120 MMHG | DIASTOLIC BLOOD PRESSURE: 72 MMHG | WEIGHT: 170 LBS

## 2019-08-16 DIAGNOSIS — C50.911 MALIGNANT NEOPLASM OF RIGHT BREAST IN FEMALE, ESTROGEN RECEPTOR POSITIVE, UNSPECIFIED SITE OF BREAST (HCC): Primary | ICD-10-CM

## 2019-08-16 DIAGNOSIS — Z17.0 MALIGNANT NEOPLASM OF RIGHT BREAST IN FEMALE, ESTROGEN RECEPTOR POSITIVE, UNSPECIFIED SITE OF BREAST (HCC): Primary | ICD-10-CM

## 2019-08-16 PROCEDURE — 99213 OFFICE O/P EST LOW 20 MIN: CPT | Performed by: SURGERY

## 2019-08-16 NOTE — PROGRESS NOTES
Surgical Oncology Follow Up       [facility] SURGICAL ONCOLOGY [facility] [address]    Jolie CruzCintron  1955  [id number]  [address]  [facility] SURGICAL ONCOLOGY [place]  [place] [address]    Chief Complaint   Patient presents with    Follow-up     6 month follow up  Assessment/Plan:    No problem-specific Assessment & Plan notes found for this encounter  Diagnoses and all orders for this visit:    Malignant neoplasm of right breast in female, estrogen receptor positive, unspecified site of breast [facility])        Advance Care Planning/Advance Directives:  Discussed disease status, cancer treatment plans and/or cancer treatment goals with the patient  Malignant neoplasm of right female breast ([facility] [address] )    5/23/2018 Initial Diagnosis     [facility]      5/23/2018 Biopsy     Right breast core biopsy:  DCIS, micropapillary type, low-intermediate nuclear grade  ER 90-95% positive,  W[place] 75-80% positive        6/26/2018 Surgery     RIGHT BREAST NEEDLE LOCALIZATION X2 WITH RIGHT BREAST LUMPECTOMY ( NEEDLE LOC @ 1000) (Right)   ONCOPLASTIC CLOSURE OF LUMPECTOMY CAVITY    Invasive breast carcinoma, NST (aka ductal)  * [place] grade 2 of 3 (total score = 2+2+2 = 6 of 9)   -- tubule formation < 10%, score 3   -- nuclear grade 2 of 3, score 2   -- mitoses ~ 4/mm2, score 2    * invasive carcinoma is multifocally present (A23-1, A32-1, A84-1, A89-1, A100-1), largest focus is 14 millimeters in greatest dimension (A89-1, this focus is graded)  * superior lumpectomy margin (orange-inked) is POSITIVE for invasive carcinoma (A84-1)   * all other lumpectomy margins are free of invasive carcinoma  * estrogen, progesterone & Her-2/negrita receptor studies are undertaken, to be described in an addendum report    - Ductal carcinoma in-situ (DCIS): Present as an extensive component (~85% of total tumor)  * DCIS is co-located with invasive carcinoma surrounding prior needle biopsy site  * DCIS spans 2 6 cm maximal dimension (A62-1) and is present on 27 of 136 total slides examined  * DCIS has cribriform & micropapillary patterns, nuclear grade 2 of 3, with central comedo-type necrosis  * DCIS is present within 0 2 millimeters of the superior lumpectomy margin (orange-inked, A26-1)   * DCIS is present within 0 2 millimeters of the medial lumpectomy margin (yellow-inked, A3-1)   * all other lumpectomy margins are free of DCIS by > 2 millimeters  - Lymph-vascular invasion: no lymph-vascular invasion is unequivocally identified  - Microcalcifications: present in DCIS  % positive, ER 45-55% positive, HER2 by IHC 3+ positive    - Dr Hawa Obando        8/2/2018 Surgery     Right breast lumpectomy reexcision, right axilla SLN biopsy:  A  Submitted as right axillary sentinel node:  - Seven lymph nodes are identified showing no metastatic tumor      B   Right breast lumpectomy reexcision:  - Abundant reactive changes are seen around the previous lumpectomy cavity   - No residual atypia or malignancy is seen            8/2/2018 -  Cancer Staged     Stage IA - pT1c, pN0, G2         9/25/2018 - 8/6/2019 Chemotherapy     Weekly Paclitaxol and trastuzumab x12, until December 11, 2018 followed by trastuzumab monotherapy 6 milligram/kilogram every 3 weeks    - Dr Christiano Garcia        1/9/2019 - 2/19/2019 Radiation     Plan ID Energy Fractions Dose per Fraction (cGy) Dose Correction (cGy) Total Dose Delivered (cGy) Elapsed Days   R Boost e 16E 5 / 5 200 0 1,000 6   Right Breast 6X 25 / 25 200 0 5,000 29     - Dr Yaya Lisa          Malignant neoplasm of upper-outer quadrant of right breast in female, estrogen receptor positive ([facility] [address] )    7/19/2018 Initial Diagnosis     Malignant neoplasm of upper-outer quadrant of right breast in female, estrogen receptor positive ([facility] [address] )      12/17/2018 -  Chemotherapy     trastuzumab (HERCEPTIN) 579 mg in sodium chloride 0 9 % 250 mL chemo infusion, 8 mg/kg, Intravenous, Once, 12 of 12 cycles  Administration: 434 mg (5/14/2019), 434 mg (6/4/2019), 450 mg (7/16/2019), 450 mg (8/6/2019), 450 mg (6/25/2019)         History of Present Illness: The patient is a 60-year-old woman history of stage I breast cancer, right breast, status post lumpectomy, radiation, and now presently on anastrozole   -Interval History:  She is here for surveillance visit  She had a mammogram in anticipation of today's visit  No major complaints to report since last visit  Review of Systems:  Review of Systems   Constitutional: Negative  HENT: Negative  Eyes: Negative  Respiratory: Negative  Cardiovascular: Negative  Gastrointestinal: Negative  Endocrine: Negative  Genitourinary: Negative  Musculoskeletal: Negative  Skin: Negative  Allergic/Immunologic: Negative  Neurological: Negative  Hematological: Negative  Psychiatric/Behavioral: Negative          Patient Active Problem List   Diagnosis    Type 2 diabetes mellitus with complication, with long-term current use of insulin ([address] )    Asthma    Essential hypertension    Other specified hypothyroidism    Chest pain    Palpitations    Chronic bilateral low back pain without sciatica    Neck pain    Malignant neoplasm of right female breast ([address] )    Malignant neoplasm of upper-outer quadrant of right breast in female, estrogen receptor positive ([address] )    Hiatal hernia    Screening for colon cancer    Esophageal dysphagia    Cellulitis of right axilla    Chronic pain of right knee    Hypercholesterolemia    Hypothyroid    Hypertension    Bilateral pulmonary embolism (HCC)    Radiation pneumonitis ([address] )    Type 2 diabetes mellitus with hyperglycemia, with long-term current use of insulin ([address] )     Past Medical History:   Diagnosis Date    Abdominal infection ([address] )     h-pylori    Abnormal chest x-ray 4/5/2019    Asthma     [facility] 06/26/2018    Cancer [facility]     BREAST    Diabetes mellitus ([facility] )     blood sugar 199 @ 735    Hiatal hernia     History of chemotherapy     History of radiation therapy     Hypercholesterolemia     Hypertension     Hypothyroid     Renal disorder     Rheumatoid arthritis ([facility] )      Past Surgical History:   Procedure Laterality Date    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    BREAST LUMPECTOMY Right 6/26/2018    Procedure: RIGHT BREAST NEEDLE LOCALIZATION X2 WITH RIGHT BREAST LUMPECTOMY ( NEEDLE LOC @ 1000); Surgeon: Jayson Quesada MD;  Location: [facility];  Service: Surgical Oncology    BREAST LUMPECTOMY Right 8/2/2018    Procedure: LYMPHOSCINITGRAPHY INTRAOPERATIVE LYMPHATIC MAPPING , RIGHT SENTINEL NODE BIOPSY, REEXCISION  RIGHT BREAST LUMPECTOMY CAVITY (SUPERIOR MARGIN); Surgeon: Jayson Quesada MD;  [facility]: [facility];  Service: Surgical Oncology    BREAST SURGERY Left     CATARACT EXTRACTION, BILATERAL Bilateral     EGD AND COLONOSCOPY N/A 9/5/2018    Procedure: EGD AND COLONOSCOPY;  Surgeon: Nir Samuels MD;  Location[facility] GI LAB; Service: Gastroenterology    [place] GUIDED CENTRAL VENOUS ACCESS DEVICE INSERTION  9/13/2018    KNEE SURGERY Right     MAMMO NEEDLE LOCALIZATION RIGHT (ALL INC) Right 6/26/2018    MAMMO STEREOTACTIC BREAST BIOPSY RIGHT (ALL INC) Right 5/23/2018    RETINAL DETACHMENT SURGERY Right     TUBAL LIGATION      TUNNELED VENOUS PORT PLACEMENT Left 9/13/2018    Procedure: INSERTION VENOUS PORT (PORT-A-CATH);   Surgeon: Jayson Quesada MD;  [facility];  Service: Surgical Oncology     Family History   Problem Relation Age of Onset    Diabetes Mother     Hypertension Mother     Diabetes Father     Hypertension Father     Diabetes Brother     Hypertension Brother     Prostate cancer Brother     Cancer Maternal Grandfather     No Known Problems Sister     No Known Problems Daughter     No Known Problems Sister  No Known Problems Sister     No Known Problems Sister     No Known Problems Sister     No Known Problems Daughter     No Known Problems Daughter      Social History     Socioeconomic History    Marital status:      Spouse name: Not on file    Number of children: Not on file    Years of education: Not on file    Highest education level: Not on file   Occupational History    Not on file   Social Needs    Financial resource strain: Not on file    Food insecurity:     Worry: Not on file     Inability: Not on file    Transportation needs:     Medical: Not on file     Non-medical: Not on file   Tobacco Use    Smoking status: Never Smoker    Smokeless tobacco: Never Used   Substance and Sexual Activity    Alcohol use: No    Drug use: No    Sexual activity: Not on file   Lifestyle    Physical activity:     Days per week: Not on file     Minutes per session: Not on file    Stress: Not on file   Relationships    Social connections:     Talks on phone: Not on file     Gets together: Not on file     Attends Scientologist service: Not on file     Active member of club or organization: Not on file     Attends meetings of clubs or organizations: Not on file     Relationship status: Not on file    Intimate partner violence:     Fear of current or ex partner: Not on file     Emotionally abused: Not on file     Physically abused: Not on file     Forced sexual activity: Not on file   Other Topics Concern    Not on file   Social History Narrative    Not on file       Current Outpatient Medications:     albuterol (2 5 mg/3 mL) 0 083 % nebulizer solution, inhale contents of 1 vial in nebulizer every 4 hours if needed for wheezing or shortness of breath, Disp: 75 mL, Rfl: 0    albuterol (2 5 mg/3 mL) 0 083 % nebulizer solution, Take 1 vial (2 5 mg total) by nebulization every 6 (six) hours as needed for wheezing or shortness of breath, Disp: 90 vial, Rfl: 5    albuterol (PROVENTIL HFA,VENTOLIN HFA) 90 mcg/act inhaler, Inhale 1 puff every 4 (four) hours as needed  , Disp: , Rfl:     amLODIPine-atorvastatin (CADUET) 10-10 MG per tablet, take 1 tablet by mouth once daily, Disp: 90 tablet, Rfl: 1    anastrozole (ARIMIDEX) 1 mg tablet, Take 1 tablet (1 mg total) by mouth daily, Disp: 90 tablet, Rfl: 1    SANG MICROLET LANCETS lancets, Testing three times a day, Disp: 100 each, Rfl: 3    Blood Glucose Monitoring Suppl (ACURA BLOOD GLUCOSE METER) w/Device KIT, Testing blood sugars three times a day, Disp: , Rfl:     Blood Glucose Monitoring Suppl (SANG CONTOUR MONITOR) w/Device KIT, Testing blood sugars three times a day, Disp: 1 kit, Rfl: 0    budesonide (PULMICORT) 0 5 mg/2 mL nebulizer solution, Take 1 vial (0 5 mg total) by nebulization 2 (two) times a day Rinse mouth after use , Disp: 60 vial, Rfl: 5    enalapril (VASOTEC) 20 mg tablet, take 1 tablet by mouth once daily, Disp: 90 tablet, Rfl: 1    gemfibrozil (LOPID) 600 mg tablet, take 1 tablet by mouth twice a day, Disp: 60 tablet, Rfl: 3    glucose blood (SANG CONTOUR TEST) test strip, Testing three times a day, Disp: 100 each, Rfl: 3    Insulin Glargine (TOUJEO SOLOSTAR) 300 units/mL CONCETRATED U-300 injection pen, Inject 60 Units under the skin daily with dinner, Disp: 7 5 mL, Rfl: 0    insulin lispro (HUMALOG KWIKPEN) 100 units/mL injection pen, Inject 16 units TID, Disp: 5 pen, Rfl: 5    Insulin Pen Needle (BD PEN NEEDLE NATALEE U/F) 32G X 4 MM MISC, USE 4/DAY, Disp: 100 each, Rfl: 3    Lancets MISC, Testing three times a day, Disp: , Rfl:     levothyroxine 50 mcg tablet, take 1 tablet by mouth once daily, Disp: 90 tablet, Rfl: 1    omeprazole (PriLOSEC) 20 mg delayed release capsule, take 1 capsule by mouth once daily, Disp: 90 capsule, Rfl: 2    rivaroxaban (XARELTO) 20 mg tablet, Take 1 tablet (20 mg total) by mouth daily, Disp: 30 tablet, Rfl: 3    DULoxetine (CYMBALTA) 30 mg delayed release capsule, take 1 capsule by mouth twice a day (Patient not taking: Reported on 8/16/2019), Disp: 60 capsule, Rfl: 2    EPINEPHrine (EPIPEN) 0 3 mg/0 3 mL SOAJ, Inject 0 3 mL (0 3 mg total) into a muscle once for 1 dose, Disp: 0 6 mL, Rfl: 0    methocarbamol (ROBAXIN) 500 mg tablet, Take 1 tablet (500 mg total) by mouth 2 (two) times a day (Patient not taking: Reported on 8/16/2019), Disp: 20 tablet, Rfl: 0    predniSONE 20 mg tablet, Take 1 tablet (20 mg total) by mouth daily (Patient not taking: Reported on 8/16/2019), Disp: 30 tablet, Rfl: 3  Allergies   Allergen Reactions    Aspirin Hives     Dr  in [address] told patient not to take aspirin r/t kidneys     Tylenol With Codeine #3 [Acetaminophen-Codeine] Rash     Vitals:    08/16/19 1500   BP: 120/72   Pulse: 65   Resp: 16   Temp: 98 3 °F (36 8 °C)       Physical Exam   Constitutional: She is oriented to person, place, and time  She appears well-developed and well-nourished  HENT:   Head: Normocephalic and atraumatic  Right Ear: External ear normal    Left Ear: External ear normal    Eyes: Pupils are equal, round, and reactive to light  EOM are normal    Neck: Normal range of motion  Cardiovascular: Regular rhythm and normal heart sounds  Pulmonary/Chest: Effort normal and breath sounds normal    Abdominal: Soft  Bowel sounds are normal    Musculoskeletal: Normal range of motion  Lymphadenopathy:     She has no cervical adenopathy  Neurological: She is alert and oriented to person, place, and time  Skin: Skin is warm and dry  Breasts: breasts appear normal, no suspicious masses, no skin or nipple changes or axillary nodes  Psychiatric: She has a normal mood and affect   Her behavior is normal  Judgment and thought content normal          Results:  Labs:  CBC, Coags, BMP, Mg, Phos     Imaging    June 18 2019  DIAGNOSIS: Malignant neoplasm of right breast in female, estrogen receptor positive, unspecified site of breast ([address] )     RELEVANT HISTORY:   Family Breast Cancer History: No known family history of breast cancer  Family Medical history: No known relevant family medical history  Personal History: No known relevant hormone history  Surgical history includes breast biopsy and lumpectomy  Medical history includes breast cancer and history of chemotherapy      COMPARISONS: Prior mammograms dated: 06/26/2018, 06/26/2018, 05/23/2018, 05/23/2018, 05/23/2018, 05/15/2018, and 05/15/2018     INDICATION: Roger Beal is a 59 y o  female presenting for annual longer fee  Status post breast conservation surgery and radiation therapy in 2018       TECHNIQUE:  The current study was evaluated with Computer Aided Detection      TISSUE DENSITY:   The breasts are heterogeneously dense, which may obscure small masses       FINDINGS:   RIGHT  1) POST-SURGICAL FINDING: There are post-surgical findings from a previous lumpectomy with radiation seen in the right breast   Postsurgical scarring in the right axilla from sentinel lymph node biopsy is also noted  There has been no interval development of a suspicious mass, microcalcification, or unexplained architectural distortion       LEFT  There are no suspicious masses, grouped microcalcifications or areas of architectural distortion  The skin and nipple areolar complex are unremarkable        IMPRESSION:   1  Benign findings as above            ASSESSMENT/BI-RADS CATEGORY:  Left: 1 - Negative  Right: 2 - Benign  Overall: 2 - Benign     RECOMMENDATION:       - Diagnostic mammogram in 1 year for both breasts  No results found  I reviewed the above laboratory and imaging data  Discussion/Summary:  History of stage I breast cancer, right side status post lumpectomy radiation and presently on anastrozole  Plan on 6 month follow-up for surveillance per guidelines

## 2019-08-16 NOTE — LETTER
August 16, 2019     Mahesh Mattson MD  [address]  [address] [facility]  [place]  [address]  [id number]    Patient: Esteban Martinez   YOB: 1955   Date of Visit: 8/16/2019       Dear Dr Alfaro Covert:    Thank you for referring Esteban Martinez to me for evaluation  Below are my notes for this consultation  If you have questions, please do not hesitate to call me  I look forward to following your patient along with you  Sincerely,        Juanis Morejon MD        CC: MD Morena Flowers MD Salvador Jakob, MD Mirtha Sutherland MD Waldon Homme, MD  8/16/2019  3:21 PM  Sign at close encounter     Surgical Oncology Follow Up       [address]  1955  [id number]  [facility]  [address] [facility] [address]    Chief Complaint   Patient presents with    Follow-up     6 month follow up  Assessment/Plan:    No problem-specific Assessment & Plan notes found for this encounter  Diagnoses and all orders for this visit:    Malignant neoplasm of right breast in female, estrogen receptor positive, unspecified site of breast [facility])        Advance Care Planning/Advance Directives:  Discussed disease status, cancer treatment plans and/or cancer treatment goals with the patient          Malignant neoplasm of right female breast ([facility] [address] )    5/23/2018 Initial Diagnosis     Malignant neoplasm of right female breast ([facility] [address] )      5/23/2018 Biopsy     Right breast core biopsy:  DCIS, micropapillary type, lowintermediate nuclear grade  ER 90-95% positive,  TX 75-80% positive        6/26/2018 Surgery     RIGHT BREAST NEEDLE LOCALIZATION X2 WITH RIGHT BREAST LUMPECTOMY ( NEEDLE LOC @ 1000) (Right)   ONCOPLASTIC CLOSURE OF LUMPECTOMY CAVITY    Invasive breast carcinoma, NST (aka ductal)  * Ana Paula grade 2 of 3 (total score = 2+2+2 = 6 of 9)   -- tubule formation < 10%, score 3   -- nuclear grade 2 of 3, score 2   -- mitoses ~ 4/mm2, score 2    * invasive carcinoma is multifocally present (A23-1, A32-1, A84-1, A89-1, A100-1), largest focus is 14 millimeters in greatest dimension (A89-1, this focus is graded)  * superior lumpectomy margin (orange-inked) is POSITIVE for invasive carcinoma (A84-1)   * all other lumpectomy margins are free of invasive carcinoma  * estrogen, progesterone & Her-2/negrita receptor studies are undertaken, to be described in an addendum report  - Ductal carcinoma in-situ (DCIS): Present as an extensive component (~85% of total tumor)  * DCIS is co-located with invasive carcinoma surrounding prior needle biopsy site  * DCIS spans 2 6 cm maximal dimension (A62-1) and is present on 27 of 136 total slides examined  * DCIS has cribriform & micropapillary patterns, nuclear grade 2 of 3, with central comedo-type necrosis  * DCIS is present within 0 2 millimeters of the superior lumpectomy margin (orange-inked, A26-1)   * DCIS is present within 0 2 millimeters of the medial lumpectomy margin (yellow-inked, A3-1)   * all other lumpectomy margins are free of DCIS by > 2 millimeters  - Lymph-vascular invasion: no lymph-vascular invasion is unequivocally identified  - Microcalcifications: present in DCIS  % positive, ER 45-55% positive, HER2 by IHC 3+ positive    - Dr Len Aguilera        8/2/2018 Surgery     Right breast lumpectomy reexcision, right axilla SLN biopsy:  A  Submitted as right axillary sentinel node:  - Seven lymph nodes are identified showing no metastatic tumor      B   Right breast lumpectomy reexcision:  - Abundant reactive changes are seen around the previous lumpectomy cavity   - No residual atypia or malignancy is seen            8/2/2018 -  Cancer Staged     Stage IA - pT1c, pN0, G2         9/25/2018 - 8/6/2019 Chemotherapy     Weekly Paclitaxol and trastuzumab x12, until 2018 followed by trastuzumab monotherapy 6 milligram/kilogram every 3 weeks    - Dr Verlena Apgar        2019 - 2019 Radiation     Plan ID Energy Fractions Dose per Fraction (cGy) Dose Correction (cGy) Total Dose Delivered (cGy) Elapsed Days   R Boost e 16E 5 /  200 0 1,000 6   Right Breast 6X 25 / 25 200 0 5,000 29     - Dr Valentina Lomax          Malignant neoplasm of upper-outer quadrant of right breast in female, estrogen receptor positive ([facility] [address] )    2018 Initial Diagnosis     Malignant neoplasm of upper-outer quadrant of right breast in female, estrogen receptor positive ([facility] [address] )      2018 -  Chemotherapy     trastuzumab (HERCEPTIN) 579 mg in sodium chloride 0 9 % 250 mL chemo infusion, 8 mg/kg, Intravenous, Once, 12 of 12 cycles  Administration: 434 mg (2019), 434 mg (2019), 450 mg (2019), 450 mg (2019), 450 mg (2019)         History of Present Illness: The patient is a 78-year-old woman history of stage I breast cancer, right breast, status post lumpectomy, radiation, and now presently on anastrozole   -Interval History:  She is here for surveillance visit  She had a mammogram in anticipation of today's visit  No major complaints to report since last visit  Review of Systems:  Review of Systems   Constitutional: Negative  HENT: Negative  Eyes: Negative  Respiratory: Negative  Cardiovascular: Negative  Gastrointestinal: Negative  Endocrine: Negative  Genitourinary: Negative  Musculoskeletal: Negative  Skin: Negative  Allergic/Immunologic: Negative  Neurological: Negative  Hematological: Negative  Psychiatric/Behavioral: Negative          Patient Active Problem List   Diagnosis    Type 2 diabetes mellitus with complication, with long-term current use of insulin ([address] )    Asthma    Essential hypertension    Other specified hypothyroidism    Chest pain    Palpitations    Chronic bilateral low back pain without sciatica    Neck pain    Malignant neoplasm of right female breast ([facility] [address] )    Malignant neoplasm of upper-outer quadrant of right breast in female, estrogen receptor positive ([facility] [address] )    Hiatal hernia    Screening for colon cancer    Esophageal dysphagia    Cellulitis of right axilla    Chronic pain of right knee    Hypercholesterolemia    Hypothyroid    Hypertension    Bilateral pulmonary embolism (HCC)    Radiation pneumonitis ([facility] [address] )    Type 2 diabetes mellitus with hyperglycemia, with long-term current use of insulin (HCC)     Past Medical History:   Diagnosis Date    Abdominal infection ([facility] [address] )     h-pylori    Abnormal chest x-ray 4/5/2019    Asthma     Breast cancer ([facility] [address] ) 06/26/2018    Cancer ([facility] [address] )     BREAST    Diabetes mellitus ([facility] [address] )     blood sugar 199 @ 735    Hiatal hernia     History of chemotherapy     History of radiation therapy     Hypercholesterolemia     Hypertension     Hypothyroid     Renal disorder     Rheumatoid arthritis ([facility] [address] )      Past Surgical History:   Procedure Laterality Date    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    BREAST LUMPECTOMY Right 6/26/2018    Procedure: RIGHT BREAST NEEDLE LOCALIZATION X2 WITH RIGHT BREAST LUMPECTOMY ( NEEDLE LOC @ 1000); Surgeon: Coleman Gonzales MD;  Location: [facility];  Service: Surgical Oncology    BREAST LUMPECTOMY Right 8/2/2018    Procedure: LYMPHOSCINITGRAPHY INTRAOPERATIVE LYMPHATIC MAPPING , RIGHT SENTINEL NODE BIOPSY, REEXCISION  RIGHT BREAST LUMPECTOMY CAVITY (SUPERIOR MARGIN); Surgeon: Coleman Gonzales MD;  [facility]: [facility];  Service: Surgical Oncology    BREAST SURGERY Left     CATARACT EXTRACTION, BILATERAL Bilateral     EGD AND COLONOSCOPY N/A 9/5/2018    Procedure: EGD AND COLONOSCOPY;  Surgeon: Chris Egan MD;  Location: [facility] GI [facility];   Service: Gastroenterology    [facility] GUIDED CENTRAL VENOUS ACCESS DEVICE INSERTION  9/13/2018    KNEE SURGERY Right     MAMMO NEEDLE LOCALIZATION RIGHT (ALL INC) Right 6/26/2018    MAMMO STEREOTACTIC BREAST BIOPSY RIGHT (ALL INC) Right 5/23/2018    RETINAL DETACHMENT SURGERY Right     TUBAL LIGATION      TUNNELED VENOUS PORT PLACEMENT Left 9/13/2018    Procedure: INSERTION VENOUS PORT (PORT-A-CATH);   Surgeon: Yue Lozoya MD;  [facility]: [facility];  Service: Surgical Oncology     Family History   Problem Relation Age of Onset    Diabetes Mother     Hypertension Mother     Diabetes Father     Hypertension Father     Diabetes Brother     Hypertension Brother     Prostate cancer Brother     Cancer Maternal Grandfather     No Known Problems Sister     No Known Problems Daughter     No Known Problems Sister     No Known Problems Sister     No Known Problems Sister     No Known Problems Sister     No Known Problems Daughter     No Known Problems Daughter      Social History     Socioeconomic History    Marital status:      Spouse name: Not on file    Number of children: Not on file    Years of education: Not on file    Highest education level: Not on file   Occupational History    Not on file   Social Needs    Financial resource strain: Not on file    Food insecurity:     Worry: Not on file     Inability: Not on file    Transportation needs:     Medical: Not on file     Non-medical: Not on file   Tobacco Use    Smoking status: Never Smoker    Smokeless tobacco: Never Used   Substance and Sexual Activity    Alcohol use: No    Drug use: No    Sexual activity: Not on file   Lifestyle    Physical activity:     Days per week: Not on file     Minutes per session: Not on file    Stress: Not on file   Relationships    Social connections:     Talks on phone: Not on file     Gets together: Not on file     Attends Rastafari service: Not on file     Active member of club or organization: Not on file     Attends meetings of clubs or organizations: Not on file     Relationship status: Not on file    Intimate partner violence:     Fear of current or ex partner: Not on file     Emotionally abused: Not on file     Physically abused: Not on file     Forced sexual activity: Not on file   Other Topics Concern    Not on file   Social History Narrative    Not on file       Current Outpatient Medications:     albuterol (2 5 mg/3 mL) 0 083 % nebulizer solution, inhale contents of 1 vial in nebulizer every 4 hours if needed for wheezing or shortness of breath, Disp: 75 mL, Rfl: 0    albuterol (2 5 mg/3 mL) 0 083 % nebulizer solution, Take 1 vial (2 5 mg total) by nebulization every 6 (six) hours as needed for wheezing or shortness of breath, Disp: 90 vial, Rfl: 5    albuterol (PROVENTIL HFA,VENTOLIN HFA) 90 mcg/act inhaler, Inhale 1 puff every 4 (four) hours as needed  , Disp: , Rfl:     amLODIPine-atorvastatin (CADUET) 10-10 MG per tablet, take 1 tablet by mouth once daily, Disp: 90 tablet, Rfl: 1    anastrozole (ARIMIDEX) 1 mg tablet, Take 1 tablet (1 mg total) by mouth daily, Disp: 90 tablet, Rfl: 1    SANG MICROLET LANCETS lancets, Testing three times a day, Disp: 100 each, Rfl: 3    Blood Glucose Monitoring Suppl (ACURA BLOOD GLUCOSE METER) w/Device KIT, Testing blood sugars three times a day, Disp: , Rfl:     Blood Glucose Monitoring Suppl (SANG CONTOUR MONITOR) w/Device KIT, Testing blood sugars three times a day, Disp: 1 kit, Rfl: 0    budesonide (PULMICORT) 0 5 mg/2 mL nebulizer solution, Take 1 vial (0 5 mg total) by nebulization 2 (two) times a day Rinse mouth after use , Disp: 60 vial, Rfl: 5    enalapril (VASOTEC) 20 mg tablet, take 1 tablet by mouth once daily, Disp: 90 tablet, Rfl: 1    gemfibrozil (LOPID) 600 mg tablet, take 1 tablet by mouth twice a day, Disp: 60 tablet, Rfl: 3    glucose blood (SANG CONTOUR TEST) test strip, Testing three times a day, Disp: 100 each, Rfl: 3    Insulin Glargine (TOUJEO SOLOSTAR) 300 units/mL CONCETRATED U-300 injection pen, Inject 60 Units under the skin daily with dinner, Disp: 7 5 mL, Rfl: 0    insulin lispro (HUMALOG KWIKPEN) 100 units/mL injection pen, Inject 16 units TID, Disp: 5 pen, Rfl: 5    Insulin Pen Needle (BD PEN NEEDLE NATALEE U/F) 32G X 4 MM MISC, USE 4/DAY, Disp: 100 each, Rfl: 3    Lancets MISC, Testing three times a day, Disp: , Rfl:     levothyroxine 50 mcg tablet, take 1 tablet by mouth once daily, Disp: 90 tablet, Rfl: 1    omeprazole (PriLOSEC) 20 mg delayed release capsule, take 1 capsule by mouth once daily, Disp: 90 capsule, Rfl: 2    rivaroxaban (XARELTO) 20 mg tablet, Take 1 tablet (20 mg total) by mouth daily, Disp: 30 tablet, Rfl: 3    DULoxetine (CYMBALTA) 30 mg delayed release capsule, take 1 capsule by mouth twice a day (Patient not taking: Reported on 8/16/2019), Disp: 60 capsule, Rfl: 2    EPINEPHrine (EPIPEN) 0 3 mg/0 3 mL SOAJ, Inject 0 3 mL (0 3 mg total) into a muscle once for 1 dose, Disp: 0 6 mL, Rfl: 0    methocarbamol (ROBAXIN) 500 mg tablet, Take 1 tablet (500 mg total) by mouth 2 (two) times a day (Patient not taking: Reported on 8/16/2019), Disp: 20 tablet, Rfl: 0    predniSONE 20 mg tablet, Take 1 tablet (20 mg total) by mouth daily (Patient not taking: Reported on 8/16/2019), Disp: 30 tablet, Rfl: 3  Allergies   Allergen Reactions    Aspirin Hives     Dr  in [address] told patient not to take aspirin r/t kidneys     Tylenol With Codeine #3 [Acetaminophen-Codeine] Rash     Vitals:    08/16/19 1500   BP: 120/72   Pulse: 65   Resp: 16   Temp: 98 3 °F (36 8 °C)       Physical Exam   Constitutional: She is oriented to person, place, and time  She appears well-developed and well-nourished  HENT:   Head: Normocephalic and atraumatic  Right Ear: External ear normal    Left Ear: External ear normal    Eyes: Pupils are equal, round, and reactive to light  EOM are normal    Neck: Normal range of motion  Cardiovascular: Regular rhythm and normal heart sounds  Pulmonary/Chest: Effort normal and breath sounds normal    Abdominal: Soft   Bowel sounds are normal    Musculoskeletal: Normal range of motion  Lymphadenopathy:     She has no cervical adenopathy  Neurological: She is alert and oriented to person, place, and time  Skin: Skin is warm and dry  Breasts: breasts appear normal, no suspicious masses, no skin or nipple changes or axillary nodes  Psychiatric: She has a normal mood and affect  Her behavior is normal  Judgment and thought content normal          Results:  Labs:  CBC, Coags, BMP, Mg, Phos     Imaging    June 18 2019  DIAGNOSIS: Malignant neoplasm of right breast in female, estrogen receptor positive, unspecified site of breast ([address] )     RELEVANT HISTORY:   Family Breast Cancer History: No known family history of breast cancer  Family Medical history: No known relevant family medical history  Personal History: No known relevant hormone history  Surgical history includes breast biopsy and lumpectomy  Medical history includes breast cancer and history of chemotherapy      COMPARISONS: Prior mammograms dated: 06/26/2018, 06/26/2018, 05/23/2018, 05/23/2018, 05/23/2018, 05/15/2018, and 05/15/2018     INDICATION: Akua Conner is a 59 y o  female presenting for annual longer fee  Status post breast conservation surgery and radiation therapy in 2018       TECHNIQUE:  The current study was evaluated with Computer Aided Detection      TISSUE DENSITY:   The breasts are heterogeneously dense, which may obscure small masses       FINDINGS:   RIGHT  1) POST-SURGICAL FINDING: There are post-surgical findings from a previous lumpectomy with radiation seen in the right breast   Postsurgical scarring in the right axilla from sentinel lymph node biopsy is also noted  There has been no interval development of a suspicious mass, microcalcification, or unexplained architectural distortion       LEFT  There are no suspicious masses, grouped microcalcifications or areas of architectural distortion   The skin and nipple areolar complex are unremarkable        IMPRESSION:   1  Benign findings as above            ASSESSMENT/BI-RADS CATEGORY:  Left: 1 - Negative  Right: 2 - Benign  Overall: 2 - Benign     RECOMMENDATION:       - Diagnostic mammogram in 1 year for both breasts  No results found  I reviewed the above laboratory and imaging data  Discussion/Summary:  History of stage I breast cancer, right side status post lumpectomy radiation and presently on anastrozole  Plan on 6 month follow-up for surveillance per guidelines

## 2019-08-20 ENCOUNTER — HOSPITAL ENCOUNTER (EMERGENCY)
Facility: HOSPITAL | Age: 64
Discharge: HOME | End: 2019-08-20
Attending: EMERGENCY MEDICINE | Admitting: EMERGENCY MEDICINE
Payer: MEDICARE

## 2019-08-20 ENCOUNTER — APPOINTMENT (EMERGENCY)
Dept: RADIOLOGY | Facility: HOSPITAL | Age: 64
End: 2019-08-20
Payer: MEDICARE

## 2019-08-20 ENCOUNTER — APPOINTMENT (EMERGENCY)
Dept: CT IMAGING | Facility: HOSPITAL | Age: 64
End: 2019-08-20
Payer: MEDICARE

## 2019-08-20 ENCOUNTER — TELEPHONE (OUTPATIENT)
Dept: PULMONOLOGY | Facility: CLINIC | Age: 64
End: 2019-08-20

## 2019-08-20 VITALS
RESPIRATION RATE: 18 BRPM | OXYGEN SATURATION: 97 % | WEIGHT: 170.86 LBS | BODY MASS INDEX: 29.33 KG/M2 | SYSTOLIC BLOOD PRESSURE: 127 MMHG | TEMPERATURE: 98.8 F | HEART RATE: 78 BPM | DIASTOLIC BLOOD PRESSURE: 66 MMHG

## 2019-08-20 DIAGNOSIS — M79.602 LEFT ARM PAIN: ICD-10-CM

## 2019-08-20 DIAGNOSIS — M54.2 NECK PAIN ON LEFT SIDE: ICD-10-CM

## 2019-08-20 DIAGNOSIS — R29.898 WEAKNESS OF LEFT UPPER EXTREMITY: ICD-10-CM

## 2019-08-20 DIAGNOSIS — R51.9 LEFT-SIDED FACE PAIN: Primary | ICD-10-CM

## 2019-08-20 LAB
ALBUMIN SERPL BCP-MCNC: 3.1 G/DL (ref 3.5–5)
ALP SERPL-CCNC: 67 U/L (ref 46–116)
ALT SERPL W P-5'-P-CCNC: 33 U/L (ref 12–78)
ANION GAP SERPL CALCULATED.3IONS-SCNC: 8 MMOL/L (ref 4–13)
AST SERPL W P-5'-P-CCNC: 31 U/L (ref 5–45)
BASOPHILS # BLD AUTO: 0.06 THOUSANDS/ΜL (ref 0–0.1)
BASOPHILS NFR BLD AUTO: 1 % (ref 0–1)
BILIRUB SERPL-MCNC: 0.34 MG/DL (ref 0.2–1)
BUN SERPL-MCNC: 17 MG/DL (ref 5–25)
CALCIUM SERPL-MCNC: 9.7 MG/DL (ref 8.3–10.1)
CHLORIDE SERPL-SCNC: 102 MMOL/L (ref 100–108)
CO2 SERPL-SCNC: 28 MMOL/L (ref 21–32)
CREAT SERPL-MCNC: 0.63 MG/DL (ref 0.6–1.3)
EOSINOPHIL # BLD AUTO: 0.2 THOUSAND/ΜL (ref 0–0.61)
EOSINOPHIL NFR BLD AUTO: 4 % (ref 0–6)
ERYTHROCYTE [DISTWIDTH] IN BLOOD BY AUTOMATED COUNT: 12.9 % (ref 11.6–15.1)
GFR SERPL CREATININE-BSD FRML MDRD: 96 ML/MIN/1.73SQ M
GLUCOSE SERPL-MCNC: 173 MG/DL (ref 65–140)
HCT VFR BLD AUTO: 37.3 % (ref 34.8–46.1)
HGB BLD-MCNC: 12.1 G/DL (ref 11.5–15.4)
IMM GRANULOCYTES # BLD AUTO: 0.01 THOUSAND/UL (ref 0–0.2)
IMM GRANULOCYTES NFR BLD AUTO: 0 % (ref 0–2)
LYMPHOCYTES # BLD AUTO: 1.79 THOUSANDS/ΜL (ref 0.6–4.47)
LYMPHOCYTES NFR BLD AUTO: 37 % (ref 14–44)
MAGNESIUM SERPL-MCNC: 1.3 MG/DL (ref 1.6–2.6)
MCH RBC QN AUTO: 28.7 PG (ref 26.8–34.3)
MCHC RBC AUTO-ENTMCNC: 32.4 G/DL (ref 31.4–37.4)
MCV RBC AUTO: 89 FL (ref 82–98)
MONOCYTES # BLD AUTO: 0.39 THOUSAND/ΜL (ref 0.17–1.22)
MONOCYTES NFR BLD AUTO: 8 % (ref 4–12)
NEUTROPHILS # BLD AUTO: 2.41 THOUSANDS/ΜL (ref 1.85–7.62)
NEUTS SEG NFR BLD AUTO: 50 % (ref 43–75)
NRBC BLD AUTO-RTO: 0 /100 WBCS
PHOSPHATE SERPL-MCNC: 3.8 MG/DL (ref 2.3–4.1)
PLATELET # BLD AUTO: 238 THOUSANDS/UL (ref 149–390)
PMV BLD AUTO: 11.1 FL (ref 8.9–12.7)
POTASSIUM SERPL-SCNC: 4.6 MMOL/L (ref 3.5–5.3)
PROT SERPL-MCNC: 7.4 G/DL (ref 6.4–8.2)
RBC # BLD AUTO: 4.21 MILLION/UL (ref 3.81–5.12)
SODIUM SERPL-SCNC: 138 MMOL/L (ref 136–145)
TROPONIN I SERPL-MCNC: <0.02 NG/ML
WBC # BLD AUTO: 4.86 THOUSAND/UL (ref 4.31–10.16)

## 2019-08-20 PROCEDURE — 84484 ASSAY OF TROPONIN QUANT: CPT | Performed by: EMERGENCY MEDICINE

## 2019-08-20 PROCEDURE — 36415 COLL VENOUS BLD VENIPUNCTURE: CPT | Performed by: EMERGENCY MEDICINE

## 2019-08-20 PROCEDURE — 84100 ASSAY OF PHOSPHORUS: CPT | Performed by: EMERGENCY MEDICINE

## 2019-08-20 PROCEDURE — 83735 ASSAY OF MAGNESIUM: CPT | Performed by: EMERGENCY MEDICINE

## 2019-08-20 PROCEDURE — 71046 X-RAY EXAM CHEST 2 VIEWS: CPT

## 2019-08-20 PROCEDURE — 96361 HYDRATE IV INFUSION ADD-ON: CPT

## 2019-08-20 PROCEDURE — 70450 CT HEAD/BRAIN W/O DYE: CPT

## 2019-08-20 PROCEDURE — 72125 CT NECK SPINE W/O DYE: CPT

## 2019-08-20 PROCEDURE — 99284 EMERGENCY DEPT VISIT MOD MDM: CPT

## 2019-08-20 PROCEDURE — 96375 TX/PRO/DX INJ NEW DRUG ADDON: CPT

## 2019-08-20 PROCEDURE — 80053 COMPREHEN METABOLIC PANEL: CPT | Performed by: EMERGENCY MEDICINE

## 2019-08-20 PROCEDURE — 96365 THER/PROPH/DIAG IV INF INIT: CPT

## 2019-08-20 PROCEDURE — 85025 COMPLETE CBC W/AUTO DIFF WBC: CPT | Performed by: EMERGENCY MEDICINE

## 2019-08-20 PROCEDURE — 99284 EMERGENCY DEPT VISIT MOD MDM: CPT | Performed by: EMERGENCY MEDICINE

## 2019-08-20 RX ORDER — ACETAMINOPHEN 325 MG/1
975 TABLET ORAL ONCE
Status: COMPLETED | OUTPATIENT
Start: 2019-08-20 | End: 2019-08-20

## 2019-08-20 RX ORDER — KETOROLAC TROMETHAMINE 30 MG/ML
15 INJECTION, SOLUTION INTRAMUSCULAR; INTRAVENOUS ONCE
Status: COMPLETED | OUTPATIENT
Start: 2019-08-20 | End: 2019-08-20

## 2019-08-20 RX ORDER — MAGNESIUM SULFATE HEPTAHYDRATE 40 MG/ML
2 INJECTION, SOLUTION INTRAVENOUS ONCE
Status: COMPLETED | OUTPATIENT
Start: 2019-08-20 | End: 2019-08-20

## 2019-08-20 RX ORDER — OXYCODONE HYDROCHLORIDE 5 MG/1
5 TABLET ORAL EVERY 6 HOURS PRN
COMMUNITY
End: 2021-11-10

## 2019-08-20 RX ORDER — METOCLOPRAMIDE HYDROCHLORIDE 5 MG/ML
10 INJECTION INTRAMUSCULAR; INTRAVENOUS ONCE
Status: COMPLETED | OUTPATIENT
Start: 2019-08-20 | End: 2019-08-20

## 2019-08-20 RX ADMIN — KETOROLAC TROMETHAMINE 15 MG: 30 INJECTION, SOLUTION INTRAMUSCULAR at 15:03

## 2019-08-20 RX ADMIN — MAGNESIUM SULFATE HEPTAHYDRATE 2 G: 40 INJECTION, SOLUTION INTRAVENOUS at 15:47

## 2019-08-20 RX ADMIN — SODIUM CHLORIDE 500 ML: 0.9 INJECTION, SOLUTION INTRAVENOUS at 15:05

## 2019-08-20 RX ADMIN — METOCLOPRAMIDE 10 MG: 5 INJECTION, SOLUTION INTRAMUSCULAR; INTRAVENOUS at 15:10

## 2019-08-20 RX ADMIN — ACETAMINOPHEN 975 MG: 325 TABLET ORAL at 14:35

## 2019-08-20 NOTE — ED ATTENDING ATTESTATION
Kip Mendenhall MD, saw and evaluated the patient  I have discussed the patient with the resident/non-physician practitioner and agree with the resident's/non-physician practitioner's findings, Plan of Care, and MDM as documented in the resident's/non-physician practitioner's note, except where noted  All available labs and Radiology studies were reviewed  I was present for key portions of any procedure(s) performed by the resident/non-physician practitioner and I was immediately available to provide assistance  At this point I agree with the current assessment done in the Emergency Department  I have conducted an independent evaluation of this patient a history and physical is as follows:    Patient is a 81-year-old female  She has undergone treatment with chemo for breast cancer  She presents to the emergency room with pain the left side of her neck that radiates down her left arm  It is associated with arm weakness  The pain is also to the left side of the head  There is some right leg weakness but this is chronic  Physical exam:  There is tenderness with palpation of the muscles to the left side of the neck  There is pain with range of motion  There is no arm droop or numbness to the left arm  Good pulses  Assessment/plan:  Doubt CVA  Most likely this is musculoskeletal   Rule out metastasis    Critical Care Time  Procedures

## 2019-08-20 NOTE — TELEPHONE ENCOUNTER
[address] [address] called requesting [place] date change to 08/21/19 for patient's Xolair treatment   Please advise

## 2019-08-20 NOTE — ED PROVIDER NOTES
ASSESSMENT AND PLAN    Kandace Holloway is a 59 y o  female  who presents for evaluation of left-sided face, neck, arm, upper back pain, with reproducible tenderness on exam  On arrival, the patient is hemodynamically stable and well-appearing without acute distress, with a nontoxic appearance, though she does appear mildly uncomfortable  On exam , the patient has weakness of her left upper extremity, right lower extremity, and tenderness as noted below   The physical exam is otherwise unremarkable  Symptoms have been ongoing for 1 week, and the patient does not meet criteria for stroke alert given her weakness symptoms  -Labwork largely unremarkable  -chest x-ray unremarkable  -CT head and C-spine unremarkable, though the patient does have chronic multilevel degenerative disease which could explain her symptoms  -Toradol, Reglan, magnesium, Tylenol given for pain  She was also given 500 cc bolus of IV fluids  -on re-evaluation, the patient states her symptoms are significantly improved  She states that she feels comfortable being discharged home  -unclear etiology of the patient's symptoms  Possible radiculopathy given her degenerative disease  Possible spinal Mets not appreciated on CT scan  -will discharge home  Strict return precautions provided  Recommended the patient follow up with her oncologist and her primary care physician for further management of her symptoms if they do not improve, as she may require outpatient MRI  History  Chief Complaint   Patient presents with    Pain     Patient c/o L sided head pain radiating down her L shoulder and into her back  Pain X1 week  denies injury  HPI this is a 71-year-old female who presents for evaluation of multiple complaints  The patient states that 1 week ago, she started developing left-sided head pain, radiating down the left neck, into the left shoulder, as well as into the left back  She states the pain is severe    She tried taking some of her oxycodone for the pain, which did not provide significant improvement  Patient also notes left upper extremity weakness which started approximately the same time  She states the weakness has caused her difficulty holding things with her left hand  She also endorses right lower extremity weakness, which has been ongoing for several months  The patient states that the pain as caused her to be unable to walk, however of note, she did ambulate into the emergency department  The patient does note a history of headaches, however states this pain feels different, and she has never had pain like this before  Of note, patient does have a history of breast cancer, status post right-sided lumpectomy in 2018, recently finishing herceptin therapy, starting on anastrozole approximately 1 week ago  She has no known metastatic disease  She denies any fevers, chills, dizziness, chest pain, shortness of breath, vomiting, diarrhea, abdominal pain, or urinary symptoms  Prior to Admission Medications   Prescriptions Last Dose Informant Patient Reported? Taking?    [employer] MICROLET LANCETS lancets  Child No Yes   Sig: Testing three times a day   Blood Glucose Monitoring Suppl (Spectral Edge BLOOD GLUCOSE METER) w/Device KIT  Child Yes Yes   Sig: Testing blood sugars three times a day   Blood Glucose Monitoring Suppl (Skillaton CONTOUR MONITOR) w/Device KIT  Child No Yes   Sig: Testing blood sugars three times a day   DULoxetine (CYMBALTA) 30 mg delayed release capsule   No Yes   Sig: take 1 capsule by mouth twice a day   EPINEPHrine (EPIPEN) 0 3 mg/0 3 mL SOAJ   No Yes   Sig: Inject 0 3 mL (0 3 mg total) into a muscle once for 1 dose   Insulin Glargine (TOUJEO SOLOSTAR) 300 units/mL CONCETRATED U-300 injection pen  Child No Yes   Sig: Inject 60 Units under the skin daily with dinner   Insulin Pen Needle (BD PEN NEEDLE NATALEE U/F) 32G X 4 MM MISC  Child No Yes   Sig: USE 4/DAY   Lancets MISC  Child Yes Yes   Sig: Testing three times a day   albuterol (2 5 mg/3 mL) 0 083 % nebulizer solution   No Yes   Sig: Take 1 vial (2 5 mg total) by nebulization every 6 (six) hours as needed for wheezing or shortness of breath   albuterol (PROVENTIL HFA,VENTOLIN HFA) 90 mcg/act inhaler  Child Yes Yes   Sig: Inhale 1 puff every 4 (four) hours as needed     amLODIPine-atorvastatin (CADUET) 10-10 MG per tablet  Child No Yes   Sig: take 1 tablet by mouth once daily   anastrozole (ARIMIDEX) 1 mg tablet  Child No Yes   Sig: Take 1 tablet (1 mg total) by mouth daily   budesonide (PULMICORT) 0 5 mg/2 mL nebulizer solution   No Yes   Sig: Take 1 vial (0 5 mg total) by nebulization 2 (two) times a day Rinse mouth after use     enalapril (VASOTEC) 20 mg tablet  Child No Yes   Sig: take 1 tablet by mouth once daily   glucose blood (SANG CONTOUR TEST) test strip  Child No Yes   Sig: Testing three times a day   insulin lispro (HUMALOG KWIKPEN) 100 units/mL injection pen  Child No Yes   Sig: Inject 16 units TID   levothyroxine 50 mcg tablet  Child No Yes   Sig: take 1 tablet by mouth once daily   omeprazole (PriLOSEC) 20 mg delayed release capsule  Child No Yes   Sig: take 1 capsule by mouth once daily   oxyCODONE (ROXICODONE) 5 mg immediate release tablet 8/20/2019 at Unknown time  Yes Yes   Sig: Take 5 mg by mouth every 6 (six) hours as needed for moderate pain   rivaroxaban (XARELTO) 20 mg tablet  Child No Yes   Sig: Take 1 tablet (20 mg total) by mouth daily      Facility-Administered Medications: None       Past Medical History:   Diagnosis Date    Abdominal infection (HCC)     h-pylori    Abnormal chest x-ray 4/5/2019    Asthma     Breast cancer ([facility] ) 06/26/2018    Cancer ([place] [address] )     BREAST    Diabetes mellitus ([facility] )     Hiatal hernia     History of chemotherapy     History of radiation therapy     Hypercholesterolemia     Hypertension     Hypothyroid     Renal disorder     Rheumatoid arthritis ([facility] )        Past Surgical History:   Procedure Laterality Date  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    BREAST LUMPECTOMY Right 6/26/2018    Procedure: RIGHT BREAST NEEDLE LOCALIZATION X2 WITH RIGHT BREAST LUMPECTOMY ( NEEDLE LOC @ 1000); Surgeon: Juanis Morejon MD;  [facility];  Service: Surgical Oncology    BREAST LUMPECTOMY Right 8/2/2018    Procedure: LYMPHOSCINITGRAPHY INTRAOPERATIVE LYMPHATIC MAPPING , RIGHT SENTINEL NODE BIOPSY, REEXCISION  RIGHT BREAST LUMPECTOMY CAVITY (SUPERIOR MARGIN); Surgeon: Juanis Morejon MD;  [facility];  Service: Surgical Oncology    BREAST SURGERY Left     CATARACT EXTRACTION, BILATERAL Bilateral     EGD AND COLONOSCOPY N/A 9/5/2018    Procedure: EGD AND COLONOSCOPY;  Surgeon: Neno Betancourt MD;  Location: [facility]; Service: Gastroenterology    [facility] GUIDED CENTRAL VENOUS ACCESS DEVICE INSERTION  9/13/2018    KNEE SURGERY Right     MAMMO NEEDLE LOCALIZATION RIGHT (ALL INC) Right 6/26/2018    MAMMO STEREOTACTIC BREAST BIOPSY RIGHT (ALL INC) Right 5/23/2018    RETINAL DETACHMENT SURGERY Right     TUBAL LIGATION      TUNNELED VENOUS PORT PLACEMENT Left 9/13/2018    Procedure: INSERTION VENOUS PORT (PORT-A-CATH); Surgeon: Juanis Morejon MD;  [facility];  Service: Surgical Oncology       Family History   Problem Relation Age of Onset    Diabetes Mother     Hypertension Mother     Diabetes Father     Hypertension Father     Diabetes Brother     Hypertension Brother     Prostate cancer Brother     Cancer Maternal Grandfather     No Known Problems Sister     No Known Problems Daughter     No Known Problems Sister     No Known Problems Sister     No Known Problems Sister     No Known Problems Sister     No Known Problems Daughter     No Known Problems Daughter      I have reviewed and agree with the history as documented      Social History     Tobacco Use    Smoking status: Never Smoker    Smokeless tobacco: Never Used   Substance Use Topics    Alcohol use: No    Drug use: No        Review of Systems   Constitutional: Negative for chills and fever  HENT: Negative for congestion and rhinorrhea  Eyes: Negative for photophobia and visual disturbance  Respiratory: Negative for cough and shortness of breath  Cardiovascular: Negative for chest pain and palpitations  Gastrointestinal: Negative for abdominal pain, diarrhea, nausea and vomiting  Genitourinary: Negative for dysuria and frequency  Musculoskeletal: Positive for neck pain  Negative for neck stiffness  Skin: Negative for pallor and rash  Neurological: Positive for weakness  Negative for syncope, light-headedness, numbness and headaches  All other systems reviewed and are negative  Physical Exam  ED Triage Vitals   Temperature Pulse Respirations Blood Pressure SpO2   08/20/19 1323 08/20/19 1321 08/20/19 1321 08/20/19 1321 08/20/19 1321   98 8 °F (37 1 °C) 91 18 144/63 97 %      Temp Source Heart Rate Source Patient Position - Orthostatic VS BP Location FiO2 (%)   08/20/19 1323 08/20/19 1321 08/20/19 1321 08/20/19 1321 --   Temporal Monitor Sitting Right arm       Pain Score       08/20/19 1321       Worst Possible Pain             Orthostatic Vital Signs  Vitals:    08/20/19 1321 08/20/19 1442 08/20/19 1549   BP: 144/63 117/63 127/66   Pulse: 91 79 78   Patient Position - Orthostatic VS: Sitting Lying        Physical Exam   Constitutional: She is oriented to person, place, and time  Uncomfortable appearing  Nontoxic  No acute distress   HENT:   Head: Normocephalic and atraumatic  Mouth/Throat: Oropharynx is clear and moist  No oropharyngeal exudate  Bilateral internal ears are normal   Eyes: Pupils are equal, round, and reactive to light  EOM are normal  Right eye exhibits no discharge  Left eye exhibits no discharge  No scleral icterus  Pupils equal, round, reactive  No ophthalmoplegia  Neck: Normal range of motion  Neck supple  No JVD present  No tracheal deviation present     Cardiovascular: Normal rate, regular rhythm and normal heart sounds  No murmur heard  Pulmonary/Chest: Effort normal  No stridor  No respiratory distress  She has no wheezes  She has no rales  Abdominal: Soft  She exhibits no distension and no mass  There is no tenderness  Musculoskeletal: Normal range of motion  She exhibits no edema or deformity  There is tenderness to palpation over the left trapezius muscle, left-sided strap muscles, and the left upper back muscles  There is no skin change, deformity, or crepitus   Neurological: She is alert and oriented to person, place, and time  No cranial nerve deficit  She exhibits normal muscle tone  No cranial nerve deficits appreciated  Strength is 4/5 of the left upper extremity, 4/5 of the right lower extremity, otherwise 5/5 and equal bilaterally  Sensation grossly intact and equal in all extremities  No dysmetria with finger-to-nose testing   Skin: Skin is warm and dry  No rash noted  No pallor         ED Medications  Medications   ketorolac (TORADOL) injection 15 mg (15 mg Intravenous Given 8/20/19 1503)   acetaminophen (TYLENOL) tablet 975 mg (975 mg Oral Given 8/20/19 1435)   magnesium sulfate 2 g/50 mL IVPB (premix) 2 g (0 g Intravenous Stopped 8/20/19 1617)   metoclopramide (REGLAN) injection 10 mg (10 mg Intravenous Given 8/20/19 1510)   sodium chloride 0 9 % bolus 500 mL (0 mL Intravenous Stopped 8/20/19 1636)       Diagnostic Studies  Results Reviewed     Procedure Component Value Units Date/Time    Comprehensive metabolic panel [[id number]]  (Abnormal) Collected:  08/20/19 1450    Lab Status:  Final result Specimen:  Blood from Arm, Right Updated:  08/20/19 1536     Sodium 138 mmol/L      Potassium 4 6 mmol/L      Chloride 102 mmol/L      CO2 28 mmol/L      ANION GAP 8 mmol/L      BUN 17 mg/dL      Creatinine 0 63 mg/dL      Glucose 173 mg/dL      Calcium 9 7 mg/dL      AST 31 U/L      ALT 33 U/L      Alkaline Phosphatase 67 U/L      Total Protein 7 4 g/dL Albumin 3 1 g/dL      Total Bilirubin 0 34 mg/dL      eGFR 96 ml/min/1 73sq m     Narrative:       National Kidney Disease Foundation guidelines for Chronic Kidney Disease (CKD):     Stage 1 with normal or high GFR (GFR > 90 mL/min/1 73 square meters)    Stage 2 Mild CKD (GFR = 60-89 mL/min/1 73 square meters)    Stage 3A Moderate CKD (GFR = 45-59 mL/min/1 73 square meters)    Stage 3B Moderate CKD (GFR = 30-44 mL/min/1 73 square meters)    Stage 4 Severe CKD (GFR = 15-29 mL/min/1 73 square meters)    Stage 5 End Stage CKD (GFR <15 mL/min/1 73 square meters)  Note: GFR calculation is accurate only with a steady state creatinine    Phosphorus [[id number]]  (Normal) Collected:  08/20/19 1450    Lab Status:  Final result Specimen:  Blood from Arm, Right Updated:  08/20/19 1536     Phosphorus 3 8 mg/dL     Magnesium [[id number]]  (Abnormal) Collected:  08/20/19 1450    Lab Status:  Final result Specimen:  Blood from Arm, Right Updated:  08/20/19 1536     Magnesium 1 3 mg/dL     Troponin I [[id number]]  (Normal) Collected:  08/20/19 1450    Lab Status:  Final result Specimen:  Blood from Arm, Right Updated:  08/20/19 1522     Troponin I <0 02 ng/mL     CBC and differential [[id number]] Collected:  08/20/19 1450    Lab Status:  Final result Specimen:  Blood from Arm, Right Updated:  08/20/19 1504     WBC 4 86 Thousand/uL      RBC 4 21 Million/uL      Hemoglobin 12 1 g/dL      Hematocrit 37 3 %      MCV 89 fL      MCH 28 7 pg      MCHC 32 4 g/dL      RDW 12 9 %      MPV 11 1 fL      Platelets 012 Thousands/uL      nRBC 0 /100 WBCs      Neutrophils Relative 50 %      Immat GRANS % 0 %      Lymphocytes Relative 37 %      Monocytes Relative 8 %      Eosinophils Relative 4 %      Basophils Relative 1 %      Neutrophils Absolute 2 41 Thousands/µL      Immature Grans Absolute 0 01 Thousand/uL      Lymphocytes Absolute 1 79 Thousands/µL      Monocytes Absolute 0 39 Thousand/µL      Eosinophils Absolute 0 20 Thousand/µL Basophils Absolute 0 06 Thousands/µL                  XR chest 2 views   Final Result by Jono Riley MD (08/20 1547)      No focal consolidation, pleural effusion, or pneumothorax  Workstation performed: [id number]         CT cervical spine without contrast   Final Result by Jono Riley MD (08/20 1551)      No cervical spine fracture or traumatic malalignment  Workstation performed: [id number]         CT head without contrast   Final Result by Jono Riley MD (08/20 1548)      No acute intracranial abnormality  Workstation performed: [id number]               Procedures  Procedures        ED Course                               MDM    Disposition  Final diagnoses:   Left-sided face pain   Neck pain on left side   Left arm pain   Weakness of left upper extremity     Time reflects when diagnosis was documented in both MDM as applicable and the Disposition within this note     Time User Action Codes Description Comment    8/20/2019  4:18 PM Anna Macias Add [R51] Left-sided face pain     8/20/2019  4:18 PM Anna Macias Add [M54 2] Neck pain on left side     8/20/2019  4:18 PM Anna Macias Add [I74 533] Left arm pain     8/20/2019  4:19 PM Anna Macias Add [R29 898] Weakness of left upper extremity       ED Disposition     ED Disposition Condition Date/Time Comment    Discharge Stable Tue Aug 20, 2019  4:18 PM Jolie CruzCintron discharge to home/self care              Follow-up Information     Follow up With Specialties Details Why Contact Info    Brigette Johnson MD Family Medicine Call  To schedule appointment for re-evaluation [address] [facility]  [phone]            Discharge Medication List as of 8/20/2019  4:19 PM      CONTINUE these medications which have NOT CHANGED    Details   albuterol (2 5 mg/3 mL) 0 083 % nebulizer solution Take 1 vial (2 5 mg total) by nebulization every 6 (six) hours as needed for wheezing or shortness of breath, Starting Thu 6/6/2019, Normal      albuterol (PROVENTIL HFA,VENTOLIN HFA) 90 mcg/act inhaler Inhale 1 puff every 4 (four) hours as needed  , Historical Med      amLODIPine-atorvastatin (CADUET) 10-10 MG per tablet take 1 tablet by mouth once daily, Normal      anastrozole (ARIMIDEX) 1 mg tablet Take 1 tablet (1 mg total) by mouth daily, Starting Thu 2/28/2019, Normal      !! SANG MICROLET LANCETS lancets Testing three times a day, Normal      !! Blood Glucose Monitoring Suppl (ACURA BLOOD GLUCOSE METER) w/Device KIT Testing blood sugars three times a day, Historical Med      !! Blood Glucose Monitoring Suppl (SANG CONTOUR MONITOR) w/Device KIT Testing blood sugars three times a day, Normal      budesonide (PULMICORT) 0 5 mg/2 mL nebulizer solution Take 1 vial (0 5 mg total) by nebulization 2 (two) times a day Rinse mouth after use , Starting Thu 6/6/2019, Normal      DULoxetine (CYMBALTA) 30 mg delayed release capsule take 1 capsule by mouth twice a day, Normal      enalapril (VASOTEC) 20 mg tablet take 1 tablet by mouth once daily, Normal      EPINEPHrine (EPIPEN) 0 3 mg/0 3 mL SOAJ Inject 0 3 mL (0 3 mg total) into a muscle once for 1 dose, Starting Thu 6/13/2019, Normal      glucose blood (SANG CONTOUR TEST) test strip Testing three times a day, Normal      Insulin Glargine (TOUJEO SOLOSTAR) 300 units/mL CONCETRATED U-300 injection pen Inject 60 Units under the skin daily with dinner, Starting Wed 5/29/2019, Normal      insulin lispro (HUMALOG KWIKPEN) 100 units/mL injection pen Inject 16 units TID, Normal      Insulin Pen Needle (BD PEN NEEDLE NATALEE U/F) 32G X 4 MM MISC USE 4/DAY, Normal      !!  Lancets MISC Testing three times a day, Historical Med      levothyroxine 50 mcg tablet take 1 tablet by mouth once daily, Normal      omeprazole (PriLOSEC) 20 mg delayed release capsule take 1 capsule by mouth once daily, Normal      oxyCODONE (ROXICODONE) 5 mg immediate release tablet Take 5 mg by mouth every 6 (six) hours as needed for moderate pain, Historical Med      rivaroxaban (XARELTO) 20 mg tablet Take 1 tablet (20 mg total) by mouth daily, Starting Fri 5/24/2019, Normal       !! - Potential duplicate medications found  Please discuss with provider  No discharge procedures on file  ED Provider  Attending physically available and evaluated Courtney Valdez  JOHNSON managed the patient along with the ED Attending      Electronically Signed by         Chetna Islas MD  08/20/19 0029

## 2019-08-21 ENCOUNTER — HOSPITAL ENCOUNTER (OUTPATIENT)
Dept: INFUSION CENTER | Facility: CLINIC | Age: 64
Discharge: HOME | End: 2019-08-21
Payer: MEDICARE

## 2019-08-21 VITALS
DIASTOLIC BLOOD PRESSURE: 73 MMHG | SYSTOLIC BLOOD PRESSURE: 124 MMHG | TEMPERATURE: 98.1 F | HEART RATE: 78 BPM | RESPIRATION RATE: 18 BRPM

## 2019-08-21 DIAGNOSIS — J45.50 SEVERE PERSISTENT ASTHMA WITHOUT COMPLICATION: Primary | ICD-10-CM

## 2019-08-21 PROCEDURE — 96372 THER/PROPH/DIAG INJ SC/IM: CPT

## 2019-08-21 RX ORDER — EPINEPHRINE 1 MG/ML
0.3 INJECTION, SOLUTION, CONCENTRATE INTRAVENOUS AS NEEDED
Status: DISCONTINUED | OUTPATIENT
Start: 2019-08-21 | End: 2019-08-24 | Stop reason: HOSPADM

## 2019-08-21 RX ORDER — EPINEPHRINE 1 MG/ML
0.3 INJECTION, SOLUTION, CONCENTRATE INTRAVENOUS AS NEEDED
Status: CANCELLED | OUTPATIENT
Start: 2019-09-04

## 2019-08-21 RX ADMIN — OMALIZUMAB 375 MG: 150 INJECTION, SOLUTION SUBCUTANEOUS at 10:34

## 2019-08-21 NOTE — PROGRESS NOTES
Pt tolerated xolair injections as ordered  Pt to be observed for 30 minutes post injections  Pt was provided with AVS and is aware of her next appt

## 2019-08-23 ENCOUNTER — TELEPHONE (OUTPATIENT)
Dept: PULMONOLOGY | Facility: CLINIC | Age: 64
End: 2019-08-23

## 2019-08-23 NOTE — TELEPHONE ENCOUNTER
This should be discussed with Dr Maria De Jesus Patel when he returns, not scheduled for repeat injection until 9/4/2019    No interventions at this time

## 2019-08-23 NOTE — TELEPHONE ENCOUNTER
Daughter calling saying Libra Peterson is experiencing pain on her left side and back that started about a week ago  She could not provide any information as she is at work  I asked her if we could talk to patient herself to get more information and she said yes  Translated through [employer]  Patient states after every Xolair infusion she gets a headache and pain on her left side, but this time is worse  She has pain all on her left side, even behind her ear  She said she does have some chest tightness as well but it is not that bad  Denies any other symptoms  She went to the ER on 8/20 for the pain  They did a CXR that was negative  They did a CT of the brain as she was having left sided weakness while in the ER  The CT showed chronic multilevel degenerative disease  They had advised her to call her PCP and oncologist   She called us though thinking it was from her [address]  Please advise

## 2019-08-27 ENCOUNTER — TELEPHONE (OUTPATIENT)
Dept: HEMATOLOGY ONCOLOGY | Facility: CLINIC | Age: 64
End: 2019-08-27

## 2019-08-27 ENCOUNTER — HOSPITAL ENCOUNTER (OUTPATIENT)
Dept: CT IMAGING | Facility: HOSPITAL | Age: 64
Discharge: HOME | End: 2019-08-27
Attending: INTERNAL MEDICINE
Payer: MEDICARE

## 2019-08-27 DIAGNOSIS — Z17.0 MALIGNANT NEOPLASM OF UPPER-OUTER QUADRANT OF RIGHT BREAST IN FEMALE, ESTROGEN RECEPTOR POSITIVE (HCC): ICD-10-CM

## 2019-08-27 DIAGNOSIS — C50.411 MALIGNANT NEOPLASM OF UPPER-OUTER QUADRANT OF RIGHT BREAST IN FEMALE, ESTROGEN RECEPTOR POSITIVE (HCC): ICD-10-CM

## 2019-08-27 PROCEDURE — 71275 CT ANGIOGRAPHY CHEST: CPT

## 2019-08-27 RX ADMIN — IOHEXOL 85 ML: 350 INJECTION, SOLUTION INTRAVENOUS at 12:58

## 2019-08-27 NOTE — TELEPHONE ENCOUNTER
Please review- she just had this done today; like the results wont be read that quick? Just wanted to make you aware of this

## 2019-08-27 NOTE — TELEPHONE ENCOUNTER
Called patient on 8/27 left a message stating that her 9/20 appointment with dr armstrong needed to be rescheduled  Stated that I had rescheduled patient's appointment to 10/10 at 9:20am  Stated that if this date and or time does not work or if patient has any questions and or concerns to please give our office a call back

## 2019-08-27 NOTE — TELEPHONE ENCOUNTER
Stated that the pt was waiting there for the results of the CT scan  Dr Heather Chandra is rounding and is in the hospital & Gonzalez Rausch was out in a training  Manav Christian informed the pt that we will call her with the results

## 2019-08-27 NOTE — TELEPHONE ENCOUNTER
Spoke with patient's daughter to let her know that the patient's scan was normal and there was no PE  If she has any questions she can call our office tomorrow  She verbalized understanding and agreed to plan

## 2019-09-03 ENCOUNTER — OFFICE VISIT (OUTPATIENT)
Dept: FAMILY MEDICINE CLINIC | Facility: CLINIC | Age: 64
End: 2019-09-03

## 2019-09-03 ENCOUNTER — TELEPHONE (OUTPATIENT)
Dept: INFUSION CENTER | Facility: CLINIC | Age: 64
End: 2019-09-03

## 2019-09-03 VITALS
SYSTOLIC BLOOD PRESSURE: 120 MMHG | WEIGHT: 171 LBS | TEMPERATURE: 96 F | RESPIRATION RATE: 20 BRPM | OXYGEN SATURATION: 97 % | DIASTOLIC BLOOD PRESSURE: 80 MMHG | BODY MASS INDEX: 29.19 KG/M2 | HEIGHT: 64 IN | HEART RATE: 88 BPM

## 2019-09-03 DIAGNOSIS — E11.8 TYPE 2 DIABETES MELLITUS WITH COMPLICATION, WITH LONG-TERM CURRENT USE OF INSULIN (HCC): ICD-10-CM

## 2019-09-03 DIAGNOSIS — Z79.4 TYPE 2 DIABETES MELLITUS WITH HYPERGLYCEMIA, WITH LONG-TERM CURRENT USE OF INSULIN (HCC): ICD-10-CM

## 2019-09-03 DIAGNOSIS — Z79.4 TYPE 2 DIABETES MELLITUS WITH COMPLICATION, WITH LONG-TERM CURRENT USE OF INSULIN (HCC): ICD-10-CM

## 2019-09-03 DIAGNOSIS — M54.2 NECK PAIN: Primary | ICD-10-CM

## 2019-09-03 DIAGNOSIS — E11.65 TYPE 2 DIABETES MELLITUS WITH HYPERGLYCEMIA, WITH LONG-TERM CURRENT USE OF INSULIN (HCC): ICD-10-CM

## 2019-09-03 LAB — SL AMB POCT HEMOGLOBIN AIC: 12.5 (ref ?–6.5)

## 2019-09-03 PROCEDURE — 99215 OFFICE O/P EST HI 40 MIN: CPT | Performed by: FAMILY MEDICINE

## 2019-09-03 PROCEDURE — 83036 HEMOGLOBIN GLYCOSYLATED A1C: CPT | Performed by: FAMILY MEDICINE

## 2019-09-03 RX ORDER — DULOXETIN HYDROCHLORIDE 30 MG/1
30 CAPSULE, DELAYED RELEASE ORAL 2 TIMES DAILY
Qty: 60 CAPSULE | Refills: 2 | Status: SHIPPED | OUTPATIENT
Start: 2019-09-03 | End: 2019-11-08 | Stop reason: ALTCHOICE

## 2019-09-03 RX ORDER — CYCLOBENZAPRINE HCL 5 MG
5 TABLET ORAL 3 TIMES DAILY
Qty: 90 TABLET | Refills: 1 | Status: SHIPPED | OUTPATIENT
Start: 2019-09-03 | End: 2019-11-03 | Stop reason: SDUPTHER

## 2019-09-03 NOTE — PROGRESS NOTES
Assessment/Plan:    Type 2 diabetes mellitus with hyperglycemia, with long-term current use of insulin ([facility])  Lab Results   Component Value Date    HGBA1C 12 5 (A) 09/03/2019       No results for input(s): POCGLU in the last 72 hours  Blood Sugar Average: Last 72 hrs: Increased compared to June  Discussed at length the importance of diet  Reviewed the plate method with patient and daughter, explained to patient that she must include lean protein in her meal  Counseled against grazing all day  Reviewed portion size  Patient has follow up tomorrow with Endo       Neck pain  Most probably musculoskeletal in nature  CT Scan of the  Cervical spine done in the ED on 8/20/19 reviewed and discussed with patient  Apply moist heat BID for 15 minutes  Cyclobenzaprine 5 mg TID      Greater than 50% of 40 minute encounter was spent counseling/coordinating care of the patient regarding the diagnosis and understanding pathogenesis of disease process, discussion of differential diagnoses, review of laboratory data and imaging, discussion of medications to include side effect profile, precautions, and plan of care  Problem List Items Addressed This Visit        Endocrine    Type 2 diabetes mellitus with complication, with long-term current use of insulin ([facility])    Relevant Medications    DULoxetine (CYMBALTA) 30 mg delayed release capsule    Other Relevant Orders    POCT hemoglobin A1c (Completed)    Type 2 diabetes mellitus with hyperglycemia, with long-term current use of insulin ([facility])     Lab Results   Component Value Date    HGBA1C 12 5 (A) 09/03/2019       No results for input(s): POCGLU in the last 72 hours  Blood Sugar Average: Last 72 hrs:   Increased compared to June  Discussed at length the importance of diet  Reviewed the plate method with patient and daughter, explained to patient that she must include lean protein in her meal  Counseled against grazing all day  Reviewed portion size  Patient has follow up tomorrow with Endo               Other    Neck pain - Primary     Most probably musculoskeletal in nature  CT Scan of the  Cervical spine done in the ED on 8/20/19 reviewed and discussed with patient  Apply moist heat BID for 15 minutes  Cyclobenzaprine 5 mg TID           Relevant Medications    cyclobenzaprine (FLEXERIL) 5 mg tablet            Subjective:      Patient ID: Tank Salas is a 59 y o  female  60 yo  female with uncontrolled DM, followed by Endocrine, right breast cancer s/p radiation currently on Herceptin every 3 weeks and anastrozole 1 mg daily, [facility] here today complaining of:  1- Left sided neck pain radiated towards her left ear and down the side of her L arm  Pain started a few days after receiving her Xolair infusion on the L arm  Pain is constant, worse with movement  Is unable to lie down in bed due to pain  Pain is described as a deep sharp ache  8/10  Not improved with Oxycodone  2- Bilateral lower extremity cramps, worse at night  3- Sensation of pins and needles of both feet, worse at night  Patient states she was told by Nutrition that her meal should not be larger than her fist  So she admits she has been snacking frequently during the day  States she often forgets to use her Toujeo at night  Drinks Sprite 2 times a day  Not keep a food journal       The following portions of the patient's history were reviewed and updated as appropriate: She  has a past medical history of Abdominal infection ([address] ), Abnormal chest x-ray (4/5/2019), Asthma, Breast cancer ([address] ) (06/26/2018), Cancer ([address] ), Diabetes mellitus ([address] ), Hiatal hernia, History of chemotherapy, History of radiation therapy, Hypercholesterolemia, Hypertension, Hypothyroid, Renal disorder, and Rheumatoid arthritis ([address] )    She   Patient Active Problem List    Diagnosis Date Noted    Type 2 diabetes mellitus with hyperglycemia, with long-term current use of insulin ([address] ) 05/29/2019    Radiation pneumonitis ([facility] ) 05/18/2019    Bilateral pulmonary embolism ([place] [address] ) 11/06/2018    Hypothyroid     Hypertension     Hypercholesterolemia 09/21/2018    Chronic pain of right knee 09/04/2018    Cellulitis of right axilla 08/22/2018    Hiatal hernia 08/08/2018    Screening for colon cancer 08/08/2018    Esophageal dysphagia 08/08/2018    Malignant neoplasm of upper-outer quadrant of right breast in female, estrogen receptor positive ([place] [address] ) 07/19/2018    Malignant neoplasm of right female breast ([place] [address] ) 06/26/2018    Neck pain 06/13/2018    Chronic bilateral low back pain without sciatica 05/22/2018    Palpitations 05/09/2018    Type 2 diabetes mellitus with complication, with long-term current use of insulin ([place] [address] ) 03/27/2018    Other specified hypothyroidism 03/27/2018    Chest pain 03/27/2018    Asthma 09/22/2009    Essential hypertension 09/22/2009     She  has a past surgical history that includes Tubal ligation; Knee surgery (Right); Cataract extraction, bilateral (Bilateral); Retinal detachment surgery (Right); Breast surgery (Left); Breast lumpectomy (Right, 6/26/2018); Breast lumpectomy (Right, 8/2/2018); EGD AND COLONOSCOPY (N/A, 9/5/2018); Tunneled venous port placement (Left, 9/13/2018); FL guided central venous access device insertion (9/13/2018); Mammo stereotactic breast biopsy right (all inc) (Right, 5/23/2018); Mammo needle localization right (all inc) (Right, 6/26/2018); and Breast biopsy (Right, 05/23/2018)  Her family history includes Cancer in her maternal grandfather; Diabetes in her brother, father, and mother; Hypertension in her brother, father, and mother; No Known Problems in her daughter, daughter, daughter, sister, sister, sister, sister, and sister; Prostate cancer in her brother  She  reports that she has never smoked  She has never used smokeless tobacco  She reports that she does not drink alcohol or use drugs    Current Outpatient Medications   Medication Sig Dispense Refill    albuterol (2 5 mg/3 mL) 0 083 % nebulizer solution Take 1 vial (2 5 mg total) by nebulization every 6 (six) hours as needed for wheezing or shortness of breath 90 vial 5    albuterol (PROVENTIL HFA,VENTOLIN HFA) 90 mcg/act inhaler Inhale 1 puff every 4 (four) hours as needed        amLODIPine-atorvastatin (CADUET) 10-10 MG per tablet take 1 tablet by mouth once daily 90 tablet 1    anastrozole (ARIMIDEX) 1 mg tablet Take 1 tablet (1 mg total) by mouth daily 90 tablet 1    SANG MICROLET LANCETS lancets Testing three times a day 100 each 3    Blood Glucose Monitoring Suppl ([employer] BLOOD GLUCOSE METER) w/Device KIT Testing blood sugars three times a day      Blood Glucose Monitoring Suppl (SANG CONTOUR MONITOR) w/Device KIT Testing blood sugars three times a day 1 kit 0    budesonide (PULMICORT) 0 5 mg/2 mL nebulizer solution Take 1 vial (0 5 mg total) by nebulization 2 (two) times a day Rinse mouth after use   60 vial 5    cyclobenzaprine (FLEXERIL) 5 mg tablet Take 1 tablet (5 mg total) by mouth 3 (three) times a day 90 tablet 1    DULoxetine (CYMBALTA) 30 mg delayed release capsule Take 1 capsule (30 mg total) by mouth 2 (two) times a day 60 capsule 2    enalapril (VASOTEC) 20 mg tablet take 1 tablet by mouth once daily 90 tablet 1    EPINEPHrine (EPIPEN) 0 3 mg/0 3 mL SOAJ Inject 0 3 mL (0 3 mg total) into a muscle once for 1 dose 0 6 mL 0    glucose blood (SANG CONTOUR TEST) test strip Testing three times a day 100 each 3    Insulin Glargine (TOUJEO SOLOSTAR) 300 units/mL CONCETRATED U-300 injection pen Inject 60 Units under the skin daily with dinner 7 5 mL 0    insulin lispro (HUMALOG KWIKPEN) 100 units/mL injection pen Inject 16 units TID 5 pen 5    Insulin Pen Needle (BD PEN NEEDLE NATALEE U/F) 32G X 4 MM MISC USE 4/ each 3    Lancets MISC Testing three times a day      levothyroxine 50 mcg tablet take 1 tablet by mouth once daily 90 tablet 1    omeprazole (PriLOSEC) 20 mg delayed release capsule take 1 capsule by mouth once daily 90 capsule 2    oxyCODONE (ROXICODONE) 5 mg immediate release tablet Take 5 mg by mouth every 6 (six) hours as needed for moderate pain      rivaroxaban (XARELTO) 20 mg tablet Take 1 tablet (20 mg total) by mouth daily 30 tablet 3     No current facility-administered medications for this visit  Current Outpatient Medications on File Prior to Visit   Medication Sig    albuterol (2 5 mg/3 mL) 0 083 % nebulizer solution Take 1 vial (2 5 mg total) by nebulization every 6 (six) hours as needed for wheezing or shortness of breath    albuterol (PROVENTIL HFA,VENTOLIN HFA) 90 mcg/act inhaler Inhale 1 puff every 4 (four) hours as needed      amLODIPine-atorvastatin (CADUET) 10-10 MG per tablet take 1 tablet by mouth once daily    anastrozole (ARIMIDEX) 1 mg tablet Take 1 tablet (1 mg total) by mouth daily    SANG MICROLET LANCETS lancets Testing three times a day    Blood Glucose Monitoring Suppl ([employer] BLOOD GLUCOSE METER) w/Device KIT Testing blood sugars three times a day    Blood Glucose Monitoring Suppl (SANG CONTOUR MONITOR) w/Device KIT Testing blood sugars three times a day    budesonide (PULMICORT) 0 5 mg/2 mL nebulizer solution Take 1 vial (0 5 mg total) by nebulization 2 (two) times a day Rinse mouth after use      enalapril (VASOTEC) 20 mg tablet take 1 tablet by mouth once daily    EPINEPHrine (EPIPEN) 0 3 mg/0 3 mL SOAJ Inject 0 3 mL (0 3 mg total) into a muscle once for 1 dose    glucose blood ([employer] CONTOUR TEST) test strip Testing three times a day    Insulin Glargine (TOUJEO SOLOSTAR) 300 units/mL CONCETRATED U-300 injection pen Inject 60 Units under the skin daily with dinner    insulin lispro (HUMALOG KWIKPEN) 100 units/mL injection pen Inject 16 units TID    Insulin Pen Needle (BD PEN NEEDLE NATALEE U/F) 32G X 4 MM MISC USE 4/DAY    Lancets MISC Testing three times a day    levothyroxine 50 mcg tablet take 1 tablet by mouth once daily    omeprazole (PriLOSEC) 20 mg delayed release capsule take 1 capsule by mouth once daily    oxyCODONE (ROXICODONE) 5 mg immediate release tablet Take 5 mg by mouth every 6 (six) hours as needed for moderate pain    rivaroxaban (XARELTO) 20 mg tablet Take 1 tablet (20 mg total) by mouth daily    [DISCONTINUED] DULoxetine (CYMBALTA) 30 mg delayed release capsule take 1 capsule by mouth twice a day     No current facility-administered medications on file prior to visit       Review of Systems   Constitutional: Positive for fatigue  Respiratory: Positive for shortness of breath  Musculoskeletal: Positive for myalgias and neck pain  Neurological:        As per HPI   All other systems reviewed and are negative  Objective:      /80   Pulse 88   Temp (!) 96 °F (35 6 °C) (Skin)   Resp 20   Ht 5' 4" (1 626 m)   Wt 77 6 kg (171 lb)   SpO2 97%   BMI 29 35 kg/m²          Physical Exam   Constitutional: She is oriented to person, place, and time  She appears well-developed  HENT:   Head: Normocephalic  Right Ear: External ear normal    Left Ear: External ear normal    Nose: Nose normal    Mouth/Throat: Oropharynx is clear and moist    Eyes: Pupils are equal, round, and reactive to light  Conjunctivae and EOM are normal    Neck: Normal range of motion  Neck supple  No thyromegaly present  Cardiovascular: Normal rate, regular rhythm and normal heart sounds  Pulmonary/Chest: Effort normal and breath sounds normal    Abdominal: Soft  There is no tenderness  There is no rebound and no guarding  Musculoskeletal: Normal range of motion  She exhibits tenderness  Left shoulder: She exhibits tenderness and spasm  Cervical back: She exhibits tenderness and spasm  Neurological: She is alert and oriented to person, place, and time  She has normal reflexes  Skin: Skin is dry  Psychiatric: She has a normal mood and affect  Nursing note and vitals reviewed

## 2019-09-03 NOTE — ASSESSMENT & PLAN NOTE
Lab Results   Component Value Date    HGBA1C 12 5 (A) 09/03/2019       No results for input(s): POCGLU in the last 72 hours  Blood Sugar Average: Last 72 hrs:   Increased compared to June  Discussed at length the importance of diet  Reviewed the plate method with patient and daughter, explained to patient that she must include lean protein in her meal  Counseled against grazing all day  Reviewed portion size  Patient has follow up tomorrow with Endo

## 2019-09-03 NOTE — ASSESSMENT & PLAN NOTE
Most probably musculoskeletal in nature  CT Scan of the  Cervical spine done in the ED on 8/20/19 reviewed and discussed with patient  Apply moist heat BID for 15 minutes  Cyclobenzaprine 5 mg TID

## 2019-09-04 ENCOUNTER — OFFICE VISIT (OUTPATIENT)
Dept: DIABETES SERVICES | Facility: CLINIC | Age: 64
End: 2019-09-04
Payer: MEDICARE

## 2019-09-04 VITALS — BODY MASS INDEX: 29.37 KG/M2 | HEIGHT: 64 IN | WEIGHT: 172 LBS

## 2019-09-04 DIAGNOSIS — Z79.4 TYPE 2 DIABETES MELLITUS WITH HYPERGLYCEMIA, WITH LONG-TERM CURRENT USE OF INSULIN (HCC): Primary | ICD-10-CM

## 2019-09-04 DIAGNOSIS — E11.65 TYPE 2 DIABETES MELLITUS WITH HYPERGLYCEMIA, WITH LONG-TERM CURRENT USE OF INSULIN (HCC): Primary | ICD-10-CM

## 2019-09-04 PROCEDURE — 97803 MED NUTRITION INDIV SUBSEQ: CPT | Performed by: DIETITIAN, REGISTERED

## 2019-09-04 NOTE — PROGRESS NOTES
Medical Nutrition Therapy      Assessment    Chief complaint Patient was seen with her daughter who translated  She has reportedly not taken any Humalog since Monday  She took her Toujeo Tuesday afternoon but doesn't recall when she took it before that  Visit Type: Follow-up visit    HPI: Art La returned for follow-up today  [address] recall is sparse  It reveals that she eats 2-3 meals daily but does not count carbohydrates except to note that she only has a fist sized portion of any cooked starch  She does include some non-starchy vegetables, 2% milk, and fruits in her diet  Overall, Jolies meals provide 30-60 grams carbohydrate  Based on meal plan review and poor insulin record provided education about timing of insulin doses, coming up with a schedule to ensure patient takes insulin regularly, taking Humalog before meals, explaining again which foods besides starches are carbohydrates, and describing how much carbohydrate is in a can of Sprite  Art La is willing to drink Sprite Zero, but it sounds as though it is not as easily available  I asked if her daughter could pick some up at [facility] for her       Ht Readings from Last 1 Encounters:   09/03/19 5' 4" (1 626 m)     Wt Readings from Last 2 Encounters:   09/03/19 77 6 kg (171 lb)   08/20/19 77 5 kg (170 lb 13 7 oz)     Weight Change: No    Medical Diagnosis/reason for visit   E11 65, Z79 4 (ICD-10-CM) - Type 2 diabetes mellitus with hyperglycemia, with long-term current use of insulin ([facility])   E78 00 (ICD-10-CM) - Hypercholesterolemia   I10 (ICD-10-CM) - Essential hypertension     Food Log: Completed via the method of food recall         Breakfast:cafe con leche 2% w/ fried pastry filled w/ cheese (not sweet)  Morning Snack:none  Lunch:did not report  Afternoon Snack: none  Dinner:4pm, mashed potato, meat and vegetable (cauliflower and broccoli)  Evening Snack:did not report    Beverages: Often has Sprite with dinner  Eating out/Take out:rare    Exercise none    Calorie needs 1612 kcals/day Carbs: 45 g/meal, 15 g/snack     Nutrition Diagnosis:  Self-monitoring deficit  related to Lack of focus and attention to detail, difficulty with time management and/or organization  as evidenced by Incomplete self monitoring records (i e  glucose, food, fluid intake, weight, physical activity)    Intervention: plate method, carbohydrate counting, meal timing, monitoring portion control, exercise guidelines and taking insulin as prescribed     Treatment Goals: Patient understands education and recommendations, Patient will consume 3 meals a day and take Humalog before every meal and take Toujeo once daily at about the same time each day    Monitoring and evaluation:    Term code indicator  FH 1 3 2 Food Intake Criteria: Eat 3 meals daily with about 45 g carb at each meal  Term code indicator  [facility] 2 2 Treatments/Therapy/Alternative Medicine Criteria: Take insulin as prescribed    Patients Response to Instruction:  Shlomo Garcia  Expected Compliancefair    Thank you for coming to the [address] [place] [address] for education today  Please feel free to call with any questions or concerns      [facility]  [id number] [facility]  [address] [facility] [id number]

## 2019-09-04 NOTE — PATIENT INSTRUCTIONS
1  Take Toujeo every day  2  Take Humalog whenever you eat a meal  3  One fist sized portion of starch is about 45 g carbohydrate  4  Eat 45 g carb at each meal, 3 times per day  5   Get Sprite Zero instead of Sprite

## 2019-09-04 NOTE — Clinical Note
MNT f/u complete, for your review  Not taking insulin regularly  She sees Whitley Ballesteros in endo next week

## 2019-09-10 DIAGNOSIS — E11.8 TYPE 2 DIABETES MELLITUS WITH COMPLICATION, WITHOUT LONG-TERM CURRENT USE OF INSULIN (HCC): ICD-10-CM

## 2019-09-12 ENCOUNTER — APPOINTMENT (EMERGENCY)
Dept: RADIOLOGY | Facility: HOSPITAL | Age: 64
End: 2019-09-12
Payer: MEDICARE

## 2019-09-12 ENCOUNTER — HOSPITAL ENCOUNTER (EMERGENCY)
Facility: HOSPITAL | Age: 64
Discharge: HOME | End: 2019-09-12
Attending: EMERGENCY MEDICINE | Admitting: EMERGENCY MEDICINE
Payer: MEDICARE

## 2019-09-12 VITALS
HEART RATE: 116 BPM | OXYGEN SATURATION: 94 % | DIASTOLIC BLOOD PRESSURE: 57 MMHG | TEMPERATURE: 98.1 F | SYSTOLIC BLOOD PRESSURE: 152 MMHG | WEIGHT: 171.74 LBS | RESPIRATION RATE: 18 BRPM | BODY MASS INDEX: 29.48 KG/M2

## 2019-09-12 DIAGNOSIS — J45.51 SEVERE PERSISTENT ASTHMA WITH (ACUTE) EXACERBATION: Primary | ICD-10-CM

## 2019-09-12 LAB
ALBUMIN SERPL BCP-MCNC: 3.5 G/DL (ref 3.5–5)
ALP SERPL-CCNC: 73 U/L (ref 46–116)
ALT SERPL W P-5'-P-CCNC: 29 U/L (ref 12–78)
ANION GAP SERPL CALCULATED.3IONS-SCNC: 12 MMOL/L (ref 4–13)
AST SERPL W P-5'-P-CCNC: 15 U/L (ref 5–45)
ATRIAL RATE: 114 BPM
BASOPHILS # BLD AUTO: 0.09 THOUSANDS/ΜL (ref 0–0.1)
BASOPHILS NFR BLD AUTO: 1 % (ref 0–1)
BILIRUB SERPL-MCNC: 0.41 MG/DL (ref 0.2–1)
BUN SERPL-MCNC: 12 MG/DL (ref 5–25)
CALCIUM SERPL-MCNC: 9.6 MG/DL (ref 8.3–10.1)
CHLORIDE SERPL-SCNC: 99 MMOL/L (ref 100–108)
CO2 SERPL-SCNC: 25 MMOL/L (ref 21–32)
CREAT SERPL-MCNC: 0.83 MG/DL (ref 0.6–1.3)
EOSINOPHIL # BLD AUTO: 0.2 THOUSAND/ΜL (ref 0–0.61)
EOSINOPHIL NFR BLD AUTO: 3 % (ref 0–6)
ERYTHROCYTE [DISTWIDTH] IN BLOOD BY AUTOMATED COUNT: 13.4 % (ref 11.6–15.1)
GFR SERPL CREATININE-BSD FRML MDRD: 76 ML/MIN/1.73SQ M
GLUCOSE SERPL-MCNC: 311 MG/DL (ref 65–140)
HCT VFR BLD AUTO: 36.4 % (ref 34.8–46.1)
HGB BLD-MCNC: 11.9 G/DL (ref 11.5–15.4)
IMM GRANULOCYTES # BLD AUTO: 0.02 THOUSAND/UL (ref 0–0.2)
IMM GRANULOCYTES NFR BLD AUTO: 0 % (ref 0–2)
LYMPHOCYTES # BLD AUTO: 1.4 THOUSANDS/ΜL (ref 0.6–4.47)
LYMPHOCYTES NFR BLD AUTO: 19 % (ref 14–44)
MCH RBC QN AUTO: 29.2 PG (ref 26.8–34.3)
MCHC RBC AUTO-ENTMCNC: 32.7 G/DL (ref 31.4–37.4)
MCV RBC AUTO: 89 FL (ref 82–98)
MONOCYTES # BLD AUTO: 0.63 THOUSAND/ΜL (ref 0.17–1.22)
MONOCYTES NFR BLD AUTO: 9 % (ref 4–12)
NEUTROPHILS # BLD AUTO: 4.9 THOUSANDS/ΜL (ref 1.85–7.62)
NEUTS SEG NFR BLD AUTO: 68 % (ref 43–75)
NRBC BLD AUTO-RTO: 0 /100 WBCS
P AXIS: 57 DEGREES
PLATELET # BLD AUTO: 232 THOUSANDS/UL (ref 149–390)
PMV BLD AUTO: 11.1 FL (ref 8.9–12.7)
POTASSIUM SERPL-SCNC: 4.1 MMOL/L (ref 3.5–5.3)
PR INTERVAL: 152 MS
PROT SERPL-MCNC: 7.9 G/DL (ref 6.4–8.2)
QRS AXIS: 16 DEGREES
QRSD INTERVAL: 74 MS
QT INTERVAL: 326 MS
QTC INTERVAL: 449 MS
RBC # BLD AUTO: 4.08 MILLION/UL (ref 3.81–5.12)
SODIUM SERPL-SCNC: 136 MMOL/L (ref 136–145)
T WAVE AXIS: 39 DEGREES
TROPONIN I SERPL-MCNC: <0.02 NG/ML
VENTRICULAR RATE: 114 BPM
WBC # BLD AUTO: 7.24 THOUSAND/UL (ref 4.31–10.16)

## 2019-09-12 PROCEDURE — 93010 ELECTROCARDIOGRAM REPORT: CPT

## 2019-09-12 PROCEDURE — 84484 ASSAY OF TROPONIN QUANT: CPT | Performed by: EMERGENCY MEDICINE

## 2019-09-12 PROCEDURE — 85025 COMPLETE CBC W/AUTO DIFF WBC: CPT | Performed by: EMERGENCY MEDICINE

## 2019-09-12 PROCEDURE — 36415 COLL VENOUS BLD VENIPUNCTURE: CPT

## 2019-09-12 PROCEDURE — 93005 ELECTROCARDIOGRAM TRACING: CPT

## 2019-09-12 PROCEDURE — 99284 EMERGENCY DEPT VISIT MOD MDM: CPT | Performed by: EMERGENCY MEDICINE

## 2019-09-12 PROCEDURE — 99285 EMERGENCY DEPT VISIT HI MDM: CPT

## 2019-09-12 PROCEDURE — 71046 X-RAY EXAM CHEST 2 VIEWS: CPT

## 2019-09-12 PROCEDURE — 80053 COMPREHEN METABOLIC PANEL: CPT | Performed by: EMERGENCY MEDICINE

## 2019-09-12 PROCEDURE — 94640 AIRWAY INHALATION TREATMENT: CPT

## 2019-09-12 RX ORDER — PREDNISONE 20 MG/1
40 TABLET ORAL DAILY
Qty: 10 TABLET | Refills: 0 | Status: SHIPPED | OUTPATIENT
Start: 2019-09-12 | End: 2019-11-08 | Stop reason: ALTCHOICE

## 2019-09-12 RX ORDER — ALBUTEROL SULFATE 2.5 MG/3ML
5 SOLUTION RESPIRATORY (INHALATION) ONCE
Status: COMPLETED | OUTPATIENT
Start: 2019-09-12 | End: 2019-09-12

## 2019-09-12 RX ORDER — ACETAMINOPHEN 325 MG/1
650 TABLET ORAL ONCE
Status: COMPLETED | OUTPATIENT
Start: 2019-09-12 | End: 2019-09-12

## 2019-09-12 RX ADMIN — PREDNISONE 50 MG: 20 TABLET ORAL at 14:47

## 2019-09-12 RX ADMIN — ALBUTEROL SULFATE 5 MG: 2.5 SOLUTION RESPIRATORY (INHALATION) at 14:49

## 2019-09-12 RX ADMIN — IPRATROPIUM BROMIDE 0.5 MG: 0.5 SOLUTION RESPIRATORY (INHALATION) at 14:49

## 2019-09-12 RX ADMIN — ACETAMINOPHEN 650 MG: 325 TABLET ORAL at 16:16

## 2019-09-12 RX ADMIN — ALBUTEROL SULFATE 5 MG: 2.5 SOLUTION RESPIRATORY (INHALATION) at 16:18

## 2019-09-12 NOTE — DISCHARGE INSTRUCTIONS
Asma  LO QUE NECESITAS SABER:  El asma es felicita enfermedad pulmonar que dificulta la respiración  La inflamación crónica y las reacciones a los desencadenantes estrechan las vías respiratorias en los pulmones  El asma puede ser mortal si no se maneja  INSTRUCCIONES DE DESCARGA:  Regrese al departamento de emergencias si:  Tiene dificultad para respirar severa  Tus labios o uñas se vuelven azules o grises  La piel alrededor de pruitt jayda y costillas se estira con cada respiración  Tiene dificultad para respirar, incluso después de ganesh pruitt medicamento a corto plazo según las indicaciones  Tia números de flujo arsen están empeorando  Póngase en contacto con pruitt proveedor de atención médica si:  Se le acaba la medicina antes de que venza pruitt próxima recarga  Tia síntomas empeoran  Necesita ganesh [employer] de lo habitual para controlar tia síntomas  Tiene preguntas o inquietudes sobre pruitt afección o atención  Controle otras afecciones de [facility], nelli [place], reflujo ácido y apnea del sueño  Identificar y evitar los desencadenantes  Estos pueden incluir mascotas, ácaros del [place], moho y cucarachas  No fume ni esté cerca de otras personas que fuman  La nicotina y otros químicos en los cigarrillos y cigarros pueden causar daño pulmonar  Solicite información a pruitt proveedor de atención médica si actualmente fuma y necesita ayuda para dejar de fumar  Los cigarrillos electrónicos o el tabaco sin humo todavía contienen nicotina  Hable con pruitt proveedor de [employer] antes de usar estos productos  La gripe puede empeorar pruitt asma  Es posible que necesite felicita vacuna anual contra la gripe       Send feedback   History   [employer]

## 2019-09-12 NOTE — ED PROVIDER NOTES
History  Chief Complaint   Patient presents with    Shortness of Breath     c/o shortness of breath, dry cough, headache which began yesterday  shortness of breath worsens with exertions  60 yo female with DM2, RA, h/o Breast CA, stage IA right breast cancer status post lumpectomy, sentinel lymph node biopsy, chemotherapy and radiation on January 2018  ment, complicated by radiation pneumonitis, and severe persistent asthma, c/o onset of dyspnea,cough, non productive and hacking, not responding to home regimen with inhaler, arrives tachypneic, mild distress, no fever  History provided by:  Patient  Shortness of Breath   Onset quality:  Gradual  Duration:  1 day  Timing:  Constant  Progression:  Worsening  Chronicity:  Recurrent  Relieved by:  Nothing  Worsened by:  Exertion  Ineffective treatments:  Inhaler and rest  Associated symptoms: cough    Associated symptoms: no fever        Prior to Admission Medications   Prescriptions Last Dose Informant Patient Reported? Taking?    GoGarden MICROLET LANCETS lancets   No No   Sig: Testing three times a day   Blood Glucose Monitoring Suppl (codebender BLOOD GLUCOSE METER) w/Device KIT  Child Yes No   Sig: Testing blood sugars three times a day   Blood Glucose Monitoring Suppl (GoGarden CONTOUR MONITOR) w/Device KIT  Child No No   Sig: Testing blood sugars three times a day   DULoxetine (CYMBALTA) 30 mg delayed release capsule   No No   Sig: Take 1 capsule (30 mg total) by mouth 2 (two) times a day   EPINEPHrine (EPIPEN) 0 3 mg/0 3 mL SOAJ   No No   Sig: Inject 0 3 mL (0 3 mg total) into a muscle once for 1 dose   Insulin Glargine (TOUJEO SOLOSTAR) 300 units/mL CONCETRATED U-300 injection pen  Child No No   Sig: Inject 60 Units under the skin daily with dinner   Insulin Pen Needle (BD PEN NEEDLE NATALEE U/F) 32G X 4 MM MISC  Child No No   Sig: USE 4/DAY   Lancets MISC  Child Yes No   Sig: Testing three times a day   albuterol (2 5 mg/3 mL) 0 083 % nebulizer solution   No No Sig: Take 1 vial (2 5 mg total) by nebulization every 6 (six) hours as needed for wheezing or shortness of breath   albuterol (PROVENTIL HFA,VENTOLIN HFA) 90 mcg/act inhaler  Child Yes No   Sig: Inhale 1 puff every 4 (four) hours as needed     amLODIPine-atorvastatin (CADUET) 10-10 MG per tablet  Child No No   Sig: take 1 tablet by mouth once daily   anastrozole (ARIMIDEX) 1 mg tablet  Child No No   Sig: Take 1 tablet (1 mg total) by mouth daily   budesonide (PULMICORT) 0 5 mg/2 mL nebulizer solution   No No   Sig: Take 1 vial (0 5 mg total) by nebulization 2 (two) times a day Rinse mouth after use     cyclobenzaprine (FLEXERIL) 5 mg tablet   No No   Sig: Take 1 tablet (5 mg total) by mouth 3 (three) times a day   enalapril (VASOTEC) 20 mg tablet  Child No No   Sig: take 1 tablet by mouth once daily   glucose blood (SANG CONTOUR TEST) test strip  Child No No   Sig: Testing three times a day   insulin lispro (HUMALOG KWIKPEN) 100 units/mL injection pen  Child No No   Sig: Inject 16 units TID   levothyroxine 50 mcg tablet  Child No No   Sig: take 1 tablet by mouth once daily   omeprazole (PriLOSEC) 20 mg delayed release capsule  Child No No   Sig: take 1 capsule by mouth once daily   oxyCODONE (ROXICODONE) 5 mg immediate release tablet   Yes No   Sig: Take 5 mg by mouth every 6 (six) hours as needed for moderate pain   rivaroxaban (XARELTO) 20 mg tablet  Child No No   Sig: Take 1 tablet (20 mg total) by mouth daily      Facility-Administered Medications: None       Past Medical History:   Diagnosis Date    Abdominal infection (HCC)     h-pylori    Abnormal chest x-ray 4/5/2019    Asthma     Breast cancer ([facility] ) 06/26/2018    Cancer ([place] [address] )     BREAST    Diabetes mellitus ([place] [address] )     Hiatal hernia     History of chemotherapy     History of radiation therapy     Hypercholesterolemia     Hypertension     Hypothyroid     Renal disorder     Rheumatoid arthritis ([facility] )        Past Surgical History:   Procedure Laterality Date    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    BREAST LUMPECTOMY Right 6/26/2018    Procedure: RIGHT BREAST NEEDLE LOCALIZATION X2 WITH RIGHT BREAST LUMPECTOMY ( NEEDLE LOC @ 1000); Surgeon: Liyah Shin MD;  [facility]: [facility];  Service: Surgical Oncology    BREAST LUMPECTOMY Right 8/2/2018    Procedure: LYMPHOSCINITGRAPHY INTRAOPERATIVE LYMPHATIC MAPPING , RIGHT SENTINEL NODE BIOPSY, REEXCISION  RIGHT BREAST LUMPECTOMY CAVITY (SUPERIOR MARGIN); Surgeon: Liyah Shin MD;  [facility];  Service: Surgical Oncology    BREAST SURGERY Left     CATARACT EXTRACTION, BILATERAL Bilateral     EGD AND COLONOSCOPY N/A 9/5/2018    Procedure: EGD AND COLONOSCOPY;  Surgeon: Dianelys Garvey MD;  Location: [facility] GI [facility]; Service: Gastroenterology    [facility] GUIDED CENTRAL VENOUS ACCESS DEVICE INSERTION  9/13/2018    KNEE SURGERY Right     MAMMO NEEDLE LOCALIZATION RIGHT (ALL INC) Right 6/26/2018    MAMMO STEREOTACTIC BREAST BIOPSY RIGHT (ALL INC) Right 5/23/2018    RETINAL DETACHMENT SURGERY Right     TUBAL LIGATION      TUNNELED VENOUS PORT PLACEMENT Left 9/13/2018    Procedure: INSERTION VENOUS PORT (PORT-A-CATH); Surgeon: Liyah Shin MD;  [facility];  Service: Surgical Oncology       Family History   Problem Relation Age of Onset    Diabetes Mother     Hypertension Mother     Diabetes Father     Hypertension Father     Diabetes Brother     Hypertension Brother     Prostate cancer Brother     Cancer Maternal Grandfather     No Known Problems Sister     No Known Problems Daughter     No Known Problems Sister     No Known Problems Sister     No Known Problems Sister     No Known Problems Sister     No Known Problems Daughter     No Known Problems Daughter      I have reviewed and agree with the history as documented  Social History     Tobacco Use    Smoking status: Never Smoker    Smokeless tobacco: Never Used   Substance Use Topics    Alcohol use:  No  Drug use: No        Review of Systems   Constitutional: Negative for fever  Respiratory: Positive for cough and shortness of breath  All other systems reviewed and are negative  Physical Exam  Physical Exam   Constitutional: She is oriented to person, place, and time  She appears well-developed and well-nourished  No distress  HENT:   Head: Normocephalic and atraumatic  Right Ear: External ear normal    Left Ear: External ear normal    Nose: Nose normal    Eyes: Pupils are equal, round, and reactive to light  Conjunctivae and EOM are normal    Neck: Normal range of motion  Neck supple  Cardiovascular: Normal rate and regular rhythm  Pulmonary/Chest: Accessory muscle usage present  Tachypnea noted  She is in respiratory distress  She has decreased breath sounds  She has wheezes  Abdominal: Soft  Musculoskeletal: Normal range of motion  Neurological: She is alert and oriented to person, place, and time  She has normal strength  Gait normal    Skin: Skin is warm and dry  No rash noted  She is not diaphoretic  Psychiatric: She has a normal mood and affect  Her behavior is normal  Judgment and thought content normal    Nursing note and vitals reviewed        Vital Signs  ED Triage Vitals [09/12/19 1205]   Temperature Pulse Respirations Blood Pressure SpO2   98 1 °F (36 7 °C) (!) 113 18 145/65 96 %      Temp Source Heart Rate Source Patient Position - Orthostatic VS BP Location FiO2 (%)   Oral Monitor Sitting Right arm --      Pain Score       6           Vitals:    09/12/19 1205 09/12/19 1509 09/12/19 1651   BP: 145/65 (!) 173/72 152/57   Pulse: (!) 113 104 (!) 116   Patient Position - Orthostatic VS: Sitting Lying          Visual Acuity      ED Medications  Medications   albuterol inhalation solution 5 mg (5 mg Nebulization Given 9/12/19 1449)   ipratropium (ATROVENT) 0 02 % inhalation solution 0 5 mg (0 5 mg Nebulization Given 9/12/19 1449)   predniSONE tablet 50 mg (50 mg Oral Given 9/12/19 1447)   albuterol inhalation solution 5 mg (5 mg Nebulization Given 9/12/19 1618)   acetaminophen (TYLENOL) tablet 650 mg (650 mg Oral Given 9/12/19 1616)       Diagnostic Studies  Results Reviewed     Procedure Component Value Units Date/Time    Troponin I [[id number]]  (Normal) Collected:  09/12/19 1258    Lab Status:  Final result Specimen:  Blood from Arm, Left Updated:  09/12/19 1334     Troponin I <0 02 ng/mL     Comprehensive metabolic panel [[id number]]  (Abnormal) Collected:  09/12/19 1258    Lab Status:  Final result Specimen:  Blood from Arm, Left Updated:  09/12/19 1332     Sodium 136 mmol/L      Potassium 4 1 mmol/L      Chloride 99 mmol/L      CO2 25 mmol/L      ANION GAP 12 mmol/L      BUN 12 mg/dL      Creatinine 0 83 mg/dL      Glucose 311 mg/dL      Calcium 9 6 mg/dL      AST 15 U/L      ALT 29 U/L      Alkaline Phosphatase 73 U/L      Total Protein 7 9 g/dL      Albumin 3 5 g/dL      Total Bilirubin 0 41 mg/dL      eGFR 76 ml/min/1 73sq m     Narrative:       [place] guidelines for Chronic Kidney Disease (CKD):     Stage 1 with normal or high GFR (GFR > 90 mL/min/1 73 square meters)    Stage 2 Mild CKD (GFR = 60-89 mL/min/1 73 square meters)    Stage 3A Moderate CKD (GFR = 45-59 mL/min/1 73 square meters)    Stage 3B Moderate CKD (GFR = 30-44 mL/min/1 73 square meters)    Stage 4 Severe CKD (GFR = 15-29 mL/min/1 73 square meters)    Stage 5 End Stage CKD (GFR <15 mL/min/1 73 square meters)  Note: GFR calculation is accurate only with a steady state creatinine    CBC and differential [[id number]] Collected:  09/12/19 1258    Lab Status:  Final result Specimen:  Blood from Arm, Left Updated:  09/12/19 1305     WBC 7 24 Thousand/uL      RBC 4 08 Million/uL      Hemoglobin 11 9 g/dL      Hematocrit 36 4 %      MCV 89 fL      MCH 29 2 pg      MCHC 32 7 g/dL      RDW 13 4 %      MPV 11 1 fL      Platelets 773 Thousands/uL      nRBC 0 /100 WBCs      Neutrophils Relative 68 %      Immat GRANS % 0 %      Lymphocytes Relative 19 %      Monocytes Relative 9 %      Eosinophils Relative 3 %      Basophils Relative 1 %      Neutrophils Absolute 4 90 Thousands/µL      Immature Grans Absolute 0 02 Thousand/uL      Lymphocytes Absolute 1 40 Thousands/µL      Monocytes Absolute 0 63 Thousand/µL      Eosinophils Absolute 0 20 Thousand/µL      Basophils Absolute 0 09 Thousands/µL                  XR chest 2 views   ED Interpretation by Laith Clark MD (09/12 1451)   No acute findings      Final Result by Alta Latif MD (09/12 1612)      No acute cardiopulmonary disease  Workstation performed: [id number]                    Procedures  Procedures       ED Course  ED Course as of Sep 12 1737   Thu Sep 12, 2019   1451 No infiltrates, no effusion, no PTX, she has a port a cath   XR chest 2 views   1604 She is improving after updraft, and ED workup is reassuring  She c/o pain behind left ear, onset in mid August, normal appearance      1641 After second updraft, she is no distress, lying flat she had wheezing for a few breaths, without distress  She feels well enough to go home, asking for something for cough, in addition to treatment I will do for asthma  MDM    Disposition  Final diagnoses:   Severe persistent asthma with (acute) exacerbation     Time reflects when diagnosis was documented in both MDM as applicable and the Disposition within this note     Time User Action Codes Description Comment    9/12/2019  4:43 PM Shayna Meeks Add [J45 [id number]] Severe persistent asthma with (acute) exacerbation       ED Disposition     ED Disposition Condition Date/Time Comment    Discharge Stable Thu Sep 12, 2019  4:43 PM Jolie CruzCintron discharge to home/self care              Follow-up Information     Follow up With Specialties Details Why Oziel Crawford MD Family Medicine Go to  For followup [address] [place] [address] [facility]            Discharge Medication List as of 9/12/2019  4:44 PM      START taking these medications    Details   dextromethorphan-guaifenesin (MUCINEX DM)  MG per 12 hr tablet Take 1 tablet by mouth every 12 (twelve) hours as needed for cough, Starting Thu 9/12/2019, Print      predniSONE 20 mg tablet Take 2 tablets (40 mg total) by mouth daily, Starting Thu 9/12/2019, Print         CONTINUE these medications which have NOT CHANGED    Details   albuterol (2 5 mg/3 mL) 0 083 % nebulizer solution Take 1 vial (2 5 mg total) by nebulization every 6 (six) hours as needed for wheezing or shortness of breath, Starting Thu 6/6/2019, Normal      albuterol (PROVENTIL HFA,VENTOLIN HFA) 90 mcg/act inhaler Inhale 1 puff every 4 (four) hours as needed  , Historical Med      amLODIPine-atorvastatin (CADUET) 10-10 MG per tablet take 1 tablet by mouth once daily, Normal      anastrozole (ARIMIDEX) 1 mg tablet Take 1 tablet (1 mg total) by mouth daily, Starting Thu 2/28/2019, Normal      !! SANG MICROLET LANCETS lancets Testing three times a day, Normal      !! Blood Glucose Monitoring Suppl (ACURA BLOOD GLUCOSE METER) w/Device KIT Testing blood sugars three times a day, Historical Med      !!  Blood Glucose Monitoring Suppl (SANG CONTOUR MONITOR) w/Device KIT Testing blood sugars three times a day, Normal      budesonide (PULMICORT) 0 5 mg/2 mL nebulizer solution Take 1 vial (0 5 mg total) by nebulization 2 (two) times a day Rinse mouth after use , Starting Thu 6/6/2019, Normal      cyclobenzaprine (FLEXERIL) 5 mg tablet Take 1 tablet (5 mg total) by mouth 3 (three) times a day, Starting Tue 9/3/2019, Normal      DULoxetine (CYMBALTA) 30 mg delayed release capsule Take 1 capsule (30 mg total) by mouth 2 (two) times a day, Starting Tue 9/3/2019, Normal      enalapril (VASOTEC) 20 mg tablet take 1 tablet by mouth once daily, Normal      EPINEPHrine (EPIPEN) 0 3 mg/0 3 mL SOAJ Inject 0 3 mL (0 3 mg total) into a muscle once for 1 dose, Starting Thu 6/13/2019, Normal      glucose blood (SANG CONTOUR TEST) test strip Testing three times a day, Normal      Insulin Glargine (TOUJEO SOLOSTAR) 300 units/mL CONCETRATED U-300 injection pen Inject 60 Units under the skin daily with dinner, Starting Wed 5/29/2019, Normal      insulin lispro (HUMALOG KWIKPEN) 100 units/mL injection pen Inject 16 units TID, Normal      Insulin Pen Needle (BD PEN NEEDLE NATALEE U/F) 32G X 4 MM MISC USE 4/DAY, Normal      !! Lancets MISC Testing three times a day, Historical Med      levothyroxine 50 mcg tablet take 1 tablet by mouth once daily, Normal      omeprazole (PriLOSEC) 20 mg delayed release capsule take 1 capsule by mouth once daily, Normal      oxyCODONE (ROXICODONE) 5 mg immediate release tablet Take 5 mg by mouth every 6 (six) hours as needed for moderate pain, Historical Med      rivaroxaban (XARELTO) 20 mg tablet Take 1 tablet (20 mg total) by mouth daily, Starting Fri 5/24/2019, Normal       !! - Potential duplicate medications found  Please discuss with provider  No discharge procedures on file      ED Provider  Electronically Signed by           Pham Razo MD  09/12/19 2820

## 2019-09-13 NOTE — TELEPHONE ENCOUNTER
Solomon Medina from [employer] calling to release hold on Xolair order and a date change to 9/18  Thank you!

## 2019-09-18 ENCOUNTER — HOSPITAL ENCOUNTER (OUTPATIENT)
Dept: INFUSION CENTER | Facility: CLINIC | Age: 64
End: 2019-09-18

## 2019-09-26 DIAGNOSIS — I10 ESSENTIAL HYPERTENSION: ICD-10-CM

## 2019-09-26 DIAGNOSIS — E03.8 OTHER SPECIFIED HYPOTHYROIDISM: ICD-10-CM

## 2019-09-26 DIAGNOSIS — E11.8 TYPE 2 DIABETES MELLITUS WITH COMPLICATION, WITHOUT LONG-TERM CURRENT USE OF INSULIN (HCC): ICD-10-CM

## 2019-09-26 RX ORDER — METFORMIN HYDROCHLORIDE 500 MG/1
TABLET, EXTENDED RELEASE ORAL
Qty: 180 TABLET | Refills: 1 | Status: SHIPPED | OUTPATIENT
Start: 2019-09-26 | End: 2019-10-15

## 2019-09-26 RX ORDER — AMLODIPINE BESYLATE 10 MG/1
TABLET ORAL
Qty: 90 TABLET | Refills: 1 | Status: SHIPPED | OUTPATIENT
Start: 2019-09-26 | End: 2020-05-28

## 2019-09-26 RX ORDER — ENALAPRIL MALEATE 20 MG/1
TABLET ORAL
Qty: 90 TABLET | Refills: 1 | Status: SHIPPED | OUTPATIENT
Start: 2019-09-26 | End: 2020-06-16

## 2019-09-26 RX ORDER — LEVOTHYROXINE SODIUM 0.05 MG/1
TABLET ORAL
Qty: 90 TABLET | Refills: 1 | Status: SHIPPED | OUTPATIENT
Start: 2019-09-26 | End: 2020-01-07 | Stop reason: SDUPTHER

## 2019-09-26 RX ORDER — GLIPIZIDE 10 MG/1
TABLET, FILM COATED, EXTENDED RELEASE ORAL
Qty: 90 TABLET | Refills: 1 | Status: SHIPPED | OUTPATIENT
Start: 2019-09-26 | End: 2019-10-15

## 2019-09-27 ENCOUNTER — OFFICE VISIT (OUTPATIENT)
Dept: PULMONOLOGY | Facility: CLINIC | Age: 64
End: 2019-09-27
Payer: MEDICARE

## 2019-09-27 VITALS
HEIGHT: 64 IN | SYSTOLIC BLOOD PRESSURE: 124 MMHG | TEMPERATURE: 96.9 F | HEART RATE: 83 BPM | OXYGEN SATURATION: 95 % | RESPIRATION RATE: 16 BRPM | BODY MASS INDEX: 29.5 KG/M2 | DIASTOLIC BLOOD PRESSURE: 70 MMHG | WEIGHT: 172.8 LBS

## 2019-09-27 DIAGNOSIS — J45.50 SEVERE PERSISTENT ASTHMA WITHOUT COMPLICATION: ICD-10-CM

## 2019-09-27 DIAGNOSIS — H92.02 LEFT EAR PAIN: Primary | ICD-10-CM

## 2019-09-27 PROCEDURE — 99215 OFFICE O/P EST HI 40 MIN: CPT | Performed by: INTERNAL MEDICINE

## 2019-09-27 RX ORDER — FLUTICASONE FUROATE AND VILANTEROL 100; 25 UG/1; UG/1
1 POWDER RESPIRATORY (INHALATION) DAILY
Qty: 2 INHALER | Refills: 5 | Status: SHIPPED | OUTPATIENT
Start: 2019-09-27 | End: 2020-04-29

## 2019-09-27 RX ORDER — ALBUTEROL SULFATE 2.5 MG/3ML
2.5 SOLUTION RESPIRATORY (INHALATION) EVERY 6 HOURS PRN
Qty: 90 VIAL | Refills: 5 | Status: SHIPPED | OUTPATIENT
Start: 2019-09-27 | End: 2020-04-29 | Stop reason: SDUPTHER

## 2019-09-27 NOTE — TELEPHONE ENCOUNTER
Mena Encinas from [facility] calling saying date needs to be changed to 10/2 in [place] and the hold needs to be released   Thank you

## 2019-09-27 NOTE — LETTER
September 27, 2019     Sia Koroma MD  [address] [place] [address] 305  [address]    Patient: Hemalatha Drake   YOB: 1955   Date of Visit: 9/27/2019       Dear Dr Mandi Pascal Recipients: Thank you for referring Hemalatha Drake to me for evaluation  Below are my notes for this consultation  If you have questions, please do not hesitate to call me  I look forward to following your patient along with you  Sincerely,        Sameer Gardner MD        CC: No Recipients  Sameer Gardner MD  9/27/2019 11:22 AM  Sign at close encounter    Pulmonary outpatient note   Hemalatha Drake 59 y o  female MRN: [id number]  9/27/2019      Assessment and plan:  Hemalatha Drake has the following medical problems:  1  Asthma  Severe persistent asthma  Started Xolair in June 2019 but stopped after 2 injections due to left-sided pain  I do not think this pain is related to the patient injections  Headache is reported side effects but not left-sided body pain  She is complaining of ear pain  I referred the patient to ear nose and throat physician  If there is no findings there should continue with Neurology evaluation  I told the patient to try another injection of the Xolair and if the pain is chronologically related to the injections we will stop it  In the meantime I gave the patient showed and reviewed her technique to [facility] which was good  I told her to take it on a daily basis once daily  No need for nebulizers standing if she uses the [facility] every day  2   Fibrotic changes  Fibrotic changes were seen in the right upper lung status post radiation therapy that was given in January 2019 for breast cancer  This has improved on chest CT in August 2019  Follow-up in 3 months after ear nose and throat consult, Xolair treatment and weight status  Sameer Gardner MD/PhD,  [facility]      No follow-ups on file      History of Present Illness  This is 59 y o  year old female with previous medical history of  severe persistent asthma, stage IA right breast cancer status post lumpectomy, sentinel lymph node biopsy, chemotherapy and radiation initially on May 2018  We started Xolair therapy in June 2019 due to severe persistent asthma on chronic steroids  Since starting this treatment she felt better but stopped after 2 shots since she developed headache and left-sided body pain that she thought might be related to the Xolair injections  Currently she is not on steroids min not on inhalers  She is using nebulizers as needed with albuterol and Pulmicort  She was in the ER on August for left-sided pain and in September for asthma flare, she was discharged from the ER with systemic steroids        Social history:   Never smoked  Does not drink alcohol      Occupational history:   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[facility]   No occupational risk factors        Review of Systems   HENT: Negative for ear pain, postnasal drip, rhinorrhea, sneezing, sore throat and trouble swallowing  Eyes: Negative  Respiratory: Positive for cough, shortness of breath and wheezing  Cardiovascular: Negative for chest pain  Gastrointestinal: Negative  Endocrine: Negative  Genitourinary: Negative  Musculoskeletal: Positive for myalgias  Allergic/Immunologic: Positive for environmental allergies  Negative for food allergies  Neurological: Positive for headaches  Hematological: Negative  Psychiatric/Behavioral: Negative          Historical Information   Past Medical History:   Diagnosis Date    [facility] )     h-pylori    Abnormal chest x-ray 4/5/2019    Asthma     [facility] cancer [facility] ) 06/26/2018    Cancer ([facility] )     BREAST    Diabetes mellitus ([facility] )     Hiatal hernia     History of chemotherapy     History of radiation therapy     Hypercholesterolemia     Hypertension     Hypothyroid     Renal disorder     Rheumatoid arthritis (HCC)      Past Surgical History:   Procedure Laterality Date    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    BREAST LUMPECTOMY Right 6/26/2018    Procedure: RIGHT BREAST NEEDLE LOCALIZATION X2 WITH RIGHT BREAST LUMPECTOMY ( NEEDLE LOC @ 1000); Surgeon: Vega Chavez MD;  Location[facility];  Service: Surgical Oncology    BREAST LUMPECTOMY Right 8/2/2018    Procedure: LYMPHOSCINITGRAPHY INTRAOPERATIVE LYMPHATIC MAPPING , RIGHT SENTINEL NODE BIOPSY, REEXCISION  RIGHT BREAST LUMPECTOMY CAVITY (SUPERIOR MARGIN); Surgeon: Vega Chavez MD;  [facility];  Service: Surgical Oncology    BREAST SURGERY Left     CATARACT EXTRACTION, BILATERAL Bilateral     EGD AND COLONOSCOPY N/A 9/5/2018    Procedure: EGD AND COLONOSCOPY;  Surgeon: Benjamin Ngo MD;  Location: [facility]; Service: Gastroenterology    [facility] GUIDED CENTRAL VENOUS ACCESS DEVICE INSERTION  9/13/2018    KNEE SURGERY Right     MAMMO NEEDLE LOCALIZATION RIGHT (ALL INC) Right 6/26/2018    MAMMO STEREOTACTIC BREAST BIOPSY RIGHT (ALL INC) Right 5/23/2018    RETINAL DETACHMENT SURGERY Right     TUBAL LIGATION      TUNNELED VENOUS PORT PLACEMENT Left 9/13/2018    Procedure: INSERTION VENOUS PORT (PORT-A-CATH);   Surgeon: Vega Chavez MD;  [facility];  Service: Surgical Oncology     Family History   Problem Relation Age of Onset    Diabetes Mother     Hypertension Mother     Diabetes Father     Hypertension Father     Diabetes Brother     Hypertension Brother     Prostate cancer Brother     Cancer Maternal Grandfather     No Known Problems Sister     No Known Problems Daughter     No Known Problems Sister     No Known Problems Sister     No Known Problems Sister     No Known Problems Sister     No Known Problems Daughter     No Known Problems Daughter        Meds/Allergies     Current Outpatient Medications:     albuterol (2 5 mg/3 mL) 0 083 % nebulizer solution, Take 1 vial (2 5 mg total) by nebulization every 6 (six) hours as needed for wheezing or shortness of breath, Disp: 90 vial, Rfl: 5    albuterol (PROVENTIL HFA,VENTOLIN HFA) 90 mcg/act inhaler, Inhale 1 puff every 4 (four) hours as needed  , Disp: , Rfl:     amLODIPine (NORVASC) 10 mg tablet, take 1 tablet by mouth once daily, Disp: 90 tablet, Rfl: 1    amLODIPine-atorvastatin (CADUET) 10-10 MG per tablet, take 1 tablet by mouth once daily, Disp: 90 tablet, Rfl: 1    anastrozole (ARIMIDEX) 1 mg tablet, Take 1 tablet (1 mg total) by mouth daily, Disp: 90 tablet, Rfl: 1    SANG MICROLET LANCETS lancets, Testing three times a day, Disp: 100 each, Rfl: 3    Blood Glucose Monitoring Suppl ([place] Causes BLOOD GLUCOSE METER) w/Device KIT, Testing blood sugars three times a day, Disp: , Rfl:     Blood Glucose Monitoring Suppl (SANG CONTOUR MONITOR) w/Device KIT, Testing blood sugars three times a day, Disp: 1 kit, Rfl: 0    budesonide (PULMICORT) 0 5 mg/2 mL nebulizer solution, Take 1 vial (0 5 mg total) by nebulization 2 (two) times a day Rinse mouth after use , Disp: 60 vial, Rfl: 5    cyclobenzaprine (FLEXERIL) 5 mg tablet, Take 1 tablet (5 mg total) by mouth 3 (three) times a day, Disp: 90 tablet, Rfl: 1    dextromethorphan-guaifenesin (MUCINEX DM)  MG per 12 hr tablet, Take 1 tablet by mouth every 12 (twelve) hours as needed for cough, Disp: 10 tablet, Rfl: 0    DULoxetine (CYMBALTA) 30 mg delayed release capsule, Take 1 capsule (30 mg total) by mouth 2 (two) times a day, Disp: 60 capsule, Rfl: 2    enalapril (VASOTEC) 20 mg tablet, take 1 tablet by mouth once daily, Disp: 90 tablet, Rfl: 1    EPINEPHrine (EPIPEN) 0 3 mg/0 3 mL SOAJ, Inject 0 3 mL (0 3 mg total) into a muscle once for 1 dose, Disp: 0 6 mL, Rfl: 0    glipiZIDE (GLUCOTROL XL) 10 mg 24 hr tablet, take 1 tablet by mouth once daily, Disp: 90 tablet, Rfl: 1    glucose blood (SANG CONTOUR TEST) test strip, Testing three times a day, Disp: 100 each, Rfl: 3    Insulin Glargine (TOUJEO SOLOSTAR) 300 units/mL CONCETRATED U-300 injection pen, Inject 60 Units under the skin daily with dinner, Disp: 7 5 mL, Rfl: 0    insulin lispro (HUMALOG KWIKPEN) 100 units/mL injection pen, Inject 16 units TID, Disp: 5 pen, Rfl: 5    Insulin Pen Needle (BD PEN NEEDLE NATALEE U/F) 32G X 4 MM MISC, USE 4/DAY, Disp: 100 each, Rfl: 3    Lancets MISC, Testing three times a day, Disp: , Rfl:     levothyroxine 50 mcg tablet, take 1 tablet by mouth once daily, Disp: 90 tablet, Rfl: 1    metFORMIN (GLUCOPHAGE-XR) 500 mg 24 hr tablet, take 2 tablets by mouth once daily WITH BREAKFAST, Disp: 180 tablet, Rfl: 1    omeprazole (PriLOSEC) 20 mg delayed release capsule, take 1 capsule by mouth once daily, Disp: 90 capsule, Rfl: 2    oxyCODONE (ROXICODONE) 5 mg immediate release tablet, Take 5 mg by mouth every 6 (six) hours as needed for moderate pain, Disp: , Rfl:     predniSONE 20 mg tablet, Take 2 tablets (40 mg total) by mouth daily, Disp: 10 tablet, Rfl: 0    rivaroxaban (XARELTO) 20 mg tablet, Take 1 tablet (20 mg total) by mouth daily, Disp: 30 tablet, Rfl: 3  Allergies   Allergen Reactions    Aspirin Hives     Dr  in [address] told patient not to take aspirin r/t kidneys     Tylenol With Codeine #3 [Acetaminophen-Codeine] Rash       Vitals: not currently breastfeeding  There is no height or weight on file to calculate BMI  Physical Exam  Physical Exam   Constitutional: She is oriented to person, place, and time  She appears well-developed and well-nourished  No distress  HENT:   Head: Normocephalic and atraumatic  Eyes: Pupils are equal, round, and reactive to light  EOM are normal    Neck: Normal range of motion  Neck supple  No JVD present  Cardiovascular: Normal rate, regular rhythm and normal heart sounds  Exam reveals no friction rub  No murmur heard  Pulmonary/Chest: Effort normal  She has wheezes  She has rales  Abdominal: Soft  She exhibits no distension  Musculoskeletal: Normal range of motion   She exhibits no edema or deformity  Neurological: She is alert and oriented to person, place, and time  Skin: Skin is warm and dry  She is not diaphoretic  Psychiatric: She has a normal mood and affect  Labs: I have personally reviewed pertinent lab results  Lab Results   Component Value Date    WBC 7 24 09/12/2019    HGB 11 9 09/12/2019    HCT 36 4 09/12/2019    MCV 89 09/12/2019     09/12/2019     Lab Results   Component Value Date    CALCIUM 9 6 09/12/2019    K 4 1 09/12/2019    CO2 25 09/12/2019    CL 99 (L) 09/12/2019    BUN 12 09/12/2019    CREATININE 0 83 09/12/2019     Lab Results   Component Value Date     (H) 11/26/2018     Lab Results   Component Value Date    ALT 29 09/12/2019    AST 15 09/12/2019    ALKPHOS 73 09/12/2019       Imaging and other studies:   I have personally reviewed pertinent imaging studies in PACS  Chest CT 05/2019  Subpleural right upper lobe airspace disease, distribution of which in the setting of right breast cancer is most consistent with radiation pneumonitis in the appropriate setting  Pneumonia could be considered if the patient has not had radiation  Stable tiny left lower lobe pulmonary nodule  Pulmonary function testing:   PFT 12/2018  Study Interpretation:      · Hyperinflated lung volumes with total lung capacity at 134% predicted and residual volume at 173% predicted consistent with air trapping  · Normal airflow on vital capacity  · Super normal diffusion capacity  · Increased airway resistance as indicated by the specific airway conductance  Allergy blood test 11/2018  [facility], Adult (Order #[id number]) on 11/26/18   Contains abnormal data [facility], Adult   Order: [id number]   Status:  Final result   Visible to patient:  Yes (MyChart) Next appt:  06/17/2019 at 02:30 PM in Obstetrics and Gynecology Holley Valdes MD) Dx: Moderate persistent asthma without co      Ref Range & Units 11/26/18  4:53 PM   A  ALTERNATA 0 00 - 0 09 kUA/I <0 10    A  FUMIGATUS 0 00 - 0 09 kUA/I <0 10    [place] Grass 0 00 - 0 09 kUA/I 0  77High     Box Elder  0 00 - 0 09 kUA/I 0  76High     Cat Epithellium-Dander 0 00 - 0 09 kUA/I 1  70High     C HERBARUM 0 00 - 0 09 kUA/I <0 10    Cockroach 0 00 - 0 09 kUA/I 5  50High     Common Silver Birch 0 00 - 0 09 kUA/I 0  49High     [place] 0 00 - 0 09 kUA/I 0  79High     D  farinae 0 00 - 0 09 kUA/I 47  90High     D  pteronyssinus 0 00 - 0 09 kUA/I 73  80High     Dog Dander 0 00 - 0 09 kUA/I 38  00High     Elm IgE 0 00 - 0 09 kUA/I 0  [address] Tree 0 00 - 0 09 kUA/I 0  57High     Mugwort 0 00 - 0 09 kUA/I 0 63High     [place] Tree 0 00 - 0 09 kUA/I 0  46High     [place] 0 00 - 0 09 kUA/I 0 68High     P CHRYSOGENUM 0 00 - 0 09 kUA/I <0 10    Rough Pigweed  IgE 0 00 - 0 09 kUA/I 0 68High     Common Ragweed 0 00 - 0 09 kUA/I 0  76High     Sheep Sorrel IgE 0 00 - 0 09 kUA/I 0 68High     [place] Tree 0 00 - 0 09 kUA/I 0  73High     [place] Grass 0 00 - 0 09 kUA/I 0  71High     [place] Tree 0 00 - 0 09 kUA/I 0  80High     White Tee Tree 0 00 - 0 09 kUA/I 0  79High     IgE 0 - 113 kU/l 863High     Allergen Comment  See Below    MOUSE URINE 0 00 - 0 09 kUA/I <0 10            Chest CT August 2019  No pulmonary embolus      Radiation change in the subpleural anterolateral right chest wall, decreased in overall area when compared to previous examination  Unchanged 2 to 3 mm left lower lobe pulmonary nodule, probably benign and similar at least as far back as November 2018      Unchanged appearance of a spiculated mass in the posterior right breast, inseparable from the right pectoralis musculature  Unchanged skin thickening is over the right breast related to radiation therapy

## 2019-09-27 NOTE — PROGRESS NOTES
Pulmonary outpatient note   Ольга Fenton 59 y o  female MRN: [id number]  9/27/2019      Assessment and plan:  Ольга Fenton has the following medical problems:  1  Asthma  Severe persistent asthma  Started Xolair in June 2019 but stopped after 2 injections due to left-sided pain  I do not think this pain is related to the patient injections  Headache is reported side effects but not left-sided body pain  She is complaining of ear pain  I referred the patient to ear nose and throat physician  If there is no findings there should continue with Neurology evaluation  I told the patient to try another injection of the Xolair and if the pain is chronologically related to the injections we will stop it  In the meantime I gave the patient showed and reviewed her technique to [facility] which was good  I told her to take it on a daily basis once daily  No need for nebulizers standing if she uses the [facility] every day  2   Fibrotic changes  Fibrotic changes were seen in the right upper lung status post radiation therapy that was given in January 2019 for breast cancer  This has improved on chest CT in August 2019  Follow-up in 3 months after ear nose and throat consult, Xolair treatment and weight status  Tami Hannah MD/PhD,  [facility]      No follow-ups on file  History of Present Illness  This is 59y o  year old female with previous medical history of  severe persistent asthma, stage IA right breast cancer status post lumpectomy, sentinel lymph node biopsy, chemotherapy and radiation initially on May 2018  We started Xolair therapy in June 2019 due to severe persistent asthma on chronic steroids  Since starting this treatment she felt better but stopped after 2 shots since she developed headache and left-sided body pain that she thought might be related to the Xolair injections  Currently she is not on steroids min not on inhalers    She is using nebulizers as needed with albuterol and Pulmicort  She was in the ER on August for left-sided pain and in September for asthma flare, she was discharged from the ER with systemic steroids        Social history:   Never smoked  Does not drink alcohol      Occupational history:   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[facility] [facility]   No occupational risk factors        Review of Systems   HENT: Negative for ear pain, postnasal drip, rhinorrhea, sneezing, sore throat and trouble swallowing  Eyes: Negative  Respiratory: Positive for cough, shortness of breath and wheezing  Cardiovascular: Negative for chest pain  Gastrointestinal: Negative  Endocrine: Negative  Genitourinary: Negative  Musculoskeletal: Positive for myalgias  Allergic/Immunologic: Positive for environmental allergies  Negative for food allergies  Neurological: Positive for headaches  Hematological: Negative  Psychiatric/Behavioral: Negative  Historical Information   Past Medical History:   Diagnosis Date    Abdominal infection ([facility] )     h-pylori    Abnormal chest x-ray 4/5/2019    Asthma     [facility] cancer [facility] ) 06/26/2018    Cancer ([facility] )     BREAST    [facility]     Hiatal hernia     History of chemotherapy     History of radiation therapy     Hypercholesterolemia     Hypertension     Hypothyroid     Renal disorder     Rheumat[facility]      Past Surgical History:   Procedure Laterality Date    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    BREAST LUMPECTOMY Right 6/26/2018    Procedure: RIGHT BREAST NEEDLE LOCALIZATION X2 WITH RIGHT BREAST LUMPECTOMY ( NEEDLE LOC @ 1000); Surgeon: Tracy Blake MD;  [facility];  Service: Surgical Oncology    BREAST LUMPECTOMY Right 8/2/2018    Procedure: LYMPHOSCINITGRAPHY INTRAOPERATIVE LYMPHATIC MAPPING , RIGHT SENTINEL NODE BIOPSY, REEXCISION  RIGHT BREAST LUMPECTOMY CAVITY (SUPERIOR MARGIN);   Surgeon: Tracy Blake MD;  [facility]; Service: Surgical Oncology    BREAST SURGERY Left     CATARACT EXTRACTION, BILATERAL Bilateral     EGD AND COLONOSCOPY N/A 9/5/2018    Procedure: EGD AND COLONOSCOPY;  Surgeon: Meng Kearney MD;  Location: [facility] GI LAB; Service: Gastroenterology    [facility] GUIDED CENTRAL VENOUS ACCESS DEVICE INSERTION  9/13/2018    KNEE SURGERY Right     MAMMO NEEDLE LOCALIZATION RIGHT (ALL INC) Right 6/26/2018    MAMMO STEREOTACTIC BREAST BIOPSY RIGHT (ALL INC) Right 5/23/2018    RETINAL DETACHMENT SURGERY Right     TUBAL LIGATION      TUNNELED VENOUS PORT PLACEMENT Left 9/13/2018    Procedure: INSERTION VENOUS PORT (PORT-A-CATH);   Surgeon: Marychuy Hartley MD;  [facility];  Service: Surgical Oncology     Family History   Problem Relation Age of Onset    Diabetes Mother     Hypertension Mother     Diabetes Father     Hypertension Father     Diabetes Brother     Hypertension Brother     Prostate cancer Brother     Cancer Maternal Grandfather     No Known Problems Sister     No Known Problems Daughter     No Known Problems Sister     No Known Problems Sister     No Known Problems Sister     No Known Problems Sister     No Known Problems Daughter     No Known Problems Daughter        Meds/Allergies     Current Outpatient Medications:     albuterol (2 5 mg/3 mL) 0 083 % nebulizer solution, Take 1 vial (2 5 mg total) by nebulization every 6 (six) hours as needed for wheezing or shortness of breath, Disp: 90 vial, Rfl: 5    albuterol (PROVENTIL HFA,VENTOLIN HFA) 90 mcg/act inhaler, Inhale 1 puff every 4 (four) hours as needed  , Disp: , Rfl:     amLODIPine (NORVASC) 10 mg tablet, take 1 tablet by mouth once daily, Disp: 90 tablet, Rfl: 1    amLODIPine-atorvastatin (CADUET) 10-10 MG per tablet, take 1 tablet by mouth once daily, Disp: 90 tablet, Rfl: 1    anastrozole (ARIMIDEX) 1 mg tablet, Take 1 tablet (1 mg total) by mouth daily, Disp: 90 tablet, Rfl: 1    SANG MICROLET LANCETS lancets, Testing three times a day, Disp: 100 each, Rfl: 3    Blood Glucose Monitoring Suppl (ACURA BLOOD GLUCOSE METER) w/Device KIT, Testing blood sugars three times a day, Disp: , Rfl:     Blood Glucose Monitoring Suppl (SANG CONTOUR MONITOR) w/Device KIT, Testing blood sugars three times a day, Disp: 1 kit, Rfl: 0    budesonide (PULMICORT) 0 5 mg/2 mL nebulizer solution, Take 1 vial (0 5 mg total) by nebulization 2 (two) times a day Rinse mouth after use , Disp: 60 vial, Rfl: 5    cyclobenzaprine (FLEXERIL) 5 mg tablet, Take 1 tablet (5 mg total) by mouth 3 (three) times a day, Disp: 90 tablet, Rfl: 1    dextromethorphan-guaifenesin (MUCINEX DM)  MG per 12 hr tablet, Take 1 tablet by mouth every 12 (twelve) hours as needed for cough, Disp: 10 tablet, Rfl: 0    DULoxetine (CYMBALTA) 30 mg delayed release capsule, Take 1 capsule (30 mg total) by mouth 2 (two) times a day, Disp: 60 capsule, Rfl: 2    enalapril (VASOTEC) 20 mg tablet, take 1 tablet by mouth once daily, Disp: 90 tablet, Rfl: 1    EPINEPHrine (EPIPEN) 0 3 mg/0 3 mL SOAJ, Inject 0 3 mL (0 3 mg total) into a muscle once for 1 dose, Disp: 0 6 mL, Rfl: 0    glipiZIDE (GLUCOTROL XL) 10 mg 24 hr tablet, take 1 tablet by mouth once daily, Disp: 90 tablet, Rfl: 1    glucose blood (SANG CONTOUR TEST) test strip, Testing three times a day, Disp: 100 each, Rfl: 3    Insulin Glargine (TOUJEO SOLOSTAR) 300 units/mL CONCETRATED U-300 injection pen, Inject 60 Units under the skin daily with dinner, Disp: 7 5 mL, Rfl: 0    insulin lispro (HUMALOG KWIKPEN) 100 units/mL injection pen, Inject 16 units TID, Disp: 5 pen, Rfl: 5    Insulin Pen Needle (BD PEN NEEDLE NATALEE U/F) 32G X 4 MM MISC, USE 4/DAY, Disp: 100 each, Rfl: 3    Lancets MISC, Testing three times a day, Disp: , Rfl:     levothyroxine 50 mcg tablet, take 1 tablet by mouth once daily, Disp: 90 tablet, Rfl: 1    metFORMIN (GLUCOPHAGE-XR) 500 mg 24 hr tablet, take 2 tablets by mouth once daily WITH BREAKFAST, Disp: 180 tablet, Rfl: 1    omeprazole (PriLOSEC) 20 mg delayed release capsule, take 1 capsule by mouth once daily, Disp: 90 capsule, Rfl: 2    oxyCODONE (ROXICODONE) 5 mg immediate release tablet, Take 5 mg by mouth every 6 (six) hours as needed for moderate pain, Disp: , Rfl:     predniSONE 20 mg tablet, Take 2 tablets (40 mg total) by mouth daily, Disp: 10 tablet, Rfl: 0    rivaroxaban (XARELTO) 20 mg tablet, Take 1 tablet (20 mg total) by mouth daily, Disp: 30 tablet, Rfl: 3  Allergies   Allergen Reactions    Aspirin Hives     Dr  in [address] told patient not to take aspirin r/t kidneys     Tylenol With Codeine #3 [Acetaminophen-Codeine] Rash       Vitals: not currently breastfeeding  There is no height or weight on file to calculate BMI  Physical Exam  Physical Exam   Constitutional: She is oriented to person, place, and time  She appears well-developed and well-nourished  No distress  HENT:   Head: Normocephalic and atraumatic  Eyes: Pupils are equal, round, and reactive to light  EOM are normal    Neck: Normal range of motion  Neck supple  No JVD present  Cardiovascular: Normal rate, regular rhythm and normal heart sounds  Exam reveals no friction rub  No murmur heard  Pulmonary/Chest: Effort normal  She has wheezes  She has rales  Abdominal: Soft  She exhibits no distension  Musculoskeletal: Normal range of motion  She exhibits no edema or deformity  Neurological: She is alert and oriented to person, place, and time  Skin: Skin is warm and dry  She is not diaphoretic  Psychiatric: She has a normal mood and affect  Labs: I have personally reviewed pertinent lab results    Lab Results   Component Value Date    WBC 7 24 09/12/2019    HGB 11 9 09/12/2019    HCT 36 4 09/12/2019    MCV 89 09/12/2019     09/12/2019     Lab Results   Component Value Date    CALCIUM 9 6 09/12/2019    K 4 1 09/12/2019    CO2 25 09/12/2019    CL 99 (L) 09/12/2019    BUN 12 09/12/2019    CREATININE 0 83 09/12/2019     Lab Results   Component Value Date     (H) 11/26/2018     Lab Results   Component Value Date    ALT 29 09/12/2019    AST 15 09/12/2019    ALKPHOS 73 09/12/2019       Imaging and other studies:   I have personally reviewed pertinent imaging studies in PACS  Chest CT 05/2019  Subpleural right upper lobe airspace disease, distribution of which in the setting of right breast cancer is most consistent with radiation pneumonitis in the appropriate setting  Pneumonia could be considered if the patient has not had radiation  Stable tiny left lower lobe pulmonary nodule  Pulmonary function testing:   PFT 12/2018  Study Interpretation:      · Hyperinflated lung volumes with total lung capacity at 134% predicted and residual volume at 173% predicted consistent with air trapping  · Normal airflow on vital capacity  · Super normal diffusion capacity  · Increased airway resistance as indicated by the specific airway conductance  Allergy blood test 11/2018  [facility] Allergy Panel, Adult (Order #[id number]) on 11/26/18   Contains abnormal data [facility] Allergy Panel, Adult   Order: [id number]   Status:  Final result   Visible to patient:  Yes (MyChart) Next appt:  06/17/2019 at 02:30 PM in Obstetrics and Gynecology Kiko Gray MD) Dx: Moderate persistent asthma without co  Ref Range & Units 11/26/18  4:53 PM   A  ALTERNATA 0 00 - 0 09 kUA/I <0 10    A  FUMIGATUS 0 00 - 0 09 kUA/I <0 10    [place] Grass 0 00 - 0 09 kUA/I 0  77High     [place]  0 00 - 0 09 kUA/I 0  76High     Cat Epithellium-Dander 0 00 - 0 09 kUA/I 1  70High     C HERBARUM 0 00 - 0 09 kUA/I <0 10    Cockroach 0 00 - 0 09 kUA/I 5  50High     Common Silver Birch 0 00 - 0 09 kUA/I 0  49High     [place] 0 00 - 0 09 kUA/I 0  79High     D  farinae 0 00 - 0 09 kUA/I 47  90High     D  pteronyssinus 0 00 - 0 09 kUA/I 73  80High     Dog Dander 0 00 - 0 09 kUA/I 38  00High     Elm IgE 0 00 - 0 09 kUA/I 0  [address] Tree 0 00 - 0 09 kUA/I 0  57High     Mugwort 0 00 - 0 09 kUA/I 0 63High     [place] Tree 0 00 - 0 09 kUA/I 0  46High     [place] 0 00 - 0 09 kUA/I 0 68High     P CHRYSOGENUM 0 00 - 0 09 kUA/I <0 10    Rough Pigweed  IgE 0 00 - 0 09 kUA/I 0 68High     Common Ragweed 0 00 - 0 09 kUA/I 0  76High     Sheep Sorrel IgE 0 00 - 0 09 kUA/I 0 68High     [place] Tree 0 00 - 0 09 kUA/I 0  73High     [place] Grass 0 00 - 0 09 kUA/I 0  71High     [place] Tree 0 00 - 0 09 kUA/I 0  80High     [place] Tree 0 00 - 0 09 kUA/I 0  79High     IgE 0 - 113 kU/l 863High     Allergen Comment  See Below    MOUSE URINE 0 00 - 0 09 kUA/I <0 10            Chest CT August 2019  No pulmonary embolus      Radiation change in the subpleural anterolateral right chest wall, decreased in overall area when compared to previous examination  Unchanged 2 to 3 mm left lower lobe pulmonary nodule, probably benign and similar at least as far back as November 2018      Unchanged appearance of a spiculated mass in the posterior right breast, inseparable from the right pectoralis musculature  Unchanged skin thickening is over the right breast related to radiation therapy

## 2019-09-30 ENCOUNTER — TELEPHONE (OUTPATIENT)
Dept: PULMONOLOGY | Facility: CLINIC | Age: 64
End: 2019-09-30

## 2019-09-30 RX ORDER — EPINEPHRINE 1 MG/ML
0.3 INJECTION, SOLUTION, CONCENTRATE INTRAVENOUS AS NEEDED
Status: CANCELLED | OUTPATIENT
Start: 2019-10-02

## 2019-09-30 NOTE — TELEPHONE ENCOUNTER
[facility] called requesting Date Change in [place] to 10/02/19  Patient scheduled on Wednesday for Xolair treatment   Please advise

## 2019-10-11 ENCOUNTER — TELEPHONE (OUTPATIENT)
Dept: PULMONOLOGY | Facility: CLINIC | Age: 64
End: 2019-10-11

## 2019-10-11 RX ORDER — EPINEPHRINE 1 MG/ML
0.3 INJECTION, SOLUTION, CONCENTRATE INTRAVENOUS AS NEEDED
Status: CANCELLED | OUTPATIENT
Start: 2019-10-16

## 2019-10-11 NOTE — TELEPHONE ENCOUNTER
[facility] called requesting Date change in [place] to 10/16/19  Patient is scheduled for Xolair on Wednesday 10/16/19   Please advise

## 2019-10-15 ENCOUNTER — CLINICAL SUPPORT (OUTPATIENT)
Dept: RADIATION ONCOLOGY | Facility: CLINIC | Age: 64
End: 2019-10-15
Attending: RADIOLOGY
Payer: MEDICARE

## 2019-10-15 VITALS
RESPIRATION RATE: 18 BRPM | BODY MASS INDEX: 29.4 KG/M2 | HEART RATE: 92 BPM | TEMPERATURE: 98.4 F | DIASTOLIC BLOOD PRESSURE: 68 MMHG | WEIGHT: 172.18 LBS | SYSTOLIC BLOOD PRESSURE: 134 MMHG | HEIGHT: 64 IN

## 2019-10-15 DIAGNOSIS — C50.411 MALIGNANT NEOPLASM OF UPPER-OUTER QUADRANT OF RIGHT BREAST IN FEMALE, ESTROGEN RECEPTOR POSITIVE (HCC): Primary | ICD-10-CM

## 2019-10-15 DIAGNOSIS — Z17.0 MALIGNANT NEOPLASM OF UPPER-OUTER QUADRANT OF RIGHT BREAST IN FEMALE, ESTROGEN RECEPTOR POSITIVE (HCC): Primary | ICD-10-CM

## 2019-10-15 PROCEDURE — 99211 OFF/OP EST MAY X REQ PHY/QHP: CPT | Performed by: RADIOLOGY

## 2019-10-15 RX ORDER — GEMFIBROZIL 600 MG/1
600 TABLET, FILM COATED ORAL DAILY
COMMUNITY
End: 2021-05-19

## 2019-10-15 NOTE — PROGRESS NOTES
Andrae Cancino 1955 is a 59 y o  female       Follow up visit   59year old female with stage IA T1c N0 grade 2 invasive ductal carcinoma of the right breast  Her tumor was ER/ID and HER2 positive   She had evidence of extensive DCIS spanning approximately 2 6 centimeters  Her initial margin of resection was positive however she underwent re-excision with final negative margins   In addition 7 lymph nodes return negative for malignancy  She completed her adjuvant chemotherapy course on 12/11/18 and Herceptin monotherapy on 8/6/19       She returns today for follow up post right breast radiation that was completed on 2/19/19  She was last seen 4/9/19 5/18 - 5/20/19 admitted with shortness of breath, and trouble catching her breath four roughly a month and a half  Patient with CT chest findings consistent with radiation pneumonitis  Pulmonary consulted  5/18/19 CT chest  IMPRESSION:   Subpleural right upper lobe airspace disease, distribution of which in the setting of right breast cancer is most consistent with radiation pneumonitis in the appropriate setting  Pneumonia could be considered if the patient has not had radiation      Stable tiny left lower lobe pulmonary nodule  5/24/19 Dr Harvinder Agustin FU  - recommending continuing trastuzumab every 3 weeks until August 6, 2019     - continue with anastrozole 1 mg once a day  6/18/19 Bilateral diagnostic 3D mammogram  Benign findings  Overall BI-RADS 2- Benign    8/16/19 Dr Jenifer Kimball 6 month FU  - continue surveillance    8/27/19 CTA chest   IMPRESSION:   No pulmonary embolus      Radiation change in the subpleural anterolateral right chest wall, decreased in overall area when compared to previous examination    Unchanged 2 to 3 mm left lower lobe pulmonary nodule, probably benign and similar at least as far back as November 2018      Unchanged appearance of a spiculated mass in the posterior right breast, inseparable from the right pectoralis musculature  Unchanged skin thickening is over the right breast related to radiation therapy  9/27/19 Dr Alis Carlin, pulmonary FU, following patient for severe persistent asthma and fibrotic changes in right upper lung s/p RT       Today, she reports intermittent right breast discomfort and "burning" feeling, taking acetaminophen  She does not do breast massage  She continues to take anastrozole, she complains of headaches, and occasional sweats  She has frequent cough with occasional thick, clear mucous expectoration  No hemoptysis  She reports shortness of breath at rest and with exertion  11/8/19 Dr Nikhil Moore  2/21/20 Surgical onc FU         Malignant neoplasm of right female breast ([facility] [address] )    5/23/2018 Initial Diagnosis     Malignant neoplasm of right female breast ([facility] [address] )      5/23/2018 Biopsy     Right breast core biopsy:  DCIS, micropapillary type, lowintermediate nuclear grade  ER 90-95% positive,  OH 75-80% positive        6/26/2018 Surgery     RIGHT BREAST NEEDLE LOCALIZATION X2 WITH RIGHT BREAST LUMPECTOMY ( NEEDLE LOC @ 1000) (Right)   ONCOPLASTIC CLOSURE OF LUMPECTOMY CAVITY    Invasive breast carcinoma, NST (aka ductal)  * Marsteller grade 2 of 3 (total score = 2+2+2 = 6 of 9)   -- tubule formation < 10%, score 3   -- nuclear grade 2 of 3, score 2   -- mitoses ~ 4/mm2, score 2    * invasive carcinoma is multifocally present (A23-1, A32-1, A84-1, A89-1, A100-1), largest focus is 14 millimeters in greatest dimension (A89-1, this focus is graded)  * superior lumpectomy margin (orange-inked) is POSITIVE for invasive carcinoma (A84-1)   * all other lumpectomy margins are free of invasive carcinoma  * estrogen, progesterone & Her-2/negrita receptor studies are undertaken, to be described in an addendum report  - Ductal carcinoma in-situ (DCIS): Present as an extensive component (~85% of total tumor)  * DCIS is co-located with invasive carcinoma surrounding prior needle biopsy site     * DCIS spans 2 6 cm maximal dimension (A62-1) and is present on 27 of 136 total slides examined  * DCIS has cribriform & micropapillary patterns, nuclear grade 2 of 3, with central comedo-type necrosis  * DCIS is present within 0 2 millimeters of the superior lumpectomy margin (orange-inked, A26-1)   * DCIS is present within 0 2 millimeters of the medial lumpectomy margin (yellow-inked, A3-1)   * all other lumpectomy margins are free of DCIS by > 2 millimeters  - Lymph-vascular invasion: no lymph-vascular invasion is unequivocally identified  - Microcalcifications: present in DCIS  % positive, ER 45-55% positive, HER2 by IHC 3+ positive    - Dr Osmar Driver        8/2/2018 Surgery     Right breast lumpectomy reexcision, right axilla SLN biopsy:  A  Submitted as right axillary sentinel node:  - Seven lymph nodes are identified showing no metastatic tumor      B   Right breast lumpectomy reexcision:  - Abundant reactive changes are seen around the previous lumpectomy cavity   - No residual atypia or malignancy is seen            8/2/2018 -  Cancer Staged     Stage IA - pT1c, pN0, G2         9/25/2018 - 8/6/2019 Chemotherapy     Weekly Paclitaxol and trastuzumab x12, until December 11, 2018 followed by trastuzumab monotherapy 6 milligram/kilogram every 3 weeks    - Dr Magalie Patterson        1/9/2019 - 2/19/2019 Radiation     Plan ID Energy Fractions Dose per Fraction (cGy) Dose Correction (cGy) Total Dose Delivered (cGy) Elapsed Days   R Boost e 16E 5 / 5 200 0 1,000 6   Right Breast 6X 25 / 25 200 0 5,000 29     -  [id number]          Malignant neoplasm of upper-outer quadrant of right breast in female, estrogen receptor positive ([facility] [address] )    7/19/2018 Initial Diagnosis     Malignant neoplasm of upper-outer quadrant of right breast in female, estrogen receptor positive ([address] )      12/17/2018 - 8/26/2019 Chemotherapy     trastuzumab (HERCEPTIN) 579 mg in sodium chloride 0 9 % 250 mL chemo infusion, 8 mg/kg, Intravenous, Once, 12 of 12 cycles  Administration: 434 mg (5/14/2019), 434 mg (6/4/2019), 450 mg (7/16/2019), 450 mg (8/6/2019), 450 mg (6/25/2019)         Clinical Trial: no      Imaging    Health Maintenance   Topic Date Due    Medicare Annual Wellness Visit (AWV)  1955    Pneumococcal Vaccine: Pediatrics (0 to 5 Years) and At-Risk Patients (6 to 59 Years) (1 of 3 - PCV13) 10/29/1962    DM Eye Exam  10/29/1966    BMI: Followup Plan  10/29/1974    HEPATITIS B VACCINES (1 of 3 - Risk 3-dose series) 10/29/1975    DTaP,Tdap,and Td Vaccines (1 - Tdap) 10/29/1977    Cervical Cancer Screening  10/29/1977    INFLUENZA VACCINE  12/05/2019 (Originally 7/1/2019)    Diabetic Foot Exam  11/26/2019    HEMOGLOBIN A1C  03/03/2020    URINE MICROALBUMIN  06/07/2020    BMI: Adult  09/27/2020    MAMMOGRAM  06/18/2021    Pneumococcal Vaccine: 65+ Years (1 of 2 - PCV13) 10/29/2021    CRC Screening: Colonoscopy  09/05/2028    Hepatitis C Screening  Completed       Patient Active Problem List   Diagnosis    Type 2 diabetes mellitus with complication, with long-term current use of insulin ([address] )    Asthma    Essential hypertension    Other specified hypothyroidism    Chest pain    Palpitations    Chronic bilateral low back pain without sciatica    Neck pain    Malignant neoplasm of right female breast (HCC)    Malignant neoplasm of upper-outer quadrant of right breast in female, estrogen receptor positive ([address] )    Hiatal hernia    Screening for colon cancer    Esophageal dysphagia    Cellulitis of right axilla    Chronic pain of right knee    Hypercholesterolemia    Hypothyroid    Hypertension    Bilateral pulmonary embolism (HCC)    Radiation pneumonitis (HCC)    Type 2 diabetes mellitus with hyperglycemia, with long-term current use of insulin (HCC)     Past Medical History:   Diagnosis Date    Abdominal infection ([address] )     h-pylori    Abnormal chest x-ray 4/5/2019    Asthma     Breast cancer ([address] ) 06/26/2018    Cancer ([facility] [address] )     BREAST    Diabetes mellitus ([facility] [address] )     Hiatal hernia     History of chemotherapy     History of radiation therapy     Hypercholesterolemia     Hypertension     Hypothyroid     Renal disorder     Rheumatoid arthritis ([facility] [address] )      Past Surgical History:   Procedure Laterality Date    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    BREAST LUMPECTOMY Right 6/26/2018    Procedure: RIGHT BREAST NEEDLE LOCALIZATION X2 WITH RIGHT BREAST LUMPECTOMY ( NEEDLE LOC @ 1000); Surgeon: Willa Gallagher MD;  Location: [facility];  Service: Surgical Oncology    BREAST LUMPECTOMY Right 8/2/2018    Procedure: LYMPHOSCINITGRAPHY INTRAOPERATIVE LYMPHATIC MAPPING , RIGHT SENTINEL NODE BIOPSY, REEXCISION  RIGHT BREAST LUMPECTOMY CAVITY (SUPERIOR MARGIN); Surgeon: Willa Gallagher MD;  [facility]: [facility];  Service: Surgical Oncology    BREAST SURGERY Left     CATARACT EXTRACTION, BILATERAL Bilateral     EGD AND COLONOSCOPY N/A 9/5/2018    Procedure: EGD AND COLONOSCOPY;  Surgeon: aIn Carvajal MD;  Location[facility]; Service: Gastroenterology    [facility] GUIDED CENTRAL VENOUS ACCESS DEVICE INSERTION  9/13/2018    KNEE SURGERY Right     MAMMO NEEDLE LOCALIZATION RIGHT (ALL INC) Right 6/26/2018    MAMMO STEREOTACTIC BREAST BIOPSY RIGHT (ALL INC) Right 5/23/2018    RETINAL DETACHMENT SURGERY Right     TUBAL LIGATION      TUNNELED VENOUS PORT PLACEMENT Left 9/13/2018    Procedure: INSERTION VENOUS PORT (PORT-A-CATH);   Surgeon: Willa Gallagher MD;  [facility]: [facility];  Service: Surgical Oncology     Family History   Problem Relation Age of Onset    Diabetes Mother     Hypertension Mother     Diabetes Father     Hypertension Father     Diabetes Brother     Hypertension Brother     Prostate cancer Brother     Cancer Maternal Grandfather     No Known Problems Sister     No Known Problems Daughter     No Known Problems Sister     No Known Problems Sister     No Known Problems Sister     No Known Problems Sister     No Known Problems Daughter     No Known Problems Daughter      Social History     Socioeconomic History    Marital status:      Spouse name: Not on file    Number of children: Not on file    Years of education: Not on file    Highest education level: Not on file   Occupational History    Not on file   Social Needs    Financial resource strain: Not on file    Food insecurity:     Worry: Not on file     Inability: Not on file    Transportation needs:     Medical: Not on file     Non-medical: Not on file   Tobacco Use    Smoking status: Never Smoker    Smokeless tobacco: Never Used   Substance and Sexual Activity    Alcohol use: No    Drug use: No    Sexual activity: Not on file   Lifestyle    Physical activity:     Days per week: Not on file     Minutes per session: Not on file    Stress: Not on file   Relationships    Social connections:     Talks on phone: Not on file     Gets together: Not on file     Attends Anabaptism service: Not on file     Active member of club or organization: Not on file     Attends meetings of clubs or organizations: Not on file     Relationship status: Not on file    Intimate partner violence:     Fear of current or ex partner: Not on file     Emotionally abused: Not on file     Physically abused: Not on file     Forced sexual activity: Not on file   Other Topics Concern    Not on file   Social History Narrative    Not on file       Current Outpatient Medications:     albuterol (2 5 mg/3 mL) 0 083 % nebulizer solution, Take 1 vial (2 5 mg total) by nebulization every 6 (six) hours as needed for wheezing or shortness of breath, Disp: 90 vial, Rfl: 5    albuterol (PROVENTIL HFA,VENTOLIN HFA) 90 mcg/act inhaler, Inhale 1 puff every 4 (four) hours as needed  , Disp: , Rfl:     amLODIPine-atorvastatin (CADUET) 10-10 MG per tablet, take 1 tablet by mouth once daily, Disp: 90 tablet, Rfl: 1    anastrozole (ARIMIDEX) 1 mg tablet, Take 1 tablet (1 mg total) by mouth daily, Disp: 90 tablet, Rfl: 1    SANG MICROLET LANCETS lancets, Testing three times a day, Disp: 100 each, Rfl: 3    Blood Glucose Monitoring Suppl (ACURA BLOOD GLUCOSE METER) w/Device KIT, Testing blood sugars three times a day, Disp: , Rfl:     Blood Glucose Monitoring Suppl (SANG CONTOUR MONITOR) w/Device KIT, Testing blood sugars three times a day, Disp: 1 kit, Rfl: 0    budesonide (PULMICORT) 0 5 mg/2 mL nebulizer solution, Take 1 vial (0 5 mg total) by nebulization 2 (two) times a day Rinse mouth after use , Disp: 60 vial, Rfl: 5    cyclobenzaprine (FLEXERIL) 5 mg tablet, Take 1 tablet (5 mg total) by mouth 3 (three) times a day, Disp: 90 tablet, Rfl: 1    dextromethorphan-guaifenesin (MUCINEX DM)  MG per 12 hr tablet, Take 1 tablet by mouth every 12 (twelve) hours as needed for cough, Disp: 10 tablet, Rfl: 0    enalapril (VASOTEC) 20 mg tablet, take 1 tablet by mouth once daily, Disp: 90 tablet, Rfl: 1    fluticasone-vilanterol (BREO ELLIPTA) 100-25 mcg/inh inhaler, Inhale 1 puff daily Rinse mouth after use , Disp: 2 Inhaler, Rfl: 5    gemfibrozil (LOPID) 600 mg tablet, Take 600 mg by mouth daily, Disp: , Rfl:     glucose blood (SANG CONTOUR TEST) test strip, Testing three times a day, Disp: 100 each, Rfl: 3    Insulin Glargine (TOUJEO SOLOSTAR) 300 units/mL CONCETRATED U-300 injection pen, Inject 60 Units under the skin daily with dinner, Disp: 7 5 mL, Rfl: 0    insulin lispro (HUMALOG KWIKPEN) 100 units/mL injection pen, Inject 16 units TID, Disp: 5 pen, Rfl: 5    Insulin Pen Needle (BD PEN NEEDLE NATALEE U/F) 32G X 4 MM MISC, USE 4/DAY, Disp: 100 each, Rfl: 3    Lancets MISC, Testing three times a day, Disp: , Rfl:     levothyroxine 50 mcg tablet, take 1 tablet by mouth once daily, Disp: 90 tablet, Rfl: 1    omeprazole (PriLOSEC) 20 mg delayed release capsule, take 1 capsule by mouth once daily, Disp: 90 capsule, Rfl: 2    oxyCODONE (ROXICODONE) 5 mg immediate release tablet, Take 5 mg by mouth every 6 (six) hours as needed for moderate pain, Disp: , Rfl:     predniSONE 20 mg tablet, Take 2 tablets (40 mg total) by mouth daily (Patient taking differently: Take 10 mg by mouth daily ), Disp: 10 tablet, Rfl: 0    rivaroxaban (XARELTO) 20 mg tablet, Take 1 tablet (20 mg total) by mouth daily, Disp: 30 tablet, Rfl: 3    amLODIPine (NORVASC) 10 mg tablet, take 1 tablet by mouth once daily (Patient not taking: Reported on 10/15/2019), Disp: 90 tablet, Rfl: 1    DULoxetine (CYMBALTA) 30 mg delayed release capsule, Take 1 capsule (30 mg total) by mouth 2 (two) times a day (Patient not taking: Reported on 10/15/2019), Disp: 60 capsule, Rfl: 2    EPINEPHrine (EPIPEN) 0 3 mg/0 3 mL SOAJ, Inject 0 3 mL (0 3 mg total) into a muscle once for 1 dose, Disp: 0 6 mL, Rfl: 0  Allergies   Allergen Reactions    Aspirin Hives     Dr  in [address] told patient not to take aspirin r/t kidneys     Tylenol With Codeine #3 [Acetaminophen-Codeine] Rash       Review of Systems:  Review of Systems   Constitutional: Positive for fatigue (occasional)  HENT: Positive for postnasal drip  Eyes: Negative  Respiratory: Positive for cough (frequent cough with occasional thick clear mucous production) and shortness of breath (at rest and exertion)  Cardiovascular: Negative  Gastrointestinal:        Stools flutcuate between diarrhea and constipation    Endocrine: Negative  Genitourinary: Negative  Musculoskeletal:        Occasional right knee swelling and constant right knee pain; occasional leg cramping    Skin: Negative  Allergic/Immunologic: Negative  Neurological: Positive for headaches  Hematological: Negative  Psychiatric/Behavioral: Negative  Vitals:    10/15/19 0856   BP: 134/68   Pulse: 92   Resp: 18   Temp: 98 4 °F (36 9 °C)   Weight: 78 1 kg (172 lb 2 9 oz)   Height: 5' 4" (1 626 m)        Pain assessment:5    Imaging:No results found      Teaching

## 2019-10-15 NOTE — PROGRESS NOTES
Follow-up - Radiation Oncology   Jolie Mulligan 1955 59 y o  female [id number]      History of Present Illness   Cancer Staging  Malignant neoplasm of upper-outer quadrant of right breast in female, estrogen receptor positive ([facility] [address] )  Staging form: Breast, AJCC 8th Edition  - Clinical: No stage assigned - Unsigned  - Pathologic: Stage IA (pT1c, pN0, cM0, G2, ER: Positive, MO: Positive, HER2: Positive) - Signed by Zane Ignacio MD on 8/21/2018  Laterality: Right  Histologic grading system: 3 grade system          Interval History:    59year old female with stage IA T1c N0 grade 2 invasive ductal carcinoma of the right breast  Her tumor was ER/MO and HER2 positive   She had evidence of extensive DCIS spanning approximately 2 6 centimeters  Her initial margin of resection was positive however she underwent re-excision with final negative margins   In addition 7 lymph nodes return negative for malignancy  She completed her adjuvant chemotherapy course on 12/11/18 and Herceptin monotherapy on 8/6/19       She returns today for follow up post right breast radiation that was completed on 2/19/19  She was last seen 4/9/19 5/18 - 5/20/19 admitted with shortness of breath, and trouble catching her breath four roughly a month and a half  Patient with CT chest findings consistent with radiation pneumonitis  Pulmonary consulted  5/18/19 CT chest  IMPRESSION:   Subpleural right upper lobe airspace disease, distribution of which in the setting of right breast cancer is most consistent with radiation pneumonitis in the appropriate setting   Pneumonia could be considered if the patient has not had radiation      Stable tiny left lower lobe pulmonary nodule      5/24/19 Dr Masha STEARNS  - recommending continuing trastuzumab every 3 weeks until August 6, 2019     - continue with anastrozole 1 mg once a day        6/18/19 Bilateral diagnostic 3D mammogram  Benign findings    Overall BI-RADS 2- Benign     8/16/19 Dr April Guy 6 month FU  - continue surveillance     8/27/19 CTA chest   IMPRESSION:   No pulmonary embolus      Radiation change in the subpleural anterolateral right chest wall, decreased in overall area when compared to previous examination   Unchanged 2 to 3 mm left lower lobe pulmonary nodule, probably benign and similar at least as far back as November 2018      Unchanged appearance of a spiculated mass in the posterior right breast, inseparable from the right pectoralis musculature   Unchanged skin thickening is over the right breast related to radiation therapy      9/27/19 Dr Iqra Kim, pulmonary FU, following patient for severe persistent asthma and fibrotic changes in right upper lung s/p RT       Historical Information      Malignant neoplasm of right female breast ([facility] [address] )    5/23/2018 Initial Diagnosis     Malignant neoplasm of right female breast ([facility] [address] )      5/23/2018 Biopsy     Right breast core biopsy:  DCIS, micropapillary type, lowintermediate nuclear grade  ER 90-95% positive,  MN 75-80% positive        6/26/2018 Surgery     RIGHT BREAST NEEDLE LOCALIZATION X2 WITH RIGHT BREAST LUMPECTOMY ( NEEDLE LOC @ 1000) (Right)   ONCOPLASTIC CLOSURE OF LUMPECTOMY CAVITY    Invasive breast carcinoma, NST (aka ductal)  * [place] grade 2 of 3 (total score = 2+2+2 = 6 of 9)   -- tubule formation < 10%, score 3   -- nuclear grade 2 of 3, score 2   -- mitoses ~ 4/mm2, score 2    * invasive carcinoma is multifocally present (A23-1, A32-1, A84-1, A89-1, A100-1), largest focus is 14 millimeters in greatest dimension (A89-1, this focus is graded)  * superior lumpectomy margin (orange-inked) is POSITIVE for invasive carcinoma (A84-1)   * all other lumpectomy margins are free of invasive carcinoma  * estrogen, progesterone & Her-2/negrita receptor studies are undertaken, to be described in an addendum report  - Ductal carcinoma in-situ (DCIS): Present as an extensive component (~85% of total tumor)     * DCIS is co-located with invasive carcinoma surrounding prior needle biopsy site  * DCIS spans 2 6 cm maximal dimension (A62-1) and is present on 27 of 136 total slides examined  * DCIS has cribriform & micropapillary patterns, nuclear grade 2 of 3, with central comedo-type necrosis  * DCIS is present within 0 2 millimeters of the superior lumpectomy margin (orange-inked, A26-1)   * DCIS is present within 0 2 millimeters of the medial lumpectomy margin (yellow-inked, A3-1)   * all other lumpectomy margins are free of DCIS by > 2 millimeters  - Lymph-vascular invasion: no lymph-vascular invasion is unequivocally identified  - Microcalcifications: present in DCIS  % positive, ER 45-55% positive, HER2 by IHC 3+ positive    - Dr Stew Chen        8/2/2018 Surgery     Right breast lumpectomy reexcision, right axilla SLN biopsy:  A  Submitted as right axillary sentinel node:  - Seven lymph nodes are identified showing no metastatic tumor      B   Right breast lumpectomy reexcision:  - Abundant reactive changes are seen around the previous lumpectomy cavity   - No residual atypia or malignancy is seen            8/2/2018 -  Cancer Staged     Stage IA - pT1c, pN0, G2         9/25/2018 - 8/6/2019 Chemotherapy     Weekly Paclitaxol and trastuzumab x12, until December 11, 2018 followed by trastuzumab monotherapy 6 milligram/kilogram every 3 weeks    - Dr Dean Srivastava        1/9/2019 - 2/19/2019 Radiation     Plan ID Energy Fractions Dose per Fraction (cGy) Dose Correction (cGy) Total Dose Delivered (cGy) Elapsed Days   R Boost e 16E 5 / 5 200 0 1,000 6   Right Breast 6X 25 / 25 200 0 5,000 29     - Dr Savannah Colin          Malignant neoplasm of upper-outer quadrant of right breast in female, estrogen receptor positive ([facility] [address] )    7/19/2018 Initial Diagnosis     Malignant neoplasm of upper-outer quadrant of right breast in female, estrogen receptor positive ([address] )      12/17/2018 - 8/26/2019 Chemotherapy     trastuzumab (HERCEPTIN) 579 mg in sodium chloride 0 9 % 250 mL chemo infusion, 8 mg/kg, Intravenous, Once, 12 of 12 cycles  Administration: 434 mg (5/14/2019), 434 mg (6/4/2019), 450 mg (7/16/2019), 450 mg (8/6/2019), 450 mg (6/25/2019)         Past Medical History:   Diagnosis Date    Abdominal infection ([facility] [address] )     h-pylori    Abnormal chest x-ray 4/5/2019    Asthma     Breast cancer ([facility] [address] ) 06/26/2018    Cancer ([facility] [address] )     BREAST    Diabetes mellitus ([facility] [address] )     Hiatal hernia     History of chemotherapy     History of radiation therapy     Hypercholesterolemia     Hypertension     Hypothyroid     Renal disorder     Rheumatoid arthritis ([facility] [address] )      Past Surgical History:   Procedure Laterality Date    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    BREAST LUMPECTOMY Right 6/26/2018    Procedure: RIGHT BREAST NEEDLE LOCALIZATION X2 WITH RIGHT BREAST LUMPECTOMY ( NEEDLE LOC @ 1000); Surgeon: Shannen Brown MD;  [facility]: [facility];  Service: Surgical Oncology    BREAST LUMPECTOMY Right 8/2/2018    Procedure: LYMPHOSCINITGRAPHY INTRAOPERATIVE LYMPHATIC MAPPING , RIGHT SENTINEL NODE BIOPSY, REEXCISION  RIGHT BREAST LUMPECTOMY CAVITY (SUPERIOR MARGIN); Surgeon: Shannen Brown MD;  [facility];  Service: Surgical Oncology    BREAST SURGERY Left     CATARACT EXTRACTION, BILATERAL Bilateral     EGD AND COLONOSCOPY N/A 9/5/2018    Procedure: EGD AND COLONOSCOPY;  Surgeon: Carson Chen MD;  Location: [facility]; Service: Gastroenterology    [facility] GUIDED CENTRAL VENOUS ACCESS DEVICE INSERTION  9/13/2018    KNEE SURGERY Right     MAMMO NEEDLE LOCALIZATION RIGHT (ALL INC) Right 6/26/2018    MAMMO STEREOTACTIC BREAST BIOPSY RIGHT (ALL INC) Right 5/23/2018    RETINAL DETACHMENT SURGERY Right     TUBAL LIGATION      TUNNELED VENOUS PORT PLACEMENT Left 9/13/2018    Procedure: INSERTION VENOUS PORT (PORT-A-CATH);   Surgeon: Shannen Brown MD;  [facility]: [facility];  Service: Surgical Oncology       Social History   Social History     Substance and Sexual Activity   Alcohol Use No     Social History     Substance and Sexual Activity   Drug Use No     Social History     Tobacco Use   Smoking Status Never Smoker   Smokeless Tobacco Never Used         Meds/Allergies     Current Outpatient Medications:     albuterol (2 5 mg/3 mL) 0 083 % nebulizer solution, Take 1 vial (2 5 mg total) by nebulization every 6 (six) hours as needed for wheezing or shortness of breath, Disp: 90 vial, Rfl: 5    albuterol (PROVENTIL HFA,VENTOLIN HFA) 90 mcg/act inhaler, Inhale 1 puff every 4 (four) hours as needed  , Disp: , Rfl:     amLODIPine-atorvastatin (CADUET) 10-10 MG per tablet, take 1 tablet by mouth once daily, Disp: 90 tablet, Rfl: 1    anastrozole (ARIMIDEX) 1 mg tablet, Take 1 tablet (1 mg total) by mouth daily, Disp: 90 tablet, Rfl: 1    SANG MICROLET LANCETS lancets, Testing three times a day, Disp: 100 each, Rfl: 3    Blood Glucose Monitoring Suppl ([employer] BLOOD GLUCOSE METER) w/Device KIT, Testing blood sugars three times a day, Disp: , Rfl:     Blood Glucose Monitoring Suppl (SANG CONTOUR MONITOR) w/Device KIT, Testing blood sugars three times a day, Disp: 1 kit, Rfl: 0    budesonide (PULMICORT) 0 5 mg/2 mL nebulizer solution, Take 1 vial (0 5 mg total) by nebulization 2 (two) times a day Rinse mouth after use , Disp: 60 vial, Rfl: 5    cyclobenzaprine (FLEXERIL) 5 mg tablet, Take 1 tablet (5 mg total) by mouth 3 (three) times a day, Disp: 90 tablet, Rfl: 1    dextromethorphan-guaifenesin (MUCINEX DM)  MG per 12 hr tablet, Take 1 tablet by mouth every 12 (twelve) hours as needed for cough, Disp: 10 tablet, Rfl: 0    enalapril (VASOTEC) 20 mg tablet, take 1 tablet by mouth once daily, Disp: 90 tablet, Rfl: 1    fluticasone-vilanterol (BREO ELLIPTA) 100-25 mcg/inh inhaler, Inhale 1 puff daily Rinse mouth after use , Disp: 2 Inhaler, Rfl: 5    gemfibrozil (LOPID) 600 mg tablet, Take 600 mg by mouth daily, Disp: , Rfl:     glucose blood (SANG CONTOUR TEST) test strip, Testing three times a day, Disp: 100 each, Rfl: 3    Insulin Glargine (TOUJEO SOLOSTAR) 300 units/mL CONCETRATED U-300 injection pen, Inject 60 Units under the skin daily with dinner, Disp: 7 5 mL, Rfl: 0    insulin lispro (HUMALOG KWIKPEN) 100 units/mL injection pen, Inject 16 units TID, Disp: 5 pen, Rfl: 5    Insulin Pen Needle (BD PEN NEEDLE NATALEE U/F) 32G X 4 MM MISC, USE 4/DAY, Disp: 100 each, Rfl: 3    Lancets MISC, Testing three times a day, Disp: , Rfl:     levothyroxine 50 mcg tablet, take 1 tablet by mouth once daily, Disp: 90 tablet, Rfl: 1    omeprazole (PriLOSEC) 20 mg delayed release capsule, take 1 capsule by mouth once daily, Disp: 90 capsule, Rfl: 2    oxyCODONE (ROXICODONE) 5 mg immediate release tablet, Take 5 mg by mouth every 6 (six) hours as needed for moderate pain, Disp: , Rfl:     predniSONE 20 mg tablet, Take 2 tablets (40 mg total) by mouth daily (Patient taking differently: Take 10 mg by mouth daily ), Disp: 10 tablet, Rfl: 0    rivaroxaban (XARELTO) 20 mg tablet, Take 1 tablet (20 mg total) by mouth daily, Disp: 30 tablet, Rfl: 3    amLODIPine (NORVASC) 10 mg tablet, take 1 tablet by mouth once daily (Patient not taking: Reported on 10/15/2019), Disp: 90 tablet, Rfl: 1    DULoxetine (CYMBALTA) 30 mg delayed release capsule, Take 1 capsule (30 mg total) by mouth 2 (two) times a day (Patient not taking: Reported on 10/15/2019), Disp: 60 capsule, Rfl: 2    EPINEPHrine (EPIPEN) 0 3 mg/0 3 mL SOAJ, Inject 0 3 mL (0 3 mg total) into a muscle once for 1 dose, Disp: 0 6 mL, Rfl: 0  Allergies   Allergen Reactions    Aspirin Hives     Dr  in [address] told patient not to take aspirin r/t kidneys     Tylenol With Codeine #3 [Acetaminophen-Codeine] Rash         Review of Systems     Constitutional: Positive for fatigue (occasional)  HENT: Positive for postnasal drip  Eyes: Negative      Respiratory: Positive for cough (frequent cough with occasional thick clear mucous production) and shortness of breath (at rest and exertion)  Cardiovascular: Negative  Gastrointestinal:        Stools flutcuate between diarrhea and constipation    Endocrine: Negative  Genitourinary: Negative  Musculoskeletal:        Occasional right knee swelling and constant right knee pain; occasional leg cramping    Skin: Negative  Allergic/Immunologic: Negative  Neurological: Positive for headaches  Hematological: Negative  Psychiatric/Behavioral: Negative        OBJECTIVE:   /68   Pulse 92   Temp 98 4 °F (36 9 °C)   Resp 18   Ht 5' 4" (1 626 m)   Wt 78 1 kg (172 lb 2 9 oz)   BMI 29 55 kg/m²   Pain Assessment:  0  ECOG/Zubrod/WHO: 1 - Symptomatic but completely ambulatory    Physical Exam   Constitutional: She appears well-developed  HENT:   Head: Normocephalic  Hair is normal    Mouth/Throat: Oropharynx is clear and moist    Eyes: EOM are normal  No scleral icterus  Neck: Neck supple  No spinous process tenderness present  No edema present  Cardiovascular: Normal rate and regular rhythm  Pulmonary/Chest: Effort normal and breath sounds normal  No respiratory distress  She has no wheezes  She exhibits no tenderness  No breast tenderness  There is no supraclavicular axillary adenopathy palpable  No suspicious lesions palpable in either breast   No significant breast edema  Mild fibrosis at the primary site  No arm edema  Lungs are clear with no wheezing   Abdominal: Soft  Bowel sounds are normal  She exhibits no distension, no ascites and no mass  There is no hepatosplenomegaly  There is no tenderness  There is no rebound  Genitourinary: No breast tenderness  Musculoskeletal: Normal range of motion  She exhibits no edema or tenderness  Lymphadenopathy:     She has no cervical adenopathy  She has no axillary adenopathy  Neurological: She is alert  She has normal strength  No cranial nerve deficit  Coordination and gait normal    Skin: Skin is intact  Psychiatric: She has a normal mood and affect  Her speech is normal and behavior is normal             RESULTS    Lab Results: No results found for this or any previous visit (from the past 672 hour(s))  Imaging Studies:No results found  Assessment/Plan:  No orders of the defined types were placed in this encounter  Amarjit Ziegler is a 59y o  year old female who is 8 months status post radiation therapy for right breast carcinoma  Post treatment she had shortness of breath and on imaging has findings consistent with pneumonitis  She also has longstanding severe asthma  On my review of her CT scan during that time I believe her findings were consistent with radiation pneumonitis from her breast tangential fields  She was placed on high-dose prednisone with taper with improvement in her symptoms  Follow-up CT scan from August reveals improved findings  She states that the she is not on a regular dosage of prednisone secondary to spikes in her glucose  She does receive injection for her asthma and feels she has had an allergic reaction with headache and pain in her neck  She will receive this 1 more time as per the patient and if this recurs she will likely alter her medication  She does continue with Anastrozole  Of note she states she also has rheumatoid arthritis however not undergoing any active therapy for this  She has no clinical evidence of recurrence of her breast cancer  She will return for follow-up visit in 6 months          Lindsay Wilkinson MD  10/15/2019,1:36 PM    Portions of the record may have been created with voice recognition software   Occasional wrong word or "sound a like" substitutions may have occurred due to the inherent limitations of voice recognition software   Read the chart carefully and recognize, using context, where substitutions have occurred

## 2019-10-16 ENCOUNTER — HOSPITAL ENCOUNTER (OUTPATIENT)
Dept: INFUSION CENTER | Facility: CLINIC | Age: 64
Discharge: HOME | End: 2019-10-16
Payer: MEDICARE

## 2019-10-16 VITALS
TEMPERATURE: 97.7 F | HEART RATE: 93 BPM | SYSTOLIC BLOOD PRESSURE: 120 MMHG | RESPIRATION RATE: 18 BRPM | DIASTOLIC BLOOD PRESSURE: 75 MMHG

## 2019-10-16 DIAGNOSIS — J45.50 SEVERE PERSISTENT ASTHMA WITHOUT COMPLICATION: Primary | ICD-10-CM

## 2019-10-16 PROCEDURE — 96372 THER/PROPH/DIAG INJ SC/IM: CPT

## 2019-10-16 RX ORDER — EPINEPHRINE 1 MG/ML
0.3 INJECTION, SOLUTION, CONCENTRATE INTRAVENOUS AS NEEDED
Status: CANCELLED | OUTPATIENT
Start: 2019-10-30

## 2019-10-16 RX ADMIN — OMALIZUMAB 375 MG: 150 INJECTION, SOLUTION SUBCUTANEOUS at 09:18

## 2019-10-16 NOTE — PROGRESS NOTES
Patient arrived to the unit and denies infection  She has her epi pen at the bedside expiring 8/2020  Patient tolerated her xolair injection, 2 in the left arm and one in the right  Patient remained at the infusion center for 30 minutes of observation

## 2019-10-16 NOTE — PLAN OF CARE
Problem: INFECTION - ADULT  Goal: Absence or prevention of progression during hospitalization  Description  INTERVENTIONS:  - Assess and monitor for signs and symptoms of infection  - Monitor lab/diagnostic results  - Monitor all insertion sites, i e  indwelling lines, tubes, and drains  - Monitor endotracheal if appropriate and nasal secretions for changes in amount and color  - [place] appropriate cooling/warming therapies per order  - Administer medications as ordered  - Instruct and encourage patient and family to use good hand hygiene technique  - Identify and instruct in appropriate isolation precautions for identified infection/condition  10/16/2019 1113 by Westley To RN  Outcome: Progressing  10/16/2019 1111 by Westley To RN  Outcome: Progressing

## 2019-10-16 NOTE — PLAN OF CARE

## 2019-10-22 ENCOUNTER — OFFICE VISIT (OUTPATIENT)
Dept: GASTROENTEROLOGY | Facility: MEDICAL CENTER | Age: 64
End: 2019-10-22
Payer: MEDICARE

## 2019-10-22 VITALS
DIASTOLIC BLOOD PRESSURE: 72 MMHG | BODY MASS INDEX: 29.37 KG/M2 | HEART RATE: 92 BPM | TEMPERATURE: 98.7 F | SYSTOLIC BLOOD PRESSURE: 124 MMHG | WEIGHT: 172 LBS | HEIGHT: 64 IN

## 2019-10-22 DIAGNOSIS — R19.7 DIARRHEA, UNSPECIFIED TYPE: Primary | ICD-10-CM

## 2019-10-22 DIAGNOSIS — R10.13 EPIGASTRIC PAIN: ICD-10-CM

## 2019-10-22 DIAGNOSIS — A04.8 H. PYLORI INFECTION: ICD-10-CM

## 2019-10-22 PROCEDURE — 99214 OFFICE O/P EST MOD 30 MIN: CPT | Performed by: PHYSICIAN ASSISTANT

## 2019-10-22 RX ORDER — OMEPRAZOLE 40 MG/1
40 CAPSULE, DELAYED RELEASE ORAL DAILY
Qty: 30 CAPSULE | Refills: 3 | Status: SHIPPED | OUTPATIENT
Start: 2019-10-22 | End: 2020-04-14 | Stop reason: ALTCHOICE

## 2019-11-03 DIAGNOSIS — M54.2 NECK PAIN: ICD-10-CM

## 2019-11-04 RX ORDER — CYCLOBENZAPRINE HCL 5 MG
TABLET ORAL
Qty: 90 TABLET | Refills: 1 | Status: SHIPPED | OUTPATIENT
Start: 2019-11-04 | End: 2020-01-13 | Stop reason: SDUPTHER

## 2019-11-08 ENCOUNTER — OFFICE VISIT (OUTPATIENT)
Dept: HEMATOLOGY ONCOLOGY | Facility: CLINIC | Age: 64
End: 2019-11-08
Payer: MEDICARE

## 2019-11-08 VITALS
TEMPERATURE: 97.2 F | WEIGHT: 173.4 LBS | HEART RATE: 88 BPM | HEIGHT: 66 IN | OXYGEN SATURATION: 97 % | DIASTOLIC BLOOD PRESSURE: 82 MMHG | SYSTOLIC BLOOD PRESSURE: 140 MMHG | BODY MASS INDEX: 27.87 KG/M2 | RESPIRATION RATE: 16 BRPM

## 2019-11-08 DIAGNOSIS — C50.411 MALIGNANT NEOPLASM OF UPPER-OUTER QUADRANT OF RIGHT BREAST IN FEMALE, ESTROGEN RECEPTOR POSITIVE (HCC): Primary | ICD-10-CM

## 2019-11-08 DIAGNOSIS — Z17.0 MALIGNANT NEOPLASM OF UPPER-OUTER QUADRANT OF RIGHT BREAST IN FEMALE, ESTROGEN RECEPTOR POSITIVE (HCC): Primary | ICD-10-CM

## 2019-11-08 PROCEDURE — 99214 OFFICE O/P EST MOD 30 MIN: CPT | Performed by: INTERNAL MEDICINE

## 2019-11-08 RX ORDER — ANASTROZOLE 1 MG/1
1 TABLET ORAL DAILY
Qty: 90 TABLET | Refills: 3 | Status: SHIPPED | OUTPATIENT
Start: 2019-11-08 | End: 2020-11-04

## 2019-11-08 NOTE — PROGRESS NOTES
Hematology / Oncology Outpatient Follow Up Note    Jolie Mulligan 62 y o  female :1955 LZN:[id number]         Date:  2019    Assessment / Plan:  A 56-year-old postmenopausal woman with stage IA right breast cancer, grade 2, % positive, WV 45% positive, HER2 3+ disease   She underwent lumpectomy with sentinel lymph node biopsy, resulting in LILIANA    She completed adjuvant chemotherapy with weekly paclitaxel and trastuzumab in 2018 without cardiac toxicity  She is currently on adjuvant hormonal therapy with anastrozole with excellent tolerance  Clinically, she has no evidence recurrent disease  I recommended her to continue with anastrozole 1 mg once a day  I am going to ask Dr Rony Diaz to remove the Port-A-Cath  Since she had provoked pulmonary embolism with wrist subsequent resolution on anticoagulation, she may stop rivaroxaban  I recommended her to start baby aspirin after she discontinue rivaroxaban  She is in agreement with my recommendation  I will see her again in 6 months for routine follow-up                                                                      Subjective:      HPI:  A 36-year-old postmenopausal woman who was recently found to have abnormality in her right breast   She lived in [place] [address] she found breast abnormality, she came to [facility] to be with her daughter  Aniyah Peers underwent right breast biopsy in May 23, 2018 which showed ductal carcinoma in situ, ER 90% positive, WV 75% positive   She underwent lumpectomy by Dr Rony Diaz in 2018   She had 1 4 cm of invasive ductal carcinoma, grade 2   Lymphovascular invasion was not present   This was % positive, WV 45% positive, HER2 3+ disease   Because of the invasive carcinoma was found, she underwent sentinel lymph node biopsy as well as reexcision in 2018   She had no evidence of malignancy in the reexcised specimen   Seven lymph nodes were all negative for metastatic disease  Maribel Schneider presents today to discuss adjuvant treatment options with her daughter  Maribel Schneider feels well  She has no complaint of pain  Her weight is stable   She has no respiratory symptoms   She has many comorbidities, including diabetes, hypertension, asthma as well as hypothyroidism   She has no family history of breast or ovarian cancer  Maribel Schneider is a lifetime never smoker            Interval History:  A 78-year-old postmenopausal woman with stage IA right breast cancer, grade 2, % positive, VA 45% positive, HER2 3+ disease   She underwent lumpectomy with sentinel lymph node biopsy, resulting in LILIANA    She was treated with adjuvant chemotherapy with weekly paclitaxel and trastuzumab which was completed in December 2018   During the adjuvant chemotherapy, she developed pulmonary embolism   Therefore, she is currently on rivaroxaban  She repeated CT a of the chest which shows resolution of pulmonary embolism  She presents today for follow-up  She feels well  She has some hot flashes as well as mild musculoskeletal symptoms  She denied any pain  Her weight is stable  Her performance status is normal   She has no respiratory symptoms                                                Objective:      Primary Diagnosis:     1  Right breast cancer, stage IA (pT1c, pN0, M0) grade 2, % positive, VA 45% positive, HER2 3+ disease   Diagnosed in June 2018    2    Pulmonary embolism and bilateral distal lower extremity DVT, diagnosed in November 2018      Cancer Staging:  Cancer Staging  Malignant neoplasm of upper-outer quadrant of right breast in female, estrogen receptor positive ([facility] [address] )  Staging form: Breast, AJCC 8th Edition  - Clinical: No stage assigned - Unsigned  - Pathologic: Stage IA (pT1c, pN0, cM0, G2, ER: Positive, VA: Positive, HER2: Positive) - Signed by Dorian Carney MD on 8/21/2018           Previous Hematologic/ Oncologic Treatment:       Adjuvant chemotherapy with weekly paclitaxel and trastuzumab times 12   Completed in December 2018      Current Hematologic/ Oncologic Treatment:       1  Rivaroxaban since November 2018        2    Adjuvant trastuzumab monotherapy since December 2018  To be completed in August 2019  3    Adjuvant hormonal therapy with anastrozole 1 mg once a day since March 2019         Disease Status:      LILIANA status post lumpectomy with sentinel lymph node biopsy      Test Results:     Pathology:     1 4 cm of invasive ductal carcinoma, grade 2   No evidence of lymphovascular invasion   Seven sentinel lymph nodes were all negative for metastatic disease   % positive, RI 45% positive, HER2 3+ disease   Stage IA (pT1c, pN0, M0)     Radiology:     CT scan of chest with PE protocol in August 2019 showed resolution of pulmonary embolism  Doppler ultrasound showed bilateral distal lower extremity DVT  Echocardiogram in May 2019 showed ejection fraction 60%     Laboratory:     See below for CBC and CMP      Physical Exam:        General Appearance:    Alert, oriented          Eyes:    PERRL   Ears:    Normal external ear canals, both ears   Nose:   Nares normal, septum midline   Throat:   Mucosa moist  Pharynx without injection  Neck:   Supple         Lungs:     Clear to auscultation bilaterally   Chest Wall:    No tenderness or deformity    Heart:    Regular rate and rhythm         Abdomen:     Soft, non-tender, bowel sounds +, no organomegaly               Extremities:   Extremities no cyanosis or edema         Skin:   no rash or icterus  Lymph nodes:   Cervical, supraclavicular, and axillary nodes normal   Neurologic:   CNII-XII intact, normal strength, sensation and reflexes     Throughout             Breast exam:   Lumpectomy scar at outer upper quadrant of the right breast with no palpable abnormality   Left breast exam is negative             ROS: Review of Systems   All other systems reviewed and are negative  Imaging: No results found  Labs:   Lab Results   Component Value Date    WBC 7 24 09/12/2019    HGB 11 9 09/12/2019    HCT 36 4 09/12/2019    MCV 89 09/12/2019     09/12/2019     Lab Results   Component Value Date    K 4 1 09/12/2019    CL 99 (L) 09/12/2019    CO2 25 09/12/2019    BUN 12 09/12/2019    CREATININE 0 83 09/12/2019    GLUF 235 (H) 06/07/2019    CALCIUM 9 6 09/12/2019    AST 15 09/12/2019    ALT 29 09/12/2019    ALKPHOS 73 09/12/2019    EGFR 76 09/12/2019       No components found for: CA27 29        Current Medications: Reviewed  Allergies: Reviewed  PMH/FH/SH:  Reviewed      Vital Sign:    Body surface area is 1 88 meters squared      Wt Readings from Last 3 Encounters:   11/08/19 78 7 kg (173 lb 6 4 oz)   10/23/19 78 kg (172 lb)   10/22/19 78 kg (172 lb)        Temp Readings from Last 3 Encounters:   11/08/19 (!) 97 2 °F (36 2 °C) (Tympanic Core)   10/22/19 98 7 °F (37 1 °C) (Tympanic)   10/16/19 97 7 °F (36 5 °C)        BP Readings from Last 3 Encounters:   11/08/19 140/82   10/22/19 124/72   10/16/19 120/75         Pulse Readings from Last 3 Encounters:   11/08/19 88   10/22/19 92   10/16/19 93     @LASTSAO2(3)@

## 2019-11-22 ENCOUNTER — OFFICE VISIT (OUTPATIENT)
Dept: FAMILY MEDICINE CLINIC | Facility: CLINIC | Age: 64
End: 2019-11-22

## 2019-11-22 VITALS
HEART RATE: 103 BPM | DIASTOLIC BLOOD PRESSURE: 70 MMHG | TEMPERATURE: 96 F | OXYGEN SATURATION: 96 % | SYSTOLIC BLOOD PRESSURE: 132 MMHG | RESPIRATION RATE: 18 BRPM | BODY MASS INDEX: 27.8 KG/M2 | WEIGHT: 173 LBS | HEIGHT: 66 IN

## 2019-11-22 DIAGNOSIS — I83.813 VARICOSE VEINS OF BOTH LOWER EXTREMITIES WITH PAIN: ICD-10-CM

## 2019-11-22 DIAGNOSIS — E11.42 DIABETIC POLYNEUROPATHY ASSOCIATED WITH TYPE 2 DIABETES MELLITUS (HCC): Primary | ICD-10-CM

## 2019-11-22 PROCEDURE — 99213 OFFICE O/P EST LOW 20 MIN: CPT | Performed by: PHYSICIAN ASSISTANT

## 2019-11-22 RX ORDER — EPINEPHRINE 1 MG/ML
0.3 INJECTION, SOLUTION, CONCENTRATE INTRAVENOUS AS NEEDED
Status: CANCELLED | OUTPATIENT
Start: 2019-11-27

## 2019-11-22 RX ORDER — GABAPENTIN 300 MG/1
CAPSULE ORAL
Qty: 90 CAPSULE | Refills: 1 | Status: SHIPPED | OUTPATIENT
Start: 2019-11-22 | End: 2020-01-07

## 2019-11-22 NOTE — TELEPHONE ENCOUNTER
[facility] from [employer] calling saying date needs to be changed to 11/27 and the order needs to be co-signed by a doctor for patients Xolair injection

## 2019-11-22 NOTE — PATIENT INSTRUCTIONS
Neuropatía periférica diabética   LO QUE NECESITA SABER:   La neuropatía periférica diabética es el daño a los nervios en tia brazos, carlotta, piernas o pies  La neuropatía periférica diabética es más común en las piernas y pies y puede aumentar el riesgo de felicita úlcera en el pie  El dolor del nervio provocado por la neuropatía periférica diabética puede impedirle la motricidad y afectarle la calidad de pruitt ramsey  INSTRUCCIONES SOBRE EL ALLEGRA HOSPITALARIA:   Regrese a la darrin de emergencias si:   · Tia piernas o pies se tornan de color caryl o noel  · Usted tiene felicita herida que no radha o tiene felicita herida que es de color rojizo, está inflamada o secreta líquido  Pregúntele a pruitt [place] Reil vitaminas y minerales son adecuados para usted  · Usted empieza a tener síntomas  · Pruitt nivel de azúcar en la teresa es más alto o más bajo de lo recomendado por tia médicos  · Usted tiene enrojecimiento, callos o llagas en los pies  · Usted tiene preguntas o inquietudes acerca de pruitt condición o cuidado  Medicamentos:   · Medicamento  para disminuir el dolor del nervio  · [place] tia medicamentos nelli se le haya indicado  Consulte con pruitt médico si usted giselle que pruitt medicamento no le está ayudando o si presenta efectos secundarios  Infórmele si es alérgico a cualquier medicamento  Mantenga felicita lista actualizada de los [place], las vitaminas y los productos herbales que gilmar  Incluya los siguientes datos de los medicamentos: cantidad, frecuencia y motivo de administración  Traiga con usted la lista o los envases de la píldoras a tia citas de seguimiento  Lleve la lista de los medicamentos con usted en solo de felicita emergencia  Controle pruitt nivel de azúcar en la teresa: Los niveles de azúcar se deben mantener tan normal nelli le sea posible al ganesh pruitt medicamento según las indicaciones  Revise el nivel de azúcar en pruitt teresa con la frecuencia que le indicaron   En solo que tia niveles estén más alto que lo que deberían estar, es mejor comunicarse con pruitt médico        · Siga el plan de alimentación  que pruitt proveedor o pruitt dietista le proporcionaron  Zaria plan de alimentación puede ayudarle a controlar la azúcar en pruitt teresa y disminuir joe síntomas  · Realice actividad física con regularidad  El ejercicio puede ayudarlo a mantener pruitt nivel de azúcar estable y ayudarle a controlar pruitt peso corporal  Realice felicita actividad física por lo menos 30 minutos, 5 días a la semana  Pregunte a pruitt médico acerca del mejor plan de ejercicio para usted  Tenga cuidado cuando hace ejercicio si usted pierde la sensibilidad en joe pies  · Mantenga un peso saludable  Consulte con pruitt médico cuánto debería pesar  Un peso saludable puede ayudarlo a controlar pruitt diabetes  Pida que le ayude a crear un plan para bajar de peso si usted tiene sobrepeso  Incluso felicita pérdida de [place] de 10 a 15 libras puede ayudar a controlar el nivel de azúcar en pruitt teresa  · Limite el consumo de alcohol  El alcohol afecta pruitt azúcar en la teresa y puede hacer más difícil el control de pruitt diabetes  Las mujeres deberían limitar el consumo de alcohol a 1 bebida por día  Los hombres deberían limitar el consumo de alcohol a 2 tragos al día  Un trago equivale a 12 onzas de cerveza, 5 onzas de vino o 1 onza y ½ de licor  Cuidado de los pies:  Revise joe pies todos los días por cortadas, raspones, callos u otras heridas  Zaria pendiente de enrojecimiento e inflamación y de calor al tacto  Use zapatos que le calcen elaina  Compruebe que no haya piedras u otros objetos dentro de joe zapatos que le podrían [employer]  No camine descalzo ni use zapatos sin calcetines  Use calcetines de algodón para ayudar a [employer]  No fume:  La nicotina puede hacer que joe síntomas empeoren e impedir que usted pueda controlar pruitt diabetes  No use cigarrillos electrónicos o tabaco sin humo en vez de cigarrillos o para tratar de dejar de fumar   [employer] contienen nicotina  Pida a mccormick médico información si usted fuma actualmente y [place] para dejar de hacerlo  Acuda a joe consultas de control con mccormick médico según le indicaron  Es necesario que le revisen joe pies por lo menos felicita vez al [place]  Anote joe preguntas para que se acuerde de hacerlas fabio joe visitas  © 2017 [facility] Information is for End User's use only and may not be sold, redistributed or otherwise used for commercial purposes  All illustrations and images included in CareNotes® are the copyrighted property of A D A M , Inc  or Zi Aragon  Esta información es sólo para uso en educación  Mccormick intención no es darle un consejo médico sobre enfermedades o tratamientos  Colsulte con mccormick Art [employer] farmacéutico antes de seguir cualquier régimen médico para saber si es seguro y efectivo para usted

## 2019-11-22 NOTE — PROGRESS NOTES
Assessment/Plan:    Diabetic polyneuropathy associated with type 2 diabetes mellitus ([facility] )    Lab Results   Component Value Date    HGBA1C 12 5 (A) 09/03/2019   Discussed with her that symptoms are likely related to diabetes  She needs to get better control of her blood sugars to improve her neuropathy symptoms  Recommend diabetic shoes and compression socks  Will restart on gabapetin  She was on it prior and does not know why it was stopped  She will return in another 1 month  Problem List Items Addressed This Visit        Endocrine    Diabetic polyneuropathy associated with type 2 diabetes mellitus ([facility] ) - Primary       Lab Results   Component Value Date    HGBA1C 12 5 (A) 09/03/2019   Discussed with her that symptoms are likely related to diabetes  She needs to get better control of her blood sugars to improve her neuropathy symptoms  Recommend diabetic shoes and compression socks  Will restart on gabapetin  She was on it prior and does not know why it was stopped  She will return in another 1 month  Relevant Medications    gabapentin (NEURONTIN) 300 mg capsule    Other Relevant Orders    CompreFit (below knee) 20-30 mmHg    Diabetic Shoe      Other Visit Diagnoses     Varicose veins of both lower extremities with pain        Relevant Orders    CompreFit (below knee) 20-30 mmHg            Subjective:      Patient ID: Elena Score is a 62 y o  female  HPI  62year old diabetic F here for an acute visit for bilateral foot numbness which is chronic  She is complainiing because she gets popping in her veins and she get pain and cramping in her legs  She does not feel  The plantar surfaces of her feet , most the time at night  She does also have aching and burning in her feet  She is not taking anything for neuropathy but was on gabapentin and cymbalta before and she does not remember much about the medicines or why they were stopped   Blood sugar is still uncontrolled and is often in the 200s    She is also interested in getting 2 skin lesions removed  She has one on her back and chest  They sometimes will ooze a foul smelling thick liquid  She is getting port removed in the furture  The following portions of the patient's history were reviewed and updated as appropriate:   She  has a past medical history of Abdominal infection ([facility] ), Abnormal chest x-ray (4/5/2019), Asthma, Breast cancer ([facility] ) (06/26/2018), Cancer ([facility] ), Diabetes mellitus ([facility] ), Hiatal hernia, History of chemotherapy, History of radiation therapy, Hypercholesterolemia, Hypertension, Hypothyroid, Renal disorder, and Rheumatoid arthritis ([facility] )  She   Patient Active Problem List    Diagnosis Date Noted    Diabetic polyneuropathy associated with type 2 diabetes mellitus ([facility] ) 11/24/2019    Type 2 diabetes mellitus with hyperglycemia, with long-term current use of insulin ([facility] ) 05/29/2019    Radiation pneumonitis ([facility] ) 05/18/2019    Bilateral pulmonary embolism ([facility] ) 11/06/2018    Hypothyroid     Hypertension     Hypercholesterolemia 09/21/2018    Chronic pain of right knee 09/04/2018    Cellulitis of right axilla 08/22/2018    Hiatal hernia 08/08/2018    Screening for colon cancer 08/08/2018    Esophageal dysphagia 08/08/2018    Malignant neoplasm of upper-outer quadrant of right breast in female, estrogen receptor positive ([facility] ) 07/19/2018    Malignant neoplasm of right female breast ([facility] ) 06/26/2018    Neck pain 06/13/2018    Chronic bilateral low back pain without sciatica 05/22/2018    Palpitations 05/09/2018    Type 2 diabetes mellitus with complication, with long-term current use of insulin ([facility] ) 03/27/2018    Other specified hypothyroidism 03/27/2018    Chest pain 03/27/2018    Asthma 09/22/2009    Essential hypertension 09/22/2009     She  has a past surgical history that includes Tubal ligation; Knee surgery (Right); Cataract extraction, bilateral (Bilateral);  Retinal detachment surgery (Right); Breast surgery (Left); Breast lumpectomy (Right, 6/26/2018); Breast lumpectomy (Right, 8/2/2018); EGD AND COLONOSCOPY (N/A, 9/5/2018); Tunneled venous port placement (Left, 9/13/2018); FL guided central venous access device insertion (9/13/2018); Mammo stereotactic breast biopsy right (all inc) (Right, 5/23/2018); Mammo needle localization right (all inc) (Right, 6/26/2018); and Breast biopsy (Right, 05/23/2018)  Her family history includes Cancer in her maternal grandfather; Diabetes in her brother, father, and mother; Hypertension in her brother, father, and mother; No Known Problems in her daughter, daughter, daughter, sister, sister, sister, sister, and sister; Prostate cancer in her brother  She  reports that she has never smoked  She has never used smokeless tobacco  She reports that she does not drink alcohol or use drugs  Current Outpatient Medications   Medication Sig Dispense Refill    albuterol (2 5 mg/3 mL) 0 083 % nebulizer solution Take 1 vial (2 5 mg total) by nebulization every 6 (six) hours as needed for wheezing or shortness of breath 90 vial 5    albuterol (PROVENTIL HFA,VENTOLIN HFA) 90 mcg/act inhaler Inhale 1 puff every 4 (four) hours as needed        amLODIPine (NORVASC) 10 mg tablet take 1 tablet by mouth once daily 90 tablet 1    amLODIPine-atorvastatin (CADUET) 10-10 MG per tablet take 1 tablet by mouth once daily 90 tablet 1    anastrozole (ARIMIDEX) 1 mg tablet Take 1 tablet (1 mg total) by mouth daily 90 tablet 3    SANG MICROLET LANCETS lancets Testing three times a day 100 each 3    Blood Glucose Monitoring Suppl (ACURA BLOOD GLUCOSE METER) w/Device KIT Testing blood sugars three times a day      Blood Glucose Monitoring Suppl (SANG CONTOUR MONITOR) w/Device KIT Testing blood sugars three times a day 1 kit 0    budesonide (PULMICORT) 0 5 mg/2 mL nebulizer solution Take 1 vial (0 5 mg total) by nebulization 2 (two) times a day Rinse mouth after use   60 vial 5    cyclobenzaprine (FLEXERIL) 5 mg tablet take 1 tablet by mouth three times a day 90 tablet 1    enalapril (VASOTEC) 20 mg tablet take 1 tablet by mouth once daily 90 tablet 1    EPINEPHrine (EPIPEN) 0 3 mg/0 3 mL SOAJ Inject 0 3 mL (0 3 mg total) into a muscle once for 1 dose 0 6 mL 0    fluticasone-vilanterol (BREO ELLIPTA) 100-25 mcg/inh inhaler Inhale 1 puff daily Rinse mouth after use  2 Inhaler 5    gabapentin (NEURONTIN) 300 mg capsule Take 1 capsule at night for 3 nights, then take 1 capsules 2 times a day, then at 1 week take 1 pill 3 times a day 90 capsule 1    gemfibrozil (LOPID) 600 mg tablet Take 600 mg by mouth daily      glucose blood (SANG CONTOUR TEST) test strip Testing three times a day 100 each 3    Insulin Glargine (TOUJEO SOLOSTAR) 300 units/mL CONCETRATED U-300 injection pen Inject 60 Units under the skin daily with dinner 7 5 mL 0    insulin lispro (HUMALOG KWIKPEN) 100 units/mL injection pen Inject 16 units TID 5 pen 5    Insulin Pen Needle (BD PEN NEEDLE NATALEE U/F) 32G X 4 MM MISC USE 4/ each 3    Lancets MISC Testing three times a day      levothyroxine 50 mcg tablet take 1 tablet by mouth once daily 90 tablet 1    omeprazole (PriLOSEC) 40 MG capsule Take 1 capsule (40 mg total) by mouth daily 30 capsule 3    oxyCODONE (ROXICODONE) 5 mg immediate release tablet Take 5 mg by mouth every 6 (six) hours as needed for moderate pain       No current facility-administered medications for this visit        Current Outpatient Medications on File Prior to Visit   Medication Sig    albuterol (2 5 mg/3 mL) 0 083 % nebulizer solution Take 1 vial (2 5 mg total) by nebulization every 6 (six) hours as needed for wheezing or shortness of breath    albuterol (PROVENTIL HFA,VENTOLIN HFA) 90 mcg/act inhaler Inhale 1 puff every 4 (four) hours as needed      amLODIPine (NORVASC) 10 mg tablet take 1 tablet by mouth once daily    amLODIPine-atorvastatin (CADUET) 10-10 MG per tablet take 1 tablet by mouth once daily    anastrozole (ARIMIDEX) 1 mg tablet Take 1 tablet (1 mg total) by mouth daily    SANG MICROLET LANCETS lancets Testing three times a day    Blood Glucose Monitoring Suppl (ACURA BLOOD GLUCOSE METER) w/Device KIT Testing blood sugars three times a day    Blood Glucose Monitoring Suppl (SANG CONTOUR MONITOR) w/Device KIT Testing blood sugars three times a day    budesonide (PULMICORT) 0 5 mg/2 mL nebulizer solution Take 1 vial (0 5 mg total) by nebulization 2 (two) times a day Rinse mouth after use   cyclobenzaprine (FLEXERIL) 5 mg tablet take 1 tablet by mouth three times a day    enalapril (VASOTEC) 20 mg tablet take 1 tablet by mouth once daily    EPINEPHrine (EPIPEN) 0 3 mg/0 3 mL SOAJ Inject 0 3 mL (0 3 mg total) into a muscle once for 1 dose    fluticasone-vilanterol (BREO ELLIPTA) 100-25 mcg/inh inhaler Inhale 1 puff daily Rinse mouth after use   gemfibrozil (LOPID) 600 mg tablet Take 600 mg by mouth daily    glucose blood (SANG CONTOUR TEST) test strip Testing three times a day    Insulin Glargine (TOUJEO SOLOSTAR) 300 units/mL CONCETRATED U-300 injection pen Inject 60 Units under the skin daily with dinner    insulin lispro (HUMALOG KWIKPEN) 100 units/mL injection pen Inject 16 units TID    Insulin Pen Needle (BD PEN NEEDLE NATALEE U/F) 32G X 4 MM MISC USE 4/DAY    Lancets MISC Testing three times a day    levothyroxine 50 mcg tablet take 1 tablet by mouth once daily    omeprazole (PriLOSEC) 40 MG capsule Take 1 capsule (40 mg total) by mouth daily    oxyCODONE (ROXICODONE) 5 mg immediate release tablet Take 5 mg by mouth every 6 (six) hours as needed for moderate pain     No current facility-administered medications on file prior to visit  She is allergic to aspirin and tylenol with codeine #3 [acetaminophen-codeine]       Review of Systems   Constitutional: Positive for fatigue  Negative for chills and fever     Respiratory: Negative for shortness of breath  Cardiovascular: Negative for chest pain  Genitourinary: Negative for difficulty urinating  Musculoskeletal: Positive for back pain  Skin: Negative for rash and wound  Neurological: Positive for numbness  Objective:      /70 (BP Location: Left arm, Patient Position: Sitting, Cuff Size: Standard)   Pulse 103   Temp (!) 96 °F (35 6 °C) (Temporal)   Resp 18   Ht 5' 6" (1 676 m)   Wt 78 5 kg (173 lb)   SpO2 96%   Breastfeeding? No   BMI 27 92 kg/m²          Physical Exam   Constitutional: She is oriented to person, place, and time  She appears well-developed and well-nourished  No distress  HENT:   Head: Normocephalic and atraumatic  Right Ear: External ear normal    Left Ear: External ear normal    Eyes: Conjunctivae are normal    Neck: Normal range of motion  Neck supple  No thyromegaly present  Cardiovascular: Normal rate, regular rhythm and normal heart sounds  Pulses are no weak pulses  No murmur heard  Pulses:       Dorsalis pedis pulses are 2+ on the right side, and 2+ on the left side  Posterior tibial pulses are 2+ on the right side  Pulmonary/Chest: Effort normal and breath sounds normal  No respiratory distress  She has no wheezes  Feet:   Right Foot:   Skin Integrity: Negative for ulcer, skin breakdown, erythema, warmth, callus or dry skin  Left Foot:   Skin Integrity: Negative for ulcer, skin breakdown, erythema, warmth, callus or dry skin  Lymphadenopathy:     She has no cervical adenopathy  Neurological: She is alert and oriented to person, place, and time  Psychiatric: She has a normal mood and affect  Her behavior is normal    Nursing note and vitals reviewed  Patient's shoes and socks removed  Right Foot/Ankle   Right Foot Inspection  Skin Exam: skin normal and skin intact no dry skin, no warmth, no callus, no erythema, no maceration, no abnormal color, no pre-ulcer, no ulcer and no callus                          Toe Exam: ROM and strength within normal limits  Sensory   Vibration: absent    Monofilament testing: absent  Vascular    The right DP pulse is 2+  The right PT pulse is 2+  Left Foot/Ankle  Left Foot Inspection  Skin Exam: skin normal and skin intactno dry skin, no warmth, no erythema, no maceration, normal color, no pre-ulcer, no ulcer and no callus                         Toe Exam: ROM and strength within normal limits                   Sensory   Vibration: absent    Monofilament: absent  Vascular    The left DP pulse is 2+  Assign Risk Category:  No deformity present; Loss of protective sensation;  No weak pulses       Risk: 2

## 2019-11-24 PROBLEM — E11.42 DIABETIC POLYNEUROPATHY ASSOCIATED WITH TYPE 2 DIABETES MELLITUS (HCC): Status: ACTIVE | Noted: 2019-11-24

## 2019-11-25 NOTE — ASSESSMENT & PLAN NOTE
Lab Results   Component Value Date    HGBA1C 12 5 (A) 09/03/2019   Discussed with her that symptoms are likely related to diabetes  She needs to get better control of her blood sugars to improve her neuropathy symptoms  Recommend diabetic shoes and compression socks  Will restart on gabapetin  She was on it prior and does not know why it was stopped  She will return in another 1 month

## 2019-11-27 ENCOUNTER — HOSPITAL ENCOUNTER (OUTPATIENT)
Dept: INFUSION CENTER | Facility: CLINIC | Age: 64
Discharge: HOME | End: 2019-11-27
Payer: MEDICARE

## 2019-11-27 VITALS
SYSTOLIC BLOOD PRESSURE: 143 MMHG | HEART RATE: 84 BPM | DIASTOLIC BLOOD PRESSURE: 78 MMHG | RESPIRATION RATE: 18 BRPM | TEMPERATURE: 97.6 F

## 2019-11-27 DIAGNOSIS — J45.50 SEVERE PERSISTENT ASTHMA WITHOUT COMPLICATION: Primary | ICD-10-CM

## 2019-11-27 PROCEDURE — 96372 THER/PROPH/DIAG INJ SC/IM: CPT

## 2019-11-27 RX ORDER — ASPIRIN 81 MG/1
81 TABLET, CHEWABLE ORAL DAILY
COMMUNITY

## 2019-11-27 RX ORDER — EPINEPHRINE 1 MG/ML
0.3 INJECTION, SOLUTION, CONCENTRATE INTRAVENOUS AS NEEDED
Status: DISCONTINUED | OUTPATIENT
Start: 2019-11-27 | End: 2019-11-30 | Stop reason: HOSPADM

## 2019-11-27 RX ORDER — EPINEPHRINE 1 MG/ML
0.3 INJECTION, SOLUTION, CONCENTRATE INTRAVENOUS AS NEEDED
Status: CANCELLED | OUTPATIENT
Start: 2019-12-11

## 2019-11-27 RX ADMIN — OMALIZUMAB 375 MG: 150 INJECTION, SOLUTION SUBCUTANEOUS at 10:24

## 2019-11-27 NOTE — PROGRESS NOTES
Pt given xolair injections sq as ordered  Pt to be observed for 30 minutes post injections  Pt was given appt in 2 weeks for next xolair

## 2019-11-29 ENCOUNTER — PROCEDURE VISIT (OUTPATIENT)
Dept: SURGICAL ONCOLOGY | Facility: CLINIC | Age: 64
End: 2019-11-29

## 2019-11-29 VITALS
WEIGHT: 175 LBS | SYSTOLIC BLOOD PRESSURE: 160 MMHG | DIASTOLIC BLOOD PRESSURE: 90 MMHG | RESPIRATION RATE: 16 BRPM | HEART RATE: 86 BPM | HEIGHT: 66 IN | BODY MASS INDEX: 28.12 KG/M2 | TEMPERATURE: 98.1 F

## 2019-11-29 DIAGNOSIS — C50.911 MALIGNANT NEOPLASM OF RIGHT BREAST IN FEMALE, ESTROGEN RECEPTOR POSITIVE, UNSPECIFIED SITE OF BREAST (HCC): Primary | ICD-10-CM

## 2019-11-29 DIAGNOSIS — Z17.0 MALIGNANT NEOPLASM OF RIGHT BREAST IN FEMALE, ESTROGEN RECEPTOR POSITIVE, UNSPECIFIED SITE OF BREAST (HCC): Primary | ICD-10-CM

## 2019-12-03 ENCOUNTER — APPOINTMENT (OUTPATIENT)
Dept: LAB | Facility: HOSPITAL | Age: 64
End: 2019-12-03
Payer: MEDICARE

## 2019-12-03 DIAGNOSIS — A04.8 H. PYLORI INFECTION: ICD-10-CM

## 2019-12-03 DIAGNOSIS — R19.7 DIARRHEA, UNSPECIFIED TYPE: ICD-10-CM

## 2019-12-03 PROCEDURE — 87505 NFCT AGENT DETECTION GI: CPT | Performed by: PHYSICIAN ASSISTANT

## 2019-12-03 PROCEDURE — 87209 SMEAR COMPLEX STAIN: CPT

## 2019-12-03 PROCEDURE — 87177 OVA AND PARASITES SMEARS: CPT

## 2019-12-04 LAB
CAMPYLOBACTER DNA SPEC NAA+PROBE: NORMAL
SALMONELLA DNA SPEC QL NAA+PROBE: NORMAL
SHIGA TOXIN STX GENE SPEC NAA+PROBE: NORMAL
SHIGELLA DNA SPEC QL NAA+PROBE: NORMAL

## 2019-12-05 LAB
G LAMBLIA AG STL QL IA: NEGATIVE
O+P STL CONC: NORMAL

## 2019-12-10 ENCOUNTER — TELEPHONE (OUTPATIENT)
Dept: FAMILY MEDICINE CLINIC | Facility: CLINIC | Age: 64
End: 2019-12-10

## 2019-12-10 DIAGNOSIS — E11.8 TYPE 2 DIABETES MELLITUS WITH COMPLICATION, WITHOUT LONG-TERM CURRENT USE OF INSULIN (HCC): ICD-10-CM

## 2019-12-10 DIAGNOSIS — Z79.4 TYPE 2 DIABETES MELLITUS WITH HYPERGLYCEMIA, WITH LONG-TERM CURRENT USE OF INSULIN (HCC): ICD-10-CM

## 2019-12-10 DIAGNOSIS — E11.65 TYPE 2 DIABETES MELLITUS WITH HYPERGLYCEMIA, WITH LONG-TERM CURRENT USE OF INSULIN (HCC): ICD-10-CM

## 2019-12-10 NOTE — TELEPHONE ENCOUNTER
SIGNATURES NEEDED FOR medicare part b form  FORM RECEIVED VIA FAX. WILL BE PLACED IN YOUR BIN AT ASSIGNED DELIVERY TIMES.

## 2019-12-11 ENCOUNTER — TELEPHONE (OUTPATIENT)
Dept: FAMILY MEDICINE CLINIC | Facility: CLINIC | Age: 64
End: 2019-12-11

## 2019-12-11 ENCOUNTER — HOSPITAL ENCOUNTER (OUTPATIENT)
Dept: INFUSION CENTER | Facility: CLINIC | Age: 64
Discharge: HOME | End: 2019-12-11
Payer: MEDICARE

## 2019-12-11 VITALS
TEMPERATURE: 98.9 F | SYSTOLIC BLOOD PRESSURE: 163 MMHG | RESPIRATION RATE: 16 BRPM | DIASTOLIC BLOOD PRESSURE: 89 MMHG | HEART RATE: 86 BPM

## 2019-12-11 DIAGNOSIS — J45.50 SEVERE PERSISTENT ASTHMA WITHOUT COMPLICATION: Primary | ICD-10-CM

## 2019-12-11 PROCEDURE — 96372 THER/PROPH/DIAG INJ SC/IM: CPT

## 2019-12-11 RX ORDER — EPINEPHRINE 1 MG/ML
0.3 INJECTION, SOLUTION, CONCENTRATE INTRAVENOUS AS NEEDED
Status: DISCONTINUED | OUTPATIENT
Start: 2019-12-11 | End: 2019-12-14 | Stop reason: HOSPADM

## 2019-12-11 RX ORDER — EPINEPHRINE 1 MG/ML
0.3 INJECTION, SOLUTION, CONCENTRATE INTRAVENOUS AS NEEDED
Status: CANCELLED | OUTPATIENT
Start: 2019-12-24

## 2019-12-11 RX ADMIN — OMALIZUMAB 375 MG: 150 INJECTION, SOLUTION SUBCUTANEOUS at 14:22

## 2019-12-11 NOTE — TELEPHONE ENCOUNTER
Refill pts daughter came in person/ states she called refill line for supplies no one has contacted the pharmacy:    glucose blood (SANG CONTOUR TEST) test strip  SANG MICROLET LANCETS lancets  Insulin Pen Needle (BD PEN NEEDLE NATALEE U/F) 32G X 4 MM MISC    Patient is all out

## 2019-12-11 NOTE — PROGRESS NOTES
Pt  Denies new symptoms or concerns today  Xolair 375mg given SQ as ordered  Pt  Received 150mg plus 75mg in NIKIA and 150mg in JESSICA as requested  Pt  Scheduled for next appointment at 12:00 as [place] [id number] is closing for the holiday at 1400  Pt  Verbalized understanding  AVS provided

## 2019-12-11 NOTE — PLAN OF CARE
Problem: PAIN - ADULT  Goal: Verbalizes/displays adequate comfort level or baseline comfort level  Description  Interventions:  - Encourage patient to monitor pain and request assistance  - Assess pain using appropriate pain scale  - Administer analgesics based on type and severity of pain and evaluate response  - Implement non-pharmacological measures as appropriate and evaluate response  - Consider cultural and social influences on pain and pain management  - Notify physician/advanced practitioner if interventions unsuccessful or patient reports new pain  Outcome: Progressing     Problem: INFECTION - ADULT  Goal: Absence or prevention of progression during hospitalization  Description  INTERVENTIONS:  - Assess and monitor for signs and symptoms of infection  - Monitor lab/diagnostic results  - Monitor all insertion sites, i e  indwelling lines, tubes, and drains  - Monitor endotracheal if appropriate and nasal secretions for changes in amount and color  - [place] appropriate cooling/warming therapies per order  - Administer medications as ordered  - Instruct and encourage patient and family to use good hand hygiene technique  - Identify and instruct in appropriate isolation precautions for identified infection/condition  Outcome: Progressing  Goal: Absence of fever/infection during neutropenic period  Description  INTERVENTIONS:  - Monitor WBC    Outcome: Progressing     Problem: Knowledge Deficit  Goal: Patient/family/caregiver demonstrates understanding of disease process, treatment plan, medications, and discharge instructions  Description  Complete learning assessment and assess knowledge base    Interventions:  - Provide teaching at level of understanding  - Provide teaching via preferred learning methods  Outcome: Progressing ankle

## 2019-12-24 ENCOUNTER — HOSPITAL ENCOUNTER (OUTPATIENT)
Dept: INFUSION CENTER | Facility: CLINIC | Age: 64
Discharge: HOME | End: 2019-12-24
Payer: MEDICARE

## 2019-12-24 VITALS
TEMPERATURE: 97.7 F | DIASTOLIC BLOOD PRESSURE: 82 MMHG | RESPIRATION RATE: 18 BRPM | HEART RATE: 80 BPM | SYSTOLIC BLOOD PRESSURE: 149 MMHG

## 2019-12-24 DIAGNOSIS — J45.50 SEVERE PERSISTENT ASTHMA WITHOUT COMPLICATION: Primary | ICD-10-CM

## 2019-12-24 PROCEDURE — 96372 THER/PROPH/DIAG INJ SC/IM: CPT

## 2019-12-24 RX ORDER — EPINEPHRINE 1 MG/ML
0.3 INJECTION, SOLUTION, CONCENTRATE INTRAVENOUS AS NEEDED
Status: DISCONTINUED | OUTPATIENT
Start: 2019-12-24 | End: 2019-12-27 | Stop reason: HOSPADM

## 2019-12-24 RX ORDER — EPINEPHRINE 1 MG/ML
0.3 INJECTION, SOLUTION, CONCENTRATE INTRAVENOUS AS NEEDED
Status: CANCELLED | OUTPATIENT
Start: 2020-01-08

## 2019-12-24 RX ADMIN — OMALIZUMAB 375 MG: 150 INJECTION, SOLUTION SUBCUTANEOUS at 12:14

## 2019-12-24 NOTE — PROGRESS NOTES
Patient denies any discomforts/ issues  Patient has epipen along with her and within expiration  Patient tolerated 3 injections of Xolair to equal 375mg as ordered  Remained 30 minutes for observation  Aware of next appointment   AVS given to patient

## 2019-12-31 DIAGNOSIS — I10 ESSENTIAL HYPERTENSION: ICD-10-CM

## 2020-01-02 RX ORDER — OMEPRAZOLE 20 MG/1
CAPSULE, DELAYED RELEASE ORAL
Qty: 90 CAPSULE | Refills: 0 | Status: SHIPPED | OUTPATIENT
Start: 2020-01-02 | End: 2020-09-21 | Stop reason: SDUPTHER

## 2020-01-06 ENCOUNTER — TELEPHONE (OUTPATIENT)
Dept: PULMONOLOGY | Facility: CLINIC | Age: 65
End: 2020-01-06

## 2020-01-06 NOTE — TELEPHONE ENCOUNTER
Mckenna from [facility] called stating that the Pt's [address] ) memb ID [id number] called stating they needed a prior auth for Xolair  Pt  Has an appt  For 1/8/2020 Provider's srvc # is [phone]  Pls call Mckenna back with status

## 2020-01-07 ENCOUNTER — OFFICE VISIT (OUTPATIENT)
Dept: FAMILY MEDICINE CLINIC | Facility: CLINIC | Age: 65
End: 2020-01-07

## 2020-01-07 VITALS
TEMPERATURE: 97.5 F | DIASTOLIC BLOOD PRESSURE: 70 MMHG | WEIGHT: 174 LBS | BODY MASS INDEX: 27.97 KG/M2 | OXYGEN SATURATION: 96 % | HEART RATE: 78 BPM | HEIGHT: 66 IN | RESPIRATION RATE: 18 BRPM | SYSTOLIC BLOOD PRESSURE: 140 MMHG

## 2020-01-07 DIAGNOSIS — E03.8 OTHER SPECIFIED HYPOTHYROIDISM: ICD-10-CM

## 2020-01-07 DIAGNOSIS — Z79.4 TYPE 2 DIABETES MELLITUS WITH COMPLICATION, WITH LONG-TERM CURRENT USE OF INSULIN (HCC): Primary | ICD-10-CM

## 2020-01-07 DIAGNOSIS — E11.8 TYPE 2 DIABETES MELLITUS WITH COMPLICATION, WITH LONG-TERM CURRENT USE OF INSULIN (HCC): Primary | ICD-10-CM

## 2020-01-07 DIAGNOSIS — Z23 FLU VACCINE NEED: ICD-10-CM

## 2020-01-07 DIAGNOSIS — R14.0 BLOATING SYMPTOM: ICD-10-CM

## 2020-01-07 DIAGNOSIS — E11.8 TYPE 2 DIABETES MELLITUS WITH COMPLICATION, WITHOUT LONG-TERM CURRENT USE OF INSULIN (HCC): ICD-10-CM

## 2020-01-07 DIAGNOSIS — E11.42 DIABETIC POLYNEUROPATHY ASSOCIATED WITH TYPE 2 DIABETES MELLITUS (HCC): ICD-10-CM

## 2020-01-07 PROBLEM — E66.3 OVERWEIGHT (BMI 25.0-29.9): Status: ACTIVE | Noted: 2020-01-07

## 2020-01-07 LAB — SL AMB POCT HEMOGLOBIN AIC: 12.6 (ref ?–6.5)

## 2020-01-07 PROCEDURE — 99215 OFFICE O/P EST HI 40 MIN: CPT | Performed by: FAMILY MEDICINE

## 2020-01-07 PROCEDURE — 83036 HEMOGLOBIN GLYCOSYLATED A1C: CPT | Performed by: FAMILY MEDICINE

## 2020-01-07 PROCEDURE — 3008F BODY MASS INDEX DOCD: CPT | Performed by: FAMILY MEDICINE

## 2020-01-07 PROCEDURE — 3046F HEMOGLOBIN A1C LEVEL >9.0%: CPT | Performed by: FAMILY MEDICINE

## 2020-01-07 RX ORDER — DICYCLOMINE HYDROCHLORIDE 10 MG/1
10 CAPSULE ORAL
Qty: 120 CAPSULE | Refills: 1 | Status: SHIPPED | OUTPATIENT
Start: 2020-01-07 | End: 2020-09-01

## 2020-01-07 RX ORDER — DULOXETIN HYDROCHLORIDE 30 MG/1
30 CAPSULE, DELAYED RELEASE ORAL 2 TIMES DAILY
Qty: 180 CAPSULE | Refills: 1 | Status: SHIPPED | OUTPATIENT
Start: 2020-01-07 | End: 2020-05-28

## 2020-01-07 RX ORDER — LEVOTHYROXINE SODIUM 0.05 MG/1
50 TABLET ORAL DAILY
Qty: 90 TABLET | Refills: 1 | Status: SHIPPED | OUTPATIENT
Start: 2020-01-07 | End: 2021-11-10

## 2020-01-07 NOTE — ASSESSMENT & PLAN NOTE
Lab Results   Component Value Date    HGBA1C 12 6 (A) 01/07/2020   Restart Cymbalta  Counseled patient that she must give medications at least 1 month prior to deciding that it does not work   Do not just stop the medication if it is not helping, let us know so we can evaluate if an increase in dose is beneficial    Counseled on the importance of improving blood sugar control to help with symptoms as well

## 2020-01-07 NOTE — PROGRESS NOTES
Assessment/Plan:    Type 2 diabetes mellitus with complication, with long-term current use of insulin ([facility])    Lab Results   Component Value Date    HGBA1C 12 6 (A) 01/07/2020   Referred to Endocrinology  Counseled on importance of checking blood sugar regularly  Increase Lantus to 40 units and increase 2 units every 2 days if fasting blood sugar above 150  Counseled on low carb diet  Counseled patient on decreasing carbohydrates in diet  Counseled on what are carbohydrates, and the difference between simple and complex carbohydrates  Keep food journal   Discussed that foot pain and post prandial nausea are complications related to uncontrolled BG  Suggested gastric emptying study to evaluate for gastroparesis but patient refused at this time  Small frequent meals     Diabetic polyneuropathy associated with type 2 diabetes mellitus ([facility] )    Lab Results   Component Value Date    HGBA1C 12 6 (A) 01/07/2020   Restart Cymbalta  Counseled patient that she must give medications at least 1 month prior to deciding that it does not work  Do not just stop the medication if it is not helping, let us know so we can evaluate if an increase in dose is beneficial    Counseled on the importance of improving blood sugar control to help with symptoms as well     Overweight (BMI 25 0-29  9)  BMI Counseling: Body mass index is 28 08 kg/m²  The BMI is above normal  Nutrition recommendations include consuming healthier snacks, decreasing soda and/or juice intake and moderation in carbohydrate intake  Greater than 50% of 40 minute encounter was spent counseling/coordinating care of the patient regarding the diagnosis and understanding pathogenesis of disease process, discussion of differential diagnoses, review of laboratory data and imaging, discussion of medications to include side effect profile, precautions, and plan of care         Diagnoses and all orders for this visit:    Type 2 diabetes mellitus with complication, with long-term current use of insulin ([facility])  -     POCT hemoglobin A1c  -     Ambulatory referral to Endocrinology; Future    Diabetic polyneuropathy associated with type 2 diabetes mellitus ([facility])  -     DULoxetine (CYMBALTA) 30 mg delayed release capsule; Take 1 capsule (30 mg total) by mouth 2 (two) times a day  -     Ambulatory referral to Podiatry; Future    Other specified hypothyroidism  -     levothyroxine 50 mcg tablet; Take 1 tablet (50 mcg total) by mouth daily    Type 2 diabetes mellitus with complication, without long-term current use of insulin ([facility])  -     insulin glargine (TOUJEO) 300 units/mL CONCETRATED U-300 injection pen (1-unit dial); Inject 40 Units under the skin daily with dinner    Flu vaccine need  -     influenza vaccine, [id number], quadrivalent, recombinant, PF, 0 5 mL, for patients 18 yr+ (FLUBLOK)    Bloating symptom  -     dicyclomine (BENTYL) 10 mg capsule; Take 1 capsule (10 mg total) by mouth 4 (four) times a day (before meals and at bedtime)          Subjective:      Patient ID: Ana Cristina Trivedi is a 62 y o  female  A 62year-old postmenopausal woman with stage IA right breast cancer  She underwent lumpectomy with sentinel lymph node biopsy, resulting in LILIANA  She completed adjuvant chemotherapy with weekly paclitaxel and trastuzumab in December 2018 without cardiac toxicity  She is currently on adjuvant hormonal therapy with anastrozole with excellent tolerance  Clinically, she has no evidence recurrent disease  She had her Port-A-Cath removed in December 2019  She had provoked pulmonary embolism with subsequent resolution on anticoagulation, she stopped rivaroxaban as per Hematology recommendation and started baby aspirin  She has poorly controlled DM and has trouble following a diabetic diet  She seldomly checks her blood sugar and has been only using 16 units of Lantus instead of 60 prescribed   She has long standing diabetic neuropathy, was on gabapentin and Cymbalta but stopped them in the past for unknown reasons  She complains of bilateral foot pain described as cramps and feeling like something is pulling her foot, L>R  Diabetes   She presents for her follow-up diabetic visit  She has type 2 diabetes mellitus  No MedicAlert identification noted  Her disease course has been worsening  Hypoglycemia symptoms include headaches and nervousness/anxiousness  Associated symptoms include blurred vision, fatigue and foot paresthesias  There are no hypoglycemic complications  Symptoms are worsening  Diabetic complications include nephropathy and peripheral neuropathy  Risk factors for coronary artery disease include dyslipidemia, post-menopausal, sedentary lifestyle and stress  Current diabetic treatment includes insulin injections  She is compliant with treatment most of the time  Her weight is stable  She is following a generally healthy diet  When asked about meal planning, she reported none  She has not had a previous visit with a dietitian  She rarely participates in exercise  An ACE inhibitor/angiotensin II receptor blocker is being taken  She does not see a podiatrist Eye exam is not current  The following portions of the patient's history were reviewed and updated as appropriate: She  has a past medical history of Abdominal infection ([facility] ), Abnormal chest x-ray (4/5/2019), Asthma, Breast cancer ([facility] ) (06/26/2018), Cancer ([facility] ), Diabetes mellitus ([facility] ), Hiatal hernia, History of chemotherapy, History of radiation therapy, Hypercholesterolemia, Hypertension, Hypothyroid, Renal disorder, and Rheumatoid arthritis ([facility] )    She   Patient Active Problem List    Diagnosis Date Noted    Overweight (BMI 25 0-29 9) 01/07/2020    Diabetic polyneuropathy associated with type 2 diabetes mellitus ([facility] ) 11/24/2019    Type 2 diabetes mellitus with hyperglycemia, with long-term current use of insulin ([facility] ) 05/29/2019    Radiation pneumonitis ([facility] ) 05/18/2019    Bilateral pulmonary embolism ([facility] ) 11/06/2018    Hypothyroid     Hypertension     Hypercholesterolemia 09/21/2018    Chronic pain of right knee 09/04/2018    Cellulitis of right axilla 08/22/2018    Hiatal hernia 08/08/2018    Screening for colon cancer 08/08/2018    Esophageal dysphagia 08/08/2018    Malignant neoplasm of upper-outer quadrant of right breast in female, estrogen receptor positive ([facility] ) 07/19/2018    Malignant neoplasm of right female breast ([facility] ) 06/26/2018    Neck pain 06/13/2018    Chronic bilateral low back pain without sciatica 05/22/2018    Palpitations 05/09/2018    Type 2 diabetes mellitus with complication, with long-term current use of insulin ([facility] ) 03/27/2018    Other specified hypothyroidism 03/27/2018    Chest pain 03/27/2018    Asthma 09/22/2009    Essential hypertension 09/22/2009     She  has a past surgical history that includes Tubal ligation; Knee surgery (Right); Cataract extraction, bilateral (Bilateral); Retinal detachment surgery (Right); Breast surgery (Left); Breast lumpectomy (Right, 6/26/2018); Breast lumpectomy (Right, 8/2/2018); EGD AND COLONOSCOPY (N/A, 9/5/2018); Tunneled venous port placement (Left, 9/13/2018); FL guided central venous access device insertion (9/13/2018); Mammo stereotactic breast biopsy right (all inc) (Right, 5/23/2018); Mammo needle localization right (all inc) (Right, 6/26/2018); and Breast biopsy (Right, 05/23/2018)  Her family history includes Cancer in her maternal grandfather; Diabetes in her brother, father, and mother; Hypertension in her brother, father, and mother; No Known Problems in her daughter, daughter, daughter, sister, sister, sister, sister, and sister; Prostate cancer in her brother  She  reports that she has never smoked  She has never used smokeless tobacco  She reports that she does not drink alcohol or use drugs       Review of Systems   Constitutional: Positive for appetite change (decreased appetite ) and fatigue     Eyes: Positive for blurred vision and visual disturbance  Gastrointestinal: Positive for abdominal distention and nausea  Bloating  Post prandial nausea    Musculoskeletal: Positive for arthralgias  Neurological: Positive for numbness and headaches  Psychiatric/Behavioral: The patient is nervous/anxious  Objective:      /70 (BP Location: Right arm, Patient Position: Sitting, Cuff Size: Standard)   Pulse 78   Temp 97 5 °F (36 4 °C) (Temporal)   Resp 18   Ht 5' 6" (1 676 m)   Wt 78 9 kg (174 lb)   SpO2 96%   BMI 28 08 kg/m²          Physical Exam   Constitutional: She is oriented to person, place, and time  She appears well-developed  HENT:   Head: Normocephalic  Right Ear: External ear normal    Left Ear: External ear normal    Nose: Nose normal    Mouth/Throat: Oropharynx is clear and moist    Eyes: Pupils are equal, round, and reactive to light  Conjunctivae and EOM are normal    Neck: Normal range of motion  Neck supple  No thyromegaly present  Cardiovascular: Normal rate, regular rhythm and normal heart sounds  Pulmonary/Chest: Effort normal and breath sounds normal    Abdominal: Soft  There is no tenderness  There is no rebound and no guarding  Musculoskeletal: Normal range of motion  Neurological: She is alert and oriented to person, place, and time  She has normal reflexes  Skin: Skin is dry  Psychiatric: She has a normal mood and affect  Nursing note and vitals reviewed

## 2020-01-07 NOTE — ASSESSMENT & PLAN NOTE
BMI Counseling: Body mass index is 28 08 kg/m²  The BMI is above normal  Nutrition recommendations include consuming healthier snacks, decreasing soda and/or juice intake and moderation in carbohydrate intake

## 2020-01-07 NOTE — ASSESSMENT & PLAN NOTE
Lab Results   Component Value Date    HGBA1C 12 6 (A) 01/07/2020   Referred to Endocrinology  Counseled on importance of checking blood sugar regularly  Increase Lantus to 40 units and increase 2 units every 2 days if fasting blood sugar above 150  Counseled on low carb diet  Counseled patient on decreasing carbohydrates in diet   Counseled on what are carbohydrates, and the difference between simple and complex carbohydrates  Keep food journal   Discussed that foot pain and post prandial nausea are complications related to uncontrolled BG  Suggested gastric emptying study to evaluate for gastroparesis but patient refused at this time  Small frequent meals

## 2020-01-08 ENCOUNTER — HOSPITAL ENCOUNTER (OUTPATIENT)
Dept: INFUSION CENTER | Facility: CLINIC | Age: 65
End: 2020-01-08

## 2020-01-08 ENCOUNTER — TELEPHONE (OUTPATIENT)
Dept: FAMILY MEDICINE CLINIC | Facility: CLINIC | Age: 65
End: 2020-01-08

## 2020-01-08 NOTE — TELEPHONE ENCOUNTER
Daughter Elvira Adhikari calling asking for an update on her moms infusion  She said her appt was cancelled today because it was not approved    Her number is [phone]

## 2020-01-08 NOTE — TELEPHONE ENCOUNTER
[facility] Int# [id number] gave pt info for Endo, but  pt needed Endo changed from 1/28/20 because she is getting teeth pulled that day   New Endo apt([phone]) is on 2/10/20 at 9:40 am [facility] [address] [facility]    Podiatry apt pt already scheduled apt for 2/17/20 at [address] [place] [address], [facility]

## 2020-01-08 NOTE — TELEPHONE ENCOUNTER
Called daughter back informed her we did call her mom on Monday to cancel her infusion because she changed insurances and we have to redo the prior auth  The pt was okay with this and I informed her daughter it does take a few weeks sometimes for the insurance company to get back to us with an approval  Informed her I will keep in contact with her about the auth status

## 2020-01-13 ENCOUNTER — TELEPHONE (OUTPATIENT)
Dept: FAMILY MEDICINE CLINIC | Facility: CLINIC | Age: 65
End: 2020-01-13

## 2020-01-13 DIAGNOSIS — M54.2 NECK PAIN: ICD-10-CM

## 2020-01-13 RX ORDER — CYCLOBENZAPRINE HCL 5 MG
5 TABLET ORAL 3 TIMES DAILY
Qty: 90 TABLET | Refills: 1 | Status: SHIPPED | OUTPATIENT
Start: 2020-01-13 | End: 2020-09-01

## 2020-01-13 NOTE — TELEPHONE ENCOUNTER
Patient's daughter called into the office checking in on the status of her strips  Pharmacy has inform patient that they are waiting on the prior authorization form  Patient's daughter ask that we call her back with an update, I inform pt I will send this message out

## 2020-01-13 NOTE — TELEPHONE ENCOUNTER
Amlodipine and enalalpril are not due from us yet  90 day supply sent 9/26/19 with 1 refill      Strips are already pending from the interface for the provider so I did not duplicate the request

## 2020-01-13 NOTE — TELEPHONE ENCOUNTER
Pt called stating that she needs these refills today if possible due to her not having any  Please advise          cyclobenzaprine (FLEXERIL) 5 mg tablet     amLODIPine (NORVASC) 10 mg tablet     enalapril (VASOTEC) 20 mg tablet     glucose blood (SANG CONTOUR TEST) test strip

## 2020-01-14 DIAGNOSIS — E11.8 TYPE 2 DIABETES MELLITUS WITH COMPLICATION, WITH LONG-TERM CURRENT USE OF INSULIN (HCC): Primary | ICD-10-CM

## 2020-01-14 DIAGNOSIS — Z79.4 TYPE 2 DIABETES MELLITUS WITH COMPLICATION, WITH LONG-TERM CURRENT USE OF INSULIN (HCC): Primary | ICD-10-CM

## 2020-01-14 RX ORDER — BLOOD-GLUCOSE METER
EACH MISCELLANEOUS 3 TIMES DAILY
Qty: 1 EACH | Refills: 0 | Status: SHIPPED | OUTPATIENT
Start: 2020-01-14 | End: 2022-05-02

## 2020-01-14 RX ORDER — LANCETS 33 GAUGE
EACH MISCELLANEOUS 3 TIMES DAILY
Qty: 100 EACH | Refills: 5 | Status: SHIPPED | OUTPATIENT
Start: 2020-01-14 | End: 2020-03-17 | Stop reason: SDUPTHER

## 2020-01-14 NOTE — TELEPHONE ENCOUNTER
FYI, if patient has changed pharmacies, the new pharmacy must contact the old pharmacy to have meds transferred

## 2020-01-14 NOTE — TELEPHONE ENCOUNTER
Patient called stating her insurance covers  One touch ultra meter  One touch ultra test strips  One touch delica lancets      Needs prior auth for   insulin glargine (TOUJEO) 300 units/mL CONCETRATED U-300 injection pen (1-unit dial)      Please send to new pharmacy  Patient no longer using [facility]  [facility]  [place] [facility]    Pharmacy needs a list of all her medications faxed over

## 2020-01-17 ENCOUNTER — TELEPHONE (OUTPATIENT)
Dept: FAMILY MEDICINE CLINIC | Facility: CLINIC | Age: 65
End: 2020-01-17

## 2020-01-17 DIAGNOSIS — Z11.4 ENCOUNTER FOR SCREENING FOR HIV: ICD-10-CM

## 2020-01-17 DIAGNOSIS — Z11.59 ENCOUNTER FOR HEPATITIS C SCREENING TEST FOR LOW RISK PATIENT: ICD-10-CM

## 2020-01-17 DIAGNOSIS — E11.8 TYPE 2 DIABETES MELLITUS WITH COMPLICATION, WITHOUT LONG-TERM CURRENT USE OF INSULIN (HCC): Primary | ICD-10-CM

## 2020-01-17 NOTE — TELEPHONE ENCOUNTER
Pharmacy called with question on patient if she is still to be taking Asprin and or cholesterol medication

## 2020-01-17 NOTE — TELEPHONE ENCOUNTER
Should recheck blood work first  Orders written  As per her chart she reports allergy to aspirin, can discuss the aspirin further at her next visit   Once I have blood work results will decide on cholesterol treatment

## 2020-01-24 ENCOUNTER — DOCUMENTATION (OUTPATIENT)
Dept: PULMONOLOGY | Facility: CLINIC | Age: 65
End: 2020-01-24

## 2020-01-24 NOTE — PROGRESS NOTES
Received approval from [employer] to Ivel[address] Faulkner 47 rep informed me of the approval for the Xolair medication    Auth number: [id number]  Buy and bill    Effective dates 1/24/2020-7/24/2020    Xolair  for her Severe persistent asthma J45 [id number]

## 2020-01-29 ENCOUNTER — OFFICE VISIT (OUTPATIENT)
Dept: PULMONOLOGY | Facility: CLINIC | Age: 65
End: 2020-01-29
Payer: COMMERCIAL

## 2020-01-29 VITALS
DIASTOLIC BLOOD PRESSURE: 60 MMHG | WEIGHT: 177 LBS | BODY MASS INDEX: 28.45 KG/M2 | SYSTOLIC BLOOD PRESSURE: 118 MMHG | TEMPERATURE: 99.1 F | HEIGHT: 66 IN | OXYGEN SATURATION: 96 % | HEART RATE: 101 BPM

## 2020-01-29 DIAGNOSIS — J45.50 SEVERE PERSISTENT ASTHMA WITHOUT COMPLICATION: Primary | ICD-10-CM

## 2020-01-29 PROCEDURE — 99214 OFFICE O/P EST MOD 30 MIN: CPT | Performed by: INTERNAL MEDICINE

## 2020-01-29 RX ORDER — PREDNISONE 20 MG/1
20 TABLET ORAL DAILY
Qty: 5 TABLET | Refills: 0 | Status: SHIPPED | OUTPATIENT
Start: 2020-01-29 | End: 2020-02-03

## 2020-01-29 RX ORDER — EPINEPHRINE 1 MG/ML
0.3 INJECTION, SOLUTION, CONCENTRATE INTRAVENOUS AS NEEDED
Status: CANCELLED | OUTPATIENT
Start: 2020-02-05

## 2020-01-29 NOTE — PROGRESS NOTES
Pulmonary outpatient note   Dion Kim 62 y o  female MRN: [id number]  1/29/2020      Assessment and plan:  Dion Kim has the following medical problems:  1  Asthma  Severe persistent asthma  On Xolair since June 2019  However, the patient changed her insurance and this is not approved anymore  Last dose was December 2019  She also takes Symbicort daily  I reviewed her technique to Symbicort which is not adequate  I try to correct her  We will try to work with her new insurance to approve the Xolair as soon as possible  Since she is in a flare currently with wheezing and shortness of breaths I prescribed a low-dose of prednisone for the next 5 days 20 mg per day  The patient said that with higher dose she is getting severely hyperglycemic  I told her to contact us if she is not getting better since she might need hospitalization  2   Fibrotic lung changes  Seen on chest CT in the right upper lung status post radiation therapy for breast cancer in January 2019  This improved in another CT scan in August 2019     3   TMJ left side  Patient is complaining of left-sided pain specially after Xolair injections  I referred the patient to ENT than diagnosed TMJ and she was referred for dental exam and had tooth extraction yesterday that probably caused the patient's symptoms  Follow-up in 3 months  Nika Vidal MD/PhD,  [facility]      No follow-ups on file  History of Present Illness  This is 57 y o  year old female with previous medical history of  severe persistent asthma, stage IA right breast cancer status post lumpectomy, sentinel lymph node biopsy, chemotherapy and radiation initially on May 2018  We started Xolair therapy in June 2019 due to severe persistent asthma on chronic steroids  Since starting Xolair she is feeling better from the breathing standpoint  She was taking Breo that was switched to Symbicort due to vomiting  Last Xolair was on December 2019  However, the patient change in insurance and Xolair is not approved anymore by the new insurance  She is complaining of increasing symptoms the last 2 weeks due to viral disease that is going on over her house including her on and off  Social history:   Never smoked  Vidya Heredia not drink alcohol      Occupational history:   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[place] [address] [address] [place] [address] occupational risk factors  Review of Systems   Constitutional: Negative  HENT: Positive for congestion and rhinorrhea  Eyes: Negative  Respiratory: Positive for cough, shortness of breath and wheezing  Cardiovascular: Negative  Gastrointestinal: Negative  Endocrine: Negative  Genitourinary: Negative  Musculoskeletal: Negative  Skin: Negative  Allergic/Immunologic: Positive for environmental allergies  Neurological: Negative  Hematological: Negative  Psychiatric/Behavioral: Negative  Historical Information   Past Medical History:   Diagnosis Date    [facility]     h[facility]ylori    Abnormal chest x-ray 4/5/2019    Asthma     [facility] ) 06/26/2018    Cancer ([facility]     BREAST    Diabetes mellitus ([facility]     Hiatal hernia     History of chemotherapy     History of radiation therapy     Hypercholesterolemia     Hypertension     Hypothyroid     Renal disorder     Rheumatoid arthritis ([facility] )      Past Surgical History:   Procedure Laterality Date    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    BREAST LUMPECTOMY Right 6/26/2018    Procedure: RIGHT BREAST NEEDLE LOCALIZATION X2 WITH RIGHT BREAST LUMPECTOMY ( NEEDLE LOC @ 1000); Surgeon: Angela Ramirez MD;  [facility];  Service: Surgical Oncology    BREAST LUMPECTOMY Right 8/2/2018    Procedure: LYMPHOSCINITGRAPHY INTRAOPERATIVE LYMPHATIC MAPPING , RIGHT SENTINEL NODE BIOPSY, REEXCISION  RIGHT BREAST LUMPECTOMY CAVITY (SUPERIOR MARGIN);   Surgeon: Angela Ramirez MD;  [facility]; Service: Surgical Oncology    BREAST SURGERY Left     CATARACT EXTRACTION, BILATERAL Bilateral     EGD AND COLONOSCOPY N/A 9/5/2018    Procedure: EGD AND COLONOSCOPY;  Surgeon: Monty Abrams MD;  Location: [facility]; Service: Gastroenterology    [place] GUIDED CENTRAL VENOUS ACCESS DEVICE INSERTION  9/13/2018    KNEE SURGERY Right     MAMMO NEEDLE LOCALIZATION RIGHT (ALL INC) Right 6/26/2018    MAMMO STEREOTACTIC BREAST BIOPSY RIGHT (ALL INC) Right 5/23/2018    RETINAL DETACHMENT SURGERY Right     TUBAL LIGATION      TUNNELED VENOUS PORT PLACEMENT Left 9/13/2018    Procedure: INSERTION VENOUS PORT (PORT-A-CATH);   Surgeon: Sam Morrissey MD;  [facility];  Service: Surgical Oncology     Family History   Problem Relation Age of Onset    Diabetes Mother     Hypertension Mother     Diabetes Father     Hypertension Father     Diabetes Brother     Hypertension Brother     Prostate cancer Brother     Cancer Maternal Grandfather     No Known Problems Sister     No Known Problems Daughter     No Known Problems Sister     No Known Problems Sister     No Known Problems Sister     No Known Problems Sister     No Known Problems Daughter     No Known Problems Daughter        Meds/Allergies     Current Outpatient Medications:     albuterol (2 5 mg/3 mL) 0 083 % nebulizer solution, Take 1 vial (2 5 mg total) by nebulization every 6 (six) hours as needed for wheezing or shortness of breath, Disp: 90 vial, Rfl: 5    albuterol (PROVENTIL HFA,VENTOLIN HFA) 90 mcg/act inhaler, Inhale 1 puff every 4 (four) hours as needed  , Disp: , Rfl:     amLODIPine (NORVASC) 10 mg tablet, take 1 tablet by mouth once daily, Disp: 90 tablet, Rfl: 1    amLODIPine-atorvastatin (CADUET) 10-10 MG per tablet, take 1 tablet by mouth once daily, Disp: 90 tablet, Rfl: 1    anastrozole (ARIMIDEX) 1 mg tablet, Take 1 tablet (1 mg total) by mouth daily, Disp: 90 tablet, Rfl: 3    aspirin 81 mg chewable tablet, Chew 81 mg daily, Disp: , Rfl:     Blood Glucose Monitoring Suppl (ONE TOUCH ULTRA 2) w/Device KIT, by Does not apply route 3 (three) times a day, Disp: 1 each, Rfl: 0    budesonide (PULMICORT) 0 5 mg/2 mL nebulizer solution, Take 1 vial (0 5 mg total) by nebulization 2 (two) times a day Rinse mouth after use , Disp: 60 vial, Rfl: 5    cyclobenzaprine (FLEXERIL) 5 mg tablet, Take 1 tablet (5 mg total) by mouth 3 (three) times a day, Disp: 90 tablet, Rfl: 1    dicyclomine (BENTYL) 10 mg capsule, Take 1 capsule (10 mg total) by mouth 4 (four) times a day (before meals and at bedtime), Disp: 120 capsule, Rfl: 1    DULoxetine (CYMBALTA) 30 mg delayed release capsule, Take 1 capsule (30 mg total) by mouth 2 (two) times a day, Disp: 180 capsule, Rfl: 1    enalapril (VASOTEC) 20 mg tablet, take 1 tablet by mouth once daily, Disp: 90 tablet, Rfl: 1    EPINEPHrine (EPIPEN) 0 3 mg/0 3 mL SOAJ, Inject 0 3 mL (0 3 mg total) into a muscle once for 1 dose, Disp: 0 6 mL, Rfl: 0    fluticasone-vilanterol (BREO ELLIPTA) 100-25 mcg/inh inhaler, Inhale 1 puff daily Rinse mouth after use , Disp: 2 Inhaler, Rfl: 5    gemfibrozil (LOPID) 600 mg tablet, Take 600 mg by mouth daily, Disp: , Rfl:     glucose blood (ONE TOUCH ULTRA TEST) test strip, 1 each by Other route 3 (three) times a day Use as instructed, Disp: 100 each, Rfl: 5    insulin glargine (TOUJEO) 300 units/mL CONCETRATED U-300 injection pen (1-unit dial), Inject 40 Units under the skin daily with dinner, Disp: 3 pen, Rfl: 1    insulin lispro (HUMALOG KWIKPEN) 100 units/mL injection pen, Inject 16 units TID, Disp: 5 pen, Rfl: 5    Insulin Pen Needle (BD PEN NEEDLE NATALEE U/F) 32G X 4 MM MISC, USE 4/DAY, Disp: 100 each, Rfl: 3    levothyroxine 50 mcg tablet, Take 1 tablet (50 mcg total) by mouth daily, Disp: 90 tablet, Rfl: 1    omeprazole (PriLOSEC) 20 mg delayed release capsule, take 1 capsule by mouth once daily, Disp: 90 capsule, Rfl: 0    omeprazole (PriLOSEC) 40 MG capsule, Take 1 capsule (40 mg total) by mouth daily, Disp: 30 capsule, Rfl: 3    ONETOUCH DELICA LANCETS 06H MISC, by Does not apply route 3 (three) times a day, Disp: 100 each, Rfl: 5    oxyCODONE (ROXICODONE) 5 mg immediate release tablet, Take 5 mg by mouth every 6 (six) hours as needed for moderate pain, Disp: , Rfl:   Allergies   Allergen Reactions    Aspirin Hives     Dr  in [place] told patient not to take aspirin r/t kidneys     Tylenol With Codeine #3 [Acetaminophen-Codeine] Rash       Vitals: not currently breastfeeding  There is no height or weight on file to calculate BMI  Physical Exam  Physical Exam   Constitutional: She is oriented to person, place, and time  She appears well-developed and well-nourished  No distress  HENT:   Head: Normocephalic and atraumatic  Eyes: Pupils are equal, round, and reactive to light  EOM are normal    Neck: Normal range of motion  Neck supple  No JVD present  Cardiovascular: Normal rate, regular rhythm and normal heart sounds  Exam reveals no friction rub  No murmur heard  Pulmonary/Chest: Effort normal  No stridor  No respiratory distress  She has wheezes  She has no rales  Abdominal: Soft  She exhibits no distension  Musculoskeletal: Normal range of motion  She exhibits no edema or deformity  Neurological: She is alert and oriented to person, place, and time  Skin: Skin is warm  She is not diaphoretic  Psychiatric: She has a normal mood and affect  Labs: I have personally reviewed pertinent lab results    Lab Results   Component Value Date    WBC 7 24 09/12/2019    HGB 11 9 09/12/2019    HCT 36 4 09/12/2019    MCV 89 09/12/2019     09/12/2019     Lab Results   Component Value Date    CALCIUM 9 6 09/12/2019    K 4 1 09/12/2019    CO2 25 09/12/2019    CL 99 (L) 09/12/2019    BUN 12 09/12/2019    CREATININE 0 83 09/12/2019     Lab Results   Component Value Date     (H) 11/26/2018     Lab Results   Component Value Date ALT 29 09/12/2019    AST 15 09/12/2019    ALKPHOS 73 09/12/2019       Imaging and other studies:   I have personally reviewed pertinent imaging studies in PACS  The patient had chest CT on August 2018 that showed radiation changes in the subpleural anterolateral right chest wall

## 2020-02-04 ENCOUNTER — TELEPHONE (OUTPATIENT)
Dept: FAMILY MEDICINE CLINIC | Facility: CLINIC | Age: 65
End: 2020-02-04

## 2020-02-04 DIAGNOSIS — E11.8 TYPE 2 DIABETES MELLITUS WITH COMPLICATION, WITHOUT LONG-TERM CURRENT USE OF INSULIN (HCC): Primary | ICD-10-CM

## 2020-02-04 NOTE — TELEPHONE ENCOUNTER
insulin glargine (TOUJEO) 300 units/mL CONCETRATED U-300 injection pen    Patient's medical insurance no longer covers this medicine  Pharmacy states the following will cover      Silva Obrien

## 2020-02-11 NOTE — PROGRESS NOTES
Daughter Kate Perdomo calling for an update on patients [facility]    [phone] Spiral Flap Text: The defect edges were debeveled with a #15 scalpel blade.  Given the location of the defect, shape of the defect and the proximity to free margins a spiral flap was deemed most appropriate.  Using a sterile surgical marker, an appropriate rotation flap was drawn incorporating the defect and placing the expected incisions within the relaxed skin tension lines where possible. The area thus outlined was incised deep to adipose tissue with a #15 scalpel blade.  The skin margins were undermined to an appropriate distance in all directions utilizing iris scissors.

## 2020-02-18 ENCOUNTER — HOSPITAL ENCOUNTER (OUTPATIENT)
Dept: INFUSION CENTER | Facility: CLINIC | Age: 65
Discharge: HOME | End: 2020-02-18
Payer: COMMERCIAL

## 2020-02-18 VITALS
SYSTOLIC BLOOD PRESSURE: 129 MMHG | HEART RATE: 90 BPM | DIASTOLIC BLOOD PRESSURE: 73 MMHG | TEMPERATURE: 97.9 F | RESPIRATION RATE: 18 BRPM

## 2020-02-18 DIAGNOSIS — J45.50 SEVERE PERSISTENT ASTHMA WITHOUT COMPLICATION: Primary | ICD-10-CM

## 2020-02-18 PROCEDURE — 96372 THER/PROPH/DIAG INJ SC/IM: CPT

## 2020-02-18 RX ORDER — EPINEPHRINE 1 MG/ML
0.3 INJECTION, SOLUTION, CONCENTRATE INTRAVENOUS AS NEEDED
Status: CANCELLED | OUTPATIENT
Start: 2020-03-03

## 2020-02-18 RX ORDER — EPINEPHRINE 1 MG/ML
0.3 INJECTION, SOLUTION, CONCENTRATE INTRAVENOUS AS NEEDED
Status: DISCONTINUED | OUTPATIENT
Start: 2020-02-18 | End: 2020-02-21 | Stop reason: HOSPADM

## 2020-02-18 RX ADMIN — OMALIZUMAB 375 MG: 150 INJECTION, SOLUTION SUBCUTANEOUS at 10:32

## 2020-02-18 NOTE — PROGRESS NOTES
Pt here for xolair  Pt has Epipen which expires 08/2020  Pt to be observed 30 minutes post xolair injections

## 2020-02-19 ENCOUNTER — TELEPHONE (OUTPATIENT)
Dept: PULMONOLOGY | Facility: CLINIC | Age: 65
End: 2020-02-19

## 2020-02-19 NOTE — TELEPHONE ENCOUNTER
PT is requesting a call back to see Dr Caroline Au sooner  Pt stated that she is concerned in regards to some symptoms she is experiencing  She stated that she started her xolair but it is not helping  Pt says that she her symptoms are similar to when she was on chemo  She said that she wants to get checked asap  I offered her a PA but she refused    Please advise

## 2020-02-26 ENCOUNTER — OFFICE VISIT (OUTPATIENT)
Dept: PULMONOLOGY | Facility: CLINIC | Age: 65
End: 2020-02-26
Payer: COMMERCIAL

## 2020-02-26 VITALS
WEIGHT: 175 LBS | BODY MASS INDEX: 28.12 KG/M2 | OXYGEN SATURATION: 97 % | HEART RATE: 87 BPM | HEIGHT: 66 IN | SYSTOLIC BLOOD PRESSURE: 120 MMHG | TEMPERATURE: 98.5 F | DIASTOLIC BLOOD PRESSURE: 80 MMHG

## 2020-02-26 DIAGNOSIS — J70.0 RADIATION PNEUMONITIS (HCC): Primary | ICD-10-CM

## 2020-02-26 PROCEDURE — 3074F SYST BP LT 130 MM HG: CPT | Performed by: INTERNAL MEDICINE

## 2020-02-26 PROCEDURE — 3079F DIAST BP 80-89 MM HG: CPT | Performed by: INTERNAL MEDICINE

## 2020-02-26 PROCEDURE — 3046F HEMOGLOBIN A1C LEVEL >9.0%: CPT | Performed by: INTERNAL MEDICINE

## 2020-02-26 PROCEDURE — 3008F BODY MASS INDEX DOCD: CPT | Performed by: INTERNAL MEDICINE

## 2020-02-26 PROCEDURE — 99214 OFFICE O/P EST MOD 30 MIN: CPT | Performed by: INTERNAL MEDICINE

## 2020-02-26 PROCEDURE — 1036F TOBACCO NON-USER: CPT | Performed by: INTERNAL MEDICINE

## 2020-02-26 RX ORDER — BUDESONIDE AND FORMOTEROL FUMARATE DIHYDRATE 160; 4.5 UG/1; UG/1
2 AEROSOL RESPIRATORY (INHALATION) 2 TIMES DAILY
COMMUNITY
End: 2020-03-26 | Stop reason: SDUPTHER

## 2020-02-26 NOTE — LETTER
February 27, 2020     [place], [place]    Patient: Valentin Kilpatrick   YOB: 1955   Date of Visit: 2/26/2020       Dear Dr Catarina Jimenez: Thank you for referring Valentin Kilpatrick to me for evaluation  Below are my notes for this consultation  If you have questions, please do not hesitate to call me  I look forward to following your patient along with you  Sincerely,        Jackie Barrientos MD        CC: No Recipients  Jackie Barrientos MD  2/27/2020  6:55 PM  Addendum  Pulmonary outpatient note   Valentin Kilpatrick 62 y o  female MRN: [id number]  2/26/2020      Assessment and plan:  Valentin Kilpatrick has the following medical problems:  1  Asthma  Severe persistent asthma well controlled on Xolair every 2 weeks and Symbicort  However, the patient does not take Symbicort as prescribed  I told the patient to take it 2 puffs twice daily  I gave her sample  2   Radiation pneumonitis  Was seen previously in 2018  However it was not severe  The patient complains now of shortness of breath with activity without wheezing and dry cough which might be symptoms of fibrotic lung disease that potentially might be secondary to radiation pneumonitis  I ordered chest CT for re-evaluation of the previous findings  Given the patient pulmonary disease including asthma and radiation pneumonitis she cannot climb more than 1 flight of stairs without significant shortness of breath  Also, she should be in an environment that is smoke free to prevent flares of her severe persistent asthma  I will call the patient with the results of the chest CT for the next steps  I did not start steroids empirically at this point  Jackie Barrientos MD/PhD,  [facility]      No follow-ups on file      History of Present Illness  This is 57 y o  year old female with previous medical history of severe persistent asthma, stage IA right breast cancer status post lumpectomy, sentinel lymph node biopsy, chemotherapy and radiation on May 2018  We started Xolair therapy in June 2019 due to severe persistent asthma on chronic steroids  Since starting Xolair she is feeling better from the breathing standpoint  She was taking Breo that was switched to Symbicort due to vomiting     however she does not take the Symbicort on a daily basis as prescribed  She came today due to symptoms of shortness of breath, dry cough and feeling weak like she felt while she was taking chemotherapy      Social history:   Never smoked  Loreli Le not drink alcohol      Occupational history:   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  Review of Systems   Constitutional: Positive for fatigue  HENT: Negative  Eyes: Negative  Respiratory: Positive for shortness of breath  Negative for wheezing  Cardiovascular: Negative  Gastrointestinal: Negative  Endocrine: Negative  Genitourinary: Negative  Musculoskeletal: Negative  Skin: Negative  Allergic/Immunologic: Positive for environmental allergies  Neurological: Negative  Hematological: Negative  Psychiatric/Behavioral: Negative  Historical Information   Past Medical History:   Diagnosis Date    [facility]     h[facility]ylori    Abnormal chest x-ray 4/5/2019    Asthma     Breast [facility] ) 06/26/2018    Cancer ([facility]     BREAST    Diabetes mellitus ([facility]     Hiatal hernia     History of chemotherapy     History of radiation therapy     Hypercholesterolemia     Hypertension     Hypothyroid     Renal disorder     Rheumatoid arthritis ([facility]      Past Surgical History:   Procedure Laterality Date    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    BREAST LUMPECTOMY Right 6/26/2018    Procedure: RIGHT BREAST NEEDLE LOCALIZATION X2 WITH RIGHT BREAST LUMPECTOMY ( NEEDLE LOC @ 1000);   Surgeon: Salomon Lacy MD;  [facility];  Service: Surgical Oncology    BREAST LUMPECTOMY Right 8/2/2018    Procedure: LYMPHOSCINITGRAPHY INTRAOPERATIVE LYMPHATIC MAPPING , RIGHT SENTINEL NODE BIOPSY, REEXCISION  RIGHT BREAST LUMPECTOMY CAVITY (SUPERIOR MARGIN); Surgeon: Tonya Lesch, MD;  [facility];  Service: Surgical Oncology    BREAST SURGERY Left     CATARACT EXTRACTION, BILATERAL Bilateral     EGD AND COLONOSCOPY N/A 9/5/2018    Procedure: EGD AND COLONOSCOPY;  Surgeon: Ysosi Perkins MD;  Location: [facility]; Service: Gastroenterology    [place] GUIDED CENTRAL VENOUS ACCESS DEVICE INSERTION  9/13/2018    KNEE SURGERY Right     MAMMO NEEDLE LOCALIZATION RIGHT (ALL INC) Right 6/26/2018    MAMMO STEREOTACTIC BREAST BIOPSY RIGHT (ALL INC) Right 5/23/2018    RETINAL DETACHMENT SURGERY Right     TUBAL LIGATION      TUNNELED VENOUS PORT PLACEMENT Left 9/13/2018    Procedure: INSERTION VENOUS PORT (PORT-A-CATH);   Surgeon: Tonya Lesch, MD;  [facility];  Service: Surgical Oncology     Family History   Problem Relation Age of Onset    Diabetes Mother     Hypertension Mother     Diabetes Father     Hypertension Father     Diabetes Brother     Hypertension Brother     Prostate cancer Brother     Cancer Maternal Grandfather     No Known Problems Sister     No Known Problems Daughter     No Known Problems Sister     No Known Problems Sister     No Known Problems Sister     No Known Problems Sister     No Known Problems Daughter     No Known Problems Daughter        Meds/Allergies     Current Outpatient Medications:     albuterol (2 5 mg/3 mL) 0 083 % nebulizer solution, Take 1 vial (2 5 mg total) by nebulization every 6 (six) hours as needed for wheezing or shortness of breath, Disp: 90 vial, Rfl: 5    albuterol (PROVENTIL HFA,VENTOLIN HFA) 90 mcg/act inhaler, Inhale 1 puff every 4 (four) hours as needed  , Disp: , Rfl:     amLODIPine-atorvastatin (CADUET) 10-10 MG per tablet, take 1 tablet by mouth once daily, Disp: 90 tablet, Rfl: 1    anastrozole (ARIMIDEX) 1 mg tablet, Take 1 tablet (1 mg total) by mouth daily, Disp: 90 tablet, Rfl: 3    aspirin 81 mg chewable tablet, Chew 81 mg daily, Disp: , Rfl:     Blood Glucose Monitoring Suppl (ONE TOUCH ULTRA 2) w/Device KIT, by Does not apply route 3 (three) times a day, Disp: 1 each, Rfl: 0    budesonide (PULMICORT) 0 5 mg/2 mL nebulizer solution, Take 1 vial (0 5 mg total) by nebulization 2 (two) times a day Rinse mouth after use , Disp: 60 vial, Rfl: 5    budesonide-formoterol (SYMBICORT) 160-4 5 mcg/act inhaler, Inhale 2 puffs 2 (two) times a day Rinse mouth after use , Disp: , Rfl:     enalapril (VASOTEC) 20 mg tablet, take 1 tablet by mouth once daily, Disp: 90 tablet, Rfl: 1    EPINEPHrine (EPIPEN) 0 3 mg/0 3 mL SOAJ, Inject 0 3 mL (0 3 mg total) into a muscle once for 1 dose, Disp: 0 6 mL, Rfl: 0    glucose blood (ONE TOUCH ULTRA TEST) test strip, 1 each by Other route 3 (three) times a day Use as instructed, Disp: 100 each, Rfl: 5    insulin detemir (LEVEMIR FLEXTOUCH) 100 Units/mL injection pen, Inject 40 Units under the skin daily at bedtime, Disp: 12 pen, Rfl: 1    insulin lispro (HUMALOG KWIKPEN) 100 units/mL injection pen, Inject 16 units TID, Disp: 5 pen, Rfl: 5    Insulin Pen Needle (BD PEN NEEDLE NATALEE U/F) 32G X 4 MM MISC, USE 4/DAY, Disp: 100 each, Rfl: 3    levothyroxine 50 mcg tablet, Take 1 tablet (50 mcg total) by mouth daily, Disp: 90 tablet, Rfl: 1    omeprazole (PriLOSEC) 20 mg delayed release capsule, take 1 capsule by mouth once daily, Disp: 90 capsule, Rfl: 0    ONETOUCH DELICA LANCETS 75R MISC, by Does not apply route 3 (three) times a day, Disp: 100 each, Rfl: 5    amLODIPine (NORVASC) 10 mg tablet, take 1 tablet by mouth once daily (Patient not taking: Reported on 1/29/2020), Disp: 90 tablet, Rfl: 1    cyclobenzaprine (FLEXERIL) 5 mg tablet, Take 1 tablet (5 mg total) by mouth 3 (three) times a day (Patient not taking: Reported on 1/29/2020), Disp: 90 tablet, Rfl: 1    dicyclomine (BENTYL) 10 mg capsule, Take 1 capsule (10 mg total) by mouth 4 (four) times a day (before meals and at bedtime) (Patient not taking: Reported on 1/29/2020), Disp: 120 capsule, Rfl: 1    DULoxetine (CYMBALTA) 30 mg delayed release capsule, Take 1 capsule (30 mg total) by mouth 2 (two) times a day (Patient not taking: Reported on 1/29/2020), Disp: 180 capsule, Rfl: 1    fluticasone-vilanterol (BREO ELLIPTA) 100-25 mcg/inh inhaler, Inhale 1 puff daily Rinse mouth after use  (Patient not taking: Reported on 2/26/2020), Disp: 2 Inhaler, Rfl: 5    gemfibrozil (LOPID) 600 mg tablet, Take 600 mg by mouth daily, Disp: , Rfl:     omeprazole (PriLOSEC) 40 MG capsule, Take 1 capsule (40 mg total) by mouth daily (Patient not taking: Reported on 2/26/2020), Disp: 30 capsule, Rfl: 3    oxyCODONE (ROXICODONE) 5 mg immediate release tablet, Take 5 mg by mouth every 6 (six) hours as needed for moderate pain, Disp: , Rfl:   Allergies   Allergen Reactions    Aspirin Hives     Dr  in [address] told patient not to take aspirin r/t kidneys     Tylenol With Codeine #3 [Acetaminophen-Codeine] Rash       Vitals: Blood pressure 120/80, pulse 87, temperature 98 5 °F (36 9 °C), temperature source Tympanic, height 5' 6" (1 676 m), weight 79 4 kg (175 lb), SpO2 97 %, not currently breastfeeding  Body mass index is 28 25 kg/m²  Oxygen Therapy  SpO2: 97 %  Oxygen Therapy: None (Room air)    Physical Exam  Physical Exam   Constitutional: She is oriented to person, place, and time  She appears well-developed and well-nourished  No distress  HENT:   Head: Normocephalic and atraumatic  Eyes: Pupils are equal, round, and reactive to light  Conjunctivae and EOM are normal    Neck: Normal range of motion  Neck supple  No JVD present  Cardiovascular: Normal rate, regular rhythm and normal heart sounds  Exam reveals no gallop and no friction rub  No murmur heard  Pulmonary/Chest: Effort normal and breath sounds normal  No stridor   No respiratory distress  She has no wheezes  She has no rales  She exhibits no tenderness  Abdominal: Soft  She exhibits no distension  There is no tenderness  Musculoskeletal: Normal range of motion  She exhibits no edema or deformity  Neurological: She is alert and oriented to person, place, and time  Skin: Skin is warm  She is not diaphoretic  Psychiatric: She has a normal mood and affect  Labs: I have personally reviewed pertinent lab results  Lab Results   Component Value Date    WBC 7 24 09/12/2019    HGB 11 9 09/12/2019    HCT 36 4 09/12/2019    MCV 89 09/12/2019     09/12/2019     Lab Results   Component Value Date    CALCIUM 9 6 09/12/2019    K 4 1 09/12/2019    CO2 25 09/12/2019    CL 99 (L) 09/12/2019    BUN 12 09/12/2019    CREATININE 0 83 09/12/2019     Lab Results   Component Value Date     (H) 11/26/2018     Lab Results   Component Value Date    ALT 29 09/12/2019    AST 15 09/12/2019    ALKPHOS 73 09/12/2019       Imaging and other studies:   I have personally reviewed pertinent imaging studies in PACS  The patient had chest CT on August 2018 that showed mild radiation pneumonitis in the right lung

## 2020-02-26 NOTE — PROGRESS NOTES
Pulmonary outpatient note   Melvina Mora 62 y o  female MRN: [id number]  2/26/2020      Assessment and plan:  Melvina Mora has the following medical problems:  1  Asthma  Severe persistent asthma well controlled on Xolair every 2 weeks and Symbicort  However, the patient does not take Symbicort as prescribed  I told the patient to take it 2 puffs twice daily  I gave her sample  2   Radiation pneumonitis  Was seen previously in 2018  However it was not severe  The patient complains now of shortness of breath with activity without wheezing and dry cough which might be symptoms of fibrotic lung disease that potentially might be secondary to radiation pneumonitis  I ordered chest CT for re-evaluation of the previous findings  Given the patient pulmonary disease including asthma and radiation pneumonitis she cannot climb more than 1 flight of stairs without significant shortness of breath  Also, she should be in an environment that is smoke free to prevent flares of her severe persistent asthma  I will call the patient with the results of the chest CT for the next steps  I did not start steroids empirically at this point  Carlos Benítez MD/PhD,  [facility]      No follow-ups on file  History of Present Illness  This is 57 y o  year old female with previous medical history of severe persistent asthma, stage IA right breast cancer status post lumpectomy, sentinel lymph node biopsy, chemotherapy and radiation on May 2018  We started Xolair therapy in June 2019 due to severe persistent asthma on chronic steroids  Since starting Xolair she is feeling better from the breathing standpoint  She was taking Breo that was switched to Symbicort due to vomiting    however she does not take the Symbicort on a daily basis as prescribed    She came today due to symptoms of shortness of breath, dry cough and feeling weak like she felt while she was taking chemotherapy      Social history:   Never smoked   Does not drink alcohol      Occupational history:   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  Review of Systems   Constitutional: Positive for fatigue  HENT: Negative  Eyes: Negative  Respiratory: Positive for shortness of breath  Negative for wheezing  Cardiovascular: Negative  Gastrointestinal: Negative  Endocrine: Negative  Genitourinary: Negative  Musculoskeletal: Negative  Skin: Negative  Allergic/Immunologic: Positive for environmental allergies  Neurological: Negative  Hematological: Negative  Psychiatric/Behavioral: Negative  Historical Information   Past Medical History:   Diagnosis Date    Abdominal infection ([facility] )     h-pylori    Abnormal chest x-ray 4/5/2019    Asthma     [facility] 06/26/2018    Cancer ([facility] )     BREAST    Diabetes mellitus ([facility] )     Hiatal hernia     History of chemotherapy     History of radiation therapy     Hypercholesterolemia     Hypertension     Hypothyroid     Renal disorder     Rheumatoid arthritis ([facility] )      Past Surgical History:   Procedure Laterality Date    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    BREAST LUMPECTOMY Right 6/26/2018    Procedure: RIGHT BREAST NEEDLE LOCALIZATION X2 WITH RIGHT BREAST LUMPECTOMY ( NEEDLE LOC @ 1000); Surgeon: Radha Alcazar MD;  Location[facility];  Service: Surgical Oncology    BREAST LUMPECTOMY Right 8/2/2018    Procedure: LYMPHOSCINITGRAPHY INTRAOPERATIVE LYMPHATIC MAPPING , RIGHT SENTINEL NODE BIOPSY, REEXCISION  RIGHT BREAST LUMPECTOMY CAVITY (SUPERIOR MARGIN); Surgeon: Radha Alcazar MD;  [facility];  Service: Surgical Oncology    BREAST SURGERY Left     CATARACT EXTRACTION, BILATERAL Bilateral     EGD AND COLONOSCOPY N/A 9/5/2018    Procedure: EGD AND COLONOSCOPY;  Surgeon: Autumn Gorman MD;  Location: [facility];   Service: Gastroenterology    [facility] VENOUS ACCESS DEVICE INSERTION  9/13/2018    KNEE SURGERY Right     MAMMO NEEDLE LOCALIZATION RIGHT (ALL INC) Right 6/26/2018    MAMMO STEREOTACTIC BREAST BIOPSY RIGHT (ALL INC) Right 5/23/2018    RETINAL DETACHMENT SURGERY Right     TUBAL LIGATION      TUNNELED VENOUS PORT PLACEMENT Left 9/13/2018    Procedure: INSERTION VENOUS PORT (PORT-A-CATH);   Surgeon: Misbah Almeida MD;  [facility];  Service: Surgical Oncology     Family History   Problem Relation Age of Onset    Diabetes Mother     Hypertension Mother     Diabetes Father     Hypertension Father     Diabetes Brother     Hypertension Brother     Prostate cancer Brother     Cancer Maternal Grandfather     No Known Problems Sister     No Known Problems Daughter     No Known Problems Sister     No Known Problems Sister     No Known Problems Sister     No Known Problems Sister     No Known Problems Daughter     No Known Problems Daughter        Meds/Allergies     Current Outpatient Medications:     albuterol (2 5 mg/3 mL) 0 083 % nebulizer solution, Take 1 vial (2 5 mg total) by nebulization every 6 (six) hours as needed for wheezing or shortness of breath, Disp: 90 vial, Rfl: 5    albuterol (PROVENTIL HFA,VENTOLIN HFA) 90 mcg/act inhaler, Inhale 1 puff every 4 (four) hours as needed  , Disp: , Rfl:     amLODIPine-atorvastatin (CADUET) 10-10 MG per tablet, take 1 tablet by mouth once daily, Disp: 90 tablet, Rfl: 1    anastrozole (ARIMIDEX) 1 mg tablet, Take 1 tablet (1 mg total) by mouth daily, Disp: 90 tablet, Rfl: 3    aspirin 81 mg chewable tablet, Chew 81 mg daily, Disp: , Rfl:     Blood Glucose Monitoring Suppl (ONE TOUCH ULTRA 2) w/Device KIT, by Does not apply route 3 (three) times a day, Disp: 1 each, Rfl: 0    budesonide (PULMICORT) 0 5 mg/2 mL nebulizer solution, Take 1 vial (0 5 mg total) by nebulization 2 (two) times a day Rinse mouth after use , Disp: 60 vial, Rfl: 5    budesonide-formoterol (SYMBICORT) 160-4 5 mcg/act inhaler, Inhale 2 puffs 2 (two) times a day Rinse mouth after use , Disp: , Rfl:     enalapril (VASOTEC) 20 mg tablet, take 1 tablet by mouth once daily, Disp: 90 tablet, Rfl: 1    EPINEPHrine (EPIPEN) 0 3 mg/0 3 mL SOAJ, Inject 0 3 mL (0 3 mg total) into a muscle once for 1 dose, Disp: 0 6 mL, Rfl: 0    glucose blood (ONE TOUCH ULTRA TEST) test strip, 1 each by Other route 3 (three) times a day Use as instructed, Disp: 100 each, Rfl: 5    insulin detemir (LEVEMIR FLEXTOUCH) 100 Units/mL injection pen, Inject 40 Units under the skin daily at bedtime, Disp: 12 pen, Rfl: 1    insulin lispro (HUMALOG KWIKPEN) 100 units/mL injection pen, Inject 16 units TID, Disp: 5 pen, Rfl: 5    Insulin Pen Needle (BD PEN NEEDLE NATALEE U/F) 32G X 4 MM MISC, USE 4/DAY, Disp: 100 each, Rfl: 3    levothyroxine 50 mcg tablet, Take 1 tablet (50 mcg total) by mouth daily, Disp: 90 tablet, Rfl: 1    omeprazole (PriLOSEC) 20 mg delayed release capsule, take 1 capsule by mouth once daily, Disp: 90 capsule, Rfl: 0    ONETOUCH DELICA LANCETS 79C MISC, by Does not apply route 3 (three) times a day, Disp: 100 each, Rfl: 5    amLODIPine (NORVASC) 10 mg tablet, take 1 tablet by mouth once daily (Patient not taking: Reported on 1/29/2020), Disp: 90 tablet, Rfl: 1    cyclobenzaprine (FLEXERIL) 5 mg tablet, Take 1 tablet (5 mg total) by mouth 3 (three) times a day (Patient not taking: Reported on 1/29/2020), Disp: 90 tablet, Rfl: 1    dicyclomine (BENTYL) 10 mg capsule, Take 1 capsule (10 mg total) by mouth 4 (four) times a day (before meals and at bedtime) (Patient not taking: Reported on 1/29/2020), Disp: 120 capsule, Rfl: 1    DULoxetine (CYMBALTA) 30 mg delayed release capsule, Take 1 capsule (30 mg total) by mouth 2 (two) times a day (Patient not taking: Reported on 1/29/2020), Disp: 180 capsule, Rfl: 1    fluticasone-vilanterol (BREO ELLIPTA) 100-25 mcg/inh inhaler, Inhale 1 puff daily Rinse mouth after use   (Patient not taking: Reported on 2/26/2020), Disp: 2 Inhaler, Rfl: 5    gemfibrozil (LOPID) 600 mg tablet, Take 600 mg by mouth daily, Disp: , Rfl:     omeprazole (PriLOSEC) 40 MG capsule, Take 1 capsule (40 mg total) by mouth daily (Patient not taking: Reported on 2/26/2020), Disp: 30 capsule, Rfl: 3    oxyCODONE (ROXICODONE) 5 mg immediate release tablet, Take 5 mg by mouth every 6 (six) hours as needed for moderate pain, Disp: , Rfl:   Allergies   Allergen Reactions    Aspirin Hives     Dr  in [address] told patient not to take aspirin r/t kidneys     Tylenol With Codeine #3 [Acetaminophen-Codeine] Rash       Vitals: Blood pressure 120/80, pulse 87, temperature 98 5 °F (36 9 °C), temperature source Tympanic, height 5' 6" (1 676 m), weight 79 4 kg (175 lb), SpO2 97 %, not currently breastfeeding  Body mass index is 28 25 kg/m²  Oxygen Therapy  SpO2: 97 %  Oxygen Therapy: None (Room air)    Physical Exam  Physical Exam   Constitutional: She is oriented to person, place, and time  She appears well-developed and well-nourished  No distress  HENT:   Head: Normocephalic and atraumatic  Eyes: Pupils are equal, round, and reactive to light  Conjunctivae and EOM are normal    Neck: Normal range of motion  Neck supple  No JVD present  Cardiovascular: Normal rate, regular rhythm and normal heart sounds  Exam reveals no gallop and no friction rub  No murmur heard  Pulmonary/Chest: Effort normal and breath sounds normal  No stridor  No respiratory distress  She has no wheezes  She has no rales  She exhibits no tenderness  Abdominal: Soft  She exhibits no distension  There is no tenderness  Musculoskeletal: Normal range of motion  She exhibits no edema or deformity  Neurological: She is alert and oriented to person, place, and time  Skin: Skin is warm  She is not diaphoretic  Psychiatric: She has a normal mood and affect  Labs: I have personally reviewed pertinent lab results    Lab Results   Component Value Date    WBC 7 24 09/12/2019    HGB 11 9 09/12/2019    HCT 36 4 09/12/2019    MCV 89 09/12/2019     09/12/2019     Lab Results   Component Value Date    CALCIUM 9 6 09/12/2019    K 4 1 09/12/2019    CO2 25 09/12/2019    CL 99 (L) 09/12/2019    BUN 12 09/12/2019    CREATININE 0 83 09/12/2019     Lab Results   Component Value Date     (H) 11/26/2018     Lab Results   Component Value Date    ALT 29 09/12/2019    AST 15 09/12/2019    ALKPHOS 73 09/12/2019       Imaging and other studies:   I have personally reviewed pertinent imaging studies in PACS  The patient had chest CT on August 2018 that showed mild radiation pneumonitis in the right lung

## 2020-03-03 ENCOUNTER — HOSPITAL ENCOUNTER (OUTPATIENT)
Dept: INFUSION CENTER | Facility: CLINIC | Age: 65
Discharge: HOME | End: 2020-03-03
Payer: COMMERCIAL

## 2020-03-03 VITALS
SYSTOLIC BLOOD PRESSURE: 138 MMHG | DIASTOLIC BLOOD PRESSURE: 77 MMHG | HEART RATE: 84 BPM | TEMPERATURE: 97.1 F | RESPIRATION RATE: 16 BRPM

## 2020-03-03 DIAGNOSIS — J45.50 SEVERE PERSISTENT ASTHMA WITHOUT COMPLICATION: Primary | ICD-10-CM

## 2020-03-03 PROCEDURE — 96372 THER/PROPH/DIAG INJ SC/IM: CPT

## 2020-03-03 RX ORDER — EPINEPHRINE 1 MG/ML
0.3 INJECTION, SOLUTION, CONCENTRATE INTRAVENOUS AS NEEDED
Status: CANCELLED | OUTPATIENT
Start: 2020-03-17

## 2020-03-03 RX ORDER — EPINEPHRINE 1 MG/ML
0.3 INJECTION, SOLUTION, CONCENTRATE INTRAVENOUS AS NEEDED
Status: DISCONTINUED | OUTPATIENT
Start: 2020-03-03 | End: 2020-03-06 | Stop reason: HOSPADM

## 2020-03-03 RX ADMIN — OMALIZUMAB 375 MG: 150 INJECTION, SOLUTION SUBCUTANEOUS at 12:13

## 2020-03-03 NOTE — PROGRESS NOTES
Patient arrived on unit for Xolair  Patient has epipen along with her and within expiration  Tolerated Xolair injections- 2 in left arm and 1 in R arm  Remained 30 minutes for observation  No issues  Aware of next appointment   AVS given to patient

## 2020-03-04 ENCOUNTER — HOSPITAL ENCOUNTER (OUTPATIENT)
Dept: CT IMAGING | Facility: HOSPITAL | Age: 65
Discharge: HOME | End: 2020-03-04
Attending: INTERNAL MEDICINE
Payer: COMMERCIAL

## 2020-03-04 DIAGNOSIS — J70.0 RADIATION PNEUMONITIS (HCC): ICD-10-CM

## 2020-03-04 PROCEDURE — 71250 CT THORAX DX C-: CPT

## 2020-03-11 ENCOUNTER — OFFICE VISIT (OUTPATIENT)
Dept: PODIATRY | Facility: CLINIC | Age: 65
End: 2020-03-11
Payer: COMMERCIAL

## 2020-03-11 ENCOUNTER — TELEPHONE (OUTPATIENT)
Dept: FAMILY MEDICINE CLINIC | Facility: CLINIC | Age: 65
End: 2020-03-11

## 2020-03-11 ENCOUNTER — TELEPHONE (OUTPATIENT)
Dept: PULMONOLOGY | Facility: CLINIC | Age: 65
End: 2020-03-11

## 2020-03-11 VITALS
BODY MASS INDEX: 28.12 KG/M2 | DIASTOLIC BLOOD PRESSURE: 75 MMHG | SYSTOLIC BLOOD PRESSURE: 129 MMHG | HEART RATE: 82 BPM | HEIGHT: 66 IN | WEIGHT: 175 LBS

## 2020-03-11 DIAGNOSIS — E11.42 DIABETIC POLYNEUROPATHY ASSOCIATED WITH TYPE 2 DIABETES MELLITUS (HCC): Primary | ICD-10-CM

## 2020-03-11 PROCEDURE — 99203 OFFICE O/P NEW LOW 30 MIN: CPT | Performed by: PODIATRIST

## 2020-03-11 PROCEDURE — 3074F SYST BP LT 130 MM HG: CPT | Performed by: PODIATRIST

## 2020-03-11 PROCEDURE — 3078F DIAST BP <80 MM HG: CPT | Performed by: PODIATRIST

## 2020-03-11 PROCEDURE — 3008F BODY MASS INDEX DOCD: CPT | Performed by: PODIATRIST

## 2020-03-11 PROCEDURE — 1036F TOBACCO NON-USER: CPT | Performed by: PODIATRIST

## 2020-03-11 PROCEDURE — 3046F HEMOGLOBIN A1C LEVEL >9.0%: CPT | Performed by: PODIATRIST

## 2020-03-11 RX ORDER — CAPSAICIN 0.025 %
1 CREAM (GRAM) TOPICAL 2 TIMES DAILY
Qty: 60 G | Refills: 0 | Status: SHIPPED | OUTPATIENT
Start: 2020-03-11 | End: 2020-09-01

## 2020-03-11 NOTE — PROGRESS NOTES
Assessment/Plan:       Diagnoses and all orders for this visit:    Diabetic polyneuropathy associated with type 2 diabetes mellitus ([facility] )  -     Ambulatory referral to [address] on DM risk to lower extremities, proper shoe gear, and daily monitoring of feet    -Educated on A1C and the risks of poorly controlled Diabetes   -Discussed weight loss and suitable exercise regiment    -Lengthy discussion about her poorly controlled DM  Gabapentin will not help her neuropathy pain unless she gets better BG control  Provided information to [facility] nutrition classes   -Prescribed DM shoes, discussed use  -DM foot risk 1 but given her high A1C general health outlook is guarded  Subjective:      Patient ID: Tomas Valadez is a 62 y o  female  Patient presents with pain and numbness in her feet  PMH significant for DM2 (A1C 12 6), breast cancer s/p radiation and chemotherapy, chronic right knee pain  At night her feet feel numb  When she walks they tingle  This has been getting worse over the past year  She has not fallen  She does not smoke  She has tried gabapentin but it did not help  The following portions of the patient's history were reviewed and updated as appropriate: allergies, current medications, past family history, past medical history, past social history, past surgical history and problem list     Review of Systems   Constitutional: Negative  HENT: Negative for sinus pressure and sinus pain  Respiratory: Negative for cough and shortness of breath  Cardiovascular: Positive for leg swelling  Gastrointestinal: Negative for diarrhea and nausea  Musculoskeletal: Positive for arthralgias  Skin: Negative for color change and wound  Neurological: Positive for numbness  Negative for weakness           Objective:      /75   Pulse 82   Ht 5' 6" (1 676 m)   Wt 79 4 kg (175 lb)   BMI 28 25 kg/m²          Physical Exam   Constitutional: She is oriented to person, place, and time  She appears well-developed and well-nourished  No distress  Cardiovascular: Normal rate and intact distal pulses  Pulses are no weak pulses  Pulses:       Dorsalis pedis pulses are 2+ on the right side, and 2+ on the left side  Posterior tibial pulses are 2+ on the right side, and 2+ on the left side  Pulmonary/Chest: Effort normal  No respiratory distress  Musculoskeletal: She exhibits edema  She exhibits no deformity  Feet:   Right Foot:   Protective Sensation: 10 sites tested  0 sites sensed  Skin Integrity: Positive for dry skin  Negative for ulcer, skin breakdown, erythema or callus  Left Foot:   Protective Sensation: 10 sites tested  0 sites sensed  Skin Integrity: Positive for dry skin  Negative for ulcer, skin breakdown, erythema or callus  Neurological: She is alert and oriented to person, place, and time  A sensory deficit is present  Skin: Skin is warm and dry  Capillary refill takes less than 2 seconds  No rash noted  No erythema  Psychiatric: She has a normal mood and affect  Vitals reviewed  Diabetic Foot Exam    Patient's shoes and socks removed  Right Foot/Ankle   Right Foot Inspection  Skin Exam: skin intact and dry skin no callus, no erythema, no maceration, no abnormal color, no ulcer and no callus                          Toe Exam: swellingno tenderness and  no right toe deformity  Sensory   Vibration: absent  Proprioception: absent   Monofilament testing: absent  Vascular  Capillary refills: < 3 seconds  The right DP pulse is 2+  The right PT pulse is 2+     Right Toe  - Comprehensive Exam  Arch: pes planus    Left Foot/Ankle  Left Foot Inspection  Skin Exam: skin intact and dry skinno erythema, no maceration, normal color, no ulcer and no callus                         Toe Exam: swellingno tenderness and no left toe deformity                   Sensory   Vibration: absent  Proprioception: absent  Monofilament: absent  Vascular  Capillary refills: < 3 seconds  The left DP pulse is 2+  The left PT pulse is 2+  Left Toe  - Comprehensive Exam  Arch: pes planus  Assign Risk Category:  No deformity present; Loss of protective sensation;  No weak pulses       Risk: 1

## 2020-03-11 NOTE — PATIENT INSTRUCTIONS
Foot Care for People with Diabetes   WHAT YOU NEED TO KNOW:   · Foot care helps protect your feet and prevent foot ulcers or sores  Long-term high blood sugar levels can damage the blood vessels and nerves in your legs and feet  This damage makes it hard to feel pressure, pain, temperature, and touch  You may not be able to feel a cut or sore, or shoes that are too tight  Foot care is needed to prevent serious problems, such as an infection or amputation  · Diabetes may cause your toes to become crooked or curved under  These changes may affect the way you walk and can lead to increased pressure on your foot  The pressure can decrease blood flow to your feet  Lack of blood flow increases your risk for a foot ulcer  Do not ignore small problems, such as dry skin or small wounds  These can become life-threatening over time without proper care  DISCHARGE INSTRUCTIONS:   Contact your healthcare provider if:   · Your feet become numb, weak, or hard to move  · You have pus draining from a sore on your foot  · You have a wound on your foot that gets bigger, deeper, or does not heal      · You see blisters, cuts, scratches, calluses, or sores on your foot  · You have a fever, and your feet become red, warm, and swollen  · Your toenails become thick, curled, or yellow  · You find it hard to check your feet because your vision is poor  · You have questions or concerns about your condition or care  Foot care:   · Check your feet each day  Look at your whole foot, including the bottom, and between and under your toes  Check for wounds, corns, and calluses  Use a mirror to see the bottom of your feet  The skin on your feet may be shiny, tight, or darker than normal  Your feet may also be cold and pale  Feel your feet by running your hands along the tops, bottoms, sides, and between your toes  Redness, swelling, and warmth are signs of blood flow problems that can lead to a foot ulcer   Do not try to remove corns or calluses yourself  · Wash your feet each day with soap and warm water  Do not use hot water, because this can injure your foot  Dry your feet gently with a towel after you wash them  Dry between and under your toes  · Apply lotion or a moisturizer on your dry feet  Ask your healthcare provider what lotions are best to use  Do not put lotion or moisturizer between your toes  · Cut your toenails correctly  File or cut your toenails straight across  Use a soft brush to clean around your toenails  If your toenails are very thick, you may need to have a healthcare provider or specialist cut them  · Protect your feet  Do not walk barefoot or wear your shoes without socks  Check your shoes for rocks or other objects that can hurt your feet  Wear cotton socks to help keep your feet dry  Wear socks without toe seams, or wear them with the seams inside out  Change your socks each day  Do not wear socks that are dirty or damp  · Wear shoes that fit well  Wear shoes that do not rub against any area of your feet  Your shoes should be ½ to ¾ inch (1 to 2 centimeters) longer than your feet  Your shoes should also have extra space around the widest part of your feet  Walking or athletic shoes with laces or straps that adjust are best  Ask your healthcare provider for help to choose shoes that fit you best  Ask him if you need to wear an insert, orthotic, or bandage on your feet  Follow up with your healthcare provider or foot specialist as directed: You will need to have your feet checked at least once a year  You may need a foot exam more often if you have nerve damage, foot deformities, or ulcers  Write down your questions so you remember to ask them during your visits  © 2017 [employer] Information is for End User's use only and may not be sold, redistributed or otherwise used for commercial purposes   All illustrations and images included in CareNotes® are the copyrighted property of [employer]  or [employer]  The above information is an  only  It is not intended as medical advice for individual conditions or treatments  Talk to your doctor, nurse or pharmacist before following any medical regimen to see if it is safe and effective for you

## 2020-03-11 NOTE — TELEPHONE ENCOUNTER
I did not prescribe short acting insulin, it was Endocrinology Dr Kellie Gross back in May 2019, they need to call her office for clarification

## 2020-03-12 NOTE — TELEPHONE ENCOUNTER
I called the patient's daughter with the results of the chest CT  She did not answer and I left a message  Basically, the chest CT did not show new lung disease and showed the previous finding of mild radiation changes  I will see her as scheduled on April 28

## 2020-03-17 ENCOUNTER — HOSPITAL ENCOUNTER (OUTPATIENT)
Dept: INFUSION CENTER | Facility: CLINIC | Age: 65
Discharge: HOME | End: 2020-03-17
Payer: COMMERCIAL

## 2020-03-17 ENCOUNTER — OFFICE VISIT (OUTPATIENT)
Dept: ENDOCRINOLOGY | Facility: CLINIC | Age: 65
End: 2020-03-17
Payer: COMMERCIAL

## 2020-03-17 VITALS
DIASTOLIC BLOOD PRESSURE: 80 MMHG | HEART RATE: 77 BPM | BODY MASS INDEX: 28.87 KG/M2 | WEIGHT: 179.6 LBS | HEIGHT: 66 IN | SYSTOLIC BLOOD PRESSURE: 140 MMHG

## 2020-03-17 VITALS
HEART RATE: 83 BPM | DIASTOLIC BLOOD PRESSURE: 82 MMHG | TEMPERATURE: 97.6 F | SYSTOLIC BLOOD PRESSURE: 154 MMHG | RESPIRATION RATE: 18 BRPM

## 2020-03-17 DIAGNOSIS — E11.8 TYPE 2 DIABETES MELLITUS WITH COMPLICATION, WITH LONG-TERM CURRENT USE OF INSULIN (HCC): ICD-10-CM

## 2020-03-17 DIAGNOSIS — E11.42 DIABETIC POLYNEUROPATHY ASSOCIATED WITH TYPE 2 DIABETES MELLITUS (HCC): ICD-10-CM

## 2020-03-17 DIAGNOSIS — Z79.4 TYPE 2 DIABETES MELLITUS WITH HYPERGLYCEMIA, WITH LONG-TERM CURRENT USE OF INSULIN (HCC): Primary | ICD-10-CM

## 2020-03-17 DIAGNOSIS — E11.65 TYPE 2 DIABETES MELLITUS WITH HYPERGLYCEMIA, WITH LONG-TERM CURRENT USE OF INSULIN (HCC): Primary | ICD-10-CM

## 2020-03-17 DIAGNOSIS — E11.8 TYPE 2 DIABETES MELLITUS WITH COMPLICATION, WITHOUT LONG-TERM CURRENT USE OF INSULIN (HCC): ICD-10-CM

## 2020-03-17 DIAGNOSIS — E78.00 HYPERCHOLESTEROLEMIA: ICD-10-CM

## 2020-03-17 DIAGNOSIS — J45.50 SEVERE PERSISTENT ASTHMA WITHOUT COMPLICATION: Primary | ICD-10-CM

## 2020-03-17 DIAGNOSIS — Z79.4 TYPE 2 DIABETES MELLITUS WITH COMPLICATION, WITH LONG-TERM CURRENT USE OF INSULIN (HCC): ICD-10-CM

## 2020-03-17 DIAGNOSIS — E03.9 ACQUIRED HYPOTHYROIDISM: ICD-10-CM

## 2020-03-17 PROCEDURE — 3077F SYST BP >= 140 MM HG: CPT | Performed by: INTERNAL MEDICINE

## 2020-03-17 PROCEDURE — 3079F DIAST BP 80-89 MM HG: CPT | Performed by: INTERNAL MEDICINE

## 2020-03-17 PROCEDURE — 96372 THER/PROPH/DIAG INJ SC/IM: CPT

## 2020-03-17 PROCEDURE — 3046F HEMOGLOBIN A1C LEVEL >9.0%: CPT | Performed by: INTERNAL MEDICINE

## 2020-03-17 PROCEDURE — 1036F TOBACCO NON-USER: CPT | Performed by: INTERNAL MEDICINE

## 2020-03-17 PROCEDURE — 3008F BODY MASS INDEX DOCD: CPT | Performed by: INTERNAL MEDICINE

## 2020-03-17 PROCEDURE — 99215 OFFICE O/P EST HI 40 MIN: CPT | Performed by: INTERNAL MEDICINE

## 2020-03-17 RX ORDER — INSULIN LISPRO 100 [IU]/ML
16 INJECTION, SOLUTION INTRAVENOUS; SUBCUTANEOUS
Qty: 5 PEN | Refills: 5 | Status: SHIPPED | OUTPATIENT
Start: 2020-03-17 | End: 2020-07-06 | Stop reason: SDUPTHER

## 2020-03-17 RX ORDER — LANCETS 33 GAUGE
EACH MISCELLANEOUS 3 TIMES DAILY
Qty: 100 EACH | Refills: 5 | Status: SHIPPED | OUTPATIENT
Start: 2020-03-17 | End: 2021-04-06

## 2020-03-17 RX ORDER — EPINEPHRINE 1 MG/ML
0.3 INJECTION, SOLUTION, CONCENTRATE INTRAVENOUS AS NEEDED
Status: DISCONTINUED | OUTPATIENT
Start: 2020-03-17 | End: 2020-03-20 | Stop reason: HOSPADM

## 2020-03-17 RX ORDER — EPINEPHRINE 1 MG/ML
0.3 INJECTION, SOLUTION, CONCENTRATE INTRAVENOUS AS NEEDED
Status: CANCELLED | OUTPATIENT
Start: 2020-03-31

## 2020-03-17 RX ADMIN — OMALIZUMAB 375 MG: 150 INJECTION, SOLUTION SUBCUTANEOUS at 10:07

## 2020-03-17 NOTE — PROGRESS NOTES
John Carvajal 62 y o  female MRN: [id number]    Encounter: [id number]      Assessment/Plan     Problem List Items Addressed This Visit        Endocrine    Diabetic polyneuropathy associated with type 2 diabetes mellitus ([facility] [address] )       Lab Results   Component Value Date    HGBA1C 12 6 (A) 01/07/2020   She has seen a podiatrist recently-counseled on the importance of improving glycemic control         Hypothyroid     Continue levothyroxine at current dose-will check thyroid function tests         Relevant Orders    TSH, 3rd generation Lab Collect    T4, free Lab Collect    Type 2 diabetes mellitus with hyperglycemia, with long-term current use of insulin ([facility] ) - Primary       Lab Results   Component Value Date    HGBA1C 12 6 (A) 01/07/2020   Patient and daughter counseled on complications of uncontrolled type 2 diabetes-counseled on the importance of taking insulin as directed  For now I would doubt that she is taking insulin regularly or at all  I have discussed with the daughter that she may need some supervision at home-I have given them written instructions to take levemir 40 units at bedtime and Humalog 16 units before meals  Monitor blood sugar 4 times a day and sent over fingerstick log in 1-2 weeks or sooner if there is any significant hypo or hyperglycemia    I am also going to refer her to see the diabetes educator to review insulin and pen teaching         Relevant Orders    Comprehensive metabolic panel Lab Collect    HEMOGLOBIN A1C W/ EAG ESTIMATION Lab Collect    Microalbumin / creatinine urine ratio Lab Collect    Ambulatory referral to Diabetic Education       Other    Hypercholesterolemia     Continue statins-will check fasting lipid profile         Relevant Orders    Microalbumin / creatinine urine ratio Lab Collect    Lipid Panel with Direct LDL reflex Lab Collect    Ambulatory referral to Diabetic Education        CC: Diabetes    History of Present Illness     HPI:  62-year-old female with type 2 diabetes on long-term insulin therapy, hypothyroidism, hypertension and hyperlipidemia here for [id number] was last seen in May 2019 and has not followed up since then  She is accompanied by her daughter who is helping with history-patient states that she forgets to take her insulin and check her blood sugar  It is not clear how often she is taking her insulin or checking her blood sugars  After much probing it appears that she is taking   humalog  16 units 1 hour  after breakfast ,right after lunch, and after dinner   levemir - 40 units  At bedtime   Checks f s 3/day -didn't bring in log or meter   Cannot tell me how her sugars run but according to the daughter a few times when she had checked sugars have been in 300s ,   No polyuria , polydipsia , c/o blurry vision - last eye exam - a few years back  C/o numbness and tingling in feet  weight gain      Review of Systems   Constitutional: Positive for fatigue  Negative for unexpected weight change  Eyes: Positive for visual disturbance  Respiratory: Negative for cough and shortness of breath  Gastrointestinal: Positive for constipation  Negative for diarrhea, nausea and vomiting  Endocrine: Negative for polydipsia and polyuria  Musculoskeletal: Positive for arthralgias and myalgias  Negative for gait problem  Skin: Negative for pallor, rash and wound  Neurological: Positive for numbness  Psychiatric/Behavioral: Positive for sleep disturbance  The patient is nervous/anxious  All other systems reviewed and are negative        Historical Information   Past Medical History:   Diagnosis Date    Abdominal infection ([facility] [address] )     h-pylori    Abnormal chest x-ray 4/5/2019    Asthma     Breast cancer ([facility] ) 06/26/2018    Cancer ([facility] [address] )     BREAST    Diabetes mellitus ([facility] [address] )     Hiatal hernia     History of chemotherapy     History of radiation therapy     Hypercholesterolemia     Hypertension     Hypothyroid     Renal disorder     Rheumatoid arthritis [facility])      Past Surgical History:   Procedure Laterality Date    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    BREAST LUMPECTOMY Right 6/26/2018    Procedure: RIGHT BREAST NEEDLE LOCALIZATION X2 WITH RIGHT BREAST LUMPECTOMY ( NEEDLE LOC @ 1000); Surgeon: Oziel Myrick MD;  [facility];  Service: Surgical Oncology    BREAST LUMPECTOMY Right 8/2/2018    Procedure: LYMPHOSCINITGRAPHY INTRAOPERATIVE LYMPHATIC MAPPING , RIGHT SENTINEL NODE BIOPSY, REEXCISION  RIGHT BREAST LUMPECTOMY CAVITY (SUPERIOR MARGIN); Surgeon: Oziel Myrick MD;  [facility]: [facility];  Service: Surgical Oncology    BREAST SURGERY Left     CATARACT EXTRACTION, BILATERAL Bilateral     EGD AND COLONOSCOPY N/A 9/5/2018    Procedure: EGD AND COLONOSCOPY;  Surgeon: Vincent Garcia MD;  Location: [facility] GI LAB; Service: Gastroenterology    [facility] GUIDED CENTRAL VENOUS ACCESS DEVICE INSERTION  9/13/2018    KNEE SURGERY Right     MAMMO NEEDLE LOCALIZATION RIGHT (ALL INC) Right 6/26/2018    MAMMO STEREOTACTIC BREAST BIOPSY RIGHT (ALL INC) Right 5/23/2018    RETINAL DETACHMENT SURGERY Right     TUBAL LIGATION      TUNNELED VENOUS PORT PLACEMENT Left 9/13/2018    Procedure: INSERTION VENOUS PORT (PORT-A-CATH);   Surgeon: Oziel Myrick MD;  [facility];  Service: Surgical Oncology     Social History   Social History     Substance and Sexual Activity   Alcohol Use No     Social History     Substance and Sexual Activity   Drug Use No     Social History     Tobacco Use   Smoking Status Never Smoker   Smokeless Tobacco Never Used     Family History:   Family History   Problem Relation Age of Onset    Diabetes Mother     Hypertension Mother     Diabetes Father     Hypertension Father     Diabetes Brother     Hypertension Brother     Prostate cancer Brother     Cancer Maternal Grandfather     No Known Problems Sister     No Known Problems Daughter     No Known Problems Sister     No Known Problems Sister     No Known Problems Sister     No Known Problems Sister     No Known Problems Daughter     No Known Problems Daughter        Meds/Allergies   Current Outpatient Medications   Medication Sig Dispense Refill    albuterol (PROVENTIL HFA,VENTOLIN HFA) 90 mcg/act inhaler Inhale 1 puff every 4 (four) hours as needed        amLODIPine (NORVASC) 10 mg tablet take 1 tablet by mouth once daily 90 tablet 1    amLODIPine-atorvastatin (CADUET) 10-10 MG per tablet take 1 tablet by mouth once daily 90 tablet 1    anastrozole (ARIMIDEX) 1 mg tablet Take 1 tablet (1 mg total) by mouth daily 90 tablet 3    aspirin 81 mg chewable tablet Chew 81 mg daily      Blood Glucose Monitoring Suppl (ONE TOUCH ULTRA 2) w/Device KIT by Does not apply route 3 (three) times a day 1 each 0    budesonide-formoterol (SYMBICORT) 160-4 5 mcg/act inhaler Inhale 2 puffs 2 (two) times a day Rinse mouth after use   enalapril (VASOTEC) 20 mg tablet take 1 tablet by mouth once daily 90 tablet 1    EPINEPHrine (EPIPEN) 0 3 mg/0 3 mL SOAJ Inject 0 3 mL (0 3 mg total) into a muscle once for 1 dose 0 6 mL 0    gemfibrozil (LOPID) 600 mg tablet Take 600 mg by mouth daily      Insulin Pen Needle (BD PEN NEEDLE NATALEE U/F) 32G X 4 MM MISC USE 4/ each 3    levothyroxine 50 mcg tablet Take 1 tablet (50 mcg total) by mouth daily 90 tablet 1    omeprazole (PriLOSEC) 20 mg delayed release capsule take 1 capsule by mouth once daily 90 capsule 0    albuterol (2 5 mg/3 mL) 0 083 % nebulizer solution Take 1 vial (2 5 mg total) by nebulization every 6 (six) hours as needed for wheezing or shortness of breath (Patient not taking: Reported on 3/11/2020) 90 vial 5    budesonide (PULMICORT) 0 5 mg/2 mL nebulizer solution Take 1 vial (0 5 mg total) by nebulization 2 (two) times a day Rinse mouth after use   (Patient not taking: Reported on 3/17/2020) 60 vial 5    capsaicin (ZOSTRIX) 0 025 % cream Apply 1 application topically 2 (two) times a day Apply to feet at night 60 g 0    cyclobenzaprine (FLEXERIL) 5 mg tablet Take 1 tablet (5 mg total) by mouth 3 (three) times a day (Patient not taking: Reported on 3/17/2020) 90 tablet 1    dicyclomine (BENTYL) 10 mg capsule Take 1 capsule (10 mg total) by mouth 4 (four) times a day (before meals and at bedtime) (Patient not taking: Reported on 3/17/2020) 120 capsule 1    DULoxetine (CYMBALTA) 30 mg delayed release capsule Take 1 capsule (30 mg total) by mouth 2 (two) times a day (Patient not taking: Reported on 3/17/2020) 180 capsule 1    fluticasone-vilanterol (BREO ELLIPTA) 100-25 mcg/inh inhaler Inhale 1 puff daily Rinse mouth after use  (Patient not taking: Reported on 3/17/2020) 2 Inhaler 5    glucose blood (ONE TOUCH ULTRA TEST) test strip 1 each by Other route 3 (three) times a day 100 each 5    insulin detemir (Levemir FlexTouch) 100 Units/mL injection pen Inject 40 Units under the skin daily at bedtime 4 pen 5    insulin lispro (HumaLOG) 100 units/mL injection pen Inject 16 Units under the skin 3 (three) times a day with meals Inject 16 units TID 5 pen 5    omeprazole (PriLOSEC) 40 MG capsule Take 1 capsule (40 mg total) by mouth daily (Patient not taking: Reported on 3/17/2020) 30 capsule 3    OneTouch Delica Lancets 46K MISC by Does not apply route 3 (three) times a day 100 each 5    oxyCODONE (ROXICODONE) 5 mg immediate release tablet Take 5 mg by mouth every 6 (six) hours as needed for moderate pain       No current facility-administered medications for this visit        Facility-Administered Medications Ordered in Other Visits   Medication Dose Route Frequency Provider Last Rate Last Dose    EPINEPHrine PF (ADRENALIN) 1 mg/mL injection 0 3 mg  0 3 mg Intramuscular PRN Pattie Alonso MD         Allergies   Allergen Reactions    Aspirin Hives       in [address] told patient not to take aspirin r/t kidneys     Tylenol With Codeine #3 [Acetaminophen-Codeine] Rash       Objective   Vitals: Blood pressure 140/80, pulse 77, height 5' 6" (1 676 m), weight 81 5 kg (179 lb 9 6 oz), not currently breastfeeding  Physical Exam   Constitutional: She is oriented to person, place, and time  She appears well-developed and well-nourished  No distress  HENT:   Head: Normocephalic and atraumatic  Eyes: EOM are normal  No scleral icterus  Neck: Normal range of motion  Neck supple  No thyromegaly present  Cardiovascular: Normal rate, regular rhythm and normal heart sounds  No murmur heard  Pulmonary/Chest: Effort normal and breath sounds normal  No respiratory distress  She has no wheezes  She has no rales  Abdominal: Soft  Bowel sounds are normal  She exhibits no distension  There is no tenderness  There is no guarding  Musculoskeletal: Normal range of motion  She exhibits no edema or deformity  Lymphadenopathy:     She has no cervical adenopathy  Neurological: She is alert and oriented to person, place, and time  Skin: Skin is warm and dry  Psychiatric: She has a normal mood and affect  Her behavior is normal  Thought content normal    Vitals reviewed  The history was obtained from the review of the chart, patient and family      Lab Results:   Lab Results   Component Value Date/Time    Hemoglobin A1C 12 6 (A) 01/07/2020 10:59 AM    Hemoglobin A1C 12 5 (A) 09/03/2019 12:00 PM    Hemoglobin A1C 10 7 (H) 06/07/2019 10:23 AM    WBC 7 24 09/12/2019 12:58 PM    WBC 4 86 08/20/2019 02:50 PM    WBC 5 31 06/07/2019 10:23 AM    Hemoglobin 11 9 09/12/2019 12:58 PM    Hemoglobin 12 1 08/20/2019 02:50 PM    Hemoglobin 11 5 06/07/2019 10:23 AM    Hematocrit 36 4 09/12/2019 12:58 PM    Hematocrit 37 3 08/20/2019 02:50 PM    Hematocrit 35 9 06/07/2019 10:23 AM    MCV 89 09/12/2019 12:58 PM    MCV 89 08/20/2019 02:50 PM    MCV 90 06/07/2019 10:23 AM    Platelets 420 48/11/2153 12:58 PM    Platelets 648 03/41/2097 02:50 PM    Platelets 662 82/83/8665 10:23 AM    BUN 12 09/12/2019 12:58 PM    BUN 17 08/20/2019 02:50 PM    BUN 13 06/07/2019 10:23 AM    Potassium 4 1 09/12/2019 12:58 PM    Potassium 4 6 08/20/2019 02:50 PM    Potassium 3 8 06/07/2019 10:23 AM    Chloride 99 (L) 09/12/2019 12:58 PM    Chloride 102 08/20/2019 02:50 PM    Chloride 104 06/07/2019 10:23 AM    CO2 25 09/12/2019 12:58 PM    CO2 28 08/20/2019 02:50 PM    CO2 25 06/07/2019 10:23 AM    Creatinine 0 83 09/12/2019 12:58 PM    Creatinine 0 63 08/20/2019 02:50 PM    Creatinine 0 68 06/07/2019 10:23 AM    AST 15 09/12/2019 12:58 PM    AST 31 08/20/2019 02:50 PM    AST 11 06/07/2019 10:23 AM    ALT 29 09/12/2019 12:58 PM    ALT 33 08/20/2019 02:50 PM    ALT 25 06/07/2019 10:23 AM    Albumin 3 5 09/12/2019 12:58 PM    Albumin 3 1 (L) 08/20/2019 02:50 PM    Albumin 3 1 (L) 06/07/2019 10:23 AM    HDL, Direct 53 06/07/2019 10:23 AM    Triglycerides 188 (H) 06/07/2019 10:23 AM         Portions of the record may have been created with voice recognition software  Occasional wrong word or "sound a like" substitutions may have occurred due to the inherent limitations of voice recognition software  Read the chart carefully and recognize, using context, where substitutions have occurred

## 2020-03-17 NOTE — ASSESSMENT & PLAN NOTE
Lab Results   Component Value Date    HGBA1C 12 6 (A) 01/07/2020   She has seen a podiatrist recently-counseled on the importance of improving glycemic control

## 2020-03-17 NOTE — PROGRESS NOTES
Presented today for xolair injections  1 injection given in r arm, 2/injections in L arm, tolerated well, will return in 2 weeks for next injections

## 2020-03-17 NOTE — PATIENT INSTRUCTIONS
Hipoglucemia en felicita persona con diabetes   LO QUE NECESITA SABER:   La hipoglucemia es felicita afección grave que se produce cuando el nivel de glucosa (azúcar) en la teresa baja demasiado  El nivel de azúcar en la teresa generalmente es demasiado alto en felicita persona con diabetes, kashif el azúcar en la teresa también puede caer a un nivel excesivamente bajo  Es importante que usted siga pruitt plan de manejo de la diabetes para mantener cadence pruitt nivel de azúcar en la teresa  INSTRUCCIONES SOBRE EL ALLEGRA HOSPITALARIA:   Llame al 911 en solo de presentar lo siguiente:   · Usted siente que se va a desmayar  · Usted tiene felicita convulsión o se desmaya  · Usted tiene dificultad para pensar con claridad  Busque atención médica de inmediato si:  · Pruitt nivel de glucosa en la teresa es de menos de 50 mg/dL y no responde al tratamiento  Pregúntele a pruitt Charlies Nasuti vitaminas y minerales son adecuados para usted  · Usted ha tenido síntomas de un descenso del nivel de azúcar varias veces  · Usted tiene preguntas acerca de la cantidad de insulina o medicamento para la diabetes que está tomando  · Usted tiene preguntas o inquietudes acerca de pruitt condición o cuidado  Medicamentos:   · Insulina o un medicamento para la diabetes  ayuda a mantener joe niveles de azúcar en la teresa bajo control  · El glucagón  podría ser necesario si usted tiene hipoglucemia grave  · [place] joe medicamentos nelli se le haya indicado  Consulte con pruitt médico si usted giselle que pruitt medicamento no le está ayudando o si presenta efectos secundarios  Infórmele si es alérgico a cualquier medicamento  Mantenga felicita lista actualizada de los [place], las vitaminas y los productos herbales que gilmar  Incluya los siguientes datos de los medicamentos: cantidad, frecuencia y motivo de administración  Traiga con usted la lista o los envases de la píldoras a joe citas de seguimiento   Lleve la lista de los medicamentos con usted en solo de Vannessa Doshi emergencia  [place] felicita manish de control con pruitt médico o especialista nelli se lo indicaron:  Si usted tiene hipoglucemia, es posible que necesite felicita cambio en la dosis de pruitt insulina o medicamento oral para la diabetes  Anote joe preguntas para que se acuerde de hacerlas fabio joe visitas  Manejo de la hipoglucemia:   · Revise pruitt nivel de azúcar en la teresa inmediatamente si usted tiene síntomas de hipoglucemia  Usualmente se considera hipoglucemia a un nivel de 70 mg/dL o menos  Pregunte a pruitt médico cuál es un nivel de azúcar demasiado bajo para usted  · Si pruitt nivel de azúcar en la teresa está bajo, coma o tome 15 gramos de carbohidratos de acción rápida  Ejemplos de esta cantidad de carbohidratos de acción rápida son 4 onzas (1/2 taza) de jugo de fruta o 4 onzas de gaseosa regular  Otros ejemplos son 2 cucharadas de pasas de uva o entre 3 y 4 tabletas de glucosa  Revise pruitt nivel de azúcar en la teresa 15 minutos más tarde  Si el nivel es todavía bajo (menos de 100 mg/dL), ingiera otros 15 gramos de carbohidratos  Cuando el nivel vuelva a 100 mg/dL, coma un refrigerio o alimento que contenga carbohidratos  [place] ayudará a evitar otra caída de pruitt nivel de azúcar en la teresa  Siempre siga cuidadosamente las instrucciones de pruitt médico acerca de nelli tratar los niveles bajos de azúcar en la teresa  · Siempre lleve consigo alguna sly de carbohidratos de acción rápida  Si usted presenta síntomas de hipoglucemia y no tiene un glucómetro, igual lleve consigo felicita sly de carbohidratos de acción rápida  Evite carbohidratos de alimentos que son altos en grasa  El contendido de grasa podría hacer que pruitt nivel de azúcar en teresa tarde más en subir  Pregunte a pruitt médico si usted debería llevar consigo un estuche de glucagón  El glucagón es un medicamento que se inyecta cuando usted desarrolla hipoglucemia grave y pierde el conocimiento   Revise la fecha de vencimiento todos los meses y reemplace el medicamento de pruitt vencimiento  · Enseñe a otras personas cómo ayudarlo si usted tiene síntomas de hipoglucemia  Infórmeles acerca de los síntomas de la hipoglucemia  Pídales que le den felicita sly de carbohidratos de acción rápida si usted no puede autoadministrársela  Pídales que le apliquen felicita inyección de glucagón si tiene síntomas de hipoglucemia y usted pierde el conocimiento o si tiene felicita convulsión  Pídales que llamen al 911   [place] es felicita emergencia  Indíqueles que nunca lo sudheer que trague nada si se desmaya o si tiene felicita convulsión  · Use accesorios de alerta médica  o lleve consigo felicita tarjeta que indique que usted tiene diabetes  Pregunte en dónde puede conseguir estos artículos  Prevenga la hipoglucemia:   · Moraida los medicamentos de diabetes nelli se le indique  Use joe medicamentos a la hora y en la cantidad exacta  Pruitt médico podría cambiar joe metas esperadas de azúcar en la teresa si usted padece de hipoglucemia frecuentemente  · Coma comidas y meriendas regularmente  Hable con pruitt dietista o pruitt médico sobre un plan de comidas que sea adecuado para usted  No se salte ninguna comida  · Revise pruitt nivel de azúcar en la teresa según indicaciones  Pregunte a pruitt médico cuáles son los niveles de azúcar adecuados para usted antes y [address] comidas  Pregúntele cuándo y con qué frecuencia debe revisar pruitt nivel de azúcar en la teresa  Es posible que necesite controlarlos al menos 3 veces al día  Registre el resultado de pruitt nivel de azúcar en la teresa y lleve pruitt registro con usted cuando tenga felicita manish con pruitt médico  Pruitt médico podría usar el registro para hacer cambios en pruitt medicación, pruitt alimentación o joe horarios de ejercicios  · Revise pruitt nivel de azúcar en la teresa antes de hacer ejercicio  El ejercicio puede disminuir pruitt nivel de azúcar en la teresa  Si pruitt nivel de azúcar en la teresa es inferior a 100 mg /dL, consuma un bocadillo de carbohidratos   [place] son 4 a 6 galletas saladas, ½ plátano, 8 onzas (1 taza) de [employer] o del 1%, o 4 onzas de jugo (½ taza)  Si va a realizar actividad física por más de 1 hora, revise pruitt nivel de azúcar en la teresa cada 30 minutos  Pruitt médico también podría recomendarle que revise pruitt nivel de azúcar en la teresa después del ejercicio  · Esté consciente de cómo el alcohol afecta pruitt nivel de azúcar en la teresa  El consumo de alcohol puede provocar que joe niveles de azúcar en la teresa bajen hasta por 12 horas después de valdez bebido  Pregúntele a pruitt médico si usted puede ganesh alcohol  Si usted gilmar alcohol, siempre asegúrese de comer un bocadillo o felicita comida a la vez  Las mujeres deberían limitar el consumo de alcohol a 1 bebida por día  Los hombres deberían limitar el consumo de alcohol a 2 tragos al día  Un trago equivale a 12 onzas de cerveza, 5 onzas de vino o 1 onza y ½ de licor  © 2017 [address] [facility] Information is for End User's use only and may not be sold, redistributed or otherwise used for commercial purposes  All illustrations and images included in CareNotes® are the copyrighted property of A D A M , Inc  or Zi Aragon  Esta información es sólo para uso en educación  Pruitt intención no es darle un consejo médico sobre enfermedades o tratamientos  Colsulte con pruitt Ronalee Mon farmacéutico antes de seguir cualquier régimen médico para saber si es seguro y efectivo para usted

## 2020-03-17 NOTE — ASSESSMENT & PLAN NOTE
Lab Results   Component Value Date    HGBA1C 12 6 (A) 01/07/2020   Patient and daughter counseled on complications of uncontrolled type 2 diabetes-counseled on the importance of taking insulin as directed  For now I would doubt that she is taking insulin regularly or at all  I have discussed with the daughter that she may need some supervision at home-I have given them written instructions to take levemir 40 units at bedtime and Humalog 16 units before meals  Monitor blood sugar 4 times a day and sent over fingerstick log in 1-2 weeks or sooner if there is any significant hypo or hyperglycemia    I am also going to refer her to see the diabetes educator to review insulin and pen teaching

## 2020-03-18 ENCOUNTER — TELEPHONE (OUTPATIENT)
Dept: FAMILY MEDICINE CLINIC | Facility: CLINIC | Age: 65
End: 2020-03-18

## 2020-03-18 NOTE — TELEPHONE ENCOUNTER
SIGNATURES NEEDED FOR medicare part b detailed written order-continuation of therapy  FORM for contour next test strips RECEIVED VIA FAX  WILL BE PLACED IN YOUR OFFICE AT ASSIGNED DELIVERY TIMES

## 2020-03-23 ENCOUNTER — TELEPHONE (OUTPATIENT)
Dept: FAMILY MEDICINE CLINIC | Facility: CLINIC | Age: 65
End: 2020-03-23

## 2020-03-23 NOTE — TELEPHONE ENCOUNTER
Patient experiencing right side pain by rib cage area  Patients daughter would like a call back [phone]

## 2020-03-26 DIAGNOSIS — J45.21 MILD INTERMITTENT ASTHMA WITH ACUTE EXACERBATION: Primary | ICD-10-CM

## 2020-03-27 RX ORDER — BUDESONIDE AND FORMOTEROL FUMARATE DIHYDRATE 160; 4.5 UG/1; UG/1
2 AEROSOL RESPIRATORY (INHALATION) 2 TIMES DAILY
Qty: 3 INHALER | Refills: 1 | Status: SHIPPED | OUTPATIENT
Start: 2020-03-27 | End: 2020-07-24

## 2020-03-31 ENCOUNTER — HOSPITAL ENCOUNTER (OUTPATIENT)
Dept: INFUSION CENTER | Facility: CLINIC | Age: 65
Discharge: HOME | End: 2020-03-31
Payer: COMMERCIAL

## 2020-03-31 VITALS
RESPIRATION RATE: 16 BRPM | TEMPERATURE: 97 F | HEART RATE: 86 BPM | DIASTOLIC BLOOD PRESSURE: 79 MMHG | SYSTOLIC BLOOD PRESSURE: 168 MMHG

## 2020-03-31 DIAGNOSIS — J45.50 SEVERE PERSISTENT ASTHMA WITHOUT COMPLICATION: Primary | ICD-10-CM

## 2020-03-31 PROCEDURE — 96372 THER/PROPH/DIAG INJ SC/IM: CPT

## 2020-03-31 RX ORDER — EPINEPHRINE 1 MG/ML
0.3 INJECTION, SOLUTION, CONCENTRATE INTRAVENOUS AS NEEDED
Status: DISCONTINUED | OUTPATIENT
Start: 2020-03-31 | End: 2020-04-03 | Stop reason: HOSPADM

## 2020-03-31 RX ORDER — EPINEPHRINE 1 MG/ML
0.3 INJECTION, SOLUTION, CONCENTRATE INTRAVENOUS AS NEEDED
Status: CANCELLED | OUTPATIENT
Start: 2020-04-14

## 2020-03-31 RX ADMIN — OMALIZUMAB 375 MG: 150 INJECTION, SOLUTION SUBCUTANEOUS at 09:49

## 2020-04-14 ENCOUNTER — CLINICAL SUPPORT (OUTPATIENT)
Dept: RADIATION ONCOLOGY | Facility: CLINIC | Age: 65
End: 2020-04-14
Attending: RADIOLOGY
Payer: COMMERCIAL

## 2020-04-14 ENCOUNTER — HOSPITAL ENCOUNTER (OUTPATIENT)
Dept: INFUSION CENTER | Facility: CLINIC | Age: 65
Discharge: HOME | End: 2020-04-14
Payer: COMMERCIAL

## 2020-04-14 VITALS
HEART RATE: 84 BPM | RESPIRATION RATE: 18 BRPM | TEMPERATURE: 97.5 F | DIASTOLIC BLOOD PRESSURE: 79 MMHG | SYSTOLIC BLOOD PRESSURE: 137 MMHG

## 2020-04-14 VITALS — HEIGHT: 66 IN | BODY MASS INDEX: 28.77 KG/M2 | WEIGHT: 179 LBS

## 2020-04-14 DIAGNOSIS — J45.50 SEVERE PERSISTENT ASTHMA WITHOUT COMPLICATION: Primary | ICD-10-CM

## 2020-04-14 DIAGNOSIS — C50.411 MALIGNANT NEOPLASM OF UPPER-OUTER QUADRANT OF RIGHT BREAST IN FEMALE, ESTROGEN RECEPTOR POSITIVE (HCC): Primary | ICD-10-CM

## 2020-04-14 DIAGNOSIS — Z17.0 MALIGNANT NEOPLASM OF UPPER-OUTER QUADRANT OF RIGHT BREAST IN FEMALE, ESTROGEN RECEPTOR POSITIVE (HCC): Primary | ICD-10-CM

## 2020-04-14 PROCEDURE — 96372 THER/PROPH/DIAG INJ SC/IM: CPT

## 2020-04-14 PROCEDURE — 99211 OFF/OP EST MAY X REQ PHY/QHP: CPT | Performed by: RADIOLOGY

## 2020-04-14 RX ORDER — EPINEPHRINE 1 MG/ML
0.3 INJECTION, SOLUTION, CONCENTRATE INTRAVENOUS AS NEEDED
Status: CANCELLED | OUTPATIENT
Start: 2020-04-28

## 2020-04-14 RX ADMIN — OMALIZUMAB 375 MG: 150 INJECTION, SOLUTION SUBCUTANEOUS at 10:07

## 2020-04-16 DIAGNOSIS — Z79.4 TYPE 2 DIABETES MELLITUS WITH COMPLICATION, WITH LONG-TERM CURRENT USE OF INSULIN (HCC): Primary | ICD-10-CM

## 2020-04-16 DIAGNOSIS — E11.8 TYPE 2 DIABETES MELLITUS WITH COMPLICATION, WITH LONG-TERM CURRENT USE OF INSULIN (HCC): Primary | ICD-10-CM

## 2020-04-16 RX ORDER — INSULIN ASPART 100 [IU]/ML
INJECTION, SOLUTION INTRAVENOUS; SUBCUTANEOUS
COMMUNITY
End: 2020-04-16

## 2020-04-16 RX ORDER — INSULIN ASPART 100 [IU]/ML
INJECTION, SOLUTION INTRAVENOUS; SUBCUTANEOUS
COMMUNITY
End: 2020-04-16 | Stop reason: SDUPTHER

## 2020-04-16 RX ORDER — INSULIN ASPART 100 [IU]/ML
INJECTION, SOLUTION INTRAVENOUS; SUBCUTANEOUS
Qty: 5 PEN | Refills: 2 | Status: SHIPPED | OUTPATIENT
Start: 2020-04-16 | End: 2020-07-06 | Stop reason: SDUPTHER

## 2020-04-17 ENCOUNTER — TELEPHONE (OUTPATIENT)
Dept: HEMATOLOGY ONCOLOGY | Facility: CLINIC | Age: 65
End: 2020-04-17

## 2020-04-20 ENCOUNTER — OFFICE VISIT (OUTPATIENT)
Dept: SURGICAL ONCOLOGY | Facility: CLINIC | Age: 65
End: 2020-04-20
Payer: COMMERCIAL

## 2020-04-20 VITALS
HEART RATE: 86 BPM | BODY MASS INDEX: 28.79 KG/M2 | WEIGHT: 178.4 LBS | TEMPERATURE: 98 F | DIASTOLIC BLOOD PRESSURE: 92 MMHG | SYSTOLIC BLOOD PRESSURE: 138 MMHG

## 2020-04-20 DIAGNOSIS — Z17.0 MALIGNANT NEOPLASM OF UPPER-OUTER QUADRANT OF RIGHT BREAST IN FEMALE, ESTROGEN RECEPTOR POSITIVE (HCC): Primary | ICD-10-CM

## 2020-04-20 DIAGNOSIS — R22.9 LUMP OF SKIN: ICD-10-CM

## 2020-04-20 DIAGNOSIS — C50.411 MALIGNANT NEOPLASM OF UPPER-OUTER QUADRANT OF RIGHT BREAST IN FEMALE, ESTROGEN RECEPTOR POSITIVE (HCC): Primary | ICD-10-CM

## 2020-04-20 DIAGNOSIS — Z17.0 MALIGNANT NEOPLASM OF RIGHT BREAST IN FEMALE, ESTROGEN RECEPTOR POSITIVE, UNSPECIFIED SITE OF BREAST (HCC): ICD-10-CM

## 2020-04-20 DIAGNOSIS — C50.911 MALIGNANT NEOPLASM OF RIGHT BREAST IN FEMALE, ESTROGEN RECEPTOR POSITIVE, UNSPECIFIED SITE OF BREAST (HCC): ICD-10-CM

## 2020-04-20 DIAGNOSIS — Z79.811 USE OF ANASTROZOLE (ARIMIDEX): ICD-10-CM

## 2020-04-20 DIAGNOSIS — M25.611 DECREASED ROM OF RIGHT SHOULDER: ICD-10-CM

## 2020-04-20 PROCEDURE — 3046F HEMOGLOBIN A1C LEVEL >9.0%: CPT | Performed by: NURSE PRACTITIONER

## 2020-04-20 PROCEDURE — 99214 OFFICE O/P EST MOD 30 MIN: CPT | Performed by: NURSE PRACTITIONER

## 2020-04-20 PROCEDURE — 3080F DIAST BP >= 90 MM HG: CPT | Performed by: NURSE PRACTITIONER

## 2020-04-20 PROCEDURE — 3075F SYST BP GE 130 - 139MM HG: CPT | Performed by: NURSE PRACTITIONER

## 2020-04-20 PROCEDURE — 1036F TOBACCO NON-USER: CPT | Performed by: NURSE PRACTITIONER

## 2020-04-21 ENCOUNTER — HOSPITAL ENCOUNTER (OUTPATIENT)
Dept: ULTRASOUND IMAGING | Facility: HOSPITAL | Age: 65
Discharge: HOME | End: 2020-04-21
Payer: COMMERCIAL

## 2020-04-21 ENCOUNTER — LAB (OUTPATIENT)
Dept: LAB | Facility: HOSPITAL | Age: 65
End: 2020-04-21
Payer: COMMERCIAL

## 2020-04-21 DIAGNOSIS — Z79.4 TYPE 2 DIABETES MELLITUS WITH HYPERGLYCEMIA, WITH LONG-TERM CURRENT USE OF INSULIN (HCC): ICD-10-CM

## 2020-04-21 DIAGNOSIS — E78.00 HYPERCHOLESTEROLEMIA: ICD-10-CM

## 2020-04-21 DIAGNOSIS — R10.13 EPIGASTRIC PAIN: ICD-10-CM

## 2020-04-21 DIAGNOSIS — E03.9 ACQUIRED HYPOTHYROIDISM: ICD-10-CM

## 2020-04-21 DIAGNOSIS — E11.65 TYPE 2 DIABETES MELLITUS WITH HYPERGLYCEMIA, WITH LONG-TERM CURRENT USE OF INSULIN (HCC): ICD-10-CM

## 2020-04-21 DIAGNOSIS — Z11.59 ENCOUNTER FOR HEPATITIS C SCREENING TEST FOR LOW RISK PATIENT: ICD-10-CM

## 2020-04-21 LAB
ALBUMIN SERPL BCP-MCNC: 3.2 G/DL (ref 3.5–5)
ALP SERPL-CCNC: 62 U/L (ref 46–116)
ALT SERPL W P-5'-P-CCNC: 23 U/L (ref 12–78)
ANION GAP SERPL CALCULATED.3IONS-SCNC: 8 MMOL/L (ref 4–13)
AST SERPL W P-5'-P-CCNC: 13 U/L (ref 5–45)
BILIRUB SERPL-MCNC: 0.42 MG/DL (ref 0.2–1)
BUN SERPL-MCNC: 19 MG/DL (ref 5–25)
CALCIUM SERPL-MCNC: 9.4 MG/DL (ref 8.3–10.1)
CHLORIDE SERPL-SCNC: 101 MMOL/L (ref 100–108)
CHOLEST SERPL-MCNC: 253 MG/DL (ref 50–200)
CO2 SERPL-SCNC: 30 MMOL/L (ref 21–32)
CREAT SERPL-MCNC: 0.76 MG/DL (ref 0.6–1.3)
CREAT UR-MCNC: 161 MG/DL
EST. AVERAGE GLUCOSE BLD GHB EST-MCNC: 283 MG/DL
GFR SERPL CREATININE-BSD FRML MDRD: 84 ML/MIN/1.73SQ M
GLUCOSE P FAST SERPL-MCNC: 319 MG/DL (ref 65–99)
HBA1C MFR BLD: 11.5 %
HCV AB SER QL: NORMAL
HDLC SERPL-MCNC: 49 MG/DL
LDLC SERPL CALC-MCNC: 171 MG/DL (ref 0–100)
MICROALBUMIN UR-MCNC: 41.9 MG/L (ref 0–20)
MICROALBUMIN/CREAT 24H UR: 26 MG/G CREATININE (ref 0–30)
POTASSIUM SERPL-SCNC: 3.8 MMOL/L (ref 3.5–5.3)
PROT SERPL-MCNC: 7.5 G/DL (ref 6.4–8.2)
SODIUM SERPL-SCNC: 139 MMOL/L (ref 136–145)
T4 FREE SERPL-MCNC: 1.04 NG/DL (ref 0.76–1.46)
TRIGL SERPL-MCNC: 165 MG/DL
TSH SERPL DL<=0.05 MIU/L-ACNC: 1.83 UIU/ML (ref 0.36–3.74)

## 2020-04-21 PROCEDURE — 84443 ASSAY THYROID STIM HORMONE: CPT

## 2020-04-21 PROCEDURE — 86803 HEPATITIS C AB TEST: CPT

## 2020-04-21 PROCEDURE — 82043 UR ALBUMIN QUANTITATIVE: CPT

## 2020-04-21 PROCEDURE — 76705 ECHO EXAM OF ABDOMEN: CPT

## 2020-04-21 PROCEDURE — 80053 COMPREHEN METABOLIC PANEL: CPT

## 2020-04-21 PROCEDURE — 80061 LIPID PANEL: CPT

## 2020-04-21 PROCEDURE — 82570 ASSAY OF URINE CREATININE: CPT

## 2020-04-21 PROCEDURE — 36415 COLL VENOUS BLD VENIPUNCTURE: CPT

## 2020-04-21 PROCEDURE — 84439 ASSAY OF FREE THYROXINE: CPT

## 2020-04-21 PROCEDURE — 83036 HEMOGLOBIN GLYCOSYLATED A1C: CPT

## 2020-04-28 ENCOUNTER — HOSPITAL ENCOUNTER (OUTPATIENT)
Dept: INFUSION CENTER | Facility: CLINIC | Age: 65
Discharge: HOME | End: 2020-04-28
Payer: COMMERCIAL

## 2020-04-28 ENCOUNTER — TELEPHONE (OUTPATIENT)
Dept: PULMONOLOGY | Facility: CLINIC | Age: 65
End: 2020-04-28

## 2020-04-28 VITALS
SYSTOLIC BLOOD PRESSURE: 158 MMHG | RESPIRATION RATE: 18 BRPM | HEART RATE: 91 BPM | TEMPERATURE: 98.1 F | DIASTOLIC BLOOD PRESSURE: 79 MMHG

## 2020-04-28 DIAGNOSIS — J45.50 SEVERE PERSISTENT ASTHMA WITHOUT COMPLICATION: Primary | ICD-10-CM

## 2020-04-28 PROCEDURE — 96372 THER/PROPH/DIAG INJ SC/IM: CPT

## 2020-04-28 RX ORDER — EPINEPHRINE 1 MG/ML
0.3 INJECTION, SOLUTION, CONCENTRATE INTRAVENOUS AS NEEDED
Status: DISCONTINUED | OUTPATIENT
Start: 2020-04-28 | End: 2020-05-01 | Stop reason: HOSPADM

## 2020-04-28 RX ORDER — EPINEPHRINE 1 MG/ML
0.3 INJECTION, SOLUTION, CONCENTRATE INTRAVENOUS AS NEEDED
Status: CANCELLED | OUTPATIENT
Start: 2020-05-12

## 2020-04-28 RX ADMIN — OMALIZUMAB 375 MG: 150 INJECTION, SOLUTION SUBCUTANEOUS at 10:35

## 2020-04-29 ENCOUNTER — TELEMEDICINE (OUTPATIENT)
Dept: PULMONOLOGY | Facility: CLINIC | Age: 65
End: 2020-04-29
Payer: COMMERCIAL

## 2020-04-29 VITALS — BODY MASS INDEX: 28.61 KG/M2 | WEIGHT: 178 LBS | HEIGHT: 66 IN

## 2020-04-29 DIAGNOSIS — J45.50 SEVERE PERSISTENT ASTHMA WITHOUT COMPLICATION: Primary | ICD-10-CM

## 2020-04-29 DIAGNOSIS — J70.0 RADIATION PNEUMONITIS (HCC): ICD-10-CM

## 2020-04-29 PROCEDURE — 99441 PR PHYS/QHP TELEPHONE EVALUATION 5-10 MIN: CPT | Performed by: NURSE PRACTITIONER

## 2020-04-29 RX ORDER — ALBUTEROL SULFATE 2.5 MG/3ML
2.5 SOLUTION RESPIRATORY (INHALATION) EVERY 6 HOURS PRN
Qty: 120 VIAL | Refills: 5 | Status: SHIPPED | OUTPATIENT
Start: 2020-04-29 | End: 2021-12-19 | Stop reason: SDUPTHER

## 2020-05-04 ENCOUNTER — TELEPHONE (OUTPATIENT)
Dept: HEMATOLOGY ONCOLOGY | Facility: CLINIC | Age: 65
End: 2020-05-04

## 2020-05-05 ENCOUNTER — APPOINTMENT (OUTPATIENT)
Dept: PHYSICAL THERAPY | Facility: CLINIC | Age: 65
End: 2020-05-05
Payer: COMMERCIAL

## 2020-05-06 ENCOUNTER — EVALUATION (OUTPATIENT)
Dept: PHYSICAL THERAPY | Facility: CLINIC | Age: 65
End: 2020-05-06
Payer: COMMERCIAL

## 2020-05-06 DIAGNOSIS — M25.611 DECREASED ROM OF RIGHT SHOULDER: Primary | ICD-10-CM

## 2020-05-06 DIAGNOSIS — C50.411 MALIGNANT NEOPLASM OF UPPER-OUTER QUADRANT OF RIGHT BREAST IN FEMALE, ESTROGEN RECEPTOR POSITIVE (HCC): ICD-10-CM

## 2020-05-06 DIAGNOSIS — Z17.0 MALIGNANT NEOPLASM OF UPPER-OUTER QUADRANT OF RIGHT BREAST IN FEMALE, ESTROGEN RECEPTOR POSITIVE (HCC): ICD-10-CM

## 2020-05-06 DIAGNOSIS — C50.911 MALIGNANT NEOPLASM OF RIGHT BREAST IN FEMALE, ESTROGEN RECEPTOR POSITIVE, UNSPECIFIED SITE OF BREAST (HCC): ICD-10-CM

## 2020-05-06 DIAGNOSIS — Z17.0 MALIGNANT NEOPLASM OF RIGHT BREAST IN FEMALE, ESTROGEN RECEPTOR POSITIVE, UNSPECIFIED SITE OF BREAST (HCC): ICD-10-CM

## 2020-05-06 PROCEDURE — 97110 THERAPEUTIC EXERCISES: CPT | Performed by: PHYSICAL THERAPIST

## 2020-05-06 PROCEDURE — 97161 PT EVAL LOW COMPLEX 20 MIN: CPT | Performed by: PHYSICAL THERAPIST

## 2020-05-07 ENCOUNTER — OFFICE VISIT (OUTPATIENT)
Dept: PHYSICAL THERAPY | Facility: CLINIC | Age: 65
End: 2020-05-07
Payer: COMMERCIAL

## 2020-05-07 ENCOUNTER — TELEMEDICINE (OUTPATIENT)
Dept: HEMATOLOGY ONCOLOGY | Facility: CLINIC | Age: 65
End: 2020-05-07
Payer: COMMERCIAL

## 2020-05-07 ENCOUNTER — TELEPHONE (OUTPATIENT)
Dept: HEMATOLOGY ONCOLOGY | Facility: MEDICAL CENTER | Age: 65
End: 2020-05-07

## 2020-05-07 DIAGNOSIS — C50.411 MALIGNANT NEOPLASM OF UPPER-OUTER QUADRANT OF RIGHT BREAST IN FEMALE, ESTROGEN RECEPTOR POSITIVE (HCC): Primary | ICD-10-CM

## 2020-05-07 DIAGNOSIS — M25.611 DECREASED ROM OF RIGHT SHOULDER: Primary | ICD-10-CM

## 2020-05-07 DIAGNOSIS — Z17.0 MALIGNANT NEOPLASM OF UPPER-OUTER QUADRANT OF RIGHT BREAST IN FEMALE, ESTROGEN RECEPTOR POSITIVE (HCC): Primary | ICD-10-CM

## 2020-05-07 PROCEDURE — 97140 MANUAL THERAPY 1/> REGIONS: CPT

## 2020-05-07 PROCEDURE — G2012 BRIEF CHECK IN BY MD/QHP: HCPCS | Performed by: INTERNAL MEDICINE

## 2020-05-07 PROCEDURE — 97110 THERAPEUTIC EXERCISES: CPT

## 2020-05-11 ENCOUNTER — APPOINTMENT (OUTPATIENT)
Dept: PHYSICAL THERAPY | Facility: CLINIC | Age: 65
End: 2020-05-11
Payer: COMMERCIAL

## 2020-05-12 ENCOUNTER — HOSPITAL ENCOUNTER (OUTPATIENT)
Dept: INFUSION CENTER | Facility: CLINIC | Age: 65
Discharge: HOME | End: 2020-05-12
Payer: COMMERCIAL

## 2020-05-12 VITALS
HEART RATE: 85 BPM | RESPIRATION RATE: 18 BRPM | SYSTOLIC BLOOD PRESSURE: 134 MMHG | TEMPERATURE: 97.8 F | DIASTOLIC BLOOD PRESSURE: 73 MMHG

## 2020-05-12 DIAGNOSIS — J45.50 SEVERE PERSISTENT ASTHMA WITHOUT COMPLICATION: Primary | ICD-10-CM

## 2020-05-12 PROCEDURE — 96372 THER/PROPH/DIAG INJ SC/IM: CPT

## 2020-05-12 RX ORDER — EPINEPHRINE 1 MG/ML
0.3 INJECTION, SOLUTION, CONCENTRATE INTRAVENOUS AS NEEDED
Status: CANCELLED | OUTPATIENT
Start: 2020-05-26

## 2020-05-12 RX ORDER — EPINEPHRINE 1 MG/ML
0.3 INJECTION, SOLUTION, CONCENTRATE INTRAVENOUS AS NEEDED
Status: DISCONTINUED | OUTPATIENT
Start: 2020-05-12 | End: 2020-05-15 | Stop reason: HOSPADM

## 2020-05-12 RX ADMIN — OMALIZUMAB 375 MG: 150 INJECTION, SOLUTION SUBCUTANEOUS at 10:20

## 2020-05-13 ENCOUNTER — OFFICE VISIT (OUTPATIENT)
Dept: PHYSICAL THERAPY | Facility: CLINIC | Age: 65
End: 2020-05-13
Payer: COMMERCIAL

## 2020-05-13 DIAGNOSIS — M25.611 DECREASED ROM OF RIGHT SHOULDER: Primary | ICD-10-CM

## 2020-05-13 PROCEDURE — 97110 THERAPEUTIC EXERCISES: CPT | Performed by: PHYSICAL THERAPIST

## 2020-05-13 PROCEDURE — 97140 MANUAL THERAPY 1/> REGIONS: CPT | Performed by: PHYSICAL THERAPIST

## 2020-05-13 PROCEDURE — 97530 THERAPEUTIC ACTIVITIES: CPT | Performed by: PHYSICAL THERAPIST

## 2020-05-14 ENCOUNTER — OFFICE VISIT (OUTPATIENT)
Dept: PHYSICAL THERAPY | Facility: CLINIC | Age: 65
End: 2020-05-14
Payer: COMMERCIAL

## 2020-05-14 DIAGNOSIS — M25.611 DECREASED ROM OF RIGHT SHOULDER: Primary | ICD-10-CM

## 2020-05-14 PROCEDURE — 97530 THERAPEUTIC ACTIVITIES: CPT | Performed by: PHYSICAL THERAPIST

## 2020-05-14 PROCEDURE — 97110 THERAPEUTIC EXERCISES: CPT | Performed by: PHYSICAL THERAPIST

## 2020-05-20 ENCOUNTER — OFFICE VISIT (OUTPATIENT)
Dept: PHYSICAL THERAPY | Facility: CLINIC | Age: 65
End: 2020-05-20
Payer: COMMERCIAL

## 2020-05-20 DIAGNOSIS — M25.611 DECREASED ROM OF RIGHT SHOULDER: Primary | ICD-10-CM

## 2020-05-20 PROCEDURE — 97110 THERAPEUTIC EXERCISES: CPT

## 2020-05-20 PROCEDURE — 97140 MANUAL THERAPY 1/> REGIONS: CPT

## 2020-05-21 ENCOUNTER — APPOINTMENT (OUTPATIENT)
Dept: PHYSICAL THERAPY | Facility: CLINIC | Age: 65
End: 2020-05-21
Payer: COMMERCIAL

## 2020-05-26 ENCOUNTER — HOSPITAL ENCOUNTER (OUTPATIENT)
Dept: INFUSION CENTER | Facility: CLINIC | Age: 65
Discharge: HOME | End: 2020-05-26
Payer: COMMERCIAL

## 2020-05-26 VITALS
SYSTOLIC BLOOD PRESSURE: 152 MMHG | HEART RATE: 86 BPM | RESPIRATION RATE: 16 BRPM | DIASTOLIC BLOOD PRESSURE: 72 MMHG | TEMPERATURE: 99.3 F

## 2020-05-26 DIAGNOSIS — J45.50 SEVERE PERSISTENT ASTHMA WITHOUT COMPLICATION: Primary | ICD-10-CM

## 2020-05-26 PROCEDURE — 96372 THER/PROPH/DIAG INJ SC/IM: CPT

## 2020-05-26 RX ORDER — EPINEPHRINE 1 MG/ML
0.3 INJECTION, SOLUTION, CONCENTRATE INTRAVENOUS AS NEEDED
Status: DISCONTINUED | OUTPATIENT
Start: 2020-05-26 | End: 2020-05-29 | Stop reason: HOSPADM

## 2020-05-26 RX ORDER — EPINEPHRINE 1 MG/ML
0.3 INJECTION, SOLUTION, CONCENTRATE INTRAVENOUS AS NEEDED
Status: CANCELLED | OUTPATIENT
Start: 2020-06-09

## 2020-05-26 RX ADMIN — OMALIZUMAB 375 MG: 150 INJECTION, SOLUTION SUBCUTANEOUS at 10:59

## 2020-05-27 ENCOUNTER — OFFICE VISIT (OUTPATIENT)
Dept: PHYSICAL THERAPY | Facility: CLINIC | Age: 65
End: 2020-05-27
Payer: COMMERCIAL

## 2020-05-27 DIAGNOSIS — M25.611 DECREASED ROM OF RIGHT SHOULDER: Primary | ICD-10-CM

## 2020-05-27 PROCEDURE — 97140 MANUAL THERAPY 1/> REGIONS: CPT | Performed by: PHYSICAL THERAPIST

## 2020-05-28 ENCOUNTER — APPOINTMENT (OUTPATIENT)
Dept: PHYSICAL THERAPY | Facility: CLINIC | Age: 65
End: 2020-05-28
Payer: COMMERCIAL

## 2020-05-28 DIAGNOSIS — E11.42 DIABETIC POLYNEUROPATHY ASSOCIATED WITH TYPE 2 DIABETES MELLITUS (HCC): ICD-10-CM

## 2020-05-28 DIAGNOSIS — I10 ESSENTIAL HYPERTENSION: ICD-10-CM

## 2020-05-28 RX ORDER — AMLODIPINE BESYLATE 10 MG/1
TABLET ORAL
Qty: 90 TABLET | Refills: 1 | Status: SHIPPED | OUTPATIENT
Start: 2020-05-28 | End: 2020-07-24

## 2020-05-28 RX ORDER — CIPROFLOXACIN HYDROCHLORIDE 3.5 MG/ML
1 SOLUTION/ DROPS TOPICAL
Qty: 5 ML | Refills: 0 | Status: CANCELLED | OUTPATIENT
Start: 2020-05-28

## 2020-05-28 RX ORDER — GENTAMICIN SULFATE 3 MG/ML
1 SOLUTION/ DROPS OPHTHALMIC EVERY 4 HOURS
Qty: 5 ML | Refills: 0 | Status: CANCELLED | OUTPATIENT
Start: 2020-05-28

## 2020-05-28 RX ORDER — DULOXETIN HYDROCHLORIDE 30 MG/1
CAPSULE, DELAYED RELEASE ORAL
Qty: 180 CAPSULE | Refills: 1 | Status: SHIPPED | OUTPATIENT
Start: 2020-05-28 | End: 2020-10-27

## 2020-06-08 ENCOUNTER — TELEPHONE (OUTPATIENT)
Dept: OTHER | Facility: OTHER | Age: 65
End: 2020-06-08

## 2020-06-09 ENCOUNTER — HOSPITAL ENCOUNTER (OUTPATIENT)
Dept: INFUSION CENTER | Facility: CLINIC | Age: 65
Discharge: HOME | End: 2020-06-09
Payer: COMMERCIAL

## 2020-06-09 VITALS
HEART RATE: 86 BPM | RESPIRATION RATE: 20 BRPM | TEMPERATURE: 97.4 F | SYSTOLIC BLOOD PRESSURE: 135 MMHG | DIASTOLIC BLOOD PRESSURE: 74 MMHG

## 2020-06-09 DIAGNOSIS — J45.50 SEVERE PERSISTENT ASTHMA WITHOUT COMPLICATION: Primary | ICD-10-CM

## 2020-06-09 PROCEDURE — 96372 THER/PROPH/DIAG INJ SC/IM: CPT

## 2020-06-09 RX ORDER — EPINEPHRINE 1 MG/ML
0.3 INJECTION, SOLUTION, CONCENTRATE INTRAVENOUS AS NEEDED
Status: CANCELLED | OUTPATIENT
Start: 2020-06-23

## 2020-06-09 RX ADMIN — OMALIZUMAB 375 MG: 150 INJECTION, SOLUTION SUBCUTANEOUS at 10:30

## 2020-06-16 ENCOUNTER — OFFICE VISIT (OUTPATIENT)
Dept: FAMILY MEDICINE CLINIC | Facility: CLINIC | Age: 65
End: 2020-06-16

## 2020-06-16 VITALS
HEIGHT: 66 IN | OXYGEN SATURATION: 97 % | DIASTOLIC BLOOD PRESSURE: 80 MMHG | WEIGHT: 179 LBS | HEART RATE: 95 BPM | TEMPERATURE: 96 F | BODY MASS INDEX: 28.77 KG/M2 | RESPIRATION RATE: 18 BRPM | SYSTOLIC BLOOD PRESSURE: 160 MMHG

## 2020-06-16 DIAGNOSIS — Z79.4 TYPE 2 DIABETES MELLITUS WITH COMPLICATION, WITH LONG-TERM CURRENT USE OF INSULIN (HCC): ICD-10-CM

## 2020-06-16 DIAGNOSIS — E11.8 TYPE 2 DIABETES MELLITUS WITH COMPLICATION, WITHOUT LONG-TERM CURRENT USE OF INSULIN (HCC): ICD-10-CM

## 2020-06-16 DIAGNOSIS — E11.9 DIABETIC EYE EXAM (HCC): Primary | ICD-10-CM

## 2020-06-16 DIAGNOSIS — I10 ESSENTIAL HYPERTENSION: ICD-10-CM

## 2020-06-16 DIAGNOSIS — T46.4X5A COUGH DUE TO ACE INHIBITOR: ICD-10-CM

## 2020-06-16 DIAGNOSIS — E78.5 HYPERLIPIDEMIA, UNSPECIFIED HYPERLIPIDEMIA TYPE: ICD-10-CM

## 2020-06-16 DIAGNOSIS — E11.8 TYPE 2 DIABETES MELLITUS WITH COMPLICATION, WITH LONG-TERM CURRENT USE OF INSULIN (HCC): ICD-10-CM

## 2020-06-16 DIAGNOSIS — Z01.00 DIABETIC EYE EXAM (HCC): Primary | ICD-10-CM

## 2020-06-16 DIAGNOSIS — K12.0 APHTHOUS STOMATITIS: ICD-10-CM

## 2020-06-16 DIAGNOSIS — R05.8 COUGH DUE TO ACE INHIBITOR: ICD-10-CM

## 2020-06-16 LAB
LEFT EYE DIABETIC RETINOPATHY: NORMAL
LEFT EYE IMAGE QUALITY: NORMAL
LEFT EYE MACULAR EDEMA: NORMAL
LEFT EYE OTHER RETINOPATHY: NORMAL
RIGHT EYE DIABETIC RETINOPATHY: NORMAL
RIGHT EYE IMAGE QUALITY: NORMAL
RIGHT EYE MACULAR EDEMA: NORMAL
RIGHT EYE OTHER RETINOPATHY: NORMAL
SEVERITY (EYE EXAM): NORMAL

## 2020-06-16 PROCEDURE — 1036F TOBACCO NON-USER: CPT | Performed by: FAMILY MEDICINE

## 2020-06-16 PROCEDURE — 3077F SYST BP >= 140 MM HG: CPT | Performed by: FAMILY MEDICINE

## 2020-06-16 PROCEDURE — 4010F ACE/ARB THERAPY RXD/TAKEN: CPT | Performed by: FAMILY MEDICINE

## 2020-06-16 PROCEDURE — 3079F DIAST BP 80-89 MM HG: CPT | Performed by: FAMILY MEDICINE

## 2020-06-16 PROCEDURE — 2025F 7 FLD RTA PHOTO W/O RTNOPTHY: CPT | Performed by: INTERNAL MEDICINE

## 2020-06-16 PROCEDURE — 3008F BODY MASS INDEX DOCD: CPT | Performed by: FAMILY MEDICINE

## 2020-06-16 PROCEDURE — 3060F POS MICROALBUMINURIA REV: CPT | Performed by: FAMILY MEDICINE

## 2020-06-16 PROCEDURE — 3046F HEMOGLOBIN A1C LEVEL >9.0%: CPT | Performed by: FAMILY MEDICINE

## 2020-06-16 PROCEDURE — 92250 FUNDUS PHOTOGRAPHY W/I&R: CPT | Performed by: FAMILY MEDICINE

## 2020-06-16 PROCEDURE — 4010F ACE/ARB THERAPY RXD/TAKEN: CPT | Performed by: INTERNAL MEDICINE

## 2020-06-16 PROCEDURE — 99214 OFFICE O/P EST MOD 30 MIN: CPT | Performed by: FAMILY MEDICINE

## 2020-06-16 PROCEDURE — 3008F BODY MASS INDEX DOCD: CPT | Performed by: INTERNAL MEDICINE

## 2020-06-16 PROCEDURE — 2025F 7 FLD RTA PHOTO W/O RTNOPTHY: CPT | Performed by: FAMILY MEDICINE

## 2020-06-16 RX ORDER — GEMFIBROZIL 600 MG/1
TABLET, FILM COATED ORAL
Qty: 60 TABLET | OUTPATIENT
Start: 2020-06-16

## 2020-06-16 RX ORDER — CHLORHEXIDINE GLUCONATE 0.12 MG/ML
15 RINSE ORAL 2 TIMES DAILY
Qty: 120 ML | Refills: 0 | Status: SHIPPED | OUTPATIENT
Start: 2020-06-16 | End: 2020-09-01

## 2020-06-16 RX ORDER — LOSARTAN POTASSIUM 50 MG/1
50 TABLET ORAL DAILY
Qty: 90 TABLET | Refills: 1 | Status: SHIPPED | OUTPATIENT
Start: 2020-06-16 | End: 2020-12-01

## 2020-06-23 ENCOUNTER — HOSPITAL ENCOUNTER (OUTPATIENT)
Dept: MAMMOGRAPHY | Facility: CLINIC | Age: 65
Discharge: HOME | End: 2020-06-23
Payer: COMMERCIAL

## 2020-06-23 ENCOUNTER — HOSPITAL ENCOUNTER (OUTPATIENT)
Dept: INFUSION CENTER | Facility: CLINIC | Age: 65
Discharge: HOME | End: 2020-06-23
Payer: COMMERCIAL

## 2020-06-23 VITALS — BODY MASS INDEX: 28.77 KG/M2 | TEMPERATURE: 98.2 F | WEIGHT: 179 LBS | HEIGHT: 66 IN

## 2020-06-23 VITALS — TEMPERATURE: 99.6 F

## 2020-06-23 DIAGNOSIS — Z17.0 MALIGNANT NEOPLASM OF UPPER-OUTER QUADRANT OF RIGHT BREAST IN FEMALE, ESTROGEN RECEPTOR POSITIVE (HCC): ICD-10-CM

## 2020-06-23 DIAGNOSIS — J45.50 SEVERE PERSISTENT ASTHMA WITHOUT COMPLICATION: Primary | ICD-10-CM

## 2020-06-23 DIAGNOSIS — C50.411 MALIGNANT NEOPLASM OF UPPER-OUTER QUADRANT OF RIGHT BREAST IN FEMALE, ESTROGEN RECEPTOR POSITIVE (HCC): ICD-10-CM

## 2020-06-23 PROCEDURE — 96372 THER/PROPH/DIAG INJ SC/IM: CPT

## 2020-06-23 PROCEDURE — 77066 DX MAMMO INCL CAD BI: CPT

## 2020-06-23 PROCEDURE — G0279 TOMOSYNTHESIS, MAMMO: HCPCS

## 2020-06-23 RX ORDER — EPINEPHRINE 1 MG/ML
0.3 INJECTION, SOLUTION, CONCENTRATE INTRAVENOUS AS NEEDED
Status: DISCONTINUED | OUTPATIENT
Start: 2020-06-23 | End: 2020-06-26 | Stop reason: HOSPADM

## 2020-06-23 RX ORDER — EPINEPHRINE 1 MG/ML
0.3 INJECTION, SOLUTION, CONCENTRATE INTRAVENOUS AS NEEDED
Status: CANCELLED | OUTPATIENT
Start: 2020-07-07

## 2020-06-23 RX ADMIN — OMALIZUMAB 375 MG: 150 INJECTION, SOLUTION SUBCUTANEOUS at 12:45

## 2020-06-24 ENCOUNTER — APPOINTMENT (OUTPATIENT)
Dept: LAB | Facility: HOSPITAL | Age: 65
End: 2020-06-24
Payer: COMMERCIAL

## 2020-06-24 DIAGNOSIS — E11.8 TYPE 2 DIABETES MELLITUS WITH COMPLICATION, WITHOUT LONG-TERM CURRENT USE OF INSULIN (HCC): ICD-10-CM

## 2020-06-24 DIAGNOSIS — E78.5 HYPERLIPIDEMIA, UNSPECIFIED HYPERLIPIDEMIA TYPE: ICD-10-CM

## 2020-06-24 LAB
ALBUMIN SERPL BCP-MCNC: 3.4 G/DL (ref 3.5–5)
ALP SERPL-CCNC: 65 U/L (ref 46–116)
ALT SERPL W P-5'-P-CCNC: 20 U/L (ref 12–78)
ANION GAP SERPL CALCULATED.3IONS-SCNC: 10 MMOL/L (ref 4–13)
AST SERPL W P-5'-P-CCNC: 15 U/L (ref 5–45)
BASOPHILS # BLD AUTO: 0.08 THOUSANDS/ΜL (ref 0–0.1)
BASOPHILS NFR BLD AUTO: 2 % (ref 0–1)
BILIRUB SERPL-MCNC: 0.24 MG/DL (ref 0.2–1)
BUN SERPL-MCNC: 16 MG/DL (ref 5–25)
CALCIUM SERPL-MCNC: 9 MG/DL (ref 8.3–10.1)
CHLORIDE SERPL-SCNC: 104 MMOL/L (ref 100–108)
CHOLEST SERPL-MCNC: 242 MG/DL (ref 50–200)
CO2 SERPL-SCNC: 26 MMOL/L (ref 21–32)
CREAT SERPL-MCNC: 0.7 MG/DL (ref 0.6–1.3)
CREAT UR-MCNC: 300 MG/DL
EOSINOPHIL # BLD AUTO: 0.24 THOUSAND/ΜL (ref 0–0.61)
EOSINOPHIL NFR BLD AUTO: 5 % (ref 0–6)
ERYTHROCYTE [DISTWIDTH] IN BLOOD BY AUTOMATED COUNT: 13 % (ref 11.6–15.1)
EST. AVERAGE GLUCOSE BLD GHB EST-MCNC: 315 MG/DL
GFR SERPL CREATININE-BSD FRML MDRD: 93 ML/MIN/1.73SQ M
GLUCOSE P FAST SERPL-MCNC: 236 MG/DL (ref 65–99)
HBA1C MFR BLD: 12.6 %
HCT VFR BLD AUTO: 36.7 % (ref 34.8–46.1)
HDLC SERPL-MCNC: 48 MG/DL
HGB BLD-MCNC: 11.4 G/DL (ref 11.5–15.4)
IMM GRANULOCYTES # BLD AUTO: 0.01 THOUSAND/UL (ref 0–0.2)
IMM GRANULOCYTES NFR BLD AUTO: 0 % (ref 0–2)
LDLC SERPL CALC-MCNC: 168 MG/DL (ref 0–100)
LYMPHOCYTES # BLD AUTO: 1.94 THOUSANDS/ΜL (ref 0.6–4.47)
LYMPHOCYTES NFR BLD AUTO: 36 % (ref 14–44)
MCH RBC QN AUTO: 28.4 PG (ref 26.8–34.3)
MCHC RBC AUTO-ENTMCNC: 31.1 G/DL (ref 31.4–37.4)
MCV RBC AUTO: 92 FL (ref 82–98)
MICROALBUMIN UR-MCNC: 81.2 MG/L (ref 0–20)
MICROALBUMIN/CREAT 24H UR: 27 MG/G CREATININE (ref 0–30)
MONOCYTES # BLD AUTO: 0.46 THOUSAND/ΜL (ref 0.17–1.22)
MONOCYTES NFR BLD AUTO: 9 % (ref 4–12)
NEUTROPHILS # BLD AUTO: 2.65 THOUSANDS/ΜL (ref 1.85–7.62)
NEUTS SEG NFR BLD AUTO: 48 % (ref 43–75)
NONHDLC SERPL-MCNC: 194 MG/DL
NRBC BLD AUTO-RTO: 0 /100 WBCS
PLATELET # BLD AUTO: 246 THOUSANDS/UL (ref 149–390)
PMV BLD AUTO: 11.6 FL (ref 8.9–12.7)
POTASSIUM SERPL-SCNC: 3.6 MMOL/L (ref 3.5–5.3)
PROT SERPL-MCNC: 7.3 G/DL (ref 6.4–8.2)
RBC # BLD AUTO: 4.01 MILLION/UL (ref 3.81–5.12)
SODIUM SERPL-SCNC: 140 MMOL/L (ref 136–145)
TRIGL SERPL-MCNC: 132 MG/DL
TSH SERPL DL<=0.05 MIU/L-ACNC: 2.79 UIU/ML (ref 0.36–3.74)
WBC # BLD AUTO: 5.38 THOUSAND/UL (ref 4.31–10.16)

## 2020-06-24 PROCEDURE — 82043 UR ALBUMIN QUANTITATIVE: CPT

## 2020-06-24 PROCEDURE — 83036 HEMOGLOBIN GLYCOSYLATED A1C: CPT

## 2020-06-24 PROCEDURE — 3061F NEG MICROALBUMINURIA REV: CPT | Performed by: INTERNAL MEDICINE

## 2020-06-24 PROCEDURE — 3046F HEMOGLOBIN A1C LEVEL >9.0%: CPT | Performed by: INTERNAL MEDICINE

## 2020-06-24 PROCEDURE — 3061F NEG MICROALBUMINURIA REV: CPT | Performed by: FAMILY MEDICINE

## 2020-06-24 PROCEDURE — 84443 ASSAY THYROID STIM HORMONE: CPT

## 2020-06-24 PROCEDURE — 80061 LIPID PANEL: CPT

## 2020-06-24 PROCEDURE — 80053 COMPREHEN METABOLIC PANEL: CPT

## 2020-06-24 PROCEDURE — 85025 COMPLETE CBC W/AUTO DIFF WBC: CPT

## 2020-06-24 PROCEDURE — 82570 ASSAY OF URINE CREATININE: CPT

## 2020-06-24 PROCEDURE — 36415 COLL VENOUS BLD VENIPUNCTURE: CPT

## 2020-06-24 PROCEDURE — 3046F HEMOGLOBIN A1C LEVEL >9.0%: CPT | Performed by: FAMILY MEDICINE

## 2020-06-25 DIAGNOSIS — E11.8 TYPE 2 DIABETES MELLITUS WITH COMPLICATION, WITHOUT LONG-TERM CURRENT USE OF INSULIN (HCC): Primary | ICD-10-CM

## 2020-07-06 DIAGNOSIS — E11.8 TYPE 2 DIABETES MELLITUS WITH COMPLICATION, WITH LONG-TERM CURRENT USE OF INSULIN (HCC): ICD-10-CM

## 2020-07-06 DIAGNOSIS — Z79.4 TYPE 2 DIABETES MELLITUS WITH COMPLICATION, WITH LONG-TERM CURRENT USE OF INSULIN (HCC): ICD-10-CM

## 2020-07-06 RX ORDER — INSULIN LISPRO 100 [IU]/ML
16 INJECTION, SOLUTION INTRAVENOUS; SUBCUTANEOUS
Qty: 5 PEN | Refills: 5 | Status: SHIPPED | OUTPATIENT
Start: 2020-07-06 | End: 2021-06-25 | Stop reason: SDUPTHER

## 2020-07-06 RX ORDER — INSULIN ASPART 100 [IU]/ML
16 INJECTION, SOLUTION INTRAVENOUS; SUBCUTANEOUS
COMMUNITY
End: 2021-05-19

## 2020-07-06 RX ORDER — INSULIN ASPART 100 [IU]/ML
INJECTION, SOLUTION INTRAVENOUS; SUBCUTANEOUS
Qty: 5 PEN | Refills: 3 | Status: SHIPPED | OUTPATIENT
Start: 2020-07-06 | End: 2020-10-28

## 2020-07-06 NOTE — TELEPHONE ENCOUNTER
Pharmacy called ins will not cover Humalog but will cover Novolog  Please let me know if I can swiitch it?   Thanks

## 2020-07-07 ENCOUNTER — HOSPITAL ENCOUNTER (OUTPATIENT)
Dept: INFUSION CENTER | Facility: CLINIC | Age: 65
Discharge: HOME | End: 2020-07-07
Payer: COMMERCIAL

## 2020-07-07 VITALS
DIASTOLIC BLOOD PRESSURE: 75 MMHG | SYSTOLIC BLOOD PRESSURE: 133 MMHG | HEART RATE: 76 BPM | RESPIRATION RATE: 18 BRPM | TEMPERATURE: 98.9 F

## 2020-07-07 DIAGNOSIS — J45.50 SEVERE PERSISTENT ASTHMA WITHOUT COMPLICATION: Primary | ICD-10-CM

## 2020-07-07 PROCEDURE — 96372 THER/PROPH/DIAG INJ SC/IM: CPT

## 2020-07-07 RX ORDER — EPINEPHRINE 1 MG/ML
0.3 INJECTION, SOLUTION, CONCENTRATE INTRAVENOUS AS NEEDED
Status: CANCELLED | OUTPATIENT
Start: 2020-07-21

## 2020-07-07 RX ADMIN — OMALIZUMAB 375 MG: 150 INJECTION, SOLUTION SUBCUTANEOUS at 12:15

## 2020-07-07 NOTE — PROGRESS NOTES
Pt tolerated Xolair injections bilat arms for total 3 injections  No adverse reaction noted after 30 min observation   Pt has Epi pen that expiires 8/2020

## 2020-07-13 RX ORDER — GEMFIBROZIL 600 MG/1
TABLET, FILM COATED ORAL
Qty: 60 TABLET | OUTPATIENT
Start: 2020-07-13

## 2020-07-20 ENCOUNTER — TELEPHONE (OUTPATIENT)
Dept: PULMONOLOGY | Facility: CLINIC | Age: 65
End: 2020-07-20

## 2020-07-20 NOTE — TELEPHONE ENCOUNTER
Mckenna from pre-encounter LM saying patients Caprice Clamp with  on     She is scheduled for her infusion on

## 2020-07-21 ENCOUNTER — HOSPITAL ENCOUNTER (OUTPATIENT)
Dept: INFUSION CENTER | Facility: CLINIC | Age: 65
Discharge: HOME | End: 2020-07-21
Payer: COMMERCIAL

## 2020-07-21 VITALS
DIASTOLIC BLOOD PRESSURE: 73 MMHG | TEMPERATURE: 97.7 F | HEART RATE: 80 BPM | SYSTOLIC BLOOD PRESSURE: 129 MMHG | RESPIRATION RATE: 18 BRPM

## 2020-07-21 DIAGNOSIS — J45.50 SEVERE PERSISTENT ASTHMA WITHOUT COMPLICATION: Primary | ICD-10-CM

## 2020-07-21 PROCEDURE — 96372 THER/PROPH/DIAG INJ SC/IM: CPT

## 2020-07-21 RX ORDER — EPINEPHRINE 1 MG/ML
0.3 INJECTION, SOLUTION, CONCENTRATE INTRAVENOUS AS NEEDED
Status: DISCONTINUED | OUTPATIENT
Start: 2020-07-21 | End: 2020-07-24 | Stop reason: HOSPADM

## 2020-07-21 RX ORDER — EPINEPHRINE 1 MG/ML
0.3 INJECTION, SOLUTION, CONCENTRATE INTRAVENOUS AS NEEDED
Status: CANCELLED | OUTPATIENT
Start: 2020-08-05

## 2020-07-21 RX ADMIN — OMALIZUMAB 375 MG: 150 INJECTION, SOLUTION SUBCUTANEOUS at 12:25

## 2020-07-21 NOTE — PROGRESS NOTES
Pt tolerated Xolair injections bilat arms for total 3 injections/375mg  No adverse reaction noted after 30 min observation  Pt has Epi pen that expiires 8/2020, AVS provided

## 2020-07-22 ENCOUNTER — TELEPHONE (OUTPATIENT)
Dept: FAMILY MEDICINE CLINIC | Facility: CLINIC | Age: 65
End: 2020-07-22

## 2020-07-22 ENCOUNTER — TELEPHONE (OUTPATIENT)
Dept: PULMONOLOGY | Facility: CLINIC | Age: 65
End: 2020-07-22

## 2020-07-22 ENCOUNTER — OFFICE VISIT (OUTPATIENT)
Dept: FAMILY MEDICINE CLINIC | Facility: CLINIC | Age: 65
End: 2020-07-22

## 2020-07-22 VITALS
OXYGEN SATURATION: 97 % | DIASTOLIC BLOOD PRESSURE: 70 MMHG | WEIGHT: 180 LBS | RESPIRATION RATE: 18 BRPM | HEART RATE: 86 BPM | BODY MASS INDEX: 29.05 KG/M2 | TEMPERATURE: 97.9 F | SYSTOLIC BLOOD PRESSURE: 130 MMHG

## 2020-07-22 DIAGNOSIS — E78.5 HYPERLIPIDEMIA, UNSPECIFIED HYPERLIPIDEMIA TYPE: ICD-10-CM

## 2020-07-22 DIAGNOSIS — I10 ESSENTIAL HYPERTENSION: ICD-10-CM

## 2020-07-22 DIAGNOSIS — Z78.9 NEED FOR FOLLOW-UP BY SOCIAL WORKER: ICD-10-CM

## 2020-07-22 DIAGNOSIS — R53.83 FATIGUE, UNSPECIFIED TYPE: ICD-10-CM

## 2020-07-22 DIAGNOSIS — E11.8 TYPE 2 DIABETES MELLITUS WITH COMPLICATION, WITHOUT LONG-TERM CURRENT USE OF INSULIN (HCC): ICD-10-CM

## 2020-07-22 DIAGNOSIS — K31.84 GASTROPARESIS: Primary | ICD-10-CM

## 2020-07-22 PROCEDURE — 1036F TOBACCO NON-USER: CPT | Performed by: FAMILY MEDICINE

## 2020-07-22 PROCEDURE — 3060F POS MICROALBUMINURIA REV: CPT | Performed by: FAMILY MEDICINE

## 2020-07-22 PROCEDURE — 99214 OFFICE O/P EST MOD 30 MIN: CPT | Performed by: FAMILY MEDICINE

## 2020-07-22 PROCEDURE — 3078F DIAST BP <80 MM HG: CPT | Performed by: FAMILY MEDICINE

## 2020-07-22 PROCEDURE — 2022F DILAT RTA XM EVC RTNOPTHY: CPT | Performed by: FAMILY MEDICINE

## 2020-07-22 PROCEDURE — 3075F SYST BP GE 130 - 139MM HG: CPT | Performed by: FAMILY MEDICINE

## 2020-07-22 PROCEDURE — 3046F HEMOGLOBIN A1C LEVEL >9.0%: CPT | Performed by: FAMILY MEDICINE

## 2020-07-22 RX ORDER — METOCLOPRAMIDE 5 MG/1
5 TABLET ORAL 4 TIMES DAILY
Qty: 120 TABLET | Refills: 1 | Status: SHIPPED | OUTPATIENT
Start: 2020-07-22 | End: 2020-09-01

## 2020-07-22 NOTE — TELEPHONE ENCOUNTER
Pt daughter would like for provider to see pt Mammo results  daughet is concern because pt   seems very pale and would like for her to get blood work today     Pt has appt today [email or web address]

## 2020-07-22 NOTE — TELEPHONE ENCOUNTER
Patient's  daughter calling back   Daughter stated that patient has Medicare and    [employer]

## 2020-07-22 NOTE — TELEPHONE ENCOUNTER
Elissa started with [facility] Reffrence number: [id number] spoke to Ki at [facility]   Clinicals faxed to [phone]

## 2020-07-22 NOTE — TELEPHONE ENCOUNTER
Called pt and left VM requesting her to please call office today ASAP to confirm which is her current health insurance

## 2020-07-23 ENCOUNTER — PATIENT OUTREACH (OUTPATIENT)
Dept: FAMILY MEDICINE CLINIC | Facility: CLINIC | Age: 65
End: 2020-07-23

## 2020-07-23 NOTE — PROGRESS NOTES
Called patient utilizing [employer]   I explained my role to her and at this time she declines my services stating she she takes her medications, knows she has to watch her diet and will be seeing Endocrinology  I gave her my contact information if she decides she needs further assistance or changes her mind in the future  Chart reviewed

## 2020-07-24 ENCOUNTER — PATIENT OUTREACH (OUTPATIENT)
Dept: FAMILY MEDICINE CLINIC | Facility: CLINIC | Age: 65
End: 2020-07-24

## 2020-07-24 ENCOUNTER — OFFICE VISIT (OUTPATIENT)
Dept: PULMONOLOGY | Facility: CLINIC | Age: 65
End: 2020-07-24
Payer: COMMERCIAL

## 2020-07-24 VITALS
OXYGEN SATURATION: 97 % | BODY MASS INDEX: 29.02 KG/M2 | SYSTOLIC BLOOD PRESSURE: 150 MMHG | DIASTOLIC BLOOD PRESSURE: 82 MMHG | WEIGHT: 179.8 LBS | TEMPERATURE: 99.3 F | HEART RATE: 76 BPM

## 2020-07-24 DIAGNOSIS — E78.5 HYPERLIPIDEMIA, UNSPECIFIED HYPERLIPIDEMIA TYPE: ICD-10-CM

## 2020-07-24 DIAGNOSIS — I10 ESSENTIAL HYPERTENSION: ICD-10-CM

## 2020-07-24 DIAGNOSIS — J45.50 SEVERE PERSISTENT ASTHMA WITHOUT COMPLICATION: Primary | ICD-10-CM

## 2020-07-24 PROCEDURE — 1036F TOBACCO NON-USER: CPT | Performed by: INTERNAL MEDICINE

## 2020-07-24 PROCEDURE — 3060F POS MICROALBUMINURIA REV: CPT | Performed by: INTERNAL MEDICINE

## 2020-07-24 PROCEDURE — 99214 OFFICE O/P EST MOD 30 MIN: CPT | Performed by: INTERNAL MEDICINE

## 2020-07-24 PROCEDURE — 2022F DILAT RTA XM EVC RTNOPTHY: CPT | Performed by: INTERNAL MEDICINE

## 2020-07-24 PROCEDURE — 3079F DIAST BP 80-89 MM HG: CPT | Performed by: INTERNAL MEDICINE

## 2020-07-24 PROCEDURE — 3077F SYST BP >= 140 MM HG: CPT | Performed by: INTERNAL MEDICINE

## 2020-07-24 PROCEDURE — 3046F HEMOGLOBIN A1C LEVEL >9.0%: CPT | Performed by: INTERNAL MEDICINE

## 2020-07-24 RX ORDER — ATORVASTATIN CALCIUM 10 MG/1
10 TABLET, FILM COATED ORAL DAILY
Qty: 30 TABLET | Refills: 2 | Status: SHIPPED | OUTPATIENT
Start: 2020-07-24 | End: 2020-10-28

## 2020-07-24 NOTE — PROGRESS NOTES
Xolair approved from through [employer] number: [id number]    Effective: 07/25/2020-01/25/2020 under sadie and bill     J-Code:

## 2020-07-24 NOTE — PROGRESS NOTES
Pulmonary Follow Up Note   Jolie Kirkland 62 y o  female MRN: [id number]  7/24/2020      Assessment:    1  Severe persistent asthma not in exacerbation- on symbicort and albuterol  Xolair injections every two weeks  No recent exacerbations but still having SOB with exertion and reports wheezing  2  SOB on exertion- asthma vs pulmonary HTN vs deconditioning  Echo last year was normal  She does have peripheral edema but this may due to the amlodipine  She has also gained 11lbs since last year when reviewing vitals taken at her appointments last year  3  Hx of breast cancer- s/p right mastectomy, chemo and radiation in 2018  4  HTN- on amlodipine and amlodipine-atorvastatin combo pill per records    Plan:    · Stop symbicort, start trelegy one puff daily  · Continue albuterol as needed  · Resume Xolair injections  · Refer for pulmonary rehab  · In regards to her peripheral edema, it is unclear if this is due to amlodipine side effect or development of HF  Will stop amlodipine and combo pill with atorvastatin  Start metoprolol 12 5mg BID and replaced atorvastatin 10mg daily  · F/up in 6 weeks    Diagnoses and all orders for this visit:    Severe persistent asthma without complication  -     fluticasone-umeclidinium-vilanterol (TRELEGY) 100-62 5-25 MCG/INH inhaler; Inhale 1 puff daily Rinse mouth after use  -     Ambulatory Referral to Pulmonary Rehabilitation; Future    Essential hypertension  -     metoprolol tartrate (LOPRESSOR) 25 mg tablet; Take 0 5 tablets (12 5 mg total) by mouth every 12 (twelve) hours    Hyperlipidemia, unspecified hyperlipidemia type  -     atorvastatin (LIPITOR) 10 mg tablet; Take 1 tablet (10 mg total) by mouth daily        Return in about 6 weeks (around 9/4/2020)  History of Present Illness   HPI:  Aletha Nunez is a 62 y o  female who presents for follow up of the SOB  She is still reporting SOB on exertion and apparent wheezing  She denies cough   The SOB mainly occurs with any activity  She is somewhat sedentary and has gained 11lbs since last year  She denies any recent fevers or sick contacts  No recent exacerbations or use of steroids/antibiotics  She is still using the symbicort and receiving the xolair injections  Review of Systems   Constitutional: Negative for chills and fever  HENT: Negative for congestion, postnasal drip and rhinorrhea  Eyes: Negative for itching  Respiratory: Positive for shortness of breath  Negative for cough, wheezing and stridor  Cardiovascular: Negative for chest pain, palpitations and leg swelling  Gastrointestinal: Negative for abdominal distention, abdominal pain, nausea and vomiting  Genitourinary: Negative for dysuria and flank pain  Musculoskeletal: Negative for arthralgias and myalgias  Skin: Negative for color change  Neurological: Negative for dizziness, light-headedness and headaches  Psychiatric/Behavioral: Negative  Historical Information   Past Medical History:   Diagnosis Date    Abdominal infection ([facility] )     h-pylori    Abnormal chest x-ray 4/5/2019    Asthma     Breast cancer ([facility] ) 06/26/2018    Cancer ([place] [address] )     BREAST    Diabetes mellitus ([place] [address] )     Hiatal hernia     History of chemotherapy     History of radiation therapy     Hypercholesterolemia     Hypertension     Hypothyroid     Renal disorder     Rheumatoid arthritis ([facility] )      Past Surgical History:   Procedure Laterality Date    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    BREAST LUMPECTOMY Right 6/26/2018    Procedure: RIGHT BREAST NEEDLE LOCALIZATION X2 WITH RIGHT BREAST LUMPECTOMY ( NEEDLE LOC @ 1000); Surgeon: Carmen Ramirez MD;  [facility];  Service: Surgical Oncology    BREAST LUMPECTOMY Right 8/2/2018    Procedure: LYMPHOSCINITGRAPHY INTRAOPERATIVE LYMPHATIC MAPPING , RIGHT SENTINEL NODE BIOPSY, REEXCISION  RIGHT BREAST LUMPECTOMY CAVITY (SUPERIOR MARGIN);   Surgeon: Carmen Ramirez MD;  [facility]; Service: Surgical Oncology    BREAST SURGERY Left     CATARACT EXTRACTION, BILATERAL Bilateral     EGD AND COLONOSCOPY N/A 9/5/2018    Procedure: EGD AND COLONOSCOPY;  Surgeon: Kodak Fisher MD;  Location: [facility]; Service: Gastroenterology    [facility] GUIDED CENTRAL VENOUS ACCESS DEVICE INSERTION  9/13/2018    KNEE SURGERY Right     MAMMO NEEDLE LOCALIZATION RIGHT (ALL INC) Right 6/26/2018    MAMMO STEREOTACTIC BREAST BIOPSY RIGHT (ALL INC) Right 5/23/2018    RETINAL DETACHMENT SURGERY Right     TUBAL LIGATION      TUNNELED VENOUS PORT PLACEMENT Left 9/13/2018    Procedure: INSERTION VENOUS PORT (PORT-A-CATH);   Surgeon: Donis Cespedes MD;  [facility];  Service: Surgical Oncology     Family History   Problem Relation Age of Onset    Diabetes Mother     Hypertension Mother     Diabetes Father     Hypertension Father     Diabetes Brother     Hypertension Brother     Prostate cancer Brother     Cancer Maternal Grandfather     No Known Problems Sister     No Known Problems Daughter     No Known Problems Sister     No Known Problems Sister     No Known Problems Sister     No Known Problems Sister     No Known Problems Daughter     No Known Problems Daughter          Meds/Allergies     Current Outpatient Medications:     albuterol (2 5 mg/3 mL) 0 083 % nebulizer solution, Take 1 vial (2 5 mg total) by nebulization every 6 (six) hours as needed for wheezing or shortness of breath, Disp: 120 vial, Rfl: 5    albuterol (PROVENTIL HFA,VENTOLIN HFA) 90 mcg/act inhaler, Inhale 1 puff every 4 (four) hours as needed  , Disp: , Rfl:     anastrozole (ARIMIDEX) 1 mg tablet, Take 1 tablet (1 mg total) by mouth daily, Disp: 90 tablet, Rfl: 3    aspirin 81 mg chewable tablet, Chew 81 mg daily, Disp: , Rfl:     Blood Glucose Monitoring Suppl (ONE TOUCH ULTRA 2) w/Device KIT, by Does not apply route 3 (three) times a day, Disp: 1 each, Rfl: 0    chlorhexidine (PERIDEX) 0 12 % solution, Apply 15 mL to the mouth or throat 2 (two) times a day, Disp: 120 mL, Rfl: 0    gemfibrozil (LOPID) 600 mg tablet, Take 600 mg by mouth daily, Disp: , Rfl:     glucose blood (ONE TOUCH ULTRA TEST) test strip, 1 each by Other route 3 (three) times a day, Disp: 100 each, Rfl: 5    Insulin Aspart FlexPen 100 UNIT/ML SOPN, Inject 16 Units under the skin 3 (three) times a day before meals, Disp: , Rfl:     insulin detemir (Levemir FlexTouch) 100 Units/mL injection pen, Inject 45 Units under the skin daily at bedtime, Disp: 4 pen, Rfl: 5    insulin lispro (HumaLOG) 100 units/mL injection pen, Inject 16 Units under the skin 3 (three) times a day with meals Inject 16 units TID, Disp: 5 pen, Rfl: 5    Insulin Pen Needle (BD PEN NEEDLE NATALEE U/F) 32G X 4 MM MISC, USE 4/DAY, Disp: 100 each, Rfl: 3    levothyroxine 50 mcg tablet, Take 1 tablet (50 mcg total) by mouth daily, Disp: 90 tablet, Rfl: 1    losartan (COZAAR) 50 mg tablet, Take 1 tablet (50 mg total) by mouth daily, Disp: 90 tablet, Rfl: 1    metoclopramide (REGLAN) 5 mg tablet, Take 1 tablet (5 mg total) by mouth 4 (four) times a day, Disp: 120 tablet, Rfl: 1    NOVOLOG FLEXPEN 100 units/mL SOPN, Patient injects 16 units three times a day, Disp: 5 pen, Rfl: 3    omeprazole (PriLOSEC) 20 mg delayed release capsule, take 1 capsule by mouth once daily (Patient taking differently: 2 (two) times a day ), Disp: 90 capsule, Rfl: 0    OneTouch Delica Lancets 53D MISC, by Does not apply route 3 (three) times a day, Disp: 100 each, Rfl: 5    oxyCODONE (ROXICODONE) 5 mg immediate release tablet, Take 5 mg by mouth every 6 (six) hours as needed for moderate pain, Disp: , Rfl:     atorvastatin (LIPITOR) 10 mg tablet, Take 1 tablet (10 mg total) by mouth daily, Disp: 30 tablet, Rfl: 2    capsaicin (ZOSTRIX) 0 025 % cream, Apply 1 application topically 2 (two) times a day Apply to feet at night (Patient not taking: Reported on 4/29/2020), Disp: 60 g, Rfl: 0   cyclobenzaprine (FLEXERIL) 5 mg tablet, Take 1 tablet (5 mg total) by mouth 3 (three) times a day (Patient not taking: Reported on 4/29/2020), Disp: 90 tablet, Rfl: 1    dicyclomine (BENTYL) 10 mg capsule, Take 1 capsule (10 mg total) by mouth 4 (four) times a day (before meals and at bedtime) (Patient not taking: Reported on 6/16/2020), Disp: 120 capsule, Rfl: 1    DULoxetine (CYMBALTA) 30 mg delayed release capsule, TAKE 1 CAPSULE BY MOUTH 2 (TWICE)  A DAY (Patient not taking: Reported on 6/16/2020), Disp: 180 capsule, Rfl: 1    EPINEPHrine (EPIPEN) 0 3 mg/0 3 mL SOAJ, Inject 0 3 mL (0 3 mg total) into a muscle once for 1 dose, Disp: 0 6 mL, Rfl: 0    fluticasone-umeclidinium-vilanterol (TRELEGY) 100-62 5-25 MCG/INH inhaler, Inhale 1 puff daily Rinse mouth after use , Disp: 1 Inhaler, Rfl: 2    metoprolol tartrate (LOPRESSOR) 25 mg tablet, Take 0 5 tablets (12 5 mg total) by mouth every 12 (twelve) hours, Disp: 60 tablet, Rfl: 2  No current facility-administered medications for this visit  Allergies   Allergen Reactions    Aspirin Hives     Dr  in [address] told patient not to take aspirin r/t kidneys     Tylenol With Codeine #3 [Acetaminophen-Codeine] Rash       Vitals: Blood pressure 150/82, pulse 76, temperature 99 3 °F (37 4 °C), weight 81 6 kg (179 lb 12 8 oz), SpO2 97 %, not currently breastfeeding  Body mass index is 29 02 kg/m²  Oxygen Therapy  SpO2: 97 %  Oxygen Therapy: None (Room air)      Physical Exam  Physical Exam   Constitutional: She is oriented to person, place, and time  No distress  HENT:   Head: Normocephalic and atraumatic  Nose: Nose normal    Mouth/Throat: Oropharynx is clear and moist  No oropharyngeal exudate  Eyes: Pupils are equal, round, and reactive to light  Conjunctivae and EOM are normal  No scleral icterus  Neck: Normal range of motion  Neck supple  No JVD present  No tracheal deviation present  No thyromegaly present     Cardiovascular: Normal rate, regular rhythm, normal heart sounds and intact distal pulses  Exam reveals no gallop and no friction rub  No murmur heard  Pulmonary/Chest: Effort normal and breath sounds normal  No stridor  No respiratory distress  She has no wheezes  She has no rales  Abdominal: Soft  Bowel sounds are normal  She exhibits no distension  There is no tenderness  There is no rebound and no guarding  Musculoskeletal: Normal range of motion  She exhibits edema  She exhibits no deformity  1-2+ bilateral lower extremity edema   Lymphadenopathy:     She has no cervical adenopathy  Neurological: She is alert and oriented to person, place, and time  No cranial nerve deficit or sensory deficit  Skin: Skin is warm  No rash noted  She is not diaphoretic  No erythema  Psychiatric: She has a normal mood and affect  Labs: I have personally reviewed pertinent lab results  Lab Results   Component Value Date    WBC 5 38 06/24/2020    HGB 11 4 (L) 06/24/2020    HCT 36 7 06/24/2020    MCV 92 06/24/2020     06/24/2020     Lab Results   Component Value Date    CALCIUM 9 0 06/24/2020    K 3 6 06/24/2020    CO2 26 06/24/2020     06/24/2020    BUN 16 06/24/2020    CREATININE 0 70 06/24/2020     Lab Results   Component Value Date     (H) 11/26/2018     Lab Results   Component Value Date    ALT 20 06/24/2020    AST 15 06/24/2020    ALKPHOS 65 06/24/2020         Imaging and other studies: I have personally reviewed pertinent reports  and I have personally reviewed pertinent films in PACS  HRCT- RUL subpleural scarring  3mm pulmonary nodule    Pulmonary function testing: normal spirometry, hyperinflation with air trapping, supranormal DLCO (likely a technical issue)    EKG, Pathology, and Other Studies: I have personally reviewed pertinent reports     and I have personally reviewed pertinent films in PACS  Echo 2019- EF 35% grade 1 diastolic dysfunction    Shayy Fitzgerald MD  Pulmonary and Critical Care Fellow- PGY 4  [facility]

## 2020-07-27 ENCOUNTER — PATIENT OUTREACH (OUTPATIENT)
Dept: FAMILY MEDICINE CLINIC | Facility: CLINIC | Age: 65
End: 2020-07-27

## 2020-07-27 DIAGNOSIS — Z78.9 UNDER CARE OF SOCIAL WORKER: Primary | ICD-10-CM

## 2020-07-27 NOTE — PROGRESS NOTES
CAROLA VEGA received a referral from patient's PCP regarding patient's concerns with her current insurance plan and her desire to change her insurance provider  CAROLA VEGA provided patient with phone number of [employer]  Patient states she will have her daughter call to inquire more information  CAROLA VEGA also inquired patient's interest in applying for [employer], as mentioned by her PCP  Patient states her primary form of transportation is her daughter, however, she sometimes has to reschedule appointments due to her daughter being unable to provide transportation  Patient agreeable to working with CHW on applying for [employer] as a secondary form of transportation  Referral for CHW has been placed  CAROLA VEGA will remain available for additional assistance/support as needed

## 2020-07-28 ENCOUNTER — APPOINTMENT (OUTPATIENT)
Dept: LAB | Facility: HOSPITAL | Age: 65
End: 2020-07-28
Payer: COMMERCIAL

## 2020-07-28 DIAGNOSIS — R53.83 FATIGUE, UNSPECIFIED TYPE: ICD-10-CM

## 2020-07-28 LAB
BASOPHILS # BLD AUTO: 0.08 THOUSANDS/ΜL (ref 0–0.1)
BASOPHILS NFR BLD AUTO: 1 % (ref 0–1)
EOSINOPHIL # BLD AUTO: 0.26 THOUSAND/ΜL (ref 0–0.61)
EOSINOPHIL NFR BLD AUTO: 4 % (ref 0–6)
ERYTHROCYTE [DISTWIDTH] IN BLOOD BY AUTOMATED COUNT: 13 % (ref 11.6–15.1)
HCT VFR BLD AUTO: 35.2 % (ref 34.8–46.1)
HGB BLD-MCNC: 11.5 G/DL (ref 11.5–15.4)
IMM GRANULOCYTES # BLD AUTO: 0.02 THOUSAND/UL (ref 0–0.2)
IMM GRANULOCYTES NFR BLD AUTO: 0 % (ref 0–2)
LYMPHOCYTES # BLD AUTO: 1.91 THOUSANDS/ΜL (ref 0.6–4.47)
LYMPHOCYTES NFR BLD AUTO: 32 % (ref 14–44)
MCH RBC QN AUTO: 29.3 PG (ref 26.8–34.3)
MCHC RBC AUTO-ENTMCNC: 32.7 G/DL (ref 31.4–37.4)
MCV RBC AUTO: 90 FL (ref 82–98)
MONOCYTES # BLD AUTO: 0.46 THOUSAND/ΜL (ref 0.17–1.22)
MONOCYTES NFR BLD AUTO: 8 % (ref 4–12)
NEUTROPHILS # BLD AUTO: 3.31 THOUSANDS/ΜL (ref 1.85–7.62)
NEUTS SEG NFR BLD AUTO: 55 % (ref 43–75)
NRBC BLD AUTO-RTO: 0 /100 WBCS
PLATELET # BLD AUTO: 248 THOUSANDS/UL (ref 149–390)
PMV BLD AUTO: 11.7 FL (ref 8.9–12.7)
RBC # BLD AUTO: 3.92 MILLION/UL (ref 3.81–5.12)
WBC # BLD AUTO: 6.04 THOUSAND/UL (ref 4.31–10.16)

## 2020-07-28 PROCEDURE — 36415 COLL VENOUS BLD VENIPUNCTURE: CPT

## 2020-07-28 PROCEDURE — 85025 COMPLETE CBC W/AUTO DIFF WBC: CPT

## 2020-07-31 ENCOUNTER — PATIENT OUTREACH (OUTPATIENT)
Dept: FAMILY MEDICINE CLINIC | Facility: CLINIC | Age: 65
End: 2020-07-31

## 2020-07-31 NOTE — PROGRESS NOTES
Incoming Call  07/31/2020    Jolie called CHW on this day  Kelsea Funk accepted services and to work along with CHW to complete [employer] application  CHW explained Jolie that [employer] services are not free  Also that she could qualify for [employer]  [place] will offer free roundtrip to medical appointments  CHW will send by mail Kelsea Funk more information about [employer]  Kelsea Funk states needs to switch from [place] to another insurance but wasn't sure which one  CHW asked Kelsea Funk to please let her daughter to contact CHW at her convenience for more information  Kelsea Funk seen understood      Next follow up was scheduled on 08/07/2020

## 2020-07-31 NOTE — PROGRESS NOTES
Outgoing Call  First Attempt  07/31/2020    Community Health Worker called Jodie Jamison on this day regarding referral from Domenic Patel to assist Jolie with [facility] application  Aidee Hoffman did not answer at [facility] time  CHW left voicemail in Malay to please call back at her convenience      Next follow up was scheduled on 08/07/2020

## 2020-08-04 ENCOUNTER — HOSPITAL ENCOUNTER (OUTPATIENT)
Dept: INFUSION CENTER | Facility: CLINIC | Age: 65
Discharge: HOME | End: 2020-08-04
Payer: COMMERCIAL

## 2020-08-04 VITALS — HEART RATE: 82 BPM | RESPIRATION RATE: 18 BRPM | TEMPERATURE: 98 F

## 2020-08-04 DIAGNOSIS — Z79.4 TYPE 2 DIABETES MELLITUS WITH HYPERGLYCEMIA, WITH LONG-TERM CURRENT USE OF INSULIN (HCC): ICD-10-CM

## 2020-08-04 DIAGNOSIS — E11.65 TYPE 2 DIABETES MELLITUS WITH HYPERGLYCEMIA, WITH LONG-TERM CURRENT USE OF INSULIN (HCC): ICD-10-CM

## 2020-08-04 DIAGNOSIS — J45.50 SEVERE PERSISTENT ASTHMA WITHOUT COMPLICATION: Primary | ICD-10-CM

## 2020-08-04 PROCEDURE — 96372 THER/PROPH/DIAG INJ SC/IM: CPT

## 2020-08-04 RX ORDER — PEN NEEDLE, DIABETIC 32GX 5/32"
NEEDLE, DISPOSABLE MISCELLANEOUS
Qty: 100 EACH | Refills: 5 | Status: SHIPPED | OUTPATIENT
Start: 2020-08-04 | End: 2021-06-25 | Stop reason: SDUPTHER

## 2020-08-04 RX ORDER — EPINEPHRINE 1 MG/ML
0.3 INJECTION, SOLUTION, CONCENTRATE INTRAVENOUS AS NEEDED
Status: CANCELLED | OUTPATIENT
Start: 2020-08-18

## 2020-08-04 RX ADMIN — OMALIZUMAB 375 MG: 150 INJECTION, SOLUTION SUBCUTANEOUS at 13:03

## 2020-08-04 NOTE — PROGRESS NOTES
Pt arrived to [facility] today for Xolair injection  Pt said she did not have her Epi Pen, that she left it at home  Call made to [facility], spoke to Laura Khan (practice coordinator)  She was made aware that pt does not have Epi pen available and said that it was ok to proceed with pt's injection today  Pt received injection and stayed for 30 mins observation, and no adverse reaction noted or complaints made  Pt left in stable condition, declined AVS but aware of next appointment

## 2020-08-05 ENCOUNTER — HOSPITAL ENCOUNTER (OUTPATIENT)
Dept: INFUSION CENTER | Facility: CLINIC | Age: 65
End: 2020-08-05

## 2020-08-07 ENCOUNTER — PATIENT OUTREACH (OUTPATIENT)
Dept: FAMILY MEDICINE CLINIC | Facility: CLINIC | Age: 65
End: 2020-08-07

## 2020-08-07 NOTE — PROGRESS NOTES
Outgoing Call  08/07/2020    CHW called Jolie on this day regarding [employer] application for medical transportation and to complete enrollment with [employer]  Adelia Yeh did not answer  CHW left voicemail to please call back at her convenience      Next follow up was scheduled on 08/14/2020

## 2020-08-14 ENCOUNTER — PATIENT OUTREACH (OUTPATIENT)
Dept: FAMILY MEDICINE CLINIC | Facility: CLINIC | Age: 65
End: 2020-08-14

## 2020-08-14 NOTE — PROGRESS NOTES
Outgoing Call  08/14/2020    CHW called Jolie on this day  Kurtis Mcintosh did not answer  CHW left voicemail to please call back      Next follow up was scheduled on 08/17/2020

## 2020-08-17 ENCOUNTER — PATIENT OUTREACH (OUTPATIENT)
Dept: FAMILY MEDICINE CLINIC | Facility: CLINIC | Age: 65
End: 2020-08-17

## 2020-08-17 NOTE — PROGRESS NOTES
Outgoing Call  08/17/2020    CHW called Jolie on this day to follow up with [employer] application  Jolie's daughter, Zach Washington, will send CHW a picture of [place] [employer] and [employer]  Today CHW will meet with Jolie to have sign the application, then application will be send by mail      Next follow up will be on 08/31/2020

## 2020-08-18 ENCOUNTER — HOSPITAL ENCOUNTER (OUTPATIENT)
Dept: INFUSION CENTER | Facility: CLINIC | Age: 65
Discharge: HOME | End: 2020-08-18
Payer: COMMERCIAL

## 2020-08-18 VITALS
RESPIRATION RATE: 18 BRPM | HEART RATE: 85 BPM | DIASTOLIC BLOOD PRESSURE: 74 MMHG | TEMPERATURE: 98 F | SYSTOLIC BLOOD PRESSURE: 137 MMHG

## 2020-08-18 DIAGNOSIS — J45.50 SEVERE PERSISTENT ASTHMA WITHOUT COMPLICATION: Primary | ICD-10-CM

## 2020-08-18 PROCEDURE — 96372 THER/PROPH/DIAG INJ SC/IM: CPT

## 2020-08-18 RX ORDER — EPINEPHRINE 1 MG/ML
0.3 INJECTION, SOLUTION, CONCENTRATE INTRAVENOUS AS NEEDED
Status: CANCELLED | OUTPATIENT
Start: 2020-09-01

## 2020-08-18 RX ADMIN — OMALIZUMAB 375 MG: 150 INJECTION, SOLUTION SUBCUTANEOUS at 12:36

## 2020-08-18 NOTE — PROGRESS NOTES
Patient arrived to the infusion center with her epi pen  Offers no complaints  Xolair given in 3 injections, x2 in left arm, x1 in right arm  Pt observed for 30 minutes with no adverse events  Pt is aware of all future appointments  AVS provided

## 2020-08-25 ENCOUNTER — TELEPHONE (OUTPATIENT)
Dept: FAMILY MEDICINE CLINIC | Facility: CLINIC | Age: 65
End: 2020-08-25

## 2020-08-25 NOTE — TELEPHONE ENCOUNTER
SIGNATURES NEEDED FOR [facility] FORM RECEIVED VIA FAX  WILL BE PLACED IN YOUR BIN AT ASSIGNED DELIVERY TIMES

## 2020-08-31 ENCOUNTER — PATIENT OUTREACH (OUTPATIENT)
Dept: FAMILY MEDICINE CLINIC | Facility: CLINIC | Age: 65
End: 2020-08-31

## 2020-08-31 NOTE — PROGRESS NOTES
Outgoing Call  08/31/2020    CHW called Jolie on this day to follow up with [employer] application  Aye Fu did not answer at this time  CHW left voicemail to please call back at her convenience      Next follow up was scheduled 09/01/2020

## 2020-09-01 ENCOUNTER — PATIENT OUTREACH (OUTPATIENT)
Dept: FAMILY MEDICINE CLINIC | Facility: CLINIC | Age: 65
End: 2020-09-01

## 2020-09-01 ENCOUNTER — OFFICE VISIT (OUTPATIENT)
Dept: PULMONOLOGY | Facility: CLINIC | Age: 65
End: 2020-09-01
Payer: COMMERCIAL

## 2020-09-01 VITALS
WEIGHT: 184.4 LBS | BODY MASS INDEX: 29.63 KG/M2 | SYSTOLIC BLOOD PRESSURE: 134 MMHG | DIASTOLIC BLOOD PRESSURE: 70 MMHG | OXYGEN SATURATION: 97 % | TEMPERATURE: 97.6 F | HEART RATE: 76 BPM | HEIGHT: 66 IN

## 2020-09-01 DIAGNOSIS — I10 ESSENTIAL HYPERTENSION: ICD-10-CM

## 2020-09-01 DIAGNOSIS — Z17.0 MALIGNANT NEOPLASM OF UPPER-OUTER QUADRANT OF RIGHT BREAST IN FEMALE, ESTROGEN RECEPTOR POSITIVE (HCC): ICD-10-CM

## 2020-09-01 DIAGNOSIS — I26.99 BILATERAL PULMONARY EMBOLISM (HCC): ICD-10-CM

## 2020-09-01 DIAGNOSIS — Z79.4 TYPE 2 DIABETES MELLITUS WITH HYPERGLYCEMIA, WITH LONG-TERM CURRENT USE OF INSULIN (HCC): ICD-10-CM

## 2020-09-01 DIAGNOSIS — C50.411 MALIGNANT NEOPLASM OF UPPER-OUTER QUADRANT OF RIGHT BREAST IN FEMALE, ESTROGEN RECEPTOR POSITIVE (HCC): ICD-10-CM

## 2020-09-01 DIAGNOSIS — J45.50 SEVERE PERSISTENT ASTHMA WITHOUT COMPLICATION: Primary | ICD-10-CM

## 2020-09-01 DIAGNOSIS — I50.32 CHRONIC DIASTOLIC HEART FAILURE (HCC): ICD-10-CM

## 2020-09-01 DIAGNOSIS — J70.0 RADIATION PNEUMONITIS (HCC): ICD-10-CM

## 2020-09-01 DIAGNOSIS — E11.65 TYPE 2 DIABETES MELLITUS WITH HYPERGLYCEMIA, WITH LONG-TERM CURRENT USE OF INSULIN (HCC): ICD-10-CM

## 2020-09-01 PROCEDURE — 99215 OFFICE O/P EST HI 40 MIN: CPT | Performed by: INTERNAL MEDICINE

## 2020-09-01 PROCEDURE — 1036F TOBACCO NON-USER: CPT | Performed by: INTERNAL MEDICINE

## 2020-09-01 RX ORDER — AMLODIPINE BESYLATE 10 MG/1
10 TABLET ORAL DAILY
COMMUNITY
End: 2020-12-01

## 2020-09-01 RX ORDER — EPINEPHRINE 0.3 MG/.3ML
0.3 INJECTION SUBCUTANEOUS ONCE
Qty: 0.6 ML | Refills: 0 | Status: SHIPPED | OUTPATIENT
Start: 2020-09-01 | End: 2021-03-03

## 2020-09-01 NOTE — PATIENT INSTRUCTIONS
Try stopping your Amlodipine  If the swelling in your legs does not get better, we may need to add a "water pill"

## 2020-09-01 NOTE — ASSESSMENT & PLAN NOTE
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

## 2020-09-01 NOTE — ASSESSMENT & PLAN NOTE
- continue anastrozole daily  I reviewed the patient's medication list with her and she is taking it

## 2020-09-01 NOTE — ASSESSMENT & PLAN NOTE
Wt Readings from Last 3 Encounters:   09/01/20 83 6 kg (184 lb 6 4 oz)   07/24/20 81 6 kg (179 lb 12 8 oz)   07/22/20 81 6 kg (180 lb)       - she has grade 1 diastolic dysfunction on her echocardiogram from May 2019  She has ongoing bilateral lower extremity edema  She has not discontinued her amlodipine as was recommended at her last visit here  I advised her to stop the amlodipine and for lower extremity edema does not improve, will need to consider addition of diuretic therapy

## 2020-09-01 NOTE — PROGRESS NOTES
Incoming Call  09/01/2020    Jolie's Daughter called CHW on this day  Aye Fu states that Xavi Oneill returned application due [address] number was incomplete  CHW verified number and was not incomplete  CHW suggested to call [facility] to find out [employer] recipient number  Maria Elena Eamonroyal seen understood  Maria Elena Beltran will call at this moment  CHW will  application to drop off at [facility] office      Next follow up was scheduled on 09/09/2020

## 2020-09-01 NOTE — ASSESSMENT & PLAN NOTE
Lab Results   Component Value Date    HGBA1C 12 6 (H) 06/24/2020     - no recent steroid used for asthma  Blood sugars are uncontrolled  She has pain at the injection site from her insulin  I encouraged her to discuss this with the primary doctor as I suspect it may help her compliance if she could tolerate the injections better

## 2020-09-01 NOTE — ASSESSMENT & PLAN NOTE
- I asked her to hold her amlodipine to see for lower extremity edema improves  I instructed her to follow-up with her primary care doctor in 2 weeks for a blood pressure check  Her other antihypertensive doses may needed adjusted  - If she does not have improvement in her lower extremity edema with the discontinuation of Amlodipine, then can restart it

## 2020-09-01 NOTE — PROGRESS NOTES
Outgoing Call  09/01/2020    CHW called Jolie on this day to follow up with [employer] application  Renato Jennings did not answer at this time  CHW left voicemail to please call back at her convenience      Next follow up was scheduled on 09/09/2020

## 2020-09-02 ENCOUNTER — HOSPITAL ENCOUNTER (OUTPATIENT)
Dept: INFUSION CENTER | Facility: CLINIC | Age: 65
Discharge: HOME | End: 2020-09-02
Payer: COMMERCIAL

## 2020-09-02 VITALS — TEMPERATURE: 97.4 F

## 2020-09-02 DIAGNOSIS — J45.50 SEVERE PERSISTENT ASTHMA WITHOUT COMPLICATION: Primary | ICD-10-CM

## 2020-09-02 PROCEDURE — 96372 THER/PROPH/DIAG INJ SC/IM: CPT

## 2020-09-02 RX ORDER — EPINEPHRINE 1 MG/ML
0.3 INJECTION, SOLUTION, CONCENTRATE INTRAVENOUS AS NEEDED
Status: CANCELLED | OUTPATIENT
Start: 2020-09-15

## 2020-09-02 RX ADMIN — OMALIZUMAB 375 MG: 150 INJECTION, SOLUTION SUBCUTANEOUS at 12:25

## 2020-09-02 NOTE — PROGRESS NOTES
Patient arrived to the infusion center with her epi pen  Xolair given x2 injections in right arm, x1 injection in left arm  Pt observed for 30 minutes post injections with no adverse events  Pt is aware of all future appointments   Refused AVS

## 2020-09-09 ENCOUNTER — PATIENT OUTREACH (OUTPATIENT)
Dept: FAMILY MEDICINE CLINIC | Facility: CLINIC | Age: 65
End: 2020-09-09

## 2020-09-09 NOTE — PROGRESS NOTES
09/09/2020    CHW fixed [employer] application on this day  [employer] application will be drop off at the main office today      CHW will follow up with Minerva and Chet Opitz on 09/16/2020

## 2020-09-16 ENCOUNTER — HOSPITAL ENCOUNTER (OUTPATIENT)
Dept: INFUSION CENTER | Facility: CLINIC | Age: 65
Discharge: HOME | End: 2020-09-16
Payer: COMMERCIAL

## 2020-09-16 VITALS
HEART RATE: 77 BPM | RESPIRATION RATE: 16 BRPM | SYSTOLIC BLOOD PRESSURE: 146 MMHG | DIASTOLIC BLOOD PRESSURE: 88 MMHG | TEMPERATURE: 98.3 F

## 2020-09-16 DIAGNOSIS — J45.50 SEVERE PERSISTENT ASTHMA WITHOUT COMPLICATION: Primary | ICD-10-CM

## 2020-09-16 PROCEDURE — 96372 THER/PROPH/DIAG INJ SC/IM: CPT

## 2020-09-16 RX ORDER — EPINEPHRINE 1 MG/ML
0.3 INJECTION, SOLUTION, CONCENTRATE INTRAVENOUS AS NEEDED
Status: CANCELLED | OUTPATIENT
Start: 2020-09-29

## 2020-09-16 RX ADMIN — OMALIZUMAB 375 MG: 150 INJECTION, SOLUTION SUBCUTANEOUS at 12:12

## 2020-09-17 ENCOUNTER — PATIENT OUTREACH (OUTPATIENT)
Dept: FAMILY MEDICINE CLINIC | Facility: CLINIC | Age: 65
End: 2020-09-17

## 2020-09-17 NOTE — PROGRESS NOTES
Outgoing Call  09/17/2020    CHW called Jolie on this day to follow up with [employer] application  Michelle Damon stated had move and new address is [address] [facility]  [address] [facility] [place]  [address]  Also CHW and Michelle Damon called (3 way call) Will Eitan for an update on application  Will Formosa states that Jolie needs to schedule Physical Assessment with Srinath due she missed phone call  We called Srinath and not answer at this time  CHW left voicemail to please call back CHW at their convenience to schedule the appointment      Next follow up was scheduled on 09/22/2020

## 2020-09-21 DIAGNOSIS — I10 ESSENTIAL HYPERTENSION: ICD-10-CM

## 2020-09-21 RX ORDER — OMEPRAZOLE 20 MG/1
20 CAPSULE, DELAYED RELEASE ORAL DAILY
Qty: 90 CAPSULE | Refills: 2 | Status: SHIPPED | OUTPATIENT
Start: 2020-09-21 | End: 2021-06-07

## 2020-09-23 ENCOUNTER — PATIENT OUTREACH (OUTPATIENT)
Dept: FAMILY MEDICINE CLINIC | Facility: CLINIC | Age: 65
End: 2020-09-23

## 2020-09-23 NOTE — PROGRESS NOTES
Outgoing Call  09/23/2020    CHW called Jolie on this day to follow up with Jacob Dave stats that had not received call to scheduled Physical Evaluation  CHW will call Srinath to scheduled Assessment      Next follow up was scheduled on 09/30/2020

## 2020-09-30 ENCOUNTER — CLINICAL SUPPORT (OUTPATIENT)
Dept: RADIATION ONCOLOGY | Facility: HOSPITAL | Age: 65
End: 2020-09-30
Attending: RADIOLOGY

## 2020-09-30 ENCOUNTER — PATIENT OUTREACH (OUTPATIENT)
Dept: FAMILY MEDICINE CLINIC | Facility: CLINIC | Age: 65
End: 2020-09-30

## 2020-09-30 ENCOUNTER — TELEMEDICINE (OUTPATIENT)
Dept: RADIATION ONCOLOGY | Facility: HOSPITAL | Age: 65
End: 2020-09-30
Attending: RADIOLOGY

## 2020-09-30 DIAGNOSIS — Z17.0 MALIGNANT NEOPLASM OF RIGHT BREAST IN FEMALE, ESTROGEN RECEPTOR POSITIVE, UNSPECIFIED SITE OF BREAST (HCC): Primary | ICD-10-CM

## 2020-09-30 DIAGNOSIS — Z17.0 MALIGNANT NEOPLASM OF UPPER-OUTER QUADRANT OF RIGHT BREAST IN FEMALE, ESTROGEN RECEPTOR POSITIVE (HCC): ICD-10-CM

## 2020-09-30 DIAGNOSIS — Z17.0 MALIGNANT NEOPLASM OF UPPER-OUTER QUADRANT OF RIGHT BREAST IN FEMALE, ESTROGEN RECEPTOR POSITIVE (HCC): Primary | ICD-10-CM

## 2020-09-30 DIAGNOSIS — C50.911 MALIGNANT NEOPLASM OF RIGHT BREAST IN FEMALE, ESTROGEN RECEPTOR POSITIVE, UNSPECIFIED SITE OF BREAST (HCC): Primary | ICD-10-CM

## 2020-09-30 DIAGNOSIS — C50.411 MALIGNANT NEOPLASM OF UPPER-OUTER QUADRANT OF RIGHT BREAST IN FEMALE, ESTROGEN RECEPTOR POSITIVE (HCC): ICD-10-CM

## 2020-09-30 DIAGNOSIS — C50.411 MALIGNANT NEOPLASM OF UPPER-OUTER QUADRANT OF RIGHT BREAST IN FEMALE, ESTROGEN RECEPTOR POSITIVE (HCC): Primary | ICD-10-CM

## 2020-09-30 NOTE — PROGRESS NOTES
Virtual Brief Visit    Problem List Items Addressed This Visit     None                Reason for visit is  Radiation follow up    Encounter provider Linnea Jackson MD    Provider located at [address] [address]      Recent Visits  No visits were found meeting these conditions  Showing recent visits within past 7 days and meeting all other requirements     Today's Visits  Date Type Provider Dept   09/30/20 Paula Cadet MD [facility]   Showing today's visits and meeting all other requirements     Future Appointments  No visits were found meeting these conditions  Showing future appointments within next 150 days and meeting all other requirements        After connecting through telephone, the patient was identified by name and date of birth  Kirsty Begum was informed that this is a telemedicine visit and that the visit is being conducted through telephone  My office door was closed  No one else was in the room  She acknowledged consent and understanding of privacy and security of the video platform  The patient has agreed to participate and understands they can discontinue the visit at any time  It was my intent to perform this visit via video technology but the patient was not able to do a video connection so the visit was completed via audio telephone only  Patient's daughter, Saritha Mazariegos was present during the telemedicine visit and assisted with translating for patient  Patient is aware this is a billable service  Mikki Ferrara is a 62 y o  female     62year old female with stage IA T1c N0 grade 2 invasive ductal carcinoma of the right breast  Her tumor was ER/HI and HER2 positive  She had evidence of extensive DCIS spanning approximately 2 6 centimeters   Her initial margin of resection was positive however she underwent re-excision with final negative margins  In addition 7 lymph nodes return negative for malignancy  She completed her adjuvant chemotherapy course on 12/11/18 and Herceptin monotherapy on 8/6/19  She completed a course of radiation therapy to the right breast on 2/19/19  She is seen today for routine follow up  She was last seen for telemedicine visit on 4/14/20 4/20/20 Surgical Onc, Girish Costa, [facility] follow up  Complaints:   Bilateral chest wall discomfort -  recommended stretching, heat/ice, NSAIDS if able to tolerate  Decreased ROM in right shoulder - refer to PT for evaluation  Right breast tenderness, skin thickening/scar tissue -  recommended supportive bra  Due for mammogram in June 5/6/20 PT evaluation for decreased right shoulder ROM and pain - started PT 5/7/20 5/7/20 Dr Naina Cedeno follow up  Adjuvant hormonal therapy with anastrozole with excellent tolerance, continue    6/3/20 3D diagnositc bilateral mammogram  IMPRESSION:   1  Stable bilateral diagnostic mammogram in this post right lumpectomy patient  2  Bilateral diagnostic mammography in 1 year recommended  ASSESSMENT/BI-RADS CATEGORY:  Left: 1 - Negative  Right: 2 - Benign  Overall: 2 - Benign      Today, she reports pinching type pain in both breasts, intermittent, can be #8/10  She reports that approximately 3 weeks ago, she had one episode were "something came out" of the right breast, next to the right nipple, states it was white  Denies bloody nipple drainage  She reports that she received a letter after her mammogram to follow up with the breast surgeon, now she's worried that they found something wrong on her mammogram from June  She completed PT for right shoulder, states it helped a little bit  She complains of tightness in her hands and occasional swelling of hands  She continues to take anastrozole daily         Upcoming:  10/23/20 Dr Jay Starkey  11/6/20 Dr Naina Cedeno      Past Medical History:   Diagnosis Date    Abdominal infection ([address] ) h-pylori    Abnormal chest x-ray 4/5/2019    Asthma     Breast cancer ([facility] ) 06/26/2018    Cancer ([facility] )     BREAST    Diabetes mellitus ([place] [address] )     Hiatal hernia     History of chemotherapy     History of radiation therapy     Hypercholesterolemia     Hypertension     Hypothyroid     Renal disorder     Rheumatoid arthritis ([facility] )        Past Surgical History:   Procedure Laterality Date    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    BREAST LUMPECTOMY Right 6/26/2018    Procedure: RIGHT BREAST NEEDLE LOCALIZATION X2 WITH RIGHT BREAST LUMPECTOMY ( NEEDLE LOC @ 1000); Surgeon: Christiano Stoddard MD;  [facility];  Service: Surgical Oncology    BREAST LUMPECTOMY Right 8/2/2018    Procedure: LYMPHOSCINITGRAPHY INTRAOPERATIVE LYMPHATIC MAPPING , RIGHT SENTINEL NODE BIOPSY, REEXCISION  RIGHT BREAST LUMPECTOMY CAVITY (SUPERIOR MARGIN); Surgeon: Christiano Stoddard MD;  [facility];  Service: Surgical Oncology    BREAST SURGERY Left     CATARACT EXTRACTION, BILATERAL Bilateral     EGD AND COLONOSCOPY N/A 9/5/2018    Procedure: EGD AND COLONOSCOPY;  Surgeon: Adal Jackson MD;  [facility]; Service: Gastroenterology    [facility] GUIDED CENTRAL VENOUS ACCESS DEVICE INSERTION  9/13/2018    KNEE SURGERY Right     MAMMO NEEDLE LOCALIZATION RIGHT (ALL INC) Right 6/26/2018    MAMMO STEREOTACTIC BREAST BIOPSY RIGHT (ALL INC) Right 5/23/2018    RETINAL DETACHMENT SURGERY Right     TUBAL LIGATION      TUNNELED VENOUS PORT PLACEMENT Left 9/13/2018    Procedure: INSERTION VENOUS PORT (PORT-A-CATH);   Surgeon: Christiano Stoddard MD;  [facility];  Service: Surgical Oncology       Current Outpatient Medications   Medication Sig Dispense Refill    albuterol (2 5 mg/3 mL) 0 083 % nebulizer solution Take 1 vial (2 5 mg total) by nebulization every 6 (six) hours as needed for wheezing or shortness of breath 120 vial 5    albuterol (PROVENTIL HFA,VENTOLIN HFA) 90 mcg/act inhaler Inhale 1 puff every 4 (four) hours as needed        amLODIPine (NORVASC) 10 mg tablet Take 10 mg by mouth daily      anastrozole (ARIMIDEX) 1 mg tablet Take 1 tablet (1 mg total) by mouth daily 90 tablet 3    aspirin 81 mg chewable tablet Chew 81 mg daily      atorvastatin (LIPITOR) 10 mg tablet Take 1 tablet (10 mg total) by mouth daily 30 tablet 2    Blood Glucose Monitoring Suppl (ONE TOUCH ULTRA 2) w/Device KIT by Does not apply route 3 (three) times a day 1 each 0    fluticasone-umeclidinium-vilanterol (TRELEGY) 100-62 5-25 MCG/INH inhaler Inhale 1 puff daily Rinse mouth after use   1 Inhaler 2    glucose blood (ONE TOUCH ULTRA TEST) test strip 1 each by Other route 3 (three) times a day 100 each 5    insulin detemir (Levemir FlexTouch) 100 Units/mL injection pen Inject 45 Units under the skin daily at bedtime 4 pen 5    insulin lispro (HumaLOG) 100 units/mL injection pen Inject 16 Units under the skin 3 (three) times a day with meals Inject 16 units TID 5 pen 5    Insulin Pen Needle (BD Pen Needle Emily U/F) 32G X 4 MM MISC USE FOUR (4) TIMES A  each 5    levothyroxine 50 mcg tablet Take 1 tablet (50 mcg total) by mouth daily 90 tablet 1    losartan (COZAAR) 50 mg tablet Take 1 tablet (50 mg total) by mouth daily 90 tablet 1    metoprolol tartrate (LOPRESSOR) 25 mg tablet Take 0 5 tablets (12 5 mg total) by mouth every 12 (twelve) hours 60 tablet 2    NOVOLOG FLEXPEN 100 units/mL SOPN Patient injects 16 units three times a day 5 pen 3    omeprazole (PriLOSEC) 20 mg delayed release capsule Take 1 capsule (20 mg total) by mouth daily 90 capsule 2    OneTouch Delica Lancets 32W MISC by Does not apply route 3 (three) times a day 100 each 5    DULoxetine (CYMBALTA) 30 mg delayed release capsule TAKE 1 CAPSULE BY MOUTH 2 (TWICE)  A DAY (Patient not taking: Reported on 9/30/2020) 180 capsule 1    EPINEPHrine (EPIPEN) 0 3 mg/0 3 mL SOAJ Inject 0 3 mL (0 3 mg total) into a muscle once for 1 dose 0 6 mL 0    EPINEPHrine (EPIPEN) 0 3 mg/0 3 mL SOAJ Inject 0 3 mL (0 3 mg total) into a muscle once for 1 dose 0 6 mL 0    gemfibrozil (LOPID) 600 mg tablet Take 600 mg by mouth daily      Insulin Aspart FlexPen 100 UNIT/ML SOPN Inject 16 Units under the skin 3 (three) times a day before meals      oxyCODONE (ROXICODONE) 5 mg immediate release tablet Take 5 mg by mouth every 6 (six) hours as needed for moderate pain       No current facility-administered medications for this visit  Allergies   Allergen Reactions    Aspirin Hives       in [address] told patient not to take aspirin r/t kidneys     Tylenol With Codeine #3 [Acetaminophen-Codeine] Rash         I spent minutes with the patient during this visit  Follow up visit       Oncology History   Malignant neoplasm of right female breast ([facility] )   5/23/2018 Initial Diagnosis    Malign[facility] right female breast [facility] )     5/23/2018 Biopsy    Right breast core biopsy:  DCIS, micropapillary type, lowintermediate nuclear grade  ER 90-95% positive,  KS 75-80% positive       6/26/2018 Surgery    RIGHT BREAST NEEDLE LOCALIZATION X2 WITH RIGHT BREAST LUMPECTOMY ( NEEDLE LOC @ 1000) (Right)   ONCOPLASTIC CLOSURE OF LUMPECTOMY CAVITY    Invasive breast carcinoma, NST (aka ductal)  * Ana Paula grade 2 of 3 (total score = 2+2+2 = 6 of 9)   -- tubule formation < 10%, score 3   -- nuclear grade 2 of 3, score 2   -- mitoses ~ 4/mm2, score 2    * invasive carcinoma is multifocally present (A23-1, A32-1, A84-1, A89-1, A100-1), largest focus is 14 millimeters in greatest dimension (A89-1, this focus is graded)  * superior lumpectomy margin (orange-inked) is POSITIVE for invasive carcinoma (A84-1)   * all other lumpectomy margins are free of invasive carcinoma  * estrogen, progesterone & Her-2/negrita receptor studies are undertaken, to be described in an addendum report  - Ductal carcinoma in-situ (DCIS): Present as an extensive component (~85% of total tumor)     * DCIS is co-located with invasive carcinoma surrounding prior needle biopsy site  * DCIS spans 2 6 cm maximal dimension (A62-1) and is present on 27 of 136 total slides examined  * DCIS has cribriform & micropapillary patterns, nuclear grade 2 of 3, with central comedo-type necrosis  * DCIS is present within 0 2 millimeters of the superior lumpectomy margin (orange-inked, A26-1)   * DCIS is present within 0 2 millimeters of the medial lumpectomy margin (yellow-inked, A3-1)   * all other lumpectomy margins are free of DCIS by > 2 millimeters  - Lymph-vascular invasion: no lymph-vascular invasion is unequivocally identified  - Microcalcifications: present in DCIS  % positive, ER 45-55% positive, HER2 by IHC 3+ positive    - Dr Mook Hernandez       8/2/2018 Surgery    Right breast lumpectomy reexcision, right axilla SLN biopsy:  A  Submitted as right axillary sentinel node:  - Seven lymph nodes are identified showing no metastatic tumor      B   Right breast lumpectomy reexcision:  - Abundant reactive changes are seen around the previous lumpectomy cavity   - No residual atypia or malignancy is seen           8/2/2018 -  Cancer Staged    Stage IA - pT1c, pN0, G2        9/25/2018 - 8/6/2019 Chemotherapy    Weekly Paclitaxol and trastuzumab x12, until December 11, 2018 followed by trastuzumab monotherapy 6 milligram/kilogram every 3 weeks    - Dr Ivana Lunsford       1/9/2019 - 2/19/2019 Radiation    Plan ID Energy Fractions Dose per Fraction (cGy) Dose Correction (cGy) Total Dose Delivered (cGy) Elapsed Days   R Boost e 16E 5 / 5 200 0 1,000 6   Right Breast 6X 25 / 25 200 0 5,000 29     - Dr Natali Blair       Malignant neoplasm of upper-outer quadrant of right breast in female, estrogen receptor positive ([facility] )   7/19/2018 Initial Diagnosis    Malignant neoplasm of upper-outer quadrant of right breast in female, estrogen receptor positive ([address] )     12/17/2018 - 8/26/2019 Chemotherapy    trastuzumab (HERCEPTIN) 579 mg in sodium chloride 0 9 % 250 mL chemo infusion, 8 mg/kg, Intravenous, Once, 12 of 12 cycles  Administration: 434 mg (5/14/2019), 434 mg (6/4/2019), 450 mg (7/16/2019), 450 mg (8/6/2019), 450 mg (6/25/2019)         Clinical Trial: no      Health Maintenance   Topic Date Due    Medicare Annual Wellness Visit (AWV)  1955    Pneumococcal Vaccine: Pediatrics (0 to 5 Years) and At-Risk Patients (6 to 59 Years) (1 of 1 - PPSV23) 10/29/1962    DM Eye Exam  10/29/1966    HIV Screening  10/29/1971    DTaP,Tdap,and Td Vaccines (1 - Tdap) 10/29/1977    Cervical Cancer Screening  10/29/1977    Influenza Vaccine  07/01/2020    HEMOGLOBIN A1C  09/24/2020    BMI: Followup Plan  01/07/2021    Diabetic Foot Exam  03/11/2021    Depression Screening PHQ  04/14/2021    BMI: Adult  09/01/2021    MAMMOGRAM  06/23/2022    Colorectal Cancer Screening  09/05/2028    Hepatitis C Screening  Completed    HIB Vaccine  Aged Out    Hepatitis B Vaccine  Aged Out    IPV Vaccine  Aged Out    Hepatitis A Vaccine  Aged Out    Meningococcal ACWY Vaccine  Aged Out    HPV Vaccine  Aged Out       Patient Active Problem List   Diagnosis    Type 2 diabetes mellitus with complication, with long-term current use of insulin ([address] )    Asthma    Other specified hypothyroidism    Chest pain    Palpitations    Chronic bilateral low back pain without sciatica    Neck pain    Malignant neoplasm of right female breast ([address] )    Malignant neoplasm of upper-outer quadrant of right breast in female, estrogen receptor positive ([address] )    Hiatal hernia    Screening for colon cancer    Esophageal dysphagia    Cellulitis of right axilla    Chronic pain of right knee    Hypercholesterolemia    Hypothyroid    Hypertension    Bilateral pulmonary embolism (HCC)    Radiation pneumonitis ([address] )    Type 2 diabetes mellitus with hyperglycemia, with long-term current use of insulin ([address] )    Diabetic polyneuropathy associated with type 2 diabetes mellitus ([facility] )    Overweight with body mass index (BMI) of 29 to 29 9 in adult    Use of anastrozole (Arimidex)    Decreased ROM of right shoulder    Lump of skin    Chronic diastolic heart failure (HCC)     Past Medical History:   Diagnosis Date    [facility]     h-pylori    Abnormal chest x-ray 4/5/2019    Asthma     Breast cancer [facility] ) 06/26/2018    Cancer ([facility] )     BREAST    Diabetes mellitus ([facility] )     Hiatal hernia     History of chemotherapy     History of radiation therapy     Hypercholesterolemia     Hypertension     Hypothyroid     Renal disorder     Rheumatoid arthritis ([facility] )      Past Surgical History:   Procedure Laterality Date    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    BREAST LUMPECTOMY Right 6/26/2018    Procedure: RIGHT BREAST NEEDLE LOCALIZATION X2 WITH RIGHT BREAST LUMPECTOMY ( NEEDLE LOC @ 1000); Surgeon: Bao Caro MD;  [facility];  Service: Surgical Oncology    BREAST LUMPECTOMY Right 8/2/2018    Procedure: LYMPHOSCINITGRAPHY INTRAOPERATIVE LYMPHATIC MAPPING , RIGHT SENTINEL NODE BIOPSY, REEXCISION  RIGHT BREAST LUMPECTOMY CAVITY (SUPERIOR MARGIN); Surgeon: Bao Caro MD;  [facility];  Service: Surgical Oncology    BREAST SURGERY Left     CATARACT EXTRACTION, BILATERAL Bilateral     EGD AND COLONOSCOPY N/A 9/5/2018    Procedure: EGD AND COLONOSCOPY;  Surgeon: Manny Fernandez MD;  Location: [facility]; Service: Gastroenterology    [facility] GUIDED CENTRAL VENOUS ACCESS DEVICE INSERTION  9/13/2018    KNEE SURGERY Right     MAMMO NEEDLE LOCALIZATION RIGHT (ALL INC) Right 6/26/2018    MAMMO STEREOTACTIC BREAST BIOPSY RIGHT (ALL INC) Right 5/23/2018    RETINAL DETACHMENT SURGERY Right     TUBAL LIGATION      TUNNELED VENOUS PORT PLACEMENT Left 9/13/2018    Procedure: INSERTION VENOUS PORT (PORT-A-CATH);   Surgeon: Bao Caro MD;  [facility];  Service: Surgical Oncology     Family History   Problem Relation Age of Onset    Diabetes Mother     Hypertension Mother     Diabetes Father     Hypertension Father     Diabetes Brother     Hypertension Brother     Prostate cancer Brother     Cancer Maternal Grandfather     No Known Problems Sister     No Known Problems Daughter     No Known Problems Sister     No Known Problems Sister     No Known Problems Sister     No Known Problems Sister     No Known Problems Daughter     No Known Problems Daughter      Social History     Socioeconomic History    Marital status:      Spouse name: Not on file    Number of children: Not on file    Years of education: Not on file    Highest education level: Not on file   Occupational History    Not on file   Social Needs    Financial resource strain: Not on file    Food insecurity     Worry: Not on file     Inability: Not on file   [employer] needs     Medical: Not on file     Non-medical: Not on file   Tobacco Use    Smoking status: Never Smoker    Smokeless tobacco: Never Used   Substance and Sexual Activity    Alcohol use: No    Drug use: No    Sexual activity: Not on file   Lifestyle    Physical activity     Days per week: Not on file     Minutes per session: Not on file    Stress: Not on file   Relationships    Social connections     Talks on phone: Not on file     Gets together: Not on file     Attends [employer] service: Not on file     Active member of club or organization: Not on file     Attends meetings of clubs or organizations: Not on file     Relationship status: Not on file    Intimate partner violence     Fear of current or ex partner: Not on file     Emotionally abused: Not on file     Physically abused: Not on file     Forced sexual activity: Not on file   Other Topics Concern    Not on file   Social History Narrative    Not on file       Current Outpatient Medications:     albuterol (2 5 mg/3 mL) 0 083 % nebulizer solution, Take 1 vial (2 5 mg total) by nebulization every 6 (six) hours as needed for wheezing or shortness of breath, Disp: 120 vial, Rfl: 5    albuterol (PROVENTIL HFA,VENTOLIN HFA) 90 mcg/act inhaler, Inhale 1 puff every 4 (four) hours as needed  , Disp: , Rfl:     amLODIPine (NORVASC) 10 mg tablet, Take 10 mg by mouth daily, Disp: , Rfl:     anastrozole (ARIMIDEX) 1 mg tablet, Take 1 tablet (1 mg total) by mouth daily, Disp: 90 tablet, Rfl: 3    aspirin 81 mg chewable tablet, Chew 81 mg daily, Disp: , Rfl:     atorvastatin (LIPITOR) 10 mg tablet, Take 1 tablet (10 mg total) by mouth daily, Disp: 30 tablet, Rfl: 2    Blood Glucose Monitoring Suppl (ONE TOUCH ULTRA 2) w/Device KIT, by Does not apply route 3 (three) times a day, Disp: 1 each, Rfl: 0    fluticasone-umeclidinium-vilanterol (TRELEGY) 100-62 5-25 MCG/INH inhaler, Inhale 1 puff daily Rinse mouth after use , Disp: 1 Inhaler, Rfl: 2    glucose blood (ONE TOUCH ULTRA TEST) test strip, 1 each by Other route 3 (three) times a day, Disp: 100 each, Rfl: 5    insulin detemir (Levemir FlexTouch) 100 Units/mL injection pen, Inject 45 Units under the skin daily at bedtime, Disp: 4 pen, Rfl: 5    insulin lispro (HumaLOG) 100 units/mL injection pen, Inject 16 Units under the skin 3 (three) times a day with meals Inject 16 units TID, Disp: 5 pen, Rfl: 5    Insulin Pen Needle (BD Pen Needle Emily U/F) 32G X 4 MM MISC, USE FOUR (4) TIMES A DAY, Disp: 100 each, Rfl: 5    levothyroxine 50 mcg tablet, Take 1 tablet (50 mcg total) by mouth daily, Disp: 90 tablet, Rfl: 1    losartan (COZAAR) 50 mg tablet, Take 1 tablet (50 mg total) by mouth daily, Disp: 90 tablet, Rfl: 1    metoprolol tartrate (LOPRESSOR) 25 mg tablet, Take 0 5 tablets (12 5 mg total) by mouth every 12 (twelve) hours, Disp: 60 tablet, Rfl: 2    NOVOLOG FLEXPEN 100 units/mL SOPN, Patient injects 16 units three times a day, Disp: 5 pen, Rfl: 3    omeprazole (PriLOSEC) 20 mg delayed release capsule, Take 1 capsule (20 mg total) by mouth daily, Disp: 90 capsule, Rfl: 2    OneTouch Delica Lancets 09R MISC, by Does not apply route 3 (three) times a day, Disp: 100 each, Rfl: 5    DULoxetine (CYMBALTA) 30 mg delayed release capsule, TAKE 1 CAPSULE BY MOUTH 2 (TWICE)  A DAY (Patient not taking: Reported on 9/30/2020), Disp: 180 capsule, Rfl: 1    EPINEPHrine (EPIPEN) 0 3 mg/0 3 mL SOAJ, Inject 0 3 mL (0 3 mg total) into a muscle once for 1 dose, Disp: 0 6 mL, Rfl: 0    EPINEPHrine (EPIPEN) 0 3 mg/0 3 mL SOAJ, Inject 0 3 mL (0 3 mg total) into a muscle once for 1 dose, Disp: 0 6 mL, Rfl: 0    gemfibrozil (LOPID) 600 mg tablet, Take 600 mg by mouth daily, Disp: , Rfl:     Insulin Aspart FlexPen 100 UNIT/ML SOPN, Inject 16 Units under the skin 3 (three) times a day before meals, Disp: , Rfl:     oxyCODONE (ROXICODONE) 5 mg immediate release tablet, Take 5 mg by mouth every 6 (six) hours as needed for moderate pain, Disp: , Rfl:   Allergies   Allergen Reactions    Aspirin Hives     Dr  in [address] told patient not to take aspirin r/t kidneys     Tylenol With Codeine #3 [Acetaminophen-Codeine] Rash       Review of Systems:  Review of Systems   Constitutional: Negative for appetite change, chills, fatigue, fever and unexpected weight change  HENT: Negative  Eyes: Negative  Respiratory: Positive for wheezing (states wheezing is starting again, occurring intermittently over the past month)  Negative for cough and shortness of breath  Cardiovascular: Positive for leg swelling  Gastrointestinal: Positive for diarrhea (sometimes due to her medication)  Endocrine: Negative  Genitourinary: Positive for difficulty urinating (has sensation of need to void, but voids small amounts)  Negative for dysuria, hematuria and vaginal bleeding  Occasional urinary leaking   Musculoskeletal: Negative  C/o knee pain; occasional cramping in her feet at night; Occasional tightness in her fingers  Skin: Negative      Allergic/Immunologic: Negative  Neurological: Negative  Hematological: Negative  Psychiatric/Behavioral: The patient is nervous/anxious  There were no vitals filed for this visit  Imaging:No results found      Teaching

## 2020-09-30 NOTE — PROGRESS NOTES
Outgoing Call  09/30/2020    CHW called Jolie on this day to [facility]   CHW informed Rachel Jimenez that evaluation was scheduled on October 8th at 11:30am  And transportation will be provided  Rex Fierro stated that she moved to a new placed and address needs to be updated  Ashutoshfrancisco Cortez stated that her daughter called the Physical evaluation line and left message to update address      Next follow up was scheduled on 10/08/2020

## 2020-09-30 NOTE — PROGRESS NOTES
Follow-up - Radiation Oncology   Jolie Kirkland 1955 62 y o  female [id number]      History of Present Illness   Cancer Staging  Malignant neoplasm of upper-outer quadrant of right breast in female, estrogen receptor positive ([facility] )  Staging form: Breast, AJCC 8th Edition  - Clinical: No stage assigned - Unsigned  - Pathologic: Stage IA (pT1c, pN0, cM0, G2, ER: Positive, VA: Positive, HER2: Positive) - Signed by Linnea Jackson MD on 8/21/2018  Laterality: Right  Histologic grading system: 3 grade system        After connecting through telephone, the patient was identified by name and date of birth  Kirsty Begum was informed that this is a telemedicine visit and that the visit is being conducted through telephone  My office door was closed  No one else was in the room  She acknowledged consent and understanding of privacy and security of the video platform  The patient has agreed to participate and understands they can discontinue the visit at any time      It was my intent to perform this visit via video technology but the patient was not able to do a video connection so the visit was completed via audio telephone only      Patient's daughter, Saritha Mazariegos was present during the telemedicine visit and assisted with translating for patient       Patient is aware this is a billable service         Interval History:  62year old female with stage IA T1c N0 grade 2 invasive ductal carcinoma of the right breast  Her tumor was ER/VA and HER2 positive   She had evidence of extensive DCIS spanning approximately 2 6 centimeters  Her initial margin of resection was positive however she underwent re-excision with final negative margins  In addition 7 lymph nodes return negative for malignancy  She completed her adjuvant chemotherapy course on 12/11/18 and Herceptin monotherapy on 8/6/19       She completed a course of radiation therapy to the right breast on 2/19/19  She is seen today for routine follow up   She was last seen for telemedicine visit on 4/14/20 4/20/20 Surgical Onc, ARIN Sigala follow up  Complaints:   Bilateral chest wall discomfort -  recommended stretching, heat/ice, NSAIDS if able to tolerate  Decreased ROM in right shoulder - refer to PT for evaluation  Right breast tenderness, skin thickening/scar tissue -  recommended supportive bra  Due for mammogram in June 5/6/20 PT evaluation for decreased right shoulder ROM and pain - started PT 5/7/20 5/7/20 Dr Eric Bo follow up  Adjuvant hormonal therapy with anastrozole with excellent tolerance, continue     6/3/20 3D diagnositc bilateral mammogram  IMPRESSION:   1  Stable bilateral diagnostic mammogram in this post right lumpectomy patient  2  Bilateral diagnostic mammography in 1 year recommended      ASSESSMENT/BI-RADS CATEGORY:  Left: 1 - Negative  Right: 2 - Benign  Overall: 2 - Benign        Today, she reports pinching type pain in both breasts, intermittent, can be #8/10  She reports that approximately 3 weeks ago, she had one episode were "something came out" of the right breast, next to the right nipple, states it was white  Denies bloody nipple drainage  She reports that she received a letter after her mammogram to follow up with the breast surgeon, now she's worried that they found something wrong on her mammogram from June       She completed PT for right shoulder, states it helped a little bit  She complains of tightness in her hands and occasional swelling of hands     She continues to take anastrozole daily          Upcoming:  10/23/20 Dr Erica Stapleton  11/6/20 Dr Audra Andino   Oncology History   Malignant neoplasm of right female breast [facility])   5/23/2018 Initial Diagnosis    Malignant neoplasm of right female breast ([address] )     5/23/2018 Biopsy    Right breast core biopsy:  DCIS, micropapillary type, lowintermediate nuclear grade  ER 90-95% positive,  NC 75-80% positive       6/26/2018 Surgery    RIGHT BREAST NEEDLE LOCALIZATION X2 WITH RIGHT BREAST LUMPECTOMY ( NEEDLE LOC @ 1000) (Right)   ONCOPLASTIC CLOSURE OF LUMPECTOMY CAVITY    Invasive breast carcinoma, NST (aka ductal)  * [place] grade 2 of 3 (total score = 2+2+2 = 6 of 9)   -- tubule formation < 10%, score 3   -- nuclear grade 2 of 3, score 2   -- mitoses ~ 4/mm2, score 2    * invasive carcinoma is multifocally present (A23-1, A32-1, A84-1, A89-1, A100-1), largest focus is 14 millimeters in greatest dimension (A89-1, this focus is graded)  * superior lumpectomy margin (orange-inked) is POSITIVE for invasive carcinoma (A84-1)   * all other lumpectomy margins are free of invasive carcinoma  * estrogen, progesterone & Her-2/negrita receptor studies are undertaken, to be described in an addendum report  - Ductal carcinoma in-situ (DCIS): Present as an extensive component (~85% of total tumor)  * DCIS is co-located with invasive carcinoma surrounding prior needle biopsy site  * DCIS spans 2 6 cm maximal dimension (A62-1) and is present on 27 of 136 total slides examined  * DCIS has cribriform & micropapillary patterns, nuclear grade 2 of 3, with central comedo-type necrosis  * DCIS is present within 0 2 millimeters of the superior lumpectomy margin (orange-inked, A26-1)   * DCIS is present within 0 2 millimeters of the medial lumpectomy margin (yellow-inked, A3-1)   * all other lumpectomy margins are free of DCIS by > 2 millimeters  - Lymph-vascular invasion: no lymph-vascular invasion is unequivocally identified  - Microcalcifications: present in DCIS  % positive, ER 45-55% positive, HER2 by IHC 3+ positive    - Dr Maricruz Harrison       8/2/2018 Surgery    Right breast lumpectomy reexcision, right axilla SLN biopsy:  A  Submitted as right axillary sentinel node:  - Seven lymph nodes are identified showing no metastatic tumor      B   Right breast lumpectomy reexcision:  - Abundant reactive changes are seen around the previous lumpectomy cavity   - No residual atypia or malignancy is seen           8/2/2018 -  Cancer Staged    Stage IA - pT1c, pN0, G2        9/25/2018 - 8/6/2019 Chemotherapy    Weekly Paclitaxol and trastuzumab x12, until December 11, 2018 followed by trastuzumab monotherapy 6 milligram/kilogram every 3 weeks    - Dr Monisha Haq       1/9/2019 - 2/19/2019 Radiation    Plan ID Energy Fractions Dose per Fraction (cGy) Dose Correction (cGy) Total Dose Delivered (cGy) Elapsed Days   R Boost e 16E 5 / 5 200 0 1,000 6   Right Breast 6X 25 / 25 200 0 5,000 29     - Dr Maira Banegas       Malignant neoplasm of upper-outer quadrant of right breast in female, estrogen receptor positive ([facility] [address] )   7/19/2018 Initial Diagnosis    Malignant neoplasm of upper-outer quadrant of right breast in female, estrogen receptor positive ([address] )     12/17/2018 - 8/26/2019 Chemotherapy    trastuzumab (HERCEPTIN) 579 mg in sodium chloride 0 9 % 250 mL chemo infusion, 8 mg/kg, Intravenous, Once, 12 of 12 cycles  Administration: 434 mg (5/14/2019), 434 mg (6/4/2019), 450 mg (7/16/2019), 450 mg (8/6/2019), 450 mg (6/25/2019)         Past Medical History:   Diagnosis Date    Abdominal infection ([address] )     h-pylori    Abnormal chest x-ray 4/5/2019    Asthma     Breast cancer ([address] ) 06/26/2018    Cancer ([address] )     BREAST    Diabetes mellitus ([address] )     Hiatal hernia     History of chemotherapy     History of radiation therapy     Hypercholesterolemia     Hypertension     Hypothyroid     Renal disorder     Rheumatoid arthritis ([address] )      Past Surgical History:   Procedure Laterality Date    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    BREAST LUMPECTOMY Right 6/26/2018    Procedure: RIGHT BREAST NEEDLE LOCALIZATION X2 WITH RIGHT BREAST LUMPECTOMY ( NEEDLE LOC @ 1000);   Surgeon: Alessandra Portillo MD;  Location: [facility];  Service: Surgical Oncology    BREAST LUMPECTOMY Right 8/2/2018    Procedure: LYMPHOSCINITGRAPHY INTRAOPERATIVE LYMPHATIC MAPPING , RIGHT SENTINEL NODE BIOPSY, REEXCISION  RIGHT BREAST LUMPECTOMY CAVITY (SUPERIOR MARGIN); Surgeon: Maddison Tam MD;  Location: [facility];  Service: Surgical Oncology    BREAST SURGERY Left     CATARACT EXTRACTION, BILATERAL Bilateral     EGD AND COLONOSCOPY N/A 9/5/2018    Procedure: EGD AND COLONOSCOPY;  Surgeon: Mitzi Car MD;  Location: [facility] GI [facility]; Service: Gastroenterology    [place] GUIDED CENTRAL VENOUS ACCESS DEVICE INSERTION  9/13/2018    KNEE SURGERY Right     MAMMO NEEDLE LOCALIZATION RIGHT (ALL INC) Right 6/26/2018    MAMMO STEREOTACTIC BREAST BIOPSY RIGHT (ALL INC) Right 5/23/2018    RETINAL DETACHMENT SURGERY Right     TUBAL LIGATION      TUNNELED VENOUS PORT PLACEMENT Left 9/13/2018    Procedure: INSERTION VENOUS PORT (PORT-A-CATH);   Surgeon: Maddison Tam MD;  [facility]: [facility];  Service: Surgical Oncology       Social History   Social History     Substance and Sexual Activity   Alcohol Use No     Social History     Substance and Sexual Activity   Drug Use No     Social History     Tobacco Use   Smoking Status Never Smoker   Smokeless Tobacco Never Used         Meds/Allergies     Current Outpatient Medications:     albuterol (2 5 mg/3 mL) 0 083 % nebulizer solution, Take 1 vial (2 5 mg total) by nebulization every 6 (six) hours as needed for wheezing or shortness of breath, Disp: 120 vial, Rfl: 5    albuterol (PROVENTIL HFA,VENTOLIN HFA) 90 mcg/act inhaler, Inhale 1 puff every 4 (four) hours as needed  , Disp: , Rfl:     amLODIPine (NORVASC) 10 mg tablet, Take 10 mg by mouth daily, Disp: , Rfl:     anastrozole (ARIMIDEX) 1 mg tablet, Take 1 tablet (1 mg total) by mouth daily, Disp: 90 tablet, Rfl: 3    aspirin 81 mg chewable tablet, Chew 81 mg daily, Disp: , Rfl:     atorvastatin (LIPITOR) 10 mg tablet, Take 1 tablet (10 mg total) by mouth daily, Disp: 30 tablet, Rfl: 2    Blood Glucose Monitoring Suppl (ONE TOUCH ULTRA 2) w/Device KIT, by Does not apply route 3 (three) times a day, Disp: 1 each, Rfl: 0    fluticasone-umeclidinium-vilanterol (TRELEGY) 100-62 5-25 MCG/INH inhaler, Inhale 1 puff daily Rinse mouth after use , Disp: 1 Inhaler, Rfl: 2    glucose blood (ONE TOUCH ULTRA TEST) test strip, 1 each by Other route 3 (three) times a day, Disp: 100 each, Rfl: 5    insulin detemir (Levemir FlexTouch) 100 Units/mL injection pen, Inject 45 Units under the skin daily at bedtime, Disp: 4 pen, Rfl: 5    insulin lispro (HumaLOG) 100 units/mL injection pen, Inject 16 Units under the skin 3 (three) times a day with meals Inject 16 units TID, Disp: 5 pen, Rfl: 5    Insulin Pen Needle (BD Pen Needle Emily U/F) 32G X 4 MM MISC, USE FOUR (4) TIMES A DAY, Disp: 100 each, Rfl: 5    levothyroxine 50 mcg tablet, Take 1 tablet (50 mcg total) by mouth daily, Disp: 90 tablet, Rfl: 1    losartan (COZAAR) 50 mg tablet, Take 1 tablet (50 mg total) by mouth daily, Disp: 90 tablet, Rfl: 1    metoprolol tartrate (LOPRESSOR) 25 mg tablet, Take 0 5 tablets (12 5 mg total) by mouth every 12 (twelve) hours, Disp: 60 tablet, Rfl: 2    NOVOLOG FLEXPEN 100 units/mL SOPN, Patient injects 16 units three times a day, Disp: 5 pen, Rfl: 3    omeprazole (PriLOSEC) 20 mg delayed release capsule, Take 1 capsule (20 mg total) by mouth daily, Disp: 90 capsule, Rfl: 2    OneTouch Delica Lancets 15V MISC, by Does not apply route 3 (three) times a day, Disp: 100 each, Rfl: 5    DULoxetine (CYMBALTA) 30 mg delayed release capsule, TAKE 1 CAPSULE BY MOUTH 2 (TWICE)  A DAY (Patient not taking: Reported on 9/30/2020), Disp: 180 capsule, Rfl: 1    EPINEPHrine (EPIPEN) 0 3 mg/0 3 mL SOAJ, Inject 0 3 mL (0 3 mg total) into a muscle once for 1 dose, Disp: 0 6 mL, Rfl: 0    EPINEPHrine (EPIPEN) 0 3 mg/0 3 mL SOAJ, Inject 0 3 mL (0 3 mg total) into a muscle once for 1 dose, Disp: 0 6 mL, Rfl: 0    gemfibrozil (LOPID) 600 mg tablet, Take 600 mg by mouth daily, Disp: , Rfl:     Insulin Aspart FlexPen 100 UNIT/ML SOPN, Inject 16 Units under the skin 3 (three) times a day before meals, Disp: , Rfl:     oxyCODONE (ROXICODONE) 5 mg immediate release tablet, Take 5 mg by mouth every 6 (six) hours as needed for moderate pain, Disp: , Rfl:   Allergies   Allergen Reactions    Aspirin Hives     Dr  in [address] told patient not to take aspirin r/t kidneys     Tylenol With Codeine #3 [Acetaminophen-Codeine] Rash         Review of Systems   Constitutional: Negative for appetite change, chills, fatigue, fever and unexpected weight change  HENT: Negative  Eyes: Negative  Respiratory: Positive for wheezing (states wheezing is starting again, occurring intermittently over the past month)  Negative for cough and shortness of breath  Cardiovascular: Positive for leg swelling  Gastrointestinal: Positive for diarrhea (sometimes due to her medication)  Endocrine: Negative  Genitourinary: Positive for difficulty urinating (has sensation of need to void, but voids small amounts)  Negative for dysuria, hematuria and vaginal bleeding  Occasional urinary leaking   Musculoskeletal: Negative  C/o knee pain; occasional cramping in her feet at night; Occasional tightness in her fingers  Skin: Negative  Allergic/Immunologic: Negative  Neurological: Negative  Hematological: Negative  Psychiatric/Behavioral: The patient is nervous/anxious  OBJECTIVE:   There were no vitals taken for this visit  Pain Assessment:  0  ECOG/[place]/WHO: 0 - Asymptomatic    Physical Exam   She is conversing appropriately  Her breathing is unlabored        RESULTS    Lab Results: No results found for this or any previous visit (from the past 672 hour(s))  Imaging Studies:No results found  Assessment/Plan:  No orders of the defined types were placed in this encounter  Isidoro Marley is a 62y o  year old female who is 1 and half years post adjuvant radiation therapy for right breast carcinoma    We reviewed her mammogram from 6/3/2020 is stable  Her daughter had question a sentence in the report which stated that she has some dense breast and may hide small lesions  We discussed that should there be significant density additional imaging would of been recommended  She is receiving adjuvant anastrozole with good tolerance  She has follow-up with Dr Jonathan Urias [facility] in the next few weeks  She will return for follow-up visit in 1 year  Tiny Varela MD  9/30/2020,10:49 AM    Portions of the record may have been created with voice recognition software   Occasional wrong word or "sound a like" substitutions may have occurred due to the inherent limitations of voice recognition software   Read the chart carefully and recognize, using context, where substitutions have occurred

## 2020-10-07 ENCOUNTER — OFFICE VISIT (OUTPATIENT)
Dept: FAMILY MEDICINE CLINIC | Facility: CLINIC | Age: 65
End: 2020-10-07

## 2020-10-07 VITALS
WEIGHT: 180 LBS | SYSTOLIC BLOOD PRESSURE: 130 MMHG | RESPIRATION RATE: 18 BRPM | OXYGEN SATURATION: 98 % | TEMPERATURE: 98 F | HEART RATE: 85 BPM | HEIGHT: 66 IN | DIASTOLIC BLOOD PRESSURE: 70 MMHG | BODY MASS INDEX: 28.93 KG/M2

## 2020-10-07 DIAGNOSIS — G89.29 CHRONIC PAIN OF BOTH KNEES: ICD-10-CM

## 2020-10-07 DIAGNOSIS — Z23 FLU VACCINE NEED: ICD-10-CM

## 2020-10-07 DIAGNOSIS — M25.562 CHRONIC PAIN OF BOTH KNEES: ICD-10-CM

## 2020-10-07 DIAGNOSIS — E11.65 TYPE 2 DIABETES MELLITUS WITH HYPERGLYCEMIA, WITH LONG-TERM CURRENT USE OF INSULIN (HCC): Primary | ICD-10-CM

## 2020-10-07 DIAGNOSIS — M25.561 CHRONIC PAIN OF BOTH KNEES: ICD-10-CM

## 2020-10-07 DIAGNOSIS — Z79.4 TYPE 2 DIABETES MELLITUS WITH HYPERGLYCEMIA, WITH LONG-TERM CURRENT USE OF INSULIN (HCC): Primary | ICD-10-CM

## 2020-10-07 LAB — SL AMB POCT HEMOGLOBIN AIC: 12.6 (ref ?–6.5)

## 2020-10-07 PROCEDURE — 83036 HEMOGLOBIN GLYCOSYLATED A1C: CPT | Performed by: FAMILY MEDICINE

## 2020-10-07 PROCEDURE — 99214 OFFICE O/P EST MOD 30 MIN: CPT | Performed by: FAMILY MEDICINE

## 2020-10-07 PROCEDURE — 3046F HEMOGLOBIN A1C LEVEL >9.0%: CPT | Performed by: INTERNAL MEDICINE

## 2020-10-07 PROCEDURE — 3046F HEMOGLOBIN A1C LEVEL >9.0%: CPT | Performed by: FAMILY MEDICINE

## 2020-10-07 PROCEDURE — 1036F TOBACCO NON-USER: CPT | Performed by: FAMILY MEDICINE

## 2020-10-07 RX ORDER — LIDOCAINE 50 MG/G
OINTMENT TOPICAL AS NEEDED
Qty: 35.44 G | Refills: 0 | Status: SHIPPED | OUTPATIENT
Start: 2020-10-07 | End: 2022-05-02

## 2020-10-07 RX ORDER — DULAGLUTIDE 1.5 MG/.5ML
1.5 INJECTION, SOLUTION SUBCUTANEOUS WEEKLY
Qty: 12 PEN | Refills: 1 | Status: SHIPPED | OUTPATIENT
Start: 2020-10-07 | End: 2021-11-10

## 2020-10-09 ENCOUNTER — PATIENT OUTREACH (OUTPATIENT)
Dept: FAMILY MEDICINE CLINIC | Facility: CLINIC | Age: 65
End: 2020-10-09

## 2020-10-12 ENCOUNTER — TELEPHONE (OUTPATIENT)
Dept: FAMILY MEDICINE CLINIC | Facility: CLINIC | Age: 65
End: 2020-10-12

## 2020-10-13 ENCOUNTER — TELEPHONE (OUTPATIENT)
Dept: FAMILY MEDICINE CLINIC | Facility: CLINIC | Age: 65
End: 2020-10-13

## 2020-10-14 ENCOUNTER — HOSPITAL ENCOUNTER (OUTPATIENT)
Dept: INFUSION CENTER | Facility: CLINIC | Age: 65
Discharge: HOME | End: 2020-10-14
Payer: COMMERCIAL

## 2020-10-14 VITALS
HEART RATE: 75 BPM | DIASTOLIC BLOOD PRESSURE: 76 MMHG | RESPIRATION RATE: 18 BRPM | TEMPERATURE: 98.5 F | SYSTOLIC BLOOD PRESSURE: 121 MMHG

## 2020-10-14 DIAGNOSIS — J45.50 SEVERE PERSISTENT ASTHMA WITHOUT COMPLICATION: Primary | ICD-10-CM

## 2020-10-14 PROCEDURE — 96372 THER/PROPH/DIAG INJ SC/IM: CPT

## 2020-10-14 RX ORDER — EPINEPHRINE 1 MG/ML
0.3 INJECTION, SOLUTION, CONCENTRATE INTRAVENOUS AS NEEDED
Status: CANCELLED | OUTPATIENT
Start: 2020-10-28

## 2020-10-14 RX ORDER — EPINEPHRINE 1 MG/ML
0.3 INJECTION, SOLUTION, CONCENTRATE INTRAVENOUS AS NEEDED
Status: DISCONTINUED | OUTPATIENT
Start: 2020-10-14 | End: 2020-10-17 | Stop reason: HOSPADM

## 2020-10-14 RX ADMIN — OMALIZUMAB 375 MG: 150 INJECTION, SOLUTION SUBCUTANEOUS at 10:13

## 2020-10-19 ENCOUNTER — PATIENT OUTREACH (OUTPATIENT)
Dept: FAMILY MEDICINE CLINIC | Facility: CLINIC | Age: 65
End: 2020-10-19

## 2020-10-21 ENCOUNTER — HOSPITAL ENCOUNTER (OUTPATIENT)
Dept: RADIOLOGY | Facility: HOSPITAL | Age: 65
Discharge: HOME | End: 2020-10-21
Payer: COMMERCIAL

## 2020-10-21 DIAGNOSIS — M25.562 CHRONIC PAIN OF BOTH KNEES: ICD-10-CM

## 2020-10-21 DIAGNOSIS — G89.29 CHRONIC PAIN OF BOTH KNEES: ICD-10-CM

## 2020-10-21 DIAGNOSIS — M25.561 CHRONIC PAIN OF BOTH KNEES: ICD-10-CM

## 2020-10-21 PROCEDURE — 73562 X-RAY EXAM OF KNEE 3: CPT

## 2020-10-22 ENCOUNTER — TELEPHONE (OUTPATIENT)
Dept: SURGICAL ONCOLOGY | Facility: CLINIC | Age: 65
End: 2020-10-22

## 2020-10-23 ENCOUNTER — OFFICE VISIT (OUTPATIENT)
Dept: SURGICAL ONCOLOGY | Facility: CLINIC | Age: 65
End: 2020-10-23
Payer: COMMERCIAL

## 2020-10-23 VITALS
DIASTOLIC BLOOD PRESSURE: 80 MMHG | HEIGHT: 66 IN | TEMPERATURE: 98.1 F | SYSTOLIC BLOOD PRESSURE: 140 MMHG | BODY MASS INDEX: 29.57 KG/M2 | RESPIRATION RATE: 16 BRPM | HEART RATE: 90 BPM | WEIGHT: 184 LBS

## 2020-10-23 DIAGNOSIS — Z78.9 NEED FOR FOLLOW-UP BY SOCIAL WORKER: ICD-10-CM

## 2020-10-23 DIAGNOSIS — C50.911 MALIGNANT NEOPLASM OF RIGHT BREAST IN FEMALE, ESTROGEN RECEPTOR POSITIVE, UNSPECIFIED SITE OF BREAST (HCC): Primary | ICD-10-CM

## 2020-10-23 DIAGNOSIS — Z17.0 MALIGNANT NEOPLASM OF RIGHT BREAST IN FEMALE, ESTROGEN RECEPTOR POSITIVE, UNSPECIFIED SITE OF BREAST (HCC): Primary | ICD-10-CM

## 2020-10-23 PROCEDURE — 3008F BODY MASS INDEX DOCD: CPT | Performed by: FAMILY MEDICINE

## 2020-10-23 PROCEDURE — 99213 OFFICE O/P EST LOW 20 MIN: CPT | Performed by: SURGERY

## 2020-10-27 DIAGNOSIS — Z17.0 MALIGNANT NEOPLASM OF UPPER-OUTER QUADRANT OF RIGHT BREAST IN FEMALE, ESTROGEN RECEPTOR POSITIVE (HCC): ICD-10-CM

## 2020-10-27 DIAGNOSIS — E11.42 DIABETIC POLYNEUROPATHY ASSOCIATED WITH TYPE 2 DIABETES MELLITUS (HCC): ICD-10-CM

## 2020-10-27 DIAGNOSIS — C50.411 MALIGNANT NEOPLASM OF UPPER-OUTER QUADRANT OF RIGHT BREAST IN FEMALE, ESTROGEN RECEPTOR POSITIVE (HCC): ICD-10-CM

## 2020-10-27 RX ORDER — DULOXETIN HYDROCHLORIDE 30 MG/1
CAPSULE, DELAYED RELEASE ORAL
Qty: 180 CAPSULE | Refills: 0 | Status: SHIPPED | OUTPATIENT
Start: 2020-10-27 | End: 2021-01-25

## 2020-10-27 RX ORDER — ANASTROZOLE 1 MG/1
TABLET ORAL
Refills: 1 | OUTPATIENT
Start: 2020-10-27

## 2020-10-28 ENCOUNTER — HOSPITAL ENCOUNTER (OUTPATIENT)
Dept: INFUSION CENTER | Facility: CLINIC | Age: 65
Discharge: HOME | End: 2020-10-28
Payer: COMMERCIAL

## 2020-10-28 VITALS
DIASTOLIC BLOOD PRESSURE: 71 MMHG | HEART RATE: 80 BPM | TEMPERATURE: 98.2 F | SYSTOLIC BLOOD PRESSURE: 147 MMHG | RESPIRATION RATE: 18 BRPM

## 2020-10-28 DIAGNOSIS — E11.8 TYPE 2 DIABETES MELLITUS WITH COMPLICATION, WITH LONG-TERM CURRENT USE OF INSULIN (HCC): ICD-10-CM

## 2020-10-28 DIAGNOSIS — Z79.4 TYPE 2 DIABETES MELLITUS WITH COMPLICATION, WITH LONG-TERM CURRENT USE OF INSULIN (HCC): ICD-10-CM

## 2020-10-28 DIAGNOSIS — J45.50 SEVERE PERSISTENT ASTHMA WITHOUT COMPLICATION: Primary | ICD-10-CM

## 2020-10-28 DIAGNOSIS — J45.50 SEVERE PERSISTENT ASTHMA WITHOUT COMPLICATION: ICD-10-CM

## 2020-10-28 DIAGNOSIS — E78.5 HYPERLIPIDEMIA, UNSPECIFIED HYPERLIPIDEMIA TYPE: ICD-10-CM

## 2020-10-28 PROCEDURE — 96372 THER/PROPH/DIAG INJ SC/IM: CPT

## 2020-10-28 RX ORDER — INSULIN ASPART 100 [IU]/ML
INJECTION, SOLUTION INTRAVENOUS; SUBCUTANEOUS
Qty: 15 PEN | Refills: 2 | Status: SHIPPED | OUTPATIENT
Start: 2020-10-28 | End: 2021-03-10

## 2020-10-28 RX ORDER — ATORVASTATIN CALCIUM 10 MG/1
10 TABLET, FILM COATED ORAL DAILY
Qty: 30 TABLET | Refills: 1 | Status: SHIPPED | OUTPATIENT
Start: 2020-10-28 | End: 2021-01-12 | Stop reason: SDUPTHER

## 2020-10-28 RX ORDER — EPINEPHRINE 1 MG/ML
0.3 INJECTION, SOLUTION, CONCENTRATE INTRAVENOUS AS NEEDED
Status: CANCELLED | OUTPATIENT
Start: 2020-11-11

## 2020-10-28 RX ORDER — FLUTICASONE FUROATE, UMECLIDINIUM BROMIDE AND VILANTEROL TRIFENATATE 100; 62.5; 25 UG/1; UG/1; UG/1
POWDER RESPIRATORY (INHALATION)
Qty: 1 INHALER | Refills: 1 | Status: SHIPPED | OUTPATIENT
Start: 2020-10-28 | End: 2020-12-21

## 2020-10-28 RX ORDER — EPINEPHRINE 1 MG/ML
0.3 INJECTION, SOLUTION, CONCENTRATE INTRAVENOUS AS NEEDED
Status: DISCONTINUED | OUTPATIENT
Start: 2020-10-28 | End: 2020-10-31 | Stop reason: HOSPADM

## 2020-10-28 RX ADMIN — OMALIZUMAB 375 MG: 150 INJECTION, SOLUTION SUBCUTANEOUS at 10:29

## 2020-11-04 DIAGNOSIS — Z17.0 MALIGNANT NEOPLASM OF UPPER-OUTER QUADRANT OF RIGHT BREAST IN FEMALE, ESTROGEN RECEPTOR POSITIVE (HCC): ICD-10-CM

## 2020-11-04 DIAGNOSIS — C50.411 MALIGNANT NEOPLASM OF UPPER-OUTER QUADRANT OF RIGHT BREAST IN FEMALE, ESTROGEN RECEPTOR POSITIVE (HCC): ICD-10-CM

## 2020-11-04 RX ORDER — ANASTROZOLE 1 MG/1
TABLET ORAL
Qty: 90 TABLET | Refills: 3 | Status: SHIPPED | OUTPATIENT
Start: 2020-11-04 | End: 2021-11-15 | Stop reason: SDUPTHER

## 2020-11-05 ENCOUNTER — TELEPHONE (OUTPATIENT)
Dept: HEMATOLOGY ONCOLOGY | Facility: CLINIC | Age: 65
End: 2020-11-05

## 2020-11-06 ENCOUNTER — OFFICE VISIT (OUTPATIENT)
Dept: HEMATOLOGY ONCOLOGY | Facility: CLINIC | Age: 65
End: 2020-11-06
Payer: COMMERCIAL

## 2020-11-06 VITALS
HEART RATE: 80 BPM | DIASTOLIC BLOOD PRESSURE: 78 MMHG | WEIGHT: 178 LBS | RESPIRATION RATE: 18 BRPM | SYSTOLIC BLOOD PRESSURE: 142 MMHG | TEMPERATURE: 97.7 F | HEIGHT: 66 IN | OXYGEN SATURATION: 96 % | BODY MASS INDEX: 28.61 KG/M2

## 2020-11-06 DIAGNOSIS — Z17.0 MALIGNANT NEOPLASM OF UPPER-OUTER QUADRANT OF RIGHT BREAST IN FEMALE, ESTROGEN RECEPTOR POSITIVE (HCC): Primary | ICD-10-CM

## 2020-11-06 DIAGNOSIS — C50.411 MALIGNANT NEOPLASM OF UPPER-OUTER QUADRANT OF RIGHT BREAST IN FEMALE, ESTROGEN RECEPTOR POSITIVE (HCC): Primary | ICD-10-CM

## 2020-11-06 PROCEDURE — 3078F DIAST BP <80 MM HG: CPT | Performed by: INTERNAL MEDICINE

## 2020-11-06 PROCEDURE — 3077F SYST BP >= 140 MM HG: CPT | Performed by: INTERNAL MEDICINE

## 2020-11-06 PROCEDURE — 3008F BODY MASS INDEX DOCD: CPT | Performed by: INTERNAL MEDICINE

## 2020-11-06 PROCEDURE — 3008F BODY MASS INDEX DOCD: CPT | Performed by: FAMILY MEDICINE

## 2020-11-06 PROCEDURE — 99214 OFFICE O/P EST MOD 30 MIN: CPT | Performed by: INTERNAL MEDICINE

## 2020-11-06 PROCEDURE — 1036F TOBACCO NON-USER: CPT | Performed by: INTERNAL MEDICINE

## 2020-11-11 ENCOUNTER — HOSPITAL ENCOUNTER (OUTPATIENT)
Dept: INFUSION CENTER | Facility: CLINIC | Age: 65
Discharge: HOME | End: 2020-11-11
Payer: COMMERCIAL

## 2020-11-11 VITALS
DIASTOLIC BLOOD PRESSURE: 76 MMHG | SYSTOLIC BLOOD PRESSURE: 157 MMHG | HEART RATE: 76 BPM | TEMPERATURE: 97.4 F | RESPIRATION RATE: 18 BRPM

## 2020-11-11 DIAGNOSIS — J45.50 SEVERE PERSISTENT ASTHMA WITHOUT COMPLICATION: Primary | ICD-10-CM

## 2020-11-11 PROCEDURE — 96372 THER/PROPH/DIAG INJ SC/IM: CPT

## 2020-11-11 RX ORDER — EPINEPHRINE 1 MG/ML
0.3 INJECTION, SOLUTION, CONCENTRATE INTRAVENOUS AS NEEDED
Status: CANCELLED | OUTPATIENT
Start: 2020-11-25

## 2020-11-11 RX ORDER — EPINEPHRINE 1 MG/ML
0.3 INJECTION, SOLUTION, CONCENTRATE INTRAVENOUS AS NEEDED
Status: DISCONTINUED | OUTPATIENT
Start: 2020-11-11 | End: 2020-11-14 | Stop reason: HOSPADM

## 2020-11-11 RX ADMIN — OMALIZUMAB 375 MG: 150 INJECTION, SOLUTION SUBCUTANEOUS at 12:09

## 2020-11-12 ENCOUNTER — PATIENT OUTREACH (OUTPATIENT)
Dept: CASE MANAGEMENT | Facility: HOSPITAL | Age: 65
End: 2020-11-12

## 2020-11-25 ENCOUNTER — HOSPITAL ENCOUNTER (OUTPATIENT)
Dept: INFUSION CENTER | Facility: CLINIC | Age: 65
Discharge: HOME | End: 2020-11-25
Payer: COMMERCIAL

## 2020-11-25 ENCOUNTER — VBI (OUTPATIENT)
Dept: ADMINISTRATIVE | Facility: OTHER | Age: 65
End: 2020-11-25

## 2020-11-25 VITALS
DIASTOLIC BLOOD PRESSURE: 78 MMHG | HEART RATE: 85 BPM | RESPIRATION RATE: 18 BRPM | TEMPERATURE: 97.7 F | SYSTOLIC BLOOD PRESSURE: 139 MMHG

## 2020-11-25 DIAGNOSIS — J45.50 SEVERE PERSISTENT ASTHMA WITHOUT COMPLICATION: Primary | ICD-10-CM

## 2020-11-25 PROCEDURE — 96372 THER/PROPH/DIAG INJ SC/IM: CPT

## 2020-11-25 RX ORDER — EPINEPHRINE 1 MG/ML
0.3 INJECTION, SOLUTION, CONCENTRATE INTRAVENOUS AS NEEDED
Status: CANCELLED | OUTPATIENT
Start: 2020-12-09

## 2020-11-25 RX ORDER — EPINEPHRINE 1 MG/ML
0.3 INJECTION, SOLUTION, CONCENTRATE INTRAVENOUS AS NEEDED
Status: DISCONTINUED | OUTPATIENT
Start: 2020-11-25 | End: 2020-11-28 | Stop reason: HOSPADM

## 2020-11-25 RX ADMIN — OMALIZUMAB 375 MG: 150 INJECTION, SOLUTION SUBCUTANEOUS at 10:25

## 2020-11-30 DIAGNOSIS — E11.8 TYPE 2 DIABETES MELLITUS WITH COMPLICATION, WITHOUT LONG-TERM CURRENT USE OF INSULIN (HCC): ICD-10-CM

## 2020-11-30 DIAGNOSIS — I10 ESSENTIAL HYPERTENSION: ICD-10-CM

## 2020-11-30 PROCEDURE — 4010F ACE/ARB THERAPY RXD/TAKEN: CPT | Performed by: FAMILY MEDICINE

## 2020-11-30 PROCEDURE — 4010F ACE/ARB THERAPY RXD/TAKEN: CPT | Performed by: INTERNAL MEDICINE

## 2020-12-01 PROCEDURE — 4010F ACE/ARB THERAPY RXD/TAKEN: CPT | Performed by: SURGERY

## 2020-12-01 PROCEDURE — 4010F ACE/ARB THERAPY RXD/TAKEN: CPT | Performed by: NURSE PRACTITIONER

## 2020-12-01 RX ORDER — LOSARTAN POTASSIUM 50 MG/1
50 TABLET ORAL DAILY
Qty: 90 TABLET | Refills: 0 | Status: SHIPPED | OUTPATIENT
Start: 2020-12-01 | End: 2021-03-08

## 2020-12-01 RX ORDER — AMLODIPINE BESYLATE 10 MG/1
TABLET ORAL
Qty: 90 TABLET | Refills: 0 | Status: SHIPPED | OUTPATIENT
Start: 2020-12-01 | End: 2021-01-11

## 2020-12-03 ENCOUNTER — HOSPITAL ENCOUNTER (OUTPATIENT)
Dept: ULTRASOUND IMAGING | Facility: HOSPITAL | Age: 65
Discharge: HOME | End: 2020-12-03
Payer: COMMERCIAL

## 2020-12-03 ENCOUNTER — OFFICE VISIT (OUTPATIENT)
Dept: FAMILY MEDICINE CLINIC | Facility: CLINIC | Age: 65
End: 2020-12-03

## 2020-12-03 VITALS
BODY MASS INDEX: 28.45 KG/M2 | SYSTOLIC BLOOD PRESSURE: 130 MMHG | HEIGHT: 66 IN | OXYGEN SATURATION: 95 % | HEART RATE: 90 BPM | TEMPERATURE: 98 F | WEIGHT: 177 LBS | DIASTOLIC BLOOD PRESSURE: 74 MMHG | RESPIRATION RATE: 16 BRPM

## 2020-12-03 DIAGNOSIS — R10.32 LEFT LOWER QUADRANT ABDOMINAL PAIN: Primary | ICD-10-CM

## 2020-12-03 DIAGNOSIS — R10.32 LEFT LOWER QUADRANT ABDOMINAL PAIN: ICD-10-CM

## 2020-12-03 DIAGNOSIS — R19.7 DIARRHEA, UNSPECIFIED TYPE: ICD-10-CM

## 2020-12-03 DIAGNOSIS — R52 BODY ACHES: ICD-10-CM

## 2020-12-03 PROCEDURE — 3078F DIAST BP <80 MM HG: CPT | Performed by: NURSE PRACTITIONER

## 2020-12-03 PROCEDURE — 3075F SYST BP GE 130 - 139MM HG: CPT | Performed by: NURSE PRACTITIONER

## 2020-12-03 PROCEDURE — 3008F BODY MASS INDEX DOCD: CPT | Performed by: INTERNAL MEDICINE

## 2020-12-03 PROCEDURE — 1036F TOBACCO NON-USER: CPT | Performed by: NURSE PRACTITIONER

## 2020-12-03 PROCEDURE — 3008F BODY MASS INDEX DOCD: CPT | Performed by: NURSE PRACTITIONER

## 2020-12-03 PROCEDURE — 76700 US EXAM ABDOM COMPLETE: CPT

## 2020-12-03 PROCEDURE — 99214 OFFICE O/P EST MOD 30 MIN: CPT | Performed by: NURSE PRACTITIONER

## 2020-12-04 ENCOUNTER — TELEPHONE (OUTPATIENT)
Dept: FAMILY MEDICINE CLINIC | Facility: CLINIC | Age: 65
End: 2020-12-04

## 2020-12-04 DIAGNOSIS — R52 BODY ACHES: ICD-10-CM

## 2020-12-04 DIAGNOSIS — R10.32 LEFT LOWER QUADRANT ABDOMINAL PAIN: ICD-10-CM

## 2020-12-04 DIAGNOSIS — R19.7 DIARRHEA, UNSPECIFIED TYPE: ICD-10-CM

## 2020-12-04 PROCEDURE — U0003 INFECTIOUS AGENT DETECTION BY NUCLEIC ACID (DNA OR RNA); SEVERE ACUTE RESPIRATORY SYNDROME CORONAVIRUS 2 (SARS-COV-2) (CORONAVIRUS DISEASE [COVID-19]), AMPLIFIED PROBE TECHNIQUE, MAKING USE OF HIGH THROUGHPUT TECHNOLOGIES AS DESCRIBED BY CMS-2020-01-R: HCPCS | Performed by: NURSE PRACTITIONER

## 2020-12-05 LAB — SARS-COV-2 RNA SPEC QL NAA+PROBE: NOT DETECTED

## 2020-12-09 ENCOUNTER — HOSPITAL ENCOUNTER (OUTPATIENT)
Dept: INFUSION CENTER | Facility: CLINIC | Age: 65
Discharge: HOME | End: 2020-12-09
Payer: COMMERCIAL

## 2020-12-09 VITALS
SYSTOLIC BLOOD PRESSURE: 160 MMHG | DIASTOLIC BLOOD PRESSURE: 74 MMHG | RESPIRATION RATE: 18 BRPM | TEMPERATURE: 99 F | HEART RATE: 87 BPM

## 2020-12-09 DIAGNOSIS — J45.50 SEVERE PERSISTENT ASTHMA WITHOUT COMPLICATION: Primary | ICD-10-CM

## 2020-12-09 PROCEDURE — 96372 THER/PROPH/DIAG INJ SC/IM: CPT

## 2020-12-09 RX ORDER — EPINEPHRINE 1 MG/ML
0.3 INJECTION, SOLUTION, CONCENTRATE INTRAVENOUS AS NEEDED
Status: DISCONTINUED | OUTPATIENT
Start: 2020-12-09 | End: 2020-12-12 | Stop reason: HOSPADM

## 2020-12-09 RX ORDER — EPINEPHRINE 1 MG/ML
0.3 INJECTION, SOLUTION, CONCENTRATE INTRAVENOUS AS NEEDED
Status: CANCELLED | OUTPATIENT
Start: 2020-12-23

## 2020-12-09 RX ADMIN — OMALIZUMAB 375 MG: 150 INJECTION, SOLUTION SUBCUTANEOUS at 10:06

## 2020-12-16 ENCOUNTER — TELEPHONE (OUTPATIENT)
Dept: GASTROENTEROLOGY | Facility: AMBULARY SURGERY CENTER | Age: 65
End: 2020-12-16

## 2020-12-21 DIAGNOSIS — J45.50 SEVERE PERSISTENT ASTHMA WITHOUT COMPLICATION: ICD-10-CM

## 2020-12-21 DIAGNOSIS — E78.5 HYPERLIPIDEMIA, UNSPECIFIED HYPERLIPIDEMIA TYPE: ICD-10-CM

## 2020-12-21 DIAGNOSIS — I10 ESSENTIAL HYPERTENSION: ICD-10-CM

## 2020-12-23 ENCOUNTER — HOSPITAL ENCOUNTER (OUTPATIENT)
Dept: INFUSION CENTER | Facility: CLINIC | Age: 65
Discharge: HOME | End: 2020-12-23
Payer: COMMERCIAL

## 2020-12-23 VITALS — SYSTOLIC BLOOD PRESSURE: 122 MMHG | DIASTOLIC BLOOD PRESSURE: 75 MMHG | RESPIRATION RATE: 18 BRPM | HEART RATE: 76 BPM

## 2020-12-23 DIAGNOSIS — J45.50 SEVERE PERSISTENT ASTHMA WITHOUT COMPLICATION: Primary | ICD-10-CM

## 2020-12-23 PROCEDURE — 96372 THER/PROPH/DIAG INJ SC/IM: CPT

## 2020-12-23 RX ORDER — EPINEPHRINE 1 MG/ML
0.3 INJECTION, SOLUTION, CONCENTRATE INTRAVENOUS AS NEEDED
Status: CANCELLED | OUTPATIENT
Start: 2021-01-06

## 2020-12-23 RX ADMIN — OMALIZUMAB 375 MG: 150 INJECTION, SOLUTION SUBCUTANEOUS at 10:16

## 2021-01-06 ENCOUNTER — HOSPITAL ENCOUNTER (OUTPATIENT)
Dept: INFUSION CENTER | Facility: CLINIC | Age: 66
Discharge: HOME | End: 2021-01-06
Payer: COMMERCIAL

## 2021-01-06 VITALS
TEMPERATURE: 98.3 F | SYSTOLIC BLOOD PRESSURE: 133 MMHG | DIASTOLIC BLOOD PRESSURE: 70 MMHG | RESPIRATION RATE: 18 BRPM | HEART RATE: 79 BPM

## 2021-01-06 DIAGNOSIS — J45.50 SEVERE PERSISTENT ASTHMA WITHOUT COMPLICATION: Primary | ICD-10-CM

## 2021-01-06 PROCEDURE — 96372 THER/PROPH/DIAG INJ SC/IM: CPT

## 2021-01-06 RX ORDER — EPINEPHRINE 1 MG/ML
0.3 INJECTION, SOLUTION, CONCENTRATE INTRAVENOUS AS NEEDED
Status: CANCELLED | OUTPATIENT
Start: 2021-01-20

## 2021-01-06 RX ORDER — EPINEPHRINE 1 MG/ML
0.3 INJECTION, SOLUTION, CONCENTRATE INTRAVENOUS AS NEEDED
Status: DISCONTINUED | OUTPATIENT
Start: 2021-01-06 | End: 2021-01-09 | Stop reason: HOSPADM

## 2021-01-06 RX ADMIN — OMALIZUMAB 375 MG: 150 INJECTION, SOLUTION SUBCUTANEOUS at 10:10

## 2021-01-06 NOTE — PROGRESS NOTES
Presented today for xolair injections, total dose 375mg, 3 injections given 1 in Tommy, 2 in L, tolerated same well  Epi-pen on hand

## 2021-01-11 DIAGNOSIS — I10 ESSENTIAL HYPERTENSION: ICD-10-CM

## 2021-01-11 RX ORDER — AMLODIPINE BESYLATE 10 MG/1
TABLET ORAL
Qty: 90 TABLET | Refills: 0 | Status: SHIPPED | OUTPATIENT
Start: 2021-01-11 | End: 2021-06-07

## 2021-01-12 DIAGNOSIS — I10 ESSENTIAL HYPERTENSION: ICD-10-CM

## 2021-01-12 DIAGNOSIS — E78.5 HYPERLIPIDEMIA, UNSPECIFIED HYPERLIPIDEMIA TYPE: ICD-10-CM

## 2021-01-12 RX ORDER — ATORVASTATIN CALCIUM 10 MG/1
10 TABLET, FILM COATED ORAL DAILY
Qty: 30 TABLET | Refills: 0 | OUTPATIENT
Start: 2021-01-12

## 2021-01-12 RX ORDER — FLUTICASONE FUROATE, UMECLIDINIUM BROMIDE AND VILANTEROL TRIFENATATE 100; 62.5; 25 UG/1; UG/1; UG/1
POWDER RESPIRATORY (INHALATION)
Qty: 1 INHALER | Refills: 1 | Status: SHIPPED | OUTPATIENT
Start: 2021-01-12 | End: 2021-03-03

## 2021-01-12 NOTE — TELEPHONE ENCOUNTER
Pharmacist called to req meds --he states its from cardiologist but is having a hard time getting them filled so he wanted to see if Jolie's PCP would prescribe

## 2021-01-12 NOTE — TELEPHONE ENCOUNTER
[facility] needs clarification on meds says they've made left several attempts   Please call Kalyn (Pharmacy)

## 2021-01-13 RX ORDER — ATORVASTATIN CALCIUM 10 MG/1
10 TABLET, FILM COATED ORAL DAILY
Qty: 30 TABLET | Refills: 1 | Status: SHIPPED | OUTPATIENT
Start: 2021-01-13 | End: 2021-03-10

## 2021-01-20 ENCOUNTER — HOSPITAL ENCOUNTER (OUTPATIENT)
Dept: INFUSION CENTER | Facility: CLINIC | Age: 66
Discharge: HOME | End: 2021-01-20
Payer: COMMERCIAL

## 2021-01-20 VITALS — TEMPERATURE: 97.7 F

## 2021-01-20 DIAGNOSIS — J45.50 SEVERE PERSISTENT ASTHMA WITHOUT COMPLICATION: Primary | ICD-10-CM

## 2021-01-20 PROCEDURE — 96372 THER/PROPH/DIAG INJ SC/IM: CPT

## 2021-01-20 RX ORDER — EPINEPHRINE 1 MG/ML
0.3 INJECTION, SOLUTION, CONCENTRATE INTRAVENOUS AS NEEDED
Status: CANCELLED | OUTPATIENT
Start: 2021-02-03

## 2021-01-20 RX ADMIN — OMALIZUMAB 375 MG: 150 INJECTION, SOLUTION SUBCUTANEOUS at 10:15

## 2021-01-20 NOTE — PROGRESS NOTES
Patient arrived to the infusion center with her epi pen  Offers no complaints  Xolair given in 3 separate injections, 2 in the right arm, 1 in the left arm  Pt observed for 30 minutes post injections with no adverse events  Pt is aware of all future appointments  AVS provided

## 2021-01-25 DIAGNOSIS — E11.42 DIABETIC POLYNEUROPATHY ASSOCIATED WITH TYPE 2 DIABETES MELLITUS (HCC): ICD-10-CM

## 2021-01-25 DIAGNOSIS — E11.8 TYPE 2 DIABETES MELLITUS WITH COMPLICATION, WITHOUT LONG-TERM CURRENT USE OF INSULIN (HCC): ICD-10-CM

## 2021-01-25 RX ORDER — INSULIN DETEMIR 100 [IU]/ML
INJECTION, SOLUTION SUBCUTANEOUS
Qty: 12 ML | Refills: 4 | Status: SHIPPED | OUTPATIENT
Start: 2021-01-25 | End: 2021-06-25 | Stop reason: SDUPTHER

## 2021-01-25 RX ORDER — DULOXETIN HYDROCHLORIDE 30 MG/1
CAPSULE, DELAYED RELEASE ORAL
Qty: 180 CAPSULE | Refills: 0 | Status: SHIPPED | OUTPATIENT
Start: 2021-01-25 | End: 2021-06-07

## 2021-02-03 ENCOUNTER — HOSPITAL ENCOUNTER (OUTPATIENT)
Dept: INFUSION CENTER | Facility: CLINIC | Age: 66
End: 2021-02-03

## 2021-02-10 ENCOUNTER — HOSPITAL ENCOUNTER (OUTPATIENT)
Dept: INFUSION CENTER | Facility: CLINIC | Age: 66
Discharge: HOME | End: 2021-02-10
Payer: COMMERCIAL

## 2021-02-10 VITALS
RESPIRATION RATE: 18 BRPM | TEMPERATURE: 97.1 F | DIASTOLIC BLOOD PRESSURE: 73 MMHG | HEART RATE: 86 BPM | SYSTOLIC BLOOD PRESSURE: 114 MMHG

## 2021-02-10 DIAGNOSIS — J45.50 SEVERE PERSISTENT ASTHMA WITHOUT COMPLICATION: Primary | ICD-10-CM

## 2021-02-10 PROCEDURE — 96372 THER/PROPH/DIAG INJ SC/IM: CPT

## 2021-02-10 RX ORDER — EPINEPHRINE 1 MG/ML
0.3 INJECTION, SOLUTION, CONCENTRATE INTRAVENOUS AS NEEDED
Status: DISCONTINUED | OUTPATIENT
Start: 2021-02-10 | End: 2021-02-13 | Stop reason: HOSPADM

## 2021-02-10 RX ORDER — EPINEPHRINE 1 MG/ML
0.3 INJECTION, SOLUTION, CONCENTRATE INTRAVENOUS AS NEEDED
Status: CANCELLED | OUTPATIENT
Start: 2021-02-24

## 2021-02-10 RX ADMIN — OMALIZUMAB 375 MG: 150 INJECTION, SOLUTION SUBCUTANEOUS at 09:03

## 2021-02-10 NOTE — PLAN OF CARE
Problem: Potential for Falls  Goal: Patient will remain free of falls  Description: INTERVENTIONS:  - Assess patient frequently for physical needs  -  Identify cognitive and physical deficits and behaviors that affect risk of falls    -  [place] fall precautions as indicated by assessment   - Educate patient/family on patient safety including physical limitations  - Instruct patient to call for assistance with activity based on assessment  - Modify environment to reduce risk of injury  - Consider OT/PT consult to assist with strengthening/mobility  Outcome: Progressing

## 2021-02-10 NOTE — PROGRESS NOTES
Pt received xolair today, 2 injections in the right arm and 1 injection in the left arm to equal 375 mg  Pt was observed for 30 minutes per order    Pt was provided with future appts and AVS

## 2021-02-24 ENCOUNTER — HOSPITAL ENCOUNTER (OUTPATIENT)
Dept: INFUSION CENTER | Facility: CLINIC | Age: 66
Discharge: HOME | End: 2021-02-24
Payer: COMMERCIAL

## 2021-02-24 ENCOUNTER — TELEPHONE (OUTPATIENT)
Dept: PULMONOLOGY | Facility: CLINIC | Age: 66
End: 2021-02-24

## 2021-02-24 VITALS
TEMPERATURE: 98.9 F | RESPIRATION RATE: 18 BRPM | HEART RATE: 82 BPM | SYSTOLIC BLOOD PRESSURE: 123 MMHG | DIASTOLIC BLOOD PRESSURE: 71 MMHG

## 2021-02-24 DIAGNOSIS — J45.50 SEVERE PERSISTENT ASTHMA WITHOUT COMPLICATION: Primary | ICD-10-CM

## 2021-02-24 PROCEDURE — 96372 THER/PROPH/DIAG INJ SC/IM: CPT

## 2021-02-24 RX ORDER — EPINEPHRINE 1 MG/ML
0.3 INJECTION, SOLUTION, CONCENTRATE INTRAVENOUS AS NEEDED
Status: CANCELLED | OUTPATIENT
Start: 2021-03-10

## 2021-02-24 RX ADMIN — OMALIZUMAB 375 MG: 150 INJECTION, SOLUTION SUBCUTANEOUS at 11:00

## 2021-02-24 NOTE — PROGRESS NOTES
Pt has Epi Pen present   Xolair given in 3 injection to equal ordered dose of 375 mg   Two injections given in L Arm and one Injection in RA  Pt tolerated well,and monitored for 30mins per orders    Future appointment scheduled and reviewed, AVS provided in [place]

## 2021-02-24 NOTE — PROGRESS NOTES
Pt arrived to unit without complaint  Pt's orders for Xolair have a hard stop per BEACON, they need to be reviewed by MD Dr Dumont Govern office called and notified   I have spoken to Karey Cole and she will communicate to Dr Dumont Govern

## 2021-02-24 NOTE — TELEPHONE ENCOUNTER
Day Ferrer from [facility] calling stating patients orders in [place] need to be reviewed and renewed  Patient is currently at Infusion center     Arik Mock [phone] Option #  2

## 2021-03-03 ENCOUNTER — OFFICE VISIT (OUTPATIENT)
Dept: PULMONOLOGY | Facility: CLINIC | Age: 66
End: 2021-03-03
Payer: COMMERCIAL

## 2021-03-03 VITALS
SYSTOLIC BLOOD PRESSURE: 140 MMHG | BODY MASS INDEX: 29.99 KG/M2 | DIASTOLIC BLOOD PRESSURE: 80 MMHG | OXYGEN SATURATION: 97 % | HEART RATE: 94 BPM | TEMPERATURE: 98 F | RESPIRATION RATE: 18 BRPM | HEIGHT: 65 IN | WEIGHT: 180 LBS

## 2021-03-03 DIAGNOSIS — J70.0 RADIATION PNEUMONITIS (HCC): ICD-10-CM

## 2021-03-03 DIAGNOSIS — E66.09 CLASS 1 OBESITY DUE TO EXCESS CALORIES WITH SERIOUS COMORBIDITY AND BODY MASS INDEX (BMI) OF 33.0 TO 33.9 IN ADULT: ICD-10-CM

## 2021-03-03 DIAGNOSIS — K21.9 GASTROESOPHAGEAL REFLUX DISEASE WITHOUT ESOPHAGITIS: ICD-10-CM

## 2021-03-03 DIAGNOSIS — J45.50 SEVERE PERSISTENT ASTHMA WITHOUT COMPLICATION: Primary | ICD-10-CM

## 2021-03-03 DIAGNOSIS — I26.99 BILATERAL PULMONARY EMBOLISM (HCC): ICD-10-CM

## 2021-03-03 PROBLEM — E66.811 CLASS 1 OBESITY DUE TO EXCESS CALORIES WITH SERIOUS COMORBIDITY AND BODY MASS INDEX (BMI) OF 33.0 TO 33.9 IN ADULT: Status: ACTIVE | Noted: 2020-01-07

## 2021-03-03 PROCEDURE — 99214 OFFICE O/P EST MOD 30 MIN: CPT | Performed by: INTERNAL MEDICINE

## 2021-03-03 RX ORDER — LORATADINE 10 MG/1
10 TABLET ORAL DAILY
Qty: 30 TABLET | Refills: 3 | Status: SHIPPED | OUTPATIENT
Start: 2021-03-03 | End: 2021-08-05

## 2021-03-03 RX ORDER — FLUTICASONE PROPIONATE AND SALMETEROL XINAFOATE 115; 21 UG/1; UG/1
2 AEROSOL, METERED RESPIRATORY (INHALATION) 2 TIMES DAILY
Qty: 1 INHALER | Refills: 3 | Status: SHIPPED | OUTPATIENT
Start: 2021-03-03 | End: 2022-05-02 | Stop reason: SDUPTHER

## 2021-03-03 NOTE — ASSESSMENT & PLAN NOTE
- Uncontrolled GERD can make asthma worse  - Cont  PPI  - Recommend avoid spicy foods and don't eat late at night

## 2021-03-03 NOTE — PROGRESS NOTES
Pulmonary Follow Up Note   Jolie Mulligan 60 y o  female MRN: [id number]  3/3/2021      Referring provider: Dr Bhumi Yepez and Plan:    Severe persistent asthma without complication  - No recent exacerbations although having increased symptoms now for unclear reasons  - not using Trelegy properly because it makes her nauseous   - D/C Trelegy  - Start Advair HFA  - Samples Breztri given  - Add OTC allergy medication  - cont  Xolair every two weeks  May need to adjust dose since weight has increased  Repeat IgE although may not be accurate on therapy  - Refuses Influenza vaccine    Bilateral pulmonary embolism ([facility] [address] )  - Was on Rivaroxaban  Now on ASA 81mg   - No current active malignancy  Radiation pneumonitis (HCC)  - asymptomatic, not currently on XRT  Gastroesophageal reflux disease without esophagitis  - Uncontrolled GERD can make asthma worse  - Cont  PPI  - Recommend avoid spicy foods and don't eat late at night  Class 1 obesity due to excess calories with serious comorbidity and body mass index (BMI) of 33 0 to 33 9 in adult  - Counseled to lose weight and modify diet  This may help her asthma control  Diagnoses and all orders for this visit:    Severe persistent asthma without complication  -     fluticasone-salmeterol (Advair HFA) 115-21 MCG/ACT inhaler; Inhale 2 puffs 2 (two) times a day Rinse mouth after use  -     loratadine (CLARITIN) 10 mg tablet; Take 1 tablet (10 mg total) by mouth daily  -     IgE; Future    Bilateral pulmonary embolism (HCC)    Radiation pneumonitis (HCC)    Gastroesophageal reflux disease without esophagitis    Class 1 obesity due to excess calories with serious comorbidity and body mass index (BMI) of 33 0 to 33 9 in adult    Plan of care discussed with patient  Questions and concerns addressed  Return for follow-up in July  History of Present Illness   HPI:  Scott Willis is a 60 y o  female who presents for follow up of her asthma   She was last seen by me in September  Since then, her breathing was OK  No ER or hospital visits  She states her breathing is good, but she reports a "whistle" when she breathes for the past few days  She uses her albuterol nebulizer as needed, which is about three times per week  She uses her Trelegy inhaler as needed during the week because it makes her nauseous  The powder formula inhalers make her nauseous  She can tolerate liquid inhalers better  Tolerating xolair  Finds it helpful  Breathing is worse when she misses dose  No longer living with dogs  No current pets  Not currently on allergy medication  Has multiple allergies  Refuses Influenza vaccine  Has GERD, worse because she can not chew food due to issues with teeth  Takes Omeprazole  Increased appetite and eating more  Travelling to [place] in June  Entire visit conducted using video : [id number]    Review of Systems  Positive as mentioned above and negative otherwise  Historical Information   Past Medical History:   Diagnosis Date    Abdominal infection ([facility] [address] )     h-pylori    Abnormal chest x-ray 4/5/2019    Asthma     Breast cancer ([facility] [address] ) 06/26/2018    Cancer ([facility] [address] )     BREAST    Diabetes mellitus ([facility] [address] )     Hiatal hernia     History of chemotherapy     History of radiation therapy     Hypercholesterolemia     Hypertension     Hypothyroid     Renal disorder     Rheumatoid arthritis ([facility] [address] )      Past Surgical History:   Procedure Laterality Date    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    BREAST LUMPECTOMY Right 6/26/2018    Procedure: RIGHT BREAST NEEDLE LOCALIZATION X2 WITH RIGHT BREAST LUMPECTOMY ( NEEDLE LOC @ 1000); Surgeon: Pablo London MD;  [facility];  Service: Surgical Oncology    BREAST LUMPECTOMY Right 8/2/2018    Procedure: LYMPHOSCINITGRAPHY INTRAOPERATIVE LYMPHATIC MAPPING , RIGHT SENTINEL NODE BIOPSY, REEXCISION  RIGHT BREAST LUMPECTOMY CAVITY (SUPERIOR MARGIN);   Surgeon: Erick Dunne MD;  [facility];  Service: Surgical Oncology    BREAST SURGERY Left     CATARACT EXTRACTION, BILATERAL Bilateral     EGD AND COLONOSCOPY N/A 9/5/2018    Procedure: EGD AND COLONOSCOPY;  Surgeon: Jason Whitaker MD;  Location: [facility]; Service: Gastroenterology    [place] GUIDED CENTRAL VENOUS ACCESS DEVICE INSERTION  9/13/2018    KNEE SURGERY Right     MAMMO NEEDLE LOCALIZATION RIGHT (ALL INC) Right 6/26/2018    MAMMO STEREOTACTIC BREAST BIOPSY RIGHT (ALL INC) Right 5/23/2018    RETINAL DETACHMENT SURGERY Right     TUBAL LIGATION      TUNNELED VENOUS PORT PLACEMENT Left 9/13/2018    Procedure: INSERTION VENOUS PORT (PORT-A-CATH);   Surgeon: Erick Dunne MD;  [facility];  Service: Surgical Oncology     Family History   Problem Relation Age of Onset    Diabetes Mother     Hypertension Mother     Diabetes Father     Hypertension Father     Diabetes Brother     Hypertension Brother     Prostate cancer Brother     Cancer Maternal Grandfather     No Known Problems Sister     No Known Problems Daughter     No Known Problems Sister     No Known Problems Sister     No Known Problems Sister     No Known Problems Sister     No Known Problems Daughter     No Known Problems Daughter          Meds/Allergies     Current Outpatient Medications:     albuterol (2 5 mg/3 mL) 0 083 % nebulizer solution, Take 1 vial (2 5 mg total) by nebulization every 6 (six) hours as needed for wheezing or shortness of breath, Disp: 120 vial, Rfl: 5    albuterol (PROVENTIL HFA,VENTOLIN HFA) 90 mcg/act inhaler, Inhale 1 puff every 4 (four) hours as needed  , Disp: , Rfl:     amLODIPine (NORVASC) 10 mg tablet, TAKE 1 TABLET BY MOUTH DAILY, Disp: 90 tablet, Rfl: 0    anastrozole (ARIMIDEX) 1 mg tablet, TAKE 1 TABLET BY MOUTH DAILY, Disp: 90 tablet, Rfl: 3    aspirin 81 mg chewable tablet, Chew 81 mg daily, Disp: , Rfl:     atorvastatin (LIPITOR) 10 mg tablet, Take 1 tablet (10 mg total) by mouth daily, Disp: 30 tablet, Rfl: 1    Blood Glucose Monitoring Suppl (ONE TOUCH ULTRA 2) w/Device KIT, by Does not apply route 3 (three) times a day, Disp: 1 each, Rfl: 0    diclofenac sodium (VOLTAREN) 1 %, Apply 2 g topically 4 (four) times a day, Disp: 150 g, Rfl: 0    Dulaglutide (Trulicity) 1 5 JS/2 8FV SOPN, Inject 0 5 mL (1 5 mg total) under the skin once a week, Disp: 12 pen, Rfl: 1    DULoxetine (CYMBALTA) 30 mg delayed release capsule, TAKE 1 CAPSULE BY MOUTH 2 (TWICE)  A DAY, Disp: 180 capsule, Rfl: 0    gemfibrozil (LOPID) 600 mg tablet, Take 600 mg by mouth daily, Disp: , Rfl:     glucose blood (ONE TOUCH ULTRA TEST) test strip, 1 each by Other route 3 (three) times a day, Disp: 100 each, Rfl: 5    Insulin Aspart FlexPen 100 UNIT/ML SOPN, Inject 16 Units under the skin 3 (three) times a day before meals, Disp: , Rfl:     insulin lispro (HumaLOG) 100 units/mL injection pen, Inject 16 Units under the skin 3 (three) times a day with meals Inject 16 units TID, Disp: 5 pen, Rfl: 5    Insulin Pen Needle (BD Pen Needle Emily U/F) 32G X 4 MM MISC, USE FOUR (4) TIMES A DAY, Disp: 100 each, Rfl: 5    Levemir FlexTouch 100 units/mL injection pen, INJECT 45 UNITS UNDER THE SKIN DAILY AT BEDTIME, Disp: 12 mL, Rfl: 4    levothyroxine 50 mcg tablet, Take 1 tablet (50 mcg total) by mouth daily, Disp: 90 tablet, Rfl: 1    lidocaine (XYLOCAINE) 5 % ointment, Apply topically as needed for mild pain Apply 5 minutes prior to insulin injection, Disp: 35 44 g, Rfl: 0    losartan (COZAAR) 50 mg tablet, TAKE 1 TABLET (50 MG TOTAL) BY MOUTH DAILY, Disp: 90 tablet, Rfl: 0    metoprolol tartrate (LOPRESSOR) 25 mg tablet, Take 0 5 tablets (12 5 mg total) by mouth every 12 (twelve) hours, Disp: 60 tablet, Rfl: 2    NovoLOG FlexPen 100 units/mL injection pen, INJECT 16 UNITS SUBCUTANEOUSLY THREE TIMES A DAY, Disp: 15 pen, Rfl: 2    omeprazole (PriLOSEC) 20 mg delayed release capsule, Take 1 capsule (20 mg total) by mouth daily, Disp: 90 capsule, Rfl: 2    OneTouch Delica Lancets 25N MISC, by Does not apply route 3 (three) times a day, Disp: 100 each, Rfl: 5    oxyCODONE (ROXICODONE) 5 mg immediate release tablet, Take 5 mg by mouth every 6 (six) hours as needed for moderate pain, Disp: , Rfl:     Trelegy Ellipta 100-62 5-25 MCG/INH inhaler, INHALE 1 PUFF DAILY RINSE MOUTH AFTER USE , Disp: 1 Inhaler, Rfl: 1    EPINEPHrine (EPIPEN) 0 3 mg/0 3 mL SOAJ, Inject 0 3 mL (0 3 mg total) into a muscle once for 1 dose, Disp: 0 6 mL, Rfl: 0    EPINEPHrine (EPIPEN) 0 3 mg/0 3 mL SOAJ, Inject 0 3 mL (0 3 mg total) into a muscle once for 1 dose, Disp: 0 6 mL, Rfl: 0  Allergies   Allergen Reactions    Aspirin Hives       in [address] told patient not to take aspirin r/t kidneys     Tylenol With Codeine #3 [Acetaminophen-Codeine] Rash       Vitals: Blood pressure 140/80, pulse 94, temperature 98 °F (36 7 °C), resp  rate 18, height 5' 5" (1 651 m), weight 90 7 kg (200 lb), SpO2 97 %, not currently breastfeeding  Body mass index is 33 28 kg/m²  Oxygen Therapy  SpO2: 97 %      Physical Exam  GEN: WDWN, nad, comfortable  HEENT: NCAT, EOMI  CVS: Regular, no m/r/g  LUNGS: Clear, no wheezing  ABD: soft, nd, nt  EXT: No c/c/e  NEURO: No focal deficits  MS: Moving all extremities  SKIN: warm, dry  PSYCH: calm, cooperative      Labs: I have personally reviewed pertinent lab results    Lab Results   Component Value Date    WBC 6 04 07/28/2020    HGB 11 5 07/28/2020    HCT 35 2 07/28/2020    MCV 90 07/28/2020     07/28/2020     Lab Results   Component Value Date    CALCIUM 9 0 06/24/2020    K 3 6 06/24/2020    CO2 26 06/24/2020     06/24/2020    BUN 16 06/24/2020    CREATININE 0 70 06/24/2020     Lab Results   Component Value Date     (H) 11/26/2018     Lab Results   Component Value Date    ALT 20 06/24/2020    AST 15 06/24/2020    ALKPHOS 65 06/24/2020       Labs per my interpretation show normal hemoglobin and normal renal function  Imaging and other studies: I have personally reviewed pertinent films in PACS   high-resolution CT scan 2020: No acute disease  Stable nodules  Pulmonary function testin per my review show hyperinflation with air trapping, no obstruction  EKG, Pathology, and Other Studies: I have personally reviewed pertinent reports  Echo May 2019: EF 26%, grade 1 diastolic dysfunction    [facility]

## 2021-03-03 NOTE — ASSESSMENT & PLAN NOTE
- No recent exacerbations although having increased symptoms now for unclear reasons  - not using Trelegy properly because it makes her nauseous   - D/C Trelegy  - Start Advair HFA  - Samples Breztri given  - Add OTC allergy medication  - cont  Xolair every two weeks  May need to adjust dose since weight has increased  Repeat IgE although may not be accurate on therapy    - Refuses Influenza vaccine

## 2021-03-08 DIAGNOSIS — I10 ESSENTIAL HYPERTENSION: ICD-10-CM

## 2021-03-08 DIAGNOSIS — E11.8 TYPE 2 DIABETES MELLITUS WITH COMPLICATION, WITHOUT LONG-TERM CURRENT USE OF INSULIN (HCC): ICD-10-CM

## 2021-03-08 PROCEDURE — 4010F ACE/ARB THERAPY RXD/TAKEN: CPT | Performed by: PODIATRIST

## 2021-03-08 RX ORDER — LOSARTAN POTASSIUM 50 MG/1
50 TABLET ORAL DAILY
Qty: 90 TABLET | Refills: 0 | Status: SHIPPED | OUTPATIENT
Start: 2021-03-08 | End: 2021-05-27

## 2021-03-10 ENCOUNTER — HOSPITAL ENCOUNTER (OUTPATIENT)
Dept: INFUSION CENTER | Facility: CLINIC | Age: 66
Discharge: HOME | End: 2021-03-10
Payer: COMMERCIAL

## 2021-03-10 VITALS
RESPIRATION RATE: 18 BRPM | TEMPERATURE: 97.7 F | HEART RATE: 74 BPM | DIASTOLIC BLOOD PRESSURE: 67 MMHG | SYSTOLIC BLOOD PRESSURE: 130 MMHG

## 2021-03-10 DIAGNOSIS — Z79.4 TYPE 2 DIABETES MELLITUS WITH COMPLICATION, WITH LONG-TERM CURRENT USE OF INSULIN (HCC): ICD-10-CM

## 2021-03-10 DIAGNOSIS — J45.50 SEVERE PERSISTENT ASTHMA WITHOUT COMPLICATION: Primary | ICD-10-CM

## 2021-03-10 DIAGNOSIS — E78.5 HYPERLIPIDEMIA, UNSPECIFIED HYPERLIPIDEMIA TYPE: ICD-10-CM

## 2021-03-10 DIAGNOSIS — E11.8 TYPE 2 DIABETES MELLITUS WITH COMPLICATION, WITH LONG-TERM CURRENT USE OF INSULIN (HCC): ICD-10-CM

## 2021-03-10 PROCEDURE — 96372 THER/PROPH/DIAG INJ SC/IM: CPT

## 2021-03-10 RX ORDER — INSULIN ASPART 100 [IU]/ML
INJECTION, SOLUTION INTRAVENOUS; SUBCUTANEOUS
Qty: 15 ML | Refills: 1 | Status: SHIPPED | OUTPATIENT
Start: 2021-03-10 | End: 2021-05-19

## 2021-03-10 RX ORDER — CYCLOBENZAPRINE HCL 5 MG
TABLET ORAL
Qty: 90 TABLET | Refills: 0 | OUTPATIENT
Start: 2021-03-10

## 2021-03-10 RX ORDER — FLUTICASONE FUROATE, UMECLIDINIUM BROMIDE AND VILANTEROL TRIFENATATE 100; 62.5; 25 UG/1; UG/1; UG/1
1 POWDER RESPIRATORY (INHALATION) DAILY
COMMUNITY
End: 2021-09-15

## 2021-03-10 RX ORDER — EPINEPHRINE 1 MG/ML
0.3 INJECTION, SOLUTION, CONCENTRATE INTRAVENOUS AS NEEDED
Status: CANCELLED | OUTPATIENT
Start: 2021-03-24

## 2021-03-10 RX ORDER — ATORVASTATIN CALCIUM 10 MG/1
10 TABLET, FILM COATED ORAL DAILY
Qty: 30 TABLET | Refills: 0 | Status: SHIPPED | OUTPATIENT
Start: 2021-03-10 | End: 2021-05-03

## 2021-03-10 RX ADMIN — OMALIZUMAB 375 MG: 150 INJECTION, SOLUTION SUBCUTANEOUS at 10:45

## 2021-03-10 NOTE — PROGRESS NOTES
Patient arrived to the unit and denied infection  He epi pen is at the bed side and  2021  Patient tolerated two injections in the right arm and one in the left  She remained at the infusion center for 30 minutes of observation without complications   Patient declined AVS

## 2021-03-10 NOTE — PLAN OF CARE
Problem: INFECTION - ADULT  Goal: Absence or prevention of progression during hospitalization  Description: INTERVENTIONS:  - Assess and monitor for signs and symptoms of infection  - Monitor lab/diagnostic results  - Monitor all insertion sites, i e  indwelling lines, tubes, and drains  - Monitor endotracheal if appropriate and nasal secretions for changes in amount and color  - Alverda appropriate cooling/warming therapies per order  - Administer medications as ordered  - Instruct and encourage patient and family to use good hand hygiene technique  - Identify and instruct in appropriate isolation precautions for identified infection/condition  Outcome: Progressing

## 2021-03-16 DIAGNOSIS — E11.8 TYPE 2 DIABETES MELLITUS WITH COMPLICATION, WITH LONG-TERM CURRENT USE OF INSULIN (HCC): ICD-10-CM

## 2021-03-16 DIAGNOSIS — Z79.4 TYPE 2 DIABETES MELLITUS WITH COMPLICATION, WITH LONG-TERM CURRENT USE OF INSULIN (HCC): ICD-10-CM

## 2021-03-16 RX ORDER — INSULIN ASPART 100 [IU]/ML
16 INJECTION, SOLUTION INTRAVENOUS; SUBCUTANEOUS
Qty: 15 ML | Refills: 5 | OUTPATIENT
Start: 2021-03-16

## 2021-03-17 ENCOUNTER — OFFICE VISIT (OUTPATIENT)
Dept: PODIATRY | Facility: CLINIC | Age: 66
End: 2021-03-17
Payer: COMMERCIAL

## 2021-03-17 VITALS
WEIGHT: 180 LBS | DIASTOLIC BLOOD PRESSURE: 74 MMHG | SYSTOLIC BLOOD PRESSURE: 140 MMHG | HEIGHT: 65 IN | BODY MASS INDEX: 29.99 KG/M2

## 2021-03-17 DIAGNOSIS — E11.42 DIABETIC POLYNEUROPATHY ASSOCIATED WITH TYPE 2 DIABETES MELLITUS (HCC): Primary | ICD-10-CM

## 2021-03-17 DIAGNOSIS — M79.674 GREAT TOE PAIN, RIGHT: ICD-10-CM

## 2021-03-17 DIAGNOSIS — B35.1 TINEA UNGUIUM: ICD-10-CM

## 2021-03-17 PROCEDURE — 3077F SYST BP >= 140 MM HG: CPT | Performed by: PODIATRIST

## 2021-03-17 PROCEDURE — 1036F TOBACCO NON-USER: CPT | Performed by: PODIATRIST

## 2021-03-17 PROCEDURE — 3008F BODY MASS INDEX DOCD: CPT | Performed by: PODIATRIST

## 2021-03-17 PROCEDURE — 99213 OFFICE O/P EST LOW 20 MIN: CPT | Performed by: PODIATRIST

## 2021-03-17 PROCEDURE — 3078F DIAST BP <80 MM HG: CPT | Performed by: PODIATRIST

## 2021-03-17 NOTE — PROGRESS NOTES
Assessment/Plan:         Diagnoses and all orders for this visit:    Diabetic polyneuropathy associated with type 2 diabetes mellitus ([facility] [address] )  -     Diabetic Shoe Inserts  -     Diabetic Shoe    Tinea unguium  Comments:  B/L hallux, not candidate for lamisil  Can get at risk foot care every 3 months if needed  (Q9)  Trophic changes to skin, edema, parasthesia  Orders:  -     Diabetic Shoe Inserts  -     Diabetic Shoe    Great toe pain, right  -     XR foot 2 vw right; Future  -     Diabetic Shoe Inserts  -     Diabetic Shoe            Reviewed PCP visit with Dr Travis Bourne from 12/3/2020  Reviewed bloodwork, A1C still dangerously high  -Educated on DM risk to lower extremities, proper shoe gear, and daily monitoring of feet    -Educated on A1C and the risks of poorly controlled Diabetes   -Discussed weight loss and suitable exercise regiment   -patient has year on year poorly controlled diabetes with A1c is consistently over 10  With her daughter translating I explained that this is incredibly dangers to her long-term health  I did recommend she see an endocrinologist   She evidently has been referred to endocrinology but is not her made an appointment  They stated they would discuss with her family doctor  Patient's foot risk category 2 given the minor toe deformity in addition to severe neuropathy  She would qualify for at-risk foot care but at the moment her feet are overall remarkably stable given the poorly controlled blood sugar  I did recommend diabetic shoes  Prescription given  She does have some minor pain on the base of the great toe and thinks she might have a spur  In my opinion this seems like a minor condition but we can get an x-ray to make sure there is no bony abnormality to the right 1st ray  If there are acute issues on the x-ray I will call the patient  Patient is having some chronic knee pain to both knees  I did recommend orthopedic consult      Subjective:      Patient ID: Soha Gordon is a 60 y o  female  Patient arrives for annual DM foot exam  Her feet have felt more numb recently  Her last A1C was over 12  She does not smoke  She said it started after cancer treatment but admits her BG is not well controlled  She has tried gabapentin for the numbness but it doesn't help much  The following portions of the patient's history were reviewed and updated as appropriate: allergies, current medications, past family history, past medical history, past social history, past surgical history and problem list     Review of Systems   Constitutional: Negative  Gastrointestinal: Negative for diarrhea, nausea and vomiting  Musculoskeletal: Positive for joint swelling (knee pain)  Negative for arthralgias  Skin: Positive for color change (thick toenails)  Negative for wound  Neurological: Positive for weakness and numbness  Objective:      /74   Ht 5' 5" (1 651 m)   Wt 81 6 kg (180 lb)   BMI 29 95 kg/m²     POCT hemoglobin A1c  Order: [id number]  Status:  Final result   Visible to patient:  Yes ([address])   Next appt:  03/24/2021 at 10:00 AM in Infusion Therapy ([facility])   Dx:  Type 2 diabetes mellitus with hypergl    Ref Range & Units 10/7/20 11:15 AM   Hemoglobin A1C 6 5 12 6Abnormal           Specimen Collected: 10/07/20 11:15 AM   Last Resulted: 10/07/20 11:15 AM                  Physical Exam  Vitals signs reviewed  Constitutional:       Appearance: She is obese  She is not ill-appearing or diaphoretic  Cardiovascular:      Rate and Rhythm: Normal rate  Pulses: Normal pulses  no weak pulses          Dorsalis pedis pulses are 2+ on the right side and 2+ on the left side  Posterior tibial pulses are 2+ on the right side and 2+ on the left side  Pulmonary:      Effort: Pulmonary effort is normal  No respiratory distress     Musculoskeletal:         General: Tenderness (Mild tenderness to the plantar aspect of the distal great toe  No sign of wound infection or complication  Normal joint range of motion) and deformity ( 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) present  Right lower leg: Edema present  Left lower leg: Edema present  Right foot: No bunion, Charcot foot, foot drop or prominent metatarsal heads  Left foot: No bunion, Charcot foot, foot drop or prominent metatarsal heads  Feet:    Feet:      Right foot:      Protective Sensation: 10 sites tested  3 sites sensed  Skin integrity: No ulcer, skin breakdown, erythema, warmth, callus or dry skin  Toenail Condition: Right toenails are abnormally thick  Left foot:      Protective Sensation: 10 sites tested  3 sites sensed  Skin integrity: No ulcer, skin breakdown, erythema, warmth, callus or dry skin  Toenail Condition: Left toenails are abnormally thick  Skin:     Capillary Refill: Capillary refill takes less than 2 seconds  Findings: No bruising, erythema or rash  Neurological:      Mental Status: She is alert and oriented to person, place, and time  Sensory: Sensory deficit present  Motor: No weakness  Gait: Gait normal                    Diabetic Foot Exam    Patient's shoes and socks removed  Right Foot/Ankle   Right Foot Inspection  Skin Exam: skin normal and skin intact no dry skin, no warmth, no callus, no erythema, no maceration, no abnormal color, no pre-ulcer, no ulcer and no callus                          Toe Exam: swelling and right toe deformity (mild hallux limitus right)no tenderness and erythema  Sensory   Vibration: absent  Proprioception: diminished   Monofilament testing: diminished  Vascular  Capillary refills: < 3 seconds  The right DP pulse is 2+  The right PT pulse is 2+     Right Toe  - Comprehensive Exam  Arch: pes planus  Hammertoes: fifth toe  Hallux valgus: no    Left Foot/Ankle  Left Foot Inspection  Skin Exam: skin normal and skin intactno dry skin, no warmth, no erythema, no maceration, normal color, no pre-ulcer, no ulcer and no callus                         Toe Exam: swellingno tenderness and no erythema                   Sensory   Vibration: absent  Proprioception: diminished  Monofilament: diminished  Vascular  Capillary refills: < 3 seconds  The left DP pulse is 2+  The left PT pulse is 2+  Left Toe  - Comprehensive Exam  Arch: pes planus  Hammertoes: fifth toe  Hallux valgus: no  Assign Risk Category:  Deformity present; Loss of protective sensation;  No weak pulses       Risk: 2

## 2021-03-17 NOTE — PATIENT INSTRUCTIONS
Cuidado del pie para personas con diabetes   LO QUE NECESITA SABER:   · El cuidado del pie ayuda a proteger pruitt pies y evitar úlceras o llagas en el pie  Los [employer] de azúcar en la teresa a janae plazo pueden dañar los vasos sanguíneos y los nervios en tia piernas y pies  Zaria daño dificulta que sienta presión, dolor, temperatura y el tacto  Es posible que usted no sienta felicita cortada o felicita úlcera o que los zapatos están muy apretados  El cuidado del pie es necesario para evitar problemas graves, nelli felicita infección o felicita amputación  · La diabetes podría provocar que los dedos de tia pies se tuerzan o se encorven [place]  Estos cambios podrían afectar la manera en que usted camina y pueden conllevar al aumento de la presión en pruitt pie  La presión puede disminuir el flujo sanguíneo a tia pies  La falta del flujo sanguíneo aumenta pruitt riesgo de felicita úlcera en [employer]  No ignore problemas pequeños, nelli la piel reseca o heridas pequeñas  Con el tiempo, estos puede representar felicita amenaza para la ramsey si no se les da el cuidado apropiado  INSTRUCCIONES SOBRE EL AL[facility]GRA HOSPITALARIA:   Llame al proveedor del equipo de cuidados de julia si:  · Tia pies se ponen entumecidos, débiles o difícil de   · Usted tiene pus drenando de felicita llaga en pruitt pie  · Usted tiene felicita herida en pruitt pie que se hace más alfred, más profunda o que no radha  · Usted nota que tiene ampollas, cortadas, rasguños, callos o llagas en pruitt pie  · Usted tiene fiebre y tia pies se ponen rojos, calientes e inflamados  · Las uñas de tia pies se vuelven gruesas, encorvadas o [id number]  · Le es difícil revisarse los pies porque pruitt visión no está elaina  · Usted tiene preguntas o inquietudes acerca de pruitt condición o cuidado  El cuidado de los pies:  · Revísese los pies a diario  Observe todo el pie, incluyendo la planta del pie, entre y [employer] dedos  Revise si hay heridas y callos   Use un david para verse la planta de los pies  La piel de los pies podría estar brillante, estirada o más obscura de lo normal  Tia pies también podrían estar fríos y pálidos  Pase tia carlotta por encima, por debajo, por los lados y [place] dedos del pie para sentir la piel  El enrojecimiento, inflamación y calor son signos de problemas con el flujo sanguíneo que pueden conllevar a felicita úlcera en el pie  No trate de quitarse los callos usted mismo  · Ward [place] todos los días con agua tibia y Brenita Kobus  No use [place], porque esto puede lesionarle el pie  Séquese los pies suavemente con felicita toalla después de lavarlos  Seque entre y [employer] dedos  · Aplique felicita loción o un humectante sobre tia pies secos  Pregunte al médico del equipo de cuidados de julia cuáles lociones son las mejores que puede usar  No  aplique loción o humectante entre tia dedos  La [employer] dedos del pie podría causar rupturas de la piel  · [place] uñas de los pies correctamente  Lime o kerri las uñas de los pies en línea recta  Use un cepillo suave para limpiar alrededor [employer]  Si las [address] gruesas, es posible que necesite que un médico del equipo de cuidados de julia o un especialista se las kerri  · Proteja tia pies  No  camine descalzo ni use zapatos sin calcetines  Compruebe que no haya piedras u otros objetos dentro de tia zapatos que le podrían [employer]  Use calcetines de algodón para ayudar a [place] [employer]  Use calcetines sin costura en los dedos o póngaselos con la costura hacia afuera  Cámbiese los calcetines diariamente  No use calcetines sucios ni húmedos  · Use zapatos que le calcen elaina  Use zapatos que no rocen ninguna parte de tia pies  [place] calzado debería ser de ½ a ¾ pulgada (1 a 2 centímetros) más grandes que tia pies  [place] calzado también debería tener espacio adicional alrededor de la parte más ancha de tia pies   El calzado para caminar o atlético con cordones o correas que se [place] son Franci Archerab mejores  Pida ayuda al médico del equipo de cuidados de julia para elegir un par de zapatos que le calcen leaina  Pregúntele si debe usar algún tipo de plantilla, soporte o vendaje en joe pies  · No fume  Fumar puede dañar los vasos sanguíneos y aumentar pruitt riesgo de úlceras en los pies  Pida al médico de pruitt equipo de cuidados de julia información si usted fuma actualmente y [place] para dejar de hacerlo  Los cigarrillos electrónicos o el tabaco sin humo igualmente contienen nicotina  Consulte con pruitt médico del equipo de cuidados de julia antes de utilizar estos productos  Acuda a joe consultas de control con el médico del equipo de cuidado de la diabetes o un especialista en pies según le indicaron: Usted va a necesitar que le revisen joe pies al menos felicita vez al [place]  Es posible que usted necesite hacerse un examen de pie más seguido si tiene los nervios dañados, deformidad en el pie o [place]  Anote joe preguntas para que se acuerde de hacerlas fabio joe visitas  © Copyright [address] Information is for End User's use only and may not be sold, redistributed or otherwise used for commercial purposes  All illustrations and images included in CareNotes® are the copyrighted property of A D A Scarosso  or [address] [place] [address] es sólo para uso en educación  Pruitt intención no es darle un consejo médico sobre enfermedades o tratamientos  Colsulte con pruitt [employer] farmacéutico antes de seguir cualquier régimen médico para saber si es seguro y efectivo para usted

## 2021-03-23 ENCOUNTER — TELEPHONE (OUTPATIENT)
Dept: ENDOCRINOLOGY | Facility: CLINIC | Age: 66
End: 2021-03-23

## 2021-03-23 NOTE — TELEPHONE ENCOUNTER
Spoke with Pt's daughter, she canonly bring her mother on a Wednesday and you don't have any open f/u slot, can I use one of   You new pt slot?

## 2021-03-23 NOTE — TELEPHONE ENCOUNTER
Prior Authorization for Novolog was done Via Cover  meds  ADRIANNE DAMI KAHN (Key: [id number])  Rx #: [id number]  NovoLOG FlexPen 100UNIT/ML pen-injectors     Form  [employer] Part D Electronic Prior Authorization Form (2017 NCPDP)  Created    Please wait for [employer] 2017 NCPDP to return a determination

## 2021-03-24 ENCOUNTER — HOSPITAL ENCOUNTER (OUTPATIENT)
Dept: INFUSION CENTER | Facility: CLINIC | Age: 66
Discharge: HOME | End: 2021-03-24
Payer: COMMERCIAL

## 2021-03-24 VITALS — WEIGHT: 181.22 LBS | BODY MASS INDEX: 30.16 KG/M2 | TEMPERATURE: 98.1 F

## 2021-03-24 DIAGNOSIS — J45.50 SEVERE PERSISTENT ASTHMA WITHOUT COMPLICATION: Primary | ICD-10-CM

## 2021-03-24 PROCEDURE — 96372 THER/PROPH/DIAG INJ SC/IM: CPT

## 2021-03-24 RX ORDER — EPINEPHRINE 1 MG/ML
0.3 INJECTION, SOLUTION, CONCENTRATE INTRAVENOUS AS NEEDED
Status: CANCELLED | OUTPATIENT
Start: 2021-04-07

## 2021-03-24 RX ORDER — EPINEPHRINE 1 MG/ML
0.3 INJECTION, SOLUTION, CONCENTRATE INTRAVENOUS AS NEEDED
Status: DISCONTINUED | OUTPATIENT
Start: 2021-03-24 | End: 2021-03-27 | Stop reason: HOSPADM

## 2021-03-24 RX ADMIN — OMALIZUMAB 375 MG: 150 INJECTION, SOLUTION SUBCUTANEOUS at 10:06

## 2021-03-24 NOTE — PROGRESS NOTES
Pt here for xolair injections  Pt given 75 mg injection in right arm, along with 150 mg injection also in right arm and 150 mg injection in left arm to equal total dose of 375 mg ( total of 3 injections)  Pt to be observed for 30 min  Pt was provided with AVS and is aware of future appts

## 2021-04-06 DIAGNOSIS — Z79.4 TYPE 2 DIABETES MELLITUS WITH COMPLICATION, WITH LONG-TERM CURRENT USE OF INSULIN (HCC): ICD-10-CM

## 2021-04-06 DIAGNOSIS — E11.8 TYPE 2 DIABETES MELLITUS WITH COMPLICATION, WITH LONG-TERM CURRENT USE OF INSULIN (HCC): ICD-10-CM

## 2021-04-06 RX ORDER — BLOOD SUGAR DIAGNOSTIC
STRIP MISCELLANEOUS
Qty: 300 EACH | Refills: 4 | Status: SHIPPED | OUTPATIENT
Start: 2021-04-06 | End: 2021-06-24 | Stop reason: SDUPTHER

## 2021-04-06 RX ORDER — LANCETS 33 GAUGE
EACH MISCELLANEOUS
Qty: 300 EACH | Refills: 4 | Status: SHIPPED | OUTPATIENT
Start: 2021-04-06

## 2021-04-07 ENCOUNTER — HOSPITAL ENCOUNTER (OUTPATIENT)
Dept: INFUSION CENTER | Facility: CLINIC | Age: 66
Discharge: HOME | End: 2021-04-07
Payer: COMMERCIAL

## 2021-04-07 VITALS
DIASTOLIC BLOOD PRESSURE: 74 MMHG | HEART RATE: 82 BPM | TEMPERATURE: 97.6 F | RESPIRATION RATE: 18 BRPM | SYSTOLIC BLOOD PRESSURE: 132 MMHG

## 2021-04-07 DIAGNOSIS — J45.50 SEVERE PERSISTENT ASTHMA WITHOUT COMPLICATION: Primary | ICD-10-CM

## 2021-04-07 PROCEDURE — 96372 THER/PROPH/DIAG INJ SC/IM: CPT

## 2021-04-07 RX ORDER — EPINEPHRINE 1 MG/ML
0.3 INJECTION, SOLUTION, CONCENTRATE INTRAVENOUS AS NEEDED
Status: CANCELLED | OUTPATIENT
Start: 2021-04-21

## 2021-04-07 RX ADMIN — OMALIZUMAB 375 MG: 150 INJECTION, SOLUTION SUBCUTANEOUS at 10:06

## 2021-04-07 NOTE — PLAN OF CARE
Problem: INFECTION - ADULT  Goal: Absence or prevention of progression during hospitalization  Description: INTERVENTIONS:  - Assess and monitor for signs and symptoms of infection  - Monitor lab/diagnostic results  - Monitor all insertion sites, i e  indwelling lines, tubes, and drains  - Monitor endotracheal if appropriate and nasal secretions for changes in amount and color  - Oden appropriate cooling/warming therapies per order  - Administer medications as ordered  - Instruct and encourage patient and family to use good hand hygiene technique  - Identify and instruct in appropriate isolation precautions for identified infection/condition  Outcome: Progressing

## 2021-04-07 NOTE — PROGRESS NOTES
Patient arrived to the unit with her Epi pen expiring in Nov 21  Patient tolerated her xolair injection with 2 in her right arm and one in her left  Patient remained for 30 min of observation

## 2021-04-14 ENCOUNTER — TELEPHONE (OUTPATIENT)
Dept: FAMILY MEDICINE CLINIC | Facility: CLINIC | Age: 66
End: 2021-04-14

## 2021-04-14 NOTE — TELEPHONE ENCOUNTER
Keven Owen had called in reference to diabetic shoe and shoe inserts that were sent to [facility]  I was able to speak to Neto Jessica who is going to fax Diabetes statement form to have completed before they can proceed with request  I provided her with fax number and advised to send to my attention so I can have Dr Demi Blair complete and sign

## 2021-04-19 ENCOUNTER — TELEPHONE (OUTPATIENT)
Dept: PULMONOLOGY | Facility: CLINIC | Age: 66
End: 2021-04-19

## 2021-04-19 ENCOUNTER — TELEPHONE (OUTPATIENT)
Dept: FAMILY MEDICINE CLINIC | Facility: CLINIC | Age: 66
End: 2021-04-19

## 2021-04-19 NOTE — TELEPHONE ENCOUNTER
Dr Ольга Christianson is rounding @ [facility] and sent letter to my chart to reschedule appt

## 2021-04-21 ENCOUNTER — HOSPITAL ENCOUNTER (OUTPATIENT)
Dept: INFUSION CENTER | Facility: CLINIC | Age: 66
Discharge: HOME | End: 2021-04-21
Payer: COMMERCIAL

## 2021-04-21 VITALS
DIASTOLIC BLOOD PRESSURE: 69 MMHG | SYSTOLIC BLOOD PRESSURE: 118 MMHG | HEART RATE: 73 BPM | RESPIRATION RATE: 18 BRPM | TEMPERATURE: 96.5 F

## 2021-04-21 DIAGNOSIS — J45.50 SEVERE PERSISTENT ASTHMA WITHOUT COMPLICATION: Primary | ICD-10-CM

## 2021-04-21 PROCEDURE — 96372 THER/PROPH/DIAG INJ SC/IM: CPT

## 2021-04-21 RX ORDER — EPINEPHRINE 1 MG/ML
0.3 INJECTION, SOLUTION, CONCENTRATE INTRAVENOUS AS NEEDED
Status: CANCELLED | OUTPATIENT
Start: 2021-05-05

## 2021-04-21 RX ADMIN — OMALIZUMAB 375 MG: 150 INJECTION, SOLUTION SUBCUTANEOUS at 10:16

## 2021-04-21 NOTE — PROGRESS NOTES
Pt arrived to unit without complaint  Pt has her Epi pen expiring in Nov 21  Pt given 75 mg injection in left arm, along with 150 mg injection also in left arm and 150 mg injection in right arm to equal total dose of 375 mg (total of 3 injections)  Patient remained for 30 min of observation  AVS provided  Pt left unit in stable condition

## 2021-05-03 DIAGNOSIS — E78.5 HYPERLIPIDEMIA, UNSPECIFIED HYPERLIPIDEMIA TYPE: ICD-10-CM

## 2021-05-03 RX ORDER — ATORVASTATIN CALCIUM 10 MG/1
10 TABLET, FILM COATED ORAL DAILY
Qty: 30 TABLET | Refills: 0 | Status: SHIPPED | OUTPATIENT
Start: 2021-05-03 | End: 2021-06-07

## 2021-05-05 ENCOUNTER — HOSPITAL ENCOUNTER (OUTPATIENT)
Dept: INFUSION CENTER | Facility: CLINIC | Age: 66
Discharge: HOME | End: 2021-05-05
Payer: COMMERCIAL

## 2021-05-05 VITALS
TEMPERATURE: 98.6 F | DIASTOLIC BLOOD PRESSURE: 77 MMHG | HEART RATE: 80 BPM | SYSTOLIC BLOOD PRESSURE: 154 MMHG | RESPIRATION RATE: 18 BRPM

## 2021-05-05 DIAGNOSIS — J45.50 SEVERE PERSISTENT ASTHMA WITHOUT COMPLICATION: Primary | ICD-10-CM

## 2021-05-05 PROCEDURE — 96372 THER/PROPH/DIAG INJ SC/IM: CPT

## 2021-05-05 RX ORDER — EPINEPHRINE 1 MG/ML
0.3 INJECTION, SOLUTION, CONCENTRATE INTRAVENOUS AS NEEDED
Status: CANCELLED | OUTPATIENT
Start: 2021-05-19

## 2021-05-05 RX ORDER — EPINEPHRINE 1 MG/ML
0.3 INJECTION, SOLUTION, CONCENTRATE INTRAVENOUS AS NEEDED
Status: DISCONTINUED | OUTPATIENT
Start: 2021-05-05 | End: 2021-05-08 | Stop reason: HOSPADM

## 2021-05-05 RX ADMIN — OMALIZUMAB 375 MG: 150 INJECTION, SOLUTION SUBCUTANEOUS at 10:09

## 2021-05-05 NOTE — PROGRESS NOTES
Patient arrived on unit for Xolair  Denies any present issues/concerns  Patient has epipen along with her and within expiration date  Tolerated 2 injections in R arm- 1 was 75mg and the other 150mg SQ and 1 injection, 150mg, in L arm to equal a total dose of 375mg  Remained 30 minutes for observation  Aware of next appointment   AVS given to patient

## 2021-05-12 ENCOUNTER — HOSPITAL ENCOUNTER (OUTPATIENT)
Dept: RADIOLOGY | Facility: HOSPITAL | Age: 66
Discharge: HOME | End: 2021-05-12
Attending: PODIATRIST
Payer: COMMERCIAL

## 2021-05-12 DIAGNOSIS — M79.674 GREAT TOE PAIN, RIGHT: ICD-10-CM

## 2021-05-12 PROCEDURE — 73620 X-RAY EXAM OF FOOT: CPT

## 2021-05-19 ENCOUNTER — OFFICE VISIT (OUTPATIENT)
Dept: ENDOCRINOLOGY | Facility: CLINIC | Age: 66
End: 2021-05-19
Payer: COMMERCIAL

## 2021-05-19 ENCOUNTER — HOSPITAL ENCOUNTER (OUTPATIENT)
Dept: INFUSION CENTER | Facility: CLINIC | Age: 66
Discharge: HOME | End: 2021-05-19
Payer: COMMERCIAL

## 2021-05-19 VITALS
SYSTOLIC BLOOD PRESSURE: 130 MMHG | HEIGHT: 65 IN | BODY MASS INDEX: 29.85 KG/M2 | DIASTOLIC BLOOD PRESSURE: 70 MMHG | WEIGHT: 179.2 LBS | HEART RATE: 75 BPM

## 2021-05-19 VITALS
RESPIRATION RATE: 18 BRPM | TEMPERATURE: 97.8 F | DIASTOLIC BLOOD PRESSURE: 71 MMHG | SYSTOLIC BLOOD PRESSURE: 115 MMHG | HEART RATE: 85 BPM

## 2021-05-19 DIAGNOSIS — I10 ESSENTIAL HYPERTENSION: ICD-10-CM

## 2021-05-19 DIAGNOSIS — Z79.4 TYPE 2 DIABETES MELLITUS WITH HYPERGLYCEMIA, WITH LONG-TERM CURRENT USE OF INSULIN (HCC): Primary | ICD-10-CM

## 2021-05-19 DIAGNOSIS — Z79.4 TYPE 2 DIABETES MELLITUS WITH COMPLICATION, WITH LONG-TERM CURRENT USE OF INSULIN (HCC): ICD-10-CM

## 2021-05-19 DIAGNOSIS — E11.8 TYPE 2 DIABETES MELLITUS WITH COMPLICATION, WITH LONG-TERM CURRENT USE OF INSULIN (HCC): ICD-10-CM

## 2021-05-19 DIAGNOSIS — E03.8 OTHER SPECIFIED HYPOTHYROIDISM: ICD-10-CM

## 2021-05-19 DIAGNOSIS — E78.00 HYPERCHOLESTEROLEMIA: ICD-10-CM

## 2021-05-19 DIAGNOSIS — E11.65 TYPE 2 DIABETES MELLITUS WITH HYPERGLYCEMIA, WITH LONG-TERM CURRENT USE OF INSULIN (HCC): Primary | ICD-10-CM

## 2021-05-19 DIAGNOSIS — J45.50 SEVERE PERSISTENT ASTHMA WITHOUT COMPLICATION: Primary | ICD-10-CM

## 2021-05-19 PROCEDURE — 1036F TOBACCO NON-USER: CPT | Performed by: INTERNAL MEDICINE

## 2021-05-19 PROCEDURE — 96372 THER/PROPH/DIAG INJ SC/IM: CPT

## 2021-05-19 PROCEDURE — 99214 OFFICE O/P EST MOD 30 MIN: CPT | Performed by: INTERNAL MEDICINE

## 2021-05-19 RX ORDER — EPINEPHRINE 1 MG/ML
0.3 INJECTION, SOLUTION, CONCENTRATE INTRAVENOUS AS NEEDED
Status: CANCELLED | OUTPATIENT
Start: 2021-06-02

## 2021-05-19 RX ORDER — EPINEPHRINE 1 MG/ML
0.3 INJECTION, SOLUTION, CONCENTRATE INTRAVENOUS AS NEEDED
Status: DISCONTINUED | OUTPATIENT
Start: 2021-05-19 | End: 2021-05-22 | Stop reason: HOSPADM

## 2021-05-19 RX ORDER — SUB-Q INSULIN DEVICE, 40 UNIT
EACH MISCELLANEOUS DAILY
Qty: 1 KIT | Refills: 5 | Status: SHIPPED | OUTPATIENT
Start: 2021-05-19 | End: 2021-11-10

## 2021-05-19 RX ORDER — FLASH GLUCOSE SCANNING READER
1 EACH MISCELLANEOUS DAILY
Qty: 1 EACH | Refills: 0 | Status: SHIPPED | OUTPATIENT
Start: 2021-05-19 | End: 2022-05-02

## 2021-05-19 RX ORDER — FLASH GLUCOSE SENSOR
1 KIT MISCELLANEOUS
Qty: 2 EACH | Refills: 11 | Status: SHIPPED | OUTPATIENT
Start: 2021-05-19

## 2021-05-19 RX ADMIN — OMALIZUMAB 375 MG: 150 INJECTION, SOLUTION SUBCUTANEOUS at 11:18

## 2021-05-19 NOTE — PATIENT INSTRUCTIONS
Hypoglycemia in a Person with Diabetes   WHAT YOU NEED TO KNOW:   Hypoglycemia is a serious condition that happens when your blood glucose (sugar) level drops too low  The blood sugar level is usually too high in a person with diabetes, but the level can also drop too low  It is important to follow your diabetes management plan to keep your blood sugar level steady  DISCHARGE INSTRUCTIONS:   You or someone close to you needs to call the local emergency number (911 in the [address] [facility]) if:   · You have a seizure or pass out  · Your blood sugar is less than 50 mg/dL and does not respond to treatment  · You feel you are going to pass out  · You have trouble thinking clearly  Call your diabetes care team if:   · You have had symptoms of low blood sugar several times  · You have questions about the amount of insulin or diabetes medicine you are taking  · You have questions or concerns about your condition or care  Medicines:   · Insulin or diabetes medicine  help to keep your blood sugar under control  · Glucagon  may be needed if you have severe hypoglycemia  · Take your medicine as directed  Contact your healthcare provider if you think your medicine is not helping or if you have side effects  Tell him or her if you are allergic to any medicine  Keep a list of the medicines, vitamins, and herbs you take  Include the amounts, and when and why you take them  Bring the list or the pill bottles to follow-up visits  Carry your medicine list with you in case of an emergency  Manage hypoglycemia:   · Check your blood sugar level right away if you have symptoms of hypoglycemia  Hypoglycemia is usually 70 mg/dL or below  Ask your diabetes care team what blood sugar level is too low for you  · If your blood sugar level is too low, eat or drink 15 grams of fast-acting carbohydrate  Examples of this amount of fast-acting carbohydrate are 4 ounces (½ cup) of fruit juice or 4 ounces of regular soda  Other examples are 2 tablespoons of raisins or 1 tube of glucose gel  Check your blood sugar level 15 minutes later  Sit still as you wait  If the level is still low (less than 100 mg/dL), have another 15 grams of carbohydrate  When the level returns to 100 mg/dL, eat a meal if it is time  If your meal time is more than 1 hour away, eat a snack  The snack should contain carbohydrates, such as the following:     ? 3/4 cup of cereal    ? 1 cup of skim or low fat milk    ? 6 soda crackers    ? 1/2 of a turkey sandwich    ? 15 fat-free chips  This will help prevent another drop in blood sugar  Always carefully follow your diabetes care team's instructions on how to treat low blood sugar levels  · Always carry a source of fast-acting carbohydrate  If you have symptoms of hypoglycemia and you do not have a blood glucose meter, have a source of fast-acting carbohydrate anyway  Avoid carbohydrate foods that are high in fat  The fat content may make the carbohydrate take longer to increase your blood sugar level  Ask your diabetes care team if you should carry a glucagon kit  Glucagon is a medicine that is injected when you develop severe hypoglycemia and become unconscious  Check the expiration date every month and replace it before it expires  · Teach others how to help you if you have symptoms of hypoglycemia  Tell them about the symptoms of hypoglycemia  Ask them to give you a source of fast-acting carbohydrate if you cannot get it yourself  Ask them to give you a glucagon injection if you have signs of hypoglycemia and you become unconscious or have a seizure  Ask them to call the local emergency number (911 in the [address] [facility])   This is an emergency  Tell them never to try to make you swallow anything if you faint or have a seizure  · Wear medical alert jewelry  or carry a card that says you have diabetes  Ask where to get these items  Prevent hypoglycemia:   · Take diabetes medicine as directed    Take your medicine at the right time and in the right amount  Do not  double the amount of medicine you take unless instructed by your diabetes care team      · Eat regular meals and snacks  Talk to your dietitian or diabetes care team about a meal plan that is right for you  Do not skip meals  · Check your blood sugar level as directed  Ask your diabetes care team what your blood sugar levels should be before and after you eat  Ask when and how often to check your blood sugar level  You may need to check at least 3 times each day  Record your blood sugar level results and take the record with you when you see your care team  Changes may need to be made to your medicine, food, or exercise schedules using the record  · Check your blood sugar level before you exercise  Physical activity, such as exercise, can decrease your blood sugar level  If your blood sugar level is less than 100 mg/dL, have a carbohydrate snack  Examples are 4 to 6 crackers, ½ banana, 8 ounces (1 cup) of nonfat or 1% milk, or 4 ounces (½ cup) of juice  If you will be active for more than 1 hour, you may need to check your blood sugar level every 30 minutes  Your diabetes care team may also recommend that you check your blood sugar level after your activity  · Know the risks if you choose to drink alcohol  Alcohol can cause your blood sugar levels to be low if you use insulin  Alcohol can cause high blood sugar levels and weight gain if you drink too much  Women 21 years or older and men 72 years or older should limit alcohol to 1 drink a day  Men aged 24 to 59 years should limit alcohol to 2 drinks a day  A drink of alcohol is 12 ounces of beer, 5 ounces of wine, or 1½ ounces of liquor  Follow up with your diabetes care team or specialist as directed: You may need dose changes to your insulin or oral diabetes medicine if you have hypoglycemia  Write down your questions so you remember to ask them during your visits     © Copyright [employer] [facility] Information is for [employer] use only and may not be sold, redistributed or otherwise used for commercial purposes  All illustrations and images included in CareNotes® are the copyrighted property of A D A M , Inc  or [employer]   The above information is an  only  It is not intended as medical advice for individual conditions or treatments  Talk to your doctor, nurse or pharmacist before following any medical regimen to see if it is safe and effective for you

## 2021-05-19 NOTE — PROGRESS NOTES
Patient arrived on unit for Xolair  Denies any present issues/concerns  Patient has epipen and within expiration date  Tolerated 2 Xolair injections- 150mg and 75mg in Left arm and 1 injection in RA- 150mg to total 375mg- total of 3 injections today  Remained 30 minutes for observation  Aware of next appointment  AVS given to patient

## 2021-05-19 NOTE — ASSESSMENT & PLAN NOTE
Lab Results   Component Value Date    HGBA1C 12 6 (A) 10/07/2020   Patient and daughter counseled on complications of uncontrolled type 2 diabetes-I doubt that she is taking her insulin on a regular basis  For now I have given him written instructions to take insulin as directed  She needs to monitor blood sugar 4 times a day and send over log in 2 weeks  Discussed the option of using a personal continuous glucose monitor if covered by her insurance as well as using V Go insulin delivery device to try to get her to take her insulin regularly    She was also not seen her primary care in the past year-encouraged her to set up an appointment with them

## 2021-05-19 NOTE — PROGRESS NOTES
Derek Brandt 60 y o  female MRN: [id number]    Encounter: [id number]      Assessment/Plan     Problem List Items Addressed This Visit        Endocrine    Other specified hypothyroidism     She has been off levothyroxine for the past month-will repeat thyroid function tests         Type 2 diabetes mellitus with complication, with long-term current use of insulin ([facility] [address] )    Type 2 diabetes mellitus with hyperglycemia, with long-term current use of insulin ([facility] [address] ) - Primary       Lab Results   Component Value Date    HGBA1C 12 6 (A) 10/07/2020   Patient and daughter counseled on complications of uncontrolled type 2 diabetes-I doubt that she is taking her insulin on a regular basis  For now I have given him written instructions to take insulin as directed  She needs to monitor blood sugar 4 times a day and send over log in 2 weeks  Discussed the option of using a personal continuous glucose monitor if covered by her insurance as well as using V Go insulin delivery device to try to get her to take her insulin regularly  She was also not seen her primary care in the past year-encouraged her to set up an appointment with them         Relevant Orders    Comprehensive metabolic panel Lab Collect    HEMOGLOBIN A1C W/ EAG ESTIMATION Lab Collect    TSH, 3rd generation Lab Collect    T4, free Lab Collect    Ambulatory referral to Diabetic Education       Cardiovascular and Mediastinum    Hypertension    Relevant Orders    Microalbumin / creatinine urine ratio Lab Collect       Other    Hypercholesterolemia     Likely not taking her medications         Relevant Orders    Lipid Panel with Direct LDL reflex Lab Collect          CC: Diabetes    History of Present Illness     HPI:  78-year-old female with type 2 diabetes on insulin therapy seen and-she is accompanied by her daughter who is providing translation  She was last seen in office more than a year back    She expressed frustration that her sugars are high however does not appear that she has made any changes to improve her blood sugars and does not appear to be taking insulin on a regular basis  Current regimen-she is supposed to be on  humalog 16 units before meals  - eats twice a day -    levemir 45 units   Did not bring a log or meter in however by her memory states that she is checking blood sugar twice a day  Breakfast 8 am -  Fasting 200-400s  Takes humalog after eating-   Checks f s after eating dinner and takes insulin after - missing doses     Off levothyroxine for the past month -states that she was told to stop by the pharmacist         Review of Systems    Historical Information   Past Medical History:   Diagnosis Date    Abdominal infection ([facility] [address] )     h-pylori    Abnormal chest x-ray 4/5/2019    Asthma     Breast cancer ([facility] [address] ) 06/26/2018    Cancer ([facility] [address] )     BREAST    Diabetes mellitus ([facility] [address] )     Hiatal hernia     History of chemotherapy     History of radiation therapy     Hypercholesterolemia     Hypertension     Hypothyroid     Renal disorder     Rheumatoid arthritis ([facility] [address] )      Past Surgical History:   Procedure Laterality Date    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    BREAST LUMPECTOMY Right 6/26/2018    Procedure: RIGHT BREAST NEEDLE LOCALIZATION X2 WITH RIGHT BREAST LUMPECTOMY ( NEEDLE LOC @ 1000); Surgeon: Zack Alaniz MD;  Location: [facility];  Service: Surgical Oncology    BREAST LUMPECTOMY Right 8/2/2018    Procedure: LYMPHOSCINITGRAPHY INTRAOPERATIVE LYMPHATIC MAPPING , RIGHT SENTINEL NODE BIOPSY, REEXCISION  RIGHT BREAST LUMPECTOMY CAVITY (SUPERIOR MARGIN); Surgeon: Zack Alaniz MD;  Location: [facility];  Service: Surgical Oncology    BREAST SURGERY Left     CATARACT EXTRACTION, BILATERAL Bilateral     EGD AND COLONOSCOPY N/A 9/5/2018    Procedure: EGD AND COLONOSCOPY;  Surgeon: Leslie Richmond MD;  [facility] GI [facility];   Service: Gastroenterology    [facility] GUIDED CENTRAL VENOUS ACCESS DEVICE INSERTION  9/13/2018    KNEE SURGERY Right     MAMMO NEEDLE LOCALIZATION RIGHT (ALL INC) Right 6/26/2018    MAMMO STEREOTACTIC BREAST BIOPSY RIGHT (ALL INC) Right 5/23/2018    RETINAL DETACHMENT SURGERY Right     TUBAL LIGATION      TUNNELED VENOUS PORT PLACEMENT Left 9/13/2018    Procedure: INSERTION VENOUS PORT (PORT-A-CATH);   Surgeon: Donis Cespedes MD;  [facility];  Service: Surgical Oncology     Social History   Social History     Substance and Sexual Activity   Alcohol Use No     Social History     Substance and Sexual Activity   Drug Use No     Social History     Tobacco Use   Smoking Status Never Smoker   Smokeless Tobacco Never Used     Family History:   Family History   Problem Relation Age of Onset    Diabetes Mother     Hypertension Mother     Diabetes Father     Hypertension Father     Diabetes Brother     Hypertension Brother     Prostate cancer Brother     Cancer Maternal Grandfather     No Known Problems Sister     No Known Problems Daughter     No Known Problems Sister     No Known Problems Sister     No Known Problems Sister     No Known Problems Sister     No Known Problems Daughter     No Known Problems Daughter        Meds/Allergies   Current Outpatient Medications   Medication Sig Dispense Refill    albuterol (2 5 mg/3 mL) 0 083 % nebulizer solution Take 1 vial (2 5 mg total) by nebulization every 6 (six) hours as needed for wheezing or shortness of breath 120 vial 5    albuterol (PROVENTIL HFA,VENTOLIN HFA) 90 mcg/act inhaler Inhale 1 puff every 4 (four) hours as needed        amLODIPine (NORVASC) 10 mg tablet TAKE 1 TABLET BY MOUTH DAILY 90 tablet 0    anastrozole (ARIMIDEX) 1 mg tablet TAKE 1 TABLET BY MOUTH DAILY 90 tablet 3    aspirin 81 mg chewable tablet Chew 81 mg daily      atorvastatin (LIPITOR) 10 mg tablet TAKE 1 TABLET (10 MG TOTAL) BY MOUTH DAILY 30 tablet 0    Blood Glucose Monitoring Suppl (ONE TOUCH ULTRA 2) w/Device KIT by Does not apply route 3 (three) times a day 1 each 0    DULoxetine (CYMBALTA) 30 mg delayed release capsule TAKE 1 CAPSULE BY MOUTH 2 (TWICE)  A  capsule 0    fluticasone-salmeterol (Advair HFA) 115-21 MCG/ACT inhaler Inhale 2 puffs 2 (two) times a day Rinse mouth after use  1 Inhaler 3    fluticasone-umeclidinium-vilanterol (Trelegy Ellipta) 100-62 5-25 MCG/INH inhaler Inhale 1 puff daily Rinse mouth after use        insulin lispro (HumaLOG) 100 units/mL injection pen Inject 16 Units under the skin 3 (three) times a day with meals Inject 16 units TID 5 pen 5    Insulin Pen Needle (BD Pen Needle Emily U/F) 32G X 4 MM MISC USE FOUR (4) TIMES A  each 5    Levemir FlexTouch 100 units/mL injection pen INJECT 45 UNITS UNDER THE SKIN DAILY AT BEDTIME 12 mL 4    loratadine (CLARITIN) 10 mg tablet Take 1 tablet (10 mg total) by mouth daily 30 tablet 3    losartan (COZAAR) 50 mg tablet TAKE 1 TABLET (50 MG TOTAL) BY MOUTH DAILY 90 tablet 0    metoprolol tartrate (LOPRESSOR) 25 mg tablet Take 0 5 tablets (12 5 mg total) by mouth every 12 (twelve) hours 60 tablet 2    omeprazole (PriLOSEC) 20 mg delayed release capsule Take 1 capsule (20 mg total) by mouth daily 90 capsule 2    OneTouch Delica Lancets 87P MISC USE 3 (THREE) TIMES A  each 4    OneTouch Ultra test strip USE 3 (THREE) TIMES A DAY USE AS INSTRUCTED 300 each 4    oxyCODONE (ROXICODONE) 5 mg immediate release tablet Take 5 mg by mouth every 6 (six) hours as needed for moderate pain      Continuous Blood Gluc  (FreeStyle Broderick 14 Day [place]) AARON Use 1 Device daily 1 each 0    Continuous Blood Gluc Sensor (FreeStyle Broderick 14 Day Sensor) MISC Use 1 each every 14 (fourteen) days 2 each 11    diclofenac sodium (VOLTAREN) 1 % Apply 2 g topically 4 (four) times a day (Patient not taking: Reported on 5/19/2021) 150 g 0    Dulaglutide (Trulicity) 1 5 UF/6 3AH SOPN Inject 0 5 mL (1 5 mg total) under the skin once a week (Patient not taking: Reported on 5/19/2021) 12 pen 1  Insulin Disposable Pump (V-Go 40) KIT Use daily 1 kit 5    levothyroxine 50 mcg tablet Take 1 tablet (50 mcg total) by mouth daily 90 tablet 1    lidocaine (XYLOCAINE) 5 % ointment Apply topically as needed for mild pain Apply 5 minutes prior to insulin injection (Patient not taking: Reported on 5/19/2021) 35 44 g 0     No current facility-administered medications for this visit  Facility-Administered Medications Ordered in Other Visits   Medication Dose Route Frequency Provider Last Rate Last Admin    EPINEPHrine PF (ADRENALIN) 1 mg/mL injection 0 3 mg  0 3 mg Intramuscular PRN Meme Nicholson, DO         Allergies   Allergen Reactions    Aspirin Hives     Dr  in [address] told patient not to take aspirin r/t kidneys     Tylenol With Codeine #3 [Acetaminophen-Codeine] Rash       Objective   Vitals: Blood pressure 130/70, pulse 75, height 5' 5" (1 651 m), weight 81 3 kg (179 lb 3 2 oz), not currently breastfeeding  Physical Exam  Vitals signs reviewed  Constitutional:       Appearance: Normal appearance  She is not ill-appearing or diaphoretic  HENT:      Head: Normocephalic and atraumatic  Eyes:      General: No scleral icterus  Extraocular Movements: Extraocular movements intact  Neck:      Musculoskeletal: Neck supple  Cardiovascular:      Rate and Rhythm: Normal rate and regular rhythm  Pulses: Pulses are weak  Dorsalis pedis pulses are 1+ on the right side and 1+ on the left side  Posterior tibial pulses are 1+ on the right side and 1+ on the left side  Heart sounds: Normal heart sounds  No murmur  Pulmonary:      Effort: Pulmonary effort is normal  No respiratory distress  Breath sounds: Normal breath sounds  No wheezing or rales  Abdominal:      General: Bowel sounds are normal  There is no distension  Palpations: Abdomen is soft  Tenderness: There is no abdominal tenderness  Musculoskeletal:      Right lower leg: No edema  Left lower leg: No edema  Feet:      Right foot:      Skin integrity: Callus present  No ulcer, skin breakdown, erythema, warmth or dry skin  Left foot:      Skin integrity: Callus present  No ulcer, skin breakdown, erythema, warmth or dry skin  Lymphadenopathy:      Cervical: No cervical adenopathy  Skin:     General: Skin is warm and dry  Neurological:      General: No focal deficit present  Mental Status: She is alert  Psychiatric:         Mood and Affect: Mood normal          Behavior: Behavior normal          Patient's shoes and socks removed  Right Foot/Ankle   Right Foot Inspection  Skin Exam: skin normal, skin intact, callus and callus no dry skin, no warmth, no erythema, no maceration, no abnormal color, no pre-ulcer and no ulcer                          Toe Exam: no swelling, no tenderness, erythema and  no right toe deformity  Sensory   Vibration: diminished    Monofilament testing: diminished  Vascular    The right DP pulse is 1+  The right PT pulse is 1+  Left Foot/Ankle  Left Foot Inspection  Skin Exam: skin normal, skin intact and callusno dry skin, no warmth, no erythema, no maceration, normal color, no pre-ulcer and no ulcer                         Toe Exam: no swelling, no tenderness, no erythema and no left toe deformity                   Sensory   Vibration: diminished    Monofilament: diminished  Vascular    The left DP pulse is 1+  The left PT pulse is 1+  Assign Risk Category:  No deformity present; Loss of protective sensation; Weak pulses       Risk: 1    The history was obtained from the review of the chart, patient and family      Lab Results:   Lab Results   Component Value Date/Time    Hemoglobin A1C 12 6 (A) 10/07/2020 11:15 AM    Hemoglobin A1C 12 6 (H) 06/24/2020 09:11 AM    WBC 6 04 07/28/2020 09:45 AM    WBC 5 38 06/24/2020 09:11 AM    Hemoglobin 11 5 07/28/2020 09:45 AM    Hemoglobin 11 4 (L) 06/24/2020 09:11 AM    Hematocrit 35 2 07/28/2020 09:45 AM Hematocrit 36 7 06/24/2020 09:11 AM    MCV 90 07/28/2020 09:45 AM    MCV 92 06/24/2020 09:11 AM    Platelets 651 22/54/5136 09:45 AM    Platelets 214 08/23/1022 09:11 AM    BUN 16 06/24/2020 09:11 AM    Potassium 3 6 06/24/2020 09:11 AM    Chloride 104 06/24/2020 09:11 AM    CO2 26 06/24/2020 09:11 AM    Creatinine 0 70 06/24/2020 09:11 AM    AST 15 06/24/2020 09:11 AM    ALT 20 06/24/2020 09:11 AM    Albumin 3 4 (L) 06/24/2020 09:11 AM    HDL, Direct 48 06/24/2020 09:11 AM    Triglycerides 132 06/24/2020 09:11 AM           Imaging Studies:     Portions of the record may have been created with voice recognition software  Occasional wrong word or "sound a like" substitutions may have occurred due to the inherent limitations of voice recognition software  Read the chart carefully and recognize, using context, where substitutions have occurred

## 2021-05-24 ENCOUNTER — LAB (OUTPATIENT)
Dept: LAB | Facility: HOSPITAL | Age: 66
End: 2021-05-24
Attending: INTERNAL MEDICINE
Payer: COMMERCIAL

## 2021-05-24 DIAGNOSIS — J45.50 SEVERE PERSISTENT ASTHMA WITHOUT COMPLICATION: ICD-10-CM

## 2021-05-24 DIAGNOSIS — E78.00 HYPERCHOLESTEROLEMIA: ICD-10-CM

## 2021-05-24 DIAGNOSIS — I10 ESSENTIAL HYPERTENSION: ICD-10-CM

## 2021-05-24 DIAGNOSIS — Z79.4 TYPE 2 DIABETES MELLITUS WITH HYPERGLYCEMIA, WITH LONG-TERM CURRENT USE OF INSULIN (HCC): ICD-10-CM

## 2021-05-24 DIAGNOSIS — E11.65 TYPE 2 DIABETES MELLITUS WITH HYPERGLYCEMIA, WITH LONG-TERM CURRENT USE OF INSULIN (HCC): ICD-10-CM

## 2021-05-24 LAB
ALBUMIN SERPL BCP-MCNC: 3.2 G/DL (ref 3.5–5)
ALP SERPL-CCNC: 81 U/L (ref 46–116)
ALT SERPL W P-5'-P-CCNC: 22 U/L (ref 12–78)
ANION GAP SERPL CALCULATED.3IONS-SCNC: 8 MMOL/L (ref 4–13)
AST SERPL W P-5'-P-CCNC: 12 U/L (ref 5–45)
BILIRUB SERPL-MCNC: 0.34 MG/DL (ref 0.2–1)
BUN SERPL-MCNC: 23 MG/DL (ref 5–25)
CALCIUM ALBUM COR SERPL-MCNC: 9.8 MG/DL (ref 8.3–10.1)
CALCIUM SERPL-MCNC: 9.2 MG/DL (ref 8.3–10.1)
CHLORIDE SERPL-SCNC: 103 MMOL/L (ref 100–108)
CHOLEST SERPL-MCNC: 195 MG/DL (ref 50–200)
CO2 SERPL-SCNC: 30 MMOL/L (ref 21–32)
CREAT SERPL-MCNC: 0.69 MG/DL (ref 0.6–1.3)
CREAT UR-MCNC: 249 MG/DL
EST. AVERAGE GLUCOSE BLD GHB EST-MCNC: 318 MG/DL
GFR SERPL CREATININE-BSD FRML MDRD: 106 ML/MIN/1.73SQ M
GLUCOSE P FAST SERPL-MCNC: 246 MG/DL (ref 65–99)
HBA1C MFR BLD: 12.7 %
HDLC SERPL-MCNC: 52 MG/DL
LDLC SERPL CALC-MCNC: 120 MG/DL (ref 0–100)
MICROALBUMIN UR-MCNC: 76.2 MG/L (ref 0–20)
MICROALBUMIN/CREAT 24H UR: 31 MG/G CREATININE (ref 0–30)
POTASSIUM SERPL-SCNC: 4.1 MMOL/L (ref 3.5–5.3)
PROT SERPL-MCNC: 7 G/DL (ref 6.4–8.2)
SODIUM SERPL-SCNC: 141 MMOL/L (ref 136–145)
T4 FREE SERPL-MCNC: 0.99 NG/DL (ref 0.76–1.46)
TOTAL IGE SMQN RAST: 1302 KU/L (ref 0–113)
TRIGL SERPL-MCNC: 114 MG/DL
TSH SERPL DL<=0.05 MIU/L-ACNC: 4.86 UIU/ML (ref 0.36–3.74)

## 2021-05-24 PROCEDURE — 84443 ASSAY THYROID STIM HORMONE: CPT

## 2021-05-24 PROCEDURE — 82043 UR ALBUMIN QUANTITATIVE: CPT

## 2021-05-24 PROCEDURE — 80053 COMPREHEN METABOLIC PANEL: CPT

## 2021-05-24 PROCEDURE — 3046F HEMOGLOBIN A1C LEVEL >9.0%: CPT | Performed by: INTERNAL MEDICINE

## 2021-05-24 PROCEDURE — 36415 COLL VENOUS BLD VENIPUNCTURE: CPT

## 2021-05-24 PROCEDURE — 82570 ASSAY OF URINE CREATININE: CPT

## 2021-05-24 PROCEDURE — 82785 ASSAY OF IGE: CPT

## 2021-05-24 PROCEDURE — 80061 LIPID PANEL: CPT

## 2021-05-24 PROCEDURE — 3046F HEMOGLOBIN A1C LEVEL >9.0%: CPT | Performed by: FAMILY MEDICINE

## 2021-05-24 PROCEDURE — 84439 ASSAY OF FREE THYROXINE: CPT

## 2021-05-24 PROCEDURE — 83036 HEMOGLOBIN GLYCOSYLATED A1C: CPT

## 2021-05-27 DIAGNOSIS — E11.8 TYPE 2 DIABETES MELLITUS WITH COMPLICATION, WITHOUT LONG-TERM CURRENT USE OF INSULIN (HCC): ICD-10-CM

## 2021-05-27 DIAGNOSIS — I10 ESSENTIAL HYPERTENSION: ICD-10-CM

## 2021-05-27 PROCEDURE — 4010F ACE/ARB THERAPY RXD/TAKEN: CPT | Performed by: INTERNAL MEDICINE

## 2021-05-27 PROCEDURE — 4010F ACE/ARB THERAPY RXD/TAKEN: CPT | Performed by: FAMILY MEDICINE

## 2021-05-27 RX ORDER — LOSARTAN POTASSIUM 50 MG/1
50 TABLET ORAL DAILY
Qty: 90 TABLET | Refills: 0 | Status: SHIPPED | OUTPATIENT
Start: 2021-05-27 | End: 2021-08-05

## 2021-06-01 NOTE — Clinical Note
Mervin Nicole - please let pt know she is on appropriate dose of Xolair per dosing scale  Her IgE is still elevated but on Xolair, we can't interpret it

## 2021-06-02 ENCOUNTER — HOSPITAL ENCOUNTER (OUTPATIENT)
Dept: INFUSION CENTER | Facility: CLINIC | Age: 66
Discharge: HOME | End: 2021-06-02
Payer: COMMERCIAL

## 2021-06-02 ENCOUNTER — OFFICE VISIT (OUTPATIENT)
Dept: DIABETES SERVICES | Facility: CLINIC | Age: 66
End: 2021-06-02
Payer: COMMERCIAL

## 2021-06-02 VITALS
DIASTOLIC BLOOD PRESSURE: 74 MMHG | TEMPERATURE: 98.2 F | RESPIRATION RATE: 18 BRPM | SYSTOLIC BLOOD PRESSURE: 153 MMHG | HEART RATE: 80 BPM

## 2021-06-02 DIAGNOSIS — J45.50 SEVERE PERSISTENT ASTHMA WITHOUT COMPLICATION: Primary | ICD-10-CM

## 2021-06-02 DIAGNOSIS — Z79.4 TYPE 2 DIABETES MELLITUS WITH HYPERGLYCEMIA, WITH LONG-TERM CURRENT USE OF INSULIN (HCC): Primary | ICD-10-CM

## 2021-06-02 DIAGNOSIS — E11.65 TYPE 2 DIABETES MELLITUS WITH HYPERGLYCEMIA, WITH LONG-TERM CURRENT USE OF INSULIN (HCC): Primary | ICD-10-CM

## 2021-06-02 DIAGNOSIS — E11.42 DIABETIC POLYNEUROPATHY ASSOCIATED WITH TYPE 2 DIABETES MELLITUS (HCC): ICD-10-CM

## 2021-06-02 PROCEDURE — 96372 THER/PROPH/DIAG INJ SC/IM: CPT

## 2021-06-02 PROCEDURE — G0108 DIAB MANAGE TRN  PER INDIV: HCPCS

## 2021-06-02 RX ORDER — EPINEPHRINE 1 MG/ML
0.3 INJECTION, SOLUTION, CONCENTRATE INTRAVENOUS AS NEEDED
Status: CANCELLED | OUTPATIENT
Start: 2021-06-16

## 2021-06-02 RX ORDER — EPINEPHRINE 1 MG/ML
0.3 INJECTION, SOLUTION, CONCENTRATE INTRAVENOUS AS NEEDED
Status: DISCONTINUED | OUTPATIENT
Start: 2021-06-02 | End: 2021-06-05 | Stop reason: HOSPADM

## 2021-06-02 RX ADMIN — OMALIZUMAB 375 MG: 150 INJECTION, SOLUTION SUBCUTANEOUS at 15:02

## 2021-06-02 NOTE — PROGRESS NOTES
She was seen today for education on the use of the V-go 40 insulin device  Blossom Watson is Chinese speaking and her daughter was present to translate for her  She is currently using levemire and Humalog before meals  The patient and her daughter where able to fill the device, demonstrate steps for the needle insertion, administering a bolus dose and removal of the device  They understand that she is to only use the rapid acting insulin in the V-Go and to stop her injection of Levemire and Humalog  She plans on starting this tomorrow evening once she receives the vials of insulin  She was instucted to administer 6 clicks ( 12 units) for her bolus dose  She is also interested in using a Broderick device and meeting with a dietician  She hopes to have knee surgery done but must get her glucoses under better control before this will be scheduled  Her daughter will be available to assist her until she is comfortable with the V-go device  I have asked her daughter to call in a week to let me know how she is doing with the V-go device

## 2021-06-02 NOTE — TELEPHONE ENCOUNTER
Maxine Gamez, RN  Shelby Blackburn, Dr Stephen asked if you could place an order for Humalog 3 vails   Daily use of 76 uits via V-Go device   Tx

## 2021-06-02 NOTE — PROGRESS NOTES
Patient arrived on unit for Xolair  Denies any present issues/concerns  Has epipen along with her and within expiration  Tolerated 2 injections in RA- 150mg each and 1 injection in LA- 75mg  Remained 30 minutes for observation  Aware of next appointment  AVS given to patient

## 2021-06-02 NOTE — PATIENT INSTRUCTIONS
Start using the V-go at 9pm every night  Change daily only use Humalog in the device       Do not inject any insulin once you start using the V-go  Give 6 button pushes for the meals

## 2021-06-03 DIAGNOSIS — E11.65 TYPE 2 DIABETES MELLITUS WITH HYPERGLYCEMIA, WITH LONG-TERM CURRENT USE OF INSULIN (HCC): ICD-10-CM

## 2021-06-03 DIAGNOSIS — E11.65 TYPE 2 DIABETES MELLITUS WITH HYPERGLYCEMIA, WITH LONG-TERM CURRENT USE OF INSULIN (HCC): Primary | ICD-10-CM

## 2021-06-03 DIAGNOSIS — Z79.4 TYPE 2 DIABETES MELLITUS WITH HYPERGLYCEMIA, WITH LONG-TERM CURRENT USE OF INSULIN (HCC): ICD-10-CM

## 2021-06-03 DIAGNOSIS — Z79.4 TYPE 2 DIABETES MELLITUS WITH HYPERGLYCEMIA, WITH LONG-TERM CURRENT USE OF INSULIN (HCC): Primary | ICD-10-CM

## 2021-06-03 RX ORDER — FLASH GLUCOSE SENSOR
1 KIT MISCELLANEOUS
Qty: 2 EACH | Refills: 11 | Status: CANCELLED | OUTPATIENT
Start: 2021-06-03

## 2021-06-03 RX ORDER — FLASH GLUCOSE SCANNING READER
1 EACH MISCELLANEOUS DAILY
Qty: 1 EACH | Refills: 0 | Status: CANCELLED | OUTPATIENT
Start: 2021-06-03

## 2021-06-05 DIAGNOSIS — E78.5 HYPERLIPIDEMIA, UNSPECIFIED HYPERLIPIDEMIA TYPE: ICD-10-CM

## 2021-06-05 DIAGNOSIS — E11.42 DIABETIC POLYNEUROPATHY ASSOCIATED WITH TYPE 2 DIABETES MELLITUS (HCC): ICD-10-CM

## 2021-06-05 DIAGNOSIS — E11.8 TYPE 2 DIABETES MELLITUS WITH COMPLICATION, WITHOUT LONG-TERM CURRENT USE OF INSULIN (HCC): ICD-10-CM

## 2021-06-05 DIAGNOSIS — Z79.4 TYPE 2 DIABETES MELLITUS WITH HYPERGLYCEMIA, WITH LONG-TERM CURRENT USE OF INSULIN (HCC): ICD-10-CM

## 2021-06-05 DIAGNOSIS — E11.65 TYPE 2 DIABETES MELLITUS WITH HYPERGLYCEMIA, WITH LONG-TERM CURRENT USE OF INSULIN (HCC): ICD-10-CM

## 2021-06-05 DIAGNOSIS — J45.50 SEVERE PERSISTENT ASTHMA WITHOUT COMPLICATION: ICD-10-CM

## 2021-06-05 DIAGNOSIS — I10 ESSENTIAL HYPERTENSION: ICD-10-CM

## 2021-06-07 ENCOUNTER — TELEPHONE (OUTPATIENT)
Dept: ENDOCRINOLOGY | Facility: CLINIC | Age: 66
End: 2021-06-07

## 2021-06-07 RX ORDER — ATORVASTATIN CALCIUM 10 MG/1
10 TABLET, FILM COATED ORAL DAILY
Qty: 30 TABLET | Refills: 0 | Status: SHIPPED | OUTPATIENT
Start: 2021-06-07 | End: 2021-08-05

## 2021-06-07 RX ORDER — FLUTICASONE PROPIONATE AND SALMETEROL XINAFOATE 115; 21 UG/1; UG/1
AEROSOL, METERED RESPIRATORY (INHALATION)
Qty: 12 G | Refills: 2 | OUTPATIENT
Start: 2021-06-07

## 2021-06-07 RX ORDER — DULOXETIN HYDROCHLORIDE 30 MG/1
CAPSULE, DELAYED RELEASE ORAL
Qty: 180 CAPSULE | Refills: 0 | Status: SHIPPED | OUTPATIENT
Start: 2021-06-07 | End: 2021-09-15

## 2021-06-07 RX ORDER — INSULIN DETEMIR 100 [IU]/ML
INJECTION, SOLUTION SUBCUTANEOUS
Qty: 12 ML | Refills: 3 | OUTPATIENT
Start: 2021-06-07

## 2021-06-07 RX ORDER — PEN NEEDLE, DIABETIC 32GX 5/32"
NEEDLE, DISPOSABLE MISCELLANEOUS
Refills: 4 | OUTPATIENT
Start: 2021-06-07

## 2021-06-07 RX ORDER — AMLODIPINE BESYLATE 10 MG/1
TABLET ORAL
Qty: 90 TABLET | Refills: 0 | Status: SHIPPED | OUTPATIENT
Start: 2021-06-07 | End: 2021-09-20

## 2021-06-07 RX ORDER — OMEPRAZOLE 20 MG/1
20 CAPSULE, DELAYED RELEASE ORAL DAILY
Qty: 90 CAPSULE | Refills: 1 | Status: SHIPPED | OUTPATIENT
Start: 2021-06-07 | End: 2021-12-13

## 2021-06-07 RX ORDER — LORATADINE 10 MG/1
10 TABLET ORAL DAILY
Qty: 30 TABLET | Refills: 2 | OUTPATIENT
Start: 2021-06-07

## 2021-06-07 NOTE — TELEPHONE ENCOUNTER
----- Message from Xin Mckeon MD sent at 6/3/2021  8:22 AM EDT -----  Regarding: FW: orders  Please order Jeremiah Edmonds  ----- Message -----  From: Manish Walton RN  Sent: 6/2/2021   6:49 PM EDT  To: Xin Mckeon MD  Subject: orders                                           Please order for a dietician consult and order a Broderick CGM for her    tx

## 2021-06-07 NOTE — TELEPHONE ENCOUNTER
Patient informed since she has [place], she will need to use mail order supplier  I provided her with [facility] phone number

## 2021-06-08 ENCOUNTER — TELEPHONE (OUTPATIENT)
Dept: ENDOCRINOLOGY | Facility: CLINIC | Age: 66
End: 2021-06-08

## 2021-06-08 NOTE — TELEPHONE ENCOUNTER
----- Message from Modesta Hutchins MD sent at 6/7/2021 10:58 PM EDT -----  Please call the patient regarding labs - A1C high 12 6 - upload Janet Hartley in 2 weeks

## 2021-06-09 ENCOUNTER — OFFICE VISIT (OUTPATIENT)
Dept: FAMILY MEDICINE CLINIC | Facility: CLINIC | Age: 66
End: 2021-06-09

## 2021-06-09 VITALS
RESPIRATION RATE: 20 BRPM | OXYGEN SATURATION: 96 % | BODY MASS INDEX: 29.87 KG/M2 | HEIGHT: 65 IN | SYSTOLIC BLOOD PRESSURE: 132 MMHG | WEIGHT: 179.3 LBS | DIASTOLIC BLOOD PRESSURE: 78 MMHG | HEART RATE: 84 BPM | TEMPERATURE: 97.4 F

## 2021-06-09 DIAGNOSIS — L72.3 SEBACEOUS CYST: ICD-10-CM

## 2021-06-09 DIAGNOSIS — M79.7 FIBROMYALGIA: ICD-10-CM

## 2021-06-09 DIAGNOSIS — R10.9 FLANK PAIN: Primary | ICD-10-CM

## 2021-06-09 DIAGNOSIS — C50.411 MALIGNANT NEOPLASM OF UPPER-OUTER QUADRANT OF RIGHT BREAST IN FEMALE, ESTROGEN RECEPTOR POSITIVE (HCC): ICD-10-CM

## 2021-06-09 DIAGNOSIS — Z17.0 MALIGNANT NEOPLASM OF UPPER-OUTER QUADRANT OF RIGHT BREAST IN FEMALE, ESTROGEN RECEPTOR POSITIVE (HCC): ICD-10-CM

## 2021-06-09 DIAGNOSIS — R25.2 MUSCLE CRAMPS: ICD-10-CM

## 2021-06-09 DIAGNOSIS — E11.8 TYPE 2 DIABETES MELLITUS WITH COMPLICATION, WITHOUT LONG-TERM CURRENT USE OF INSULIN (HCC): ICD-10-CM

## 2021-06-09 LAB
CREAT UR-MCNC: 170 MG/DL
MICROALBUMIN UR-MCNC: 187 MG/L (ref 0–20)
MICROALBUMIN/CREAT 24H UR: 110 MG/G CREATININE (ref 0–30)
SL AMB  POCT GLUCOSE, UA: 250
SL AMB LEUKOCYTE ESTERASE,UA: NEGATIVE
SL AMB POCT BILIRUBIN,UA: NORMAL
SL AMB POCT BLOOD,UA: NEGATIVE
SL AMB POCT CLARITY,UA: NORMAL
SL AMB POCT COLOR,UA: YELLOW
SL AMB POCT KETONES,UA: NEGATIVE
SL AMB POCT NITRITE,UA: NEGATIVE
SL AMB POCT PH,UA: 5
SL AMB POCT SPECIFIC GRAVITY,UA: 1.02
SL AMB POCT URINE PROTEIN: 30
SL AMB POCT UROBILINOGEN: NORMAL

## 2021-06-09 PROCEDURE — 99214 OFFICE O/P EST MOD 30 MIN: CPT | Performed by: FAMILY MEDICINE

## 2021-06-09 PROCEDURE — 3060F POS MICROALBUMINURIA REV: CPT | Performed by: FAMILY MEDICINE

## 2021-06-09 PROCEDURE — 3078F DIAST BP <80 MM HG: CPT | Performed by: FAMILY MEDICINE

## 2021-06-09 PROCEDURE — 3075F SYST BP GE 130 - 139MM HG: CPT | Performed by: FAMILY MEDICINE

## 2021-06-09 PROCEDURE — 3008F BODY MASS INDEX DOCD: CPT | Performed by: FAMILY MEDICINE

## 2021-06-09 PROCEDURE — 82570 ASSAY OF URINE CREATININE: CPT | Performed by: FAMILY MEDICINE

## 2021-06-09 PROCEDURE — 3060F POS MICROALBUMINURIA REV: CPT | Performed by: INTERNAL MEDICINE

## 2021-06-09 PROCEDURE — 81002 URINALYSIS NONAUTO W/O SCOPE: CPT | Performed by: FAMILY MEDICINE

## 2021-06-09 PROCEDURE — 82043 UR ALBUMIN QUANTITATIVE: CPT | Performed by: FAMILY MEDICINE

## 2021-06-09 PROCEDURE — 1036F TOBACCO NON-USER: CPT | Performed by: FAMILY MEDICINE

## 2021-06-09 PROCEDURE — 3008F BODY MASS INDEX DOCD: CPT | Performed by: INTERNAL MEDICINE

## 2021-06-09 RX ORDER — CYCLOBENZAPRINE HCL 10 MG
10 TABLET ORAL
Qty: 90 TABLET | Refills: 0 | Status: SHIPPED | OUTPATIENT
Start: 2021-06-09 | End: 2021-11-10

## 2021-06-09 RX ORDER — UREA 10 %
500 LOTION (ML) TOPICAL DAILY
Qty: 90 TABLET | Refills: 1 | Status: SHIPPED | OUTPATIENT
Start: 2021-06-09 | End: 2022-06-24

## 2021-06-09 NOTE — PROGRESS NOTES
Assessment/Plan:    Muscle cramps  Try drinking 6 ounces of tonic water prior to bedtime  Increase fluid intake   Start Magnesium once day  Cyclobenzaprine 10 mg qHs          Diagnoses and all orders for this visit:    Flank pain  -     POCT urine dip    Fibromyalgia  -     Vitamin D 25 hydroxy; Future  -     FARZAD Scr, IFA, W/Refl Titer/Pattern/Rheumatoid Arthritis Panel 1; Future    Muscle cramps  -     magnesium gluconate (MAGONATE) 500 mg tablet; Take 1 tablet (500 mg total) by mouth daily  -     cyclobenzaprine (FLEXERIL) 10 mg tablet; Take 1 tablet (10 mg total) by mouth daily at bedtime    Sebaceous cyst  -     Ambulatory referral to General Surgery; Future    Malignant neoplasm of upper-outer quadrant of right breast in female, estrogen receptor positive ([facility] [address] )    Type 2 diabetes mellitus with complication, without long-term current use of insulin ([facility])  -     Microalbumin / creatinine urine ratio          Subjective:      Patient ID: Kip Goode is a 60 y o  female  59 yo  female complains of generalized myalgias and arthralgias worse as the day goes by, accompanied by frandy horses which are worse at night  Patient has uncontrolled M followed by Endocrinology  Complains of increased urinary frequency  The following portions of the patient's history were reviewed and updated as appropriate:   She  has a past medical history of Abdominal infection ([facility] [address] ), Abnormal chest x-ray (4/5/2019), Asthma, Breast cancer ([address] ) (06/26/2018), Cancer ([facility] [address] ), Diabetes mellitus ([address] ), Hiatal hernia, History of chemotherapy, History of radiation therapy, Hypercholesterolemia, Hypertension, Hypothyroid, Renal disorder, and Rheumatoid arthritis ([address] )    She   Patient Active Problem List    Diagnosis Date Noted    Muscle cramps 06/09/2021    Gastroesophageal reflux disease without esophagitis 03/03/2021    Chronic diastolic heart failure ([address] ) 09/01/2020    Use of anastrozole (Arimidex) 04/20/2020    Decreased ROM of right shoulder 04/20/2020    Lump of skin 04/20/2020    Class 1 obesity due to excess calories with serious comorbidity and body mass index (BMI) of 33 0 to 33 9 in adult 01/07/2020    Diabetic polyneuropathy associated with type 2 diabetes mellitus ([facility] [address] ) 11/24/2019    Type 2 diabetes mellitus with hyperglycemia, with long-term current use of insulin ([facility] [address] ) 05/29/2019    Rad[facility] pneumonitis ([facility] ) 05/18/2019    Bilateral pulmonary embolism ([facility] ) 11/06/2018    Hypothyroid     Hypertension     Hypercholesterolemia 09/21/2018    Chronic pain of right knee 09/04/2018    Cellulitis of right axilla 08/22/2018    Hiatal hernia 08/08/2018    Screening for colon cancer 08/08/2018    Esophageal dysphagia 08/08/2018    Malignant neoplasm of upper-outer quadrant of right breast in female, estrogen receptor positive ([facility] [address] ) 07/19/2018    Malignant neoplasm of right female breast ([facility] ) 06/26/2018    Neck pain 06/13/2018    Chronic bilateral low back pain without sciatica 05/22/2018    Palpitations 05/09/2018    Type 2 diabetes mellitus with complication, with long-term current use of insulin ([facility] ) 03/27/2018    Other specified hypothyroidism 03/27/2018    Chest pain 03/27/2018    Severe persistent asthma without complication 18/79/5818     She  has a past surgical history that includes Tubal ligation; Knee surgery (Right); Cataract extraction, bilateral (Bilateral); Retinal detachment surgery (Right); Breast surgery (Left); EGD AND COLONOSCOPY (N/A, 9/5/2018); Tunneled venous port placement (Left, 9/13/2018); FL guided central venous access device insertion (9/13/2018); Mammo stereotactic breast biopsy right (all inc) (Right, 5/23/2018); Mammo needle localization right (all inc) (Right, 6/26/2018); Breast biopsy (Right, 05/23/2018); Breast lumpectomy (Right, 6/26/2018); and Breast lumpectomy (Right, 8/2/2018)    Her family history includes Cancer in her maternal grandfather; Diabetes in her brother, father, and mother; Hypertension in her brother, father, and mother; No Known Problems in her daughter, daughter, daughter, sister, sister, sister, sister, and sister; Prostate cancer in her brother  She  reports that she has never smoked  She has never used smokeless tobacco  She reports that she does not drink alcohol or use drugs  Current Outpatient Medications   Medication Sig Dispense Refill    amLODIPine (NORVASC) 10 mg tablet TAKE 1 TABLET BY MOUTH DAILY 90 tablet 0    anastrozole (ARIMIDEX) 1 mg tablet TAKE 1 TABLET BY MOUTH DAILY 90 tablet 3    aspirin 81 mg chewable tablet Chew 81 mg daily      atorvastatin (LIPITOR) 10 mg tablet TAKE 1 TABLET (10 MG TOTAL) BY MOUTH DAILY 30 tablet 0    DULoxetine (CYMBALTA) 30 mg delayed release capsule TAKE 1 CAPSULE BY MOUTH 2 (TWICE)  A  capsule 0    Insulin Disposable Pump (V-Go 40) KIT Use daily 1 kit 5    insulin lispro (HumaLOG) 100 units/mL injection Inject 76 Units under the skin daily via V-Go device   22 8 mL 5    levothyroxine 50 mcg tablet Take 1 tablet (50 mcg total) by mouth daily 90 tablet 1    losartan (COZAAR) 50 mg tablet TAKE 1 TABLET (50 MG TOTAL) BY MOUTH DAILY 90 tablet 0    metoprolol tartrate (LOPRESSOR) 25 mg tablet Take 0 5 tablets (12 5 mg total) by mouth every 12 (twelve) hours 60 tablet 2    omeprazole (PriLOSEC) 20 mg delayed release capsule TAKE 1 CAPSULE (20 MG TOTAL) BY MOUTH DAILY 90 capsule 1    albuterol (2 5 mg/3 mL) 0 083 % nebulizer solution Take 1 vial (2 5 mg total) by nebulization every 6 (six) hours as needed for wheezing or shortness of breath 120 vial 5    albuterol (PROVENTIL HFA,VENTOLIN HFA) 90 mcg/act inhaler Inhale 1 puff every 4 (four) hours as needed        Blood Glucose Monitoring Suppl (ONE TOUCH ULTRA 2) w/Device KIT by Does not apply route 3 (three) times a day 1 each 0    Continuous Blood Gluc  (FreeStyle Broderick 14 Day [place]) AARON Use 1 Device daily 1 each 0    Continuous Blood Gluc Sensor (FreeStyle Broderick 14 Day Sensor) MISC Use 1 each every 14 (fourteen) days 2 each 11    cyclobenzaprine (FLEXERIL) 10 mg tablet Take 1 tablet (10 mg total) by mouth daily at bedtime 90 tablet 0    diclofenac sodium (VOLTAREN) 1 % Apply 2 g topically 4 (four) times a day (Patient not taking: Reported on 5/19/2021) 150 g 0    Dulaglutide (Trulicity) 1 5 FH/8 6GO SOPN Inject 0 5 mL (1 5 mg total) under the skin once a week (Patient not taking: Reported on 5/19/2021) 12 pen 1    fluticasone-salmeterol (Advair HFA) 115-21 MCG/ACT inhaler Inhale 2 puffs 2 (two) times a day Rinse mouth after use  1 Inhaler 3    fluticasone-umeclidinium-vilanterol (Trelegy Ellipta) 100-62 5-25 MCG/INH inhaler Inhale 1 puff daily Rinse mouth after use   insulin lispro (HumaLOG) 100 units/mL injection pen Inject 16 Units under the skin 3 (three) times a day with meals Inject 16 units TID 5 pen 5    Insulin Pen Needle (BD Pen Needle Emily U/F) 32G X 4 MM MISC USE FOUR (4) TIMES A  each 5    Levemir FlexTouch 100 units/mL injection pen INJECT 45 UNITS UNDER THE SKIN DAILY AT BEDTIME 12 mL 4    lidocaine (XYLOCAINE) 5 % ointment Apply topically as needed for mild pain Apply 5 minutes prior to insulin injection (Patient not taking: Reported on 5/19/2021) 35 44 g 0    loratadine (CLARITIN) 10 mg tablet Take 1 tablet (10 mg total) by mouth daily 30 tablet 3    magnesium gluconate (MAGONATE) 500 mg tablet Take 1 tablet (500 mg total) by mouth daily 90 tablet 1    OneTouch Delica Lancets 05W MISC USE 3 (THREE) TIMES A  each 4    OneTouch Ultra test strip USE 3 (THREE) TIMES A DAY USE AS INSTRUCTED 300 each 4    oxyCODONE (ROXICODONE) 5 mg immediate release tablet Take 5 mg by mouth every 6 (six) hours as needed for moderate pain       No current facility-administered medications for this visit        Current Outpatient Medications on File Prior to Visit   Medication Sig    amLODIPine (NORVASC) 10 mg tablet TAKE 1 TABLET BY MOUTH DAILY    anastrozole (ARIMIDEX) 1 mg tablet TAKE 1 TABLET BY MOUTH DAILY    aspirin 81 mg chewable tablet Chew 81 mg daily    atorvastatin (LIPITOR) 10 mg tablet TAKE 1 TABLET (10 MG TOTAL) BY MOUTH DAILY    DULoxetine (CYMBALTA) 30 mg delayed release capsule TAKE 1 CAPSULE BY MOUTH 2 (TWICE)  A DAY    Insulin Disposable Pump (V-Go 40) KIT Use daily    insulin lispro (HumaLOG) 100 units/mL injection Inject 76 Units under the skin daily via V-Go device   levothyroxine 50 mcg tablet Take 1 tablet (50 mcg total) by mouth daily    losartan (COZAAR) 50 mg tablet TAKE 1 TABLET (50 MG TOTAL) BY MOUTH DAILY    metoprolol tartrate (LOPRESSOR) 25 mg tablet Take 0 5 tablets (12 5 mg total) by mouth every 12 (twelve) hours    omeprazole (PriLOSEC) 20 mg delayed release capsule TAKE 1 CAPSULE (20 MG TOTAL) BY MOUTH DAILY    albuterol (2 5 mg/3 mL) 0 083 % nebulizer solution Take 1 vial (2 5 mg total) by nebulization every 6 (six) hours as needed for wheezing or shortness of breath    albuterol (PROVENTIL HFA,VENTOLIN HFA) 90 mcg/act inhaler Inhale 1 puff every 4 (four) hours as needed      Blood Glucose Monitoring Suppl (ONE TOUCH ULTRA 2) w/Device KIT by Does not apply route 3 (three) times a day    Continuous Blood Gluc  (FreeStyle Broderick 14 Day [place]) AARON Use 1 Device daily    Continuous Blood Gluc Sensor (FreeStyle Broderick 14 Day Sensor) MISC Use 1 each every 14 (fourteen) days    diclofenac sodium (VOLTAREN) 1 % Apply 2 g topically 4 (four) times a day (Patient not taking: Reported on 5/19/2021)    Dulaglutide (Trulicity) 1 5 NA/4 9IL SOPN Inject 0 5 mL (1 5 mg total) under the skin once a week (Patient not taking: Reported on 5/19/2021)    fluticasone-salmeterol (Advair HFA) 115-21 MCG/ACT inhaler Inhale 2 puffs 2 (two) times a day Rinse mouth after use      fluticasone-umeclidinium-vilanterol (Trelegy Ellipta) 100-62 5-25 MCG/INH inhaler Inhale 1 puff daily Rinse mouth after use   insulin lispro (HumaLOG) 100 units/mL injection pen Inject 16 Units under the skin 3 (three) times a day with meals Inject 16 units TID    Insulin Pen Needle (BD Pen Needle Emily U/F) 32G X 4 MM MISC USE FOUR (4) TIMES A DAY    Levemir FlexTouch 100 units/mL injection pen INJECT 45 UNITS UNDER THE SKIN DAILY AT BEDTIME    lidocaine (XYLOCAINE) 5 % ointment Apply topically as needed for mild pain Apply 5 minutes prior to insulin injection (Patient not taking: Reported on 5/19/2021)    loratadine (CLARITIN) 10 mg tablet Take 1 tablet (10 mg total) by mouth daily    OneTouch Delica Lancets 23U MISC USE 3 (THREE) TIMES A DAY    OneTouch Ultra test strip USE 3 (THREE) TIMES A DAY USE AS INSTRUCTED    oxyCODONE (ROXICODONE) 5 mg immediate release tablet Take 5 mg by mouth every 6 (six) hours as needed for moderate pain    [DISCONTINUED] EPINEPHrine (EPIPEN) 0 3 mg/0 3 mL SOAJ Inject 0 3 mL (0 3 mg total) into a muscle once for 1 dose     No current facility-administered medications on file prior to visit       Review of Systems   Constitutional: Positive for fatigue  Genitourinary: Positive for frequency  Musculoskeletal: Positive for arthralgias, back pain, joint swelling and myalgias  As per HPI   Psychiatric/Behavioral: Positive for sleep disturbance  The patient is nervous/anxious  All other systems reviewed and are negative  Objective:      /78 (BP Location: Left arm, Patient Position: Sitting, Cuff Size: Standard)   Pulse 84   Temp (!) 97 4 °F (36 3 °C) (Temporal)   Resp 20   Ht 5' 5" (1 651 m)   Wt 81 3 kg (179 lb 4 8 oz)   SpO2 96%   BMI 29 84 kg/m²          Physical Exam  Vitals signs and nursing note reviewed  Constitutional:       Appearance: She is well-developed  HENT:      Head: Normocephalic        Right Ear: External ear normal       Left Ear: External ear normal       Nose: Nose normal    Eyes:      Conjunctiva/sclera: Conjunctivae normal       Pupils: Pupils are equal, round, and reactive to light  Neck:      Musculoskeletal: Normal range of motion and neck supple  Thyroid: No thyromegaly  Cardiovascular:      Rate and Rhythm: Normal rate and regular rhythm  Heart sounds: Normal heart sounds  Pulmonary:      Effort: Pulmonary effort is normal       Breath sounds: Normal breath sounds  Abdominal:      Palpations: Abdomen is soft  Tenderness: There is no abdominal tenderness  There is no guarding or rebound  Musculoskeletal: Normal range of motion  Left elbow: Tenderness found  Right knee: Tenderness found  Medial joint line tenderness noted  Left knee: Tenderness found  Medial joint line tenderness noted  Cervical back: She exhibits tenderness and spasm  Thoracic back: She exhibits tenderness and spasm  Lumbar back: She exhibits spasm  Right lower leg: No edema  Left lower leg: No edema  Skin:     General: Skin is dry  Neurological:      Mental Status: She is alert and oriented to person, place, and time  Deep Tendon Reflexes: Reflexes are normal and symmetric         No CVAT

## 2021-06-09 NOTE — ASSESSMENT & PLAN NOTE
Try drinking 6 ounces of tonic water prior to bedtime  Increase fluid intake   Start Magnesium once day  Cyclobenzaprine 10 mg qHs

## 2021-06-10 ENCOUNTER — TELEPHONE (OUTPATIENT)
Dept: ENDOCRINOLOGY | Facility: CLINIC | Age: 66
End: 2021-06-10

## 2021-06-10 NOTE — TELEPHONE ENCOUNTER
She started V-GO on June 2nd   How are the sugars since then? Is she having any difficulty with V-GO ? Remembering to click  the button to bolus before meals ?

## 2021-06-10 NOTE — TELEPHONE ENCOUNTER
Daughter called back and did say that patient is having difficulty with V-go and went back to injecting before meals  Daughter said that readings have been in the 500's and all the insulin was still in the V-go it never dispense insulin to patient  They do not know why this is happening

## 2021-06-10 NOTE — TELEPHONE ENCOUNTER
Left message for patient to call back and let us know, Daughter did call on Monday wanting to know how many clicks patient should she be doing and Carol Luo gave her the info

## 2021-06-16 ENCOUNTER — HOSPITAL ENCOUNTER (OUTPATIENT)
Dept: INFUSION CENTER | Facility: CLINIC | Age: 66
Discharge: HOME | End: 2021-06-16
Payer: COMMERCIAL

## 2021-06-16 VITALS
DIASTOLIC BLOOD PRESSURE: 72 MMHG | SYSTOLIC BLOOD PRESSURE: 145 MMHG | TEMPERATURE: 97.8 F | RESPIRATION RATE: 18 BRPM | HEART RATE: 80 BPM

## 2021-06-16 DIAGNOSIS — J45.50 SEVERE PERSISTENT ASTHMA WITHOUT COMPLICATION: Primary | ICD-10-CM

## 2021-06-16 PROCEDURE — 96372 THER/PROPH/DIAG INJ SC/IM: CPT

## 2021-06-16 RX ORDER — EPINEPHRINE 1 MG/ML
0.3 INJECTION, SOLUTION, CONCENTRATE INTRAVENOUS AS NEEDED
Status: CANCELLED | OUTPATIENT
Start: 2021-06-30

## 2021-06-16 RX ADMIN — OMALIZUMAB 375 MG: 150 INJECTION, SOLUTION SUBCUTANEOUS at 11:15

## 2021-06-16 NOTE — PROGRESS NOTES
Pt arrived to unit without complaint  Pt did not have Epi-pen present  Spoke with Dr Es Baird via tiger text  Per Dr Es Baird ok to treat pt today without Epi-pen present  Pt educated on the s/s of delayed hypersensitivity reaction  Pt verbalized understanding  Pt tolerated Xolair injections without incident  Pt observed for 30 mins post injection without incident  Pt reminded to bring Epi-pen to her next appt  AVS provided  Pt left unit in stable condition

## 2021-06-17 ENCOUNTER — HOSPITAL ENCOUNTER (EMERGENCY)
Facility: HOSPITAL | Age: 66
Discharge: HOME | End: 2021-06-17
Attending: EMERGENCY MEDICINE
Payer: COMMERCIAL

## 2021-06-17 ENCOUNTER — APPOINTMENT (EMERGENCY)
Dept: RADIOLOGY | Facility: HOSPITAL | Age: 66
End: 2021-06-17
Payer: COMMERCIAL

## 2021-06-17 VITALS
TEMPERATURE: 98.1 F | DIASTOLIC BLOOD PRESSURE: 72 MMHG | RESPIRATION RATE: 19 BRPM | HEART RATE: 96 BPM | OXYGEN SATURATION: 99 % | SYSTOLIC BLOOD PRESSURE: 150 MMHG

## 2021-06-17 DIAGNOSIS — M79.605 LEFT LEG PAIN: ICD-10-CM

## 2021-06-17 DIAGNOSIS — M79.671 RIGHT FOOT PAIN: ICD-10-CM

## 2021-06-17 DIAGNOSIS — S80.812A ABRASION OF LEFT LEG: Primary | ICD-10-CM

## 2021-06-17 PROCEDURE — 99284 EMERGENCY DEPT VISIT MOD MDM: CPT | Performed by: EMERGENCY MEDICINE

## 2021-06-17 PROCEDURE — 73630 X-RAY EXAM OF FOOT: CPT

## 2021-06-17 PROCEDURE — 99283 EMERGENCY DEPT VISIT LOW MDM: CPT

## 2021-06-17 PROCEDURE — 73590 X-RAY EXAM OF LOWER LEG: CPT

## 2021-06-17 RX ORDER — NAPROXEN 500 MG/1
500 TABLET ORAL 2 TIMES DAILY WITH MEALS
Qty: 10 TABLET | Refills: 0 | Status: SHIPPED | OUTPATIENT
Start: 2021-06-17 | End: 2021-11-10

## 2021-06-17 RX ORDER — ACETAMINOPHEN 325 MG/1
650 TABLET ORAL ONCE
Status: COMPLETED | OUTPATIENT
Start: 2021-06-17 | End: 2021-06-17

## 2021-06-17 RX ORDER — ACETAMINOPHEN 500 MG
500 TABLET ORAL EVERY 6 HOURS PRN
Qty: 14 TABLET | Refills: 0 | Status: SHIPPED | OUTPATIENT
Start: 2021-06-17 | End: 2021-09-15

## 2021-06-17 RX ADMIN — ACETAMINOPHEN 650 MG: 325 TABLET, FILM COATED ORAL at 15:57

## 2021-06-23 ENCOUNTER — HOSPITAL ENCOUNTER (OUTPATIENT)
Dept: MAMMOGRAPHY | Facility: CLINIC | Age: 66
Discharge: HOME | End: 2021-06-23
Payer: COMMERCIAL

## 2021-06-23 VITALS — WEIGHT: 179 LBS | HEIGHT: 65 IN | BODY MASS INDEX: 29.82 KG/M2

## 2021-06-23 DIAGNOSIS — Z17.0 MALIGNANT NEOPLASM OF RIGHT BREAST IN FEMALE, ESTROGEN RECEPTOR POSITIVE, UNSPECIFIED SITE OF BREAST (HCC): ICD-10-CM

## 2021-06-23 DIAGNOSIS — C50.911 MALIGNANT NEOPLASM OF RIGHT BREAST IN FEMALE, ESTROGEN RECEPTOR POSITIVE, UNSPECIFIED SITE OF BREAST (HCC): ICD-10-CM

## 2021-06-23 PROCEDURE — 77066 DX MAMMO INCL CAD BI: CPT

## 2021-06-23 PROCEDURE — G0279 TOMOSYNTHESIS, MAMMO: HCPCS

## 2021-06-24 DIAGNOSIS — E11.8 TYPE 2 DIABETES MELLITUS WITH COMPLICATION, WITH LONG-TERM CURRENT USE OF INSULIN (HCC): ICD-10-CM

## 2021-06-24 DIAGNOSIS — Z79.4 TYPE 2 DIABETES MELLITUS WITH COMPLICATION, WITH LONG-TERM CURRENT USE OF INSULIN (HCC): ICD-10-CM

## 2021-06-24 RX ORDER — BLOOD SUGAR DIAGNOSTIC
1 STRIP MISCELLANEOUS 3 TIMES DAILY
Qty: 300 EACH | Refills: 4 | Status: SHIPPED | OUTPATIENT
Start: 2021-06-24

## 2021-06-24 NOTE — TELEPHONE ENCOUNTER
Pt will be traveling on 7/3/21 and could be returning in august  Pt is requesting enough refills on the following medications;    insulin lispro (HumaLOG) 100 units/mL injection pen    OneTouch Ultra test strip

## 2021-06-25 ENCOUNTER — OFFICE VISIT (OUTPATIENT)
Dept: SURGICAL ONCOLOGY | Facility: CLINIC | Age: 66
End: 2021-06-25
Payer: COMMERCIAL

## 2021-06-25 ENCOUNTER — TELEPHONE (OUTPATIENT)
Dept: ENDOCRINOLOGY | Facility: CLINIC | Age: 66
End: 2021-06-25

## 2021-06-25 VITALS
OXYGEN SATURATION: 96 % | TEMPERATURE: 98.6 F | HEART RATE: 88 BPM | SYSTOLIC BLOOD PRESSURE: 140 MMHG | WEIGHT: 179 LBS | HEIGHT: 65 IN | DIASTOLIC BLOOD PRESSURE: 78 MMHG | BODY MASS INDEX: 29.82 KG/M2 | RESPIRATION RATE: 18 BRPM

## 2021-06-25 DIAGNOSIS — C50.911 MALIGNANT NEOPLASM OF RIGHT BREAST IN FEMALE, ESTROGEN RECEPTOR POSITIVE, UNSPECIFIED SITE OF BREAST (HCC): Primary | ICD-10-CM

## 2021-06-25 DIAGNOSIS — Z79.4 TYPE 2 DIABETES MELLITUS WITH COMPLICATION, WITH LONG-TERM CURRENT USE OF INSULIN (HCC): ICD-10-CM

## 2021-06-25 DIAGNOSIS — E11.65 TYPE 2 DIABETES MELLITUS WITH HYPERGLYCEMIA, WITH LONG-TERM CURRENT USE OF INSULIN (HCC): ICD-10-CM

## 2021-06-25 DIAGNOSIS — Z17.0 MALIGNANT NEOPLASM OF RIGHT BREAST IN FEMALE, ESTROGEN RECEPTOR POSITIVE, UNSPECIFIED SITE OF BREAST (HCC): Primary | ICD-10-CM

## 2021-06-25 DIAGNOSIS — E11.8 TYPE 2 DIABETES MELLITUS WITH COMPLICATION, WITHOUT LONG-TERM CURRENT USE OF INSULIN (HCC): ICD-10-CM

## 2021-06-25 DIAGNOSIS — E11.8 TYPE 2 DIABETES MELLITUS WITH COMPLICATION, WITH LONG-TERM CURRENT USE OF INSULIN (HCC): ICD-10-CM

## 2021-06-25 DIAGNOSIS — Z79.4 TYPE 2 DIABETES MELLITUS WITH HYPERGLYCEMIA, WITH LONG-TERM CURRENT USE OF INSULIN (HCC): ICD-10-CM

## 2021-06-25 PROCEDURE — 1036F TOBACCO NON-USER: CPT | Performed by: SURGERY

## 2021-06-25 PROCEDURE — 3008F BODY MASS INDEX DOCD: CPT | Performed by: SURGERY

## 2021-06-25 PROCEDURE — 99213 OFFICE O/P EST LOW 20 MIN: CPT | Performed by: SURGERY

## 2021-06-25 PROCEDURE — 3008F BODY MASS INDEX DOCD: CPT | Performed by: FAMILY MEDICINE

## 2021-06-25 PROCEDURE — 3077F SYST BP >= 140 MM HG: CPT | Performed by: SURGERY

## 2021-06-25 PROCEDURE — 3078F DIAST BP <80 MM HG: CPT | Performed by: SURGERY

## 2021-06-25 RX ORDER — INSULIN DETEMIR 100 [IU]/ML
45 INJECTION, SOLUTION SUBCUTANEOUS
Qty: 45 ML | Refills: 0 | Status: SHIPPED | OUTPATIENT
Start: 2021-06-25 | End: 2021-09-08

## 2021-06-25 RX ORDER — INSULIN LISPRO 100 [IU]/ML
16 INJECTION, SOLUTION INTRAVENOUS; SUBCUTANEOUS
Qty: 45 ML | Refills: 0 | Status: SHIPPED | OUTPATIENT
Start: 2021-06-25 | End: 2021-09-08

## 2021-06-25 RX ORDER — PEN NEEDLE, DIABETIC 32GX 5/32"
NEEDLE, DISPOSABLE MISCELLANEOUS 4 TIMES DAILY
Qty: 400 EACH | Refills: 0 | Status: SHIPPED | OUTPATIENT
Start: 2021-06-25

## 2021-06-25 NOTE — TELEPHONE ENCOUNTER
BG likely elevated if she is not receiving insulin  Resume Levemir 45 units and Humalog 16 units before meals send in BG log in one week   Notify office of BG less than 70

## 2021-06-25 NOTE — PROGRESS NOTES
Surgical Oncology Follow Up       [facility] SURGICAL ONCOLOGY [address]  [facility] [id number]    Jolie GaffneyCintron  1955  [id number]  [address]  [facility] SURGICAL ONCOLOGY REHANRogersMARICRUZ  [address] [address]    Chief Complaint   Patient presents with    Follow-up       Assessment/Plan:    No problem-specific Assessment & Plan notes found for this encounter  Diagnoses and all orders for this visit:    Malignant neoplasm of right breast in female, estrogen receptor positive, unspecified site of breast [facility])        Advance Care Planning/Advance Directives:  Discussed disease status, cancer treatment plans and/or cancer treatment goals with the patient  Oncology History   Malignant neoplasm of right female breast ([facility] [facility] )   5/23/2018 Initial Diagnosis    Malignant neoplasm of right female breast ([facility] [address] )     5/23/2018 Biopsy    Right breast core biopsy:  DCIS, micropapillary type, low-intermediate nuclear grade  ER 90-95% positive,  MS 75-80% positive       6/26/2018 Surgery    RIGHT BREAST NEEDLE LOCALIZATION X2 WITH RIGHT BREAST LUMPECTOMY ( NEEDLE LOC @ 1000) (Right)   ONCOPLASTIC CLOSURE OF LUMPECTOMY CAVITY    Invasive breast carcinoma, NST (aka ductal)  * Ana Paula grade 2 of 3 (total score = 2+2+2 = 6 of 9)   -- tubule formation < 10%, score 3   -- nuclear grade 2 of 3, score 2   -- mitoses ~ 4/mm2, score 2    * invasive carcinoma is multifocally present (A23-1, A32-1, A84-1, A89-1, A100-1), largest focus is 14 millimeters in greatest dimension (A89-1, this focus is graded)  * superior lumpectomy margin (orange-inked) is POSITIVE for invasive carcinoma (A84-1)   * all other lumpectomy margins are free of invasive carcinoma  * estrogen, progesterone & Her-2/negrita receptor studies are undertaken, to be described in an addendum report    - Ductal carcinoma in-situ (DCIS): Present as an extensive component (~85% of total tumor)  * DCIS is co-located with invasive carcinoma surrounding prior needle biopsy site  * DCIS spans 2 6 cm maximal dimension (A62-1) and is present on 27 of 136 total slides examined  * DCIS has cribriform & micropapillary patterns, nuclear grade 2 of 3, with central comedo-type necrosis  * DCIS is present within 0 2 millimeters of the superior lumpectomy margin (orange-inked, A26-1)   * DCIS is present within 0 2 millimeters of the medial lumpectomy margin (yellow-inked, A3-1)   * all other lumpectomy margins are free of DCIS by > 2 millimeters  - Lymph-vascular invasion: no lymph-vascular invasion is unequivocally identified  - Microcalcifications: present in DCIS  % positive, ER 45-55% positive, HER2 by IHC 3+ positive    - Dr Elida Crisostomo       8/2/2018 Surgery    Right breast lumpectomy reexcision, right axilla SLN biopsy:  A  Submitted as right axillary sentinel node:  - Seven lymph nodes are identified showing no metastatic tumor      B   Right breast lumpectomy reexcision:  - Abundant reactive changes are seen around the previous lumpectomy cavity   - No residual atypia or malignancy is seen           8/2/2018 -  Cancer Staged    Stage IA - pT1c, pN0, G2        9/25/2018 - 8/6/2019 Chemotherapy    Weekly Paclitaxol and trastuzumab x12, until December 11, 2018 followed by trastuzumab monotherapy 6 milligram/kilogram every 3 weeks    - Dr Breanna Mon       1/9/2019 - 2/19/2019 Radiation    Plan ID Energy Fractions Dose per Fraction (cGy) Dose Correction (cGy) Total Dose Delivered (cGy) Elapsed Days   R Boost e 16E 5 / 5 200 0 1,000 6   Right Breast 6X 25 / 25 200 0 5,000 29     - Dr Huggins Small       Malignant neoplasm of upper-outer quadrant of right breast in female, estrogen receptor positive ([facility] [address] )   7/19/2018 Initial Diagnosis    Malignant neoplasm of upper-outer quadrant of right breast in female, estrogen receptor positive ([address] )     12/17/2018 - 8/26/2019 Chemotherapy    trastuzumab (HERCEPTIN) 579 mg in sodium chloride 0 9 % 250 mL chemo infusion, 8 mg/kg, Intravenous, Once, 12 of 12 cycles  Administration: 434 mg (5/14/2019), 434 mg (6/4/2019), 450 mg (7/16/2019), 450 mg (8/6/2019), 450 mg (6/25/2019)         History of Present Illness:   Patient is a 60 old woman here for surveillance visit  History of stage I breast cancer, right side post lumpectomy and radiation   -Interval History:  She has no new complaints report  Presently on Herceptin  She had a mammogram done in anticipation of today's visit  Review of Systems:  Review of Systems   Constitutional: Negative  HENT: Negative  Eyes: Negative  Respiratory: Negative  Cardiovascular: Negative  Gastrointestinal: Negative  Endocrine: Negative  Genitourinary: Negative  Musculoskeletal: Negative  Skin: Negative  Allergic/Immunologic: Negative  Neurological: Negative  Hematological: Negative  Psychiatric/Behavioral: Negative  All other systems reviewed and are negative        Patient Active Problem List   Diagnosis    Type 2 diabetes mellitus with complication, with long-term current use of insulin ([address] )    Severe persistent asthma without complication    Other specified hypothyroidism    Chest pain    Palpitations    Chronic bilateral low back pain without sciatica    Neck pain    Malignant neoplasm of right female breast ([address] )    Malignant neoplasm of upper-outer quadrant of right breast in female, estrogen receptor positive ([address] )    Hiatal hernia    Screening for colon cancer    Esophageal dysphagia    Cellulitis of right axilla    Chronic pain of right knee    Hypercholesterolemia    Hypothyroid    Hypertension    Bilateral pulmonary embolism (HCC)    Radiation pneumonitis (HCC)    Type 2 diabetes mellitus with hyperglycemia, with long-term current use of insulin ([address] )    Diabetic polyneuropathy associated with type 2 diabetes mellitus ([address] )    Class 1 obesity due to excess calories with serious comorbidity and body mass index (BMI) of 33 0 to 33 9 in adult    Use of anastrozole (Arimidex)    Decreased ROM of right shoulder    Lump of skin    Chronic diastolic heart failure (HCC)    Gastroesophageal reflux disease without esophagitis    Muscle cramps     Past Medical History:   Diagnosis Date    Abdominal infection ([facility] )     h-pylori    Abnormal chest x-ray 4/5/2019    Asthma     Breast cancer ([facility] ) 06/26/2018    Cancer ([place] [address] )     BREAST    Diabetes mellitus ([facility] )     Hiatal hernia     History of chemotherapy     History of radiation therapy     Hypercholesterolemia     Hypertension     Hypothyroid     Renal disorder     [facility] arthritis ([facility] )      Past Surgical History:   Procedure Laterality Date    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    BREAST LUMPECTOMY Right 6/26/2018    Procedure: RIGHT BREAST NEEDLE LOCALIZATION X2 WITH RIGHT BREAST LUMPECTOMY ( NEEDLE LOC @ 1000); Surgeon: Marcela Louie MD;  Location:  MAIN OR;  Service: Surgical Oncology    BREAST LUMPECTOMY Right 8/2/2018    Procedure: LYMPHOSCINITGRAPHY INTRAOPERATIVE LYMPHATIC MAPPING , RIGHT SENTINEL NODE BIOPSY, REEXCISION  RIGHT BREAST LUMPECTOMY CAVITY (SUPERIOR MARGIN); Surgeon: Marcela Louie MD;  Location:  MAIN OR;  Service: Surgical Oncology    BREAST SURGERY Left     CATARACT EXTRACTION, BILATERAL Bilateral     EGD AND COLONOSCOPY N/A 9/5/2018    Procedure: EGD AND COLONOSCOPY;  Surgeon: Tommie Holstein, MD;  Location: [facility] GI LAB; Service: Gastroenterology    [facility] GUIDED CENTRAL VENOUS ACCESS DEVICE INSERTION  9/13/2018    KNEE SURGERY Right     MAMMO NEEDLE LOCALIZATION RIGHT (ALL INC) Right 6/26/2018    MAMMO STEREOTACTIC BREAST BIOPSY RIGHT (ALL INC) Right 5/23/2018    RETINAL DETACHMENT SURGERY Right     TUBAL LIGATION      TUNNELED VENOUS PORT PLACEMENT Left 9/13/2018    Procedure: INSERTION VENOUS PORT (PORT-A-CATH);   Surgeon: Marcela Louie MD; Location: [facility] MAIN [facility];  Service: Surgical Oncology     Family History   Problem Relation Age of Onset    Diabetes Mother     Hypertension Mother     Diabetes Father     Hypertension Father     Diabetes Brother     Hypertension Brother     Prostate cancer Brother     Cancer Maternal Grandfather     No Known Problems Sister     No Known Problems Daughter     No Known Problems Sister     No Known Problems Sister     No Known Problems Sister     No Known Problems Sister     No Known Problems Daughter     No Known Problems Daughter      Social History     Socioeconomic History    Marital status:      Spouse name: Not on file    Number of children: Not on file    Years of education: Not on file    Highest education level: Not on file   Occupational History    Not on file   Tobacco Use    Smoking status: Never Smoker    Smokeless tobacco: Never Used   Vaping Use    Vaping Use: Never used   Substance and Sexual Activity    Alcohol use: No    Drug use: No    Sexual activity: Not on file   Other Topics Concern    Not on file   Social History Narrative    Not on file     Social Determinants of Health     Financial Resource Strain:     Difficulty of Paying Living Expenses:    Food Insecurity:     Worried About Running Out of Food in the Last Year:     Ran Out of Food in the Last Year:    Transportation Needs:     Lack of Transportation (Medical):      Lack of Transportation (Non-Medical):    Physical Activity:     Days of Exercise per Week:     Minutes of Exercise per Session:    Stress:     Feeling of Stress :    Social Connections:     Frequency of Communication with Friends and Family:     Frequency of Social Gatherings with Friends and Family:     Attends Scientologist Services:     Active Member of Clubs or Organizations:     Attends Club or Organization Meetings:     Marital Status:    Intimate Partner Violence:     Fear of Current or Ex-Partner:     Emotionally Abused:     Physically Abused:     Sexually Abused:        Current Outpatient Medications:     amLODIPine (NORVASC) 10 mg tablet, TAKE 1 TABLET BY MOUTH DAILY, Disp: 90 tablet, Rfl: 0    anastrozole (ARIMIDEX) 1 mg tablet, TAKE 1 TABLET BY MOUTH DAILY, Disp: 90 tablet, Rfl: 3    aspirin 81 mg chewable tablet, Chew 81 mg daily, Disp: , Rfl:     atorvastatin (LIPITOR) 10 mg tablet, TAKE 1 TABLET (10 MG TOTAL) BY MOUTH DAILY, Disp: 30 tablet, Rfl: 0    DULoxetine (CYMBALTA) 30 mg delayed release capsule, TAKE 1 CAPSULE BY MOUTH 2 (TWICE)  A DAY, Disp: 180 capsule, Rfl: 0    losartan (COZAAR) 50 mg tablet, TAKE 1 TABLET (50 MG TOTAL) BY MOUTH DAILY, Disp: 90 tablet, Rfl: 0    metoprolol tartrate (LOPRESSOR) 25 mg tablet, Take 0 5 tablets (12 5 mg total) by mouth every 12 (twelve) hours, Disp: 60 tablet, Rfl: 2    omeprazole (PriLOSEC) 20 mg delayed release capsule, TAKE 1 CAPSULE (20 MG TOTAL) BY MOUTH DAILY, Disp: 90 capsule, Rfl: 1    acetaminophen (TYLENOL) 500 mg tablet, Take 1 tablet (500 mg total) by mouth every 6 (six) hours as needed for mild pain (Patient not taking: Reported on 6/25/2021), Disp: 14 tablet, Rfl: 0    albuterol (2 5 mg/3 mL) 0 083 % nebulizer solution, Take 1 vial (2 5 mg total) by nebulization every 6 (six) hours as needed for wheezing or shortness of breath (Patient not taking: Reported on 6/25/2021), Disp: 120 vial, Rfl: 5    albuterol (PROVENTIL HFA,VENTOLIN HFA) 90 mcg/act inhaler, Inhale 1 puff every 4 (four) hours as needed   (Patient not taking: Reported on 6/25/2021), Disp: , Rfl:     Blood Glucose Monitoring Suppl (ONE TOUCH ULTRA 2) w/Device KIT, by Does not apply route 3 (three) times a day (Patient not taking: Reported on 6/25/2021), Disp: 1 each, Rfl: 0    Continuous Blood Gluc  (FreeStyle Broderick 14 Day [place]) AARON, Use 1 Device daily (Patient not taking: Reported on 6/25/2021), Disp: 1 each, Rfl: 0    Continuous Blood Gluc Sensor (FreeStyle Broderick 14 Day Sensor) MISC, Use 1 each every 14 (fourteen) days (Patient not taking: Reported on 6/25/2021), Disp: 2 each, Rfl: 11    cyclobenzaprine (FLEXERIL) 10 mg tablet, Take 1 tablet (10 mg total) by mouth daily at bedtime (Patient not taking: Reported on 6/25/2021), Disp: 90 tablet, Rfl: 0    diclofenac sodium (VOLTAREN) 1 %, Apply 2 g topically 4 (four) times a day (Patient not taking: Reported on 5/19/2021), Disp: 150 g, Rfl: 0    Dulaglutide (Trulicity) 1 5 DJ/1 6NJ SOPN, Inject 0 5 mL (1 5 mg total) under the skin once a week (Patient not taking: Reported on 5/19/2021), Disp: 12 pen, Rfl: 1    fluticasone-salmeterol (Advair HFA) 115-21 MCG/ACT inhaler, Inhale 2 puffs 2 (two) times a day Rinse mouth after use  (Patient not taking: Reported on 6/25/2021), Disp: 1 Inhaler, Rfl: 3    fluticasone-umeclidinium-vilanterol (Trelegy Ellipta) 100-62 5-25 MCG/INH inhaler, Inhale 1 puff daily Rinse mouth after use  (Patient not taking: Reported on 6/25/2021), Disp: , Rfl:     glucose blood (OneTouch Ultra) test strip, Use 1 each 3 (three) times a day Use as instructed (Patient not taking: Reported on 6/25/2021), Disp: 300 each, Rfl: 4    insulin detemir (Levemir FlexTouch) 100 Units/mL injection pen, Inject 45 Units under the skin daily at bedtime, Disp: 45 mL, Rfl: 0    Insulin Disposable Pump (V-Go 40) KIT, Use daily (Patient not taking: Reported on 6/25/2021), Disp: 1 kit, Rfl: 5    insulin lispro (HumaLOG) 100 units/mL injection pen, Inject 16 Units under the skin 3 (three) times a day with meals, Disp: 45 mL, Rfl: 0    insulin lispro (HumaLOG) 100 units/mL injection, Inject 76 Units under the skin daily via V-Go device   (Patient not taking: Reported on 6/25/2021), Disp: 22 8 mL, Rfl: 5    Insulin Pen Needle (BD Pen Needle Emily U/F) 32G X 4 MM MISC, Inject under the skin 4 (four) times a day, Disp: 400 each, Rfl: 0    levothyroxine 50 mcg tablet, Take 1 tablet (50 mcg total) by mouth daily (Patient not taking: Reported on 6/25/2021), Disp: 90 tablet, Rfl: 1    lidocaine (XYLOCAINE) 5 % ointment, Apply topically as needed for mild pain Apply 5 minutes prior to insulin injection (Patient not taking: Reported on 5/19/2021), Disp: 35 44 g, Rfl: 0    loratadine (CLARITIN) 10 mg tablet, Take 1 tablet (10 mg total) by mouth daily (Patient not taking: Reported on 6/25/2021), Disp: 30 tablet, Rfl: 3    magnesium gluconate (MAGONATE) 500 mg tablet, Take 1 tablet (500 mg total) by mouth daily (Patient not taking: Reported on 6/25/2021), Disp: 90 tablet, Rfl: 1    naproxen (NAPROSYN) 500 mg tablet, Take 1 tablet (500 mg total) by mouth 2 (two) times a day with meals (Patient not taking: Reported on 6/25/2021), Disp: 10 tablet, Rfl: 0    OneTouch Delica Lancets 08J MISC, USE 3 (THREE) TIMES A DAY (Patient not taking: Reported on 6/25/2021), Disp: 300 each, Rfl: 4    oxyCODONE (ROXICODONE) 5 mg immediate release tablet, Take 5 mg by mouth every 6 (six) hours as needed for moderate pain (Patient not taking: Reported on 6/25/2021), Disp: , Rfl:   Allergies   Allergen Reactions    Codeine Other (See Comments)     unknown     Vitals:    06/25/21 1508   BP: 140/78   Pulse: 88   Resp: 18   Temp: 98 6 °F (37 °C)   SpO2: 96%       Physical Exam  Constitutional:       Appearance: Normal appearance  HENT:      Head: Normocephalic  Mouth/Throat:      Mouth: Mucous membranes are dry  Eyes:      General: No scleral icterus  Extraocular Movements: Extraocular movements intact  Conjunctiva/sclera: Conjunctivae normal       Pupils: Pupils are equal, round, and reactive to light  Cardiovascular:      Rate and Rhythm: Normal rate and regular rhythm  Pulses: Normal pulses  Heart sounds: Normal heart sounds  Pulmonary:      Breath sounds: Normal breath sounds  Abdominal:      General: Abdomen is flat  Bowel sounds are normal       Palpations: Abdomen is soft  Musculoskeletal:         General: Normal range of motion  Cervical back: Normal range of motion and neck supple  Skin:     General: Skin is warm and dry  Comments: Breasts: breasts appear normal, no suspicious masses, no skin or nipple changes or axillary nodes, symmetric fibrous changes in both upper outer quadrants  Neurological:      General: No focal deficit present  Mental Status: She is alert and oriented to person, place, and time  Psychiatric:         Mood and Affect: Mood normal          Behavior: Behavior normal          Thought Content: Thought content normal          Judgment: Judgment normal            Results:  Labs:  none    Imaging  XR tibia fibula 2 views LEFT    Result Date: 6/18/2021  Narrative: LEFT TIBIA AND FIBULA INDICATION:   leg pain s/p fall  COMPARISON:  None VIEWS:  XR TIBIA FIBULA 2 VW LEFT Images: 4 FINDINGS: Upper pole patellar enthesophyte There is no acute fracture or dislocation  No significant degenerative changes  No lytic or blastic osseous lesion  Soft tissues are unremarkable  Impression: No acute osseous abnormality  Workstation performed: [id number]     XR foot 3+ views RIGHT    Result Date: 6/18/2021  Narrative: RIGHT FOOT INDICATION:   foot pain s/p fall  COMPARISON:  5/12/2021 VIEWS:  XR FOOT 3+ VW RIGHT Images: 3 FINDINGS: Posterior calcaneal heel spur There is no acute fracture or dislocation  No significant degenerative changes  No lytic or blastic osseous lesion  Soft tissues are unremarkable  Impression: No acute osseous abnormality  Findings are stable Workstation performed: [id number]     Mammo diagnostic bilateral w 3d & cad    Result Date: 6/23/2021  Narrative: DIAGNOSIS: Malignant neoplasm of right breast in female, estrogen receptor positive, unspecified site of breast [facility]) TECHNIQUE: Digital screening mammography was performed  Computer Aided Detection (CAD) analyzed all applicable images   COMPARISONS: Prior breast imaging dated: 06/23/2020, 06/18/2019, 06/26/2018, 06/26/2018, 05/23/2018, 05/23/2018, and 05/15/2018 RELEVANT HISTORY: Family Breast Cancer History: No known family history of breast cancer  Family Medical History: No known relevant family medical history  Personal History: No known relevant hormone history  Surgical history includes breast biopsy and lumpectomy  Medical history includes breast cancer and history of chemotherapy  RISK ASSESSMENT: [facility]quis risk assessment reporting was suppressed due to the patient's history and/or demographic factors  TISSUE DENSITY: The breasts are heterogeneously dense, which may obscure small masses  INDICATION: Amairani Perdue is a 60 y o  female presenting for yearly mammogram  FINDINGS: Bilateral There are no suspicious masses, grouped microcalcifications or areas of architectural distortion  Skin and nipple contours are stable  Post treatment changes are seen on the right  Impression:  Stable exam ASSESSMENT/BI-RADS CATEGORY:  Overall: 2 - Benign RECOMMENDATION:      - Diagnostic mammogram in 1 year of the breast(s)  Workstation ID: [id number]    I reviewed the above laboratory and imaging data  Discussion/Summary:  [de-identified] year old woman, history of breast cancer right side, presently disease-free  Plan of follow-up in 6 months for surveillance per protocol  Copy of most recent mammogram discussed with patient and given her for her records

## 2021-06-25 NOTE — LETTER
June 25, 2021     Efrain Mariee MD  [address]  [address]    Patient: Tom Fortune   YOB: 1955   Date of Visit: 6/25/2021       Dear Dr Roselia Noriega:    Thank you for referring Tom Fortune to me for evaluation  Below are my notes for this consultation  If you have questions, please do not hesitate to call me  I look forward to following your patient along with you  Sincerely,        Miriam Atkinson MD        CC: MD John Yañez MD Roderick Bale, RN Mitcheal Hair, MD Raina Chambers, MD Gerarda Harbor, MD  6/25/2021  3:33 PM  Sign when Signing Visit     Surgical Oncology Follow Up       [address] [id number]    Jolie CruzCintron  1955  [id number]  [address] [facility] SURGICAL ONCOLOGY [facility] [address]    Chief Complaint   Patient presents with    Follow-up       Assessment/Plan:    No problem-specific Assessment & Plan notes found for this encounter  Diagnoses and all orders for this visit:    Malignant neoplasm of right breast in female, estrogen receptor positive, unspecified site of breast [facility])        Advance Care Planning/Advance Directives:  Discussed disease status, cancer treatment plans and/or cancer treatment goals with the patient       Oncology History   Malignant neoplasm of right female breast ([facility] [address] )   5/23/2018 Initial Diagnosis    Malignant neoplasm of right female breast ([facility] [address] )     5/23/2018 Biopsy    Right breast core biopsy:  DCIS, micropapillary type, low-intermediate nuclear grade  ER 90-95% positive,  [place] 75-80% positive       6/26/2018 Surgery    RIGHT BREAST NEEDLE LOCALIZATION X2 WITH RIGHT BREAST LUMPECTOMY ( NEEDLE LOC @ 1000) (Right)   ONCOPLASTIC CLOSURE OF LUMPECTOMY CAVITY    Invasive breast carcinoma, NST (aka ductal)  * [place] grade 2 of 3 (total score = 2+2+2 = 6 of 9)   -- tubule formation < 10%, score 3   -- nuclear grade 2 of 3, score 2   -- mitoses ~ 4/mm2, score 2    * invasive carcinoma is multifocally present (A23-1, A32-1, A84-1, A89-1, A100-1), largest focus is 14 millimeters in greatest dimension (A89-1, this focus is graded)  * superior lumpectomy margin (orange-inked) is POSITIVE for invasive carcinoma (A84-1)   * all other lumpectomy margins are free of invasive carcinoma  * estrogen, progesterone & Her-2/negrita receptor studies are undertaken, to be described in an addendum report  - Ductal carcinoma in-situ (DCIS): Present as an extensive component (~85% of total tumor)  * DCIS is co-located with invasive carcinoma surrounding prior needle biopsy site  * DCIS spans 2 6 cm maximal dimension (A62-1) and is present on 27 of 136 total slides examined  * DCIS has cribriform & micropapillary patterns, nuclear grade 2 of 3, with central comedo-type necrosis  * DCIS is present within 0 2 millimeters of the superior lumpectomy margin (orange-inked, A26-1)   * DCIS is present within 0 2 millimeters of the medial lumpectomy margin (yellow-inked, A3-1)   * all other lumpectomy margins are free of DCIS by > 2 millimeters  - Lymph-vascular invasion: no lymph-vascular invasion is unequivocally identified  - Microcalcifications: present in DCIS  % positive, ER 45-55% positive, HER2 by IHC 3+ positive    - Dr Jo Gottron       8/2/2018 Surgery    Right breast lumpectomy reexcision, right axilla SLN biopsy:  A  Submitted as right axillary sentinel node:  - Seven lymph nodes are identified showing no metastatic tumor      B   Right breast lumpectomy reexcision:  - Abundant reactive changes are seen around the previous lumpectomy cavity   - No residual atypia or malignancy is seen           8/2/2018 -  Cancer Staged    Stage IA - pT1c, pN0, G2        9/25/2018 - 8/6/2019 Chemotherapy    Weekly Paclitaxol and trastuzumab x12, until December 11, 2018 followed by trastuzumab monotherapy 6 milligram/kilogram every 3 weeks    - Dr Tuttle Solders       1/9/2019 - 2/19/2019 Radiation    Plan ID Energy Fractions Dose per Fraction (cGy) Dose Correction (cGy) Total Dose Delivered (cGy) Elapsed Days   R Boost e 16E 5 / 5 200 0 1,000 6   Right Breast 6X 25 / 25 200 0 5,000 29     -  [facility]       Malignant neoplasm of upper-outer quadrant of right breast in female, estrogen receptor positive ([facility] [address] )   7/19/2018 Initial Diagnosis    Malignant neoplasm of upper-outer quadrant of right breast in female, estrogen receptor positive ([facility] [address] )     12/17/2018 - 8/26/2019 Chemotherapy    trastuzumab (HERCEPTIN) 579 mg in sodium chloride 0 9 % 250 mL chemo infusion, 8 mg/kg, Intravenous, Once, 12 of 12 cycles  Administration: 434 mg (5/14/2019), 434 mg (6/4/2019), 450 mg (7/16/2019), 450 mg (8/6/2019), 450 mg (6/25/2019)         History of Present Illness:   Patient is a 60 old woman here for surveillance visit  History of stage I breast cancer, right side post lumpectomy and radiation   -Interval History:  She has no new complaints report  Presently on Herceptin  She had a mammogram done in anticipation of today's visit  Review of Systems:  Review of Systems   Constitutional: Negative  HENT: Negative  Eyes: Negative  Respiratory: Negative  Cardiovascular: Negative  Gastrointestinal: Negative  Endocrine: Negative  Genitourinary: Negative  Musculoskeletal: Negative  Skin: Negative  Allergic/Immunologic: Negative  Neurological: Negative  Hematological: Negative  Psychiatric/Behavioral: Negative  All other systems reviewed and are negative        Patient Active Problem List   Diagnosis    Type 2 diabetes mellitus with complication, with long-term current use of insulin ([address] )    Severe persistent asthma without complication    Other specified hypothyroidism    Chest pain    Palpitations    Chronic bilateral low back pain without sciatica    Neck pain    Malignant neoplasm of right female breast ([facility] [address] )    Malignant neoplasm of upper-outer quadrant of right breast in female, estrogen receptor positive ([facility] [address] )    Hiatal hernia    Screening for colon cancer    Esophageal dysphagia    Cellulitis of right axilla    Chronic pain of right knee    Hypercholesterolemia    Hypothyroid    Hypertension    Bilateral pulmonary embolism (HCC)    Radiation pneumonitis (HCC)    Type 2 diabetes mellitus with hyperglycemia, with long-term current use of insulin ([facility] )    Diabetic polyneuropathy associated with type 2 diabetes mellitus (HCC)    Class 1 obesity due to excess calories with serious comorbidity and body mass index (BMI) of 33 0 to 33 9 in adult    Use of anastrozole (Arimidex)    Decreased ROM of right shoulder    Lump of skin    Chronic diastolic heart failure (HCC)    Gastroesophageal reflux disease without esophagitis    Muscle cramps     Past Medical History:   Diagnosis Date    Abdominal infection ([facility] )     h-pylori    Abnormal chest x-ray 4/5/2019    Asthma     Breast cancer ([facility] ) 06/26/2018    Cancer ([place] [address] )     BREAST    Diabetes mellitus ([facility] )     Hiatal hernia     History of chemotherapy     History of radiation therapy     Hypercholesterolemia     Hypertension     Hypothyroid     Renal disorder     Rheumatoid arthritis ([facility] )      Past Surgical History:   Procedure Laterality Date    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    BREAST LUMPECTOMY Right 6/26/2018    Procedure: RIGHT BREAST NEEDLE LOCALIZATION X2 WITH RIGHT BREAST LUMPECTOMY ( NEEDLE LOC @ 1000); Surgeon: Carmen Ramirez MD;  Location: [facility];  Service: Surgical Oncology    BREAST LUMPECTOMY Right 8/2/2018    Procedure: LYMPHOSCINITGRAPHY INTRAOPERATIVE LYMPHATIC MAPPING , RIGHT SENTINEL NODE BIOPSY, REEXCISION  RIGHT BREAST LUMPECTOMY CAVITY (SUPERIOR MARGIN);   Surgeon: Carmen Ramirez MD;  [facility]: [facility]; Service: Surgical Oncology    BREAST SURGERY Left     CATARACT EXTRACTION, BILATERAL Bilateral     EGD AND COLONOSCOPY N/A 9/5/2018    Procedure: EGD AND COLONOSCOPY;  Surgeon: Ofe Leslie MD;  Location: [facility] GI LAB; Service: Gastroenterology    [place] GUIDED CENTRAL VENOUS ACCESS DEVICE INSERTION  9/13/2018    KNEE SURGERY Right     MAMMO NEEDLE LOCALIZATION RIGHT (ALL INC) Right 6/26/2018    MAMMO STEREOTACTIC BREAST BIOPSY RIGHT (ALL INC) Right 5/23/2018    RETINAL DETACHMENT SURGERY Right     TUBAL LIGATION      TUNNELED VENOUS PORT PLACEMENT Left 9/13/2018    Procedure: INSERTION VENOUS PORT (PORT-A-CATH);   Surgeon: Mark Cyr MD;  [facility];  Service: Surgical Oncology     Family History   Problem Relation Age of Onset    Diabetes Mother     Hypertension Mother     Diabetes Father     Hypertension Father     Diabetes Brother     Hypertension Brother     Prostate cancer Brother     Cancer Maternal Grandfather     No Known Problems Sister     No Known Problems Daughter     No Known Problems Sister     No Known Problems Sister     No Known Problems Sister     No Known Problems Sister     No Known Problems Daughter     No Known Problems Daughter      Social History     Socioeconomic History    Marital status:      Spouse name: Not on file    Number of children: Not on file    Years of education: Not on file    Highest education level: Not on file   Occupational History    Not on file   Tobacco Use    Smoking status: Never Smoker    Smokeless tobacco: Never Used   Vaping Use    Vaping Use: Never used   Substance and Sexual Activity    Alcohol use: No    Drug use: No    Sexual activity: Not on file   Other Topics Concern    Not on file   Social History Narrative    Not on file     Social Determinants of Health     Financial Resource Strain:     Difficulty of Paying Living Expenses:    Food Insecurity:     Worried About Running Out of Food in the Last Year:     Ran Out of Food in the Last Year:    Transportation Needs:     Lack of Transportation (Medical):      Lack of Transportation (Non-Medical):    Physical Activity:     Days of Exercise per Week:     Minutes of Exercise per Session:    Stress:     Feeling of Stress :    Social Connections:     Frequency of Communication with Friends and Family:     Frequency of Social Gatherings with Friends and Family:     Attends Yarsanism Services:     Active Member of Clubs or Organizations:     Attends Club or Organization Meetings:     Marital Status:    Intimate Partner Violence:     Fear of Current or Ex-Partner:     Emotionally Abused:     Physically Abused:     Sexually Abused:        Current Outpatient Medications:     amLODIPine (NORVASC) 10 mg tablet, TAKE 1 TABLET BY MOUTH DAILY, Disp: 90 tablet, Rfl: 0    anastrozole (ARIMIDEX) 1 mg tablet, TAKE 1 TABLET BY MOUTH DAILY, Disp: 90 tablet, Rfl: 3    aspirin 81 mg chewable tablet, Chew 81 mg daily, Disp: , Rfl:     atorvastatin (LIPITOR) 10 mg tablet, TAKE 1 TABLET (10 MG TOTAL) BY MOUTH DAILY, Disp: 30 tablet, Rfl: 0    DULoxetine (CYMBALTA) 30 mg delayed release capsule, TAKE 1 CAPSULE BY MOUTH 2 (TWICE)  A DAY, Disp: 180 capsule, Rfl: 0    losartan (COZAAR) 50 mg tablet, TAKE 1 TABLET (50 MG TOTAL) BY MOUTH DAILY, Disp: 90 tablet, Rfl: 0    metoprolol tartrate (LOPRESSOR) 25 mg tablet, Take 0 5 tablets (12 5 mg total) by mouth every 12 (twelve) hours, Disp: 60 tablet, Rfl: 2    omeprazole (PriLOSEC) 20 mg delayed release capsule, TAKE 1 CAPSULE (20 MG TOTAL) BY MOUTH DAILY, Disp: 90 capsule, Rfl: 1    acetaminophen (TYLENOL) 500 mg tablet, Take 1 tablet (500 mg total) by mouth every 6 (six) hours as needed for mild pain (Patient not taking: Reported on 6/25/2021), Disp: 14 tablet, Rfl: 0    albuterol (2 5 mg/3 mL) 0 083 % nebulizer solution, Take 1 vial (2 5 mg total) by nebulization every 6 (six) hours as needed for wheezing or shortness of breath (Patient not taking: Reported on 6/25/2021), Disp: 120 vial, Rfl: 5    albuterol (PROVENTIL HFA,VENTOLIN HFA) 90 mcg/act inhaler, Inhale 1 puff every 4 (four) hours as needed   (Patient not taking: Reported on 6/25/2021), Disp: , Rfl:     Blood Glucose Monitoring Suppl (ONE TOUCH ULTRA 2) w/Device KIT, by Does not apply route 3 (three) times a day (Patient not taking: Reported on 6/25/2021), Disp: 1 each, Rfl: 0    Continuous Blood Gluc  (FreeStyle Broderick 14 Day [place]) AARON, Use 1 Device daily (Patient not taking: Reported on 6/25/2021), Disp: 1 each, Rfl: 0    Continuous Blood Gluc Sensor (FreeStyle Broderick 14 Day Sensor) MISC, Use 1 each every 14 (fourteen) days (Patient not taking: Reported on 6/25/2021), Disp: 2 each, Rfl: 11    cyclobenzaprine (FLEXERIL) 10 mg tablet, Take 1 tablet (10 mg total) by mouth daily at bedtime (Patient not taking: Reported on 6/25/2021), Disp: 90 tablet, Rfl: 0    diclofenac sodium (VOLTAREN) 1 %, Apply 2 g topically 4 (four) times a day (Patient not taking: Reported on 5/19/2021), Disp: 150 g, Rfl: 0    Dulaglutide (Trulicity) 1 5 HB/6 4IH SOPN, Inject 0 5 mL (1 5 mg total) under the skin once a week (Patient not taking: Reported on 5/19/2021), Disp: 12 pen, Rfl: 1    fluticasone-salmeterol (Advair HFA) 115-21 MCG/ACT inhaler, Inhale 2 puffs 2 (two) times a day Rinse mouth after use  (Patient not taking: Reported on 6/25/2021), Disp: 1 Inhaler, Rfl: 3    fluticasone-umeclidinium-vilanterol (Trelegy Ellipta) 100-62 5-25 MCG/INH inhaler, Inhale 1 puff daily Rinse mouth after use   (Patient not taking: Reported on 6/25/2021), Disp: , Rfl:     glucose blood (OneTouch Ultra) test strip, Use 1 each 3 (three) times a day Use as instructed (Patient not taking: Reported on 6/25/2021), Disp: 300 each, Rfl: 4    insulin detemir (Levemir FlexTouch) 100 Units/mL injection pen, Inject 45 Units under the skin daily at bedtime, Disp: 45 mL, Rfl: 0    Insulin Disposable Pump (V-Go 40) KIT, Use daily (Patient not taking: Reported on 6/25/2021), Disp: 1 kit, Rfl: 5    insulin lispro (HumaLOG) 100 units/mL injection pen, Inject 16 Units under the skin 3 (three) times a day with meals, Disp: 45 mL, Rfl: 0    insulin lispro (HumaLOG) 100 units/mL injection, Inject 76 Units under the skin daily via V-Go device  (Patient not taking: Reported on 6/25/2021), Disp: 22 8 mL, Rfl: 5    Insulin Pen Needle (BD Pen Needle Emily U/F) 32G X 4 MM MISC, Inject under the skin 4 (four) times a day, Disp: 400 each, Rfl: 0    levothyroxine 50 mcg tablet, Take 1 tablet (50 mcg total) by mouth daily (Patient not taking: Reported on 6/25/2021), Disp: 90 tablet, Rfl: 1    lidocaine (XYLOCAINE) 5 % ointment, Apply topically as needed for mild pain Apply 5 minutes prior to insulin injection (Patient not taking: Reported on 5/19/2021), Disp: 35 44 g, Rfl: 0    loratadine (CLARITIN) 10 mg tablet, Take 1 tablet (10 mg total) by mouth daily (Patient not taking: Reported on 6/25/2021), Disp: 30 tablet, Rfl: 3    magnesium gluconate (MAGONATE) 500 mg tablet, Take 1 tablet (500 mg total) by mouth daily (Patient not taking: Reported on 6/25/2021), Disp: 90 tablet, Rfl: 1    naproxen (NAPROSYN) 500 mg tablet, Take 1 tablet (500 mg total) by mouth 2 (two) times a day with meals (Patient not taking: Reported on 6/25/2021), Disp: 10 tablet, Rfl: 0    OneTouch Delica Lancets 99H MISC, USE 3 (THREE) TIMES A DAY (Patient not taking: Reported on 6/25/2021), Disp: 300 each, Rfl: 4    oxyCODONE (ROXICODONE) 5 mg immediate release tablet, Take 5 mg by mouth every 6 (six) hours as needed for moderate pain (Patient not taking: Reported on 6/25/2021), Disp: , Rfl:   Allergies   Allergen Reactions    Codeine Other (See Comments)     unknown     Vitals:    06/25/21 1508   BP: 140/78   Pulse: 88   Resp: 18   Temp: 98 6 °F (37 °C)   SpO2: 96%       Physical Exam  Constitutional:       Appearance: Normal appearance  HENT:      Head: Normocephalic  Mouth/Throat:      Mouth: Mucous membranes are dry  Eyes:      General: No scleral icterus  Extraocular Movements: Extraocular movements intact  Conjunctiva/sclera: Conjunctivae normal       Pupils: Pupils are equal, round, and reactive to light  Cardiovascular:      Rate and Rhythm: Normal rate and regular rhythm  Pulses: Normal pulses  Heart sounds: Normal heart sounds  Pulmonary:      Breath sounds: Normal breath sounds  Abdominal:      General: Abdomen is flat  Bowel sounds are normal       Palpations: Abdomen is soft  Musculoskeletal:         General: Normal range of motion  Cervical back: Normal range of motion and neck supple  Skin:     General: Skin is warm and dry  Comments: Breasts: breasts appear normal, no suspicious masses, no skin or nipple changes or axillary nodes, symmetric fibrous changes in both upper outer quadrants  Neurological:      General: No focal deficit present  Mental Status: She is alert and oriented to person, place, and time  Psychiatric:         Mood and Affect: Mood normal          Behavior: Behavior normal          Thought Content: Thought content normal          Judgment: Judgment normal            Results:  Labs:  none    Imaging  XR tibia fibula 2 views LEFT    Result Date: 6/18/2021  Narrative: LEFT TIBIA AND FIBULA INDICATION:   leg pain s/p fall  COMPARISON:  None VIEWS:  XR TIBIA FIBULA 2 VW LEFT Images: 4 FINDINGS: Upper pole patellar enthesophyte There is no acute fracture or dislocation  No significant degenerative changes  No lytic or blastic osseous lesion  Soft tissues are unremarkable  Impression: No acute osseous abnormality  Workstation performed: [id number]     XR foot 3+ views RIGHT    Result Date: 6/18/2021  Narrative: RIGHT FOOT INDICATION:   foot pain s/p fall   COMPARISON:  5/12/2021 VIEWS:  XR FOOT 3+ VW RIGHT Images: 3 FINDINGS: Posterior calcaneal heel spur There is no acute fracture or dislocation  No significant degenerative changes  No lytic or blastic osseous lesion  Soft tissues are unremarkable  Impression: No acute osseous abnormality  Findings are stable Workstation performed: [id number]     Mammo diagnostic bilateral w 3d & cad    Result Date: 6/23/2021  Narrative: DIAGNOSIS: Malignant neoplasm of right breast in female, estrogen receptor positive, unspecified site of breast [facility]) TECHNIQUE: Digital screening mammography was performed  Computer Aided Detection (CAD) analyzed all applicable images  COMPARISONS: Prior breast imaging dated: 06/23/2020, 06/18/2019, 06/26/2018, 06/26/2018, 05/23/2018, 05/23/2018, and 05/15/2018 RELEVANT HISTORY: Family Breast Cancer History: No known family history of breast cancer  Family Medical History: No known relevant family medical history  Personal History: No known relevant hormone history  Surgical history includes breast biopsy and lumpectomy  Medical history includes breast cancer and history of chemotherapy  RISK ASSESSMENT: [facility] risk assessment reporting was suppressed due to the patient's history and/or demographic factors  TISSUE DENSITY: The breasts are heterogeneously dense, which may obscure small masses  INDICATION: Amairani Perdue is a 60 y o  female presenting for yearly mammogram  FINDINGS: Bilateral There are no suspicious masses, grouped microcalcifications or areas of architectural distortion  Skin and nipple contours are stable  Post treatment changes are seen on the right  Impression:  Stable exam ASSESSMENT/BI-RADS CATEGORY:  Overall: 2 - Benign RECOMMENDATION:      - Diagnostic mammogram in 1 year of the breast(s)  Workstation ID: [id number]    I reviewed the above laboratory and imaging data  Discussion/Summary:  [de-identified] year old woman, history of breast cancer right side, presently disease-free  Plan of follow-up in 6 months for surveillance per protocol    Copy of most recent mammogram discussed with patient and given her for her records

## 2021-06-25 NOTE — TELEPHONE ENCOUNTER
She was previously taking Humalog 16 units before meals and Levemir 45 units prior to V-Go  Is she no longer using V Go? How are BG currently?  If she is no longer using V Go she will need to resume 4 injections per day

## 2021-06-25 NOTE — TELEPHONE ENCOUNTER
Spoke to daughter  She said the insulin is not being released from the VGo  They do the clicks and it never administers  BG logs in her chart for review  Daughter had sent them in on 6/9  Readings are all very high  Pt is leaving for vacation on 7/3/21 and is requesting epns before she leaves  Please review

## 2021-06-25 NOTE — TELEPHONE ENCOUNTER
Patient is asking for a script for the humalog pen and test strips be sent to [facility]  She was using the V-Go but she said that is not working  You can call her daughter, Vick Valdes, at [phone] if you have any questions  Thank you

## 2021-06-25 NOTE — TELEPHONE ENCOUNTER
Called and spoke to daughter  Notified her of recommendations with insulin dosing and to call with BG levels 70 or <  Also told her it's very important that BG logs be sent in before she leaves for vacation to make sure doses don't need to be adjusted further  Daughter said ok      Sent Rx request to provider for approval

## 2021-06-30 ENCOUNTER — HOSPITAL ENCOUNTER (OUTPATIENT)
Dept: INFUSION CENTER | Facility: CLINIC | Age: 66
Discharge: HOME | End: 2021-06-30
Payer: COMMERCIAL

## 2021-06-30 VITALS
TEMPERATURE: 97.6 F | HEART RATE: 87 BPM | DIASTOLIC BLOOD PRESSURE: 78 MMHG | SYSTOLIC BLOOD PRESSURE: 149 MMHG | RESPIRATION RATE: 18 BRPM

## 2021-06-30 DIAGNOSIS — J45.50 SEVERE PERSISTENT ASTHMA WITHOUT COMPLICATION: Primary | ICD-10-CM

## 2021-06-30 PROCEDURE — 96372 THER/PROPH/DIAG INJ SC/IM: CPT

## 2021-06-30 RX ORDER — EPINEPHRINE 1 MG/ML
0.3 INJECTION, SOLUTION, CONCENTRATE INTRAVENOUS AS NEEDED
Status: CANCELLED | OUTPATIENT
Start: 2021-07-14

## 2021-06-30 RX ADMIN — OMALIZUMAB 375 MG: 150 INJECTION, SOLUTION SUBCUTANEOUS at 10:05

## 2021-06-30 NOTE — PROGRESS NOTES
Patient tolerated treatment well  Post observation completed  Patient declined to make future appointments at this time because she is going away  Will call to make appointment when she gets back

## 2021-07-21 ENCOUNTER — TELEPHONE (OUTPATIENT)
Dept: FAMILY MEDICINE CLINIC | Facility: CLINIC | Age: 66
End: 2021-07-21

## 2021-07-23 DIAGNOSIS — I10 ESSENTIAL HYPERTENSION: ICD-10-CM

## 2021-08-05 DIAGNOSIS — Z79.4 TYPE 2 DIABETES MELLITUS WITH HYPERGLYCEMIA, WITH LONG-TERM CURRENT USE OF INSULIN (HCC): ICD-10-CM

## 2021-08-05 DIAGNOSIS — E78.5 HYPERLIPIDEMIA, UNSPECIFIED HYPERLIPIDEMIA TYPE: ICD-10-CM

## 2021-08-05 DIAGNOSIS — J45.50 SEVERE PERSISTENT ASTHMA WITHOUT COMPLICATION: ICD-10-CM

## 2021-08-05 DIAGNOSIS — E11.65 TYPE 2 DIABETES MELLITUS WITH HYPERGLYCEMIA, WITH LONG-TERM CURRENT USE OF INSULIN (HCC): ICD-10-CM

## 2021-08-05 DIAGNOSIS — I10 ESSENTIAL HYPERTENSION: ICD-10-CM

## 2021-08-05 DIAGNOSIS — E11.8 TYPE 2 DIABETES MELLITUS WITH COMPLICATION, WITHOUT LONG-TERM CURRENT USE OF INSULIN (HCC): ICD-10-CM

## 2021-08-05 PROCEDURE — 4010F ACE/ARB THERAPY RXD/TAKEN: CPT | Performed by: SURGERY

## 2021-08-05 RX ORDER — LORATADINE 10 MG/1
10 TABLET ORAL DAILY
Qty: 90 TABLET | Refills: 2 | Status: SHIPPED | OUTPATIENT
Start: 2021-08-05

## 2021-08-05 RX ORDER — FLUTICASONE PROPIONATE AND SALMETEROL XINAFOATE 115; 21 UG/1; UG/1
AEROSOL, METERED RESPIRATORY (INHALATION)
Qty: 12 G | Refills: 2 | OUTPATIENT
Start: 2021-08-05

## 2021-08-05 RX ORDER — LOSARTAN POTASSIUM 50 MG/1
50 TABLET ORAL DAILY
Qty: 90 TABLET | Refills: 0 | Status: SHIPPED | OUTPATIENT
Start: 2021-08-05 | End: 2021-10-20 | Stop reason: SDUPTHER

## 2021-08-05 RX ORDER — ATORVASTATIN CALCIUM 10 MG/1
10 TABLET, FILM COATED ORAL DAILY
Qty: 30 TABLET | Refills: 0 | Status: SHIPPED | OUTPATIENT
Start: 2021-08-05 | End: 2021-09-02

## 2021-08-17 ENCOUNTER — CONSULT (OUTPATIENT)
Dept: SURGERY | Facility: CLINIC | Age: 66
End: 2021-08-17
Payer: COMMERCIAL

## 2021-08-17 VITALS
BODY MASS INDEX: 28.82 KG/M2 | DIASTOLIC BLOOD PRESSURE: 82 MMHG | WEIGHT: 173 LBS | SYSTOLIC BLOOD PRESSURE: 127 MMHG | HEIGHT: 65 IN

## 2021-08-17 DIAGNOSIS — L72.3 SEBACEOUS CYST: ICD-10-CM

## 2021-08-17 DIAGNOSIS — R22.2 SUBCUTANEOUS MASS OF BACK: Primary | ICD-10-CM

## 2021-08-17 PROCEDURE — 3008F BODY MASS INDEX DOCD: CPT | Performed by: SURGERY

## 2021-08-17 PROCEDURE — 3079F DIAST BP 80-89 MM HG: CPT | Performed by: SURGERY

## 2021-08-17 PROCEDURE — 99202 OFFICE O/P NEW SF 15 MIN: CPT | Performed by: SURGERY

## 2021-08-17 PROCEDURE — 3074F SYST BP LT 130 MM HG: CPT | Performed by: SURGERY

## 2021-08-17 NOTE — PROGRESS NOTES
Assessment/Plan:    Subcutaneous mass of back   She has a subcutaneous mass of her right upper back that measures about 1-2 cm across  In addition she has a smaller 1 in her central chest that is about half a cm to 1 cm across  Plan on excision under sedation at [facility]   The risks benefits alternatives explained to her she is agreeable to proceed       Diagnoses and all orders for this visit:    Subcutaneous mass of back    Sebaceous cyst  -     Ambulatory referral to General Surgery  -     Case request operating room: EXCISION  BIOPSY LESION/MASS RIGHT  BACK AND LEFT CHEST; Standing  -     Case request operating room: EXCISION  BIOPSY LESION/MASS RIGHT  BACK AND LEFT CHEST    Other orders  -     Diet NPO; Sips with meds; Standing  -     Apply Sequential Compression Device; Standing  -     Place sequential compression device; Standing  -     Reason for no Mechanical VTE Prophylaxis; Standing  -     lactated ringers infusion  -     ceFAZolin (ANCEF) 2,000 mg in dextrose 5 % 100 mL IVPB          Subjective:      Patient ID: Edgar Mckeon is a 60 y o  female  Ms Angelica Ramirez  Is a 60 female here for evaluation subcutaneous mass  She has 1 under anterior chest that concerned about the odor  Also has a lump on her back as well  The following portions of the patient's history were reviewed and updated as appropriate: allergies, current medications, past family history, past medical history, past social history, past surgical history and problem listf  Review of Systems   Constitutional: Negative for chills and fever  HENT: Negative for ear pain and sore throat  Eyes: Negative for pain and visual disturbance  Respiratory: Negative for cough and shortness of breath  Cardiovascular: Negative for chest pain and palpitations  Gastrointestinal: Negative for abdominal pain and vomiting  Genitourinary: Negative for dysuria and hematuria     Musculoskeletal: Negative for arthralgias and back pain  Skin: Negative for color change and rash  Neurological: Negative for seizures and syncope  Psychiatric/Behavioral: Negative for agitation and behavioral problems  All other systems reviewed and are negative  Objective:      /82   Ht 5' 5" (1 651 m)   Wt 78 5 kg (173 lb)   BMI 28 79 kg/m²          Physical Exam  Vitals and nursing note reviewed  Constitutional:       General: She is not in acute distress  Appearance: She is well-developed  She is not diaphoretic  HENT:      Head: Normocephalic and atraumatic  Eyes:      Pupils: Pupils are equal, round, and reactive to light  Cardiovascular:      Rate and Rhythm: Normal rate and regular rhythm  Heart sounds: Normal heart sounds  No murmur heard  Pulmonary:      Effort: Pulmonary effort is normal  No respiratory distress  Breath sounds: Normal breath sounds  No wheezing  Abdominal:      General: Bowel sounds are normal       Palpations: Abdomen is soft  Musculoskeletal:         General: Normal range of motion  Cervical back: Normal range of motion and neck supple  Skin:     General: Skin is warm and dry  Comments:   Small cystic structure chest and right upper back   Neurological:      Mental Status: She is alert and oriented to person, place, and time     Psychiatric:         Behavior: Behavior normal

## 2021-08-17 NOTE — ASSESSMENT & PLAN NOTE
She has a subcutaneous mass of her right upper back that measures about 1-2 cm across  In addition she has a smaller 1 in her central chest that is about half a cm to 1 cm across    Plan on excision under sedation at [address]   The risks benefits alternatives explained to her she is agreeable to proceed

## 2021-08-18 ENCOUNTER — HOSPITAL ENCOUNTER (OUTPATIENT)
Dept: INFUSION CENTER | Facility: CLINIC | Age: 66
Discharge: HOME | End: 2021-08-18
Payer: COMMERCIAL

## 2021-08-18 VITALS
RESPIRATION RATE: 18 BRPM | TEMPERATURE: 97.6 F | SYSTOLIC BLOOD PRESSURE: 122 MMHG | HEART RATE: 74 BPM | DIASTOLIC BLOOD PRESSURE: 65 MMHG

## 2021-08-18 DIAGNOSIS — J45.50 SEVERE PERSISTENT ASTHMA WITHOUT COMPLICATION: Primary | ICD-10-CM

## 2021-08-18 PROCEDURE — 96372 THER/PROPH/DIAG INJ SC/IM: CPT

## 2021-08-18 RX ORDER — EPINEPHRINE 1 MG/ML
0.3 INJECTION, SOLUTION, CONCENTRATE INTRAVENOUS AS NEEDED
Status: DISCONTINUED | OUTPATIENT
Start: 2021-08-18 | End: 2021-08-21 | Stop reason: HOSPADM

## 2021-08-18 RX ORDER — EPINEPHRINE 1 MG/ML
0.3 INJECTION, SOLUTION, CONCENTRATE INTRAVENOUS AS NEEDED
Status: CANCELLED | OUTPATIENT
Start: 2021-09-01

## 2021-08-18 RX ADMIN — OMALIZUMAB 375 MG: 150 INJECTION, SOLUTION SUBCUTANEOUS at 10:32

## 2021-08-18 NOTE — PROGRESS NOTES
Xolair given as ordered  Two injections given SQ  in NIKIA  One injection was given SQ  in JESSICA to equal 3 injections and a total dose of 375 mg  Pt  Monitored for 30 minutes as ordered  Future appointment scheduled    AVS provided

## 2021-08-23 ENCOUNTER — TELEPHONE (OUTPATIENT)
Dept: NEUROLOGY | Facility: CLINIC | Age: 66
End: 2021-08-23

## 2021-08-25 RX ORDER — SODIUM CHLORIDE, SODIUM LACTATE, POTASSIUM CHLORIDE, CALCIUM CHLORIDE 600; 310; 30; 20 MG/100ML; MG/100ML; MG/100ML; MG/100ML
125 INJECTION, SOLUTION INTRAVENOUS CONTINUOUS
Status: CANCELLED | OUTPATIENT
Start: 2021-09-13

## 2021-09-01 ENCOUNTER — HOSPITAL ENCOUNTER (OUTPATIENT)
Dept: INFUSION CENTER | Facility: CLINIC | Age: 66
Discharge: HOME | End: 2021-09-01
Payer: COMMERCIAL

## 2021-09-01 VITALS
DIASTOLIC BLOOD PRESSURE: 73 MMHG | RESPIRATION RATE: 20 BRPM | SYSTOLIC BLOOD PRESSURE: 139 MMHG | TEMPERATURE: 98.1 F | HEART RATE: 86 BPM

## 2021-09-01 DIAGNOSIS — J45.50 SEVERE PERSISTENT ASTHMA WITHOUT COMPLICATION: Primary | ICD-10-CM

## 2021-09-01 PROCEDURE — 96372 THER/PROPH/DIAG INJ SC/IM: CPT

## 2021-09-01 RX ORDER — EPINEPHRINE 1 MG/ML
0.3 INJECTION, SOLUTION, CONCENTRATE INTRAVENOUS AS NEEDED
Status: CANCELLED | OUTPATIENT
Start: 2021-09-15

## 2021-09-01 RX ADMIN — OMALIZUMAB 375 MG: 150 INJECTION, SOLUTION SUBCUTANEOUS at 09:34

## 2021-09-01 NOTE — PROGRESS NOTES
Pt arrived to unit without complaint  Epi pen present exp 11/21  Xolair injections given as ordered  Two injections given SQ  in NIKIA  One injection was given SQ  in JESSICA to equal 3 injections and a total dose of 375 mg  Pt observed for 30 mins post injections without incident  AVS provided  Pt left unit in stable condition

## 2021-09-02 DIAGNOSIS — E78.5 HYPERLIPIDEMIA, UNSPECIFIED HYPERLIPIDEMIA TYPE: ICD-10-CM

## 2021-09-02 RX ORDER — ATORVASTATIN CALCIUM 10 MG/1
10 TABLET, FILM COATED ORAL DAILY
Qty: 30 TABLET | Refills: 0 | Status: SHIPPED | OUTPATIENT
Start: 2021-09-02 | End: 2021-10-18

## 2021-09-08 ENCOUNTER — OFFICE VISIT (OUTPATIENT)
Dept: FAMILY MEDICINE CLINIC | Facility: CLINIC | Age: 66
End: 2021-09-08

## 2021-09-08 VITALS
HEIGHT: 65 IN | OXYGEN SATURATION: 97 % | HEART RATE: 77 BPM | TEMPERATURE: 97.8 F | RESPIRATION RATE: 18 BRPM | BODY MASS INDEX: 28.92 KG/M2 | SYSTOLIC BLOOD PRESSURE: 124 MMHG | DIASTOLIC BLOOD PRESSURE: 76 MMHG | WEIGHT: 173.6 LBS

## 2021-09-08 DIAGNOSIS — K59.01 SLOW TRANSIT CONSTIPATION: ICD-10-CM

## 2021-09-08 DIAGNOSIS — E11.8 TYPE 2 DIABETES MELLITUS WITH COMPLICATION, WITHOUT LONG-TERM CURRENT USE OF INSULIN (HCC): ICD-10-CM

## 2021-09-08 DIAGNOSIS — Z79.4 TYPE 2 DIABETES MELLITUS WITH COMPLICATION, WITH LONG-TERM CURRENT USE OF INSULIN (HCC): Primary | ICD-10-CM

## 2021-09-08 DIAGNOSIS — D36.9 TUBULAR ADENOMA: ICD-10-CM

## 2021-09-08 DIAGNOSIS — Z12.4 SCREENING FOR CERVICAL CANCER: ICD-10-CM

## 2021-09-08 DIAGNOSIS — E11.8 TYPE 2 DIABETES MELLITUS WITH COMPLICATION, WITH LONG-TERM CURRENT USE OF INSULIN (HCC): Primary | ICD-10-CM

## 2021-09-08 PROBLEM — E66.811 CLASS 1 OBESITY DUE TO EXCESS CALORIES WITH SERIOUS COMORBIDITY AND BODY MASS INDEX (BMI) OF 33.0 TO 33.9 IN ADULT: Status: RESOLVED | Noted: 2020-01-07 | Resolved: 2021-09-08

## 2021-09-08 PROBLEM — E66.09 CLASS 1 OBESITY DUE TO EXCESS CALORIES WITH SERIOUS COMORBIDITY AND BODY MASS INDEX (BMI) OF 33.0 TO 33.9 IN ADULT: Status: RESOLVED | Noted: 2020-01-07 | Resolved: 2021-09-08

## 2021-09-08 PROBLEM — E66.3 OVERWEIGHT WITH BODY MASS INDEX (BMI) OF 28 TO 28.9 IN ADULT: Status: ACTIVE | Noted: 2021-09-08

## 2021-09-08 LAB — SL AMB POCT HEMOGLOBIN AIC: 13.9 (ref ?–6.5)

## 2021-09-08 PROCEDURE — 1036F TOBACCO NON-USER: CPT | Performed by: FAMILY MEDICINE

## 2021-09-08 PROCEDURE — 99214 OFFICE O/P EST MOD 30 MIN: CPT | Performed by: FAMILY MEDICINE

## 2021-09-08 PROCEDURE — 3074F SYST BP LT 130 MM HG: CPT | Performed by: FAMILY MEDICINE

## 2021-09-08 PROCEDURE — 3046F HEMOGLOBIN A1C LEVEL >9.0%: CPT | Performed by: FAMILY MEDICINE

## 2021-09-08 PROCEDURE — 83036 HEMOGLOBIN GLYCOSYLATED A1C: CPT | Performed by: FAMILY MEDICINE

## 2021-09-08 PROCEDURE — 3078F DIAST BP <80 MM HG: CPT | Performed by: FAMILY MEDICINE

## 2021-09-08 PROCEDURE — 3046F HEMOGLOBIN A1C LEVEL >9.0%: CPT | Performed by: INTERNAL MEDICINE

## 2021-09-08 RX ORDER — INSULIN LISPRO 100 [IU]/ML
20 INJECTION, SOLUTION INTRAVENOUS; SUBCUTANEOUS
Qty: 45 ML | Refills: 0 | Status: SHIPPED | OUTPATIENT
Start: 2021-09-08 | End: 2021-10-20 | Stop reason: SDUPTHER

## 2021-09-08 RX ORDER — INSULIN DETEMIR 100 [IU]/ML
50 INJECTION, SOLUTION SUBCUTANEOUS
Qty: 50 ML | Refills: 1 | Status: SHIPPED | OUTPATIENT
Start: 2021-09-08 | End: 2021-10-20

## 2021-09-08 RX ORDER — INSULIN DETEMIR 100 [IU]/ML
45 INJECTION, SOLUTION SUBCUTANEOUS
Qty: 50 ML | Refills: 1 | Status: SHIPPED | OUTPATIENT
Start: 2021-09-08 | End: 2021-09-08

## 2021-09-08 NOTE — ASSESSMENT & PLAN NOTE
BMI Counseling: Body mass index is 28 89 kg/m²  The BMI is above normal  Nutrition recommendations include reducing portion sizes, decreasing overall calorie intake, 3-5 servings of fruits/vegetables daily, consuming healthier snacks, decreasing soda and/or juice intake and increasing intake of lean protein  Exercise recommendations include moderate aerobic physical activity for 150 minutes/week

## 2021-09-08 NOTE — PROGRESS NOTES
Assessment/Plan:    Overweight with body mass index (BMI) of 28 to 28 9 in adult  BMI Counseling: Body mass index is 28 89 kg/m²  The BMI is above normal  Nutrition recommendations include reducing portion sizes, decreasing overall calorie intake, 3-5 servings of fruits/vegetables daily, consuming healthier snacks, decreasing soda and/or juice intake and increasing intake of lean protein  Exercise recommendations include moderate aerobic physical activity for 150 minutes/week  Diagnoses and all orders for this visit:    Type 2 diabetes mellitus with complication, with long-term current use of insulin ([facility])  -     POCT hemoglobin A1c  -     insulin lispro (HumaLOG) 100 units/mL injection pen; Inject 20 Units under the skin 3 (three) times a day with meals    Screening for cervical cancer  -     Ambulatory referral to Gynecology; Future    Type 2 diabetes mellitus with complication, without long-term current use of insulin ([facility])  -     Discontinue: insulin detemir (Levemir FlexTouch) 100 Units/mL injection pen; Inject 45 Units under the skin daily at bedtime  -     insulin detemir (Levemir FlexTouch) 100 Units/mL injection pen; Inject 50 Units under the skin daily at bedtime    Slow transit constipation  -     lubiprostone (AMITIZA) 24 mcg capsule; Take 1 capsule (24 mcg total) by mouth 2 (two) times a day with meals    Tubular adenoma  -     Ambulatory referral to Gastroenterology; Future        Increased humalog to 20 units TID and levemir to 50 units qHs till she is seen by Endo in October  Reviewed low carb diet  Reviewed importance of high fiber diet and increasing water intake  Follow up with GI as last colonoscopy was in September 2018 and reported tubular adenoma, recommended repeat colonoscopy 3 years later  Subjective:      Patient ID: Renny Adjutant is a 60 y o  female      59 yo  female with uncontrolled DM, HTN, hyperlipidemia, here today for follow up  As per patient she was unable to see Endocrinology because she had to travel to [facility]  She did not have enough insulin while she was their so she has been skipping doses of her insulin  She has not been checking her blood sugar regularly but states she had her HgA1c checked in [place] and it was almost 14, does not remember the exact number  She currently complains of increased thirst and increased urinary frequency  Denies hypoglycemia symptoms  States has been having increasing constipaation with small, hard BM every 2 to 3 days  The following portions of the patient's history were reviewed and updated as appropriate: She  has a past medical history of Abdominal infection ([facility] ), Abnormal chest x-ray (4/5/2019), Asthma, Breast cancer ([facility] ) (06/26/2018), Cancer ([facility] ), Diabetes mellitus ([facility] ), Hiatal hernia, History of chemotherapy, History of radiation therapy, Hypercholesterolemia, Hypertension, Hypothyroid, Renal disorder, and Rheumatoid arthritis ([facility] )    She   Patient Active Problem List    Diagnosis Date Noted    Overweight with body mass index (BMI) of 28 to 28 9 in adult 09/08/2021    Sebaceous cyst 08/17/2021    Subcutaneous mass of back 08/17/2021    Muscle cramps 06/09/2021    Gastroesophageal reflux disease without esophagitis 03/03/2021    Chronic diastolic heart failure ([facility] ) 09/01/2020    Use of anastrozole (Arimidex) 04/20/2020    Decreased ROM of right shoulder 04/20/2020    Lump of skin 04/20/2020    Diabetic polyneuropathy associated with type 2 diabetes mellitus ([facility] ) 11/24/2019    Type 2 diabetes mellitus with hyperglycemia, with long-term current use of insulin ([facility] ) 05/29/2019    Radiation pneumonitis ([facility] ) 05/18/2019    Bilateral pulmonary embolism ([place] [id number] ) 11/06/2018    Hypothyroid     Hypertension     Hypercholesterolemia 09/21/2018    Chronic pain of right knee 09/04/2018    Cellulitis of right axilla 08/22/2018    Hiatal hernia 08/08/2018    Screening for colon cancer 08/08/2018    Esophageal dysphagia 08/08/2018    Malignant neoplasm of upper-outer quadrant of right breast in female, estrogen receptor positive ([facility] ) 07/19/2018    Malignant neoplasm of right female breast ([facility] ) 06/26/2018    Neck pain 06/13/2018    Chronic bilateral low back pain without sciatica 05/22/2018    Palpitations 05/09/2018    Type 2 diabetes mellitus with complication, with long-term current use of insulin ([facility] ) 03/27/2018    Other specified hypothyroidism 03/27/2018    Chest pain 03/27/2018    Severe persistent asthma without complication 99/52/0956     She  has a past surgical history that includes Tubal ligation; Knee surgery (Right); Cataract extraction, bilateral (Bilateral); Retinal detachment surgery (Right); Breast surgery (Left); EGD AND COLONOSCOPY (N/A, 9/5/2018); Tunneled venous port placement (Left, 9/13/2018); FL guided central venous access device insertion (9/13/2018); Mammo stereotactic breast biopsy right (all inc) (Right, 5/23/2018); Mammo needle localization right (all inc) (Right, 6/26/2018); Breast biopsy (Right, 05/23/2018); Breast lumpectomy (Right, 6/26/2018); and Breast lumpectomy (Right, 8/2/2018)  Her family history includes Cancer in her maternal grandfather; Diabetes in her brother, father, and mother; Hypertension in her brother, father, and mother; No Known Problems in her daughter, daughter, daughter, sister, sister, sister, sister, and sister; Prostate cancer in her brother  She  reports that she has never smoked  She has never used smokeless tobacco  She reports that she does not drink alcohol and does not use drugs    Current Outpatient Medications   Medication Sig Dispense Refill    amLODIPine (NORVASC) 10 mg tablet TAKE 1 TABLET BY MOUTH DAILY 90 tablet 0    anastrozole (ARIMIDEX) 1 mg tablet TAKE 1 TABLET BY MOUTH DAILY 90 tablet 3    aspirin 81 mg chewable tablet Chew 81 mg daily      atorvastatin (LIPITOR) 10 mg tablet TAKE 1 TABLET (10 MG TOTAL) BY MOUTH DAILY 30 tablet 0    DULoxetine (CYMBALTA) 30 mg delayed release capsule TAKE 1 CAPSULE BY MOUTH 2 (TWICE)  A  capsule 0    insulin detemir (Levemir FlexTouch) 100 Units/mL injection pen Inject 50 Units under the skin daily at bedtime 50 mL 1    insulin lispro (HumaLOG) 100 units/mL injection INJECT 76 UNITS UNDER THE SKIN DAILY VIA V-GO DEVICE  20 mL 4    acetaminophen (TYLENOL) 500 mg tablet Take 1 tablet (500 mg total) by mouth every 6 (six) hours as needed for mild pain (Patient not taking: Reported on 6/25/2021) 14 tablet 0    albuterol (2 5 mg/3 mL) 0 083 % nebulizer solution Take 1 vial (2 5 mg total) by nebulization every 6 (six) hours as needed for wheezing or shortness of breath (Patient not taking: Reported on 6/25/2021) 120 vial 5    albuterol (PROVENTIL HFA,VENTOLIN HFA) 90 mcg/act inhaler Inhale 1 puff every 4 (four) hours as needed   (Patient not taking: Reported on 6/25/2021)      Blood Glucose Monitoring Suppl (ONE TOUCH ULTRA 2) w/Device KIT by Does not apply route 3 (three) times a day (Patient not taking: Reported on 6/25/2021) 1 each 0    Continuous Blood Gluc  (FreeStyle Broderick 14 Day [place]) AARON Use 1 Device daily (Patient not taking: Reported on 6/25/2021) 1 each 0    Continuous Blood Gluc Sensor (FreeStyle Broderick 14 Day Sensor) MISC Use 1 each every 14 (fourteen) days (Patient not taking: Reported on 6/25/2021) 2 each 11    cyclobenzaprine (FLEXERIL) 10 mg tablet Take 1 tablet (10 mg total) by mouth daily at bedtime (Patient not taking: Reported on 6/25/2021) 90 tablet 0    diclofenac sodium (VOLTAREN) 1 % Apply 2 g topically 4 (four) times a day (Patient not taking: Reported on 5/19/2021) 150 g 0    Dulaglutide (Trulicity) 1 5 GD/7 1BJ SOPN Inject 0 5 mL (1 5 mg total) under the skin once a week (Patient not taking: Reported on 5/19/2021) 12 pen 1    fluticasone-salmeterol (Advair HFA) 115-21 MCG/ACT inhaler Inhale 2 puffs 2 (two) times a day Rinse mouth after use  (Patient not taking: Reported on 6/25/2021) 1 Inhaler 3    fluticasone-umeclidinium-vilanterol (Trelegy Ellipta) 100-62 5-25 MCG/INH inhaler Inhale 1 puff daily Rinse mouth after use   (Patient not taking: Reported on 6/25/2021)      glucose blood (OneTouch Ultra) test strip Use 1 each 3 (three) times a day Use as instructed (Patient not taking: Reported on 6/25/2021) 300 each 4    Insulin Disposable Pump (V-Go 40) KIT Use daily (Patient not taking: Reported on 6/25/2021) 1 kit 5    insulin lispro (HumaLOG) 100 units/mL injection pen Inject 20 Units under the skin 3 (three) times a day with meals 45 mL 0    Insulin Pen Needle (BD Pen Needle Emily U/F) 32G X 4 MM MISC Inject under the skin 4 (four) times a day 400 each 0    levothyroxine 50 mcg tablet Take 1 tablet (50 mcg total) by mouth daily (Patient not taking: Reported on 6/25/2021) 90 tablet 1    lidocaine (XYLOCAINE) 5 % ointment Apply topically as needed for mild pain Apply 5 minutes prior to insulin injection (Patient not taking: Reported on 5/19/2021) 35 44 g 0    loratadine (CLARITIN) 10 mg tablet TAKE 1 TABLET (10 MG TOTAL) BY MOUTH DAILY 90 tablet 2    losartan (COZAAR) 50 mg tablet TAKE 1 TABLET (50 MG TOTAL) BY MOUTH DAILY 90 tablet 0    lubiprostone (AMITIZA) 24 mcg capsule Take 1 capsule (24 mcg total) by mouth 2 (two) times a day with meals 180 capsule 0    magnesium gluconate (MAGONATE) 500 mg tablet Take 1 tablet (500 mg total) by mouth daily (Patient not taking: Reported on 6/25/2021) 90 tablet 1    metoprolol tartrate (LOPRESSOR) 25 mg tablet TAKE 0 5 TABLETS (12 5 MG TOTAL) BY MOUTH EVERY 12 (TWELVE) HOURS 30 tablet 1    naproxen (NAPROSYN) 500 mg tablet Take 1 tablet (500 mg total) by mouth 2 (two) times a day with meals (Patient not taking: Reported on 6/25/2021) 10 tablet 0    omeprazole (PriLOSEC) 20 mg delayed release capsule TAKE 1 CAPSULE (20 MG TOTAL) BY MOUTH DAILY 90 capsule 1    OneTouch Delica Lancets 56C MISC USE 3 (THREE) TIMES A DAY (Patient not taking: Reported on 6/25/2021) 300 each 4    oxyCODONE (ROXICODONE) 5 mg immediate release tablet Take 5 mg by mouth every 6 (six) hours as needed for moderate pain (Patient not taking: Reported on 6/25/2021)       No current facility-administered medications for this visit  Current Outpatient Medications on File Prior to Visit   Medication Sig    amLODIPine (NORVASC) 10 mg tablet TAKE 1 TABLET BY MOUTH DAILY    anastrozole (ARIMIDEX) 1 mg tablet TAKE 1 TABLET BY MOUTH DAILY    aspirin 81 mg chewable tablet Chew 81 mg daily    atorvastatin (LIPITOR) 10 mg tablet TAKE 1 TABLET (10 MG TOTAL) BY MOUTH DAILY    DULoxetine (CYMBALTA) 30 mg delayed release capsule TAKE 1 CAPSULE BY MOUTH 2 (TWICE)  A DAY    insulin lispro (HumaLOG) 100 units/mL injection INJECT 76 UNITS UNDER THE SKIN DAILY VIA V-GO DEVICE      [DISCONTINUED] insulin detemir (Levemir FlexTouch) 100 Units/mL injection pen Inject 45 Units under the skin daily at bedtime    acetaminophen (TYLENOL) 500 mg tablet Take 1 tablet (500 mg total) by mouth every 6 (six) hours as needed for mild pain (Patient not taking: Reported on 6/25/2021)    albuterol (2 5 mg/3 mL) 0 083 % nebulizer solution Take 1 vial (2 5 mg total) by nebulization every 6 (six) hours as needed for wheezing or shortness of breath (Patient not taking: Reported on 6/25/2021)    albuterol (PROVENTIL HFA,VENTOLIN HFA) 90 mcg/act inhaler Inhale 1 puff every 4 (four) hours as needed   (Patient not taking: Reported on 6/25/2021)    Blood Glucose Monitoring Suppl (ONE TOUCH ULTRA 2) w/Device KIT by Does not apply route 3 (three) times a day (Patient not taking: Reported on 6/25/2021)    Continuous Blood Gluc  (FreeStyle Broderick 14 Day [place]) AARON Use 1 Device daily (Patient not taking: Reported on 6/25/2021)    Continuous Blood Gluc Sensor (FreeStyle Broderick 14 Day Sensor) MISC Use 1 each every 14 (fourteen) days (Patient not taking: Reported on 6/25/2021)    cyclobenzaprine (FLEXERIL) 10 mg tablet Take 1 tablet (10 mg total) by mouth daily at bedtime (Patient not taking: Reported on 6/25/2021)    diclofenac sodium (VOLTAREN) 1 % Apply 2 g topically 4 (four) times a day (Patient not taking: Reported on 5/19/2021)    Dulaglutide (Trulicity) 1 5 QV/6 1TY SOPN Inject 0 5 mL (1 5 mg total) under the skin once a week (Patient not taking: Reported on 5/19/2021)    fluticasone-salmeterol (Advair HFA) 115-21 MCG/ACT inhaler Inhale 2 puffs 2 (two) times a day Rinse mouth after use  (Patient not taking: Reported on 6/25/2021)    fluticasone-umeclidinium-vilanterol (Trelegy Ellipta) 100-62 5-25 MCG/INH inhaler Inhale 1 puff daily Rinse mouth after use   (Patient not taking: Reported on 6/25/2021)    glucose blood (OneTouch Ultra) test strip Use 1 each 3 (three) times a day Use as instructed (Patient not taking: Reported on 6/25/2021)    Insulin Disposable Pump (V-Go 40) KIT Use daily (Patient not taking: Reported on 6/25/2021)    Insulin Pen Needle (BD Pen Needle Emily U/F) 32G X 4 MM MISC Inject under the skin 4 (four) times a day    levothyroxine 50 mcg tablet Take 1 tablet (50 mcg total) by mouth daily (Patient not taking: Reported on 6/25/2021)    lidocaine (XYLOCAINE) 5 % ointment Apply topically as needed for mild pain Apply 5 minutes prior to insulin injection (Patient not taking: Reported on 5/19/2021)    loratadine (CLARITIN) 10 mg tablet TAKE 1 TABLET (10 MG TOTAL) BY MOUTH DAILY    losartan (COZAAR) 50 mg tablet TAKE 1 TABLET (50 MG TOTAL) BY MOUTH DAILY    magnesium gluconate (MAGONATE) 500 mg tablet Take 1 tablet (500 mg total) by mouth daily (Patient not taking: Reported on 6/25/2021)    metoprolol tartrate (LOPRESSOR) 25 mg tablet TAKE 0 5 TABLETS (12 5 MG TOTAL) BY MOUTH EVERY 12 (TWELVE) HOURS    naproxen (NAPROSYN) 500 mg tablet Take 1 tablet (500 mg total) by mouth 2 (two) times a day with meals (Patient not taking: Reported on 6/25/2021)    omeprazole (PriLOSEC) 20 mg delayed release capsule TAKE 1 CAPSULE (20 MG TOTAL) BY MOUTH DAILY    OneTouch Delica Lancets 10E MISC USE 3 (THREE) TIMES A DAY (Patient not taking: Reported on 6/25/2021)    oxyCODONE (ROXICODONE) 5 mg immediate release tablet Take 5 mg by mouth every 6 (six) hours as needed for moderate pain (Patient not taking: Reported on 6/25/2021)    [DISCONTINUED] EPINEPHrine (EPIPEN) 0 3 mg/0 3 mL SOAJ Inject 0 3 mL (0 3 mg total) into a muscle once for 1 dose    [DISCONTINUED] insulin lispro (HumaLOG) 100 units/mL injection pen Inject 16 Units under the skin 3 (three) times a day with meals     No current facility-administered medications on file prior to visit       Review of Systems   Gastrointestinal: Positive for constipation  Endocrine: Positive for polydipsia and polyuria  Musculoskeletal: Positive for arthralgias  All other systems reviewed and are negative  Objective:      /76 (BP Location: Left arm, Patient Position: Sitting, Cuff Size: Standard)   Pulse 77   Temp 97 8 °F (36 6 °C) (Temporal)   Resp 18   Ht 5' 5" (1 651 m)   Wt 78 7 kg (173 lb 9 6 oz)   SpO2 97%   BMI 28 89 kg/m²          Physical Exam  Vitals and nursing note reviewed  Constitutional:       Appearance: She is well-developed  HENT:      Head: Normocephalic  Right Ear: External ear normal       Left Ear: External ear normal       Nose: Nose normal    Eyes:      Conjunctiva/sclera: Conjunctivae normal       Pupils: Pupils are equal, round, and reactive to light  Neck:      Thyroid: No thyromegaly  Cardiovascular:      Rate and Rhythm: Normal rate and regular rhythm  Heart sounds: Normal heart sounds  Pulmonary:      Effort: Pulmonary effort is normal       Breath sounds: Normal breath sounds  Abdominal:      Palpations: Abdomen is soft  Tenderness: There is no abdominal tenderness  There is no guarding or rebound     Musculoskeletal: General: Normal range of motion  Cervical back: Normal range of motion and neck supple  Skin:     General: Skin is dry  Neurological:      Mental Status: She is alert and oriented to person, place, and time  Deep Tendon Reflexes: Reflexes are normal and symmetric

## 2021-09-10 DIAGNOSIS — R22.2 SUBCUTANEOUS MASS OF BACK: Primary | ICD-10-CM

## 2021-09-15 ENCOUNTER — HOSPITAL ENCOUNTER (OUTPATIENT)
Dept: INFUSION CENTER | Facility: CLINIC | Age: 66
Discharge: HOME | End: 2021-09-15
Payer: COMMERCIAL

## 2021-09-15 ENCOUNTER — OFFICE VISIT (OUTPATIENT)
Dept: PULMONOLOGY | Facility: CLINIC | Age: 66
End: 2021-09-15
Payer: COMMERCIAL

## 2021-09-15 VITALS
DIASTOLIC BLOOD PRESSURE: 64 MMHG | HEIGHT: 65 IN | BODY MASS INDEX: 28.82 KG/M2 | WEIGHT: 173 LBS | RESPIRATION RATE: 18 BRPM | SYSTOLIC BLOOD PRESSURE: 120 MMHG | OXYGEN SATURATION: 97 % | HEART RATE: 80 BPM

## 2021-09-15 VITALS
SYSTOLIC BLOOD PRESSURE: 151 MMHG | TEMPERATURE: 97.4 F | RESPIRATION RATE: 18 BRPM | HEART RATE: 80 BPM | DIASTOLIC BLOOD PRESSURE: 78 MMHG

## 2021-09-15 DIAGNOSIS — F41.9 ANXIETY: ICD-10-CM

## 2021-09-15 DIAGNOSIS — E11.8 TYPE 2 DIABETES MELLITUS WITH COMPLICATION, WITH LONG-TERM CURRENT USE OF INSULIN (HCC): ICD-10-CM

## 2021-09-15 DIAGNOSIS — J45.50 SEVERE PERSISTENT ASTHMA WITHOUT COMPLICATION: Primary | ICD-10-CM

## 2021-09-15 DIAGNOSIS — Z23 NEEDS FLU SHOT: ICD-10-CM

## 2021-09-15 DIAGNOSIS — M79.671 RIGHT FOOT PAIN: ICD-10-CM

## 2021-09-15 DIAGNOSIS — J70.0 RADIATION PNEUMONITIS (HCC): ICD-10-CM

## 2021-09-15 DIAGNOSIS — I26.99 BILATERAL PULMONARY EMBOLISM (HCC): ICD-10-CM

## 2021-09-15 DIAGNOSIS — Z79.4 TYPE 2 DIABETES MELLITUS WITH COMPLICATION, WITH LONG-TERM CURRENT USE OF INSULIN (HCC): ICD-10-CM

## 2021-09-15 DIAGNOSIS — E66.3 OVERWEIGHT WITH BODY MASS INDEX (BMI) OF 28 TO 28.9 IN ADULT: ICD-10-CM

## 2021-09-15 DIAGNOSIS — I50.32 CHRONIC DIASTOLIC HEART FAILURE (HCC): ICD-10-CM

## 2021-09-15 PROCEDURE — 96372 THER/PROPH/DIAG INJ SC/IM: CPT

## 2021-09-15 PROCEDURE — 90682 RIV4 VACC RECOMBINANT DNA IM: CPT

## 2021-09-15 PROCEDURE — 3008F BODY MASS INDEX DOCD: CPT | Performed by: INTERNAL MEDICINE

## 2021-09-15 PROCEDURE — 1036F TOBACCO NON-USER: CPT | Performed by: INTERNAL MEDICINE

## 2021-09-15 PROCEDURE — G0008 ADMIN INFLUENZA VIRUS VAC: HCPCS

## 2021-09-15 PROCEDURE — 3008F BODY MASS INDEX DOCD: CPT | Performed by: FAMILY MEDICINE

## 2021-09-15 PROCEDURE — 99215 OFFICE O/P EST HI 40 MIN: CPT | Performed by: INTERNAL MEDICINE

## 2021-09-15 RX ORDER — SERTRALINE HYDROCHLORIDE 25 MG/1
50 TABLET, FILM COATED ORAL DAILY
Qty: 30 TABLET | Refills: 0 | Status: SHIPPED | OUTPATIENT
Start: 2021-09-15 | End: 2021-11-10

## 2021-09-15 RX ORDER — EPINEPHRINE 1 MG/ML
0.3 INJECTION, SOLUTION, CONCENTRATE INTRAVENOUS AS NEEDED
Status: DISCONTINUED | OUTPATIENT
Start: 2021-09-15 | End: 2021-09-18 | Stop reason: HOSPADM

## 2021-09-15 RX ORDER — EPINEPHRINE 1 MG/ML
0.3 INJECTION, SOLUTION, CONCENTRATE INTRAVENOUS AS NEEDED
Status: CANCELLED | OUTPATIENT
Start: 2021-09-29

## 2021-09-15 RX ADMIN — OMALIZUMAB 375 MG: 150 INJECTION, SOLUTION SUBCUTANEOUS at 10:40

## 2021-09-15 NOTE — PROGRESS NOTES
Pulmonary Follow Up Note   Jolie Mulligan 60 y o  female MRN: [id number]  9/15/2021      Referring provider: Dr Hodan Centeno and Plan:    Severe persistent asthma without complication  No recent exacerbations, remains stable  - Cont  Advair  Give Breztri at last visit but she did not find it more helpful than Advair    - Cont  Albuterol as needed  - Cont  OTC allergy medication - recommend take at night   - Add Montelukast - instructed to take nightly  - cont  Xolair every two weeks  Currently on highest dose  - Influenza vaccine today  - Encouraged her to receive COVID vaccine  Reviewed risks of untreated COVID including death  Bilateral pulmonary embolism (HCC)  - Was on Rivaroxaban  Now on ASA 81 mg daily   - No current active malignancy  Chronic diastolic heart failure (HCC)  Wt Readings from Last 3 Encounters:   09/15/21 78 5 kg (173 lb)   09/08/21 78 7 kg (173 lb 9 6 oz)   08/17/21 78 5 kg (173 lb)     Weight is stable  Appears euvolemic  L/E edema is ongoing but mild  Malignant neoplasm of right female breast ([address] )  - Cont  anastrazole daily  Overweight with body mass index (BMI) of 28 to 28 9 in adult  BMI is above normal but stable  Encouraged plant based diet  Anxiety  States she has uncontrolled anxiety and it is making her eat more food  - Per review, she is on Cymbalta  She states she is going to stop taking it because she does not find it helpful  - Encouraged her to discuss with her PCP  Will start low dose of Zoloft  Encouraged her to f/u with PCP to have dose adjusted  Right foot pain  Pain in right foot, no obvious abnormality  Suspect neuropathy from DM  Refer to podiatry  Type 2 diabetes mellitus with complication, with long-term current use of insulin (HCC)    Lab Results   Component Value Date    HGBA1C 13 9 (A) 09/08/2021     Needs better compliance  Recommended ADA diet     No need for steroid from pulmonary standpoint, which can cause hyperglycemia  Radiation pneumonitis (HCC)  - asymptomatic, not on XRT      Diagnoses and all orders for this visit:    Severe persistent asthma without complication    Bilateral pulmonary embolism (HCC)    Chronic diastolic heart failure (HCC)    Anxiety  -     sertraline (ZOLOFT) 25 mg tablet; Take 2 tablets (50 mg total) by mouth daily    Right foot pain  -     Ambulatory referral to Podiatry; Future    Overweight with body mass index (BMI) of 28 to 28 9 in adult    Type 2 diabetes mellitus with complication, with long-term current use of insulin (HCC)    Radiation pneumonitis (HCC)    Needs flu shot  -     influenza vaccine, quadrivalent, recombinant, PF, 0 5 mL, for patients 18 yr+ (FLUBLOK)      Plan of care discussed in detail with patient and her daughter  Concerns addressed  History of Present Illness   HPI:  Gregg Jolley is a 60 y o  female who Presents for follow-up of asthma  She was last seen by me at the beginning of March  Since then, she is doing okay  She thought she heard wheezing yesterday  This usually happens when she is due for her Xolair  She finds the Xolair quite helpful  She uses her Albuterol as needed, which is three times per week  She is using Advair twice daily  Rinses mouth after use  No urgent care visits or hospitalizations since last visit  Feels like her allergies are controlled  Taking Loratadine but makes her sleepy  Did not receive COVID vaccine  Hesitant due to known side effects  Here with her daughter who translates for her       Review of Systems  Positive as mentioned above and negative otherwise    Historical Information   Past Medical History:   Diagnosis Date    Abdominal infection ([facility] )     h-pylori    Abnormal chest x-ray 4/5/2019    Asthma     Breast cancer ([facility] ) 06/26/2018    Cancer ([facility] )     BREAST    Diabetes mellitus ([facility] )     Hiatal hernia     History of chemotherapy     History of radiation therapy     Hypercholesterolemia     Hypertension     Hypothyroid     Renal disorder     Rheumatoid arthritis [facility])      Past Surgical History:   Procedure Laterality Date    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    BREAST LUMPECTOMY Right 6/26/2018    Procedure: RIGHT BREAST NEEDLE LOCALIZATION X2 WITH RIGHT BREAST LUMPECTOMY ( NEEDLE LOC @ 1000); Surgeon: Mayco Connors MD;  [facility];  Service: Surgical Oncology    BREAST LUMPECTOMY Right 8/2/2018    Procedure: LYMPHOSCINITGRAPHY INTRAOPERATIVE LYMPHATIC MAPPING , RIGHT SENTINEL NODE BIOPSY, REEXCISION  RIGHT BREAST LUMPECTOMY CAVITY (SUPERIOR MARGIN); Surgeon: Mayco Connors MD;  [facility];  Service: Surgical Oncology    BREAST SURGERY Left     CATARACT EXTRACTION, BILATERAL Bilateral     EGD AND COLONOSCOPY N/A 9/5/2018    Procedure: EGD AND COLONOSCOPY;  Surgeon: Aleksander Bridges MD;  Location: [facility]; Service: Gastroenterology    [place] GUIDED CENTRAL VENOUS ACCESS DEVICE INSERTION  9/13/2018    KNEE SURGERY Right     MAMMO NEEDLE LOCALIZATION RIGHT (ALL INC) Right 6/26/2018    MAMMO STEREOTACTIC BREAST BIOPSY RIGHT (ALL INC) Right 5/23/2018    RETINAL DETACHMENT SURGERY Right     TUBAL LIGATION      TUNNELED VENOUS PORT PLACEMENT Left 9/13/2018    Procedure: INSERTION VENOUS PORT (PORT-A-CATH);   Surgeon: Mayco Connors MD;  [facility];  Service: Surgical Oncology     Family History   Problem Relation Age of Onset    Diabetes Mother     Hypertension Mother     Diabetes Father     Hypertension Father     Diabetes Brother     Hypertension Brother     Prostate cancer Brother     Cancer Maternal Grandfather     No Known Problems Sister     No Known Problems Daughter     No Known Problems Sister     No Known Problems Sister     No Known Problems Sister     No Known Problems Sister     No Known Problems Daughter     No Known Problems Daughter          Meds/Allergies     Current Outpatient Medications:   amLODIPine (NORVASC) 10 mg tablet, TAKE 1 TABLET BY MOUTH DAILY, Disp: 90 tablet, Rfl: 0    anastrozole (ARIMIDEX) 1 mg tablet, TAKE 1 TABLET BY MOUTH DAILY, Disp: 90 tablet, Rfl: 3    aspirin 81 mg chewable tablet, Chew 81 mg daily, Disp: , Rfl:     atorvastatin (LIPITOR) 10 mg tablet, TAKE 1 TABLET (10 MG TOTAL) BY MOUTH DAILY, Disp: 30 tablet, Rfl: 0    DULoxetine (CYMBALTA) 30 mg delayed release capsule, TAKE 1 CAPSULE BY MOUTH 2 (TWICE)  A DAY, Disp: 180 capsule, Rfl: 0    losartan (COZAAR) 50 mg tablet, TAKE 1 TABLET (50 MG TOTAL) BY MOUTH DAILY, Disp: 90 tablet, Rfl: 0    magnesium gluconate (MAGONATE) 500 mg tablet, Take 1 tablet (500 mg total) by mouth daily, Disp: 90 tablet, Rfl: 1    metoprolol tartrate (LOPRESSOR) 25 mg tablet, TAKE 0 5 TABLETS (12 5 MG TOTAL) BY MOUTH EVERY 12 (TWELVE) HOURS, Disp: 30 tablet, Rfl: 1    omeprazole (PriLOSEC) 20 mg delayed release capsule, TAKE 1 CAPSULE (20 MG TOTAL) BY MOUTH DAILY, Disp: 90 capsule, Rfl: 1    acetaminophen (TYLENOL) 500 mg tablet, Take 1 tablet (500 mg total) by mouth every 6 (six) hours as needed for mild pain (Patient not taking: Reported on 6/25/2021), Disp: 14 tablet, Rfl: 0    albuterol (2 5 mg/3 mL) 0 083 % nebulizer solution, Take 1 vial (2 5 mg total) by nebulization every 6 (six) hours as needed for wheezing or shortness of breath (Patient not taking: Reported on 6/25/2021), Disp: 120 vial, Rfl: 5    albuterol (PROVENTIL HFA,VENTOLIN HFA) 90 mcg/act inhaler, Inhale 1 puff every 4 (four) hours as needed   (Patient not taking: Reported on 6/25/2021), Disp: , Rfl:     Blood Glucose Monitoring Suppl (ONE TOUCH ULTRA 2) w/Device KIT, by Does not apply route 3 (three) times a day (Patient not taking: Reported on 6/25/2021), Disp: 1 each, Rfl: 0    Continuous Blood Gluc  (FreeStyle Broderick 14 Day [place]) AARON, Use 1 Device daily (Patient not taking: Reported on 6/25/2021), Disp: 1 each, Rfl: 0    Continuous Blood Gluc Sensor (FreeStyle Broderick 14 Day Sensor) MISC, Use 1 each every 14 (fourteen) days (Patient not taking: Reported on 6/25/2021), Disp: 2 each, Rfl: 11    cyclobenzaprine (FLEXERIL) 10 mg tablet, Take 1 tablet (10 mg total) by mouth daily at bedtime (Patient not taking: Reported on 6/25/2021), Disp: 90 tablet, Rfl: 0    diclofenac sodium (VOLTAREN) 1 %, Apply 2 g topically 4 (four) times a day (Patient not taking: Reported on 5/19/2021), Disp: 150 g, Rfl: 0    Dulaglutide (Trulicity) 1 5 DD/7 1HY SOPN, Inject 0 5 mL (1 5 mg total) under the skin once a week (Patient not taking: Reported on 5/19/2021), Disp: 12 pen, Rfl: 1    fluticasone-salmeterol (Advair HFA) 115-21 MCG/ACT inhaler, Inhale 2 puffs 2 (two) times a day Rinse mouth after use  (Patient not taking: Reported on 6/25/2021), Disp: 1 Inhaler, Rfl: 3    fluticasone-umeclidinium-vilanterol (Trelegy Ellipta) 100-62 5-25 MCG/INH inhaler, Inhale 1 puff daily Rinse mouth after use  (Patient not taking: Reported on 6/25/2021), Disp: , Rfl:     glucose blood (OneTouch Ultra) test strip, Use 1 each 3 (three) times a day Use as instructed (Patient not taking: Reported on 6/25/2021), Disp: 300 each, Rfl: 4    insulin detemir (Levemir FlexTouch) 100 Units/mL injection pen, Inject 50 Units under the skin daily at bedtime (Patient not taking: Reported on 9/15/2021), Disp: 50 mL, Rfl: 1    Insulin Disposable Pump (V-Go 40) KIT, Use daily (Patient not taking: Reported on 6/25/2021), Disp: 1 kit, Rfl: 5    insulin lispro (HumaLOG) 100 units/mL injection pen, Inject 20 Units under the skin 3 (three) times a day with meals (Patient not taking: Reported on 9/15/2021), Disp: 45 mL, Rfl: 0    insulin lispro (HumaLOG) 100 units/mL injection, INJECT 76 UNITS UNDER THE SKIN DAILY VIA V-GO DEVICE   (Patient not taking: Reported on 9/15/2021), Disp: 20 mL, Rfl: 4    Insulin Pen Needle (BD Pen Needle Emily U/F) 32G X 4 MM MISC, Inject under the skin 4 (four) times a day (Patient not taking: Reported on 9/15/2021), Disp: 400 each, Rfl: 0    levothyroxine 50 mcg tablet, Take 1 tablet (50 mcg total) by mouth daily (Patient not taking: Reported on 6/25/2021), Disp: 90 tablet, Rfl: 1    lidocaine (XYLOCAINE) 5 % ointment, Apply topically as needed for mild pain Apply 5 minutes prior to insulin injection (Patient not taking: Reported on 5/19/2021), Disp: 35 44 g, Rfl: 0    loratadine (CLARITIN) 10 mg tablet, TAKE 1 TABLET (10 MG TOTAL) BY MOUTH DAILY (Patient not taking: Reported on 9/15/2021), Disp: 90 tablet, Rfl: 2    lubiprostone (AMITIZA) 24 mcg capsule, Take 1 capsule (24 mcg total) by mouth 2 (two) times a day with meals (Patient not taking: Reported on 9/15/2021), Disp: 180 capsule, Rfl: 0    naproxen (NAPROSYN) 500 mg tablet, Take 1 tablet (500 mg total) by mouth 2 (two) times a day with meals (Patient not taking: Reported on 6/25/2021), Disp: 10 tablet, Rfl: 0    OneTouch Delica Lancets 76B MISC, USE 3 (THREE) TIMES A DAY (Patient not taking: Reported on 6/25/2021), Disp: 300 each, Rfl: 4    oxyCODONE (ROXICODONE) 5 mg immediate release tablet, Take 5 mg by mouth every 6 (six) hours as needed for moderate pain (Patient not taking: Reported on 6/25/2021), Disp: , Rfl:   Allergies   Allergen Reactions    Codeine Other (See Comments)     unknown       Vitals: Blood pressure 120/64, pulse 80, resp  rate 18, height 5' 5" (1 651 m), weight 78 5 kg (173 lb), SpO2 97 %, not currently breastfeeding  Body mass index is 28 79 kg/m²  Oxygen Therapy  SpO2: 97 %      Physical Exam  GEN: WDWN, nad, comfortable  HEENT: NCAT, EOMI  CVS: Regular, no m/r/g  LUNGS: CTA b/l, no w/r/r  ABD: soft  EXT: No c/c/e  NEURO: No focal deficits  MS: Moving all extremities  SKIN: warm, dry  PSYCH: calm, cooperative      Labs: I have personally reviewed pertinent lab results    Lab Results   Component Value Date    WBC 6 04 07/28/2020    HGB 11 5 07/28/2020    HCT 35 2 07/28/2020    MCV 90 07/28/2020     2020     Lab Results   Component Value Date    CALCIUM 9 2 2021    K 4 1 2021    CO2 30 2021     2021    BUN 23 2021    CREATININE 0 69 2021     Lab Results   Component Value Date    IGE 1,302 (H) 2021     Lab Results   Component Value Date    ALT 22 2021    AST 12 2021    ALKPHOS 81 2021       Labs per my interpretation show normal renal function; normal hemoglobin  Imaging and other studies: I have personally reviewed pertinent reports  HRCT 3/20 RUL scarring; benign lung nodules    Pulmonary function testin per my review show normal spirometry, air trapping, hyperinflation, supranormal diffusion    EKG, Pathology, and Other Studies: I have personally reviewed pertinent reports  ECHO 2019: EF 91%, grade 1 diastolic dysfunction    [facility]

## 2021-09-15 NOTE — ASSESSMENT & PLAN NOTE
Wt Readings from Last 3 Encounters:   09/15/21 78 5 kg (173 lb)   09/08/21 78 7 kg (173 lb 9 6 oz)   08/17/21 78 5 kg (173 lb)     Weight is stable  Appears euvolemic  L/E edema is ongoing but mild

## 2021-09-15 NOTE — ASSESSMENT & PLAN NOTE
States she has uncontrolled anxiety and it is making her eat more food  - Per review, she is on Cymbalta  She states she is going to stop taking it because she does not find it helpful  - Encouraged her to discuss with her PCP  Will start low dose of Zoloft  Encouraged her to f/u with PCP to have dose adjusted

## 2021-09-15 NOTE — ASSESSMENT & PLAN NOTE
Lab Results   Component Value Date    HGBA1C 13 9 (A) 09/08/2021     Needs better compliance  Recommended ADA diet  No need for steroid from pulmonary standpoint, which can cause hyperglycemia

## 2021-09-15 NOTE — ASSESSMENT & PLAN NOTE
No recent exacerbations, remains stable  - Cont  Advair  Give Breztri at last visit but she did not find it more helpful than Advair    - Cont  Albuterol as needed  - Cont  OTC allergy medication - recommend take at night   - Add Montelukast - instructed to take nightly  - cont  Xolair every two weeks  Currently on highest dose  - Influenza vaccine today  - Encouraged her to receive COVID vaccine  Reviewed risks of untreated COVID including death

## 2021-09-15 NOTE — PROGRESS NOTES
Xolair 375mg given as ordered  Pt  Received 2 injections in the left arm and one injection in the Right arm today  Pt  Also received her flu vaccine in [place] earlier today  Pt  Tolerated well  Future appointments reviewed  AVS declined

## 2021-09-18 DIAGNOSIS — I10 ESSENTIAL HYPERTENSION: ICD-10-CM

## 2021-09-20 RX ORDER — AMLODIPINE BESYLATE 10 MG/1
TABLET ORAL
Qty: 90 TABLET | Refills: 0 | Status: SHIPPED | OUTPATIENT
Start: 2021-09-20 | End: 2021-10-20 | Stop reason: SDUPTHER

## 2021-09-22 ENCOUNTER — APPOINTMENT (OUTPATIENT)
Dept: LAB | Facility: HOSPITAL | Age: 66
End: 2021-09-22
Payer: COMMERCIAL

## 2021-09-22 DIAGNOSIS — E11.8 TYPE 2 DIABETES MELLITUS WITH COMPLICATION, WITH LONG-TERM CURRENT USE OF INSULIN (HCC): ICD-10-CM

## 2021-09-22 DIAGNOSIS — Z79.4 TYPE 2 DIABETES MELLITUS WITH COMPLICATION, WITH LONG-TERM CURRENT USE OF INSULIN (HCC): ICD-10-CM

## 2021-09-22 LAB
ALBUMIN SERPL BCP-MCNC: 3.3 G/DL (ref 3.5–5)
ALP SERPL-CCNC: 90 U/L (ref 46–116)
ALT SERPL W P-5'-P-CCNC: 37 U/L (ref 12–78)
ANION GAP SERPL CALCULATED.3IONS-SCNC: 6 MMOL/L (ref 4–13)
AST SERPL W P-5'-P-CCNC: 21 U/L (ref 5–45)
BASOPHILS # BLD AUTO: 0.1 THOUSANDS/ΜL (ref 0–0.1)
BASOPHILS NFR BLD AUTO: 2 % (ref 0–1)
BILIRUB SERPL-MCNC: 0.3 MG/DL (ref 0.2–1)
BUN SERPL-MCNC: 22 MG/DL (ref 5–25)
CALCIUM ALBUM COR SERPL-MCNC: 9.5 MG/DL (ref 8.3–10.1)
CALCIUM SERPL-MCNC: 8.9 MG/DL (ref 8.3–10.1)
CHLORIDE SERPL-SCNC: 103 MMOL/L (ref 100–108)
CHOLEST SERPL-MCNC: 216 MG/DL (ref 50–200)
CO2 SERPL-SCNC: 30 MMOL/L (ref 21–32)
CREAT SERPL-MCNC: 0.68 MG/DL (ref 0.6–1.3)
CREAT UR-MCNC: 119 MG/DL
EOSINOPHIL # BLD AUTO: 0.15 THOUSAND/ΜL (ref 0–0.61)
EOSINOPHIL NFR BLD AUTO: 2 % (ref 0–6)
ERYTHROCYTE [DISTWIDTH] IN BLOOD BY AUTOMATED COUNT: 12.9 % (ref 11.6–15.1)
GFR SERPL CREATININE-BSD FRML MDRD: 93 ML/MIN/1.73SQ M
GLUCOSE P FAST SERPL-MCNC: 346 MG/DL (ref 65–99)
HCT VFR BLD AUTO: 37.4 % (ref 34.8–46.1)
HDLC SERPL-MCNC: 53 MG/DL
HGB BLD-MCNC: 12.1 G/DL (ref 11.5–15.4)
IMM GRANULOCYTES # BLD AUTO: 0.03 THOUSAND/UL (ref 0–0.2)
IMM GRANULOCYTES NFR BLD AUTO: 1 % (ref 0–2)
LDLC SERPL CALC-MCNC: 136 MG/DL (ref 0–100)
LYMPHOCYTES # BLD AUTO: 2.24 THOUSANDS/ΜL (ref 0.6–4.47)
LYMPHOCYTES NFR BLD AUTO: 36 % (ref 14–44)
MCH RBC QN AUTO: 28.7 PG (ref 26.8–34.3)
MCHC RBC AUTO-ENTMCNC: 32.4 G/DL (ref 31.4–37.4)
MCV RBC AUTO: 89 FL (ref 82–98)
MICROALBUMIN UR-MCNC: 82.5 MG/L (ref 0–20)
MICROALBUMIN/CREAT 24H UR: 69 MG/G CREATININE (ref 0–30)
MONOCYTES # BLD AUTO: 0.41 THOUSAND/ΜL (ref 0.17–1.22)
MONOCYTES NFR BLD AUTO: 7 % (ref 4–12)
NEUTROPHILS # BLD AUTO: 3.27 THOUSANDS/ΜL (ref 1.85–7.62)
NEUTS SEG NFR BLD AUTO: 52 % (ref 43–75)
NONHDLC SERPL-MCNC: 163 MG/DL
NRBC BLD AUTO-RTO: 0 /100 WBCS
PLATELET # BLD AUTO: 236 THOUSANDS/UL (ref 149–390)
PMV BLD AUTO: 11.9 FL (ref 8.9–12.7)
POTASSIUM SERPL-SCNC: 4.2 MMOL/L (ref 3.5–5.3)
PROT SERPL-MCNC: 7.4 G/DL (ref 6.4–8.2)
RBC # BLD AUTO: 4.21 MILLION/UL (ref 3.81–5.12)
SODIUM SERPL-SCNC: 139 MMOL/L (ref 136–145)
T4 FREE SERPL-MCNC: 1.09 NG/DL (ref 0.76–1.46)
TRIGL SERPL-MCNC: 135 MG/DL
TSH SERPL DL<=0.05 MIU/L-ACNC: 4.3 UIU/ML (ref 0.36–3.74)
WBC # BLD AUTO: 6.2 THOUSAND/UL (ref 4.31–10.16)

## 2021-09-22 PROCEDURE — 84439 ASSAY OF FREE THYROXINE: CPT

## 2021-09-22 PROCEDURE — 36415 COLL VENOUS BLD VENIPUNCTURE: CPT

## 2021-09-22 PROCEDURE — 80061 LIPID PANEL: CPT

## 2021-09-22 PROCEDURE — 85025 COMPLETE CBC W/AUTO DIFF WBC: CPT

## 2021-09-22 PROCEDURE — 3060F POS MICROALBUMINURIA REV: CPT | Performed by: FAMILY MEDICINE

## 2021-09-22 PROCEDURE — 84443 ASSAY THYROID STIM HORMONE: CPT

## 2021-09-22 PROCEDURE — 3060F POS MICROALBUMINURIA REV: CPT | Performed by: INTERNAL MEDICINE

## 2021-09-22 PROCEDURE — 80053 COMPREHEN METABOLIC PANEL: CPT

## 2021-09-22 PROCEDURE — 82570 ASSAY OF URINE CREATININE: CPT

## 2021-09-22 PROCEDURE — 82043 UR ALBUMIN QUANTITATIVE: CPT

## 2021-09-28 ENCOUNTER — CLINICAL SUPPORT (OUTPATIENT)
Dept: RADIATION ONCOLOGY | Facility: CLINIC | Age: 66
End: 2021-09-28
Attending: RADIOLOGY
Payer: COMMERCIAL

## 2021-09-28 VITALS
DIASTOLIC BLOOD PRESSURE: 60 MMHG | RESPIRATION RATE: 16 BRPM | BODY MASS INDEX: 29.35 KG/M2 | OXYGEN SATURATION: 98 % | HEIGHT: 65 IN | WEIGHT: 176.15 LBS | HEART RATE: 83 BPM | SYSTOLIC BLOOD PRESSURE: 122 MMHG | TEMPERATURE: 98.8 F

## 2021-09-28 DIAGNOSIS — C50.911 MALIGNANT NEOPLASM OF RIGHT BREAST IN FEMALE, ESTROGEN RECEPTOR POSITIVE, UNSPECIFIED SITE OF BREAST (HCC): Primary | ICD-10-CM

## 2021-09-28 DIAGNOSIS — Z17.0 MALIGNANT NEOPLASM OF UPPER-OUTER QUADRANT OF RIGHT BREAST IN FEMALE, ESTROGEN RECEPTOR POSITIVE (HCC): Primary | ICD-10-CM

## 2021-09-28 DIAGNOSIS — C50.411 MALIGNANT NEOPLASM OF UPPER-OUTER QUADRANT OF RIGHT BREAST IN FEMALE, ESTROGEN RECEPTOR POSITIVE (HCC): Primary | ICD-10-CM

## 2021-09-28 DIAGNOSIS — Z17.0 MALIGNANT NEOPLASM OF RIGHT BREAST IN FEMALE, ESTROGEN RECEPTOR POSITIVE, UNSPECIFIED SITE OF BREAST (HCC): Primary | ICD-10-CM

## 2021-09-28 PROCEDURE — 99211 OFF/OP EST MAY X REQ PHY/QHP: CPT | Performed by: RADIOLOGY

## 2021-09-28 PROCEDURE — 99213 OFFICE O/P EST LOW 20 MIN: CPT | Performed by: RADIOLOGY

## 2021-09-28 NOTE — PROGRESS NOTES
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  Her tumor was ER/AR and HER2 positive  She had evidence of extensive DCIS spanning approximately 2 6 centimeters  Her initial margin of resection was positive however she underwent re-excision with final negative margins  In addition 7 lymph nodes return negative for malignancy  She completed her adjuvant chemotherapy course on 12/11/18 and Herceptin monotherapy on 8/6/19  She completed a course of radiation therapy to the right breast on 2/19/19  She is seen today for routine follow up  She was last seen for telemedicine visit on 9/20/20 11/6/20 Dr Jonh Iqbal f/u  Continue with anastrozole 1 mg once a day    6/23/21 Mammo diagnostic bilateral w 3d & cad  IMPRESSION:   Stable exam      ASSESSMENT/BI-RADS CATEGORY:  Overall: 2 - Benign     RECOMMENDATION: Diagnostic mammogram in 1 year of the breast(s)     6/25/21 Dr Mary Zaidi f/u  Disease free, continue surviellance    8/17/21 Dr Sharee Kingston consult  - seen for subcutaneous mass of her right upper back and left chest, plan for excision under sedation (9/13/21 surgery canceled)      Patient's daughter, Tanesha Betancourt translated for patient  She complains of right lateral breast/axillary pain, with intermittent swelling in the axillary  No right arm swelling         11/5/21 Dr Jonh Iqbal  1/7/22 Surgical Oncology        Oncology History   Malignant neoplasm of right female breast ([facility] [address] )   5/23/2018 Initial Diagnosis    Malignant neoplasm of right female breast ([facility] [address] )     5/23/2018 Biopsy    Right breast core biopsy:  DCIS, micropapillary type, low-intermediate nuclear grade  ER 90-95% positive,  AR 75-80% positive       6/26/2018 Surgery    RIGHT BREAST NEEDLE LOCALIZATION X2 WITH RIGHT BREAST LUMPECTOMY ( NEEDLE LOC @ 1000) (Right)   ONCOPLASTIC CLOSURE OF LUMPECTOMY CAVITY    Invasive breast carcinoma, NST (aka ductal)  * Ana Paula grade 2 of 3 (total score = 2+2+2 = 6 of 9)   -- tubule formation < 10%, score 3   -- nuclear grade 2 of 3, score 2   -- mitoses ~ 4/mm2, score 2    * invasive carcinoma is multifocally present (A23-1, A32-1, A84-1, A89-1, A100-1), largest focus is 14 millimeters in greatest dimension (A89-1, this focus is graded)  * superior lumpectomy margin (orange-inked) is POSITIVE for invasive carcinoma (A84-1)   * all other lumpectomy margins are free of invasive carcinoma  * estrogen, progesterone & Her-2/negrita receptor studies are undertaken, to be described in an addendum report  - Ductal carcinoma in-situ (DCIS): Present as an extensive component (~85% of total tumor)  * DCIS is co-located with invasive carcinoma surrounding prior needle biopsy site  * DCIS spans 2 6 cm maximal dimension (A62-1) and is present on 27 of 136 total slides examined  * DCIS has cribriform & micropapillary patterns, nuclear grade 2 of 3, with central comedo-type necrosis  * DCIS is present within 0 2 millimeters of the superior lumpectomy margin (orange-inked, A26-1)   * DCIS is present within 0 2 millimeters of the medial lumpectomy margin (yellow-inked, A3-1)   * all other lumpectomy margins are free of DCIS by > 2 millimeters  - Lymph-vascular invasion: no lymph-vascular invasion is unequivocally identified  - Microcalcifications: present in DCIS  % positive, ER 45-55% positive, HER2 by IHC 3+ positive    - Dr Joe Wells       8/2/2018 Surgery    Right breast lumpectomy reexcision, right axilla SLN biopsy:  A  Submitted as right axillary sentinel node:  - Seven lymph nodes are identified showing no metastatic tumor      B   Right breast lumpectomy reexcision:  - Abundant reactive changes are seen around the previous lumpectomy cavity   - No residual atypia or malignancy is seen           8/2/2018 -  Cancer Staged    Stage IA - pT1c, pN0, G2        9/25/2018 - 8/6/2019 Chemotherapy    Weekly Paclitaxol and trastuzumab x12, until December 11, 2018 followed by trastuzumab monotherapy 6 milligram/kilogram every 3 weeks    - Dr Abdi Collins       1/9/2019 - 2/19/2019 Radiation    Plan ID Energy Fractions Dose per Fraction (cGy) Dose Correction (cGy) Total Dose Delivered (cGy) Elapsed Days   R Boost e 16E 5 / 5 200 0 1,000 6   Right Breast 6X 25 / 25 200 0 5,000 29     - Dr Laura Landaverde       Malignant neoplasm of upper-outer quadrant of right breast in female, estrogen receptor positive ([facility] [address] )   7/19/2018 Initial Diagnosis    Malignant neoplasm of upper-outer quadrant of right breast in female, estrogen receptor positive ([facility] [address] )     12/17/2018 - 8/26/2019 Chemotherapy    trastuzumab (HERCEPTIN) 579 mg in sodium chloride 0 9 % 250 mL chemo infusion, 8 mg/kg, Intravenous, Once, 12 of 12 cycles  Administration: 434 mg (5/14/2019), 434 mg (6/4/2019), 450 mg (7/16/2019), 450 mg (8/6/2019), 450 mg (6/25/2019)         Clinical Trial: no      Health Maintenance   Topic Date Due    Medicare Annual Wellness Visit (AWV)  Never done    DM Eye Exam  Never done    COVID-19 Vaccine (1) Never done    Cervical Cancer Screening  Never done    HEMOGLOBIN A1C  12/08/2021    Pneumococcal Vaccine: Pediatrics (0 to 5 Years) and At-Risk Patients (6 to 59 Years) (1 of 2 - PPSV23) 10/07/2021 (Originally 10/29/1961)    DTaP,Tdap,and Td Vaccines (1 - Tdap) 10/07/2021 (Originally 10/29/1977)    HIV Screening  10/08/2021 (Originally 10/29/1970)    Diabetic Foot Exam  05/19/2022    BMI: Followup Plan  09/08/2022    BMI: Adult  09/15/2022    Depression Screening  09/28/2022    Breast Cancer Screening: Mammogram  06/23/2023    Colorectal Cancer Screening  09/05/2028    Hepatitis C Screening  Completed    Influenza Vaccine  Completed    HIB Vaccine  Aged Out    Hepatitis B Vaccine  Aged Out    IPV Vaccine  Aged Out    Hepatitis A Vaccine  Aged Out    Meningococcal ACWY Vaccine  Aged Out    HPV Vaccine  Aged Out       Patient Active Problem List   Diagnosis    Type 2 diabetes mellitus with complication, with long-term current use of insulin (HCC)    Severe persistent asthma without complication    Other specified hypothyroidism    Chest pain    Palpitations    Chronic bilateral low back pain without sciatica    Neck pain    Malignant neoplasm of right female breast ([facility] [address] )    Malignant neoplasm of upper-outer quadrant of right breast in female, estrogen receptor positive ([facility] [address] )    Hiatal hernia    Screening for colon cancer    Esophageal dysphagia    Cellulitis of right axilla    Chronic pain of right knee    Hypercholesterolemia    Hypothyroid    Hypertension    Bilateral pulmonary embolism (HCC)    Radiation pneumonitis (HCC)    Type 2 diabetes mellitus with hyperglycemia, with long-term current use of insulin (HCC)    Diabetic polyneuropathy associated with type 2 diabetes mellitus (HCC)    Use of anastrozole (Arimidex)    Decreased ROM of right shoulder    Lump of skin    Chronic diastolic heart failure (HCC)    Gastroesophageal reflux disease without esophagitis    Muscle cramps    Sebaceous cyst    Subcutaneous mass of back    Overweight with body mass index (BMI) of 28 to 28 9 in adult    Anxiety    Right foot pain     Past Medical History:   Diagnosis Date    [facility]     h-pylori    Abnormal chest x-ray 4/5/2019    Asthma     Breast cancer ([facility] [address] ) 06/26/2018    Cancer ([facility] [address] )     BREAST    Diabetes mellitus ([facility] [address] )     Hiatal hernia     History of chemotherapy     History of radiation therapy     Hypercholesterolemia     Hypertension     Hypothyroid     Renal disorder     Rheumatoid arthritis ([facility] [address] )      Past Surgical History:   Procedure Laterality Date    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    BREAST LUMPECTOMY Right 6/26/2018    Procedure: RIGHT BREAST NEEDLE LOCALIZATION X2 WITH RIGHT BREAST LUMPECTOMY ( NEEDLE LOC @ 1000);   Surgeon: Lynn Jain MD;  [facility]: [facility];  Service: Surgical Oncology    BREAST LUMPECTOMY Right 8/2/2018    Procedure: LYMPHOSCINITGRAPHY INTRAOPERATIVE LYMPHATIC MAPPING , RIGHT SENTINEL NODE BIOPSY, REEXCISION  RIGHT BREAST LUMPECTOMY CAVITY (SUPERIOR MARGIN); Surgeon: Yue Lozoya MD;  [facility];  Service: Surgical Oncology    BREAST SURGERY Left     CATARACT EXTRACTION, BILATERAL Bilateral     EGD AND COLONOSCOPY N/A 9/5/2018    Procedure: EGD AND COLONOSCOPY;  Surgeon: Candy Meza MD;  Location[facility]; Service: Gastroenterology    [facility] GUIDED CENTRAL VENOUS ACCESS DEVICE INSERTION  9/13/2018    KNEE SURGERY Right     MAMMO NEEDLE LOCALIZATION RIGHT (ALL INC) Right 6/26/2018    MAMMO STEREOTACTIC BREAST BIOPSY RIGHT (ALL INC) Right 5/23/2018    RETINAL DETACHMENT SURGERY Right     TUBAL LIGATION      TUNNELED VENOUS PORT PLACEMENT Left 9/13/2018    Procedure: INSERTION VENOUS PORT (PORT-A-CATH);   Surgeon: Yue Lozoya MD;  [facility];  Service: Surgical Oncology     Family History   Problem Relation Age of Onset    Diabetes Mother     Hypertension Mother     Diabetes Father     Hypertension Father     Diabetes Brother     Hypertension Brother     Prostate cancer Brother     Cancer Maternal Grandfather     No Known Problems Sister     No Known Problems Daughter     No Known Problems Sister     No Known Problems Sister     No Known Problems Sister     No Known Problems Sister     No Known Problems Daughter     No Known Problems Daughter      Social History     Socioeconomic History    Marital status:      Spouse name: Not on file    Number of children: Not on file    Years of education: Not on file    Highest education level: Not on file   Occupational History    Not on file   Tobacco Use    Smoking status: Never Smoker    Smokeless tobacco: Never Used   Vaping Use    Vaping Use: Never used   Substance and Sexual Activity    Alcohol use: No    Drug use: No    Sexual activity: Not on file   Other Topics Concern    Not on file   Social History Narrative    Not on file     Social Determinants of Health     Financial Resource Strain:     Difficulty of Paying Living Expenses:    Food Insecurity:     Worried About Running Out of Food in the Last Year:     [address] in the Last Year:    Transportation Needs:     Lack of Transportation (Medical):      Lack of Transportation (Non-Medical):    Physical Activity:     Days of Exercise per Week:     Minutes of Exercise per Session:    Stress:     Feeling of Stress :    Social Connections:     Frequency of Communication with Friends and Family:     Frequency of Social Gatherings with Friends and Family:     Attends [employer] Services:     Active Member of Clubs or Organizations:     Attends Club or Organization Meetings:     Marital Status:    Intimate Partner Violence:     Fear of Current or Ex-Partner:     Emotionally Abused:     Physically Abused:     Sexually Abused:        Current Outpatient Medications:     amLODIPine (NORVASC) 10 mg tablet, TAKE 1 TABLET BY MOUTH DAILY, Disp: 90 tablet, Rfl: 0    anastrozole (ARIMIDEX) 1 mg tablet, TAKE 1 TABLET BY MOUTH DAILY, Disp: 90 tablet, Rfl: 3    aspirin 81 mg chewable tablet, Chew 81 mg daily, Disp: , Rfl:     atorvastatin (LIPITOR) 10 mg tablet, TAKE 1 TABLET (10 MG TOTAL) BY MOUTH DAILY, Disp: 30 tablet, Rfl: 0    losartan (COZAAR) 50 mg tablet, TAKE 1 TABLET (50 MG TOTAL) BY MOUTH DAILY, Disp: 90 tablet, Rfl: 0    magnesium gluconate (MAGONATE) 500 mg tablet, Take 1 tablet (500 mg total) by mouth daily, Disp: 90 tablet, Rfl: 1    metoprolol tartrate (LOPRESSOR) 25 mg tablet, TAKE 0 5 TABLETS (12 5 MG TOTAL) BY MOUTH EVERY 12 (TWELVE) HOURS, Disp: 30 tablet, Rfl: 1    omeprazole (PriLOSEC) 20 mg delayed release capsule, TAKE 1 CAPSULE (20 MG TOTAL) BY MOUTH DAILY, Disp: 90 capsule, Rfl: 1    albuterol (2 5 mg/3 mL) 0 083 % nebulizer solution, Take 1 vial (2 5 mg total) by nebulization every 6 (six) hours as needed for wheezing or shortness of breath (Patient not taking: Reported on 6/25/2021), Disp: 120 vial, Rfl: 5    albuterol (PROVENTIL HFA,VENTOLIN HFA) 90 mcg/act inhaler, Inhale 1 puff every 4 (four) hours as needed   (Patient not taking: Reported on 6/25/2021), Disp: , Rfl:     Blood Glucose Monitoring Suppl (ONE TOUCH ULTRA 2) w/Device KIT, by Does not apply route 3 (three) times a day (Patient not taking: Reported on 6/25/2021), Disp: 1 each, Rfl: 0    Continuous Blood Gluc  (FreeStyle Broderick 14 Day [place]) AARON, Use 1 Device daily (Patient not taking: Reported on 6/25/2021), Disp: 1 each, Rfl: 0    Continuous Blood Gluc Sensor (FreeStyle Broderick 14 Day Sensor) MISC, Use 1 each every 14 (fourteen) days (Patient not taking: Reported on 6/25/2021), Disp: 2 each, Rfl: 11    cyclobenzaprine (FLEXERIL) 10 mg tablet, Take 1 tablet (10 mg total) by mouth daily at bedtime (Patient not taking: Reported on 6/25/2021), Disp: 90 tablet, Rfl: 0    diclofenac sodium (VOLTAREN) 1 %, Apply 2 g topically 4 (four) times a day (Patient not taking: Reported on 5/19/2021), Disp: 150 g, Rfl: 0    Dulaglutide (Trulicity) 1 5 BE/0 7TE SOPN, Inject 0 5 mL (1 5 mg total) under the skin once a week (Patient not taking: Reported on 5/19/2021), Disp: 12 pen, Rfl: 1    fluticasone-salmeterol (Advair HFA) 115-21 MCG/ACT inhaler, Inhale 2 puffs 2 (two) times a day Rinse mouth after use   (Patient not taking: Reported on 6/25/2021), Disp: 1 Inhaler, Rfl: 3    glucose blood (OneTouch Ultra) test strip, Use 1 each 3 (three) times a day Use as instructed (Patient not taking: Reported on 6/25/2021), Disp: 300 each, Rfl: 4    insulin detemir (Levemir FlexTouch) 100 Units/mL injection pen, Inject 50 Units under the skin daily at bedtime (Patient not taking: Reported on 9/15/2021), Disp: 50 mL, Rfl: 1    Insulin Disposable Pump (V-Go 40) KIT, Use daily (Patient not taking: Reported on 6/25/2021), Disp: 1 kit, Rfl: 5    insulin lispro (HumaLOG) 100 units/mL injection pen, Inject 20 Units under the skin 3 (three) times a day with meals (Patient not taking: Reported on 9/15/2021), Disp: 45 mL, Rfl: 0    insulin lispro (HumaLOG) 100 units/mL injection, INJECT 76 UNITS UNDER THE SKIN DAILY VIA V-GO DEVICE   (Patient not taking: Reported on 9/15/2021), Disp: 20 mL, Rfl: 4    Insulin Pen Needle (BD Pen Needle Emily U/F) 32G X 4 MM MISC, Inject under the skin 4 (four) times a day (Patient not taking: Reported on 9/15/2021), Disp: 400 each, Rfl: 0    levothyroxine 50 mcg tablet, Take 1 tablet (50 mcg total) by mouth daily (Patient not taking: Reported on 6/25/2021), Disp: 90 tablet, Rfl: 1    lidocaine (XYLOCAINE) 5 % ointment, Apply topically as needed for mild pain Apply 5 minutes prior to insulin injection (Patient not taking: Reported on 5/19/2021), Disp: 35 44 g, Rfl: 0    loratadine (CLARITIN) 10 mg tablet, TAKE 1 TABLET (10 MG TOTAL) BY MOUTH DAILY (Patient not taking: Reported on 9/15/2021), Disp: 90 tablet, Rfl: 2    lubiprostone (AMITIZA) 24 mcg capsule, Take 1 capsule (24 mcg total) by mouth 2 (two) times a day with meals (Patient not taking: Reported on 9/15/2021), Disp: 180 capsule, Rfl: 0    naproxen (NAPROSYN) 500 mg tablet, Take 1 tablet (500 mg total) by mouth 2 (two) times a day with meals (Patient not taking: Reported on 6/25/2021), Disp: 10 tablet, Rfl: 0    OneTouch Delica Lancets 35J MISC, USE 3 (THREE) TIMES A DAY (Patient not taking: Reported on 6/25/2021), Disp: 300 each, Rfl: 4    oxyCODONE (ROXICODONE) 5 mg immediate release tablet, Take 5 mg by mouth every 6 (six) hours as needed for moderate pain (Patient not taking: Reported on 6/25/2021), Disp: , Rfl:     sertraline (ZOLOFT) 25 mg tablet, Take 2 tablets (50 mg total) by mouth daily (Patient not taking: Reported on 9/28/2021), Disp: 30 tablet, Rfl: 0  Allergies   Allergen Reactions    Codeine Other (See Comments) unknown       Review of Systems:  Review of Systems   Constitutional: Positive for appetite change (eats when anxious) and fatigue  Negative for chills, fever and unexpected weight change  HENT: Positive for sore throat (feels like she has swollen glands in her throat/neck)  Eyes: Negative  Respiratory: Positive for shortness of breath (occasional)  Negative for cough  Cardiovascular: Positive for chest pain (left chest pain for the last 3 days, episodes have been more frequently) and leg swelling  Gastrointestinal: Positive for constipation  Negative for diarrhea  Endocrine: Negative  Genitourinary: Positive for difficulty urinating (voiding small amounts at times) and pelvic pain (left sided pelvic/groin pain)  Negative for dysuria, hematuria and vaginal bleeding  Occasional urinary leaking   Musculoskeletal: Negative  C/o knee pain; occasional cramping in her feet at night; Occasional tightness in her fingers  Skin: Negative  Allergic/Immunologic: Negative  Neurological: Negative  Hematological: Negative  Psychiatric/Behavioral: Positive for dysphoric mood (mild-moderate)  The patient is nervous/anxious  Vitals:    09/28/21 1445   BP: 122/60   Pulse: 83   Resp: 16   Temp: 98 8 °F (37 1 °C)   TempSrc: Temporal   SpO2: 98%   Weight: 79 9 kg (176 lb 2 4 oz)   Height: 5' 5" (1 651 m)        Imaging:No results found      Teaching

## 2021-09-28 NOTE — PROGRESS NOTES
Follow-up - Radiation Oncology   Jolie Mulligan 1955 60 y o  female [id number]      History of Present Illness   Cancer Staging  Malignant neoplasm of upper-outer quadrant of right breast in female, estrogen receptor positive ([facility] [address] )  Staging form: Breast, AJCC 8th Edition  - Clinical: No stage assigned - Unsigned  - Pathologic: Stage IA (pT1c, pN0, cM0, G2, ER: Positive, OK: Positive, HER2: Positive) - Signed by Cristy Bustillo MD on 8/21/2018  Histologic grading system: 3 grade system  Laterality: Right            Interval History:  60year old female with stage IA T1c N0 grade 2 invasive ductal carcinoma of the right breast  Her tumor was ER/OK and HER2 positive   She had evidence of extensive DCIS spanning approximately 2 6 centimeters  Her initial margin of resection was positive however she underwent re-excision with final negative margins   In addition 7 lymph nodes return negative for malignancy  She completed her adjuvant chemotherapy course on 12/11/18 and Herceptin monotherapy on 8/6/19       She completed a course of radiation therapy to the right breast on 2/19/19  She is seen today for routine follow up  She was last seen for telemedicine visit on 9/20/20 11/6/20 Dr Paul Cheng f/u  Continue with anastrozole 1 mg once a day     6/23/21 Mammo diagnostic bilateral w 3d & cad  IMPRESSION:   Stable exam      ASSESSMENT/BI-RADS CATEGORY:  Overall: 2 - Benign     RECOMMENDATION: Diagnostic mammogram in 1 year of the breast(s)      6/25/21 Dr Christoph Morocho f/u  Disease free, continue surviellance     8/17/21 Dr Nidhi Wilkins consult  - seen for subcutaneous mass of her right upper back and left chest, plan for excision under sedation (9/13/21 surgery canceled)        Patient's daughter, Joanne Terrazas translated for patient  She complains of right lateral breast/axillary pain, with intermittent swelling in the axillary   No right arm swelling          11/5/21 Dr Paul Cheng  1/7/22 Surgical Oncology         Historical Information   Oncology History   Malignant neoplasm of right female breast ([facility] [address] )   5/23/2018 Initial Diagnosis    Malignant neoplasm of right female breast ([facility] )     5/23/2018 Biopsy    Right breast core biopsy:  DCIS, micropapillary type, low-intermediate nuclear grade  ER 90-95% positive,  WV 75-80% positive       6/26/2018 Surgery    RIGHT BREAST NEEDLE LOCALIZATION X2 WITH RIGHT BREAST LUMPECTOMY ( NEEDLE LOC @ 1000) (Right)   ONCOPLASTIC CLOSURE OF LUMPECTOMY CAVITY    Invasive breast carcinoma, NST (aka ductal)  * Ana Paula grade 2 of 3 (total score = 2+2+2 = 6 of 9)   -- tubule formation < 10%, score 3   -- nuclear grade 2 of 3, score 2   -- mitoses ~ 4/mm2, score 2    * invasive carcinoma is multifocally present (A23-1, A32-1, A84-1, A89-1, A100-1), largest focus is 14 millimeters in greatest dimension (A89-1, this focus is graded)  * superior lumpectomy margin (orange-inked) is POSITIVE for invasive carcinoma (A84-1)   * all other lumpectomy margins are free of invasive carcinoma  * estrogen, progesterone & Her-2/negrita receptor studies are undertaken, to be described in an addendum report  - Ductal carcinoma in-situ (DCIS): Present as an extensive component (~85% of total tumor)  * DCIS is co-located with invasive carcinoma surrounding prior needle biopsy site  * DCIS spans 2 6 cm maximal dimension (A62-1) and is present on 27 of 136 total slides examined  * DCIS has cribriform & micropapillary patterns, nuclear grade 2 of 3, with central comedo-type necrosis  * DCIS is present within 0 2 millimeters of the superior lumpectomy margin (orange-inked, A26-1)   * DCIS is present within 0 2 millimeters of the medial lumpectomy margin (yellow-inked, A3-1)   * all other lumpectomy margins are free of DCIS by > 2 millimeters  - Lymph-vascular invasion: no lymph-vascular invasion is unequivocally identified  - Microcalcifications: present in DCIS      % positive, ER 45-55% positive, HER2 by IHC 3+ positive    - Dr Joe Wells       8/2/2018 Surgery    Right breast lumpectomy reexcision, right axilla SLN biopsy:  A  Submitted as right axillary sentinel node:  - Seven lymph nodes are identified showing no metastatic tumor      B   Right breast lumpectomy reexcision:  - Abundant reactive changes are seen around the previous lumpectomy cavity   - No residual atypia or malignancy is seen           8/2/2018 -  Cancer Staged    Stage IA - pT1c, pN0, G2        9/25/2018 - 8/6/2019 Chemotherapy    Weekly Paclitaxol and trastuzumab x12, until December 11, 2018 followed by trastuzumab monotherapy 6 milligram/kilogram every 3 weeks    - Dr Brant Galvin       1/9/2019 - 2/19/2019 Radiation    Plan ID Energy Fractions Dose per Fraction (cGy) Dose Correction (cGy) Total Dose Delivered (cGy) Elapsed Days   R Boost e 16E 5 / 5 200 0 1,000 6   Right Breast 6X 25 / 25 200 0 5,000 29     - Dr Yelena Chance       Malignant neoplasm of upper-outer quadrant of right breast in female, estrogen receptor positive ([address] )   7/19/2018 Initial Diagnosis    Malignant neoplasm of upper-outer quadrant of right breast in female, estrogen receptor positive ([address] )     12/17/2018 - 8/26/2019 Chemotherapy    trastuzumab (HERCEPTIN) 579 mg in sodium chloride 0 9 % 250 mL chemo infusion, 8 mg/kg, Intravenous, Once, 12 of 12 cycles  Administration: 434 mg (5/14/2019), 434 mg (6/4/2019), 450 mg (7/16/2019), 450 mg (8/6/2019), 450 mg (6/25/2019)         Past Medical History:   Diagnosis Date    Abdominal infection ([address] )     h-pylori    Abnormal chest x-ray 4/5/2019    Asthma     Breast cancer ([address] ) 06/26/2018    Cancer ([address] )     BREAST    Diabetes mellitus ([address] )     Hiatal hernia     History of chemotherapy     History of radiation therapy     Hypercholesterolemia     Hypertension     Hypothyroid     Renal disorder     Rheumatoid arthritis ([address] )      Past Surgical History:   Procedure Laterality Date    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    BREAST LUMPECTOMY Right 6/26/2018    Procedure: RIGHT BREAST NEEDLE LOCALIZATION X2 WITH RIGHT BREAST LUMPECTOMY ( NEEDLE LOC @ 1000); Surgeon: Lorelei Major MD;  Location: [facility];  Service: Surgical Oncology    BREAST LUMPECTOMY Right 8/2/2018    Procedure: LYMPHOSCINITGRAPHY INTRAOPERATIVE LYMPHATIC MAPPING , RIGHT SENTINEL NODE BIOPSY, REEXCISION  RIGHT BREAST LUMPECTOMY CAVITY (SUPERIOR MARGIN); Surgeon: Lorelei Major MD;  Location: [facility];  Service: Surgical Oncology    BREAST SURGERY Left     CATARACT EXTRACTION, BILATERAL Bilateral     EGD AND COLONOSCOPY N/A 9/5/2018    Procedure: EGD AND COLONOSCOPY;  Surgeon: Dorian Rahman MD;  Location: [facility] GI [facility]; Service: Gastroenterology    [facility] GUIDED CENTRAL VENOUS ACCESS DEVICE INSERTION  9/13/2018    KNEE SURGERY Right     MAMMO NEEDLE LOCALIZATION RIGHT (ALL INC) Right 6/26/2018    MAMMO STEREOTACTIC BREAST BIOPSY RIGHT (ALL INC) Right 5/23/2018    RETINAL DETACHMENT SURGERY Right     TUBAL LIGATION      TUNNELED VENOUS PORT PLACEMENT Left 9/13/2018    Procedure: INSERTION VENOUS PORT (PORT-A-CATH);   Surgeon: Lorelei Major MD;  Location: [facility];  Service: Surgical Oncology       Social History   Social History     Substance and Sexual Activity   Alcohol Use No     Social History     Substance and Sexual Activity   Drug Use No     Social History     Tobacco Use   Smoking Status Never Smoker   Smokeless Tobacco Never Used         Meds/Allergies     Current Outpatient Medications:     amLODIPine (NORVASC) 10 mg tablet, TAKE 1 TABLET BY MOUTH DAILY, Disp: 90 tablet, Rfl: 0    anastrozole (ARIMIDEX) 1 mg tablet, TAKE 1 TABLET BY MOUTH DAILY, Disp: 90 tablet, Rfl: 3    aspirin 81 mg chewable tablet, Chew 81 mg daily, Disp: , Rfl:     atorvastatin (LIPITOR) 10 mg tablet, TAKE 1 TABLET (10 MG TOTAL) BY MOUTH DAILY, Disp: 30 tablet, Rfl: 0    losartan (COZAAR) 50 mg tablet, TAKE 1 TABLET (50 MG TOTAL) BY MOUTH DAILY, Disp: 90 tablet, Rfl: 0    magnesium gluconate (MAGONATE) 500 mg tablet, Take 1 tablet (500 mg total) by mouth daily, Disp: 90 tablet, Rfl: 1    metoprolol tartrate (LOPRESSOR) 25 mg tablet, TAKE 0 5 TABLETS (12 5 MG TOTAL) BY MOUTH EVERY 12 (TWELVE) HOURS, Disp: 30 tablet, Rfl: 1    omeprazole (PriLOSEC) 20 mg delayed release capsule, TAKE 1 CAPSULE (20 MG TOTAL) BY MOUTH DAILY, Disp: 90 capsule, Rfl: 1    albuterol (2 5 mg/3 mL) 0 083 % nebulizer solution, Take 1 vial (2 5 mg total) by nebulization every 6 (six) hours as needed for wheezing or shortness of breath (Patient not taking: Reported on 6/25/2021), Disp: 120 vial, Rfl: 5    albuterol (PROVENTIL HFA,VENTOLIN HFA) 90 mcg/act inhaler, Inhale 1 puff every 4 (four) hours as needed   (Patient not taking: Reported on 6/25/2021), Disp: , Rfl:     Blood Glucose Monitoring Suppl (ONE TOUCH ULTRA 2) w/Device KIT, by Does not apply route 3 (three) times a day (Patient not taking: Reported on 6/25/2021), Disp: 1 each, Rfl: 0    Continuous Blood Gluc  (FreeStyle Broderick 14 Day [place]) AARON, Use 1 Device daily (Patient not taking: Reported on 6/25/2021), Disp: 1 each, Rfl: 0    Continuous Blood Gluc Sensor (FreeStyle Broderick 14 Day Sensor) MISC, Use 1 each every 14 (fourteen) days (Patient not taking: Reported on 6/25/2021), Disp: 2 each, Rfl: 11    cyclobenzaprine (FLEXERIL) 10 mg tablet, Take 1 tablet (10 mg total) by mouth daily at bedtime (Patient not taking: Reported on 6/25/2021), Disp: 90 tablet, Rfl: 0    diclofenac sodium (VOLTAREN) 1 %, Apply 2 g topically 4 (four) times a day (Patient not taking: Reported on 5/19/2021), Disp: 150 g, Rfl: 0    Dulaglutide (Trulicity) 1 5 DB/7 5NP SOPN, Inject 0 5 mL (1 5 mg total) under the skin once a week (Patient not taking: Reported on 5/19/2021), Disp: 12 pen, Rfl: 1    fluticasone-salmeterol (Advair HFA) 115-21 MCG/ACT inhaler, Inhale 2 puffs 2 (two) times a day Rinse mouth after use   (Patient not taking: Reported on 6/25/2021), Disp: 1 Inhaler, Rfl: 3    glucose blood (OneTouch Ultra) test strip, Use 1 each 3 (three) times a day Use as instructed (Patient not taking: Reported on 6/25/2021), Disp: 300 each, Rfl: 4    insulin detemir (Levemir FlexTouch) 100 Units/mL injection pen, Inject 50 Units under the skin daily at bedtime (Patient not taking: Reported on 9/15/2021), Disp: 50 mL, Rfl: 1    Insulin Disposable Pump (V-Go 40) KIT, Use daily (Patient not taking: Reported on 6/25/2021), Disp: 1 kit, Rfl: 5    insulin lispro (HumaLOG) 100 units/mL injection pen, Inject 20 Units under the skin 3 (three) times a day with meals (Patient not taking: Reported on 9/15/2021), Disp: 45 mL, Rfl: 0    insulin lispro (HumaLOG) 100 units/mL injection, INJECT 76 UNITS UNDER THE SKIN DAILY VIA V-GO DEVICE   (Patient not taking: Reported on 9/15/2021), Disp: 20 mL, Rfl: 4    Insulin Pen Needle (BD Pen Needle Emily U/F) 32G X 4 MM MISC, Inject under the skin 4 (four) times a day (Patient not taking: Reported on 9/15/2021), Disp: 400 each, Rfl: 0    levothyroxine 50 mcg tablet, Take 1 tablet (50 mcg total) by mouth daily (Patient not taking: Reported on 6/25/2021), Disp: 90 tablet, Rfl: 1    lidocaine (XYLOCAINE) 5 % ointment, Apply topically as needed for mild pain Apply 5 minutes prior to insulin injection (Patient not taking: Reported on 5/19/2021), Disp: 35 44 g, Rfl: 0    loratadine (CLARITIN) 10 mg tablet, TAKE 1 TABLET (10 MG TOTAL) BY MOUTH DAILY (Patient not taking: Reported on 9/15/2021), Disp: 90 tablet, Rfl: 2    lubiprostone (AMITIZA) 24 mcg capsule, Take 1 capsule (24 mcg total) by mouth 2 (two) times a day with meals (Patient not taking: Reported on 9/15/2021), Disp: 180 capsule, Rfl: 0    naproxen (NAPROSYN) 500 mg tablet, Take 1 tablet (500 mg total) by mouth 2 (two) times a day with meals (Patient not taking: Reported on 6/25/2021), Disp: 10 tablet, Rfl: 0    OneTouch Delica Lancets 56T MISC, USE 3 (THREE) TIMES A DAY (Patient not taking: Reported on 6/25/2021), Disp: 300 each, Rfl: 4    oxyCODONE (ROXICODONE) 5 mg immediate release tablet, Take 5 mg by mouth every 6 (six) hours as needed for moderate pain (Patient not taking: Reported on 6/25/2021), Disp: , Rfl:     sertraline (ZOLOFT) 25 mg tablet, Take 2 tablets (50 mg total) by mouth daily (Patient not taking: Reported on 9/28/2021), Disp: 30 tablet, Rfl: 0  Allergies   Allergen Reactions    Codeine Other (See Comments)     unknown         Review of Systems   Constitutional: Positive for appetite change (eats when anxious) and fatigue  Negative for chills, fever and unexpected weight change  HENT: Positive for sore throat (feels like she has swollen glands in her throat/neck)  Eyes: Negative  Respiratory: Positive for shortness of breath (occasional)  Negative for cough  Cardiovascular: Positive for chest pain (left chest pain for the last 3 days, episodes have been more frequently) and leg swelling  Gastrointestinal: Positive for constipation  Negative for diarrhea  Endocrine: Negative  Genitourinary: Positive for difficulty urinating (voiding small amounts at times) and pelvic pain (left sided pelvic/groin pain)  Negative for dysuria, hematuria and vaginal bleeding  Occasional urinary leaking   Musculoskeletal: Negative  C/o knee pain; occasional cramping in her feet at night; Occasional tightness in her fingers  Skin: Negative  Allergic/Immunologic: Negative  Neurological: Negative  Hematological: Negative  Psychiatric/Behavioral: Positive for dysphoric mood (mild-moderate)  The patient is nervous/anxious            OBJECTIVE:   /60   Pulse 83   Temp 98 8 °F (37 1 °C) (Temporal)   Resp 16   Ht 5' 5" (1 651 m)   Wt 79 9 kg (176 lb 2 4 oz)   SpO2 98%   BMI 29 31 kg/m²   Pain Assessment:  0  ECOG/Zubrod/WHO: 0 - Asymptomatic    Physical Exam  Constitutional:       Appearance: She is well-developed     HENT: Head: Normocephalic  Hair is normal    Eyes:      General: No scleral icterus  Cardiovascular:      Rate and Rhythm: Normal rate and regular rhythm  Pulmonary:      Effort: Pulmonary effort is normal  No respiratory distress  Breath sounds: Normal breath sounds  No wheezing  Chest:      Chest wall: No tenderness  Comments: There is no supraclavicular axillary adenopathy palpable no suspicious lesions palpable in either breast there is no breast or arm edema  The skin in the radiated field is in good condition  Abdominal:      General: Bowel sounds are normal  There is no distension  Palpations: Abdomen is soft  There is no mass  Tenderness: There is no abdominal tenderness  There is no rebound  Genitourinary:     Vagina: Normal    Musculoskeletal:         General: No tenderness  Normal range of motion  Cervical back: Neck supple  No edema  No spinous process tenderness  Lymphadenopathy:      Cervical: No cervical adenopathy  Neurological:      Mental Status: She is alert  Cranial Nerves: No cranial nerve deficit  Coordination: Coordination normal       Gait: Gait normal    Psychiatric:         Speech: Speech normal          Behavior: Behavior normal               RESULTS    Lab Results:   Recent Results (from the past 672 hour(s))   POCT hemoglobin A1c    Collection Time: 09/08/21 11:52 AM   Result Value Ref Range    Hemoglobin A1C 13 9 (A) 6 5   CBC and differential    Collection Time: 09/22/21  8:28 AM   Result Value Ref Range    WBC 6 20 4  31 - 10 16 Thousand/uL    RBC 4 21 3 81 - 5 12 Million/uL    Hemoglobin 12 1 11 5 - 15 4 g/dL    Hematocrit 37 4 34 8 - 46 1 %    MCV 89 82 - 98 fL    MCH 28 7 26 8 - 34 3 pg    MCHC 32 4 31 4 - 37 4 g/dL    RDW 12 9 11 6 - 15 1 %    MPV 11 9 8 9 - 12 7 fL    Platelets 382 540 - 826 Thousands/uL    nRBC 0 /100 WBCs    Neutrophils Relative 52 43 - 75 %    Immat GRANS % 1 0 - 2 %    Lymphocytes Relative 36 14 - 44 %    Monocytes Relative 7 4 - 12 %    Eosinophils Relative 2 0 - 6 %    Basophils Relative 2 (H) 0 - 1 %    Neutrophils Absolute 3 27 1 85 - 7 62 Thousands/µL    Immature Grans Absolute 0 03 0 00 - 0 20 Thousand/uL    Lymphocytes Absolute 2 24 0 60 - 4 47 Thousands/µL    Monocytes Absolute 0 41 0 17 - 1 22 Thousand/µL    Eosinophils Absolute 0 15 0 00 - 0 61 Thousand/µL    Basophils Absolute 0 10 0 00 - 0 10 Thousands/µL   Comprehensive metabolic panel    Collection Time: 09/22/21  8:28 AM   Result Value Ref Range    Sodium 139 136 - 145 mmol/L    Potassium 4 2 3 5 - 5 3 mmol/L    Chloride 103 100 - 108 mmol/L    CO2 30 21 - 32 mmol/L    ANION GAP 6 4 - 13 mmol/L    BUN 22 5 - 25 mg/dL    Creatinine 0 68 0 60 - 1 30 mg/dL    Glucose, Fasting 346 (H) 65 - 99 mg/dL    Calcium 8 9 8 3 - 10 1 mg/dL    Corrected Calcium 9 5 8 3 - 10 1 mg/dL    AST 21 5 - 45 U/L    ALT 37 12 - 78 U/L    Alkaline Phosphatase 90 46 - 116 U/L    Total Protein 7 4 6 4 - 8 2 g/dL    Albumin 3 3 (L) 3 5 - 5 0 g/dL    Total Bilirubin 0 30 0 20 - 1 00 mg/dL    eGFR 93 ml/min/1 73sq m   Lipid panel    Collection Time: 09/22/21  8:28 AM   Result Value Ref Range    Cholesterol 216 (H) 50 - 200 mg/dL    Triglycerides 135 <=150 mg/dL    HDL, Direct 53 >=40 mg/dL    LDL Calculated 136 (H) 0 - 100 mg/dL    Non-HDL-Chol (CHOL-HDL) 163 mg/dl   Microalbumin / creatinine urine ratio    Collection Time: 09/22/21  8:28 AM   Result Value Ref Range    Creatinine, Ur 119 0 mg/dL    Microalbum  ,U,Random 82 5 (H) 0 0 - 20 0 mg/L    Microalb Creat Ratio 69 (H) 0 - 30 mg/g creatinine   TSH, 3rd generation with Free T4 reflex    Collection Time: 09/22/21  8:28 AM   Result Value Ref Range    TSH 3RD GENERATON 4 298 (H) 0 358 - 3 740 uIU/mL   T4, free    Collection Time: 09/22/21  8:28 AM   Result Value Ref Range    Free T4 1 09 0 76 - 1 46 ng/dL       Imaging Studies:No results found  Assessment/Plan:  No orders of the defined types were placed in this encounter  Shiloh Joyce is a 60y o  year old female  Is 2 and half years post adjuvant radiation therapy for stage I right breast carcinoma  She has no clinical evidence of recurrence  Mammogram from 6/23/2021 returned stable  She remains on anastrozole  She will return for follow-up visit in 1 year  Nikki Martin MD  7/48/8089,4:67 PM    Portions of the record may have been created with voice recognition software   Occasional wrong word or "sound a like" substitutions may have occurred due to the inherent limitations of voice recognition software   Read the chart carefully and recognize, using context, where substitutions have occurred

## 2021-09-29 ENCOUNTER — OFFICE VISIT (OUTPATIENT)
Dept: FAMILY MEDICINE CLINIC | Facility: CLINIC | Age: 66
End: 2021-09-29

## 2021-09-29 ENCOUNTER — HOSPITAL ENCOUNTER (OUTPATIENT)
Dept: INFUSION CENTER | Facility: CLINIC | Age: 66
Discharge: HOME | End: 2021-09-29

## 2021-09-29 VITALS
RESPIRATION RATE: 18 BRPM | DIASTOLIC BLOOD PRESSURE: 78 MMHG | HEART RATE: 69 BPM | TEMPERATURE: 98.2 F | SYSTOLIC BLOOD PRESSURE: 152 MMHG

## 2021-09-29 VITALS
HEART RATE: 77 BPM | BODY MASS INDEX: 29.29 KG/M2 | RESPIRATION RATE: 19 BRPM | SYSTOLIC BLOOD PRESSURE: 130 MMHG | WEIGHT: 176 LBS | DIASTOLIC BLOOD PRESSURE: 64 MMHG | TEMPERATURE: 97.4 F | OXYGEN SATURATION: 98 %

## 2021-09-29 DIAGNOSIS — R07.9 CHEST PAIN, UNSPECIFIED TYPE: Primary | ICD-10-CM

## 2021-09-29 DIAGNOSIS — J45.50 SEVERE PERSISTENT ASTHMA WITHOUT COMPLICATION: ICD-10-CM

## 2021-09-29 DIAGNOSIS — R10.2 PELVIC PAIN: ICD-10-CM

## 2021-09-29 DIAGNOSIS — M89.8X9 BONE PAIN: ICD-10-CM

## 2021-09-29 PROCEDURE — 93000 ELECTROCARDIOGRAM COMPLETE: CPT | Performed by: NURSE PRACTITIONER

## 2021-09-29 PROCEDURE — 3075F SYST BP GE 130 - 139MM HG: CPT | Performed by: NURSE PRACTITIONER

## 2021-09-29 PROCEDURE — 3078F DIAST BP <80 MM HG: CPT | Performed by: NURSE PRACTITIONER

## 2021-09-29 PROCEDURE — 99214 OFFICE O/P EST MOD 30 MIN: CPT | Performed by: NURSE PRACTITIONER

## 2021-09-29 NOTE — PROGRESS NOTES
Assessment/Plan:    Pelvic pain  Chronic left pelvic pain   Was following with [facility] and had U/S 3/2021 with following results: Intramural myometrium fibroid located in the middle posterior corpus region, measuring 1 9 x 1 4 x 0 0cm  otherwise no significant findings  Offered to repeat imaging to assess for changes- patient and daughter decline, request referral to [facility], order placed         Bone pain  Reports that she has chronic "Bone pains" throughout her entire body, requests DXA scan   Recommend she complete vitamin D level and calcium/ vit d supplement   Also recommend 30 minutes of daily activity (walking, yoga, swimming)    Chest pain  She reports 4 days of left sided chest pain w/ left arm radiation "stabbing" in nature occurring 2-3 times daily and lasting about 15 minutes per episode  Episodes occur when she is at rest and with activity  There are no associated sx such as dyspnea, diaphoresis, or dizziness   Normal EKG in office today   She is noted to have had c/o similar pain in the past and has had previous echo, EKG,and stress test w/o significant abnormality   She was previously advised to be evaluated by cardiology but was not- referral re-entered   Did discuss ED parameters in depth with patient and daughter    Rosemary Woods was seen today for chest pain  Diagnoses and all orders for this visit:    Chest pain, unspecified type  -     POCT ECG  -     Echo complete with contrast if indicated; Future  -     Ambulatory referral to Cardiology; Future  -     Stress test only, exercise; Future    Pelvic pain  -     Ambulatory referral to Obstetrics / Gynecology; Future    Bone pain  -     DXA bone density spine hip and pelvis; Future      Return in about 2 weeks (around 10/13/2021) for anxiety, chest pain   Patient Instructions     Shade no peito   O QUE VOCÊ PRECISA SABER:   Shade no peito pode ser causada por flako variedade de problemas de saúde, desde não graves a fatais   Shade no peito pode ser [employer] sintoma de um problema digestivo, nelli refluxo ácido ou úlcera no estômago  Flako crise de ansiedade ou flako emoção forte, nelli raiva, também pode causar dores no peito  Infecção, inflamação ou flako fratura nos ossos ou cartilagem do peito podem causar shade ou desconforto  Às vezes, shade ou pressão no peito é causada por redução na circulação sanguínea para o coração (angina)  Shade no peito também pode ser causada por condições fatais, nelli um ataque cardíaco ou um coágulo de sangue nos pulmões  INSTRUÇÕES APÓS A ALLEGRA:   Ligue ou peça para alguém ligar para o número de emergência local (911 nos EUA) se:  · Você tiver qualquer [employer] seguintes sinais de um ataque cardíaco:      ? Compressão, pressão ou shade no peito    ? Você também pode  ter [employer] seguintes:     § Shade ou desconforto miguel allen, pescoço, maxilar, estômago ou braço    § Falta de ar    § Náuseas ou vômito    § Vertigem ou suor frio repentino      Volte à emergência se:  · Se tiver desconforto no peito que piore, mesmo com o medicamento  · Você tossir ou vomitar sangue  · Você apresentar fezes pretas ou sangrentas  · Você não consegue parar de vomitar ou dói para engolir  Ligue para o seu cuidador se:  · Você tiver dúvidas ou preocupações quanto ao seu problema de saúde ou nelli lidar com cuong  Medicamentos:  · Medicamentos podem ser administrados para tratar a [address] shade no peito  Os exemplos incluem analgésicos, medicamentos para ansiedade ou medicamentos para aumentar o fluxo sanguíneo para o coração  · Não tome determinados medicamentos pao consultar o seu profissional de saúde primeiro  Estão entre eles: AINEs, suplementos herbais ou vitamínicos ou hormônios (estrogênio ou progesterona)  · [place] seu medicamento conforme orientado  Entre em contato com o seu médico se achar que o medicamento não está ajudando ou se você apresentar efeitos colaterais  Informe a cuong se você for alérgico a algum medicamento   [employer] de todos os medicamentos, vitaminas e ervas (fitoterápicos) que você gilmar  Inclua a quantidade, quando e por que os [place]  Leve a lista ou os frascos de comprimidos às consultas de acompanhamento  Leve sua lista de medicamentos consigo em solo de emergência  Dicas para flako ramsey saudável: Estas são orientações gerais para flako ramsey saudável  Se a causa da sua bobbi no peito for conhecida, seu profissional de saúde te dará diretrizes específicas a serem seguidas  · Não fume  A nicotina e outros produtos químicos nos cigarros e charutos podem danificar o coração e o pulmão  Se você fuma e precisa de ajuda para parar de fumar, peça informações a seu médico  Cigarros eletrônicos ou tabaco pao fumaça também contêm nicotina  Fale com seu médico antes de usar esses produtos  · Escolha flako variedade de alimentos saudáveis com a maior frequência possível  Inclua frutas e legumes e verduras frescos, congelados ou enlatados  Também inclua produtos lácteos com baixo teor de gordura, frango (pao a pele) e emre magras  Seu profissional de saúde ou um nutricionista podem ajudar você a elaborar flako dieta  Você pode precisar evitar certos alimentos ou bebidas se sua bobbi for causada por problemas na digestão  · Reduza seu consumo de sódio (sal)  Controle o consumo de alimentos ricos em sódio, nelli enlatados, salgadinhos e frios  Se você adiciona sal quando cozinha o alimento, não adicione Marizol no prato  Escolha alimentos enlatados com baixo teor de sódio, sempre que possível  · Hanna bastante água todos os castellon  A água ajuda seu corpo a controlar sua temperatura e a pressão  Pergunte ao seu médico qual é a quantidade de água que você precisa ingerir por jacqueline  · Pergunte sobre a prática de atividades  Seu médico lhe dirá quais atividades devem ser limitadas ou evitadas  Pergunte quando você poderá dirigir, voltar a trabalhar e ter relações sexuais  Pergunte qual o melhor plano de exercícios para você      · Bev Parents um peso saudável  Pergunte a seu médico qual seria um peso saudável para você  Se você estiver acima do peso, peça para cuong ajudar a criar um programa seguro para perda de [place]  · Pergunte sobre as vacinas que você possa precisar  [place] a vacina contra a influenza (gripe) todos os anos assim que for recomendável, geralmente em setembro ou outubro  Você também pode precisar de flako vacina contra pneumococos para evitar a pneumonia  A vacina é normalmente riana a cada 5 anos, começando aos 65 anos de idade  Seu profissional de saúde pode informar se você deve ganesh outras vacinas e quando tomá-las  Faça o acompanhamento com seu médico dentro de 72 castellon ou conforme orientado: Evonnie Dance precise retornar para fazer mais testes a fim de descobrir a causa da bobbi no peito  Você pode ser encaminhado a um especialista, nelli um gastroenterologista ou cardiologista  Anote as dúvidas que você tiver para se lembrar de perguntar sobre elas fabio as consultas  © Copyright [employer] 2021 Information is for End User's use only and may not be sold, redistributed or otherwise used for commercial purposes  All illustrations and images included in CareNotes® are the copyrighted property of [employer]  or [facility] Irma Stout   The above information is an  only  It is not intended as medical advice for individual conditions or treatments  Talk to your doctor, nurse or pharmacist before following any medical regimen to see if it is safe and effective for you  Subjective:     Radha Marrero is a 60 y o  female who  has a past medical history of Abdominal infection ([address] ), Abnormal chest x-ray, Asthma, Breast cancer ([address] ), Cancer ([address] ), Diabetes mellitus ([address] ), Hiatal hernia, History of chemotherapy, History of radiation therapy, Hypercholesterolemia, Hypertension, Hypothyroid, Renal disorder, and Rheumatoid arthritis ([facility] )   She also has no past medical history of ADHD (attention deficit hyperactivity disorder), Bleeding disorder ([facility] ), Depression, Diabetes insipidus ([facility] ), Fractures, Head injury, Heart disease, Liver disease, Neurological disorder, Osteoarthritis, Rheumatic disease, Seizures ([facility] ), Skin disorder, Stomach disorder, Stroke [facility]), or Vascular disorder  who presented to the office today for c/o chest pain  Chest Pain  Jolie Mulligan complains of chest pain  Onset was 4 days ago  Symptoms have worsened since that time  The patient's pain is intermittent and occurs at night, at rest, with ADLs and activities  The patient describes the pain as sharp and radiates to the left arm  Patient rates pain as a 9/10 in intensity  Associated symptoms are: chest pressure/discomfort  Aggravating factors are: none  Alleviating factors are: none  Patient's cardiac risk factors are: dyslipidemia, hypertension, obesity (BMI >= 30 kg/m2) and sedentary lifestyle  Patient's risk factors for DVT/PE: history of pulmonary embolus and history of malignancy  Previous cardiac testing: chest x-ray, echocardiogram, electrocardiogram (ECG) and exercise stress test with no significant abnormalities  Patient also reports entire body "bone pain" and left pelvic pain that is chronic  She was following with [facility] but would like to be referred to new GYN  Also would like to have a "bone scan"        The following portions of the patient's history were reviewed and updated as appropriate: allergies, current medications, past family history, past medical history, past social history, past surgical history and problem list     Current Outpatient Medications on File Prior to Visit   Medication Sig Dispense Refill    amLODIPine (NORVASC) 10 mg tablet TAKE 1 TABLET BY MOUTH DAILY 90 tablet 0    anastrozole (ARIMIDEX) 1 mg tablet TAKE 1 TABLET BY MOUTH DAILY 90 tablet 3    aspirin 81 mg chewable tablet Chew 81 mg daily      atorvastatin (LIPITOR) 10 mg tablet TAKE 1 TABLET (10 MG TOTAL) BY MOUTH DAILY 30 tablet 0    Dulaglutide (Trulicity) 1 5 CX/6 0CN SOPN Inject 0 5 mL (1 5 mg total) under the skin once a week 12 pen 1    glucose blood (OneTouch Ultra) test strip Use 1 each 3 (three) times a day Use as instructed 300 each 4    insulin lispro (HumaLOG) 100 units/mL injection INJECT 76 UNITS UNDER THE SKIN DAILY VIA V-GO DEVICE   20 mL 4    losartan (COZAAR) 50 mg tablet TAKE 1 TABLET (50 MG TOTAL) BY MOUTH DAILY 90 tablet 0    lubiprostone (AMITIZA) 24 mcg capsule Take 1 capsule (24 mcg total) by mouth 2 (two) times a day with meals 180 capsule 0    magnesium gluconate (MAGONATE) 500 mg tablet Take 1 tablet (500 mg total) by mouth daily 90 tablet 1    metoprolol tartrate (LOPRESSOR) 25 mg tablet TAKE 0 5 TABLETS (12 5 MG TOTAL) BY MOUTH EVERY 12 (TWELVE) HOURS 30 tablet 1    omeprazole (PriLOSEC) 20 mg delayed release capsule TAKE 1 CAPSULE (20 MG TOTAL) BY MOUTH DAILY 90 capsule 1    albuterol (2 5 mg/3 mL) 0 083 % nebulizer solution Take 1 vial (2 5 mg total) by nebulization every 6 (six) hours as needed for wheezing or shortness of breath (Patient not taking: Reported on 6/25/2021) 120 vial 5    albuterol (PROVENTIL HFA,VENTOLIN HFA) 90 mcg/act inhaler Inhale 1 puff every 4 (four) hours as needed   (Patient not taking: Reported on 6/25/2021)      Blood Glucose Monitoring Suppl (ONE TOUCH ULTRA 2) w/Device KIT by Does not apply route 3 (three) times a day (Patient not taking: Reported on 6/25/2021) 1 each 0    Continuous Blood Gluc  (FreeStyle Broderick 14 Day [place]) AARON Use 1 Device daily (Patient not taking: Reported on 6/25/2021) 1 each 0    Continuous Blood Gluc Sensor (FreeStyle [place]derick 14 Day Sensor) MISC Use 1 each every 14 (fourteen) days (Patient not taking: Reported on 6/25/2021) 2 each 11    cyclobenzaprine (FLEXERIL) 10 mg tablet Take 1 tablet (10 mg total) by mouth daily at bedtime (Patient not taking: Reported on 6/25/2021) 90 tablet 0    diclofenac sodium (VOLTAREN) 1 % Apply 2 g topically 4 (four) times a day (Patient not taking: Reported on 5/19/2021) 150 g 0    fluticasone-salmeterol (Advair HFA) 115-21 MCG/ACT inhaler Inhale 2 puffs 2 (two) times a day Rinse mouth after use   (Patient not taking: Reported on 6/25/2021) 1 Inhaler 3    insulin detemir (Levemir FlexTouch) 100 Units/mL injection pen Inject 50 Units under the skin daily at bedtime (Patient not taking: Reported on 9/15/2021) 50 mL 1    Insulin Disposable Pump (V-Go 40) KIT Use daily (Patient not taking: Reported on 6/25/2021) 1 kit 5    insulin lispro (HumaLOG) 100 units/mL injection pen Inject 20 Units under the skin 3 (three) times a day with meals (Patient not taking: Reported on 9/15/2021) 45 mL 0    Insulin Pen Needle (BD Pen Needle Emily U/F) 32G X 4 MM MISC Inject under the skin 4 (four) times a day (Patient not taking: Reported on 9/15/2021) 400 each 0    levothyroxine 50 mcg tablet Take 1 tablet (50 mcg total) by mouth daily (Patient not taking: Reported on 6/25/2021) 90 tablet 1    lidocaine (XYLOCAINE) 5 % ointment Apply topically as needed for mild pain Apply 5 minutes prior to insulin injection (Patient not taking: Reported on 5/19/2021) 35 44 g 0    loratadine (CLARITIN) 10 mg tablet TAKE 1 TABLET (10 MG TOTAL) BY MOUTH DAILY (Patient not taking: Reported on 9/15/2021) 90 tablet 2    naproxen (NAPROSYN) 500 mg tablet Take 1 tablet (500 mg total) by mouth 2 (two) times a day with meals (Patient not taking: Reported on 6/25/2021) 10 tablet 0    OneTouch Delica Lancets 73P MISC USE 3 (THREE) TIMES A DAY (Patient not taking: Reported on 6/25/2021) 300 each 4    oxyCODONE (ROXICODONE) 5 mg immediate release tablet Take 5 mg by mouth every 6 (six) hours as needed for moderate pain (Patient not taking: Reported on 6/25/2021)      sertraline (ZOLOFT) 25 mg tablet Take 2 tablets (50 mg total) by mouth daily (Patient not taking: Reported on 9/28/2021) 30 tablet 0    [DISCONTINUED] EPINEPHrine (EPIPEN) 0 3 mg/0 3 mL SOAJ Inject 0 3 mL (0 3 mg total) into a muscle once for 1 dose 0 6 mL 0     No current facility-administered medications on file prior to visit  Review of Systems   Constitutional: Negative for chills and fever  HENT: Negative for ear pain and sore throat  Eyes: Negative for pain and visual disturbance  Respiratory: Negative for cough and shortness of breath  Cardiovascular: Positive for chest pain  Negative for palpitations  Gastrointestinal: Negative for abdominal pain and vomiting  Genitourinary: Positive for pelvic pain  Negative for decreased urine volume, difficulty urinating, dysuria, flank pain, frequency, genital sores, hematuria, urgency, vaginal bleeding, vaginal discharge and vaginal pain  Musculoskeletal: Positive for arthralgias  Negative for back pain  Skin: Negative for color change and rash  Neurological: Negative for seizures and syncope  All other systems reviewed and are negative  Objective:    /64 (BP Location: Left arm, Patient Position: Sitting, Cuff Size: Adult)   Pulse 77   Temp (!) 97 4 °F (36 3 °C) (Temporal)   Resp 19   Wt 79 8 kg (176 lb)   SpO2 98%   BMI 29 29 kg/m²     Physical Exam  Vitals and nursing note reviewed  Constitutional:       General: She is not in acute distress  Appearance: She is well-developed  She is not diaphoretic  HENT:      Head: Normocephalic and atraumatic  Right Ear: External ear normal       Left Ear: External ear normal    Eyes:      General:         Right eye: No discharge  Left eye: No discharge  Cardiovascular:      Rate and Rhythm: Normal rate and regular rhythm  Heart sounds: Normal heart sounds  No murmur heard  Pulmonary:      Effort: Pulmonary effort is normal  No respiratory distress  Breath sounds: Normal breath sounds  No wheezing  Abdominal:      General: Bowel sounds are normal  There is no distension  Palpations: Abdomen is soft        Tenderness: There is no abdominal tenderness  There is no guarding  Musculoskeletal:         General: No deformity  Normal range of motion  Cervical back: Normal range of motion and neck supple  Lymphadenopathy:      Cervical: No cervical adenopathy  Skin:     General: Skin is warm and dry  Capillary Refill: Capillary refill takes less than 2 seconds  Findings: No rash  Neurological:      Mental Status: She is alert and oriented to person, place, and time     Psychiatric:         Behavior: Behavior normal          ARIN Little  09/29/21  9:30 AM

## 2021-09-29 NOTE — PATIENT INSTRUCTIONS
Shade no peito   O QUE VOCÊ PRECISA SABER:   Shade no peito pode ser causada por flako variedade de problemas de saúde, desde não graves a fatais  Shade no peito pode ser um sintoma de um problema digestivo, nelli refluxo ácido ou úlcera no [place]  Flako crise de ansiedade ou flako emoção forte, nelli raiva, também pode causar dores no peito  Infecção, inflamação ou flako fratura nos ossos ou cartilagem do peito podem causar shade ou desconforto  Às vezes, shade ou pressão no peito é causada por redução na circulação sanguínea para o coração (angina)  Shade no peito também pode ser causada por condições fatais, nelli um ataque cardíaco ou um coágulo de sangue nos pulmões  INSTRUÇÕES APÓS A ALLEGRA:   Ligue ou peça para alguém ligar para o número de emergência local (911 nos EUA) se:  · Você tiver qualquer [employer] seguintes sinais de um ataque cardíaco:      ? Compressão, pressão ou shade no peito    ? Você também pode  ter Rated People seguintes:     § Shade ou desconforto miguel allen, pescoço, maxilar, estômago ou braço    § Falta de ar    § Náuseas ou vômito    § Vertigem ou suor frio repentino      Volte à emergência se:  · Se tiver desconforto no peito que piore, mesmo com o medicamento  · Você tossir ou vomitar sangue  · Você apresentar fezes pretas ou sangrentas  · Você não consegue parar de vomitar ou dói para engolir  Ligue para o seu cuidador se:  · Você tiver dúvidas ou preocupações quanto ao seu problema de saúde ou nelli lidar com cuong  Medicamentos:  · Medicamentos podem ser administrados para tratar a [address] shade no peito  Os exemplos incluem analgésicos, medicamentos para ansiedade ou medicamentos para aumentar o fluxo sanguíneo para o coração  · Não tome determinados medicamentos pao consultar o seu profissional de saúde primeiro  Estão entre eles: AINEs, suplementos herbais ou vitamínicos ou hormônios (estrogênio ou progesterona)  · [place] seu medicamento conforme orientado   Entre em contato com o seu médico se achar que o medicamento não está ajudando ou se você apresentar efeitos colaterais  Informe a cuong se você for alérgico a algum medicamento  [place] flako lista de todos os medicamentos, vitaminas e ervas (fitoterápicos) que você gilmar  Inclua a quantidade, quando e por que os [place]  Leve a lista ou os frascos de comprimidos às consultas de acompanhamento  Leve sua lista de medicamentos consigo em solo de emergência  Dicas para flako ramsey saudável: Estas são orientações gerais para flako ramsey saudável  Se a causa da sua bobbi no peito for conhecida, seu profissional de saúde te dará diretrizes específicas a serem seguidas  · Não fume  A nicotina e outros produtos químicos nos cigarros e charutos podem danificar o coração e o pulmão  Se você fuma e precisa de ajuda para parar de fumar, peça informações a seu médico  Cigarros eletrônicos ou tabaco pao fumaça também contêm nicotina  Fale com seu médico antes de usar esses produtos  · Escolha flako variedade de alimentos saudáveis com a maior frequência possível  Inclua frutas e legumes e verduras frescos, congelados ou enlatados  Também inclua produtos lácteos com baixo teor de gordura, frango (pao a pele) e emre magras  Seu profissional de saúde ou um nutricionista podem ajudar você a elaborar flako dieta  Você pode precisar evitar certos alimentos ou bebidas se sua bobbi for causada por problemas na digestão  · Reduza seu consumo de sódio (sal)  Controle o consumo de alimentos ricos em sódio, nelli enlatados, salgadinhos e frios  Se você adiciona sal quando cozinha o alimento, não adicione Marizol no prato  Escolha alimentos enlatados com baixo teor de sódio, sempre que possível  · Hanna bastante água todos os castellon  A água ajuda seu corpo a controlar sua temperatura e a pressão  Pergunte ao seu médico qual é a quantidade de água que você precisa ingerir por jacqueline  · Pergunte sobre a prática de atividades   Seu médico lhe dirá quais atividades devem ser limitadas ou evitadas  Pergunte quando você poderá dirigir, voltar a trabalhar e ter relações sexuais  Pergunte qual o melhor plano de exercícios para você  · [place] um peso saudável  Pergunte a seu médico qual seria um peso saudável para você  Se você estiver acima do peso, peça para cuong ajudar a criar um programa seguro para perda de [place]  · Pergunte sobre as vacinas que você possa precisar  [place] a vacina contra a influenza (gripe) todos os anos assim que for recomendável, geralmente em setembro ou outubro  Você também pode precisar de flako vacina contra pneumococos para evitar a pneumonia  A vacina é normalmente riana a cada 5 anos, começando aos 65 anos de idade  Seu profissional de saúde pode informar se você deve ganesh outras vacinas e quando tomá-las  Faça o acompanhamento com seu médico dentro de 72 cast[address]on ou conforme orientado: Cosme Graves precise retornar para fazer mais testes a fim de descobrir a causa da bobbi no peito  Você pode ser encaminhado a um especialista, nelli um gastroenterologista ou cardiologista  Anote as dúvidas que você tiver para se lembrar de perguntar sobre elas fabio as consultas  © Copyright [employer] 2021 Information is for End User's use only and may not be sold, redistributed or otherwise used for commercial purposes  All illustrations and images included in CareNotes® are the copyrighted property of A D A M , Inc  or Cesar Mondragon  The above information is an  only  It is not intended as medical advice for individual conditions or treatments  Talk to your doctor, nurse or pharmacist before following any medical regimen to see if it is safe and effective for you

## 2021-09-29 NOTE — ASSESSMENT & PLAN NOTE
Reports that she has chronic "Bone pains" throughout her entire body, requests DXA scan   Recommend she complete vitamin D level and calcium/ vit d supplement   Also recommend 30 minutes of daily activity (walking, yoga, swimming)

## 2021-09-29 NOTE — ASSESSMENT & PLAN NOTE
Chronic left pelvic pain   Was following with [facility] [place] [facility] and had U/S 3/2021 with following results: Intramural myometrium fibroid located in the middle posterior corpus region, measuring 1 9 x 1 4 x 0 0cm   otherwise no significant findings  Offered to repeat imaging to assess for changes- patient and daughter decline, request referral to [id number], order placed

## 2021-09-29 NOTE — ASSESSMENT & PLAN NOTE
She reports 4 days of left sided chest pain w/ left arm radiation "stabbing" in nature occurring 2-3 times daily and lasting about 15 minutes per episode  Episodes occur when she is at rest and with activity  There are no associated sx such as dyspnea, diaphoresis, or dizziness   Normal EKG in office today   She is noted to have had c/o similar pain in the past and has had previous echo, EKG,and stress test w/o significant abnormality   She was previously advised to be evaluated by cardiology but was not- referral re-entered   Did discuss ED parameters in depth with patient and daughter

## 2021-09-30 ENCOUNTER — HOSPITAL ENCOUNTER (OUTPATIENT)
Dept: INFUSION CENTER | Facility: CLINIC | Age: 66
Discharge: HOME | End: 2021-09-30
Payer: COMMERCIAL

## 2021-09-30 VITALS
SYSTOLIC BLOOD PRESSURE: 139 MMHG | DIASTOLIC BLOOD PRESSURE: 74 MMHG | RESPIRATION RATE: 18 BRPM | TEMPERATURE: 96.4 F | HEART RATE: 81 BPM

## 2021-09-30 DIAGNOSIS — J45.50 SEVERE PERSISTENT ASTHMA WITHOUT COMPLICATION: Primary | ICD-10-CM

## 2021-09-30 PROCEDURE — 96372 THER/PROPH/DIAG INJ SC/IM: CPT

## 2021-09-30 RX ORDER — EPINEPHRINE 1 MG/ML
0.3 INJECTION, SOLUTION, CONCENTRATE INTRAVENOUS AS NEEDED
Status: CANCELLED | OUTPATIENT
Start: 2021-10-13

## 2021-09-30 RX ADMIN — OMALIZUMAB 375 MG: 150 INJECTION, SOLUTION SUBCUTANEOUS at 15:07

## 2021-10-13 ENCOUNTER — HOSPITAL ENCOUNTER (OUTPATIENT)
Dept: INFUSION CENTER | Facility: CLINIC | Age: 66
Discharge: HOME | End: 2021-10-13
Payer: COMMERCIAL

## 2021-10-13 DIAGNOSIS — J45.50 SEVERE PERSISTENT ASTHMA WITHOUT COMPLICATION: Primary | ICD-10-CM

## 2021-10-13 PROCEDURE — 96372 THER/PROPH/DIAG INJ SC/IM: CPT

## 2021-10-13 RX ORDER — EPINEPHRINE 1 MG/ML
0.3 INJECTION, SOLUTION, CONCENTRATE INTRAVENOUS AS NEEDED
Status: DISCONTINUED | OUTPATIENT
Start: 2021-10-13 | End: 2021-10-16 | Stop reason: HOSPADM

## 2021-10-13 RX ORDER — EPINEPHRINE 1 MG/ML
0.3 INJECTION, SOLUTION, CONCENTRATE INTRAVENOUS AS NEEDED
Status: CANCELLED | OUTPATIENT
Start: 2021-10-27

## 2021-10-13 RX ADMIN — OMALIZUMAB 375 MG: 150 INJECTION, SOLUTION SUBCUTANEOUS at 10:10

## 2021-10-16 ENCOUNTER — RA CDI HCC (OUTPATIENT)
Dept: OTHER | Facility: HOSPITAL | Age: 66
End: 2021-10-16

## 2021-10-16 DIAGNOSIS — I10 ESSENTIAL HYPERTENSION: ICD-10-CM

## 2021-10-16 DIAGNOSIS — E78.5 HYPERLIPIDEMIA, UNSPECIFIED HYPERLIPIDEMIA TYPE: ICD-10-CM

## 2021-10-18 RX ORDER — ATORVASTATIN CALCIUM 10 MG/1
10 TABLET, FILM COATED ORAL DAILY
Qty: 90 TABLET | Refills: 0 | Status: SHIPPED | OUTPATIENT
Start: 2021-10-18 | End: 2022-02-07

## 2021-10-20 ENCOUNTER — OFFICE VISIT (OUTPATIENT)
Dept: FAMILY MEDICINE CLINIC | Facility: CLINIC | Age: 66
End: 2021-10-20

## 2021-10-20 VITALS
TEMPERATURE: 97.4 F | HEART RATE: 87 BPM | DIASTOLIC BLOOD PRESSURE: 70 MMHG | BODY MASS INDEX: 29.95 KG/M2 | WEIGHT: 180 LBS | SYSTOLIC BLOOD PRESSURE: 172 MMHG | OXYGEN SATURATION: 98 % | RESPIRATION RATE: 17 BRPM

## 2021-10-20 DIAGNOSIS — I10 ESSENTIAL HYPERTENSION: ICD-10-CM

## 2021-10-20 DIAGNOSIS — J02.9 SORE THROAT: ICD-10-CM

## 2021-10-20 DIAGNOSIS — E11.9 DIABETIC EYE EXAM (HCC): ICD-10-CM

## 2021-10-20 DIAGNOSIS — E11.8 TYPE 2 DIABETES MELLITUS WITH COMPLICATION, WITH LONG-TERM CURRENT USE OF INSULIN (HCC): Primary | ICD-10-CM

## 2021-10-20 DIAGNOSIS — E11.42 DIABETIC POLYNEUROPATHY ASSOCIATED WITH TYPE 2 DIABETES MELLITUS (HCC): ICD-10-CM

## 2021-10-20 DIAGNOSIS — Z01.00 DIABETIC EYE EXAM (HCC): ICD-10-CM

## 2021-10-20 DIAGNOSIS — E11.8 TYPE 2 DIABETES MELLITUS WITH COMPLICATION, WITHOUT LONG-TERM CURRENT USE OF INSULIN (HCC): ICD-10-CM

## 2021-10-20 DIAGNOSIS — Z79.4 TYPE 2 DIABETES MELLITUS WITH COMPLICATION, WITH LONG-TERM CURRENT USE OF INSULIN (HCC): Primary | ICD-10-CM

## 2021-10-20 PROCEDURE — 3077F SYST BP >= 140 MM HG: CPT | Performed by: FAMILY MEDICINE

## 2021-10-20 PROCEDURE — 99214 OFFICE O/P EST MOD 30 MIN: CPT | Performed by: FAMILY MEDICINE

## 2021-10-20 PROCEDURE — 3078F DIAST BP <80 MM HG: CPT | Performed by: FAMILY MEDICINE

## 2021-10-20 PROCEDURE — 4010F ACE/ARB THERAPY RXD/TAKEN: CPT | Performed by: OBSTETRICS & GYNECOLOGY

## 2021-10-20 RX ORDER — INSULIN DETEMIR 100 [IU]/ML
55 INJECTION, SOLUTION SUBCUTANEOUS
Qty: 50 ML | Refills: 1 | Status: SHIPPED | OUTPATIENT
Start: 2021-10-20 | End: 2021-11-10 | Stop reason: SDUPTHER

## 2021-10-20 RX ORDER — AMLODIPINE BESYLATE 10 MG/1
10 TABLET ORAL DAILY
Qty: 90 TABLET | Refills: 0 | Status: SHIPPED | OUTPATIENT
Start: 2021-10-20 | End: 2022-02-16 | Stop reason: SDUPTHER

## 2021-10-20 RX ORDER — LOSARTAN POTASSIUM 50 MG/1
50 TABLET ORAL DAILY
Qty: 90 TABLET | Refills: 0 | Status: SHIPPED | OUTPATIENT
Start: 2021-10-20 | End: 2022-02-07

## 2021-10-20 RX ORDER — INSULIN LISPRO 100 [IU]/ML
21 INJECTION, SOLUTION INTRAVENOUS; SUBCUTANEOUS
Qty: 45 ML | Refills: 0 | Status: SHIPPED | OUTPATIENT
Start: 2021-10-20 | End: 2021-11-10 | Stop reason: SDUPTHER

## 2021-10-27 ENCOUNTER — OFFICE VISIT (OUTPATIENT)
Dept: OBGYN CLINIC | Facility: CLINIC | Age: 66
End: 2021-10-27

## 2021-10-27 ENCOUNTER — HOSPITAL ENCOUNTER (OUTPATIENT)
Dept: INFUSION CENTER | Facility: CLINIC | Age: 66
Discharge: HOME | End: 2021-10-27
Payer: COMMERCIAL

## 2021-10-27 ENCOUNTER — TELEPHONE (OUTPATIENT)
Dept: NEUROLOGY | Facility: CLINIC | Age: 66
End: 2021-10-27

## 2021-10-27 VITALS
SYSTOLIC BLOOD PRESSURE: 129 MMHG | DIASTOLIC BLOOD PRESSURE: 74 MMHG | BODY MASS INDEX: 29.52 KG/M2 | HEART RATE: 86 BPM | WEIGHT: 177.4 LBS

## 2021-10-27 VITALS
DIASTOLIC BLOOD PRESSURE: 79 MMHG | TEMPERATURE: 96.6 F | SYSTOLIC BLOOD PRESSURE: 125 MMHG | RESPIRATION RATE: 20 BRPM | HEART RATE: 76 BPM

## 2021-10-27 DIAGNOSIS — J45.50 SEVERE PERSISTENT ASTHMA WITHOUT COMPLICATION: Primary | ICD-10-CM

## 2021-10-27 DIAGNOSIS — R10.2 PELVIC PAIN: ICD-10-CM

## 2021-10-27 PROCEDURE — 3074F SYST BP LT 130 MM HG: CPT | Performed by: OBSTETRICS & GYNECOLOGY

## 2021-10-27 PROCEDURE — 3078F DIAST BP <80 MM HG: CPT | Performed by: OBSTETRICS & GYNECOLOGY

## 2021-10-27 PROCEDURE — 96372 THER/PROPH/DIAG INJ SC/IM: CPT

## 2021-10-27 PROCEDURE — 99213 OFFICE O/P EST LOW 20 MIN: CPT | Performed by: OBSTETRICS & GYNECOLOGY

## 2021-10-27 PROCEDURE — 1036F TOBACCO NON-USER: CPT | Performed by: OBSTETRICS & GYNECOLOGY

## 2021-10-27 RX ORDER — EPINEPHRINE 1 MG/ML
0.3 INJECTION, SOLUTION, CONCENTRATE INTRAVENOUS AS NEEDED
Status: DISCONTINUED | OUTPATIENT
Start: 2021-10-27 | End: 2021-10-30 | Stop reason: HOSPADM

## 2021-10-27 RX ORDER — EPINEPHRINE 1 MG/ML
0.3 INJECTION, SOLUTION, CONCENTRATE INTRAVENOUS AS NEEDED
Status: CANCELLED | OUTPATIENT
Start: 2021-11-10

## 2021-10-27 RX ADMIN — OMALIZUMAB 375 MG: 150 INJECTION, SOLUTION SUBCUTANEOUS at 09:03

## 2021-10-28 ENCOUNTER — HOSPITAL ENCOUNTER (OUTPATIENT)
Dept: BONE DENSITY | Facility: CLINIC | Age: 66
Discharge: HOME | End: 2021-10-28
Payer: COMMERCIAL

## 2021-10-28 DIAGNOSIS — Z13.820 SCREENING FOR OSTEOPOROSIS: ICD-10-CM

## 2021-10-28 DIAGNOSIS — M89.8X9 BONE PAIN: ICD-10-CM

## 2021-10-28 PROCEDURE — 77080 DXA BONE DENSITY AXIAL: CPT

## 2021-11-04 ENCOUNTER — TELEPHONE (OUTPATIENT)
Dept: SURGICAL ONCOLOGY | Facility: CLINIC | Age: 66
End: 2021-11-04

## 2021-11-04 ENCOUNTER — HOSPITAL ENCOUNTER (OUTPATIENT)
Dept: ULTRASOUND IMAGING | Facility: HOSPITAL | Age: 66
Discharge: HOME | End: 2021-11-04
Attending: OBSTETRICS & GYNECOLOGY
Payer: COMMERCIAL

## 2021-11-04 DIAGNOSIS — R10.2 PELVIC PAIN: ICD-10-CM

## 2021-11-04 PROCEDURE — 76856 US EXAM PELVIC COMPLETE: CPT

## 2021-11-04 PROCEDURE — 76830 TRANSVAGINAL US NON-OB: CPT

## 2021-11-05 ENCOUNTER — TELEPHONE (OUTPATIENT)
Dept: HEMATOLOGY ONCOLOGY | Facility: CLINIC | Age: 66
End: 2021-11-05

## 2021-11-08 ENCOUNTER — TELEPHONE (OUTPATIENT)
Dept: NEUROLOGY | Facility: CLINIC | Age: 66
End: 2021-11-08

## 2021-11-10 ENCOUNTER — HOSPITAL ENCOUNTER (OUTPATIENT)
Dept: INFUSION CENTER | Facility: CLINIC | Age: 66
Discharge: HOME | End: 2021-11-10

## 2021-11-10 ENCOUNTER — OFFICE VISIT (OUTPATIENT)
Dept: OBGYN CLINIC | Facility: CLINIC | Age: 66
End: 2021-11-10

## 2021-11-10 ENCOUNTER — OFFICE VISIT (OUTPATIENT)
Dept: ENDOCRINOLOGY | Facility: CLINIC | Age: 66
End: 2021-11-10
Payer: COMMERCIAL

## 2021-11-10 ENCOUNTER — HOSPITAL ENCOUNTER (OUTPATIENT)
Dept: NON INVASIVE DIAGNOSTICS | Facility: HOSPITAL | Age: 66
Discharge: HOME | End: 2021-11-10
Payer: COMMERCIAL

## 2021-11-10 VITALS
HEART RATE: 82 BPM | DIASTOLIC BLOOD PRESSURE: 80 MMHG | BODY MASS INDEX: 30.16 KG/M2 | HEIGHT: 65 IN | SYSTOLIC BLOOD PRESSURE: 130 MMHG | WEIGHT: 181 LBS

## 2021-11-10 VITALS
BODY MASS INDEX: 29.79 KG/M2 | WEIGHT: 179 LBS | HEART RATE: 85 BPM | DIASTOLIC BLOOD PRESSURE: 66 MMHG | SYSTOLIC BLOOD PRESSURE: 112 MMHG

## 2021-11-10 VITALS
HEIGHT: 65 IN | DIASTOLIC BLOOD PRESSURE: 74 MMHG | BODY MASS INDEX: 29.49 KG/M2 | WEIGHT: 177 LBS | SYSTOLIC BLOOD PRESSURE: 129 MMHG

## 2021-11-10 DIAGNOSIS — E11.8 TYPE 2 DIABETES MELLITUS WITH COMPLICATION, WITH LONG-TERM CURRENT USE OF INSULIN (HCC): ICD-10-CM

## 2021-11-10 DIAGNOSIS — R10.2 PELVIC PAIN: Primary | ICD-10-CM

## 2021-11-10 DIAGNOSIS — Z79.4 TYPE 2 DIABETES MELLITUS WITH HYPERGLYCEMIA, WITH LONG-TERM CURRENT USE OF INSULIN (HCC): ICD-10-CM

## 2021-11-10 DIAGNOSIS — E11.29 TYPE 2 DIABETES MELLITUS WITH MICROALBUMINURIA, WITH LONG-TERM CURRENT USE OF INSULIN (HCC): ICD-10-CM

## 2021-11-10 DIAGNOSIS — R07.9 CHEST PAIN, UNSPECIFIED TYPE: ICD-10-CM

## 2021-11-10 DIAGNOSIS — E11.65 TYPE 2 DIABETES MELLITUS WITH HYPERGLYCEMIA, WITH LONG-TERM CURRENT USE OF INSULIN (HCC): ICD-10-CM

## 2021-11-10 DIAGNOSIS — E11.8 TYPE 2 DIABETES MELLITUS WITH COMPLICATION, WITHOUT LONG-TERM CURRENT USE OF INSULIN (HCC): ICD-10-CM

## 2021-11-10 DIAGNOSIS — Z79.4 TYPE 2 DIABETES MELLITUS WITH MICROALBUMINURIA, WITH LONG-TERM CURRENT USE OF INSULIN (HCC): ICD-10-CM

## 2021-11-10 DIAGNOSIS — R80.9 TYPE 2 DIABETES MELLITUS WITH MICROALBUMINURIA, WITH LONG-TERM CURRENT USE OF INSULIN (HCC): ICD-10-CM

## 2021-11-10 DIAGNOSIS — Z79.4 TYPE 2 DIABETES MELLITUS WITH COMPLICATION, WITH LONG-TERM CURRENT USE OF INSULIN (HCC): ICD-10-CM

## 2021-11-10 DIAGNOSIS — J45.50 SEVERE PERSISTENT ASTHMA WITHOUT COMPLICATION: ICD-10-CM

## 2021-11-10 DIAGNOSIS — E03.8 OTHER SPECIFIED HYPOTHYROIDISM: Primary | ICD-10-CM

## 2021-11-10 DIAGNOSIS — E78.00 HYPERCHOLESTEROLEMIA: ICD-10-CM

## 2021-11-10 LAB
AORTIC ROOT: 2.7 CM
APICAL FOUR CHAMBER EJECTION FRACTION: 64 %
E WAVE DECELERATION TIME: 247 MS
FRACTIONAL SHORTENING: 35 % (ref 28–44)
INTERVENTRICULAR SEPTUM IN DIASTOLE (PARASTERNAL SHORT AXIS VIEW): 0.9 CM
LEFT ATRIUM AREA SYSTOLE SINGLE PLANE A4C: 15.1 CM2
LEFT INTERNAL DIMENSION IN SYSTOLE: 2.8 CM (ref 2.1–4)
LEFT VENTRICULAR INTERNAL DIMENSION IN DIASTOLE: 4.3 CM (ref 4.52–6.73)
LEFT VENTRICULAR POSTERIOR WALL IN END DIASTOLE: 0.8 CM
LEFT VENTRICULAR STROKE VOLUME: 57 ML
MV E'TISSUE VEL-SEP: 9 CM/S
MV PEAK A VEL: 1.21 M/S
MV PEAK E VEL: 118 CM/S
MV STENOSIS PRESSURE HALF TIME: 0 MS
RIGHT ATRIUM AREA SYSTOLE A4C: 13.4 CM2
RIGHT VENTRICLE ID DIMENSION: 2.8 CM
RV PSP: 28 MMHG
SL CV LV EF: 65
SL CV PED ECHO LEFT VENTRICLE DIASTOLIC VOLUME (MOD BIPLANE) 2D: 85 ML
SL CV PED ECHO LEFT VENTRICLE SYSTOLIC VOLUME (MOD BIPLANE) 2D: 29 ML
TRICUSPID VALVE PEAK REGURGITATION VELOCITY: 2.51 M/S
TRICUSPID VALVE S': 1.2 CM/S
TV PEAK GRADIENT: 25 MMHG
Z-SCORE OF LEFT VENTRICULAR DIMENSION IN END SYSTOLE: -2.48

## 2021-11-10 PROCEDURE — 93306 TTE W/DOPPLER COMPLETE: CPT

## 2021-11-10 PROCEDURE — 99214 OFFICE O/P EST MOD 30 MIN: CPT | Performed by: OBSTETRICS & GYNECOLOGY

## 2021-11-10 PROCEDURE — 3066F NEPHROPATHY DOC TX: CPT | Performed by: OBSTETRICS & GYNECOLOGY

## 2021-11-10 PROCEDURE — 3078F DIAST BP <80 MM HG: CPT | Performed by: OBSTETRICS & GYNECOLOGY

## 2021-11-10 PROCEDURE — 1036F TOBACCO NON-USER: CPT | Performed by: OBSTETRICS & GYNECOLOGY

## 2021-11-10 PROCEDURE — 99214 OFFICE O/P EST MOD 30 MIN: CPT | Performed by: INTERNAL MEDICINE

## 2021-11-10 PROCEDURE — 3074F SYST BP LT 130 MM HG: CPT | Performed by: OBSTETRICS & GYNECOLOGY

## 2021-11-10 RX ORDER — INSULIN LISPRO 100 [IU]/ML
INJECTION, SOLUTION INTRAVENOUS; SUBCUTANEOUS
Qty: 45 ML | Refills: 0 | Status: SHIPPED | OUTPATIENT
Start: 2021-11-10 | End: 2022-06-13 | Stop reason: SDUPTHER

## 2021-11-10 RX ORDER — INSULIN DETEMIR 100 [IU]/ML
INJECTION, SOLUTION SUBCUTANEOUS
Qty: 50 ML | Refills: 1 | Status: SHIPPED | OUTPATIENT
Start: 2021-11-10 | End: 2022-06-01

## 2021-11-11 ENCOUNTER — HOSPITAL ENCOUNTER (OUTPATIENT)
Dept: INFUSION CENTER | Facility: CLINIC | Age: 66
Discharge: HOME | End: 2021-11-11
Payer: COMMERCIAL

## 2021-11-11 VITALS
SYSTOLIC BLOOD PRESSURE: 151 MMHG | DIASTOLIC BLOOD PRESSURE: 77 MMHG | TEMPERATURE: 97.6 F | RESPIRATION RATE: 18 BRPM | HEART RATE: 85 BPM

## 2021-11-11 DIAGNOSIS — J45.50 SEVERE PERSISTENT ASTHMA WITHOUT COMPLICATION: Primary | ICD-10-CM

## 2021-11-11 PROCEDURE — 96372 THER/PROPH/DIAG INJ SC/IM: CPT

## 2021-11-11 RX ORDER — EPINEPHRINE 1 MG/ML
0.3 INJECTION, SOLUTION, CONCENTRATE INTRAVENOUS AS NEEDED
Status: CANCELLED | OUTPATIENT
Start: 2021-11-24

## 2021-11-11 RX ADMIN — OMALIZUMAB 375 MG: 150 INJECTION, SOLUTION SUBCUTANEOUS at 14:40

## 2021-11-12 ENCOUNTER — TELEPHONE (OUTPATIENT)
Dept: FAMILY MEDICINE CLINIC | Facility: CLINIC | Age: 66
End: 2021-11-12

## 2021-11-15 ENCOUNTER — OFFICE VISIT (OUTPATIENT)
Dept: HEMATOLOGY ONCOLOGY | Facility: CLINIC | Age: 66
End: 2021-11-15
Payer: COMMERCIAL

## 2021-11-15 VITALS
HEART RATE: 86 BPM | TEMPERATURE: 98.2 F | HEIGHT: 65 IN | SYSTOLIC BLOOD PRESSURE: 116 MMHG | WEIGHT: 177.4 LBS | OXYGEN SATURATION: 99 % | DIASTOLIC BLOOD PRESSURE: 74 MMHG | BODY MASS INDEX: 29.56 KG/M2 | RESPIRATION RATE: 20 BRPM

## 2021-11-15 DIAGNOSIS — C50.411 MALIGNANT NEOPLASM OF UPPER-OUTER QUADRANT OF RIGHT BREAST IN FEMALE, ESTROGEN RECEPTOR POSITIVE (HCC): Primary | ICD-10-CM

## 2021-11-15 DIAGNOSIS — Z17.0 MALIGNANT NEOPLASM OF UPPER-OUTER QUADRANT OF RIGHT BREAST IN FEMALE, ESTROGEN RECEPTOR POSITIVE (HCC): Primary | ICD-10-CM

## 2021-11-15 PROCEDURE — 99214 OFFICE O/P EST MOD 30 MIN: CPT | Performed by: INTERNAL MEDICINE

## 2021-11-15 RX ORDER — ANASTROZOLE 1 MG/1
1 TABLET ORAL DAILY
Qty: 90 TABLET | Refills: 3 | Status: SHIPPED | OUTPATIENT
Start: 2021-11-15

## 2021-11-16 ENCOUNTER — TELEPHONE (OUTPATIENT)
Dept: FAMILY MEDICINE CLINIC | Facility: CLINIC | Age: 66
End: 2021-11-16

## 2021-11-16 ENCOUNTER — HOSPITAL ENCOUNTER (OUTPATIENT)
Dept: NON INVASIVE DIAGNOSTICS | Facility: HOSPITAL | Age: 66
Discharge: HOME | End: 2021-11-16
Payer: COMMERCIAL

## 2021-11-16 ENCOUNTER — TELEPHONE (OUTPATIENT)
Dept: GASTROENTEROLOGY | Facility: MEDICAL CENTER | Age: 66
End: 2021-11-16

## 2021-11-16 DIAGNOSIS — R07.9 CHEST PAIN, UNSPECIFIED TYPE: ICD-10-CM

## 2021-11-16 PROCEDURE — 93017 CV STRESS TEST TRACING ONLY: CPT

## 2021-11-16 NOTE — TELEPHONE ENCOUNTER
Spoke to [place] in the office regarding patients Biopsy results  Johnnie leyva states that a D&C is recommended, but patient has no follow up scheduled  I asked if someone from their office could reach out to the patient to get her into the office and get her scheduled for D&C so we could prepare to hold her chemo if need be  Johnnie leyva stated that she would message the provider, and someone from their office will call patient to make follow up appointment  My direct phone number was provided so that I can be reached  fall

## 2021-11-17 ENCOUNTER — OFFICE VISIT (OUTPATIENT)
Dept: GASTROENTEROLOGY | Facility: CLINIC | Age: 66
End: 2021-11-17
Payer: COMMERCIAL

## 2021-11-17 ENCOUNTER — PATIENT OUTREACH (OUTPATIENT)
Dept: CASE MANAGEMENT | Facility: OTHER | Age: 66
End: 2021-11-17

## 2021-11-17 VITALS
TEMPERATURE: 97.4 F | BODY MASS INDEX: 29.39 KG/M2 | DIASTOLIC BLOOD PRESSURE: 80 MMHG | WEIGHT: 176.4 LBS | HEIGHT: 65 IN | HEART RATE: 92 BPM | SYSTOLIC BLOOD PRESSURE: 120 MMHG

## 2021-11-17 DIAGNOSIS — K58.1 IRRITABLE BOWEL SYNDROME WITH CONSTIPATION: Primary | ICD-10-CM

## 2021-11-17 DIAGNOSIS — R10.13 EPIGASTRIC PAIN: ICD-10-CM

## 2021-11-17 DIAGNOSIS — D36.9 TUBULAR ADENOMA: ICD-10-CM

## 2021-11-17 DIAGNOSIS — A04.8 H. PYLORI INFECTION: ICD-10-CM

## 2021-11-17 DIAGNOSIS — Z78.9 NEED FOR FOLLOW-UP BY SOCIAL WORKER: Primary | ICD-10-CM

## 2021-11-17 LAB
CHEST PAIN STATEMENT: NORMAL
MAX DIASTOLIC BP: 72 MMHG
MAX HEART RATE: 129 BPM
MAX PREDICTED HEART RATE: 155 BPM
MAX. SYSTOLIC BP: 168 MMHG
PROTOCOL NAME: NORMAL
TARGET HR FORMULA: NORMAL
TEST INDICATION: NORMAL
TIME IN EXERCISE PHASE: NORMAL

## 2021-11-17 PROCEDURE — 99214 OFFICE O/P EST MOD 30 MIN: CPT | Performed by: PHYSICIAN ASSISTANT

## 2021-11-17 PROCEDURE — 3008F BODY MASS INDEX DOCD: CPT | Performed by: OBSTETRICS & GYNECOLOGY

## 2021-11-17 RX ORDER — LUBIPROSTONE 8 UG/1
8 CAPSULE, GELATIN COATED ORAL 2 TIMES DAILY WITH MEALS
Qty: 60 CAPSULE | Refills: 2 | Status: SHIPPED | OUTPATIENT
Start: 2021-11-17 | End: 2022-05-02

## 2021-11-17 RX ORDER — POLYETHYLENE GLYCOL 3350 17 G/17G
POWDER, FOR SOLUTION ORAL
Qty: 238 G | Refills: 0 | Status: SHIPPED | OUTPATIENT
Start: 2021-11-17

## 2021-11-18 LAB
BASELINE ST DEPRESSION: 0 MM
MAX HR PERCENT: 83 %
MAX HR: 155 BPM
RATE PRESSURE PRODUCT: NORMAL
SL CV STRESS RECOVERY BP: NORMAL MMHG
SL CV STRESS RECOVERY HR: 90 BPM
SL CV STRESS RECOVERY O2 SAT: 98 %
SL CV STRESS STAGE REACHED: 2
STRESS ANGINA INDEX: 1
STRESS BASELINE BP: NORMAL MMHG
STRESS BASELINE HR: 88 BPM
STRESS DUKE TREADMILL SCORE: -1
STRESS O2 SAT REST: 97 %
STRESS PEAK HR: 129 BPM
STRESS PERCENT HR: 83 %
STRESS POST ESTIMATED WORKLOAD: 4.9 METS
STRESS POST EXERCISE DUR MIN: 3 MIN
STRESS POST EXERCISE DUR SEC: 19 SEC
STRESS POST O2 SAT PEAK: 98 %
STRESS POST PEAK BP: 168 MMHG
STRESS ST DEPRESSION: 0 MM
STRESS TARGET HR: 129 BPM

## 2021-11-18 PROCEDURE — 93018 CV STRESS TEST I&R ONLY: CPT

## 2021-11-18 PROCEDURE — 93016 CV STRESS TEST SUPVJ ONLY: CPT

## 2021-11-19 ENCOUNTER — PATIENT OUTREACH (OUTPATIENT)
Dept: CASE MANAGEMENT | Facility: OTHER | Age: 66
End: 2021-11-19

## 2021-11-23 ENCOUNTER — TELEPHONE (OUTPATIENT)
Dept: FAMILY MEDICINE CLINIC | Facility: CLINIC | Age: 66
End: 2021-11-23

## 2021-11-24 ENCOUNTER — HOSPITAL ENCOUNTER (OUTPATIENT)
Dept: INFUSION CENTER | Facility: CLINIC | Age: 66
Discharge: HOME | End: 2021-11-24
Payer: COMMERCIAL

## 2021-11-24 ENCOUNTER — TELEPHONE (OUTPATIENT)
Dept: FAMILY MEDICINE CLINIC | Facility: CLINIC | Age: 66
End: 2021-11-24

## 2021-11-24 VITALS
DIASTOLIC BLOOD PRESSURE: 78 MMHG | RESPIRATION RATE: 18 BRPM | TEMPERATURE: 97.4 F | SYSTOLIC BLOOD PRESSURE: 122 MMHG | HEART RATE: 88 BPM

## 2021-11-24 DIAGNOSIS — J45.50 SEVERE PERSISTENT ASTHMA WITHOUT COMPLICATION: Primary | ICD-10-CM

## 2021-11-24 PROCEDURE — 96372 THER/PROPH/DIAG INJ SC/IM: CPT

## 2021-11-24 RX ORDER — EPINEPHRINE 1 MG/ML
0.3 INJECTION, SOLUTION, CONCENTRATE INTRAVENOUS AS NEEDED
Status: CANCELLED | OUTPATIENT
Start: 2021-11-25

## 2021-11-24 RX ADMIN — OMALIZUMAB 375 MG: 150 INJECTION, SOLUTION SUBCUTANEOUS at 14:56

## 2021-11-26 ENCOUNTER — TELEPHONE (OUTPATIENT)
Dept: FAMILY MEDICINE CLINIC | Facility: CLINIC | Age: 66
End: 2021-11-26

## 2021-11-26 ENCOUNTER — PATIENT OUTREACH (OUTPATIENT)
Dept: CASE MANAGEMENT | Facility: OTHER | Age: 66
End: 2021-11-26

## 2021-12-01 ENCOUNTER — OFFICE VISIT (OUTPATIENT)
Dept: FAMILY MEDICINE CLINIC | Facility: CLINIC | Age: 66
End: 2021-12-01
Payer: COMMERCIAL

## 2021-12-01 DIAGNOSIS — R80.9 TYPE 2 DIABETES MELLITUS WITH MICROALBUMINURIA, WITH LONG-TERM CURRENT USE OF INSULIN (HCC): ICD-10-CM

## 2021-12-01 DIAGNOSIS — E11.65 TYPE 2 DIABETES MELLITUS WITH HYPERGLYCEMIA, WITH LONG-TERM CURRENT USE OF INSULIN (HCC): ICD-10-CM

## 2021-12-01 DIAGNOSIS — Z79.4 TYPE 2 DIABETES MELLITUS WITH MICROALBUMINURIA, WITH LONG-TERM CURRENT USE OF INSULIN (HCC): ICD-10-CM

## 2021-12-01 DIAGNOSIS — Z79.4 TYPE 2 DIABETES MELLITUS WITH HYPERGLYCEMIA, WITH LONG-TERM CURRENT USE OF INSULIN (HCC): ICD-10-CM

## 2021-12-01 DIAGNOSIS — E11.29 TYPE 2 DIABETES MELLITUS WITH MICROALBUMINURIA, WITH LONG-TERM CURRENT USE OF INSULIN (HCC): ICD-10-CM

## 2021-12-01 PROCEDURE — G0109 DIAB MANAGE TRN IND/GROUP: HCPCS | Performed by: DIETITIAN, REGISTERED

## 2021-12-01 PROCEDURE — G0108 DIAB MANAGE TRN  PER INDIV: HCPCS | Performed by: DIETITIAN, REGISTERED

## 2021-12-06 ENCOUNTER — PATIENT OUTREACH (OUTPATIENT)
Dept: CASE MANAGEMENT | Facility: OTHER | Age: 66
End: 2021-12-06

## 2021-12-08 ENCOUNTER — HOSPITAL ENCOUNTER (OUTPATIENT)
Dept: INFUSION CENTER | Facility: CLINIC | Age: 66
Discharge: HOME | End: 2021-12-08
Payer: COMMERCIAL

## 2021-12-08 VITALS — TEMPERATURE: 98 F

## 2021-12-08 DIAGNOSIS — J45.50 SEVERE PERSISTENT ASTHMA WITHOUT COMPLICATION: ICD-10-CM

## 2021-12-08 DIAGNOSIS — J45.50 SEVERE PERSISTENT ASTHMA WITHOUT COMPLICATION: Primary | ICD-10-CM

## 2021-12-08 PROCEDURE — 96372 THER/PROPH/DIAG INJ SC/IM: CPT

## 2021-12-08 RX ORDER — EPINEPHRINE 1 MG/ML
0.3 INJECTION, SOLUTION, CONCENTRATE INTRAVENOUS AS NEEDED
Status: DISCONTINUED | OUTPATIENT
Start: 2021-12-08 | End: 2021-12-11 | Stop reason: HOSPADM

## 2021-12-08 RX ORDER — EPINEPHRINE 0.3 MG/.3ML
0.3 INJECTION SUBCUTANEOUS ONCE
Qty: 0.6 ML | Refills: 0 | Status: SHIPPED | OUTPATIENT
Start: 2021-12-08 | End: 2022-05-02

## 2021-12-08 RX ORDER — EPINEPHRINE 1 MG/ML
0.3 INJECTION, SOLUTION, CONCENTRATE INTRAVENOUS AS NEEDED
Status: CANCELLED | OUTPATIENT
Start: 2021-12-22

## 2021-12-08 RX ADMIN — OMALIZUMAB 375 MG: 150 INJECTION, SOLUTION SUBCUTANEOUS at 11:28

## 2021-12-09 ENCOUNTER — PATIENT OUTREACH (OUTPATIENT)
Dept: CASE MANAGEMENT | Facility: OTHER | Age: 66
End: 2021-12-09

## 2021-12-10 ENCOUNTER — TELEPHONE (OUTPATIENT)
Dept: FAMILY MEDICINE CLINIC | Facility: CLINIC | Age: 66
End: 2021-12-10

## 2021-12-13 DIAGNOSIS — I10 ESSENTIAL HYPERTENSION: ICD-10-CM

## 2021-12-13 RX ORDER — OMEPRAZOLE 20 MG/1
20 CAPSULE, DELAYED RELEASE ORAL DAILY
Qty: 90 CAPSULE | Refills: 1 | Status: SHIPPED | OUTPATIENT
Start: 2021-12-13 | End: 2022-06-01

## 2021-12-19 ENCOUNTER — HOSPITAL ENCOUNTER (EMERGENCY)
Facility: HOSPITAL | Age: 66
Discharge: HOME | End: 2021-12-19
Attending: EMERGENCY MEDICINE
Payer: COMMERCIAL

## 2021-12-19 VITALS
SYSTOLIC BLOOD PRESSURE: 170 MMHG | BODY MASS INDEX: 29.72 KG/M2 | TEMPERATURE: 98.8 F | OXYGEN SATURATION: 97 % | HEART RATE: 90 BPM | DIASTOLIC BLOOD PRESSURE: 77 MMHG | RESPIRATION RATE: 14 BRPM | WEIGHT: 178.57 LBS

## 2021-12-19 DIAGNOSIS — H10.30 CONJUNCTIVITIS, ACUTE: Primary | ICD-10-CM

## 2021-12-19 DIAGNOSIS — Z20.822 ENCOUNTER FOR LABORATORY TESTING FOR COVID-19 VIRUS: ICD-10-CM

## 2021-12-19 DIAGNOSIS — J06.9 UPPER RESPIRATORY INFECTION: ICD-10-CM

## 2021-12-19 DIAGNOSIS — J45.50 SEVERE PERSISTENT ASTHMA WITHOUT COMPLICATION: ICD-10-CM

## 2021-12-19 PROCEDURE — 99284 EMERGENCY DEPT VISIT MOD MDM: CPT | Performed by: PHYSICIAN ASSISTANT

## 2021-12-19 PROCEDURE — 99283 EMERGENCY DEPT VISIT LOW MDM: CPT

## 2021-12-19 PROCEDURE — 87636 SARSCOV2 & INF A&B AMP PRB: CPT | Performed by: PHYSICIAN ASSISTANT

## 2021-12-19 RX ORDER — NAPROXEN 500 MG/1
500 TABLET ORAL 2 TIMES DAILY WITH MEALS
Qty: 10 TABLET | Refills: 0 | Status: SHIPPED | OUTPATIENT
Start: 2021-12-19 | End: 2022-05-02

## 2021-12-19 RX ORDER — ALBUTEROL SULFATE 2.5 MG/3ML
2.5 SOLUTION RESPIRATORY (INHALATION) EVERY 6 HOURS PRN
Qty: 100 ML | Refills: 0 | Status: SHIPPED | OUTPATIENT
Start: 2021-12-19 | End: 2022-03-10

## 2021-12-19 RX ORDER — TETRACAINE HYDROCHLORIDE 5 MG/ML
1 SOLUTION OPHTHALMIC ONCE
Status: COMPLETED | OUTPATIENT
Start: 2021-12-19 | End: 2021-12-19

## 2021-12-19 RX ORDER — ALBUTEROL SULFATE 90 UG/1
1 AEROSOL, METERED RESPIRATORY (INHALATION) EVERY 4 HOURS PRN
Qty: 8 G | Refills: 0 | Status: SHIPPED | OUTPATIENT
Start: 2021-12-19 | End: 2022-03-09

## 2021-12-19 RX ORDER — ERYTHROMYCIN 5 MG/G
OINTMENT OPHTHALMIC
Qty: 3.5 G | Refills: 0 | Status: SHIPPED | OUTPATIENT
Start: 2021-12-19 | End: 2022-05-02

## 2021-12-19 RX ORDER — ACETAMINOPHEN 325 MG/1
650 TABLET ORAL ONCE
Status: DISCONTINUED | OUTPATIENT
Start: 2021-12-19 | End: 2021-12-19

## 2021-12-19 RX ORDER — BENZONATATE 100 MG/1
100 CAPSULE ORAL 3 TIMES DAILY PRN
Qty: 20 CAPSULE | Refills: 0 | Status: SHIPPED | OUTPATIENT
Start: 2021-12-19 | End: 2021-12-26

## 2021-12-19 RX ADMIN — FLUORESCEIN SODIUM 1 STRIP: 0.6 STRIP OPHTHALMIC at 14:20

## 2021-12-19 RX ADMIN — TETRACAINE HYDROCHLORIDE 1 DROP: 5 SOLUTION OPHTHALMIC at 14:20

## 2021-12-20 LAB
FLUAV RNA RESP QL NAA+PROBE: NEGATIVE
FLUBV RNA RESP QL NAA+PROBE: NEGATIVE
SARS-COV-2 RNA RESP QL NAA+PROBE: NEGATIVE

## 2021-12-21 ENCOUNTER — TELEPHONE (OUTPATIENT)
Dept: FAMILY MEDICINE CLINIC | Facility: CLINIC | Age: 66
End: 2021-12-21

## 2021-12-22 ENCOUNTER — HOSPITAL ENCOUNTER (OUTPATIENT)
Dept: INFUSION CENTER | Facility: CLINIC | Age: 66
Discharge: HOME | End: 2021-12-22
Payer: COMMERCIAL

## 2021-12-22 ENCOUNTER — TELEPHONE (OUTPATIENT)
Dept: FAMILY MEDICINE CLINIC | Facility: CLINIC | Age: 66
End: 2021-12-22

## 2021-12-22 VITALS
SYSTOLIC BLOOD PRESSURE: 136 MMHG | TEMPERATURE: 96.7 F | DIASTOLIC BLOOD PRESSURE: 73 MMHG | HEART RATE: 102 BPM | RESPIRATION RATE: 18 BRPM

## 2021-12-22 DIAGNOSIS — J45.50 SEVERE PERSISTENT ASTHMA WITHOUT COMPLICATION: Primary | ICD-10-CM

## 2021-12-22 PROCEDURE — 96372 THER/PROPH/DIAG INJ SC/IM: CPT

## 2021-12-22 RX ORDER — EPINEPHRINE 1 MG/ML
0.3 INJECTION, SOLUTION, CONCENTRATE INTRAVENOUS AS NEEDED
Status: CANCELLED | OUTPATIENT
Start: 2022-01-05

## 2021-12-22 RX ORDER — EPINEPHRINE 1 MG/ML
0.3 INJECTION, SOLUTION, CONCENTRATE INTRAVENOUS AS NEEDED
Status: DISCONTINUED | OUTPATIENT
Start: 2021-12-22 | End: 2021-12-25 | Stop reason: HOSPADM

## 2021-12-22 RX ADMIN — OMALIZUMAB 375 MG: 150 INJECTION, SOLUTION SUBCUTANEOUS at 10:27

## 2021-12-29 ENCOUNTER — TELEPHONE (OUTPATIENT)
Dept: FAMILY MEDICINE CLINIC | Facility: CLINIC | Age: 66
End: 2021-12-29

## 2022-01-05 ENCOUNTER — HOSPITAL ENCOUNTER (OUTPATIENT)
Dept: INFUSION CENTER | Facility: CLINIC | Age: 67
Discharge: HOME | End: 2022-01-05
Payer: COMMERCIAL

## 2022-01-05 VITALS
RESPIRATION RATE: 18 BRPM | DIASTOLIC BLOOD PRESSURE: 80 MMHG | HEART RATE: 84 BPM | SYSTOLIC BLOOD PRESSURE: 137 MMHG | TEMPERATURE: 97.8 F

## 2022-01-05 DIAGNOSIS — J45.50 SEVERE PERSISTENT ASTHMA WITHOUT COMPLICATION: Primary | ICD-10-CM

## 2022-01-05 PROCEDURE — 96372 THER/PROPH/DIAG INJ SC/IM: CPT

## 2022-01-05 RX ORDER — EPINEPHRINE 1 MG/ML
0.3 INJECTION, SOLUTION, CONCENTRATE INTRAVENOUS AS NEEDED
Status: DISCONTINUED | OUTPATIENT
Start: 2022-01-05 | End: 2022-01-08 | Stop reason: HOSPADM

## 2022-01-05 RX ORDER — EPINEPHRINE 1 MG/ML
0.3 INJECTION, SOLUTION, CONCENTRATE INTRAVENOUS AS NEEDED
Status: CANCELLED | OUTPATIENT
Start: 2022-01-19

## 2022-01-05 RX ADMIN — OMALIZUMAB 375 MG: 150 INJECTION, SOLUTION SUBCUTANEOUS at 10:22

## 2022-01-05 NOTE — PROGRESS NOTES
Patient arrived to unit without complaint  Patient has in date Epi Pen with her today  Patient tolerated Xolair injection SC to L arm x 1 and R arm x 2 without incident  AVS printed and patient aware of next appointment  After 30 minute wait time, patient left in stable condition

## 2022-01-17 ENCOUNTER — TELEPHONE (OUTPATIENT)
Dept: PREADMISSION TESTING | Facility: HOSPITAL | Age: 67
End: 2022-01-17

## 2022-01-19 ENCOUNTER — TELEPHONE (OUTPATIENT)
Dept: FAMILY MEDICINE CLINIC | Facility: CLINIC | Age: 67
End: 2022-01-19

## 2022-01-19 ENCOUNTER — HOSPITAL ENCOUNTER (OUTPATIENT)
Dept: INFUSION CENTER | Facility: CLINIC | Age: 67
Discharge: HOME | End: 2022-01-19
Payer: COMMERCIAL

## 2022-01-19 VITALS
TEMPERATURE: 98.2 F | SYSTOLIC BLOOD PRESSURE: 132 MMHG | HEART RATE: 82 BPM | RESPIRATION RATE: 18 BRPM | DIASTOLIC BLOOD PRESSURE: 72 MMHG

## 2022-01-19 DIAGNOSIS — J45.50 SEVERE PERSISTENT ASTHMA WITHOUT COMPLICATION: Primary | ICD-10-CM

## 2022-01-19 PROCEDURE — 96372 THER/PROPH/DIAG INJ SC/IM: CPT

## 2022-01-19 RX ORDER — EPINEPHRINE 1 MG/ML
0.3 INJECTION, SOLUTION, CONCENTRATE INTRAVENOUS AS NEEDED
Status: CANCELLED | OUTPATIENT
Start: 2022-02-02

## 2022-01-19 RX ADMIN — OMALIZUMAB 375 MG: 150 INJECTION, SOLUTION SUBCUTANEOUS at 10:22

## 2022-01-19 NOTE — PROGRESS NOTES
Patient arrived to the infusion center with her epi pen  Offers no complaints  Xolair givenin 3 injections,  x2 on left arm/x1 on right arm  Pt observed for 30 minutes post injections without adverse events  Pt is aware of all future appointments  AVS provided

## 2022-01-25 ENCOUNTER — TELEPHONE (OUTPATIENT)
Dept: FAMILY MEDICINE CLINIC | Facility: CLINIC | Age: 67
End: 2022-01-25

## 2022-01-26 ENCOUNTER — OFFICE VISIT (OUTPATIENT)
Dept: CARDIOLOGY CLINIC | Facility: CLINIC | Age: 67
End: 2022-01-26
Payer: COMMERCIAL

## 2022-01-26 VITALS
BODY MASS INDEX: 29.39 KG/M2 | WEIGHT: 176.4 LBS | HEART RATE: 79 BPM | DIASTOLIC BLOOD PRESSURE: 64 MMHG | SYSTOLIC BLOOD PRESSURE: 115 MMHG | HEIGHT: 65 IN

## 2022-01-26 DIAGNOSIS — I10 PRIMARY HYPERTENSION: ICD-10-CM

## 2022-01-26 DIAGNOSIS — R07.89 OTHER CHEST PAIN: Primary | ICD-10-CM

## 2022-01-26 DIAGNOSIS — K21.9 GASTROESOPHAGEAL REFLUX DISEASE WITHOUT ESOPHAGITIS: ICD-10-CM

## 2022-01-26 PROCEDURE — 99214 OFFICE O/P EST MOD 30 MIN: CPT | Performed by: INTERNAL MEDICINE

## 2022-01-26 PROCEDURE — 93000 ELECTROCARDIOGRAM COMPLETE: CPT | Performed by: INTERNAL MEDICINE

## 2022-01-26 NOTE — PROGRESS NOTES
Cardiology Follow Up    Jolie Mulligan  1955  [id number]  [address]  [facility]  [place] [address]   [phone]    1  Other chest pain  POCT ECG   2  Primary hypertension     3  Gastroesophageal reflux disease without esophagitis         Interval History:  Had noted a number of episodes of chest pain  Typically occurs when lying flat  Does not occur on a regular basis with exertion  She was evaluated for similar problem 2 years ago stress testing was negative for ischemia  She denies shortness of breath orthopnea paroxysmal nocturnal dyspnea or syncope      Patient Active Problem List   Diagnosis    Type 2 diabetes mellitus with complication, with long-term current use of insulin ([facility] )    Severe persistent asthma without complication    Other specified hypothyroidism    Chest pain    Palpitations    Chronic bilateral low back pain without sciatica    Neck pain    Malignant neoplasm of right female breast ([address] )    Malignant neoplasm of upper-outer quadrant of right breast in female, estrogen receptor positive ([facility] [address] )    Hiatal hernia    Screening for colon cancer    Esophageal dysphagia    Cellulitis of right axilla    Chronic pain of right knee    Hypercholesterolemia    Hypothyroid    Hypertension    Bilateral pulmonary embolism (HCC)    Radiation pneumonitis (HCC)    Type 2 diabetes mellitus with hyperglycemia, with long-term current use of insulin ([address] )    Diabetic polyneuropathy associated with type 2 diabetes mellitus (HCC)    Use of anastrozole (Arimidex)    Decreased ROM of right shoulder    Lump of skin    Chronic diastolic heart failure (HCC)    Gastroesophageal reflux disease without esophagitis    Muscle cramps    Sebaceous cyst    Subcutaneous mass of back    Overweight with body mass index (BMI) of 28 to 28 9 in adult    Anxiety    Right foot pain    Pelvic pain    Bone pain    Type 2 diabetes mellitus with microalbuminuria, with long-term current use of insulin (HCC)     Past Medical History:   Diagnosis Date    Abdominal infection ([place] [address] )     h-pylori    Abnormal chest x-ray 4/5/2019    Asthma     Breast cancer ([place] [address] ) 06/26/2018    Cancer ([place] [address] )     BREAST    Diabetes mellitus ([place] [address] )     Hiatal hernia     History of chemotherapy     History of radiation therapy     Hypercholesterolemia     Hypertension     Hypothyroid     Renal disorder     Rheumatoid arthritis ([place] [address] )      Social History     Socioeconomic History    Marital status:      Spouse name: Not on file    Number of children: Not on file    Years of education: Not on file    Highest education level: Not on file   Occupational History    Not on file   Tobacco Use    Smoking status: Never Smoker    Smokeless tobacco: Never Used   Vaping Use    Vaping Use: Never used   Substance and Sexual Activity    Alcohol use: No    Drug use: No    Sexual activity: Not Currently   Other Topics Concern    Not on file   Social History Narrative    Not on file     Social Determinants of Health     Financial Resource Strain: Low Risk     Difficulty of Paying Living Expenses: Not hard at all   Food Insecurity: No Food Insecurity    Worried About Running Out of Food in the Last Year: Never true    Rosana of Food in the Last Year: Never true   Transportation Needs: No Transportation Needs    Lack of Transportation (Medical): No    Lack of Transportation (Non-Medical):  No   Physical Activity: Not on file   Stress: Not on file   Social Connections: Not on file   Intimate Partner Violence: Not on file   Housing Stability: Low Risk     Unable to Pay for Housing in the Last Year: No    Number of Places Lived in the Last Year: 1    Unstable Housing in the Last Year: No      Family History   Problem Relation Age of Onset    Diabetes Mother     Hypertension Mother    [facility] Diabetes Father    [facility] Hypertension Father     Diabetes Brother     Hypertension Brother     Prostate cancer Brother     Cancer Maternal Grandfather     No Known Problems Sister     No Known Problems Daughter     No Known Problems Sister     No Known Problems Sister     No Known Problems Sister     No Known Problems Sister     No Known Problems Daughter     No Known Problems Daughter      Past Surgical History:   Procedure Laterality Date    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    BREAST LUMPECTOMY Right 6/26/2018    Procedure: RIGHT BREAST NEEDLE LOCALIZATION X2 WITH RIGHT BREAST LUMPECTOMY ( NEEDLE LOC @ 1000); Surgeon: Carmen Ramirez MD;  Location: [facility];  Service: Surgical Oncology    BREAST LUMPECTOMY Right 8/2/2018    Procedure: LYMPHOSCINITGRAPHY INTRAOPERATIVE LYMPHATIC MAPPING , RIGHT SENTINEL NODE BIOPSY, REEXCISION  RIGHT BREAST LUMPECTOMY CAVITY (SUPERIOR MARGIN); Surgeon: Carmen Ramirez MD;  Location: [facility];  Service: Surgical Oncology    BREAST SURGERY Left     CATARACT EXTRACTION, BILATERAL Bilateral     COLONOSCOPY      EGD AND COLONOSCOPY N/A 9/5/2018    Procedure: EGD AND COLONOSCOPY;  Surgeon: Javier Herman MD;  Location: [facility]; Service: Gastroenterology    [facility] GUIDED CENTRAL VENOUS ACCESS DEVICE INSERTION  9/13/2018    KNEE SURGERY Right     MAMMO NEEDLE LOCALIZATION RIGHT (ALL INC) Right 6/26/2018    MAMMO STEREOTACTIC BREAST BIOPSY RIGHT (ALL INC) Right 5/23/2018    RETINAL DETACHMENT SURGERY Right     TUBAL LIGATION      TUNNELED VENOUS PORT PLACEMENT Left 9/13/2018    Procedure: INSERTION VENOUS PORT (PORT-A-CATH);   Surgeon: Carmen Ramirez MD;  [facility];  Service: Surgical Oncology    UPPER GASTROINTESTINAL ENDOSCOPY         Current Outpatient Medications:     albuterol (2 5 mg/3 mL) 0 083 % nebulizer solution, Take 3 mL (2 5 mg total) by nebulization every 6 (six) hours as needed for wheezing or shortness of breath, Disp: 100 mL, Rfl: 0   albuterol (PROVENTIL HFA,VENTOLIN HFA) 90 mcg/act inhaler, Inhale 1 puff every 4 (four) hours as needed for wheezing, Disp: 8 g, Rfl: 0    amLODIPine (NORVASC) 10 mg tablet, Take 1 tablet (10 mg total) by mouth daily, Disp: 90 tablet, Rfl: 0    anastrozole (ARIMIDEX) 1 mg tablet, Take 1 tablet (1 mg total) by mouth daily, Disp: 90 tablet, Rfl: 3    aspirin 81 mg chewable tablet, Chew 81 mg daily, Disp: , Rfl:     atorvastatin (LIPITOR) 10 mg tablet, TAKE 1 TABLET (10 MG TOTAL) BY MOUTH DAILY, Disp: 90 tablet, Rfl: 0    fluticasone-salmeterol (Advair HFA) 115-21 MCG/ACT inhaler, Inhale 2 puffs 2 (two) times a day Rinse mouth after use , Disp: 1 Inhaler, Rfl: 3    insulin detemir (Levemir FlexTouch) 100 Units/mL injection pen, 50 units with dinner, Disp: 50 mL, Rfl: 1    insulin lispro (HumaLOG) 100 units/mL injection pen, 20 units before meals, Disp: 45 mL, Rfl: 0    losartan (COZAAR) 50 mg tablet, Take 1 tablet (50 mg total) by mouth daily, Disp: 90 tablet, Rfl: 0    lubiprostone (AMITIZA) 8 mcg capsule, Take 1 capsule (8 mcg total) by mouth 2 (two) times a day with meals, Disp: 60 capsule, Rfl: 2    magnesium gluconate (MAGONATE) 500 mg tablet, Take 1 tablet (500 mg total) by mouth daily, Disp: 90 tablet, Rfl: 1    metoprolol tartrate (LOPRESSOR) 25 mg tablet, TAKE 0 5 TABLETS (12 5 MG TOTAL) BY MOUTH EVERY 12 (TWELVE) HOURS, Disp: 180 tablet, Rfl: 0    omeprazole (PriLOSEC) 20 mg delayed release capsule, TAKE 1 CAPSULE (20 MG TOTAL) BY MOUTH DAILY, Disp: 90 capsule, Rfl: 1    bisacodyl (DULCOLAX) 5 mg EC tablet, Take as directed by the office  , Disp: 2 tablet, Rfl: 0    Blood Glucose Monitoring Suppl (ONE TOUCH ULTRA 2) w/Device KIT, by Does not apply route 3 (three) times a day, Disp: 1 each, Rfl: 0    Continuous Blood Gluc  (FreeStyle Broderick 14 Day [place]) AARON, Use 1 Device daily, Disp: 1 each, Rfl: 0    Continuous Blood Gluc Sensor (FreeStyle [place] 14 Day Sensor) MISC, Use 1 each every 14 (fourteen) days, Disp: 2 each, Rfl: 11    EPINEPHrine (EPIPEN) 0 3 mg/0 3 mL SOAJ, Inject 0 3 mL (0 3 mg total) into a muscle once for 1 dose, Disp: 0 6 mL, Rfl: 0    erythromycin (ILOTYCIN) ophthalmic ointment, Place a 1/2 inch ribbon of ointment into the lower eyelid bilaterally 4x a day, Disp: 3 5 g, Rfl: 0    glucose blood (OneTouch Ultra) test strip, Use 1 each 3 (three) times a day Use as instructed (Patient not taking: Reported on 10/27/2021), Disp: 300 each, Rfl: 4    Insulin Pen Needle (BD Pen Needle Emily U/F) 32G X 4 MM MISC, Inject under the skin 4 (four) times a day, Disp: 400 each, Rfl: 0    lidocaine (XYLOCAINE) 5 % ointment, Apply topically as needed for mild pain Apply 5 minutes prior to insulin injection (Patient not taking: Reported on 5/19/2021), Disp: 35 44 g, Rfl: 0    loratadine (CLARITIN) 10 mg tablet, TAKE 1 TABLET (10 MG TOTAL) BY MOUTH DAILY (Patient not taking: Reported on 11/17/2021 ), Disp: 90 tablet, Rfl: 2    naproxen (EC NAPROSYN) 500 MG EC tablet, Take 1 tablet (500 mg total) by mouth 2 (two) times a day with meals for 5 days, Disp: 10 tablet, Rfl: 0    OneTouch Delica Lancets 53J MISC, USE 3 (THREE) TIMES A DAY (Patient not taking: Reported on 6/25/2021), Disp: 300 each, Rfl: 4    polyethylene glycol (GLYCOLAX) 17 GM/SCOOP powder, Take as directed by the office  , Disp: 238 g, Rfl: 0  Allergies   Allergen Reactions    Codeine Other (See Comments)     unknown       Labs:  Admission on 12/19/2021, Discharged on 12/19/2021   Component Date Value    SARS-CoV-2 12/19/2021 Negative     INFLUENZA A PCR 12/19/2021 Negative     INFLUENZA B PCR 12/19/2021 Negative      Imaging: No results found  Review of Systems:  Review of Systems   Constitutional: Negative for chills and fever  HENT: Negative for ear pain and sore throat  Eyes: Negative for pain and visual disturbance  Respiratory: Negative for cough and shortness of breath      Cardiovascular: Positive for chest pain  Negative for palpitations  Gastrointestinal: Negative for abdominal pain and vomiting  Genitourinary: Negative for dysuria and hematuria  Musculoskeletal: Negative for arthralgias and back pain  Skin: Negative for color change and rash  Neurological: Negative for seizures and syncope  All other systems reviewed and are negative  Physical Exam:  Physical Exam  Vitals and nursing note reviewed  Constitutional:       Appearance: Normal appearance  HENT:      Head: Normocephalic and atraumatic  Nose: Nose normal       Mouth/Throat:      Mouth: Mucous membranes are moist    Eyes:      Conjunctiva/sclera: Conjunctivae normal    Cardiovascular:      Rate and Rhythm: Normal rate and regular rhythm  Pulmonary:      Effort: Pulmonary effort is normal       Breath sounds: Normal breath sounds  Abdominal:      Palpations: Abdomen is soft  Musculoskeletal:         General: Normal range of motion  Cervical back: Normal range of motion  Skin:     General: Skin is warm and dry  Neurological:      General: No focal deficit present  Mental Status: She is alert and oriented to person, place, and time  Psychiatric:         Mood and Affect: Mood normal          Discussion/Summary:  Atypical chest pain  Symptoms not consistent with angina  She did undergo stress testing and walked 3 minutes and 19 seconds on a Darrion protocol to 83% maximum predicted heart rate  EKG was negative for ischemia  Although she did not quite reach 85% her maximum predicted heart rate on my feel this is sufficient to exclude significant obstructive coronary artery disease in the setting of her atypical symptoms  She also had an echocardiogram that showed normal left ventricular size and function with and was essentially unremarkable  Twelve lead EKG in the office today is normal   Her blood pressure is well controlled    She is going to follow-up with her gastroenterologist

## 2022-01-27 ENCOUNTER — TELEPHONE (OUTPATIENT)
Dept: GASTROENTEROLOGY | Facility: HOSPITAL | Age: 67
End: 2022-01-27

## 2022-01-28 ENCOUNTER — TELEPHONE (OUTPATIENT)
Dept: HEMATOLOGY ONCOLOGY | Facility: CLINIC | Age: 67
End: 2022-01-28

## 2022-01-28 NOTE — TELEPHONE ENCOUNTER
Appointment Cancellation Or Reschedule     Person calling in Child    Provider Bassem Cobian   Office Visit Date and Time 1/28/22 @ 3:30pm   Office Visit Location [place]   Did patient want to reschedule their office appointment? If so, when was it scheduled to? Yes 4/8/22 @ 3pm   Is this patient calling to reschedule an infusion appointment? no   When is their next infusion appointment? n/a   Is this patient a Chemo patient? yes   Reason for Cancellation or Reschedule Unable to keep appt     If the patient is a treatment patient, please route this to the office nurse  If the patient is not on treatment, please route to the office MA

## 2022-01-31 ENCOUNTER — TELEPHONE (OUTPATIENT)
Dept: GASTROENTEROLOGY | Facility: CLINIC | Age: 67
End: 2022-01-31

## 2022-01-31 ENCOUNTER — TELEPHONE (OUTPATIENT)
Dept: GASTROENTEROLOGY | Facility: HOSPITAL | Age: 67
End: 2022-01-31

## 2022-01-31 NOTE — TELEPHONE ENCOUNTER
Patients GI provider:  Dr Arash Ravi    Number to return call: [phone]    Reason for call: Pt's daughter to reschedule colonoscopy & EGD as pt did not prep for procedure      Scheduled procedure/appointment date if applicable: Procedure: 0/98/7525

## 2022-02-01 NOTE — TELEPHONE ENCOUNTER
Procedure rescheduled on 4/19 at [address] [place] [address] with Dr Alison Wilson Re confirmed pt has prep instructions

## 2022-02-02 ENCOUNTER — HOSPITAL ENCOUNTER (OUTPATIENT)
Dept: INFUSION CENTER | Facility: CLINIC | Age: 67
Discharge: HOME | End: 2022-02-02
Payer: COMMERCIAL

## 2022-02-02 DIAGNOSIS — J45.50 SEVERE PERSISTENT ASTHMA WITHOUT COMPLICATION: Primary | ICD-10-CM

## 2022-02-02 PROCEDURE — 96372 THER/PROPH/DIAG INJ SC/IM: CPT

## 2022-02-02 RX ORDER — EPINEPHRINE 1 MG/ML
0.3 INJECTION, SOLUTION, CONCENTRATE INTRAVENOUS AS NEEDED
Status: CANCELLED | OUTPATIENT
Start: 2022-02-16

## 2022-02-02 RX ADMIN — OMALIZUMAB 375 MG: 150 INJECTION, SOLUTION SUBCUTANEOUS at 10:07

## 2022-02-02 NOTE — PROGRESS NOTES
Pt arrived to unit without complaint  Epi pen present exp 01/2023  Xolair injections given as ordered  Two injections given SQ  in NIKIA  One injection was given SQ  in JESSICA to equal 3 injections and a total dose of 375 mg  Pt observed for 30 mins post injections without incident  AVS provided  Pt left unit in stable condition

## 2022-02-07 DIAGNOSIS — E11.8 TYPE 2 DIABETES MELLITUS WITH COMPLICATION, WITHOUT LONG-TERM CURRENT USE OF INSULIN (HCC): ICD-10-CM

## 2022-02-07 DIAGNOSIS — I10 ESSENTIAL HYPERTENSION: ICD-10-CM

## 2022-02-07 DIAGNOSIS — E78.5 HYPERLIPIDEMIA, UNSPECIFIED HYPERLIPIDEMIA TYPE: ICD-10-CM

## 2022-02-07 RX ORDER — LOSARTAN POTASSIUM 50 MG/1
50 TABLET ORAL DAILY
Qty: 90 TABLET | Refills: 0 | Status: SHIPPED | OUTPATIENT
Start: 2022-02-07 | End: 2022-06-13

## 2022-02-07 RX ORDER — ATORVASTATIN CALCIUM 10 MG/1
10 TABLET, FILM COATED ORAL DAILY
Qty: 30 TABLET | Refills: 1 | Status: SHIPPED | OUTPATIENT
Start: 2022-02-07 | End: 2022-04-04

## 2022-02-16 ENCOUNTER — HOSPITAL ENCOUNTER (OUTPATIENT)
Dept: INFUSION CENTER | Facility: CLINIC | Age: 67
Discharge: HOME | End: 2022-02-16
Payer: COMMERCIAL

## 2022-02-16 ENCOUNTER — OFFICE VISIT (OUTPATIENT)
Dept: FAMILY MEDICINE CLINIC | Facility: CLINIC | Age: 67
End: 2022-02-16

## 2022-02-16 VITALS
SYSTOLIC BLOOD PRESSURE: 115 MMHG | TEMPERATURE: 96.2 F | HEART RATE: 76 BPM | DIASTOLIC BLOOD PRESSURE: 70 MMHG | RESPIRATION RATE: 18 BRPM

## 2022-02-16 VITALS
HEART RATE: 80 BPM | SYSTOLIC BLOOD PRESSURE: 180 MMHG | OXYGEN SATURATION: 97 % | RESPIRATION RATE: 19 BRPM | TEMPERATURE: 98.3 F | BODY MASS INDEX: 28.96 KG/M2 | DIASTOLIC BLOOD PRESSURE: 90 MMHG | WEIGHT: 174 LBS

## 2022-02-16 DIAGNOSIS — I10 ESSENTIAL HYPERTENSION: ICD-10-CM

## 2022-02-16 DIAGNOSIS — Z79.4 TYPE 2 DIABETES MELLITUS WITH COMPLICATION, WITH LONG-TERM CURRENT USE OF INSULIN (HCC): Primary | ICD-10-CM

## 2022-02-16 DIAGNOSIS — E11.8 TYPE 2 DIABETES MELLITUS WITH COMPLICATION, WITH LONG-TERM CURRENT USE OF INSULIN (HCC): Primary | ICD-10-CM

## 2022-02-16 DIAGNOSIS — J45.50 SEVERE PERSISTENT ASTHMA WITHOUT COMPLICATION: Primary | ICD-10-CM

## 2022-02-16 LAB — SL AMB POCT HEMOGLOBIN AIC: 14.4 (ref ?–6.5)

## 2022-02-16 PROCEDURE — 99214 OFFICE O/P EST MOD 30 MIN: CPT | Performed by: FAMILY MEDICINE

## 2022-02-16 PROCEDURE — 83036 HEMOGLOBIN GLYCOSYLATED A1C: CPT | Performed by: FAMILY MEDICINE

## 2022-02-16 PROCEDURE — 96372 THER/PROPH/DIAG INJ SC/IM: CPT

## 2022-02-16 PROCEDURE — 3080F DIAST BP >= 90 MM HG: CPT | Performed by: FAMILY MEDICINE

## 2022-02-16 PROCEDURE — 3077F SYST BP >= 140 MM HG: CPT | Performed by: FAMILY MEDICINE

## 2022-02-16 RX ORDER — AMLODIPINE BESYLATE 10 MG/1
10 TABLET ORAL DAILY
Qty: 90 TABLET | Refills: 1 | Status: SHIPPED | OUTPATIENT
Start: 2022-02-16 | End: 2022-06-13

## 2022-02-16 RX ORDER — EPINEPHRINE 1 MG/ML
0.3 INJECTION, SOLUTION, CONCENTRATE INTRAVENOUS AS NEEDED
Status: CANCELLED | OUTPATIENT
Start: 2022-03-02

## 2022-02-16 RX ADMIN — OMALIZUMAB 375 MG: 150 INJECTION, SOLUTION SUBCUTANEOUS at 10:01

## 2022-02-16 NOTE — PROGRESS NOTES
Assessment/Plan:    No problem-specific Assessment & Plan notes found for this encounter  Diagnoses and all orders for this visit:    Type 2 diabetes mellitus with complication, with long-term current use of insulin ([facility])  -     POCT hemoglobin A1c    Essential hypertension  -     metoprolol tartrate (LOPRESSOR) 25 mg tablet; Take 0 5 tablets (12 5 mg total) by mouth every 12 (twelve) hours  -     amLODIPine (NORVASC) 10 mg tablet; Take 1 tablet (10 mg total) by mouth daily        Did not take BP meds today as she had to run out of the house to make it here on time for her appointment  Suggested she increase her Levemir to 55 units till she sees endocrinology in 2 weeks   Subjective:      Patient ID: Aydin Ramirez is a 73 y o  female  72 yo  patient with uncontrolled DM, was seen and evaluated by endocrinology, here today for follow up  Patient states her BG continue to be above 300 at times close to 400  States she meet with the nutritionist and has been trying to follow her recommendations but finds it hard to decrease the simple carbs in her diet  Patient admits she forgets to use her insulin at times  She states she is not very physically active because she often feels tired and has no energy  She continues to have alternating constipation and diarrhea  States has not been taking Metamucil and is not sure if she has been taking Amatiza  The following portions of the patient's history were reviewed and updated as appropriate:   She  has a past medical history of Abdominal infection ([facility] [address] ), Abnormal chest x-ray (4/5/2019), Asthma, Breast cancer ([facility] [address] ) (06/26/2018), Cancer ([facility] [address] ), Diabetes mellitus ([facility] [address] ), Hiatal hernia, History of chemotherapy, History of radiation therapy, Hypercholesterolemia, Hypertension, Hypothyroid, Renal disorder, and Rheumatoid arthritis ([address] )    She   Patient Active Problem List    Diagnosis Date Noted    Type 2 diabetes mellitus with microalbuminuria, with long-term current use of insulin ([facility] [address] ) 11/10/2021    Pelvic pain 09/29/2021    Bone pain 09/29/2021    Anxiety 09/15/2021    Right foot pain 09/15/2021    Overweight with body mass index (BMI) of 28 to 28 9 in adult 09/08/2021    Sebaceous cyst 08/17/2021    Subcutaneous mass of back 08/17/2021    Muscle cramps 06/09/2021    Gastroesophageal reflux disease without esophagitis 03/03/2021    Chronic diastolic heart failure (HCC) 09/01/2020    Use of anastrozole (Arimidex) 04/20/2020    Decreased ROM of right shoulder 04/20/2020    Lump of skin 04/20/2020    Diabetic polyneuropathy associated with type 2 diabetes mellitus ([facility] [address] ) 11/24/2019    Type 2 diabetes mellitus with hyperglycemia, with long-term current use of insulin ([facility] [address] ) 05/29/2019    Radiation pneumonitis ([facility] ) 05/18/2019    Bilateral pulmonary embolism ([facility] ) 11/06/2018    Hypothyroid     Hypertension     Hypercholesterolemia 09/21/2018    Chronic pain of right knee 09/04/2018    Cellulitis of right axilla 08/22/2018    Hiatal hernia 08/08/2018    Screening for colon cancer 08/08/2018    Esophageal dysphagia 08/08/2018    Malignant neoplasm of upper-outer quadrant of right breast in female, estrogen receptor positive ([facility] [address] ) 07/19/2018    Malignant neoplasm of right female breast ([facility] [address] ) 06/26/2018    Neck pain 06/13/2018    Chronic bilateral low back pain without sciatica 05/22/2018    Palpitations 05/09/2018    Type 2 diabetes mellitus with complication, with long-term current use of insulin ([facility] ) 03/27/2018    Other specified hypothyroidism 03/27/2018    Chest pain 03/27/2018    Severe persistent asthma without complication 76/42/1454     She  has a past surgical history that includes Tubal ligation; Knee surgery (Right); Cataract extraction, bilateral (Bilateral); Retinal detachment surgery (Right); Breast surgery (Left); EGD AND COLONOSCOPY (N/A, 9/5/2018);  Tunneled venous port placement (Left, 9/13/2018); FL guided central venous access device insertion (9/13/2018); Mammo stereotactic breast biopsy right (all inc) (Right, 5/23/2018); Mammo needle localization right (all inc) (Right, 6/26/2018); Breast biopsy (Right, 05/23/2018); Breast lumpectomy (Right, 6/26/2018); Breast lumpectomy (Right, 8/2/2018); Colonoscopy; and Upper gastrointestinal endoscopy  Her family history includes Cancer in her maternal grandfather; Diabetes in her brother, father, and mother; Hypertension in her brother, father, and mother; No Known Problems in her daughter, daughter, daughter, sister, sister, sister, sister, and sister; Prostate cancer in her brother  She  reports that she has never smoked  She has never used smokeless tobacco  She reports that she does not drink alcohol and does not use drugs  Current Outpatient Medications   Medication Sig Dispense Refill    albuterol (2 5 mg/3 mL) 0 083 % nebulizer solution Take 3 mL (2 5 mg total) by nebulization every 6 (six) hours as needed for wheezing or shortness of breath 100 mL 0    albuterol (PROVENTIL HFA,VENTOLIN HFA) 90 mcg/act inhaler Inhale 1 puff every 4 (four) hours as needed for wheezing 8 g 0    amLODIPine (NORVASC) 10 mg tablet Take 1 tablet (10 mg total) by mouth daily 90 tablet 1    anastrozole (ARIMIDEX) 1 mg tablet Take 1 tablet (1 mg total) by mouth daily 90 tablet 3    aspirin 81 mg chewable tablet Chew 81 mg daily      atorvastatin (LIPITOR) 10 mg tablet TAKE 1 TABLET (10 MG TOTAL) BY MOUTH DAILY 30 tablet 1    bisacodyl (DULCOLAX) 5 mg EC tablet Take as directed by the office   2 tablet 0    Blood Glucose Monitoring Suppl (ONE TOUCH ULTRA 2) w/Device KIT by Does not apply route 3 (three) times a day 1 each 0    Continuous Blood Gluc  (FreeStyle Broderick 14 Day [place]) AARON Use 1 Device daily 1 each 0    Continuous Blood Gluc Sensor (FreeStyle Broderick 14 Day Sensor) MISC Use 1 each every 14 (fourteen) days 2 each 11    EPINEPHrine (EPIPEN) 0 3 mg/0 3 mL SOAJ Inject 0 3 mL (0 3 mg total) into a muscle once for 1 dose 0 6 mL 0    erythromycin (ILOTYCIN) ophthalmic ointment Place a 1/2 inch ribbon of ointment into the lower eyelid bilaterally 4x a day 3 5 g 0    fluticasone-salmeterol (Advair HFA) 115-21 MCG/ACT inhaler Inhale 2 puffs 2 (two) times a day Rinse mouth after use   1 Inhaler 3    glucose blood (OneTouch Ultra) test strip Use 1 each 3 (three) times a day Use as instructed (Patient not taking: Reported on 10/27/2021) 300 each 4    insulin detemir (Levemir FlexTouch) 100 Units/mL injection pen 50 units with dinner 50 mL 1    insulin lispro (HumaLOG) 100 units/mL injection pen 20 units before meals 45 mL 0    Insulin Pen Needle (BD Pen Needle Emily U/F) 32G X 4 MM MISC Inject under the skin 4 (four) times a day 400 each 0    lidocaine (XYLOCAINE) 5 % ointment Apply topically as needed for mild pain Apply 5 minutes prior to insulin injection (Patient not taking: Reported on 5/19/2021) 35 44 g 0    loratadine (CLARITIN) 10 mg tablet TAKE 1 TABLET (10 MG TOTAL) BY MOUTH DAILY (Patient not taking: Reported on 11/17/2021 ) 90 tablet 2    losartan (COZAAR) 50 mg tablet TAKE 1 TABLET (50 MG TOTAL) BY MOUTH DAILY 90 tablet 0    lubiprostone (AMITIZA) 8 mcg capsule Take 1 capsule (8 mcg total) by mouth 2 (two) times a day with meals 60 capsule 2    magnesium gluconate (MAGONATE) 500 mg tablet Take 1 tablet (500 mg total) by mouth daily 90 tablet 1    metoprolol tartrate (LOPRESSOR) 25 mg tablet Take 0 5 tablets (12 5 mg total) by mouth every 12 (twelve) hours 180 tablet 1    naproxen (EC NAPROSYN) 500 MG EC tablet Take 1 tablet (500 mg total) by mouth 2 (two) times a day with meals for 5 days 10 tablet 0    omeprazole (PriLOSEC) 20 mg delayed release capsule TAKE 1 CAPSULE (20 MG TOTAL) BY MOUTH DAILY 90 capsule 1    OneTouch Delica Lancets 89U MISC USE 3 (THREE) TIMES A DAY (Patient not taking: Reported on 6/25/2021) 300 each 4    polyethylene glycol (GLYCOLAX) 17 GM/SCOOP powder Take as directed by the office  238 g 0     No current facility-administered medications for this visit  Current Outpatient Medications on File Prior to Visit   Medication Sig    albuterol (2 5 mg/3 mL) 0 083 % nebulizer solution Take 3 mL (2 5 mg total) by nebulization every 6 (six) hours as needed for wheezing or shortness of breath    albuterol (PROVENTIL HFA,VENTOLIN HFA) 90 mcg/act inhaler Inhale 1 puff every 4 (four) hours as needed for wheezing    anastrozole (ARIMIDEX) 1 mg tablet Take 1 tablet (1 mg total) by mouth daily    aspirin 81 mg chewable tablet Chew 81 mg daily    atorvastatin (LIPITOR) 10 mg tablet TAKE 1 TABLET (10 MG TOTAL) BY MOUTH DAILY    bisacodyl (DULCOLAX) 5 mg EC tablet Take as directed by the office   Blood Glucose Monitoring Suppl (ONE TOUCH ULTRA 2) w/Device KIT by Does not apply route 3 (three) times a day    Continuous Blood Gluc  (AlleyWatchyle Broderick 14 Day [place]) AARON Use 1 Device daily    Continuous Blood Gluc Sensor (Catalist HomesStyle Broderick 14 Day Sensor) MISC Use 1 each every 14 (fourteen) days    EPINEPHrine (EPIPEN) 0 3 mg/0 3 mL SOAJ Inject 0 3 mL (0 3 mg total) into a muscle once for 1 dose    erythromycin (ILOTYCIN) ophthalmic ointment Place a 1/2 inch ribbon of ointment into the lower eyelid bilaterally 4x a day    fluticasone-salmeterol (Advair HFA) 115-21 MCG/ACT inhaler Inhale 2 puffs 2 (two) times a day Rinse mouth after use      glucose blood (OneTouch Ultra) test strip Use 1 each 3 (three) times a day Use as instructed (Patient not taking: Reported on 10/27/2021)    insulin detemir (Levemir FlexTouch) 100 Units/mL injection pen 50 units with dinner    insulin lispro (HumaLOG) 100 units/mL injection pen 20 units before meals    Insulin Pen Needle (BD Pen Needle Emily U/F) 32G X 4 MM MISC Inject under the skin 4 (four) times a day    lidocaine (XYLOCAINE) 5 % ointment Apply topically as needed for mild pain Apply 5 minutes prior to insulin injection (Patient not taking: Reported on 5/19/2021)    loratadine (CLARITIN) 10 mg tablet TAKE 1 TABLET (10 MG TOTAL) BY MOUTH DAILY (Patient not taking: Reported on 11/17/2021 )    losartan (COZAAR) 50 mg tablet TAKE 1 TABLET (50 MG TOTAL) BY MOUTH DAILY    lubiprostone (AMITIZA) 8 mcg capsule Take 1 capsule (8 mcg total) by mouth 2 (two) times a day with meals    magnesium gluconate (MAGONATE) 500 mg tablet Take 1 tablet (500 mg total) by mouth daily    naproxen (EC NAPROSYN) 500 MG EC tablet Take 1 tablet (500 mg total) by mouth 2 (two) times a day with meals for 5 days    omeprazole (PriLOSEC) 20 mg delayed release capsule TAKE 1 CAPSULE (20 MG TOTAL) BY MOUTH DAILY    OneTouch Delica Lancets 86S MISC USE 3 (THREE) TIMES A DAY (Patient not taking: Reported on 6/25/2021)    polyethylene glycol (GLYCOLAX) 17 GM/SCOOP powder Take as directed by the office   [DISCONTINUED] amLODIPine (NORVASC) 10 mg tablet Take 1 tablet (10 mg total) by mouth daily    [DISCONTINUED] metoprolol tartrate (LOPRESSOR) 25 mg tablet TAKE 0 5 TABLETS (12 5 MG TOTAL) BY MOUTH EVERY 12 (TWELVE) HOURS     No current facility-administered medications on file prior to visit       Review of Systems   All other systems reviewed and are negative  Objective:      BP (!) 180/90 (BP Location: Left arm, Patient Position: Sitting, Cuff Size: Adult)   Pulse 80   Temp 98 3 °F (36 8 °C) (Temporal)   Resp 19   Wt 78 9 kg (174 lb)   SpO2 97%   BMI 28 96 kg/m²          Physical Exam  Vitals and nursing note reviewed  Constitutional:       Appearance: She is well-developed  HENT:      Head: Normocephalic  Right Ear: External ear normal       Left Ear: External ear normal       Nose: Nose normal    Eyes:      Conjunctiva/sclera: Conjunctivae normal       Pupils: Pupils are equal, round, and reactive to light  Neck:      Thyroid: No thyromegaly     Cardiovascular:      Rate and Rhythm: Normal rate and regular rhythm  Heart sounds: Normal heart sounds  Pulmonary:      Effort: Pulmonary effort is normal       Breath sounds: Normal breath sounds  Abdominal:      Palpations: Abdomen is soft  Tenderness: There is no abdominal tenderness  There is no guarding or rebound  Musculoskeletal:         General: Normal range of motion  Cervical back: Normal range of motion and neck supple  Skin:     General: Skin is dry  Neurological:      Mental Status: She is alert and oriented to person, place, and time  Deep Tendon Reflexes: Reflexes are normal and symmetric

## 2022-02-16 NOTE — PROGRESS NOTES
Patient arrived to the infusion center with her epi pen  Offers no complaints  Xolair given in 3 injections  x2 in right arm, x1 left arm  Observed for 30 minutes with no adverse event  Patient is aware of all future appointments  AVS provided

## 2022-03-01 ENCOUNTER — TELEPHONE (OUTPATIENT)
Dept: ENDOCRINOLOGY | Facility: CLINIC | Age: 67
End: 2022-03-01

## 2022-03-01 ENCOUNTER — LAB (OUTPATIENT)
Dept: LAB | Facility: HOSPITAL | Age: 67
End: 2022-03-01
Payer: COMMERCIAL

## 2022-03-01 DIAGNOSIS — Z79.4 TYPE 2 DIABETES MELLITUS WITH MICROALBUMINURIA, WITH LONG-TERM CURRENT USE OF INSULIN (HCC): ICD-10-CM

## 2022-03-01 DIAGNOSIS — E78.00 HYPERCHOLESTEROLEMIA: ICD-10-CM

## 2022-03-01 DIAGNOSIS — E11.29 TYPE 2 DIABETES MELLITUS WITH MICROALBUMINURIA, WITH LONG-TERM CURRENT USE OF INSULIN (HCC): ICD-10-CM

## 2022-03-01 DIAGNOSIS — R80.9 TYPE 2 DIABETES MELLITUS WITH MICROALBUMINURIA, WITH LONG-TERM CURRENT USE OF INSULIN (HCC): ICD-10-CM

## 2022-03-01 DIAGNOSIS — E03.8 OTHER SPECIFIED HYPOTHYROIDISM: ICD-10-CM

## 2022-03-01 DIAGNOSIS — Z79.4 TYPE 2 DIABETES MELLITUS WITH HYPERGLYCEMIA, WITH LONG-TERM CURRENT USE OF INSULIN (HCC): ICD-10-CM

## 2022-03-01 DIAGNOSIS — M79.7 FIBROMYALGIA: ICD-10-CM

## 2022-03-01 DIAGNOSIS — E11.65 TYPE 2 DIABETES MELLITUS WITH HYPERGLYCEMIA, WITH LONG-TERM CURRENT USE OF INSULIN (HCC): ICD-10-CM

## 2022-03-01 LAB
25(OH)D3 SERPL-MCNC: 20 NG/ML (ref 30–100)
ALBUMIN SERPL BCP-MCNC: 3.9 G/DL (ref 3–5.2)
ALP SERPL-CCNC: 77 U/L (ref 43–122)
ALT SERPL W P-5'-P-CCNC: 19 U/L
ANION GAP SERPL CALCULATED.3IONS-SCNC: 6 MMOL/L (ref 5–14)
AST SERPL W P-5'-P-CCNC: 22 U/L (ref 14–36)
BILIRUB SERPL-MCNC: 0.7 MG/DL
BUN SERPL-MCNC: 25 MG/DL (ref 5–25)
CALCIUM SERPL-MCNC: 9 MG/DL (ref 8.4–10.2)
CHLORIDE SERPL-SCNC: 99 MMOL/L (ref 97–108)
CHOLEST SERPL-MCNC: 262 MG/DL
CO2 SERPL-SCNC: 29 MMOL/L (ref 22–30)
CREAT SERPL-MCNC: 0.58 MG/DL (ref 0.6–1.2)
GFR SERPL CREATININE-BSD FRML MDRD: 97 ML/MIN/1.73SQ M
GLUCOSE P FAST SERPL-MCNC: 387 MG/DL (ref 70–99)
HDLC SERPL-MCNC: 47 MG/DL
LDLC SERPL CALC-MCNC: 181 MG/DL
POTASSIUM SERPL-SCNC: 4.1 MMOL/L (ref 3.6–5)
PROT SERPL-MCNC: 7.4 G/DL (ref 5.9–8.4)
SODIUM SERPL-SCNC: 134 MMOL/L (ref 137–147)
T4 FREE SERPL-MCNC: 1.12 NG/DL (ref 0.76–1.46)
TRIGL SERPL-MCNC: 168 MG/DL
TSH SERPL DL<=0.05 MIU/L-ACNC: 3.78 UIU/ML (ref 0.47–4.68)

## 2022-03-01 PROCEDURE — 84439 ASSAY OF FREE THYROXINE: CPT

## 2022-03-01 PROCEDURE — 80061 LIPID PANEL: CPT

## 2022-03-01 PROCEDURE — 80053 COMPREHEN METABOLIC PANEL: CPT

## 2022-03-01 PROCEDURE — 86430 RHEUMATOID FACTOR TEST QUAL: CPT

## 2022-03-01 PROCEDURE — 83036 HEMOGLOBIN GLYCOSYLATED A1C: CPT

## 2022-03-01 PROCEDURE — 84443 ASSAY THYROID STIM HORMONE: CPT

## 2022-03-01 PROCEDURE — 86038 ANTINUCLEAR ANTIBODIES: CPT

## 2022-03-01 PROCEDURE — 36415 COLL VENOUS BLD VENIPUNCTURE: CPT

## 2022-03-01 PROCEDURE — 82306 VITAMIN D 25 HYDROXY: CPT

## 2022-03-01 NOTE — TELEPHONE ENCOUNTER
----- Message from Kalli Viveros MD sent at 3/1/2022  1:16 PM EST -----  Please call the patient regarding labs - will discuss labs on visit

## 2022-03-02 ENCOUNTER — HOSPITAL ENCOUNTER (OUTPATIENT)
Dept: INFUSION CENTER | Facility: CLINIC | Age: 67
Discharge: HOME | End: 2022-03-02
Payer: COMMERCIAL

## 2022-03-02 VITALS
SYSTOLIC BLOOD PRESSURE: 131 MMHG | DIASTOLIC BLOOD PRESSURE: 72 MMHG | HEART RATE: 81 BPM | TEMPERATURE: 97.9 F | RESPIRATION RATE: 18 BRPM

## 2022-03-02 DIAGNOSIS — J45.50 SEVERE PERSISTENT ASTHMA WITHOUT COMPLICATION: Primary | ICD-10-CM

## 2022-03-02 LAB
EST. AVERAGE GLUCOSE BLD GHB EST-MCNC: 355 MG/DL
HBA1C MFR BLD: 14 %
RHEUMATOID FACT SER QL LA: NEGATIVE
RYE IGE QN: NEGATIVE

## 2022-03-02 PROCEDURE — 96372 THER/PROPH/DIAG INJ SC/IM: CPT

## 2022-03-02 RX ORDER — EPINEPHRINE 1 MG/ML
0.3 INJECTION, SOLUTION, CONCENTRATE INTRAVENOUS AS NEEDED
Status: DISCONTINUED | OUTPATIENT
Start: 2022-03-02 | End: 2022-03-05 | Stop reason: HOSPADM

## 2022-03-02 RX ORDER — EPINEPHRINE 1 MG/ML
0.3 INJECTION, SOLUTION, CONCENTRATE INTRAVENOUS AS NEEDED
Status: CANCELLED | OUTPATIENT
Start: 2022-03-16

## 2022-03-02 RX ADMIN — OMALIZUMAB 375 MG: 150 INJECTION, SOLUTION SUBCUTANEOUS at 10:14

## 2022-03-02 NOTE — PROGRESS NOTES
Pt denies new symptoms or concerns today  Epi pen present  Exp 1/2023  Pt  Given 3 injections, 2 injections in NIKIA and 1 injection in JESSICA to equal ordered dose of Xolair 375 mg  Future appointment completed AVS provided

## 2022-03-03 NOTE — RESULT ENCOUNTER NOTE
Please call the patient regarding labs - A1C very high - she missed appointment yesterday , please schedule f/u

## 2022-03-04 ENCOUNTER — TELEPHONE (OUTPATIENT)
Dept: ENDOCRINOLOGY | Facility: CLINIC | Age: 67
End: 2022-03-04

## 2022-03-04 NOTE — TELEPHONE ENCOUNTER
----- Message from Rayna Magana MD sent at 3/3/2022  1:03 PM EST -----  Please call the patient regarding labs - A1C very high - she missed appointment yesterday , please schedule f/u

## 2022-03-09 DIAGNOSIS — J06.9 UPPER RESPIRATORY INFECTION: ICD-10-CM

## 2022-03-09 RX ORDER — ALBUTEROL SULFATE 90 UG/1
1 AEROSOL, METERED RESPIRATORY (INHALATION) EVERY 4 HOURS PRN
Qty: 18 G | Refills: 0 | Status: SHIPPED | OUTPATIENT
Start: 2022-03-09 | End: 2022-05-02 | Stop reason: SDUPTHER

## 2022-03-14 ENCOUNTER — PATIENT OUTREACH (OUTPATIENT)
Dept: FAMILY MEDICINE CLINIC | Facility: CLINIC | Age: 67
End: 2022-03-14

## 2022-03-14 NOTE — PROGRESS NOTES
CAROLA  received [place] text from Joseph Flores regarding Pt's daughter Mary Robledo wants to speak with [facility] did call Pt's daughter Mary Robledo  Mary Robledo stated that Pt needs assistance with transportation  CAROLA  offered Marytiffanie Robledo to place a order to [facility] to assist Pt with applying for [employer] service  Mary Robledo stated that Pt does not want [employer] service  [facility] provided Marytiffanie Robledo with [employer] phone number ([phone]) to confirm if Pt can be approved with 60 days rides, also, with [employer] ([phone]) that can assist Pt with transportation as well  Mary Robledo seems understanding and willing to call at the numbers above  [facility] explained Mary Meena that she can reach out [facility] for future assistance  Mary Robledo seems thankful for [facility] support  [facility] is remain available for further assistance as needed

## 2022-03-16 ENCOUNTER — HOSPITAL ENCOUNTER (OUTPATIENT)
Dept: INFUSION CENTER | Facility: CLINIC | Age: 67
Discharge: HOME | End: 2022-03-16
Payer: COMMERCIAL

## 2022-03-16 VITALS
SYSTOLIC BLOOD PRESSURE: 151 MMHG | TEMPERATURE: 97.8 F | DIASTOLIC BLOOD PRESSURE: 76 MMHG | HEART RATE: 76 BPM | RESPIRATION RATE: 18 BRPM

## 2022-03-16 DIAGNOSIS — J45.50 SEVERE PERSISTENT ASTHMA WITHOUT COMPLICATION: Primary | ICD-10-CM

## 2022-03-16 PROCEDURE — 96372 THER/PROPH/DIAG INJ SC/IM: CPT

## 2022-03-16 RX ORDER — EPINEPHRINE 1 MG/ML
0.3 INJECTION, SOLUTION, CONCENTRATE INTRAVENOUS AS NEEDED
OUTPATIENT
Start: 2022-03-30

## 2022-03-16 RX ORDER — EPINEPHRINE 1 MG/ML
0.3 INJECTION, SOLUTION, CONCENTRATE INTRAVENOUS AS NEEDED
Status: DISCONTINUED | OUTPATIENT
Start: 2022-03-16 | End: 2022-03-19 | Stop reason: HOSPADM

## 2022-03-16 RX ADMIN — OMALIZUMAB 375 MG: 150 INJECTION, SOLUTION SUBCUTANEOUS at 10:17

## 2022-03-16 NOTE — PROGRESS NOTES
Pt given xolair injections x 3 as ordered  Pt will be observed for 30 min post injections  Pt was provided with AVS and is aware of future appt

## 2022-03-22 ENCOUNTER — TELEPHONE (OUTPATIENT)
Dept: PULMONOLOGY | Facility: CLINIC | Age: 67
End: 2022-03-22

## 2022-03-24 NOTE — TELEPHONE ENCOUNTER
Pt's daughter called back and scheduled an appt for Jolie for her 7m f/u w/ Dr Barber Hester in [place] on 5/2/22 @ 10:20am

## 2022-03-30 ENCOUNTER — HOSPITAL ENCOUNTER (OUTPATIENT)
Dept: INFUSION CENTER | Facility: CLINIC | Age: 67
Discharge: HOME | End: 2022-03-30

## 2022-04-02 DIAGNOSIS — E78.5 HYPERLIPIDEMIA, UNSPECIFIED HYPERLIPIDEMIA TYPE: ICD-10-CM

## 2022-04-04 RX ORDER — ATORVASTATIN CALCIUM 10 MG/1
10 TABLET, FILM COATED ORAL DAILY
Qty: 90 TABLET | Refills: 0 | Status: SHIPPED | OUTPATIENT
Start: 2022-04-04 | End: 2022-06-23

## 2022-04-08 ENCOUNTER — TELEPHONE (OUTPATIENT)
Dept: HEMATOLOGY ONCOLOGY | Facility: CLINIC | Age: 67
End: 2022-04-08

## 2022-04-08 ENCOUNTER — TELEPHONE (OUTPATIENT)
Dept: NEUROLOGY | Facility: CLINIC | Age: 67
End: 2022-04-08

## 2022-04-11 ENCOUNTER — TELEPHONE (OUTPATIENT)
Dept: HEMATOLOGY ONCOLOGY | Facility: CLINIC | Age: 67
End: 2022-04-11

## 2022-04-11 NOTE — TELEPHONE ENCOUNTER
Appointment Cancellation Or Reschedule     Person calling in Child    Provider Santos Lucas   Office Visit Date and Time 4/8/22 at 3   Office Visit Location [place]   Did patient want to reschedule their office appointment? If so, when was it scheduled to? 5/3/22 at 9:30    Is this patient calling to reschedule an infusion appointment? No   When is their next infusion appointment? N/a   Is this patient a Chemo patient? No   Reason for Cancellation or Reschedule Reschedule no show appointment      If the patient is a treatment patient, please route this to the office nurse  If the patient is not on treatment, please route to the office MA

## 2022-04-11 NOTE — TELEPHONE ENCOUNTER
Sindy Capps called in requesting to be called back if any appointments open up on any Mondays  Sindy Capps can be reached back at [phone]

## 2022-04-18 ENCOUNTER — TELEPHONE (OUTPATIENT)
Dept: GYNECOLOGIC ONCOLOGY | Facility: CLINIC | Age: 67
End: 2022-04-18

## 2022-04-19 ENCOUNTER — ANESTHESIA EVENT (OUTPATIENT)
Dept: GASTROENTEROLOGY | Facility: HOSPITAL | Age: 67
End: 2022-04-19

## 2022-04-19 ENCOUNTER — ANESTHESIA (OUTPATIENT)
Dept: GASTROENTEROLOGY | Facility: HOSPITAL | Age: 67
End: 2022-04-19

## 2022-04-19 ENCOUNTER — HOSPITAL ENCOUNTER (OUTPATIENT)
Dept: GASTROENTEROLOGY | Facility: HOSPITAL | Age: 67
Setting detail: OUTPATIENT SURGERY
Discharge: HOME | End: 2022-04-19
Attending: INTERNAL MEDICINE | Admitting: STUDENT IN AN ORGANIZED HEALTH CARE EDUCATION/TRAINING PROGRAM
Payer: COMMERCIAL

## 2022-04-19 VITALS
HEART RATE: 75 BPM | OXYGEN SATURATION: 99 % | HEIGHT: 65 IN | WEIGHT: 174 LBS | BODY MASS INDEX: 28.99 KG/M2 | RESPIRATION RATE: 16 BRPM | SYSTOLIC BLOOD PRESSURE: 148 MMHG | TEMPERATURE: 97.8 F | DIASTOLIC BLOOD PRESSURE: 69 MMHG

## 2022-04-19 DIAGNOSIS — R10.13 EPIGASTRIC PAIN: ICD-10-CM

## 2022-04-19 DIAGNOSIS — A04.8 H. PYLORI INFECTION: ICD-10-CM

## 2022-04-19 DIAGNOSIS — D36.9 TUBULAR ADENOMA: ICD-10-CM

## 2022-04-19 LAB
GLUCOSE SERPL-MCNC: 304 MG/DL (ref 65–140)
GLUCOSE SERPL-MCNC: 319 MG/DL (ref 65–140)

## 2022-04-19 PROCEDURE — 45380 COLONOSCOPY AND BIOPSY: CPT | Performed by: STUDENT IN AN ORGANIZED HEALTH CARE EDUCATION/TRAINING PROGRAM

## 2022-04-19 PROCEDURE — 88305 TISSUE EXAM BY PATHOLOGIST: CPT | Performed by: PATHOLOGY

## 2022-04-19 PROCEDURE — 43239 EGD BIOPSY SINGLE/MULTIPLE: CPT | Performed by: STUDENT IN AN ORGANIZED HEALTH CARE EDUCATION/TRAINING PROGRAM

## 2022-04-19 PROCEDURE — 82948 REAGENT STRIP/BLOOD GLUCOSE: CPT

## 2022-04-19 RX ORDER — SODIUM CHLORIDE 9 MG/ML
50 INJECTION, SOLUTION INTRAVENOUS CONTINUOUS
Status: DISCONTINUED | OUTPATIENT
Start: 2022-04-19 | End: 2022-04-23 | Stop reason: HOSPADM

## 2022-04-19 RX ORDER — SODIUM CHLORIDE 9 MG/ML
INJECTION, SOLUTION INTRAVENOUS CONTINUOUS PRN
Status: DISCONTINUED | OUTPATIENT
Start: 2022-04-19 | End: 2022-04-19

## 2022-04-19 RX ORDER — PROPOFOL 10 MG/ML
INJECTION, EMULSION INTRAVENOUS AS NEEDED
Status: DISCONTINUED | OUTPATIENT
Start: 2022-04-19 | End: 2022-04-19

## 2022-04-19 RX ORDER — LIDOCAINE HYDROCHLORIDE 10 MG/ML
INJECTION, SOLUTION EPIDURAL; INFILTRATION; INTRACAUDAL; PERINEURAL AS NEEDED
Status: DISCONTINUED | OUTPATIENT
Start: 2022-04-19 | End: 2022-04-19

## 2022-04-19 RX ADMIN — INSULIN LISPRO 8 UNITS: 100 INJECTION, SOLUTION INTRAVENOUS; SUBCUTANEOUS at 07:17

## 2022-04-19 RX ADMIN — PROPOFOL 150 MG: 10 INJECTION, EMULSION INTRAVENOUS at 07:34

## 2022-04-19 RX ADMIN — SODIUM CHLORIDE 50 ML/HR: 0.9 INJECTION, SOLUTION INTRAVENOUS at 07:16

## 2022-04-19 RX ADMIN — PROPOFOL 50 MG: 10 INJECTION, EMULSION INTRAVENOUS at 07:38

## 2022-04-19 RX ADMIN — SODIUM CHLORIDE: 0.9 INJECTION, SOLUTION INTRAVENOUS at 07:15

## 2022-04-19 RX ADMIN — LIDOCAINE HYDROCHLORIDE 50 MG: 10 INJECTION, SOLUTION EPIDURAL; INFILTRATION; INTRACAUDAL; PERINEURAL at 07:34

## 2022-04-19 RX ADMIN — PROPOFOL 50 MG: 10 INJECTION, EMULSION INTRAVENOUS at 08:00

## 2022-04-19 RX ADMIN — PROPOFOL 50 MG: 10 INJECTION, EMULSION INTRAVENOUS at 07:53

## 2022-04-19 RX ADMIN — PROPOFOL 50 MG: 10 INJECTION, EMULSION INTRAVENOUS at 07:46

## 2022-04-19 NOTE — ANESTHESIA PREPROCEDURE EVALUATION
Procedure:  EGD  COLONOSCOPY    Relevant Problems   CARDIO   (+) Hypercholesterolemia   (+) Hypertension      ENDO   (+) Hypothyroid   (+) Other specified hypothyroidism   (+) Type 2 diabetes mellitus with complication, with long-term current use of insulin (HCC)   (+) Type 2 diabetes mellitus with hyperglycemia, with long-term current use of insulin (HCC)   (+) Type 2 diabetes mellitus with microalbuminuria, with long-term current use of insulin (HCC)      GI/HEPATIC   (+) Esophageal dysphagia   (+) Gastroesophageal reflux disease without esophagitis   (+) Hiatal hernia      GYN   (+) Malignant neoplasm of right female breast (HCC)   (+) Malignant neoplasm of upper-outer quadrant of right breast in female, estrogen receptor positive ([facility] [address] )      MUSCULOSKELETAL   (+) Chronic bilateral low back pain without sciatica      NEURO/PSYCH   (+) Anxiety   (+) Chronic bilateral low back pain without sciatica   (+) Diabetic polyneuropathy associated with type 2 diabetes mellitus (HCC)      PULMONARY   (+) Severe persistent asthma without complication        Physical Exam    Airway    Mallampati score: II  TM Distance: >3 FB  Neck ROM: full     Dental   upper dentures and lower dentures,     Cardiovascular  Cardiovascular exam normal    Pulmonary  Pulmonary exam normal     Other Findings        Anesthesia Plan  ASA Score- 3     Anesthesia Type- IV sedation with anesthesia with ASA Monitors  Additional Monitors:   Airway Plan:           Plan Factors-Exercise tolerance (METS): >4 METS  Chart reviewed  EKG reviewed  Existing labs reviewed  Patient summary reviewed  Patient is not a current smoker  Patient not instructed to abstain from smoking on day of procedure  Patient did not smoke on day of surgery  Induction-     Postoperative Plan-     Informed Consent- Anesthetic plan and risks discussed with patient  I personally reviewed this patient with the CRNA   Discussed and agreed on the Anesthesia Plan with the CRNA               Lab Results   Component Value Date    HGBA1C 14 0 (H) 03/01/2022       Lab Results   Component Value Date    K 4 1 03/01/2022    CL 99 03/01/2022    CO2 29 03/01/2022    BUN 25 03/01/2022    CREATININE 0 58 (L) 03/01/2022    GLUF 387 (H) 03/01/2022    CALCIUM 9 0 03/01/2022    CORRECTEDCA 9 5 09/22/2021    AST 22 03/01/2022    ALT 19 03/01/2022    ALKPHOS 77 03/01/2022    EGFR 97 03/01/2022       Lab Results   Component Value Date    WBC 6 20 09/22/2021    HGB 12 1 09/22/2021    HCT 37 4 09/22/2021    MCV 89 09/22/2021     09/22/2021   ekg NSR

## 2022-04-19 NOTE — ANESTHESIA POSTPROCEDURE EVALUATION
Post-Op Assessment Note    CV Status:  Stable  Pain Score: 0    Pain management: adequate     Mental Status:  Awake and sleepy   Hydration Status:  Euvolemic   PONV Controlled:  Controlled   Airway Patency:  Patent      Post Op Vitals Reviewed: Yes      Staff: CRNA         No complications documented      BP   96/54   Temp      Pulse  66   Resp   12   SpO2   96

## 2022-04-19 NOTE — H&P
History and Physical - [facility]  Elena Score 73 y o  female MRN: [id number]                  HPI: Elena Score is a 73y o  year old female who presents for history of colon polyp, epigastric pain, history of h pylori infection      REVIEW OF SYSTEMS: Per the HPI, and otherwise unremarkable  Historical Information   Past Medical History:   Diagnosis Date    Abdominal infection ([facility] )     h-pylori    Abnormal chest x-ray 4/5/2019    Asthma     Breast cancer ([facility] ) 06/26/2018    Cancer ([facility] )     BREAST    Diabetes mellitus ([facility] )     Hiatal hernia     History of chemotherapy     History of radiation therapy     Hypercholesterolemia     Hypertension     Hypothyroid     Renal disorder     Rheumatoid arthritis ([place] [address] )      Past Surgical History:   Procedure Laterality Date    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    BREAST LUMPECTOMY Right 6/26/2018    Procedure: RIGHT BREAST NEEDLE LOCALIZATION X2 WITH RIGHT BREAST LUMPECTOMY ( NEEDLE LOC @ 1000); Surgeon: Baljit Kramer MD;  Location: [facility];  Service: Surgical Oncology    BREAST LUMPECTOMY Right 8/2/2018    Procedure: LYMPHOSCINITGRAPHY INTRAOPERATIVE LYMPHATIC MAPPING , RIGHT SENTINEL NODE BIOPSY, REEXCISION  RIGHT BREAST LUMPECTOMY CAVITY (SUPERIOR MARGIN); Surgeon: Baljit Kramer MD;  Location: [facility];  Service: Surgical Oncology    BREAST SURGERY Left     CATARACT EXTRACTION, BILATERAL Bilateral     COLONOSCOPY      EGD AND COLONOSCOPY N/A 9/5/2018    Procedure: EGD AND COLONOSCOPY;  Surgeon: Suellen Connors MD;  Location: [facility];   Service: Gastroenterology    [facility] GUIDED CENTRAL VENOUS ACCESS DEVICE INSERTION  9/13/2018    KNEE SURGERY Right     MAMMO NEEDLE LOCALIZATION RIGHT (ALL INC) Right 6/26/2018    MAMMO STEREOTACTIC BREAST BIOPSY RIGHT (ALL INC) Right 5/23/2018    RETINAL DETACHMENT SURGERY Right     TUBAL LIGATION      TUNNELED VENOUS PORT PLACEMENT Left 9/13/2018 Procedure: INSERTION VENOUS PORT (PORT-A-CATH);   Surgeon: Carolynn Monroy MD;  Location: [facility];  Service: Surgical Oncology    UPPER GASTROINTESTINAL ENDOSCOPY       Social History   Social History     Substance and Sexual Activity   Alcohol Use No     Social History     Substance and Sexual Activity   Drug Use No     Social History     Tobacco Use   Smoking Status Never Smoker   Smokeless Tobacco Never Used     Family History   Problem Relation Age of Onset    Diabetes Mother     Hypertension Mother     Diabetes Father     Hypertension Father     Diabetes Brother     Hypertension Brother     Prostate cancer Brother     Cancer Maternal Grandfather     No Known Problems Sister     No Known Problems Daughter     No Known Problems Sister     No Known Problems Sister     No Known Problems Sister     No Known Problems Sister     No Known Problems Daughter     No Known Problems Daughter        Meds/Allergies       Current Outpatient Medications:     albuterol (PROVENTIL HFA,VENTOLIN HFA) 90 mcg/act inhaler    amLODIPine (NORVASC) 10 mg tablet    anastrozole (ARIMIDEX) 1 mg tablet    aspirin 81 mg chewable tablet    atorvastatin (LIPITOR) 10 mg tablet    bisacodyl (DULCOLAX) 5 mg EC tablet    Blood Glucose Monitoring Suppl (ONE TOUCH ULTRA 2) w/Device KIT    Continuous Blood Gluc  (FreeStyle Broderick 14 Day [place]) AARON    Continuous Blood Gluc Sensor (LifeScribeyle Broderick 14 Day Sensor) MISC    erythromycin (ILOTYCIN) ophthalmic ointment    fluticasone-salmeterol (Advair HFA) 115-21 MCG/ACT inhaler    insulin detemir (Levemir FlexTouch) 100 Units/mL injection pen    insulin lispro (HumaLOG) 100 units/mL injection pen    Insulin Pen Needle (BD Pen Needle Emily U/F) 32G X 4 MM MISC    losartan (COZAAR) 50 mg tablet    lubiprostone (AMITIZA) 8 mcg capsule    metoprolol tartrate (LOPRESSOR) 25 mg tablet    omeprazole (PriLOSEC) 20 mg delayed release capsule    polyethylene glycol (GLYCOLAX) 17 GM/SCOOP powder    albuterol (2 5 mg/3 mL) 0 083 % nebulizer solution    EPINEPHrine (EPIPEN) 0 3 mg/0 3 mL SOAJ    glucose blood (OneTouch Ultra) test strip    lidocaine (XYLOCAINE) 5 % ointment    loratadine (CLARITIN) 10 mg tablet    magnesium gluconate (MAGONATE) 500 mg tablet    naproxen (EC NAPROSYN) 500 MG EC tablet    OneTouch Delica Lancets 28U MISC    Current Facility-Administered Medications:     insulin lispro (HumaLOG) 100 units/mL subcutaneous injection 8 Units, 8 Units, Subcutaneous, TID With Meals, 8 Units at 04/19/22 0717    sodium chloride 0 9 % infusion, 50 mL/hr, Intravenous, Continuous, 50 mL/hr at 04/19/22 7988    Allergies   Allergen Reactions    Codeine Other (See Comments)     unknown       Objective     /67 (BP Location: Left arm)   Pulse 89   Temp 97 8 °F (36 6 °C) (Temporal)   Resp 20   Ht 5' 5" (1 651 m)   Wt 78 9 kg (174 lb)   SpO2 99%   BMI 28 96 kg/m²       PHYSICAL EXAM    Gen: NAD  Head: NCAT  CV: RRR  CHEST: Clear  ABD: soft, NT/ND  EXT: no edema      ASSESSMENT/PLAN:  This is a 73y o  year old female here for EGD and colonoscopy, and she is stable and optimized for her procedure

## 2022-04-25 ENCOUNTER — OFFICE VISIT (OUTPATIENT)
Dept: SURGICAL ONCOLOGY | Facility: CLINIC | Age: 67
End: 2022-04-25
Payer: COMMERCIAL

## 2022-04-25 VITALS
TEMPERATURE: 97.7 F | BODY MASS INDEX: 28.99 KG/M2 | HEIGHT: 65 IN | RESPIRATION RATE: 16 BRPM | SYSTOLIC BLOOD PRESSURE: 120 MMHG | HEART RATE: 89 BPM | WEIGHT: 174 LBS | DIASTOLIC BLOOD PRESSURE: 80 MMHG | OXYGEN SATURATION: 96 %

## 2022-04-25 DIAGNOSIS — Z17.0 MALIGNANT NEOPLASM OF UPPER-OUTER QUADRANT OF RIGHT BREAST IN FEMALE, ESTROGEN RECEPTOR POSITIVE (HCC): Primary | ICD-10-CM

## 2022-04-25 DIAGNOSIS — Z79.811 USE OF ANASTROZOLE (ARIMIDEX): ICD-10-CM

## 2022-04-25 DIAGNOSIS — C50.411 MALIGNANT NEOPLASM OF UPPER-OUTER QUADRANT OF RIGHT BREAST IN FEMALE, ESTROGEN RECEPTOR POSITIVE (HCC): Primary | ICD-10-CM

## 2022-04-25 PROCEDURE — 99214 OFFICE O/P EST MOD 30 MIN: CPT | Performed by: NURSE PRACTITIONER

## 2022-04-25 NOTE — PROGRESS NOTES
e   Surgical Oncology Follow Up       [facility] SURGICAL ONCOLOGY [address]  [facility] [address]    Jolie Mulligan  1955  [id number]  [facility] SURGICAL ONCOLOGY [place] [address]    Chief Complaint   Patient presents with    Follow-up       Assessment/Plan:  1  Malignant neoplasm of upper-outer quadrant of right breast in female, estrogen receptor positive ([address] )  - 6 month follow up  - Mammo diagnostic bilateral w 3d & cad; Future    2  Use of anastrozole (Arimidex)  - continue use per medical oncology       Discussion/Summary:  Patient is a 67-year-old female presenting today for six-month follow-up for right breast cancer diagnosed in May of 2018  Pathology revealed invasive carcinoma ER 90-95%, MI 75-80%  HER 2 positive  She underwent a right breast needle localization x2 with right breast lumpectomy  She subsequently underwent a right breast lumpectomy re-excision and right axilla sentinel node biopsy  She received adjuvant chemotherapy and herceptin monotherapy  She completed whole breast radiation therapy is currently on anastrozole  She had a bilateral diagnostic mammogram on 06/23/2021 which was BI-RADS 2 category 3 density  I have placed her diagnostic mammogram for this year  There are no concerns on exam  I will see the patient back in 6 months or sooner should the need arise  She was instructed to call with any questions or concerns prior to this time  All questions were answered today        History of Present Illness:     Oncology History   Malignant neoplasm of right female breast ([address] )   5/23/2018 Initial Diagnosis    Malignant neoplasm of right female breast ([address] )     5/23/2018 Biopsy    Right breast core biopsy:  DCIS, micropapillary type, low-intermediate nuclear grade  ER 90-95% positive,  MI 75-80% positive       6/26/2018 Surgery    RIGHT BREAST NEEDLE LOCALIZATION X2 WITH RIGHT BREAST LUMPECTOMY ( NEEDLE LOC @ 1000) (Right)   ONCOPLASTIC CLOSURE OF LUMPECTOMY CAVITY    Invasive breast carcinoma, NST (aka ductal)  * [employer] grade 2 of 3 (total score = 2+2+2 = 6 of 9)   -- tubule formation < 10%, score 3   -- nuclear grade 2 of 3, score 2   -- mitoses ~ 4/mm2, score 2    * invasive carcinoma is multifocally present (A23-1, A32-1, A84-1, A89-1, A100-1), largest focus is 14 millimeters in greatest dimension (A89-1, this focus is graded)  * superior lumpectomy margin (orange-inked) is POSITIVE for invasive carcinoma (A84-1)   * all other lumpectomy margins are free of invasive carcinoma  * estrogen, progesterone & Her-2/negrita receptor studies are undertaken, to be described in an addendum report  - Ductal carcinoma in-situ (DCIS): Present as an extensive component (~85% of total tumor)  * DCIS is co-located with invasive carcinoma surrounding prior needle biopsy site  * DCIS spans 2 6 cm maximal dimension (A62-1) and is present on 27 of 136 total slides examined  * DCIS has cribriform & micropapillary patterns, nuclear grade 2 of 3, with central comedo-type necrosis  * DCIS is present within 0 2 millimeters of the superior lumpectomy margin (orange-inked, A26-1)   * DCIS is present within 0 2 millimeters of the medial lumpectomy margin (yellow-inked, A3-1)   * all other lumpectomy margins are free of DCIS by > 2 millimeters  - Lymph-vascular invasion: no lymph-vascular invasion is unequivocally identified  - Microcalcifications: present in DCIS  % positive, ER 45-55% positive, HER2 by IHC 3+ positive    - Dr Marleen Keane       8/2/2018 Surgery    Right breast lumpectomy reexcision, right axilla SLN biopsy:  A  Submitted as right axillary sentinel node:  - Seven lymph nodes are identified showing no metastatic tumor      B   Right breast lumpectomy reexcision:  - Abundant reactive changes are seen around the previous lumpectomy cavity   - No residual atypia or malignancy is seen           8/2/2018 -  Cancer Staged    Stage IA - pT1c, pN0, G2        9/25/2018 - 8/6/2019 Chemotherapy    Weekly Paclitaxol and trastuzumab x12, until December 11, 2018 followed by trastuzumab monotherapy 6 milligram/kilogram every 3 weeks    - Dr Nba Mcmillan       1/9/2019 - 2/19/2019 Radiation    Plan ID Energy Fractions Dose per Fraction (cGy) Dose Correction (cGy) Total Dose Delivered (cGy) Elapsed Days   R Boost e 16E 5 / 5 200 0 1,000 6   Right Breast 6X 25 / 25 200 0 5,000 29     - Dr Aniyah Calderon       Malignant neoplasm of upper-outer quadrant of right breast in female, estrogen receptor positive ([facility] )   7/19/2018 Initial Diagnosis    Malignant neoplasm of upper-outer quadrant of right breast in female, estrogen receptor positive ([facility] )     12/17/2018 - 8/26/2019 Chemotherapy    trastuzumab (HERCEPTIN) 579 mg in sodium chloride 0 9 % 250 mL chemo infusion, 8 mg/kg, Intravenous, Once, 12 of 12 cycles  Administration: 434 mg (5/14/2019), 434 mg (6/4/2019), 450 mg (7/16/2019), 450 mg (8/6/2019), 450 mg (6/25/2019)          -Interval History: Patient is a 43-year-old female presenting today for six-month follow-up for right breast cancer diagnosed in May of 2018  She had a bilateral diagnostic mammogram on 06/23/2021 which was BI-RADS 2 category 3 density  She is still taking anastrozole  Patient denies changes on her breast exam  She denies persistent headaches, bone pain, back pain, shortness of breath, or abdominal pain  Review of Systems:  Review of Systems   Constitutional: Negative for activity change, appetite change, fatigue and unexpected weight change  Respiratory: Negative for cough and shortness of breath  Cardiovascular: Negative for chest pain  Gastrointestinal: Negative for abdominal pain, diarrhea, nausea and vomiting  Endocrine: Negative for heat intolerance  Musculoskeletal: Negative for arthralgias, back pain and myalgias  Skin: Negative for rash     Neurological: Negative for weakness and headaches  Hematological: Negative for adenopathy         Patient Active Problem List   Diagnosis    Type 2 diabetes mellitus with complication, with long-term current use of insulin ([facility] )    Severe persistent asthma without complication    Other specified hypothyroidism    Chest pain    Palpitations    Chronic bilateral low back pain without sciatica    Neck pain    Malignant neoplasm of right female breast ([facility] )    Malignant neoplasm of upper-outer quadrant of right breast in female, estrogen receptor positive ([facility] )    Hiatal hernia    Screening for colon cancer    Esophageal dysphagia    Cellulitis of right axilla    Chronic pain of right knee    Hypercholesterolemia    Hypothyroid    Hypertension    Bilateral pulmonary embolism (HCC)    Radiation pneumonitis (HCC)    Type 2 diabetes mellitus with hyperglycemia, with long-term current use of insulin ([facility] )    Diabetic polyneuropathy associated with type 2 diabetes mellitus (HCC)    Use of anastrozole (Arimidex)    Decreased ROM of right shoulder    Lump of skin    Chronic diastolic heart failure (HCC)    Gastroesophageal reflux disease without esophagitis    Muscle cramps    Sebaceous cyst    Subcutaneous mass of back    Overweight with body mass index (BMI) of 28 to 28 9 in adult    Anxiety    Right foot pain    Pelvic pain    Bone pain    Type 2 diabetes mellitus with microalbuminuria, with long-term current use of insulin (HCC)     Past Medical History:   Diagnosis Date    Abdominal infection ([facility] )     h-pylori    Abnormal chest x-ray 4/5/2019    Asthma     [facility] cancer [facility] ) 06/26/2018    Cancer ([facility] )     BREAST    Diabetes mellitus ([facility] )     Hiatal hernia     History of chemotherapy     History of radiation therapy     Hypercholesterolemia     Hypertension     Hypothyroid     Renal disorder     Rheumatoid arthritis ([facility] )      Past Surgical History:   Procedure Laterality Date    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    BREAST LUMPECTOMY Right 6/26/2018    Procedure: RIGHT BREAST NEEDLE LOCALIZATION X2 WITH RIGHT BREAST LUMPECTOMY ( NEEDLE LOC @ 1000); Surgeon: Isra Herrera MD;  [facility];  Service: Surgical Oncology    BREAST LUMPECTOMY Right 8/2/2018    Procedure: LYMPHOSCINITGRAPHY INTRAOPERATIVE LYMPHATIC MAPPING , RIGHT SENTINEL NODE BIOPSY, REEXCISION  RIGHT BREAST LUMPECTOMY CAVITY (SUPERIOR MARGIN); Surgeon: Isra Herrera MD;  [facility];  Service: Surgical Oncology    BREAST SURGERY Left     CATARACT EXTRACTION, BILATERAL Bilateral     COLONOSCOPY      EGD AND COLONOSCOPY N/A 9/5/2018    Procedure: EGD AND COLONOSCOPY;  Surgeon: Broderick Landaverde MD;  Location: [facility] GI LAB; Service: Gastroenterology    [facility] GUIDED CENTRAL VENOUS ACCESS DEVICE INSERTION  9/13/2018    KNEE SURGERY Right     MAMMO NEEDLE LOCALIZATION RIGHT (ALL INC) Right 6/26/2018    MAMMO STEREOTACTIC BREAST BIOPSY RIGHT (ALL INC) Right 5/23/2018    RETINAL DETACHMENT SURGERY Right     TUBAL LIGATION      TUNNELED VENOUS PORT PLACEMENT Left 9/13/2018    Procedure: INSERTION VENOUS PORT (PORT-A-CATH);   Surgeon: Isra Herrera MD;  [facility];  Service: Surgical Oncology    UPPER GASTROINTESTINAL ENDOSCOPY       Family History   Problem Relation Age of Onset    Diabetes Mother     Hypertension Mother     Diabetes Father     Hypertension Father     Diabetes Brother     Hypertension Brother     Prostate cancer Brother     Cancer Maternal Grandfather     No Known Problems Sister     No Known Problems Daughter     No Known Problems Sister     No Known Problems Sister     No Known Problems Sister     No Known Problems Sister     No Known Problems Daughter     No Known Problems Daughter      Social History     Socioeconomic History    Marital status:      Spouse name: Not on file    Number of children: Not on file    Years of education: Not on file    Highest education level: Not on file   Occupational History    Not on file   Tobacco Use    Smoking status: Never Smoker    Smokeless tobacco: Never Used   Vaping Use    Vaping Use: Never used   Substance and Sexual Activity    Alcohol use: No    Drug use: No    Sexual activity: Not Currently   Other Topics Concern    Not on file   Social History Narrative    Not on file     Social Determinants of Health     Financial Resource Strain: Low Risk     Difficulty of Paying Living Expenses: Not hard at all   Food Insecurity: No Food Insecurity    Worried About Running Out of Food in the Last Year: Never true    Rosana of Food in the Last Year: Never true   Transportation Needs: No Transportation Needs    Lack of Transportation (Medical): No    Lack of Transportation (Non-Medical): No   Physical Activity: Not on file   Stress: Not on file   Social Connections: Not on file   Intimate Partner Violence: Not on file   Housing Stability: Low Risk     Unable to Pay for Housing in the Last Year: No    Number of Places Lived in the Last Year: 1    Unstable Housing in the Last Year: No       Current Outpatient Medications:     albuterol (2 5 mg/3 mL) 0 083 % nebulizer solution, TAKE 3 ML (2 5 MG TOTAL) BY NEBULIZATION EVERY 6 (SIX) HOURS AS NEEDED FOR WHEEZING OR SHORTNESS OF BREATH, Disp: 75 mL, Rfl: 0    albuterol (PROVENTIL HFA,VENTOLIN HFA) 90 mcg/act inhaler, INHALE 1 PUFF EVERY 4 (FOUR) HOURS AS NEEDED FOR WHEEZING, Disp: 18 g, Rfl: 0    amLODIPine (NORVASC) 10 mg tablet, Take 1 tablet (10 mg total) by mouth daily, Disp: 90 tablet, Rfl: 1    anastrozole (ARIMIDEX) 1 mg tablet, Take 1 tablet (1 mg total) by mouth daily, Disp: 90 tablet, Rfl: 3    aspirin 81 mg chewable tablet, Chew 81 mg daily, Disp: , Rfl:     atorvastatin (LIPITOR) 10 mg tablet, TAKE 1 TABLET (10 MG TOTAL) BY MOUTH DAILY, Disp: 90 tablet, Rfl: 0    bisacodyl (DULCOLAX) 5 mg EC tablet, Take as directed by the office  , Disp: 2 tablet, Rfl: 0    Blood Glucose Monitoring Suppl (ONE TOUCH ULTRA 2) w/Device KIT, by Does not apply route 3 (three) times a day, Disp: 1 each, Rfl: 0    Continuous Blood Gluc  (FreeStyle Broderick 14 Day [place]) AARON, Use 1 Device daily, Disp: 1 each, Rfl: 0    Continuous Blood Gluc Sensor (FreeStyle Broderick 14 Day Sensor) MISC, Use 1 each every 14 (fourteen) days, Disp: 2 each, Rfl: 11    erythromycin (ILOTYCIN) ophthalmic ointment, Place a 1/2 inch ribbon of ointment into the lower eyelid bilaterally 4x a day, Disp: 3 5 g, Rfl: 0    fluticasone-salmeterol (Advair HFA) 115-21 MCG/ACT inhaler, Inhale 2 puffs 2 (two) times a day Rinse mouth after use , Disp: 1 Inhaler, Rfl: 3    insulin detemir (Levemir FlexTouch) 100 Units/mL injection pen, 50 units with dinner, Disp: 50 mL, Rfl: 1    insulin lispro (HumaLOG) 100 units/mL injection pen, 20 units before meals, Disp: 45 mL, Rfl: 0    Insulin Pen Needle (BD Pen Needle Emily U/F) 32G X 4 MM MISC, Inject under the skin 4 (four) times a day, Disp: 400 each, Rfl: 0    losartan (COZAAR) 50 mg tablet, TAKE 1 TABLET (50 MG TOTAL) BY MOUTH DAILY, Disp: 90 tablet, Rfl: 0    lubiprostone (AMITIZA) 8 mcg capsule, Take 1 capsule (8 mcg total) by mouth 2 (two) times a day with meals, Disp: 60 capsule, Rfl: 2    magnesium gluconate (MAGONATE) 500 mg tablet, Take 1 tablet (500 mg total) by mouth daily, Disp: 90 tablet, Rfl: 1    metoprolol tartrate (LOPRESSOR) 25 mg tablet, Take 0 5 tablets (12 5 mg total) by mouth every 12 (twelve) hours, Disp: 180 tablet, Rfl: 1    omeprazole (PriLOSEC) 20 mg delayed release capsule, TAKE 1 CAPSULE (20 MG TOTAL) BY MOUTH DAILY, Disp: 90 capsule, Rfl: 1    polyethylene glycol (GLYCOLAX) 17 GM/SCOOP powder, Take as directed by the office  , Disp: 238 g, Rfl: 0    EPINEPHrine (EPIPEN) 0 3 mg/0 3 mL SOAJ, Inject 0 3 mL (0 3 mg total) into a muscle once for 1 dose (Patient not taking: Reported on 4/25/2022 ), Disp: 0 6 mL, Rfl: 0   glucose blood (OneTouch Ultra) test strip, Use 1 each 3 (three) times a day Use as instructed (Patient not taking: Reported on 10/27/2021), Disp: 300 each, Rfl: 4    lidocaine (XYLOCAINE) 5 % ointment, Apply topically as needed for mild pain Apply 5 minutes prior to insulin injection (Patient not taking: Reported on 5/19/2021), Disp: 35 44 g, Rfl: 0    loratadine (CLARITIN) 10 mg tablet, TAKE 1 TABLET (10 MG TOTAL) BY MOUTH DAILY (Patient not taking: Reported on 11/17/2021 ), Disp: 90 tablet, Rfl: 2    naproxen (EC NAPROSYN) 500 MG EC tablet, Take 1 tablet (500 mg total) by mouth 2 (two) times a day with meals for 5 days (Patient not taking: Reported on 4/25/2022 ), Disp: 10 tablet, Rfl: 0    OneTouch Delica Lancets 22G MISC, USE 3 (THREE) TIMES A DAY (Patient not taking: Reported on 6/25/2021), Disp: 300 each, Rfl: 4  Allergies   Allergen Reactions    Codeine Other (See Comments)     unknown     Vitals:    04/25/22 1420   Resp: 16       Physical Exam  Constitutional:       General: She is not in acute distress  Appearance: Normal appearance  Cardiovascular:      Rate and Rhythm: Normal rate and regular rhythm  Pulses: Normal pulses  Heart sounds: Normal heart sounds  Pulmonary:      Effort: Pulmonary effort is normal       Breath sounds: Normal breath sounds  Chest:      Chest wall: No mass  Breasts:      Right: No swelling, bleeding, inverted nipple, mass, nipple discharge, skin change, tenderness, axillary adenopathy or supraclavicular adenopathy  Left: No swelling, bleeding, inverted nipple, mass, nipple discharge, skin change, tenderness, axillary adenopathy or supraclavicular adenopathy  Comments: Right axilla skin cyst present  Right breast Lumpectomy scar  No masses, nodularity, skin changes, nipple changes or discharge, or adenopathy appreciated on physical exam    Abdominal:      General: Abdomen is flat  Palpations: Abdomen is soft     Lymphadenopathy: Upper Body:      Right upper body: No supraclavicular, axillary or pectoral adenopathy  Left upper body: No supraclavicular, axillary or pectoral adenopathy  Skin:     General: Skin is warm  Neurological:      General: No focal deficit present  Mental Status: She is alert and oriented to person, place, and time  Psychiatric:         Mood and Affect: Mood normal          Behavior: Behavior normal            Results:    Imaging  EGD    Result Date: 4/19/2022  Narrative: [facility] [address] [facility] [address] [facility] [address] [phone] DATE OF SERVICE: 4/19/22 PHYSICIAN(S): Attending: David Langley MD Fellow: No Staff Documented INDICATION: Epigastric pain, H  pylori infection POST-OP DIAGNOSIS: See the impression below  PREPROCEDURE: Informed consent was obtained for the procedure, including sedation  Risks of perforation, hemorrhage, adverse drug reaction and aspiration were discussed  The patient was placed in the left lateral decubitus position  Patient was explained about the risks and benefits of the procedure  Risks including but not limited to bleeding, infection, and perforation were explained in detail  Also explained about less than 100% sensitivity with the exam and other alternatives  DETAILS OF PROCEDURE: Patient was taken to the procedure room where a time out was performed to confirm correct patient and correct procedure  The patient underwent monitored anesthesia care, which was administered by an anesthesia professional  The patient's blood pressure, heart rate, level of consciousness, respirations, oxygen and ETCO2 were monitored throughout the procedure  The scope was advanced to the second part of the duodenum  Retroflexion was performed in the fundus  The patient experienced no blood loss  The procedure was not difficult  The patient tolerated the procedure well  There were no apparent complications   ANESTHESIA INFORMATION: ASA: III Anesthesia Type: Anesthesia type not filed in the log  MEDICATIONS: No administrations occurring from 0726 to 0742 on 04/19/22 FINDINGS: The esophagus appeared normal  Z-line is 38 cm from the incisors  The stomach appeared normal  Performed random biopsy  The duodenal bulb and 2nd part of the duodenum appeared normal  SPECIMENS: ID Type Source Tests Collected by Time Destination 1 : gastric bx r/o h pylori  Tissue Stomach TISSUE EXAM Bhavna Ferraro MD 4/19/2022 0737      Impression: Normal EGD  Gastric biopsies taken for H pylori RECOMMENDATION: Await pathology results   Bhavna Ferraro MD     Colonoscopy    Result Date: 4/19/2022  Narrative: [facility] [address] [place] [id number] [phone] DATE OF SERVICE: 4/19/22 PHYSICIAN(S): Attending: Bhavna Ferraro MD Fellow: No Staff Documented INDICATION: Tubular adenoma POST-OP DIAGNOSIS: See the impression below  HISTORY: Prior colonoscopy: 4 years ago  BOWEL PREPARATION: Miralax/Dulcolax PREPROCEDURE: Informed consent was obtained for the procedure, including sedation  Risks including but not limited to bleeding, infection, perforation, adverse drug reaction and aspiration were explained in detail  Also explained about less than 100% sensitivity with the exam and other alternatives  The patient was placed in the left lateral decubitus position  DETAILS OF PROCEDURE: Patient was taken to the procedure room where a time out was performed to confirm correct patient and correct procedure  The patient underwent monitored anesthesia care, which was administered by an anesthesia professional  The patient's blood pressure, heart rate, level of consciousness, oxygen, respirations and ETCO2 were monitored throughout the procedure  A digital rectal exam was performed  The scope was introduced through the anus and advanced to the cecum  Retroflexion was performed in the rectum   The quality of bowel preparation was evaluated using the [facility] Bowel Preparation Scale with scores of: right colon = 2, transverse colon = 2, left colon = 2  The total BBPS score was 6  Bowel prep was adequate  The patient experienced no blood loss  The procedure was not difficult  The patient tolerated the procedure well  There were no apparent complications  ANESTHESIA INFORMATION: ASA: III Anesthesia Type: IV Sedation with Anesthesia MEDICATIONS: No administrations occurring from 0726 to 0817 on 04/19/22 FINDINGS: 6 mm polyp in the ascending colon; performed complete piecemeal removal by cold forceps biopsy The cecum, transverse colon, descending colon and rectosigmoid appeared normal  EVENTS: Procedure Events Event Event Time ENDO CECUM REACHED 4/19/2022  7:54 AM ENDO SCOPE OUT TIME 4/19/2022  8:13 AM SPECIMENS: ID Type Source Tests Collected by Time Destination 1 : gastric bx r/o h pylori  Tissue Stomach TISSUE EXAM Bhavna Ferraro MD 4/19/2022 8401  2 : ascending colon polyp- forceps  Tissue Polyp, Colorectal TISSUE EXAM Bhavna Ferraro MD 4/19/2022 0804  EQUIPMENT: Colonoscope -[id number] ENDOCUFF VISION MED BLUE ID [id number]     Impression: 6 mm polyp in the ascending colon; performed complete piecemeal removal by cold forceps biopsy The cecum, transverse colon, descending colon and rectosigmoid appeared normal  RECOMMENDATION: Repeat colonoscopy in 5 years due to a personal history of colon polyps  Bhavna Ferraro MD       I reviewed the above imaging data  Advance Care Planning/Advance Directives:  Discussed disease status, cancer treatment plans and/or cancer treatment goals with the patient

## 2022-05-02 ENCOUNTER — OFFICE VISIT (OUTPATIENT)
Dept: PULMONOLOGY | Facility: CLINIC | Age: 67
End: 2022-05-02
Payer: COMMERCIAL

## 2022-05-02 VITALS
TEMPERATURE: 98 F | BODY MASS INDEX: 29.82 KG/M2 | WEIGHT: 179 LBS | OXYGEN SATURATION: 98 % | DIASTOLIC BLOOD PRESSURE: 76 MMHG | SYSTOLIC BLOOD PRESSURE: 116 MMHG | HEIGHT: 65 IN | HEART RATE: 80 BPM

## 2022-05-02 DIAGNOSIS — J70.0 RADIATION PNEUMONITIS (HCC): ICD-10-CM

## 2022-05-02 DIAGNOSIS — I50.32 CHRONIC DIASTOLIC HEART FAILURE (HCC): ICD-10-CM

## 2022-05-02 DIAGNOSIS — J45.50 SEVERE PERSISTENT ASTHMA WITHOUT COMPLICATION: Primary | ICD-10-CM

## 2022-05-02 DIAGNOSIS — I26.99 BILATERAL PULMONARY EMBOLISM (HCC): ICD-10-CM

## 2022-05-02 PROCEDURE — 99214 OFFICE O/P EST MOD 30 MIN: CPT | Performed by: INTERNAL MEDICINE

## 2022-05-02 RX ORDER — ALBUTEROL SULFATE 2.5 MG/3ML
2.5 SOLUTION RESPIRATORY (INHALATION) EVERY 6 HOURS PRN
Qty: 75 ML | Refills: 5 | Status: SHIPPED | OUTPATIENT
Start: 2022-05-02

## 2022-05-02 RX ORDER — ALBUTEROL SULFATE 90 UG/1
1 AEROSOL, METERED RESPIRATORY (INHALATION) EVERY 4 HOURS PRN
Qty: 18 G | Refills: 5 | Status: SHIPPED | OUTPATIENT
Start: 2022-05-02

## 2022-05-02 RX ORDER — MONTELUKAST SODIUM 10 MG/1
10 TABLET ORAL
Qty: 30 TABLET | Refills: 5 | Status: SHIPPED | OUTPATIENT
Start: 2022-05-02

## 2022-05-02 RX ORDER — FLUTICASONE PROPIONATE AND SALMETEROL XINAFOATE 230; 21 UG/1; UG/1
2 AEROSOL, METERED RESPIRATORY (INHALATION) 2 TIMES DAILY
Qty: 12 G | Refills: 5 | Status: SHIPPED | OUTPATIENT
Start: 2022-05-02

## 2022-05-02 RX ORDER — FLUTICASONE PROPIONATE AND SALMETEROL XINAFOATE 115; 21 UG/1; UG/1
2 AEROSOL, METERED RESPIRATORY (INHALATION) 2 TIMES DAILY
Qty: 12 G | Refills: 5 | Status: SHIPPED | OUTPATIENT
Start: 2022-05-02 | End: 2022-05-02

## 2022-05-02 NOTE — ASSESSMENT & PLAN NOTE
Wt Readings from Last 3 Encounters:   05/02/22 81 2 kg (179 lb)   04/25/22 78 9 kg (174 lb)   04/19/22 78 9 kg (174 lb)       Weight is stable, euvolemic

## 2022-05-02 NOTE — ASSESSMENT & PLAN NOTE
Remains stable without recent exacerbations  - the patient has been doing well on Xolair but has recently discontinued on her own to give her body of break  Will monitor and resume if needed  - Cont  Advair BID  Trialed Breztri but did not find it helpful  - Cont  Albuterol as needed  - Cont  OTC allergy medication    - Add Montelukast nightly  - does not want COVID vaccine

## 2022-05-02 NOTE — PROGRESS NOTES
Pulmonary Follow Up Note   Jolie Mulligan 73 y o  female MRN: [id number]  5/2/2022      Referring provider: Dr iRa Castillo and Plan:    Severe persistent asthma without complication  Remains stable without recent exacerbations  - the patient has been doing well on Xolair but has recently discontinued on her own to give her body of break  Will monitor and resume if needed  - Cont  Advair BID  Trialed Breztri but did not find it helpful  - Cont  Albuterol as needed  - Cont  OTC allergy medication    - Add Montelukast nightly  - does not want COVID vaccine  Bilateral pulmonary embolism (HCC)  - hx of PE when cancer was active  Was on Rivaroxaban and now on ASA 81 mg daily  Chronic diastolic heart failure (HCC)  Wt Readings from Last 3 Encounters:   05/02/22 81 2 kg (179 lb)   04/25/22 78 9 kg (174 lb)   04/19/22 78 9 kg (174 lb)       Weight is stable, euvolemic  Radiation pneumonitis ([facility] [address] )  Asymptomatic      Diagnoses and all orders for this visit:    Severe persistent asthma without complication  -     montelukast (SINGULAIR) 10 mg tablet; Take 1 tablet (10 mg total) by mouth daily at bedtime  -     Discontinue: fluticasone-salmeterol (Advair HFA) 115-21 MCG/ACT inhaler; Inhale 2 puffs 2 (two) times a day Rinse mouth after use  -     albuterol (2 5 mg/3 mL) 0 083 % nebulizer solution; Take 3 mL (2 5 mg total) by nebulization every 6 (six) hours as needed for wheezing or shortness of breath  -     albuterol (PROVENTIL HFA,VENTOLIN HFA) 90 mcg/act inhaler; Inhale 1 puff every 4 (four) hours as needed for wheezing  -     fluticasone-salmeterol (Advair HFA) 230-21 MCG/ACT inhaler; Inhale 2 puffs 2 (two) times a day Rinse mouth after use    -     Nebulizer Supplies    Radiation pneumonitis ([address] )    Bilateral pulmonary embolism (HCC)    Chronic diastolic heart failure ([address] )    Other orders  -     Nebulizer Set Disposable Package    Discussed the plan of care at length with the patient and her daughter  Questions and concerns addressed  They return for follow-up in 6 months or sooner if needed  History of Present Illness   HPI:  Nestor Rey is a 73 y o  female who presents for follow up of severe asthma  She was last seen by me in September  Since then, she stopped the   Xolair injections  She wants to give her body a break  Uses Advair BID  Uses albuterol as needed, which is 3 times per day  Needs refills  Uses Albuterol nebulizer as needed, which is 2-3 times per day  No asthma flares since she was last here  No need for steroids  Asthma triggered by change in season and dogs  Constipated  Drinks Magnesium  She stopped eating rice and is less constipated  She is here with her daughter who provides additional history  Review of Systems  Positive as above and negative otherwise    Historical Information   Past Medical History:   Diagnosis Date    Abdominal infection ([facility] [address] )     h-pylori    Abnormal chest x-ray 4/5/2019    Asthma     Breast cancer ([facility] [address] ) 06/26/2018    Cancer ([facility] [address] )     BREAST    Diabetes mellitus ([facility] [address] )     Hiatal hernia     History of chemotherapy     History of radiation therapy     Hypercholesterolemia     Hypertension     Hypothyroid     Renal disorder     Rheumatoid arthritis ([facility] [address] )      Past Surgical History:   Procedure Laterality Date    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    BREAST LUMPECTOMY Right 6/26/2018    Procedure: RIGHT BREAST NEEDLE LOCALIZATION X2 WITH RIGHT BREAST LUMPECTOMY ( NEEDLE LOC @ 1000); Surgeon: Leonidas Shaver MD;  [facility]: [facility];  Service: Surgical Oncology    BREAST LUMPECTOMY Right 8/2/2018    Procedure: LYMPHOSCINITGRAPHY INTRAOPERATIVE LYMPHATIC MAPPING , RIGHT SENTINEL NODE BIOPSY, REEXCISION  RIGHT BREAST LUMPECTOMY CAVITY (SUPERIOR MARGIN);   Surgeon: Leonidas Shaver MD;  [facility];  Service: Surgical Oncology    BREAST SURGERY Left     CATARACT EXTRACTION, BILATERAL Bilateral  COLONOSCOPY      EGD AND COLONOSCOPY N/A 9/5/2018    Procedure: EGD AND COLONOSCOPY;  Surgeon: Catherine Hernandez MD;  Location[facility]; Service: Gastroenterology    [facility] GUIDED CENTRAL VENOUS ACCESS DEVICE INSERTION  9/13/2018    KNEE SURGERY Right     MAMMO NEEDLE LOCALIZATION RIGHT (ALL INC) Right 6/26/2018    MAMMO STEREOTACTIC BREAST BIOPSY RIGHT (ALL INC) Right 5/23/2018    RETINAL DETACHMENT SURGERY Right     TUBAL LIGATION      TUNNELED VENOUS PORT PLACEMENT Left 9/13/2018    Procedure: INSERTION VENOUS PORT (PORT-A-CATH);   Surgeon: Salvador Fitzgerald MD;  [facility];  Service: Surgical Oncology    UPPER GASTROINTESTINAL ENDOSCOPY       Family History   Problem Relation Age of Onset    Diabetes Mother     Hypertension Mother     Diabetes Father     Hypertension Father     Diabetes Brother     Hypertension Brother     Prostate cancer Brother     Cancer Maternal Grandfather     No Known Problems Sister     No Known Problems Daughter     No Known Problems Sister     No Known Problems Sister     No Known Problems Sister     No Known Problems Sister     No Known Problems Daughter     No Known Problems Daughter          Meds/Allergies     Current Outpatient Medications:     albuterol (2 5 mg/3 mL) 0 083 % nebulizer solution, TAKE 3 ML (2 5 MG TOTAL) BY NEBULIZATION EVERY 6 (SIX) HOURS AS NEEDED FOR WHEEZING OR SHORTNESS OF BREATH, Disp: 75 mL, Rfl: 0    albuterol (PROVENTIL HFA,VENTOLIN HFA) 90 mcg/act inhaler, INHALE 1 PUFF EVERY 4 (FOUR) HOURS AS NEEDED FOR WHEEZING, Disp: 18 g, Rfl: 0    amLODIPine (NORVASC) 10 mg tablet, Take 1 tablet (10 mg total) by mouth daily, Disp: 90 tablet, Rfl: 1    anastrozole (ARIMIDEX) 1 mg tablet, Take 1 tablet (1 mg total) by mouth daily, Disp: 90 tablet, Rfl: 3    aspirin 81 mg chewable tablet, Chew 81 mg daily, Disp: , Rfl:     atorvastatin (LIPITOR) 10 mg tablet, TAKE 1 TABLET (10 MG TOTAL) BY MOUTH DAILY, Disp: 90 tablet, Rfl: 0   Continuous Blood Gluc Sensor (FreeStyle Broderick 14 Day Sensor) MISC, Use 1 each every 14 (fourteen) days, Disp: 2 each, Rfl: 11    EPINEPHrine (EPIPEN) 0 3 mg/0 3 mL SOAJ, Inject 0 3 mL (0 3 mg total) into a muscle once for 1 dose, Disp: 0 6 mL, Rfl: 0    fluticasone-salmeterol (Advair HFA) 115-21 MCG/ACT inhaler, Inhale 2 puffs 2 (two) times a day Rinse mouth after use , Disp: 1 Inhaler, Rfl: 3    glucose blood (OneTouch Ultra) test strip, Use 1 each 3 (three) times a day Use as instructed, Disp: 300 each, Rfl: 4    insulin detemir (Levemir FlexTouch) 100 Units/mL injection pen, 50 units with dinner, Disp: 50 mL, Rfl: 1    loratadine (CLARITIN) 10 mg tablet, TAKE 1 TABLET (10 MG TOTAL) BY MOUTH DAILY, Disp: 90 tablet, Rfl: 2    losartan (COZAAR) 50 mg tablet, TAKE 1 TABLET (50 MG TOTAL) BY MOUTH DAILY, Disp: 90 tablet, Rfl: 0    magnesium gluconate (MAGONATE) 500 mg tablet, Take 1 tablet (500 mg total) by mouth daily, Disp: 90 tablet, Rfl: 1    metoprolol tartrate (LOPRESSOR) 25 mg tablet, Take 0 5 tablets (12 5 mg total) by mouth every 12 (twelve) hours, Disp: 180 tablet, Rfl: 1    omeprazole (PriLOSEC) 20 mg delayed release capsule, TAKE 1 CAPSULE (20 MG TOTAL) BY MOUTH DAILY, Disp: 90 capsule, Rfl: 1    OneTouch Delica Lancets 98K MISC, USE 3 (THREE) TIMES A DAY, Disp: 300 each, Rfl: 4    polyethylene glycol (GLYCOLAX) 17 GM/SCOOP powder, Take as directed by the office  , Disp: 238 g, Rfl: 0    bisacodyl (DULCOLAX) 5 mg EC tablet, Take as directed by the office   (Patient not taking: Reported on 5/2/2022 ), Disp: 2 tablet, Rfl: 0    Blood Glucose Monitoring Suppl (ONE TOUCH ULTRA 2) w/Device KIT, by Does not apply route 3 (three) times a day (Patient not taking: Reported on 5/2/2022 ), Disp: 1 each, Rfl: 0    Continuous Blood Gluc  (FreeStyle Broderick 14 Day Plush) AARON, Use 1 Device daily (Patient not taking: Reported on 5/2/2022 ), Disp: 1 each, Rfl: 0    erythromycin (ILOTYCIN) ophthalmic ointment, Place a 1/2 inch ribbon of ointment into the lower eyelid bilaterally 4x a day (Patient not taking: Reported on 5/2/2022 ), Disp: 3 5 g, Rfl: 0    insulin lispro (HumaLOG) 100 units/mL injection pen, 20 units before meals (Patient not taking: Reported on 5/2/2022 ), Disp: 45 mL, Rfl: 0    Insulin Pen Needle (BD Pen Needle Emily U/F) 32G X 4 MM MISC, Inject under the skin 4 (four) times a day, Disp: 400 each, Rfl: 0    lidocaine (XYLOCAINE) 5 % ointment, Apply topically as needed for mild pain Apply 5 minutes prior to insulin injection (Patient not taking: Reported on 5/19/2021), Disp: 35 44 g, Rfl: 0    lubiprostone (AMITIZA) 8 mcg capsule, Take 1 capsule (8 mcg total) by mouth 2 (two) times a day with meals (Patient not taking: Reported on 5/2/2022 ), Disp: 60 capsule, Rfl: 2    naproxen (EC NAPROSYN) 500 MG EC tablet, Take 1 tablet (500 mg total) by mouth 2 (two) times a day with meals for 5 days (Patient not taking: Reported on 4/25/2022 ), Disp: 10 tablet, Rfl: 0  Allergies   Allergen Reactions    Codeine Other (See Comments)     unknown       Vitals: Blood pressure 116/76, pulse 80, temperature 98 °F (36 7 °C), temperature source Tympanic, height 5' 5" (1 651 m), weight 81 2 kg (179 lb), SpO2 98 %, not currently breastfeeding  Body mass index is 29 79 kg/m²  Oxygen Therapy  SpO2: 98 %  Oxygen Therapy: None (Room air)      Physical Exam  GEN: WDWN, nad, comfortable  HEENT: NCAT, EOMI  CVS: Regular, no m/r/g  LUNGS: CTA b/l  ABD: soft  EXT: No c/c/e  NEURO: No focal deficits  MS: Moving all extremities  SKIN: warm, dry  PSYCH: calm, cooperative      Labs: I have personally reviewed pertinent lab results    Lab Results   Component Value Date    WBC 6 20 09/22/2021    HGB 12 1 09/22/2021    HCT 37 4 09/22/2021    MCV 89 09/22/2021     09/22/2021     Lab Results   Component Value Date    CALCIUM 9 0 03/01/2022    K 4 1 03/01/2022    CO2 29 03/01/2022    CL 99 03/01/2022    BUN 25 03/01/2022 CREATININE 0 58 (L) 2022     Lab Results   Component Value Date    IGE 1,302 (H) 2021     Lab Results   Component Value Date    ALT 19 2022    AST 22 2022    ALKPHOS 77 2022     Labs per my interpretation show normal renal function; normal hemoglobin    Imaging and other studies: I have personally reviewed pertinent films in PACS  HRCT 3/20: RUL scar, left lung nodules    Pulmonary function testin  Per my review reveal normal spirometry, air trapping and hyperinflation, increased diffusion    EKG, Pathology, and Other Studies: I have personally reviewed pertinent reports  ECHO : EF normal    [facility]

## 2022-05-03 ENCOUNTER — TELEPHONE (OUTPATIENT)
Dept: GASTROENTEROLOGY | Facility: CLINIC | Age: 67
End: 2022-05-03

## 2022-05-03 NOTE — TELEPHONE ENCOUNTER
Reviewed biopsy results with pt's daughter  Daughter verbalized understanding and had no further questions at this time

## 2022-05-03 NOTE — TELEPHONE ENCOUNTER
Patients GI provider:  Dr Onel Hardwick    Number to return call: [phone]    Reason for call: Pt's daughter Scot Market calling for biopsy results      Scheduled procedure/appointment date if applicable: N/A

## 2022-06-01 DIAGNOSIS — I10 ESSENTIAL HYPERTENSION: ICD-10-CM

## 2022-06-01 DIAGNOSIS — E11.8 TYPE 2 DIABETES MELLITUS WITH COMPLICATION, WITHOUT LONG-TERM CURRENT USE OF INSULIN (HCC): ICD-10-CM

## 2022-06-01 RX ORDER — INSULIN DETEMIR 100 [IU]/ML
INJECTION, SOLUTION SUBCUTANEOUS
Qty: 45 ML | Refills: 0 | Status: SHIPPED | OUTPATIENT
Start: 2022-06-01 | End: 2022-06-13 | Stop reason: SDUPTHER

## 2022-06-01 RX ORDER — OMEPRAZOLE 20 MG/1
20 CAPSULE, DELAYED RELEASE ORAL DAILY
Qty: 90 CAPSULE | Refills: 0 | Status: SHIPPED | OUTPATIENT
Start: 2022-06-01

## 2022-06-13 ENCOUNTER — OFFICE VISIT (OUTPATIENT)
Dept: FAMILY MEDICINE CLINIC | Facility: CLINIC | Age: 67
End: 2022-06-13

## 2022-06-13 VITALS
RESPIRATION RATE: 18 BRPM | HEIGHT: 65 IN | BODY MASS INDEX: 30.26 KG/M2 | SYSTOLIC BLOOD PRESSURE: 144 MMHG | TEMPERATURE: 97.7 F | OXYGEN SATURATION: 97 % | HEART RATE: 80 BPM | WEIGHT: 181.6 LBS | DIASTOLIC BLOOD PRESSURE: 72 MMHG

## 2022-06-13 DIAGNOSIS — E11.9 DIABETIC EYE EXAM (HCC): ICD-10-CM

## 2022-06-13 DIAGNOSIS — I10 ESSENTIAL HYPERTENSION: ICD-10-CM

## 2022-06-13 DIAGNOSIS — E11.8 TYPE 2 DIABETES MELLITUS WITH COMPLICATION, WITH LONG-TERM CURRENT USE OF INSULIN (HCC): ICD-10-CM

## 2022-06-13 DIAGNOSIS — Z01.00 DIABETIC EYE EXAM (HCC): ICD-10-CM

## 2022-06-13 DIAGNOSIS — F33.9 DEPRESSION, RECURRENT (HCC): ICD-10-CM

## 2022-06-13 DIAGNOSIS — H93.11 TINNITUS AURIUM, RIGHT: ICD-10-CM

## 2022-06-13 DIAGNOSIS — Z79.4 TYPE 2 DIABETES MELLITUS WITH COMPLICATION, WITH LONG-TERM CURRENT USE OF INSULIN (HCC): ICD-10-CM

## 2022-06-13 DIAGNOSIS — E55.9 VITAMIN D DEFICIENCY: ICD-10-CM

## 2022-06-13 DIAGNOSIS — R60.0 LEG EDEMA: ICD-10-CM

## 2022-06-13 DIAGNOSIS — E11.8 TYPE 2 DIABETES MELLITUS WITH COMPLICATION, WITHOUT LONG-TERM CURRENT USE OF INSULIN (HCC): Primary | ICD-10-CM

## 2022-06-13 LAB — SL AMB POCT HEMOGLOBIN AIC: 13.8 (ref ?–6.5)

## 2022-06-13 PROCEDURE — 3077F SYST BP >= 140 MM HG: CPT | Performed by: FAMILY MEDICINE

## 2022-06-13 PROCEDURE — 83036 HEMOGLOBIN GLYCOSYLATED A1C: CPT | Performed by: FAMILY MEDICINE

## 2022-06-13 PROCEDURE — 3078F DIAST BP <80 MM HG: CPT | Performed by: FAMILY MEDICINE

## 2022-06-13 PROCEDURE — 99214 OFFICE O/P EST MOD 30 MIN: CPT | Performed by: FAMILY MEDICINE

## 2022-06-13 RX ORDER — LOSARTAN POTASSIUM 100 MG/1
100 TABLET ORAL DAILY
Qty: 90 TABLET | Refills: 1 | Status: SHIPPED | OUTPATIENT
Start: 2022-06-13

## 2022-06-13 RX ORDER — INSULIN LISPRO 100 [IU]/ML
INJECTION, SOLUTION INTRAVENOUS; SUBCUTANEOUS
Qty: 45 ML | Refills: 0 | Status: SHIPPED | OUTPATIENT
Start: 2022-06-13

## 2022-06-13 RX ORDER — INSULIN DETEMIR 100 [IU]/ML
INJECTION, SOLUTION SUBCUTANEOUS
Qty: 45 ML | Refills: 0 | Status: SHIPPED | OUTPATIENT
Start: 2022-06-13

## 2022-06-13 RX ORDER — SENNOSIDES 8.6 MG
650 CAPSULE ORAL EVERY 8 HOURS PRN
Qty: 30 TABLET | Refills: 0 | Status: SHIPPED | OUTPATIENT
Start: 2022-06-13

## 2022-06-13 RX ORDER — FUROSEMIDE 20 MG/1
20 TABLET ORAL DAILY
Qty: 5 TABLET | Refills: 0 | Status: SHIPPED | OUTPATIENT
Start: 2022-06-13 | End: 2022-06-18

## 2022-06-13 NOTE — ASSESSMENT & PLAN NOTE
Lab Results   Component Value Date    HGBA1C 13 8 (A) 06/13/2022   Minimal improvement compared to prior  Patient refusing weekly insulin, often forgets daily insulin  Suggested she put an alarm/reminder on her phone for her insulin  Reviewed low carb diet

## 2022-06-13 NOTE — PROGRESS NOTES
Assessment/Plan:    Type 2 diabetes mellitus with complication, with long-term current use of insulin (Formerly [facility])    Lab Results   Component Value Date    HGBA1C 13 8 (A) 06/13/2022   Minimal improvement compared to prior  Patient refusing weekly insulin, often forgets daily insulin  Suggested she put an alarm/reminder on her phone for her insulin  Reviewed low carb diet     Essential hypertension  Given LE edema stop Amlodipine and increase Losartan to 100 mg daily        Diagnoses and all orders for this visit:    Type 2 diabetes mellitus with complication, without long-term current use of insulin (Formerly [facility])  -     POCT hemoglobin A1c  -     losartan (COZAAR) 100 MG tablet; Take 1 tablet (100 mg total) by mouth daily  -     furosemide (LASIX) 20 mg tablet; Take 1 tablet (20 mg total) by mouth daily for 5 days  -     CBC and differential; Future  -     Comprehensive metabolic panel; Future  -     Lipid panel; Future  -     Microalbumin / creatinine urine ratio; Future  -     TSH, 3rd generation with Free T4 reflex; Future  -     insulin detemir (Levemir FlexTouch) 100 Units/mL injection pen; INJECT 55 UNITS UNDER THE SKIN DAILY AT BEDTIME    Essential hypertension  -     losartan (COZAAR) 100 MG tablet; Take 1 tablet (100 mg total) by mouth daily  -     furosemide (LASIX) 20 mg tablet; Take 1 tablet (20 mg total) by mouth daily for 5 days  -     CBC and differential; Future  -     Comprehensive metabolic panel; Future  -     Lipid panel; Future  -     Microalbumin / creatinine urine ratio; Future  -     TSH, 3rd generation with Free T4 reflex; Future    Leg edema  -     furosemide (LASIX) 20 mg tablet; Take 1 tablet (20 mg total) by mouth daily for 5 days  -     acetaminophen (TYLENOL) 650 mg CR tablet; Take 1 tablet (650 mg total) by mouth every 8 (eight) hours as needed for mild pain    Vitamin D deficiency  -     Vitamin D 25 hydroxy;  Future    Type 2 diabetes mellitus with complication, with long-term current use of insulin (HCC)  -     insulin lispro (HumaLOG) 100 units/mL injection pen; 20 units before meals    Diabetic eye exam [facility])  -     Ambulatory Referral to Ophthalmology; Future    Tinnitus aurium, right  -     Ambulatory Referral to Otolaryngology; Future    Depression, recurrent ([facility])          Subjective:      Patient ID: Donavan Nguyen is a 73 y o  female  74 yo  female with known uncontrolled DM, here today for follow up  Currently complains of LE and hand edema for about 1 week R>L , worse as the day goes by  Patient unsure if she is using her insulin 3 times a day as prescribed  Does not remember BG readings  Complains of strange sensation in her R ear, at times feels she can not hear from the R ear, at times it is like a buzzing, often feels like her ear is "closed"  Denies ear pain, itching, discharge  The following portions of the patient's history were reviewed and updated as appropriate:   She  has a past medical history of Abdominal infection ([address] ), Abnormal chest x-ray (4/5/2019), Asthma, Breast cancer ([address] ) (06/26/2018), Cancer ([address] ), Diabetes mellitus ([address] ), Hiatal hernia, History of chemotherapy, History of radiation therapy, Hypercholesterolemia, Hypertension, Hypothyroid, Renal disorder, and Rheumatoid arthritis ([address] )    She   Patient Active Problem List    Diagnosis Date Noted    Depression, recurrent ([address] ) 06/13/2022    Type 2 diabetes mellitus with microalbuminuria, with long-term current use of insulin ([address] ) 11/10/2021    Pelvic pain 09/29/2021    Bone pain 09/29/2021    Anxiety 09/15/2021    Right foot pain 09/15/2021    Overweight with body mass index (BMI) of 28 to 28 9 in adult 09/08/2021    Sebaceous cyst 08/17/2021    Subcutaneous mass of back 08/17/2021    Muscle cramps 06/09/2021    Gastroesophageal reflux disease without esophagitis 03/03/2021    Chronic diastolic heart failure ([address] ) 09/01/2020    Use of anastrozole (Arimidex) 04/20/2020    Decreased ROM of right shoulder 04/20/2020    Lump of skin 04/20/2020    Diabetic polyneuropathy associated with type 2 diabetes mellitus ([facility] ) 11/24/2019    Type 2 diabetes mellitus with hyperglycemia, with long-term current use of insulin ([facility] ) 05/29/2019    Radiation pneumonitis ([facility] ) 05/18/2019    Bilateral pulmonary embolism ([facility] ) 11/06/2018    Hypothyroid     Essential hypertension     Hypercholesterolemia 09/21/2018    Chronic pain of right knee 09/04/2018    Cellulitis of right axilla 08/22/2018    Hiatal hernia 08/08/2018    Screening for colon cancer 08/08/2018    Esophageal dysphagia 08/08/2018    Malignant neoplasm of upper-outer quadrant of right breast in female, estrogen receptor positive ([facility] ) 07/19/2018    Malignant neoplasm of right female breast ([place] [address] ) 06/26/2018    Neck pain 06/13/2018    Chronic bilateral low back pain without sciatica 05/22/2018    Palpitations 05/09/2018    Type 2 diabetes mellitus with complication, with long-term current use of insulin ([facility] ) 03/27/2018    Other specified hypothyroidism 03/27/2018    Chest pain 03/27/2018    Severe persistent asthma without complication 55/24/3511     She  has a past surgical history that includes Tubal ligation; Knee surgery (Right); Cataract extraction, bilateral (Bilateral); Retinal detachment surgery (Right); Breast surgery (Left); EGD AND COLONOSCOPY (N/A, 9/5/2018); Tunneled venous port placement (Left, 9/13/2018); FL guided central venous access device insertion (9/13/2018); Mammo stereotactic breast biopsy right (all inc) (Right, 5/23/2018); Mammo needle localization right (all inc) (Right, 6/26/2018); Breast biopsy (Right, 05/23/2018); Breast lumpectomy (Right, 6/26/2018); Breast lumpectomy (Right, 8/2/2018); Colonoscopy; and Upper gastrointestinal endoscopy  Her family history includes Cancer in her maternal grandfather; Diabetes in her brother, father, and mother; Hypertension in her brother, father, and mother;  No Known Problems in her daughter, daughter, daughter, sister, sister, sister, sister, and sister; Prostate cancer in her brother  She  reports that she has never smoked  She has never used smokeless tobacco  She reports that she does not drink alcohol and does not use drugs  Current Outpatient Medications   Medication Sig Dispense Refill    acetaminophen (TYLENOL) 650 mg CR tablet Take 1 tablet (650 mg total) by mouth every 8 (eight) hours as needed for mild pain 30 tablet 0    furosemide (LASIX) 20 mg tablet Take 1 tablet (20 mg total) by mouth daily for 5 days 5 tablet 0    insulin detemir (Levemir FlexTouch) 100 Units/mL injection pen INJECT 55 UNITS UNDER THE SKIN DAILY AT BEDTIME 45 mL 0    insulin lispro (HumaLOG) 100 units/mL injection pen 20 units before meals 45 mL 0    losartan (COZAAR) 100 MG tablet Take 1 tablet (100 mg total) by mouth daily 90 tablet 1    albuterol (2 5 mg/3 mL) 0 083 % nebulizer solution Take 3 mL (2 5 mg total) by nebulization every 6 (six) hours as needed for wheezing or shortness of breath 75 mL 5    albuterol (PROVENTIL HFA,VENTOLIN HFA) 90 mcg/act inhaler Inhale 1 puff every 4 (four) hours as needed for wheezing 18 g 5    anastrozole (ARIMIDEX) 1 mg tablet Take 1 tablet (1 mg total) by mouth daily 90 tablet 3    aspirin 81 mg chewable tablet Chew 81 mg daily      atorvastatin (LIPITOR) 10 mg tablet TAKE 1 TABLET (10 MG TOTAL) BY MOUTH DAILY 90 tablet 0    Continuous Blood Gluc Sensor (FreeStyle Broderick 14 Day Sensor) MISC Use 1 each every 14 (fourteen) days 2 each 11    EPINEPHrine (EPIPEN) 0 3 mg/0 3 mL SOAJ Inject 0 3 mL (0 3 mg total) into a muscle once for 1 dose 0 6 mL 0    fluticasone-salmeterol (Advair HFA) 230-21 MCG/ACT inhaler Inhale 2 puffs 2 (two) times a day Rinse mouth after use   12 g 5    glucose blood (OneTouch Ultra) test strip Use 1 each 3 (three) times a day Use as instructed 300 each 4    Insulin Pen Needle (BD Pen Needle Emily U/F) 32G X 4 MM MISC Inject under the skin 4 (four) times a day 400 each 0    loratadine (CLARITIN) 10 mg tablet TAKE 1 TABLET (10 MG TOTAL) BY MOUTH DAILY 90 tablet 2    magnesium gluconate (MAGONATE) 500 mg tablet Take 1 tablet (500 mg total) by mouth daily 90 tablet 1    metoprolol tartrate (LOPRESSOR) 25 mg tablet Take 0 5 tablets (12 5 mg total) by mouth every 12 (twelve) hours 180 tablet 1    montelukast (SINGULAIR) 10 mg tablet Take 1 tablet (10 mg total) by mouth daily at bedtime 30 tablet 5    omeprazole (PriLOSEC) 20 mg delayed release capsule TAKE 1 CAPSULE (20 MG TOTAL) BY MOUTH DAILY 90 capsule 0    OneTouch Delica Lancets 97U MISC USE 3 (THREE) TIMES A  each 4    polyethylene glycol (GLYCOLAX) 17 GM/SCOOP powder Take as directed by the office  238 g 0     No current facility-administered medications for this visit  Current Outpatient Medications on File Prior to Visit   Medication Sig    albuterol (2 5 mg/3 mL) 0 083 % nebulizer solution Take 3 mL (2 5 mg total) by nebulization every 6 (six) hours as needed for wheezing or shortness of breath    albuterol (PROVENTIL HFA,VENTOLIN HFA) 90 mcg/act inhaler Inhale 1 puff every 4 (four) hours as needed for wheezing    anastrozole (ARIMIDEX) 1 mg tablet Take 1 tablet (1 mg total) by mouth daily    aspirin 81 mg chewable tablet Chew 81 mg daily    atorvastatin (LIPITOR) 10 mg tablet TAKE 1 TABLET (10 MG TOTAL) BY MOUTH DAILY    Continuous Blood Gluc Sensor (FreeStyle Broderick 14 Day Sensor) MISC Use 1 each every 14 (fourteen) days    EPINEPHrine (EPIPEN) 0 3 mg/0 3 mL SOAJ Inject 0 3 mL (0 3 mg total) into a muscle once for 1 dose    fluticasone-salmeterol (Advair HFA) 230-21 MCG/ACT inhaler Inhale 2 puffs 2 (two) times a day Rinse mouth after use      glucose blood (OneTouch Ultra) test strip Use 1 each 3 (three) times a day Use as instructed    Insulin Pen Needle (BD Pen Needle Emily U/F) 32G X 4 MM MISC Inject under the skin 4 (four) times a day    loratadine (CLARITIN) 10 mg tablet TAKE 1 TABLET (10 MG TOTAL) BY MOUTH DAILY    magnesium gluconate (MAGONATE) 500 mg tablet Take 1 tablet (500 mg total) by mouth daily    metoprolol tartrate (LOPRESSOR) 25 mg tablet Take 0 5 tablets (12 5 mg total) by mouth every 12 (twelve) hours    montelukast (SINGULAIR) 10 mg tablet Take 1 tablet (10 mg total) by mouth daily at bedtime    omeprazole (PriLOSEC) 20 mg delayed release capsule TAKE 1 CAPSULE (20 MG TOTAL) BY MOUTH DAILY    OneTouch Delica Lancets 45K MISC USE 3 (THREE) TIMES A DAY    polyethylene glycol (GLYCOLAX) 17 GM/SCOOP powder Take as directed by the office   [DISCONTINUED] amLODIPine (NORVASC) 10 mg tablet Take 1 tablet (10 mg total) by mouth daily    [DISCONTINUED] insulin detemir (Levemir FlexTouch) 100 Units/mL injection pen INJECT 55 UNITS UNDER THE SKIN DAILY AT BEDTIME    [DISCONTINUED] insulin lispro (HumaLOG) 100 units/mL injection pen 20 units before meals (Patient not taking: Reported on 5/2/2022 )    [DISCONTINUED] losartan (COZAAR) 50 mg tablet TAKE 1 TABLET (50 MG TOTAL) BY MOUTH DAILY     No current facility-administered medications on file prior to visit       Review of Systems   HENT:        As per HPI   Cardiovascular: Positive for leg swelling  Musculoskeletal: Positive for arthralgias  All other systems reviewed and are negative  Objective:      /72 (BP Location: Right arm, Patient Position: Sitting, Cuff Size: Adult)   Pulse 80   Temp 97 7 °F (36 5 °C) (Temporal)   Resp 18   Ht 5' 5" (1 651 m)   Wt 82 4 kg (181 lb 9 6 oz)   SpO2 97%   BMI 30 22 kg/m²          Physical Exam  Vitals and nursing note reviewed  Constitutional:       Appearance: She is well-developed  HENT:      Head: Normocephalic        Right Ear: External ear normal       Left Ear: External ear normal       Nose: Nose normal    Eyes:      Conjunctiva/sclera: Conjunctivae normal       Pupils: Pupils are equal, round, and reactive to light    Neck:      Thyroid: No thyromegaly  Cardiovascular:      Rate and Rhythm: Normal rate and regular rhythm  Heart sounds: Normal heart sounds  Pulmonary:      Effort: Pulmonary effort is normal       Breath sounds: Normal breath sounds  Abdominal:      Palpations: Abdomen is soft  Tenderness: There is no abdominal tenderness  There is no guarding or rebound  Musculoskeletal:         General: Normal range of motion  Cervical back: Normal range of motion and neck supple  Right lower le+ Pitting Edema present  Left lower le+ Pitting Edema present  Skin:     General: Skin is dry  Neurological:      Mental Status: She is alert and oriented to person, place, and time  Deep Tendon Reflexes: Reflexes are normal and symmetric

## 2022-06-22 ENCOUNTER — APPOINTMENT (OUTPATIENT)
Dept: LAB | Facility: HOSPITAL | Age: 67
End: 2022-06-22
Payer: COMMERCIAL

## 2022-06-22 DIAGNOSIS — E55.9 VITAMIN D DEFICIENCY: ICD-10-CM

## 2022-06-22 DIAGNOSIS — E11.8 TYPE 2 DIABETES MELLITUS WITH COMPLICATION, WITHOUT LONG-TERM CURRENT USE OF INSULIN (HCC): ICD-10-CM

## 2022-06-22 DIAGNOSIS — I10 ESSENTIAL HYPERTENSION: ICD-10-CM

## 2022-06-22 LAB
25(OH)D3 SERPL-MCNC: 18.7 NG/ML (ref 30–100)
ALBUMIN SERPL BCP-MCNC: 4.1 G/DL (ref 3–5.2)
ALP SERPL-CCNC: 77 U/L (ref 43–122)
ALT SERPL W P-5'-P-CCNC: 23 U/L
ANION GAP SERPL CALCULATED.3IONS-SCNC: 10 MMOL/L (ref 5–14)
AST SERPL W P-5'-P-CCNC: 25 U/L (ref 14–36)
BASOPHILS # BLD AUTO: 0.07 THOUSANDS/ΜL (ref 0–0.1)
BASOPHILS NFR BLD AUTO: 1 % (ref 0–1)
BILIRUB SERPL-MCNC: 0.52 MG/DL
BUN SERPL-MCNC: 17 MG/DL (ref 5–25)
CALCIUM SERPL-MCNC: 9.2 MG/DL (ref 8.4–10.2)
CHLORIDE SERPL-SCNC: 101 MMOL/L (ref 97–108)
CHOLEST SERPL-MCNC: 265 MG/DL
CO2 SERPL-SCNC: 28 MMOL/L (ref 22–30)
CREAT SERPL-MCNC: 0.52 MG/DL (ref 0.6–1.2)
CREAT UR-MCNC: 194 MG/DL
EOSINOPHIL # BLD AUTO: 0.23 THOUSAND/ΜL (ref 0–0.61)
EOSINOPHIL NFR BLD AUTO: 3 % (ref 0–6)
ERYTHROCYTE [DISTWIDTH] IN BLOOD BY AUTOMATED COUNT: 13 % (ref 11.6–15.1)
GFR SERPL CREATININE-BSD FRML MDRD: 100 ML/MIN/1.73SQ M
GLUCOSE P FAST SERPL-MCNC: 351 MG/DL (ref 70–99)
HCT VFR BLD AUTO: 38.1 % (ref 34.8–46.1)
HDLC SERPL-MCNC: 55 MG/DL
HGB BLD-MCNC: 12.6 G/DL (ref 11.5–15.4)
IMM GRANULOCYTES # BLD AUTO: 0.02 THOUSAND/UL (ref 0–0.2)
IMM GRANULOCYTES NFR BLD AUTO: 0 % (ref 0–2)
LDLC SERPL CALC-MCNC: 180 MG/DL
LYMPHOCYTES # BLD AUTO: 1.85 THOUSANDS/ΜL (ref 0.6–4.47)
LYMPHOCYTES NFR BLD AUTO: 27 % (ref 14–44)
MCH RBC QN AUTO: 29.2 PG (ref 26.8–34.3)
MCHC RBC AUTO-ENTMCNC: 33.1 G/DL (ref 31.4–37.4)
MCV RBC AUTO: 88 FL (ref 82–98)
MICROALBUMIN UR-MCNC: 248 MG/L (ref 0–20)
MICROALBUMIN/CREAT 24H UR: 128 MG/G CREATININE (ref 0–30)
MONOCYTES # BLD AUTO: 0.4 THOUSAND/ΜL (ref 0.17–1.22)
MONOCYTES NFR BLD AUTO: 6 % (ref 4–12)
NEUTROPHILS # BLD AUTO: 4.17 THOUSANDS/ΜL (ref 1.85–7.62)
NEUTS SEG NFR BLD AUTO: 63 % (ref 43–75)
NONHDLC SERPL-MCNC: 210 MG/DL
NRBC BLD AUTO-RTO: 0 /100 WBCS
PLATELET # BLD AUTO: 288 THOUSANDS/UL (ref 149–390)
PMV BLD AUTO: 12.1 FL (ref 8.9–12.7)
POTASSIUM SERPL-SCNC: 4 MMOL/L (ref 3.6–5)
PROT SERPL-MCNC: 7.6 G/DL (ref 5.9–8.4)
RBC # BLD AUTO: 4.32 MILLION/UL (ref 3.81–5.12)
SODIUM SERPL-SCNC: 139 MMOL/L (ref 137–147)
TRIGL SERPL-MCNC: 151 MG/DL
TSH SERPL DL<=0.05 MIU/L-ACNC: 2.38 UIU/ML (ref 0.45–4.5)
WBC # BLD AUTO: 6.74 THOUSAND/UL (ref 4.31–10.16)

## 2022-06-22 PROCEDURE — 84443 ASSAY THYROID STIM HORMONE: CPT

## 2022-06-22 PROCEDURE — 82570 ASSAY OF URINE CREATININE: CPT

## 2022-06-22 PROCEDURE — 36415 COLL VENOUS BLD VENIPUNCTURE: CPT

## 2022-06-22 PROCEDURE — 80061 LIPID PANEL: CPT

## 2022-06-22 PROCEDURE — 82043 UR ALBUMIN QUANTITATIVE: CPT

## 2022-06-22 PROCEDURE — 80053 COMPREHEN METABOLIC PANEL: CPT

## 2022-06-22 PROCEDURE — 82306 VITAMIN D 25 HYDROXY: CPT

## 2022-06-22 PROCEDURE — 85025 COMPLETE CBC W/AUTO DIFF WBC: CPT

## 2022-06-23 DIAGNOSIS — E55.9 VITAMIN D DEFICIENCY: ICD-10-CM

## 2022-06-23 DIAGNOSIS — E78.5 HYPERLIPIDEMIA, UNSPECIFIED HYPERLIPIDEMIA TYPE: ICD-10-CM

## 2022-06-23 DIAGNOSIS — R25.2 MUSCLE CRAMPS: ICD-10-CM

## 2022-06-23 DIAGNOSIS — E11.8 TYPE 2 DIABETES MELLITUS WITH COMPLICATION, WITHOUT LONG-TERM CURRENT USE OF INSULIN (HCC): Primary | ICD-10-CM

## 2022-06-23 RX ORDER — ATORVASTATIN CALCIUM 40 MG/1
40 TABLET, FILM COATED ORAL DAILY
Qty: 90 TABLET | Refills: 3 | Status: SHIPPED | OUTPATIENT
Start: 2022-06-23

## 2022-06-23 RX ORDER — ERGOCALCIFEROL 1.25 MG/1
50000 CAPSULE ORAL WEEKLY
Qty: 12 CAPSULE | Refills: 1 | Status: SHIPPED | OUTPATIENT
Start: 2022-06-23

## 2022-06-24 RX ORDER — MAGNESIUM GLUCONATE 27 MG(500)
TABLET ORAL
Qty: 90 TABLET | Refills: 1 | Status: SHIPPED | OUTPATIENT
Start: 2022-06-24

## 2022-06-27 ENCOUNTER — HOSPITAL ENCOUNTER (OUTPATIENT)
Dept: MAMMOGRAPHY | Facility: CLINIC | Age: 67
Discharge: HOME | End: 2022-06-27
Payer: COMMERCIAL

## 2022-06-27 ENCOUNTER — HOSPITAL ENCOUNTER (OUTPATIENT)
Dept: ULTRASOUND IMAGING | Facility: CLINIC | Age: 67
Discharge: HOME | End: 2022-06-27
Payer: COMMERCIAL

## 2022-06-27 VITALS — BODY MASS INDEX: 30.16 KG/M2 | HEIGHT: 65 IN | WEIGHT: 181 LBS

## 2022-06-27 DIAGNOSIS — Z17.0 MALIGNANT NEOPLASM OF UPPER-OUTER QUADRANT OF RIGHT BREAST IN FEMALE, ESTROGEN RECEPTOR POSITIVE (HCC): ICD-10-CM

## 2022-06-27 DIAGNOSIS — C50.411 MALIGNANT NEOPLASM OF UPPER-OUTER QUADRANT OF RIGHT BREAST IN FEMALE, ESTROGEN RECEPTOR POSITIVE (HCC): ICD-10-CM

## 2022-06-27 DIAGNOSIS — N63.0 BREAST LUMP: ICD-10-CM

## 2022-06-27 PROCEDURE — 76642 ULTRASOUND BREAST LIMITED: CPT

## 2022-06-27 PROCEDURE — G0279 TOMOSYNTHESIS, MAMMO: HCPCS

## 2022-06-27 PROCEDURE — 77066 DX MAMMO INCL CAD BI: CPT

## 2022-07-22 DIAGNOSIS — J45.50 SEVERE PERSISTENT ASTHMA WITHOUT COMPLICATION: Primary | ICD-10-CM

## 2022-07-25 ENCOUNTER — TELEPHONE (OUTPATIENT)
Dept: PULMONOLOGY | Facility: CLINIC | Age: 67
End: 2022-07-25

## 2022-07-25 LAB
LEFT EYE DIABETIC RETINOPATHY: NORMAL
RIGHT EYE DIABETIC RETINOPATHY: NORMAL

## 2022-07-25 NOTE — TELEPHONE ENCOUNTER
I called and spoke with Elvira Mcmanus, scheduled pt for 8/23 @920 with [facility] in [place]  Pt's daughter asking for something sooner  Kelsea Funk has been coughing and they are thinking she needs to start back on her Xolair injections  Dr Goldstein Degree can I add her to your schedule on 7/28 in [facility]?

## 2022-07-25 NOTE — TELEPHONE ENCOUNTER
Spoke with pt's daughter Brionna Wheatley who stated her mother recently stopped taking her allergy injections & is now having trouble breathing  Daughter stated her mom has bad asthma & would like an appointment with Dr Birgit Cr before September  No in person appointments available with [facility] until September & no available appointments for Dr Daniele Nguyen until October @ both [address] [place] [address] office  Please advise

## 2022-07-27 ENCOUNTER — APPOINTMENT (OUTPATIENT)
Dept: LAB | Facility: HOSPITAL | Age: 67
End: 2022-07-27
Attending: INTERNAL MEDICINE
Payer: COMMERCIAL

## 2022-07-27 ENCOUNTER — OFFICE VISIT (OUTPATIENT)
Dept: PULMONOLOGY | Facility: CLINIC | Age: 67
End: 2022-07-27
Payer: COMMERCIAL

## 2022-07-27 VITALS
DIASTOLIC BLOOD PRESSURE: 70 MMHG | HEART RATE: 80 BPM | TEMPERATURE: 97.2 F | WEIGHT: 175 LBS | SYSTOLIC BLOOD PRESSURE: 130 MMHG | HEIGHT: 65 IN | OXYGEN SATURATION: 98 % | RESPIRATION RATE: 18 BRPM | BODY MASS INDEX: 29.16 KG/M2

## 2022-07-27 DIAGNOSIS — J45.50 SEVERE PERSISTENT ALLERGIC ASTHMA: ICD-10-CM

## 2022-07-27 DIAGNOSIS — I50.32 CHRONIC DIASTOLIC HEART FAILURE (HCC): ICD-10-CM

## 2022-07-27 DIAGNOSIS — I26.99 BILATERAL PULMONARY EMBOLISM (HCC): ICD-10-CM

## 2022-07-27 DIAGNOSIS — J45.50 SEVERE PERSISTENT ALLERGIC ASTHMA: Primary | ICD-10-CM

## 2022-07-27 LAB
BASOPHILS # BLD AUTO: 0.09 THOUSANDS/ΜL (ref 0–0.1)
BASOPHILS NFR BLD AUTO: 2 % (ref 0–1)
EOSINOPHIL # BLD AUTO: 0.31 THOUSAND/ΜL (ref 0–0.61)
EOSINOPHIL NFR BLD AUTO: 5 % (ref 0–6)
ERYTHROCYTE [DISTWIDTH] IN BLOOD BY AUTOMATED COUNT: 12.7 % (ref 11.6–15.1)
HCT VFR BLD AUTO: 37.4 % (ref 34.8–46.1)
HGB BLD-MCNC: 12 G/DL (ref 11.5–15.4)
IMM GRANULOCYTES # BLD AUTO: 0.02 THOUSAND/UL (ref 0–0.2)
IMM GRANULOCYTES NFR BLD AUTO: 0 % (ref 0–2)
LYMPHOCYTES # BLD AUTO: 2.22 THOUSANDS/ΜL (ref 0.6–4.47)
LYMPHOCYTES NFR BLD AUTO: 37 % (ref 14–44)
MCH RBC QN AUTO: 28.8 PG (ref 26.8–34.3)
MCHC RBC AUTO-ENTMCNC: 32.1 G/DL (ref 31.4–37.4)
MCV RBC AUTO: 90 FL (ref 82–98)
MONOCYTES # BLD AUTO: 0.47 THOUSAND/ΜL (ref 0.17–1.22)
MONOCYTES NFR BLD AUTO: 8 % (ref 4–12)
NEUTROPHILS # BLD AUTO: 2.88 THOUSANDS/ΜL (ref 1.85–7.62)
NEUTS SEG NFR BLD AUTO: 48 % (ref 43–75)
NRBC BLD AUTO-RTO: 0 /100 WBCS
PLATELET # BLD AUTO: 258 THOUSANDS/UL (ref 149–390)
PMV BLD AUTO: 11.8 FL (ref 8.9–12.7)
RBC # BLD AUTO: 4.17 MILLION/UL (ref 3.81–5.12)
WBC # BLD AUTO: 5.99 THOUSAND/UL (ref 4.31–10.16)

## 2022-07-27 PROCEDURE — 82785 ASSAY OF IGE: CPT

## 2022-07-27 PROCEDURE — 86003 ALLG SPEC IGE CRUDE XTRC EA: CPT

## 2022-07-27 PROCEDURE — 99214 OFFICE O/P EST MOD 30 MIN: CPT | Performed by: INTERNAL MEDICINE

## 2022-07-27 PROCEDURE — 36415 COLL VENOUS BLD VENIPUNCTURE: CPT

## 2022-07-27 PROCEDURE — 85025 COMPLETE CBC W/AUTO DIFF WBC: CPT

## 2022-07-27 RX ORDER — PREDNISONE 20 MG/1
40 TABLET ORAL DAILY
Qty: 5 TABLET | Refills: 0 | Status: SHIPPED | OUTPATIENT
Start: 2022-07-27 | End: 2022-07-27 | Stop reason: SDUPTHER

## 2022-07-27 RX ORDER — PREDNISONE 20 MG/1
40 TABLET ORAL DAILY
Qty: 10 TABLET | Refills: 0 | Status: SHIPPED | OUTPATIENT
Start: 2022-07-27 | End: 2022-09-26

## 2022-07-27 NOTE — PROGRESS NOTES
Pulmonary Sick Visit Note   Roslyn Mulligan 73 y o  female MRN: [id number]  7/27/2022      Assessment and Plan:    Severe persistent asthma with possible acute exacerbation   -  Cont  Advair BID and albuterol prn -  Three times a week, nebulizer 4x/day   - on singulair   - would need to re-initiate xolair and likely need repeat labs to qualify - peripheral eosinophils 230   - IgE high 1302 in 2021, multiple allergies in 2018   - recheck CBC w/diff and IgE   - 2D echo in 11/2021 appeared normal      Bilateral pulmonary embolism secondary to breast cancer   - Rivaroxaban and now on ASA 81 mg daily      Chronic diastolic heart failure (HCC)  - off lasix     1  Severe persistent allergic asthma  -     CBC and differential; Future  -     [facility] Panel, Adult; Future  -     predniSONE 20 mg tablet; Take 2 tablets (40 mg total) by mouth daily        Return for Next scheduled follow up with Dr Madi Mendoza      History of Present Illness   HPI:  Evangelista Hugo is a 73 y o  female with PMHx severe asthma who was on xolair last year and stopped in Sept  Who hasn't been feeling well for the last 3 weeks with wheezing, nighttime cough and SOB  She does a little better after using her nebs  She is coughing up clear sputum  Review of Systems   Respiratory: Positive for cough, chest tightness, shortness of breath and wheezing  All other systems reviewed and are negative        Historical Information   Past Medical History:   Diagnosis Date    Abdominal infection ([facility] )     h-pylori    Abnormal chest x-ray 4/5/2019    Asthma     Breast cancer ([facility] ) 06/26/2018    Cancer ([facility] )     BREAST    Diabetes mellitus ([address] )     Hiatal hernia     History of chemotherapy     History of radiation therapy     Hypercholesterolemia     Hypertension     Hypothyroid     Renal disorder     Rheumatoid arthritis ([address] )      Past Surgical History:   Procedure Laterality Date    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    BREAST LUMPECTOMY Right 6/26/2018    Procedure: RIGHT BREAST NEEDLE LOCALIZATION X2 WITH RIGHT BREAST LUMPECTOMY ( NEEDLE LOC @ 1000); Surgeon: Leida Martínez MD;  Location: [facility];  Service: Surgical Oncology    BREAST LUMPECTOMY Right 8/2/2018    Procedure: LYMPHOSCINITGRAPHY INTRAOPERATIVE LYMPHATIC MAPPING , RIGHT SENTINEL NODE BIOPSY, REEXCISION  RIGHT BREAST LUMPECTOMY CAVITY (SUPERIOR MARGIN); Surgeon: Leida Martínez MD;  [facility]: [facility];  Service: Surgical Oncology    BREAST SURGERY Left     CATARACT EXTRACTION, BILATERAL Bilateral     COLONOSCOPY      EGD AND COLONOSCOPY N/A 9/5/2018    Procedure: EGD AND COLONOSCOPY;  Surgeon: Shilpi Morrissey MD;  Location: [facility] LAB; Service: Gastroenterology    [facility] GUIDED CENTRAL VENOUS ACCESS DEVICE INSERTION  9/13/2018    KNEE SURGERY Right     MAMMO NEEDLE LOCALIZATION RIGHT (ALL INC) Right 6/26/2018    MAMMO STEREOTACTIC BREAST BIOPSY RIGHT (ALL INC) Right 5/23/2018    RETINAL DETACHMENT SURGERY Right     TUBAL LIGATION      TUNNELED VENOUS PORT PLACEMENT Left 9/13/2018    Procedure: INSERTION VENOUS PORT (PORT-A-CATH);   Surgeon: Leida Martínez MD;  [facility]: [facility];  Service: Surgical Oncology    UPPER GASTROINTESTINAL ENDOSCOPY       Family History   Problem Relation Age of Onset    Diabetes Mother     Hypertension Mother     Diabetes Father     Hypertension Father     Diabetes Brother     Hypertension Brother     Prostate cancer Brother     Cancer Maternal Grandfather     No Known Problems Sister     No Known Problems Daughter     No Known Problems Sister     No Known Problems Sister     No Known Problems Sister     No Known Problems Sister     No Known Problems Daughter     No Known Problems Daughter      Social History     Tobacco Use   Smoking Status Never Smoker   Smokeless Tobacco Never Used       Meds/Allergies     Current Outpatient Medications:     acetaminophen (TYLENOL) 650 mg CR tablet, Take 1 tablet (650 mg total) by mouth every 8 (eight) hours as needed for mild pain, Disp: 30 tablet, Rfl: 0    albuterol (2 5 mg/3 mL) 0 083 % nebulizer solution, Take 3 mL (2 5 mg total) by nebulization every 6 (six) hours as needed for wheezing or shortness of breath, Disp: 75 mL, Rfl: 5    albuterol (PROVENTIL HFA,VENTOLIN HFA) 90 mcg/act inhaler, Inhale 1 puff every 4 (four) hours as needed for wheezing, Disp: 18 g, Rfl: 5    anastrozole (ARIMIDEX) 1 mg tablet, Take 1 tablet (1 mg total) by mouth daily, Disp: 90 tablet, Rfl: 3    aspirin 81 mg chewable tablet, Chew 81 mg daily, Disp: , Rfl:     atorvastatin (LIPITOR) 40 mg tablet, Take 1 tablet (40 mg total) by mouth daily, Disp: 90 tablet, Rfl: 3    Continuous Blood Gluc Sensor ([employer] Broderick 14 Day Sensor) MISC, Use 1 each every 14 (fourteen) days, Disp: 2 each, Rfl: 11    ergocalciferol (VITAMIN D2) 50,000 units, Take 1 capsule (50,000 Units total) by mouth once a week, Disp: 12 capsule, Rfl: 1    fluticasone-salmeterol (Advair HFA) 230-21 MCG/ACT inhaler, Inhale 2 puffs 2 (two) times a day Rinse mouth after use , Disp: 12 g, Rfl: 5    glucose blood (OneTouch Ultra) test strip, Use 1 each 3 (three) times a day Use as instructed, Disp: 300 each, Rfl: 4    insulin detemir (Levemir FlexTouch) 100 Units/mL injection pen, INJECT 55 UNITS UNDER THE SKIN DAILY AT BEDTIME, Disp: 45 mL, Rfl: 0    insulin lispro (HumaLOG) 100 units/mL injection pen, 20 units before meals, Disp: 45 mL, Rfl: 0    Insulin Pen Needle (BD Pen Needle Emily U/F) 32G X 4 MM MISC, Inject under the skin 4 (four) times a day, Disp: 400 each, Rfl: 0    loratadine (CLARITIN) 10 mg tablet, TAKE 1 TABLET (10 MG TOTAL) BY MOUTH DAILY, Disp: 90 tablet, Rfl: 2    losartan (COZAAR) 100 MG tablet, Take 1 tablet (100 mg total) by mouth daily, Disp: 90 tablet, Rfl: 1    Mag-G 500 (27 Mg) MG tablet, TAKE 1 TABLET (500 MG TOTAL) BY MOUTH DAILY, Disp: 90 tablet, Rfl: 1    metoprolol tartrate (LOPRESSOR) 25 mg tablet, Take 0 5 tablets (12 5 mg total) by mouth every 12 (twelve) hours, Disp: 180 tablet, Rfl: 1    montelukast (SINGULAIR) 10 mg tablet, Take 1 tablet (10 mg total) by mouth daily at bedtime, Disp: 30 tablet, Rfl: 5    omeprazole (PriLOSEC) 20 mg delayed release capsule, TAKE 1 CAPSULE (20 MG TOTAL) BY MOUTH DAILY, Disp: 90 capsule, Rfl: 0    OneTouch Delica Lancets 29X MISC, USE 3 (THREE) TIMES A DAY, Disp: 300 each, Rfl: 4    polyethylene glycol (GLYCOLAX) 17 GM/SCOOP powder, Take as directed by the office  , Disp: 238 g, Rfl: 0    predniSONE 20 mg tablet, Take 2 tablets (40 mg total) by mouth daily, Disp: 5 tablet, Rfl: 0    EPINEPHrine (EPIPEN) 0 3 mg/0 3 mL SOAJ, Inject 0 3 mL (0 3 mg total) into a muscle once for 1 dose, Disp: 0 6 mL, Rfl: 0    furosemide (LASIX) 20 mg tablet, Take 1 tablet (20 mg total) by mouth daily for 5 days, Disp: 5 tablet, Rfl: 0  Allergies   Allergen Reactions    Codeine Other (See Comments)     unknown       Vitals: Blood pressure 130/70, pulse 80, temperature (!) 97 2 °F (36 2 °C), temperature source Tympanic, resp  rate 18, height 5' 5" (1 651 m), weight 79 4 kg (175 lb), SpO2 98 %, not currently breastfeeding  Body mass index is 29 12 kg/m²  Oxygen Therapy  SpO2: 98 %  Oxygen Therapy: None (Room air)      Physical Exam  Physical Exam  Vitals and nursing note reviewed  Constitutional:       General: She is not in acute distress  Appearance: She is well-developed  HENT:      Head: Normocephalic and atraumatic  Eyes:      Conjunctiva/sclera: Conjunctivae normal    Cardiovascular:      Rate and Rhythm: Normal rate and regular rhythm  Heart sounds: No murmur heard  Pulmonary:      Effort: Pulmonary effort is normal  No respiratory distress  Breath sounds: Wheezing present  Abdominal:      Palpations: Abdomen is soft  Tenderness: There is no abdominal tenderness  Musculoskeletal:      Cervical back: Neck supple        Right lower leg: Edema present  Left lower leg: Edema present  Skin:     General: Skin is warm and dry  Neurological:      General: No focal deficit present  Mental Status: She is alert and oriented to person, place, and time  Labs: I have personally reviewed pertinent lab results  Lab Results   Component Value Date    WBC 6 74 2022    HGB 12 6 2022    HCT 38 1 2022    MCV 88 2022     2022     Lab Results   Component Value Date    CALCIUM 9 2 2022    K 4 0 2022    CO2 28 2022     2022    BUN 17 2022    CREATININE 0 52 (L) 2022     Lab Results   Component Value Date    IGE 1,302 (H) 2021     Lab Results   Component Value Date    ALT 23 2022    AST 25 2022    ALKPHOS 77 2022         Imaging and other studies: I have personally reviewed pertinent reports  and I have personally reviewed pertinent films in PACS    Pulmonary function testin: hyperinflation     EKG, Pathology, and Other Studies: I have personally reviewed pertinent reports     and I have personally reviewed pertinent films in PACS      Brandon Willis MD   Pulmonary and Critical Care Fellow  [facility]

## 2022-07-27 NOTE — TELEPHONE ENCOUNTER
Called and spoke with pt's daughter, scheduled appt for today with Dr Geovani Sarabia, she is aware the appt is with a different provider and in [place]

## 2022-07-28 LAB
A ALTERNATA IGE QN: <0.1 KUA/I
A FUMIGATUS IGE QN: <0.1 KUA/I
BERMUDA GRASS IGE QN: 0.44 KUA/I
BOXELDER IGE QN: 0.43 KUA/I
C HERBARUM IGE QN: <0.1 KUA/I
CAT DANDER IGE QN: 1.86 KUA/I
CMN PIGWEED IGE QN: 0.41 KUA/I
COMMON RAGWEED IGE QN: 0.72 KUA/I
COTTONWOOD IGE QN: 0.39 KUA/I
D FARINAE IGE QN: 36.6 KUA/I
D PTERONYSS IGE QN: 48.2 KUA/I
DOG DANDER IGE QN: 93.5 KUA/I
LONDON PLANE IGE QN: 0.43 KUA/I
MOUSE URINE PROT IGE QN: <0.1 KUA/I
MT JUNIPER IGE QN: 0.36 KUA/I
MUGWORT IGE QN: 0.34 KUA/I
P NOTATUM IGE QN: <0.1 KUA/I
ROACH IGE QN: 2.12 KUA/I
SHEEP SORREL IGE QN: 0.46 KUA/I
SILVER BIRCH IGE QN: 0.25 KUA/I
TIMOTHY IGE QN: 0.48 KUA/I
TOTAL IGE SMQN RAST: 1057 KU/L (ref 0–113)
WALNUT IGE QN: 0.83 KUA/I
WHITE ASH IGE QN: 0.48 KUA/I
WHITE ELM IGE QN: 0.61 KUA/I
WHITE MULBERRY IGE QN: 0.25 KUA/I
WHITE OAK IGE QN: 0.53 KUA/I

## 2022-08-15 ENCOUNTER — TELEPHONE (OUTPATIENT)
Dept: PULMONOLOGY | Facility: CLINIC | Age: 67
End: 2022-08-15

## 2022-08-15 NOTE — TELEPHONE ENCOUNTER
Dr Barber Hester wants pt to be restarted on her Xolair injections, I called and lvm asking pt's daughter to call back to see if she can come into the office to signed the patient consent form

## 2022-08-18 ENCOUNTER — HOSPITAL ENCOUNTER (EMERGENCY)
Facility: HOSPITAL | Age: 67
Discharge: HOME | End: 2022-08-18
Attending: EMERGENCY MEDICINE
Payer: COMMERCIAL

## 2022-08-18 ENCOUNTER — APPOINTMENT (OUTPATIENT)
Dept: RADIOLOGY | Facility: HOSPITAL | Age: 67
End: 2022-08-18
Payer: COMMERCIAL

## 2022-08-18 ENCOUNTER — APPOINTMENT (EMERGENCY)
Dept: CT IMAGING | Facility: HOSPITAL | Age: 67
End: 2022-08-18
Payer: COMMERCIAL

## 2022-08-18 VITALS
BODY MASS INDEX: 29.42 KG/M2 | HEART RATE: 79 BPM | SYSTOLIC BLOOD PRESSURE: 171 MMHG | DIASTOLIC BLOOD PRESSURE: 78 MMHG | WEIGHT: 176.8 LBS | TEMPERATURE: 99.1 F | RESPIRATION RATE: 18 BRPM | OXYGEN SATURATION: 98 %

## 2022-08-18 DIAGNOSIS — B34.9 ACUTE VIRAL SYNDROME: Primary | ICD-10-CM

## 2022-08-18 DIAGNOSIS — R42 VERTIGO: ICD-10-CM

## 2022-08-18 DIAGNOSIS — R60.9 PERIPHERAL EDEMA: ICD-10-CM

## 2022-08-18 LAB
2HR DELTA HS TROPONIN: 0 NG/L
ALBUMIN SERPL BCP-MCNC: 3.3 G/DL (ref 3.5–5)
ALP SERPL-CCNC: 93 U/L (ref 46–116)
ALT SERPL W P-5'-P-CCNC: 26 U/L (ref 12–78)
ANION GAP SERPL CALCULATED.3IONS-SCNC: 7 MMOL/L (ref 4–13)
APTT PPP: 23 SECONDS (ref 23–37)
AST SERPL W P-5'-P-CCNC: 12 U/L (ref 5–45)
ATRIAL RATE: 81 BPM
ATRIAL RATE: 84 BPM
BASOPHILS # BLD AUTO: 0.08 THOUSANDS/ΜL (ref 0–0.1)
BASOPHILS NFR BLD AUTO: 1 % (ref 0–1)
BILIRUB SERPL-MCNC: 0.42 MG/DL (ref 0.2–1)
BILIRUB UR QL STRIP: NEGATIVE
BUN SERPL-MCNC: 19 MG/DL (ref 5–25)
CALCIUM ALBUM COR SERPL-MCNC: 10.2 MG/DL (ref 8.3–10.1)
CALCIUM SERPL-MCNC: 9.6 MG/DL (ref 8.3–10.1)
CARDIAC TROPONIN I PNL SERPL HS: 4 NG/L
CARDIAC TROPONIN I PNL SERPL HS: 4 NG/L
CHLORIDE SERPL-SCNC: 99 MMOL/L (ref 96–108)
CLARITY UR: CLEAR
CO2 SERPL-SCNC: 31 MMOL/L (ref 21–32)
COLOR UR: YELLOW
CREAT SERPL-MCNC: 0.75 MG/DL (ref 0.6–1.3)
EOSINOPHIL # BLD AUTO: 0.15 THOUSAND/ΜL (ref 0–0.61)
EOSINOPHIL NFR BLD AUTO: 2 % (ref 0–6)
ERYTHROCYTE [DISTWIDTH] IN BLOOD BY AUTOMATED COUNT: 13.1 % (ref 11.6–15.1)
GFR SERPL CREATININE-BSD FRML MDRD: 83 ML/MIN/1.73SQ M
GLUCOSE SERPL-MCNC: 259 MG/DL (ref 65–140)
GLUCOSE UR STRIP-MCNC: NEGATIVE MG/DL
HCT VFR BLD AUTO: 37.6 % (ref 34.8–46.1)
HGB BLD-MCNC: 12.4 G/DL (ref 11.5–15.4)
HGB UR QL STRIP.AUTO: NEGATIVE
IMM GRANULOCYTES # BLD AUTO: 0.03 THOUSAND/UL (ref 0–0.2)
IMM GRANULOCYTES NFR BLD AUTO: 0 % (ref 0–2)
INR PPP: 0.94 (ref 0.84–1.19)
KETONES UR STRIP-MCNC: NEGATIVE MG/DL
LEUKOCYTE ESTERASE UR QL STRIP: NEGATIVE
LYMPHOCYTES # BLD AUTO: 1.75 THOUSANDS/ΜL (ref 0.6–4.47)
LYMPHOCYTES NFR BLD AUTO: 19 % (ref 14–44)
MCH RBC QN AUTO: 29.1 PG (ref 26.8–34.3)
MCHC RBC AUTO-ENTMCNC: 33 G/DL (ref 31.4–37.4)
MCV RBC AUTO: 88 FL (ref 82–98)
MONOCYTES # BLD AUTO: 0.69 THOUSAND/ΜL (ref 0.17–1.22)
MONOCYTES NFR BLD AUTO: 8 % (ref 4–12)
NEUTROPHILS # BLD AUTO: 6.35 THOUSANDS/ΜL (ref 1.85–7.62)
NEUTS SEG NFR BLD AUTO: 70 % (ref 43–75)
NITRITE UR QL STRIP: NEGATIVE
NRBC BLD AUTO-RTO: 0 /100 WBCS
NT-PROBNP SERPL-MCNC: 313 PG/ML
P AXIS: 32 DEGREES
P AXIS: 67 DEGREES
PH UR STRIP.AUTO: 7 [PH] (ref 4.5–8)
PLATELET # BLD AUTO: 266 THOUSANDS/UL (ref 149–390)
PMV BLD AUTO: 11.8 FL (ref 8.9–12.7)
POTASSIUM SERPL-SCNC: 3.7 MMOL/L (ref 3.5–5.3)
PR INTERVAL: 156 MS
PR INTERVAL: 160 MS
PROT SERPL-MCNC: 8.1 G/DL (ref 6.4–8.4)
PROT UR STRIP-MCNC: NEGATIVE MG/DL
PROTHROMBIN TIME: 12.6 SECONDS (ref 11.6–14.5)
QRS AXIS: 16 DEGREES
QRS AXIS: 19 DEGREES
QRSD INTERVAL: 74 MS
QRSD INTERVAL: 76 MS
QT INTERVAL: 356 MS
QT INTERVAL: 378 MS
QTC INTERVAL: 420 MS
QTC INTERVAL: 439 MS
RBC # BLD AUTO: 4.26 MILLION/UL (ref 3.81–5.12)
SARS-COV-2 RNA RESP QL NAA+PROBE: NEGATIVE
SODIUM SERPL-SCNC: 137 MMOL/L (ref 135–147)
SP GR UR STRIP.AUTO: 1.01 (ref 1–1.03)
T WAVE AXIS: 66 DEGREES
T WAVE AXIS: 77 DEGREES
UROBILINOGEN UR QL STRIP.AUTO: 0.2 E.U./DL
VENTRICULAR RATE: 81 BPM
VENTRICULAR RATE: 84 BPM
WBC # BLD AUTO: 9.05 THOUSAND/UL (ref 4.31–10.16)

## 2022-08-18 PROCEDURE — 80053 COMPREHEN METABOLIC PANEL: CPT | Performed by: EMERGENCY MEDICINE

## 2022-08-18 PROCEDURE — G1004 CDSM NDSC: HCPCS

## 2022-08-18 PROCEDURE — 71045 X-RAY EXAM CHEST 1 VIEW: CPT

## 2022-08-18 PROCEDURE — 85025 COMPLETE CBC W/AUTO DIFF WBC: CPT | Performed by: EMERGENCY MEDICINE

## 2022-08-18 PROCEDURE — U0005 INFEC AGEN DETEC AMPLI PROBE: HCPCS | Performed by: EMERGENCY MEDICINE

## 2022-08-18 PROCEDURE — 93010 ELECTROCARDIOGRAM REPORT: CPT | Performed by: STUDENT IN AN ORGANIZED HEALTH CARE EDUCATION/TRAINING PROGRAM

## 2022-08-18 PROCEDURE — 84484 ASSAY OF TROPONIN QUANT: CPT | Performed by: EMERGENCY MEDICINE

## 2022-08-18 PROCEDURE — 81003 URINALYSIS AUTO W/O SCOPE: CPT

## 2022-08-18 PROCEDURE — 99285 EMERGENCY DEPT VISIT HI MDM: CPT | Performed by: EMERGENCY MEDICINE

## 2022-08-18 PROCEDURE — 85610 PROTHROMBIN TIME: CPT | Performed by: EMERGENCY MEDICINE

## 2022-08-18 PROCEDURE — 83880 ASSAY OF NATRIURETIC PEPTIDE: CPT | Performed by: EMERGENCY MEDICINE

## 2022-08-18 PROCEDURE — 99285 EMERGENCY DEPT VISIT HI MDM: CPT

## 2022-08-18 PROCEDURE — 36415 COLL VENOUS BLD VENIPUNCTURE: CPT | Performed by: EMERGENCY MEDICINE

## 2022-08-18 PROCEDURE — 93005 ELECTROCARDIOGRAM TRACING: CPT

## 2022-08-18 PROCEDURE — 70498 CT ANGIOGRAPHY NECK: CPT

## 2022-08-18 PROCEDURE — 93010 ELECTROCARDIOGRAM REPORT: CPT | Performed by: INTERNAL MEDICINE

## 2022-08-18 PROCEDURE — 85730 THROMBOPLASTIN TIME PARTIAL: CPT | Performed by: EMERGENCY MEDICINE

## 2022-08-18 PROCEDURE — 70496 CT ANGIOGRAPHY HEAD: CPT

## 2022-08-18 PROCEDURE — U0003 INFECTIOUS AGENT DETECTION BY NUCLEIC ACID (DNA OR RNA); SEVERE ACUTE RESPIRATORY SYNDROME CORONAVIRUS 2 (SARS-COV-2) (CORONAVIRUS DISEASE [COVID-19]), AMPLIFIED PROBE TECHNIQUE, MAKING USE OF HIGH THROUGHPUT TECHNOLOGIES AS DESCRIBED BY CMS-2020-01-R: HCPCS | Performed by: EMERGENCY MEDICINE

## 2022-08-18 RX ORDER — MECLIZINE HYDROCHLORIDE 25 MG/1
25 TABLET ORAL 3 TIMES DAILY PRN
Qty: 30 TABLET | Refills: 0 | Status: SHIPPED | OUTPATIENT
Start: 2022-08-18

## 2022-08-18 RX ORDER — MECLIZINE HYDROCHLORIDE 25 MG/1
25 TABLET ORAL ONCE
Status: COMPLETED | OUTPATIENT
Start: 2022-08-18 | End: 2022-08-18

## 2022-08-18 RX ORDER — ONDANSETRON 4 MG/1
4 TABLET, ORALLY DISINTEGRATING ORAL EVERY 8 HOURS PRN
Qty: 12 TABLET | Refills: 0 | Status: SHIPPED | OUTPATIENT
Start: 2022-08-18

## 2022-08-18 RX ADMIN — MECLIZINE HYDROCHLORIDE 25 MG: 25 TABLET ORAL at 15:01

## 2022-08-18 RX ADMIN — IOHEXOL 85 ML: 350 INJECTION, SOLUTION INTRAVENOUS at 15:52

## 2022-08-18 NOTE — ED PROVIDER NOTES
History  Chief Complaint   Patient presents with    Dizziness     Pt c/o dizziness and b/l leg weakness 2hr PTA  Also c/o sore throat     66y F with multiple complaints  Viral symptoms that started yesterday  +subjective fever, no chills, sweat, +headache, +sinus congestion/runny nose, +sinus pressure, +sore throat, +cough, +aorexia, +nausea, no vomiting, no diarrhea, no changes in urination  No known sick contacts  Not vaccinated for COVID  Reports feeling dizzy/lightheaded today  Does report some ear fullness and a spinning sensation at times assoc w/ increased nausea  Has felt off balance when she is dizzy - feels like she is going to fall at times but hasn't  Additionally, c/o b/l LE pain and swelling that has been going on for weeks to months  Has seen pcm for same and was on "some pills for 5 days" that didn't help  No known hx of CHF  Remote hx of DVT/PE related to prior cancer      Denies any double vision, no word finding difficulties, no speech concerns, no unilateral numbness or weakness, no hx of cva in the past   No other co        History provided by:  Patient and relative   used: Yes (daughter, refused [employer])    URI  Presenting symptoms: congestion, cough, fever, rhinorrhea and sore throat    Presenting symptoms: no ear pain and no fatigue    Severity:  Moderate  Onset quality:  Gradual  Duration:  2 days  Timing:  Constant  Progression:  Worsening  Chronicity:  New  Relieved by:  Nothing  Worsened by:  Drinking and eating  Ineffective treatments:  None tried  Associated symptoms: headaches, myalgias and sinus pain    Associated symptoms: no arthralgias, no swollen glands and no wheezing    Risk factors: no recent illness, no recent travel and no sick contacts    Dizziness  Quality:  Head spinning and room spinning  Severity:  Moderate  Onset quality:  Gradual  Timing:  Intermittent  Progression:  Waxing and waning  Chronicity:  New  Context: head movement and physical activity    Relieved by:  Nothing  Worsened by:  Being still  Ineffective treatments:  None tried  Associated symptoms: headaches    Associated symptoms: no blood in stool, no diarrhea, no palpitations, no tinnitus, no vision changes, no vomiting and no weakness    Risk factors: no hx of stroke        Prior to Admission Medications   Prescriptions Last Dose Informant Patient Reported? Taking? Continuous Blood Gluc Sensor (FreeStyle Broderick 14 Day Sensor) MISC  Self No No   Sig: Use 1 each every 14 (fourteen) days   EPINEPHrine (EPIPEN) 0 3 mg/0 3 mL SOAJ   No No   Sig: Inject 0 3 mL (0 3 mg total) into a muscle once for 1 dose   Insulin Pen Needle (BD Pen Needle Emily U/F) 32G X 4 MM MISC  Self No No   Sig: Inject under the skin 4 (four) times a day   Mag-G 500 (27 Mg) MG tablet  Self No No   Sig: TAKE 1 TABLET (500 MG TOTAL) BY MOUTH DAILY   OneTouch Delica Lancets 28I MISC  Self No No   Sig: USE 3 (THREE) TIMES A DAY   acetaminophen (TYLENOL) 650 mg CR tablet  Self No No   Sig: Take 1 tablet (650 mg total) by mouth every 8 (eight) hours as needed for mild pain   albuterol (2 5 mg/3 mL) 0 083 % nebulizer solution  Self No No   Sig: Take 3 mL (2 5 mg total) by nebulization every 6 (six) hours as needed for wheezing or shortness of breath   albuterol (PROVENTIL HFA,VENTOLIN HFA) 90 mcg/act inhaler  Self No No   Sig: Inhale 1 puff every 4 (four) hours as needed for wheezing   anastrozole (ARIMIDEX) 1 mg tablet  Self No No   Sig: Take 1 tablet (1 mg total) by mouth daily   aspirin 81 mg chewable tablet  Self Yes No   Sig: Chew 81 mg daily   atorvastatin (LIPITOR) 40 mg tablet  Self No No   Sig: Take 1 tablet (40 mg total) by mouth daily   ergocalciferol (VITAMIN D2) 50,000 units  Self No No   Sig: Take 1 capsule (50,000 Units total) by mouth once a week   fluticasone-salmeterol (Advair HFA) 230-21 MCG/ACT inhaler  Self No No   Sig: Inhale 2 puffs 2 (two) times a day Rinse mouth after use     furosemide (LASIX) 20 mg tablet   No No   Sig: Take 1 tablet (20 mg total) by mouth daily for 5 days   glucose blood (OneTouch Ultra) test strip  Self No No   Sig: Use 1 each 3 (three) times a day Use as instructed   insulin detemir (Levemir FlexTouch) 100 Units/mL injection pen  Self No No   Sig: INJECT 55 UNITS UNDER THE SKIN DAILY AT BEDTIME   insulin lispro (HumaLOG) 100 units/mL injection pen  Self No No   Si units before meals   loratadine (CLARITIN) 10 mg tablet  Self No No   Sig: TAKE 1 TABLET (10 MG TOTAL) BY MOUTH DAILY   losartan (COZAAR) 100 MG tablet  Self No No   Sig: Take 1 tablet (100 mg total) by mouth daily   metoprolol tartrate (LOPRESSOR) 25 mg tablet  Self No No   Sig: Take 0 5 tablets (12 5 mg total) by mouth every 12 (twelve) hours   montelukast (SINGULAIR) 10 mg tablet  Self No No   Sig: Take 1 tablet (10 mg total) by mouth daily at bedtime   omeprazole (PriLOSEC) 20 mg delayed release capsule  Self No No   Sig: TAKE 1 CAPSULE (20 MG TOTAL) BY MOUTH DAILY   polyethylene glycol (GLYCOLAX) 17 GM/SCOOP powder  Self No No   Sig: Take as directed by the office  predniSONE 20 mg tablet   No No   Sig: Take 2 tablets (40 mg total) by mouth daily      Facility-Administered Medications: None       Past Medical History:   Diagnosis Date    Abdominal infection ([facility] )     h-pylori    Abnormal chest x-ray 2019    Asthma     Breast cancer ([facility] ) 2018    Cancer ([facility] )     BREAST    Diabetes mellitus ([facility] )     Hiatal hernia     History of chemotherapy     History of radiation therapy     Hypercholesterolemia     Hypertension     Hypothyroid     Renal disorder     Rheumatoid arthritis ([facility] )        Past Surgical History:   Procedure Laterality Date    BREAST BIOPSY Right 2018    DCIS    BREAST LUMPECTOMY Right 2018    Procedure: RIGHT BREAST NEEDLE LOCALIZATION X2 WITH RIGHT BREAST LUMPECTOMY ( NEEDLE LOC @ 1000);   Surgeon: Judith Nicholas MD;  [facility];  Service: Surgical Oncology    BREAST LUMPECTOMY Right 8/2/2018    Procedure: LYMPHOSCINITGRAPHY INTRAOPERATIVE LYMPHATIC MAPPING , RIGHT SENTINEL NODE BIOPSY, REEXCISION  RIGHT BREAST LUMPECTOMY CAVITY (SUPERIOR MARGIN); Surgeon: Carmen Ramirez MD;  [facility];  Service: Surgical Oncology    BREAST SURGERY Left     CATARACT EXTRACTION, BILATERAL Bilateral     COLONOSCOPY      EGD AND COLONOSCOPY N/A 9/5/2018    Procedure: EGD AND COLONOSCOPY;  Surgeon: Javier Herman MD;  Location: [facility]; Service: Gastroenterology    [facility] GUIDED CENTRAL VENOUS ACCESS DEVICE INSERTION  9/13/2018    KNEE SURGERY Right     MAMMO NEEDLE LOCALIZATION RIGHT (ALL INC) Right 6/26/2018    MAMMO STEREOTACTIC BREAST BIOPSY RIGHT (ALL INC) Right 5/23/2018    RETINAL DETACHMENT SURGERY Right     TUBAL LIGATION      TUNNELED VENOUS PORT PLACEMENT Left 9/13/2018    Procedure: INSERTION VENOUS PORT (PORT-A-CATH); Surgeon: Carmen Ramirez MD;  [facility];  Service: Surgical Oncology    UPPER GASTROINTESTINAL ENDOSCOPY         Family History   Problem Relation Age of Onset    Diabetes Mother     Hypertension Mother     Diabetes Father     Hypertension Father     Diabetes Brother     Hypertension Brother     Prostate cancer Brother     Cancer Maternal Grandfather     No Known Problems Sister     No Known Problems Daughter     No Known Problems Sister     No Known Problems Sister     No Known Problems Sister     No Known Problems Sister     No Known Problems Daughter     No Known Problems Daughter      I have reviewed and agree with the history as documented      E-Cigarette/Vaping    E-Cigarette Use Never User      E-Cigarette/Vaping Substances    Nicotine No     THC No     CBD No     Flavoring No     Other No     Unknown No      Social History     Tobacco Use    Smoking status: Never Smoker    Smokeless tobacco: Never Used   Vaping Use    Vaping Use: Never used   Substance Use Topics    Alcohol use: No    Drug use: No       Review of Systems   Constitutional: Positive for fever  Negative for fatigue  HENT: Positive for congestion, rhinorrhea, sinus pain and sore throat  Negative for ear pain and tinnitus  Respiratory: Positive for cough  Negative for wheezing  Cardiovascular: Negative for palpitations  Gastrointestinal: Negative for blood in stool, diarrhea and vomiting  Musculoskeletal: Positive for myalgias  Negative for arthralgias and back pain  Skin: Negative for color change and wound  Neurological: Positive for dizziness and headaches  Negative for facial asymmetry, speech difficulty, weakness, light-headedness and numbness  All other systems reviewed and are negative  Physical Exam  Physical Exam  Vitals and nursing note reviewed  Constitutional:       Appearance: Normal appearance  HENT:      Right Ear: Tympanic membrane normal       Left Ear: Tympanic membrane normal       Nose: Congestion present  Mouth/Throat:      Mouth: Mucous membranes are moist       Pharynx: No oropharyngeal exudate or posterior oropharyngeal erythema  Eyes:      Extraocular Movements: Extraocular movements intact  Conjunctiva/sclera: Conjunctivae normal       Pupils: Pupils are equal, round, and reactive to light  Cardiovascular:      Rate and Rhythm: Normal rate and regular rhythm  Heart sounds: No murmur heard  Pulmonary:      Effort: Pulmonary effort is normal       Breath sounds: Normal breath sounds  Abdominal:      Palpations: Abdomen is soft  Tenderness: There is no abdominal tenderness  Musculoskeletal:      Cervical back: Normal range of motion  Right lower leg: Edema present  Left lower leg: Edema present  Comments: +1 pitting edema b/l   Skin:     General: Skin is warm  Findings: No rash  Neurological:      Mental Status: She is alert  GCS: GCS eye subscore is 4  GCS verbal subscore is 5  GCS motor subscore is 6        Cranial Nerves: Cranial nerves are intact  Sensory: Sensation is intact  Motor: Motor function is intact  Coordination: Coordination is intact  Gait: Tandem walk abnormal  Gait normal       Deep Tendon Reflexes:      Reflex Scores:       Patellar reflexes are 2+ on the right side and 2+ on the left side  Psychiatric:         Attention and Perception: Attention normal          Mood and Affect: Mood normal          Speech: Speech normal          Vital Signs  ED Triage Vitals [08/18/22 1342]   Temperature Pulse Respirations Blood Pressure SpO2   99 1 °F (37 3 °C) 96 17 140/65 98 %      Temp Source Heart Rate Source Patient Position - Orthostatic VS BP Location FiO2 (%)   Oral Monitor Sitting Right arm --      Pain Score       --           Vitals:    08/18/22 1342 08/18/22 1717   BP: 140/65 (!) 171/78   Pulse: 96 79   Patient Position - Orthostatic VS: Sitting Lying         Visual Acuity  Visual Acuity    Flowsheet Row Most Recent Value   L Pupil Size (mm) 3   R Pupil Size (mm) 3          ED Medications  Medications   meclizine (ANTIVERT) tablet 25 mg (25 mg Oral Given 8/18/22 1501)   iohexol (OMNIPAQUE) 350 MG/ML injection (MULTI-DOSE) 85 mL (85 mL Intravenous Given 8/18/22 1552)       Diagnostic Studies  Results Reviewed     Procedure Component Value Units Date/Time    HS Troponin I 2hr [[id number]] Collected: 08/18/22 1717    Lab Status:  In process Specimen: Blood from Arm, Left Updated: 08/18/22 1721    Urine Macroscopic, POC [[id number]] Collected: 08/18/22 1720    Lab Status: Final result Specimen: Urine Updated: 08/18/22 1721     Color, UA Yellow     Clarity, UA Clear     pH, UA 7 0     Leukocytes, UA Negative     Nitrite, UA Negative     Protein, UA Negative mg/dl      Glucose, UA Negative mg/dl      Ketones, UA Negative mg/dl      Urobilinogen, UA 0 2 E U /dl      Bilirubin, UA Negative     Occult Blood, UA Negative     Specific Gravity, UA 1 010    Narrative:      CLINITEK RESULT    COVID only [[id number]]  (Normal) Collected: 08/18/22 1507    Lab Status: Final result Specimen: Nares from Nasopharyngeal Swab Updated: 08/18/22 1626     SARS-CoV-2 Negative    Narrative:      FOR PEDIATRIC PATIENTS - copy/paste COVID Guidelines URL to browser: [email or web address]    SARS-CoV-2 assay is a Nucleic Acid Amplification assay intended for the  qualitative detection of nucleic acid from SARS-CoV-2 in nasopharyngeal  swabs  Results are for the presumptive identification of SARS-CoV-2 RNA  Positive results are indicative of infection with SARS-CoV-2, the virus  causing COVID-19, but do not rule out bacterial infection or co-infection  with other viruses  Laboratories within the [place] and its  territories are required to report all positive results to the appropriate  public health authorities  Negative results do not preclude SARS-CoV-2  infection and should not be used as the sole basis for treatment or other  patient management decisions  Negative results must be combined with  clinical observations, patient history, and epidemiological information  This test has not been FDA cleared or approved  This test has been authorized by FDA under an Emergency Use Authorization  (EUA)  This test is only authorized for the duration of time the  declaration that circumstances exist justifying the authorization of the  emergency use of an in vitro diagnostic tests for detection of SARS-CoV-2  virus and/or diagnosis of COVID-19 infection under section 564(b)(1) of  the Act, 21 U  S C  983JNN-0(Q)(7), unless the authorization is terminated  or revoked sooner  The test has been validated but independent review by FDA  and CLIA is pending  Test performed using [employer]: This RT-PCR assay targets N2,  a region unique to SARS-CoV-2  A conserved region in the E-gene was chosen  for pan-Sarbecovirus detection which includes SARS-CoV-2      HS Troponin I 4hr [[id number]]     Lab Status: No result Specimen: Blood     Protime-INR [[id number]]  (Normal) Collected: 08/18/22 1507    Lab Status: Final result Specimen: Blood from Arm, Right Updated: 08/18/22 1547     Protime 12 6 seconds      INR 0 94    APTT [[id number]]  (Normal) Collected: 08/18/22 1507    Lab Status: Final result Specimen: Blood from Arm, Right Updated: 08/18/22 1547     PTT 23 seconds     HS Troponin 0hr (reflex protocol) [[id number]]  (Normal) Collected: 08/18/22 1507    Lab Status: Final result Specimen: Blood from Arm, Right Updated: 08/18/22 1539     hs TnI 0hr 4 ng/L     NT-BNP PRO [[id number]]  (Abnormal) Collected: 08/18/22 1507    Lab Status: Final result Specimen: Blood from Arm, Right Updated: 08/18/22 1539     NT-proBNP 313 pg/mL     Comprehensive metabolic panel [[id number]]  (Abnormal) Collected: 08/18/22 1507    Lab Status: Final result Specimen: Blood from Arm, Right Updated: 08/18/22 1532     Sodium 137 mmol/L      Potassium 3 7 mmol/L      Chloride 99 mmol/L      CO2 31 mmol/L      ANION GAP 7 mmol/L      BUN 19 mg/dL      Creatinine 0 75 mg/dL      Glucose 259 mg/dL      Calcium 9 6 mg/dL      Corrected Calcium 10 2 mg/dL      AST 12 U/L      ALT 26 U/L      Alkaline Phosphatase 93 U/L      Total Protein 8 1 g/dL      Albumin 3 3 g/dL      Total Bilirubin 0 42 mg/dL      eGFR 83 ml/min/1 73sq m     Narrative:      Young guidelines for Chronic Kidney Disease (CKD):     Stage 1 with normal or high GFR (GFR > 90 mL/min/1 73 square meters)    Stage 2 Mild CKD (GFR = 60-89 mL/min/1 73 square meters)    Stage 3A Moderate CKD (GFR = 45-59 mL/min/1 73 square meters)    Stage 3B Moderate CKD (GFR = 30-44 mL/min/1 73 square meters)    Stage 4 Severe CKD (GFR = 15-29 mL/min/1 73 square meters)    Stage 5 End Stage CKD (GFR <15 mL/min/1 73 square meters)  Note: GFR calculation is accurate only with a steady state creatinine    CBC and differential [[id number]] Collected: 08/18/22 1507    Lab Status: Final result Specimen: Blood from Arm, Right Updated: 08/18/22 1517     WBC 9 05 Thousand/uL      RBC 4 26 Million/uL      Hemoglobin 12 4 g/dL      Hematocrit 37 6 %      MCV 88 fL      MCH 29 1 pg      MCHC 33 0 g/dL      RDW 13 1 %      MPV 11 8 fL      Platelets 706 Thousands/uL      nRBC 0 /100 WBCs      Neutrophils Relative 70 %      Immat GRANS % 0 %      Lymphocytes Relative 19 %      Monocytes Relative 8 %      Eosinophils Relative 2 %      Basophils Relative 1 %      Neutrophils Absolute 6 35 Thousands/µL      Immature Grans Absolute 0 03 Thousand/uL      Lymphocytes Absolute 1 75 Thousands/µL      Monocytes Absolute 0 69 Thousand/µL      Eosinophils Absolute 0 15 Thousand/µL      Basophils Absolute 0 08 Thousands/µL                  CTA head and neck with and without contrast   ED Interpretation by Sav Mendez DO (08/18 1645)   See below      Final Result by Taco De Jesus DO (08/18 1639)      No acute intracranial abnormality  No significant carotid or vertebral artery stenosis  No focal intracranial stenosis or aneurysm  Workstation performed: [id number]         XR chest 1 view portable   ED Interpretation by Sav Mendez DO (08/18 1541)   Abnormal   Slightly rotated, patchy infiltrate RML region      Final Result by Nick Gabriel MD (08/18 1700)      No acute cardiopulmonary disease                    Workstation performed: [id number]                    Procedures  ECG 12 Lead Documentation Only    Date/Time: 8/18/2022 3:15 PM  Performed by: Sav Mendez DO  Authorized by: Sav Mendez DO     Indications / Diagnosis:   dizzy  ECG reviewed by me, the ED Provider: yes    Patient location:  ED  Previous ECG:     Previous ECG:  Compared to current    Comparison ECG info:  9/12/19    Similarity:  No change  Interpretation:     Interpretation: normal    Rate:     ECG rate:  84    ECG rate assessment: normal    Rhythm:     Rhythm: sinus rhythm    Ectopy:     Ectopy: none    ST segments:     ST segments:  Normal  T waves:     T waves: normal    ECG 12 Lead Documentation Only    Date/Time: 8/18/2022 5:20 PM  Performed by: Charisma Velazquez DO  Authorized by: Charisma Velazquez DO     ECG reviewed by me, the ED Provider: yes    Patient location:  ED  Previous ECG:     Previous ECG:  Compared to current    Similarity:  No change  Interpretation:     Interpretation: normal    Rate:     ECG rate:  81    ECG rate assessment: normal    Rhythm:     Rhythm: sinus rhythm    Ectopy:     Ectopy: none    ST segments:     ST segments:  Normal  T waves:     T waves: normal               ED Course  ED Course as of 08/18/22 1747   Thu Aug 18, 2022   1547 NT-proBNP(!): 313  48 three years ago   1548 hs TnI 0hr: 4   1637 SARS-COV-2: Negative  Still awaiting CTA reading   1710 Gait improved, feels a little better    Instructed to f/u w/ PCM regarding her leg swelling  Given return precautions                                             MDM  Number of Diagnoses or Management Options  Acute viral syndrome: new and requires workup  Peripheral edema: new and requires workup  Vertigo: new and requires workup  Diagnosis management comments: 71y F w/ multiple complaints including viral symptoms, LE edema for weeks to months, dizziness / vertigo today    Has some trouble w/ tandem walk otherwise normal neuro exam       Amount and/or Complexity of Data Reviewed  Clinical lab tests: reviewed and ordered  Tests in the radiology section of CPT®: reviewed and ordered  Tests in the medicine section of CPT®: reviewed and ordered  Decide to obtain previous medical records or to obtain history from someone other than the patient: yes  Obtain history from someone other than the patient: yes  Independent visualization of images, tracings, or specimens: yes        Disposition  Final diagnoses:   Acute viral syndrome   Vertigo   Peripheral edema     Time reflects when diagnosis was documented in both MDM as applicable and the Disposition within this note     Time User Action Codes Description Comment    8/18/2022  5:29 PM Abby Stephenson Add [B34 9] Acute viral syndrome     8/18/2022  5:29 PM Abby Stephenson Add [R42] Vertigo     8/18/2022  5:45 PM Michelle Sniff L Add [R60 9] Peripheral edema       ED Disposition     ED Disposition   Discharge    Condition   Stable    Date/Time   Thu Aug 18, 2022  5:29 PM    Comment   Jolie CruzCintron discharge to home/self care  Follow-up Information     Follow up With Specialties Details Why Maria Guadalupe Palma MD Family Medicine Schedule an appointment as soon as possible for a visit in 1 week If symptoms worsen or if no improvement [address]  [address]            Patient's Medications   Discharge Prescriptions    MECLIZINE (ANTIVERT) 25 MG TABLET    Take 1 tablet (25 mg total) by mouth 3 (three) times a day as needed for dizziness       Start Date: 8/18/2022 End Date: --       Order Dose: 25 mg       Quantity: 30 tablet    Refills: 0    ONDANSETRON (ZOFRAN-ODT) 4 MG DISINTEGRATING TABLET    Take 1 tablet (4 mg total) by mouth every 8 (eight) hours as needed for nausea or vomiting       Start Date: 8/18/2022 End Date: --       Order Dose: 4 mg       Quantity: 12 tablet    Refills: 0       No discharge procedures on file      PDMP Review     None          ED Provider  Electronically Signed by           Coltne Goodwin DO  08/18/22 0485

## 2022-08-18 NOTE — DISCHARGE INSTRUCTIONS
Puede tener fiebre fabio 3-5 días con felicita enfermedad viral y [place] cualquier síntoma adicional que se desarrolle (congestión, tos, [place], Zoya Prieto) puede durar otros 7 chio  Puede alternar paracetamol 1000 mg e ibuprofeno 600 mg cada william horas según sea necesario para la fiebre, el dolor de [place] y los garret corporales  Puede ganesh guaifenesina de venta adrian según sea necesario para la tos  [place]anna muchos líquidos y descanse  Regrese si tiene vómitos persistentes, dificultad para respirar o si tiene alguna inquietud

## 2022-08-26 ENCOUNTER — OFFICE VISIT (OUTPATIENT)
Dept: FAMILY MEDICINE CLINIC | Facility: CLINIC | Age: 67
End: 2022-08-26

## 2022-08-26 VITALS
HEIGHT: 65 IN | DIASTOLIC BLOOD PRESSURE: 84 MMHG | SYSTOLIC BLOOD PRESSURE: 156 MMHG | OXYGEN SATURATION: 98 % | BODY MASS INDEX: 29.66 KG/M2 | WEIGHT: 178 LBS | RESPIRATION RATE: 18 BRPM | HEART RATE: 86 BPM | TEMPERATURE: 97.8 F

## 2022-08-26 DIAGNOSIS — B37.31 VAGINAL CANDIDIASIS: Primary | ICD-10-CM

## 2022-08-26 PROBLEM — B37.3 VAGINAL CANDIDIASIS: Status: ACTIVE | Noted: 2022-08-26

## 2022-08-26 PROCEDURE — 3077F SYST BP >= 140 MM HG: CPT | Performed by: FAMILY MEDICINE

## 2022-08-26 PROCEDURE — 99213 OFFICE O/P EST LOW 20 MIN: CPT | Performed by: FAMILY MEDICINE

## 2022-08-26 PROCEDURE — 3079F DIAST BP 80-89 MM HG: CPT | Performed by: FAMILY MEDICINE

## 2022-08-26 RX ORDER — CLOTRIMAZOLE 1 %
1 CREAM WITH APPLICATOR VAGINAL
Qty: 45 G | Refills: 0 | Status: SHIPPED | OUTPATIENT
Start: 2022-08-26 | End: 2022-09-02

## 2022-08-26 NOTE — PROGRESS NOTES
Assessment/Plan:    Vaginal candidiasis  Unsure if symptoms due to vaginal candidiasis alone or also vaginal atropy and dryness  Will treat for now with clotrimazole vaginal cream  If not improved consider Premarin cream   Return if symptoms worsen or fail to improve, for Next scheduled follow up  Diagnoses and all orders for this visit:    Vaginal candidiasis  -     clotrimazole (GYNE-LOTRIMIN) 1 % vaginal cream; Insert 1 applicator into the vagina daily at bedtime for 7 days        Subjective:     JM Etienne is a 73 y o  female  who presented to the office today for a SAME DAY VISIT for vaginal itching  It started one week ago and denies any abnormal vaginal discharge, vaginal pain or bleeding  BM and urination is normal   Not sexually active  Vaginal Itching  The patient's primary symptoms include genital itching  The patient's pertinent negatives include no genital lesions, genital odor, genital rash, pelvic pain, vaginal bleeding or vaginal discharge  This is a new problem  The current episode started in the past 7 days  The problem has been waxing and waning  The pain is mild  Associated symptoms include constipation  Pertinent negatives include no abdominal pain, diarrhea, dysuria, fever, sore throat or urgency  Nothing aggravates the symptoms  She has tried nothing for the symptoms       The following portions of the patient's history were reviewed and updated as appropriate: allergies, current medications, past family history, past medical history, past social history, past surgical history and problem list     Patient Active Problem List   Diagnosis    Type 2 diabetes mellitus with complication, with long-term current use [facility]    Severe persistent asthma without complication    Other specified hypothyroidism    Chest pain    Palpitations    Chronic bilateral low back pain without sciatica    Neck pain    Malignant neoplasm of right female breast ([facility] )    Malignant neoplasm of upper-outer quadrant of right breast in female, estrogen receptor positive ([facility] )    Hiatal hernia    Screening for colon cancer    Esophageal dysphagia    Cellulitis of right axilla    Chronic pain of right knee    Hypercholesterolemia    Hypothyroid    Essential hypertension    Bilateral pulmonary embolism (HCC)    Radiation pneumonitis (HCC)    Type 2 diabetes mellitus with hyperglycemia, with long-term current use of insulin ([facility] )    Diabetic polyneuropathy associated with type 2 diabetes mellitus (HCC)    Use of anastrozole (Arimidex)    Decreased ROM of right shoulder    Lump of skin    Chronic diastolic heart failure (HCC)    Gastroesophageal reflux disease without esophagitis    Muscle cramps    Sebaceous cyst    Subcutaneous mass of back    Overweight with body mass index (BMI) of 28 to 28 9 in adult    Anxiety    Right foot pain    Pelvic pain    Bone pain    Type 2 diabetes mellitus with microalbuminuria, with long-term current use of insulin (HCC)    Depression, recurrent (HCC)    Vaginal candidiasis        Review of Systems   Constitutional: Negative for fever  HENT: Negative for sore throat  Gastrointestinal: Positive for constipation  Negative for abdominal pain and diarrhea  Genitourinary: Negative for dysuria, genital sores, menstrual problem, pelvic pain, urgency, vaginal bleeding, vaginal discharge and vaginal pain  Objective:    /84 (BP Location: Right arm, Patient Position: Sitting, Cuff Size: Standard)   Pulse 86   Temp 97 8 °F (36 6 °C) (Temporal)   Resp 18   Ht 5' 5" (1 651 m)   Wt 80 7 kg (178 lb)   SpO2 98%   BMI 29 62 kg/m²     Physical Exam  Vitals and nursing note reviewed  Exam conducted with a chaperone present  Constitutional:       Appearance: She is well-developed  HENT:      Head: Normocephalic and atraumatic  Cardiovascular:      Rate and Rhythm: Normal rate     Pulmonary:      Effort: Pulmonary effort is normal  No respiratory distress  Genitourinary:     Labia:         Right: No rash or tenderness  Left: No rash or tenderness  Vagina: No signs of injury  Tenderness present  No vaginal discharge  Cervix: No cervical motion tenderness  Comments: Mild erythema proximal vagina  Neurological:      Mental Status: She is alert and oriented to person, place, and time     Psychiatric:         Mood and Affect: Mood normal          Debi Dorantes MD  08/26/22  1:39 PM

## 2022-08-26 NOTE — ASSESSMENT & PLAN NOTE
Unsure if symptoms due to vaginal candidiasis alone or also vaginal atropy and dryness  Will treat for now with clotrimazole vaginal cream  If not improved consider Premarin cream

## 2022-09-14 ENCOUNTER — TELEPHONE (OUTPATIENT)
Dept: SURGICAL ONCOLOGY | Facility: CLINIC | Age: 67
End: 2022-09-14

## 2022-09-14 NOTE — TELEPHONE ENCOUNTER
Spoke to patient about rescheduling her appt with Dr Arpit Rosen due to him being in the OR  Patient requested a Monday appt in [place]  She was agreeable to new appt time and date

## 2022-09-19 NOTE — TELEPHONE ENCOUNTER
Fax received from [facility] requesting medical records   Scanned into media and sent to [facility] 19-Sep-2022 11:00

## 2022-09-26 ENCOUNTER — TELEPHONE (OUTPATIENT)
Dept: HEMATOLOGY ONCOLOGY | Facility: CLINIC | Age: 67
End: 2022-09-26

## 2022-09-26 ENCOUNTER — OFFICE VISIT (OUTPATIENT)
Dept: ENDOCRINOLOGY | Facility: CLINIC | Age: 67
End: 2022-09-26
Payer: COMMERCIAL

## 2022-09-26 VITALS
WEIGHT: 173.6 LBS | HEART RATE: 84 BPM | SYSTOLIC BLOOD PRESSURE: 126 MMHG | BODY MASS INDEX: 28.92 KG/M2 | HEIGHT: 65 IN | DIASTOLIC BLOOD PRESSURE: 74 MMHG

## 2022-09-26 DIAGNOSIS — Z79.4 TYPE 2 DIABETES MELLITUS WITH OTHER OPHTHALMIC COMPLICATION, WITH LONG-TERM CURRENT USE OF INSULIN (HCC): ICD-10-CM

## 2022-09-26 DIAGNOSIS — E11.65 TYPE 2 DIABETES MELLITUS WITH HYPERGLYCEMIA, WITH LONG-TERM CURRENT USE OF INSULIN (HCC): Primary | ICD-10-CM

## 2022-09-26 DIAGNOSIS — E78.00 HYPERCHOLESTEROLEMIA: ICD-10-CM

## 2022-09-26 DIAGNOSIS — Z79.4 TYPE 2 DIABETES MELLITUS WITH MICROALBUMINURIA, WITH LONG-TERM CURRENT USE OF INSULIN (HCC): ICD-10-CM

## 2022-09-26 DIAGNOSIS — E11.29 TYPE 2 DIABETES MELLITUS WITH MICROALBUMINURIA, WITH LONG-TERM CURRENT USE OF INSULIN (HCC): ICD-10-CM

## 2022-09-26 DIAGNOSIS — Z79.4 TYPE 2 DIABETES MELLITUS WITH HYPERGLYCEMIA, WITH LONG-TERM CURRENT USE OF INSULIN (HCC): Primary | ICD-10-CM

## 2022-09-26 DIAGNOSIS — E11.8 TYPE 2 DIABETES MELLITUS WITH COMPLICATION, WITHOUT LONG-TERM CURRENT USE OF INSULIN (HCC): ICD-10-CM

## 2022-09-26 DIAGNOSIS — E11.39 TYPE 2 DIABETES MELLITUS WITH OTHER OPHTHALMIC COMPLICATION, WITH LONG-TERM CURRENT USE OF INSULIN (HCC): ICD-10-CM

## 2022-09-26 DIAGNOSIS — Z79.4 TYPE 2 DIABETES MELLITUS WITH COMPLICATION, WITH LONG-TERM CURRENT USE OF INSULIN (HCC): ICD-10-CM

## 2022-09-26 DIAGNOSIS — R80.9 TYPE 2 DIABETES MELLITUS WITH MICROALBUMINURIA, WITH LONG-TERM CURRENT USE OF INSULIN (HCC): ICD-10-CM

## 2022-09-26 DIAGNOSIS — E11.8 TYPE 2 DIABETES MELLITUS WITH COMPLICATION, WITH LONG-TERM CURRENT USE OF INSULIN (HCC): ICD-10-CM

## 2022-09-26 PROBLEM — E11.9 DIABETES MELLITUS (HCC): Status: ACTIVE | Noted: 2022-09-26

## 2022-09-26 LAB — SL AMB POCT HEMOGLOBIN AIC: 14.5 (ref ?–6.5)

## 2022-09-26 PROCEDURE — 83036 HEMOGLOBIN GLYCOSYLATED A1C: CPT | Performed by: INTERNAL MEDICINE

## 2022-09-26 PROCEDURE — 99215 OFFICE O/P EST HI 40 MIN: CPT | Performed by: INTERNAL MEDICINE

## 2022-09-26 RX ORDER — INSULIN DETEMIR 100 [IU]/ML
INJECTION, SOLUTION SUBCUTANEOUS
Qty: 45 ML | Refills: 0 | Status: SHIPPED | OUTPATIENT
Start: 2022-09-26

## 2022-09-26 RX ORDER — INSULIN LISPRO 100 [IU]/ML
INJECTION, SOLUTION INTRAVENOUS; SUBCUTANEOUS
Qty: 45 ML | Refills: 0 | Status: SHIPPED | OUTPATIENT
Start: 2022-09-26

## 2022-09-26 NOTE — TELEPHONE ENCOUNTER
Appointment Confirmation (to confirm pre existing appointments - ONLY)  No need to route   Appointment with  Dr Corey Canavan   Appointment date & time 10/31 at 11:30AM    Location [facility]   Patient verbilized Understanding  Yes

## 2022-09-26 NOTE — PROGRESS NOTES
Dago Higgins 73 y o  female MRN: [id number]    Encounter: [id number]      Assessment/Plan     Problem List Items Addressed This Visit        Endocrine    Diabetes mellitus ([facility] )    Relevant Medications    insulin lispro (HumaLOG) 100 units/mL injection pen    insulin detemir (Levemir FlexTouch) 100 Units/mL injection pen    Other Relevant Orders    Ambulatory referral to Diabetic Education    Comprehensive metabolic panel Lab Collect    HEMOGLOBIN A1C W/ EAG ESTIMATION Lab Collect    Microalbumin / creatinine urine ratio Lab Collect    TSH, 3rd generation Lab Collect    T4, free Lab Collect    POCT hemoglobin A1c (Completed)    Ambulatory referral to Diabetic Education    Ambulatory referral to Diabetic Education    Type 2 diabetes mellitus with complication, with long-term current use of insulin ([facility])    Relevant Medications    insulin lispro (HumaLOG) 100 units/mL injection pen    insulin detemir (Levemir FlexTouch) 100 Units/mL injection pen    Other Relevant Orders    Ambulatory referral to Podiatry    Ambulatory referral to Diabetic Education    Type 2 diabetes mellitus with hyperglycemia, with long-term current use of insulin ([facility] ) - Primary       Lab Results   Component Value Date    HGBA1C 14 5 (A) 09/26/2022   Type 2 diabetes remains very poorly controlled-she has multiple complications including retinopathy, nephropathy and neuropathy  Patient and daughter again counseled on improving glycemic control to delay progression of complications  Currently do not have enough information to be able to suggest any changes in her insulin regimen as she is not taking meds consistently     Again given written instructions-referred for medical nutrition therapy    Look into personal CGM         Relevant Medications    insulin lispro (HumaLOG) 100 units/mL injection pen    insulin detemir (Levemir FlexTouch) 100 Units/mL injection pen    Other Relevant Orders    POCT hemoglobin A1c (Completed)    Ambulatory referral to Diabetic Education    Type 2 diabetes mellitus with microalbuminuria, with long-term current use of insulin (HCC)    Relevant Medications    insulin lispro (HumaLOG) 100 units/mL injection pen    insulin detemir (Levemir FlexTouch) 100 Units/mL injection pen    Other Relevant Orders    Ambulatory referral to Diabetic Education    Comprehensive metabolic panel Lab Collect    HEMOGLOBIN A1C W/ EAG ESTIMATION Lab Collect    Microalbumin / creatinine urine ratio Lab Collect    TSH, 3rd generation Lab Collect    T4, free Lab Collect       Other    Hypercholesterolemia     Continue statins         Relevant Orders    Lipid Panel with Direct LDL reflex Lab Collect      Other Visit Diagnoses     Type 2 diabetes mellitus with complication, without long-term current use of insulin (HCC)        Relevant Medications    insulin lispro (HumaLOG) 100 units/mL injection pen    insulin detemir (Levemir FlexTouch) 100 Units/mL injection pen          CC: Diabetes    History of Present Illness     HPI:  28-year-old female with type 2 diabetes on insulin therapy seen in follow-up  She is accompanied by her daughter who is providing translation  Tried V-GO last year but had difficulty applying it  Was supposed to come back and see the educator for re teaching however decided not to      Current regimen   levemir -50 units at 9 pm   humalog 20 units a day     Admits to missing insulin often-on the last visit plan was for the daughter to be there when she takes insulin before dinner and breakfast however Daughter has not been able to help at all -she states that she made charts  and calendar for  reminding the patient however patient never used them    Checks f s 3/day , did not bring log or meter and reports that most sugars are between  -200-500s   'no hypoglycemia     C/o polyuria , polyopsia , c/o blurry vision ,+ foot pain and c/o numbness   Lost 7-8 lbs since last visit            Review of Systems    Historical Information   Past Medical History:   Diagnosis Date    Abdominal infection ([facility] )     h-pylori    Abnormal chest x-ray 4/5/2019    Asthma     Breast cancer ([facility] ) 06/26/2018    [facility]     BREAST    Diabetes mellitus [facility]     Hiatal hernia     History of chemotherapy     History of radiation therapy     Hypercholesterolemia     Hypertension     Hypothyroid     Renal disorder     Rheumatoid arthritis ([facility] )      Past Surgical History:   Procedure Laterality Date    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    BREAST LUMPECTOMY Right 6/26/2018    Procedure: RIGHT BREAST NEEDLE LOCALIZATION X2 WITH RIGHT BREAST LUMPECTOMY ( NEEDLE LOC @ 1000); Surgeon: Kaye Jacobo MD;  [facility];  Service: Surgical Oncology    BREAST LUMPECTOMY Right 8/2/2018    Procedure: LYMPHOSCINITGRAPHY INTRAOPERATIVE LYMPHATIC MAPPING , RIGHT SENTINEL NODE BIOPSY, REEXCISION  RIGHT BREAST LUMPECTOMY CAVITY (SUPERIOR MARGIN); Surgeon: Kaye Jacobo MD;  [facility];  Service: Surgical Oncology    BREAST SURGERY Left     CATARACT EXTRACTION, BILATERAL Bilateral     COLONOSCOPY      EGD AND COLONOSCOPY N/A 9/5/2018    Procedure: EGD AND COLONOSCOPY;  Surgeon: Concepción Pisano MD;  Location: [facility]; Service: Gastroenterology    [place] GUIDED CENTRAL VENOUS ACCESS DEVICE INSERTION  9/13/2018    KNEE SURGERY Right     MAMMO NEEDLE LOCALIZATION RIGHT (ALL INC) Right 6/26/2018    MAMMO STEREOTACTIC BREAST BIOPSY RIGHT (ALL INC) Right 5/23/2018    RETINAL DETACHMENT SURGERY Right     TUBAL LIGATION      TUNNELED VENOUS PORT PLACEMENT Left 9/13/2018    Procedure: INSERTION VENOUS PORT (PORT-A-CATH);   Surgeon: Kaye Jacobo MD;  [facility];  Service: Surgical Oncology    UPPER GASTROINTESTINAL ENDOSCOPY       Social History   Social History     Substance and Sexual Activity   Alcohol Use No     Social History     Substance and Sexual Activity   Drug Use No     Social History Tobacco Use   Smoking Status Never Smoker   Smokeless Tobacco Never Used     Family History:   Family History   Problem Relation Age of Onset    Diabetes Mother     Hypertension Mother     Diabetes Father     Hypertension Father     Diabetes Brother     Hypertension Brother     Prostate cancer Brother     Cancer Maternal Grandfather     No Known Problems Sister     No Known Problems Daughter     No Known Problems Sister     No Known Problems Sister     No Known Problems Sister     No Known Problems Sister     No Known Problems Daughter     No Known Problems Daughter        Meds/Allergies   Current Outpatient Medications   Medication Sig Dispense Refill    acetaminophen (TYLENOL) 650 mg CR tablet Take 1 tablet (650 mg total) by mouth every 8 (eight) hours as needed for mild pain 30 tablet 0    albuterol (2 5 mg/3 mL) 0 083 % nebulizer solution Take 3 mL (2 5 mg total) by nebulization every 6 (six) hours as needed for wheezing or shortness of breath 75 mL 5    albuterol (PROVENTIL HFA,VENTOLIN HFA) 90 mcg/act inhaler Inhale 1 puff every 4 (four) hours as needed for wheezing 18 g 5    anastrozole (ARIMIDEX) 1 mg tablet Take 1 tablet (1 mg total) by mouth daily 90 tablet 3    aspirin 81 mg chewable tablet Chew 81 mg daily      atorvastatin (LIPITOR) 40 mg tablet Take 1 tablet (40 mg total) by mouth daily 90 tablet 3    ergocalciferol (VITAMIN D2) 50,000 units Take 1 capsule (50,000 Units total) by mouth once a week 12 capsule 1    fluticasone-salmeterol (Advair HFA) 230-21 MCG/ACT inhaler Inhale 2 puffs 2 (two) times a day Rinse mouth after use   12 g 5    glucose blood (OneTouch Ultra) test strip Use 1 each 3 (three) times a day Use as instructed 300 each 4    insulin detemir (Levemir FlexTouch) 100 Units/mL injection pen INJECT 50 UNITS UNDER THE SKIN DAILY AT BEDTIME 45 mL 0    insulin lispro (HumaLOG) 100 units/mL injection pen 20 units before meals 45 mL 0    Insulin Pen Needle (BD Pen Needle Emily U/F) 32G X 4 MM MISC Inject under the skin 4 (four) times a day 400 each 0    losartan (COZAAR) 100 MG tablet Take 1 tablet (100 mg total) by mouth daily 90 tablet 1    Mag-G 500 (27 Mg) MG tablet TAKE 1 TABLET (500 MG TOTAL) BY MOUTH DAILY 90 tablet 1    metoprolol tartrate (LOPRESSOR) 25 mg tablet Take 0 5 tablets (12 5 mg total) by mouth every 12 (twelve) hours 180 tablet 1    montelukast (SINGULAIR) 10 mg tablet Take 1 tablet (10 mg total) by mouth daily at bedtime 30 tablet 5    omeprazole (PriLOSEC) 20 mg delayed release capsule TAKE 1 CAPSULE (20 MG TOTAL) BY MOUTH DAILY 90 capsule 0    OneTouch Delica Lancets 15U MISC USE 3 (THREE) TIMES A  each 4    Continuous Blood Gluc Sensor (FreeStyle Broderick 14 Day Sensor) MISC Use 1 each every 14 (fourteen) days (Patient not taking: Reported on 9/26/2022) 2 each 11    EPINEPHrine (EPIPEN) 0 3 mg/0 3 mL SOAJ Inject 0 3 mL (0 3 mg total) into a muscle once for 1 dose (Patient not taking: Reported on 9/26/2022) 0 6 mL 0    furosemide (LASIX) 20 mg tablet Take 1 tablet (20 mg total) by mouth daily for 5 days (Patient not taking: Reported on 9/26/2022) 5 tablet 0    loratadine (CLARITIN) 10 mg tablet TAKE 1 TABLET (10 MG TOTAL) BY MOUTH DAILY (Patient not taking: Reported on 9/26/2022) 90 tablet 2    meclizine (ANTIVERT) 25 mg tablet Take 1 tablet (25 mg total) by mouth 3 (three) times a day as needed for dizziness (Patient not taking: Reported on 9/26/2022) 30 tablet 0    ondansetron (ZOFRAN-ODT) 4 mg disintegrating tablet Take 1 tablet (4 mg total) by mouth every 8 (eight) hours as needed for nausea or vomiting (Patient not taking: Reported on 9/26/2022) 12 tablet 0    polyethylene glycol (GLYCOLAX) 17 GM/SCOOP powder Take as directed by the office  (Patient not taking: Reported on 9/26/2022) 238 g 0     No current facility-administered medications for this visit       Allergies   Allergen Reactions    Codeine Other (See Comments) unknown    Latex Other (See Comments)     fungus       Objective   Vitals: Blood pressure 126/74, pulse 84, height 5' 5" (1 651 m), weight 78 7 kg (173 lb 9 6 oz), not currently breastfeeding  Physical Exam  Vitals reviewed  Constitutional:       Appearance: Normal appearance  She is not ill-appearing or diaphoretic  HENT:      Head: Normocephalic and atraumatic  Eyes:      General: No scleral icterus  Extraocular Movements: Extraocular movements intact  Cardiovascular:      Rate and Rhythm: Normal rate and regular rhythm  Pulses: Pulses are weak  Dorsalis pedis pulses are 1+ on the right side and 1+ on the left side  Heart sounds: Normal heart sounds  No murmur heard  Pulmonary:      Effort: Pulmonary effort is normal  No respiratory distress  Breath sounds: Normal breath sounds  No wheezing  Abdominal:      General: There is no distension  Palpations: Abdomen is soft  Tenderness: There is no abdominal tenderness  Musculoskeletal:      Cervical back: Neck supple  Right lower leg: No edema  Left lower leg: No edema  Feet:      Right foot:      Skin integrity: Callus present  No ulcer, skin breakdown, erythema, warmth or dry skin  Left foot:      Skin integrity: Callus present  No ulcer, skin breakdown, erythema, warmth or dry skin  Lymphadenopathy:      Cervical: No cervical adenopathy  Skin:     General: Skin is warm and dry  Neurological:      General: No focal deficit present  Mental Status: She is alert and oriented to person, place, and time  Psychiatric:         Mood and Affect: Mood normal          Behavior: Behavior normal          Thought Content: Thought content normal          Judgment: Judgment normal         Patient's shoes and socks removed  Right Foot/Ankle   Right Foot Inspection  Skin Exam: skin normal, skin intact, callus and callus   No dry skin, no warmth, no erythema, no maceration, no abnormal color, no pre-ulcer and no ulcer  Toe Exam: No swelling, no tenderness, erythema and  no right toe deformity    Sensory   Vibration: diminished  Monofilament testing: diminished    Vascular  The right DP pulse is 1+  Left Foot/Ankle  Left Foot Inspection  Skin Exam: skin normal, skin intact and callus  No dry skin, no warmth, no erythema, no maceration, normal color, no pre-ulcer and no ulcer  Toe Exam: No swelling, no erythema and no left toe deformity  Sensory   Vibration: diminished  Monofilament testing: diminished    Vascular  The left DP pulse is 1+  Assign Risk Category  No deformity present  Loss of protective sensation  Weak pulses  Risk: 2    The history was obtained from the review of the chart, patient      Lab Results:   Lab Results   Component Value Date/Time    Hemoglobin A1C 14 5 (A) 09/26/2022 09:11 AM    Hemoglobin A1C 13 8 (A) 06/13/2022 10:58 AM    Hemoglobin A1C 14 0 (H) 03/01/2022 09:37 AM    Hemoglobin A1C 14 4 (A) 02/16/2022 12:31 PM    WBC 9 05 08/18/2022 03:07 PM    WBC 5 99 07/27/2022 11:53 AM    WBC 6 74 06/22/2022 11:49 AM    Hemoglobin 12 4 08/18/2022 03:07 PM    Hemoglobin 12 0 07/27/2022 11:53 AM    Hemoglobin 12 6 06/22/2022 11:49 AM    Hematocrit 37 6 08/18/2022 03:07 PM    Hematocrit 37 4 07/27/2022 11:53 AM    Hematocrit 38 1 06/22/2022 11:49 AM    MCV 88 08/18/2022 03:07 PM    MCV 90 07/27/2022 11:53 AM    MCV 88 06/22/2022 11:49 AM    Platelets 768 28/08/8111 03:07 PM    Platelets 094 31/83/8638 11:53 AM    Platelets 329 07/79/8774 11:49 AM    BUN 19 08/18/2022 03:07 PM    BUN 17 06/22/2022 11:49 AM    BUN 25 03/01/2022 09:37 AM    Potassium 3 7 08/18/2022 03:07 PM    Potassium 4 0 06/22/2022 11:49 AM    Potassium 4 1 03/01/2022 09:37 AM    Chloride 99 08/18/2022 03:07 PM    Chloride 101 06/22/2022 11:49 AM    Chloride 99 03/01/2022 09:37 AM    CO2 31 08/18/2022 03:07 PM    CO2 28 06/22/2022 11:49 AM    CO2 29 03/01/2022 09:37 AM    Creatinine 0 75 08/18/2022 03:07 PM Creatinine 0 52 (L) 06/22/2022 11:49 AM    Creatinine 0 58 (L) 03/01/2022 09:37 AM    AST 12 08/18/2022 03:07 PM    AST 25 06/22/2022 11:49 AM    AST 22 03/01/2022 09:37 AM    ALT 26 08/18/2022 03:07 PM    ALT 23 06/22/2022 11:49 AM    ALT 19 03/01/2022 09:37 AM    Albumin 3 3 (L) 08/18/2022 03:07 PM    Albumin 4 1 06/22/2022 11:49 AM    Albumin 3 9 03/01/2022 09:37 AM    HDL, Direct 55 06/22/2022 11:49 AM    HDL, Direct 47 (L) 03/01/2022 09:37 AM    Triglycerides 151 (H) 06/22/2022 11:49 AM    Triglycerides 168 (H) 03/01/2022 09:37 AM       Portions of the record may have been created with voice recognition software  Occasional wrong word or "sound a like" substitutions may have occurred due to the inherent limitations of voice recognition software  Read the chart carefully and recognize, using context, where substitutions have occurred

## 2022-09-26 NOTE — ASSESSMENT & PLAN NOTE
Lab Results   Component Value Date    HGBA1C 14 5 (A) 09/26/2022   Type 2 diabetes remains very poorly controlled-she has multiple complications including retinopathy, nephropathy and neuropathy  Patient and daughter again counseled on improving glycemic control to delay progression of complications  Currently do not have enough information to be able to suggest any changes in her insulin regimen as she is not taking meds consistently     Again given written instructions-referred for medical nutrition therapy    Look into personal CGM

## 2022-09-27 DIAGNOSIS — E11.65 TYPE 2 DIABETES MELLITUS WITH HYPERGLYCEMIA, WITH LONG-TERM CURRENT USE OF INSULIN (HCC): ICD-10-CM

## 2022-09-27 DIAGNOSIS — Z79.4 TYPE 2 DIABETES MELLITUS WITH HYPERGLYCEMIA, WITH LONG-TERM CURRENT USE OF INSULIN (HCC): ICD-10-CM

## 2022-09-28 RX ORDER — PEN NEEDLE, DIABETIC 32GX 5/32"
NEEDLE, DISPOSABLE MISCELLANEOUS
Qty: 400 EACH | Refills: 0 | Status: SHIPPED | OUTPATIENT
Start: 2022-09-28

## 2022-09-29 ENCOUNTER — TELEPHONE (OUTPATIENT)
Dept: DIABETES SERVICES | Facility: OTHER | Age: 67
End: 2022-09-29

## 2022-09-29 NOTE — TELEPHONE ENCOUNTER
Called and advised patient's daughter that the Insurance has denied the xolair and she is being set up for the free drug program and I am waiting on a call back for the final approval for the program and then we will get her set up for the injections

## 2022-10-05 ENCOUNTER — TELEPHONE (OUTPATIENT)
Dept: PULMONOLOGY | Facility: CLINIC | Age: 67
End: 2022-10-05

## 2022-10-05 ENCOUNTER — DOCUMENTATION (OUTPATIENT)
Dept: PULMONOLOGY | Facility: CLINIC | Age: 67
End: 2022-10-05

## 2022-10-05 NOTE — PROGRESS NOTES
Xolair delivery has been set up through [facility]       375 mg every 14 days     To be delivered to the [facility] on [address] [place] [address] for 10/13/22      Order number: [id number]    [phone]- [facility]

## 2022-10-05 NOTE — TELEPHONE ENCOUNTER
Called and spoke to patient's daughter Chaim Duarte advised her that her mom is finally set up for the [facility] free-drug program    She is set for 10/17/22 at 11 am for the [facility] center  I advised her to check the epi-pen to see if the patient will need a new script  If she does then she will call back to have order placed

## 2022-10-07 ENCOUNTER — OFFICE VISIT (OUTPATIENT)
Dept: FAMILY MEDICINE CLINIC | Facility: CLINIC | Age: 67
End: 2022-10-07

## 2022-10-07 VITALS
SYSTOLIC BLOOD PRESSURE: 110 MMHG | DIASTOLIC BLOOD PRESSURE: 70 MMHG | BODY MASS INDEX: 29.79 KG/M2 | WEIGHT: 179 LBS | OXYGEN SATURATION: 98 % | RESPIRATION RATE: 19 BRPM | HEART RATE: 78 BPM | TEMPERATURE: 97.4 F

## 2022-10-07 DIAGNOSIS — Z23 ENCOUNTER FOR IMMUNIZATION: ICD-10-CM

## 2022-10-07 DIAGNOSIS — G89.29 CHRONIC PAIN OF RIGHT KNEE: ICD-10-CM

## 2022-10-07 DIAGNOSIS — M25.561 CHRONIC PAIN OF RIGHT KNEE: ICD-10-CM

## 2022-10-07 DIAGNOSIS — R60.0 LEG EDEMA, RIGHT: Primary | ICD-10-CM

## 2022-10-07 PROCEDURE — 99214 OFFICE O/P EST MOD 30 MIN: CPT | Performed by: FAMILY MEDICINE

## 2022-10-07 PROCEDURE — 3074F SYST BP LT 130 MM HG: CPT | Performed by: FAMILY MEDICINE

## 2022-10-07 PROCEDURE — 90677 PCV20 VACCINE IM: CPT | Performed by: FAMILY MEDICINE

## 2022-10-07 PROCEDURE — G0008 ADMIN INFLUENZA VIRUS VAC: HCPCS | Performed by: FAMILY MEDICINE

## 2022-10-07 PROCEDURE — G0009 ADMIN PNEUMOCOCCAL VACCINE: HCPCS | Performed by: FAMILY MEDICINE

## 2022-10-07 PROCEDURE — 3078F DIAST BP <80 MM HG: CPT | Performed by: FAMILY MEDICINE

## 2022-10-07 PROCEDURE — 90662 IIV NO PRSV INCREASED AG IM: CPT | Performed by: FAMILY MEDICINE

## 2022-10-07 NOTE — PROGRESS NOTES
Name: Myles Kim      : 1955      MRN: [id number]  Encounter Provider: Philip Steel MD  Encounter Date: 10/7/2022   Encounter department: [address] [place] [address]     1  Leg edema, right    2  Chronic pain of right knee  -     Diclofenac Sodium (VOLTAREN) 1 %; Apply 2 g topically 2 (two) times a day    3  Encounter for immunization  -     Pneumococcal Conjugate Vaccine 20-valent (Pcv20)  -     influenza vaccine, high-dose, PF 0 7 mL (FLUZONE HIGH-DOSE)       elevate leg while seated  Apply ice to knee twice a day   Discussed DASH diet     Subjective      HPI  Review of Systems   Cardiovascular: Positive for leg swelling  Negative for chest pain and palpitations  All other systems reviewed and are negative        Current Outpatient Medications on File Prior to Visit   Medication Sig    acetaminophen (TYLENOL) 650 mg CR tablet Take 1 tablet (650 mg total) by mouth every 8 (eight) hours as needed for mild pain    albuterol (2 5 mg/3 mL) 0 083 % nebulizer solution Take 3 mL (2 5 mg total) by nebulization every 6 (six) hours as needed for wheezing or shortness of breath    albuterol (PROVENTIL HFA,VENTOLIN HFA) 90 mcg/act inhaler Inhale 1 puff every 4 (four) hours as needed for wheezing    anastrozole (ARIMIDEX) 1 mg tablet Take 1 tablet (1 mg total) by mouth daily    aspirin 81 mg chewable tablet Chew 81 mg daily    atorvastatin (LIPITOR) 40 mg tablet Take 1 tablet (40 mg total) by mouth daily    Continuous Blood Gluc Sensor (FreeStyle Broderick 14 Day Sensor) MISC Use 1 each every 14 (fourteen) days (Patient not taking: Reported on 2022)    EPINEPHrine (EPIPEN) 0 3 mg/0 3 mL SOAJ Inject 0 3 mL (0 3 mg total) into a muscle once for 1 dose (Patient not taking: Reported on 2022)    ergocalciferol (VITAMIN D2) 50,000 units Take 1 capsule (50,000 Units total) by mouth once a week    fluticasone-salmeterol (Advair HFA) 230-21 MCG/ACT inhaler Inhale 2 puffs 2 (two) times a day Rinse mouth after use   furosemide (LASIX) 20 mg tablet Take 1 tablet (20 mg total) by mouth daily for 5 days (Patient not taking: Reported on 9/26/2022)    glucose blood (OneTouch Ultra) test strip Use 1 each 3 (three) times a day Use as instructed    insulin detemir (Levemir FlexTouch) 100 Units/mL injection pen INJECT 50 UNITS UNDER THE SKIN DAILY AT BEDTIME    insulin lispro (HumaLOG) 100 units/mL injection pen 20 units before meals    loratadine (CLARITIN) 10 mg tablet TAKE 1 TABLET (10 MG TOTAL) BY MOUTH DAILY (Patient not taking: Reported on 9/26/2022)    losartan (COZAAR) 100 MG tablet Take 1 tablet (100 mg total) by mouth daily    Mag-G 500 (27 Mg) MG tablet TAKE 1 TABLET (500 MG TOTAL) BY MOUTH DAILY    meclizine (ANTIVERT) 25 mg tablet Take 1 tablet (25 mg total) by mouth 3 (three) times a day as needed for dizziness (Patient not taking: Reported on 9/26/2022)    metoprolol tartrate (LOPRESSOR) 25 mg tablet Take 0 5 tablets (12 5 mg total) by mouth every 12 (twelve) hours    montelukast (SINGULAIR) 10 mg tablet Take 1 tablet (10 mg total) by mouth daily at bedtime    omeprazole (PriLOSEC) 20 mg delayed release capsule TAKE 1 CAPSULE (20 MG TOTAL) BY MOUTH DAILY    ondansetron (ZOFRAN-ODT) 4 mg disintegrating tablet Take 1 tablet (4 mg total) by mouth every 8 (eight) hours as needed for nausea or vomiting (Patient not taking: Reported on 9/26/2022)    OneTouch Delica Lancets 89E MISC USE 3 (THREE) TIMES A DAY    polyethylene glycol (GLYCOLAX) 17 GM/SCOOP powder Take as directed by the office   (Patient not taking: Reported on 9/26/2022)    UltiCare Micro Pen Needles 32G X 4 MM MISC INJECT UNDER THE SKIN 4 (FOUR) TIMES A DAY       Objective     /70 (BP Location: Left arm, Patient Position: Sitting, Cuff Size: Adult)   Pulse 78   Temp (!) 97 4 °F (36 3 °C) (Temporal)   Resp 19   Wt 81 2 kg (179 lb)   SpO2 98%   BMI 29 79 kg/m²     Physical Exam  Vitals and nursing note reviewed  Constitutional:       Appearance: She is well-developed  HENT:      Head: Normocephalic  Right Ear: External ear normal       Left Ear: External ear normal       Nose: Nose normal    Eyes:      Conjunctiva/sclera: Conjunctivae normal       Pupils: Pupils are equal, round, and reactive to light  Neck:      Thyroid: No thyromegaly  Cardiovascular:      Rate and Rhythm: Normal rate and regular rhythm  Heart sounds: Normal heart sounds  Pulmonary:      Effort: Pulmonary effort is normal       Breath sounds: Normal breath sounds  Abdominal:      Palpations: Abdomen is soft  Tenderness: There is no abdominal tenderness  There is no guarding or rebound  Musculoskeletal:         General: Normal range of motion  Cervical back: Normal range of motion and neck supple  Right lower leg: Edema present  Skin:     General: Skin is dry  Neurological:      Mental Status: She is alert and oriented to person, place, and time  Deep Tendon Reflexes: Reflexes are normal and symmetric         Erin Pond MD

## 2022-10-10 ENCOUNTER — RADIATION ONCOLOGY FOLLOW-UP (OUTPATIENT)
Dept: RADIATION ONCOLOGY | Facility: CLINIC | Age: 67
End: 2022-10-10
Attending: RADIOLOGY

## 2022-10-10 ENCOUNTER — CLINICAL SUPPORT (OUTPATIENT)
Dept: RADIATION ONCOLOGY | Facility: CLINIC | Age: 67
End: 2022-10-10
Attending: RADIOLOGY
Payer: COMMERCIAL

## 2022-10-10 VITALS
DIASTOLIC BLOOD PRESSURE: 80 MMHG | RESPIRATION RATE: 16 BRPM | HEIGHT: 65 IN | HEART RATE: 84 BPM | SYSTOLIC BLOOD PRESSURE: 126 MMHG | TEMPERATURE: 97.6 F | BODY MASS INDEX: 29.27 KG/M2 | OXYGEN SATURATION: 97 % | WEIGHT: 175.71 LBS

## 2022-10-10 DIAGNOSIS — C50.911 MALIGNANT NEOPLASM OF RIGHT BREAST IN FEMALE, ESTROGEN RECEPTOR POSITIVE, UNSPECIFIED SITE OF BREAST (HCC): Primary | ICD-10-CM

## 2022-10-10 DIAGNOSIS — Z17.0 MALIGNANT NEOPLASM OF RIGHT BREAST IN FEMALE, ESTROGEN RECEPTOR POSITIVE, UNSPECIFIED SITE OF BREAST (HCC): Primary | ICD-10-CM

## 2022-10-10 DIAGNOSIS — C50.411 MALIGNANT NEOPLASM OF UPPER-OUTER QUADRANT OF RIGHT BREAST IN FEMALE, ESTROGEN RECEPTOR POSITIVE (HCC): Primary | ICD-10-CM

## 2022-10-10 DIAGNOSIS — Z17.0 MALIGNANT NEOPLASM OF UPPER-OUTER QUADRANT OF RIGHT BREAST IN FEMALE, ESTROGEN RECEPTOR POSITIVE (HCC): Primary | ICD-10-CM

## 2022-10-10 PROCEDURE — 99214 OFFICE O/P EST MOD 30 MIN: CPT | Performed by: RADIOLOGY

## 2022-10-10 PROCEDURE — 99211 OFF/OP EST MAY X REQ PHY/QHP: CPT | Performed by: RADIOLOGY

## 2022-10-10 NOTE — PROGRESS NOTES
Follow-up - Radiation Oncology   Jolie Mulligan 1955 73 y o  female [id number]      History of Present Illness   Cancer Staging  Malignant neoplasm of upper-outer quadrant of right breast in female, estrogen receptor positive ([facility] [address] )  Staging form: Breast, AJCC 8th Edition  - Clinical: No stage assigned - Unsigned  - Pathologic: Stage IA (pT1c, pN0, cM0, G2, ER: Positive, ME: Positive, HER2: Positive) - Signed by Yadi Hi MD on 8/21/2018  Histologic grading system: 3 grade system  Laterality: Right          Interval History:  Jolie Mulligan 1955 is a 73 y o  female with stage IA T1c N0 grade 2 invasive ductal carcinoma of the right breast  Her tumor was ER/ME and HER2 positive   She had evidence of extensive DCIS spanning approximately 2 6 centimeters  Her initial margin of resection was positive however she underwent re-excision with final negative margins   In addition 7 lymph nodes return negative for malignancy  She completed her adjuvant chemotherapy course on 12/11/2018 and Herceptin monotherapy on 8/6/2019       She completed a course of radiation therapy to the right breast on 2/19/2019  She is seen today for routine follow up  She was last seen 9/28/2021      Follow up visit      11/15/21 Hem Onc, Dr Ifrah Fajardo  Currently on adjuvant hormonal therapy with anastrozole with excellent tolerance, continue     4/25/22 Surg Onc, ARIN Acharya  No concerns on exam     6/27/22 Mammo diagnostic bilateral w 3d & cad; US breast right limited (diagnostic)  IMPRESSION:   Stable post treatment changes in the right breast   Palpable area at the superior aspect of the right axilla corresponds to a sebaceous cyst       ASSESSMENT/BI-RADS CATEGORY:  Left: 2 - Benign  Right: 2 - Benign  Overall: 2 - Benign     RECOMMENDATION: Diagnostic mammogram in 1 year for both breasts      Patient's daughter was present for today's visit and assisted with translation   She complains of intermittent right lateral breat pain and left breast pain          Upcoming:   10/31/22 Surg Onc, Dr Yuan White  12/12/22 hem Onc, Dr Chairez [place]   Oncology History   Malignant neoplasm of right female breast [facility])   5/23/2018 Initial Diagnosis    Malignant neoplasm of right female breast ([address] )     5/23/2018 Biopsy    Right breast core biopsy:  DCIS, micropapillary type, low-intermediate nuclear grade  ER 90-95% positive,  NJ 75-80% positive       6/26/2018 Surgery    RIGHT BREAST NEEDLE LOCALIZATION X2 WITH RIGHT BREAST LUMPECTOMY ( NEEDLE LOC @ 1000) (Right)   ONCOPLASTIC CLOSURE OF LUMPECTOMY CAVITY    Invasive breast carcinoma, NST (aka ductal)  * Mccloud grade 2 of 3 (total score = 2+2+2 = 6 of 9)   -- tubule formation < 10%, score 3   -- nuclear grade 2 of 3, score 2   -- mitoses ~ 4/mm2, score 2    * invasive carcinoma is multifocally present (A23-1, A32-1, A84-1, A89-1, A100-1), largest focus is 14 millimeters in greatest dimension (A89-1, this focus is graded)  * superior lumpectomy margin (orange-inked) is POSITIVE for invasive carcinoma (A84-1)   * all other lumpectomy margins are free of invasive carcinoma  * estrogen, progesterone & Her-2/negrita receptor studies are undertaken, to be described in an addendum report  - Ductal carcinoma in-situ (DCIS): Present as an extensive component (~85% of total tumor)  * DCIS is co-located with invasive carcinoma surrounding prior needle biopsy site  * DCIS spans 2 6 cm maximal dimension (A62-1) and is present on 27 of 136 total slides examined  * DCIS has cribriform & micropapillary patterns, nuclear grade 2 of 3, with central comedo-type necrosis  * DCIS is present within 0 2 millimeters of the superior lumpectomy margin (orange-inked, A26-1)   * DCIS is present within 0 2 millimeters of the medial lumpectomy margin (yellow-inked, A3-1)   * all other lumpectomy margins are free of DCIS by > 2 millimeters    - Lymph-vascular invasion: no lymph-vascular invasion is unequivocally identified  - Microcalcifications: present in DCIS  % positive, ER 45-55% positive, HER2 by IHC 3+ positive    - Dr Gabe Ewing       8/2/2018 Surgery    Right breast lumpectomy reexcision, right axilla SLN biopsy:  A  Submitted as “right axillary sentinel node”:  - Seven lymph nodes are identified showing no metastatic tumor      B   Right breast lumpectomy reexcision:  - Abundant reactive changes are seen around the previous lumpectomy cavity   - No residual atypia or malignancy is seen           8/2/2018 -  Cancer Staged    Stage IA - pT1c, pN0, G2        9/25/2018 - 8/6/2019 Chemotherapy    Weekly Paclitaxol and trastuzumab x12, until December 11, 2018 followed by trastuzumab monotherapy 6 milligram/kilogram every 3 weeks    - Dr Woodrow Aragon       1/9/2019 - 2/19/2019 Radiation    Plan ID Energy Fractions Dose per Fraction (cGy) Dose Correction (cGy) Total Dose Delivered (cGy) Elapsed Days   R Boost e 16E 5 / 5 200 0 1,000 6   Right Breast 6X 25 / 25 200 0 5,000 29     - Dr Ciara Sánchez       Malignant neoplasm of upper-outer quadrant of right breast in female, estrogen receptor positive ([facility] [address] )   7/19/2018 Initial Diagnosis    Malignant neoplasm of upper-outer quadrant of right breast in female, estrogen receptor positive ([address] )     12/17/2018 - 8/26/2019 Chemotherapy    trastuzumab (HERCEPTIN) 579 mg in sodium chloride 0 9 % 250 mL chemo infusion, 8 mg/kg, Intravenous, Once, 12 of 12 cycles  Administration: 434 mg (5/14/2019), 434 mg (6/4/2019), 450 mg (7/16/2019), 450 mg (8/6/2019), 450 mg (6/25/2019)         Past Medical History:   Diagnosis Date   • Abdominal infection ([address] )     h-pylori   • Abnormal chest x-ray 4/5/2019   • Asthma    • Breast cancer ([address] ) 06/26/2018   • Cancer ([address] )     BREAST   • Diabetes mellitus ([address] )    • Hiatal hernia    • History of chemotherapy    • History of radiation therapy    • Hypercholesterolemia    • Hypertension    • Hypothyroid    • Renal disorder    • Rheumatoid arthritis [facility])      Past Surgical History:   Procedure Laterality Date   • 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   • BREAST LUMPECTOMY Right 6/26/2018    Procedure: RIGHT BREAST NEEDLE LOCALIZATION X2 WITH RIGHT BREAST LUMPECTOMY ( NEEDLE LOC @ 1000); Surgeon: Alessandra Portillo MD;  Location: [facility];  Service: Surgical Oncology   • BREAST LUMPECTOMY Right 8/2/2018    Procedure: LYMPHOSCINITGRAPHY INTRAOPERATIVE LYMPHATIC MAPPING , RIGHT SENTINEL NODE BIOPSY, REEXCISION  RIGHT BREAST LUMPECTOMY CAVITY (SUPERIOR MARGIN); Surgeon: Alessandra Portillo MD;  Location: [facility];  Service: Surgical Oncology   • BREAST SURGERY Left    • CATARACT EXTRACTION, BILATERAL Bilateral    • COLONOSCOPY     • EGD AND COLONOSCOPY N/A 9/5/2018    Procedure: EGD AND COLONOSCOPY;  Surgeon: Valentine Castillo MD;  Location: [facility]; Service: Gastroenterology   • FL GUIDED CENTRAL VENOUS ACCESS DEVICE INSERTION  9/13/2018   • KNEE SURGERY Right    • MAMMO NEEDLE LOCALIZATION RIGHT (ALL INC) Right 6/26/2018   • MAMMO STEREOTACTIC BREAST BIOPSY RIGHT (ALL INC) Right 5/23/2018   • RETINAL DETACHMENT SURGERY Right    • TUBAL LIGATION     • TUNNELED VENOUS PORT PLACEMENT Left 9/13/2018    Procedure: INSERTION VENOUS PORT (PORT-A-CATH);   Surgeon: Alessandra Portillo MD;  Location: [facility];  Service: Surgical Oncology   • UPPER GASTROINTESTINAL ENDOSCOPY         Social History   Social History     Substance and Sexual Activity   Alcohol Use No     Social History     Substance and Sexual Activity   Drug Use No     Social History     Tobacco Use   Smoking Status Never Smoker   Smokeless Tobacco Never Used         Meds/Allergies     Current Outpatient Medications:   •  acetaminophen (TYLENOL) 650 mg CR tablet, Take 1 tablet (650 mg total) by mouth every 8 (eight) hours as needed for mild pain, Disp: 30 tablet, Rfl: 0  •  albuterol (2 5 mg/3 mL) 0 083 % nebulizer solution, Take 3 mL (2 5 mg total) by nebulization every 6 (six) hours as needed for wheezing or shortness of breath, Disp: 75 mL, Rfl: 5  •  albuterol (PROVENTIL HFA,VENTOLIN HFA) 90 mcg/act inhaler, Inhale 1 puff every 4 (four) hours as needed for wheezing, Disp: 18 g, Rfl: 5  •  anastrozole (ARIMIDEX) 1 mg tablet, Take 1 tablet (1 mg total) by mouth daily, Disp: 90 tablet, Rfl: 3  •  aspirin 81 mg chewable tablet, Chew 81 mg daily, Disp: , Rfl:   •  atorvastatin (LIPITOR) 40 mg tablet, Take 1 tablet (40 mg total) by mouth daily, Disp: 90 tablet, Rfl: 3  •  ergocalciferol (VITAMIN D2) 50,000 units, Take 1 capsule (50,000 Units total) by mouth once a week, Disp: 12 capsule, Rfl: 1  •  fluticasone-salmeterol (Advair HFA) 230-21 MCG/ACT inhaler, Inhale 2 puffs 2 (two) times a day Rinse mouth after use , Disp: 12 g, Rfl: 5  •  glucose blood (OneTouch Ultra) test strip, Use 1 each 3 (three) times a day Use as instructed, Disp: 300 each, Rfl: 4  •  insulin detemir (Levemir FlexTouch) 100 Units/mL injection pen, INJECT 50 UNITS UNDER THE SKIN DAILY AT BEDTIME, Disp: 45 mL, Rfl: 0  •  insulin lispro (HumaLOG) 100 units/mL injection pen, 20 units before meals, Disp: 45 mL, Rfl: 0  •  losartan (COZAAR) 100 MG tablet, Take 1 tablet (100 mg total) by mouth daily, Disp: 90 tablet, Rfl: 1  •  Mag-G 500 (27 Mg) MG tablet, TAKE 1 TABLET (500 MG TOTAL) BY MOUTH DAILY, Disp: 90 tablet, Rfl: 1  •  metoprolol tartrate (LOPRESSOR) 25 mg tablet, Take 0 5 tablets (12 5 mg total) by mouth every 12 (twelve) hours, Disp: 180 tablet, Rfl: 1  •  montelukast (SINGULAIR) 10 mg tablet, Take 1 tablet (10 mg total) by mouth daily at bedtime, Disp: 30 tablet, Rfl: 5  •  omeprazole (PriLOSEC) 20 mg delayed release capsule, TAKE 1 CAPSULE (20 MG TOTAL) BY MOUTH DAILY, Disp: 90 capsule, Rfl: 0  •  OneTouch Delica Lancets 91T MISC, USE 3 (THREE) TIMES A DAY, Disp: 300 each, Rfl: 4  •  UltiCare Micro Pen Needles 32G X 4 MM MISC, INJECT UNDER THE SKIN 4 (FOUR) TIMES A DAY, Disp: 400 each, Rfl: 0  •  Continuous Blood Gluc Sensor (FreeStyle Broderick 14 Day Sensor) MISC, Use 1 each every 14 (fourteen) days (Patient not taking: Reported on 9/26/2022), Disp: 2 each, Rfl: 11  •  Diclofenac Sodium (VOLTAREN) 1 %, Apply 2 g topically 2 (two) times a day (Patient not taking: Reported on 10/10/2022), Disp: 150 g, Rfl: 0  •  EPINEPHrine (EPIPEN) 0 3 mg/0 3 mL SOAJ, Inject 0 3 mL (0 3 mg total) into a muscle once for 1 dose (Patient not taking: Reported on 9/26/2022), Disp: 0 6 mL, Rfl: 0  •  furosemide (LASIX) 20 mg tablet, Take 1 tablet (20 mg total) by mouth daily for 5 days (Patient not taking: Reported on 9/26/2022), Disp: 5 tablet, Rfl: 0  •  loratadine (CLARITIN) 10 mg tablet, TAKE 1 TABLET (10 MG TOTAL) BY MOUTH DAILY (Patient not taking: No sig reported), Disp: 90 tablet, Rfl: 2  •  meclizine (ANTIVERT) 25 mg tablet, Take 1 tablet (25 mg total) by mouth 3 (three) times a day as needed for dizziness (Patient not taking: No sig reported), Disp: 30 tablet, Rfl: 0  •  ondansetron (ZOFRAN-ODT) 4 mg disintegrating tablet, Take 1 tablet (4 mg total) by mouth every 8 (eight) hours as needed for nausea or vomiting (Patient not taking: No sig reported), Disp: 12 tablet, Rfl: 0  •  polyethylene glycol (GLYCOLAX) 17 GM/SCOOP powder, Take as directed by the office  (Patient not taking: Reported on 9/26/2022), Disp: 238 g, Rfl: 0  Allergies   Allergen Reactions   • Codeine Other (See Comments)     unknown   • Latex Other (See Comments)     fungus         Review of Systems   Constitutional: Positive for appetite change (eats when anxious) and fatigue  Negative for chills, fever and unexpected weight change  HENT: Negative  Eyes: Negative  Respiratory: Positive for shortness of breath (occasional)  Negative for cough  Hx asthma, uses inhalers prn   Cardiovascular: Positive for chest pain (right lateral breast discomfort, intermittent) and leg swelling (ankles)  Gastrointestinal: Positive for constipation (uses MOM prn)   Negative for diarrhea  Endocrine: Negative  Genitourinary: Positive for difficulty urinating (voiding small amounts at times) and pelvic pain (left sided pelvic/groin pain, occasional)  Negative for dysuria, hematuria and vaginal bleeding  Occasional urinary leaking   Musculoskeletal: Negative  C/o knee pain; occasional tightness in ankles   Skin: Negative  Allergic/Immunologic: Negative  Neurological: Negative  Hematological: Negative  Psychiatric/Behavioral: Positive for dysphoric mood (moderate)  The patient is nervous/anxious (mild)  OBJECTIVE:   /80 (BP Location: Left arm, Patient Position: Sitting)   Pulse 84   Temp 97 6 °F (36 4 °C) (Temporal)   Resp 16   Ht 5' 5" (1 651 m)   Wt 79 7 kg (175 lb 11 3 oz)   SpO2 97%   BMI 29 24 kg/m²   Pain Assessment:  0  ECOG/Zubrod/WHO: 0 - Asymptomatic    Physical Exam   There is no supraclavicular axillary adenopathy palpable skin in the radiated field is in good condition  No suspicious lesions palpable in either breast   No breast or arm edema  Abdomen soft nontender  As she is ambulating independently  RESULTS    Lab Results:   Recent Results (from the past 672 hour(s))   POCT hemoglobin A1c    Collection Time: 09/26/22  9:11 AM   Result Value Ref Range    Hemoglobin A1C 14 5 (A) 6 5       Imaging Studies:No results found  Assessment/Plan:  No orders of the defined types were placed in this encounter  Jadon Blair is a 73y o  year old female who is 3 and half years post adjuvant radiation therapy for stage I A right breast carcinoma  She has no clinical evidence of recurrence  She remains on anastrozole  Mammogram from June returned stable  Of note her daughter was assisting with translation  She will return for follow-up visit in 1 year        Adan Prieto  10/10/2022,3:29 PM    Portions of the record may have been created with voice recognition software   Occasional wrong word or "sound a like" substitutions may have occurred due to the inherent limitations of voice recognition software   Read the chart carefully and recognize, using context, where substitutions have occurred

## 2022-10-10 NOTE — PROGRESS NOTES
Derek Brandt 1955 is a 73 y o  female with stage IA T1c N0 grade 2 invasive ductal carcinoma of the right breast  Her tumor was ER/CA and HER2 positive  She had evidence of extensive DCIS spanning approximately 2 6 centimeters  Her initial margin of resection was positive however she underwent re-excision with final negative margins  In addition 7 lymph nodes return negative for malignancy  She completed her adjuvant chemotherapy course on 12/11/2018 and Herceptin monotherapy on 8/6/2019  She completed a course of radiation therapy to the right breast on 2/19/2019  She is seen today for routine follow up  She was last seen 9/28/2021  Follow up visit     11/15/21 Hem Onc, Dr Andrew Painting  Currently on adjuvant hormonal therapy with anastrozole with excellent tolerance, continue    4/25/22 Surg Onc, ARIN Acharya  No concerns on exam    6/27/22 Mammo diagnostic bilateral w 3d & cad; US breast right limited (diagnostic)  IMPRESSION:   Stable post treatment changes in the right breast   Palpable area at the superior aspect of the right axilla corresponds to a sebaceous cyst       ASSESSMENT/BI-RADS CATEGORY:  Left: 2 - Benign  Right: 2 - Benign  Overall: 2 - Benign     RECOMMENDATION: Diagnostic mammogram in 1 year for both breasts  Patient's daughter was present for today's visit and assisted with translation  She complains of intermittent right lateral breat pain and left breast pain         Upcoming:   10/31/22 Surg Onc, Dr Mayra Nguyen  12/12/22 hem Onc, Dr Andrew Painting        Oncology History   Malignant neoplasm of right female breast [facility])   5/23/2018 Initial Diagnosis    Malignant neoplasm of right female breast ([facility] [address] )     5/23/2018 Biopsy    Right breast core biopsy:  DCIS, micropapillary type, low-intermediate nuclear grade  ER 90-95% positive,  CA 75-80% positive       6/26/2018 Surgery    RIGHT BREAST NEEDLE LOCALIZATION X2 WITH RIGHT BREAST LUMPECTOMY ( NEEDLE LOC @ 1000) (Right)   ONCOPLASTIC CLOSURE OF LUMPECTOMY CAVITY    Invasive breast carcinoma, NST (aka ductal)  * Polk grade 2 of 3 (total score = 2+2+2 = 6 of 9)   -- tubule formation < 10%, score 3   -- nuclear grade 2 of 3, score 2   -- mitoses ~ 4/mm2, score 2    * invasive carcinoma is multifocally present (A23-1, A32-1, A84-1, A89-1, A100-1), largest focus is 14 millimeters in greatest dimension (A89-1, this focus is graded)  * superior lumpectomy margin (orange-inked) is POSITIVE for invasive carcinoma (A84-1)   * all other lumpectomy margins are free of invasive carcinoma  * estrogen, progesterone & Her-2/negrita receptor studies are undertaken, to be described in an addendum report  - Ductal carcinoma in-situ (DCIS): Present as an extensive component (~85% of total tumor)  * DCIS is co-located with invasive carcinoma surrounding prior needle biopsy site  * DCIS spans 2 6 cm maximal dimension (A62-1) and is present on 27 of 136 total slides examined  * DCIS has cribriform & micropapillary patterns, nuclear grade 2 of 3, with central comedo-type necrosis  * DCIS is present within 0 2 millimeters of the superior lumpectomy margin (orange-inked, A26-1)   * DCIS is present within 0 2 millimeters of the medial lumpectomy margin (yellow-inked, A3-1)   * all other lumpectomy margins are free of DCIS by > 2 millimeters  - Lymph-vascular invasion: no lymph-vascular invasion is unequivocally identified  - Microcalcifications: present in DCIS  % positive, ER 45-55% positive, HER2 by IHC 3+ positive    - Dr Erica Stapleton       8/2/2018 Surgery    Right breast lumpectomy reexcision, right axilla SLN biopsy:  A  Submitted as “right axillary sentinel node”:  - Seven lymph nodes are identified showing no metastatic tumor      B   Right breast lumpectomy reexcision:  - Abundant reactive changes are seen around the previous lumpectomy cavity   - No residual atypia or malignancy is seen           8/2/2018 -  Cancer Staged    Stage IA - pT1c, pN0, G2        9/25/2018 - 8/6/2019 Chemotherapy    Weekly Paclitaxol and trastuzumab x12, until December 11, 2018 followed by trastuzumab monotherapy 6 milligram/kilogram every 3 weeks    - Dr Radha Sidhu       1/9/2019 - 2/19/2019 Radiation    Plan ID Energy Fractions Dose per Fraction (cGy) Dose Correction (cGy) Total Dose Delivered (cGy) Elapsed Days   R Boost e 16E 5 / 5 200 0 1,000 6   Right Breast 6X 25 / 25 200 0 5,000 29     - Dr Misbah Rueda       Malignant neoplasm of upper-outer quadrant of right breast in female, estrogen receptor positive ([facility] [address] )   7/19/2018 Initial Diagnosis    Malignant neoplasm of upper-outer quadrant of right breast in female, estrogen receptor positive ([facility] [address] )     12/17/2018 - 8/26/2019 Chemotherapy    trastuzumab (HERCEPTIN) 579 mg in sodium chloride 0 9 % 250 mL chemo infusion, 8 mg/kg, Intravenous, Once, 12 of 12 cycles  Administration: 434 mg (5/14/2019), 434 mg (6/4/2019), 450 mg (7/16/2019), 450 mg (8/6/2019), 450 mg (6/25/2019)         Review of Systems:  Review of Systems   Constitutional: Positive for appetite change (eats when anxious) and fatigue  Negative for chills, fever and unexpected weight change  HENT: Negative  Eyes: Negative  Respiratory: Positive for shortness of breath (occasional)  Negative for cough  Hx asthma, uses inhalers prn   Cardiovascular: Positive for chest pain (right lateral breast discomfort, intermittent) and leg swelling (ankles)  Gastrointestinal: Positive for constipation (uses MOM prn)  Negative for diarrhea  Endocrine: Negative  Genitourinary: Positive for difficulty urinating (voiding small amounts at times) and pelvic pain (left sided pelvic/groin pain, occasional)  Negative for dysuria, hematuria and vaginal bleeding  Occasional urinary leaking   Musculoskeletal: Negative  C/o knee pain; occasional tightness in ankles   Skin: Negative  Allergic/Immunologic: Negative  Neurological: Negative      Hematological: Negative  Psychiatric/Behavioral: Positive for dysphoric mood (moderate)  The patient is nervous/anxious (mild)          Clinical Trial: no    Teaching    Covid Vaccine Status: unvaccinated    Health Maintenance   Topic Date Due   • Medicare Annual Wellness Visit (AWV)  Never done   • COVID-19 Vaccine (1) Never done   • HIV Screening  Never done   • Cervical Cancer Screening  Never done   • Urinary Incontinence Screening  Never done   • Falls: Plan of Care  Never done   • BMI: Followup Plan  09/08/2022   • HEMOGLOBIN A1C  12/26/2022   • Fall Risk  06/13/2023   • Depression Remission PHQ  06/13/2023   • DM Eye Exam  07/25/2023   • Diabetic Foot Exam  09/26/2023   • BMI: Adult  10/07/2023   • Breast Cancer Screening: Mammogram  06/27/2024   • Colorectal Cancer Screening  04/18/2027   • Hepatitis C Screening  Completed   • Osteoporosis Screening  Completed   • Pneumococcal Vaccine: 65+ Years  Completed   • Influenza Vaccine  Completed   • HIB Vaccine  Aged Out   • Hepatitis B Vaccine  Aged Out   • IPV Vaccine  Aged Out   • Hepatitis A Vaccine  Aged Out   • Meningococcal ACWY Vaccine  Aged Out   • HPV Vaccine  Aged Out     Patient Active Problem List   Diagnosis   • Type 2 diabetes mellitus with complication, with long-term current use of insulin ([address] )   • Severe persistent asthma without complication   • Other specified hypothyroidism   • Chest pain   • Palpitations   • Chronic bilateral low back pain without sciatica   • Neck pain   • Malignant neoplasm of right female breast ([address] )   • Malignant neoplasm of upper-outer quadrant of right breast in female, estrogen receptor positive ([address] )   • Hiatal hernia   • Screening for colon cancer   • Esophageal dysphagia   • Cellulitis of right axilla   • Chronic pain of right knee   • Hypercholesterolemia   • Hypothyroid   • Essential hypertension   • Bilateral pulmonary embolism (HCC)   • Radiation pneumonitis (HCC)   • Type 2 diabetes mellitus with hyperglycemia, with long-term current use of insulin ([facility] [address] )   • Diabetic polyneuropathy associated with type 2 diabetes mellitus (HCC)   • Use of anastrozole (Arimidex)   • Decreased ROM of right shoulder   • Lump of skin   • Chronic diastolic heart failure (HCC)   • Gastroesophageal reflux disease without esophagitis   • Muscle cramps   • Sebaceous cyst   • Subcutaneous mass of back   • Overweight with body mass index (BMI) of 28 to 28 9 in adult   • Anxiety   • Right foot pain   • Pelvic pain   • Bone pain   • Type 2 diabetes mellitus with microalbuminuria, with long-term current use of insulin (HCC)   • Depression, recurrent ([facility])   • Vaginal candidiasis   • Diabetes mellitus ([facility] [address] )     Past Medical History:   Diagnosis Date   • Abdominal infection ([place] [address] )     h-pylori   • Abnormal chest x-ray 4/5/2019   • Asthma    • Breast cancer ([facility] ) 06/26/2018   • Cancer ([facility]  )     BREAST   • Diabetes mellitus ([facility] )    • Hiatal hernia    • History of chemotherapy    • History of radiation therapy    • Hypercholesterolemia    • Hypertension    • Hypothyroid    • Renal disorder    • Rheumatoid arthritis [facility])      Past Surgical History:   Procedure Laterality Date   • 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   • BREAST LUMPECTOMY Right 6/26/2018    Procedure: RIGHT BREAST NEEDLE LOCALIZATION X2 WITH RIGHT BREAST LUMPECTOMY ( NEEDLE LOC @ 1000); Surgeon: Milton Julian MD;  Location:  MAIN OR;  Service: Surgical Oncology   • BREAST LUMPECTOMY Right 8/2/2018    Procedure: LYMPHOSCINITGRAPHY INTRAOPERATIVE LYMPHATIC MAPPING , RIGHT SENTINEL NODE BIOPSY, REEXCISION  RIGHT BREAST LUMPECTOMY CAVITY (SUPERIOR MARGIN); Surgeon: Mliton Julian MD;  Location:  MAIN OR;  Service: Surgical Oncology   • BREAST SURGERY Left    • CATARACT EXTRACTION, BILATERAL Bilateral    • COLONOSCOPY     • EGD AND COLONOSCOPY N/A 9/5/2018    Procedure: EGD AND COLONOSCOPY;  Surgeon: Gurinder Stubbs MD;  Location: [facility] GI LAB;   Service: Gastroenterology   • FL GUIDED CENTRAL VENOUS ACCESS DEVICE INSERTION  9/13/2018   • KNEE SURGERY Right    • MAMMO NEEDLE LOCALIZATION RIGHT (ALL INC) Right 6/26/2018   • MAMMO STEREOTACTIC BREAST BIOPSY RIGHT (ALL INC) Right 5/23/2018   • RETINAL DETACHMENT SURGERY Right    • TUBAL LIGATION     • TUNNELED VENOUS PORT PLACEMENT Left 9/13/2018    Procedure: INSERTION VENOUS PORT (PORT-A-CATH); Surgeon: Milton Julian MD;  Location: [facility];  Service: Surgical Oncology   • UPPER GASTROINTESTINAL ENDOSCOPY       Family History   Problem Relation Age of Onset   • Diabetes Mother    • Hypertension Mother    • Diabetes Father    • Hypertension Father    • Diabetes Brother    • Hypertension Brother    • Prostate cancer Brother    • Cancer Maternal Grandfather    • No Known Problems Sister    • No Known Problems Daughter    • No Known Problems Sister    • No Known Problems Sister    • No Known Problems Sister    • No Known Problems Sister    • No Known Problems Daughter    • No Known Problems Daughter      Social History     Socioeconomic History   • Marital status:      Spouse name: Not on file   • Number of children: Not on file   • Years of education: Not on file   • Highest education level: Not on file   Occupational History   • Not on file   Tobacco Use   • Smoking status: Never Smoker   • Smokeless tobacco: Never Used   Vaping Use   • Vaping Use: Never used   Substance and Sexual Activity   • Alcohol use: No   • Drug use: No   • Sexual activity: Not Currently   Other Topics Concern   • Not on file   Social History Narrative   • Not on file     Social Determinants of Health     Financial Resource Strain: Low Risk    • Difficulty of Paying Living Expenses: Not hard at all   Food Insecurity: No Food Insecurity   • Worried About Running Out of Food in the Last Year: Never true   • Ran Out of Food in the Last Year: Never true   Transportation Needs: No Transportation Needs   • Lack of Transportation (Medical):  No   • Lack of Transportation (Non-Medical):  No   Physical Activity: Not on file   Stress: Not on file   Social Connections: Not on file   Intimate Partner Violence: Not on file   Housing Stability: Low Risk    • Unable to Pay for Housing in the Last Year: No   • Number of Places Lived in the Last Year: 1   • Unstable Housing in the Last Year: No       Current Outpatient Medications:   •  acetaminophen (TYLENOL) 650 mg CR tablet, Take 1 tablet (650 mg total) by mouth every 8 (eight) hours as needed for mild pain, Disp: 30 tablet, Rfl: 0  •  albuterol (2 5 mg/3 mL) 0 083 % nebulizer solution, Take 3 mL (2 5 mg total) by nebulization every 6 (six) hours as needed for wheezing or shortness of breath, Disp: 75 mL, Rfl: 5  •  albuterol (PROVENTIL HFA,VENTOLIN HFA) 90 mcg/act inhaler, Inhale 1 puff every 4 (four) hours as needed for wheezing, Disp: 18 g, Rfl: 5  •  anastrozole (ARIMIDEX) 1 mg tablet, Take 1 tablet (1 mg total) by mouth daily, Disp: 90 tablet, Rfl: 3  •  aspirin 81 mg chewable tablet, Chew 81 mg daily, Disp: , Rfl:   •  atorvastatin (LIPITOR) 40 mg tablet, Take 1 tablet (40 mg total) by mouth daily, Disp: 90 tablet, Rfl: 3  •  Continuous Blood Gluc Sensor (FreeStyle Broderick 14 Day Sensor) MISC, Use 1 each every 14 (fourteen) days (Patient not taking: Reported on 9/26/2022), Disp: 2 each, Rfl: 11  •  Diclofenac Sodium (VOLTAREN) 1 %, Apply 2 g topically 2 (two) times a day, Disp: 150 g, Rfl: 0  •  EPINEPHrine (EPIPEN) 0 3 mg/0 3 mL SOAJ, Inject 0 3 mL (0 3 mg total) into a muscle once for 1 dose (Patient not taking: Reported on 9/26/2022), Disp: 0 6 mL, Rfl: 0  •  ergocalciferol (VITAMIN D2) 50,000 units, Take 1 capsule (50,000 Units total) by mouth once a week, Disp: 12 capsule, Rfl: 1  •  fluticasone-salmeterol (Advair HFA) 230-21 MCG/ACT inhaler, Inhale 2 puffs 2 (two) times a day Rinse mouth after use , Disp: 12 g, Rfl: 5  •  furosemide (LASIX) 20 mg tablet, Take 1 tablet (20 mg total) by mouth daily for 5 days (Patient not taking: Reported on 9/26/2022), Disp: 5 tablet, Rfl: 0  •  glucose blood (OneTouch Ultra) test strip, Use 1 each 3 (three) times a day Use as instructed, Disp: 300 each, Rfl: 4  •  insulin detemir (Levemir FlexTouch) 100 Units/mL injection pen, INJECT 50 UNITS UNDER THE SKIN DAILY AT BEDTIME, Disp: 45 mL, Rfl: 0  •  insulin lispro (HumaLOG) 100 units/mL injection pen, 20 units before meals, Disp: 45 mL, Rfl: 0  •  loratadine (CLARITIN) 10 mg tablet, TAKE 1 TABLET (10 MG TOTAL) BY MOUTH DAILY (Patient not taking: Reported on 9/26/2022), Disp: 90 tablet, Rfl: 2  •  losartan (COZAAR) 100 MG tablet, Take 1 tablet (100 mg total) by mouth daily, Disp: 90 tablet, Rfl: 1  •  Mag-G 500 (27 Mg) MG tablet, TAKE 1 TABLET (500 MG TOTAL) BY MOUTH DAILY, Disp: 90 tablet, Rfl: 1  •  meclizine (ANTIVERT) 25 mg tablet, Take 1 tablet (25 mg total) by mouth 3 (three) times a day as needed for dizziness (Patient not taking: Reported on 9/26/2022), Disp: 30 tablet, Rfl: 0  •  metoprolol tartrate (LOPRESSOR) 25 mg tablet, Take 0 5 tablets (12 5 mg total) by mouth every 12 (twelve) hours, Disp: 180 tablet, Rfl: 1  •  montelukast (SINGULAIR) 10 mg tablet, Take 1 tablet (10 mg total) by mouth daily at bedtime, Disp: 30 tablet, Rfl: 5  •  omeprazole (PriLOSEC) 20 mg delayed release capsule, TAKE 1 CAPSULE (20 MG TOTAL) BY MOUTH DAILY, Disp: 90 capsule, Rfl: 0  •  ondansetron (ZOFRAN-ODT) 4 mg disintegrating tablet, Take 1 tablet (4 mg total) by mouth every 8 (eight) hours as needed for nausea or vomiting (Patient not taking: Reported on 9/26/2022), Disp: 12 tablet, Rfl: 0  •  OneTouch Delica Lancets 28V MISC, USE 3 (THREE) TIMES A DAY, Disp: 300 each, Rfl: 4  •  polyethylene glycol (GLYCOLAX) 17 GM/SCOOP powder, Take as directed by the office   (Patient not taking: Reported on 9/26/2022), Disp: 238 g, Rfl: 0  •  UltiCare Micro Pen Needles 32G X 4 MM MISC, INJECT UNDER THE SKIN 4 (FOUR) TIMES A DAY, Disp: 400 each, Rfl: 0  Allergies   Allergen Reactions   • Codeine Other (See Comments)     unknown   • Latex Other (See Comments)     fungus     There were no vitals filed for this visit

## 2022-10-11 DIAGNOSIS — I10 ESSENTIAL HYPERTENSION: ICD-10-CM

## 2022-10-12 RX ORDER — OMEPRAZOLE 20 MG/1
20 CAPSULE, DELAYED RELEASE ORAL DAILY
Qty: 90 CAPSULE | Refills: 0 | Status: SHIPPED | OUTPATIENT
Start: 2022-10-12

## 2022-10-17 ENCOUNTER — HOSPITAL ENCOUNTER (OUTPATIENT)
Dept: INFUSION CENTER | Facility: CLINIC | Age: 67
Discharge: HOME | End: 2022-10-17
Payer: COMMERCIAL

## 2022-10-17 DIAGNOSIS — J45.50 SEVERE PERSISTENT ASTHMA WITHOUT COMPLICATION: Primary | ICD-10-CM

## 2022-10-17 PROCEDURE — 96372 THER/PROPH/DIAG INJ SC/IM: CPT

## 2022-10-17 RX ORDER — EPINEPHRINE 1 MG/ML
0.3 INJECTION, SOLUTION, CONCENTRATE INTRAVENOUS AS NEEDED
OUTPATIENT
Start: 2022-10-31

## 2022-10-17 RX ORDER — EPINEPHRINE 1 MG/ML
0.3 INJECTION, SOLUTION, CONCENTRATE INTRAVENOUS AS NEEDED
Status: DISCONTINUED | OUTPATIENT
Start: 2022-10-17 | End: 2022-10-20 | Stop reason: HOSPADM

## 2022-10-17 RX ADMIN — OMALIZUMAB 375 MG: 150 INJECTION, SOLUTION SUBCUTANEOUS at 11:12

## 2022-10-18 NOTE — PROGRESS NOTES
Xolair delivery is set for 11/8/22 to the [facility] infusion center     375 mg     Order number: [id number]     [phone]

## 2022-10-31 ENCOUNTER — TELEPHONE (OUTPATIENT)
Dept: HEMATOLOGY ONCOLOGY | Facility: CLINIC | Age: 67
End: 2022-10-31

## 2022-10-31 ENCOUNTER — OFFICE VISIT (OUTPATIENT)
Dept: PULMONOLOGY | Facility: CLINIC | Age: 67
End: 2022-10-31

## 2022-10-31 ENCOUNTER — HOSPITAL ENCOUNTER (OUTPATIENT)
Dept: INFUSION CENTER | Facility: CLINIC | Age: 67
Discharge: HOME | End: 2022-10-31

## 2022-10-31 VITALS — TEMPERATURE: 96.7 F

## 2022-10-31 VITALS
SYSTOLIC BLOOD PRESSURE: 136 MMHG | WEIGHT: 176 LBS | OXYGEN SATURATION: 96 % | HEART RATE: 78 BPM | HEIGHT: 65 IN | BODY MASS INDEX: 29.32 KG/M2 | DIASTOLIC BLOOD PRESSURE: 70 MMHG | TEMPERATURE: 98.1 F

## 2022-10-31 DIAGNOSIS — I26.99 BILATERAL PULMONARY EMBOLISM (HCC): ICD-10-CM

## 2022-10-31 DIAGNOSIS — J30.2 SEASONAL ALLERGIES: ICD-10-CM

## 2022-10-31 DIAGNOSIS — J45.50 SEVERE PERSISTENT ASTHMA WITHOUT COMPLICATION: Primary | ICD-10-CM

## 2022-10-31 DIAGNOSIS — J70.0 RADIATION PNEUMONITIS (HCC): ICD-10-CM

## 2022-10-31 RX ORDER — EPINEPHRINE 1 MG/ML
0.3 INJECTION, SOLUTION, CONCENTRATE INTRAVENOUS AS NEEDED
Status: DISCONTINUED | OUTPATIENT
Start: 2022-10-31 | End: 2022-11-03 | Stop reason: HOSPADM

## 2022-10-31 RX ORDER — FLUTICASONE PROPIONATE AND SALMETEROL XINAFOATE 230; 21 UG/1; UG/1
2 AEROSOL, METERED RESPIRATORY (INHALATION) 2 TIMES DAILY
Qty: 12 G | Refills: 5 | Status: SHIPPED | OUTPATIENT
Start: 2022-10-31

## 2022-10-31 RX ORDER — FLUTICASONE PROPIONATE 50 MCG
1 SPRAY, SUSPENSION (ML) NASAL DAILY
Qty: 18.2 ML | Refills: 5 | Status: SHIPPED | OUTPATIENT
Start: 2022-10-31

## 2022-10-31 RX ORDER — ALBUTEROL SULFATE 90 UG/1
1 AEROSOL, METERED RESPIRATORY (INHALATION) EVERY 4 HOURS PRN
Qty: 18 G | Refills: 5 | Status: SHIPPED | OUTPATIENT
Start: 2022-10-31

## 2022-10-31 RX ORDER — EPINEPHRINE 1 MG/ML
0.3 INJECTION, SOLUTION, CONCENTRATE INTRAVENOUS AS NEEDED
OUTPATIENT
Start: 2022-11-14

## 2022-10-31 RX ORDER — ALBUTEROL SULFATE 2.5 MG/3ML
2.5 SOLUTION RESPIRATORY (INHALATION) EVERY 6 HOURS PRN
Qty: 75 ML | Refills: 5 | Status: SHIPPED | OUTPATIENT
Start: 2022-10-31

## 2022-10-31 RX ORDER — EPINEPHRINE 0.1 MG/.1ML
0.3 INJECTION, SOLUTION INTRAMUSCULAR ONCE
Qty: 2 EACH | Refills: 2 | Status: SHIPPED | OUTPATIENT
Start: 2022-10-31 | End: 2022-11-09 | Stop reason: SDUPTHER

## 2022-10-31 RX ORDER — ZILEUTON 600 MG/1
600 TABLET, MULTILAYER, EXTENDED RELEASE ORAL 2 TIMES DAILY
Qty: 60 TABLET | Refills: 5 | Status: SHIPPED | OUTPATIENT
Start: 2022-10-31

## 2022-10-31 RX ADMIN — OMALIZUMAB 375 MG: 150 INJECTION, SOLUTION SUBCUTANEOUS at 11:56

## 2022-10-31 NOTE — ASSESSMENT & PLAN NOTE
The patient has severe persistent allergic asthma   -resume Xolair     -continue Advair b i d  She trial Breztri in the past and it was not helpful     -continue albuterol as needed     -continue regular allergy medications  -refuses COVID vaccine   -up-to-date on flu and pneumococcal vaccines

## 2022-10-31 NOTE — ASSESSMENT & PLAN NOTE
Has a history of pulmonary emboli when she had active cancer  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

## 2022-10-31 NOTE — PROGRESS NOTES
Pulmonary Follow Up Note   Jolie Mulligan 78 y o  female MRN: [id number]  10/31/2022      Referring provider: Dr Michel Dinero and Plan:    Seasonal allergies  Reviewed allergy panel in detail with patient and provided her with written report, which we translated to [place]  - Cont  daily allergy medication    - Recommend trial of steroid nasal spray  Pt agreeable  Severe persistent asthma without complication  The patient has severe persistent allergic asthma   -resume Xolair     -continue Advair b i d  She trial Breztri in the past and it was not helpful     -continue albuterol as needed     -continue regular allergy medications  -refuses COVID vaccine   -up-to-date on flu and pneumococcal vaccines  Radiation pneumonitis (HCC)  Mild  Asymptomatic  Bilateral pulmonary embolism (HCC)  Has a history of pulmonary emboli when she had active cancer  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  Diagnoses and all orders for this visit:    Severe persistent asthma without complication  -     albuterol (2 5 mg/3 mL) 0 083 % nebulizer solution; Take 3 mL (2 5 mg total) by nebulization every 6 (six) hours as needed for wheezing or shortness of breath  -     albuterol (PROVENTIL HFA,VENTOLIN HFA) 90 mcg/act inhaler; Inhale 1 puff every 4 (four) hours as needed for wheezing  -     EPINEPHrine (Auvi-Q) 0 1 mg/0 1 mL SOAJ; Inject 0 3 mL (0 3 mg total) into a muscle once for 1 dose  -     fluticasone-salmeterol (Advair HFA) 230-21 MCG/ACT inhaler; Inhale 2 puffs 2 (two) times a day Rinse mouth after use   -     zileuton (ZYFLO CR) 600 MG; Take 1 tablet (600 mg total) by mouth 2 (two) times a day    Radiation pneumonitis (HCC)    Seasonal allergies  -     fluticasone (FLONASE) 50 mcg/act nasal spray; 1 spray into each nostril daily    Bilateral pulmonary embolism ([address] )    Plan of care reviewed with patient and her daughter  Concerns addressed    Return for follow-up in 4 months or sooner if needed  History of Present Illness   HPI:  Gillian Garcia is a 78 y o  female who presents for follow-up of severe asthma  She was last seen by me at the end of July  She had a recent repeat IgE level and is planned to resume Xolair  Needs insurance approval  Got first dose of Xolair on 10/17  She used it in the past but then stopped on her own for a few months  States her allergies are better when she stays indoors  Worse with change in season  No pets at home  Takes daily allergy medication  She is unsure if she is taking Montelukast nightly  Using Advair twice daily, rinses after use with tap water  Uses Albuterol as needed, which is twice daily  Uses Albuterol nebulizer as needed, which is three times daily  Feels her allergies are better since taking Xolair  Received flu and pneumococcal vaccines  Does not want COVID vaccine  She is here with her daughter who provides additional history  Review of Systems  Positive as above and negative otherwise    Historical Information   Past Medical History:   Diagnosis Date   • Abdominal infection ([facility] )     h-pylori   • Abnormal chest x-ray 4/5/2019   • Asthma    • Breast cancer ([facility] ) 06/26/2018   • Cancer ([facility] )     BREAST   • Diabetes mellitus ([facility] )    • Hiatal hernia    • History of chemotherapy    • History of radiation therapy    • Hypercholesterolemia    • Hypertension    • Hypothyroid    • Renal disorder    • Rheumatoid arthritis [facility])      Past Surgical History:   Procedure Laterality Date   • 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   • BREAST LUMPECTOMY Right 6/26/2018    Procedure: RIGHT BREAST NEEDLE LOCALIZATION X2 WITH RIGHT BREAST LUMPECTOMY ( NEEDLE LOC @ 1000);   Surgeon: Phuong Ballard MD;  Location: [facility];  Service: Surgical Oncology   • BREAST LUMPECTOMY Right 8/2/2018    Procedure: LYMPHOSCINITGRAPHY INTRAOPERATIVE LYMPHATIC MAPPING , RIGHT SENTINEL NODE BIOPSY, REEXCISION  RIGHT BREAST LUMPECTOMY CAVITY (SUPERIOR MARGIN); Surgeon: Vicente Watson MD;  Location: [facility];  Service: Surgical Oncology   • BREAST SURGERY Left    • CATARACT EXTRACTION, BILATERAL Bilateral    • COLONOSCOPY     • EGD AND COLONOSCOPY N/A 9/5/2018    Procedure: EGD AND COLONOSCOPY;  Surgeon: Colin Sr MD;  Location: [facility] GI LAB; Service: Gastroenterology   • FL GUIDED CENTRAL VENOUS ACCESS DEVICE INSERTION  9/13/2018   • KNEE SURGERY Right    • MAMMO NEEDLE LOCALIZATION RIGHT (ALL INC) Right 6/26/2018   • MAMMO STEREOTACTIC BREAST BIOPSY RIGHT (ALL INC) Right 5/23/2018   • RETINAL DETACHMENT SURGERY Right    • TUBAL LIGATION     • TUNNELED VENOUS PORT PLACEMENT Left 9/13/2018    Procedure: INSERTION VENOUS PORT (PORT-A-CATH);   Surgeon: Vicente Watson MD;  Location: [facility];  Service: Surgical Oncology   • UPPER GASTROINTESTINAL ENDOSCOPY       Family History   Problem Relation Age of Onset   • Diabetes Mother    • Hypertension Mother    • Diabetes Father    • Hypertension Father    • Diabetes Brother    • Hypertension Brother    • Prostate cancer Brother    • Cancer Maternal Grandfather    • No Known Problems Sister    • No Known Problems Daughter    • No Known Problems Sister    • No Known Problems Sister    • No Known Problems Sister    • No Known Problems Sister    • No Known Problems Daughter    • No Known Problems Daughter          Meds/Allergies     Current Outpatient Medications:   •  acetaminophen (TYLENOL) 650 mg CR tablet, Take 1 tablet (650 mg total) by mouth every 8 (eight) hours as needed for mild pain, Disp: 30 tablet, Rfl: 0  •  albuterol (2 5 mg/3 mL) 0 083 % nebulizer solution, Take 3 mL (2 5 mg total) by nebulization every 6 (six) hours as needed for wheezing or shortness of breath, Disp: 75 mL, Rfl: 5  •  albuterol (PROVENTIL HFA,VENTOLIN HFA) 90 mcg/act inhaler, Inhale 1 puff every 4 (four) hours as needed for wheezing, Disp: 18 g, Rfl: 5  •  anastrozole (ARIMIDEX) 1 mg tablet, Take 1 tablet (1 mg total) by mouth daily, Disp: 90 tablet, Rfl: 3  •  aspirin 81 mg chewable tablet, Chew 81 mg daily, Disp: , Rfl:   •  atorvastatin (LIPITOR) 40 mg tablet, Take 1 tablet (40 mg total) by mouth daily, Disp: 90 tablet, Rfl: 3  •  Diclofenac Sodium (VOLTAREN) 1 %, Apply 2 g topically 2 (two) times a day, Disp: 150 g, Rfl: 0  •  EPINEPHrine (EPIPEN) 0 3 mg/0 3 mL SOAJ, Inject 0 3 mL (0 3 mg total) into a muscle once for 1 dose, Disp: 0 6 mL, Rfl: 0  •  ergocalciferol (VITAMIN D2) 50,000 units, Take 1 capsule (50,000 Units total) by mouth once a week, Disp: 12 capsule, Rfl: 1  •  fluticasone-salmeterol (Advair HFA) 230-21 MCG/ACT inhaler, Inhale 2 puffs 2 (two) times a day Rinse mouth after use , Disp: 12 g, Rfl: 5  •  glucose blood (OneTouch Ultra) test strip, Use 1 each 3 (three) times a day Use as instructed, Disp: 300 each, Rfl: 4  •  insulin detemir (Levemir FlexTouch) 100 Units/mL injection pen, INJECT 50 UNITS UNDER THE SKIN DAILY AT BEDTIME, Disp: 45 mL, Rfl: 0  •  insulin lispro (HumaLOG) 100 units/mL injection pen, 20 units before meals, Disp: 45 mL, Rfl: 0  •  losartan (COZAAR) 100 MG tablet, Take 1 tablet (100 mg total) by mouth daily, Disp: 90 tablet, Rfl: 1  •  Mag-G 500 (27 Mg) MG tablet, TAKE 1 TABLET (500 MG TOTAL) BY MOUTH DAILY, Disp: 90 tablet, Rfl: 1  •  metoprolol tartrate (LOPRESSOR) 25 mg tablet, Take 0 5 tablets (12 5 mg total) by mouth every 12 (twelve) hours, Disp: 180 tablet, Rfl: 1  •  montelukast (SINGULAIR) 10 mg tablet, Take 1 tablet (10 mg total) by mouth daily at bedtime, Disp: 30 tablet, Rfl: 5  •  omeprazole (PriLOSEC) 20 mg delayed release capsule, TAKE 1 CAPSULE (20 MG TOTAL) BY MOUTH DAILY, Disp: 90 capsule, Rfl: 0  •  OneTouch Delica Lancets 91I MISC, USE 3 (THREE) TIMES A DAY, Disp: 300 each, Rfl: 4  •  UltiCare Micro Pen Needles 32G X 4 MM MISC, INJECT UNDER THE SKIN 4 (FOUR) TIMES A DAY, Disp: 400 each, Rfl: 0  •  Continuous Blood Gluc Sensor (FreeStyle Broderick 14 Day Sensor) MISC, Use 1 each every 14 (fourteen) days (Patient not taking: Reported on 9/26/2022), Disp: 2 each, Rfl: 11  •  furosemide (LASIX) 20 mg tablet, Take 1 tablet (20 mg total) by mouth daily for 5 days (Patient not taking: Reported on 9/26/2022), Disp: 5 tablet, Rfl: 0  •  loratadine (CLARITIN) 10 mg tablet, TAKE 1 TABLET (10 MG TOTAL) BY MOUTH DAILY (Patient not taking: No sig reported), Disp: 90 tablet, Rfl: 2  •  meclizine (ANTIVERT) 25 mg tablet, Take 1 tablet (25 mg total) by mouth 3 (three) times a day as needed for dizziness (Patient not taking: No sig reported), Disp: 30 tablet, Rfl: 0  •  ondansetron (ZOFRAN-ODT) 4 mg disintegrating tablet, Take 1 tablet (4 mg total) by mouth every 8 (eight) hours as needed for nausea or vomiting (Patient not taking: No sig reported), Disp: 12 tablet, Rfl: 0  •  polyethylene glycol (GLYCOLAX) 17 GM/SCOOP powder, Take as directed by the office  (Patient not taking: No sig reported), Disp: 238 g, Rfl: 0  Allergies   Allergen Reactions   • Codeine Other (See Comments)     unknown   • Latex Other (See Comments)     fungus       Vitals: Blood pressure 136/70, pulse 78, temperature 98 1 °F (36 7 °C), temperature source Tympanic, height 5' 5" (1 651 m), weight 79 8 kg (176 lb), SpO2 96 %, not currently breastfeeding  Body mass index is 29 29 kg/m²  Oxygen Therapy  SpO2: 96 %  Oxygen Therapy: None (Room air)      Physical Exam  GEN: WDWN, nad, comfortable  HEENT: NCAT, EOMI  CVS: Regular, no m/r/g  LUNGS: CTA b/l  ABD: soft  EXT: No c/c/e  NEURO: No focal deficits  MS: Moving all extremities  SKIN: warm, dry  PSYCH: calm, cooperative      Labs: I have personally reviewed pertinent lab results    Lab Results   Component Value Date    WBC 9 05 08/18/2022    HGB 12 4 08/18/2022    HCT 37 6 08/18/2022    MCV 88 08/18/2022     08/18/2022     Lab Results   Component Value Date    CALCIUM 9 6 08/18/2022    K 3 7 08/18/2022    CO2 31 08/18/2022    CL 99 08/18/2022    BUN 19 08/18/2022 CREATININE 0 75 2022     Lab Results   Component Value Date    IGE 1,057 (H) 2022     Lab Results   Component Value Date    ALT 26 2022    AST 12 2022    ALKPHOS 93 2022       Labs per my interpretation show multiple allergens, IgE 1057    Imaging and other studies: I have personally reviewed pertinent films in PACS  CXR  per my review shows clear b/l lungs    Pulmonary function testin2018 per my review shows no obstruction, + hyperinflationm, + air trapping      [facility]

## 2022-10-31 NOTE — ASSESSMENT & PLAN NOTE
Reviewed allergy panel in detail with patient and provided her with written report, which we translated to [place]  - Cont  daily allergy medication    - Recommend trial of steroid nasal spray  Pt agreeable

## 2022-10-31 NOTE — PROGRESS NOTES
Patient arrived to the infusion center with her epi pen  Xolair given in 3 injections, x2 in right arm, x1 in left arm  Pt observed for 30 minutes post injections with no complications  Patient is aware of all future appointments  AVS provided

## 2022-10-31 NOTE — TELEPHONE ENCOUNTER
Pt came to [facility] office for appt with Dr Bert Bucio, appt was scheduled at the [facility] office  Informed Pt and Pt requested to be rescheduled at the [facility]

## 2022-11-09 ENCOUNTER — TELEPHONE (OUTPATIENT)
Dept: PULMONOLOGY | Facility: CLINIC | Age: 67
End: 2022-11-09

## 2022-11-09 ENCOUNTER — TELEPHONE (OUTPATIENT)
Dept: ENDOCRINOLOGY | Facility: CLINIC | Age: 67
End: 2022-11-09

## 2022-11-09 DIAGNOSIS — J45.50 SEVERE PERSISTENT ASTHMA WITHOUT COMPLICATION: ICD-10-CM

## 2022-11-09 RX ORDER — EPINEPHRINE 0.1 MG/.1ML
0.3 INJECTION, SOLUTION INTRAMUSCULAR ONCE
Qty: 2 EACH | Refills: 5 | Status: SHIPPED | OUTPATIENT
Start: 2022-11-09 | End: 2022-11-09

## 2022-11-09 NOTE — TELEPHONE ENCOUNTER
Pharmacy has called in stating the rx that was sent in for the specific type of Epipen is not covered  They are requesting the regular simply Epipen script be sent in instead of this one   Please advise

## 2022-11-10 ENCOUNTER — HOSPITAL ENCOUNTER (EMERGENCY)
Facility: HOSPITAL | Age: 67
Discharge: HOME | End: 2022-11-10
Attending: EMERGENCY MEDICINE

## 2022-11-10 ENCOUNTER — OFFICE VISIT (OUTPATIENT)
Dept: FAMILY MEDICINE CLINIC | Facility: CLINIC | Age: 67
End: 2022-11-10

## 2022-11-10 ENCOUNTER — TELEPHONE (OUTPATIENT)
Dept: FAMILY MEDICINE CLINIC | Facility: CLINIC | Age: 67
End: 2022-11-10

## 2022-11-10 VITALS
OXYGEN SATURATION: 94 % | HEART RATE: 96 BPM | SYSTOLIC BLOOD PRESSURE: 132 MMHG | WEIGHT: 167.8 LBS | RESPIRATION RATE: 16 BRPM | DIASTOLIC BLOOD PRESSURE: 65 MMHG | TEMPERATURE: 97.9 F | BODY MASS INDEX: 27.92 KG/M2

## 2022-11-10 DIAGNOSIS — R19.7 DIARRHEA, UNSPECIFIED TYPE: Primary | ICD-10-CM

## 2022-11-10 DIAGNOSIS — U07.1 COVID-19: Primary | ICD-10-CM

## 2022-11-10 LAB
ANION GAP SERPL CALCULATED.3IONS-SCNC: 10 MMOL/L (ref 5–14)
BASOPHILS # BLD AUTO: 0.03 THOUSANDS/ÂΜL (ref 0–0.1)
BASOPHILS NFR BLD AUTO: 0 % (ref 0–1)
BUN SERPL-MCNC: 18 MG/DL (ref 5–25)
CALCIUM SERPL-MCNC: 9.1 MG/DL (ref 8.4–10.2)
CARDIAC TROPONIN I PNL SERPL HS: 5 NG/L (ref 8–18)
CHLORIDE SERPL-SCNC: 98 MMOL/L (ref 96–108)
CO2 SERPL-SCNC: 26 MMOL/L (ref 21–32)
CREAT SERPL-MCNC: 0.83 MG/DL (ref 0.6–1.2)
EOSINOPHIL # BLD AUTO: 0.03 THOUSAND/ÂΜL (ref 0–0.61)
EOSINOPHIL NFR BLD AUTO: 0 % (ref 0–6)
ERYTHROCYTE [DISTWIDTH] IN BLOOD BY AUTOMATED COUNT: 12.9 % (ref 11.6–15.1)
FLUAV RNA RESP QL NAA+PROBE: NEGATIVE
FLUBV RNA RESP QL NAA+PROBE: NEGATIVE
GFR SERPL CREATININE-BSD FRML MDRD: 73 ML/MIN/1.73SQ M
GLUCOSE SERPL-MCNC: 366 MG/DL (ref 70–99)
HCT VFR BLD AUTO: 38.8 % (ref 34.8–46.1)
HGB BLD-MCNC: 12.7 G/DL (ref 11.5–15.4)
IMM GRANULOCYTES # BLD AUTO: 0.02 THOUSAND/UL (ref 0–0.2)
IMM GRANULOCYTES NFR BLD AUTO: 0 % (ref 0–2)
LYMPHOCYTES # BLD AUTO: 1.17 THOUSANDS/ÂΜL (ref 0.6–4.47)
LYMPHOCYTES NFR BLD AUTO: 14 % (ref 14–44)
MCH RBC QN AUTO: 28.5 PG (ref 26.8–34.3)
MCHC RBC AUTO-ENTMCNC: 32.7 G/DL (ref 31.4–37.4)
MCV RBC AUTO: 87 FL (ref 82–98)
MONOCYTES # BLD AUTO: 0.73 THOUSAND/ÂΜL (ref 0.17–1.22)
MONOCYTES NFR BLD AUTO: 8 % (ref 4–12)
NEUTROPHILS # BLD AUTO: 6.7 THOUSANDS/ÂΜL (ref 1.85–7.62)
NEUTS SEG NFR BLD AUTO: 78 % (ref 43–75)
NRBC BLD AUTO-RTO: 0 /100 WBCS
PLATELET # BLD AUTO: 255 THOUSANDS/UL (ref 149–390)
PMV BLD AUTO: 11.2 FL (ref 8.9–12.7)
POTASSIUM SERPL-SCNC: 3.9 MMOL/L (ref 3.5–5.3)
RBC # BLD AUTO: 4.46 MILLION/UL (ref 3.81–5.12)
RSV RNA RESP QL NAA+PROBE: NEGATIVE
SARS-COV-2 RNA RESP QL NAA+PROBE: POSITIVE
SODIUM SERPL-SCNC: 134 MMOL/L (ref 135–147)
WBC # BLD AUTO: 8.68 THOUSAND/UL (ref 4.31–10.16)

## 2022-11-10 RX ORDER — ONDANSETRON 2 MG/ML
4 INJECTION INTRAMUSCULAR; INTRAVENOUS ONCE
Status: COMPLETED | OUTPATIENT
Start: 2022-11-10 | End: 2022-11-10

## 2022-11-10 RX ADMIN — ONDANSETRON 4 MG: 2 INJECTION INTRAMUSCULAR; INTRAVENOUS at 18:13

## 2022-11-10 RX ADMIN — SODIUM CHLORIDE 1000 ML: 0.9 INJECTION, SOLUTION INTRAVENOUS at 18:12

## 2022-11-10 NOTE — PROGRESS NOTES
Virtual Brief Visit    Patient is located in the following state in which I hold an active license [place]     Review of Systems - General ROS: positive for  - chills, fatigue, fever and malaise  ENT ROS: positive for - headaches and nasal congestion  Gastrointestinal ROS: positive for - nausea/vomiting    Assessment/Plan:    Problem List Items Addressed This Visit        Other    Diarrhea - Primary     Patient states that she has diarrhea for the past 3 days and has not been able to tolerate anything PO  Patient complains of nausea, fever, chills  States she has positive sick contacts   - Recommend patient to report to the [facility] ED for further evaluation                Recent Visits  No visits were found meeting these conditions  Showing recent visits within past 7 days and meeting all other requirements  Today's Visits  Date Type Provider Dept   11/10/22 Office Visit Doc John Latasha Forde   11/10/22 Telephone MD Latasha Capellan   Showing today's visits and meeting all other requirements  Future Appointments  No visits were found meeting these conditions  Showing future appointments within next 150 days and meeting all other requirements     Physical exam unable to conduct as this was a virtual visit      Visit Time    Visit Start Time: 2:53  Visit Stop Time: 3:02  Total Visit Duration: 9 minutes

## 2022-11-10 NOTE — ED PROVIDER NOTES
History  Chief Complaint   Patient presents with   • Fever - 9 weeks to 74 years     Pt came to ER with nausea, vomiting, and diarrhea for four days  Pt is COVID 19 positive  Pt has had shortness of breath  Pt last used her nebulizer at 1300  Pt had fever of unknown temperature at home  Patient is a 36-year-old female referred into the ER after a virtual visit with her PCP  States 4-5 days of viral symptoms  Nausea, vomiting, diarrhea, body aches with cough and shortness of breath  Did have a positive COVID exposure 2 days ago  Patient also is diabetic and states little po intake over the last few days  Did use her nebulizer with some relief  Last use this afternoon  Patient is not vaccinated against COVID because she doesn't want to be  Prior to Admission Medications   Prescriptions Last Dose Informant Patient Reported? Taking?    Diclofenac Sodium (VOLTAREN) 1 %  Self No No   Sig: Apply 2 g topically 2 (two) times a day   EPINEPHrine (Auvi-Q) 0 1 mg/0 1 mL SOAJ   No No   Sig: Inject 0 3 mL (0 3 mg total) into a muscle once for 1 dose   Mag-G 500 (27 Mg) MG tablet  Self No No   Sig: TAKE 1 TABLET (500 MG TOTAL) BY MOUTH DAILY   OneTouch Delica Lancets 93F MISC  Self No No   Sig: USE 3 (THREE) TIMES A DAY   UltiCare Micro Pen Needles 32G X 4 MM MISC  Self No No   Sig: INJECT UNDER THE SKIN 4 (FOUR) TIMES A DAY   acetaminophen (TYLENOL) 650 mg CR tablet  Self No No   Sig: Take 1 tablet (650 mg total) by mouth every 8 (eight) hours as needed for mild pain   albuterol (2 5 mg/3 mL) 0 083 % nebulizer solution   No No   Sig: Take 3 mL (2 5 mg total) by nebulization every 6 (six) hours as needed for wheezing or shortness of breath   albuterol (PROVENTIL HFA,VENTOLIN HFA) 90 mcg/act inhaler   No No   Sig: Inhale 1 puff every 4 (four) hours as needed for wheezing   anastrozole (ARIMIDEX) 1 mg tablet  Self No No   Sig: Take 1 tablet (1 mg total) by mouth daily   aspirin 81 mg chewable tablet  Self Yes No Sig: Chew 81 mg daily   atorvastatin (LIPITOR) 40 mg tablet  Self No No   Sig: Take 1 tablet (40 mg total) by mouth daily   ergocalciferol (VITAMIN D2) 50,000 units  Self No No   Sig: Take 1 capsule (50,000 Units total) by mouth once a week   fluticasone (FLONASE) 50 mcg/act nasal spray   No No   Si spray into each nostril daily   fluticasone-salmeterol (Advair HFA) 230-21 MCG/ACT inhaler   No No   Sig: Inhale 2 puffs 2 (two) times a day Rinse mouth after use     glucose blood (OneTouch Ultra) test strip  Self No No   Sig: Use 1 each 3 (three) times a day Use as instructed   insulin detemir (Levemir FlexTouch) 100 Units/mL injection pen  Self No No   Sig: INJECT 50 UNITS UNDER THE SKIN DAILY AT BEDTIME   insulin lispro (HumaLOG) 100 units/mL injection pen  Self No No   Si units before meals   loratadine (CLARITIN) 10 mg tablet  Self No No   Sig: TAKE 1 TABLET (10 MG TOTAL) BY MOUTH DAILY   losartan (COZAAR) 100 MG tablet  Self No No   Sig: Take 1 tablet (100 mg total) by mouth daily   metoprolol tartrate (LOPRESSOR) 25 mg tablet  Self No No   Sig: Take 0 5 tablets (12 5 mg total) by mouth every 12 (twelve) hours   omeprazole (PriLOSEC) 20 mg delayed release capsule  Self No No   Sig: TAKE 1 CAPSULE (20 MG TOTAL) BY MOUTH DAILY   zileuton (ZYFLO CR) 600 MG   No No   Sig: Take 1 tablet (600 mg total) by mouth 2 (two) times a day      Facility-Administered Medications: None       Past Medical History:   Diagnosis Date   • Abdominal infection (HCC)     h-pylori   • Abnormal chest x-ray 2019   • Asthma    • Breast cancer ([facility] [address] ) 2018   • Cancer ([facility] [address] )     BREAST   • Diabetes mellitus ([facility] [address] )    • Hiatal hernia    • History of chemotherapy    • History of radiation therapy    • Hypercholesterolemia    • Hypertension    • Hypothyroid    • Renal disorder    • Rheumatoid arthritis [facility])        Past Surgical History:   Procedure Laterality Date   • BREAST BIOPSY Right 2018    DCIS   • BREAST LUMPECTOMY Right 6/26/2018    Procedure: RIGHT BREAST NEEDLE LOCALIZATION X2 WITH RIGHT BREAST LUMPECTOMY ( NEEDLE LOC @ 1000); Surgeon: Charley Carpenter MD;  Location: [facility];  Service: Surgical Oncology   • BREAST LUMPECTOMY Right 8/2/2018    Procedure: LYMPHOSCINITGRAPHY INTRAOPERATIVE LYMPHATIC MAPPING , RIGHT SENTINEL NODE BIOPSY, REEXCISION  RIGHT BREAST LUMPECTOMY CAVITY (SUPERIOR MARGIN); Surgeon: Charley Carpenter MD;  Location: [facility];  Service: Surgical Oncology   • BREAST SURGERY Left    • CATARACT EXTRACTION, BILATERAL Bilateral    • COLONOSCOPY     • EGD AND COLONOSCOPY N/A 9/5/2018    Procedure: EGD AND COLONOSCOPY;  Surgeon: Ruthann Granado MD;  Location: [facility] GI LAB; Service: Gastroenterology   • FL GUIDED CENTRAL VENOUS ACCESS DEVICE INSERTION  9/13/2018   • KNEE SURGERY Right    • MAMMO NEEDLE LOCALIZATION RIGHT (ALL INC) Right 6/26/2018   • MAMMO STEREOTACTIC BREAST BIOPSY RIGHT (ALL INC) Right 5/23/2018   • RETINAL DETACHMENT SURGERY Right    • TUBAL LIGATION     • TUNNELED VENOUS PORT PLACEMENT Left 9/13/2018    Procedure: INSERTION VENOUS PORT (PORT-A-CATH); Surgeon: Charley Carpenter MD;  [facility]: [facility];  Service: Surgical Oncology   • UPPER GASTROINTESTINAL ENDOSCOPY         Family History   Problem Relation Age of Onset   • Diabetes Mother    • Hypertension Mother    • Diabetes Father    • Hypertension Father    • Diabetes Brother    • Hypertension Brother    • Prostate cancer Brother    • Cancer Maternal Grandfather    • No Known Problems Sister    • No Known Problems Daughter    • No Known Problems Sister    • No Known Problems Sister    • No Known Problems Sister    • No Known Problems Sister    • No Known Problems Daughter    • No Known Problems Daughter      I have reviewed and agree with the history as documented      E-Cigarette/Vaping   • E-Cigarette Use Never User      E-Cigarette/Vaping Substances   • Nicotine No    • THC No    • CBD No    • Flavoring No    • Other No    • Unknown No      Social History     Tobacco Use   • Smoking status: Never Smoker   • Smokeless tobacco: Never Used   Vaping Use   • Vaping Use: Never used   Substance Use Topics   • Alcohol use: No   • Drug use: No       Review of Systems   Constitutional: Positive for appetite change and fatigue  HENT: Negative  Eyes: Negative  Respiratory: Positive for cough and shortness of breath  Cardiovascular: Negative  Gastrointestinal: Positive for diarrhea, nausea and vomiting  Endocrine: Negative  Genitourinary: Negative  Musculoskeletal: Positive for myalgias  Skin: Negative  Allergic/Immunologic: Negative  Neurological: Negative  Hematological: Negative  Psychiatric/Behavioral: Negative  All other systems reviewed and are negative  Physical Exam  Physical Exam  Vitals and nursing note reviewed  Constitutional:       Appearance: Normal appearance  She is obese  HENT:      Head: Normocephalic and atraumatic  Right Ear: Tympanic membrane, ear canal and external ear normal       Left Ear: Tympanic membrane, ear canal and external ear normal       Nose: Congestion present  Mouth/Throat:      Mouth: Mucous membranes are moist       Pharynx: Oropharynx is clear  Eyes:      Conjunctiva/sclera: Conjunctivae normal       Pupils: Pupils are equal, round, and reactive to light  Cardiovascular:      Rate and Rhythm: Normal rate and regular rhythm  Pulses: Normal pulses  Heart sounds: Normal heart sounds  Pulmonary:      Effort: Pulmonary effort is normal       Breath sounds: Normal breath sounds  Abdominal:      General: Bowel sounds are normal       Palpations: Abdomen is soft  Musculoskeletal:         General: Normal range of motion  Cervical back: Normal range of motion and neck supple  Skin:     General: Skin is warm and dry  Capillary Refill: Capillary refill takes less than 2 seconds  Neurological:      General: No focal deficit present  Mental Status: She is alert and oriented to person, place, and time  Psychiatric:         Mood and Affect: Mood normal          Behavior: Behavior normal          Vital Signs  ED Triage Vitals [11/10/22 1734]   Temperature Pulse Respirations Blood Pressure SpO2   97 9 °F (36 6 °C) 96 20 127/59 97 %      Temp Source Heart Rate Source Patient Position - Orthostatic VS BP Location FiO2 (%)   Tympanic Monitor Sitting Left arm --      Pain Score       --           Vitals:    11/10/22 1734 11/10/22 1805   BP: 127/59 132/65   Pulse: 96 96   Patient Position - Orthostatic VS: Sitting Lying         Visual Acuity      ED Medications  Medications   sodium chloride 0 9 % bolus 1,000 mL (1,000 mL Intravenous New Bag 11/10/22 1812)   ondansetron (ZOFRAN) injection 4 mg (4 mg Intravenous Given 11/10/22 1813)       Diagnostic Studies  Results Reviewed     Procedure Component Value Units Date/Time    CBC and differential [[id number]]  (Abnormal) Collected: 11/10/22 1756    Lab Status: Final result Specimen: Blood from Arm, Right Updated: 11/10/22 1807     WBC 8 68 Thousand/uL      RBC 4 46 Million/uL      Hemoglobin 12 7 g/dL      Hematocrit 38 8 %      MCV 87 fL      MCH 28 5 pg      MCHC 32 7 g/dL      RDW 12 9 %      MPV 11 2 fL      Platelets 454 Thousands/uL      nRBC 0 /100 WBCs      Neutrophils Relative 78 %      Immat GRANS % 0 %      Lymphocytes Relative 14 %      Monocytes Relative 8 %      Eosinophils Relative 0 %      Basophils Relative 0 %      Neutrophils Absolute 6 70 Thousands/µL      Immature Grans Absolute 0 02 Thousand/uL      Lymphocytes Absolute 1 17 Thousands/µL      Monocytes Absolute 0 73 Thousand/µL      Eosinophils Absolute 0 03 Thousand/µL      Basophils Absolute 0 03 Thousands/µL     FLU/RSV/COVID - if FLU/RSV clinically relevant [[id number]] Collected: 11/10/22 1756    Lab Status:  In process Specimen: Nares from Nose Updated: 11/10/22 1804    High Sensitivity Troponin I Random [[id number]] Collected: 11/10/22 1756    Lab Status: In process Specimen: Blood from Arm, Right Updated: 11/10/22 6157    Basic metabolic panel [[id number]] Collected: 11/10/22 1756    Lab Status: In process Specimen: Blood from Arm, Right Updated: 11/10/22 1804                 No orders to display              Procedures  ECG 12 Lead Documentation Only    Date/Time: 11/10/2022 6:02 PM  Performed by: Dion Perry MD  Authorized by: Dion Perry MD     Indications / Diagnosis:  Dyspnea  ECG reviewed by me, the ED Provider: yes    Patient location:  ED  Interpretation:     Interpretation: normal    Rate:     ECG rate:  94    ECG rate assessment: normal    Rhythm:     Rhythm: sinus rhythm    Ectopy:     Ectopy: none    QRS:     QRS axis:  Left    QRS intervals:  Normal  Conduction:     Conduction: normal    ST segments:     ST segments:  Normal  T waves:     T waves: normal               ED Course                               SBIRT 20yo+    Flowsheet Row Most Recent Value   SBIRT (23 yo +)    In order to provide better care to our patients, we are screening all of our patients for alcohol and drug use  Would it be okay to ask you these screening questions? No Filed at: 11/10/2022 1751                    MDM    Disposition  Final diagnoses:   None     ED Disposition     None      Follow-up Information    None         Patient's Medications   Discharge Prescriptions    No medications on file       No discharge procedures on file      PDMP Review     None          ED Provider  Electronically Signed by           Dion Perry MD  11/10/22 1816

## 2022-11-10 NOTE — ASSESSMENT & PLAN NOTE
Patient states that she has diarrhea for the past 3 days and has not been able to tolerate anything PO  Patient complains of nausea, fever, chills    States she has positive sick contacts   - Recommend patient to report to the [facility] ED for further evaluation

## 2022-11-10 NOTE — TELEPHONE ENCOUNTER
Daughter called stating her mom has covid and she does too  She would like top know if something can be called in  Daughter would like a call back  I mentioned to her she needs an appointment, but she wants something to be called in

## 2022-11-11 ENCOUNTER — TELEPHONE (OUTPATIENT)
Dept: SURGICAL ONCOLOGY | Facility: CLINIC | Age: 67
End: 2022-11-11

## 2022-11-11 LAB
ATRIAL RATE: 94 BPM
P AXIS: 30 DEGREES
PR INTERVAL: 144 MS
QRS AXIS: -12 DEGREES
QRSD INTERVAL: 78 MS
QT INTERVAL: 366 MS
QTC INTERVAL: 457 MS
T WAVE AXIS: 61 DEGREES
VENTRICULAR RATE: 94 BPM

## 2022-11-11 NOTE — TELEPHONE ENCOUNTER
LM using  #[id number] that Dr April Guy is needed in surgery on Nov 25 and that her appt has been r/s to Wednesday, Dec 7 at 2pm  Teams number given if she needs to change this

## 2022-11-17 PROBLEM — U07.1 COVID-19: Status: ACTIVE | Noted: 2022-11-17

## 2022-11-21 ENCOUNTER — OFFICE VISIT (OUTPATIENT)
Dept: PODIATRY | Facility: CLINIC | Age: 67
End: 2022-11-21

## 2022-11-21 ENCOUNTER — LAB (OUTPATIENT)
Dept: LAB | Facility: HOSPITAL | Age: 67
End: 2022-11-21

## 2022-11-21 ENCOUNTER — HOSPITAL ENCOUNTER (OUTPATIENT)
Dept: INFUSION CENTER | Facility: CLINIC | Age: 67
Discharge: HOME | End: 2022-11-21

## 2022-11-21 VITALS
BODY MASS INDEX: 27.82 KG/M2 | HEIGHT: 65 IN | HEART RATE: 69 BPM | DIASTOLIC BLOOD PRESSURE: 80 MMHG | SYSTOLIC BLOOD PRESSURE: 125 MMHG | WEIGHT: 167 LBS

## 2022-11-21 VITALS
HEART RATE: 93 BPM | TEMPERATURE: 99.2 F | SYSTOLIC BLOOD PRESSURE: 124 MMHG | DIASTOLIC BLOOD PRESSURE: 72 MMHG | RESPIRATION RATE: 18 BRPM

## 2022-11-21 DIAGNOSIS — Z79.4 TYPE 2 DIABETES MELLITUS WITH MICROALBUMINURIA, WITH LONG-TERM CURRENT USE OF INSULIN (HCC): ICD-10-CM

## 2022-11-21 DIAGNOSIS — R60.0 BILATERAL LOWER EXTREMITY EDEMA: ICD-10-CM

## 2022-11-21 DIAGNOSIS — E11.42 DIABETIC POLYNEUROPATHY ASSOCIATED WITH TYPE 2 DIABETES MELLITUS (HCC): Primary | ICD-10-CM

## 2022-11-21 DIAGNOSIS — R80.9 TYPE 2 DIABETES MELLITUS WITH MICROALBUMINURIA, WITH LONG-TERM CURRENT USE OF INSULIN (HCC): ICD-10-CM

## 2022-11-21 DIAGNOSIS — E11.29 TYPE 2 DIABETES MELLITUS WITH MICROALBUMINURIA, WITH LONG-TERM CURRENT USE OF INSULIN (HCC): ICD-10-CM

## 2022-11-21 DIAGNOSIS — J45.50 SEVERE PERSISTENT ASTHMA WITHOUT COMPLICATION: Primary | ICD-10-CM

## 2022-11-21 DIAGNOSIS — E78.00 HYPERCHOLESTEROLEMIA: ICD-10-CM

## 2022-11-21 LAB
ALBUMIN SERPL BCP-MCNC: 3 G/DL (ref 3.5–5)
ALP SERPL-CCNC: 81 U/L (ref 46–116)
ALT SERPL W P-5'-P-CCNC: 21 U/L (ref 12–78)
ANION GAP SERPL CALCULATED.3IONS-SCNC: 6 MMOL/L (ref 4–13)
AST SERPL W P-5'-P-CCNC: 13 U/L (ref 5–45)
BILIRUB SERPL-MCNC: 0.59 MG/DL (ref 0.2–1)
BUN SERPL-MCNC: 18 MG/DL (ref 5–25)
CALCIUM ALBUM COR SERPL-MCNC: 10.6 MG/DL (ref 8.3–10.1)
CALCIUM SERPL-MCNC: 9.8 MG/DL (ref 8.3–10.1)
CHLORIDE SERPL-SCNC: 102 MMOL/L (ref 96–108)
CHOLEST SERPL-MCNC: 166 MG/DL
CO2 SERPL-SCNC: 28 MMOL/L (ref 21–32)
CREAT SERPL-MCNC: 0.74 MG/DL (ref 0.6–1.3)
CREAT UR-MCNC: 515 MG/DL
EST. AVERAGE GLUCOSE BLD GHB EST-MCNC: 324 MG/DL
GFR SERPL CREATININE-BSD FRML MDRD: 84 ML/MIN/1.73SQ M
GLUCOSE P FAST SERPL-MCNC: 351 MG/DL (ref 65–99)
HBA1C MFR BLD: 12.9 %
HDLC SERPL-MCNC: 44 MG/DL
LDLC SERPL CALC-MCNC: 90 MG/DL (ref 0–100)
MICROALBUMIN UR-MCNC: 801 MG/L (ref 0–20)
MICROALBUMIN/CREAT 24H UR: 156 MG/G CREATININE (ref 0–30)
POTASSIUM SERPL-SCNC: 4 MMOL/L (ref 3.5–5.3)
PROT SERPL-MCNC: 7.9 G/DL (ref 6.4–8.4)
SODIUM SERPL-SCNC: 136 MMOL/L (ref 135–147)
T4 FREE SERPL-MCNC: 1.36 NG/DL (ref 0.76–1.46)
TRIGL SERPL-MCNC: 161 MG/DL
TSH SERPL DL<=0.05 MIU/L-ACNC: 2.71 UIU/ML (ref 0.45–4.5)

## 2022-11-21 RX ORDER — EPINEPHRINE 1 MG/ML
0.3 INJECTION, SOLUTION, CONCENTRATE INTRAVENOUS AS NEEDED
Status: DISCONTINUED | OUTPATIENT
Start: 2022-11-21 | End: 2022-11-24 | Stop reason: HOSPADM

## 2022-11-21 RX ORDER — EPINEPHRINE 1 MG/ML
0.3 INJECTION, SOLUTION, CONCENTRATE INTRAVENOUS AS NEEDED
OUTPATIENT
Start: 2022-12-05

## 2022-11-21 RX ADMIN — OMALIZUMAB 375 MG: 150 INJECTION, SOLUTION SUBCUTANEOUS at 14:35

## 2022-11-21 NOTE — PLAN OF CARE
Problem: SAFETY ADULT  Goal: Patient will remain free of falls  Description: INTERVENTIONS:  - Educate patient/family on patient safety including physical limitations  - Instruct patient to call for assistance with activity   - Consult OT/PT to assist with strengthening/mobility   - Keep Call bell within reach  - Keep bed low and locked with side rails adjusted as appropriate  - Keep care items and personal belongings within reach  - Initiate and maintain comfort rounds  - Make Fall Risk Sign visible to staff  -- Apply yellow socks and bracelet for high fall risk patients  - Consider moving patient to room near nurses station  11/21/2022 1512 by Carolina Raymundo RN  Outcome: Progressing  11/21/2022 1504 by Carolina Raymundo RN  Outcome: Progressing

## 2022-11-21 NOTE — PATIENT INSTRUCTIONS
Cuidado del pie para personas con diabetes   LO QUE NECESITA SABER:   El cuidado del pie ayuda a proteger pruitt pies y evitar úlceras o llagas en el pie  Los [employer] de azúcar en la teresa a janae plazo pueden dañar los vasos sanguíneos y los nervios en tia piernas y pies  Zaria daño dificulta que sienta presión, dolor, temperatura y el tacto  Es posible que usted no sienta felicita cortada o felicita úlcera o que los zapatos están muy apretados  El cuidado del pie es necesario para evitar problemas graves, nelli felicita infección o felicita amputación  La diabetes podría provocar que los dedos de tia pies se tuerzan o se encorven [place]  Estos cambios podrían afectar la manera en que usted camina y pueden conllevar al aumento de la presión en pruitt pie  La presión puede disminuir el flujo sanguíneo a tia pies  La falta del flujo sanguíneo aumenta pruitt riesgo de felicita úlcera en [employer]  No ignore problemas pequeños, nelli la piel reseca o heridas pequeñas  Con el tiempo, estos puede representar felicita amenaza para la ramsey si no se les da el cuidado apropiado  INSTRUCCIONES SOBRE EL [facility]ITAL[facility]:   Llame al proveedor del equipo de cuidados de julia si:  Tia pies se ponen entumecidos, débiles o difícil de   Usted tiene pus drenando de felicita llaga en pruitt pie  Usted tiene felicita herida en pruitt pie que se hace más alfred, más profunda o que no radha  Usted nota que tiene [place], cortadas, rasguños, callos o llagas en pruitt pie  Usted tiene fiebre y tia pies se ponen rojos, calientes e inflamados  Las uñas de tia pies se vuelven gruesas, encorvadas o [id number]  Le es difícil revisarse los pies porque pruitt visión no está elaina  Usted tiene preguntas o inquietudes acerca de pruitt condición o cuidado  El cuidado de los pies:  Revísese los pies a diario  Observe todo el pie, incluyendo la planta del pie, entre y [employer] dedos  Revise si hay heridas y callos  Use un david para verse la planta de los pies   La piel de los pies podría estar brillante, estirada o más obscura de lo normal  Tia pies también podrían estar fríos y pálidos  Pase tia carlotta por encima, por debajo, por los lados y [place] dedos del pie para sentir la piel  El enrojecimiento, inflamación y calor son signos de problemas con el flujo sanguíneo que pueden conllevar a felicita úlcera en el pie  No trate de quitarse los callos usted mismo  Lávese los pies todos los días con agua tibia y [place]  No use [place], porque esto puede lesionarle el pie  Séquese los pies suavemente con felicita toalla después de lavarlos  Seque entre y [employer] dedos  Aplique felicita loción o un humectante sobre tia pies secos  Pregunte al médico del equipo de cuidados de julia cuáles lociones son las mejores que puede usar  No  aplique loción o humectante entre tia dedos  La [employer] dedos del pie podría causar rupturas de la piel  [place] [address] uñas de los pies correctamente  Lime o kerri las uñas de los pies en línea recta  Use un cepillo suave para limpiar alrededor [employer]  Si las [address] gruesas, es posible que necesite que un médico del equipo de cuidados de julia o un especialista se las kerri  Proteja tia pies  No  camine descalzo ni use zapatos sin calcetines  Compruebe que no haya piedras u otros objetos dentro de tia zapatos que le podrían [facility]  Use calcetines de algodón para ayudar a [place]  Use calcetines sin costura en los dedos o póngaselos con la costura hacia afuera  Cámbiese los calcetines diariamente  No use calcetines sucios ni húmedos  Use zapatos que le calcen elaina  Use zapatos que no rocen ninguna parte de tia pies  [place] calzado debería ser de ½ a ¾ pulgada (1 a 2 centímetros) más grandes que tia pies  [place] calzado también debería tener espacio adicional alrededor de la parte más ancha de tia pies  El calzado para caminar o atlético con cordones o correas que se ajustan son los mejores   Pida ayuda al [employer] del equipo de cuidados de julia para elegir un par de zapatos que le calcen elaina  Pregúntele si debe usar algún tipo de plantilla, soporte o vendaje en joe pies  No fume  Fumar puede dañar los vasos sanguíneos y aumentar pruitt riesgo de úlceras en los pies  Pida al médico de pruitt equipo de cuidados de julia información si usted fuma actualmente y [place] para dejar de hacerlo  Los cigarrillos electrónicos o el tabaco sin humo igualmente contienen nicotina  Consulte con pruitt médico del equipo de cuidados de julia antes de utilizar estos productos  Acuda a joe consultas de control con el médico del equipo de cuidado de la diabetes o un especialista en pies según le indicaron: Usted va a necesitar que le revisen joe pies al menos felicita vez al [place]  Es posible que usted necesite hacerse un examen de pie más seguido si tiene los nervios dañados, deformidad en el pie o [place]  Anote joe preguntas para que se acuerde de hacerlas fabio joe visitas  © Copyright [employer] 2022 Information is for End User's use only and may not be sold, redistributed or otherwise used for commercial purposes  All illustrations and images included in CareNotes® are the copyrighted property of [employer] , [facility]  or [address] [place] [address] es sólo para uso en educación  Pruitt intención no es darle un consejo médico sobre enfermedades o tratamientos  Colsulte con pruitt [employer]ing farmacéutico antes de seguir cualquier régimen médico para saber si es seguro y efectivo para usted

## 2022-11-21 NOTE — PROGRESS NOTES
Patient arrived to the unit and reported that she was positive for covid on 11/10/22  She still has a cough and her temp is 99 2  Confirmed with Dr Erik Phelan that it is ok to proceed with xolair  Epi pen expires April of 2024  Patient tolerated her xolair injection with 2 in her left arm and one in her right arm  She is aware of her next appointment  She remained at the infusion center for 30 minutes of observation

## 2022-11-21 NOTE — RESULT ENCOUNTER NOTE
Please call the patient regarding labs - A1C improved from 14 5 to 12 9 -still very high but improving  please send over log in 2 weeks

## 2022-11-21 NOTE — PROGRESS NOTES
Assessment/Plan:         Diagnoses and all orders for this visit:    Diabetic polyneuropathy associated with type 2 diabetes mellitus ([facility] [address] )  -     Ambulatory referral to Podiatry  -     Compression Stocking    Bilateral lower extremity edema  -     Compression Stocking          Diagnosis and options discussed with patient  Patient agreeable to the plan as stated below    -Educated on DM risk to lower extremities, proper shoe gear, and daily monitoring of feet    -Educated on A1C and the risks of poorly controlled Diabetes  Reviewed recent A1C, 14 5  -Discussed weight loss and suitable exercise regiment  Reviewed most recent PCP visit on 6/13/2022  She often forgets insulin and wasn't taking weekly insulin  Her BG is dangerously uncontrolled  Reviewed endocrine visit 9/26/2022  Concern over consistency of medication compliance as well  Foot symptoms correlate to High A1C and neuropathy pain  Unlikely to improve unless drastic measures taken to control BG  She is at high risk not only for foot issues but morbidity and mortality due to consistent A1C over 13  Spent more of the visit counseling patient and her daughter  Subjective:      Patient ID: Shanta Anthony is a 78 y o  female  Patient presents for DM foot exam  Her feet are numb and burn at night  Her toes feel swollen and tight at night  She states she often forgets to take her insulin  Her A1C are consistently over 13  The following portions of the patient's history were reviewed and updated as appropriate: allergies, current medications, past family history, past medical history, past social history, past surgical history and problem list     Review of Systems   Constitutional: Negative  HENT: Negative for sinus pressure and sinus pain  Respiratory: Negative for cough and shortness of breath  Cardiovascular: Positive for leg swelling  Gastrointestinal: Negative for diarrhea, nausea and vomiting     Musculoskeletal: Positive for arthralgias and joint swelling  Skin: Negative for color change and wound  Neurological: Positive for weakness and numbness  Psychiatric/Behavioral: The patient is not nervous/anxious  Objective:      /80   Pulse 69   Ht 5' 5" (1 651 m)   Wt 75 8 kg (167 lb)   BMI 27 79 kg/m²     POCT hemoglobin A1c  Order: [id number]   Status: Final result      Visible to patient: Yes (not seen)      Next appt: Today at 02:00 PM in Infusion Therapy ([facility]F [facility]AIR 1)      Dx: Type 2 diabetes mellitus with hypergl         0 Result Notes     1 HM Topic  Component Ref Range & Units 9/26/22 0911 6/13/22 1058 3/1/22 0937 2/16/22 1231 9/8/21 1152 5/24/21 0835 10/7/20 1115   Hemoglobin A1C 6 5 14 5 Abnormal   13 8 Abnormal   14 0 High  R  14 4                Physical Exam  Vitals reviewed  HENT:      Nose: No congestion or rhinorrhea  Cardiovascular:      Rate and Rhythm: Normal rate  Pulses: Normal pulses  no weak pulses          Dorsalis pedis pulses are 2+ on the right side and 2+ on the left side  Posterior tibial pulses are 2+ on the right side and 2+ on the left side  Pulmonary:      Effort: Pulmonary effort is normal  No respiratory distress  Musculoskeletal:         General: Tenderness (general toe pain) present  No deformity  Right foot: Normal range of motion  No deformity  Left foot: Normal range of motion  No deformity  Feet:      Right foot:      Protective Sensation: 10 sites tested  0 sites sensed  Skin integrity: Dry skin present  No ulcer, skin breakdown or callus  Toenail Condition: Right toenails are abnormally thick  Left foot:      Protective Sensation: 10 sites tested  0 sites sensed  Skin integrity: Dry skin present  No ulcer, skin breakdown or callus  Toenail Condition: Left toenails are abnormally thick  Skin:     General: Skin is dry  Capillary Refill: Capillary refill takes 2 to 3 seconds        Findings: No erythema or rash    Neurological:      Mental Status: She is alert and oriented to person, place, and time  Sensory: Sensory deficit present  Gait: Gait normal    Psychiatric:         Mood and Affect: Mood normal              Diabetic Foot Exam    Patient's shoes and socks removed  Right Foot/Ankle   Right Foot Inspection  Skin Exam: skin intact and dry skin  No callus, no maceration, no ulcer and no callus  Toe Exam: swelling  Erythema and  no right toe deformity    Sensory   Vibration: absent  Monofilament testing: absent    Vascular  Capillary refills: < 3 seconds  The right DP pulse is 2+  The right PT pulse is 2+  Right Toe  - Comprehensive Exam  Ecchymosis: none  Swelling: dorsum   Tenderness: great toe interphalangeal joint         Left Foot/Ankle  Left Foot Inspection  Skin Exam: skin intact and dry skin  No maceration, no ulcer and no callus  Toe Exam: swelling  No erythema and no left toe deformity  Sensory   Vibration: absent  Monofilament testing: absent    Vascular  Capillary refills: < 3 seconds  The left DP pulse is 2+  The left PT pulse is 2+       Left Toe  - Comprehensive Exam  Ecchymosis: none  Swelling: dorsum   Tenderness: great toe interphalangeal joint           Assign Risk Category  No deformity present  Loss of protective sensation  No weak pulses  Risk: 1

## 2022-12-07 ENCOUNTER — OFFICE VISIT (OUTPATIENT)
Dept: SURGICAL ONCOLOGY | Facility: CLINIC | Age: 67
End: 2022-12-07

## 2022-12-07 VITALS
HEART RATE: 83 BPM | SYSTOLIC BLOOD PRESSURE: 162 MMHG | WEIGHT: 175 LBS | BODY MASS INDEX: 29.16 KG/M2 | DIASTOLIC BLOOD PRESSURE: 82 MMHG | TEMPERATURE: 98.2 F | HEIGHT: 65 IN | OXYGEN SATURATION: 98 %

## 2022-12-07 DIAGNOSIS — C50.911 MALIGNANT NEOPLASM OF RIGHT BREAST IN FEMALE, ESTROGEN RECEPTOR POSITIVE, UNSPECIFIED SITE OF BREAST (HCC): Primary | ICD-10-CM

## 2022-12-07 DIAGNOSIS — L72.3 SEBACEOUS CYST: ICD-10-CM

## 2022-12-07 DIAGNOSIS — Z17.0 MALIGNANT NEOPLASM OF RIGHT BREAST IN FEMALE, ESTROGEN RECEPTOR POSITIVE, UNSPECIFIED SITE OF BREAST (HCC): Primary | ICD-10-CM

## 2022-12-07 DIAGNOSIS — Z79.811 USE OF ANASTROZOLE (ARIMIDEX): ICD-10-CM

## 2022-12-07 DIAGNOSIS — Z17.0 MALIGNANT NEOPLASM OF UPPER-OUTER QUADRANT OF RIGHT BREAST IN FEMALE, ESTROGEN RECEPTOR POSITIVE (HCC): ICD-10-CM

## 2022-12-07 DIAGNOSIS — C50.411 MALIGNANT NEOPLASM OF UPPER-OUTER QUADRANT OF RIGHT BREAST IN FEMALE, ESTROGEN RECEPTOR POSITIVE (HCC): ICD-10-CM

## 2022-12-07 NOTE — PROGRESS NOTES
Surgical Oncology Follow Up       [facility] SURGICAL ONCOLOGY [address]  [facility] [address]    Jolie Mulligan  1955  [id number]  [facility] SURGICAL ONCOLOGY [address] [address]    Chief Complaint   Patient presents with   • Follow-up     Pt presents for a f/u for Malignant neoplasm of right breast in female, estrogen receptor positive, unspecified site of breast ([facility] [address] )  Pt reports having pain in the right breast on and off  Pt is with daughter who is translating  Assessment/Plan:    No problem-specific Assessment & Plan notes found for this encounter  Diagnoses and all orders for this visit:    Malignant neoplasm of right breast in female, estrogen receptor positive, unspecified site of breast ([address] )    Malignant neoplasm of upper-outer quadrant of right breast in female, estrogen receptor positive ([address] )    Use of anastrozole (Arimidex)    Sebaceous cyst        Advance Care Planning/Advance Directives:  Discussed disease status, cancer treatment plans and/or cancer treatment goals with the patient       Oncology History   Malignant neoplasm of right female breast ([address] )   5/23/2018 Initial Diagnosis    Malignant neoplasm of right female breast ([facility] [address] )     5/23/2018 Biopsy    Right breast core biopsy:  DCIS, micropapillary type, low-intermediate nuclear grade  ER 90-95% positive,  MT 75-80% positive       6/26/2018 Surgery    RIGHT BREAST NEEDLE LOCALIZATION X2 WITH RIGHT BREAST LUMPECTOMY ( NEEDLE LOC @ 1000) (Right)   ONCOPLASTIC CLOSURE OF LUMPECTOMY CAVITY    Invasive breast carcinoma, NST (aka ductal)  * Ana Paula grade 2 of 3 (total score = 2+2+2 = 6 of 9)   -- tubule formation < 10%, score 3   -- nuclear grade 2 of 3, score 2   -- mitoses ~ 4/mm2, score 2    * invasive carcinoma is multifocally present (A23-1, A32-1, A84-1, A89-1, A100-1), largest focus is 14 millimeters in greatest dimension (A89-1, this focus is graded)  * superior lumpectomy margin (orange-inked) is POSITIVE for invasive carcinoma (A84-1)   * all other lumpectomy margins are free of invasive carcinoma  * estrogen, progesterone & Her-2/negrita receptor studies are undertaken, to be described in an addendum report  - Ductal carcinoma in-situ (DCIS): Present as an extensive component (~85% of total tumor)  * DCIS is co-located with invasive carcinoma surrounding prior needle biopsy site  * DCIS spans 2 6 cm maximal dimension (A62-1) and is present on 27 of 136 total slides examined  * DCIS has cribriform & micropapillary patterns, nuclear grade 2 of 3, with central comedo-type necrosis  * DCIS is present within 0 2 millimeters of the superior lumpectomy margin (orange-inked, A26-1)   * DCIS is present within 0 2 millimeters of the medial lumpectomy margin (yellow-inked, A3-1)   * all other lumpectomy margins are free of DCIS by > 2 millimeters  - Lymph-vascular invasion: no lymph-vascular invasion is unequivocally identified  - Microcalcifications: present in DCIS  % positive, ER 45-55% positive, HER2 by IHC 3+ positive    - Dr Yuan White       8/2/2018 Surgery    Right breast lumpectomy reexcision, right axilla SLN biopsy:  A  Submitted as “right axillary sentinel node”:  - Seven lymph nodes are identified showing no metastatic tumor      B   Right breast lumpectomy reexcision:  - Abundant reactive changes are seen around the previous lumpectomy cavity   - No residual atypia or malignancy is seen           8/2/2018 -  Cancer Staged    Stage IA - pT1c, pN0, G2        9/25/2018 - 8/6/2019 Chemotherapy    Weekly Paclitaxol and trastuzumab x12, until December 11, 2018 followed by trastuzumab monotherapy 6 milligram/kilogram every 3 weeks    - Dr Ifrah Fajardo       1/9/2019 - 2/19/2019 Radiation    Plan ID Energy Fractions Dose per Fraction (cGy) Dose Correction (cGy) Total Dose Delivered (cGy) Elapsed Days   R Boost e 16E 5 / 5 200 0 1,000 6   Right Breast 6X 25 / 25 200 0 5,000 29     - Dr Virl Angles       Malignant neoplasm of upper-outer quadrant of right breast in female, estrogen receptor positive ([facility] [address] )   7/19/2018 Initial Diagnosis    Malignant neoplasm of upper-outer quadrant of right breast in female, estrogen receptor positive ([facility] [address] )     12/17/2018 - 8/26/2019 Chemotherapy    trastuzumab (HERCEPTIN) 579 mg in sodium chloride 0 9 % 250 mL chemo infusion, 8 mg/kg, Intravenous, Once, 12 of 12 cycles  Administration: 434 mg (5/14/2019), 434 mg (6/4/2019), 450 mg (7/16/2019), 450 mg (8/6/2019), 450 mg (6/25/2019)         History of Present Illness: Patient is a 78-year-old woman with history of stage I breast cancer on the right side here for surveillance s/p lumpectomy, radiation, and chemotherapy   -Interval History: Here for surveillance with no new complaints to report  Mammogram in June of this year, with recommendations for mammogram and 1 year  Review of Systems:  Review of Systems   Constitutional: Negative  HENT: Negative  Eyes: Negative  Respiratory: Negative  Cardiovascular: Negative  Gastrointestinal: Negative  Endocrine: Negative  Genitourinary: Negative  Musculoskeletal: Negative  Skin: Negative  Allergic/Immunologic: Negative  Neurological: Negative  Hematological: Negative  Psychiatric/Behavioral: Negative  All other systems reviewed and are negative        Patient Active Problem List   Diagnosis   • Type 2 diabetes mellitus with complication, with long-term current use of insulin ([facility] [address] )   • Severe persistent asthma without complication   • Other specified hypothyroidism   • Chest pain   • Palpitations   • Chronic bilateral low back pain without sciatica   • Neck pain   • Malignant neoplasm of right female breast [facility])   • Malignant neoplasm of upper-outer quadrant of right breast in female, estrogen receptor positive ([facility] [address] )   • Hiatal hernia   • Screening for colon cancer   • Esophageal dysphagia   • Cellulitis of right axilla   • Chronic pain of right knee   • Hypercholesterolemia   • Hypothyroid   • Essential hypertension   • Bilateral pulmonary embolism (HCC)   • Radiation pneumonitis (HCC)   • Type 2 diabetes mellitus with hyperglycemia, with long-term current use of insulin (HCC)   • Diabetic polyneuropathy associated with type 2 diabetes mellitus (HCC)   • Use of anastrozole (Arimidex)   • Decreased ROM of right shoulder   • Lump of skin   • Chronic diastolic heart failure (HCC)   • Gastroesophageal reflux disease without esophagitis   • Muscle cramps   • Sebaceous cyst   • Subcutaneous mass of back   • Overweight with body mass index (BMI) of 28 to 28 9 in adult   • Anxiety   • Right foot pain   • Pelvic pain   • Bone pain   • Type 2 diabetes mellitus with microalbuminuria, with long-term current use of insulin (HCC)   • Depression, recurrent (HCC)   • Vaginal candidiasis   • Diabetes mellitus ([address] )   • Seasonal allergies   • Diarrhea   • COVID-19     Past Medical History:   Diagnosis Date   • Abdominal infection ([facility] [address] )     h-pylori   • Abnormal chest x-ray 4/5/2019   • Asthma    • Breast cancer ([facility] [address] ) 06/26/2018   • Cancer ([address] )     BREAST   • Diabetes mellitus ([facility] [address] )    • Hiatal hernia    • History of chemotherapy    • History of radiation therapy    • Hypercholesterolemia    • Hypertension    • Hypothyroid    • Renal disorder    • Rheumatoid arthritis [facility])      Past Surgical History:   Procedure Laterality Date   • 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   • BREAST LUMPECTOMY Right 6/26/2018    Procedure: RIGHT BREAST NEEDLE LOCALIZATION X2 WITH RIGHT BREAST LUMPECTOMY ( NEEDLE LOC @ 1000);   Surgeon: Warren Kaur MD;  Location: [facility];  Service: Surgical Oncology   • BREAST LUMPECTOMY Right 8/2/2018    Procedure: LYMPHOSCINITGRAPHY INTRAOPERATIVE LYMPHATIC MAPPING , RIGHT SENTINEL NODE BIOPSY, REEXCISION  RIGHT BREAST LUMPECTOMY CAVITY (SUPERIOR MARGIN); Surgeon: Shahla Cullen MD;  Location:  MAIN OR;  Service: Surgical Oncology   • BREAST SURGERY Left    • CATARACT EXTRACTION, BILATERAL Bilateral    • COLONOSCOPY     • EGD AND COLONOSCOPY N/A 9/5/2018    Procedure: EGD AND COLONOSCOPY;  Surgeon: Shanta Lowery MD;  Location: [facility] GI LAB; Service: Gastroenterology   • FL GUIDED CENTRAL VENOUS ACCESS DEVICE INSERTION  9/13/2018   • KNEE SURGERY Right    • MAMMO NEEDLE LOCALIZATION RIGHT (ALL INC) Right 6/26/2018   • MAMMO STEREOTACTIC BREAST BIOPSY RIGHT (ALL INC) Right 5/23/2018   • RETINAL DETACHMENT SURGERY Right    • TUBAL LIGATION     • TUNNELED VENOUS PORT PLACEMENT Left 9/13/2018    Procedure: INSERTION VENOUS PORT (PORT-A-CATH);   Surgeon: Shahla Cullen MD;  Location:  MAIN OR;  Service: Surgical Oncology   • UPPER GASTROINTESTINAL ENDOSCOPY       Family History   Problem Relation Age of Onset   • Diabetes Mother    • Hypertension Mother    • Diabetes Father    • Hypertension Father    • Diabetes Brother    • Hypertension Brother    • Prostate cancer Brother    • Cancer Maternal Grandfather    • No Known Problems Sister    • No Known Problems Daughter    • No Known Problems Sister    • No Known Problems Sister    • No Known Problems Sister    • No Known Problems Sister    • No Known Problems Daughter    • No Known Problems Daughter      Social History     Socioeconomic History   • Marital status:      Spouse name: Not on file   • Number of children: Not on file   • Years of education: Not on file   • Highest education level: Not on file   Occupational History   • Not on file   Tobacco Use   • Smoking status: Never   • Smokeless tobacco: Never   Vaping Use   • Vaping Use: Never used   Substance and Sexual Activity   • Alcohol use: No   • Drug use: No   • Sexual activity: Not Currently   Other Topics Concern   • Not on file   Social History Narrative   • Not on file     Social Determinants of Health     Financial Resource Strain: Not on file Food Insecurity: Not on file   Transportation Needs: Not on file   Physical Activity: Not on file   Stress: Not on file   Social Connections: Not on file   Intimate Partner Violence: Not on file   Housing Stability: Not on file       Current Outpatient Medications:   •  acetaminophen (TYLENOL) 650 mg CR tablet, Take 1 tablet (650 mg total) by mouth every 8 (eight) hours as needed for mild pain, Disp: 30 tablet, Rfl: 0  •  albuterol (2 5 mg/3 mL) 0 083 % nebulizer solution, Take 3 mL (2 5 mg total) by nebulization every 6 (six) hours as needed for wheezing or shortness of breath, Disp: 75 mL, Rfl: 5  •  albuterol (PROVENTIL HFA,VENTOLIN HFA) 90 mcg/act inhaler, Inhale 1 puff every 4 (four) hours as needed for wheezing, Disp: 18 g, Rfl: 5  •  anastrozole (ARIMIDEX) 1 mg tablet, Take 1 tablet (1 mg total) by mouth daily, Disp: 90 tablet, Rfl: 3  •  aspirin 81 mg chewable tablet, Chew 81 mg daily, Disp: , Rfl:   •  atorvastatin (LIPITOR) 40 mg tablet, Take 1 tablet (40 mg total) by mouth daily, Disp: 90 tablet, Rfl: 3  •  Diclofenac Sodium (VOLTAREN) 1 %, Apply 2 g topically 2 (two) times a day, Disp: 150 g, Rfl: 0  •  ergocalciferol (VITAMIN D2) 50,000 units, Take 1 capsule (50,000 Units total) by mouth once a week, Disp: 12 capsule, Rfl: 1  •  fluticasone (FLONASE) 50 mcg/act nasal spray, 1 spray into each nostril daily, Disp: 18 2 mL, Rfl: 5  •  fluticasone (FLONASE) 50 mcg/act nasal spray, 1 spray into each nostril daily, Disp: 16 g, Rfl: 0  •  fluticasone-salmeterol (Advair HFA) 230-21 MCG/ACT inhaler, Inhale 2 puffs 2 (two) times a day Rinse mouth after use , Disp: 12 g, Rfl: 5  •  glucose blood (OneTouch Ultra) test strip, Use 1 each 3 (three) times a day Use as instructed, Disp: 300 each, Rfl: 4  •  guaiFENesin (MUCINEX) 600 mg 12 hr tablet, Take 2 tablets (1,200 mg total) by mouth every 12 (twelve) hours, Disp: 60 tablet, Rfl: 0  •  insulin detemir (Levemir FlexTouch) 100 Units/mL injection pen, INJECT 50 UNITS UNDER THE SKIN DAILY AT BEDTIME, Disp: 45 mL, Rfl: 0  •  insulin lispro (HumaLOG) 100 units/mL injection pen, 20 units before meals, Disp: 45 mL, Rfl: 0  •  loratadine (CLARITIN) 10 mg tablet, TAKE 1 TABLET (10 MG TOTAL) BY MOUTH DAILY, Disp: 90 tablet, Rfl: 2  •  losartan (COZAAR) 100 MG tablet, Take 1 tablet (100 mg total) by mouth daily, Disp: 90 tablet, Rfl: 1  •  Mag-G 500 (27 Mg) MG tablet, TAKE 1 TABLET (500 MG TOTAL) BY MOUTH DAILY, Disp: 90 tablet, Rfl: 1  •  metoprolol tartrate (LOPRESSOR) 25 mg tablet, Take 0 5 tablets (12 5 mg total) by mouth every 12 (twelve) hours, Disp: 180 tablet, Rfl: 1  •  omeprazole (PriLOSEC) 20 mg delayed release capsule, TAKE 1 CAPSULE (20 MG TOTAL) BY MOUTH DAILY, Disp: 90 capsule, Rfl: 0  •  OneTouch Delica Lancets 82F MISC, USE 3 (THREE) TIMES A DAY, Disp: 300 each, Rfl: 4  •  UltiCare Micro Pen Needles 32G X 4 MM MISC, INJECT UNDER THE SKIN 4 (FOUR) TIMES A DAY, Disp: 400 each, Rfl: 0  •  zileuton (ZYFLO CR) 600 MG, Take 1 tablet (600 mg total) by mouth 2 (two) times a day, Disp: 60 tablet, Rfl: 5  •  EPINEPHrine (Auvi-Q) 0 1 mg/0 1 mL SOAJ, Inject 0 3 mL (0 3 mg total) into a muscle once for 1 dose, Disp: 2 each, Rfl: 5  Allergies   Allergen Reactions   • Codeine Other (See Comments)     unknown   • Latex Other (See Comments)     fungus     Vitals:    12/07/22 1404   BP: 162/82   Pulse: 83   Temp: 98 2 °F (36 8 °C)   SpO2: 98%       Physical Exam  Vitals reviewed  Constitutional:       Appearance: Normal appearance  HENT:      Head: Normocephalic and atraumatic  Nose: Nose normal    Eyes:      Extraocular Movements: Extraocular movements intact  Pupils: Pupils are equal, round, and reactive to light  Cardiovascular:      Rate and Rhythm: Normal rate and regular rhythm  Heart sounds: Normal heart sounds  Pulmonary:      Effort: Pulmonary effort is normal       Breath sounds: Normal breath sounds  Abdominal:      General: Abdomen is flat  Palpations: Abdomen is soft  Musculoskeletal:         General: Normal range of motion  Cervical back: Normal range of motion and neck supple  Skin:     General: Skin is warm and dry  Comments: Breasts: breasts appear normal, no suspicious masses, no skin or nipple changes or axillary nodes  Neurological:      General: No focal deficit present  Mental Status: She is alert and oriented to person, place, and time  Psychiatric:         Mood and Affect: Mood normal          Behavior: Behavior normal          Thought Content: Thought content normal          Judgment: Judgment normal            Results:  Labs:  none    Imaging    DIAGNOSIS: Malignant neoplasm of upper-outer quadrant of right breast in female, estrogen receptor positive ([address] ) , breast cancer in 2018  TECHNIQUE:  Digital diagnostic mammography was performed  Computer Aided Detection (CAD) analyzed all applicable images  Computer Aided Detection (CAD) analyzed all applicable images      COMPARISONS: Prior breast imaging dated: 06/23/2021, 06/23/2020, 06/18/2019, 06/26/2018, 06/26/2018, 05/23/2018, 05/23/2018, 05/23/2018, 05/15/2018, and 05/15/2018     RELEVANT HISTORY:   Family Breast Cancer History: No known family history of breast cancer  Family Medical History: No known relevant family medical history  Personal History: No known relevant hormone history  Surgical history includes breast biopsy and lumpectomy   Medical history includes breast cancer and history of chemotherapy      RISK ASSESSMENT:   Tyrer-Cuzick risk assessment reporting was suppressed due to the patient's history and/or demographic factors      TISSUE DENSITY:   The breasts are heterogeneously dense, which may obscure small masses       INDICATION: Rylee Chew is a 73 y o  female presenting for annual      FINDINGS:   Bilateral     Stable post surgical changes are demonstrated in the upper outer right breast     After the mammographic exam was completed the patient describes a palpable abnormality at the superior aspect of the axilla/upper arm  Sonographic exam of this area showed a superficial complex cyst, likely a sebaceous cyst measuring 0 7 x 0 7 x 0 7 cm and containing internal echoes  There is no internal vascularity  Thru transmission is seen      There are no suspicious masses, grouped microcalcifications or areas of unexplained architectural distortion       The skin and nipple areolar complex are unremarkable       IMPRESSION:   Stable post treatment changes in the right breast   Palpable area at the superior aspect of the right axilla corresponds to a sebaceous cyst            ASSESSMENT/BI-RADS CATEGORY:  Left: 2 - Benign  Right: 2 - Benign  Overall: 2 - Benign     RECOMMENDATION:       - Diagnostic mammogram in 1 year for both breasts  No results found  I reviewed the above laboratory and imaging data  Discussion/Summary: 60-year-old woman with history of stage I breast cancer right side, presently LILIANA    Plan on follow-up in 6 months after her next mammogram

## 2022-12-07 NOTE — LETTER
December 7, 2022     Juan Antonio Garrett, [address] [facility]  [address] [place]    Patient: New Brown   YOB: 1955   Date of Visit: 12/7/2022       Dear Dr Peng Oliver:    Thank you for referring New Brown to me for evaluation  Below are my notes for this consultation  If you have questions, please do not hesitate to call me  I look forward to following your patient along with you  Sincerely,        Manuel Morales MD        CC: MD Du Regan MD Eddy Box, MD Rockey Sark, MD Celestino Pilling, MD  12/7/2022  2:34 PM  Sign when Signing Visit     Surgical Oncology Follow Up       [facility] [address]    Jolie CruzCintron  1955  [id number]  [address] [facility] [place] SURGICAL ONCOLOGY [facility] [address]    Chief Complaint   Patient presents with   • Follow-up     Pt presents for a f/u for Malignant neoplasm of right breast in female, estrogen receptor positive, unspecified site of breast ([facility] [address] )  Pt reports having pain in the right breast on and off  Pt is with daughter who is translating  Assessment/Plan:    No problem-specific Assessment & Plan notes found for this encounter  Diagnoses and all orders for this visit:    Malignant neoplasm of right breast in female, estrogen receptor positive, unspecified site of breast ([address] )    Malignant neoplasm of upper-outer quadrant of right breast in female, estrogen receptor positive ([address] )    Use of anastrozole (Arimidex)    Sebaceous cyst       Advance Care Planning/Advance Directives:  Discussed disease status, cancer treatment plans and/or cancer treatment goals with the patient       Oncology History   Malignant neoplasm of right female breast ([address] )   5/23/2018 Initial Diagnosis    Malignant neoplasm of right female breast ([facility] [address] )     5/23/2018 Biopsy    Right breast core biopsy:  DCIS, micropapillary type, low-intermediate nuclear grade  ER 90-95% positive,  [place] 75-80% positive       6/26/2018 Surgery    RIGHT BREAST NEEDLE LOCALIZATION X2 WITH RIGHT BREAST LUMPECTOMY ( NEEDLE LOC @ 1000) (Right)   ONCOPLASTIC CLOSURE OF LUMPECTOMY CAVITY    Invasive breast carcinoma, NST (aka ductal)  * [place] grade 2 of 3 (total score = 2+2+2 = 6 of 9)   -- tubule formation < 10%, score 3   -- nuclear grade 2 of 3, score 2   -- mitoses ~ 4/mm2, score 2    * invasive carcinoma is multifocally present (A23-1, A32-1, A84-1, A89-1, A100-1), largest focus is 14 millimeters in greatest dimension (A89-1, this focus is graded)  * superior lumpectomy margin (orange-inked) is POSITIVE for invasive carcinoma (A84-1)   * all other lumpectomy margins are free of invasive carcinoma  * estrogen, progesterone & Her-2/negrita receptor studies are undertaken, to be described in an addendum report  - Ductal carcinoma in-situ (DCIS): Present as an extensive component (~85% of total tumor)  * DCIS is co-located with invasive carcinoma surrounding prior needle biopsy site  * DCIS spans 2 6 cm maximal dimension (A62-1) and is present on 27 of 136 total slides examined  * DCIS has cribriform & micropapillary patterns, nuclear grade 2 of 3, with central comedo-type necrosis  * DCIS is present within 0 2 millimeters of the superior lumpectomy margin (orange-inked, A26-1)   * DCIS is present within 0 2 millimeters of the medial lumpectomy margin (yellow-inked, A3-1)   * all other lumpectomy margins are free of DCIS by > 2 millimeters  - Lymph-vascular invasion: no lymph-vascular invasion is unequivocally identified  - Microcalcifications: present in DCIS  % positive, ER 45-55% positive, HER2 by IHC 3+ positive    - Dr Jo Gottron       8/2/2018 Surgery    Right breast lumpectomy reexcision, right axilla SLN biopsy:  A    Submitted as “right axillary sentinel node”: - Seven lymph nodes are identified showing no metastatic tumor      B  Right breast lumpectomy reexcision:  - Abundant reactive changes are seen around the previous lumpectomy cavity   - No residual atypia or malignancy is seen           8/2/2018 -  Cancer Staged    Stage IA - pT1c, pN0, G2        9/25/2018 - 8/6/2019 Chemotherapy    Weekly Paclitaxol and trastuzumab x12, until December 11, 2018 followed by trastuzumab monotherapy 6 milligram/kilogram every 3 weeks    - Dr Fiona Falcon       1/9/2019 - 2/19/2019 Radiation    Plan ID Energy Fractions Dose per Fraction (cGy) Dose Correction (cGy) Total Dose Delivered (cGy) Elapsed Days   R Boost e 16E 5 / 5 200 0 1,000 6   Right Breast 6X 25 / 25 200 0 5,000 29     - Dr Devi La       Malignant neoplasm of upper-outer quadrant of right breast in female, estrogen receptor positive ([facility] [address] )   7/19/2018 Initial Diagnosis    Malignant neoplasm of upper-outer quadrant of right breast in female, estrogen receptor positive ([facility] [address] )     12/17/2018 - 8/26/2019 Chemotherapy    trastuzumab (HERCEPTIN) 579 mg in sodium chloride 0 9 % 250 mL chemo infusion, 8 mg/kg, Intravenous, Once, 12 of 12 cycles  Administration: 434 mg (5/14/2019), 434 mg (6/4/2019), 450 mg (7/16/2019), 450 mg (8/6/2019), 450 mg (6/25/2019)         History of Present Illness: Patient is a 21-year-old woman with history of stage I breast cancer on the right side here for surveillance s/p lumpectomy, radiation, and chemotherapy   -Interval History: Here for surveillance with no new complaints to report  Mammogram in June of this year, with recommendations for mammogram and 1 year  Review of Systems:  Review of Systems   Constitutional: Negative  HENT: Negative  Eyes: Negative  Respiratory: Negative  Cardiovascular: Negative  Gastrointestinal: Negative  Endocrine: Negative  Genitourinary: Negative  Musculoskeletal: Negative  Skin: Negative  Allergic/Immunologic: Negative  Neurological: Negative  Hematological: Negative  Psychiatric/Behavioral: Negative  All other systems reviewed and are negative        Patient Active Problem List   Diagnosis   • Type 2 diabetes mellitus with complication, with long-term current use of insulin ([place] [address] )   • Severe persistent asthma without complication   • Other specified hypothyroidism   • Chest pain   • Palpitations   • Chronic bilateral low back pain without sciatica   • Neck pain   • Malignant neoplasm of right female breast ([place] [address] )   • Malignant neoplasm of upper-outer quadrant of right breast in female, estrogen receptor positive ([place] [address] )   • Hiatal hernia   • Screening for colon cancer   • Esophageal dysphagia   • Cellulitis of right axilla   • Chronic pain of right knee   • Hypercholesterolemia   • Hypothyroid   • Essential hypertension   • Bilateral pulmonary embolism (HCC)   • Radiation pneumonitis (HCC)   • Type 2 diabetes mellitus with hyperglycemia, with long-term current use of insulin ([place] [address] )   • Diabetic polyneuropathy associated with type 2 diabetes mellitus (HCC)   • Use of anastrozole (Arimidex)   • Decreased ROM of right shoulder   • Lump of skin   • Chronic diastolic heart failure (HCC)   • Gastroesophageal reflux disease without esophagitis   • Muscle cramps   • Sebaceous cyst   • Subcutaneous mass of back   • Overweight with body mass index (BMI) of 28 to 28 9 in adult   • Anxiety   • Right foot pain   • Pelvic pain   • Bone pain   • Type 2 diabetes mellitus with microalbuminuria, with long-term current use of insulin (HCC)   • Depression, recurrent ([facility])   • Vaginal candidiasis   • Diabetes mellitus ([place] [address] )   • Seasonal allergies   • Diarrhea   • COVID-19     Past Medical History:   Diagnosis Date   • Abdominal infection ([place] [address] )     h-pylori   • Abnormal chest x-ray 4/5/2019   • Asthma    • Breast cancer ([place] [address] ) 06/26/2018   • Cancer ([place] [address] )     BREAST   • Diabetes mellitus ([place] [address] )    • Hiatal hernia    • History of chemotherapy    • History of radiation therapy    • Hypercholesterolemia    • Hypertension    • Hypothyroid    • Renal disorder    • Rheumatoid arthritis [facility])      Past Surgical History:   Procedure Laterality Date   • 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   • BREAST LUMPECTOMY Right 6/26/2018    Procedure: RIGHT BREAST NEEDLE LOCALIZATION X2 WITH RIGHT BREAST LUMPECTOMY ( NEEDLE LOC @ 1000); Surgeon: Anahi Christianson MD;  Location: [facility];  Service: Surgical Oncology   • BREAST LUMPECTOMY Right 8/2/2018    Procedure: LYMPHOSCINITGRAPHY INTRAOPERATIVE LYMPHATIC MAPPING , RIGHT SENTINEL NODE BIOPSY, REEXCISION  RIGHT BREAST LUMPECTOMY CAVITY (SUPERIOR MARGIN); Surgeon: Anahi Christianson MD;  Location: [facility];  Service: Surgical Oncology   • BREAST SURGERY Left    • CATARACT EXTRACTION, BILATERAL Bilateral    • COLONOSCOPY     • EGD AND COLONOSCOPY N/A 9/5/2018    Procedure: EGD AND COLONOSCOPY;  Surgeon: Daniel Vasques MD;  Location: [facility]; Service: Gastroenterology   • FL GUIDED CENTRAL VENOUS ACCESS DEVICE INSERTION  9/13/2018   • KNEE SURGERY Right    • MAMMO NEEDLE LOCALIZATION RIGHT (ALL INC) Right 6/26/2018   • MAMMO STEREOTACTIC BREAST BIOPSY RIGHT (ALL INC) Right 5/23/2018   • RETINAL DETACHMENT SURGERY Right    • TUBAL LIGATION     • TUNNELED VENOUS PORT PLACEMENT Left 9/13/2018    Procedure: INSERTION VENOUS PORT (PORT-A-CATH);   Surgeon: Anahi Christianson MD;  Location: [facility];  Service: Surgical Oncology   • UPPER GASTROINTESTINAL ENDOSCOPY       Family History   Problem Relation Age of Onset   • Diabetes Mother    • Hypertension Mother    • Diabetes Father    • Hypertension Father    • Diabetes Brother    • Hypertension Brother    • Prostate cancer Brother    • Cancer Maternal Grandfather    • No Known Problems Sister    • No Known Problems Daughter    • No Known Problems Sister    • No Known Problems Sister    • No Known Problems Sister    • No Known Problems Sister    • No Known Problems Daughter • No Known Problems Daughter      Social History     Socioeconomic History   • Marital status:      Spouse name: Not on file   • Number of children: Not on file   • Years of education: Not on file   • Highest education level: Not on file   Occupational History   • Not on file   Tobacco Use   • Smoking status: Never   • Smokeless tobacco: Never   Vaping Use   • Vaping Use: Never used   Substance and Sexual Activity   • Alcohol use: No   • Drug use: No   • Sexual activity: Not Currently   Other Topics Concern   • Not on file   Social History Narrative   • Not on file     Social Determinants of Health     Financial Resource Strain: Not on file   Food Insecurity: Not on file   Transportation Needs: Not on file   Physical Activity: Not on file   Stress: Not on file   Social Connections: Not on file   Intimate Partner Violence: Not on file   Housing Stability: Not on file       Current Outpatient Medications:   •  acetaminophen (TYLENOL) 650 mg CR tablet, Take 1 tablet (650 mg total) by mouth every 8 (eight) hours as needed for mild pain, Disp: 30 tablet, Rfl: 0  •  albuterol (2 5 mg/3 mL) 0 083 % nebulizer solution, Take 3 mL (2 5 mg total) by nebulization every 6 (six) hours as needed for wheezing or shortness of breath, Disp: 75 mL, Rfl: 5  •  albuterol (PROVENTIL HFA,VENTOLIN HFA) 90 mcg/act inhaler, Inhale 1 puff every 4 (four) hours as needed for wheezing, Disp: 18 g, Rfl: 5  •  anastrozole (ARIMIDEX) 1 mg tablet, Take 1 tablet (1 mg total) by mouth daily, Disp: 90 tablet, Rfl: 3  •  aspirin 81 mg chewable tablet, Chew 81 mg daily, Disp: , Rfl:   •  atorvastatin (LIPITOR) 40 mg tablet, Take 1 tablet (40 mg total) by mouth daily, Disp: 90 tablet, Rfl: 3  •  Diclofenac Sodium (VOLTAREN) 1 %, Apply 2 g topically 2 (two) times a day, Disp: 150 g, Rfl: 0  •  ergocalciferol (VITAMIN D2) 50,000 units, Take 1 capsule (50,000 Units total) by mouth once a week, Disp: 12 capsule, Rfl: 1  •  fluticasone (FLONASE) 50 mcg/act nasal spray, 1 spray into each nostril daily, Disp: 18 2 mL, Rfl: 5  •  fluticasone (FLONASE) 50 mcg/act nasal spray, 1 spray into each nostril daily, Disp: 16 g, Rfl: 0  •  fluticasone-salmeterol (Advair HFA) 230-21 MCG/ACT inhaler, Inhale 2 puffs 2 (two) times a day Rinse mouth after use , Disp: 12 g, Rfl: 5  •  glucose blood (OneTouch Ultra) test strip, Use 1 each 3 (three) times a day Use as instructed, Disp: 300 each, Rfl: 4  •  guaiFENesin (MUCINEX) 600 mg 12 hr tablet, Take 2 tablets (1,200 mg total) by mouth every 12 (twelve) hours, Disp: 60 tablet, Rfl: 0  •  insulin detemir (Levemir FlexTouch) 100 Units/mL injection pen, INJECT 50 UNITS UNDER THE SKIN DAILY AT BEDTIME, Disp: 45 mL, Rfl: 0  •  insulin lispro (HumaLOG) 100 units/mL injection pen, 20 units before meals, Disp: 45 mL, Rfl: 0  •  loratadine (CLARITIN) 10 mg tablet, TAKE 1 TABLET (10 MG TOTAL) BY MOUTH DAILY, Disp: 90 tablet, Rfl: 2  •  losartan (COZAAR) 100 MG tablet, Take 1 tablet (100 mg total) by mouth daily, Disp: 90 tablet, Rfl: 1  •  Mag-G 500 (27 Mg) MG tablet, TAKE 1 TABLET (500 MG TOTAL) BY MOUTH DAILY, Disp: 90 tablet, Rfl: 1  •  metoprolol tartrate (LOPRESSOR) 25 mg tablet, Take 0 5 tablets (12 5 mg total) by mouth every 12 (twelve) hours, Disp: 180 tablet, Rfl: 1  •  omeprazole (PriLOSEC) 20 mg delayed release capsule, TAKE 1 CAPSULE (20 MG TOTAL) BY MOUTH DAILY, Disp: 90 capsule, Rfl: 0  •  OneTouch Delica Lancets 82V MISC, USE 3 (THREE) TIMES A DAY, Disp: 300 each, Rfl: 4  •  UltiCare Micro Pen Needles 32G X 4 MM MISC, INJECT UNDER THE SKIN 4 (FOUR) TIMES A DAY, Disp: 400 each, Rfl: 0  •  zileuton (ZYFLO CR) 600 MG, Take 1 tablet (600 mg total) by mouth 2 (two) times a day, Disp: 60 tablet, Rfl: 5  •  EPINEPHrine (Auvi-Q) 0 1 mg/0 1 mL SOAJ, Inject 0 3 mL (0 3 mg total) into a muscle once for 1 dose, Disp: 2 each, Rfl: 5  Allergies   Allergen Reactions   • Codeine Other (See Comments)     unknown   • Latex Other (See Comments)     fungus     Vitals:    12/07/22 1404   BP: 162/82   Pulse: 83   Temp: 98 2 °F (36 8 °C)   SpO2: 98%       Physical Exam  Vitals reviewed  Constitutional:       Appearance: Normal appearance  HENT:      Head: Normocephalic and atraumatic  Nose: Nose normal    Eyes:      Extraocular Movements: Extraocular movements intact  Pupils: Pupils are equal, round, and reactive to light  Cardiovascular:      Rate and Rhythm: Normal rate and regular rhythm  Heart sounds: Normal heart sounds  Pulmonary:      Effort: Pulmonary effort is normal       Breath sounds: Normal breath sounds  Abdominal:      General: Abdomen is flat  Palpations: Abdomen is soft  Musculoskeletal:         General: Normal range of motion  Cervical back: Normal range of motion and neck supple  Skin:     General: Skin is warm and dry  Comments: Breasts: breasts appear normal, no suspicious masses, no skin or nipple changes or axillary nodes  Neurological:      General: No focal deficit present  Mental Status: She is alert and oriented to person, place, and time  Psychiatric:         Mood and Affect: Mood normal          Behavior: Behavior normal          Thought Content: Thought content normal          Judgment: Judgment normal            Results:  Labs:  none    Imaging    DIAGNOSIS: Malignant neoplasm of upper-outer quadrant of right breast in female, estrogen receptor positive ([facility] [address] ) , breast cancer in 2018  TECHNIQUE:  Digital diagnostic mammography was performed  Computer Aided Detection (CAD) analyzed all applicable images  Computer Aided Detection (CAD) analyzed all applicable images      COMPARISONS: Prior breast imaging dated: 06/23/2021, 06/23/2020, 06/18/2019, 06/26/2018, 06/26/2018, 05/23/2018, 05/23/2018, 05/23/2018, 05/15/2018, and 05/15/2018     RELEVANT HISTORY:   Family Breast Cancer History: No known family history of breast cancer    Family Medical History: No known relevant family medical history  Personal History: No known relevant hormone history  Surgical history includes breast biopsy and lumpectomy  Medical history includes breast cancer and history of chemotherapy      RISK ASSESSMENT:   Tyrer-Cuzick risk assessment reporting was suppressed due to the patient's history and/or demographic factors      TISSUE DENSITY:   The breasts are heterogeneously dense, which may obscure small masses       INDICATION: Brittani Colon is a 73 y o  female presenting for annual      FINDINGS:   Bilateral     Stable post surgical changes are demonstrated in the upper outer right breast     After the mammographic exam was completed the patient describes a palpable abnormality at the superior aspect of the axilla/upper arm  Sonographic exam of this area showed a superficial complex cyst, likely a sebaceous cyst measuring 0 7 x 0 7 x 0 7 cm and containing internal echoes  There is no internal vascularity  Thru transmission is seen      There are no suspicious masses, grouped microcalcifications or areas of unexplained architectural distortion       The skin and nipple areolar complex are unremarkable       IMPRESSION:   Stable post treatment changes in the right breast   Palpable area at the superior aspect of the right axilla corresponds to a sebaceous cyst            ASSESSMENT/BI-RADS CATEGORY:  Left: 2 - Benign  Right: 2 - Benign  Overall: 2 - Benign     RECOMMENDATION:       - Diagnostic mammogram in 1 year for both breasts  No results found  I reviewed the above laboratory and imaging data  Discussion/Summary: 79-year-old woman with history of stage I breast cancer right side, presently LILIANA    Plan on follow-up in 6 months after her next mammogram

## 2022-12-12 ENCOUNTER — OFFICE VISIT (OUTPATIENT)
Dept: HEMATOLOGY ONCOLOGY | Facility: CLINIC | Age: 67
End: 2022-12-12

## 2022-12-12 VITALS
SYSTOLIC BLOOD PRESSURE: 128 MMHG | OXYGEN SATURATION: 99 % | WEIGHT: 175.4 LBS | HEIGHT: 65 IN | HEART RATE: 91 BPM | DIASTOLIC BLOOD PRESSURE: 62 MMHG | BODY MASS INDEX: 29.22 KG/M2 | RESPIRATION RATE: 18 BRPM | TEMPERATURE: 96.9 F

## 2022-12-12 DIAGNOSIS — Z17.0 MALIGNANT NEOPLASM OF UPPER-OUTER QUADRANT OF RIGHT BREAST IN FEMALE, ESTROGEN RECEPTOR POSITIVE (HCC): ICD-10-CM

## 2022-12-12 DIAGNOSIS — C50.411 MALIGNANT NEOPLASM OF UPPER-OUTER QUADRANT OF RIGHT BREAST IN FEMALE, ESTROGEN RECEPTOR POSITIVE (HCC): ICD-10-CM

## 2022-12-12 RX ORDER — ANASTROZOLE 1 MG/1
1 TABLET ORAL DAILY
Qty: 90 TABLET | Refills: 3 | Status: SHIPPED | OUTPATIENT
Start: 2022-12-12

## 2022-12-12 NOTE — PROGRESS NOTES
Hematology / Oncology Outpatient Follow Up Note    Jolie Mulligan 78 y o  female :1955 [facility]:[id number]         Date:  2022    Assessment / Plan:  A 78-year-old postmenopausal woman with stage IA right breast cancer, grade 2, % positive, [place] 45% positive, HER2 3+ disease   She underwent lumpectomy with sentinel lymph node biopsy, resulting in LILIANA    She completed adjuvant chemotherapy with weekly paclitaxel and trastuzumab in 2018 without cardiac toxicity   She is currently on adjuvant hormonal therapy with anastrozole with excellent tolerance  Clinically, she has no evidence recurrent disease  We recommended her to continue on estradiol 1 mg once a day  We will order an US of right armpit in which patient felt a small nodule  We will see her again in a year for routine follow-up  She is in agreement with my recommendations                                                                                                                           Subjective:      HPI:  A 50-year-old postmenopausal woman who was recently found to have abnormality in her right breast   She lived in [place] [id number] she found breast abnormality, she came to [facility] to be with her daughter  Franci Waller underwent right breast biopsy in May 23, 2018 which showed ductal carcinoma in situ, ER 90% positive, NY 75% positive   She underwent lumpectomy by Dr Kiah Hernandez in 2018   She had 1 4 cm of invasive ductal carcinoma, grade 2   Lymphovascular invasion was not present  This was % positive, [place] 45% positive, HER2 3+ disease   Because of the invasive carcinoma was found, she underwent sentinel lymph node biopsy as well as reexcision in 2018   She had no evidence of malignancy in the reexcised specimen   Seven lymph nodes were all negative for metastatic disease  Franci Waller presents today to discuss adjuvant treatment options with her daughter  Franci Waller feels well  She has no complaint of pain   Her weight is stable   She has no respiratory symptoms   She has many comorbidities, including diabetes, hypertension, asthma as well as hypothyroidism   She has no family history of breast or ovarian cancer  Sim Christopher is a lifetime never smoker            Interval History:  A 61-year-old postmenopausal woman with stage IA right breast cancer, grade 2, % positive, [place] 45% positive, HER2 3+ disease   She underwent lumpectomy with sentinel lymph node biopsy, resulting in LILIANA    She was treated with adjuvant chemotherapy with weekly paclitaxel and trastuzumab which was completed in December 2018   During the adjuvant chemotherapy, she developed pulmonary embolism   She was treated with rivaroxaban which was switched over to baby aspirin which she is on  Sim Christopher is currently on anastrozole with excellent tolerance  She presents today for routine follow-up  She noticed a small nodule in her armpit  She denied hot flashes  She has no complaint of pain  Her weight is stable  She has no respiratory symptoms  Her performance status is normal       Objective:      Primary Diagnosis:     1  Right breast cancer, stage IA (pT1c, pN0, M0) grade 2, % positive, [place] 45% positive, HER2 3+ disease   Diagnosed in June 2018    2    Pulmonary embolism and bilateral distal lower extremity DVT, diagnosed in November 2018      Cancer Staging:  Cancer Staging  Malignant neoplasm of upper-outer quadrant of right breast in female, estrogen receptor positive ([facility] [address] )  Staging form: Breast, AJCC 8th Edition  - Clinical: No stage assigned - Unsigned  - Pathologic: Stage IA (pT1c, pN0, cM0, G2, ER: Positive, NJ: Positive, HER2: Positive) - Signed by Damion Pollack MD on 8/21/2018           Previous Hematologic/ Oncologic Treatment:       1  Adjuvant chemotherapy with weekly paclitaxel and trastuzumab x12   Completed in December 2018   2  Adjuvant trastuzumab monotherapy from December 2018 through August 2019       Current Hematologic/ Oncologic Treatment:       1  Adjuvant hormonal therapy with anastrozole 1 mg once a day since March 2019         Disease Status:      LILIANA status post lumpectomy with sentinel lymph node biopsy      Test Results:     Pathology:     1 4 cm of invasive ductal carcinoma, grade 2   No evidence of lymphovascular invasion   Seven sentinel lymph nodes were all negative for metastatic disease   % positive, AL 45% positive, HER2 3+ disease   Stage IA (pT1c, pN0, M0)     Radiology:     Mammography in June 2021 was benign  BI-RADS 2  DEXA scan in October 2021 showed T-score -1 1, consistent with osteopenia      Laboratory:     See below for CBC and CMP  Physical Exam:      General Appearance:    Alert, oriented        Eyes:    PERRL   Ears:    Normal external ear canals, both ears   Nose:   Nares normal, septum midline   Throat:   Mucosa moist  Pharynx without injection  Neck:   Supple       Lungs:     Clear to auscultation bilaterally   Chest Wall:    No tenderness or deformity    Heart:    Regular rate and rhythm       Abdomen:     Soft, non-tender, bowel sounds +, no organomegaly           Extremities:   Extremities no cyanosis or edema       Skin:   no rash or icterus  Lymph nodes:   Cervical, supraclavicular, and axillary nodes normal   Neurologic:   CNII-XII intact, normal strength, sensation and reflexes     Throughout          Breast exam:   Lumpectomy scar at outer upper quadrant of the right breast with no palpable abnormality   Left breast exam is negative  ROS: Review of Systems   Constitutional: Negative for activity change, appetite change, fatigue and fever  HENT: Negative for facial swelling  Respiratory: Negative for chest tightness and shortness of breath  Gastrointestinal: Negative for abdominal pain  Endocrine: Negative for cold intolerance and heat intolerance  Genitourinary: Negative for flank pain  Musculoskeletal: Negative for back pain, joint swelling and neck pain  All other systems reviewed and are negative  Imaging: DXA bone density spine hip and pelvis    Result Date: 10/29/2021  Narrative: CENTRAL DXA SCAN CLINICAL HISTORY:  18-year-old postmenopausal  female with a history of insulin-dependent diabetes  Breast carcinoma with anastrozole use  Zoloft use  Gastroesophageal reflux with Prilosec use  Osteoporosis screening  OTHER RISK FACTORS:  None  PHARMACOLOGIC THERAPY FOR OSTEOPOROSIS:  None  TECHNIQUE: Bone densitometry was performed using a [employer] C   bone densitometer  Regions of interest appear properly placed  COMPARISON: There are no prior DXA studies performed on this unit for comparison  RESULTS: LUMBAR SPINE L2-L4 (L1 vertebra excluded from analysis due to local structural abnormalities or artifact): BMD  0 957  gm/cm2 T-score -1 1  These values are artifactually elevated due to degenerative sclerosis and osteophytosis  LEFT  TOTAL HIP: BMD:  1 075  gm/cm2 T-score:  0 8 LEFT  FEMORAL NECK: BMD:  0 869  gm/cm2 T score: 0 0     Impression: 1  Low bone mass (OSTEOPENIA)  [Based on the lumbar spine] 2  The 10 year risk of hip fracture is 0 1 with the 10 year risk of major osteoporotic fracture being 4 as calculated by the [facility]/WHO fracture risk assessment tool (FRAX)  3   The current NOF guidelines recommend treating patients with a T-score of -2 5 or less in the lumbar spine or hips, or in post-menopausal women and men over the age of 48 with low bone mass (osteopenia) and a FRAX 10 year risk score of >3% for hip fracture and/or >20% for major osteoporotic fracture  4   The NOF recommends follow-up DXA in 1-2 years after initiating therapy for osteoporosis and every 2 years thereafter  More frequent evaluation is appropriate for patients with conditions associated with rapid bone loss, such as glucocorticoid therapy   The interval between DXA screenings may be longer for individuals without major risk factors and initial T-score in the normal or upper low bone mass range  The FRAX algorithm has certain limitations: -FRAX has not been validated in patients currently or previously treated with pharmacotherapy for osteoporosis  In such patients, clinical judgment must be exercised in interpreting FRAX scores  -Prior hip, vertebral and humeral fragility fractures appear to confer greater risk of subsequent fracture than fractures at other sites (this is especially true for individuals with severe vertebral fractures), but quantification of this incremental risk is not possible with FRAX  -FRAX underestimates fracture risk in patients with history of multiple fragility fractures  -FRAX may underestimate fracture risk in patients with history of frequent falls  -It is not appropriate to use FRAX to monitor treatment response  WHO CLASSIFICATION: Normal (a T-score of -1 0 or higher) Low bone mineral density (a T-score of less than -1 0 but higher than -2 5) Osteoporosis (a T-score of -2 5 or less) Severe osteoporosis (a T-score of -2 5 or less with a fragility fracture) Workstation performed: [id number]     US pelvis complete w transvaginal    Result Date: 11/9/2021  Narrative: PELVIC ULTRASOUND, COMPLETE INDICATION:  73years old  R10 2: Pelvic and perineal pain  COMPARISON: Report pelvic ultrasound from [facility] dated 3/31/2021 (unavailable for direct review at this time), CT chest, abdomen and pelvis 11/6/2018 TECHNIQUE:   Transabdominal pelvic ultrasound was performed in sagittal and transverse planes with a curvilinear transducer  Additional transvaginal imaging was performed to better evaluate the endometrium and ovaries  Imaging included volumetric sweeps as well as traditional still imaging technique  FINDINGS: UTERUS: The uterus is retroverted in position, measuring 8 7 x 5 5 x 5 6 cm  1 8 x 1 7 x 1 5 cm posterior fibroid  The cervix shows no suspicious abnormality  Small nabothian cysts  ENDOMETRIUM:  Not optimally defined, estimated at 5 mm thickness  No focal pathology seen  OVARIES/ADNEXA: Right ovary:  2 0 x 1 3 x 2 0 cm  No suspicious right ovarian abnormality  Doppler flow within normal limits  Left ovary:  2 5 x 1 9 x 1 7 cm  No suspicious left ovarian abnormality  Doppler flow within normal limits  No suspicious adnexal mass or loculated collections  There is no free fluid  Impression:  Endometrium is not well-defined, questionably minimally thickened at 5 mm  Thickness appears similar by previous report  Follow-up if any may be based on clinical grounds  Small uterine fibroid  Grossly unremarkable ovaries  Workstation performed: [id number]     Echo complete w/ contrast if indicated    Result Date: 11/10/2021  Narrative: •  Left Ventricle: Left ventricular cavity size is normal  The left ventricular ejection fraction is 65% by visual estimation  Systolic function is normal  Wall motion is normal  Diastolic function is normal  •  Tricuspid Valve: There is mild regurgitation  Labs:   Lab Results   Component Value Date    WBC 8 68 11/10/2022    HGB 12 7 11/10/2022    HCT 38 8 11/10/2022    MCV 87 11/10/2022     11/10/2022     Lab Results   Component Value Date    K 4 0 11/21/2022     11/21/2022    CO2 28 11/21/2022    BUN 18 11/21/2022    CREATININE 0 74 11/21/2022    GLUF 351 (H) 11/21/2022    CALCIUM 9 8 11/21/2022    CORRECTEDCA 10 6 (H) 11/21/2022    AST 13 11/21/2022    ALT 21 11/21/2022    ALKPHOS 81 11/21/2022    EGFR 84 11/21/2022         Current Medications: Reviewed  Allergies: Reviewed  PMH/FH/SH:  Reviewed      Vital Sign:    Body surface area is 1 87 meters squared      Wt Readings from Last 3 Encounters:   12/12/22 79 6 kg (175 lb 6 4 oz)   12/07/22 79 4 kg (175 lb)   11/21/22 75 8 kg (167 lb)        Temp Readings from Last 3 Encounters:   12/12/22 (!) 96 9 °F (36 1 °C) (Tympanic)   12/07/22 98 2 °F (36 8 °C) (Tympanic)   11/21/22 99 2 °F (37 3 °C) (Temporal)        BP Readings from Last 3 Encounters:   12/12/22 128/62   12/07/22 162/82   11/21/22 124/72         Pulse Readings from Last 3 Encounters:   12/12/22 91   12/07/22 83   11/21/22 93     @LASTSAO2(3)@

## 2022-12-23 ENCOUNTER — OFFICE VISIT (OUTPATIENT)
Dept: FAMILY MEDICINE CLINIC | Facility: CLINIC | Age: 67
End: 2022-12-23

## 2022-12-23 VITALS
WEIGHT: 174.8 LBS | OXYGEN SATURATION: 96 % | DIASTOLIC BLOOD PRESSURE: 74 MMHG | HEIGHT: 65 IN | TEMPERATURE: 97.9 F | SYSTOLIC BLOOD PRESSURE: 132 MMHG | HEART RATE: 80 BPM | RESPIRATION RATE: 18 BRPM | BODY MASS INDEX: 29.12 KG/M2

## 2022-12-23 DIAGNOSIS — R42 VERTIGO: ICD-10-CM

## 2022-12-23 DIAGNOSIS — Z00.00 MEDICARE ANNUAL WELLNESS VISIT, INITIAL: ICD-10-CM

## 2022-12-23 DIAGNOSIS — R26.89 LOSS OF BALANCE: Primary | ICD-10-CM

## 2022-12-23 NOTE — PROGRESS NOTES
Assessment and Plan:     Problem List Items Addressed This Visit    None  Visit Diagnoses     Loss of balance    -  Primary    Relevant Orders    MRI brain wo contrast    VAS carotid complete study    Vertigo        Relevant Orders    MRI brain wo contrast    Medicare annual wellness visit, initial               Preventive health issues were discussed with patient, and age appropriate screening tests were ordered as noted in patient's After Visit Summary  Personalized health advice and appropriate referrals for health education or preventive services given if needed, as noted in patient's After Visit Summary  History of Present Illness:     Patient presents for a Medicare Wellness Visit    77 yo  female with breast cancer, uncontroled DM complains of increasing vertigo and loss of balance      Patient Care Team:  Cece Geren MD as PCP - General (Family Medicine)  Cece Green MD as PCP - [address] [place], [place] [address] [facility] ([facility])  Iris Gillespie MD (Radiation Oncology)  Delinda Ormond III, DO (Sports Medicine)  Valentin Rudd MD as Endoscopist  Luis A Bhagat MD as Surgeon (Surgical Oncology)  Cindy Palma MD (Hematology and Oncology)  Rowdy Noyola MD (Endocrinology)     Review of Systems:     Review of Systems   Neurological: Positive for dizziness, weakness and light-headedness  Psychiatric/Behavioral: Positive for sleep disturbance  All other systems reviewed and are negative         Problem List:     Patient Active Problem List   Diagnosis   • Type 2 diabetes mellitus with complication, with long-term current use of insulin (HCC)   • Severe persistent asthma without complication   • Other specified hypothyroidism   • Chest pain   • Palpitations   • Chronic bilateral low back pain without sciatica   • Neck pain   • Malignant neoplasm of right female breast [facility])   • Malignant neoplasm of upper-outer quadrant of right breast in female, estrogen receptor positive ([facility] [address] )   • Hiatal hernia   • Screening for colon cancer   • Esophageal dysphagia   • Cellulitis of right axilla   • Chronic pain of right knee   • Hypercholesterolemia   • Hypothyroid   • Essential hypertension   • Bilateral pulmonary embolism (HCC)   • Radiation pneumonitis (HCC)   • Type 2 diabetes mellitus with hyperglycemia, with long-term current use of insulin (HCC)   • Diabetic polyneuropathy associated with type 2 diabetes mellitus (HCC)   • Use of anastrozole (Arimidex)   • Decreased ROM of right shoulder   • Lump of skin   • Chronic diastolic heart failure (HCC)   • Gastroesophageal reflux disease without esophagitis   • Muscle cramps   • Sebaceous cyst   • Subcutaneous mass of back   • Overweight with body mass index (BMI) of 28 to 28 9 in adult   • Anxiety   • Right foot pain   • Pelvic pain   • Bone pain   • Type 2 diabetes mellitus with microalbuminuria, with long-term current use of insulin (HCC)   • Depression, recurrent (HCC)   • Vaginal candidiasis   • Diabetes mellitus ([address] )   • Seasonal allergies   • Diarrhea   • COVID-19      Past Medical and Surgical History:     Past Medical History:   Diagnosis Date   • Abdominal infection ([facility] [address] )     h-pylori   • Abnormal chest x-ray 4/5/2019   • Asthma    • Breast cancer ([address] ) 06/26/2018   • Cancer ([address] )     BREAST   • Diabetes mellitus ([facility] [address] )    • Hiatal hernia    • History of chemotherapy    • History of radiation therapy    • Hypercholesterolemia    • Hypertension    • Hypothyroid    • Renal disorder    • Rheumatoid arthritis [facility])      Past Surgical History:   Procedure Laterality Date   • 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   • BREAST LUMPECTOMY Right 6/26/2018    Procedure: RIGHT BREAST NEEDLE LOCALIZATION X2 WITH RIGHT BREAST LUMPECTOMY ( NEEDLE LOC @ 1000);   Surgeon: Olita Opitz, MD;  Location: [facility];  Service: Surgical Oncology   • BREAST LUMPECTOMY Right 8/2/2018    Procedure: LYMPHOSCINITGRAPHY INTRAOPERATIVE LYMPHATIC MAPPING , RIGHT SENTINEL NODE BIOPSY, REEXCISION  RIGHT BREAST LUMPECTOMY CAVITY (SUPERIOR MARGIN); Surgeon: Denise Meadows MD;  Location: [facility];  Service: Surgical Oncology   • BREAST SURGERY Left    • CATARACT EXTRACTION, BILATERAL Bilateral    • COLONOSCOPY     • EGD AND COLONOSCOPY N/A 9/5/2018    Procedure: EGD AND COLONOSCOPY;  Surgeon: Maggy Kebede MD;  Location: [facility] GI LAB; Service: Gastroenterology   • FL GUIDED CENTRAL VENOUS ACCESS DEVICE INSERTION  9/13/2018   • KNEE SURGERY Right    • MAMMO NEEDLE LOCALIZATION RIGHT (ALL INC) Right 6/26/2018   • MAMMO STEREOTACTIC BREAST BIOPSY RIGHT (ALL INC) Right 5/23/2018   • RETINAL DETACHMENT SURGERY Right    • TUBAL LIGATION     • TUNNELED VENOUS PORT PLACEMENT Left 9/13/2018    Procedure: INSERTION VENOUS PORT (PORT-A-CATH);   Surgeon: Denise Meadows MD;  Location: [facility];  Service: Surgical Oncology   • UPPER GASTROINTESTINAL ENDOSCOPY        Family History:     Family History   Problem Relation Age of Onset   • Diabetes Mother    • Hypertension Mother    • Diabetes Father    • Hypertension Father    • Diabetes Brother    • Hypertension Brother    • Prostate cancer Brother    • Cancer Maternal Grandfather    • No Known Problems Sister    • No Known Problems Daughter    • No Known Problems Sister    • No Known Problems Sister    • No Known Problems Sister    • No Known Problems Sister    • No Known Problems Daughter    • No Known Problems Daughter       Social History:     Social History     Socioeconomic History   • Marital status:      Spouse name: None   • Number of children: None   • Years of education: None   • Highest education level: None   Occupational History   • None   Tobacco Use   • Smoking status: Never   • Smokeless tobacco: Never   Vaping Use   • Vaping Use: Never used   Substance and Sexual Activity   • Alcohol use: No   • Drug use: No   • Sexual activity: Not Currently   Other Topics Concern   • None   Social History Narrative   • None     Social Determinants of Health     Financial Resource Strain: Unknown   • Difficulty of Paying Living Expenses: Patient refused   Food Insecurity: Not on file   Transportation Needs: No Transportation Needs   • Lack of Transportation (Medical): No   • Lack of Transportation (Non-Medical): No   Physical Activity: Not on file   Stress: Not on file   Social Connections: Not on file   Intimate Partner Violence: Not on file   Housing Stability: Not on file      Medications and Allergies:     Current Outpatient Medications   Medication Sig Dispense Refill   • acetaminophen (TYLENOL) 650 mg CR tablet Take 1 tablet (650 mg total) by mouth every 8 (eight) hours as needed for mild pain 30 tablet 0   • albuterol (2 5 mg/3 mL) 0 083 % nebulizer solution Take 3 mL (2 5 mg total) by nebulization every 6 (six) hours as needed for wheezing or shortness of breath 75 mL 5   • albuterol (PROVENTIL HFA,VENTOLIN HFA) 90 mcg/act inhaler Inhale 1 puff every 4 (four) hours as needed for wheezing 18 g 5   • anastrozole (ARIMIDEX) 1 mg tablet Take 1 tablet (1 mg total) by mouth daily 90 tablet 3   • aspirin 81 mg chewable tablet Chew 81 mg daily     • atorvastatin (LIPITOR) 40 mg tablet Take 1 tablet (40 mg total) by mouth daily 90 tablet 3   • Diclofenac Sodium (VOLTAREN) 1 % Apply 2 g topically 2 (two) times a day 150 g 0   • EPINEPHrine (Auvi-Q) 0 1 mg/0 1 mL SOAJ Inject 0 3 mL (0 3 mg total) into a muscle once for 1 dose 2 each 5   • ergocalciferol (VITAMIN D2) 50,000 units Take 1 capsule (50,000 Units total) by mouth once a week 12 capsule 1   • fluticasone (FLONASE) 50 mcg/act nasal spray 1 spray into each nostril daily 18 2 mL 5   • fluticasone (FLONASE) 50 mcg/act nasal spray 1 spray into each nostril daily 16 g 0   • fluticasone-salmeterol (Advair HFA) 230-21 MCG/ACT inhaler Inhale 2 puffs 2 (two) times a day Rinse mouth after use   12 g 5   • glucose blood (OneTouch Ultra) test strip Use 1 each 3 (three) times a day Use as instructed 300 each 4   • guaiFENesin (MUCINEX) 600 mg 12 hr tablet Take 2 tablets (1,200 mg total) by mouth every 12 (twelve) hours 60 tablet 0   • insulin detemir (Levemir FlexTouch) 100 Units/mL injection pen INJECT 50 UNITS UNDER THE SKIN DAILY AT BEDTIME 45 mL 0   • insulin lispro (HumaLOG) 100 units/mL injection pen 20 units before meals 45 mL 0   • loratadine (CLARITIN) 10 mg tablet TAKE 1 TABLET (10 MG TOTAL) BY MOUTH DAILY 90 tablet 2   • losartan (COZAAR) 100 MG tablet TAKE 1 TABLET (100 MG TOTAL) BY MOUTH DAILY 90 tablet 0   • Mag-G 500 (27 Mg) MG tablet TAKE 1 TABLET (500 MG TOTAL) BY MOUTH DAILY 90 tablet 1   • metoprolol tartrate (LOPRESSOR) 25 mg tablet Take 0 5 tablets (12 5 mg total) by mouth every 12 (twelve) hours 180 tablet 1   • omeprazole (PriLOSEC) 20 mg delayed release capsule TAKE 1 CAPSULE (20 MG TOTAL) BY MOUTH DAILY 90 capsule 0   • OneTouch Delica Lancets 78M MISC USE 3 (THREE) TIMES A  each 4   • UltiCare Micro Pen Needles 32G X 4 MM MISC INJECT UNDER THE SKIN 4 (FOUR) TIMES A  each 0   • zileuton (ZYFLO CR) 600 MG Take 1 tablet (600 mg total) by mouth 2 (two) times a day 60 tablet 5     No current facility-administered medications for this visit       Allergies   Allergen Reactions   • Codeine Other (See Comments)     unknown   • Latex Other (See Comments)     fungus      Immunizations:     Immunization History   Administered Date(s) Administered   • INFLUENZA 10/07/2022   • Influenza, high dose seasonal 0 7 mL 10/07/2022   • Influenza, recombinant, quadrivalent,injectable, preservative free 09/15/2021   • Pneumococcal Conjugate Vaccine 20-valent (Pcv20), Polysace 10/07/2022      Health Maintenance:         Topic Date Due   • Cervical Cancer Screening  Never done   • Breast Cancer Screening: Mammogram  06/27/2024   • Colorectal Cancer Screening  04/18/2027   • Hepatitis C Screening  Completed         Topic Date Due   • Hepatitis B Vaccine (1 of 3 - 3-dose series) Never done   • COVID-19 Vaccine (1) Never done      Medicare Screening Tests and Risk Assessments:     Daphne Jamison is here for her Welcome to Medicare visit  Health Risk Assessment:   Patient rates overall health as poor  Patient feels that their physical health rating is much worse  Patient is dissatisfied with their life  Eyesight was rated as slightly worse  Hearing was rated as slightly worse  Patient feels that their emotional and mental health rating is slightly worse  Patients states they are never, rarely angry  Patient states they are often unusually tired/fatigued  Pain experienced in the last 7 days has been a lot  Patient's pain rating has been 9/10  Patient states that she has experienced no weight loss or gain in last 6 months  Fall Risk Screening: In the past year, patient has experienced: history of falling in past year    Number of falls: 1  Injured during fall?: No    Feels unsteady when standing or walking?: Yes    Worried about falling?: Yes      Urinary Incontinence Screening:   Patient has not leaked urine accidently in the last six months  Home Safety:  Patient has trouble with stairs inside or outside of their home  Patient has working smoke alarms and has working carbon monoxide detector  Home safety hazards include: none  Nutrition:   Current diet is Regular  Medications:   Patient is not currently taking any over-the-counter supplements  Patient is able to manage medications  Activities of Daily Living (ADLs)/Instrumental Activities of Daily Living (IADLs):   Walk and transfer into and out of bed and chair?: Yes  Dress and groom yourself?: Yes    Bathe or shower yourself?: Yes    Feed yourself? Yes  Do your laundry/housekeeping?: Yes  Manage your money, pay your bills and track your expenses?: Yes  Make your own meals?: Yes    Do your own shopping?: No    ADL comments: Per pt daughter help with daily living routine       Previous Hospitalizations:   Any hospitalizations or ED visits within the last 12 months?: No      PREVENTIVE SCREENINGS      Cardiovascular Screening:    General: Screening Not Indicated and History Lipid Disorder      Diabetes Screening:     General: Screening Not Indicated and History Diabetes      Colorectal Cancer Screening:     General: Screening Current      Breast Cancer Screening:     General: History Breast Cancer      Cervical Cancer Screening:    General: Screening Not Indicated      Lung Cancer Screening:     General: Screening Not Indicated      Hepatitis C Screening:    General: Screening Current    Screening, Brief Intervention, and Referral to Treatment (SBIRT)    Screening  Typical number of drinks in a day: 0  Typical number of drinks in a week: 0  Interpretation: Low risk drinking behavior  Single Item Drug Screening:  How often have you used an illegal drug (including marijuana) or a prescription medication for non-medical reasons in the past year? never    Single Item Drug Screen Score: 0  Interpretation: Negative screen for possible drug use disorder    No results found  Physical Exam:     /74 (BP Location: Right arm, Patient Position: Sitting, Cuff Size: Standard)   Pulse 80   Temp 97 9 °F (36 6 °C) (Temporal)   Resp 18   Ht 5' 5" (1 651 m)   Wt 79 3 kg (174 lb 12 8 oz)   SpO2 96%   BMI 29 09 kg/m²     Physical Exam  Vitals and nursing note reviewed  Constitutional:       General: She is not in acute distress  Appearance: She is well-developed  HENT:      Head: Normocephalic and atraumatic  Eyes:      Conjunctiva/sclera: Conjunctivae normal    Cardiovascular:      Rate and Rhythm: Normal rate and regular rhythm  Heart sounds: No murmur heard  Pulmonary:      Effort: Pulmonary effort is normal  No respiratory distress  Breath sounds: Normal breath sounds  Abdominal:      Palpations: Abdomen is soft  Tenderness: There is no abdominal tenderness  Musculoskeletal:         General: No swelling        Cervical back: Neck supple  Skin:     General: Skin is warm and dry  Capillary Refill: Capillary refill takes less than 2 seconds  Neurological:      Mental Status: She is alert     Psychiatric:         Mood and Affect: Mood normal           Lana Marino MD

## 2022-12-23 NOTE — PATIENT INSTRUCTIONS
Medicare Preventive Visit Patient Instructions  Thank you for completing your Welcome to Medicare Visit or Medicare Annual Wellness Visit today  Your next wellness visit will be due in one year (12/24/2023)  The screening/preventive services that you may require over the next 5-10 years are detailed below  Some tests may not apply to you based off risk factors and/or age  Screening tests ordered at today's visit but not completed yet may show as past due  Also, please note that scanned in results may not display below  Preventive Screenings:  Service Recommendations Previous Testing/Comments   Colorectal Cancer Screening  * Colonoscopy    * Fecal Occult Blood Test (FOBT)/Fecal Immunochemical Test (FIT)  * Fecal DNA/Cologuard Test  * Flexible Sigmoidoscopy Age: 39-70 years old   Colonoscopy: every 10 years (may be performed more frequently if at higher risk)  OR  FOBT/FIT: every 1 year  OR  Cologuard: every 3 years  OR  Sigmoidoscopy: every 5 years  Screening may be recommended earlier than age 39 if at higher risk for colorectal cancer  Also, an individualized decision between you and your healthcare provider will decide whether screening between the ages of 74-80 would be appropriate  Colonoscopy: 04/19/2022  FOBT/FIT: Not on file  Cologuard: Not on file  Sigmoidoscopy: Not on file    Screening Current     Breast Cancer Screening Age: 37 years old  Frequency: every 1-2 years  Not required if history of left and right mastectomy Mammogram: 06/27/2022    History Breast Cancer   Cervical Cancer Screening Between the ages of 21-29, pap smear recommended once every 3 years  Between the ages of 33-67, can perform pap smear with HPV co-testing every 5 years     Recommendations may differ for women with a history of total hysterectomy, cervical cancer, or abnormal pap smears in past  Pap Smear: Not on file    Screening Not Indicated   Hepatitis C Screening Once for adults born between 1945 and 1965  More frequently in patients at high risk for Hepatitis C Hep C Antibody: 04/21/2020    Screening Current   Diabetes Screening 1-2 times per year if you're at risk for diabetes or have pre-diabetes Fasting glucose: 351 mg/dL (11/21/2022)  A1C: 12 9 % (11/21/2022)  Screening Not Indicated  History Diabetes   Cholesterol Screening Once every 5 years if you don't have a lipid disorder  May order more often based on risk factors  Lipid panel: 11/21/2022    Screening Not Indicated  History Lipid Disorder     Other Preventive Screenings Covered by Medicare:  1  Abdominal Aortic Aneurysm (AAA) Screening: covered once if your at risk  You're considered to be at risk if you have a family history of AAA  2  Lung Cancer Screening: covers low dose CT scan once per year if you meet all of the following conditions: (1) Age 50-69; (2) No signs or symptoms of lung cancer; (3) Current smoker or have quit smoking within the last 15 years; (4) You have a tobacco smoking history of at least 20 pack years (packs per day multiplied by number of years you smoked); (5) You get a written order from a healthcare provider  3  Glaucoma Screening: covered annually if you're considered high risk: (1) You have diabetes OR (2) Family history of glaucoma OR (3)  aged 48 and older OR (3)  American aged 72 and older  3  Osteoporosis Screening: covered every 2 years if you meet one of the following conditions: (1) You're estrogen deficient and at risk for osteoporosis based off medical history and other findings; (2) Have a vertebral abnormality; (3) On glucocorticoid therapy for more than 3 months; (4) Have primary hyperparathyroidism; (5) On osteoporosis medications and need to assess response to drug therapy  · Last bone density test (DXA Scan): 10/28/2021   5  HIV Screening: covered annually if you're between the age of 15-65  Also covered annually if you are younger than 13 and older than 72 with risk factors for HIV infection   For pregnant patients, it is covered up to 3 times per pregnancy  Immunizations:  Immunization Recommendations   Influenza Vaccine Annual influenza vaccination during flu season is recommended for all persons aged >= 6 months who do not have contraindications   Pneumococcal Vaccine   * Pneumococcal conjugate vaccine = PCV13 (Prevnar 13), PCV15 (Vaxneuvance), PCV20 (Prevnar 20)  * Pneumococcal polysaccharide vaccine = PPSV23 (Pneumovax) Adults 25-60 years old: 1-3 doses may be recommended based on certain risk factors  Adults 72 years old: 1-2 doses may be recommended based off what pneumonia vaccine you previously received   Hepatitis B Vaccine 3 dose series if at intermediate or high risk (ex: diabetes, end stage renal disease, liver disease)   Tetanus (Td) Vaccine - COST NOT COVERED BY MEDICARE PART B Following completion of primary series, a booster dose should be given every 10 years to maintain immunity against tetanus  Td may also be given as tetanus wound prophylaxis  Tdap Vaccine - COST NOT COVERED BY MEDICARE PART B Recommended at least once for all adults  For pregnant patients, recommended with each pregnancy  Shingles Vaccine (Shingrix) - COST NOT COVERED BY MEDICARE PART B  2 shot series recommended in those aged 48 and above     Health Maintenance Due:      Topic Date Due   • Cervical Cancer Screening  Never done   • Breast Cancer Screening: Mammogram  06/27/2024   • Colorectal Cancer Screening  04/18/2027   • Hepatitis C Screening  Completed     Immunizations Due:      Topic Date Due   • Hepatitis B Vaccine (1 of 3 - 3-dose series) Never done   • COVID-19 Vaccine (1) Never done     Advance Directives   What are advance directives? Advance directives are legal documents that state your wishes and plans for medical care  These plans are made ahead of time in case you lose your ability to make decisions for yourself   Advance directives can apply to any medical decision, such as the treatments you want, and if you want to donate organs  What are the types of advance directives? There are many types of advance directives, and each state has rules about how to use them  You may choose a combination of any of the following:  · Living will: This is a written record of the treatment you want  You can also choose which treatments you do not want, which to limit, and which to stop at a certain time  This includes surgery, medicine, IV fluid, and tube feedings  · Durable power of  for healthcare [facility]): This is a written record that states who you want to make healthcare choices for you when you are unable to make them for yourself  This person, called a proxy, is usually a family member or a friend  You may choose more than 1 proxy  · Do not resuscitate (DNR) order:  A DNR order is used in case your heart stops beating or you stop breathing  It is a request not to have certain forms of treatment, such as CPR  A DNR order may be included in other types of advance directives  · Medical directive: This covers the care that you want if you are in a coma, near death, or unable to make decisions for yourself  You can list the treatments you want for each condition  Treatment may include pain medicine, surgery, blood transfusions, dialysis, IV or tube feedings, and a ventilator (breathing machine)  · Values history: This document has questions about your views, beliefs, and how you feel and think about life  This information can help others choose the care that you would choose  Why are advance directives important? An advance directive helps you control your care  Although spoken wishes may be used, it is better to have your wishes written down  Spoken wishes can be misunderstood, or not followed  Treatments may be given even if you do not want them  An advance directive may make it easier for your family to make difficult choices about your care     Fall Prevention    Fall prevention  includes ways to make your home and other areas safer  It also includes ways you can move more carefully to prevent a fall  Health conditions that cause changes in your blood pressure, vision, or muscle strength and coordination may increase your risk for falls  Medicines may also increase your risk for falls if they make you dizzy, weak, or sleepy  Fall prevention tips:   · Stand or sit up slowly  · Use assistive devices as directed  · Wear shoes that fit well and have soles that   · Wear a personal alarm  · Stay active  · Manage your medical conditions  Home Safety Tips:  · Add items to prevent falls in the bathroom  · Keep paths clear  · Install bright lights in your home  · Keep items you use often on shelves within reach  · Paint or place reflective tape on the edges of your stairs  Weight Management   Why it is important to manage your weight:  Being overweight increases your risk of health conditions such as heart disease, high blood pressure, type 2 diabetes, and certain types of cancer  It can also increase your risk for osteoarthritis, sleep apnea, and other respiratory problems  Aim for a slow, steady weight loss  Even a small amount of weight loss can lower your risk of health problems  How to lose weight safely:  A safe and healthy way to lose weight is to eat fewer calories and get regular exercise  You can lose up about 1 pound a week by decreasing the number of calories you eat by 500 calories each day  Healthy meal plan for weight management:  A healthy meal plan includes a variety of foods, contains fewer calories, and helps you stay healthy  A healthy meal plan includes the following:  · Eat whole-grain foods more often  A healthy meal plan should contain fiber  Fiber is the part of grains, fruits, and vegetables that is not broken down by your body  Whole-grain foods are healthy and provide extra fiber in your diet   Some examples of whole-grain foods are whole-wheat breads and pastas, oatmeal, brown rice, and bulgur  · Eat a variety of vegetables every day  Include dark, leafy greens such as spinach, kale, alysha greens, and mustard greens  Eat yellow and orange vegetables such as carrots, sweet potatoes, and winter squash  · Eat a variety of fruits every day  Choose fresh or canned fruit (canned in its own juice or light syrup) instead of juice  Fruit juice has very little or no fiber  · Eat low-fat dairy foods  Drink fat-free (skim) milk or 1% milk  Eat fat-free yogurt and low-fat cottage cheese  Try low-fat cheeses such as mozzarella and other reduced-fat cheeses  · Choose meat and other protein foods that are low in fat  Choose beans or other legumes such as split peas or lentils  Choose fish, skinless poultry (chicken or turkey), or lean cuts of red meat (beef or pork)  Before you cook meat or poultry, cut off any visible fat  · Use less fat and oil  Try baking foods instead of frying them  Add less fat, such as margarine, sour cream, regular salad dressing and mayonnaise to foods  Eat fewer high-fat foods  Some examples of high-fat foods include french fries, doughnuts, ice cream, and cakes  · Eat fewer sweets  Limit foods and drinks that are high in sugar  This includes candy, cookies, regular soda, and sweetened drinks  Exercise:  Exercise at least 30 minutes per day on most days of the week  Some examples of exercise include walking, biking, dancing, and swimming  You can also fit in more physical activity by taking the stairs instead of the elevator or parking farther away from stores  Ask your healthcare provider about the best exercise plan for you  © Copyright [employer] 2018 Information is for End User's use only and may not be sold, redistributed or otherwise used for commercial purposes   All illustrations and images included in CareNotes® are the copyrighted property of A D A M , Inc  or [facility] Visit Patient Instructions  Thank you for completing your Welcome to Medicare Visit or Medicare Annual Wellness Visit today  Your next wellness visit will be due in one year (12/24/2023)  The screening/preventive services that you may require over the next 5-10 years are detailed below  Some tests may not apply to you based off risk factors and/or age  Screening tests ordered at today's visit but not completed yet may show as past due  Also, please note that scanned in results may not display below  Preventive Screenings:  Service Recommendations Previous Testing/Comments   Colorectal Cancer Screening  * Colonoscopy    * Fecal Occult Blood Test (FOBT)/Fecal Immunochemical Test (FIT)  * Fecal DNA/Cologuard Test  * Flexible Sigmoidoscopy Age: 39-70 years old   Colonoscopy: every 10 years (may be performed more frequently if at higher risk)  OR  FOBT/FIT: every 1 year  OR  Cologuard: every 3 years  OR  Sigmoidoscopy: every 5 years  Screening may be recommended earlier than age 39 if at higher risk for colorectal cancer  Also, an individualized decision between you and your healthcare provider will decide whether screening between the ages of 74-80 would be appropriate  Colonoscopy: 04/19/2022  FOBT/FIT: Not on file  Cologuard: Not on file  Sigmoidoscopy: Not on file    Screening Current     Breast Cancer Screening Age: 37 years old  Frequency: every 1-2 years  Not required if history of left and right mastectomy Mammogram: 06/27/2022    History Breast Cancer   Cervical Cancer Screening Between the ages of 21-29, pap smear recommended once every 3 years  Between the ages of 33-67, can perform pap smear with HPV co-testing every 5 years     Recommendations may differ for women with a history of total hysterectomy, cervical cancer, or abnormal pap smears in past  Pap Smear: Not on file    Screening Not Indicated   Hepatitis C Screening Once for adults born between [facility]  More frequently in patients at high risk for Hepatitis C Hep C Antibody: 04/21/2020    Screening Current   Diabetes Screening 1-2 times per year if you're at risk for diabetes or have pre-diabetes Fasting glucose: 351 mg/dL (11/21/2022)  A1C: 12 9 % (11/21/2022)  Screening Not Indicated  History Diabetes   Cholesterol Screening Once every 5 years if you don't have a lipid disorder  May order more often based on risk factors  Lipid panel: 11/21/2022    Screening Not Indicated  History Lipid Disorder     Other Preventive Screenings Covered by Medicare:  6  Abdominal Aortic Aneurysm (AAA) Screening: covered once if your at risk  You're considered to be at risk if you have a family history of AAA  7  Lung Cancer Screening: covers low dose CT scan once per year if you meet all of the following conditions: (1) Age 50-69; (2) No signs or symptoms of lung cancer; (3) Current smoker or have quit smoking within the last 15 years; (4) You have a tobacco smoking history of at least 20 pack years (packs per day multiplied by number of years you smoked); (5) You get a written order from a healthcare provider  8  Glaucoma Screening: covered annually if you're considered high risk: (1) You have diabetes OR (2) Family history of glaucoma OR (3)  aged 48 and older OR (3)  American aged 72 and older  5  Osteoporosis Screening: covered every 2 years if you meet one of the following conditions: (1) You're estrogen deficient and at risk for osteoporosis based off medical history and other findings; (2) Have a vertebral abnormality; (3) On glucocorticoid therapy for more than 3 months; (4) Have primary hyperparathyroidism; (5) On osteoporosis medications and need to assess response to drug therapy  · Last bone density test (DXA Scan): 10/28/2021  10  HIV Screening: covered annually if you're between the age of 12-76  Also covered annually if you are younger than 13 and older than 72 with risk factors for HIV infection   For pregnant patients, it is covered up to 3 times per pregnancy  Immunizations:  Immunization Recommendations   Influenza Vaccine Annual influenza vaccination during flu season is recommended for all persons aged >= 6 months who do not have contraindications   Pneumococcal Vaccine   * Pneumococcal conjugate vaccine = PCV13 (Prevnar 13), PCV15 (Vaxneuvance), PCV20 (Prevnar 20)  * Pneumococcal polysaccharide vaccine = PPSV23 (Pneumovax) Adults 25-60 years old: 1-3 doses may be recommended based on certain risk factors  Adults 72 years old: 1-2 doses may be recommended based off what pneumonia vaccine you previously received   Hepatitis B Vaccine 3 dose series if at intermediate or high risk (ex: diabetes, end stage renal disease, liver disease)   Tetanus (Td) Vaccine - COST NOT COVERED BY MEDICARE PART B Following completion of primary series, a booster dose should be given every 10 years to maintain immunity against tetanus  Td may also be given as tetanus wound prophylaxis  Tdap Vaccine - COST NOT COVERED BY MEDICARE PART B Recommended at least once for all adults  For pregnant patients, recommended with each pregnancy  Shingles Vaccine (Shingrix) - COST NOT COVERED BY MEDICARE PART B  2 shot series recommended in those aged 48 and above     Health Maintenance Due:      Topic Date Due   • Cervical Cancer Screening  Never done   • Breast Cancer Screening: Mammogram  06/27/2024   • Colorectal Cancer Screening  04/18/2027   • Hepatitis C Screening  Completed     Immunizations Due:      Topic Date Due   • Hepatitis B Vaccine (1 of 3 - 3-dose series) Never done   • COVID-19 Vaccine (1) Never done     Advance Directives   What are advance directives? Advance directives are legal documents that state your wishes and plans for medical care  These plans are made ahead of time in case you lose your ability to make decisions for yourself   Advance directives can apply to any medical decision, such as the treatments you want, and if you want to donate organs  What are the types of advance directives? There are many types of advance directives, and each state has rules about how to use them  You may choose a combination of any of the following:  · Living will: This is a written record of the treatment you want  You can also choose which treatments you do not want, which to limit, and which to stop at a certain time  This includes surgery, medicine, IV fluid, and tube feedings  · Durable power of  for healthcare [facility]): This is a written record that states who you want to make healthcare choices for you when you are unable to make them for yourself  This person, called a proxy, is usually a family member or a friend  You may choose more than 1 proxy  · Do not resuscitate (DNR) order:  A DNR order is used in case your heart stops beating or you stop breathing  It is a request not to have certain forms of treatment, such as CPR  A DNR order may be included in other types of advance directives  · Medical directive: This covers the care that you want if you are in a coma, near death, or unable to make decisions for yourself  You can list the treatments you want for each condition  Treatment may include pain medicine, surgery, blood transfusions, dialysis, IV or tube feedings, and a ventilator (breathing machine)  · Values history: This document has questions about your views, beliefs, and how you feel and think about life  This information can help others choose the care that you would choose  Why are advance directives important? An advance directive helps you control your care  Although spoken wishes may be used, it is better to have your wishes written down  Spoken wishes can be misunderstood, or not followed  Treatments may be given even if you do not want them  An advance directive may make it easier for your family to make difficult choices about your care     Fall Prevention    Fall prevention  includes ways to make your home and other areas safer  It also includes ways you can move more carefully to prevent a fall  Health conditions that cause changes in your blood pressure, vision, or muscle strength and coordination may increase your risk for falls  Medicines may also increase your risk for falls if they make you dizzy, weak, or sleepy  Fall prevention tips:   · Stand or sit up slowly  · Use assistive devices as directed  · Wear shoes that fit well and have soles that   · Wear a personal alarm  · Stay active  · Manage your medical conditions  Home Safety Tips:  · Add items to prevent falls in the bathroom  · Keep paths clear  · Install bright lights in your home  · Keep items you use often on shelves within reach  · Paint or place reflective tape on the edges of your stairs  Weight Management   Why it is important to manage your weight:  Being overweight increases your risk of health conditions such as heart disease, high blood pressure, type 2 diabetes, and certain types of cancer  It can also increase your risk for osteoarthritis, sleep apnea, and other respiratory problems  Aim for a slow, steady weight loss  Even a small amount of weight loss can lower your risk of health problems  How to lose weight safely:  A safe and healthy way to lose weight is to eat fewer calories and get regular exercise  You can lose up about 1 pound a week by decreasing the number of calories you eat by 500 calories each day  Healthy meal plan for weight management:  A healthy meal plan includes a variety of foods, contains fewer calories, and helps you stay healthy  A healthy meal plan includes the following:  · Eat whole-grain foods more often  A healthy meal plan should contain fiber  Fiber is the part of grains, fruits, and vegetables that is not broken down by your body  Whole-grain foods are healthy and provide extra fiber in your diet   Some examples of whole-grain foods are whole-wheat breads and pastas, oatmeal, brown rice, and bulgur  · Eat a variety of vegetables every day  Include dark, leafy greens such as spinach, kale, alysha greens, and mustard greens  Eat yellow and orange vegetables such as carrots, sweet potatoes, and winter squash  · Eat a variety of fruits every day  Choose fresh or canned fruit (canned in its own juice or light syrup) instead of juice  Fruit juice has very little or no fiber  · Eat low-fat dairy foods  Drink fat-free (skim) milk or 1% milk  Eat fat-free yogurt and low-fat cottage cheese  Try low-fat cheeses such as mozzarella and other reduced-fat cheeses  · Choose meat and other protein foods that are low in fat  Choose beans or other legumes such as split peas or lentils  Choose fish, skinless poultry (chicken or turkey), or lean cuts of red meat (beef or pork)  Before you cook meat or poultry, cut off any visible fat  · Use less fat and oil  Try baking foods instead of frying them  Add less fat, such as margarine, sour cream, regular salad dressing and mayonnaise to foods  Eat fewer high-fat foods  Some examples of high-fat foods include french fries, doughnuts, ice cream, and cakes  · Eat fewer sweets  Limit foods and drinks that are high in sugar  This includes candy, cookies, regular soda, and sweetened drinks  Exercise:  Exercise at least 30 minutes per day on most days of the week  Some examples of exercise include walking, biking, dancing, and swimming  You can also fit in more physical activity by taking the stairs instead of the elevator or parking farther away from stores  Ask your healthcare provider about the best exercise plan for you  © Copyright [address] 2018 Information is for End User's use only and may not be sold, redistributed or otherwise used for commercial purposes   All illustrations and images included in CareNotes® are the copyrighted property of A D A M [employer] Inc  or [facility] Preventive Visit Patient Instructions  Thank you for completing your Welcome to Medicare Visit or Medicare Annual Wellness Visit today  Your next wellness visit will be due in one year (12/24/2023)  The screening/preventive services that you may require over the next 5-10 years are detailed below  Some tests may not apply to you based off risk factors and/or age  Screening tests ordered at today's visit but not completed yet may show as past due  Also, please note that scanned in results may not display below  Preventive Screenings:  Service Recommendations Previous Testing/Comments   Colorectal Cancer Screening  * Colonoscopy    * Fecal Occult Blood Test (FOBT)/Fecal Immunochemical Test (FIT)  * Fecal DNA/Cologuard Test  * Flexible Sigmoidoscopy Age: 39-70 years old   Colonoscopy: every 10 years (may be performed more frequently if at higher risk)  OR  FOBT/FIT: every 1 year  OR  Cologuard: every 3 years  OR  Sigmoidoscopy: every 5 years  Screening may be recommended earlier than age 39 if at higher risk for colorectal cancer  Also, an individualized decision between you and your healthcare provider will decide whether screening between the ages of 74-80 would be appropriate  Colonoscopy: 04/19/2022  FOBT/FIT: Not on file  Cologuard: Not on file  Sigmoidoscopy: Not on file    Screening Current     Breast Cancer Screening Age: 37 years old  Frequency: every 1-2 years  Not required if history of left and right mastectomy Mammogram: 06/27/2022    History Breast Cancer   Cervical Cancer Screening Between the ages of 21-29, pap smear recommended once every 3 years  Between the ages of 33-67, can perform pap smear with HPV co-testing every 5 years     Recommendations may differ for women with a history of total hysterectomy, cervical cancer, or abnormal pap smears in past  Pap Smear: Not on file    Screening Not Indicated   Hepatitis C Screening Once for adults born [place] [facility]  More frequently in patients at high risk for Hepatitis C Hep C Antibody: 04/21/2020    Screening Current   Diabetes Screening 1-2 times per year if you're at risk for diabetes or have pre-diabetes Fasting glucose: 351 mg/dL (11/21/2022)  A1C: 12 9 % (11/21/2022)  Screening Not Indicated  History Diabetes   Cholesterol Screening Once every 5 years if you don't have a lipid disorder  May order more often based on risk factors  Lipid panel: 11/21/2022    Screening Not Indicated  History Lipid Disorder     Other Preventive Screenings Covered by Medicare:  11  Abdominal Aortic Aneurysm (AAA) Screening: covered once if your at risk  You're considered to be at risk if you have a family history of AAA  12  Lung Cancer Screening: covers low dose CT scan once per year if you meet all of the following conditions: (1) Age 50-69; (2) No signs or symptoms of lung cancer; (3) Current smoker or have quit smoking within the last 15 years; (4) You have a tobacco smoking history of at least 20 pack years (packs per day multiplied by number of years you smoked); (5) You get a written order from a healthcare provider  15  Glaucoma Screening: covered annually if you're considered high risk: (1) You have diabetes OR (2) Family history of glaucoma OR (3)  aged 48 and older OR (3)  American aged 72 and older  15  Osteoporosis Screening: covered every 2 years if you meet one of the following conditions: (1) You're estrogen deficient and at risk for osteoporosis based off medical history and other findings; (2) Have a vertebral abnormality; (3) On glucocorticoid therapy for more than 3 months; (4) Have primary hyperparathyroidism; (5) On osteoporosis medications and need to assess response to drug therapy  · Last bone density test (DXA Scan): 10/28/2021  15  HIV Screening: covered annually if you're between the age of 12-76  Also covered annually if you are younger than 13 and older than 72 with risk factors for HIV infection   For pregnant patients, it is donate organs  What are the types of advance directives? There are many types of advance directives, and each state has rules about how to use them  You may choose a combination of any of the following:  · Living will: This is a written record of the treatment you want  You can also choose which treatments you do not want, which to limit, and which to stop at a certain time  This includes surgery, medicine, IV fluid, and tube feedings  · Durable power of  for healthcare [facility]): This is a written record that states who you want to make healthcare choices for you when you are unable to make them for yourself  This person, called a proxy, is usually a family member or a friend  You may choose more than 1 proxy  · Do not resuscitate (DNR) order:  A DNR order is used in case your heart stops beating or you stop breathing  It is a request not to have certain forms of treatment, such as CPR  A DNR order may be included in other types of advance directives  · Medical directive: This covers the care that you want if you are in a coma, near death, or unable to make decisions for yourself  You can list the treatments you want for each condition  Treatment may include pain medicine, surgery, blood transfusions, dialysis, IV or tube feedings, and a ventilator (breathing machine)  · Values history: This document has questions about your views, beliefs, and how you feel and think about life  This information can help others choose the care that you would choose  Why are advance directives important? An advance directive helps you control your care  Although spoken wishes may be used, it is better to have your wishes written down  Spoken wishes can be misunderstood, or not followed  Treatments may be given even if you do not want them  An advance directive may make it easier for your family to make difficult choices about your care     Fall Prevention    Fall prevention  includes ways to make your home and oatmeal, brown rice, and bulgur  · Eat a variety of vegetables every day  Include dark, leafy greens such as spinach, kale, alysha greens, and mustard greens  Eat yellow and orange vegetables such as carrots, sweet potatoes, and winter squash  · Eat a variety of fruits every day  Choose fresh or canned fruit (canned in its own juice or light syrup) instead of juice  Fruit juice has very little or no fiber  · Eat low-fat dairy foods  Drink fat-free (skim) milk or 1% milk  Eat fat-free yogurt and low-fat cottage cheese  Try low-fat cheeses such as mozzarella and other reduced-fat cheeses  · Choose meat and other protein foods that are low in fat  Choose beans or other legumes such as split peas or lentils  Choose fish, skinless poultry (chicken or turkey), or lean cuts of red meat (beef or pork)  Before you cook meat or poultry, cut off any visible fat  · Use less fat and oil  Try baking foods instead of frying them  Add less fat, such as margarine, sour cream, regular salad dressing and mayonnaise to foods  Eat fewer high-fat foods  Some examples of high-fat foods include french fries, doughnuts, ice cream, and cakes  · Eat fewer sweets  Limit foods and drinks that are high in sugar  This includes candy, cookies, regular soda, and sweetened drinks  Exercise:  Exercise at least 30 minutes per day on most days of the week  Some examples of exercise include walking, biking, dancing, and swimming  You can also fit in more physical activity by taking the stairs instead of the elevator or parking farther away from stores  Ask your healthcare provider about the best exercise plan for you  © Copyright [address] 2018 Information is for End User's use only and may not be sold, redistributed or otherwise used for commercial purposes   All illustrations and images included in CareNotes® are the copyrighted property of A D A M , Inc  or [facility] Preventive Visit Patient Instructions  Thank you for completing your Welcome to Medicare Visit or Medicare Annual Wellness Visit today  Your next wellness visit will be due in one year (12/24/2023)  The screening/preventive services that you may require over the next 5-10 years are detailed below  Some tests may not apply to you based off risk factors and/or age  Screening tests ordered at today's visit but not completed yet may show as past due  Also, please note that scanned in results may not display below  Preventive Screenings:  Service Recommendations Previous Testing/Comments   Colorectal Cancer Screening  * Colonoscopy    * Fecal Occult Blood Test (FOBT)/Fecal Immunochemical Test (FIT)  * Fecal DNA/Cologuard Test  * Flexible Sigmoidoscopy Age: 39-70 years old   Colonoscopy: every 10 years (may be performed more frequently if at higher risk)  OR  FOBT/FIT: every 1 year  OR  Cologuard: every 3 years  OR  Sigmoidoscopy: every 5 years  Screening may be recommended earlier than age 39 if at higher risk for colorectal cancer  Also, an individualized decision between you and your healthcare provider will decide whether screening between the ages of 74-80 would be appropriate  Colonoscopy: 04/19/2022  FOBT/FIT: Not on file  Cologuard: Not on file  Sigmoidoscopy: Not on file    Screening Current     Breast Cancer Screening Age: 37 years old  Frequency: every 1-2 years  Not required if history of left and right mastectomy Mammogram: 06/27/2022    History Breast Cancer   Cervical Cancer Screening Between the ages of 21-29, pap smear recommended once every 3 years  Between the ages of 33-67, can perform pap smear with HPV co-testing every 5 years     Recommendations may differ for women with a history of total hysterectomy, cervical cancer, or abnormal pap smears in past  Pap Smear: Not on file    Screening Not Indicated   Hepatitis C Screening Once for adults born between [facility] [place]  More frequently in patients at high risk for Hepatitis C Hep C Antibody: 04/21/2020    Screening Current   Diabetes Screening 1-2 times per year if you're at risk for diabetes or have pre-diabetes Fasting glucose: 351 mg/dL (11/21/2022)  A1C: 12 9 % (11/21/2022)  Screening Not Indicated  History Diabetes   Cholesterol Screening Once every 5 years if you don't have a lipid disorder  May order more often based on risk factors  Lipid panel: 11/21/2022    Screening Not Indicated  History Lipid Disorder     Other Preventive Screenings Covered by Medicare:  16  Abdominal Aortic Aneurysm (AAA) Screening: covered once if your at risk  You're considered to be at risk if you have a family history of AAA  17  Lung Cancer Screening: covers low dose CT scan once per year if you meet all of the following conditions: (1) Age 50-69; (2) No signs or symptoms of lung cancer; (3) Current smoker or have quit smoking within the last 15 years; (4) You have a tobacco smoking history of at least 20 pack years (packs per day multiplied by number of years you smoked); (5) You get a written order from a healthcare provider  25  Glaucoma Screening: covered annually if you're considered high risk: (1) You have diabetes OR (2) Family history of glaucoma OR (3)  aged 48 and older OR (3)  American aged 72 and older  23  Osteoporosis Screening: covered every 2 years if you meet one of the following conditions: (1) You're estrogen deficient and at risk for osteoporosis based off medical history and other findings; (2) Have a vertebral abnormality; (3) On glucocorticoid therapy for more than 3 months; (4) Have primary hyperparathyroidism; (5) On osteoporosis medications and need to assess response to drug therapy  · Last bone density test (DXA Scan): 10/28/2021   20  HIV Screening: covered annually if you're between the age of 15-65  Also covered annually if you are younger than 13 and older than 72 with risk factors for HIV infection   For pregnant patients, it is donate organs  What are the types of advance directives? There are many types of advance directives, and each state has rules about how to use them  You may choose a combination of any of the following:  · Living will: This is a written record of the treatment you want  You can also choose which treatments you do not want, which to limit, and which to stop at a certain time  This includes surgery, medicine, IV fluid, and tube feedings  · Durable power of  for healthcare [facility]): This is a written record that states who you want to make healthcare choices for you when you are unable to make them for yourself  This person, called a proxy, is usually a family member or a friend  You may choose more than 1 proxy  · Do not resuscitate (DNR) order:  A DNR order is used in case your heart stops beating or you stop breathing  It is a request not to have certain forms of treatment, such as CPR  A DNR order may be included in other types of advance directives  · Medical directive: This covers the care that you want if you are in a coma, near death, or unable to make decisions for yourself  You can list the treatments you want for each condition  Treatment may include pain medicine, surgery, blood transfusions, dialysis, IV or tube feedings, and a ventilator (breathing machine)  · Values history: This document has questions about your views, beliefs, and how you feel and think about life  This information can help others choose the care that you would choose  Why are advance directives important? An advance directive helps you control your care  Although spoken wishes may be used, it is better to have your wishes written down  Spoken wishes can be misunderstood, or not followed  Treatments may be given even if you do not want them  An advance directive may make it easier for your family to make difficult choices about your care     Fall Prevention    Fall prevention  includes ways to make your home and oatmeal, brown rice, and bulgur  · Eat a variety of vegetables every day  Include dark, leafy greens such as spinach, kale, alysha greens, and mustard greens  Eat yellow and orange vegetables such as carrots, sweet potatoes, and winter squash  · Eat a variety of fruits every day  Choose fresh or canned fruit (canned in its own juice or light syrup) instead of juice  Fruit juice has very little or no fiber  · Eat low-fat dairy foods  Drink fat-free (skim) milk or 1% milk  Eat fat-free yogurt and low-fat cottage cheese  Try low-fat cheeses such as mozzarella and other reduced-fat cheeses  · Choose meat and other protein foods that are low in fat  Choose beans or other legumes such as split peas or lentils  Choose fish, skinless poultry (chicken or turkey), or lean cuts of red meat (beef or pork)  Before you cook meat or poultry, cut off any visible fat  · Use less fat and oil  Try baking foods instead of frying them  Add less fat, such as margarine, sour cream, regular salad dressing and mayonnaise to foods  Eat fewer high-fat foods  Some examples of high-fat foods include french fries, doughnuts, ice cream, and cakes  · Eat fewer sweets  Limit foods and drinks that are high in sugar  This includes candy, cookies, regular soda, and sweetened drinks  Exercise:  Exercise at least 30 minutes per day on most days of the week  Some examples of exercise include walking, biking, dancing, and swimming  You can also fit in more physical activity by taking the stairs instead of the elevator or parking farther away from stores  Ask your healthcare provider about the best exercise plan for you  © Copyright [employer] 2018 Information is for End User's use only and may not be sold, redistributed or otherwise used for commercial purposes   All illustrations and images included in CareNotes® are the copyrighted property of A D A M , Inc  or [address] [place] [address]

## 2022-12-27 ENCOUNTER — OFFICE VISIT (OUTPATIENT)
Dept: ENDOCRINOLOGY | Facility: CLINIC | Age: 67
End: 2022-12-27

## 2022-12-27 ENCOUNTER — TELEPHONE (OUTPATIENT)
Dept: PULMONOLOGY | Facility: CLINIC | Age: 67
End: 2022-12-27

## 2022-12-27 VITALS
HEART RATE: 86 BPM | WEIGHT: 176.4 LBS | SYSTOLIC BLOOD PRESSURE: 130 MMHG | BODY MASS INDEX: 29.39 KG/M2 | DIASTOLIC BLOOD PRESSURE: 70 MMHG | HEIGHT: 65 IN

## 2022-12-27 DIAGNOSIS — E11.65 TYPE 2 DIABETES MELLITUS WITH HYPERGLYCEMIA, WITH LONG-TERM CURRENT USE OF INSULIN (HCC): Primary | ICD-10-CM

## 2022-12-27 DIAGNOSIS — Z79.4 TYPE 2 DIABETES MELLITUS WITH HYPERGLYCEMIA, WITH LONG-TERM CURRENT USE OF INSULIN (HCC): Primary | ICD-10-CM

## 2022-12-27 DIAGNOSIS — R80.9 TYPE 2 DIABETES MELLITUS WITH MICROALBUMINURIA, WITH LONG-TERM CURRENT USE OF INSULIN (HCC): ICD-10-CM

## 2022-12-27 DIAGNOSIS — Z79.4 TYPE 2 DIABETES MELLITUS WITH MICROALBUMINURIA, WITH LONG-TERM CURRENT USE OF INSULIN (HCC): ICD-10-CM

## 2022-12-27 DIAGNOSIS — E11.29 TYPE 2 DIABETES MELLITUS WITH MICROALBUMINURIA, WITH LONG-TERM CURRENT USE OF INSULIN (HCC): ICD-10-CM

## 2022-12-27 DIAGNOSIS — E11.39 TYPE 2 DIABETES MELLITUS WITH OTHER OPHTHALMIC COMPLICATION, WITH LONG-TERM CURRENT USE OF INSULIN (HCC): ICD-10-CM

## 2022-12-27 DIAGNOSIS — Z79.4 TYPE 2 DIABETES MELLITUS WITH OTHER OPHTHALMIC COMPLICATION, WITH LONG-TERM CURRENT USE OF INSULIN (HCC): ICD-10-CM

## 2022-12-27 RX ORDER — BLOOD-GLUCOSE,RECEIVER,CONT
EACH MISCELLANEOUS
Qty: 1 EACH | Refills: 0 | Status: SHIPPED | OUTPATIENT
Start: 2022-12-27

## 2022-12-27 RX ORDER — BLOOD-GLUCOSE TRANSMITTER
EACH MISCELLANEOUS
Qty: 1 EACH | Refills: 3 | Status: SHIPPED | OUTPATIENT
Start: 2022-12-27

## 2022-12-27 RX ORDER — BLOOD-GLUCOSE SENSOR
EACH MISCELLANEOUS
Qty: 1 EACH | Refills: 11 | Status: SHIPPED | OUTPATIENT
Start: 2022-12-27

## 2022-12-27 NOTE — PROGRESS NOTES
Jennifer Ryder 78 y o  female MRN: [id number]    Encounter: [id number]      Assessment/Plan     Problem List Items Addressed This Visit        Endocrine    Diabetes mellitus ([place] [address] )    Relevant Orders    Ambulatory referral to Diabetic Education    Ambulatory referral to Diabetic Education    Type 2 diabetes mellitus with hyperglycemia, with long-term current use of insulin ([place] [address] )       Lab Results   Component Value Date    HGBA1C 12 9 (H) 11/21/2022   diabetes remains poorly controlled - compliance is questionable - again given written instruction on taking levemir 55 units at bedtime and humalog 20 units before meals   She was given a log sheet and advised to write down the dose of insulin every time she takes it    She may benefit for personal CGM to help with monitoring - refer for MNT          Relevant Orders    Ambulatory referral to Diabetic Education    Type 2 diabetes mellitus with microalbuminuria, with long-term current use of insulin ([place] [address] ) - Primary       Lab Results   Component Value Date    HGBA1C 12 9 (H) 11/21/2022   counseled on importance of improving glycemic control to delay progression of complications          Relevant Orders    Ambulatory referral to Diabetic Education     CC: Diabetes    History of Present Illness     HPI:  71-year-old female with type 2 diabetes on insulin therapy seen in follow-up   She is accompanied by her sister  who is providing translation  Tried V-GO last year but had difficulty applying it  Was supposed to come back and see the educator for re teaching however decided not to      Current regimen   levemir -50 units at 9 pm   humalog 20 units a day      Checks anywhere from 0-3/day and sugars range between 200-330s  Eats 3 meals a day - and claims to take humalog 15 mins before melas and takes  levemir at bedtime      Review of Systems    Historical Information   Past Medical History:   Diagnosis Date   • Abdominal infection ([place] [address] )     h-pylori   • Abnormal chest x-ray 4/5/2019   • Asthma    • Breast cancer ([facility] [address] ) 06/26/2018   • Cancer ([facility] [address] )     BREAST   • Diabetes mellitus ([facility] [address] )    • Hiatal hernia    • History of chemotherapy    • History of radiation therapy    • Hypercholesterolemia    • Hypertension    • Hypothyroid    • Renal disorder    • Rheumatoid arthritis [facility])      Past Surgical History:   Procedure Laterality Date   • 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   • BREAST LUMPECTOMY Right 6/26/2018    Procedure: RIGHT BREAST NEEDLE LOCALIZATION X2 WITH RIGHT BREAST LUMPECTOMY ( NEEDLE LOC @ 1000); Surgeon: Urvashi Santiago MD;  Location: [facility];  Service: Surgical Oncology   • BREAST LUMPECTOMY Right 8/2/2018    Procedure: LYMPHOSCINITGRAPHY INTRAOPERATIVE LYMPHATIC MAPPING , RIGHT SENTINEL NODE BIOPSY, REEXCISION  RIGHT BREAST LUMPECTOMY CAVITY (SUPERIOR MARGIN); Surgeon: Urvashi Santiago MD;  Location: [facility];  Service: Surgical Oncology   • BREAST SURGERY Left    • CATARACT EXTRACTION, BILATERAL Bilateral    • COLONOSCOPY     • EGD AND COLONOSCOPY N/A 9/5/2018    Procedure: EGD AND COLONOSCOPY;  Surgeon: Kingsley Guan MD;  Location: [facility] GI LAB; Service: Gastroenterology   • FL GUIDED CENTRAL VENOUS ACCESS DEVICE INSERTION  9/13/2018   • KNEE SURGERY Right    • MAMMO NEEDLE LOCALIZATION RIGHT (ALL INC) Right 6/26/2018   • MAMMO STEREOTACTIC BREAST BIOPSY RIGHT (ALL INC) Right 5/23/2018   • RETINAL DETACHMENT SURGERY Right    • TUBAL LIGATION     • TUNNELED VENOUS PORT PLACEMENT Left 9/13/2018    Procedure: INSERTION VENOUS PORT (PORT-A-CATH);   Surgeon: Urvashi Santiago MD;  Location: [facility];  Service: Surgical Oncology   • UPPER GASTROINTESTINAL ENDOSCOPY       Social History   Social History     Substance and Sexual Activity   Alcohol Use No     Social History     Substance and Sexual Activity   Drug Use No     Social History     Tobacco Use   Smoking Status Never   Smokeless Tobacco Never     Family History:   Family History   Problem Relation Age of Onset   • Diabetes Mother    • Hypertension Mother    • Diabetes Father    • Hypertension Father    • Diabetes Brother    • Hypertension Brother    • Prostate cancer Brother    • Cancer Maternal Grandfather    • No Known Problems Sister    • No Known Problems Daughter    • No Known Problems Sister    • No Known Problems Sister    • No Known Problems Sister    • No Known Problems Sister    • No Known Problems Daughter    • No Known Problems Daughter        Meds/Allergies   Current Outpatient Medications   Medication Sig Dispense Refill   • anastrozole (ARIMIDEX) 1 mg tablet Take 1 tablet (1 mg total) by mouth daily 90 tablet 3   • aspirin 81 mg chewable tablet Chew 81 mg daily     • atorvastatin (LIPITOR) 40 mg tablet Take 1 tablet (40 mg total) by mouth daily 90 tablet 3   • Continuous Blood Gluc  (Dexcom G6 ) AARON Use as directed for continuous glucose monitoring 1 each 0   • Continuous Blood Gluc Sensor (Dexcom G6 Sensor) MISC Use as directed for continuous glucose monitoring  Change every 10 days 1 each 11   • Continuous Blood Gluc Transmit (Dexcom G6 Transmitter) MISC Use as directed for continuous glucose monitoring   Change every 3 months 1 each 3   • ergocalciferol (VITAMIN D2) 50,000 units Take 1 capsule (50,000 Units total) by mouth once a week 12 capsule 1   • glucose blood (OneTouch Ultra) test strip Use 1 each 3 (three) times a day Use as instructed 300 each 4   • insulin detemir (Levemir FlexTouch) 100 Units/mL injection pen INJECT 50 UNITS UNDER THE SKIN DAILY AT BEDTIME 45 mL 0   • insulin lispro (HumaLOG) 100 units/mL injection pen 20 units before meals 45 mL 0   • loratadine (CLARITIN) 10 mg tablet TAKE 1 TABLET (10 MG TOTAL) BY MOUTH DAILY 90 tablet 2   • losartan (COZAAR) 100 MG tablet TAKE 1 TABLET (100 MG TOTAL) BY MOUTH DAILY 90 tablet 0   • Mag-G 500 (27 Mg) MG tablet TAKE 1 TABLET (500 MG TOTAL) BY MOUTH DAILY 90 tablet 1   • metoprolol tartrate (LOPRESSOR) 25 mg tablet Take 0 5 tablets (12 5 mg total) by mouth every 12 (twelve) hours 180 tablet 1   • omeprazole (PriLOSEC) 20 mg delayed release capsule TAKE 1 CAPSULE (20 MG TOTAL) BY MOUTH DAILY 90 capsule 0   • OneTouch Delica Lancets 77E MISC USE 3 (THREE) TIMES A  each 4   • UltiCare Micro Pen Needles 32G X 4 MM MISC INJECT UNDER THE SKIN 4 (FOUR) TIMES A  each 0   • acetaminophen (TYLENOL) 650 mg CR tablet Take 1 tablet (650 mg total) by mouth every 8 (eight) hours as needed for mild pain 30 tablet 0   • albuterol (2 5 mg/3 mL) 0 083 % nebulizer solution Take 3 mL (2 5 mg total) by nebulization every 6 (six) hours as needed for wheezing or shortness of breath 75 mL 5   • albuterol (PROVENTIL HFA,VENTOLIN HFA) 90 mcg/act inhaler Inhale 1 puff every 4 (four) hours as needed for wheezing 18 g 5   • Diclofenac Sodium (VOLTAREN) 1 % Apply 2 g topically 2 (two) times a day (Patient not taking: Reported on 12/27/2022) 150 g 0   • EPINEPHrine (Auvi-Q) 0 1 mg/0 1 mL SOAJ Inject 0 3 mL (0 3 mg total) into a muscle once for 1 dose 2 each 5   • fluticasone (FLONASE) 50 mcg/act nasal spray 1 spray into each nostril daily (Patient not taking: Reported on 12/27/2022) 18 2 mL 5   • fluticasone (FLONASE) 50 mcg/act nasal spray 1 spray into each nostril daily (Patient not taking: Reported on 12/27/2022) 16 g 0   • fluticasone-salmeterol (Advair HFA) 230-21 MCG/ACT inhaler Inhale 2 puffs 2 (two) times a day Rinse mouth after use  (Patient not taking: Reported on 12/27/2022) 12 g 5   • guaiFENesin (MUCINEX) 600 mg 12 hr tablet Take 2 tablets (1,200 mg total) by mouth every 12 (twelve) hours (Patient not taking: Reported on 12/27/2022) 60 tablet 0   • zileuton (ZYFLO CR) 600 MG Take 1 tablet (600 mg total) by mouth 2 (two) times a day (Patient not taking: Reported on 12/27/2022) 60 tablet 5     No current facility-administered medications for this visit       Allergies   Allergen Reactions   • Codeine Other (See Comments)     unknown • Latex Other (See Comments)     fungus       Objective   Vitals: Blood pressure 130/70, pulse 86, height 5' 5" (1 651 m), weight 80 kg (176 lb 6 4 oz), not currently breastfeeding  Physical Exam  Vitals reviewed  Constitutional:       General: She is not in acute distress  Appearance: Normal appearance  She is not ill-appearing, toxic-appearing or diaphoretic  HENT:      Head: Normocephalic and atraumatic  Eyes:      General: No scleral icterus  Extraocular Movements: Extraocular movements intact  Cardiovascular:      Rate and Rhythm: Normal rate and regular rhythm  Heart sounds: Normal heart sounds  Pulmonary:      Effort: Pulmonary effort is normal  No respiratory distress  Breath sounds: Normal breath sounds  Abdominal:      Palpations: Abdomen is soft  Musculoskeletal:      Cervical back: Neck supple  Right lower leg: No edema  Left lower leg: No edema  Lymphadenopathy:      Cervical: No cervical adenopathy  Skin:     General: Skin is warm and dry  Neurological:      General: No focal deficit present  Mental Status: She is alert and oriented to person, place, and time  Psychiatric:         Mood and Affect: Mood normal          Behavior: Behavior normal          The history was obtained from the review of the chart, patient and family      Lab Results:   Lab Results   Component Value Date/Time    Hemoglobin A1C 12 9 (H) 11/21/2022 08:51 AM    Hemoglobin A1C 14 5 (A) 09/26/2022 09:11 AM    Hemoglobin A1C 13 8 (A) 06/13/2022 10:58 AM    Hemoglobin A1C 14 0 (H) 03/01/2022 09:37 AM    Hemoglobin A1C 14 4 (A) 02/16/2022 12:31 PM    WBC 8 68 11/10/2022 05:56 PM    WBC 9 05 08/18/2022 03:07 PM    WBC 5 99 07/27/2022 11:53 AM    Hemoglobin 12 7 11/10/2022 05:56 PM    Hemoglobin 12 4 08/18/2022 03:07 PM    Hemoglobin 12 0 07/27/2022 11:53 AM    Hematocrit 38 8 11/10/2022 05:56 PM    Hematocrit 37 6 08/18/2022 03:07 PM    Hematocrit 37 4 07/27/2022 11:53 AM    MCV 87 11/10/2022 05:56 PM    MCV 88 08/18/2022 03:07 PM    MCV 90 07/27/2022 11:53 AM    Platelets 273 10/91/6409 05:56 PM    Platelets 030 34/80/0472 03:07 PM    Platelets 734 00/17/6660 11:53 AM    BUN 18 11/21/2022 08:51 AM    BUN 18 11/10/2022 05:56 PM    BUN 19 08/18/2022 03:07 PM    Potassium 4 0 11/21/2022 08:51 AM    Potassium 3 9 11/10/2022 05:56 PM    Potassium 3 7 08/18/2022 03:07 PM    Chloride 102 11/21/2022 08:51 AM    Chloride 98 11/10/2022 05:56 PM    Chloride 99 08/18/2022 03:07 PM    CO2 28 11/21/2022 08:51 AM    CO2 26 11/10/2022 05:56 PM    CO2 31 08/18/2022 03:07 PM    Creatinine 0 74 11/21/2022 08:51 AM    Creatinine 0 83 11/10/2022 05:56 PM    Creatinine 0 75 08/18/2022 03:07 PM    AST 13 11/21/2022 08:51 AM    AST 12 08/18/2022 03:07 PM    AST 25 06/22/2022 11:49 AM    ALT 21 11/21/2022 08:51 AM    ALT 26 08/18/2022 03:07 PM    ALT 23 06/22/2022 11:49 AM    Albumin 3 0 (L) 11/21/2022 08:51 AM    Albumin 3 3 (L) 08/18/2022 03:07 PM    Albumin 4 1 06/22/2022 11:49 AM    HDL, Direct 44 (L) 11/21/2022 08:51 AM    HDL, Direct 55 06/22/2022 11:49 AM    HDL, Direct 47 (L) 03/01/2022 09:37 AM    Triglycerides 161 (H) 11/21/2022 08:51 AM    Triglycerides 151 (H) 06/22/2022 11:49 AM    Triglycerides 168 (H) 03/01/2022 09:37 AM           Imaging Studies:     Portions of the record may have been created with voice recognition software  Occasional wrong word or "sound a like" substitutions may have occurred due to the inherent limitations of voice recognition software  Read the chart carefully and recognize, using context, where substitutions have occurred

## 2022-12-27 NOTE — TELEPHONE ENCOUNTER
Left a voicemail for patient to schedule a 4 month follow up at our [facility] office with Dr Rob Alfaro or CARLOS in February

## 2022-12-28 NOTE — ASSESSMENT & PLAN NOTE
Lab Results   Component Value Date    HGBA1C 12 9 (H) 11/21/2022   diabetes remains poorly controlled - compliance is questionable - again given written instruction on taking levemir 55 units at bedtime and humalog 20 units before meals   She was given a log sheet and advised to write down the dose of insulin every time she takes it    She may benefit for personal CGM to help with monitoring - refer for MNT

## 2022-12-28 NOTE — ASSESSMENT & PLAN NOTE
Lab Results   Component Value Date    HGBA1C 12 9 (H) 11/21/2022   counseled on importance of improving glycemic control to delay progression of complications

## 2023-01-02 DIAGNOSIS — Z79.4 TYPE 2 DIABETES MELLITUS WITH COMPLICATION, WITH LONG-TERM CURRENT USE OF INSULIN (HCC): ICD-10-CM

## 2023-01-02 DIAGNOSIS — E11.8 TYPE 2 DIABETES MELLITUS WITH COMPLICATION, WITH LONG-TERM CURRENT USE OF INSULIN (HCC): ICD-10-CM

## 2023-01-03 RX ORDER — BLOOD SUGAR DIAGNOSTIC
1 STRIP MISCELLANEOUS 3 TIMES DAILY
Qty: 300 STRIP | Refills: 3 | Status: SHIPPED | OUTPATIENT
Start: 2023-01-03

## 2023-01-25 ENCOUNTER — TELEPHONE (OUTPATIENT)
Dept: PULMONOLOGY | Facility: CLINIC | Age: 68
End: 2023-01-25

## 2023-01-25 NOTE — TELEPHONE ENCOUNTER
Left a voicemail for patient to reschedule appointment on 03/13 with Gladys GONZALEZ at our [facility] office to another available appointment in March with doctor or AP

## 2023-01-26 ENCOUNTER — HOSPITAL ENCOUNTER (OUTPATIENT)
Dept: MAMMOGRAPHY | Facility: CLINIC | Age: 68
End: 2023-01-26

## 2023-01-26 VITALS — WEIGHT: 176 LBS | HEIGHT: 65 IN | BODY MASS INDEX: 29.32 KG/M2

## 2023-01-26 VITALS — BODY MASS INDEX: 29.38 KG/M2 | WEIGHT: 176.37 LBS | HEIGHT: 65 IN

## 2023-01-26 DIAGNOSIS — L72.3 SEBACEOUS CYST OF AXILLA: ICD-10-CM

## 2023-01-26 DIAGNOSIS — C50.411 MALIGNANT NEOPLASM OF UPPER-OUTER QUADRANT OF RIGHT BREAST IN FEMALE, ESTROGEN RECEPTOR POSITIVE (HCC): ICD-10-CM

## 2023-01-26 DIAGNOSIS — Z17.0 MALIGNANT NEOPLASM OF UPPER-OUTER QUADRANT OF RIGHT BREAST IN FEMALE, ESTROGEN RECEPTOR POSITIVE (HCC): ICD-10-CM

## 2023-01-27 ENCOUNTER — TELEPHONE (OUTPATIENT)
Dept: HEMATOLOGY ONCOLOGY | Facility: CLINIC | Age: 68
End: 2023-01-27

## 2023-01-30 DIAGNOSIS — J45.50 SEVERE PERSISTENT ASTHMA WITHOUT COMPLICATION: Primary | ICD-10-CM

## 2023-02-06 ENCOUNTER — OFFICE VISIT (OUTPATIENT)
Dept: ENDOCRINOLOGY | Facility: CLINIC | Age: 68
End: 2023-02-06

## 2023-02-06 VITALS
BODY MASS INDEX: 29.99 KG/M2 | WEIGHT: 180 LBS | HEART RATE: 96 BPM | HEIGHT: 65 IN | SYSTOLIC BLOOD PRESSURE: 122 MMHG | DIASTOLIC BLOOD PRESSURE: 70 MMHG

## 2023-02-06 DIAGNOSIS — Z79.4 TYPE 2 DIABETES MELLITUS WITH HYPERGLYCEMIA, WITH LONG-TERM CURRENT USE OF INSULIN (HCC): Primary | ICD-10-CM

## 2023-02-06 DIAGNOSIS — E78.00 HYPERCHOLESTEROLEMIA: ICD-10-CM

## 2023-02-06 DIAGNOSIS — Z79.4 TYPE 2 DIABETES MELLITUS WITH COMPLICATION, WITH LONG-TERM CURRENT USE OF INSULIN (HCC): ICD-10-CM

## 2023-02-06 DIAGNOSIS — I10 ESSENTIAL HYPERTENSION: ICD-10-CM

## 2023-02-06 DIAGNOSIS — E11.65 TYPE 2 DIABETES MELLITUS WITH HYPERGLYCEMIA, WITH LONG-TERM CURRENT USE OF INSULIN (HCC): Primary | ICD-10-CM

## 2023-02-06 DIAGNOSIS — E11.8 TYPE 2 DIABETES MELLITUS WITH COMPLICATION, WITH LONG-TERM CURRENT USE OF INSULIN (HCC): ICD-10-CM

## 2023-02-06 RX ORDER — INSULIN LISPRO 100 [IU]/ML
INJECTION, SOLUTION INTRAVENOUS; SUBCUTANEOUS
Qty: 45 ML | Refills: 0 | Status: SHIPPED | OUTPATIENT
Start: 2023-02-06

## 2023-02-06 NOTE — PROGRESS NOTES
Established Patient Progress Note      CC: Follow up for type 2 diabetes mellitus     ID number: [id number]     History of Present Illness:   Elma Lay is a 78 y o  female with a history of type 2 diabetes, hypertension and hyperlipidemia  Last seen in the office 9/26/22  Reports complications of moderate diabetic retinopathy, microalbuminuria and neuropathy  Last A1C was 12 9  Denies recent illness or hospitalizations  Denies recent severe hypoglycemic or severe hyperglycemic episodes  Denies any issues with current regimen  Home glucose monitoring: performed using home glucose meter    Patient denies missing any dose of her insulin  She has seen podiatry in the past however is not getting regular nail care  She has an appointment scheduled with the dietitian for medical nutrition therapy at an outside facility      Home blood glucose readings:   Before breakfast: 110's-200's  Before lunch: 100s-200s  Before dinner: 110's-200s  Bedtime: 100's-140's     Current regimen:   Humalog 20 units before meals  Levemir 55 units at bedtime    Last Eye Exam: UTD  Last Foot Exam: UTD    Has hypertension: Taking losartan, metoprolol  Has hyperlipidemia: Taking atorvastatin       Patient Active Problem List   Diagnosis   • Type 2 diabetes mellitus with complication, with long-term current use of insulin (HCC)   • Severe persistent asthma without complication   • Other specified hypothyroidism   • Chest pain   • Palpitations   • Chronic bilateral low back pain without sciatica   • Neck pain   • Malignant neoplasm of right female breast (HCC)   • Malignant neoplasm of upper-outer quadrant of right breast in female, estrogen receptor positive ([address] )   • Hiatal hernia   • Screening for colon cancer   • Esophageal dysphagia   • Cellulitis of right axilla   • Chronic pain of right knee   • Hypercholesterolemia   • Hypothyroid   • Essential hypertension   • Bilateral pulmonary embolism ([address] )   • Radiation pneumonitis ([facility] )   • Type 2 diabetes mellitus with hyperglycemia, with long-term current use of insulin ([facility] )   • Diabetic polyneuropathy associated with type 2 diabetes mellitus (HCC)   • Use of anastrozole (Arimidex)   • Decreased ROM of right shoulder   • Lump of skin   • Chronic diastolic heart failure (HCC)   • Gastroesophageal reflux disease without esophagitis   • Muscle cramps   • Sebaceous cyst   • Subcutaneous mass of back   • Overweight with body mass index (BMI) of 28 to 28 9 in adult   • Anxiety   • Right foot pain   • Pelvic pain   • Bone pain   • Type 2 diabetes mellitus with microalbuminuria, with long-term current use of insulin (HCC)   • Depression, recurrent ([facility])   • Vaginal candidiasis   • Diabetes mellitus ([facility]  )   • Seasonal allergies   • Diarrhea   • COVID-19      Past Medical History:   Diagnosis Date   • Abdominal infection ([place] [address] )     h-pylori   • Abnormal chest x-ray 04/05/2019   • Asthma    • Breast cancer ([place] [address] ) 06/26/2018   • Cancer ([place] [address] )     BREAST   • Diabetes mellitus ([place] [address] )    • Hiatal hernia    • History of chemotherapy 2018   • History of radiation therapy 2018   • Hypercholesterolemia    • Hypertension    • Hypothyroid    • Renal disorder    • Rheumatoid arthritis [facility])       Past Surgical History:   Procedure Laterality Date   • 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   • BREAST LUMPECTOMY Right 6/26/2018    Procedure: RIGHT BREAST NEEDLE LOCALIZATION X2 WITH RIGHT BREAST LUMPECTOMY ( NEEDLE LOC @ 1000); Surgeon: Denise Meadows MD;  Location: [facility];  Service: Surgical Oncology   • BREAST LUMPECTOMY Right 8/2/2018    Procedure: LYMPHOSCINITGRAPHY INTRAOPERATIVE LYMPHATIC MAPPING , RIGHT SENTINEL NODE BIOPSY, REEXCISION  RIGHT BREAST LUMPECTOMY CAVITY (SUPERIOR MARGIN);   Surgeon: Denise Meadows MD;  Location: [facility];  Service: Surgical Oncology   • BREAST SURGERY Left    • CATARACT EXTRACTION, BILATERAL Bilateral    • COLONOSCOPY     • EGD AND COLONOSCOPY N/A 9/5/2018 Procedure: EGD AND COLONOSCOPY;  Surgeon: Michelle Rodgers MD;  Location: [facility] GI LAB; Service: Gastroenterology   • FL GUIDED CENTRAL VENOUS ACCESS DEVICE INSERTION  9/13/2018   • KNEE SURGERY Right    • MAMMO NEEDLE LOCALIZATION RIGHT (ALL INC) Right 6/26/2018   • MAMMO STEREOTACTIC BREAST BIOPSY RIGHT (ALL INC) Right 5/23/2018   • RETINAL DETACHMENT SURGERY Right    • TUBAL LIGATION     • TUNNELED VENOUS PORT PLACEMENT Left 9/13/2018    Procedure: INSERTION VENOUS PORT (PORT-A-CATH);   Surgeon: Lopez Ryan MD;  Location:  MAIN OR;  Service: Surgical Oncology   • UPPER GASTROINTESTINAL ENDOSCOPY        Family History   Problem Relation Age of Onset   • Diabetes Mother    • Hypertension Mother    • Diabetes Father    • Hypertension Father    • No Known Problems Sister    • No Known Problems Sister    • No Known Problems Sister    • No Known Problems Sister    • No Known Problems Sister    • Diabetes Brother    • Diabetes Brother    • Kidney failure Brother    • Diabetes Brother    • Cancer Maternal Grandfather    • No Known Problems Daughter    • No Known Problems Daughter    • No Known Problems Daughter      Social History     Tobacco Use   • Smoking status: Never   • Smokeless tobacco: Never   Substance Use Topics   • Alcohol use: No     Allergies   Allergen Reactions   • Codeine Other (See Comments)     unknown   • Latex Other (See Comments)     fungus         Current Outpatient Medications:   •  albuterol (2 5 mg/3 mL) 0 083 % nebulizer solution, Take 3 mL (2 5 mg total) by nebulization every 6 (six) hours as needed for wheezing or shortness of breath, Disp: 75 mL, Rfl: 5  •  albuterol (PROVENTIL HFA,VENTOLIN HFA) 90 mcg/act inhaler, Inhale 1 puff every 4 (four) hours as needed for wheezing, Disp: 18 g, Rfl: 5  •  anastrozole (ARIMIDEX) 1 mg tablet, Take 1 tablet (1 mg total) by mouth daily, Disp: 90 tablet, Rfl: 3  •  aspirin 81 mg chewable tablet, Chew 81 mg daily, Disp: , Rfl:   •  atorvastatin (LIPITOR) 40 mg tablet, Take 1 tablet (40 mg total) by mouth daily, Disp: 90 tablet, Rfl: 3  •  ergocalciferol (VITAMIN D2) 50,000 units, Take 1 capsule (50,000 Units total) by mouth once a week, Disp: 12 capsule, Rfl: 1  •  insulin detemir (Levemir FlexTouch) 100 Units/mL injection pen, INJECT 50 UNITS UNDER THE SKIN DAILY AT BEDTIME (Patient taking differently: INJECT 55 UNITS UNDER THE SKIN DAILY AT BEDTIME), Disp: 45 mL, Rfl: 0  •  insulin lispro (HumaLOG) 100 units/mL injection pen, 20 units before meals plus the following: Use your regular dose of Humalog and add the followin-200: 2 units; 201-250: 3 units; 251-300: 4 units; 301-350: 5 units; Over 350: 6 units, Disp: 45 mL, Rfl: 0  •  losartan (COZAAR) 100 MG tablet, TAKE 1 TABLET (100 MG TOTAL) BY MOUTH DAILY, Disp: 90 tablet, Rfl: 0  •  Mag-G 500 (27 Mg) MG tablet, TAKE 1 TABLET (500 MG TOTAL) BY MOUTH DAILY, Disp: 90 tablet, Rfl: 1  •  metoprolol tartrate (LOPRESSOR) 25 mg tablet, Take 0 5 tablets (12 5 mg total) by mouth every 12 (twelve) hours, Disp: 180 tablet, Rfl: 1  •  omeprazole (PriLOSEC) 20 mg delayed release capsule, TAKE 1 CAPSULE (20 MG TOTAL) BY MOUTH DAILY, Disp: 90 capsule, Rfl: 0  •  acetaminophen (TYLENOL) 650 mg CR tablet, Take 1 tablet (650 mg total) by mouth every 8 (eight) hours as needed for mild pain (Patient not taking: Reported on 2023), Disp: 30 tablet, Rfl: 0  •  Continuous Blood Gluc Transmit (Dexcom G6 Transmitter) MISC, Use as directed for continuous glucose monitoring   Change every 3 months (Patient not taking: Reported on 2023), Disp: 1 each, Rfl: 3  •  Diclofenac Sodium (VOLTAREN) 1 %, Apply 2 g topically 2 (two) times a day (Patient not taking: Reported on 2022), Disp: 150 g, Rfl: 0  •  EPINEPHrine (Auvi-Q) 0 1 mg/0 1 mL SOAJ, Inject 0 3 mL (0 3 mg total) into a muscle once for 1 dose (Patient not taking: Reported on 2023), Disp: 2 each, Rfl: 5  •  fluticasone (FLONASE) 50 mcg/act nasal spray, 1 spray into each nostril daily (Patient not taking: Reported on 12/27/2022), Disp: 18 2 mL, Rfl: 5  •  fluticasone (FLONASE) 50 mcg/act nasal spray, 1 spray into each nostril daily (Patient not taking: Reported on 12/27/2022), Disp: 16 g, Rfl: 0  •  fluticasone-salmeterol (Advair HFA) 230-21 MCG/ACT inhaler, Inhale 2 puffs 2 (two) times a day Rinse mouth after use  (Patient not taking: Reported on 12/27/2022), Disp: 12 g, Rfl: 5  •  guaiFENesin (MUCINEX) 600 mg 12 hr tablet, Take 2 tablets (1,200 mg total) by mouth every 12 (twelve) hours (Patient not taking: Reported on 12/27/2022), Disp: 60 tablet, Rfl: 0  •  loratadine (CLARITIN) 10 mg tablet, TAKE 1 TABLET (10 MG TOTAL) BY MOUTH DAILY (Patient not taking: Reported on 2/6/2023), Disp: 90 tablet, Rfl: 2  •  OneTouch Delica Lancets 43A MISC, USE 3 (THREE) TIMES A DAY, Disp: 300 each, Rfl: 4  •  OneTouch Ultra test strip, USE 1 EACH 3 (THREE) TIMES A DAY USE AS INSTRUCTED (Patient not taking: Reported on 2/6/2023), Disp: 300 strip, Rfl: 3  •  UltiCare Micro Pen Needles 32G X 4 MM MISC, INJECT UNDER THE SKIN 4 (FOUR) TIMES A DAY (Patient not taking: Reported on 2/6/2023), Disp: 400 each, Rfl: 0  •  zileuton (ZYFLO CR) 600 MG, Take 1 tablet (600 mg total) by mouth 2 (two) times a day (Patient not taking: Reported on 12/27/2022), Disp: 60 tablet, Rfl: 5    Review of Systems   Constitutional: Negative for chills and fever  HENT: Negative for ear pain and sore throat  Eyes: Negative for photophobia, pain and visual disturbance  Respiratory: Negative for cough and shortness of breath  Cardiovascular: Negative for chest pain and palpitations  Gastrointestinal: Negative for abdominal pain and vomiting  Endocrine: Positive for polyphagia  Negative for polydipsia and polyuria  See hpi   Genitourinary: Negative for dysuria and hematuria  Musculoskeletal: Negative for arthralgias and back pain  Skin: Negative for color change and rash     Neurological: Positive for numbness  Negative for seizures and syncope  Has neuropathy   All other systems reviewed and are negative  Physical Exam:  Body mass index is 29 95 kg/m²  /70 (BP Location: Left arm, Patient Position: Sitting, Cuff Size: Large)   Pulse 96   Ht 5' 5" (1 651 m)   Wt 81 6 kg (180 lb)   BMI 29 95 kg/m²    Wt Readings from Last 3 Encounters:   02/06/23 81 6 kg (180 lb)   01/26/23 80 kg (176 lb 5 9 oz)   01/26/23 79 8 kg (176 lb)       Physical Exam  Vitals reviewed  Constitutional:       Appearance: Normal appearance  HENT:      Head: Normocephalic and atraumatic  Cardiovascular:      Rate and Rhythm: Normal rate and regular rhythm  Heart sounds: Normal heart sounds  Pulmonary:      Effort: Pulmonary effort is normal       Breath sounds: Normal breath sounds  Neurological:      Mental Status: She is alert and oriented to person, place, and time     Psychiatric:         Mood and Affect: Mood normal          Behavior: Behavior normal        Diabetic Foot Exam    Labs:   Lab Results   Component Value Date    HGBA1C 12 9 (H) 11/21/2022    HGBA1C 14 5 (A) 09/26/2022    HGBA1C 13 8 (A) 06/13/2022     Lab Results   Component Value Date    CREATININE 0 74 11/21/2022    CREATININE 0 83 11/10/2022    CREATININE 0 75 08/18/2022    BUN 18 11/21/2022    K 4 0 11/21/2022     11/21/2022    CO2 28 11/21/2022     eGFR   Date Value Ref Range Status   11/21/2022 84 ml/min/1 73sq m Final     Lab Results   Component Value Date    HDL 44 (L) 11/21/2022    TRIG 161 (H) 11/21/2022     Lab Results   Component Value Date    ALT 21 11/21/2022    AST 13 11/21/2022    ALKPHOS 81 11/21/2022     Lab Results   Component Value Date    STV1BQSSLPCZ 2 710 11/21/2022    EBC1WUKRDQWP 2 380 06/22/2022    YSK1TDOTFHYY 3 780 03/01/2022     Lab Results   Component Value Date    FREET4 1 36 11/21/2022       Impression & Plan:    Problem List Items Addressed This Visit        Endocrine    Type 2 diabetes mellitus with complication, with long-term current use of insulin ([facility] )     Patient's blood sugars do not correlate with her last hgbA1C  Her last hemoglobin A1c was 12 9  She is due for repeat hemoglobin A1c in a couple weeks  Further management will depend on results  For now, I have added a sliding scale for additional coverage at mealtime:  150-200: 2 units  201-250: 3 units  251-300: 4 units  301-350: 5 units  Over 350: 6 units    Patient would also benefit from CGM  I have sent order for Sebastian Martinez  Will appreciate more data to review  Lab Results   Component Value Date    HGBA1C 12 9 (H) 11/21/2022            Relevant Medications    insulin lispro (HumaLOG) 100 units/mL injection pen    Type 2 diabetes mellitus with hyperglycemia, with long-term current use of insulin ([facility]) - Primary    Relevant Medications    insulin lispro (HumaLOG) 100 units/mL injection pen    Other Relevant Orders    Ambulatory Referral to Ophthalmology    HEMOGLOBIN A1C W/ Jeremiah Boyle    Ambulatory referral to Diabetic Education       Cardiovascular and Mediastinum    Essential hypertension     Well-controlled today in office  Continue with current regimen  Other    Hypercholesterolemia     Continue with atorvastatin and lifestyle modifications              Orders Placed This Encounter   Procedures   • FreeStyle Broderick 2 System, Continuous Glucose Monitoring Package   • HEMOGLOBIN A1C W/ EAG ESTIMATION Lab Collect     Standing Status:   Future     Standing Expiration Date:   2/6/2024   • Ambulatory Referral to Ophthalmology     Standing Status:   Future     Standing Expiration Date:   2/6/2024     Referral Priority:   Routine     Referral Type:   Consult - AMB     Referral Reason:   Specialty Services Required     Referred to Provider:   Halle Liang MD     Requested Specialty:   Ophthalmology     Number of Visits Requested:   1     Expiration Date:   2/7/2024   • Ambulatory referral to Diabetic Education Standing Status:   Future     Standing Expiration Date:   2024     Referral Priority:   Routine     Referral Type:   Consult - AMB     Referral Reason:   Specialty Services Required     Requested Specialty:   Diabetes Services     Number of Visits Requested:   1     Expiration Date:   2024       Patient Instructions   Use your regular dose of Humalog and add the followin-200: 2 units  201-250: 3 units  251-300: 4 units  301-350: 5 units  Over 350: 6 units    Send blood sugar log in 2 weeks   Hgba1c due ~ 2023        Discussed with the patient and all questioned fully answered  She will call me if any problems arise

## 2023-02-06 NOTE — PATIENT INSTRUCTIONS
Use your regular dose of Humalog and add the followin-200: 2 units  201-250: 3 units  251-300: 4 units  301-350: 5 units  Over 350: 6 units    Send blood sugar log in 2 weeks   Hgba1c due ~ 2023

## 2023-02-06 NOTE — ASSESSMENT & PLAN NOTE
Patient's blood sugars do not correlate with her last hgbA1C  Her last hemoglobin A1c was 12 9  She is due for repeat hemoglobin A1c in a couple weeks  Further management will depend on results  For now, I have added a sliding scale for additional coverage at mealtime:  150-200: 2 units  201-250: 3 units  251-300: 4 units  301-350: 5 units  Over 350: 6 units    Patient would also benefit from CGM  I have sent order for [facility]  Will appreciate more data to review      Lab Results   Component Value Date    HGBA1C 12 9 (H) 11/21/2022

## 2023-02-12 LAB
DME PARACHUTE DELIVERY DATE REQUESTED: NORMAL
DME PARACHUTE ITEM DESCRIPTION: NORMAL
DME PARACHUTE ITEM DESCRIPTION: NORMAL
DME PARACHUTE ORDER STATUS: NORMAL
DME PARACHUTE SUPPLIER NAME: NORMAL
DME PARACHUTE SUPPLIER PHONE: NORMAL

## 2023-02-13 ENCOUNTER — APPOINTMENT (OUTPATIENT)
Dept: LAB | Facility: HOSPITAL | Age: 68
End: 2023-02-13

## 2023-02-13 DIAGNOSIS — E11.65 TYPE 2 DIABETES MELLITUS WITH HYPERGLYCEMIA, WITH LONG-TERM CURRENT USE OF INSULIN (HCC): ICD-10-CM

## 2023-02-13 DIAGNOSIS — Z79.4 TYPE 2 DIABETES MELLITUS WITH HYPERGLYCEMIA, WITH LONG-TERM CURRENT USE OF INSULIN (HCC): ICD-10-CM

## 2023-02-13 LAB
EST. AVERAGE GLUCOSE BLD GHB EST-MCNC: 275 MG/DL
HBA1C MFR BLD: 11.2 %

## 2023-02-18 DIAGNOSIS — Z79.4 TYPE 2 DIABETES MELLITUS WITH COMPLICATION, WITH LONG-TERM CURRENT USE OF INSULIN (HCC): ICD-10-CM

## 2023-02-18 DIAGNOSIS — E11.8 TYPE 2 DIABETES MELLITUS WITH COMPLICATION, WITH LONG-TERM CURRENT USE OF INSULIN (HCC): ICD-10-CM

## 2023-02-18 RX ORDER — BLOOD SUGAR DIAGNOSTIC
1 STRIP MISCELLANEOUS 3 TIMES DAILY
Qty: 300 EACH | Refills: 2 | Status: SHIPPED | OUTPATIENT
Start: 2023-02-18

## 2023-02-18 RX ORDER — LANCETS 33 GAUGE
EACH MISCELLANEOUS
Qty: 300 EACH | Refills: 3 | Status: SHIPPED | OUTPATIENT
Start: 2023-02-18

## 2023-02-20 ENCOUNTER — TELEPHONE (OUTPATIENT)
Dept: DIABETES SERVICES | Facility: OTHER | Age: 68
End: 2023-02-20

## 2023-02-25 DIAGNOSIS — I10 ESSENTIAL HYPERTENSION: ICD-10-CM

## 2023-02-26 RX ORDER — OMEPRAZOLE 20 MG/1
20 CAPSULE, DELAYED RELEASE ORAL DAILY
Qty: 90 CAPSULE | Refills: 0 | Status: SHIPPED | OUTPATIENT
Start: 2023-02-26

## 2023-02-27 ENCOUNTER — HOSPITAL ENCOUNTER (OUTPATIENT)
Dept: MRI IMAGING | Facility: HOSPITAL | Age: 68
Discharge: HOME | End: 2023-02-27

## 2023-02-27 DIAGNOSIS — R26.89 LOSS OF BALANCE: ICD-10-CM

## 2023-02-27 DIAGNOSIS — R42 VERTIGO: ICD-10-CM

## 2023-03-06 ENCOUNTER — TELEPHONE (OUTPATIENT)
Dept: DIABETES SERVICES | Facility: OTHER | Age: 68
End: 2023-03-06

## 2023-03-06 NOTE — TELEPHONE ENCOUNTER
Cayden Mars called to cancel appointment for Trisha Nelson this afternoon  She will call back to reschedule

## 2023-03-08 LAB
DME PARACHUTE DELIVERY DATE ACTUAL: NORMAL
DME PARACHUTE DELIVERY DATE REQUESTED: NORMAL
DME PARACHUTE ITEM DESCRIPTION: NORMAL
DME PARACHUTE ITEM DESCRIPTION: NORMAL
DME PARACHUTE ORDER STATUS: NORMAL
DME PARACHUTE SUPPLIER NAME: NORMAL
DME PARACHUTE SUPPLIER PHONE: NORMAL

## 2023-03-13 ENCOUNTER — HOSPITAL ENCOUNTER (OUTPATIENT)
Dept: NON INVASIVE DIAGNOSTICS | Facility: HOSPITAL | Age: 68
Discharge: HOME | End: 2023-03-13

## 2023-03-13 DIAGNOSIS — R26.89 LOSS OF BALANCE: ICD-10-CM

## 2023-03-15 DIAGNOSIS — I10 ESSENTIAL HYPERTENSION: ICD-10-CM

## 2023-03-15 DIAGNOSIS — E11.8 TYPE 2 DIABETES MELLITUS WITH COMPLICATION, WITHOUT LONG-TERM CURRENT USE OF INSULIN (HCC): ICD-10-CM

## 2023-03-16 RX ORDER — LOSARTAN POTASSIUM 100 MG/1
100 TABLET ORAL DAILY
Qty: 90 TABLET | Refills: 1 | Status: SHIPPED | OUTPATIENT
Start: 2023-03-16

## 2023-03-20 ENCOUNTER — OFFICE VISIT (OUTPATIENT)
Dept: PULMONOLOGY | Facility: CLINIC | Age: 68
End: 2023-03-20

## 2023-03-20 VITALS
HEART RATE: 88 BPM | HEIGHT: 65 IN | WEIGHT: 182 LBS | BODY MASS INDEX: 30.32 KG/M2 | TEMPERATURE: 97.7 F | DIASTOLIC BLOOD PRESSURE: 74 MMHG | SYSTOLIC BLOOD PRESSURE: 110 MMHG | OXYGEN SATURATION: 97 %

## 2023-03-20 DIAGNOSIS — I26.99 BILATERAL PULMONARY EMBOLISM (HCC): ICD-10-CM

## 2023-03-20 DIAGNOSIS — J45.50 SEVERE PERSISTENT ASTHMA WITHOUT COMPLICATION: Primary | ICD-10-CM

## 2023-03-20 DIAGNOSIS — J30.2 SEASONAL ALLERGIES: ICD-10-CM

## 2023-03-20 RX ORDER — TIOTROPIUM BROMIDE INHALATION SPRAY 3.12 UG/1
2 SPRAY, METERED RESPIRATORY (INHALATION) DAILY
Qty: 4 G | Refills: 3 | Status: SHIPPED | OUTPATIENT
Start: 2023-03-20

## 2023-03-20 NOTE — PROGRESS NOTES
Pulmonary Follow Up Note   Jolie Mulligan 78 y o  female MRN: [id number]  3/20/2023      Assessment:    1  Severe persistent asthma without complication  - atopic asthma based on multiple triggers identified on [place] panel and significantly elevated IgE  - Continue advair, add spiriva, continue albuterol as needed  - Continue allergic meds  - Discussed additional options for biologics (she did have eosinophilia in the past)  She declines for now  - Will refer to allergy as well  -     tiotropium (Spiriva Respimat) 2 5 MCG/ACT AERS inhaler; Inhale 2 puffs daily  -     Ambulatory Referral to Allergy; Future    2  Seasonal allergies  - continue OTC loratadine  Counseled on her on removing plants from the home  Does not have pets and to clean and change linens  Will refer to allergy/immunology    3  Bilateral pulmonary embolism (HCC)  - in the setting of active malignancy at that time  Completed rivaroxaban and now on aspirin  Plan:    Diagnoses and all orders for this visit:    Severe persistent asthma without complication  -     tiotropium (Spiriva Respimat) 2 5 MCG/ACT AERS inhaler; Inhale 2 puffs daily  -     Ambulatory Referral to Allergy; Future    Seasonal allergies    Bilateral pulmonary embolism ([facility] [address] )        Return in about 2 months (around 5/20/2023)  History of Present Illness   HPI:  Annabel Guerrero is a 78 y o  female who presents for follow up of asthma  Since her last visit she continues to use advair along with albuterol as needed  She typically uses it BID and before going to sleep  She stopped the xolair because she reports it gave her lower extremity muscle pain  She has some shortness of breath and wheezing throughout the day  Denies coughing  No recent sicknesses  Has plants in her home, denies pets  Taking her claritan occasionally  Review of Systems   Constitutional: Negative for chills and fever  HENT: Negative for congestion, postnasal drip and rhinorrhea      Eyes: Negative for itching  Respiratory: Positive for wheezing  Negative for cough, shortness of breath and stridor  Cardiovascular: Negative for chest pain, palpitations and leg swelling  Gastrointestinal: Negative for abdominal distention, abdominal pain, nausea and vomiting  Genitourinary: Negative for dysuria and flank pain  Musculoskeletal: Negative for arthralgias and myalgias  Skin: Negative for color change  Neurological: Negative for dizziness, light-headedness and headaches  Psychiatric/Behavioral: Negative  Historical Information   Past Medical History:   Diagnosis Date   • Abdominal infection ([facility] )     h-pylori   • Abnormal chest x-ray 04/05/2019   • Asthma    • Breast cancer ([facility] ) 06/26/2018   • Cancer ([facility] )     BREAST   • Diabetes mellitus ([facility] )    • Hiatal hernia    • History of chemotherapy 2018   • History of radiation therapy 2018   • Hypercholesterolemia    • Hypertension    • Hypothyroid    • Renal disorder    • Rheumatoid arthritis [facility])      Past Surgical History:   Procedure Laterality Date   • 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   • BREAST LUMPECTOMY Right 6/26/2018    Procedure: RIGHT BREAST NEEDLE LOCALIZATION X2 WITH RIGHT BREAST LUMPECTOMY ( NEEDLE LOC @ 1000); Surgeon: Carmen Ramirez MD;  Location: [facility];  Service: Surgical Oncology   • BREAST LUMPECTOMY Right 8/2/2018    Procedure: LYMPHOSCINITGRAPHY INTRAOPERATIVE LYMPHATIC MAPPING , RIGHT SENTINEL NODE BIOPSY, REEXCISION  RIGHT BREAST LUMPECTOMY CAVITY (SUPERIOR MARGIN); Surgeon: Carmen Ramirez MD;  Location: [facility];  Service: Surgical Oncology   • BREAST SURGERY Left    • CATARACT EXTRACTION, BILATERAL Bilateral    • COLONOSCOPY     • EGD AND COLONOSCOPY N/A 9/5/2018    Procedure: EGD AND COLONOSCOPY;  Surgeon: Javier Herman MD;  Location: [facility] GI LAB;   Service: Gastroenterology   • FL GUIDED CENTRAL VENOUS ACCESS DEVICE INSERTION  9/13/2018   • KNEE SURGERY Right    • MAMMO NEEDLE LOCALIZATION RIGHT (ALL INC) Right 6/26/2018   • MAMMO STEREOTACTIC BREAST BIOPSY RIGHT (ALL INC) Right 5/23/2018   • RETINAL DETACHMENT SURGERY Right    • TUBAL LIGATION     • TUNNELED VENOUS PORT PLACEMENT Left 9/13/2018    Procedure: INSERTION VENOUS PORT (PORT-A-CATH);   Surgeon: Darlin Moore MD;  Location: [facility];  Service: Surgical Oncology   • UPPER GASTROINTESTINAL ENDOSCOPY       Family History   Problem Relation Age of Onset   • Diabetes Mother    • Hypertension Mother    • Diabetes Father    • Hypertension Father    • No Known Problems Sister    • No Known Problems Sister    • No Known Problems Sister    • No Known Problems Sister    • No Known Problems Sister    • Diabetes Brother    • Diabetes Brother    • Kidney failure Brother    • Diabetes Brother    • Cancer Maternal Grandfather    • No Known Problems Daughter    • No Known Problems Daughter    • No Known Problems Daughter          Meds/Allergies     Current Outpatient Medications:   •  acetaminophen (TYLENOL) 650 mg CR tablet, Take 1 tablet (650 mg total) by mouth every 8 (eight) hours as needed for mild pain, Disp: 30 tablet, Rfl: 0  •  albuterol (2 5 mg/3 mL) 0 083 % nebulizer solution, Take 3 mL (2 5 mg total) by nebulization every 6 (six) hours as needed for wheezing or shortness of breath, Disp: 75 mL, Rfl: 5  •  albuterol (PROVENTIL HFA,VENTOLIN HFA) 90 mcg/act inhaler, Inhale 1 puff every 4 (four) hours as needed for wheezing, Disp: 18 g, Rfl: 5  •  anastrozole (ARIMIDEX) 1 mg tablet, Take 1 tablet (1 mg total) by mouth daily, Disp: 90 tablet, Rfl: 3  •  aspirin 81 mg chewable tablet, Chew 81 mg daily, Disp: , Rfl:   •  atorvastatin (LIPITOR) 40 mg tablet, Take 1 tablet (40 mg total) by mouth daily, Disp: 90 tablet, Rfl: 3  •  Diclofenac Sodium (VOLTAREN) 1 %, Apply 2 g topically 2 (two) times a day, Disp: 150 g, Rfl: 0  •  ergocalciferol (VITAMIN D2) 50,000 units, Take 1 capsule (50,000 Units total) by mouth once a week, Disp: 12 capsule, Rfl: 1  •  fluticasone-salmeterol (Advair HFA) 230-21 MCG/ACT inhaler, Inhale 2 puffs 2 (two) times a day Rinse mouth after use , Disp: 12 g, Rfl: 5  •  insulin detemir (Levemir FlexTouch) 100 Units/mL injection pen, INJECT 50 UNITS UNDER THE SKIN DAILY AT BEDTIME (Patient taking differently: INJECT 55 UNITS UNDER THE SKIN DAILY AT BEDTIME), Disp: 45 mL, Rfl: 0  •  insulin lispro (HumaLOG) 100 units/mL injection pen, 20 units before meals plus the following: Use your regular dose of Humalog and add the followin-200: 2 units; 201-250: 3 units; 251-300: 4 units; 301-350: 5 units; Over 350: 6 units, Disp: 45 mL, Rfl: 0  •  losartan (COZAAR) 100 MG tablet, TAKE 1 TABLET (100 MG TOTAL) BY MOUTH DAILY, Disp: 90 tablet, Rfl: 1  •  Mag-G 500 (27 Mg) MG tablet, TAKE 1 TABLET (500 MG TOTAL) BY MOUTH DAILY, Disp: 90 tablet, Rfl: 1  •  metoprolol tartrate (LOPRESSOR) 25 mg tablet, TAKE 0 5 TABLETS (12 5 MG TOTAL) BY MOUTH EVERY 12 (TWELVE) HOURS, Disp: 180 tablet, Rfl: 0  •  omeprazole (PriLOSEC) 20 mg delayed release capsule, TAKE 1 CAPSULE (20 MG TOTAL) BY MOUTH DAILY, Disp: 90 capsule, Rfl: 0  •  OneTouch Delica Lancets 70H MISC, USE 3 (THREE) TIMES A DAY, Disp: 300 each, Rfl: 3  •  OneTouch Ultra test strip, USE 1 EACH 3 (THREE) TIMES A DAY USE AS INSTRUCTED, Disp: 300 each, Rfl: 2  •  tiotropium (Spiriva Respimat) 2 5 MCG/ACT AERS inhaler, Inhale 2 puffs daily, Disp: 4 g, Rfl: 3  •  UltiCare Micro Pen Needles 32G X 4 MM MISC, INJECT UNDER THE SKIN 4 (FOUR) TIMES A DAY, Disp: 400 each, Rfl: 0  •  Continuous Blood Gluc Transmit (Dexcom G6 Transmitter) MISC, Use as directed for continuous glucose monitoring   Change every 3 months (Patient not taking: Reported on 2023), Disp: 1 each, Rfl: 3  •  EPINEPHrine (Auvi-Q) 0 1 mg/0 1 mL SOAJ, Inject 0 3 mL (0 3 mg total) into a muscle once for 1 dose (Patient not taking: Reported on 2023), Disp: 2 each, Rfl: 5  •  fluticasone (FLONASE) 50 mcg/act nasal spray, 1 spray into each nostril daily (Patient not taking: Reported on 12/27/2022), Disp: 18 2 mL, Rfl: 5  •  fluticasone (FLONASE) 50 mcg/act nasal spray, 1 spray into each nostril daily (Patient not taking: Reported on 12/27/2022), Disp: 16 g, Rfl: 0  •  guaiFENesin (MUCINEX) 600 mg 12 hr tablet, Take 2 tablets (1,200 mg total) by mouth every 12 (twelve) hours (Patient not taking: Reported on 12/27/2022), Disp: 60 tablet, Rfl: 0  •  loratadine (CLARITIN) 10 mg tablet, TAKE 1 TABLET (10 MG TOTAL) BY MOUTH DAILY (Patient not taking: Reported on 2/6/2023), Disp: 90 tablet, Rfl: 2  •  zileuton (ZYFLO CR) 600 MG, Take 1 tablet (600 mg total) by mouth 2 (two) times a day (Patient not taking: Reported on 12/27/2022), Disp: 60 tablet, Rfl: 5  Allergies   Allergen Reactions   • Codeine Other (See Comments)     unknown   • Latex Other (See Comments)     fungus       Vitals: Blood pressure 110/74, pulse 88, temperature 97 7 °F (36 5 °C), temperature source Tympanic, height 5' 5" (1 651 m), weight 82 6 kg (182 lb), SpO2 97 %, not currently breastfeeding  Body mass index is 30 29 kg/m²  Oxygen Therapy  SpO2: 97 %  Oxygen Therapy: None (Room air)      Physical Exam  Physical Exam  Constitutional:       General: She is not in acute distress  Appearance: She is not diaphoretic  HENT:      Head: Normocephalic and atraumatic  Nose: Nose normal       Mouth/Throat:      Pharynx: No oropharyngeal exudate  Eyes:      General: No scleral icterus  Conjunctiva/sclera: Conjunctivae normal       Pupils: Pupils are equal, round, and reactive to light  Neck:      Thyroid: No thyromegaly  Vascular: No JVD  Trachea: No tracheal deviation  Cardiovascular:      Rate and Rhythm: Normal rate and regular rhythm  Heart sounds: Normal heart sounds  No murmur heard  No friction rub  No gallop  Pulmonary:      Effort: Pulmonary effort is normal  No respiratory distress  Breath sounds: Normal breath sounds  No stridor   No wheezing or rales  Abdominal:      General: Bowel sounds are normal  There is no distension  Palpations: Abdomen is soft  Tenderness: There is no abdominal tenderness  There is no guarding or rebound  Musculoskeletal:         General: No deformity  Normal range of motion  Cervical back: Normal range of motion and neck supple  Lymphadenopathy:      Cervical: No cervical adenopathy  Skin:     General: Skin is warm  Findings: No erythema or rash  Neurological:      Mental Status: She is alert and oriented to person, place, and time  Cranial Nerves: No cranial nerve deficit  Sensory: No sensory deficit  Labs: I have personally reviewed pertinent lab results  Lab Results   Component Value Date    WBC 8 68 11/10/2022    HGB 12 7 11/10/2022    HCT 38 8 11/10/2022    MCV 87 11/10/2022     11/10/2022     Lab Results   Component Value Date    CALCIUM 9 8 2022    K 4 0 2022    CO2 28 2022     2022    BUN 18 2022    CREATININE 0 74 2022     Lab Results   Component Value Date    IGE 1,057 (H) 2022     Lab Results   Component Value Date    ALT 21 2022    AST 13 2022    ALKPHOS 81 2022         Imaging and other studies: I have personally reviewed pertinent reports  and I have personally reviewed pertinent films in PACS  Chest xray 2022- clear bilateral lung fields    Pulmonary function testin2018- normal spirometry, hyperinflation with air trapping, supranormal diffusion capacity      EKG, Pathology, and Other Studies: I have personally reviewed pertinent reports     and I have personally reviewed pertinent films in PACS    Arturo Hunter MD  Pulmonary and Critical Care   [facility]

## 2023-03-23 NOTE — TELEPHONE ENCOUNTER
I called and spoke with the patient and her daughter  They stated that she does not need these supplies

## 2023-03-23 NOTE — TELEPHONE ENCOUNTER
Tania from [facility] called and left a voicemail requesting a refill for needles and alcohol pads for the patient

## 2023-03-27 ENCOUNTER — TELEPHONE (OUTPATIENT)
Dept: ENDOCRINOLOGY | Facility: CLINIC | Age: 68
End: 2023-03-27

## 2023-03-27 NOTE — TELEPHONE ENCOUNTER
Received fax from [facility] for rx for diabetic supplies for pen needles and alcohol prep pads  Filled out form and faxed form back to [phone]   Tobias Morelos

## 2023-04-03 ENCOUNTER — OFFICE VISIT (OUTPATIENT)
Dept: DIABETES SERVICES | Facility: CLINIC | Age: 68
End: 2023-04-03

## 2023-04-03 DIAGNOSIS — Z79.4 TYPE 2 DIABETES MELLITUS WITH HYPERGLYCEMIA, WITH LONG-TERM CURRENT USE OF INSULIN (HCC): Primary | ICD-10-CM

## 2023-04-03 DIAGNOSIS — E11.65 TYPE 2 DIABETES MELLITUS WITH HYPERGLYCEMIA, WITH LONG-TERM CURRENT USE OF INSULIN (HCC): Primary | ICD-10-CM

## 2023-04-03 NOTE — PROGRESS NOTES
Personal Broderick Training        Met with Ruben Gonzalez and her daughter today for a personal Hernandez 2 training  Long Balbuena brought his own supplies to the visit which include a reader and sensors  Educator reviewed the following:    -- Skin Prep  -- How to place the Hernandez  -- Importance of site rotation  -- Hernandez 2 set alerts  -- How to scan for a reading  -- 60 minute warm-up  -- If reading doesn't match symptoms, do a finger stick  -- Return in 14 days for download and change sensor    Lab Results   Component Value Date    HGBA1C 11 2 (H) 02/13/2023         Patient response to instruction    Comprehension: good  Motivation: good  Expected Compliance: good  Response to Teachback: 100%, demonstrated understanding    Thank you for referring your patient to [facility] [address] [place] [address], it was a pleasure working with them today  Please feel free to call with any questions or concerns      Yessenia Conroy, MSc, MPH, RD, LDN, CDE  [facility], [address]

## 2023-05-02 ENCOUNTER — OFFICE VISIT (OUTPATIENT)
Dept: FAMILY MEDICINE CLINIC | Facility: CLINIC | Age: 68
End: 2023-05-02

## 2023-05-02 VITALS
WEIGHT: 184 LBS | BODY MASS INDEX: 30.62 KG/M2 | HEART RATE: 98 BPM | SYSTOLIC BLOOD PRESSURE: 140 MMHG | OXYGEN SATURATION: 95 % | DIASTOLIC BLOOD PRESSURE: 80 MMHG | TEMPERATURE: 97.7 F | RESPIRATION RATE: 19 BRPM

## 2023-05-02 DIAGNOSIS — I10 ESSENTIAL HYPERTENSION: ICD-10-CM

## 2023-05-02 DIAGNOSIS — H00.14 CHALAZION LEFT UPPER EYELID: ICD-10-CM

## 2023-05-02 DIAGNOSIS — F51.01 PRIMARY INSOMNIA: ICD-10-CM

## 2023-05-02 DIAGNOSIS — R60.0 BILATERAL LEG EDEMA: Primary | ICD-10-CM

## 2023-05-02 RX ORDER — TRAZODONE HYDROCHLORIDE 50 MG/1
25 TABLET ORAL
Qty: 45 TABLET | Refills: 0 | Status: SHIPPED | OUTPATIENT
Start: 2023-05-02

## 2023-05-02 RX ORDER — FUROSEMIDE 20 MG/1
10 TABLET ORAL DAILY
Qty: 10 TABLET | Refills: 0 | Status: SHIPPED | OUTPATIENT
Start: 2023-05-02

## 2023-05-02 RX ORDER — ERYTHROMYCIN 5 MG/G
0.5 OINTMENT OPHTHALMIC
Qty: 3.5 G | Refills: 0 | Status: SHIPPED | OUTPATIENT
Start: 2023-05-02

## 2023-05-02 NOTE — PROGRESS NOTES
Diabetic Foot Exam    Patient's shoes and socks removed  Right Foot/Ankle   Right Foot Inspection  Skin Exam: skin normal, skin intact, dry skin, callus and callus  No warmth, no erythema, no maceration, no abnormal color, no pre-ulcer and no ulcer  Toe Exam: ROM and strength within normal limits  Sensory   Vibration: intact  Proprioception: intact  Monofilament testing: diminished    Vascular  Capillary refills: < 3 seconds  The right DP pulse is 1+  The right PT pulse is 1+  Left Foot/Ankle  Left Foot Inspection  Skin Exam: skin normal, skin intact and callus  No dry skin, no warmth, no erythema, no maceration, normal color, no pre-ulcer and no ulcer  Toe Exam: ROM and strength within normal limits  Sensory   Vibration: intact  Proprioception: intact  Monofilament testing: diminished    Vascular  Capillary refills: < 3 seconds  The left DP pulse is 1+  The left PT pulse is 1+  Assign Risk Category  No deformity present  Loss of protective sensation  No weak pulses  Risk: 1  Name: Marline Garrison      : 1955      MRN: [id number]  Encounter Provider: Jorge Aiken MD  Encounter Date: 2023   Encounter department: [address] [place] [address]     1  Bilateral leg edema  -     furosemide (LASIX) 20 mg tablet; Take 0 5 tablets (10 mg total) by mouth daily    2  Essential hypertension  -     metoprolol tartrate (LOPRESSOR) 25 mg tablet; Take 0 5 tablets (12 5 mg total) by mouth every 12 (twelve) hours    3  Chalazion left upper eyelid  -     erythromycin (ILOTYCIN) ophthalmic ointment; Administer 0 5 inches into the left eye daily at bedtime    4  Primary insomnia  -     traZODone (DESYREL) 50 mg tablet;  Take 0 5 tablets (25 mg total) by mouth daily at bedtime         Subjective      79 yo  female with DM, complains of:  1- Bilateral leg swelling worse as the day goes by, denies CP, SOB  2- Mildly painful lump on L upper eyelid  3- Difficulty falling and staying asleep     Review of Systems   Cardiovascular: Positive for leg swelling  Psychiatric/Behavioral: Positive for sleep disturbance  All other systems reviewed and are negative  Current Outpatient Medications on File Prior to Visit   Medication Sig   • acetaminophen (TYLENOL) 650 mg CR tablet Take 1 tablet (650 mg total) by mouth every 8 (eight) hours as needed for mild pain   • Advair -21 MCG/ACT inhaler INHALE 2 PUFFS 2 (TWO) TIMES A DAY RINSE MOUTH AFTER USE  • albuterol (2 5 mg/3 mL) 0 083 % nebulizer solution Take 3 mL (2 5 mg total) by nebulization every 6 (six) hours as needed for wheezing or shortness of breath   • albuterol (PROVENTIL HFA,VENTOLIN HFA) 90 mcg/act inhaler INHALE 1 PUFF EVERY 4 (FOUR) HOURS AS NEEDED FOR WHEEZING   • anastrozole (ARIMIDEX) 1 mg tablet Take 1 tablet (1 mg total) by mouth daily   • aspirin 81 mg chewable tablet Chew 81 mg daily   • atorvastatin (LIPITOR) 40 mg tablet Take 1 tablet (40 mg total) by mouth daily   • Continuous Blood Gluc Transmit (Dexcom G6 Transmitter) MISC Use as directed for continuous glucose monitoring   Change every 3 months (Patient not taking: Reported on 2/6/2023)   • Diclofenac Sodium (VOLTAREN) 1 % Apply 2 g topically 2 (two) times a day   • EPINEPHrine (Auvi-Q) 0 1 mg/0 1 mL SOAJ Inject 0 3 mL (0 3 mg total) into a muscle once for 1 dose (Patient not taking: Reported on 2/6/2023)   • ergocalciferol (VITAMIN D2) 50,000 units Take 1 capsule (50,000 Units total) by mouth once a week   • fluticasone (FLONASE) 50 mcg/act nasal spray 1 spray into each nostril daily (Patient not taking: Reported on 12/27/2022)   • fluticasone (FLONASE) 50 mcg/act nasal spray 1 spray into each nostril daily (Patient not taking: Reported on 12/27/2022)   • guaiFENesin (MUCINEX) 600 mg 12 hr tablet Take 2 tablets (1,200 mg total) by mouth every 12 (twelve) hours (Patient not taking: Reported on 12/27/2022)   • insulin detemir (Levemir FlexTouch) 100 Units/mL injection pen INJECT 50 UNITS UNDER THE SKIN DAILY AT BEDTIME (Patient taking differently: INJECT 55 UNITS UNDER THE SKIN DAILY AT BEDTIME)   • insulin lispro (HumaLOG) 100 units/mL injection pen 20 units before meals plus the following: Use your regular dose of Humalog and add the followin-200: 2 units; 201-250: 3 units; 251-300: 4 units; 301-350: 5 units; Over 350: 6 units   • loratadine (CLARITIN) 10 mg tablet TAKE 1 TABLET (10 MG TOTAL) BY MOUTH DAILY (Patient not taking: Reported on 2023)   • losartan (COZAAR) 100 MG tablet TAKE 1 TABLET (100 MG TOTAL) BY MOUTH DAILY   • Mag-G 500 (27 Mg) MG tablet TAKE 1 TABLET (500 MG TOTAL) BY MOUTH DAILY   • omeprazole (PriLOSEC) 20 mg delayed release capsule TAKE 1 CAPSULE (20 MG TOTAL) BY MOUTH DAILY   • OneTouch Delica Lancets 06V MISC USE 3 (THREE) TIMES A DAY   • OneTouch Ultra test strip USE 1 EACH 3 (THREE) TIMES A DAY USE AS INSTRUCTED   • tiotropium (Spiriva Respimat) 2 5 MCG/ACT AERS inhaler Inhale 2 puffs daily   • UltiCare Micro Pen Needles 32G X 4 MM MISC INJECT UNDER THE SKIN 4 (FOUR) TIMES A DAY   • zileuton 600 MG TAKE 1 TABLET (600 MG TOTAL) BY MOUTH 2 (TWO) TIMES A DAY       Objective     /80 (BP Location: Left arm, Patient Position: Sitting, Cuff Size: Standard)   Pulse 98   Temp 97 7 °F (36 5 °C) (Temporal)   Resp 19   Wt 83 5 kg (184 lb)   SpO2 95%   BMI 30 62 kg/m²     Physical Exam  Vitals and nursing note reviewed  Constitutional:       Appearance: She is well-developed  HENT:      Head: Normocephalic  Right Ear: External ear normal       Left Ear: External ear normal       Nose: Nose normal    Eyes:      Conjunctiva/sclera: Conjunctivae normal       Pupils: Pupils are equal, round, and reactive to light  Neck:      Thyroid: No thyromegaly  Cardiovascular:      Rate and Rhythm: Normal rate and regular rhythm        Pulses: no weak pulses          Dorsalis pedis pulses are 1+ on the right side and 1+ on the left side  Posterior tibial pulses are 1+ on the right side and 1+ on the left side  Heart sounds: Normal heart sounds  Pulmonary:      Effort: Pulmonary effort is normal       Breath sounds: Normal breath sounds  Abdominal:      Palpations: Abdomen is soft  Tenderness: There is no abdominal tenderness  There is no guarding or rebound  Musculoskeletal:         General: Swelling present  Normal range of motion  Cervical back: Normal range of motion and neck supple  Right lower le+ Pitting Edema present  Left lower le+ Pitting Edema present  Feet:      Right foot:      Skin integrity: Callus and dry skin present  No ulcer, skin breakdown, erythema or warmth  Toenail Condition: Right toenails are abnormally thick  Left foot:      Skin integrity: Callus present  No ulcer, skin breakdown, erythema, warmth or dry skin  Toenail Condition: Left toenails are abnormally thick and ingrown  Skin:     General: Skin is dry  Neurological:      Mental Status: She is alert and oriented to person, place, and time  Deep Tendon Reflexes: Reflexes are normal and symmetric         Gilberto Tobin MD

## 2023-05-16 DIAGNOSIS — E55.9 VITAMIN D DEFICIENCY: ICD-10-CM

## 2023-05-16 DIAGNOSIS — E78.5 HYPERLIPIDEMIA, UNSPECIFIED HYPERLIPIDEMIA TYPE: ICD-10-CM

## 2023-05-16 DIAGNOSIS — E11.8 TYPE 2 DIABETES MELLITUS WITH COMPLICATION, WITHOUT LONG-TERM CURRENT USE OF INSULIN (HCC): ICD-10-CM

## 2023-05-16 DIAGNOSIS — Z79.4 TYPE 2 DIABETES MELLITUS WITH HYPERGLYCEMIA, WITH LONG-TERM CURRENT USE OF INSULIN (HCC): ICD-10-CM

## 2023-05-16 DIAGNOSIS — I10 ESSENTIAL HYPERTENSION: ICD-10-CM

## 2023-05-16 DIAGNOSIS — E11.65 TYPE 2 DIABETES MELLITUS WITH HYPERGLYCEMIA, WITH LONG-TERM CURRENT USE OF INSULIN (HCC): ICD-10-CM

## 2023-05-16 RX ORDER — INSULIN DETEMIR 100 [IU]/ML
INJECTION, SOLUTION SUBCUTANEOUS
Qty: 45 ML | Refills: 0 | Status: SHIPPED | OUTPATIENT
Start: 2023-05-16

## 2023-05-16 RX ORDER — ERGOCALCIFEROL 1.25 MG/1
50000 CAPSULE ORAL WEEKLY
Qty: 12 CAPSULE | Refills: 0 | Status: SHIPPED | OUTPATIENT
Start: 2023-05-16

## 2023-05-16 RX ORDER — OMEPRAZOLE 20 MG/1
20 CAPSULE, DELAYED RELEASE ORAL DAILY
Qty: 90 CAPSULE | Refills: 2 | Status: SHIPPED | OUTPATIENT
Start: 2023-05-16

## 2023-05-16 RX ORDER — ATORVASTATIN CALCIUM 40 MG/1
40 TABLET, FILM COATED ORAL DAILY
Qty: 90 TABLET | Refills: 2 | Status: SHIPPED | OUTPATIENT
Start: 2023-05-16

## 2023-05-16 RX ORDER — PEN NEEDLE, DIABETIC 32GX 5/32"
NEEDLE, DISPOSABLE MISCELLANEOUS
Qty: 400 EACH | Refills: 0 | Status: SHIPPED | OUTPATIENT
Start: 2023-05-16

## 2023-05-19 DIAGNOSIS — Z79.4 TYPE 2 DIABETES MELLITUS WITH COMPLICATION, WITH LONG-TERM CURRENT USE OF INSULIN (HCC): ICD-10-CM

## 2023-05-19 DIAGNOSIS — E11.8 TYPE 2 DIABETES MELLITUS WITH COMPLICATION, WITH LONG-TERM CURRENT USE OF INSULIN (HCC): ICD-10-CM

## 2023-05-19 RX ORDER — INSULIN LISPRO 100 [IU]/ML
INJECTION, SOLUTION INTRAVENOUS; SUBCUTANEOUS
Qty: 45 ML | Refills: 0 | Status: SHIPPED | OUTPATIENT
Start: 2023-05-19

## 2023-05-30 ENCOUNTER — APPOINTMENT (OUTPATIENT)
Dept: LAB | Facility: HOSPITAL | Age: 68
End: 2023-05-30

## 2023-05-30 ENCOUNTER — OFFICE VISIT (OUTPATIENT)
Dept: FAMILY MEDICINE CLINIC | Facility: CLINIC | Age: 68
End: 2023-05-30

## 2023-05-30 VITALS
HEART RATE: 99 BPM | OXYGEN SATURATION: 99 % | RESPIRATION RATE: 18 BRPM | WEIGHT: 186 LBS | DIASTOLIC BLOOD PRESSURE: 70 MMHG | HEIGHT: 65 IN | BODY MASS INDEX: 30.99 KG/M2 | SYSTOLIC BLOOD PRESSURE: 148 MMHG | TEMPERATURE: 98.2 F

## 2023-05-30 DIAGNOSIS — E83.52 HYPERCALCEMIA: ICD-10-CM

## 2023-05-30 DIAGNOSIS — F33.9 DEPRESSION, RECURRENT (HCC): ICD-10-CM

## 2023-05-30 DIAGNOSIS — R60.0 LEG EDEMA: Primary | ICD-10-CM

## 2023-05-30 DIAGNOSIS — R60.0 BILATERAL LEG EDEMA: ICD-10-CM

## 2023-05-30 DIAGNOSIS — I50.32 CHRONIC DIASTOLIC HEART FAILURE (HCC): ICD-10-CM

## 2023-05-30 DIAGNOSIS — R60.0 LEG EDEMA: ICD-10-CM

## 2023-05-30 DIAGNOSIS — R06.09 DOE (DYSPNEA ON EXERTION): ICD-10-CM

## 2023-05-30 LAB
25(OH)D3 SERPL-MCNC: 24 NG/ML (ref 30–100)
ALBUMIN SERPL BCP-MCNC: 4.3 G/DL (ref 3.5–5)
ALP SERPL-CCNC: 57 U/L (ref 34–104)
ALT SERPL W P-5'-P-CCNC: 19 U/L (ref 7–52)
ANION GAP SERPL CALCULATED.3IONS-SCNC: 11 MMOL/L (ref 4–13)
AST SERPL W P-5'-P-CCNC: 15 U/L (ref 13–39)
BILIRUB SERPL-MCNC: 0.48 MG/DL (ref 0.2–1)
BUN SERPL-MCNC: 15 MG/DL (ref 5–25)
CALCIUM SERPL-MCNC: 10.1 MG/DL (ref 8.4–10.2)
CHLORIDE SERPL-SCNC: 102 MMOL/L (ref 96–108)
CO2 SERPL-SCNC: 28 MMOL/L (ref 21–32)
CREAT SERPL-MCNC: 0.58 MG/DL (ref 0.6–1.3)
CREAT UR-MCNC: 162 MG/DL
GFR SERPL CREATININE-BSD FRML MDRD: 96 ML/MIN/1.73SQ M
GLUCOSE SERPL-MCNC: 136 MG/DL (ref 65–140)
MICROALBUMIN UR-MCNC: 326 MG/L (ref 0–20)
MICROALBUMIN/CREAT 24H UR: 201 MG/G CREATININE (ref 0–30)
POTASSIUM SERPL-SCNC: 3.6 MMOL/L (ref 3.5–5.3)
PROT SERPL-MCNC: 7.7 G/DL (ref 6.4–8.4)
PTH-INTACT SERPL-MCNC: 32.7 PG/ML (ref 12–88)
SODIUM SERPL-SCNC: 141 MMOL/L (ref 135–147)

## 2023-05-30 RX ORDER — FUROSEMIDE 20 MG/1
20 TABLET ORAL 2 TIMES DAILY
Qty: 60 TABLET | Refills: 0 | Status: SHIPPED | OUTPATIENT
Start: 2023-05-30

## 2023-05-30 NOTE — ASSESSMENT & PLAN NOTE
Check CMP  Stop Vit D supplement  Check PTH  Stop OTC calcium supplement  Increase fluid intake   T/c repeat DEXA scan, last DEXA 10/2021 showed osteopenia

## 2023-05-30 NOTE — PROGRESS NOTES
Name: David Kohler      : 1955      MRN: [id number]  Encounter Provider: Kyara Stoddard MD  Encounter Date: 2023   Encounter department: [address] [place] [address]     1  Leg edema  -     Echo complete w/ contrast if indicated; Future; Expected date: 2023  -     Comprehensive metabolic panel; Future  -     Vitamin D 25 hydroxy; Future  -     Albumin / creatinine urine ratio; Future  -     PTH, intact; Future    2  Hypercalcemia  Assessment & Plan:  Check CMP  Stop Vit D supplement  Check PTH  Stop OTC calcium supplement  Increase fluid intake   T/c repeat DEXA scan, last DEXA 10/2021 showed osteopenia     Orders:  -     Comprehensive metabolic panel; Future  -     Vitamin D 25 hydroxy; Future  -     PTH, intact; Future    3  BARNETT (dyspnea on exertion)  -     Echo complete w/ contrast if indicated; Future; Expected date: 2023    4  Chronic diastolic heart failure ([facility] [address] )    5  Depression, recurrent ([facility] [address] )    6  Bilateral leg edema  -     furosemide (LASIX) 20 mg tablet; Take 1 tablet (20 mg total) by mouth 2 (two) times a day           Subjective      77 yo  female with uncontrolled DM, here today for follow up of LE edema, which has not improved with Furosemide 10 mg daily and BARNETT  States she gets SOB with walking 100 feet  Needs to stop to catch her breath  Denies PND or SOB at rest      Review of Systems   Cardiovascular: Positive for leg swelling  All other systems reviewed and are negative  Current Outpatient Medications on File Prior to Visit   Medication Sig   • acetaminophen (TYLENOL) 650 mg CR tablet Take 1 tablet (650 mg total) by mouth every 8 (eight) hours as needed for mild pain   • Advair -21 MCG/ACT inhaler INHALE 2 PUFFS 2 (TWO) TIMES A DAY RINSE MOUTH AFTER USE     • albuterol (2 5 mg/3 mL) 0 083 % nebulizer solution Take 3 mL (2 5 mg total) by nebulization every 6 (six) hours as needed for wheezing or shortness of breath   • albuterol (PROVENTIL HFA,VENTOLIN HFA) 90 mcg/act inhaler INHALE 1 PUFF EVERY 4 (FOUR) HOURS AS NEEDED FOR WHEEZING   • anastrozole (ARIMIDEX) 1 mg tablet Take 1 tablet (1 mg total) by mouth daily   • aspirin 81 mg chewable tablet Chew 81 mg daily   • atorvastatin (LIPITOR) 40 mg tablet TAKE 1 TABLET (40 MG TOTAL) BY MOUTH DAILY   • Continuous Blood Gluc Transmit (Dexcom G6 Transmitter) MISC Use as directed for continuous glucose monitoring   Change every 3 months (Patient not taking: Reported on 2/6/2023)   • Diclofenac Sodium (VOLTAREN) 1 % Apply 2 g topically 2 (two) times a day   • EPINEPHrine (Auvi-Q) 0 1 mg/0 1 mL SOAJ Inject 0 3 mL (0 3 mg total) into a muscle once for 1 dose (Patient not taking: Reported on 2/6/2023)   • ergocalciferol (VITAMIN D2) 50,000 units TAKE 1 CAPSULE (50,000 UNITS TOTAL) BY MOUTH ONCE A WEEK   • erythromycin (ILOTYCIN) ophthalmic ointment Administer 0 5 inches into the left eye daily at bedtime   • fluticasone (FLONASE) 50 mcg/act nasal spray 1 spray into each nostril daily (Patient not taking: Reported on 12/27/2022)   • fluticasone (FLONASE) 50 mcg/act nasal spray 1 spray into each nostril daily (Patient not taking: Reported on 12/27/2022)   • guaiFENesin (MUCINEX) 600 mg 12 hr tablet Take 2 tablets (1,200 mg total) by mouth every 12 (twelve) hours (Patient not taking: Reported on 12/27/2022)   • insulin detemir (Levemir FlexTouch) 100 Units/mL injection pen INJECT 55 UNITS UNDER THE SKIN DAILY AT BEDTIME   • insulin lispro (HumaLOG KwikPen) 100 units/mL injection pen INJECT 20 UNITS UNDER THE SKIN BEFORE MEALS   • loratadine (CLARITIN) 10 mg tablet TAKE 1 TABLET (10 MG TOTAL) BY MOUTH DAILY (Patient not taking: Reported on 2/6/2023)   • losartan (COZAAR) 100 MG tablet TAKE 1 TABLET (100 MG TOTAL) BY MOUTH DAILY   • Mag-G 500 (27 Mg) MG tablet TAKE 1 TABLET (500 MG TOTAL) BY MOUTH DAILY   • metoprolol tartrate (LOPRESSOR) 25 mg tablet Take 0 5 tablets (12 5 "mg total) by mouth every 12 (twelve) hours   • omeprazole (PriLOSEC) 20 mg delayed release capsule TAKE 1 CAPSULE (20 MG TOTAL) BY MOUTH DAILY   • OneTouch Delica Lancets 56G MISC USE 3 (THREE) TIMES A DAY   • OneTouch Ultra test strip USE 1 EACH 3 (THREE) TIMES A DAY USE AS INSTRUCTED   • tiotropium (Spiriva Respimat) 2 5 MCG/ACT AERS inhaler Inhale 2 puffs daily   • traZODone (DESYREL) 50 mg tablet Take 0 5 tablets (25 mg total) by mouth daily at bedtime   • UltiCare Micro Pen Needles 32G X 4 MM MISC INJECT UNDER THE SKIN 4 (FOUR) TIMES A DAY   • zileuton 600 MG TAKE 1 TABLET (600 MG TOTAL) BY MOUTH 2 (TWO) TIMES A DAY   • [DISCONTINUED] furosemide (LASIX) 20 mg tablet Take 0 5 tablets (10 mg total) by mouth daily       Objective     /70 (BP Location: Left arm, Patient Position: Sitting, Cuff Size: Standard)   Pulse 99   Temp 98 2 °F (36 8 °C) (Temporal)   Resp 18   Ht 5' 5\" (1 651 m)   Wt 84 4 kg (186 lb)   SpO2 99%   BMI 30 95 kg/m²     Physical Exam  Vitals and nursing note reviewed  Constitutional:       Appearance: She is well-developed  HENT:      Head: Normocephalic  Right Ear: External ear normal       Left Ear: External ear normal       Nose: Nose normal    Eyes:      Conjunctiva/sclera: Conjunctivae normal       Pupils: Pupils are equal, round, and reactive to light  Neck:      Thyroid: No thyromegaly  Cardiovascular:      Rate and Rhythm: Normal rate and regular rhythm  Heart sounds: Normal heart sounds  Pulmonary:      Effort: Pulmonary effort is normal       Breath sounds: Normal breath sounds  Abdominal:      Palpations: Abdomen is soft  Tenderness: There is no abdominal tenderness  There is no guarding or rebound  Musculoskeletal:         General: Normal range of motion  Cervical back: Normal range of motion and neck supple  Right lower le+ Pitting Edema present  Left lower le+ Pitting Edema present  Skin:     General: Skin is dry   " Neurological:      Mental Status: She is alert and oriented to person, place, and time  Deep Tendon Reflexes: Reflexes are normal and symmetric         Benedicto Canales MD

## 2023-06-02 ENCOUNTER — OFFICE VISIT (OUTPATIENT)
Dept: SURGICAL ONCOLOGY | Facility: CLINIC | Age: 68
End: 2023-06-02

## 2023-06-02 ENCOUNTER — TELEPHONE (OUTPATIENT)
Dept: SURGICAL ONCOLOGY | Facility: CLINIC | Age: 68
End: 2023-06-02

## 2023-06-02 VITALS
OXYGEN SATURATION: 97 % | SYSTOLIC BLOOD PRESSURE: 132 MMHG | TEMPERATURE: 97.3 F | DIASTOLIC BLOOD PRESSURE: 80 MMHG | HEIGHT: 65 IN | BODY MASS INDEX: 31.02 KG/M2 | WEIGHT: 186.2 LBS | HEART RATE: 89 BPM | RESPIRATION RATE: 18 BRPM

## 2023-06-02 DIAGNOSIS — Z17.0 MALIGNANT NEOPLASM OF UPPER-OUTER QUADRANT OF RIGHT BREAST IN FEMALE, ESTROGEN RECEPTOR POSITIVE (HCC): Primary | ICD-10-CM

## 2023-06-02 DIAGNOSIS — C50.411 MALIGNANT NEOPLASM OF UPPER-OUTER QUADRANT OF RIGHT BREAST IN FEMALE, ESTROGEN RECEPTOR POSITIVE (HCC): Primary | ICD-10-CM

## 2023-06-02 DIAGNOSIS — Z79.811 USE OF ANASTROZOLE (ARIMIDEX): ICD-10-CM

## 2023-06-02 NOTE — PROGRESS NOTES
Surgical Oncology Follow Up       [facility] [address]  [facility] [address]    Jolie Gastontron  1955  [phone]  [address]  [facility] [place]  [place] [address]    No chief complaint on file  Assessment/Plan:  1  Malignant neoplasm of upper-outer quadrant of right breast in female, estrogen receptor positive ([address] )  - 6 month follow up    2  Use of anastrozole (Arimidex)  - continue use per medical oncology     Discussion/Summary: Patient is a 67-year-old female presenting today for six-month follow-up for right breast cancer diagnosed in May of 2018  Pathology revealed invasive carcinoma ER/MT/HER 2 positive  She underwent a right breast lumpectomy and sentinel node biopsy  She received adjuvant chemotherapy and herceptin monotherapy  She completed whole breast radiation therapy and is currently on anastrozole  She has a right breast mammogram and u/s with complaints of cyst on 1/26/2023 which was BIRADS 3 category 3 density  This is of stable size and appearance of a complicated septated cyst in right axilla  6 month follow up and b/l mammogram scheduled next month  There were no concerns on her cbe  She still has soreness at her sln bx scar  She may now follow with us on an annual basis  I will see the patient back in 1 year or sooner should the need arise  She was instructed to call with any questions or concerns prior to this time  All questions were answered today  Dez Briceno   History of Present Illness:     Oncology History   Malignant neoplasm of right female breast ([facility] [address] )   5/23/2018 Initial Diagnosis    Malignant neoplasm of right female breast ([facility] [address] )     5/23/2018 Biopsy    Right breast core biopsy:  DCIS, micropapillary type, low-intermediate nuclear grade  ER 90-95% positive,  MT 75-80% positive       6/26/2018 Surgery    RIGHT BREAST NEEDLE LOCALIZATION X2 WITH RIGHT BREAST LUMPECTOMY ( NEEDLE LOC @ 1000) (Right)   ONCOPLASTIC CLOSURE OF LUMPECTOMY CAVITY    Invasive breast carcinoma, NST (aka ductal)  * [place] grade 2 of 3 (total score = 2+2+2 = 6 of 9)   -- tubule formation < 10%, score 3   -- nuclear grade 2 of 3, score 2   -- mitoses ~ 4/mm2, score 2    * invasive carcinoma is multifocally present (A23-1, A32-1, A84-1, A89-1, A100-1), largest focus is 14 millimeters in greatest dimension (A89-1, this focus is graded)  * superior lumpectomy margin (orange-inked) is POSITIVE for invasive carcinoma (A84-1)   * all other lumpectomy margins are free of invasive carcinoma  * estrogen, progesterone & Her-2/negrita receptor studies are undertaken, to be described in an addendum report  - Ductal carcinoma in-situ (DCIS): Present as an extensive component (~85% of total tumor)  * DCIS is co-located with invasive carcinoma surrounding prior needle biopsy site  * DCIS spans 2 6 cm maximal dimension (A62-1) and is present on 27 of 136 total slides examined  * DCIS has cribriform & micropapillary patterns, nuclear grade 2 of 3, with central comedo-type necrosis  * DCIS is present within 0 2 millimeters of the superior lumpectomy margin (orange-inked, A26-1)   * DCIS is present within 0 2 millimeters of the medial lumpectomy margin (yellow-inked, A3-1)   * all other lumpectomy margins are free of DCIS by > 2 millimeters  - Lymph-vascular invasion: no lymph-vascular invasion is unequivocally identified  - Microcalcifications: present in DCIS  % positive, ER 45-55% positive, HER2 by IHC 3+ positive    - Dr Agustin Fitch       8/2/2018 Surgery    Right breast lumpectomy reexcision, right axilla SLN biopsy:  A  Submitted as “right axillary sentinel node”:  - Seven lymph nodes are identified showing no metastatic tumor      B   Right breast lumpectomy reexcision:  - Abundant reactive changes are seen around the previous lumpectomy cavity   - No residual atypia or malignancy is seen           8/2/2018 -  Cancer Staged    Stage IA - pT1c, pN0, G2        9/25/2018 - 8/6/2019 Chemotherapy    Weekly Paclitaxol and trastuzumab x12, until December 11, 2018 followed by trastuzumab monotherapy 6 milligram/kilogram every 3 weeks    - Dr Azar Garcia       1/9/2019 - 2/19/2019 Radiation    Plan ID Energy Fractions Dose per Fraction (cGy) Dose Correction (cGy) Total Dose Delivered (cGy) Elapsed Days   R Boost e 16E 5 / 5 200 0 1,000 6   Right Breast 6X 25 / 25 200 0 5,000 29     - Dr Leandro Borjas       Malignant neoplasm of upper-outer quadrant of right breast in female, estrogen receptor positive ([facility] [address] )   7/19/2018 Initial Diagnosis    Malignant neoplasm of upper-outer quadrant of right breast in female, estrogen receptor positive ([facility] [address] )     12/17/2018 - 8/26/2019 Chemotherapy    trastuzumab (HERCEPTIN) 579 mg in sodium chloride 0 9 % 250 mL chemo infusion, 8 mg/kg, Intravenous, Once, 12 of 12 cycles  Administration: 434 mg (5/14/2019), 434 mg (6/4/2019), 450 mg (7/16/2019), 450 mg (8/6/2019), 450 mg (6/25/2019)          -Interval History: Patient is a 28-year-old female presenting today for six-month follow-up for right breast cancer diagnosed in May of 2018  She has a right breast mammogram and u/s with complaints of cyst on 1/26/2023 which was BIRADS 3 category 3 density  This is of stable size and appearance of a complicated septated cyst in right axilla  Patient denies changes on her breast exam  She notes arthralgias in her hands and feet  She also has edema in her feet but states her doctor prescribed her lasix  She denies persistent headaches, bone pain, back pain, shortness of breath, or abdominal pain  Review of Systems:  Review of Systems   Constitutional: Negative for activity change, appetite change, fatigue and unexpected weight change  Respiratory: Negative for cough and shortness of breath  Cardiovascular: Negative for chest pain     Gastrointestinal: Negative for abdominal pain, diarrhea, nausea and vomiting  Endocrine: Negative for heat intolerance  Musculoskeletal: Positive for arthralgias  Negative for back pain and myalgias  Skin: Negative for rash  Neurological: Negative for weakness and headaches  Hematological: Negative for adenopathy         Patient Active Problem List   Diagnosis   • Type 2 diabetes mellitus with complication, with long-term current use of insulin ([place] [address] )   • Severe persistent asthma without complication   • Other specified hypothyroidism   • Chest pain   • Palpitations   • Chronic bilateral low back pain without sciatica   • Neck pain   • Malignant neoplasm of right female breast ([place] [address] )   • Malignant neoplasm of upper-outer quadrant of right breast in female, estrogen receptor positive ([place] [address] )   • Hiatal hernia   • Screening for colon cancer   • Esophageal dysphagia   • Cellulitis of right axilla   • Chronic pain of right knee   • Hypercholesterolemia   • Hypothyroid   • Essential hypertension   • Bilateral pulmonary embolism (HCC)   • Radiation pneumonitis ([facility])   • Type 2 diabetes mellitus with hyperglycemia, with long-term current use of insulin ([place] [address] )   • Diabetic polyneuropathy associated with type 2 diabetes mellitus (HCC)   • Use of anastrozole (Arimidex)   • Decreased ROM of right shoulder   • Lump of skin   • Chronic diastolic heart failure ([facility])   • Gastroesophageal reflux disease without esophagitis   • Muscle cramps   • Sebaceous cyst   • Subcutaneous mass of back   • Overweight with body mass index (BMI) of 28 to 28 9 in adult   • Anxiety   • Right foot pain   • Pelvic pain   • Bone pain   • Type 2 diabetes mellitus with microalbuminuria, with long-term current use of insulin ([facility])   • Depression, recurrent ([facility])   • Vaginal candidiasis   • Diabetes mellitus ([place] [address] )   • Seasonal allergies   • Diarrhea   • COVID-19   • Hypercalcemia     Past Medical History:   Diagnosis Date   • Abdominal infection ([place] [address] )     h-pylori   • Abnormal chest x-ray 04/05/2019   • Asthma    • Breast cancer ([facility] [address] ) 06/26/2018   • Cancer ([facility] [address] )     BREAST   • Diabetes mellitus ([facility] [address] )    • Hiatal hernia    • History of chemotherapy 2018   • History of radiation therapy 2018   • Hypercholesterolemia    • Hypertension    • Hypothyroid    • Renal disorder    • Rheumatoid arthritis [facility])      Past Surgical History:   Procedure Laterality Date   • 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   • BREAST LUMPECTOMY Right 6/26/2018    Procedure: RIGHT BREAST NEEDLE LOCALIZATION X2 WITH RIGHT BREAST LUMPECTOMY ( NEEDLE LOC @ 1000); Surgeon: Kenneth Lobo MD;  Location: [facility];  Service: Surgical Oncology   • BREAST LUMPECTOMY Right 8/2/2018    Procedure: LYMPHOSCINITGRAPHY INTRAOPERATIVE LYMPHATIC MAPPING , RIGHT SENTINEL NODE BIOPSY, REEXCISION  RIGHT BREAST LUMPECTOMY CAVITY (SUPERIOR MARGIN); Surgeon: Kenneth Lobo MD;  Location: [facility];  Service: Surgical Oncology   • BREAST SURGERY Left    • CATARACT EXTRACTION, BILATERAL Bilateral    • COLONOSCOPY     • EGD AND COLONOSCOPY N/A 9/5/2018    Procedure: EGD AND COLONOSCOPY;  Surgeon: Ramirez Garzon MD;  Location: [facility] GI LAB; Service: Gastroenterology   • FL GUIDED CENTRAL VENOUS ACCESS DEVICE INSERTION  9/13/2018   • KNEE SURGERY Right    • MAMMO NEEDLE LOCALIZATION RIGHT (ALL INC) Right 6/26/2018   • MAMMO STEREOTACTIC BREAST BIOPSY RIGHT (ALL INC) Right 5/23/2018   • RETINAL DETACHMENT SURGERY Right    • TUBAL LIGATION     • TUNNELED VENOUS PORT PLACEMENT Left 9/13/2018    Procedure: INSERTION VENOUS PORT (PORT-A-CATH);   Surgeon: Kenneth Lobo MD;  Location: [facility];  Service: Surgical Oncology   • UPPER GASTROINTESTINAL ENDOSCOPY       Family History   Problem Relation Age of Onset   • Diabetes Mother    • Hypertension Mother    • Diabetes Father    • Hypertension Father    • No Known Problems Sister    • No Known Problems Sister    • No Known Problems Sister    • No Known Problems Sister    • No Known Problems Sister • Diabetes Brother    • Diabetes Brother    • Kidney failure Brother    • Diabetes Brother    • Cancer Maternal Grandfather    • No Known Problems Daughter    • No Known Problems Daughter    • No Known Problems Daughter      Social History     Socioeconomic History   • Marital status:      Spouse name: Not on file   • Number of children: Not on file   • Years of education: Not on file   • Highest education level: Not on file   Occupational History   • Not on file   Tobacco Use   • Smoking status: Never   • Smokeless tobacco: Never   Vaping Use   • Vaping Use: Never used   Substance and Sexual Activity   • Alcohol use: No   • Drug use: No   • Sexual activity: Not Currently   Other Topics Concern   • Not on file   Social History Narrative   • Not on file     Social Determinants of Health     Financial Resource Strain: Unknown (12/23/2022)    Overall Financial Resource Strain (CARDIA)    • Difficulty of Paying Living Expenses: Patient refused   Food Insecurity: No Food Insecurity (10/27/2021)    Hunger Vital Sign    • Worried About Running Out of Food in the Last Year: Never true    • Ran Out of Food in the Last Year: Never true   Transportation Needs: No Transportation Needs (12/23/2022)    PRAPARE - Transportation    • Lack of Transportation (Medical): No    • Lack of Transportation (Non-Medical):  No   Physical Activity: Not on file   Stress: Not on file   Social Connections: Not on file   Intimate Partner Violence: Not on file   Housing Stability: Low Risk  (10/27/2021)    Housing Stability Vital Sign    • Unable to Pay for Housing in the Last Year: No    • Number of Places Lived in the Last Year: 1    • Unstable Housing in the Last Year: No       Current Outpatient Medications:   •  acetaminophen (TYLENOL) 650 mg CR tablet, Take 1 tablet (650 mg total) by mouth every 8 (eight) hours as needed for mild pain, Disp: 30 tablet, Rfl: 0  •  Advair -21 MCG/ACT inhaler, INHALE 2 PUFFS 2 (TWO) TIMES A DAY RINSE MOUTH AFTER USE , Disp: 12 g, Rfl: 4  •  albuterol (2 5 mg/3 mL) 0 083 % nebulizer solution, Take 3 mL (2 5 mg total) by nebulization every 6 (six) hours as needed for wheezing or shortness of breath, Disp: 75 mL, Rfl: 5  •  albuterol (PROVENTIL HFA,VENTOLIN HFA) 90 mcg/act inhaler, INHALE 1 PUFF EVERY 4 (FOUR) HOURS AS NEEDED FOR WHEEZING, Disp: 8 5 g, Rfl: 4  •  anastrozole (ARIMIDEX) 1 mg tablet, Take 1 tablet (1 mg total) by mouth daily, Disp: 90 tablet, Rfl: 3  •  aspirin 81 mg chewable tablet, Chew 81 mg daily, Disp: , Rfl:   •  atorvastatin (LIPITOR) 40 mg tablet, TAKE 1 TABLET (40 MG TOTAL) BY MOUTH DAILY, Disp: 90 tablet, Rfl: 2  •  Continuous Blood Gluc Transmit (Dexcom G6 Transmitter) MISC, Use as directed for continuous glucose monitoring   Change every 3 months (Patient not taking: Reported on 2/6/2023), Disp: 1 each, Rfl: 3  •  Diclofenac Sodium (VOLTAREN) 1 %, Apply 2 g topically 2 (two) times a day, Disp: 150 g, Rfl: 0  •  EPINEPHrine (Auvi-Q) 0 1 mg/0 1 mL SOAJ, Inject 0 3 mL (0 3 mg total) into a muscle once for 1 dose (Patient not taking: Reported on 2/6/2023), Disp: 2 each, Rfl: 5  •  ergocalciferol (VITAMIN D2) 50,000 units, TAKE 1 CAPSULE (50,000 UNITS TOTAL) BY MOUTH ONCE A WEEK, Disp: 12 capsule, Rfl: 0  •  erythromycin (ILOTYCIN) ophthalmic ointment, Administer 0 5 inches into the left eye daily at bedtime, Disp: 3 5 g, Rfl: 0  •  fluticasone (FLONASE) 50 mcg/act nasal spray, 1 spray into each nostril daily (Patient not taking: Reported on 12/27/2022), Disp: 18 2 mL, Rfl: 5  •  fluticasone (FLONASE) 50 mcg/act nasal spray, 1 spray into each nostril daily (Patient not taking: Reported on 12/27/2022), Disp: 16 g, Rfl: 0  •  furosemide (LASIX) 20 mg tablet, Take 1 tablet (20 mg total) by mouth 2 (two) times a day, Disp: 60 tablet, Rfl: 0  •  guaiFENesin (MUCINEX) 600 mg 12 hr tablet, Take 2 tablets (1,200 mg total) by mouth every 12 (twelve) hours (Patient not taking: Reported on 12/27/2022), Disp: 60 tablet, Rfl: 0  •  insulin detemir (Levemir FlexTouch) 100 Units/mL injection pen, INJECT 55 UNITS UNDER THE SKIN DAILY AT BEDTIME, Disp: 45 mL, Rfl: 0  •  insulin lispro (HumaLOG KwikPen) 100 units/mL injection pen, INJECT 20 UNITS UNDER THE SKIN BEFORE MEALS, Disp: 45 mL, Rfl: 0  •  loratadine (CLARITIN) 10 mg tablet, TAKE 1 TABLET (10 MG TOTAL) BY MOUTH DAILY (Patient not taking: Reported on 2/6/2023), Disp: 90 tablet, Rfl: 2  •  losartan (COZAAR) 100 MG tablet, TAKE 1 TABLET (100 MG TOTAL) BY MOUTH DAILY, Disp: 90 tablet, Rfl: 1  •  Mag-G 500 (27 Mg) MG tablet, TAKE 1 TABLET (500 MG TOTAL) BY MOUTH DAILY, Disp: 90 tablet, Rfl: 1  •  metoprolol tartrate (LOPRESSOR) 25 mg tablet, Take 0 5 tablets (12 5 mg total) by mouth every 12 (twelve) hours, Disp: 180 tablet, Rfl: 0  •  omeprazole (PriLOSEC) 20 mg delayed release capsule, TAKE 1 CAPSULE (20 MG TOTAL) BY MOUTH DAILY, Disp: 90 capsule, Rfl: 2  •  OneTouch Delica Lancets 30R MISC, USE 3 (THREE) TIMES A DAY, Disp: 300 each, Rfl: 3  •  OneTouch Ultra test strip, USE 1 EACH 3 (THREE) TIMES A DAY USE AS INSTRUCTED, Disp: 300 each, Rfl: 2  •  tiotropium (Spiriva Respimat) 2 5 MCG/ACT AERS inhaler, Inhale 2 puffs daily, Disp: 4 g, Rfl: 3  •  traZODone (DESYREL) 50 mg tablet, Take 0 5 tablets (25 mg total) by mouth daily at bedtime, Disp: 45 tablet, Rfl: 0  •  UltiCare Micro Pen Needles 32G X 4 MM MISC, INJECT UNDER THE SKIN 4 (FOUR) TIMES A DAY, Disp: 400 each, Rfl: 0  •  zileuton 600 MG, TAKE 1 TABLET (600 MG TOTAL) BY MOUTH 2 (TWO) TIMES A DAY, Disp: 60 tablet, Rfl: 4  Allergies   Allergen Reactions   • Codeine Other (See Comments)     unknown   • Latex Other (See Comments)     fungus     There were no vitals filed for this visit  Physical Exam  Constitutional:       General: She is not in acute distress  Appearance: Normal appearance  Cardiovascular:      Rate and Rhythm: Normal rate and regular rhythm  Pulses: Normal pulses  Heart sounds: Normal heart sounds  Pulmonary:      Effort: Pulmonary effort is normal       Breath sounds: Normal breath sounds  Chest:      Chest wall: No mass  Breasts:     Right: Tenderness present  No swelling, bleeding, inverted nipple, mass, nipple discharge or skin change  Left: No swelling, bleeding, inverted nipple, mass, nipple discharge, skin change or tenderness  Abdominal:      General: Abdomen is flat  Palpations: Abdomen is soft  Lymphadenopathy:      Upper Body:      Right upper body: No supraclavicular, axillary or pectoral adenopathy  Left upper body: No supraclavicular, axillary or pectoral adenopathy  Skin:     General: Skin is warm  Neurological:      General: No focal deficit present  Mental Status: She is alert and oriented to person, place, and time  Psychiatric:         Mood and Affect: Mood normal          Behavior: Behavior normal            Results:    Imaging  No results found  I reviewed the above imaging data  Advance Care Planning/Advance Directives:  Discussed disease status, cancer treatment plans and/or cancer treatment goals with the patient

## 2023-06-12 ENCOUNTER — OFFICE VISIT (OUTPATIENT)
Dept: PULMONOLOGY | Facility: CLINIC | Age: 68
End: 2023-06-12
Payer: COMMERCIAL

## 2023-06-12 VITALS
OXYGEN SATURATION: 97 % | WEIGHT: 186 LBS | SYSTOLIC BLOOD PRESSURE: 120 MMHG | TEMPERATURE: 98 F | HEART RATE: 86 BPM | BODY MASS INDEX: 30.99 KG/M2 | HEIGHT: 65 IN | DIASTOLIC BLOOD PRESSURE: 76 MMHG

## 2023-06-12 DIAGNOSIS — J45.50 SEVERE PERSISTENT ASTHMA WITHOUT COMPLICATION: Primary | ICD-10-CM

## 2023-06-12 DIAGNOSIS — I26.99 BILATERAL PULMONARY EMBOLISM (HCC): ICD-10-CM

## 2023-06-12 DIAGNOSIS — Z17.0 MALIGNANT NEOPLASM OF RIGHT BREAST IN FEMALE, ESTROGEN RECEPTOR POSITIVE, UNSPECIFIED SITE OF BREAST (HCC): ICD-10-CM

## 2023-06-12 DIAGNOSIS — C50.911 MALIGNANT NEOPLASM OF RIGHT BREAST IN FEMALE, ESTROGEN RECEPTOR POSITIVE, UNSPECIFIED SITE OF BREAST (HCC): ICD-10-CM

## 2023-06-12 PROCEDURE — 99214 OFFICE O/P EST MOD 30 MIN: CPT | Performed by: INTERNAL MEDICINE

## 2023-06-12 NOTE — PROGRESS NOTES
Pulmonary Follow Up Note   Jolie Mulligan 78 y o  female MRN: [id number]  6/12/2023      Assessment:    1  Severe persistent asthma without complication  - on advair, symptoms have improved after removing plants from within her home  Did not start spiriva due to uncertainty on proper use  Plan:  - Continue advair  - Counseled on proper technique for spiriva  - Continue albuterol PRN  - F/up with allergy/immunology    2  Bilateral pulmonary embolism (HCC)  - in the setting of active malignancy, now in remission  Completed a course of anticoagulation  Plan:  - Continue aspirin    3  Malignant neoplasm of right breast in female, estrogen receptor positive, unspecified site of breast ([facility] [address] )  - S/p lumpectomy, chemo and radiation  Currently on anastrozole  Plan:  - Continue regular f/up with oncology    Plan:    Diagnoses and all orders for this visit:    Severe persistent asthma without complication    Bilateral pulmonary embolism (HCC)    Malignant neoplasm of right breast in female, estrogen receptor positive, [facility] site [facility] )        Return in about 6 months (around 12/12/2023)  History of Present Illness   HPI:  Rah Gibson is a 78 y o  female who presents for follow up of her shortness of breath  She continues to use her advair, did not start spiriva after her last visit  She is using the albuterol rescue inhaler about twice per week  She Continues to f/up with oncology for surveillance of breast cancer  She removed plants from within her home and is feeling much better since then  Review of Systems   Constitutional: Negative for chills and fever  HENT: Negative for congestion, postnasal drip and rhinorrhea  Eyes: Negative for itching  Respiratory: Negative for cough, shortness of breath, wheezing and stridor  Cardiovascular: Negative for chest pain, palpitations and leg swelling  Gastrointestinal: Negative for abdominal distention, abdominal pain, nausea and vomiting  Genitourinary: Negative for dysuria and flank pain  Musculoskeletal: Negative for arthralgias and myalgias  Skin: Negative for color change  Neurological: Negative for dizziness, light-headedness and headaches  Psychiatric/Behavioral: Negative  Historical Information   Past Medical History:   Diagnosis Date   • Abdominal infection ([facility] [address] )     h-pylori   • Abnormal chest x-ray 04/05/2019   • Asthma    • Breast cancer ([facility] [address] ) 06/26/2018   • Cancer ([facility] [address] )     BREAST   • [facility]    • Hiatal hernia    • History of chemotherapy 2018   • History of radiation therapy 2018   • Hypercholesterolemia    • Hypertension    • Hypothyroid    • Renal disorder    • Rheumatoid arthritis [facility])      Past Surgical History:   Procedure Laterality Date   • 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   • BREAST LUMPECTOMY Right 6/26/2018    Procedure: RIGHT BREAST NEEDLE LOCALIZATION X2 WITH RIGHT BREAST LUMPECTOMY ( NEEDLE LOC @ 1000); Surgeon: Angelica Cano MD;  Location: [facility];  Service: Surgical Oncology   • BREAST LUMPECTOMY Right 8/2/2018    Procedure: LYMPHOSCINITGRAPHY INTRAOPERATIVE LYMPHATIC MAPPING , RIGHT SENTINEL NODE BIOPSY, REEXCISION  RIGHT BREAST LUMPECTOMY CAVITY (SUPERIOR MARGIN); Surgeon: Angelica Cano MD;  Location: [facility];  Service: Surgical Oncology   • BREAST SURGERY Left    • CATARACT EXTRACTION, BILATERAL Bilateral    • COLONOSCOPY     • EGD AND COLONOSCOPY N/A 9/5/2018    Procedure: EGD AND COLONOSCOPY;  Surgeon: Rosemary Robison MD;  Location: [facility] GI LAB; Service: Gastroenterology   • FL GUIDED CENTRAL VENOUS ACCESS DEVICE INSERTION  9/13/2018   • KNEE SURGERY Right    • MAMMO NEEDLE LOCALIZATION RIGHT (ALL INC) Right 6/26/2018   • MAMMO STEREOTACTIC BREAST BIOPSY RIGHT (ALL INC) Right 5/23/2018   • RETINAL DETACHMENT SURGERY Right    • TUBAL LIGATION     • TUNNELED VENOUS PORT PLACEMENT Left 9/13/2018    Procedure: INSERTION VENOUS PORT (PORT-A-CATH);   Surgeon: Malka Valdez Onel Banegas MD;  Location: [facility];  Service: Surgical Oncology   • UPPER GASTROINTESTINAL ENDOSCOPY       Family History   Problem Relation Age of Onset   • Diabetes Mother    • Hypertension Mother    • Diabetes Father    • Hypertension Father    • No Known Problems Sister    • No Known Problems Sister    • No Known Problems Sister    • No Known Problems Sister    • No Known Problems Sister    • Diabetes Brother    • Diabetes Brother    • Kidney failure Brother    • Diabetes Brother    • Cancer Maternal Grandfather    • No Known Problems Daughter    • No Known Problems Daughter    • No Known Problems Daughter          Meds/Allergies     Current Outpatient Medications:   •  acetaminophen (TYLENOL) 650 mg CR tablet, Take 1 tablet (650 mg total) by mouth every 8 (eight) hours as needed for mild pain, Disp: 30 tablet, Rfl: 0  •  Advair -21 MCG/ACT inhaler, INHALE 2 PUFFS 2 (TWO) TIMES A DAY RINSE MOUTH AFTER USE , Disp: 12 g, Rfl: 4  •  albuterol (2 5 mg/3 mL) 0 083 % nebulizer solution, Take 3 mL (2 5 mg total) by nebulization every 6 (six) hours as needed for wheezing or shortness of breath, Disp: 75 mL, Rfl: 5  •  albuterol (PROVENTIL HFA,VENTOLIN HFA) 90 mcg/act inhaler, INHALE 1 PUFF EVERY 4 (FOUR) HOURS AS NEEDED FOR WHEEZING, Disp: 8 5 g, Rfl: 4  •  anastrozole (ARIMIDEX) 1 mg tablet, Take 1 tablet (1 mg total) by mouth daily, Disp: 90 tablet, Rfl: 3  •  aspirin 81 mg chewable tablet, Chew 81 mg daily, Disp: , Rfl:   •  atorvastatin (LIPITOR) 40 mg tablet, TAKE 1 TABLET (40 MG TOTAL) BY MOUTH DAILY, Disp: 90 tablet, Rfl: 2  •  Diclofenac Sodium (VOLTAREN) 1 %, Apply 2 g topically 2 (two) times a day, Disp: 150 g, Rfl: 0  •  furosemide (LASIX) 20 mg tablet, Take 1 tablet (20 mg total) by mouth 2 (two) times a day, Disp: 60 tablet, Rfl: 0  •  insulin detemir (Levemir FlexTouch) 100 Units/mL injection pen, INJECT 55 UNITS UNDER THE SKIN DAILY AT BEDTIME, Disp: 45 mL, Rfl: 0  •  insulin lispro (HumaLOG KwikPen) 100 units/mL injection pen, INJECT 20 UNITS UNDER THE SKIN BEFORE MEALS, Disp: 45 mL, Rfl: 0  •  losartan (COZAAR) 100 MG tablet, TAKE 1 TABLET (100 MG TOTAL) BY MOUTH DAILY, Disp: 90 tablet, Rfl: 1  •  metoprolol tartrate (LOPRESSOR) 25 mg tablet, Take 0 5 tablets (12 5 mg total) by mouth every 12 (twelve) hours, Disp: 180 tablet, Rfl: 0  •  omeprazole (PriLOSEC) 20 mg delayed release capsule, TAKE 1 CAPSULE (20 MG TOTAL) BY MOUTH DAILY, Disp: 90 capsule, Rfl: 2  •  OneTouch Delica Lancets 38N MISC, USE 3 (THREE) TIMES A DAY, Disp: 300 each, Rfl: 3  •  OneTouch Ultra test strip, USE 1 EACH 3 (THREE) TIMES A DAY USE AS INSTRUCTED, Disp: 300 each, Rfl: 2  •  UltiCare Micro Pen Needles 32G X 4 MM MISC, INJECT UNDER THE SKIN 4 (FOUR) TIMES A DAY, Disp: 400 each, Rfl: 0  •  Continuous Blood Gluc Transmit (Dexcom G6 Transmitter) MISC, Use as directed for continuous glucose monitoring   Change every 3 months (Patient not taking: Reported on 2/6/2023), Disp: 1 each, Rfl: 3  •  EPINEPHrine (Auvi-Q) 0 1 mg/0 1 mL SOAJ, Inject 0 3 mL (0 3 mg total) into a muscle once for 1 dose (Patient not taking: Reported on 2/6/2023), Disp: 2 each, Rfl: 5  •  ergocalciferol (VITAMIN D2) 50,000 units, TAKE 1 CAPSULE (50,000 UNITS TOTAL) BY MOUTH ONCE A WEEK (Patient not taking: Reported on 6/12/2023), Disp: 12 capsule, Rfl: 0  •  erythromycin (ILOTYCIN) ophthalmic ointment, Administer 0 5 inches into the left eye daily at bedtime (Patient not taking: Reported on 6/12/2023), Disp: 3 5 g, Rfl: 0  •  fluticasone (FLONASE) 50 mcg/act nasal spray, 1 spray into each nostril daily (Patient not taking: Reported on 12/27/2022), Disp: 18 2 mL, Rfl: 5  •  fluticasone (FLONASE) 50 mcg/act nasal spray, 1 spray into each nostril daily (Patient not taking: Reported on 12/27/2022), Disp: 16 g, Rfl: 0  •  guaiFENesin (MUCINEX) 600 mg 12 hr tablet, Take 2 tablets (1,200 mg total) by mouth every 12 (twelve) hours (Patient not taking: Reported on "6/2/2023), Disp: 60 tablet, Rfl: 0  •  loratadine (CLARITIN) 10 mg tablet, TAKE 1 TABLET (10 MG TOTAL) BY MOUTH DAILY (Patient not taking: Reported on 6/12/2023), Disp: 90 tablet, Rfl: 2  •  Mag-G 500 (27 Mg) MG tablet, TAKE 1 TABLET (500 MG TOTAL) BY MOUTH DAILY (Patient not taking: Reported on 6/12/2023), Disp: 90 tablet, Rfl: 1  •  tiotropium (Spiriva Respimat) 2 5 MCG/ACT AERS inhaler, Inhale 2 puffs daily (Patient not taking: Reported on 6/12/2023), Disp: 4 g, Rfl: 3  •  traZODone (DESYREL) 50 mg tablet, Take 0 5 tablets (25 mg total) by mouth daily at bedtime (Patient not taking: Reported on 6/12/2023), Disp: 45 tablet, Rfl: 0  •  zileuton 600 MG, TAKE 1 TABLET (600 MG TOTAL) BY MOUTH 2 (TWO) TIMES A DAY (Patient not taking: Reported on 6/12/2023), Disp: 60 tablet, Rfl: 4  Allergies   Allergen Reactions   • Codeine Other (See Comments)     unknown   • Latex Other (See Comments)     fungus       Vitals: Blood pressure 120/76, pulse 86, temperature 98 °F (36 7 °C), temperature source Tympanic, height 5' 5\" (1 651 m), weight 84 4 kg (186 lb), SpO2 97 %, not currently breastfeeding  Body mass index is 30 95 kg/m²  Oxygen Therapy  SpO2: 97 %  Oxygen Therapy: None (Room air)      Physical Exam  Physical Exam  Constitutional:       General: She is not in acute distress  Appearance: She is not diaphoretic  HENT:      Head: Normocephalic and atraumatic  Nose: Nose normal       Mouth/Throat:      Pharynx: No oropharyngeal exudate  Eyes:      General: No scleral icterus  Conjunctiva/sclera: Conjunctivae normal       Pupils: Pupils are equal, round, and reactive to light  Neck:      Thyroid: No thyromegaly  Vascular: No JVD  Trachea: No tracheal deviation  Cardiovascular:      Rate and Rhythm: Normal rate and regular rhythm  Heart sounds: Normal heart sounds  No murmur heard  No friction rub  No gallop  Pulmonary:      Effort: Pulmonary effort is normal  No respiratory distress   " Breath sounds: Normal breath sounds  No stridor  No wheezing or rales  Abdominal:      General: Bowel sounds are normal  There is no distension  Palpations: Abdomen is soft  Tenderness: There is no abdominal tenderness  There is no guarding or rebound  Musculoskeletal:         General: No deformity  Normal range of motion  Cervical back: Normal range of motion and neck supple  Right lower leg: Edema present  Left lower leg: Edema present  Lymphadenopathy:      Cervical: No cervical adenopathy  Skin:     General: Skin is warm  Findings: No erythema or rash  Neurological:      Mental Status: She is alert and oriented to person, place, and time  Cranial Nerves: No cranial nerve deficit  Sensory: No sensory deficit  Labs: I have personally reviewed pertinent lab results  Lab Results   Component Value Date    HCT 38 8 11/10/2022    HGB 12 7 11/10/2022    MCV 87 11/10/2022     11/10/2022    WBC 8 68 11/10/2022     Lab Results   Component Value Date    BUN 15 2023    CALCIUM 10 1 2023     2023    CO2 28 2023    CREATININE 0 58 (L) 2023    K 3 6 2023     Lab Results   Component Value Date    IGE 1,057 (H) 2022     Lab Results   Component Value Date    ALKPHOS 57 2023    ALT 19 2023    AST 15 2023         Imaging and other studies: I have personally reviewed pertinent reports  and I have personally reviewed pertinent films in PACS  Chest xray on 2022- clear bilateral lung fields    Pulmonary function [id number]  Normal spirometry, air trapping and hyperinflation, supranormal diffusion capacity    EKG, Pathology, and Other Studies: I have personally reviewed pertinent reports     and I have personally reviewed pertinent films in PACS    Rocio Casper MD  Pulmonary and Critical Care   [facility]

## 2023-07-03 ENCOUNTER — HOSPITAL ENCOUNTER (OUTPATIENT)
Dept: NON INVASIVE DIAGNOSTICS | Facility: HOSPITAL | Age: 68
Discharge: HOME | End: 2023-07-03
Payer: COMMERCIAL

## 2023-07-03 VITALS
BODY MASS INDEX: 30.99 KG/M2 | WEIGHT: 186 LBS | HEIGHT: 65 IN | SYSTOLIC BLOOD PRESSURE: 120 MMHG | HEART RATE: 86 BPM | DIASTOLIC BLOOD PRESSURE: 76 MMHG

## 2023-07-03 DIAGNOSIS — R06.09 DOE (DYSPNEA ON EXERTION): ICD-10-CM

## 2023-07-03 DIAGNOSIS — R60.0 LEG EDEMA: ICD-10-CM

## 2023-07-03 LAB
AORTIC ROOT: 3 CM
APICAL FOUR CHAMBER EJECTION FRACTION: 62 %
E WAVE DECELERATION TIME: 337 MS
FRACTIONAL SHORTENING: 33 (ref 28–44)
INTERVENTRICULAR SEPTUM IN DIASTOLE (PARASTERNAL SHORT AXIS VIEW): 1.1 CM
INTERVENTRICULAR SEPTUM: 1.1 CM (ref 0.6–1.1)
LAAS-AP2: 15.1 CM2
LAAS-AP4: 14 CM2
LEFT ATRIUM SIZE: 3.7 CM
LEFT ATRIUM VOLUME (MOD BIPLANE): 38 ML
LEFT INTERNAL DIMENSION IN SYSTOLE: 2.7 CM (ref 2.1–4)
LEFT VENTRICULAR INTERNAL DIMENSION IN DIASTOLE: 4 CM (ref 3.5–6)
LEFT VENTRICULAR POSTERIOR WALL IN END DIASTOLE: 1 CM
LEFT VENTRICULAR STROKE VOLUME: 43 ML
LVSV (TEICH): 43 ML
MV E'TISSUE VEL-SEP: 6 CM/S
MV PEAK A VEL: 1.29 M/S
MV PEAK E VEL: 83 CM/S
MV STENOSIS PRESSURE HALF TIME: 98 MS
MV VALVE AREA P 1/2 METHOD: 2.24
RIGHT ATRIUM AREA SYSTOLE A4C: 13.9 CM2
SL CV LEFT ATRIUM LENGTH A2C: 5.2 CM
SL CV LV EF: 60
SL CV PED ECHO LEFT VENTRICLE DIASTOLIC VOLUME (MOD BIPLANE) 2D: 69 ML
SL CV PED ECHO LEFT VENTRICLE SYSTOLIC VOLUME (MOD BIPLANE) 2D: 26 ML
TR MAX PG: 15 MMHG
TR PEAK VELOCITY: 2 M/S
TRICUSPID ANNULAR PLANE SYSTOLIC EXCURSION: 2.3 CM
TRICUSPID VALVE PEAK REGURGITATION VELOCITY: 1.97 M/S

## 2023-07-03 PROCEDURE — 93306 TTE W/DOPPLER COMPLETE: CPT

## 2023-07-03 PROCEDURE — 93306 TTE W/DOPPLER COMPLETE: CPT | Performed by: STUDENT IN AN ORGANIZED HEALTH CARE EDUCATION/TRAINING PROGRAM

## 2023-07-15 DIAGNOSIS — R60.0 BILATERAL LEG EDEMA: ICD-10-CM

## 2023-07-15 DIAGNOSIS — J45.50 SEVERE PERSISTENT ASTHMA WITHOUT COMPLICATION: Primary | ICD-10-CM

## 2023-07-15 DIAGNOSIS — E11.8 TYPE 2 DIABETES MELLITUS WITH COMPLICATION, WITHOUT LONG-TERM CURRENT USE OF INSULIN (HCC): ICD-10-CM

## 2023-07-15 DIAGNOSIS — I10 ESSENTIAL HYPERTENSION: ICD-10-CM

## 2023-07-15 DIAGNOSIS — J30.2 SEASONAL ALLERGIES: ICD-10-CM

## 2023-07-17 ENCOUNTER — OFFICE VISIT (OUTPATIENT)
Dept: ENDOCRINOLOGY | Facility: CLINIC | Age: 68
End: 2023-07-17
Payer: COMMERCIAL

## 2023-07-17 VITALS
DIASTOLIC BLOOD PRESSURE: 82 MMHG | BODY MASS INDEX: 30.82 KG/M2 | HEART RATE: 103 BPM | WEIGHT: 185 LBS | SYSTOLIC BLOOD PRESSURE: 140 MMHG | HEIGHT: 65 IN

## 2023-07-17 DIAGNOSIS — E11.8 TYPE 2 DIABETES MELLITUS WITH COMPLICATION, WITH LONG-TERM CURRENT USE OF INSULIN (HCC): ICD-10-CM

## 2023-07-17 DIAGNOSIS — E11.8 TYPE 2 DIABETES MELLITUS WITH COMPLICATION, WITHOUT LONG-TERM CURRENT USE OF INSULIN (HCC): ICD-10-CM

## 2023-07-17 DIAGNOSIS — Z79.4 TYPE 2 DIABETES MELLITUS WITH HYPERGLYCEMIA, WITH LONG-TERM CURRENT USE OF INSULIN (HCC): Primary | ICD-10-CM

## 2023-07-17 DIAGNOSIS — R80.9 TYPE 2 DIABETES MELLITUS WITH MICROALBUMINURIA, WITH LONG-TERM CURRENT USE OF INSULIN (HCC): ICD-10-CM

## 2023-07-17 DIAGNOSIS — E11.29 TYPE 2 DIABETES MELLITUS WITH MICROALBUMINURIA, WITH LONG-TERM CURRENT USE OF INSULIN (HCC): ICD-10-CM

## 2023-07-17 DIAGNOSIS — I10 ESSENTIAL HYPERTENSION: ICD-10-CM

## 2023-07-17 DIAGNOSIS — E11.65 TYPE 2 DIABETES MELLITUS WITH HYPERGLYCEMIA, WITH LONG-TERM CURRENT USE OF INSULIN (HCC): Primary | ICD-10-CM

## 2023-07-17 DIAGNOSIS — Z79.4 TYPE 2 DIABETES MELLITUS WITH MICROALBUMINURIA, WITH LONG-TERM CURRENT USE OF INSULIN (HCC): ICD-10-CM

## 2023-07-17 DIAGNOSIS — Z79.4 TYPE 2 DIABETES MELLITUS WITH COMPLICATION, WITH LONG-TERM CURRENT USE OF INSULIN (HCC): ICD-10-CM

## 2023-07-17 DIAGNOSIS — E78.00 HYPERCHOLESTEROLEMIA: ICD-10-CM

## 2023-07-17 LAB — SL AMB POCT HEMOGLOBIN AIC: 11.9 (ref ?–6.5)

## 2023-07-17 PROCEDURE — 99214 OFFICE O/P EST MOD 30 MIN: CPT | Performed by: INTERNAL MEDICINE

## 2023-07-17 PROCEDURE — 83036 HEMOGLOBIN GLYCOSYLATED A1C: CPT | Performed by: INTERNAL MEDICINE

## 2023-07-17 RX ORDER — FUROSEMIDE 20 MG/1
20 TABLET ORAL 2 TIMES DAILY
Qty: 180 TABLET | Refills: 0 | Status: SHIPPED | OUTPATIENT
Start: 2023-07-17

## 2023-07-17 RX ORDER — INSULIN LISPRO 100 [IU]/ML
INJECTION, SOLUTION INTRAVENOUS; SUBCUTANEOUS
Qty: 45 ML | Refills: 0 | Status: SHIPPED | OUTPATIENT
Start: 2023-07-17

## 2023-07-17 RX ORDER — INSULIN DETEMIR 100 [IU]/ML
INJECTION, SOLUTION SUBCUTANEOUS
Qty: 45 ML | Refills: 0 | Status: SHIPPED | OUTPATIENT
Start: 2023-07-17

## 2023-07-17 RX ORDER — LOSARTAN POTASSIUM 100 MG/1
100 TABLET ORAL DAILY
Qty: 90 TABLET | Refills: 0 | Status: SHIPPED | OUTPATIENT
Start: 2023-07-17

## 2023-07-17 RX ORDER — FLUTICASONE PROPIONATE 50 MCG
SPRAY, SUSPENSION (ML) NASAL
Qty: 16 G | Refills: 4 | Status: SHIPPED | OUTPATIENT
Start: 2023-07-17

## 2023-07-17 NOTE — ASSESSMENT & PLAN NOTE
Lab Results   Component Value Date    HGBA1C 11.9 (A) 07/17/2023   A1c slightly improved but still pretty high. She has a personal CGM however review of the data on the device shows that she is not scanning very often and rarely would scan once a day sugars are usually above 300s. It is unlikely that she is taking all 4 shots of insulin a day. Again stressed on the importance of taking all 4 shots of insulin daily  For now increase Humalog to 24 units before meals, increase Levemir to 60 units daily.   Suggest seeing a nutritionist

## 2023-07-17 NOTE — PROGRESS NOTES
Jayson Parson 78 y.o. female MRN: [id number]    Encounter: [id number]      Assessment/Plan     Problem List Items Addressed This Visit        Endocrine    Type 2 diabetes mellitus with complication, with long-term current use of insulin ([facility])    Relevant Medications    insulin lispro (HumaLOG KwikPen) 100 units/mL injection pen    insulin detemir (Levemir FlexTouch) 100 Units/mL injection pen    Other Relevant Orders    POCT hemoglobin A1c (Completed)    Type 2 diabetes mellitus with hyperglycemia, with long-term current use of insulin ([facility]) - Primary       Lab Results   Component Value Date    HGBA1C 11.9 (A) 07/17/2023   A1c slightly improved but still pretty high. She has a personal CGM however review of the data on the device shows that she is not scanning very often and rarely would scan once a day sugars are usually above 300s. It is unlikely that she is taking all 4 shots of insulin a day. Again stressed on the importance of taking all 4 shots of insulin daily  For now increase Humalog to 24 units before meals, increase Levemir to 60 units daily.   Suggest seeing a nutritionist         Relevant Medications    insulin lispro (HumaLOG KwikPen) 100 units/mL injection pen    insulin detemir (Levemir FlexTouch) 100 Units/mL injection pen    Other Relevant Orders    Comprehensive metabolic panel Lab Collect    HEMOGLOBIN A1C W/ EAG ESTIMATION Lab Collect    TSH, 3rd generation Lab Collect    T4, free Lab Collect    Ambulatory referral to Diabetic Education    Type 2 diabetes mellitus with microalbuminuria, with long-term current use of insulin ([facility])    Relevant Medications    insulin lispro (HumaLOG KwikPen) 100 units/mL injection pen    insulin detemir (Levemir FlexTouch) 100 Units/mL injection pen    Other Relevant Orders    Albumin / creatinine urine ratio       Cardiovascular and Mediastinum    Essential hypertension       Other    Hypercholesterolemia     Continue statins        Other Visit Diagnoses Type 2 diabetes mellitus with complication, without long-term current use of insulin ([facility])        Relevant Medications    insulin lispro (HumaLOG KwikPen) 100 units/mL injection pen    insulin detemir (Levemir FlexTouch) 100 Units/mL injection pen          CC: Diabetes    History of Present Illness     HPI:  78 y.o. female with a history of type 2 diabetes, hypertension and hyperlipidemia. Reports complications of moderate diabetic retinopathy, microalbuminuria and neuropathy.  # [id number]    Current regimen   levemir 55 units at   [address] 20-20-20-0    Has been using freestyle adrian but rarely scans so not enough information in there   C/o polyuria , polydpsia   C/o myalgias and feet swelling   C/o blurry vision     Patient very tearful and under a lot of stress in her personal life     Review of Systems    Historical Information   Past Medical History:   Diagnosis Date   • Abdominal infection ([address])     h-pylori   • Abnormal chest x-ray 04/05/2019   • Asthma    • Breast cancer ([address]) 06/26/2018   • Cancer ([address])     BREAST   • Diabetes mellitus ([address])    • Hiatal hernia    • History of chemotherapy 2018   • History of radiation therapy 2018   • Hypercholesterolemia    • Hypertension    • Hypothyroid    • Renal disorder    • Rheumatoid arthritis [facility])      Past Surgical History:   Procedure Laterality Date   • 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   • BREAST LUMPECTOMY Right 6/26/2018    Procedure: RIGHT BREAST NEEDLE LOCALIZATION X2 WITH RIGHT BREAST LUMPECTOMY ( NEEDLE LOC @ 1000); Surgeon: Wing Dixon MD;  Location: [facility];  Service: Surgical Oncology   • BREAST LUMPECTOMY Right 8/2/2018    Procedure: LYMPHOSCINITGRAPHY INTRAOPERATIVE LYMPHATIC MAPPING , RIGHT SENTINEL NODE BIOPSY, REEXCISION  RIGHT BREAST LUMPECTOMY CAVITY (SUPERIOR MARGIN);   Surgeon: Wing Dixon MD;  Location: [facility];  Service: Surgical Oncology   • BREAST SURGERY Left    • CATARACT EXTRACTION, BILATERAL Bilateral    • COLONOSCOPY     • EGD AND COLONOSCOPY N/A 9/5/2018    Procedure: EGD AND COLONOSCOPY;  Surgeon: Bi Childress MD;  Location: [facility] GI LAB; Service: Gastroenterology   • FL GUIDED CENTRAL VENOUS ACCESS DEVICE INSERTION  9/13/2018   • KNEE SURGERY Right    • MAMMO NEEDLE LOCALIZATION RIGHT (ALL INC) Right 6/26/2018   • MAMMO STEREOTACTIC BREAST BIOPSY RIGHT (ALL INC) Right 5/23/2018   • RETINAL DETACHMENT SURGERY Right    • TUBAL LIGATION     • TUNNELED VENOUS PORT PLACEMENT Left 9/13/2018    Procedure: INSERTION VENOUS PORT (PORT-A-CATH);   Surgeon: Tushar Juarez MD;  Location:  MAIN OR;  Service: Surgical Oncology   • UPPER GASTROINTESTINAL ENDOSCOPY       Social History   Social History     Substance and Sexual Activity   Alcohol Use No     Social History     Substance and Sexual Activity   Drug Use No     Social History     Tobacco Use   Smoking Status Never   Smokeless Tobacco Never     Family History:   Family History   Problem Relation Age of Onset   • Diabetes Mother    • Hypertension Mother    • Diabetes Father    • Hypertension Father    • No Known Problems Sister    • No Known Problems Sister    • No Known Problems Sister    • No Known Problems Sister    • No Known Problems Sister    • Diabetes Brother    • Diabetes Brother    • Kidney failure Brother    • Diabetes Brother    • Cancer Maternal Grandfather    • No Known Problems Daughter    • No Known Problems Daughter    • No Known Problems Daughter        Meds/Allergies   Current Outpatient Medications   Medication Sig Dispense Refill   • acetaminophen (TYLENOL) 650 mg CR tablet Take 1 tablet (650 mg total) by mouth every 8 (eight) hours as needed for mild pain 30 tablet 0   • Advair -21 MCG/ACT inhaler INHALE 2 PUFFS 2 (TWO) TIMES A DAY RINSE MOUTH AFTER USE. 12 g 4   • albuterol (2.5 mg/3 mL) 0.083 % nebulizer solution Take 3 mL (2.5 mg total) by nebulization every 6 (six) hours as needed for wheezing or shortness of breath 75 mL 5   • albuterol (PROVENTIL HFA,VENTOLIN HFA) 90 mcg/act inhaler INHALE 1 PUFF EVERY 4 (FOUR) HOURS AS NEEDED FOR WHEEZING 8.5 g 4   • aspirin 81 mg chewable tablet Chew 81 mg daily     • atorvastatin (LIPITOR) 40 mg tablet TAKE 1 TABLET (40 MG TOTAL) BY MOUTH DAILY 90 tablet 2   • Diclofenac Sodium (VOLTAREN) 1 % Apply 2 g topically 2 (two) times a day 150 g 0   • furosemide (LASIX) 20 mg tablet Take 1 tablet (20 mg total) by mouth 2 (two) times a day 60 tablet 0   • insulin detemir (Levemir FlexTouch) 100 Units/mL injection pen INJECT 60 UNITS UNDER THE SKIN DAILY AT BEDTIME 45 mL 0   • insulin lispro (HumaLOG KwikPen) 100 units/mL injection pen INJECT 24 UNITS UNDER THE SKIN BEFORE MEALS 45 mL 0   • losartan (COZAAR) 100 MG tablet TAKE 1 TABLET (100 MG TOTAL) BY MOUTH DAILY 90 tablet 1   • metoprolol tartrate (LOPRESSOR) 25 mg tablet Take 0.5 tablets (12.5 mg total) by mouth every 12 (twelve) hours 180 tablet 0   • omeprazole (PriLOSEC) 20 mg delayed release capsule TAKE 1 CAPSULE (20 MG TOTAL) BY MOUTH DAILY 90 capsule 2   • OneTouch Delica Lancets 28S MISC USE 3 (THREE) TIMES A  each 3   • OneTouch Ultra test strip USE 1 EACH 3 (THREE) TIMES A DAY USE AS INSTRUCTED 300 each 2   • UltiCare Micro Pen Needles 32G X 4 MM MISC INJECT UNDER THE SKIN 4 (FOUR) TIMES A  each 0   • anastrozole (ARIMIDEX) 1 mg tablet Take 1 tablet (1 mg total) by mouth daily 90 tablet 3   • Continuous Blood Gluc Transmit (Dexcom G6 Transmitter) MISC Use as directed for continuous glucose monitoring.  Change every 3 months (Patient not taking: Reported on 2/6/2023) 1 each 3   • EPINEPHrine (Auvi-Q) 0.1 mg/0.1 mL SOAJ Inject 0.3 mL (0.3 mg total) into a muscle once for 1 dose (Patient not taking: Reported on 2/6/2023) 2 each 5   • ergocalciferol (VITAMIN D2) 50,000 units TAKE 1 CAPSULE (50,000 UNITS TOTAL) BY MOUTH ONCE A WEEK (Patient not taking: Reported on 6/12/2023) 12 capsule 0   • erythromycin (ILOTYCIN) ophthalmic ointment Administer 0.5 inches into the left eye daily at bedtime (Patient not taking: Reported on 6/12/2023) 3.5 g 0   • fluticasone (FLONASE) 50 mcg/act nasal spray TAKE 1 SPRAY INTO EACH NOSTRIL DAILY 16 g 4   • guaiFENesin (MUCINEX) 600 mg 12 hr tablet Take 2 tablets (1,200 mg total) by mouth every 12 (twelve) hours (Patient not taking: Reported on 6/2/2023) 60 tablet 0   • loratadine (CLARITIN) 10 mg tablet TAKE 1 TABLET (10 MG TOTAL) BY MOUTH DAILY (Patient not taking: Reported on 6/12/2023) 90 tablet 2   • Mag-G 500 (27 Mg) MG tablet TAKE 1 TABLET (500 MG TOTAL) BY MOUTH DAILY (Patient not taking: Reported on 6/12/2023) 90 tablet 1   • tiotropium (Spiriva Respimat) 2.5 MCG/ACT AERS inhaler Inhale 2 puffs daily (Patient not taking: Reported on 6/12/2023) 4 g 3   • traZODone (DESYREL) 50 mg tablet Take 0.5 tablets (25 mg total) by mouth daily at bedtime (Patient not taking: Reported on 6/12/2023) 45 tablet 0   • zileuton 600 MG TAKE 1 TABLET (600 MG TOTAL) BY MOUTH 2 (TWO) TIMES A DAY (Patient not taking: Reported on 6/12/2023) 60 tablet 4     No current facility-administered medications for this visit. Allergies   Allergen Reactions   • Codeine Other (See Comments)     unknown   • Latex Other (See Comments)     fungus       Objective   Vitals: Blood pressure 140/82, pulse 103, height 5' 5" (1.651 m), weight 83.9 kg (185 lb), not currently breastfeeding. Physical Exam  Vitals reviewed. Constitutional:       General: She is not in acute distress. Appearance: Normal appearance. She is obese. She is not ill-appearing, toxic-appearing or diaphoretic. HENT:      Head: Normocephalic and atraumatic. Eyes:      General: No scleral icterus. Extraocular Movements: Extraocular movements intact. Cardiovascular:      Rate and Rhythm: Normal rate and regular rhythm. Heart sounds: Normal heart sounds. No murmur heard.   Pulmonary:      Effort: Pulmonary effort is normal. No respiratory distress. Breath sounds: Normal breath sounds. No wheezing or rales. Abdominal:      General: There is no distension. Palpations: Abdomen is soft. Tenderness: There is no abdominal tenderness. Musculoskeletal:      Cervical back: Neck supple. Right lower leg: No edema. Left lower leg: No edema. Lymphadenopathy:      Cervical: No cervical adenopathy. Skin:     General: Skin is warm and dry. Neurological:      General: No focal deficit present. Mental Status: She is alert and oriented to person, place, and time. Gait: Gait normal.   Psychiatric:         Behavior: Behavior normal.      Comments: Tearful          The history was obtained from the review of the chart, patient.     Lab Results:   Lab Results   Component Value Date/Time    Hemoglobin A1C 11.9 (A) 07/17/2023 10:58 AM    Hemoglobin A1C 11.2 (H) 02/13/2023 07:58 AM    Hemoglobin A1C 12.9 (H) 11/21/2022 08:51 AM    Hemoglobin A1C 14.5 (A) 09/26/2022 09:11 AM    WBC 8.68 11/10/2022 05:56 PM    WBC 9.05 08/18/2022 03:07 PM    WBC 5.99 07/27/2022 11:53 AM    Hemoglobin 12.7 11/10/2022 05:56 PM    Hemoglobin 12.4 08/18/2022 03:07 PM    Hemoglobin 12.0 07/27/2022 11:53 AM    Hematocrit 38.8 11/10/2022 05:56 PM    Hematocrit 37.6 08/18/2022 03:07 PM    Hematocrit 37.4 07/27/2022 11:53 AM    MCV 87 11/10/2022 05:56 PM    MCV 88 08/18/2022 03:07 PM    MCV 90 07/27/2022 11:53 AM    Platelets 452 44/05/2337 05:56 PM    Platelets 782 27/16/2588 03:07 PM    Platelets 821 82/31/6175 11:53 AM    BUN 15 05/30/2023 12:06 PM    BUN 18 11/21/2022 08:51 AM    BUN 18 11/10/2022 05:56 PM    Potassium 3.6 05/30/2023 12:06 PM    Potassium 4.0 11/21/2022 08:51 AM    Potassium 3.9 11/10/2022 05:56 PM    Chloride 102 05/30/2023 12:06 PM    Chloride 102 11/21/2022 08:51 AM    Chloride 98 11/10/2022 05:56 PM    CO2 28 05/30/2023 12:06 PM    CO2 28 11/21/2022 08:51 AM    CO2 26 11/10/2022 05:56 PM    Creatinine 0.58 (L) 05/30/2023 12:06 PM Creatinine 0.74 11/21/2022 08:51 AM    Creatinine 0.83 11/10/2022 05:56 PM    AST 15 05/30/2023 12:06 PM    AST 13 11/21/2022 08:51 AM    AST 12 08/18/2022 03:07 PM    ALT 19 05/30/2023 12:06 PM    ALT 21 11/21/2022 08:51 AM    ALT 26 08/18/2022 03:07 PM    Total Protein 7.7 05/30/2023 12:06 PM    Total Protein 7.9 11/21/2022 08:51 AM    Total Protein 8.1 08/18/2022 03:07 PM    Albumin 4.3 05/30/2023 12:06 PM    Albumin 3.0 (L) 11/21/2022 08:51 AM    Albumin 3.3 (L) 08/18/2022 03:07 PM    HDL, Direct 44 (L) 11/21/2022 08:51 AM    Triglycerides 161 (H) 11/21/2022 08:51 AM       Portions of the record may have been created with voice recognition software. Occasional wrong word or "sound a like" substitutions may have occurred due to the inherent limitations of voice recognition software. Read the chart carefully and recognize, using context, where substitutions have occurred.

## 2023-07-19 DIAGNOSIS — J45.50 SEVERE PERSISTENT ASTHMA WITHOUT COMPLICATION: Primary | ICD-10-CM

## 2023-07-25 RX ORDER — TIOTROPIUM BROMIDE INHALATION SPRAY 3.12 UG/1
2 SPRAY, METERED RESPIRATORY (INHALATION) DAILY
Qty: 4 G | Refills: 2 | Status: SHIPPED | OUTPATIENT
Start: 2023-07-25

## 2023-07-31 ENCOUNTER — HOSPITAL ENCOUNTER (OUTPATIENT)
Dept: ULTRASOUND IMAGING | Facility: CLINIC | Age: 68
Discharge: HOME | End: 2023-07-31
Payer: COMMERCIAL

## 2023-07-31 ENCOUNTER — HOSPITAL ENCOUNTER (OUTPATIENT)
Dept: MAMMOGRAPHY | Facility: CLINIC | Age: 68
Discharge: HOME | End: 2023-07-31
Payer: COMMERCIAL

## 2023-07-31 VITALS — WEIGHT: 185 LBS | BODY MASS INDEX: 30.82 KG/M2 | HEIGHT: 65 IN

## 2023-07-31 DIAGNOSIS — Z09 FOLLOW-UP EXAM, 3-6 MONTHS SINCE PREVIOUS EXAM: ICD-10-CM

## 2023-07-31 DIAGNOSIS — R92.8 ABNORMAL MAMMOGRAM: ICD-10-CM

## 2023-07-31 PROCEDURE — G0279 TOMOSYNTHESIS, MAMMO: HCPCS

## 2023-07-31 PROCEDURE — 76642 ULTRASOUND BREAST LIMITED: CPT

## 2023-07-31 PROCEDURE — 77066 DX MAMMO INCL CAD BI: CPT

## 2023-08-10 ENCOUNTER — TELEPHONE (OUTPATIENT)
Dept: FAMILY MEDICINE CLINIC | Facility: CLINIC | Age: 68
End: 2023-08-10

## 2023-08-10 NOTE — TELEPHONE ENCOUNTER
1st attempt 8/10 at 2:28     Left message on answering machine to call office to schedule appointment.

## 2023-08-21 ENCOUNTER — TELEPHONE (OUTPATIENT)
Dept: FAMILY MEDICINE CLINIC | Facility: CLINIC | Age: 68
End: 2023-08-21

## 2023-08-23 ENCOUNTER — OFFICE VISIT (OUTPATIENT)
Dept: FAMILY MEDICINE CLINIC | Facility: CLINIC | Age: 68
End: 2023-08-23

## 2023-08-23 VITALS
HEIGHT: 65 IN | RESPIRATION RATE: 16 BRPM | WEIGHT: 182 LBS | SYSTOLIC BLOOD PRESSURE: 148 MMHG | BODY MASS INDEX: 30.32 KG/M2 | OXYGEN SATURATION: 97 % | DIASTOLIC BLOOD PRESSURE: 60 MMHG | HEART RATE: 105 BPM | TEMPERATURE: 96.7 F

## 2023-08-23 DIAGNOSIS — Z12.4 SCREENING FOR CERVICAL CANCER: ICD-10-CM

## 2023-08-23 DIAGNOSIS — I10 ESSENTIAL HYPERTENSION: ICD-10-CM

## 2023-08-23 DIAGNOSIS — E11.65 TYPE 2 DIABETES MELLITUS WITH HYPERGLYCEMIA, WITH LONG-TERM CURRENT USE OF INSULIN (HCC): Primary | ICD-10-CM

## 2023-08-23 DIAGNOSIS — E78.5 HYPERLIPIDEMIA, UNSPECIFIED HYPERLIPIDEMIA TYPE: ICD-10-CM

## 2023-08-23 DIAGNOSIS — Z79.4 TYPE 2 DIABETES MELLITUS WITH HYPERGLYCEMIA, WITH LONG-TERM CURRENT USE OF INSULIN (HCC): Primary | ICD-10-CM

## 2023-08-23 DIAGNOSIS — M25.561 CHRONIC PAIN OF RIGHT KNEE: ICD-10-CM

## 2023-08-23 DIAGNOSIS — J70.0 RADIATION PNEUMONITIS (HCC): ICD-10-CM

## 2023-08-23 DIAGNOSIS — L72.3 SEBACEOUS CYST OF AXILLA: ICD-10-CM

## 2023-08-23 DIAGNOSIS — E11.8 TYPE 2 DIABETES MELLITUS WITH COMPLICATION, WITHOUT LONG-TERM CURRENT USE OF INSULIN (HCC): ICD-10-CM

## 2023-08-23 DIAGNOSIS — G89.29 CHRONIC PAIN OF RIGHT KNEE: ICD-10-CM

## 2023-08-23 DIAGNOSIS — Z12.4 CERVICAL CANCER SCREENING: ICD-10-CM

## 2023-08-23 DIAGNOSIS — H91.93 DECREASED HEARING OF BOTH EARS: ICD-10-CM

## 2023-08-23 PROCEDURE — 3077F SYST BP >= 140 MM HG: CPT | Performed by: FAMILY MEDICINE

## 2023-08-23 PROCEDURE — 99214 OFFICE O/P EST MOD 30 MIN: CPT | Performed by: FAMILY MEDICINE

## 2023-08-23 PROCEDURE — 3078F DIAST BP <80 MM HG: CPT | Performed by: FAMILY MEDICINE

## 2023-08-23 RX ORDER — ATORVASTATIN CALCIUM 40 MG/1
40 TABLET, FILM COATED ORAL DAILY
Qty: 90 TABLET | Refills: 2 | Status: SHIPPED | OUTPATIENT
Start: 2023-08-23

## 2023-08-23 RX ORDER — LOSARTAN POTASSIUM 100 MG/1
100 TABLET ORAL DAILY
Qty: 90 TABLET | Refills: 1 | Status: SHIPPED | OUTPATIENT
Start: 2023-08-23

## 2023-08-23 NOTE — PROGRESS NOTES
Name: Brissa Adams      : 1955      MRN: [id number]  Encounter Provider: Ansih Esquivel MD  Encounter Date: 2023   Encounter department: [address] [facility]     1. Type 2 diabetes mellitus with hyperglycemia, with long-term current use of insulin [facility])  Assessment & Plan:    Lab Results   Component Value Date    HGBA1C 11.9 (A) 2023   Encouraged patient to scan sugars more often  Encouraged patient to use her insulin as prescribed by Endocrinology, she admits she often forgets to use her meal time insulin  Encouraged patient to see Nutritionist       2. Essential hypertension  -     losartan (COZAAR) 100 MG tablet; Take 1 tablet (100 mg total) by mouth daily    3. BMI 30.0-30.9,adult    4. Sebaceous cyst of axilla  Assessment & Plan:  Reviewed US from 2023 which reports:  Sonographic exam of the area of palpable abnormality in the right axilla shows no interval change in a very superficial cyst which extends to the skin surface and measures 0.7 x 0.8 x 0.8 cm. There are internal septations but no internal vascularity. Tract is not seen with certainty extending to the skin surface. This could represent some type of sebaceous cyst or small seroma. Discussed results with patient  Apply moist warm compress 10 minutes twice a day       5. Type 2 diabetes mellitus with complication, without long-term current use of insulin (HCC)  -     losartan (COZAAR) 100 MG tablet; Take 1 tablet (100 mg total) by mouth daily    6. Hyperlipidemia, unspecified hyperlipidemia type  -     atorvastatin (LIPITOR) 40 mg tablet; Take 1 tablet (40 mg total) by mouth daily    7. Radiation pneumonitis [facility])  Assessment & Plan:  Mild Asymptomatic       8. Decreased hearing of both ears  -     Ambulatory Referral to Otolaryngology; Future    9. Chronic pain of right knee  -     Diclofenac Sodium (VOLTAREN) 1 %; Apply 2 g topically 2 (two) times a day    10. Screening for cervical cancer    11. Cervical cancer screening  -     Ambulatory referral to Obstetrics / Gynecology; Future           Subjective      79 yo  female with uncontrolled DM followed by Endo  Here today with the following concerns:  1- Mildly painful lump on R axila since her breast surgery. 2- Decreased hearing bilateral  3- LE edema, minimally improved with Furosemide     Review of Systems   Cardiovascular: Positive for leg swelling. Skin:        Mass on R axilla    All other systems reviewed and are negative. Current Outpatient Medications on File Prior to Visit   Medication Sig   • acetaminophen (TYLENOL) 650 mg CR tablet Take 1 tablet (650 mg total) by mouth every 8 (eight) hours as needed for mild pain   • Advair -21 MCG/ACT inhaler INHALE 2 PUFFS 2 (TWO) TIMES A DAY RINSE MOUTH AFTER USE. • albuterol (2.5 mg/3 mL) 0.083 % nebulizer solution Take 3 mL (2.5 mg total) by nebulization every 6 (six) hours as needed for wheezing or shortness of breath   • albuterol (PROVENTIL HFA,VENTOLIN HFA) 90 mcg/act inhaler INHALE 1 PUFF EVERY 4 (FOUR) HOURS AS NEEDED FOR WHEEZING   • anastrozole (ARIMIDEX) 1 mg tablet Take 1 tablet (1 mg total) by mouth daily   • aspirin 81 mg chewable tablet Chew 81 mg daily   • Continuous Blood Gluc Transmit (Dexcom G6 Transmitter) MISC Use as directed for continuous glucose monitoring.  Change every 3 months (Patient not taking: Reported on 2/6/2023)   • EPINEPHrine (Auvi-Q) 0.1 mg/0.1 mL SOAJ Inject 0.3 mL (0.3 mg total) into a muscle once for 1 dose (Patient not taking: Reported on 2/6/2023)   • ergocalciferol (VITAMIN D2) 50,000 units TAKE 1 CAPSULE (50,000 UNITS TOTAL) BY MOUTH ONCE A WEEK (Patient not taking: Reported on 6/12/2023)   • fluticasone (FLONASE) 50 mcg/act nasal spray TAKE 1 SPRAY INTO EACH NOSTRIL DAILY   • furosemide (LASIX) 20 mg tablet TAKE 1 TABLET (20 MG TOTAL) BY MOUTH 2 (TWO) TIMES A DAY   • guaiFENesin (MUCINEX) 600 mg 12 hr tablet Take 2 tablets (1,200 mg total) by mouth every 12 (twelve) hours (Patient not taking: Reported on 6/2/2023)   • insulin detemir (Levemir FlexTouch) 100 Units/mL injection pen INJECT 60 UNITS UNDER THE SKIN DAILY AT BEDTIME   • insulin lispro (HumaLOG KwikPen) 100 units/mL injection pen INJECT 24 UNITS UNDER THE SKIN BEFORE MEALS   • loratadine (CLARITIN) 10 mg tablet TAKE 1 TABLET (10 MG TOTAL) BY MOUTH DAILY (Patient not taking: Reported on 6/12/2023)   • Mag-G 500 (27 Mg) MG tablet TAKE 1 TABLET (500 MG TOTAL) BY MOUTH DAILY (Patient not taking: Reported on 6/12/2023)   • metoprolol tartrate (LOPRESSOR) 25 mg tablet Take 0.5 tablets (12.5 mg total) by mouth every 12 (twelve) hours   • omeprazole (PriLOSEC) 20 mg delayed release capsule TAKE 1 CAPSULE (20 MG TOTAL) BY MOUTH DAILY   • OneTouch Delica Lancets 20G MISC USE 3 (THREE) TIMES A DAY   • OneTouch Ultra test strip USE 1 EACH 3 (THREE) TIMES A DAY USE AS INSTRUCTED   • Spiriva Respimat 2.5 MCG/ACT AERS inhaler INHALE 2 PUFFS DAILY   • traZODone (DESYREL) 50 mg tablet Take 0.5 tablets (25 mg total) by mouth daily at bedtime (Patient not taking: Reported on 6/12/2023)   • UltiCare Micro Pen Needles 32G X 4 MM MISC INJECT UNDER THE SKIN 4 (FOUR) TIMES A DAY   • zileuton 600 MG TAKE 1 TABLET (600 MG TOTAL) BY MOUTH 2 (TWO) TIMES A DAY (Patient not taking: Reported on 6/12/2023)   • [DISCONTINUED] atorvastatin (LIPITOR) 40 mg tablet TAKE 1 TABLET (40 MG TOTAL) BY MOUTH DAILY   • [DISCONTINUED] Diclofenac Sodium (VOLTAREN) 1 % Apply 2 g topically 2 (two) times a day   • [DISCONTINUED] erythromycin (ILOTYCIN) ophthalmic ointment Administer 0.5 inches into the left eye daily at bedtime (Patient not taking: Reported on 6/12/2023)   • [DISCONTINUED] losartan (COZAAR) 100 MG tablet TAKE 1 TABLET (100 MG TOTAL) BY MOUTH DAILY       Objective     /60 (BP Location: Left arm, Patient Position: Sitting, Cuff Size: Large)   Pulse 105   Temp (!) 96.7 °F (35.9 °C) (Temporal)   Resp 16   Ht 5' 5" (1.651 m)   Wt 82.6 kg (182 lb)   SpO2 97%   BMI 30.29 kg/m²     Physical Exam  Vitals and nursing note reviewed. Constitutional:       Appearance: She is well-developed. HENT:      Head: Normocephalic. Right Ear: External ear normal.      Left Ear: External ear normal.      Nose: Nose normal.   Eyes:      Conjunctiva/sclera: Conjunctivae normal.      Pupils: Pupils are equal, round, and reactive to light. Neck:      Thyroid: No thyromegaly. Cardiovascular:      Rate and Rhythm: Normal rate and regular rhythm. Heart sounds: Normal heart sounds. Pulmonary:      Effort: Pulmonary effort is normal.      Breath sounds: Normal breath sounds. Abdominal:      Palpations: Abdomen is soft. Tenderness: There is no abdominal tenderness. There is no guarding or rebound. Musculoskeletal:         General: Normal range of motion. Cervical back: Normal range of motion and neck supple. Right lower le+ Pitting Edema present. Left lower le+ Pitting Edema present. Skin:     General: Skin is dry. Comments: 1 cm soft, mildly tender mass, mobile, non draining, no erythema or warmth    Neurological:      Mental Status: She is alert and oriented to person, place, and time. Deep Tendon Reflexes: Reflexes are normal and symmetric. Nirmal Unger MD BMI Counseling: Body mass index is 30.29 kg/m². The BMI is above normal. Nutrition recommendations include reducing portion sizes, decreasing overall calorie intake, 3-5 servings of fruits/vegetables daily, reducing fast food intake, consuming healthier snacks, decreasing soda and/or juice intake, moderation in carbohydrate intake, increasing intake of lean protein, reducing intake of saturated fat and trans fat and reducing intake of cholesterol.  Exercise recommendations include moderate aerobic physical activity for 150 minutes/week, exercising 3-5 times per week and strength training exercises.

## 2023-08-23 NOTE — ASSESSMENT & PLAN NOTE
Lab Results   Component Value Date    HGBA1C 11.9 (A) 07/17/2023   Encouraged patient to scan sugars more often  Encouraged patient to use her insulin as prescribed by Endocrinology, she admits she often forgets to use her meal time insulin  Encouraged patient to see Nutritionist

## 2023-08-23 NOTE — ASSESSMENT & PLAN NOTE
Reviewed US from 7/31/2023 which reports:  Sonographic exam of the area of palpable abnormality in the right axilla shows no interval change in a very superficial cyst which extends to the skin surface and measures 0.7 x 0.8 x 0.8 cm. There are internal septations but no internal vascularity. Tract is not seen with certainty extending to the skin surface. This could represent some type of sebaceous cyst or small seroma.   Discussed results with patient  Apply moist warm compress 10 minutes twice a day

## 2023-09-02 DIAGNOSIS — E55.9 VITAMIN D DEFICIENCY: ICD-10-CM

## 2023-09-02 DIAGNOSIS — F51.01 PRIMARY INSOMNIA: ICD-10-CM

## 2023-09-05 RX ORDER — ERGOCALCIFEROL 1.25 MG/1
50000 CAPSULE ORAL WEEKLY
Qty: 12 CAPSULE | Refills: 0 | Status: SHIPPED | OUTPATIENT
Start: 2023-09-05

## 2023-09-05 RX ORDER — TRAZODONE HYDROCHLORIDE 50 MG/1
25 TABLET ORAL
Qty: 90 TABLET | Refills: 0 | Status: SHIPPED | OUTPATIENT
Start: 2023-09-05

## 2023-09-09 DIAGNOSIS — J45.50 SEVERE PERSISTENT ASTHMA WITHOUT COMPLICATION: ICD-10-CM

## 2023-09-10 RX ORDER — FLUTICASONE PROPIONATE AND SALMETEROL XINAFOATE 230; 21 UG/1; UG/1
2 AEROSOL, METERED RESPIRATORY (INHALATION) 2 TIMES DAILY
Qty: 12 G | Refills: 3 | Status: SHIPPED | OUTPATIENT
Start: 2023-09-10

## 2023-09-10 RX ORDER — ZILEUTON 600 MG/1
TABLET ORAL
Qty: 60 TABLET | Refills: 3 | Status: SHIPPED | OUTPATIENT
Start: 2023-09-10

## 2023-09-10 RX ORDER — ALBUTEROL SULFATE 90 UG/1
1 AEROSOL, METERED RESPIRATORY (INHALATION) EVERY 4 HOURS PRN
Qty: 8.5 G | Refills: 3 | Status: SHIPPED | OUTPATIENT
Start: 2023-09-10

## 2023-09-18 ENCOUNTER — ANNUAL EXAM (OUTPATIENT)
Dept: OBGYN CLINIC | Facility: CLINIC | Age: 68
End: 2023-09-18

## 2023-09-18 VITALS
WEIGHT: 183 LBS | HEIGHT: 65 IN | HEART RATE: 71 BPM | BODY MASS INDEX: 30.49 KG/M2 | DIASTOLIC BLOOD PRESSURE: 76 MMHG | SYSTOLIC BLOOD PRESSURE: 146 MMHG

## 2023-09-18 DIAGNOSIS — Z13.31 POSITIVE DEPRESSION SCREENING: ICD-10-CM

## 2023-09-18 DIAGNOSIS — Z01.419 ENCOUNTER FOR ANNUAL ROUTINE GYNECOLOGICAL EXAMINATION: Primary | ICD-10-CM

## 2023-09-18 DIAGNOSIS — R10.2 PELVIC PAIN: ICD-10-CM

## 2023-09-18 DIAGNOSIS — Z12.4 CERVICAL CANCER SCREENING: ICD-10-CM

## 2023-09-18 PROCEDURE — G0101 CA SCREEN;PELVIC/BREAST EXAM: HCPCS | Performed by: NURSE PRACTITIONER

## 2023-09-18 NOTE — PROGRESS NOTES
Assessment/Plan     Diagnoses and all orders for this visit:    Encounter for annual routine gynecological examination    Pelvic pain  -     US pelvis complete non OB; Future    Cervical cancer screening  -     Ambulatory referral to Obstetrics / Gynecology    Positive depression screening  -     Ambulatory Referral to Social Work Care Management Program; Future         Plan  Patient Instructions   Schedule pelvic U/S  Return for pelvic U/S  Call with needs or concerns    Return if symptoms worsen or fail to improve, for follow up visit, U/S results. Pt verbalized understanding of all discussed. Braulio Beal is a 78 y.o. female who presents for annual exam. The patient has no complaints today. The patient is not sexually active. Last intercourse was many years ago GYN screening history: last pap: approximate date 3/24/2021 and negative HPV and was normal and last mammogram: approximate date 2023 and WNL R breast U/S WNL  and was normal. The patient is not taking hormone replacement therapy. Patient denies post-menopausal vaginal bleeding. . The patient participates in regular exercise: no. The patient reports that there is not domestic violence in her life. The patient is not having menopausal symptoms none    Depression Screening Follow-up Plan: Patient's depression screening was positive with a PHQ-2 score of 4. Their PHQ-9 score was 17. Denies suicidal thoughts, referral to social work provided    Colorectal screening due 2027             Menstrual History:  OB History        4    Para   4    Term   4            AB        Living           SAB        IAB        Ectopic        Multiple        Live Births   4           Obstetric Comments   Menarche age 7. First birth at age 25. Postmenopausal.   Hormonal contraceptive use x3 years. No HRT. Menarche age: 7  No LMP recorded.  Patient is postmenopausal. age 63 when she had a procedure to stop excessive VB due to fibroids       The following portions of the patient's history were reviewed and updated as appropriate: allergies, current medications, past family history, past medical history, past social history, past surgical history and problem list.    Review of Systems  Pertinent items are noted in HPI.      Objective      /76   Pulse 71   Ht 5' 5" (1.651 m)   Wt 83 kg (183 lb)   BMI 30.45 kg/m²      General:   alert and oriented, in no acute distress, alert, appears stated age and cooperative   Heart: [facility] 8/23/2023   Lungs: , [facility] 8/23/2023   Thyroid: [facility] 8/23/2023   Abdomen: [facility] 8/23/2023   Vulva: normal   Vagina: normal mucosa   Cervix: no cervical motion tenderness and no lesions   Uterus: normal size, normal shape and consistency, retroverted   Adnexa: normal adnexa   Breasts: Deferred pt states she sees a breast specialist had R breast  U/S and mammogram WNL 7/31/2023         Lab Review  had mammogram and R breast U/S 7/31/203

## 2023-09-20 ENCOUNTER — TELEPHONE (OUTPATIENT)
Dept: HEMATOLOGY ONCOLOGY | Facility: CLINIC | Age: 68
End: 2023-09-20

## 2023-09-20 NOTE — TELEPHONE ENCOUNTER
I am reaching out regarding your appointment with Dr. Gray Royal on 12/14/23    There has been a change to the providers schedule, and we will need to reschedule your appointment. I left a voicemail explaining to patient that this appointment will need to be rescheduled due to a change in the providers schedule. Patient was advised to call [facility] [phone] to reschedule.

## 2023-09-21 ENCOUNTER — TELEPHONE (OUTPATIENT)
Dept: HEMATOLOGY ONCOLOGY | Facility: CLINIC | Age: 68
End: 2023-09-21

## 2023-09-21 NOTE — TELEPHONE ENCOUNTER
I called [facility] regarding an appointment that they have scheduled with Dr. Trejo Distance scheduled on 12/14/23     I left a voicemail explaining to patient that this appointment will need to be rescheduled due to a change in the providers schedule. Patient was advised to call [facility] to reschedule.   Another attempt will be made to reschedule

## 2023-09-25 ENCOUNTER — TELEPHONE (OUTPATIENT)
Dept: HEMATOLOGY ONCOLOGY | Facility: CLINIC | Age: 68
End: 2023-09-25

## 2023-09-25 ENCOUNTER — HOSPITAL ENCOUNTER (OUTPATIENT)
Dept: ULTRASOUND IMAGING | Facility: HOSPITAL | Age: 68
Discharge: HOME | End: 2023-09-25
Payer: COMMERCIAL

## 2023-09-25 DIAGNOSIS — R10.2 PELVIC PAIN: ICD-10-CM

## 2023-09-25 PROCEDURE — 76830 TRANSVAGINAL US NON-OB: CPT

## 2023-09-25 PROCEDURE — 76856 US EXAM PELVIC COMPLETE: CPT

## 2023-09-25 NOTE — TELEPHONE ENCOUNTER
Appointment Change  Cancel, Reschedule, Change to Virtual      Who are you speaking with? Child   If it is not the patient, is the caller listed on the communication consent form? Yes   Which provider is the appointment scheduled with? Dr. Elbert Key   When was the original appointment scheduled? Please list date and time 12/14/23 3pm   At which location is the appointment scheduled to take place? [facility]   Was the appointment rescheduled? Was the appointment changed from an in person visit to a virtual visit? If so, please list the details of the change. 12/18/23 3pm [place]   What is the reason for the appointment change? provider       Was [facility] transport scheduled? N/A   Does STAR transport need to be scheduled for the new visit (if applicable) N/A   Does the patient need an infusion appointment rescheduled? N/A   Does the patient have an upcoming infusion appointment scheduled? If so, when? No   Is the patient undergoing chemotherapy? N/A   For appointments cancelled with less than 24 hours:  Was the no-show policy reviewed?  N/A

## 2023-09-26 LAB
LEFT EYE DIABETIC RETINOPATHY: POSITIVE
RIGHT EYE DIABETIC RETINOPATHY: POSITIVE

## 2023-09-28 ENCOUNTER — TELEPHONE (OUTPATIENT)
Dept: FAMILY MEDICINE CLINIC | Facility: CLINIC | Age: 68
End: 2023-09-28

## 2023-09-28 NOTE — TELEPHONE ENCOUNTER
Pt daughter Zuleika Manuel called but no vm was left. I returned the call. She wanted to make an appt for her mother.          Appointment scheduled for 10/3/23

## 2023-10-03 ENCOUNTER — OFFICE VISIT (OUTPATIENT)
Dept: FAMILY MEDICINE CLINIC | Facility: CLINIC | Age: 68
End: 2023-10-03

## 2023-10-03 VITALS
DIASTOLIC BLOOD PRESSURE: 80 MMHG | RESPIRATION RATE: 16 BRPM | HEIGHT: 65 IN | HEART RATE: 90 BPM | SYSTOLIC BLOOD PRESSURE: 138 MMHG | BODY MASS INDEX: 30.99 KG/M2 | TEMPERATURE: 98.7 F | WEIGHT: 186 LBS | OXYGEN SATURATION: 97 %

## 2023-10-03 DIAGNOSIS — L03.115 CELLULITIS OF RIGHT LOWER EXTREMITY: Primary | ICD-10-CM

## 2023-10-03 DIAGNOSIS — H57.9 ITCHY EYES: ICD-10-CM

## 2023-10-03 DIAGNOSIS — R60.0 BILATERAL LOWER EXTREMITY EDEMA: ICD-10-CM

## 2023-10-03 PROCEDURE — 3079F DIAST BP 80-89 MM HG: CPT

## 2023-10-03 PROCEDURE — 99214 OFFICE O/P EST MOD 30 MIN: CPT

## 2023-10-03 PROCEDURE — 3075F SYST BP GE 130 - 139MM HG: CPT

## 2023-10-03 RX ORDER — SULFAMETHOXAZOLE AND TRIMETHOPRIM 800; 160 MG/1; MG/1
1 TABLET ORAL 2 TIMES DAILY
Qty: 14 TABLET | Refills: 0 | Status: SHIPPED | OUTPATIENT
Start: 2023-10-03 | End: 2023-10-10

## 2023-10-03 NOTE — PROGRESS NOTES
Name: Josie Boateng      : 1955      MRN: [id number]  Encounter Provider: ARIN Emmanuel  Encounter Date: 10/3/2023   Encounter department: [address] [facility]     1. Cellulitis of right lower extremity  Assessment & Plan:  - Bactrim DS BID x7 days.  - Follow up in one week, sooner if worsens, changes, or fails to improve. Orders:  -     sulfamethoxazole-trimethoprim (BACTRIM DS) 800-160 mg per tablet; Take 1 tablet by mouth 2 (two) times a day for 7 days    2. Bilateral lower extremity edema  Assessment & Plan:  - Recommend daily physical activity, elevate legs when seated, low salt diet. - Pt provided with measurements for compression stockings, wear daily. 3. Itchy eyes  Assessment & Plan:  - Trial Zaditor eye drops BID. Orders:  -     ketotifen (ZADITOR) 0.025 % ophthalmic solution; Administer 1 drop to both eyes 2 (two) times a day as needed (itchy eyes)         Subjective     HPI     Jolie presents to the office for c/o skin lesion right leg. She is accompanied by her friend who assists with Kinyarwanda language translation when needed. Pt states she woke up with lesion present on back of right leg about 2 weeks ago. She believes it may have started as an insect/spider bite, states there was initially a vikash in the center. She did not see an insect. The red area is painful, not itchy, has not opened or drained. The red area has been growing in size since it appeared. There has been no red streaking from area, no fever. Pt reports chronic BLE edema is currently worse in right leg. Pt has not recently been ill, there are no known sick contacts. Review of Systems   Constitutional: Negative for fatigue, fever and unexpected weight change. HENT: Negative for congestion and sore throat. Respiratory: Negative for cough, shortness of breath and wheezing. Cardiovascular: Positive for leg swelling.  Negative for chest pain and palpitations. Gastrointestinal: Negative for abdominal pain, diarrhea, nausea and vomiting. Skin: Positive for rash. Neurological: Negative for dizziness, light-headedness and headaches. Hematological: Does not bruise/bleed easily. All other systems reviewed and are negative. Past Medical History:   Diagnosis Date   • Abdominal infection ([address])     h-pylori   • Abnormal chest x-ray 04/05/2019   • Asthma    • Breast cancer ([address]) 06/26/2018   • Cancer ([address])     BREAST   • Diabetes mellitus ([address])    • Hiatal hernia    • History of chemotherapy 2018   • History of radiation therapy 2018   • Hypercholesterolemia    • Hypertension    • Hypothyroid    • Renal disorder    • Rheumatoid arthritis [facility])      Past Surgical History:   Procedure Laterality Date   • 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   • BREAST LUMPECTOMY Right 6/26/2018    Procedure: RIGHT BREAST NEEDLE LOCALIZATION X2 WITH RIGHT BREAST LUMPECTOMY ( NEEDLE LOC @ 1000); Surgeon: Nash Nunez MD;  [facility];  Service: Surgical Oncology   • BREAST LUMPECTOMY Right 8/2/2018    Procedure: LYMPHOSCINITGRAPHY INTRAOPERATIVE LYMPHATIC MAPPING , RIGHT SENTINEL NODE BIOPSY, REEXCISION  RIGHT BREAST LUMPECTOMY CAVITY (SUPERIOR MARGIN); Surgeon: Nash Nunez MD;  [facility];  Service: Surgical Oncology   • BREAST SURGERY Left    • CATARACT EXTRACTION, BILATERAL Bilateral    • COLONOSCOPY     • EGD AND COLONOSCOPY N/A 9/5/2018    Procedure: EGD AND COLONOSCOPY;  Surgeon: Sandhya Gramajo MD;  Location: [facility] GI LAB; Service: Gastroenterology   • FL GUIDED CENTRAL VENOUS ACCESS DEVICE INSERTION  9/13/2018   • KNEE SURGERY Right    • MAMMO NEEDLE LOCALIZATION RIGHT (ALL INC) Right 6/26/2018   • MAMMO STEREOTACTIC BREAST BIOPSY RIGHT (ALL INC) Right 5/23/2018   • RETINAL DETACHMENT SURGERY Right    • TUBAL LIGATION     • TUNNELED VENOUS PORT PLACEMENT Left 9/13/2018    Procedure: INSERTION VENOUS PORT (PORT-A-CATH);   Surgeon: Carlos Tolliver MD;  [facility];  Service: Surgical Oncology   • UPPER GASTROINTESTINAL ENDOSCOPY       Family History   Problem Relation Age of Onset   • Diabetes Mother    • Hypertension Mother    • Diabetes Father    • Hypertension Father    • No Known Problems Sister    • No Known Problems Sister    • No Known Problems Sister    • No Known Problems Sister    • No Known Problems Sister    • Diabetes Brother    • Diabetes Brother    • Kidney failure Brother    • Diabetes Brother    • Cancer Maternal Grandfather    • No Known Problems Daughter    • No Known Problems Daughter    • No Known Problems Daughter      Social History     Socioeconomic History   • Marital status:      Spouse name: None   • Number of children: None   • Years of education: None   • Highest education level: None   Occupational History   • None   Tobacco Use   • Smoking status: Never   • Smokeless tobacco: Never   Vaping Use   • Vaping Use: Never used   Substance and Sexual Activity   • Alcohol use: No   • Drug use: No   • Sexual activity: Not Currently   Other Topics Concern   • None   Social History Narrative   • None     Social Determinants of Health     Financial Resource Strain: Medium Risk (9/18/2023)    Overall Financial Resource Strain (CARDIA)    • Difficulty of Paying Living Expenses: Somewhat hard   Food Insecurity: No Food Insecurity (9/18/2023)    Hunger Vital Sign    • Worried About Running Out of Food in the Last Year: Never true    • Ran Out of Food in the Last Year: Never true   Transportation Needs: No Transportation Needs (9/18/2023)    PRAPARE - Transportation    • Lack of Transportation (Medical): No    • Lack of Transportation (Non-Medical):  No   Physical Activity: Not on file   Stress: Not on file   Social Connections: Not on file   Intimate Partner Violence: Not on file   Housing Stability: Low Risk  (10/27/2021)    Housing Stability Vital Sign    • Unable to Pay for Housing in the Last Year: No    • Number of Places Lived in the Last Year: 1    • Unstable Housing in the Last Year: No     Current Outpatient Medications on File Prior to Visit   Medication Sig   • acetaminophen (TYLENOL) 650 mg CR tablet Take 1 tablet (650 mg total) by mouth every 8 (eight) hours as needed for mild pain   • Advair -21 MCG/ACT inhaler INHALE 2 PUFFS 2 (TWO) TIMES A DAY RINSE MOUTH AFTER USE. • albuterol (2.5 mg/3 mL) 0.083 % nebulizer solution Take 3 mL (2.5 mg total) by nebulization every 6 (six) hours as needed for wheezing or shortness of breath   • albuterol (PROVENTIL HFA,VENTOLIN HFA) 90 mcg/act inhaler INHALE 1 PUFF EVERY 4 (FOUR) HOURS AS NEEDED FOR WHEEZING   • anastrozole (ARIMIDEX) 1 mg tablet Take 1 tablet (1 mg total) by mouth daily   • aspirin 81 mg chewable tablet Chew 81 mg daily   • atorvastatin (LIPITOR) 40 mg tablet Take 1 tablet (40 mg total) by mouth daily   • Continuous Blood Gluc Transmit (Dexcom G6 Transmitter) MISC Use as directed for continuous glucose monitoring.  Change every 3 months (Patient not taking: Reported on 2/6/2023)   • Diclofenac Sodium (VOLTAREN) 1 % Apply 2 g topically 2 (two) times a day   • EPINEPHrine (Auvi-Q) 0.1 mg/0.1 mL SOAJ Inject 0.3 mL (0.3 mg total) into a muscle once for 1 dose (Patient not taking: Reported on 2/6/2023)   • ergocalciferol (VITAMIN D2) 50,000 units TAKE 1 CAPSULE (50,000 UNITS TOTAL) BY MOUTH ONCE A WEEK   • fluticasone (FLONASE) 50 mcg/act nasal spray TAKE 1 SPRAY INTO EACH NOSTRIL DAILY   • furosemide (LASIX) 20 mg tablet TAKE 1 TABLET (20 MG TOTAL) BY MOUTH 2 (TWO) TIMES A DAY   • guaiFENesin (MUCINEX) 600 mg 12 hr tablet Take 2 tablets (1,200 mg total) by mouth every 12 (twelve) hours (Patient not taking: Reported on 6/2/2023)   • insulin detemir (Levemir FlexTouch) 100 Units/mL injection pen INJECT 60 UNITS UNDER THE SKIN DAILY AT BEDTIME   • insulin lispro (HumaLOG KwikPen) 100 units/mL injection pen INJECT 24 UNITS UNDER THE SKIN BEFORE MEALS   • loratadine (CLARITIN) 10 mg tablet TAKE 1 TABLET (10 MG TOTAL) BY MOUTH DAILY (Patient not taking: Reported on 6/12/2023)   • losartan (COZAAR) 100 MG tablet Take 1 tablet (100 mg total) by mouth daily   • Mag-G 500 (27 Mg) MG tablet TAKE 1 TABLET (500 MG TOTAL) BY MOUTH DAILY (Patient not taking: Reported on 6/12/2023)   • metoprolol tartrate (LOPRESSOR) 25 mg tablet Take 0.5 tablets (12.5 mg total) by mouth every 12 (twelve) hours   • omeprazole (PriLOSEC) 20 mg delayed release capsule TAKE 1 CAPSULE (20 MG TOTAL) BY MOUTH DAILY   • OneTouch Delica Lancets 04G MISC USE 3 (THREE) TIMES A DAY   • OneTouch Ultra test strip USE 1 EACH 3 (THREE) TIMES A DAY USE AS INSTRUCTED   • Spiriva Respimat 2.5 MCG/ACT AERS inhaler INHALE 2 PUFFS DAILY   • traZODone (DESYREL) 50 mg tablet TAKE 0.5 TABLETS (25 MG TOTAL) BY MOUTH DAILY AT BEDTIME   • UltiCare Micro Pen Needles 32G X 4 MM MISC INJECT UNDER THE SKIN 4 (FOUR) TIMES A DAY   • zileuton 600 MG TAKE 1 TABLET (600 MG TOTAL) BY MOUTH 2 (TWO) TIMES A DAY     Allergies   Allergen Reactions   • Codeine Other (See Comments)     unknown   • Latex Other (See Comments)     fungus     Immunization History   Administered Date(s) Administered   • INFLUENZA 10/07/2022   • Influenza, high dose seasonal 0.7 mL 10/07/2022   • Influenza, recombinant, quadrivalent,injectable, preservative free 09/15/2021   • Pneumococcal Conjugate Vaccine 20-valent (Pcv20), Polysace 10/07/2022       Objective     /80 (BP Location: Right arm, Patient Position: Sitting, Cuff Size: Standard)   Pulse 90   Temp 98.7 °F (37.1 °C) (Temporal)   Resp 16   Ht 5' 5" (1.651 m)   Wt 84.4 kg (186 lb)   SpO2 97%   BMI 30.95 kg/m²     Physical Exam  Vitals reviewed. Constitutional:       General: She is not in acute distress. Appearance: She is obese. She is not ill-appearing or diaphoretic. HENT:      Head: Normocephalic and atraumatic.       Mouth/Throat:      Mouth: Mucous membranes are moist. Pharynx: Oropharynx is clear. Eyes:      General: Lids are normal.      Conjunctiva/sclera: Conjunctivae normal.      Pupils: Pupils are equal, round, and reactive to light. Cardiovascular:      Rate and Rhythm: Normal rate and regular rhythm. Pulses: Normal pulses. Heart sounds: Normal heart sounds. No murmur heard. Pulmonary:      Effort: Pulmonary effort is normal. No tachypnea. Breath sounds: Normal breath sounds. No decreased breath sounds or wheezing. Musculoskeletal:      Right lower le+ Edema present. Left lower le+ Edema present. Skin:     General: Skin is warm and dry. Comments: 1" raised, tender, nonfluctuant erythematous lesion posterior aspect right leg, surrounded by perimeter of erythema, see clinical image. Neurological:      Mental Status: She is alert and oriented to person, place, and time. Gait: Gait is intact.    Psychiatric:         Attention and Perception: Attention normal.         Mood and Affect: Mood and affect normal.         Behavior: Behavior normal.                  ARIN Chaney

## 2023-10-10 PROBLEM — L03.115 CELLULITIS OF RIGHT LOWER EXTREMITY: Status: ACTIVE | Noted: 2023-10-10

## 2023-10-10 PROBLEM — H57.9 ITCHY EYES: Status: ACTIVE | Noted: 2023-10-10

## 2023-10-10 PROBLEM — R60.0 BILATERAL LOWER EXTREMITY EDEMA: Status: ACTIVE | Noted: 2023-10-10

## 2023-10-10 NOTE — ASSESSMENT & PLAN NOTE
- Bactrim DS BID x7 days.  - Follow up in one week, sooner if worsens, changes, or fails to improve.

## 2023-10-10 NOTE — ASSESSMENT & PLAN NOTE
- Recommend daily physical activity, elevate legs when seated, low salt diet. - Pt provided with measurements for compression stockings, wear daily.

## 2023-10-11 ENCOUNTER — OFFICE VISIT (OUTPATIENT)
Dept: OBGYN CLINIC | Facility: CLINIC | Age: 68
End: 2023-10-11

## 2023-10-11 VITALS
HEART RATE: 98 BPM | WEIGHT: 182.8 LBS | HEIGHT: 65 IN | BODY MASS INDEX: 30.46 KG/M2 | DIASTOLIC BLOOD PRESSURE: 76 MMHG | SYSTOLIC BLOOD PRESSURE: 135 MMHG

## 2023-10-11 DIAGNOSIS — D21.9 FIBROID: ICD-10-CM

## 2023-10-11 DIAGNOSIS — K59.01 SLOW TRANSIT CONSTIPATION: ICD-10-CM

## 2023-10-11 DIAGNOSIS — Z71.2 ENCOUNTER TO DISCUSS TEST RESULTS: Primary | ICD-10-CM

## 2023-10-11 PROCEDURE — 99213 OFFICE O/P EST LOW 20 MIN: CPT | Performed by: OBSTETRICS & GYNECOLOGY

## 2023-10-11 PROCEDURE — 3078F DIAST BP <80 MM HG: CPT | Performed by: OBSTETRICS & GYNECOLOGY

## 2023-10-11 PROCEDURE — 3075F SYST BP GE 130 - 139MM HG: CPT | Performed by: OBSTETRICS & GYNECOLOGY

## 2023-10-11 RX ORDER — CALCIUM POLYCARBOPHIL 625 MG 625 MG/1
625 TABLET ORAL DAILY
Qty: 30 TABLET | Refills: 5 | Status: SHIPPED | OUTPATIENT
Start: 2023-10-11

## 2023-10-11 RX ORDER — POLYETHYLENE GLYCOL 3350 17 G/17G
17 POWDER, FOR SOLUTION ORAL DAILY
Qty: 28 EACH | Refills: 5 | Status: SHIPPED | OUTPATIENT
Start: 2023-10-11 | End: 2023-10-25

## 2023-10-11 NOTE — PROGRESS NOTES
PROBLEM GYNECOLOGICAL VISIT    Jenifer Laurent is a 78 y.o. female who presents today for follow up. Her general medical history has been reviewed and she reports it as follows:    Past Medical History:   Diagnosis Date    Abdominal infection ([address])     h-pylori    Abnormal chest x-ray 2019    Asthma     Breast cancer ([address]) 2018    Cancer ([address])     BREAST    Diabetes mellitus ([address])     Hiatal hernia     History of chemotherapy 2018    History of radiation therapy 2018    Hypercholesterolemia     Hypertension     Hypothyroid     Renal disorder     Rheumatoid arthritis [facility])      Past Surgical History:   Procedure Laterality Date    BREAST BIOPSY Right 2018    DCIS    BREAST LUMPECTOMY Right 2018    Procedure: RIGHT BREAST NEEDLE LOCALIZATION X2 WITH RIGHT BREAST LUMPECTOMY ( NEEDLE LOC @ 1000); Surgeon: Viet Ortiz MD;  [facility];  Service: Surgical Oncology    BREAST LUMPECTOMY Right 2018    Procedure: LYMPHOSCINITGRAPHY INTRAOPERATIVE LYMPHATIC MAPPING , RIGHT SENTINEL NODE BIOPSY, REEXCISION  RIGHT BREAST LUMPECTOMY CAVITY (SUPERIOR MARGIN); Surgeon: Viet Ortiz MD;  [facility];  Service: Surgical Oncology    BREAST SURGERY Left     CATARACT EXTRACTION, BILATERAL Bilateral     COLONOSCOPY      EGD AND COLONOSCOPY N/A 2018    Procedure: EGD AND COLONOSCOPY;  Surgeon: Sabino Kamara MD;  Location: [facility] GI LAB; Service: Gastroenterology    FL GUIDED CENTRAL VENOUS ACCESS DEVICE INSERTION  2018    KNEE SURGERY Right     MAMMO NEEDLE LOCALIZATION RIGHT (ALL INC) Right 2018    MAMMO STEREOTACTIC BREAST BIOPSY RIGHT (ALL INC) Right 2018    RETINAL DETACHMENT SURGERY Right     TUBAL LIGATION      TUNNELED VENOUS PORT PLACEMENT Left 2018    Procedure: INSERTION VENOUS PORT (PORT-A-CATH);   Surgeon: Viet Ortiz MD;  [facility];  Service: Surgical Oncology    UPPER GASTROINTESTINAL ENDOSCOPY       OB History        4    Para   4    Term   4            AB        Living             SAB        IAB        Ectopic        Multiple        Live Births   4           Obstetric Comments   Menarche age 7. First birth at age 25. Postmenopausal.   Hormonal contraceptive use x3 years. No HRT. Social History     Tobacco Use    Smoking status: Never    Smokeless tobacco: Never   Vaping Use    Vaping Use: Never used   Substance Use Topics    Alcohol use: No    Drug use: No     Social History     Substance and Sexual Activity   Sexual Activity Not Currently       Current Outpatient Medications   Medication Instructions    acetaminophen (TYLENOL) 650 mg, Oral, Every 8 hours PRN    Advair -21 MCG/ACT inhaler 2 puffs, Inhalation, 2 times daily, Rinse mouth after use. albuterol (PROVENTIL HFA,VENTOLIN HFA) 90 mcg/act inhaler 1 puff, Inhalation, Every 4 hours PRN    albuterol 2.5 mg, Nebulization, Every 6 hours PRN    anastrozole (ARIMIDEX) 1 mg, Oral, Daily    aspirin 81 mg, Oral, Daily    atorvastatin (LIPITOR) 40 mg, Oral, Daily    Auvi-Q 0.3 mg, Intramuscular, Once    calcium polycarbophil (FIBERCON) 625 mg, Oral, Daily    Continuous Blood Gluc Transmit (Dexcom G6 Transmitter) MISC Use as directed for continuous glucose monitoring.  Change every 3 months    Diclofenac Sodium (VOLTAREN) 2 g, Topical, 2 times daily    ergocalciferol (VITAMIN D2) 50,000 Units, Oral, Weekly    fluticasone (FLONASE) 50 mcg/act nasal spray TAKE 1 SPRAY INTO EACH NOSTRIL DAILY    furosemide (LASIX) 20 mg, Oral, 2 times daily    guaiFENesin (MUCINEX) 1,200 mg, Oral, Every 12 hours scheduled    insulin detemir (Levemir FlexTouch) 100 Units/mL injection pen INJECT 60 UNITS UNDER THE SKIN DAILY AT BEDTIME    insulin lispro (HumaLOG KwikPen) 100 units/mL injection pen INJECT 24 UNITS UNDER THE SKIN BEFORE MEALS    ketotifen (ZADITOR) 0.025 % ophthalmic solution 1 drop, Both Eyes, 2 times daily PRN    loratadine (CLARITIN) 10 mg, Oral, Daily losartan (COZAAR) 100 mg, Oral, Daily    Mag-G 500 (27 Mg) MG tablet TAKE 1 TABLET (500 MG TOTAL) BY MOUTH DAILY    metoprolol tartrate (LOPRESSOR) 12.5 mg, Oral, Every 12 hours scheduled    omeprazole (PRILOSEC) 20 mg, Oral, Daily    OneTouch Delica Lancets 81H MISC USE 3 (THREE) TIMES A DAY    OneTouch Ultra test strip 1 each, Other, 3 times daily, Use as instructed    polyethylene glycol (MIRALAX) 17 g, Oral, Daily    Spiriva Respimat 2.5 MCG/ACT AERS inhaler 2 puffs, Inhalation, Daily    traZODone (DESYREL) 25 mg, Oral, Daily at bedtime    UltiCare Micro Pen Needles 32G X 4 MM MISC INJECT UNDER THE SKIN 4 (FOUR) TIMES A DAY    zileuton 600 MG TAKE 1 TABLET (600 MG TOTAL) BY MOUTH 2 (TWO) TIMES A DAY       History of Present Illness:   Patient presents for follow up s/p pelvic sonogram for left sided pelvic pain. Review of Systems:  Review of Systems   All other systems reviewed and are negative. Physical Exam:  /76   Pulse 98   Ht 5' 5" (1.651 m)   Wt 82.9 kg (182 lb 12.8 oz)   BMI 30.42 kg/m²   Physical Exam  Constitutional:       Appearance: Normal appearance. Neurological:      Mental Status: She is alert. Vitals reviewed. Discussion:  Discuss with patient pelvic sonogram with 2 small fibroids noted, normal endometrial stripe, right ovary visualized but unable to see left due to overlying bowel gas. Discuss with patient any history constipation which most likely causing the pain and no suspicious ovarian or adnexal abnormality. Recommend miralax and increase fiber supplementation. Assessment:   1. Uterus with small fibroids   2. Constipation    Plan:   1. Miralax/Fibercon escribed   2. Return to office prm. .  Reviewed with patient that test results are available in [facility] immediately, but that they will not necessarily be reviewed by me immediately. Explained that I will review results at my earliest opportunity and contact patient appropriately.

## 2023-10-11 NOTE — PATIENT INSTRUCTIONS
Watson por pruitt confianza en nuestro equipo. Kenia Lizzie y agradecemos joe comentarios. Si recibe felicita encuesta nuestra, tómese unos momentos para informarnos cómo estamos.    Sinceramente,  [facility], DO

## 2023-10-12 ENCOUNTER — OFFICE VISIT (OUTPATIENT)
Dept: FAMILY MEDICINE CLINIC | Facility: CLINIC | Age: 68
End: 2023-10-12

## 2023-10-12 VITALS
DIASTOLIC BLOOD PRESSURE: 60 MMHG | TEMPERATURE: 97.1 F | SYSTOLIC BLOOD PRESSURE: 130 MMHG | WEIGHT: 186 LBS | BODY MASS INDEX: 30.99 KG/M2 | HEART RATE: 81 BPM | RESPIRATION RATE: 16 BRPM | OXYGEN SATURATION: 97 % | HEIGHT: 65 IN

## 2023-10-12 DIAGNOSIS — Z23 ENCOUNTER FOR IMMUNIZATION: Primary | ICD-10-CM

## 2023-10-12 DIAGNOSIS — L03.115 CELLULITIS OF RIGHT LOWER EXTREMITY: ICD-10-CM

## 2023-10-12 PROCEDURE — 99213 OFFICE O/P EST LOW 20 MIN: CPT | Performed by: FAMILY MEDICINE

## 2023-10-12 PROCEDURE — 3078F DIAST BP <80 MM HG: CPT | Performed by: FAMILY MEDICINE

## 2023-10-12 PROCEDURE — 3075F SYST BP GE 130 - 139MM HG: CPT | Performed by: FAMILY MEDICINE

## 2023-10-12 PROCEDURE — G0008 ADMIN INFLUENZA VIRUS VAC: HCPCS | Performed by: FAMILY MEDICINE

## 2023-10-12 PROCEDURE — 90662 IIV NO PRSV INCREASED AG IM: CPT | Performed by: FAMILY MEDICINE

## 2023-10-12 RX ORDER — SULFAMETHOXAZOLE AND TRIMETHOPRIM 800; 160 MG/1; MG/1
1 TABLET ORAL EVERY 12 HOURS SCHEDULED
Qty: 10 TABLET | Refills: 0 | Status: SHIPPED | OUTPATIENT
Start: 2023-10-12 | End: 2023-10-17

## 2023-10-12 NOTE — PROGRESS NOTES
Assessment/Plan:    No problem-specific Assessment & Plan notes found for this encounter. Diagnoses and all orders for this visit:    Encounter for immunization  -     influenza vaccine, high-dose, PF 0.7 mL (FLUZONE HIGH-DOSE)    Cellulitis of right lower extremity  -     sulfamethoxazole-trimethoprim (BACTRIM DS) 800-160 mg per tablet; Take 1 tablet by mouth every 12 (twelve) hours for 5 days        Continue Bactrim for another 5 days  Keep area clean and dry  ED precautions reviewed with patient   Subjective:      Patient ID: Maura Rivera is a 78 y.o. female. Pt states she woke up with lesion present on back of right leg about 2 weeks ago. She believes it may have started as an insect/spider bite, states there was initially a vikash in the center. She did not see an insect. The red area is painful, not itchy, has not opened or drained. The red area has been growing in size since it appeared. There has been no red streaking from area, no fever. Pt reports chronic BLE edema is currently worse in right leg. She was seen on 10/2/2023 diagnosed with cellulitis and prescribed Bactrim DS for 7 days. Redness has improved but not yet resolved  Denies fever, chills, change in appetite         The following portions of the patient's history were reviewed and updated as appropriate: She  has a past medical history of Abdominal infection ([address]), Abnormal chest x-ray (04/05/2019), Asthma, Breast cancer ([address]) (06/26/2018), Cancer ([address]), Diabetes mellitus ([address]), Hiatal hernia, History of chemotherapy (2018), History of radiation therapy (2018), Hypercholesterolemia, Hypertension, Hypothyroid, Renal disorder, and Rheumatoid arthritis ([address]).   She   Patient Active Problem List    Diagnosis Date Noted   • Bilateral lower extremity edema 10/10/2023   • Itchy eyes 10/10/2023   • Cellulitis of right lower extremity 10/10/2023   • Hypercalcemia 05/30/2023   • COVID-19 11/17/2022   • Diarrhea 11/10/2022   • Seasonal allergies 10/31/2022   • Diabetes mellitus ([address]) 09/26/2022   • Vaginal candidiasis 08/26/2022   • Depression, recurrent ([address]) 06/13/2022   • Type 2 diabetes mellitus with microalbuminuria, with long-term current use of insulin ([address]) 11/10/2021   • Pelvic pain 09/29/2021   • Bone pain 09/29/2021   • Anxiety 09/15/2021   • Right foot pain 09/15/2021   • Overweight with body mass index (BMI) of 28 to 28.9 in adult 09/08/2021   • Sebaceous cyst of axilla 08/17/2021   • Subcutaneous mass of back 08/17/2021   • Muscle cramps 06/09/2021   • Gastroesophageal reflux disease without esophagitis 03/03/2021   • Chronic diastolic heart failure ([address]) 09/01/2020   • Use of anastrozole (Arimidex) 04/20/2020   • Decreased ROM of right shoulder 04/20/2020   • Lump of skin 04/20/2020   • Diabetic polyneuropathy associated with type 2 diabetes mellitus ([address]) 11/24/2019   • Type 2 diabetes mellitus with hyperglycemia, with long-term current use of insulin ([address]) 05/29/2019   • Radiation pneumonitis ([address]) 05/18/2019   • Bilateral pulmonary embolism ([address]) 11/06/2018   • Hypothyroid    • Essential hypertension    • Hypercholesterolemia 09/21/2018   • Chronic pain of right knee 09/04/2018   • Cellulitis of right axilla 08/22/2018   • Hiatal hernia 08/08/2018   • Screening for colon cancer 08/08/2018   • Esophageal dysphagia 08/08/2018   • Malignant neoplasm of upper-outer quadrant of right breast in female, estrogen receptor positive  07/19/2018   • Malignant neoplasm of right female breast ([address]) 06/26/2018   • Neck pain 06/13/2018   • Chronic bilateral low back pain without sciatica 05/22/2018   • Palpitations 05/09/2018   • Type 2 diabetes mellitus with complication, with long-term current use of insulin ([address]) 03/27/2018   • Other specified hypothyroidism 03/27/2018   • Chest pain 03/27/2018   • Severe persistent asthma without complication 01/71/8915     She  has a past surgical history that includes Tubal ligation; Knee surgery (Right); Cataract extraction, bilateral (Bilateral); Retinal detachment surgery (Right); Breast surgery (Left); EGD AND COLONOSCOPY (N/A, 9/5/2018); Tunneled venous port placement (Left, 9/13/2018); FL guided central venous access device insertion (9/13/2018); Mammo stereotactic breast biopsy right (all inc) (Right, 5/23/2018); Mammo needle localization right (all inc) (Right, 6/26/2018); Breast biopsy (Right, 05/23/2018); Breast lumpectomy (Right, 6/26/2018); Breast lumpectomy (Right, 8/2/2018); Colonoscopy; and Upper gastrointestinal endoscopy. Her family history includes Cancer in her maternal grandfather; Diabetes in her brother, brother, brother, father, and mother; Hypertension in her father and mother; Kidney failure in her brother; No Known Problems in her daughter, daughter, daughter, sister, sister, sister, sister, and sister. She  reports that she has never smoked. She has never used smokeless tobacco. She reports that she does not drink alcohol and does not use drugs.   Current Outpatient Medications   Medication Sig Dispense Refill   • sulfamethoxazole-trimethoprim (BACTRIM DS) 800-160 mg per tablet Take 1 tablet by mouth every 12 (twelve) hours for 5 days 10 tablet 0   • acetaminophen (TYLENOL) 650 mg CR tablet Take 1 tablet (650 mg total) by mouth every 8 (eight) hours as needed for mild pain 30 tablet 0   • Advair -21 MCG/ACT inhaler INHALE 2 PUFFS 2 (TWO) TIMES A DAY RINSE MOUTH AFTER USE. 12 g 3   • albuterol (2.5 mg/3 mL) 0.083 % nebulizer solution Take 3 mL (2.5 mg total) by nebulization every 6 (six) hours as needed for wheezing or shortness of breath 75 mL 5   • albuterol (PROVENTIL HFA,VENTOLIN HFA) 90 mcg/act inhaler INHALE 1 PUFF EVERY 4 (FOUR) HOURS AS NEEDED FOR WHEEZING 8.5 g 3   • anastrozole (ARIMIDEX) 1 mg tablet Take 1 tablet (1 mg total) by mouth daily 90 tablet 3   • aspirin 81 mg chewable tablet Chew 81 mg daily     • atorvastatin (LIPITOR) 40 mg tablet Take 1 tablet (40 mg total) by mouth daily 90 tablet 2   • calcium polycarbophil (FIBERCON) 625 mg tablet Take 1 tablet (625 mg total) by mouth daily 30 tablet 5   • Continuous Blood Gluc Transmit (Dexcom G6 Transmitter) MISC Use as directed for continuous glucose monitoring.  Change every 3 months (Patient not taking: Reported on 2/6/2023) 1 each 3   • Diclofenac Sodium (VOLTAREN) 1 % Apply 2 g topically 2 (two) times a day 150 g 0   • EPINEPHrine (Auvi-Q) 0.1 mg/0.1 mL SOAJ Inject 0.3 mL (0.3 mg total) into a muscle once for 1 dose (Patient not taking: Reported on 2/6/2023) 2 each 5   • ergocalciferol (VITAMIN D2) 50,000 units TAKE 1 CAPSULE (50,000 UNITS TOTAL) BY MOUTH ONCE A WEEK 12 capsule 0   • fluticasone (FLONASE) 50 mcg/act nasal spray TAKE 1 SPRAY INTO EACH NOSTRIL DAILY 16 g 4   • furosemide (LASIX) 20 mg tablet TAKE 1 TABLET (20 MG TOTAL) BY MOUTH 2 (TWO) TIMES A  tablet 0   • guaiFENesin (MUCINEX) 600 mg 12 hr tablet Take 2 tablets (1,200 mg total) by mouth every 12 (twelve) hours (Patient not taking: Reported on 6/2/2023) 60 tablet 0   • insulin detemir (Levemir FlexTouch) 100 Units/mL injection pen INJECT 60 UNITS UNDER THE SKIN DAILY AT BEDTIME 45 mL 0   • insulin lispro (HumaLOG KwikPen) 100 units/mL injection pen INJECT 24 UNITS UNDER THE SKIN BEFORE MEALS 45 mL 0   • ketotifen (ZADITOR) 0.025 % ophthalmic solution Administer 1 drop to both eyes 2 (two) times a day as needed (itchy eyes) 5 mL 0   • loratadine (CLARITIN) 10 mg tablet TAKE 1 TABLET (10 MG TOTAL) BY MOUTH DAILY (Patient not taking: Reported on 6/12/2023) 90 tablet 2   • losartan (COZAAR) 100 MG tablet Take 1 tablet (100 mg total) by mouth daily 90 tablet 1   • Mag-G 500 (27 Mg) MG tablet TAKE 1 TABLET (500 MG TOTAL) BY MOUTH DAILY (Patient not taking: Reported on 6/12/2023) 90 tablet 1   • metoprolol tartrate (LOPRESSOR) 25 mg tablet Take 0.5 tablets (12.5 mg total) by mouth every 12 (twelve) hours 180 tablet 0   • omeprazole (PriLOSEC) 20 mg delayed release capsule TAKE 1 CAPSULE (20 MG TOTAL) BY MOUTH DAILY 90 capsule 2   • OneTouch Delica Lancets 41L MISC USE 3 (THREE) TIMES A  each 3   • OneTouch Ultra test strip USE 1 EACH 3 (THREE) TIMES A DAY USE AS INSTRUCTED 300 each 2   • polyethylene glycol (MIRALAX) 17 g packet Take 17 g by mouth daily for 14 days 28 each 5   • Spiriva Respimat 2.5 MCG/ACT AERS inhaler INHALE 2 PUFFS DAILY 4 g 2   • traZODone (DESYREL) 50 mg tablet TAKE 0.5 TABLETS (25 MG TOTAL) BY MOUTH DAILY AT BEDTIME 90 tablet 0   • UltiCare Micro Pen Needles 32G X 4 MM MISC INJECT UNDER THE SKIN 4 (FOUR) TIMES A  each 0   • zileuton 600 MG TAKE 1 TABLET (600 MG TOTAL) BY MOUTH 2 (TWO) TIMES A DAY 60 tablet 3     No current facility-administered medications for this visit. Current Outpatient Medications on File Prior to Visit   Medication Sig   • acetaminophen (TYLENOL) 650 mg CR tablet Take 1 tablet (650 mg total) by mouth every 8 (eight) hours as needed for mild pain   • Advair -21 MCG/ACT inhaler INHALE 2 PUFFS 2 (TWO) TIMES A DAY RINSE MOUTH AFTER USE. • albuterol (2.5 mg/3 mL) 0.083 % nebulizer solution Take 3 mL (2.5 mg total) by nebulization every 6 (six) hours as needed for wheezing or shortness of breath   • albuterol (PROVENTIL HFA,VENTOLIN HFA) 90 mcg/act inhaler INHALE 1 PUFF EVERY 4 (FOUR) HOURS AS NEEDED FOR WHEEZING   • anastrozole (ARIMIDEX) 1 mg tablet Take 1 tablet (1 mg total) by mouth daily   • aspirin 81 mg chewable tablet Chew 81 mg daily   • atorvastatin (LIPITOR) 40 mg tablet Take 1 tablet (40 mg total) by mouth daily   • calcium polycarbophil (FIBERCON) 625 mg tablet Take 1 tablet (625 mg total) by mouth daily   • Continuous Blood Gluc Transmit (Dexcom G6 Transmitter) MISC Use as directed for continuous glucose monitoring.  Change every 3 months (Patient not taking: Reported on 2/6/2023)   • Diclofenac Sodium (VOLTAREN) 1 % Apply 2 g topically 2 (two) times a day   • EPINEPHrine (Auvi-Q) 0.1 mg/0.1 mL SOAJ Inject 0.3 mL (0.3 mg total) into a muscle once for 1 dose (Patient not taking: Reported on 2/6/2023)   • ergocalciferol (VITAMIN D2) 50,000 units TAKE 1 CAPSULE (50,000 UNITS TOTAL) BY MOUTH ONCE A WEEK   • fluticasone (FLONASE) 50 mcg/act nasal spray TAKE 1 SPRAY INTO EACH NOSTRIL DAILY   • furosemide (LASIX) 20 mg tablet TAKE 1 TABLET (20 MG TOTAL) BY MOUTH 2 (TWO) TIMES A DAY   • guaiFENesin (MUCINEX) 600 mg 12 hr tablet Take 2 tablets (1,200 mg total) by mouth every 12 (twelve) hours (Patient not taking: Reported on 6/2/2023)   • insulin detemir (Levemir FlexTouch) 100 Units/mL injection pen INJECT 60 UNITS UNDER THE SKIN DAILY AT BEDTIME   • insulin lispro (HumaLOG KwikPen) 100 units/mL injection pen INJECT 24 UNITS UNDER THE SKIN BEFORE MEALS   • ketotifen (ZADITOR) 0.025 % ophthalmic solution Administer 1 drop to both eyes 2 (two) times a day as needed (itchy eyes)   • loratadine (CLARITIN) 10 mg tablet TAKE 1 TABLET (10 MG TOTAL) BY MOUTH DAILY (Patient not taking: Reported on 6/12/2023)   • losartan (COZAAR) 100 MG tablet Take 1 tablet (100 mg total) by mouth daily   • Mag-G 500 (27 Mg) MG tablet TAKE 1 TABLET (500 MG TOTAL) BY MOUTH DAILY (Patient not taking: Reported on 6/12/2023)   • metoprolol tartrate (LOPRESSOR) 25 mg tablet Take 0.5 tablets (12.5 mg total) by mouth every 12 (twelve) hours   • omeprazole (PriLOSEC) 20 mg delayed release capsule TAKE 1 CAPSULE (20 MG TOTAL) BY MOUTH DAILY   • OneTouch Delica Lancets 99F MISC USE 3 (THREE) TIMES A DAY   • OneTouch Ultra test strip USE 1 EACH 3 (THREE) TIMES A DAY USE AS INSTRUCTED   • polyethylene glycol (MIRALAX) 17 g packet Take 17 g by mouth daily for 14 days   • Spiriva Respimat 2.5 MCG/ACT AERS inhaler INHALE 2 PUFFS DAILY   • traZODone (DESYREL) 50 mg tablet TAKE 0.5 TABLETS (25 MG TOTAL) BY MOUTH DAILY AT BEDTIME   • UltiCare Micro Pen Needles 32G X 4 MM MISC INJECT UNDER THE SKIN 4 (FOUR) TIMES A DAY   • zileuton 600 MG TAKE 1 TABLET (600 MG TOTAL) BY MOUTH 2 (TWO) TIMES A DAY     No current facility-administered medications on file prior to visit. She is allergic to codeine and latex. .    Review of Systems   Skin:  Positive for wound. All other systems reviewed and are negative. Objective:      /60 (BP Location: Left arm, Patient Position: Sitting, Cuff Size: Large)   Pulse 81   Temp (!) 97.1 °F (36.2 °C) (Temporal)   Resp 16   Ht 5' 5" (1.651 m)   Wt 84.4 kg (186 lb)   SpO2 97%   BMI 30.95 kg/m²          Physical Exam  Vitals and nursing note reviewed. Constitutional:       Appearance: She is well-developed. HENT:      Head: Normocephalic. Right Ear: External ear normal.      Left Ear: External ear normal.      Nose: Nose normal.   Eyes:      Conjunctiva/sclera: Conjunctivae normal.      Pupils: Pupils are equal, round, and reactive to light. Neck:      Thyroid: No thyromegaly. Cardiovascular:      Rate and Rhythm: Normal rate and regular rhythm. Heart sounds: Normal heart sounds. Pulmonary:      Effort: Pulmonary effort is normal.      Breath sounds: Normal breath sounds. Abdominal:      Palpations: Abdomen is soft. Tenderness: There is no abdominal tenderness. There is no guarding or rebound. Musculoskeletal:         General: Normal range of motion. Cervical back: Normal range of motion and neck supple. Right lower leg: Edema present. Left lower leg: Edema present. Skin:     General: Skin is dry. Findings: Lesion present. Comments: Small, 0.5 cm wound with surrounding erythema. No local warmth    Neurological:      Mental Status: She is alert and oriented to person, place, and time. Deep Tendon Reflexes: Reflexes are normal and symmetric.

## 2023-10-26 ENCOUNTER — OFFICE VISIT (OUTPATIENT)
Dept: FAMILY MEDICINE CLINIC | Facility: CLINIC | Age: 68
End: 2023-10-26

## 2023-10-26 VITALS
DIASTOLIC BLOOD PRESSURE: 92 MMHG | HEIGHT: 65 IN | TEMPERATURE: 98.2 F | RESPIRATION RATE: 16 BRPM | WEIGHT: 184.4 LBS | HEART RATE: 93 BPM | SYSTOLIC BLOOD PRESSURE: 130 MMHG | OXYGEN SATURATION: 95 % | BODY MASS INDEX: 30.72 KG/M2

## 2023-10-26 DIAGNOSIS — J45.50 SEVERE PERSISTENT ASTHMA WITHOUT COMPLICATION: ICD-10-CM

## 2023-10-26 DIAGNOSIS — T14.8XXA OPEN WOUND OF SKIN: ICD-10-CM

## 2023-10-26 DIAGNOSIS — R09.81 NASAL CONGESTION: Primary | ICD-10-CM

## 2023-10-26 PROCEDURE — 3075F SYST BP GE 130 - 139MM HG: CPT | Performed by: FAMILY MEDICINE

## 2023-10-26 PROCEDURE — 99214 OFFICE O/P EST MOD 30 MIN: CPT | Performed by: FAMILY MEDICINE

## 2023-10-26 PROCEDURE — 3080F DIAST BP >= 90 MM HG: CPT | Performed by: FAMILY MEDICINE

## 2023-10-26 RX ORDER — ALBUTEROL SULFATE 2.5 MG/3ML
2.5 SOLUTION RESPIRATORY (INHALATION) EVERY 6 HOURS PRN
Qty: 75 ML | Refills: 5 | Status: SHIPPED | OUTPATIENT
Start: 2023-10-26

## 2023-10-26 RX ORDER — GUAIFENESIN 600 MG/1
1200 TABLET, EXTENDED RELEASE ORAL EVERY 12 HOURS SCHEDULED
Qty: 60 TABLET | Refills: 0 | Status: SHIPPED | OUTPATIENT
Start: 2023-10-26 | End: 2023-10-26

## 2023-10-26 RX ORDER — GUAIFENESIN 600 MG/1
600 TABLET, EXTENDED RELEASE ORAL EVERY 12 HOURS SCHEDULED
Qty: 30 TABLET | Refills: 0 | Status: SHIPPED | OUTPATIENT
Start: 2023-10-26

## 2023-10-26 NOTE — PROGRESS NOTES
Assessment/Plan:    No problem-specific Assessment & Plan notes found for this encounter. Diagnoses and all orders for this visit:    Nasal congestion  -     guaiFENesin (MUCINEX) 600 mg 12 hr tablet; Take 1 tablet (600 mg total) by mouth every 12 (twelve) hours    Severe persistent asthma without complication  -     albuterol (2.5 mg/3 mL) 0.083 % nebulizer solution; Take 3 mL (2.5 mg total) by nebulization every 6 (six) hours as needed for wheezing or shortness of breath    Open wound of skin  Comments:  Keep area cleand and dry. ED/return precautions reviewd. Reviewed that given her uncontrolled DM, healing takes longer. No signs of infection at this time    Other orders  -     Discontinue: guaiFENesin (MUCINEX) 600 mg 12 hr tablet; Take 2 tablets (1,200 mg total) by mouth every 12 (twelve) hours        Strongly encouraged patient to have BW done as ordered   Subjective:      Patient ID: Isidro Seth is a 78 y.o. female. 77 yo  female with uncontrolled DM, here today for follow up R lower extremity cellulitis and wound after 2 weeks of Bactrim. Patient states area is less red an painful, but wound has not yet closed. Denies drainage. Also complains of 1 day history of nasal congestion and wheezing  Denies fever, chills, SOB, cough         The following portions of the patient's history were reviewed and updated as appropriate: She  has a past medical history of Abdominal infection ([address]), Abnormal chest x-ray (04/05/2019), Asthma, Breast cancer ([address]) (06/26/2018), Cancer ([address]), Diabetes mellitus ([address]), Hiatal hernia, History of chemotherapy (2018), History of radiation therapy (2018), Hypercholesterolemia, Hypertension, Hypothyroid, Renal disorder, and Rheumatoid arthritis ([address]).   She   Patient Active Problem List    Diagnosis Date Noted   • Bilateral lower extremity edema 10/10/2023   • Itchy eyes 10/10/2023   • Cellulitis of right lower extremity 10/10/2023   • Hypercalcemia 05/30/2023 • COVID-19 11/17/2022   • Diarrhea 11/10/2022   • Seasonal allergies 10/31/2022   • Diabetes mellitus ([address]) 09/26/2022   • Vaginal candidiasis 08/26/2022   • Depression, recurrent ([address]) 06/13/2022   • Type 2 diabetes mellitus with microalbuminuria, with long-term current use of insulin ([address]) 11/10/2021   • Pelvic pain 09/29/2021   • Bone pain 09/29/2021   • Anxiety 09/15/2021   • Right foot pain 09/15/2021   • Overweight with body mass index (BMI) of 28 to 28.9 in adult 09/08/2021   • Sebaceous cyst of axilla 08/17/2021   • Subcutaneous mass of back 08/17/2021   • Muscle cramps 06/09/2021   • Gastroesophageal reflux disease without esophagitis 03/03/2021   • Chronic diastolic heart failure ([address]) 09/01/2020   • Use of anastrozole (Arimidex) 04/20/2020   • Decreased ROM of right shoulder 04/20/2020   • Lump of skin 04/20/2020   • Diabetic polyneuropathy associated with type 2 diabetes mellitus ([address]) 11/24/2019   • Type 2 diabetes mellitus with hyperglycemia, with long-term current use of insulin ([address]) 05/29/2019   • Radiation pneumonitis ([address]) 05/18/2019   • Bilateral pulmonary embolism ([address]) 11/06/2018   • Hypothyroid    • Essential hypertension    • Hypercholesterolemia 09/21/2018   • Chronic pain of right knee 09/04/2018   • Cellulitis of right axilla 08/22/2018   • Hiatal hernia 08/08/2018   • Screening for colon cancer 08/08/2018   • Esophageal dysphagia 08/08/2018   • Malignant neoplasm of upper-outer quadrant of right breast in female, estrogen receptor positive  07/19/2018   • Malignant neoplasm of right female breast ([address]) 06/26/2018   • Neck pain 06/13/2018   • Chronic bilateral low back pain without sciatica 05/22/2018   • Palpitations 05/09/2018   • Type 2 diabetes mellitus with complication, with long-term current use of insulin ([address]) 03/27/2018   • Other specified hypothyroidism 03/27/2018   • Chest pain 03/27/2018   • Severe persistent asthma without complication 49/85/9697     She  has a past surgical history that includes Tubal ligation; Knee surgery (Right); Cataract extraction, bilateral (Bilateral); Retinal detachment surgery (Right); Breast surgery (Left); EGD AND COLONOSCOPY (N/A, 9/5/2018); Tunneled venous port placement (Left, 9/13/2018); FL guided central venous access device insertion (9/13/2018); Mammo stereotactic breast biopsy right (all inc) (Right, 5/23/2018); Mammo needle localization right (all inc) (Right, 6/26/2018); Breast biopsy (Right, 05/23/2018); Breast lumpectomy (Right, 6/26/2018); Breast lumpectomy (Right, 8/2/2018); Colonoscopy; and Upper gastrointestinal endoscopy. Her family history includes Cancer in her maternal grandfather; Diabetes in her brother, brother, brother, father, and mother; Hypertension in her father and mother; Kidney failure in her brother; No Known Problems in her daughter, daughter, daughter, sister, sister, sister, sister, and sister. She  reports that she has never smoked. She has never used smokeless tobacco. She reports that she does not drink alcohol and does not use drugs.   Current Outpatient Medications   Medication Sig Dispense Refill   • albuterol (2.5 mg/3 mL) 0.083 % nebulizer solution Take 3 mL (2.5 mg total) by nebulization every 6 (six) hours as needed for wheezing or shortness of breath 75 mL 5   • guaiFENesin (MUCINEX) 600 mg 12 hr tablet Take 1 tablet (600 mg total) by mouth every 12 (twelve) hours 30 tablet 0   • acetaminophen (TYLENOL) 650 mg CR tablet Take 1 tablet (650 mg total) by mouth every 8 (eight) hours as needed for mild pain 30 tablet 0   • Advair -21 MCG/ACT inhaler INHALE 2 PUFFS 2 (TWO) TIMES A DAY RINSE MOUTH AFTER USE. 12 g 3   • albuterol (PROVENTIL HFA,VENTOLIN HFA) 90 mcg/act inhaler INHALE 1 PUFF EVERY 4 (FOUR) HOURS AS NEEDED FOR WHEEZING 8.5 g 3   • anastrozole (ARIMIDEX) 1 mg tablet Take 1 tablet (1 mg total) by mouth daily 90 tablet 3   • aspirin 81 mg chewable tablet Chew 81 mg daily     • atorvastatin (LIPITOR) 40 mg tablet Take 1 tablet (40 mg total) by mouth daily 90 tablet 2   • calcium polycarbophil (FIBERCON) 625 mg tablet Take 1 tablet (625 mg total) by mouth daily 30 tablet 5   • Continuous Blood Gluc Transmit (Dexcom G6 Transmitter) MISC Use as directed for continuous glucose monitoring.  Change every 3 months (Patient not taking: Reported on 2/6/2023) 1 each 3   • Diclofenac Sodium (VOLTAREN) 1 % Apply 2 g topically 2 (two) times a day 150 g 0   • EPINEPHrine (Auvi-Q) 0.1 mg/0.1 mL SOAJ Inject 0.3 mL (0.3 mg total) into a muscle once for 1 dose (Patient not taking: Reported on 2/6/2023) 2 each 5   • ergocalciferol (VITAMIN D2) 50,000 units TAKE 1 CAPSULE (50,000 UNITS TOTAL) BY MOUTH ONCE A WEEK 12 capsule 0   • fluticasone (FLONASE) 50 mcg/act nasal spray TAKE 1 SPRAY INTO EACH NOSTRIL DAILY 16 g 4   • furosemide (LASIX) 20 mg tablet TAKE 1 TABLET (20 MG TOTAL) BY MOUTH 2 (TWO) TIMES A  tablet 0   • insulin detemir (Levemir FlexTouch) 100 Units/mL injection pen INJECT 60 UNITS UNDER THE SKIN DAILY AT BEDTIME 45 mL 0   • insulin lispro (HumaLOG KwikPen) 100 units/mL injection pen INJECT 24 UNITS UNDER THE SKIN BEFORE MEALS 45 mL 0   • ketotifen (ZADITOR) 0.025 % ophthalmic solution Administer 1 drop to both eyes 2 (two) times a day as needed (itchy eyes) 5 mL 0   • loratadine (CLARITIN) 10 mg tablet TAKE 1 TABLET (10 MG TOTAL) BY MOUTH DAILY (Patient not taking: Reported on 6/12/2023) 90 tablet 2   • losartan (COZAAR) 100 MG tablet Take 1 tablet (100 mg total) by mouth daily 90 tablet 1   • Mag-G 500 (27 Mg) MG tablet TAKE 1 TABLET (500 MG TOTAL) BY MOUTH DAILY (Patient not taking: Reported on 6/12/2023) 90 tablet 1   • metoprolol tartrate (LOPRESSOR) 25 mg tablet Take 0.5 tablets (12.5 mg total) by mouth every 12 (twelve) hours 180 tablet 0   • omeprazole (PriLOSEC) 20 mg delayed release capsule TAKE 1 CAPSULE (20 MG TOTAL) BY MOUTH DAILY 90 capsule 2   • OneTouch Delica Lancets 79K MISC USE 3 (THREE) TIMES A  each 3   • OneTouch Ultra test strip USE 1 EACH 3 (THREE) TIMES A DAY USE AS INSTRUCTED 300 each 2   • polyethylene glycol (MIRALAX) 17 g packet Take 17 g by mouth daily for 14 days 28 each 5   • Spiriva Respimat 2.5 MCG/ACT AERS inhaler INHALE 2 PUFFS DAILY 4 g 2   • traZODone (DESYREL) 50 mg tablet TAKE 0.5 TABLETS (25 MG TOTAL) BY MOUTH DAILY AT BEDTIME 90 tablet 0   • UltiCare Micro Pen Needles 32G X 4 MM MISC INJECT UNDER THE SKIN 4 (FOUR) TIMES A  each 0   • zileuton 600 MG TAKE 1 TABLET (600 MG TOTAL) BY MOUTH 2 (TWO) TIMES A DAY 60 tablet 3     No current facility-administered medications for this visit. Current Outpatient Medications on File Prior to Visit   Medication Sig   • acetaminophen (TYLENOL) 650 mg CR tablet Take 1 tablet (650 mg total) by mouth every 8 (eight) hours as needed for mild pain   • Advair -21 MCG/ACT inhaler INHALE 2 PUFFS 2 (TWO) TIMES A DAY RINSE MOUTH AFTER USE. • albuterol (PROVENTIL HFA,VENTOLIN HFA) 90 mcg/act inhaler INHALE 1 PUFF EVERY 4 (FOUR) HOURS AS NEEDED FOR WHEEZING   • anastrozole (ARIMIDEX) 1 mg tablet Take 1 tablet (1 mg total) by mouth daily   • aspirin 81 mg chewable tablet Chew 81 mg daily   • atorvastatin (LIPITOR) 40 mg tablet Take 1 tablet (40 mg total) by mouth daily   • calcium polycarbophil (FIBERCON) 625 mg tablet Take 1 tablet (625 mg total) by mouth daily   • Continuous Blood Gluc Transmit (Dexcom G6 Transmitter) MISC Use as directed for continuous glucose monitoring.  Change every 3 months (Patient not taking: Reported on 2/6/2023)   • Diclofenac Sodium (VOLTAREN) 1 % Apply 2 g topically 2 (two) times a day   • EPINEPHrine (Auvi-Q) 0.1 mg/0.1 mL SOAJ Inject 0.3 mL (0.3 mg total) into a muscle once for 1 dose (Patient not taking: Reported on 2/6/2023)   • ergocalciferol (VITAMIN D2) 50,000 units TAKE 1 CAPSULE (50,000 UNITS TOTAL) BY MOUTH ONCE A WEEK   • fluticasone (FLONASE) 50 mcg/act nasal spray TAKE 1 SPRAY INTO EACH NOSTRIL DAILY   • furosemide (LASIX) 20 mg tablet TAKE 1 TABLET (20 MG TOTAL) BY MOUTH 2 (TWO) TIMES A DAY   • insulin detemir (Levemir FlexTouch) 100 Units/mL injection pen INJECT 60 UNITS UNDER THE SKIN DAILY AT BEDTIME   • insulin lispro (HumaLOG KwikPen) 100 units/mL injection pen INJECT 24 UNITS UNDER THE SKIN BEFORE MEALS   • ketotifen (ZADITOR) 0.025 % ophthalmic solution Administer 1 drop to both eyes 2 (two) times a day as needed (itchy eyes)   • loratadine (CLARITIN) 10 mg tablet TAKE 1 TABLET (10 MG TOTAL) BY MOUTH DAILY (Patient not taking: Reported on 6/12/2023)   • losartan (COZAAR) 100 MG tablet Take 1 tablet (100 mg total) by mouth daily   • Mag-G 500 (27 Mg) MG tablet TAKE 1 TABLET (500 MG TOTAL) BY MOUTH DAILY (Patient not taking: Reported on 6/12/2023)   • metoprolol tartrate (LOPRESSOR) 25 mg tablet Take 0.5 tablets (12.5 mg total) by mouth every 12 (twelve) hours   • omeprazole (PriLOSEC) 20 mg delayed release capsule TAKE 1 CAPSULE (20 MG TOTAL) BY MOUTH DAILY   • OneTouch Delica Lancets 39R MISC USE 3 (THREE) TIMES A DAY   • OneTouch Ultra test strip USE 1 EACH 3 (THREE) TIMES A DAY USE AS INSTRUCTED   • polyethylene glycol (MIRALAX) 17 g packet Take 17 g by mouth daily for 14 days   • Spiriva Respimat 2.5 MCG/ACT AERS inhaler INHALE 2 PUFFS DAILY   • traZODone (DESYREL) 50 mg tablet TAKE 0.5 TABLETS (25 MG TOTAL) BY MOUTH DAILY AT BEDTIME   • UltiCare Micro Pen Needles 32G X 4 MM MISC INJECT UNDER THE SKIN 4 (FOUR) TIMES A DAY   • zileuton 600 MG TAKE 1 TABLET (600 MG TOTAL) BY MOUTH 2 (TWO) TIMES A DAY   • [DISCONTINUED] albuterol (2.5 mg/3 mL) 0.083 % nebulizer solution Take 3 mL (2.5 mg total) by nebulization every 6 (six) hours as needed for wheezing or shortness of breath   • [DISCONTINUED] guaiFENesin (MUCINEX) 600 mg 12 hr tablet Take 2 tablets (1,200 mg total) by mouth every 12 (twelve) hours (Patient not taking: Reported on 6/2/2023)     No current facility-administered medications on file prior to visit. She is allergic to codeine and latex. .    Review of Systems   HENT:  Positive for congestion. Respiratory:  Positive for wheezing. Skin:  Positive for wound. All other systems reviewed and are negative. Objective:      /92 (BP Location: Right arm, Patient Position: Sitting, Cuff Size: Standard)   Pulse 93   Temp 98.2 °F (36.8 °C) (Temporal)   Resp 16   Ht 5' 5" (1.651 m)   Wt 83.6 kg (184 lb 6.4 oz)   SpO2 95%   BMI 30.69 kg/m²          Physical Exam  Vitals and nursing note reviewed. Constitutional:       Appearance: She is well-developed. HENT:      Head: Normocephalic. Right Ear: External ear normal.      Left Ear: External ear normal.      Nose: Nose normal.   Eyes:      Conjunctiva/sclera: Conjunctivae normal.      Pupils: Pupils are equal, round, and reactive to light. Neck:      Thyroid: No thyromegaly. Cardiovascular:      Rate and Rhythm: Normal rate and regular rhythm. Heart sounds: Normal heart sounds. Pulmonary:      Effort: Pulmonary effort is normal.      Breath sounds: Wheezing present. Abdominal:      Palpations: Abdomen is soft. Tenderness: There is no abdominal tenderness. There is no guarding or rebound. Musculoskeletal:         General: Normal range of motion. Cervical back: Normal range of motion and neck supple. Skin:     General: Skin is dry. Findings: Lesion present. Comments: Clear borders, minimal surrounding erythema, healthy granulation tissue, no discharge    Neurological:      Mental Status: She is alert and oriented to person, place, and time. Deep Tendon Reflexes: Reflexes are normal and symmetric.

## 2023-10-28 DIAGNOSIS — E11.8 TYPE 2 DIABETES MELLITUS WITH COMPLICATION, WITH LONG-TERM CURRENT USE OF INSULIN (HCC): ICD-10-CM

## 2023-10-28 DIAGNOSIS — Z79.4 TYPE 2 DIABETES MELLITUS WITH COMPLICATION, WITH LONG-TERM CURRENT USE OF INSULIN (HCC): ICD-10-CM

## 2023-10-30 ENCOUNTER — OFFICE VISIT (OUTPATIENT)
Dept: ENDOCRINOLOGY | Facility: CLINIC | Age: 68
End: 2023-10-30
Payer: COMMERCIAL

## 2023-10-30 VITALS
DIASTOLIC BLOOD PRESSURE: 82 MMHG | HEART RATE: 88 BPM | WEIGHT: 186.8 LBS | SYSTOLIC BLOOD PRESSURE: 138 MMHG | BODY MASS INDEX: 31.12 KG/M2 | HEIGHT: 65 IN

## 2023-10-30 DIAGNOSIS — E78.00 HYPERCHOLESTEROLEMIA: ICD-10-CM

## 2023-10-30 DIAGNOSIS — S81.801S WOUND OF RIGHT LOWER EXTREMITY, SEQUELA: ICD-10-CM

## 2023-10-30 DIAGNOSIS — Z79.4 TYPE 2 DIABETES MELLITUS WITH COMPLICATION, WITH LONG-TERM CURRENT USE OF INSULIN (HCC): Primary | ICD-10-CM

## 2023-10-30 DIAGNOSIS — I10 ESSENTIAL HYPERTENSION: ICD-10-CM

## 2023-10-30 DIAGNOSIS — E11.8 TYPE 2 DIABETES MELLITUS WITH COMPLICATION, WITH LONG-TERM CURRENT USE OF INSULIN (HCC): Primary | ICD-10-CM

## 2023-10-30 LAB — SL AMB POCT HEMOGLOBIN AIC: 11.9 (ref ?–6.5)

## 2023-10-30 PROCEDURE — 83036 HEMOGLOBIN GLYCOSYLATED A1C: CPT | Performed by: PHYSICIAN ASSISTANT

## 2023-10-30 PROCEDURE — 99215 OFFICE O/P EST HI 40 MIN: CPT | Performed by: PHYSICIAN ASSISTANT

## 2023-10-30 PROCEDURE — 95251 CONT GLUC MNTR ANALYSIS I&R: CPT | Performed by: PHYSICIAN ASSISTANT

## 2023-10-30 RX ORDER — INSULIN LISPRO 100 [IU]/ML
INJECTION, SOLUTION INTRAVENOUS; SUBCUTANEOUS
Qty: 90 ML | Refills: 1 | Status: SHIPPED | OUTPATIENT
Start: 2023-10-30

## 2023-10-30 RX ORDER — INSULIN DEGLUDEC 200 U/ML
INJECTION, SOLUTION SUBCUTANEOUS
Qty: 27 ML | Refills: 1 | Status: SHIPPED | OUTPATIENT
Start: 2023-10-30

## 2023-10-30 RX ORDER — INSULIN LISPRO 100 [IU]/ML
INJECTION, SOLUTION INTRAVENOUS; SUBCUTANEOUS
Qty: 45 ML | Refills: 0 | Status: SHIPPED | OUTPATIENT
Start: 2023-10-30 | End: 2023-10-30 | Stop reason: SDUPTHER

## 2023-10-30 NOTE — PATIENT INSTRUCTIONS
Humalog 24 units before each meal PLUS sliding scale  150-200: 2 units  201-250: 4 units  251-300: 6 units  301-350: 8 units  Over 350: 10 units     Change Levemir 60 units to Tresiba 60 units daily   Send Glucose log in 1 week for review. Try new injection sites-- abdomen or fatty part of arm.

## 2023-10-30 NOTE — PROGRESS NOTES
Established Patient Progress Note      Chief Complaint   Patient presents with   • Diabetes Type 2        Impression & Plan:    Problem List Items Addressed This Visit        Endocrine    Type 2 diabetes mellitus with complication, with long-term current use of insulin ([address]) - Primary     Diabetes continues to be severely uncontrolled. She is frequently injecting insulin on her anterior thighs. Discussed importance of injection site rotation as continuing to use the same injection sites can lead to poor absorption which can be one of the reasons for her hyperglycemia. For now, will change Levemir 60 units daily to [place] U200 60 units daily. Continue to use Humalog 24 units before each meal but will again add a sliding scale. 150-200: 2 units  201-250: 4 units  251-300: 6 units  301-350: 8 units  Over 350: 10 units   Advised her to send glucose log or CGM download in 1 week for review and additional medication changes if needed at that time. Discussed adding GLP-1 agonist to her regimen but she reports that she has sometimes gets nausea and abdominal pain and constipation so we will not add additional medication at this time. Discussed importance of improving control of diabetes ASAP to reduce risk of worsening complications and especially with current wound on her right leg. Lab Results   Component Value Date    HGBA1C 11.9 (A) 10/30/2023            Relevant Medications    insulin degludec [place] FlexTouch) 200 units/mL CONCENTRATED U-200 injection pen    insulin lispro (HumaLOG) 100 units/mL injection pen    Other Relevant Orders    POCT hemoglobin A1c (Completed)    Ambulatory Referral to Wound Care    Comprehensive metabolic panel    TSH, 3rd generation    T4, free    Albumin / creatinine urine ratio       Cardiovascular and Mediastinum    Essential hypertension     Well-controlled.             Other    Hypercholesterolemia    Relevant Orders    Comprehensive metabolic panel    TSH, 3rd generation T4, free    Wound of right leg     Refer to wound care for treatment and evaluation as her open wound has not healed despite treatment with antibiotics. Discussed risk of severe infection or amputation with uncontrolled diabetes and need for better diabetes control. She was given contact information for wound care and she would like to be seen at [facility] location. Relevant Orders    Ambulatory Referral to Wound Care    CBC and differential       Orders Placed This Encounter   Procedures   • Comprehensive metabolic panel     This is a patient instruction: Patient fasting for 8 hours or longer recommended. Standing Status:   Future     Standing Expiration Date:   4/30/2024   • TSH, 3rd generation     This is a patient instruction: This test is non-fasting. Please drink two glasses of water morning of bloodwork. Standing Status:   Future     Standing Expiration Date:   4/30/2024   • T4, free     Standing Status:   Future     Standing Expiration Date:   4/30/2024   • Albumin / creatinine urine ratio     Standing Status:   Future     Standing Expiration Date:   4/30/2024   • CBC and differential     This is a patient instruction: This test is non-fasting. Please drink two glasses of water morning of bloodwork. Standing Status:   Future     Standing Expiration Date:   4/30/2024   • Ambulatory Referral to Wound Care     Standing Status:   Future     Standing Expiration Date:   10/30/2024     Referral Priority:   Routine     Referral Type:   Consult - AMB     Referral Reason:   Specialty Services Required     Requested Specialty:   Wound Care     Number of Visits Requested:   1     Expiration Date:   10/30/2024   • POCT hemoglobin A1c       History of Present Illness:   Josie Boateng is a 80 y.o. female with a history of type 2 diabetes with long term use of insulin since many years ago. Reports complications of neuropathy, retinopathy, and microalbuminuria.  Denies recent illness or hospitalizations. Denies recent severe hypoglycemic or severe hyperglycemic episodes. Denies any issues with her current regimen. home glucose monitoring: are performed regularly using Broderick 2 sensor. History obtained by family member to help with translation  Patient reports sometimes forgets to take levemir at bedtime due to falling asleep, but this doesn't happen often. 2019- metformin stopped due to GI upset  Yesterday had nausea/abdominal pain  Typically has constipation and complains of polyuria  Had appt with RD 3/2023 that was cancelled    Has wound on right leg-- was treated with bactrim but it isn't closing. Current regimen:   Levemir 55 units daily at bedtime (RX says 50)  Humalog 24 units before each meal.     CGM Interpretation  MoPixtron   Device used Freestyle Libre2, Home use   Indication: Type 2 Diabetes  More than 72 hours of data was reviewed. Report to be scanned to chart.    Date Range: 10/18/2023-10/31/2023  Analysis of data:   Average Glucose: 297  Coefficient of Variation : 22.3%   Time in Target Range: 3%   Time Above Range: 21% high, 76% very high   Time Below Range: 0%   Interpretation of data:   Blood sugars very high overall       Last Eye Exam: UTD  Last Foot Exam: UTD 9/2023, Mild NPDR    Has hypertension: Taking losartan and Metoprolol   Has hyperlipidemia: Taking atorvastatin      For Vitamin D Deficiency, taking a weekly supplement    Patient Active Problem List   Diagnosis   • Type 2 diabetes mellitus with complication, with long-term current use of insulin (HCC)   • Severe persistent asthma without complication   • Other specified hypothyroidism   • Chest pain   • Palpitations   • Chronic bilateral low back pain without sciatica   • Neck pain   • Malignant neoplasm of right female breast ([address])   • Malignant neoplasm of upper-outer quadrant of right breast in female, estrogen receptor positive    • Hiatal hernia   • Screening for colon cancer   • Esophageal dysphagia   • Cellulitis of right axilla   • Chronic pain of right knee   • Hypercholesterolemia   • Hypothyroid   • Essential hypertension   • Bilateral pulmonary embolism (HCC)   • Radiation pneumonitis (HCC)   • Type 2 diabetes mellitus with hyperglycemia, with long-term current use of insulin (HCC)   • Diabetic polyneuropathy associated with type 2 diabetes mellitus (HCC)   • Use of anastrozole (Arimidex)   • Decreased ROM of right shoulder   • Lump of skin   • Chronic diastolic heart failure (HCC)   • Gastroesophageal reflux disease without esophagitis   • Muscle cramps   • Sebaceous cyst of axilla   • Subcutaneous mass of back   • Overweight with body mass index (BMI) of 28 to 28.9 in adult   • Anxiety   • Right foot pain   • Pelvic pain   • Bone pain   • Type 2 diabetes mellitus with microalbuminuria, with long-term current use of insulin (HCC)   • Depression, recurrent (HCC)   • Vaginal candidiasis   • Diabetes mellitus ([address])   • Seasonal allergies   • Diarrhea   • COVID-19   • Hypercalcemia   • Bilateral lower extremity edema   • Itchy eyes   • Cellulitis of right lower extremity   • Wound of right leg      Past Medical History:   Diagnosis Date   • Abdominal infection (HCC)     h-pylori   • Abnormal chest x-ray 04/05/2019   • Asthma    • Breast cancer ([address]) 06/26/2018   • Cancer ([address])     BREAST   • Diabetes mellitus ([address])    • Hiatal hernia    • History of chemotherapy 2018   • History of radiation therapy 2018   • Hypercholesterolemia    • Hypertension    • Hypothyroid    • Renal disorder    • Rheumatoid arthritis [facility])       Past Surgical History:   Procedure Laterality Date   • 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   • BREAST LUMPECTOMY Right 6/26/2018    Procedure: RIGHT BREAST NEEDLE LOCALIZATION X2 WITH RIGHT BREAST LUMPECTOMY ( NEEDLE LOC @ 1000);   Surgeon: Steven Liang MD;  Location: [facility];  Service: Surgical Oncology   • BREAST LUMPECTOMY Right 8/2/2018    Procedure: Lauren Montenegro INTRAOPERATIVE LYMPHATIC MAPPING , RIGHT SENTINEL NODE BIOPSY, REEXCISION  RIGHT BREAST LUMPECTOMY CAVITY (SUPERIOR MARGIN); Surgeon: Fazal Knutson MD;  Location: [facility];  Service: Surgical Oncology   • BREAST SURGERY Left    • CATARACT EXTRACTION, BILATERAL Bilateral    • COLONOSCOPY     • EGD AND COLONOSCOPY N/A 9/5/2018    Procedure: EGD AND COLONOSCOPY;  Surgeon: Osmany Cleveland MD;  Location: [facility]; Service: Gastroenterology   • FL GUIDED CENTRAL VENOUS ACCESS DEVICE INSERTION  9/13/2018   • KNEE SURGERY Right    • MAMMO NEEDLE LOCALIZATION RIGHT (ALL INC) Right 6/26/2018   • MAMMO STEREOTACTIC BREAST BIOPSY RIGHT (ALL INC) Right 5/23/2018   • RETINAL DETACHMENT SURGERY Right    • TUBAL LIGATION     • TUNNELED VENOUS PORT PLACEMENT Left 9/13/2018    Procedure: INSERTION VENOUS PORT (PORT-A-CATH);   Surgeon: Fazal Knutson MD;  Location: [facility];  Service: Surgical Oncology   • UPPER GASTROINTESTINAL ENDOSCOPY        Family History   Problem Relation Age of Onset   • Diabetes Mother    • Hypertension Mother    • Diabetes Father    • Hypertension Father    • No Known Problems Sister    • No Known Problems Sister    • No Known Problems Sister    • No Known Problems Sister    • No Known Problems Sister    • Diabetes Brother    • Diabetes Brother    • Kidney failure Brother    • Diabetes Brother    • Cancer Maternal Grandfather    • No Known Problems Daughter    • No Known Problems Daughter    • No Known Problems Daughter      Social History     Tobacco Use   • Smoking status: Never   • Smokeless tobacco: Never   Substance Use Topics   • Alcohol use: No     Allergies   Allergen Reactions   • Codeine Other (See Comments)     unknown   • Latex Other (See Comments)     fungus         Current Outpatient Medications:   •  acetaminophen (TYLENOL) 650 mg CR tablet, Take 1 tablet (650 mg total) by mouth every 8 (eight) hours as needed for mild pain, Disp: 30 tablet, Rfl: 0  •  Advair -21 MCG/ACT inhaler, INHALE 2 PUFFS 2 (TWO) TIMES A DAY RINSE MOUTH AFTER USE., Disp: 12 g, Rfl: 3  •  albuterol (2.5 mg/3 mL) 0.083 % nebulizer solution, Take 3 mL (2.5 mg total) by nebulization every 6 (six) hours as needed for wheezing or shortness of breath, Disp: 75 mL, Rfl: 5  •  albuterol (PROVENTIL HFA,VENTOLIN HFA) 90 mcg/act inhaler, INHALE 1 PUFF EVERY 4 (FOUR) HOURS AS NEEDED FOR WHEEZING, Disp: 8.5 g, Rfl: 3  •  aspirin 81 mg chewable tablet, Chew 81 mg daily, Disp: , Rfl:   •  atorvastatin (LIPITOR) 40 mg tablet, Take 1 tablet (40 mg total) by mouth daily, Disp: 90 tablet, Rfl: 2  •  calcium polycarbophil (FIBERCON) 625 mg tablet, Take 1 tablet (625 mg total) by mouth daily, Disp: 30 tablet, Rfl: 5  •  Diclofenac Sodium (VOLTAREN) 1 %, Apply 2 g topically 2 (two) times a day, Disp: 150 g, Rfl: 0  •  ergocalciferol (VITAMIN D2) 50,000 units, TAKE 1 CAPSULE (50,000 UNITS TOTAL) BY MOUTH ONCE A WEEK, Disp: 12 capsule, Rfl: 0  •  fluticasone (FLONASE) 50 mcg/act nasal spray, TAKE 1 SPRAY INTO EACH NOSTRIL DAILY, Disp: 16 g, Rfl: 4  •  furosemide (LASIX) 20 mg tablet, TAKE 1 TABLET (20 MG TOTAL) BY MOUTH 2 (TWO) TIMES A DAY, Disp: 180 tablet, Rfl: 0  •  guaiFENesin (MUCINEX) 600 mg 12 hr tablet, Take 1 tablet (600 mg total) by mouth every 12 (twelve) hours, Disp: 30 tablet, Rfl: 0  •  insulin degludec [place] FlexTouch) 200 units/mL CONCENTRATED U-200 injection pen, 60 units daily at 5PM, Disp: 27 mL, Rfl: 1  •  insulin lispro (HumaLOG) 100 units/mL injection pen, 24 units before each meal Plus sliding scale 150-200: 2 units 201-250: 4 units 251-300: 6 units 301-350: 8 units Over 350: 10 units, Disp: 90 mL, Rfl: 1  •  ketotifen (ZADITOR) 0.025 % ophthalmic solution, Administer 1 drop to both eyes 2 (two) times a day as needed (itchy eyes), Disp: 5 mL, Rfl: 0  •  loratadine (CLARITIN) 10 mg tablet, TAKE 1 TABLET (10 MG TOTAL) BY MOUTH DAILY, Disp: 90 tablet, Rfl: 2  •  losartan (COZAAR) 100 MG tablet, Take 1 tablet (100 mg total) by mouth daily, Disp: 90 tablet, Rfl: 1  •  Mag-G 500 (27 Mg) MG tablet, TAKE 1 TABLET (500 MG TOTAL) BY MOUTH DAILY, Disp: 90 tablet, Rfl: 1  •  metoprolol tartrate (LOPRESSOR) 25 mg tablet, Take 0.5 tablets (12.5 mg total) by mouth every 12 (twelve) hours, Disp: 180 tablet, Rfl: 0  •  omeprazole (PriLOSEC) 20 mg delayed release capsule, TAKE 1 CAPSULE (20 MG TOTAL) BY MOUTH DAILY, Disp: 90 capsule, Rfl: 2  •  OneTouch Delica Lancets 86J MISC, USE 3 (THREE) TIMES A DAY, Disp: 300 each, Rfl: 3  •  OneTouch Ultra test strip, USE 1 EACH 3 (THREE) TIMES A DAY USE AS INSTRUCTED, Disp: 300 each, Rfl: 2  •  polyethylene glycol (MIRALAX) 17 g packet, Take 17 g by mouth daily for 14 days, Disp: 28 each, Rfl: 5  •  Spiriva Respimat 2.5 MCG/ACT AERS inhaler, INHALE 2 PUFFS DAILY, Disp: 4 g, Rfl: 2  •  UltiCare Micro Pen Needles 32G X 4 MM MISC, INJECT UNDER THE SKIN 4 (FOUR) TIMES A DAY, Disp: 400 each, Rfl: 0  •  anastrozole (ARIMIDEX) 1 mg tablet, TAKE 1 TABLET (1 MG TOTAL) BY MOUTH DAILY, Disp: 90 tablet, Rfl: 3  •  EPINEPHrine (Auvi-Q) 0.1 mg/0.1 mL SOAJ, Inject 0.3 mL (0.3 mg total) into a muscle once for 1 dose (Patient not taking: Reported on 2/6/2023), Disp: 2 each, Rfl: 5  •  traZODone (DESYREL) 50 mg tablet, TAKE 0.5 TABLETS (25 MG TOTAL) BY MOUTH DAILY AT BEDTIME (Patient not taking: Reported on 10/30/2023), Disp: 90 tablet, Rfl: 0  •  zileuton 600 MG, TAKE 1 TABLET (600 MG TOTAL) BY MOUTH 2 (TWO) TIMES A DAY, Disp: 60 tablet, Rfl: 3    Review of Systems   Constitutional:  Negative for activity change, appetite change, chills, diaphoresis, fatigue, fever and unexpected weight change. HENT:  Negative for trouble swallowing and voice change. Eyes:  Negative for visual disturbance. Respiratory:  Negative for shortness of breath. Cardiovascular:  Negative for chest pain and palpitations. Gastrointestinal:  Negative for abdominal pain, constipation and diarrhea.    Endocrine: Positive for polyuria. Negative for cold intolerance, heat intolerance, polydipsia and polyphagia. Genitourinary:  Negative for frequency and menstrual problem. Musculoskeletal:  Negative for arthralgias and myalgias. Skin:  Negative for rash. Allergic/Immunologic: Negative for food allergies. Neurological:  Negative for dizziness and tremors. Hematological:  Negative for adenopathy. Psychiatric/Behavioral:  Negative for sleep disturbance. All other systems reviewed and are negative. Physical Exam:  Body mass index is 31.09 kg/m². /82   Pulse 88   Ht 5' 5" (1.651 m)   Wt 84.7 kg (186 lb 12.8 oz)   BMI 31.09 kg/m²    Wt Readings from Last 3 Encounters:   10/30/23 84.7 kg (186 lb 12.8 oz)   10/26/23 83.6 kg (184 lb 6.4 oz)   10/12/23 84.4 kg (186 lb)       Physical Exam  Vitals reviewed. Constitutional:       General: She is not in acute distress. Appearance: Normal appearance. She is well-developed. She is not toxic-appearing. HENT:      Head: Normocephalic and atraumatic. Eyes:      Conjunctiva/sclera: Conjunctivae normal.      Pupils: Pupils are equal, round, and reactive to light. Neck:      Thyroid: No thyromegaly. Cardiovascular:      Rate and Rhythm: Normal rate and regular rhythm. Heart sounds: Normal heart sounds. Pulmonary:      Effort: Pulmonary effort is normal. No respiratory distress. Breath sounds: Normal breath sounds. No wheezing or rales. Abdominal:      General: Bowel sounds are normal. There is no distension. Palpations: Abdomen is soft. Tenderness: There is no abdominal tenderness. Musculoskeletal:         General: Normal range of motion. Cervical back: Normal range of motion and neck supple. Skin:     General: Skin is warm and dry. Findings: Wound (1cm open wound on right calf with slight serious draining, no surrounding warmth/erythemia) present.    Neurological:      Mental Status: She is alert and oriented to person, place, and time. Psychiatric:         Mood and Affect: Mood normal.         Behavior: Behavior normal.           Labs:   Lab Results   Component Value Date    HGBA1C 11.9 (A) 10/30/2023    HGBA1C 11.9 (A) 07/17/2023    HGBA1C 11.2 (H) 02/13/2023     Lab Results   Component Value Date    CREATININE 0.58 (L) 05/30/2023    CREATININE 0.74 11/21/2022    CREATININE 0.83 11/10/2022    BUN 15 05/30/2023    K 3.6 05/30/2023     05/30/2023    CO2 28 05/30/2023     eGFR   Date Value Ref Range Status   05/30/2023 96 ml/min/1.73sq m Final     Lab Results   Component Value Date    HDL 44 (L) 11/21/2022    TRIG 161 (H) 11/21/2022     Lab Results   Component Value Date    ALT 19 05/30/2023    AST 15 05/30/2023    ALKPHOS 57 05/30/2023     Lab Results   Component Value Date    [id number] 2.710 11/21/2022    LLN9HZOWHGKA 2.380 06/22/2022    [id number] 3.780 03/01/2022     Lab Results   Component Value Date    FREET4 1.36 11/21/2022             Discussed with the patient and all questioned fully answered. She will call me if any problems arise. Follow-up appointment in 3 months. Counseled patient on diagnostic results, prognosis, risk and benefit of treatment options, instruction for management, importance of treatment compliance, Risk  factor reduction and impressions    Patient Instructions   Humalog 24 units before each meal PLUS sliding scale  150-200: 2 units  201-250: 4 units  251-300: 6 units  301-350: 8 units  Over 350: 10 units     Change Levemir 60 units to Tresiba 60 units daily   Send Glucose log in 1 week for review. Try new injection sites-- abdomen or fatty part of arm.      Dennis Zepeda PA-C

## 2023-10-31 DIAGNOSIS — J45.50 SEVERE PERSISTENT ASTHMA WITHOUT COMPLICATION: ICD-10-CM

## 2023-10-31 PROBLEM — S81.801A WOUND OF RIGHT LEG: Status: ACTIVE | Noted: 2023-10-31

## 2023-10-31 NOTE — ASSESSMENT & PLAN NOTE
Refer to wound care for treatment and evaluation as her open wound has not healed despite treatment with antibiotics. Discussed risk of severe infection or amputation with uncontrolled diabetes and need for better diabetes control. She was given contact information for wound care and she would like to be seen at [facility] location.

## 2023-11-01 RX ORDER — TIOTROPIUM BROMIDE INHALATION SPRAY 3.12 UG/1
2 SPRAY, METERED RESPIRATORY (INHALATION) DAILY
Qty: 18 G | Refills: 1 | Status: SHIPPED | OUTPATIENT
Start: 2023-11-01

## 2023-11-08 ENCOUNTER — PATIENT OUTREACH (OUTPATIENT)
Dept: OBGYN CLINIC | Facility: CLINIC | Age: 68
End: 2023-11-08

## 2023-11-10 ENCOUNTER — OFFICE VISIT (OUTPATIENT)
Dept: WOUND CARE | Facility: CLINIC | Age: 68
End: 2023-11-10
Payer: COMMERCIAL

## 2023-11-10 VITALS
WEIGHT: 186 LBS | DIASTOLIC BLOOD PRESSURE: 74 MMHG | HEART RATE: 79 BPM | OXYGEN SATURATION: 97 % | BODY MASS INDEX: 36.52 KG/M2 | RESPIRATION RATE: 16 BRPM | HEIGHT: 60 IN | SYSTOLIC BLOOD PRESSURE: 140 MMHG | TEMPERATURE: 97.6 F

## 2023-11-10 DIAGNOSIS — Z79.4 TYPE 2 DIABETES MELLITUS WITH COMPLICATION, WITH LONG-TERM CURRENT USE OF INSULIN (HCC): ICD-10-CM

## 2023-11-10 DIAGNOSIS — S81.801A OPEN WOUND OF RIGHT LOWER LEG, INITIAL ENCOUNTER: Primary | ICD-10-CM

## 2023-11-10 DIAGNOSIS — E11.8 TYPE 2 DIABETES MELLITUS WITH COMPLICATION, WITH LONG-TERM CURRENT USE OF INSULIN (HCC): ICD-10-CM

## 2023-11-10 PROCEDURE — 97597 DBRDMT OPN WND 1ST 20 CM/<: CPT | Performed by: NURSE PRACTITIONER

## 2023-11-10 PROCEDURE — 99204 OFFICE O/P NEW MOD 45 MIN: CPT | Performed by: NURSE PRACTITIONER

## 2023-11-10 PROCEDURE — 29581 APPL MULTLAYER CMPRN SYS LEG: CPT

## 2023-11-10 RX ORDER — LIDOCAINE 40 MG/G
CREAM TOPICAL ONCE
Status: COMPLETED | OUTPATIENT
Start: 2023-11-10 | End: 2023-11-10

## 2023-11-10 RX ADMIN — LIDOCAINE: 40 CREAM TOPICAL at 14:29

## 2023-11-10 NOTE — PATIENT INSTRUCTIONS
Orders Placed This Encounter   Procedures    Wound cleansing and dressings     Wash your hands with soap and water. Remove old dressing, discard into plastic bag and place in trash. Cleanse the wound with Mild Soap & Water prior to applying a clean dressing. Do not use tissue or cotton balls. Do not scrub the wound. Pat dry using gauze. Shower yes, with protection. Your dressings can not get wet. If they do please contact us ASAP. You can purchase a cast cover for showers. Some people have luck using plastic bags & tape. Apply skin prep to skin surrounding wound   Apply Hydroferra Blue to the wound. Cover with abd. Secure with Coflex Lite System  Change dressing weekly in wound center. Please try to consume 3-4 servings of protein (30g) each day. Follow up in [address] [place] [address] in 1 weeks    Today's Treatment:  Johnston Filter debrided your wound, we cleansed with normal saline & redressed as ordered above.      Standing Status:   Future     Standing Expiration Date:   11/10/2024

## 2023-11-10 NOTE — PROGRESS NOTES
Patient ID: Enrique Guillen is a 80 y.o. female Date of Birth 1955     Chief Complaint  Chief Complaint   Patient presents with    New Patient Visit     New patient visit for chronic wound to right leg. Pt reports she has had the wound for over a month. She does note increased swelling to her legs today. Allergies  Codeine and Latex    Assessment:     Diagnoses and all orders for this visit:    Open wound of right lower leg, initial encounter  -     lidocaine (LMX) 4 % cream  -     Wound cleansing and dressings; Future  -     Cast Application    Type 2 diabetes mellitus with complication, with long-term current use of insulin (HCC)  -     lidocaine (LMX) 4 % cream  -     Wound cleansing and dressings; Future  -     Cast Application    Other orders  -     Debridement              Debridement   Wound 11/10/23 Other (comment) Leg Right;Medial    Universal Protocol:  Consent: Written consent obtained. Consent given by: patient  Time out: Immediately prior to procedure a "time out" was called to verify the correct patient, procedure, equipment, support staff and site/side marked as required. Timeout called at: 11/10/2023 2:58 PM.  Patient identity confirmed: verbally with patient    Performed by: NP  Debridement type: selective  Pain control: lidocaine 4%  Post-debridement measurements  Length (cm): 1.5  Width (cm): 2  Depth (cm): 0.1  Percent debrided: 100%  Surface Area (cm^2): 3  Area debrided (cm^2): 3  Volume (cm^3): 0.3  Devitalized tissue debrided: biofilm, fibrin and slough  Instrument(s) utilized: curette  Bleeding: small  Hemostasis obtained with: pressure  Procedural pain (0-10): 0  Post-procedural pain: 0   Response to treatment: procedure was tolerated well        Plan:  Initial visit. Patient has a full thickness ulcer of her RLE not showing any s/s of infection. Unclear etiology. ABIs completed in office today: 0.86 RLE and 0.81 LLE.    Will have Hydrafera blue applied to wound covered with ABD and secured with CoFlex Lite wrap. Counseled patient and sister to not get dressing wet   A1C results reviewed with the patient today. Patient's BS are currently elevated with her most recent A1c being 11.9 as of 10/30/23. Patient currently working with her Endocrinologist to achieve tight glycemic control  Patient will follow up in 1 week      Wound 11/10/23 Other (comment) Leg Right;Medial (Active)   Wound Image Images linked 11/10/23 1408   Wound Description Yellow; White;[place] 11/10/23 1407   Wound Length (cm) 1.5 cm 11/10/23 1407   Wound Width (cm) 2 cm 11/10/23 1407   Wound Depth (cm) 0.1 cm 11/10/23 1407   Wound Surface Area (cm^2) 3 cm^2 11/10/23 1407   Wound Volume (cm^3) 0.3 cm^3 11/10/23 1407   Calculated Wound Volume (cm^3) 0.3 cm^3 11/10/23 1407   Drainage Amount Moderate 11/10/23 1407   Drainage Description Serosanguineous; Yellow 11/10/23 1407   Non-staged Wound Description Full thickness 11/10/23 1407   Treatments Irrigation with NSS 11/10/23 1407   Patient Tolerance Tolerated well 11/10/23 1407   Dressing Status Removed 11/10/23 1407       Incision 06/26/18 Breast Right (Active)   Date First Assessed/Time First Assessed: 06/26/18 1339   Location: Breast  Wound Location Orientation: Right  Wound Description (Comments):  DERMABOND PRINEO USED AS DRESSING       Incision 08/02/18 Breast Right (Active)   Date First Assessed/Time First Assessed: 08/02/18 1003   Location: Breast  Wound Location Orientation: Right  Wound Description (Comments):  1 incision closest to axillia hist.1 incision right steris       Incision 09/13/18 Chest Left (Active)   Date First Assessed/Time First Assessed: 09/13/18 0829   Location: Chest  Wound Location Orientation: Left       Wound 11/10/23 Other (comment) Leg Right;Medial (Active)   Date First Assessed/Time First Assessed: 11/10/23 1406   Primary Wound Type:  Other (comment)  Location: Leg  Wound Location Orientation: Right;Medial       Subjective: .    Patient is a 80year old female who presents to the [address] [place] [address] center as a new patient for an open wound of her right lower leg. Patient reports her wound has been present for approximately 1 month. She reports she woke up one morning with a small bump on her leg which progressively got worse. She has a hx of bilateral lower extremity edema since completing chemo and radiation for breast cancer 1 year ago. She denies wearing any type of compression. Patient has a hx of type 2 DM with her most recent A1c 11.9 as of 10/30. Patient reports her wound drains a small amount of drainage. She keeps her wound MYRANDA when she is at home and keeps it covered with a band aid when outside her home. She denies any fevers or chills. The following portions of the patient's history were reviewed and updated as appropriate: She  has a past medical history of Abdominal infection ([address]), Abnormal chest x-ray (04/05/2019), Asthma, Breast cancer ([address]) (06/26/2018), Cancer ([address]), Diabetes mellitus ([address]), Hiatal hernia, History of chemotherapy (2018), History of radiation therapy (2018), Hypercholesterolemia, Hypertension, Hypothyroid, Renal disorder, and Rheumatoid arthritis ([address]).   She   Patient Active Problem List    Diagnosis Date Noted    Wound of right leg 10/31/2023    Bilateral lower extremity edema 10/10/2023    Itchy eyes 10/10/2023    Cellulitis of right lower extremity 10/10/2023    Hypercalcemia 05/30/2023    COVID-19 11/17/2022    Diarrhea 11/10/2022    Seasonal allergies 10/31/2022    Diabetes mellitus ([address]) 09/26/2022    Vaginal candidiasis 08/26/2022    Depression, recurrent ([address]) 06/13/2022    Type 2 diabetes mellitus with microalbuminuria, with long-term current use of insulin ([address]) 11/10/2021    Pelvic pain 09/29/2021    Bone pain 09/29/2021    Anxiety 09/15/2021    Right foot pain 09/15/2021    Overweight with body mass index (BMI) of 28 to 28.9 in adult 09/08/2021    Sebaceous cyst of axilla 08/17/2021 Subcutaneous mass of back 08/17/2021    Muscle cramps 06/09/2021    Gastroesophageal reflux disease without esophagitis 03/03/2021    Chronic diastolic heart failure ([address]) 09/01/2020    Use of anastrozole (Arimidex) 04/20/2020    Decreased ROM of right shoulder 04/20/2020    Lump of skin 04/20/2020    Diabetic polyneuropathy associated with type 2 diabetes mellitus ([address]) 11/24/2019    Type 2 diabetes mellitus with hyperglycemia, with long-term current use of insulin ([address]) 05/29/2019    Radiation pneumonitis ([address]) 05/18/2019    Bilateral pulmonary embolism ([address]) 11/06/2018    Hypothyroid     Essential hypertension     Hypercholesterolemia 09/21/2018    Chronic pain of right knee 09/04/2018    Cellulitis of right axilla 08/22/2018    Hiatal hernia 08/08/2018    Screening for colon cancer 08/08/2018    Esophageal dysphagia 08/08/2018    Malignant neoplasm of upper-outer quadrant of right breast in female, estrogen receptor positive  07/19/2018    Malignant neoplasm of right female breast ([address]) 06/26/2018    Neck pain 06/13/2018    Chronic bilateral low back pain without sciatica 05/22/2018    Palpitations 05/09/2018    Type 2 diabetes mellitus with complication, with long-term current use of insulin ([address]) 03/27/2018    Other specified hypothyroidism 03/27/2018    Chest pain 03/27/2018    Severe persistent asthma without complication 44/46/8851     She  has a past surgical history that includes Tubal ligation; Knee surgery (Right); Cataract extraction, bilateral (Bilateral); Retinal detachment surgery (Right); Breast surgery (Left); EGD AND COLONOSCOPY (N/A, 9/5/2018); Tunneled venous port placement (Left, 9/13/2018); FL guided central venous access device insertion (9/13/2018); Mammo stereotactic breast biopsy right (all inc) (Right, 5/23/2018); Mammo needle localization right (all inc) (Right, 6/26/2018); Breast biopsy (Right, 05/23/2018); Breast lumpectomy (Right, 6/26/2018);  Breast lumpectomy (Right, 8/2/2018); Colonoscopy; and Upper gastrointestinal endoscopy. Her family history includes Cancer in her maternal grandfather; Diabetes in her brother, brother, brother, father, and mother; Hypertension in her father and mother; Kidney failure in her brother; No Known Problems in her daughter, daughter, daughter, sister, sister, sister, sister, and sister. She  reports that she has never smoked. She has never used smokeless tobacco. She reports that she does not drink alcohol and does not use drugs.   Current Outpatient Medications   Medication Sig Dispense Refill    acetaminophen (TYLENOL) 650 mg CR tablet Take 1 tablet (650 mg total) by mouth every 8 (eight) hours as needed for mild pain 30 tablet 0    Advair -21 MCG/ACT inhaler INHALE 2 PUFFS 2 (TWO) TIMES A DAY RINSE MOUTH AFTER USE. 12 g 3    albuterol (2.5 mg/3 mL) 0.083 % nebulizer solution Take 3 mL (2.5 mg total) by nebulization every 6 (six) hours as needed for wheezing or shortness of breath 75 mL 5    albuterol (PROVENTIL HFA,VENTOLIN HFA) 90 mcg/act inhaler INHALE 1 PUFF EVERY 4 (FOUR) HOURS AS NEEDED FOR WHEEZING 8.5 g 3    anastrozole (ARIMIDEX) 1 mg tablet TAKE 1 TABLET (1 MG TOTAL) BY MOUTH DAILY 90 tablet 3    aspirin 81 mg chewable tablet Chew 81 mg daily      atorvastatin (LIPITOR) 40 mg tablet Take 1 tablet (40 mg total) by mouth daily 90 tablet 2    Diclofenac Sodium (VOLTAREN) 1 % Apply 2 g topically 2 (two) times a day 150 g 0    ergocalciferol (VITAMIN D2) 50,000 units TAKE 1 CAPSULE (50,000 UNITS TOTAL) BY MOUTH ONCE A WEEK 12 capsule 0    insulin lispro (HumaLOG) 100 units/mL injection pen 24 units before each meal Plus sliding scale 150-200: 2 units 201-250: 4 units 251-300: 6 units 301-350: 8 units Over 350: 10 units 90 mL 1    ketotifen (ZADITOR) 0.025 % ophthalmic solution Administer 1 drop to both eyes 2 (two) times a day as needed (itchy eyes) 5 mL 0    loratadine (CLARITIN) 10 mg tablet TAKE 1 TABLET (10 MG TOTAL) BY MOUTH DAILY 90 tablet 2    losartan (COZAAR) 100 MG tablet Take 1 tablet (100 mg total) by mouth daily 90 tablet 1    Mag-G 500 (27 Mg) MG tablet TAKE 1 TABLET (500 MG TOTAL) BY MOUTH DAILY 90 tablet 1    metoprolol tartrate (LOPRESSOR) 25 mg tablet Take 0.5 tablets (12.5 mg total) by mouth every 12 (twelve) hours 180 tablet 0    omeprazole (PriLOSEC) 20 mg delayed release capsule TAKE 1 CAPSULE (20 MG TOTAL) BY MOUTH DAILY 90 capsule 2    OneTouch Delica Lancets 35F MISC USE 3 (THREE) TIMES A  each 3    OneTouch Ultra test strip USE 1 EACH 3 (THREE) TIMES A DAY USE AS INSTRUCTED 300 each 2    tiotropium (Spiriva Respimat) 2.5 MCG/ACT AERS inhaler INHALE 2 PUFFS DAILY 18 g 1    UltiCare Micro Pen Needles 32G X 4 MM MISC INJECT UNDER THE SKIN 4 (FOUR) TIMES A  each 0    zileuton 600 MG TAKE 1 TABLET (600 MG TOTAL) BY MOUTH 2 (TWO) TIMES A DAY 60 tablet 3    calcium polycarbophil (FIBERCON) 625 mg tablet Take 1 tablet (625 mg total) by mouth daily (Patient not taking: Reported on 11/10/2023) 30 tablet 5    EPINEPHrine (Auvi-Q) 0.1 mg/0.1 mL SOAJ Inject 0.3 mL (0.3 mg total) into a muscle once for 1 dose (Patient not taking: Reported on 2/6/2023) 2 each 5    fluticasone (FLONASE) 50 mcg/act nasal spray TAKE 1 SPRAY INTO EACH NOSTRIL DAILY (Patient not taking: Reported on 11/10/2023) 16 g 4    furosemide (LASIX) 20 mg tablet TAKE 1 TABLET (20 MG TOTAL) BY MOUTH 2 (TWO) TIMES A  tablet 0    guaiFENesin (MUCINEX) 600 mg 12 hr tablet Take 1 tablet (600 mg total) by mouth every 12 (twelve) hours (Patient not taking: Reported on 11/10/2023) 30 tablet 0    insulin degludec (Tresiba FlexTouch) 200 units/mL CONCENTRATED U-200 injection pen 60 units daily at 5PM (Patient taking differently: Inject 10 Units under the skin daily 60 units daily at 5PM) 27 mL 1    polyethylene glycol (MIRALAX) 17 g packet Take 17 g by mouth daily for 14 days 28 each 5    traZODone (DESYREL) 50 mg tablet TAKE 0.5 TABLETS (25 MG TOTAL) BY MOUTH DAILY AT BEDTIME (Patient not taking: Reported on 10/30/2023) 90 tablet 0     No current facility-administered medications for this visit. She is allergic to codeine and latex. .    Review of Systems   Constitutional: Negative. HENT:  Negative for ear pain and hearing loss. Eyes:  Negative for pain. Respiratory:  Negative for chest tightness and shortness of breath. Cardiovascular:  Positive for leg swelling. Negative for chest pain and palpitations. Gastrointestinal:  Negative for diarrhea, nausea and vomiting. Genitourinary:  Negative for dysuria. Musculoskeletal:  Negative for gait problem. Skin:  Positive for wound. Neurological:  Negative for tremors and weakness. Psychiatric/Behavioral:  Negative for behavioral problems, confusion and suicidal ideas. Objective:       Wound 11/10/23 Other (comment) Leg Right;Medial (Active)   Wound Image Images linked 11/10/23 1408   Wound Description Yellow; White;[place] 11/10/23 1407   Wound Length (cm) 1.5 cm 11/10/23 1407   Wound Width (cm) 2 cm 11/10/23 1407   Wound Depth (cm) 0.1 cm 11/10/23 1407   Wound Surface Area (cm^2) 3 cm^2 11/10/23 1407   Wound Volume (cm^3) 0.3 cm^3 11/10/23 1407   Calculated Wound Volume (cm^3) 0.3 cm^3 11/10/23 1407   Drainage Amount Moderate 11/10/23 1407   Drainage Description Serosanguineous; Yellow 11/10/23 1407   Non-staged Wound Description Full thickness 11/10/23 1407   Treatments Irrigation with NSS 11/10/23 1407   Patient Tolerance Tolerated well 11/10/23 1407   Dressing Status Removed 11/10/23 1407       /74   Pulse 79   Temp 97.6 °F (36.4 °C) (Tympanic)   Resp 16   Ht 5' (1.524 m)   Wt 84.4 kg (186 lb)   SpO2 97%   BMI 36.33 kg/m²     Physical Exam  Vitals and nursing note reviewed. Constitutional:       General: She is not in acute distress. Appearance: Normal appearance. She is obese. HENT:      Head: Normocephalic and atraumatic.    Eyes:      General:         Right eye: No discharge. Left eye: No discharge. Pulmonary:      Effort: Pulmonary effort is normal. No respiratory distress. Musculoskeletal:         General: Normal range of motion. Cervical back: Normal range of motion and neck supple. No rigidity. Right lower le+ Edema present. Left lower le+ Edema present. Skin:     General: Skin is warm and dry. Findings: Wound present. No erythema. Neurological:      General: No focal deficit present. Mental Status: She is alert and oriented to person, place, and time. Mental status is at baseline. Psychiatric:         Mood and Affect: Mood normal.         Behavior: Behavior normal.         Thought Content: Thought content normal.         Judgment: Judgment normal.                     Wound Instructions:  Orders Placed This Encounter   Procedures    Wound cleansing and dressings     Wash your hands with soap and water. Remove old dressing, discard into plastic bag and place in trash. Cleanse the wound with Mild Soap & Water prior to applying a clean dressing. Do not use tissue or cotton balls. Do not scrub the wound. Pat dry using gauze. Shower yes, with protection. Your dressings can not get wet. If they do please contact us ASAP. You can purchase a cast cover for showers. Some people have luck using plastic bags & tape. Apply skin prep to skin surrounding wound   Apply Hydroferra Blue to the wound. Cover with abd. Secure with Coflex Lite System  Change dressing weekly in wound center. Please try to consume 3-4 servings of protein (30g) each day. Follow up in [address] [place] [address] in 1 weeks    Today's Treatment:  Kapil Godoy debrided your wound, we cleansed with normal saline & redressed as ordered above.      Standing Status:   Future     Standing Expiration Date:       Cast Application     This order was created via procedure documentation    Debridement     This order was created via procedure documentation        Diagnosis ICD-10-CM Associated Orders   1. Open wound of right lower leg, initial encounter  S81.801A lidocaine (LMX) 4 % cream     Wound cleansing and dressings     Cast Application      2.  Type 2 diabetes mellitus with complication, with long-term current use of insulin ([facility])  E11.8 lidocaine (LMX) 4 % cream    Z79.4 Wound cleansing and dressings     Cast Application

## 2023-11-10 NOTE — PROGRESS NOTES
Cast Application    Date/Time: 11/10/2023 2:00 PM    Performed by: Yessi Boyle RN  Authorized by: ARIN Mejia  Universal Protocol:  Consent: Verbal consent obtained. Consent given by: patient  Patient understanding: patient states understanding of the procedure being performed  Patient identity confirmed: verbally with patient    Pre-procedure details:     Sensation:  Unchanged  Procedure details:     Laterality:  Right    Location:  Leg    Leg:  R lower leg    Strapping: no  Cast type:  Multi-layer compression short leg        Supplies:  2 layer wrap (hydroferra blue)  Post-procedure details:     Pain:  Improved    Sensation:  Unchanged    Patient tolerance of procedure: Tolerated well, no immediate complications  Comments:      Multilayer wrap procedure     Before application, LISA and/or TBI determined to be adequate for healing and application of compression. Lower extremity washed prior to application of compression wrap. With the foot in dorsiflexed position, COFLEX Lite was applied as per physician orders without complications or complaint of pain. The procedure was tolerated well. Toes warm & pink post application. Patient provided education & reinforced to observe toes for any discoloration, swelling or tingling and instructed when to report to the  [address] or to remove compression. Wound care as per providers orders, patient tolerated well.

## 2023-11-13 ENCOUNTER — LAB (OUTPATIENT)
Dept: LAB | Facility: HOSPITAL | Age: 68
End: 2023-11-13
Payer: COMMERCIAL

## 2023-11-13 DIAGNOSIS — E03.9 ACQUIRED HYPOTHYROIDISM: Primary | ICD-10-CM

## 2023-11-13 DIAGNOSIS — Z79.4 TYPE 2 DIABETES MELLITUS WITH HYPERGLYCEMIA, WITH LONG-TERM CURRENT USE OF INSULIN (HCC): ICD-10-CM

## 2023-11-13 DIAGNOSIS — Z79.4 TYPE 2 DIABETES MELLITUS WITH COMPLICATION, WITH LONG-TERM CURRENT USE OF INSULIN (HCC): ICD-10-CM

## 2023-11-13 DIAGNOSIS — R80.9 TYPE 2 DIABETES MELLITUS WITH MICROALBUMINURIA, WITH LONG-TERM CURRENT USE OF INSULIN (HCC): ICD-10-CM

## 2023-11-13 DIAGNOSIS — E11.29 TYPE 2 DIABETES MELLITUS WITH MICROALBUMINURIA, WITH LONG-TERM CURRENT USE OF INSULIN (HCC): ICD-10-CM

## 2023-11-13 DIAGNOSIS — E11.8 TYPE 2 DIABETES MELLITUS WITH COMPLICATION, WITH LONG-TERM CURRENT USE OF INSULIN (HCC): ICD-10-CM

## 2023-11-13 DIAGNOSIS — S81.801S WOUND OF RIGHT LOWER EXTREMITY, SEQUELA: ICD-10-CM

## 2023-11-13 DIAGNOSIS — E78.00 HYPERCHOLESTEROLEMIA: ICD-10-CM

## 2023-11-13 DIAGNOSIS — Z79.4 TYPE 2 DIABETES MELLITUS WITH MICROALBUMINURIA, WITH LONG-TERM CURRENT USE OF INSULIN (HCC): ICD-10-CM

## 2023-11-13 DIAGNOSIS — E11.65 TYPE 2 DIABETES MELLITUS WITH HYPERGLYCEMIA, WITH LONG-TERM CURRENT USE OF INSULIN (HCC): ICD-10-CM

## 2023-11-13 LAB
ALBUMIN SERPL BCP-MCNC: 4.2 G/DL (ref 3.5–5)
ALP SERPL-CCNC: 74 U/L (ref 34–104)
ALT SERPL W P-5'-P-CCNC: 23 U/L (ref 7–52)
ANION GAP SERPL CALCULATED.3IONS-SCNC: 5 MMOL/L
AST SERPL W P-5'-P-CCNC: 17 U/L (ref 13–39)
BASOPHILS # BLD AUTO: 0.09 THOUSANDS/ÂΜL (ref 0–0.1)
BASOPHILS NFR BLD AUTO: 1 % (ref 0–1)
BILIRUB SERPL-MCNC: 0.54 MG/DL (ref 0.2–1)
BUN SERPL-MCNC: 17 MG/DL (ref 5–25)
CALCIUM SERPL-MCNC: 9.9 MG/DL (ref 8.4–10.2)
CHLORIDE SERPL-SCNC: 103 MMOL/L (ref 96–108)
CO2 SERPL-SCNC: 31 MMOL/L (ref 21–32)
CREAT SERPL-MCNC: 0.54 MG/DL (ref 0.6–1.3)
CREAT UR-MCNC: 41.4 MG/DL
EOSINOPHIL # BLD AUTO: 0.29 THOUSAND/ÂΜL (ref 0–0.61)
EOSINOPHIL NFR BLD AUTO: 4 % (ref 0–6)
ERYTHROCYTE [DISTWIDTH] IN BLOOD BY AUTOMATED COUNT: 13.8 % (ref 11.6–15.1)
EST. AVERAGE GLUCOSE BLD GHB EST-MCNC: 278 MG/DL
GFR SERPL CREATININE-BSD FRML MDRD: 97 ML/MIN/1.73SQ M
GLUCOSE P FAST SERPL-MCNC: 98 MG/DL (ref 65–99)
HBA1C MFR BLD: 11.3 %
HCT VFR BLD AUTO: 38.7 % (ref 34.8–46.1)
HGB BLD-MCNC: 12.3 G/DL (ref 11.5–15.4)
IMM GRANULOCYTES # BLD AUTO: 0.03 THOUSAND/UL (ref 0–0.2)
IMM GRANULOCYTES NFR BLD AUTO: 0 % (ref 0–2)
LYMPHOCYTES # BLD AUTO: 2.31 THOUSANDS/ÂΜL (ref 0.6–4.47)
LYMPHOCYTES NFR BLD AUTO: 34 % (ref 14–44)
MCH RBC QN AUTO: 28.6 PG (ref 26.8–34.3)
MCHC RBC AUTO-ENTMCNC: 31.8 G/DL (ref 31.4–37.4)
MCV RBC AUTO: 90 FL (ref 82–98)
MICROALBUMIN UR-MCNC: 96.7 MG/L
MICROALBUMIN/CREAT 24H UR: 234 MG/G CREATININE (ref 0–30)
MONOCYTES # BLD AUTO: 0.6 THOUSAND/ÂΜL (ref 0.17–1.22)
MONOCYTES NFR BLD AUTO: 9 % (ref 4–12)
NEUTROPHILS # BLD AUTO: 3.45 THOUSANDS/ÂΜL (ref 1.85–7.62)
NEUTS SEG NFR BLD AUTO: 52 % (ref 43–75)
NRBC BLD AUTO-RTO: 0 /100 WBCS
PLATELET # BLD AUTO: 310 THOUSANDS/UL (ref 149–390)
PMV BLD AUTO: 11.6 FL (ref 8.9–12.7)
POTASSIUM SERPL-SCNC: 3.7 MMOL/L (ref 3.5–5.3)
PROT SERPL-MCNC: 7.7 G/DL (ref 6.4–8.4)
RBC # BLD AUTO: 4.3 MILLION/UL (ref 3.81–5.12)
SODIUM SERPL-SCNC: 139 MMOL/L (ref 135–147)
T4 FREE SERPL-MCNC: 0.75 NG/DL (ref 0.61–1.12)
TSH SERPL DL<=0.05 MIU/L-ACNC: 7.15 UIU/ML (ref 0.45–4.5)
WBC # BLD AUTO: 6.77 THOUSAND/UL (ref 4.31–10.16)

## 2023-11-13 PROCEDURE — 84439 ASSAY OF FREE THYROXINE: CPT

## 2023-11-13 PROCEDURE — 80053 COMPREHEN METABOLIC PANEL: CPT

## 2023-11-13 PROCEDURE — 82570 ASSAY OF URINE CREATININE: CPT

## 2023-11-13 PROCEDURE — 36415 COLL VENOUS BLD VENIPUNCTURE: CPT

## 2023-11-13 PROCEDURE — 85025 COMPLETE CBC W/AUTO DIFF WBC: CPT

## 2023-11-13 PROCEDURE — 83036 HEMOGLOBIN GLYCOSYLATED A1C: CPT

## 2023-11-13 PROCEDURE — 84443 ASSAY THYROID STIM HORMONE: CPT

## 2023-11-13 PROCEDURE — 82043 UR ALBUMIN QUANTITATIVE: CPT

## 2023-11-13 NOTE — RESULT ENCOUNTER NOTE
Please call the patient regarding labs -diabetes very poorly controlled, hemoglobin A1c 11.3. She was recently seen in the office a couple of weeks back. Has she been taking her insulins on a regular basis?   Please get a fingerstick log as well as the current insulin regimen

## 2023-11-15 ENCOUNTER — OFFICE VISIT (OUTPATIENT)
Dept: WOUND CARE | Facility: CLINIC | Age: 68
End: 2023-11-15
Payer: COMMERCIAL

## 2023-11-15 ENCOUNTER — PATIENT OUTREACH (OUTPATIENT)
Dept: OBGYN CLINIC | Facility: CLINIC | Age: 68
End: 2023-11-15

## 2023-11-15 VITALS
TEMPERATURE: 97.4 F | RESPIRATION RATE: 16 BRPM | HEART RATE: 68 BPM | SYSTOLIC BLOOD PRESSURE: 144 MMHG | DIASTOLIC BLOOD PRESSURE: 74 MMHG

## 2023-11-15 DIAGNOSIS — S81.801A OPEN WOUND OF RIGHT LOWER LEG, INITIAL ENCOUNTER: Primary | ICD-10-CM

## 2023-11-15 PROCEDURE — 97597 DBRDMT OPN WND 1ST 20 CM/<: CPT | Performed by: FAMILY MEDICINE

## 2023-11-15 RX ORDER — LIDOCAINE 40 MG/G
CREAM TOPICAL ONCE
Status: COMPLETED | OUTPATIENT
Start: 2023-11-15 | End: 2023-11-15

## 2023-11-15 RX ADMIN — LIDOCAINE: 40 CREAM TOPICAL at 14:40

## 2023-11-15 NOTE — PATIENT INSTRUCTIONS
Orders Placed This Encounter   Procedures    Wound cleansing and dressings     Right leg wound      Shower yes, with protection. Your dressings can not get wet. If they do please contact us ASAP. Apply skin prep to skin surrounding wound   Apply Hydrofera Blue to the wound. Cover with abd. Secure with rolled gauze and tape   Secure with Coflex Lite   Change dressing weekly in wound center. Please try to consume 3-4 servings of protein (30g) each day. Multi-layer compression wrap Instructions--Coflex lite     Keep compression wrap/wraps clean and dry. If wraps are too tight and you experience numbness/tingling, call the wound center. If after hours, remove wraps or proceed to nearest E.R. and call wound center in AM.    [place] will be changed 1 x weekly    Avoid prolonged standing in one place. Elevate leg(s) above the level of the heart when sitting or as much as possible.      Standing Status:   Future     Standing Expiration Date:   11/15/2024

## 2023-11-15 NOTE — PROGRESS NOTES
Patient ID: Franklyn Guillen is a 80 y.o. female Date of Birth 1955       Chief Complaint   Patient presents with   • Follow Up Wound Care Visit     Right leg wound        Allergies:  Codeine and Latex    Diagnosis:      Diagnosis ICD-10-CM Associated Orders   1. Open wound of right lower leg, initial encounter  S81.801A lidocaine (LMX) 4 % cream     Wound cleansing and dressings     VAS reflux lower limb venous duplex study with reflux assesment, unilateral     Debridement                Assessment & Plan:  RLE: Overall unchanged since last visit. No erythema lymphangitic streaking malodor or purulent drainage. Selective debridement and debulking of necrotic tissue  Hydrofera Blue followed by ABD and secured with Coflex lite  Venous reflux studies ordered to evaluate for venous insufficiency  Discussed importance of lower extremity elevation when seated and avoiding prolonged standing  Follow-up in 1 week or sooner if needed     Subjective:   "11/10/23: Patient is a 80year old female who presents to the [address] [place] [address] wound center as a new patient for an open wound of her right lower leg. Patient reports her wound has been present for approximately 1 month. She reports she woke up one morning with a small bump on her leg which progressively got worse. She has a hx of bilateral lower extremity edema since completing chemo and radiation for breast cancer 1 year ago. She denies wearing any type of compression. Patient has a hx of type 2 DM with her most recent A1c 11.9 as of 10/30. Patient reports her wound drains a small amount of drainage. She keeps her wound MYRANDA when she is at home and keeps it covered with a band aid when outside her home. She denies any fevers or chills. "    11/15/2023: Kaye Weber presents to wound center today for follow-up of right lower extremity wound. Her sister is present at bedside providing translation. She denies acute complaints and reports she tolerated Coflex lite well.   Denies fever, chills.       The following portions of the patient's history were reviewed and updated as appropriate:   Patient Active Problem List   Diagnosis   • Type 2 diabetes mellitus with complication, with long-term current use of insulin ([address])   • Severe persistent asthma without complication   • Other specified hypothyroidism   • Chest pain   • Palpitations   • Chronic bilateral low back pain without sciatica   • Neck pain   • Malignant neoplasm of right female breast ([address])   • Malignant neoplasm of upper-outer quadrant of right breast in female, estrogen receptor positive    • Hiatal hernia   • Screening for colon cancer   • Esophageal dysphagia   • Cellulitis of right axilla   • Chronic pain of right knee   • Hypercholesterolemia   • Hypothyroid   • Essential hypertension   • Bilateral pulmonary embolism (HCC)   • Radiation pneumonitis (HCC)   • Type 2 diabetes mellitus with hyperglycemia, with long-term current use of insulin ([address])   • Diabetic polyneuropathy associated with type 2 diabetes mellitus (HCC)   • Use of anastrozole (Arimidex)   • Decreased ROM of right shoulder   • Lump of skin   • Chronic diastolic heart failure (HCC)   • Gastroesophageal reflux disease without esophagitis   • Muscle cramps   • Sebaceous cyst of axilla   • Subcutaneous mass of back   • Overweight with body mass index (BMI) of 28 to 28.9 in adult   • Anxiety   • Right foot pain   • Pelvic pain   • Bone pain   • Type 2 diabetes mellitus with microalbuminuria, with long-term current use of insulin (HCC)   • Depression, recurrent (HCC)   • Vaginal candidiasis   • Diabetes mellitus ([address])   • Seasonal allergies   • Diarrhea   • COVID-19   • Hypercalcemia   • Bilateral lower extremity edema   • Itchy eyes   • Cellulitis of right lower extremity   • Wound of right leg     Past Medical History:   Diagnosis Date   • Abdominal infection ([address])     h-pylori   • Abnormal chest x-ray 04/05/2019   • Asthma    • Breast cancer ([address]) 06/26/2018   • Cancer ([address])     BREAST   • Diabetes mellitus ([address])    • Hiatal hernia    • History of chemotherapy 2018   • History of radiation therapy 2018   • Hypercholesterolemia    • Hypertension    • Hypothyroid    • Renal disorder    • Rheumatoid arthritis [facility])      Past Surgical History:   Procedure Laterality Date   • 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   • BREAST LUMPECTOMY Right 6/26/2018    Procedure: RIGHT BREAST NEEDLE LOCALIZATION X2 WITH RIGHT BREAST LUMPECTOMY ( NEEDLE LOC @ 1000); Surgeon: Lori Fajardo MD;  Location: [facility];  Service: Surgical Oncology   • BREAST LUMPECTOMY Right 8/2/2018    Procedure: LYMPHOSCINITGRAPHY INTRAOPERATIVE LYMPHATIC MAPPING , RIGHT SENTINEL NODE BIOPSY, REEXCISION  RIGHT BREAST LUMPECTOMY CAVITY (SUPERIOR MARGIN); Surgeon: Lori Fajardo MD;  Location: [facility];  Service: Surgical Oncology   • BREAST SURGERY Left    • CATARACT EXTRACTION, BILATERAL Bilateral    • COLONOSCOPY     • EGD AND COLONOSCOPY N/A 9/5/2018    Procedure: EGD AND COLONOSCOPY;  Surgeon: Claudette Day MD;  Location: [facility]; Service: Gastroenterology   • FL GUIDED CENTRAL VENOUS ACCESS DEVICE INSERTION  9/13/2018   • KNEE SURGERY Right    • MAMMO NEEDLE LOCALIZATION RIGHT (ALL INC) Right 6/26/2018   • MAMMO STEREOTACTIC BREAST BIOPSY RIGHT (ALL INC) Right 5/23/2018   • RETINAL DETACHMENT SURGERY Right    • TUBAL LIGATION     • TUNNELED VENOUS PORT PLACEMENT Left 9/13/2018    Procedure: INSERTION VENOUS PORT (PORT-A-CATH);   Surgeon: Lori Fajardo MD;  Location: [facility];  Service: Surgical Oncology   • UPPER GASTROINTESTINAL ENDOSCOPY       Family History   Problem Relation Age of Onset   • Diabetes Mother    • Hypertension Mother    • Diabetes Father    • Hypertension Father    • No Known Problems Sister    • No Known Problems Sister    • No Known Problems Sister    • No Known Problems Sister    • No Known Problems Sister    • Diabetes Brother    • Diabetes Brother    • Kidney failure Brother    • Diabetes Brother    • Cancer Maternal Grandfather    • No Known Problems Daughter    • No Known Problems Daughter    • No Known Problems Daughter       Social History     Socioeconomic History   • Marital status:      Spouse name: Not on file   • Number of children: Not on file   • Years of education: Not on file   • Highest education level: Not on file   Occupational History   • Not on file   Tobacco Use   • Smoking status: Never   • Smokeless tobacco: Never   Vaping Use   • Vaping Use: Never used   Substance and Sexual Activity   • Alcohol use: No   • Drug use: No   • Sexual activity: Not Currently   Other Topics Concern   • Not on file   Social History Narrative   • Not on file     Social Determinants of Health     Financial Resource Strain: Medium Risk (9/18/2023)    Overall Financial Resource Strain (CARDIA)    • Difficulty of Paying Living Expenses: Somewhat hard   Food Insecurity: No Food Insecurity (9/18/2023)    Hunger Vital Sign    • Worried About Running Out of Food in the Last Year: Never true    • Ran Out of Food in the Last Year: Never true   Transportation Needs: No Transportation Needs (9/18/2023)    PRAPARE - Transportation    • Lack of Transportation (Medical): No    • Lack of Transportation (Non-Medical):  No   Physical Activity: Not on file   Stress: Not on file   Social Connections: Not on file   Intimate Partner Violence: Not on file   Housing Stability: Low Risk  (10/27/2021)    Housing Stability Vital Sign    • Unable to Pay for Housing in the Last Year: No    • Number of Places Lived in the Last Year: 1    • Unstable Housing in the Last Year: No        Current Outpatient Medications:   •  acetaminophen (TYLENOL) 650 mg CR tablet, Take 1 tablet (650 mg total) by mouth every 8 (eight) hours as needed for mild pain, Disp: 30 tablet, Rfl: 0  •  Advair -21 MCG/ACT inhaler, INHALE 2 PUFFS 2 (TWO) TIMES A DAY RINSE MOUTH AFTER USE., Disp: 12 g, Rfl: 3  •  albuterol (2.5 mg/3 mL) 0.083 % nebulizer solution, Take 3 mL (2.5 mg total) by nebulization every 6 (six) hours as needed for wheezing or shortness of breath, Disp: 75 mL, Rfl: 5  •  albuterol (PROVENTIL HFA,VENTOLIN HFA) 90 mcg/act inhaler, INHALE 1 PUFF EVERY 4 (FOUR) HOURS AS NEEDED FOR WHEEZING, Disp: 8.5 g, Rfl: 3  •  anastrozole (ARIMIDEX) 1 mg tablet, TAKE 1 TABLET (1 MG TOTAL) BY MOUTH DAILY, Disp: 90 tablet, Rfl: 3  •  aspirin 81 mg chewable tablet, Chew 81 mg daily, Disp: , Rfl:   •  atorvastatin (LIPITOR) 40 mg tablet, Take 1 tablet (40 mg total) by mouth daily, Disp: 90 tablet, Rfl: 2  •  calcium polycarbophil (FIBERCON) 625 mg tablet, Take 1 tablet (625 mg total) by mouth daily (Patient not taking: Reported on 11/10/2023), Disp: 30 tablet, Rfl: 5  •  Diclofenac Sodium (VOLTAREN) 1 %, Apply 2 g topically 2 (two) times a day, Disp: 150 g, Rfl: 0  •  EPINEPHrine (Auvi-Q) 0.1 mg/0.1 mL SOAJ, Inject 0.3 mL (0.3 mg total) into a muscle once for 1 dose (Patient not taking: Reported on 2/6/2023), Disp: 2 each, Rfl: 5  •  ergocalciferol (VITAMIN D2) 50,000 units, TAKE 1 CAPSULE (50,000 UNITS TOTAL) BY MOUTH ONCE A WEEK, Disp: 12 capsule, Rfl: 0  •  fluticasone (FLONASE) 50 mcg/act nasal spray, TAKE 1 SPRAY INTO EACH NOSTRIL DAILY (Patient not taking: Reported on 11/10/2023), Disp: 16 g, Rfl: 4  •  furosemide (LASIX) 20 mg tablet, TAKE 1 TABLET (20 MG TOTAL) BY MOUTH 2 (TWO) TIMES A DAY, Disp: 180 tablet, Rfl: 0  •  guaiFENesin (MUCINEX) 600 mg 12 hr tablet, Take 1 tablet (600 mg total) by mouth every 12 (twelve) hours (Patient not taking: Reported on 11/10/2023), Disp: 30 tablet, Rfl: 0  •  insulin degludec Seretha Beavers FlexTouch) 200 units/mL CONCENTRATED U-200 injection pen, 60 units daily at 5PM (Patient taking differently: Inject 10 Units under the skin daily 60 units daily at 5PM), Disp: 27 mL, Rfl: 1  •  insulin lispro (HumaLOG) 100 units/mL injection pen, 24 units before each meal Plus sliding scale 150-200: 2 units 201-250: 4 units 251-300: 6 units 301-350: 8 units Over 350: 10 units, Disp: 90 mL, Rfl: 1  •  ketotifen (ZADITOR) 0.025 % ophthalmic solution, Administer 1 drop to both eyes 2 (two) times a day as needed (itchy eyes), Disp: 5 mL, Rfl: 0  •  loratadine (CLARITIN) 10 mg tablet, TAKE 1 TABLET (10 MG TOTAL) BY MOUTH DAILY, Disp: 90 tablet, Rfl: 2  •  losartan (COZAAR) 100 MG tablet, Take 1 tablet (100 mg total) by mouth daily, Disp: 90 tablet, Rfl: 1  •  Mag-G 500 (27 Mg) MG tablet, TAKE 1 TABLET (500 MG TOTAL) BY MOUTH DAILY, Disp: 90 tablet, Rfl: 1  •  metoprolol tartrate (LOPRESSOR) 25 mg tablet, Take 0.5 tablets (12.5 mg total) by mouth every 12 (twelve) hours, Disp: 180 tablet, Rfl: 0  •  omeprazole (PriLOSEC) 20 mg delayed release capsule, TAKE 1 CAPSULE (20 MG TOTAL) BY MOUTH DAILY, Disp: 90 capsule, Rfl: 2  •  OneTouch Delica Lancets 93K MISC, USE 3 (THREE) TIMES A DAY, Disp: 300 each, Rfl: 3  •  OneTouch Ultra test strip, USE 1 EACH 3 (THREE) TIMES A DAY USE AS INSTRUCTED, Disp: 300 each, Rfl: 2  •  polyethylene glycol (MIRALAX) 17 g packet, Take 17 g by mouth daily for 14 days, Disp: 28 each, Rfl: 5  •  tiotropium (Spiriva Respimat) 2.5 MCG/ACT AERS inhaler, INHALE 2 PUFFS DAILY, Disp: 18 g, Rfl: 1  •  traZODone (DESYREL) 50 mg tablet, TAKE 0.5 TABLETS (25 MG TOTAL) BY MOUTH DAILY AT BEDTIME (Patient not taking: Reported on 10/30/2023), Disp: 90 tablet, Rfl: 0  •  UltiCare Micro Pen Needles 32G X 4 MM MISC, INJECT UNDER THE SKIN 4 (FOUR) TIMES A DAY, Disp: 400 each, Rfl: 0  •  zileuton 600 MG, TAKE 1 TABLET (600 MG TOTAL) BY MOUTH 2 (TWO) TIMES A DAY, Disp: 60 tablet, Rfl: 3  No current facility-administered medications for this visit. Review of Systems   Constitutional: Negative. Negative for chills and fever. Respiratory:  Negative for chest tightness and shortness of breath. Cardiovascular:  Positive for leg swelling. Negative for chest pain and palpitations.    Gastrointestinal:  Negative for nausea and vomiting. Musculoskeletal:  Negative for gait problem. Skin:  Positive for wound. Objective:  /74   Pulse 68   Temp (!) 97.4 °F (36.3 °C)   Resp 16         Physical Exam  Vitals and nursing note reviewed. Constitutional:       General: She is not in acute distress. Appearance: Normal appearance. She is obese. HENT:      Head: Normocephalic and atraumatic. Eyes:      General:         Right eye: No discharge. Left eye: No discharge. Pulmonary:      Effort: Pulmonary effort is normal. No respiratory distress. Musculoskeletal:         General: Normal range of motion. Cervical back: Normal range of motion and neck supple. No rigidity. Right lower le+ Edema present. Left lower le+ Edema present. Skin:     General: Skin is warm and dry. Findings: Wound present. No erythema. Neurological:      General: No focal deficit present. Mental Status: She is alert and oriented to person, place, and time. Mental status is at baseline. Psychiatric:         Mood and Affect: Mood normal.         Behavior: Behavior normal.         Thought Content: Thought content normal.         Judgment: Judgment normal.           Wound 11/10/23 Other (comment) Leg Right;Medial (Active)   Wound Image    11/15/23 1432   Wound Description Slough; Yellow;Pink 11/15/23 1433   Bobbi-wound Assessment Purple;Edema 11/15/23 1433   Wound Length (cm) 1.6 cm 11/15/23 1433   Wound Width (cm) 1.9 cm 11/15/23 1433   Wound Depth (cm) 0.1 cm 11/15/23 1433   Wound Surface Area (cm^2) 3.04 cm^2 11/15/23 1433   Wound Volume (cm^3) 0.304 cm^3 11/15/23 1433   Calculated Wound Volume (cm^3) 0.3 cm^3 11/15/23 1433   Change in Wound Size % 0 11/15/23 1433   Drainage Amount Moderate 11/15/23 1433   Drainage Description Serosanguineous; Yellow 11/15/23 1433   Non-staged Wound Description Full thickness 11/15/23 1433   Treatments Irrigation with NSS 11/10/23 1407   Patient Tolerance Tolerated well 11/10/23 1407   Dressing Status Intact 11/15/23 1433       Debridement   Wound 11/10/23 Other (comment) Leg Right;Medial    Universal Protocol:  Consent: Verbal consent obtained. Written consent obtained. Risks and benefits: risks, benefits and alternatives were discussed  Consent given by: patient  Time out: Immediately prior to procedure a "time out" was called to verify the correct patient, procedure, equipment, support staff and site/side marked as required. Patient understanding: patient states understanding of the procedure being performed  Patient identity confirmed: verbally with patient    Performed by: physician  Debridement type: selective  Pain control: lidocaine 4%  Post-debridement measurements  Length (cm): 1.6  Width (cm): 1.9  Depth (cm): 0.1  Percent debrided: 100%  Surface Area (cm^2): 3.04  Area debrided (cm^2): 3.04  Volume (cm^3): 0.3  Devitalized tissue debrided: necrotic debris  Instrument(s) utilized: blade, forceps and curette  Bleeding: none  Hemostasis obtained with: not applicable  Procedural pain (0-10): 3  Post-procedural pain: 0   Response to treatment: procedure was tolerated well             Lab Results   Component Value Date    HGBA1C 11.3 (H) 11/13/2023       Wound Instructions:  Orders Placed This Encounter   Procedures   • Wound cleansing and dressings     Right leg wound      Shower yes, with protection. Your dressings can not get wet. If they do please contact us ASAP. Apply skin prep to skin surrounding wound   Apply Hydrofera Blue to the wound. Cover with abd. Secure with rolled gauze and tape   Secure with Coflex Lite   Change dressing weekly in wound center. Please try to consume 3-4 servings of protein (30g) each day. Multi-layer compression wrap Instructions--Coflex lite     Keep compression wrap/wraps clean and dry. If wraps are too tight and you experience numbness/tingling, call the wound center.  If after hours, remove wraps or proceed to nearest E.R. and call wound center in AM.    Shikha Gomes will be changed 1 x weekly    Avoid prolonged standing in one place. Elevate leg(s) above the level of the heart when sitting or as much as possible. Standing Status:   Future     Standing Expiration Date:   11/15/2024   • Debridement     This order was created via procedure documentation     Abimael Diamond MD    Portions of the record may have been created with voice recognition software. Occasional wrong word or "sound alike" substitutions may have occurred due to the inherent limitations of voice recognition software. Read the chart carefully and recognize, using context, where substitutions have occurred.

## 2023-11-17 ENCOUNTER — HOSPITAL ENCOUNTER (OUTPATIENT)
Dept: NON INVASIVE DIAGNOSTICS | Facility: HOSPITAL | Age: 68
Discharge: HOME | End: 2023-11-17
Attending: FAMILY MEDICINE
Payer: COMMERCIAL

## 2023-11-17 DIAGNOSIS — S81.801A OPEN WOUND OF RIGHT LOWER LEG, INITIAL ENCOUNTER: ICD-10-CM

## 2023-11-17 PROCEDURE — 93971 EXTREMITY STUDY: CPT | Performed by: SURGERY

## 2023-11-17 PROCEDURE — 93971 EXTREMITY STUDY: CPT

## 2023-11-21 ENCOUNTER — TELEPHONE (OUTPATIENT)
Dept: ENDOCRINOLOGY | Facility: CLINIC | Age: 68
End: 2023-11-21

## 2023-11-21 NOTE — PROGRESS NOTES
Patient ID: Yves Carlisle is a 80 y.o. female Date of Birth 1955       Chief Complaint   Patient presents with   • Follow Up Wound Care Visit     Right leg wound        Allergies:  Codeine and Latex    Diagnosis:      Diagnosis ICD-10-CM Associated Orders   1. Open wound of right lower leg, initial encounter  S81.801A Wound cleansing and dressings     VAS lower limb arterial duplex, complete bilateral     Debridement              Assessment & Plan:  Right lower extremity ulcer: Little change since last visit, fibrous necrotic tissue and slough present requiring surgical debridement/debulking. Drainage slightly improved since last visit. Had venous reflux studies completed which did not show significant venous incompetence. However, this does not completely exclude venous disease. In addition, her ABIs that were completed in the wound center R 0.86 , L 0.81 - considering history of uncontrolled diabetes, previous cancer treatment, and nonhealing wound, will order arterial studies to evaluate for arterial disease. In addition, discussed with patient possible future biopsy; will reevaluate need for this at next visit. Arterial studies ordered   Surgical debridement  Drainage improved, will switch to silver alginate followed by ABD and Coflex lite  Follow-up in 1 week or sooner if needed    Subjective:   "11/10/23: Patient is a 80year old female who presents to the [facility] wound center as a new patient for an open wound of her right lower leg. Patient reports her wound has been present for approximately 1 month. She reports she woke up one morning with a small bump on her leg which progressively got worse. She has a hx of bilateral lower extremity edema since completing chemo and radiation for breast cancer 1 year ago. She denies wearing any type of compression. Patient has a hx of type 2 DM with her most recent A1c 11.9 as of 10/30. Patient reports her wound drains a small amount of drainage.  She keeps her wound MYRANDA when she is at home and keeps it covered with a band aid when outside her home. She denies any fevers or chills. "    11/15/2023: Stella Toure presents to wound center today for follow-up of right lower extremity wound. Her sister is present at bedside providing translation. She denies acute complaints and reports she tolerated Coflex lite well. Denies fever, chills. 11/22/2023: Stella Toure presents to wound center today for follow-up of right lower extremity wound. Her sister is again present at bedside aiding with translation. After last visit, venous studies completed which showed:  "RIGHT LIMB: No evidence of deep venous incompetence. The great saphenous vein is competent. The great saphenous vein exits the saphenous compartment in the distal thigh. The small saphenous vein is competent and does not communicate with the popliteal vein. There is no evidence of incompetent perforators in the thigh or calf. There is no evidence of deep vein thrombosis in the CFV, the proximal PFV, the femoral vein and the popliteal vein."  She denies fever, chills. She reports she has no acute complaints today. She has tolerated the Coflex lite wrap well.       The following portions of the patient's history were reviewed and updated as appropriate:   Patient Active Problem List   Diagnosis   • Type 2 diabetes mellitus with complication, with long-term current use of insulin ([address])   • Severe persistent asthma without complication   • Other specified hypothyroidism   • Chest pain   • Palpitations   • Chronic bilateral low back pain without sciatica   • Neck pain   • Malignant neoplasm of right female breast (HCC)   • Malignant neoplasm of upper-outer quadrant of right breast in female, estrogen receptor positive    • Hiatal hernia   • Screening for colon cancer   • Esophageal dysphagia   • Cellulitis of right axilla   • Chronic pain of right knee   • Hypercholesterolemia   • Hypothyroid   • Essential hypertension   • Bilateral pulmonary embolism (HCC)   • Radiation pneumonitis (HCC)   • Type 2 diabetes mellitus with hyperglycemia, with long-term current use of insulin (HCC)   • Diabetic polyneuropathy associated with type 2 diabetes mellitus (HCC)   • Use of anastrozole (Arimidex)   • Decreased ROM of right shoulder   • Lump of skin   • Chronic diastolic heart failure (HCC)   • Gastroesophageal reflux disease without esophagitis   • Muscle cramps   • Sebaceous cyst of axilla   • Subcutaneous mass of back   • Overweight with body mass index (BMI) of 28 to 28.9 in adult   • Anxiety   • Right foot pain   • Pelvic pain   • Bone pain   • Type 2 diabetes mellitus with microalbuminuria, with long-term current use of insulin (HCC)   • Depression, recurrent (HCC)   • Vaginal candidiasis   • Diabetes mellitus ([address])   • Seasonal allergies   • Diarrhea   • COVID-19   • Hypercalcemia   • Bilateral lower extremity edema   • Itchy eyes   • Cellulitis of right lower extremity   • Wound of right leg     Past Medical History:   Diagnosis Date   • Abdominal infection (HCC)     h-pylori   • Abnormal chest x-ray 04/05/2019   • Asthma    • Breast cancer ([address]) 06/26/2018   • Cancer ([address])     BREAST   • Diabetes mellitus ([address])    • Hiatal hernia    • History of chemotherapy 2018   • History of radiation therapy 2018   • Hypercholesterolemia    • Hypertension    • Hypothyroid    • Renal disorder    • Rheumatoid arthritis [facility])      Past Surgical History:   Procedure Laterality Date   • 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   • BREAST LUMPECTOMY Right 6/26/2018    Procedure: RIGHT BREAST NEEDLE LOCALIZATION X2 WITH RIGHT BREAST LUMPECTOMY ( NEEDLE LOC @ 1000); Surgeon: Jackson Robles MD;  Location: [facility];  Service: Surgical Oncology   • BREAST LUMPECTOMY Right 8/2/2018    Procedure: LYMPHOSCINITGRAPHY INTRAOPERATIVE LYMPHATIC MAPPING , RIGHT SENTINEL NODE BIOPSY, REEXCISION  RIGHT BREAST LUMPECTOMY CAVITY (SUPERIOR MARGIN);   Surgeon: Calos Lowe Nikhil Ohara MD;  Location: [facility];  Service: Surgical Oncology   • BREAST SURGERY Left    • CATARACT EXTRACTION, BILATERAL Bilateral    • COLONOSCOPY     • EGD AND COLONOSCOPY N/A 9/5/2018    Procedure: EGD AND COLONOSCOPY;  Surgeon: Elias Borden MD;  Location: [facility] GI [facility]; Service: Gastroenterology   • FL GUIDED CENTRAL VENOUS ACCESS DEVICE INSERTION  9/13/2018   • KNEE SURGERY Right    • MAMMO NEEDLE LOCALIZATION RIGHT (ALL INC) Right 6/26/2018   • MAMMO STEREOTACTIC BREAST BIOPSY RIGHT (ALL INC) Right 5/23/2018   • RETINAL DETACHMENT SURGERY Right    • TUBAL LIGATION     • TUNNELED VENOUS PORT PLACEMENT Left 9/13/2018    Procedure: INSERTION VENOUS PORT (PORT-A-CATH);   Surgeon: Saeid Diallo MD;  Location: [facility];  Service: Surgical Oncology   • UPPER GASTROINTESTINAL ENDOSCOPY       Family History   Problem Relation Age of Onset   • Diabetes Mother    • Hypertension Mother    • Diabetes Father    • Hypertension Father    • No Known Problems Sister    • No Known Problems Sister    • No Known Problems Sister    • No Known Problems Sister    • No Known Problems Sister    • Diabetes Brother    • Diabetes Brother    • Kidney failure Brother    • Diabetes Brother    • Cancer Maternal Grandfather    • No Known Problems Daughter    • No Known Problems Daughter    • No Known Problems Daughter       Social History     Socioeconomic History   • Marital status:      Spouse name: Not on file   • Number of children: Not on file   • Years of education: Not on file   • Highest education level: Not on file   Occupational History   • Not on file   Tobacco Use   • Smoking status: Never   • Smokeless tobacco: Never   Vaping Use   • Vaping Use: Never used   Substance and Sexual Activity   • Alcohol use: No   • Drug use: No   • Sexual activity: Not Currently   Other Topics Concern   • Not on file   Social History Narrative   • Not on file     Social Determinants of Health     Financial Resource Strain: Medium Risk (9/18/2023)    Overall Financial Resource Strain (CARDIA)    • Difficulty of Paying Living Expenses: Somewhat hard   Food Insecurity: No Food Insecurity (9/18/2023)    Hunger Vital Sign    • Worried About Running Out of Food in the Last Year: Never true    • Ran Out of Food in the Last Year: Never true   Transportation Needs: No Transportation Needs (9/18/2023)    PRAPARE - Transportation    • Lack of Transportation (Medical): No    • Lack of Transportation (Non-Medical):  No   Physical Activity: Not on file   Stress: Not on file   Social Connections: Not on file   Intimate Partner Violence: Not on file   Housing Stability: Low Risk  (10/27/2021)    Housing Stability Vital Sign    • Unable to Pay for Housing in the Last Year: No    • Number of Places Lived in the Last Year: 1    • Unstable Housing in the Last Year: No        Current Outpatient Medications:   •  acetaminophen (TYLENOL) 650 mg CR tablet, Take 1 tablet (650 mg total) by mouth every 8 (eight) hours as needed for mild pain, Disp: 30 tablet, Rfl: 0  •  Advair -21 MCG/ACT inhaler, INHALE 2 PUFFS 2 (TWO) TIMES A DAY RINSE MOUTH AFTER USE., Disp: 12 g, Rfl: 3  •  albuterol (2.5 mg/3 mL) 0.083 % nebulizer solution, Take 3 mL (2.5 mg total) by nebulization every 6 (six) hours as needed for wheezing or shortness of breath, Disp: 75 mL, Rfl: 5  •  albuterol (PROVENTIL HFA,VENTOLIN HFA) 90 mcg/act inhaler, INHALE 1 PUFF EVERY 4 (FOUR) HOURS AS NEEDED FOR WHEEZING, Disp: 8.5 g, Rfl: 3  •  anastrozole (ARIMIDEX) 1 mg tablet, TAKE 1 TABLET (1 MG TOTAL) BY MOUTH DAILY, Disp: 90 tablet, Rfl: 3  •  aspirin 81 mg chewable tablet, Chew 81 mg daily, Disp: , Rfl:   •  atorvastatin (LIPITOR) 40 mg tablet, Take 1 tablet (40 mg total) by mouth daily, Disp: 90 tablet, Rfl: 2  •  calcium polycarbophil (FIBERCON) 625 mg tablet, Take 1 tablet (625 mg total) by mouth daily (Patient not taking: Reported on 11/10/2023), Disp: 30 tablet, Rfl: 5  •  Diclofenac Sodium (VOLTAREN) 1 %, Apply 2 g topically 2 (two) times a day, Disp: 150 g, Rfl: 0  •  EPINEPHrine (Auvi-Q) 0.1 mg/0.1 mL SOAJ, Inject 0.3 mL (0.3 mg total) into a muscle once for 1 dose (Patient not taking: Reported on 2/6/2023), Disp: 2 each, Rfl: 5  •  ergocalciferol (VITAMIN D2) 50,000 units, TAKE 1 CAPSULE (50,000 UNITS TOTAL) BY MOUTH ONCE A WEEK, Disp: 12 capsule, Rfl: 0  •  fluticasone (FLONASE) 50 mcg/act nasal spray, TAKE 1 SPRAY INTO EACH NOSTRIL DAILY (Patient not taking: Reported on 11/10/2023), Disp: 16 g, Rfl: 4  •  furosemide (LASIX) 20 mg tablet, TAKE 1 TABLET (20 MG TOTAL) BY MOUTH 2 (TWO) TIMES A DAY, Disp: 180 tablet, Rfl: 0  •  guaiFENesin (MUCINEX) 600 mg 12 hr tablet, Take 1 tablet (600 mg total) by mouth every 12 (twelve) hours (Patient not taking: Reported on 11/10/2023), Disp: 30 tablet, Rfl: 0  •  insulin degludec Goran Flattery FlexTouch) 200 units/mL CONCENTRATED U-200 injection pen, 60 units daily at 5PM (Patient taking differently: Inject 10 Units under the skin daily 60 units daily at 5PM), Disp: 27 mL, Rfl: 1  •  insulin lispro (HumaLOG) 100 units/mL injection pen, 24 units before each meal Plus sliding scale 150-200: 2 units 201-250: 4 units 251-300: 6 units 301-350: 8 units Over 350: 10 units, Disp: 90 mL, Rfl: 1  •  ketotifen (ZADITOR) 0.025 % ophthalmic solution, Administer 1 drop to both eyes 2 (two) times a day as needed (itchy eyes), Disp: 5 mL, Rfl: 0  •  loratadine (CLARITIN) 10 mg tablet, TAKE 1 TABLET (10 MG TOTAL) BY MOUTH DAILY, Disp: 90 tablet, Rfl: 2  •  losartan (COZAAR) 100 MG tablet, Take 1 tablet (100 mg total) by mouth daily, Disp: 90 tablet, Rfl: 1  •  Mag-G 500 (27 Mg) MG tablet, TAKE 1 TABLET (500 MG TOTAL) BY MOUTH DAILY, Disp: 90 tablet, Rfl: 1  •  metoprolol tartrate (LOPRESSOR) 25 mg tablet, Take 0.5 tablets (12.5 mg total) by mouth every 12 (twelve) hours, Disp: 180 tablet, Rfl: 0  •  omeprazole (PriLOSEC) 20 mg delayed release capsule, TAKE 1 CAPSULE (20 MG TOTAL) BY MOUTH DAILY, Disp: 90 capsule, Rfl: 2  •  OneTouch Delica Lancets 89E MISC, USE 3 (THREE) TIMES A DAY, Disp: 300 each, Rfl: 3  •  OneTouch Ultra test strip, USE 1 EACH 3 (THREE) TIMES A DAY USE AS INSTRUCTED, Disp: 300 each, Rfl: 2  •  polyethylene glycol (MIRALAX) 17 g packet, Take 17 g by mouth daily for 14 days, Disp: 28 each, Rfl: 5  •  tiotropium (Spiriva Respimat) 2.5 MCG/ACT AERS inhaler, INHALE 2 PUFFS DAILY, Disp: 18 g, Rfl: 1  •  traZODone (DESYREL) 50 mg tablet, TAKE 0.5 TABLETS (25 MG TOTAL) BY MOUTH DAILY AT BEDTIME (Patient not taking: Reported on 10/30/2023), Disp: 90 tablet, Rfl: 0  •  UltiCare Micro Pen Needles 32G X 4 MM MISC, INJECT UNDER THE SKIN 4 (FOUR) TIMES A DAY, Disp: 400 each, Rfl: 0  •  zileuton 600 MG, TAKE 1 TABLET (600 MG TOTAL) BY MOUTH 2 (TWO) TIMES A DAY, Disp: 60 tablet, Rfl: 3    Review of Systems   Constitutional: Negative. Negative for chills and fever. Respiratory:  Negative for chest tightness and shortness of breath. Cardiovascular:  Positive for leg swelling. Negative for chest pain and palpitations. Gastrointestinal:  Negative for nausea and vomiting. Musculoskeletal:  Negative for gait problem. Skin:  Positive for wound. Objective:  /80   Pulse 78   Temp 97.8 °F (36.6 °C)   Resp 16         Physical Exam  Vitals and nursing note reviewed. Constitutional:       General: She is not in acute distress. Appearance: Normal appearance. She is obese. She is not ill-appearing, toxic-appearing or diaphoretic. Comments: Pleasant, no acute distress. Sister present at bedside. Offered [employer]  but patient declined and preferred sister to translate. HENT:      Head: Normocephalic and atraumatic. Eyes:      General: No scleral icterus. Right eye: No discharge. Left eye: No discharge. Cardiovascular:      Rate and Rhythm: Normal rate. Pulmonary:      Effort: Pulmonary effort is normal. No respiratory distress. Musculoskeletal:         General: Normal range of motion. Cervical back: Normal range of motion and neck supple. No rigidity. Right lower le+ Edema present. Left lower le+ Edema present. Comments: Bilateral lower extremities are edematous, right slightly improved with Coflex wrap  1-2+ pitting right lower extremity, 2+ pitting left lower extremity   Skin:     General: Skin is warm and dry. Findings: Wound present. No erythema. Neurological:      Mental Status: She is alert and oriented to person, place, and time. Psychiatric:         Mood and Affect: Mood normal.         Behavior: Behavior normal.         Thought Content: Thought content normal.         Judgment: Judgment normal.                             Wound 11/10/23 Other (comment) Leg Right;Medial (Active)   Wound Image   23 1326   Wound Description Slough; Yellow;Pink 23 132   Bobbi-wound Assessment Maceration;Edema; Erythema 23 1327   Wound Length (cm) 1.6 cm 23 1327   Wound Width (cm) 2 cm 23 1327   Wound Depth (cm) 0.2 cm 23 1327   Wound Surface Area (cm^2) 3.2 cm^2 23 1327   Wound Volume (cm^3) 0.64 cm^3 23 1327   Calculated Wound Volume (cm^3) 0.64 cm^3 23 1327   Change in Wound Size % -113.33 23 1327   Drainage Amount Moderate 23 1327   Drainage Description Serosanguineous; Yellow 23 1327   Non-staged Wound Description Full thickness 11/15/23 1433   Treatments Irrigation with NSS 11/10/23 1407   Patient Tolerance Tolerated well 11/10/23 1407   Dressing Status Intact 23 1327       Debridement    Universal Protocol:  Consent: Verbal consent obtained. Written consent obtained.   Risks and benefits: risks, benefits and alternatives were discussed  Consent given by: patient  Time out: Immediately prior to procedure a "time out" was called to verify the correct patient, procedure, equipment, support staff and site/side marked as required. Patient understanding: patient states understanding of the procedure being performed  Patient identity confirmed: verbally with patient    Performed by: physician  Debridement type: surgical  Level of debridement: subcutaneous tissue  Pain control: lidocaine 4%  Post-debridement measurements  Length (cm): 1.6  Width (cm): 2  Depth (cm): 0.3  Percent debrided: 85%  Surface Area (cm^2): 3.2  Area debrided (cm^2): 2.72  Volume (cm^3): 0.96  Tissue and other material debrided: subcutaneous tissue  Devitalized tissue debrided: fibrin, necrotic debris and slough  Instrument(s) utilized: blade and forceps  Bleeding: small  Hemostasis obtained with: pressure  Procedural pain (0-10): 3  Post-procedural pain: 0   Response to treatment: procedure was tolerated well                     Lab Results   Component Value Date    HGBA1C 11.3 (H) 11/13/2023       Wound Instructions:  Orders Placed This Encounter   Procedures   • Wound cleansing and dressings     Right leg wound      Shower yes, with protection. Your dressings can not get wet. If they do please contact us ASAP. Apply skin prep to skin surrounding wound   Apply silver alginate to the wound. Cover with abd. Secure with rolled gauze and tape   Secure with Coflex Lite   Change dressing weekly in wound center. Please try to consume 3-4 servings of protein (30g) each day. Multi-layer compression wrap Instructions--Coflex lite      Keep compression wrap/wraps clean and dry. If wraps are too tight and you experience numbness/tingling, call the wound center. If after hours, remove wraps or proceed to nearest E.R. and call wound center in AM.     Evelena Pih will be changed 1 x weekly     Avoid prolonged standing in one place. Elevate leg(s) above the level of the heart when sitting or as much as possible.      Standing Status:   Future     Standing Expiration Date:   11/22/2024   • Debridement     This order was created via procedure documentation Darin Gaytan MD    Portions of the record may have been created with voice recognition software. Occasional wrong word or "sound alike" substitutions may have occurred due to the inherent limitations of voice recognition software. Read the chart carefully and recognize, using context, where substitutions have occurred.

## 2023-11-22 ENCOUNTER — OFFICE VISIT (OUTPATIENT)
Dept: WOUND CARE | Facility: CLINIC | Age: 68
End: 2023-11-22
Payer: COMMERCIAL

## 2023-11-22 VITALS
HEART RATE: 78 BPM | RESPIRATION RATE: 16 BRPM | DIASTOLIC BLOOD PRESSURE: 80 MMHG | SYSTOLIC BLOOD PRESSURE: 138 MMHG | TEMPERATURE: 97.8 F

## 2023-11-22 DIAGNOSIS — S81.801A OPEN WOUND OF RIGHT LOWER LEG, INITIAL ENCOUNTER: Primary | ICD-10-CM

## 2023-11-22 PROCEDURE — 11045 DBRDMT SUBQ TISS EACH ADDL: CPT | Performed by: FAMILY MEDICINE

## 2023-11-22 PROCEDURE — 11042 DBRDMT SUBQ TIS 1ST 20SQCM/<: CPT | Performed by: FAMILY MEDICINE

## 2023-11-22 NOTE — PATIENT INSTRUCTIONS
Orders Placed This Encounter   Procedures    Wound cleansing and dressings     Right leg wound      Shower yes, with protection. Your dressings can not get wet. If they do please contact us ASAP. Apply skin prep to skin surrounding wound   Apply silver alginate to the wound. Cover with abd. Secure with rolled gauze and tape   Secure with Coflex Lite   Change dressing weekly in wound center. Please try to consume 3-4 servings of protein (30g) each day. Multi-layer compression wrap Instructions--Coflex lite      Keep compression wrap/wraps clean and dry. If wraps are too tight and you experience numbness/tingling, call the wound center. If after hours, remove wraps or proceed to nearest E.R. and call wound center in AM.     Melvia Garcia will be changed 1 x weekly     Avoid prolonged standing in one place. Elevate leg(s) above the level of the heart when sitting or as much as possible.      Standing Status:   Future     Standing Expiration Date:   11/22/2024

## 2023-11-27 ENCOUNTER — PATIENT OUTREACH (OUTPATIENT)
Dept: OBGYN CLINIC | Facility: CLINIC | Age: 68
End: 2023-11-27

## 2023-11-29 ENCOUNTER — HOSPITAL ENCOUNTER (OUTPATIENT)
Dept: NON INVASIVE DIAGNOSTICS | Facility: HOSPITAL | Age: 68
Discharge: HOME | End: 2023-11-29
Attending: FAMILY MEDICINE
Payer: COMMERCIAL

## 2023-11-29 DIAGNOSIS — S81.801A OPEN WOUND OF RIGHT LOWER LEG, INITIAL ENCOUNTER: ICD-10-CM

## 2023-11-29 PROCEDURE — 93925 LOWER EXTREMITY STUDY: CPT

## 2023-11-29 PROCEDURE — 93923 UPR/LXTR ART STDY 3+ LVLS: CPT

## 2023-11-29 PROCEDURE — 93922 UPR/L XTREMITY ART 2 LEVELS: CPT | Performed by: SURGERY

## 2023-11-29 PROCEDURE — 93925 LOWER EXTREMITY STUDY: CPT | Performed by: SURGERY

## 2023-11-30 ENCOUNTER — OFFICE VISIT (OUTPATIENT)
Dept: WOUND CARE | Facility: CLINIC | Age: 68
End: 2023-11-30
Payer: COMMERCIAL

## 2023-11-30 VITALS
DIASTOLIC BLOOD PRESSURE: 78 MMHG | HEART RATE: 100 BPM | SYSTOLIC BLOOD PRESSURE: 158 MMHG | TEMPERATURE: 96.1 F | RESPIRATION RATE: 18 BRPM

## 2023-11-30 DIAGNOSIS — I73.9 PAD (PERIPHERAL ARTERY DISEASE) (HCC): ICD-10-CM

## 2023-11-30 DIAGNOSIS — L97.912 NON-PRESSURE ULCER OF RIGHT LOWER EXTREMITY WITH FAT LAYER EXPOSED (HCC): Primary | ICD-10-CM

## 2023-11-30 PROCEDURE — 11042 DBRDMT SUBQ TIS 1ST 20SQCM/<: CPT | Performed by: FAMILY MEDICINE

## 2023-11-30 NOTE — PROGRESS NOTES
Patient ID: Enrique Guillen is a 80 y.o. female Date of Birth 1955       Chief Complaint   Patient presents with   • Follow Up Wound Care Visit     RLE wound       Allergies:  Codeine and Latex    Diagnosis:      Diagnosis ICD-10-CM Associated Orders   1. Non-pressure ulcer of right lower extremity with fat layer exposed ([address])  L97.912 Wound cleansing and dressings     Debridement      2. PAD (peripheral artery disease) ([facility])  I73.9               Assessment & Plan:  Nonpressure ulcer of the right lower extremity. Bleeds done yesterday indicate moderate PAD left greater than right. Toe pressures are good bilaterally. LISA on the left was 0.89 in the right 0.96. Discussed results today. Surgical debridement. Because of slight green drainage, Dakin's soaked today and will use Acticoat 7 followed by ConvaMax. Zinc the distal edge. Coflex lite. Follow-up in 1 week. Subjective:   "11/10/23: Patient is a 80year old female who presents to the [address] [place] [address] wound [place] as a new patient for an open wound of her right lower leg. Patient reports her wound has been present for approximately 1 month. She reports she woke up one morning with a small bump on her leg which progressively got worse. She has a hx of bilateral lower extremity edema since completing chemo and radiation for breast cancer 1 year ago. She denies wearing any type of compression. Patient has a hx of type 2 DM with her most recent A1c 11.9 as of 10/30. Patient reports her wound drains a small amount of drainage. She keeps her wound MYRANDA when she is at home and keeps it covered with a band aid when outside her home. She denies any fevers or chills. "    11/15/2023: Kevin Henry presents to wound center today for follow-up of right lower extremity wound. Her sister is present at bedside providing translation. She denies acute complaints and reports she tolerated Coflex lite well. Denies fever, chills.     11/22/2023: Kevin Henry presents to wound center today for follow-up of right lower extremity wound. Her sister is again present at bedside aiding with translation. After last visit, venous studies completed which showed:  "RIGHT LIMB: No evidence of deep venous incompetence. The great saphenous vein is competent. The great saphenous vein exits the saphenous compartment in the distal thigh. The small saphenous vein is competent and does not communicate with the popliteal vein. There is no evidence of incompetent perforators in the thigh or calf. There is no evidence of deep vein thrombosis in the CFV, the proximal PFV, the femoral vein and the popliteal vein."  She denies fever, chills. She reports she has no acute complaints today. She has tolerated the Coflex lite wrap well. 11/30/2023: Follow-up ulceration on the right lower extremity. LEADS done yesterday:    CONCLUSION:  Impression:  RIGHT LOWER LIMB:  Evidence of <50% stenosis noted at the mid and distal superficial femoral  artery. Evidence of posterior tibial artery occlusive disease. Ankle/Brachial index: 0.96, which is in the normal disease category. PVR/ PPG tracings are normal.  Metatarsal pressure of 141 mmHg. Great toe pressure of 97 mmHg, within the healing range. LEFT LOWER LIMB:  Evidence of 50-75% stenosis noted in the mid superficial femoral artery. Evidence of anterior tibial artery occlusive disease. Ankle/Brachial index: 0.89, which is in the mild disease category. PVR/ PPG tracings are normal.  Metatarsal pressure of 176 mmHg. Great toe pressure of 67 mmHg, within the healing range. Tolerated Coflex lite but did express some discomfort. No fever or chills.              The following portions of the patient's history were reviewed and updated as appropriate:   Patient Active Problem List   Diagnosis   • Type 2 diabetes mellitus with complication, with long-term current use of insulin ([address])   • Severe persistent asthma without complication   • Other specified hypothyroidism   • Chest pain   • Palpitations   • Chronic bilateral low back pain without sciatica   • Neck pain   • Malignant neoplasm of right female breast (HCC)   • Malignant neoplasm of upper-outer quadrant of right breast in female, estrogen receptor positive    • Hiatal hernia   • Screening for colon cancer   • Esophageal dysphagia   • Cellulitis of right axilla   • Chronic pain of right knee   • Hypercholesterolemia   • Hypothyroid   • Essential hypertension   • Bilateral pulmonary embolism (HCC)   • Radiation pneumonitis (HCC)   • Type 2 diabetes mellitus with hyperglycemia, with long-term current use of insulin (HCC)   • Diabetic polyneuropathy associated with type 2 diabetes mellitus (HCC)   • Use of anastrozole (Arimidex)   • Decreased ROM of right shoulder   • Lump of skin   • Chronic diastolic heart failure (HCC)   • Gastroesophageal reflux disease without esophagitis   • Muscle cramps   • Sebaceous cyst of axilla   • Subcutaneous mass of back   • Overweight with body mass index (BMI) of 28 to 28.9 in adult   • Anxiety   • Right foot pain   • Pelvic pain   • Bone pain   • Type 2 diabetes mellitus with microalbuminuria, with long-term current use of insulin (HCC)   • Depression, recurrent (HCC)   • Vaginal candidiasis   • Diabetes mellitus (HCC)   • Seasonal allergies   • Diarrhea   • COVID-19   • Hypercalcemia   • Bilateral lower extremity edema   • Itchy eyes   • Cellulitis of right lower extremity   • Wound of right leg     Past Medical History:   Diagnosis Date   • Abdominal infection (HCC)     h-pylori   • Abnormal chest x-ray 04/05/2019   • Asthma    • Breast cancer ([address]) 06/26/2018   • Cancer ([address])     BREAST   • Diabetes mellitus ([address])    • Hiatal hernia    • History of chemotherapy 2018   • History of radiation therapy 2018   • Hypercholesterolemia    • Hypertension    • Hypothyroid    • Renal disorder    • Rheumatoid arthritis [facility]      Past Surgical History:   Procedure Laterality Date   • 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   • BREAST LUMPECTOMY Right 6/26/2018    Procedure: RIGHT BREAST NEEDLE LOCALIZATION X2 WITH RIGHT BREAST LUMPECTOMY ( NEEDLE LOC @ 1000); Surgeon: Fazal Knutson MD;  Location: [facility];  Service: Surgical Oncology   • BREAST LUMPECTOMY Right 8/2/2018    Procedure: LYMPHOSCINITGRAPHY INTRAOPERATIVE LYMPHATIC MAPPING , RIGHT SENTINEL NODE BIOPSY, REEXCISION  RIGHT BREAST LUMPECTOMY CAVITY (SUPERIOR MARGIN); Surgeon: Fazal Knutson MD;  Location: [facility];  Service: Surgical Oncology   • BREAST SURGERY Left    • CATARACT EXTRACTION, BILATERAL Bilateral    • COLONOSCOPY     • EGD AND COLONOSCOPY N/A 9/5/2018    Procedure: EGD AND COLONOSCOPY;  Surgeon: Osmany Cleveland MD;  Location: [facility]; Service: Gastroenterology   • FL GUIDED CENTRAL VENOUS ACCESS DEVICE INSERTION  9/13/2018   • KNEE SURGERY Right    • MAMMO NEEDLE LOCALIZATION RIGHT (ALL INC) Right 6/26/2018   • MAMMO STEREOTACTIC BREAST BIOPSY RIGHT (ALL INC) Right 5/23/2018   • RETINAL DETACHMENT SURGERY Right    • TUBAL LIGATION     • TUNNELED VENOUS PORT PLACEMENT Left 9/13/2018    Procedure: INSERTION VENOUS PORT (PORT-A-CATH);   Surgeon: Fazal Knutson MD;  Location: [facility];  Service: Surgical Oncology   • UPPER GASTROINTESTINAL ENDOSCOPY       Family History   Problem Relation Age of Onset   • Diabetes Mother    • Hypertension Mother    • Diabetes Father    • Hypertension Father    • No Known Problems Sister    • No Known Problems Sister    • No Known Problems Sister    • No Known Problems Sister    • No Known Problems Sister    • Diabetes Brother    • Diabetes Brother    • Kidney failure Brother    • Diabetes Brother    • Cancer Maternal Grandfather    • No Known Problems Daughter    • No Known Problems Daughter    • No Known Problems Daughter       Social History     Socioeconomic History   • Marital status:      Spouse name: None   • Number of children: None   • Years of education: None   • Highest education level: None   Occupational History   • None   Tobacco Use   • Smoking status: Never   • Smokeless tobacco: Never   Vaping Use   • Vaping Use: Never used   Substance and Sexual Activity   • Alcohol use: No   • Drug use: No   • Sexual activity: Not Currently   Other Topics Concern   • None   Social History Narrative   • None     Social Determinants of Health     Financial Resource Strain: Medium Risk (9/18/2023)    Overall Financial Resource Strain (CARDIA)    • Difficulty of Paying Living Expenses: Somewhat hard   Food Insecurity: No Food Insecurity (9/18/2023)    Hunger Vital Sign    • Worried About Running Out of Food in the Last Year: Never true    • Ran Out of Food in the Last Year: Never true   Transportation Needs: No Transportation Needs (9/18/2023)    PRAPARE - Transportation    • Lack of Transportation (Medical): No    • Lack of Transportation (Non-Medical):  No   Physical Activity: Not on file   Stress: Not on file   Social Connections: Not on file   Intimate Partner Violence: Not on file   Housing Stability: Low Risk  (10/27/2021)    Housing Stability Vital Sign    • Unable to Pay for Housing in the Last Year: No    • Number of Places Lived in the Last Year: 1    • Unstable Housing in the Last Year: No        Current Outpatient Medications:   •  acetaminophen (TYLENOL) 650 mg CR tablet, Take 1 tablet (650 mg total) by mouth every 8 (eight) hours as needed for mild pain, Disp: 30 tablet, Rfl: 0  •  Advair -21 MCG/ACT inhaler, INHALE 2 PUFFS 2 (TWO) TIMES A DAY RINSE MOUTH AFTER USE., Disp: 12 g, Rfl: 3  •  albuterol (2.5 mg/3 mL) 0.083 % nebulizer solution, Take 3 mL (2.5 mg total) by nebulization every 6 (six) hours as needed for wheezing or shortness of breath, Disp: 75 mL, Rfl: 5  •  albuterol (PROVENTIL HFA,VENTOLIN HFA) 90 mcg/act inhaler, INHALE 1 PUFF EVERY 4 (FOUR) HOURS AS NEEDED FOR WHEEZING, Disp: 8.5 g, Rfl: 3  •  anastrozole (ARIMIDEX) 1 mg tablet, TAKE 1 TABLET (1 MG TOTAL) BY MOUTH DAILY, Disp: 90 tablet, Rfl: 3  •  aspirin 81 mg chewable tablet, Chew 81 mg daily, Disp: , Rfl:   •  atorvastatin (LIPITOR) 40 mg tablet, Take 1 tablet (40 mg total) by mouth daily, Disp: 90 tablet, Rfl: 2  •  calcium polycarbophil (FIBERCON) 625 mg tablet, Take 1 tablet (625 mg total) by mouth daily (Patient not taking: Reported on 11/10/2023), Disp: 30 tablet, Rfl: 5  •  Diclofenac Sodium (VOLTAREN) 1 %, Apply 2 g topically 2 (two) times a day, Disp: 150 g, Rfl: 0  •  EPINEPHrine (Auvi-Q) 0.1 mg/0.1 mL SOAJ, Inject 0.3 mL (0.3 mg total) into a muscle once for 1 dose (Patient not taking: Reported on 2/6/2023), Disp: 2 each, Rfl: 5  •  ergocalciferol (VITAMIN D2) 50,000 units, TAKE 1 CAPSULE (50,000 UNITS TOTAL) BY MOUTH ONCE A WEEK, Disp: 12 capsule, Rfl: 0  •  fluticasone (FLONASE) 50 mcg/act nasal spray, TAKE 1 SPRAY INTO EACH NOSTRIL DAILY (Patient not taking: Reported on 11/10/2023), Disp: 16 g, Rfl: 4  •  furosemide (LASIX) 20 mg tablet, TAKE 1 TABLET (20 MG TOTAL) BY MOUTH 2 (TWO) TIMES A DAY, Disp: 180 tablet, Rfl: 0  •  guaiFENesin (MUCINEX) 600 mg 12 hr tablet, Take 1 tablet (600 mg total) by mouth every 12 (twelve) hours (Patient not taking: Reported on 11/10/2023), Disp: 30 tablet, Rfl: 0  •  insulin degludec Sharlett Gander FlexTouch) 200 units/mL CONCENTRATED U-200 injection pen, 60 units daily at 5PM (Patient taking differently: Inject 10 Units under the skin daily 60 units daily at 5PM), Disp: 27 mL, Rfl: 1  •  insulin lispro (HumaLOG) 100 units/mL injection pen, 24 units before each meal Plus sliding scale 150-200: 2 units 201-250: 4 units 251-300: 6 units 301-350: 8 units Over 350: 10 units, Disp: 90 mL, Rfl: 1  •  ketotifen (ZADITOR) 0.025 % ophthalmic solution, Administer 1 drop to both eyes 2 (two) times a day as needed (itchy eyes), Disp: 5 mL, Rfl: 0  •  loratadine (CLARITIN) 10 mg tablet, TAKE 1 TABLET (10 MG TOTAL) BY MOUTH DAILY, Disp: 90 tablet, Rfl: 2  •  losartan (COZAAR) 100 MG tablet, Take 1 tablet (100 mg total) by mouth daily, Disp: 90 tablet, Rfl: 1  •  Mag-G 500 (27 Mg) MG tablet, TAKE 1 TABLET (500 MG TOTAL) BY MOUTH DAILY, Disp: 90 tablet, Rfl: 1  •  metoprolol tartrate (LOPRESSOR) 25 mg tablet, Take 0.5 tablets (12.5 mg total) by mouth every 12 (twelve) hours, Disp: 180 tablet, Rfl: 0  •  omeprazole (PriLOSEC) 20 mg delayed release capsule, TAKE 1 CAPSULE (20 MG TOTAL) BY MOUTH DAILY, Disp: 90 capsule, Rfl: 2  •  OneTouch Delica Lancets 51N MISC, USE 3 (THREE) TIMES A DAY, Disp: 300 each, Rfl: 3  •  OneTouch Ultra test strip, USE 1 EACH 3 (THREE) TIMES A DAY USE AS INSTRUCTED, Disp: 300 each, Rfl: 2  •  polyethylene glycol (MIRALAX) 17 g packet, Take 17 g by mouth daily for 14 days, Disp: 28 each, Rfl: 5  •  tiotropium (Spiriva Respimat) 2.5 MCG/ACT AERS inhaler, INHALE 2 PUFFS DAILY, Disp: 18 g, Rfl: 1  •  traZODone (DESYREL) 50 mg tablet, TAKE 0.5 TABLETS (25 MG TOTAL) BY MOUTH DAILY AT BEDTIME (Patient not taking: Reported on 10/30/2023), Disp: 90 tablet, Rfl: 0  •  UltiCare Micro Pen Needles 32G X 4 MM MISC, INJECT UNDER THE SKIN 4 (FOUR) TIMES A DAY, Disp: 400 each, Rfl: 0  •  zileuton 600 MG, TAKE 1 TABLET (600 MG TOTAL) BY MOUTH 2 (TWO) TIMES A DAY, Disp: 60 tablet, Rfl: 3    Review of Systems   Constitutional: Negative. Negative for chills and fever. Respiratory:  Negative for shortness of breath. Cardiovascular:  Positive for leg swelling. Negative for palpitations. Gastrointestinal:  Negative for nausea and vomiting. Musculoskeletal:  Negative for gait problem. Skin:  Positive for wound (Right calf). Psychiatric/Behavioral:  The patient is nervous/anxious. Objective:  /78   Pulse 100   Temp (!) 96.1 °F (35.6 °C)   Resp 18   Pain Score:   9     Physical Exam  Vitals and nursing note reviewed. Constitutional:       General: She is not in acute distress. Appearance: Normal appearance. She is obese.  She is not ill-appearing, toxic-appearing or diaphoretic. Comments: Pleasant, no acute distress. Sister present at bedside. Offered Kerecis  but patient declined and preferred sister to translate. HENT:      Head: Normocephalic and atraumatic. Eyes:      General: No scleral icterus. Right eye: No discharge. Left eye: No discharge. Cardiovascular:      Rate and Rhythm: Normal rate. Pulmonary:      Effort: Pulmonary effort is normal. No respiratory distress. Musculoskeletal:         General: Normal range of motion. Cervical back: Normal range of motion and neck supple. No rigidity. Right lower le+ Edema present. Left lower le+ Edema present. Comments: Bilateral lower extremities are edematous, right slightly improved with Coflex wrap  1-2+ pitting right lower extremity, 2+ pitting left lower extremity   Skin:     General: Skin is warm and dry. Findings: Erythema and wound present. Comments: Ulcer with surrounding erythema. There is skin breakdown just at the distal border. This is new. Necrotic tissue and slough in the ulcer base. Small amount of yellow-green drainage. Neurological:      Mental Status: She is alert and oriented to person, place, and time. Psychiatric:         Mood and Affect: Mood normal.         Wound 11/10/23 Other (comment) Leg Right;Medial (Active)   Wound Image Images linked 23 1515   Wound Description Epithelialization;Yellow;Pink;Slough;Granulation tissue 23   Bobbi-wound Assessment Edema; Erythema; Excoriated 23 1437   Wound Length (cm) 2.8 cm 23 1437   Wound Width (cm) 2 cm 23 1437   Wound Depth (cm) 0.1 cm 23 1437   Wound Surface Area (cm^2) 5.6 cm^2 23 1437   Wound Volume (cm^3) 0.56 cm^3 23 1437   Calculated Wound Volume (cm^3) 0.56 cm^3 23 1437   Change in Wound Size % -86.67 23 1437   Drainage Amount Moderate 23 143   Drainage Description Serosanguineous;Green;Yellow 11/30/23 1437   Non-staged Wound Description Full thickness 11/30/23 1437   Dressing Status Intact 11/30/23 1437       Debridement   Wound 11/10/23 Other (comment) Leg Right;Medial    Universal Protocol:  Consent: Verbal consent obtained. Written consent obtained. Consent given by: patient  Time out: Immediately prior to procedure a "time out" was called to verify the correct patient, procedure, equipment, support staff and site/side marked as required. Patient understanding: patient states understanding of the procedure being performed  Patient identity confirmed: verbally with patient    Performed by: physician  Debridement type: surgical  Level of debridement: subcutaneous tissue  Pain control: lidocaine 4%  Post-debridement measurements  Length (cm): 2.8  Width (cm): 2  Depth (cm): 0.2  Percent debrided: 100%  Surface Area (cm^2): 5.6  Area debrided (cm^2): 5.6  Volume (cm^3): 1.12  Tissue and other material debrided: dermis and subcutaneous tissue  Devitalized tissue debrided: fibrin, necrotic debris and slough  Instrument(s) utilized: curette  Bleeding: small  Hemostasis obtained with: pressure  Procedural pain (0-10): 4  Post-procedural pain: 2   Response to treatment: procedure was tolerated well               Lab Results   Component Value Date    HGBA1C 11.3 (H) 11/13/2023       Wound Instructions:  Orders Placed This Encounter   Procedures   • Wound cleansing and dressings     Right leg wound      Shower yes, with protection. Your dressings can not get wet. If they do please contact us ASAP. Zinc to lower  Acticoat 7, convamax       Apply zinc oxide to skin surrounding lower portion of wound   Apply Acticoat 7 to the wound. Cover with Convamax. Secure with rolled gauze and tape   Secure with Coflex Lite   Change dressing weekly in wound center. Please try to consume 3-4 servings of protein (30g) each day.       Multi-layer compression wrap Instructions--Coflex lite Keep compression wrap/wraps clean and dry. If wraps are too tight and you experience numbness/tingling, call the wound center. If after hours, remove wraps or proceed to nearest E.R. and call wound center in AM.  Avoid prolonged standing in one place. Elevate leg(s) above the level of the heart when sitting or as much as possible. Wrap will be changed 1 x weekly     Standing Status:   Future     Standing Expiration Date:   11/30/2024   • Debridement     This order was created via procedure documentation             Kevin Elliott MD, CHT, CWS    Portions of the record may have been created with voice recognition software. Occasional wrong word or "sound alike" substitutions may have occurred due to the inherent limitations of voice recognition software. Read the chart carefully and recognize, using context, where substitutions have occurred.

## 2023-11-30 NOTE — PATIENT INSTRUCTIONS
Orders Placed This Encounter   Procedures    Wound cleansing and dressings     Right leg wound      Shower yes, with protection. Your dressings can not get wet. If they do please contact us ASAP. Zinc to lower  Acticoat 7, convamax       Apply zinc oxide to skin surrounding lower portion of wound   Apply Acticoat 7 to the wound. Cover with Convamax. Secure with rolled gauze and tape   Secure with Coflex Lite   Change dressing weekly in wound center. Please try to consume 3-4 servings of protein (30g) each day. Multi-layer compression wrap Instructions--Coflex lite      Keep compression wrap/wraps clean and dry. If wraps are too tight and you experience numbness/tingling, call the wound center. If after hours, remove wraps or proceed to nearest E.R. and call wound center in AM.  Avoid prolonged standing in one place. Elevate leg(s) above the level of the heart when sitting or as much as possible.     Wrap will be changed 1 x weekly     Standing Status:   Future     Standing Expiration Date:   11/30/2024

## 2023-12-04 ENCOUNTER — TELEPHONE (OUTPATIENT)
Dept: WOUND CARE | Facility: CLINIC | Age: 68
End: 2023-12-04

## 2023-12-04 NOTE — TELEPHONE ENCOUNTER
Telephone call received from pt's dtr Everett Kimble regarding [place] wraps. Pt reports wraps are too tight. Encouraged patient to elevate legs for 20 minutes. If pain does not lessen she can un-wrap her legs. If pain is still persisting patient should go to the emergency department for eval of same. Everett Kimble verbalized understanding & will relate to patient. Everett Kimble asked about scheduling Jolie's next appointment earlier, encouraged Everett Kimble to speak with  Terrell Haddad however our schedule has been very full & rescheduling can be difficult.

## 2023-12-07 ENCOUNTER — OFFICE VISIT (OUTPATIENT)
Dept: WOUND CARE | Facility: CLINIC | Age: 68
End: 2023-12-07
Payer: COMMERCIAL

## 2023-12-07 VITALS
DIASTOLIC BLOOD PRESSURE: 82 MMHG | HEART RATE: 90 BPM | SYSTOLIC BLOOD PRESSURE: 138 MMHG | RESPIRATION RATE: 18 BRPM | TEMPERATURE: 97.4 F

## 2023-12-07 DIAGNOSIS — I73.9 PAD (PERIPHERAL ARTERY DISEASE) (HCC): ICD-10-CM

## 2023-12-07 DIAGNOSIS — L97.912 NON-PRESSURE ULCER OF RIGHT LOWER EXTREMITY WITH FAT LAYER EXPOSED (HCC): Primary | ICD-10-CM

## 2023-12-07 PROCEDURE — 11042 DBRDMT SUBQ TIS 1ST 20SQCM/<: CPT | Performed by: FAMILY MEDICINE

## 2023-12-07 RX ORDER — LIDOCAINE 40 MG/G
CREAM TOPICAL ONCE
Status: COMPLETED | OUTPATIENT
Start: 2023-12-07 | End: 2023-12-07

## 2023-12-07 RX ADMIN — LIDOCAINE: 40 CREAM TOPICAL at 14:28

## 2023-12-07 NOTE — PROGRESS NOTES
Patient ID: Carlos Eduardo Beaulieu is a 80 y.o. female Date of Birth 1955       Chief Complaint   Patient presents with   • Follow Up Wound Care Visit     Follow up visit for wound to right leg. Pt arrives with multi layer wrap in place however she has cut it in several places. Pt reports she felt the wrap was too tight & cut it this morning. Allergies:  Codeine and Latex    Diagnosis:      Diagnosis ICD-10-CM Associated Orders   1. Non-pressure ulcer of right lower extremity with fat layer exposed ([address])  L97.912 lidocaine (LMX) 4 % cream     Wound cleansing and dressings     Debridement      2. PAD (peripheral artery disease) ([facility])  I73.9 lidocaine (LMX) 4 % cream     Wound cleansing and dressings              Assessment & Plan:  Nonpressure ulcer right lower extremity in setting of PAD: Green drainage and malodor resolved. Small area of irritation seen at last with skin breakdown; no evidence of acute infection. Surgical debridement   Continue Acticoat 7 followed by, Convamax  Small area distal end of wound with breakdown - apply silver alginate   Continue coflex lite; We discussed appropriate wrap precautions and should wrap become uncomfortable or she have a problem, to immediately call her office and if it has to be removed to take wrap off rather than cut wrap. Follow-up in 1 week or sooner if needed      Subjective:   "11/10/23: Patient is a 80year old female who presents to the [address] [place] [address] wound center as a new patient for an open wound of her right lower leg. Patient reports her wound has been present for approximately 1 month. She reports she woke up one morning with a small bump on her leg which progressively got worse. She has a hx of bilateral lower extremity edema since completing chemo and radiation for breast cancer 1 year ago. She denies wearing any type of compression. Patient has a hx of type 2 DM with her most recent A1c 11.9 as of 10/30.  Patient reports her wound drains a small amount of drainage. She keeps her wound MYRANDA when she is at home and keeps it covered with a band aid when outside her home. She denies any fevers or chills. "    11/15/2023: Patricia Huerta presents to wound center today for follow-up of right lower extremity wound. Her sister is present at bedside providing translation. She denies acute complaints and reports she tolerated Coflex lite well. Denies fever, chills. 11/22/2023: Patricia Huerta presents to wound center today for follow-up of right lower extremity wound. Her sister is again present at bedside aiding with translation. After last visit, venous studies completed which showed:  "RIGHT LIMB: No evidence of deep venous incompetence. The great saphenous vein is competent. The great saphenous vein exits the saphenous compartment in the distal thigh. The small saphenous vein is competent and does not communicate with the popliteal vein. There is no evidence of incompetent perforators in the thigh or calf. There is no evidence of deep vein thrombosis in the CFV, the proximal PFV, the femoral vein and the popliteal vein."  She denies fever, chills. She reports she has no acute complaints today. She has tolerated the Coflex lite wrap well. 11/30/2023: Follow-up ulceration on the right lower extremity. LEADS done yesterday:    CONCLUSION:  Impression:  RIGHT LOWER LIMB:  Evidence of <50% stenosis noted at the mid and distal superficial femoral  artery. Evidence of posterior tibial artery occlusive disease. Ankle/Brachial index: 0.96, which is in the normal disease category. PVR/ PPG tracings are normal.  Metatarsal pressure of 141 mmHg. Great toe pressure of 97 mmHg, within the healing range. LEFT LOWER LIMB:  Evidence of 50-75% stenosis noted in the mid superficial femoral artery. Evidence of anterior tibial artery occlusive disease. Ankle/Brachial index: 0.89, which is in the mild disease category.   PVR/ PPG tracings are normal.  Metatarsal pressure of 176 mmHg.  Great toe pressure of 67 mmHg, within the healing range. Tolerated Coflex lite but did express some discomfort. No fever or chills. 12/7/2023Deeann Route presents today for follow-up of ulceration right lower extremity. Reports this morning felt her Coflex wrap was too tight and cut the top (see a/p). Otherwise she has had no acute complaints. She denies fever, chills.       The following portions of the patient's history were reviewed and updated as appropriate:   Patient Active Problem List   Diagnosis   • Type 2 diabetes mellitus with complication, with long-term current use of insulin ([address])   • Severe persistent asthma without complication   • Other specified hypothyroidism   • Chest pain   • Palpitations   • Chronic bilateral low back pain without sciatica   • Neck pain   • Malignant neoplasm of right female breast ([address])   • Malignant neoplasm of upper-outer quadrant of right breast in female, estrogen receptor positive    • Hiatal hernia   • Screening for colon cancer   • Esophageal dysphagia   • Cellulitis of right axilla   • Chronic pain of right knee   • Hypercholesterolemia   • Hypothyroid   • Essential hypertension   • Bilateral pulmonary embolism (HCC)   • Radiation pneumonitis (HCC)   • Type 2 diabetes mellitus with hyperglycemia, with long-term current use of insulin ([address])   • Diabetic polyneuropathy associated with type 2 diabetes mellitus (HCC)   • Use of anastrozole (Arimidex)   • Decreased ROM of right shoulder   • Lump of skin   • Chronic diastolic heart failure (HCC)   • Gastroesophageal reflux disease without esophagitis   • Muscle cramps   • Sebaceous cyst of axilla   • Subcutaneous mass of back   • Overweight with body mass index (BMI) of 28 to 28.9 in adult   • Anxiety   • Right foot pain   • Pelvic pain   • Bone pain   • Type 2 diabetes mellitus with microalbuminuria, with long-term current use of insulin ([address])   • Depression, recurrent (HCC)   • Vaginal candidiasis   • Diabetes mellitus ([address])   • Seasonal allergies   • Diarrhea   • COVID-19   • Hypercalcemia   • Bilateral lower extremity edema   • Itchy eyes   • Cellulitis of right lower extremity   • Wound of right leg     Past Medical History:   Diagnosis Date   • Abdominal infection ([address])     h-pylori   • Abnormal chest x-ray 04/05/2019   • Asthma    • Breast cancer ([address]) 06/26/2018   • Cancer ([address])     BREAST   • Diabetes mellitus ([address])    • Hiatal hernia    • History of chemotherapy 2018   • History of radiation therapy 2018   • Hypercholesterolemia    • Hypertension    • Hypothyroid    • Renal disorder    • Rheumatoid arthritis [facility])      Past Surgical History:   Procedure Laterality Date   • 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   • BREAST LUMPECTOMY Right 6/26/2018    Procedure: RIGHT BREAST NEEDLE LOCALIZATION X2 WITH RIGHT BREAST LUMPECTOMY ( NEEDLE LOC @ 1000); Surgeon: Molina Canales MD;  Location: [facility];  Service: Surgical Oncology   • BREAST LUMPECTOMY Right 8/2/2018    Procedure: LYMPHOSCINITGRAPHY INTRAOPERATIVE LYMPHATIC MAPPING , RIGHT SENTINEL NODE BIOPSY, REEXCISION  RIGHT BREAST LUMPECTOMY CAVITY (SUPERIOR MARGIN); Surgeon: Molina Canales MD;  Location: [facility];  Service: Surgical Oncology   • BREAST SURGERY Left    • CATARACT EXTRACTION, BILATERAL Bilateral    • COLONOSCOPY     • EGD AND COLONOSCOPY N/A 9/5/2018    Procedure: EGD AND COLONOSCOPY;  Surgeon: Darling Yang MD;  Location: [facility] GI LAB; Service: Gastroenterology   • FL GUIDED CENTRAL VENOUS ACCESS DEVICE INSERTION  9/13/2018   • KNEE SURGERY Right    • MAMMO NEEDLE LOCALIZATION RIGHT (ALL INC) Right 6/26/2018   • MAMMO STEREOTACTIC BREAST BIOPSY RIGHT (ALL INC) Right 5/23/2018   • RETINAL DETACHMENT SURGERY Right    • TUBAL LIGATION     • TUNNELED VENOUS PORT PLACEMENT Left 9/13/2018    Procedure: INSERTION VENOUS PORT (PORT-A-CATH);   Surgeon: Molina Canales MD;  Location: [facility];  Service: Surgical Oncology   • UPPER GASTROINTESTINAL ENDOSCOPY       Family History   Problem Relation Age of Onset   • Diabetes Mother    • Hypertension Mother    • Diabetes Father    • Hypertension Father    • No Known Problems Sister    • No Known Problems Sister    • No Known Problems Sister    • No Known Problems Sister    • No Known Problems Sister    • Diabetes Brother    • Diabetes Brother    • Kidney failure Brother    • Diabetes Brother    • Cancer Maternal Grandfather    • No Known Problems Daughter    • No Known Problems Daughter    • No Known Problems Daughter       Social History     Socioeconomic History   • Marital status:      Spouse name: Not on file   • Number of children: Not on file   • Years of education: Not on file   • Highest education level: Not on file   Occupational History   • Not on file   Tobacco Use   • Smoking status: Never   • Smokeless tobacco: Never   Vaping Use   • Vaping Use: Never used   Substance and Sexual Activity   • Alcohol use: No   • Drug use: No   • Sexual activity: Not Currently   Other Topics Concern   • Not on file   Social History Narrative   • Not on file     Social Determinants of Health     Financial Resource Strain: Medium Risk (9/18/2023)    Overall Financial Resource Strain (CARDIA)    • Difficulty of Paying Living Expenses: Somewhat hard   Food Insecurity: No Food Insecurity (9/18/2023)    Hunger Vital Sign    • Worried About Running Out of Food in the Last Year: Never true    • Ran Out of Food in the Last Year: Never true   Transportation Needs: No Transportation Needs (9/18/2023)    PRAPARE - Transportation    • Lack of Transportation (Medical): No    • Lack of Transportation (Non-Medical):  No   Physical Activity: Not on file   Stress: Not on file   Social Connections: Not on file   Intimate Partner Violence: Not on file   Housing Stability: Low Risk  (10/27/2021)    Housing Stability Vital Sign    • Unable to Pay for Housing in the Last Year: No    • Number of Places Lived in the Last Year: 1    • Unstable Housing in the Last Year: No        Current Outpatient Medications:   •  acetaminophen (TYLENOL) 650 mg CR tablet, Take 1 tablet (650 mg total) by mouth every 8 (eight) hours as needed for mild pain, Disp: 30 tablet, Rfl: 0  •  Advair -21 MCG/ACT inhaler, INHALE 2 PUFFS 2 (TWO) TIMES A DAY RINSE MOUTH AFTER USE., Disp: 12 g, Rfl: 3  •  albuterol (2.5 mg/3 mL) 0.083 % nebulizer solution, Take 3 mL (2.5 mg total) by nebulization every 6 (six) hours as needed for wheezing or shortness of breath, Disp: 75 mL, Rfl: 5  •  albuterol (PROVENTIL HFA,VENTOLIN HFA) 90 mcg/act inhaler, INHALE 1 PUFF EVERY 4 (FOUR) HOURS AS NEEDED FOR WHEEZING, Disp: 8.5 g, Rfl: 3  •  anastrozole (ARIMIDEX) 1 mg tablet, TAKE 1 TABLET (1 MG TOTAL) BY MOUTH DAILY, Disp: 90 tablet, Rfl: 3  •  aspirin 81 mg chewable tablet, Chew 81 mg daily, Disp: , Rfl:   •  atorvastatin (LIPITOR) 40 mg tablet, Take 1 tablet (40 mg total) by mouth daily, Disp: 90 tablet, Rfl: 2  •  calcium polycarbophil (FIBERCON) 625 mg tablet, Take 1 tablet (625 mg total) by mouth daily (Patient not taking: Reported on 11/10/2023), Disp: 30 tablet, Rfl: 5  •  Diclofenac Sodium (VOLTAREN) 1 %, Apply 2 g topically 2 (two) times a day, Disp: 150 g, Rfl: 0  •  EPINEPHrine (Auvi-Q) 0.1 mg/0.1 mL SOAJ, Inject 0.3 mL (0.3 mg total) into a muscle once for 1 dose (Patient not taking: Reported on 2/6/2023), Disp: 2 each, Rfl: 5  •  ergocalciferol (VITAMIN D2) 50,000 units, TAKE 1 CAPSULE (50,000 UNITS TOTAL) BY MOUTH ONCE A WEEK, Disp: 12 capsule, Rfl: 0  •  fluticasone (FLONASE) 50 mcg/act nasal spray, TAKE 1 SPRAY INTO EACH NOSTRIL DAILY (Patient not taking: Reported on 11/10/2023), Disp: 16 g, Rfl: 4  •  furosemide (LASIX) 20 mg tablet, TAKE 1 TABLET (20 MG TOTAL) BY MOUTH 2 (TWO) TIMES A DAY, Disp: 180 tablet, Rfl: 0  •  guaiFENesin (MUCINEX) 600 mg 12 hr tablet, Take 1 tablet (600 mg total) by mouth every 12 (twelve) hours (Patient not taking: Reported on 11/10/2023), Disp: 30 tablet, Rfl: 0  •  insulin degludec Nonah Mauro FlexTouch) 200 units/mL CONCENTRATED U-200 injection pen, 60 units daily at 5PM (Patient taking differently: Inject 10 Units under the skin daily 60 units daily at 5PM), Disp: 27 mL, Rfl: 1  •  insulin lispro (HumaLOG) 100 units/mL injection pen, 24 units before each meal Plus sliding scale 150-200: 2 units 201-250: 4 units 251-300: 6 units 301-350: 8 units Over 350: 10 units, Disp: 90 mL, Rfl: 1  •  ketotifen (ZADITOR) 0.025 % ophthalmic solution, Administer 1 drop to both eyes 2 (two) times a day as needed (itchy eyes), Disp: 5 mL, Rfl: 0  •  loratadine (CLARITIN) 10 mg tablet, TAKE 1 TABLET (10 MG TOTAL) BY MOUTH DAILY, Disp: 90 tablet, Rfl: 2  •  losartan (COZAAR) 100 MG tablet, Take 1 tablet (100 mg total) by mouth daily, Disp: 90 tablet, Rfl: 1  •  Mag-G 500 (27 Mg) MG tablet, TAKE 1 TABLET (500 MG TOTAL) BY MOUTH DAILY, Disp: 90 tablet, Rfl: 1  •  metoprolol tartrate (LOPRESSOR) 25 mg tablet, Take 0.5 tablets (12.5 mg total) by mouth every 12 (twelve) hours, Disp: 180 tablet, Rfl: 0  •  omeprazole (PriLOSEC) 20 mg delayed release capsule, TAKE 1 CAPSULE (20 MG TOTAL) BY MOUTH DAILY, Disp: 90 capsule, Rfl: 2  •  OneTouch Delica Lancets 78R MISC, USE 3 (THREE) TIMES A DAY, Disp: 300 each, Rfl: 3  •  OneTouch Ultra test strip, USE 1 EACH 3 (THREE) TIMES A DAY USE AS INSTRUCTED, Disp: 300 each, Rfl: 2  •  polyethylene glycol (MIRALAX) 17 g packet, Take 17 g by mouth daily for 14 days, Disp: 28 each, Rfl: 5  •  tiotropium (Spiriva Respimat) 2.5 MCG/ACT AERS inhaler, INHALE 2 PUFFS DAILY, Disp: 18 g, Rfl: 1  •  traZODone (DESYREL) 50 mg tablet, TAKE 0.5 TABLETS (25 MG TOTAL) BY MOUTH DAILY AT BEDTIME (Patient not taking: Reported on 10/30/2023), Disp: 90 tablet, Rfl: 0  •  UltiCare Micro Pen Needles 32G X 4 MM MISC, INJECT UNDER THE SKIN 4 (FOUR) TIMES A DAY, Disp: 400 each, Rfl: 0  •  zileuton 600 MG, TAKE 1 TABLET (600 MG TOTAL) BY MOUTH 2 (TWO) TIMES A DAY, Disp: 60 tablet, Rfl: 3  No current facility-administered medications for this visit. Review of Systems   Constitutional: Negative. Negative for chills and fever. Respiratory:  Negative for shortness of breath. Cardiovascular:  Positive for leg swelling. Negative for chest pain and palpitations. Gastrointestinal:  Negative for nausea and vomiting. Musculoskeletal:  Negative for gait problem. Skin:  Positive for wound (RLE). Psychiatric/Behavioral:  The patient is not nervous/anxious. Objective:  /82   Pulse 90   Temp (!) 97.4 °F (36.3 °C) (Tympanic)   Resp 18   Pain Score: 0-No pain     Physical Exam  Vitals and nursing note reviewed. Constitutional:       General: She is not in acute distress. Appearance: Normal appearance. She is obese. She is not ill-appearing, toxic-appearing or diaphoretic. Comments: Pleasant, no acute distress. NokterraCom  used today   HENT:      Head: Normocephalic and atraumatic. Eyes:      General: No scleral icterus. Cardiovascular:      Rate and Rhythm: Normal rate. Pulses:           Dorsalis pedis pulses are 1+ on the right side and 1+ on the left side. Posterior tibial pulses are 1+ on the right side and 1+ on the left side. Pulmonary:      Effort: Pulmonary effort is normal. No respiratory distress. Musculoskeletal:         General: Normal range of motion. Cervical back: Normal range of motion and neck supple. No rigidity. Right lower le+ Edema present. Left lower le+ Edema present. Comments: Bilateral lower extremities are edematous 1+ pitting bilaterally   Skin:     General: Skin is warm and dry. Findings: Wound present. Comments: Ulcer with surrounding mild erythema; more pink in appearance today. Continues w/ skin breakdown just at the distal border. Green drainage has resolved, wound bed with adherent slough and necrotic tissue.   No malodor, lymphangitic streaking, or induration. Neurological:      Mental Status: She is alert and oriented to person, place, and time. Psychiatric:         Mood and Affect: Mood normal.                 Wound 11/10/23 Other (comment) Leg Right;Medial (Active)   Wound Image    12/07/23 1359   Wound Description Epithelialization;Pink;Yellow;Slough 12/07/23 1400   Bobbi-wound Assessment Edema 12/07/23 1400   Wound Length (cm) 2.8 cm 12/07/23 1400   Wound Width (cm) 2 cm 12/07/23 1400   Wound Depth (cm) 0.1 cm 12/07/23 1400   Wound Surface Area (cm^2) 5.6 cm^2 12/07/23 1400   Wound Volume (cm^3) 0.56 cm^3 12/07/23 1400   Calculated Wound Volume (cm^3) 0.56 cm^3 12/07/23 1400   Change in Wound Size % -86.67 12/07/23 1400   Drainage Amount Moderate 12/07/23 1400   Drainage Description Serosanguineous; Yellow 12/07/23 1400   Non-staged Wound Description Full thickness 12/07/23 1400   Treatments Irrigation with NSS 12/07/23 1400   Patient Tolerance Tolerated well 12/07/23 1400   Dressing Status Removed 12/07/23 1400         Debridement    Universal Protocol:  Consent: Verbal consent obtained. Written consent obtained. Risks and benefits: risks, benefits and alternatives were discussed  Consent given by: patient  Time out: Immediately prior to procedure a "time out" was called to verify the correct patient, procedure, equipment, support staff and site/side marked as required.   Patient understanding: patient states understanding of the procedure being performed  Patient identity confirmed: verbally with patient    Performed by: physician  Debridement type: surgical  Level of debridement: subcutaneous tissue  Pain control: lidocaine 4%  Post-debridement measurements  Length (cm): 2.8  Width (cm): 2  Depth (cm): 0.2  Percent debrided: 100%  Surface Area (cm^2): 5.6  Area debrided (cm^2): 5.6  Volume (cm^3): 1.12  Tissue and other material debrided: subcutaneous tissue  Devitalized tissue debrided: fibrin and solo  Instrument(s) utilized: curette  Bleeding: medium  Hemostasis obtained with: pressure  Procedural pain (0-10): 3  Post-procedural pain: 0   Response to treatment: procedure was tolerated well                     Lab Results   Component Value Date    HGBA1C 11.3 (H) 11/13/2023       Wound Instructions:  Orders Placed This Encounter   Procedures   • Wound cleansing and dressings     Right leg wound      Shower yes, with protection. Your dressings can not get wet. If they do please contact us ASAP. Apply zinc oxide to skin surrounding lower portion of wound   Apply Acticoat 7 cut to fit the upper wound. Apply Silver alginate to lower wound. Cover with Convamax. Secure with rolled gauze and tape   Secure with Coflex Lite   Change dressing weekly in wound center. Please try to consume 3-4 servings of protein (30g) each day. Multi-layer compression wrap Instructions -- Coflex lite    Keep compression wrap/wraps clean and dry. If wraps are too tight and you experience numbness/tingling, call the wound center. If after hours, remove wraps or proceed to nearest E.R. and call wound center in AM.   Avoid prolonged standing in one place. Elevate leg(s) above the level of the heart when sitting or as much as possible. Veronica Abdul will be changed 1 x weekly     Standing Status:   Future     Standing Expiration Date:   12/7/2024   • Debridement     This order was created via procedure documentation       Janine Koo MD    Portions of the record may have been created with voice recognition software. Occasional wrong word or "sound alike" substitutions may have occurred due to the inherent limitations of voice recognition software. Read the chart carefully and recognize, using context, where substitutions have occurred.

## 2023-12-07 NOTE — PATIENT INSTRUCTIONS
Orders Placed This Encounter   Procedures    Wound cleansing and dressings     Right leg wound      Shower yes, with protection. Your dressings can not get wet. If they do please contact us ASAP. Apply zinc oxide to skin surrounding lower portion of wound   Apply Acticoat 7 cut to fit the upper wound. Apply Silver alginate to lower wound. Cover with Convamax. Secure with rolled gauze and tape   Secure with Coflex Lite   Change dressing weekly in wound center. Please try to consume 3-4 servings of protein (30g) each day. Multi-layer compression wrap Instructions -- Coflex lite    Keep compression wrap/wraps clean and dry. If wraps are too tight and you experience numbness/tingling, call the wound center. If after hours, remove wraps or proceed to nearest E.R. and call wound center in AM.   Avoid prolonged standing in one place. Elevate leg(s) above the level of the heart when sitting or as much as possible.      Wrap will be changed 1 x weekly     Standing Status:   Future     Standing Expiration Date:   12/7/2024

## 2023-12-13 NOTE — PROGRESS NOTES
Patient ID: Yves Carlisle is a 80 y.o. female Date of Birth 1955       Chief Complaint   Patient presents with   • Follow Up Wound Care Visit     Follow up visit for wound to right leg. Pt arrives with multi layer wrap in place. Pt denies any issues or concerns since last visit. Allergies:  Codeine and Latex    Diagnosis:      Diagnosis ICD-10-CM Associated Orders   1. Non-pressure ulcer of right lower extremity with fat layer exposed ([facility])  L97.912 lidocaine (LMX) 4 % cream     Wound cleansing and dressings Other (comment) Right;Medial Leg     Debridement      2. PAD (peripheral artery disease) ([facility])  I73.9               Assessment & Plan:  Nonpressure ulcer right lower extremity in setting of PAD: The wound measuring similar size overall is improved. Distal skin breakdown is improved almost completely epithelialized. Wound bed with granulation tissue and outer rim of adherent slough. No malodor, lymphangitic streaking, or induration. Drainage has improved. Surgical debridement   Discontinue Acticoat, start Puracol Ag  followed by calcium alginate to open areas  Continue with Coflex lite  Again reviewed importance of diabetic control in setting of her wounds  Follow-up in 1 week or sooner if needed    Subjective:   "11/10/23: Patient is a 80year old female who presents to the [facility] wound center as a new patient for an open wound of her right lower leg. Patient reports her wound has been present for approximately 1 month. She reports she woke up one morning with a small bump on her leg which progressively got worse. She has a hx of bilateral lower extremity edema since completing chemo and radiation for breast cancer 1 year ago. She denies wearing any type of compression. Patient has a hx of type 2 DM with her most recent A1c 11.9 as of 10/30. Patient reports her wound drains a small amount of drainage.  She keeps her wound MYRANDA when she is at home and keeps it covered with a band aid when outside her home. She denies any fevers or chills. "    11/15/2023: Carli Soto presents to wound center today for follow-up of right lower extremity wound. Her sister is present at bedside providing translation. She denies acute complaints and reports she tolerated Coflex lite well. Denies fever, chills. 11/22/2023: Carli Soto presents to wound center today for follow-up of right lower extremity wound. Her sister is again present at bedside aiding with translation. After last visit, venous studies completed which showed:  "RIGHT LIMB: No evidence of deep venous incompetence. The great saphenous vein is competent. The great saphenous vein exits the saphenous compartment in the distal thigh. The small saphenous vein is competent and does not communicate with the popliteal vein. There is no evidence of incompetent perforators in the thigh or calf. There is no evidence of deep vein thrombosis in the CFV, the proximal PFV, the femoral vein and the popliteal vein."  She denies fever, chills. She reports she has no acute complaints today. She has tolerated the Coflex lite wrap well. 11/30/2023: Follow-up ulceration on the right lower extremity. LEADS done yesterday:    CONCLUSION:  Impression:  RIGHT LOWER LIMB:  Evidence of <50% stenosis noted at the mid and distal superficial femoral  artery. Evidence of posterior tibial artery occlusive disease. Ankle/Brachial index: 0.96, which is in the normal disease category. PVR/ PPG tracings are normal.  Metatarsal pressure of 141 mmHg. Great toe pressure of 97 mmHg, within the healing range. LEFT LOWER LIMB:  Evidence of 50-75% stenosis noted in the mid superficial femoral artery. Evidence of anterior tibial artery occlusive disease. Ankle/Brachial index: 0.89, which is in the mild disease category. PVR/ PPG tracings are normal.  Metatarsal pressure of 176 mmHg. Great toe pressure of 67 mmHg, within the healing range.     Tolerated Coflex lite but did express some discomfort. No fever or chills. 12/7/2023Femarley Dixon presents today for follow-up of ulceration right lower extremity. Reports this morning felt her Coflex wrap was too tight and cut the top (see a/p). Otherwise she has had no acute complaints. She denies fever, chills. 12/14/2023: Sonal Dixon presents to wound center today for follow-up of right lower extremity ulceration. Her daughter is present with her today providing translation. Reports that she tolerated the Coflex lite wrap well and did not have difficulty as she did at previous visit. She reports she has no acute complaints and denies fever, chills.       The following portions of the patient's history were reviewed and updated as appropriate:   Patient Active Problem List   Diagnosis   • Type 2 diabetes mellitus with complication, with long-term current use of insulin ([address])   • Severe persistent asthma without complication   • Other specified hypothyroidism   • Chest pain   • Palpitations   • Chronic bilateral low back pain without sciatica   • Neck pain   • Malignant neoplasm of right female breast ([address])   • Malignant neoplasm of upper-outer quadrant of right breast in female, estrogen receptor positive    • Hiatal hernia   • Screening for colon cancer   • Esophageal dysphagia   • Cellulitis of right axilla   • Chronic pain of right knee   • Hypercholesterolemia   • Hypothyroid   • Essential hypertension   • Bilateral pulmonary embolism (HCC)   • Radiation pneumonitis (HCC)   • Type 2 diabetes mellitus with hyperglycemia, with long-term current use of insulin ([address])   • Diabetic polyneuropathy associated with type 2 diabetes mellitus (HCC)   • Use of anastrozole (Arimidex)   • Decreased ROM of right shoulder   • Lump of skin   • Chronic diastolic heart failure (HCC)   • Gastroesophageal reflux disease without esophagitis   • Muscle cramps   • Sebaceous cyst of axilla   • Subcutaneous mass of back   • Overweight with body mass index (BMI) of 28 to 28.9 in adult   • Anxiety   • Right foot pain   • Pelvic pain   • Bone pain   • Type 2 diabetes mellitus with microalbuminuria, with long-term current use of insulin (HCC)   • Depression, recurrent (HCC)   • Vaginal candidiasis   • Diabetes mellitus ([address])   • Seasonal allergies   • Diarrhea   • COVID-19   • Hypercalcemia   • Bilateral lower extremity edema   • Itchy eyes   • Cellulitis of right lower extremity   • Wound of right leg     Past Medical History:   Diagnosis Date   • Abdominal infection ([address])     h-pylori   • Abnormal chest x-ray 04/05/2019   • Asthma    • Breast cancer ([address]) 06/26/2018   • Cancer ([address])     BREAST   • Diabetes mellitus ([address])    • Hiatal hernia    • History of chemotherapy 2018   • History of radiation therapy 2018   • Hypercholesterolemia    • Hypertension    • Hypothyroid    • Renal disorder    • Rheumatoid arthritis [facility])      Past Surgical History:   Procedure Laterality Date   • 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   • BREAST LUMPECTOMY Right 6/26/2018    Procedure: RIGHT BREAST NEEDLE LOCALIZATION X2 WITH RIGHT BREAST LUMPECTOMY ( NEEDLE LOC @ 1000); Surgeon: Hussein Langford MD;  Location: [facility];  Service: Surgical Oncology   • BREAST LUMPECTOMY Right 8/2/2018    Procedure: LYMPHOSCINITGRAPHY INTRAOPERATIVE LYMPHATIC MAPPING , RIGHT SENTINEL NODE BIOPSY, REEXCISION  RIGHT BREAST LUMPECTOMY CAVITY (SUPERIOR MARGIN); Surgeon: Hussein Langford MD;  Location: [facility];  Service: Surgical Oncology   • BREAST SURGERY Left    • CATARACT EXTRACTION, BILATERAL Bilateral    • COLONOSCOPY     • EGD AND COLONOSCOPY N/A 9/5/2018    Procedure: EGD AND COLONOSCOPY;  Surgeon: Benji Mcgee MD;  Location: [facility] GI LAB;   Service: Gastroenterology   • FL GUIDED CENTRAL VENOUS ACCESS DEVICE INSERTION  9/13/2018   • KNEE SURGERY Right    • MAMMO NEEDLE LOCALIZATION RIGHT (ALL INC) Right 6/26/2018   • MAMMO STEREOTACTIC BREAST BIOPSY RIGHT (ALL INC) Right 5/23/2018   • RETINAL DETACHMENT SURGERY Right    • TUBAL LIGATION     • TUNNELED VENOUS PORT PLACEMENT Left 9/13/2018    Procedure: INSERTION VENOUS PORT (PORT-A-CATH); Surgeon: Екатерина Patel MD;  Location: [facility];  Service: Surgical Oncology   • UPPER GASTROINTESTINAL ENDOSCOPY       Family History   Problem Relation Age of Onset   • Diabetes Mother    • Hypertension Mother    • Diabetes Father    • Hypertension Father    • No Known Problems Sister    • No Known Problems Sister    • No Known Problems Sister    • No Known Problems Sister    • No Known Problems Sister    • Diabetes Brother    • Diabetes Brother    • Kidney failure Brother    • Diabetes Brother    • Cancer Maternal Grandfather    • No Known Problems Daughter    • No Known Problems Daughter    • No Known Problems Daughter       Social History     Socioeconomic History   • Marital status:      Spouse name: Not on file   • Number of children: Not on file   • Years of education: Not on file   • Highest education level: Not on file   Occupational History   • Not on file   Tobacco Use   • Smoking status: Never   • Smokeless tobacco: Never   Vaping Use   • Vaping status: Never Used   Substance and Sexual Activity   • Alcohol use: No   • Drug use: No   • Sexual activity: Not Currently   Other Topics Concern   • Not on file   Social History Narrative   • Not on file     Social Determinants of Health     Financial Resource Strain: Medium Risk (9/18/2023)    Overall Financial Resource Strain (CARDIA)    • Difficulty of Paying Living Expenses: Somewhat hard   Food Insecurity: No Food Insecurity (9/18/2023)    Hunger Vital Sign    • Worried About Running Out of Food in the Last Year: Never true    • Ran Out of Food in the Last Year: Never true   Transportation Needs: No Transportation Needs (9/18/2023)    PRAPARE - Transportation    • Lack of Transportation (Medical): No    • Lack of Transportation (Non-Medical):  No   Physical Activity: Not on file   Stress: Not on file   Social Connections: Not on file   Intimate Partner Violence: Not on file   Housing Stability: Low Risk  (10/27/2021)    Housing Stability Vital Sign    • Unable to Pay for Housing in the Last Year: No    • Number of Places Lived in the Last Year: 1    • Unstable Housing in the Last Year: No        Current Outpatient Medications:   •  acetaminophen (TYLENOL) 650 mg CR tablet, Take 1 tablet (650 mg total) by mouth every 8 (eight) hours as needed for mild pain, Disp: 30 tablet, Rfl: 0  •  Advair -21 MCG/ACT inhaler, INHALE 2 PUFFS 2 (TWO) TIMES A DAY RINSE MOUTH AFTER USE., Disp: 12 g, Rfl: 3  •  albuterol (2.5 mg/3 mL) 0.083 % nebulizer solution, Take 3 mL (2.5 mg total) by nebulization every 6 (six) hours as needed for wheezing or shortness of breath, Disp: 75 mL, Rfl: 5  •  albuterol (PROVENTIL HFA,VENTOLIN HFA) 90 mcg/act inhaler, INHALE 1 PUFF EVERY 4 (FOUR) HOURS AS NEEDED FOR WHEEZING, Disp: 8.5 g, Rfl: 3  •  anastrozole (ARIMIDEX) 1 mg tablet, TAKE 1 TABLET (1 MG TOTAL) BY MOUTH DAILY, Disp: 90 tablet, Rfl: 3  •  aspirin 81 mg chewable tablet, Chew 81 mg daily, Disp: , Rfl:   •  atorvastatin (LIPITOR) 40 mg tablet, Take 1 tablet (40 mg total) by mouth daily, Disp: 90 tablet, Rfl: 2  •  calcium polycarbophil (FIBERCON) 625 mg tablet, Take 1 tablet (625 mg total) by mouth daily (Patient not taking: Reported on 11/10/2023), Disp: 30 tablet, Rfl: 5  •  Diclofenac Sodium (VOLTAREN) 1 %, Apply 2 g topically 2 (two) times a day, Disp: 150 g, Rfl: 0  •  EPINEPHrine (Auvi-Q) 0.1 mg/0.1 mL SOAJ, Inject 0.3 mL (0.3 mg total) into a muscle once for 1 dose (Patient not taking: Reported on 2/6/2023), Disp: 2 each, Rfl: 5  •  ergocalciferol (VITAMIN D2) 50,000 units, TAKE 1 CAPSULE (50,000 UNITS TOTAL) BY MOUTH ONCE A WEEK, Disp: 12 capsule, Rfl: 0  •  fluticasone (FLONASE) 50 mcg/act nasal spray, TAKE 1 SPRAY INTO EACH NOSTRIL DAILY (Patient not taking: Reported on 11/10/2023), Disp: 16 g, Rfl: 4  •  furosemide (LASIX) 20 mg tablet, TAKE 1 TABLET (20 MG TOTAL) BY MOUTH 2 (TWO) TIMES A DAY, Disp: 180 tablet, Rfl: 0  •  guaiFENesin (MUCINEX) 600 mg 12 hr tablet, Take 1 tablet (600 mg total) by mouth every 12 (twelve) hours (Patient not taking: Reported on 11/10/2023), Disp: 30 tablet, Rfl: 0  •  insulin degludec Anola Severin FlexTouch) 200 units/mL CONCENTRATED U-200 injection pen, 60 units daily at 5PM (Patient taking differently: Inject 10 Units under the skin daily 60 units daily at 5PM), Disp: 27 mL, Rfl: 1  •  insulin lispro (HumaLOG) 100 units/mL injection pen, 24 units before each meal Plus sliding scale 150-200: 2 units 201-250: 4 units 251-300: 6 units 301-350: 8 units Over 350: 10 units, Disp: 90 mL, Rfl: 1  •  ketotifen (ZADITOR) 0.025 % ophthalmic solution, Administer 1 drop to both eyes 2 (two) times a day as needed (itchy eyes), Disp: 5 mL, Rfl: 0  •  loratadine (CLARITIN) 10 mg tablet, TAKE 1 TABLET (10 MG TOTAL) BY MOUTH DAILY, Disp: 90 tablet, Rfl: 2  •  losartan (COZAAR) 100 MG tablet, Take 1 tablet (100 mg total) by mouth daily, Disp: 90 tablet, Rfl: 1  •  Mag-G 500 (27 Mg) MG tablet, TAKE 1 TABLET (500 MG TOTAL) BY MOUTH DAILY, Disp: 90 tablet, Rfl: 1  •  metoprolol tartrate (LOPRESSOR) 25 mg tablet, Take 0.5 tablets (12.5 mg total) by mouth every 12 (twelve) hours, Disp: 180 tablet, Rfl: 0  •  omeprazole (PriLOSEC) 20 mg delayed release capsule, TAKE 1 CAPSULE (20 MG TOTAL) BY MOUTH DAILY, Disp: 90 capsule, Rfl: 2  •  OneTouch Delica Lancets 04U MISC, USE 3 (THREE) TIMES A DAY, Disp: 300 each, Rfl: 3  •  OneTouch Ultra test strip, USE 1 EACH 3 (THREE) TIMES A DAY USE AS INSTRUCTED, Disp: 300 each, Rfl: 2  •  polyethylene glycol (MIRALAX) 17 g packet, Take 17 g by mouth daily for 14 days, Disp: 28 each, Rfl: 5  •  tiotropium (Spiriva Respimat) 2.5 MCG/ACT AERS inhaler, INHALE 2 PUFFS DAILY, Disp: 18 g, Rfl: 1  •  traZODone (DESYREL) 50 mg tablet, TAKE 0.5 TABLETS (25 MG TOTAL) BY MOUTH DAILY AT BEDTIME (Patient not taking: Reported on 10/30/2023), Disp: 90 tablet, Rfl: 0  •  UltiCare Micro Pen Needles 32G X 4 MM MISC, INJECT UNDER THE SKIN 4 (FOUR) TIMES A DAY, Disp: 400 each, Rfl: 0  •  zileuton 600 MG, TAKE 1 TABLET (600 MG TOTAL) BY MOUTH 2 (TWO) TIMES A DAY, Disp: 60 tablet, Rfl: 3  No current facility-administered medications for this visit. Review of Systems   Constitutional: Negative. Negative for chills and fever. Respiratory:  Negative for shortness of breath. Cardiovascular:  Positive for leg swelling. Negative for chest pain and palpitations. Gastrointestinal:  Negative for nausea and vomiting. Musculoskeletal:  Negative for gait problem. Skin:  Positive for wound (RLE). Psychiatric/Behavioral:  The patient is not nervous/anxious. Objective:  /82   Pulse 86   Temp 98 °F (36.7 °C) (Tympanic)   Resp 18   Pain Score: 0-No pain     Physical Exam  Vitals and nursing note reviewed. Constitutional:       General: She is not in acute distress. Appearance: Normal appearance. She is obese. She is not ill-appearing, toxic-appearing or diaphoretic. Comments: Very pleasant no acute distress; Daughter is at bedside who is providing translation   HENT:      Head: Normocephalic and atraumatic. Eyes:      General: No scleral icterus. Cardiovascular:      Rate and Rhythm: Normal rate. Pulses:           Dorsalis pedis pulses are 1+ on the right side and 1+ on the left side. Posterior tibial pulses are 1+ on the right side and 1+ on the left side. Pulmonary:      Effort: Pulmonary effort is normal. No respiratory distress. Musculoskeletal:         General: Normal range of motion. Right lower leg: Edema present. Left lower leg: Edema present. Comments: Mild pitting edema on the right lower extremity, left lower extremity remains around 1+   Skin:     General: Skin is warm and dry. Findings: Wound present.              Comments: Ulcer with surrounding mild erythema; stable. Distal skin breakdown is improved almost completely epithelialized. Wound bed with granulation tissue and outer rim of adherent slough. No malodor, lymphangitic streaking, or induration. Neurological:      Mental Status: She is alert and oriented to person, place, and time. Psychiatric:         Mood and Affect: Mood normal.                 Wound 11/10/23 Other (comment) Leg Right;Medial (Active)   Wound Description Yellow;Pink;Epithelialization;Granulation tissue 12/14/23 1500   Bobbi-wound Assessment Intact; Pink 12/14/23 1500   Wound Length (cm) 2.8 cm 12/14/23 1500   Wound Width (cm) 2 cm 12/14/23 1500   Wound Depth (cm) 0.1 cm 12/14/23 1500   Wound Surface Area (cm^2) 5.6 cm^2 12/14/23 1500   Wound Volume (cm^3) 0.56 cm^3 12/14/23 1500   Calculated Wound Volume (cm^3) 0.56 cm^3 12/14/23 1500   Change in Wound Size % -86.67 12/14/23 1500   Drainage Amount Moderate 12/14/23 1500   Drainage Description Serosanguineous; Yellow 12/14/23 1500   Non-staged Wound Description Full thickness 12/14/23 1500   Treatments Cleansed 12/14/23 1500   Patient Tolerance Tolerated well 12/14/23 1500   Dressing Status Removed 12/14/23 1500       Postdebridement photo unavailable    Debridement    Universal Protocol:  Consent: Verbal consent obtained. Written consent obtained. Risks and benefits: risks, benefits and alternatives were discussed  Consent given by: patient  Time out: Immediately prior to procedure a "time out" was called to verify the correct patient, procedure, equipment, support staff and site/side marked as required.   Patient understanding: patient states understanding of the procedure being performed  Patient identity confirmed: verbally with patient    Debridement Details  Performed by: physician  Debridement type: surgical  Level of debridement: subcutaneous tissue  Pain control: lidocaine 4%      Post-debridement measurements  Length (cm): 2.8  Width (cm): 2  Depth (cm): 0.3  Percent debrided: 100%  Surface Area (cm^2): 5.6  Area Debrided (cm^2): 5.6  Volume (cm^3): 1.68    Tissue and other material debrided: subcutaneous tissue  Devitalized tissue debrided: fibrin and slough  Instrument(s) utilized: curette  Bleeding: medium  Hemostasis obtained with: pressure  Procedural pain (0-10): 3  Post-procedural pain: 0   Response to treatment: procedure was tolerated well               Lab Results   Component Value Date    HGBA1C 11.3 (H) 11/13/2023       Wound Instructions:  Orders Placed This Encounter   Procedures   • Wound cleansing and dressings Other (comment) Right;Medial Leg     Right leg wound      Shower yes, with protection. Your dressings can not get wet. If they do please contact us ASAP. Apply zinc oxide to skin surrounding lower portion of wound   Apply Puracol AG to wound bed. Cover with calcium alginate & abd pad. Secure with rolled gauze and tape   Secure with Coflex Lite   Change dressing weekly in wound center. Please try to consume 3-4 servings of protein (30g) each day. Multi-layer compression wrap Instructions -- Coflex lite    Keep compression wrap/wraps clean and dry. If wraps are too tight and you experience numbness/tingling, call the wound center. If after hours, remove wraps or proceed to nearest E.R. and call wound center in AM.   Avoid prolonged standing in one place. Elevate leg(s) above the level of the heart when sitting or as much as possible. Shi Gorman will be changed 1 x weekly     Standing Status:   Future     Standing Expiration Date:   12/14/2024   • Debridement     This order was created via procedure documentation         Adalid Lara MD    Portions of the record may have been created with voice recognition software. Occasional wrong word or "sound alike" substitutions may have occurred due to the inherent limitations of voice recognition software.  Read the chart carefully and recognize, using context, where substitutions have occurred.

## 2023-12-14 ENCOUNTER — OFFICE VISIT (OUTPATIENT)
Dept: WOUND CARE | Facility: CLINIC | Age: 68
End: 2023-12-14
Payer: COMMERCIAL

## 2023-12-14 VITALS
SYSTOLIC BLOOD PRESSURE: 130 MMHG | RESPIRATION RATE: 18 BRPM | HEART RATE: 86 BPM | TEMPERATURE: 98 F | DIASTOLIC BLOOD PRESSURE: 82 MMHG

## 2023-12-14 DIAGNOSIS — L97.912 NON-PRESSURE ULCER OF RIGHT LOWER EXTREMITY WITH FAT LAYER EXPOSED (HCC): Primary | ICD-10-CM

## 2023-12-14 DIAGNOSIS — I73.9 PAD (PERIPHERAL ARTERY DISEASE) (HCC): ICD-10-CM

## 2023-12-14 PROCEDURE — 11042 DBRDMT SUBQ TIS 1ST 20SQCM/<: CPT | Performed by: FAMILY MEDICINE

## 2023-12-14 RX ORDER — LIDOCAINE 40 MG/G
CREAM TOPICAL ONCE
Status: COMPLETED | OUTPATIENT
Start: 2023-12-14 | End: 2023-12-14

## 2023-12-14 RX ADMIN — LIDOCAINE: 40 CREAM TOPICAL at 15:27

## 2023-12-14 NOTE — PATIENT INSTRUCTIONS
Orders Placed This Encounter   Procedures    Wound cleansing and dressings Other (comment) Right;Medial Leg     Right leg wound      Shower yes, with protection. Your dressings can not get wet. If they do please contact us ASAP. Apply zinc oxide to skin surrounding lower portion of wound   Apply Puracol AG to wound bed. Cover with calcium alginate & abd pad. Secure with rolled gauze and tape   Secure with Coflex Lite   Change dressing weekly in wound center. Please try to consume 3-4 servings of protein (30g) each day. Multi-layer compression wrap Instructions -- Coflex lite    Keep compression wrap/wraps clean and dry. If wraps are too tight and you experience numbness/tingling, call the wound center. If after hours, remove wraps or proceed to nearest E.R. and call wound center in AM.   Avoid prolonged standing in one place. Elevate leg(s) above the level of the heart when sitting or as much as possible.      Wrap will be changed 1 x weekly     Standing Status:   Future     Standing Expiration Date:   12/14/2024

## 2023-12-18 ENCOUNTER — TELEPHONE (OUTPATIENT)
Dept: HEMATOLOGY ONCOLOGY | Facility: CLINIC | Age: 68
End: 2023-12-18

## 2023-12-18 ENCOUNTER — OFFICE VISIT (OUTPATIENT)
Dept: HEMATOLOGY ONCOLOGY | Facility: CLINIC | Age: 68
End: 2023-12-18
Payer: COMMERCIAL

## 2023-12-18 VITALS
TEMPERATURE: 97.8 F | OXYGEN SATURATION: 98 % | HEART RATE: 89 BPM | BODY MASS INDEX: 30.66 KG/M2 | WEIGHT: 184 LBS | SYSTOLIC BLOOD PRESSURE: 126 MMHG | DIASTOLIC BLOOD PRESSURE: 70 MMHG | RESPIRATION RATE: 18 BRPM | HEIGHT: 65 IN

## 2023-12-18 DIAGNOSIS — C50.411 MALIGNANT NEOPLASM OF UPPER-OUTER QUADRANT OF RIGHT BREAST IN FEMALE, ESTROGEN RECEPTOR POSITIVE: Primary | ICD-10-CM

## 2023-12-18 DIAGNOSIS — M85.80 OSTEOPENIA AFTER MENOPAUSE: ICD-10-CM

## 2023-12-18 DIAGNOSIS — L72.0 EPIDERMAL INCLUSION CYST: ICD-10-CM

## 2023-12-18 DIAGNOSIS — Z79.811 USE OF ANASTROZOLE (ARIMIDEX): ICD-10-CM

## 2023-12-18 DIAGNOSIS — Z78.0 OSTEOPENIA AFTER MENOPAUSE: ICD-10-CM

## 2023-12-18 DIAGNOSIS — Z17.0 MALIGNANT NEOPLASM OF UPPER-OUTER QUADRANT OF RIGHT BREAST IN FEMALE, ESTROGEN RECEPTOR POSITIVE: Primary | ICD-10-CM

## 2023-12-18 PROCEDURE — 99215 OFFICE O/P EST HI 40 MIN: CPT | Performed by: PHYSICIAN ASSISTANT

## 2023-12-18 NOTE — PATIENT INSTRUCTIONS
[facility] Medical Oncology and Hematology Team  [place] - [phone]    Your Team Members:  Advanced Practitioner:  Shaylee Clarke PA-C  Oncology Nurse:   AMADA Summers ([phone]) M-F 8am - 4:30pm    Please answer Private and Unavailable Calls - this may be your team(s) contacting you.  If you have medical questions/concerns/issues - contact us either by (1) My Chart (2) [facility]       Calcium 1200 mg + Vitamin D 2000 IU Daily

## 2023-12-18 NOTE — PROGRESS NOTES
[address]  [facility] HEMATOLOGY ONCOLOG[facility]ISTS [place]  [address]  [facility] [address]  Oncology Progress Note  Jolie Mulligan, 1955, [id number]  12/18/2023       used for this appointment  VALERIANO: Dr. Savage  Assessment/Plan:   1. Malignant neoplasm of upper-outer quadrant of right breast in female, estrogen receptor positive;  2. Use of anastrozole (Arimidex)  - Pathologic: Stage IA (pT1c, pN0, cM0, G2, ER: Positive, OK: Positive, HER2: Positive) - Signed by Nunu Acosta MD on 8/21/2018    This is a 68-year-old female with past medical history of the above.  Patient diagnosed with stage Ia breast cancer triple positive.  Patient underwent lumpectomy, chemotherapy with a full year of HER2 directed therapy and radiation and is now presently on hormonal manipulation with Arimidex alone.    Most recent DEXA scan demonstrated osteopenia see #3 below.    Patient continues to do well on the above medications.  Mammography is scheduled every July.  I will order this at her next interval check.    Regimen:  Arimidex 1 mg p.o. daily    - DXA bone density spine hip and pelvis; Future    3. Osteopenia after menopause  Patient was noted to have osteopenia however she is not taking vitamin supplements.  See outlined recommendations below.  Repeat DEXA scan in June approximately 2 months.  If patient has worsening of osteopenia Prolia may be helpful.    Recommendation:  Calcium 1200 mg p.o. daily  Vitamin D 2000 IU p.o. daily  +  DEXA scan in February 2024.    - DXA bone density spine hip and pelvis; Future    4. Epidermal inclusion cyst  Cyst has been there for some time.   Patient reports that surgical oncology has seen this area.  Have recommended that they continue to observe it if it becomes too large then will be resected.    Patient will follow-up in 3 months for review of this area.  If this is grows, referral to surgical oncology will occur sooner than regularly scheduled  "appointment.      The patient is scheduled for follow-up in approximately 3 months.     Patient voiced agreement and understanding to the above.   Patient knows to call the Hematology/Oncology office with any questions and concerns regarding the above.    Goals and Barriers:    Current Goal:   Prolong Survival from Cancer.     Barriers: None.      Patient's Capacity to Self Care:  Patient able to self care.    Shaylee Clarke PA-C  Medical Oncology/Hematology  [facility]  ______________________________________________________________________________________________________________    Subjective     Chief Complaint   Patient presents with    Follow-up       History of present illness/Cancer History:   Oncology History   Malignant neoplasm of right female breast (HCC)   Malignant neoplasm of upper-outer quadrant of right breast in female, estrogen receptor positive    5/23/2018 Initial Diagnosis    5/15/2018: Breast pain send to dx mammo    \"Targeted sonographic evaluation of the area of pain in the left   breast 12 to 3 o'clock position left breast area of pain   demonstrates a prominent island of fibroglandular tissue without   solid mass or abnormal shadowing.\"    ACR BI-RADS® Assessments: BiRad:4 - Suspicious (Overall)  Diag Mammogram: BiRad:4 - suspicious abnormality in the right   breast.  Left breast Left Brst US: Left breast is BiRad:1 - negative.        5/23/2018 Biopsy    Right breast core biopsy:  DCIS, micropapillary type, low-intermediate nuclear grade  ER 90-95% positive,  NE 75-80% positive       6/26/2018 Surgery    RIGHT BREAST NEEDLE LOCALIZATION X2 WITH RIGHT BREAST LUMPECTOMY ( NEEDLE LOC @ 1000) (Right)   ONCOPLASTIC CLOSURE OF LUMPECTOMY CAVITY    Invasive breast carcinoma, NST (aka ductal)  * Ana Paula grade 2 of 3 (total score = 2+2+2 = 6 of 9)   -- tubule formation < 10%, score 3   -- nuclear grade 2 of 3, score 2   -- mitoses ~ 4/mm2, score 2.   * invasive " carcinoma is multifocally present (A23-1, A32-1, A84-1, A89-1, A100-1), largest focus is 14 millimeters in greatest dimension (A89-1, this focus is graded).   * superior lumpectomy margin (orange-inked) is POSITIVE for invasive carcinoma (A84-1)   * all other lumpectomy margins are free of invasive carcinoma.   * estrogen, progesterone & Her-2/negrita receptor studies are undertaken, to be described in an addendum report.  - Ductal carcinoma in-situ (DCIS): Present as an extensive component (~85% of total tumor).   * DCIS is co-located with invasive carcinoma surrounding prior needle biopsy site.   * DCIS spans 2.6 cm maximal dimension (A62-1) and is present on 27 of 136 total slides examined.   * DCIS has cribriform & micropapillary patterns, nuclear grade 2 of 3, with central comedo-type necrosis.   * DCIS is present within 0.2 millimeters of the superior lumpectomy margin (orange-inked, A26-1)   * DCIS is present within 0.2 millimeters of the medial lumpectomy margin (yellow-inked, A3-1)   * all other lumpectomy margins are free of DCIS by > 2 millimeters.  - Lymph-vascular invasion: no lymph-vascular invasion is unequivocally identified.  - Microcalcifications: present in DCIS.    % positive, ER 45-55% positive, HER2 by IHC 3+ positive    - Dr Barragan       8/2/2018 Surgery    Right breast lumpectomy reexcision, right axilla SLN biopsy:  A.  Submitted as “right axillary sentinel node”:  - Seven lymph nodes are identified showing no metastatic tumor.     B. Right breast lumpectomy reexcision:  - Abundant reactive changes are seen around the previous lumpectomy cavity.  - No residual atypia or malignancy is seen.          8/2/2018 -  Cancer Staged    Stage IA - pT1c, pN0, G2.   Cancer Staging   Malignant neoplasm of upper-outer quadrant of right breast in female, estrogen receptor positive   Staging form: Breast, AJCC 8th Edition  - Clinical: No stage assigned - Unsigned  - Pathologic: Stage IA (pT1c, pN0, cM0,  G2, ER: Positive, HI: Positive, HER2: Positive) - Signed by Nunu Acosta MD on 8/21/2018  Histologic grading system: 3 grade system  Laterality: Right       9/25/2018 - 8/6/2019 Chemotherapy    Weekly Paclitaxol and trastuzumab x12, until December 11, 2018 followed by trastuzumab monotherapy 6 milligram/kilogram every 3 weeks    - Dr Savage       12/17/2018 - 8/26/2019 Chemotherapy    trastuzumab (HERCEPTIN) 579 mg in sodium chloride 0.9 % 250 mL chemo infusion, 8 mg/kg, Intravenous, Once, 12 of 12 cycles  Administration: 434 mg (5/14/2019), 434 mg (6/4/2019), 450 mg (7/16/2019), 450 mg (8/6/2019), 450 mg (6/25/2019)     1/9/2019 - 2/19/2019 Radiation    Plan ID Energy Fractions Dose per Fraction (cGy) Dose Correction (cGy) Total Dose Delivered (cGy) Elapsed Days   R Boost e 16E 5 / 5 200 0 1,000 6   Right Breast 6X 25 / 25 200 0 5,000 34     - Dr Acosta       2/19/2019 -  Hormone Therapy    Anastrozole (Arimidex) 1 mg PO Daily          Lab Results   Component Value Date    WBC 6.77 11/13/2023    HGB 12.3 11/13/2023    HCT 38.7 11/13/2023    MCV 90 11/13/2023     11/13/2023     Lab Results   Component Value Date    SODIUM 139 11/13/2023    K 3.7 11/13/2023     11/13/2023    CO2 31 11/13/2023    AGAP 5 11/13/2023    BUN 17 11/13/2023    CREATININE 0.54 (L) 11/13/2023    GLUC 136 05/30/2023    GLUF 98 11/13/2023    CALCIUM 9.9 11/13/2023    AST 17 11/13/2023    ALT 23 11/13/2023    ALKPHOS 74 11/13/2023    TP 7.7 11/13/2023    TBILI 0.54 11/13/2023    EGFR 97 11/13/2023       Interval history:  feeling ok.  Mild breast pain right axilla.  Feeling well otherwise. Notes a cystic area of growth     Review of Systems   Constitutional:  Negative for appetite change, fatigue, fever and unexpected weight change.   HENT:  Negative for nosebleeds.    Respiratory:  Negative for cough, choking and shortness of breath.         Negative hemoptysis.   Cardiovascular:  Negative for chest pain, palpitations and leg  swelling.   Gastrointestinal: Negative.  Negative for abdominal distention, abdominal pain, anal bleeding, blood in stool, constipation, diarrhea, nausea and vomiting.   Endocrine: Negative.  Negative for cold intolerance.   Genitourinary: Negative.  Negative for hematuria, menstrual problem, vaginal bleeding, vaginal discharge and vaginal pain.   Musculoskeletal: Negative.  Negative for arthralgias, myalgias, neck pain and neck stiffness.   Skin: Negative.  Negative for color change, pallor and rash.   Allergic/Immunologic: Negative.  Negative for immunocompromised state.   Neurological: Negative.  Negative for weakness and headaches.   Hematological:  Negative for adenopathy. Does not bruise/bleed easily.   All other systems reviewed and are negative.      Current Outpatient Medications:     acetaminophen (TYLENOL) 650 mg CR tablet, Take 1 tablet (650 mg total) by mouth every 8 (eight) hours as needed for mild pain, Disp: 30 tablet, Rfl: 0    Advair -21 MCG/ACT inhaler, INHALE 2 PUFFS 2 (TWO) TIMES A DAY RINSE MOUTH AFTER USE., Disp: 12 g, Rfl: 3    albuterol (2.5 mg/3 mL) 0.083 % nebulizer solution, Take 3 mL (2.5 mg total) by nebulization every 6 (six) hours as needed for wheezing or shortness of breath, Disp: 75 mL, Rfl: 5    albuterol (PROVENTIL HFA,VENTOLIN HFA) 90 mcg/act inhaler, INHALE 1 PUFF EVERY 4 (FOUR) HOURS AS NEEDED FOR WHEEZING, Disp: 8.5 g, Rfl: 3    anastrozole (ARIMIDEX) 1 mg tablet, TAKE 1 TABLET (1 MG TOTAL) BY MOUTH DAILY, Disp: 90 tablet, Rfl: 3    aspirin 81 mg chewable tablet, Chew 81 mg daily, Disp: , Rfl:     atorvastatin (LIPITOR) 40 mg tablet, Take 1 tablet (40 mg total) by mouth daily, Disp: 90 tablet, Rfl: 2    Diclofenac Sodium (VOLTAREN) 1 %, Apply 2 g topically 2 (two) times a day, Disp: 150 g, Rfl: 0    ergocalciferol (VITAMIN D2) 50,000 units, TAKE 1 CAPSULE (50,000 UNITS TOTAL) BY MOUTH ONCE A WEEK, Disp: 12 capsule, Rfl: 0    insulin degludec (Tresiba FlexTouch) 200  units/mL CONCENTRATED U-200 injection pen, 60 units daily at 5PM (Patient taking differently: Inject 10 Units under the skin daily 60 units daily at 5PM), Disp: 27 mL, Rfl: 1    insulin lispro (HumaLOG) 100 units/mL injection pen, 24 units before each meal Plus sliding scale 150-200: 2 units 201-250: 4 units 251-300: 6 units 301-350: 8 units Over 350: 10 units, Disp: 90 mL, Rfl: 1    ketotifen (ZADITOR) 0.025 % ophthalmic solution, Administer 1 drop to both eyes 2 (two) times a day as needed (itchy eyes), Disp: 5 mL, Rfl: 0    loratadine (CLARITIN) 10 mg tablet, TAKE 1 TABLET (10 MG TOTAL) BY MOUTH DAILY, Disp: 90 tablet, Rfl: 2    losartan (COZAAR) 100 MG tablet, Take 1 tablet (100 mg total) by mouth daily, Disp: 90 tablet, Rfl: 1    Mag-G 500 (27 Mg) MG tablet, TAKE 1 TABLET (500 MG TOTAL) BY MOUTH DAILY, Disp: 90 tablet, Rfl: 1    metoprolol tartrate (LOPRESSOR) 25 mg tablet, Take 0.5 tablets (12.5 mg total) by mouth every 12 (twelve) hours, Disp: 180 tablet, Rfl: 0    omeprazole (PriLOSEC) 20 mg delayed release capsule, TAKE 1 CAPSULE (20 MG TOTAL) BY MOUTH DAILY, Disp: 90 capsule, Rfl: 2    OneTouch Delica Lancets 33G MISC, USE 3 (THREE) TIMES A DAY, Disp: 300 each, Rfl: 3    OneTouch Ultra test strip, USE 1 EACH 3 (THREE) TIMES A DAY USE AS INSTRUCTED, Disp: 300 each, Rfl: 2    tiotropium (Spiriva Respimat) 2.5 MCG/ACT AERS inhaler, INHALE 2 PUFFS DAILY, Disp: 18 g, Rfl: 1    UltiCare Micro Pen Needles 32G X 4 MM MISC, INJECT UNDER THE SKIN 4 (FOUR) TIMES A DAY, Disp: 400 each, Rfl: 0    zileuton 600 MG, TAKE 1 TABLET (600 MG TOTAL) BY MOUTH 2 (TWO) TIMES A DAY, Disp: 60 tablet, Rfl: 3    calcium polycarbophil (FIBERCON) 625 mg tablet, Take 1 tablet (625 mg total) by mouth daily (Patient not taking: Reported on 11/10/2023), Disp: 30 tablet, Rfl: 5    EPINEPHrine (Auvi-Q) 0.1 mg/0.1 mL SOAJ, Inject 0.3 mL (0.3 mg total) into a muscle once for 1 dose (Patient not taking: Reported on 2/6/2023), Disp: 2 each, Rfl:  "5    furosemide (LASIX) 20 mg tablet, TAKE 1 TABLET (20 MG TOTAL) BY MOUTH 2 (TWO) TIMES A DAY, Disp: 180 tablet, Rfl: 0    polyethylene glycol (MIRALAX) 17 g packet, Take 17 g by mouth daily for 14 days, Disp: 28 each, Rfl: 5    traZODone (DESYREL) 50 mg tablet, TAKE 0.5 TABLETS (25 MG TOTAL) BY MOUTH DAILY AT BEDTIME (Patient not taking: Reported on 10/30/2023), Disp: 90 tablet, Rfl: 0  Allergies   Allergen Reactions    Codeine Other (See Comments)     unknown    Latex Other (See Comments)     fungus     Objective   /70 (BP Location: Left arm, Patient Position: Sitting, Cuff Size: Adult)   Pulse 89   Temp 97.8 °F (36.6 °C)   Resp 18   Ht 5' 5\" (1.651 m)   Wt 83.5 kg (184 lb)   SpO2 98%   BMI 30.62 kg/m²   Wt Readings from Last 6 Encounters:   12/18/23 83.5 kg (184 lb)   11/10/23 84.4 kg (186 lb)   10/30/23 84.7 kg (186 lb 12.8 oz)   10/26/23 83.6 kg (184 lb 6.4 oz)   10/12/23 84.4 kg (186 lb)   10/11/23 82.9 kg (182 lb 12.8 oz)     Physical Exam  Constitutional:       General: She is not in acute distress.     Appearance: She is well-developed.   HENT:      Head: Normocephalic and atraumatic.   Eyes:      General: No scleral icterus.     Conjunctiva/sclera: Conjunctivae normal.   Cardiovascular:      Rate and Rhythm: Normal rate.   Pulmonary:      Effort: Pulmonary effort is normal. No respiratory distress.   Chest:          Comments: Well healed surgical scars.  Lymphadenopathy:      Upper Body:      Right upper body: No axillary adenopathy.      Left upper body: No axillary adenopathy.   Skin:     General: Skin is warm.      Coloration: Skin is not pale.      Findings: No rash.   Neurological:      Mental Status: She is alert and oriented to person, place, and time.   Psychiatric:         Thought Content: Thought content normal.         Pertinent Laboratory Results and Imaging Review:  No visits with results within 3 Week(s) from this visit.   Latest known visit with results is:   Lab on 11/13/2023 "   Component Date Value Ref Range Status    Sodium 11/13/2023 139  135 - 147 mmol/L Final    Potassium 11/13/2023 3.7  3.5 - 5.3 mmol/L Final    Chloride 11/13/2023 103  96 - 108 mmol/L Final    CO2 11/13/2023 31  21 - 32 mmol/L Final    ANION GAP 11/13/2023 5  mmol/L Final    BUN 11/13/2023 17  5 - 25 mg/dL Final    Creatinine 11/13/2023 0.54 (L)  0.60 - 1.30 mg/dL Final    Standardized to IDMS reference method    Glucose, Fasting 11/13/2023 98  65 - 99 mg/dL Final    Calcium 11/13/2023 9.9  8.4 - 10.2 mg/dL Final    AST 11/13/2023 17  13 - 39 U/L Final    ALT 11/13/2023 23  7 - 52 U/L Final    Specimen collection should occur prior to Sulfasalazine administration due to the potential for falsely depressed results.     Alkaline Phosphatase 11/13/2023 74  34 - 104 U/L Final    Total Protein 11/13/2023 7.7  6.4 - 8.4 g/dL Final    Albumin 11/13/2023 4.2  3.5 - 5.0 g/dL Final    Total Bilirubin 11/13/2023 0.54  0.20 - 1.00 mg/dL Final    Use of this assay is not recommended for patients undergoing treatment with eltrombopag due to the potential for falsely elevated results.  N-acetyl-p-benzoquinone imine (metabolite of Acetaminophen) will generate erroneously low results in samples for patients that have taken an overdose of Acetaminophen.    eGFR 11/13/2023 97  ml/min/1.73sq m Final    Hemoglobin A1C 11/13/2023 11.3 (H)  Normal 4.0-5.6%; PreDiabetic 5.7-6.4%; Diabetic >=6.5%; Glycemic control for adults with diabetes <7.0% % Final    EAG 11/13/2023 278  mg/dl Final    Creatinine, Ur 11/13/2023 41.4  Reference range not established. mg/dL Final    Albumin,U,Random 11/13/2023 96.7 (H)  <20.0 mg/L Final    Albumin Creat Ratio 11/13/2023 234 (H)  0 - 30 mg/g creatinine Final    TSH 3RD GENERATON 11/13/2023 7.148 (H)  0.450 - 4.500 uIU/mL Final    The recommended reference ranges for TSH during pregnancy are as follows:   First trimester 0.100 to 2.500 uIU/mL   Second trimester  0.200 to 3.000 uIU/mL   Third trimester  0.300 to 3.000 uIU/m    Note: Normal ranges may not apply to patients who are transgender, non-binary, or whose legal sex, sex at birth, and gender identity differ.  Adult TSH (3rd generation) reference range follows the recommended guidelines of the American Thyroid Association, January, 2020.    Free T4 11/13/2023 0.75  0.61 - 1.12 ng/dL Final    Specimens with biotin concentrations > 10 ng/mL can lead to significant (> 10%) positive bias in result.    WBC 11/13/2023 6.77  4.31 - 10.16 Thousand/uL Final    RBC 11/13/2023 4.30  3.81 - 5.12 Million/uL Final    Hemoglobin 11/13/2023 12.3  11.5 - 15.4 g/dL Final    Hematocrit 11/13/2023 38.7  34.8 - 46.1 % Final    MCV 11/13/2023 90  82 - 98 fL Final    MCH 11/13/2023 28.6  26.8 - 34.3 pg Final    MCHC 11/13/2023 31.8  31.4 - 37.4 g/dL Final    RDW 11/13/2023 13.8  11.6 - 15.1 % Final    MPV 11/13/2023 11.6  8.9 - 12.7 fL Final    Platelets 11/13/2023 310  149 - 390 Thousands/uL Final    nRBC 11/13/2023 0  /100 WBCs Final    Neutrophils Relative 11/13/2023 52  43 - 75 % Final    Immat GRANS % 11/13/2023 0  0 - 2 % Final    Lymphocytes Relative 11/13/2023 34  14 - 44 % Final    Monocytes Relative 11/13/2023 9  4 - 12 % Final    Eosinophils Relative 11/13/2023 4  0 - 6 % Final    Basophils Relative 11/13/2023 1  0 - 1 % Final    Neutrophils Absolute 11/13/2023 3.45  1.85 - 7.62 Thousands/µL Final    Immature Grans Absolute 11/13/2023 0.03  0.00 - 0.20 Thousand/uL Final    Lymphocytes Absolute 11/13/2023 2.31  0.60 - 4.47 Thousands/µL Final    Monocytes Absolute 11/13/2023 0.60  0.17 - 1.22 Thousand/µL Final    Eosinophils Absolute 11/13/2023 0.29  0.00 - 0.61 Thousand/µL Final    Basophils Absolute 11/13/2023 0.09  0.00 - 0.10 Thousands/µL Final         The following historical data was reviewed:  Past Medical History:   Diagnosis Date    Abdominal infection (HCC)     h-pylori    Abnormal chest x-ray 04/05/2019    Asthma     Breast cancer (HCC) 06/26/2018    Cancer  (HCC)     BREAST    Diabetes mellitus (HCC)     Hiatal hernia     History of chemotherapy 2018    History of radiation therapy 2018    Hypercholesterolemia     Hypertension     Hypothyroid     Renal disorder     Rheumatoid arthritis (HCC)      Past Surgical History:   Procedure Laterality Date    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    BREAST LUMPECTOMY Right 6/26/2018    Procedure: RIGHT BREAST NEEDLE LOCALIZATION X2 WITH RIGHT BREAST LUMPECTOMY ( NEEDLE LOC @ 1000);  Surgeon: Familia Barragan MD;  Location: [facility];  Service: Surgical Oncology    BREAST LUMPECTOMY Right 8/2/2018    Procedure: LYMPHOSCINITGRAPHY INTRAOPERATIVE LYMPHATIC MAPPING , RIGHT SENTINEL NODE BIOPSY, REEXCISION  RIGHT BREAST LUMPECTOMY CAVITY (SUPERIOR MARGIN);  Surgeon: Familia Barragan MD;  Location: [facility];  Service: Surgical Oncology    BREAST SURGERY Left     CATARACT EXTRACTION, BILATERAL Bilateral     COLONOSCOPY      EGD AND COLONOSCOPY N/A 9/5/2018    Procedure: EGD AND COLONOSCOPY;  Surgeon: José Miguel Rosas MD;  Location: [facility];  Service: Gastroenterology    FL GUIDED CENTRAL VENOUS ACCESS DEVICE INSERTION  9/13/2018    KNEE SURGERY Right     MAMMO NEEDLE LOCALIZATION RIGHT (ALL INC) Right 6/26/2018    MAMMO STEREOTACTIC BREAST BIOPSY RIGHT (ALL INC) Right 5/23/2018    RETINAL DETACHMENT SURGERY Right     TUBAL LIGATION      TUNNELED VENOUS PORT PLACEMENT Left 9/13/2018    Procedure: INSERTION VENOUS PORT (PORT-A-CATH);  Surgeon: Familia Barragan MD;  Location: [facility];  Service: Surgical Oncology    UPPER GASTROINTESTINAL ENDOSCOPY       Social History     Socioeconomic History    Marital status:      Spouse name: None    Number of children: None    Years of education: None    Highest education level: None   Occupational History    None   Tobacco Use    Smoking status: Never    Smokeless tobacco: Never   Vaping Use    Vaping status: Never Used   Substance and Sexual Activity    Alcohol use: No    Drug  use: No    Sexual activity: Not Currently   Other Topics Concern    None   Social History Narrative    None     Social Determinants of Health     Financial Resource Strain: Medium Risk (9/18/2023)    Overall Financial Resource Strain (CARDIA)     Difficulty of Paying Living Expenses: Somewhat hard   Food Insecurity: No Food Insecurity (9/18/2023)    Hunger Vital Sign     Worried About Running Out of Food in the Last Year: Never true     Ran Out of Food in the Last Year: Never true   Transportation Needs: No Transportation Needs (9/18/2023)    PRAPARE - Transportation     Lack of Transportation (Medical): No     Lack of Transportation (Non-Medical): No   Physical Activity: Not on file   Stress: Not on file   Social Connections: Not on file   Intimate Partner Violence: Not on file   Housing Stability: Low Risk  (10/27/2021)    Housing Stability Vital Sign     Unable to Pay for Housing in the Last Year: No     Number of Places Lived in the Last Year: 1     Unstable Housing in the Last Year: No     Family History   Problem Relation Age of Onset    Diabetes Mother     Hypertension Mother     Diabetes Father     Hypertension Father     No Known Problems Sister     No Known Problems Sister     No Known Problems Sister     No Known Problems Sister     No Known Problems Sister     Diabetes Brother     Diabetes Brother     Kidney failure Brother     Diabetes Brother     Cancer Maternal Grandfather     No Known Problems Daughter     No Known Problems Daughter     No Known Problems Daughter        Please note:  This report has been generated by a voice recognition software system. Therefore there may be syntax, spelling, and/or grammatical errors. Please call if you have any questions.

## 2023-12-18 NOTE — TELEPHONE ENCOUNTER
Patient Call    Who are you speaking with? Child    If it is not the patient, are they listed on an active communication consent form? Yes   What is the reason for this call? they ask if a cell phone was found there?   Does this require a call back? Yes   If a call back is required, please list [facility] call back number [phone]    If a call back is required, advise that a message will be forwarded to their care team and someone will return their call as soon as possible.   Did you relay this information to the patient? Yes

## 2023-12-20 NOTE — PROGRESS NOTES
"Patient ID: Jolie Mulligan is a 68 y.o. female Date of Birth 1955       Chief Complaint   Patient presents with   • Follow Up Wound Care Visit     Follow up visit for wound to right leg.  Pt denies any concerns or issues.         Allergies:  Codeine and Latex    Diagnosis:      Diagnosis ICD-10-CM Associated Orders   1. Non-pressure ulcer of right lower extremity with fat layer exposed ([facility])  L97.912 lidocaine (LMX) 4 % cream     Wound cleansing and dressings Other (comment) Right;Medial Leg     Chemical Caut Of A Wound     Cast Application      2. PAD (peripheral artery disease) ([facility])  I73.9 Cast Application      3. Hypergranulation  L92.9 Chemical Caut Of A Wound     Cast Application                Assessment & Plan:  Non-pressure RLE ulcer w/ hx of PAD: Measurements continue to improve.  Distal skin breakdown epithelialized.  Wound bed with granulation and hypergranulation tissue requiring chemical cautery.  No malodor lymphangitic streaking or induration drainage continues to improve, small.  No evidence of infection at this time.  Chemical cautery of hypergranulation tissue  Continue Puracol switch to regular Puracol followed by silver alginate  Reviewed Importance of diabetic control in setting of her wounds and for her overall health  Follow-up in 1 week or sooner if needed    Subjective:   \"11/10/23: Patient is a 68 year old female who presents to the  wound center as a new patient for an open wound of her right lower leg. Patient reports her wound has been present for approximately 1 month. She reports she woke up one morning with a small bump on her leg which progressively got worse. She has a hx of bilateral lower extremity edema since completing chemo and radiation for breast cancer 1 year ago. She denies wearing any type of compression. Patient has a hx of type 2 DM with her most recent A1c 11.9 as of 10/30. Patient reports her wound drains a small amount of drainage. She keeps her wound " "MYRANDA when she is at home and keeps it covered with a band aid when outside her home. She denies any fevers or chills. \"    11/15/2023: Jolie presents to wound center today for follow-up of right lower extremity wound.  Her sister is present at bedside providing translation.  She denies acute complaints and reports she tolerated Coflex lite well.  Denies fever, chills.    11/22/2023: Jolie presents to wound center today for follow-up of right lower extremity wound.  Her sister is again present at bedside aiding with translation.  After last visit, venous studies completed which showed:  \"RIGHT LIMB: No evidence of deep venous incompetence. The great saphenous vein is competent. The great saphenous vein exits the saphenous compartment in the distal thigh. The small saphenous vein is competent and does not communicate with the popliteal vein. There is no evidence of incompetent perforators in the thigh or calf. There is no evidence of deep vein thrombosis in the CFV, the proximal PFV, the femoral vein and the popliteal vein.\"  She denies fever, chills.  She reports she has no acute complaints today.  She has tolerated the Coflex lite wrap well.    11/30/2023: Follow-up ulceration on the right lower extremity.  LEADS done yesterday:    CONCLUSION:  Impression:  RIGHT LOWER LIMB:  Evidence of <50% stenosis noted at the mid and distal superficial femoral  artery.  Evidence of posterior tibial artery occlusive disease.  Ankle/Brachial index: 0.96, which is in the normal disease category.  PVR/ PPG tracings are normal.  Metatarsal pressure of 141 mmHg.  Great toe pressure of 97 mmHg, within the healing range.     LEFT LOWER LIMB:  Evidence of 50-75% stenosis noted in the mid superficial femoral artery.  Evidence of anterior tibial artery occlusive disease.  Ankle/Brachial index: 0.89, which is in the mild disease category.  PVR/ PPG tracings are normal.  Metatarsal pressure of 176 mmHg.  Great toe pressure of 67 mmHg, " within the healing range.    Tolerated Coflex lite but did express some discomfort.  No fever or chills.    12/7/2023: Jolie presents today for follow-up of ulceration right lower extremity.  Reports this morning felt her Coflex wrap was too tight and cut the top (see a/p).  Otherwise she has had no acute complaints.  She denies fever, chills.    12/14/2023: Jolie presents to wound center today for follow-up of right lower extremity ulceration.  Her daughter is present with her today providing translation.  Reports that she tolerated the Coflex lite wrap well and did not have difficulty as she did at previous visit.  She reports she has no acute complaints and denies fever, chills.    12/21/2023: Jolie presents to wound center today for follow-up of RLE ulceration.  She has no acute complaints today and denies fever, chills.  Has not had any issues with the Coflex lite wrap.      The following portions of the patient's history were reviewed and updated as appropriate:   Patient Active Problem List   Diagnosis   • Type 2 diabetes mellitus with complication, with long-term current use of insulin (HCC)   • Severe persistent asthma without complication   • Other specified hypothyroidism   • Chest pain   • Palpitations   • Chronic bilateral low back pain without sciatica   • Neck pain   • Malignant neoplasm of right female breast (HCC)   • Malignant neoplasm of upper-outer quadrant of right breast in female, estrogen receptor positive    • Hiatal hernia   • Screening for colon cancer   • Esophageal dysphagia   • Cellulitis of right axilla   • Chronic pain of right knee   • Hypercholesterolemia   • Hypothyroid   • Essential hypertension   • Bilateral pulmonary embolism (HCC)   • Radiation pneumonitis (HCC)   • Type 2 diabetes mellitus with hyperglycemia, with long-term current use of insulin (HCC)   • Diabetic polyneuropathy associated with type 2 diabetes mellitus (HCC)   • Use of anastrozole (Arimidex)   •  Decreased ROM of right shoulder   • Lump of skin   • Chronic diastolic heart failure (HCC)   • Gastroesophageal reflux disease without esophagitis   • Muscle cramps   • Sebaceous cyst of axilla   • Subcutaneous mass of back   • Overweight with body mass index (BMI) of 28 to 28.9 in adult   • Anxiety   • Right foot pain   • Pelvic pain   • Bone pain   • Type 2 diabetes mellitus with microalbuminuria, with long-term current use of insulin (HCC)   • Depression, recurrent (HCC)   • Vaginal candidiasis   • Diabetes mellitus (HCC)   • Seasonal allergies   • Diarrhea   • COVID-19   • Hypercalcemia   • Bilateral lower extremity edema   • Itchy eyes   • Cellulitis of right lower extremity   • Wound of right leg     Past Medical History:   Diagnosis Date   • Abdominal infection (HCC)     h-pylori   • Abnormal chest x-ray 04/05/2019   • Asthma    • Breast cancer (HCC) 06/26/2018   • Cancer (HCC)     BREAST   • Diabetes mellitus (HCC)    • Hiatal hernia    • History of chemotherapy 2018   • History of radiation therapy 2018   • Hypercholesterolemia    • Hypertension    • Hypothyroid    • Renal disorder    • Rheumatoid arthritis (HCC)      Past Surgical History:   Procedure Laterality Date   • 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   • BREAST LUMPECTOMY Right 6/26/2018    Procedure: RIGHT BREAST NEEDLE LOCALIZATION X2 WITH RIGHT BREAST LUMPECTOMY ( NEEDLE LOC @ 1000);  Surgeon: Familia Barragan MD;  Location: [facility];  Service: Surgical Oncology   • BREAST LUMPECTOMY Right 8/2/2018    Procedure: LYMPHOSCINITGRAPHY INTRAOPERATIVE LYMPHATIC MAPPING , RIGHT SENTINEL NODE BIOPSY, REEXCISION  RIGHT BREAST LUMPECTOMY CAVITY (SUPERIOR MARGIN);  Surgeon: Familia Barragan MD;  Location: [facility];  Service: Surgical Oncology   • BREAST SURGERY Left    • CATARACT EXTRACTION, BILATERAL Bilateral    • COLONOSCOPY     • EGD AND COLONOSCOPY N/A 9/5/2018    Procedure: EGD AND COLONOSCOPY;  Surgeon: José Miguel Rosas MD;  Location: [facility];  Service: Gastroenterology   • FL GUIDED CENTRAL VENOUS ACCESS DEVICE INSERTION  9/13/2018   • KNEE SURGERY Right    • MAMMO NEEDLE LOCALIZATION RIGHT (ALL INC) Right 6/26/2018   • MAMMO STEREOTACTIC BREAST BIOPSY RIGHT (ALL INC) Right 5/23/2018   • RETINAL DETACHMENT SURGERY Right    • TUBAL LIGATION     • TUNNELED VENOUS PORT PLACEMENT Left 9/13/2018    Procedure: INSERTION VENOUS PORT (PORT-A-CATH);  Surgeon: Familia Barragan MD;  Location: [facility];  Service: Surgical Oncology   • UPPER GASTROINTESTINAL ENDOSCOPY       Family History   Problem Relation Age of Onset   • Diabetes Mother    • Hypertension Mother    • Diabetes Father    • Hypertension Father    • No Known Problems Sister    • No Known Problems Sister    • No Known Problems Sister    • No Known Problems Sister    • No Known Problems Sister    • Diabetes Brother    • Diabetes Brother    • Kidney failure Brother    • Diabetes Brother    • Cancer Maternal Grandfather    • No Known Problems Daughter    • No Known Problems Daughter    • No Known Problems Daughter       Social History     Socioeconomic History   • Marital status:      Spouse name: None   • Number of children: None   • Years of education: None   • Highest education level: None   Occupational History   • None   Tobacco Use   • Smoking status: Never   • Smokeless tobacco: Never   Vaping Use   • Vaping status: Never Used   Substance and Sexual Activity   • Alcohol use: No   • Drug use: No   • Sexual activity: Not Currently   Other Topics Concern   • None   Social History Narrative   • None     Social Determinants of Health     Financial Resource Strain: Medium Risk (9/18/2023)    Overall Financial Resource Strain (CARDIA)    • Difficulty of Paying Living Expenses: Somewhat hard   Food Insecurity: No Food Insecurity (9/18/2023)    Hunger Vital Sign    • Worried About Running Out of Food in the Last Year: Never true    • Ran Out of Food in the Last Year: Never true    Transportation Needs: No Transportation Needs (9/18/2023)    PRAPARE - Transportation    • Lack of Transportation (Medical): No    • Lack of Transportation (Non-Medical): No   Physical Activity: Not on file   Stress: Not on file   Social Connections: Not on file   Intimate Partner Violence: Not on file   Housing Stability: Low Risk  (10/27/2021)    Housing Stability Vital Sign    • Unable to Pay for Housing in the Last Year: No    • Number of Places Lived in the Last Year: 1    • Unstable Housing in the Last Year: No        Current Outpatient Medications:   •  acetaminophen (TYLENOL) 650 mg CR tablet, Take 1 tablet (650 mg total) by mouth every 8 (eight) hours as needed for mild pain, Disp: 30 tablet, Rfl: 0  •  Advair -21 MCG/ACT inhaler, INHALE 2 PUFFS 2 (TWO) TIMES A DAY RINSE MOUTH AFTER USE., Disp: 12 g, Rfl: 3  •  albuterol (2.5 mg/3 mL) 0.083 % nebulizer solution, Take 3 mL (2.5 mg total) by nebulization every 6 (six) hours as needed for wheezing or shortness of breath, Disp: 75 mL, Rfl: 5  •  albuterol (PROVENTIL HFA,VENTOLIN HFA) 90 mcg/act inhaler, INHALE 1 PUFF EVERY 4 (FOUR) HOURS AS NEEDED FOR WHEEZING, Disp: 8.5 g, Rfl: 3  •  anastrozole (ARIMIDEX) 1 mg tablet, TAKE 1 TABLET (1 MG TOTAL) BY MOUTH DAILY, Disp: 90 tablet, Rfl: 3  •  aspirin 81 mg chewable tablet, Chew 81 mg daily, Disp: , Rfl:   •  atorvastatin (LIPITOR) 40 mg tablet, Take 1 tablet (40 mg total) by mouth daily, Disp: 90 tablet, Rfl: 2  •  calcium polycarbophil (FIBERCON) 625 mg tablet, Take 1 tablet (625 mg total) by mouth daily (Patient not taking: Reported on 11/10/2023), Disp: 30 tablet, Rfl: 5  •  Diclofenac Sodium (VOLTAREN) 1 %, Apply 2 g topically 2 (two) times a day, Disp: 150 g, Rfl: 0  •  EPINEPHrine (Auvi-Q) 0.1 mg/0.1 mL SOAJ, Inject 0.3 mL (0.3 mg total) into a muscle once for 1 dose (Patient not taking: Reported on 2/6/2023), Disp: 2 each, Rfl: 5  •  ergocalciferol (VITAMIN D2) 50,000 units, TAKE 1 CAPSULE (50,000  UNITS TOTAL) BY MOUTH ONCE A WEEK, Disp: 12 capsule, Rfl: 0  •  furosemide (LASIX) 20 mg tablet, TAKE 1 TABLET (20 MG TOTAL) BY MOUTH 2 (TWO) TIMES A DAY, Disp: 180 tablet, Rfl: 0  •  insulin degludec (Tresiba FlexTouch) 200 units/mL CONCENTRATED U-200 injection pen, 60 units daily at 5PM (Patient taking differently: Inject 10 Units under the skin daily 60 units daily at 5PM), Disp: 27 mL, Rfl: 1  •  insulin lispro (HumaLOG) 100 units/mL injection pen, 24 units before each meal Plus sliding scale 150-200: 2 units 201-250: 4 units 251-300: 6 units 301-350: 8 units Over 350: 10 units, Disp: 90 mL, Rfl: 1  •  ketotifen (ZADITOR) 0.025 % ophthalmic solution, Administer 1 drop to both eyes 2 (two) times a day as needed (itchy eyes), Disp: 5 mL, Rfl: 0  •  loratadine (CLARITIN) 10 mg tablet, TAKE 1 TABLET (10 MG TOTAL) BY MOUTH DAILY, Disp: 90 tablet, Rfl: 2  •  losartan (COZAAR) 100 MG tablet, Take 1 tablet (100 mg total) by mouth daily, Disp: 90 tablet, Rfl: 1  •  Mag-G 500 (27 Mg) MG tablet, TAKE 1 TABLET (500 MG TOTAL) BY MOUTH DAILY, Disp: 90 tablet, Rfl: 1  •  metoprolol tartrate (LOPRESSOR) 25 mg tablet, Take 0.5 tablets (12.5 mg total) by mouth every 12 (twelve) hours, Disp: 180 tablet, Rfl: 0  •  omeprazole (PriLOSEC) 20 mg delayed release capsule, TAKE 1 CAPSULE (20 MG TOTAL) BY MOUTH DAILY, Disp: 90 capsule, Rfl: 2  •  OneTouch Delica Lancets 33G MISC, USE 3 (THREE) TIMES A DAY, Disp: 300 each, Rfl: 3  •  OneTouch Ultra test strip, USE 1 EACH 3 (THREE) TIMES A DAY USE AS INSTRUCTED, Disp: 300 each, Rfl: 2  •  polyethylene glycol (MIRALAX) 17 g packet, Take 17 g by mouth daily for 14 days, Disp: 28 each, Rfl: 5  •  tiotropium (Spiriva Respimat) 2.5 MCG/ACT AERS inhaler, INHALE 2 PUFFS DAILY, Disp: 18 g, Rfl: 1  •  traZODone (DESYREL) 50 mg tablet, TAKE 0.5 TABLETS (25 MG TOTAL) BY MOUTH DAILY AT BEDTIME (Patient not taking: Reported on 10/30/2023), Disp: 90 tablet, Rfl: 0  •  UltiCare Micro Pen Needles 32G X 4  MM MISC, INJECT UNDER THE SKIN 4 (FOUR) TIMES A DAY, Disp: 400 each, Rfl: 0  •  zileuton 600 MG, TAKE 1 TABLET (600 MG TOTAL) BY MOUTH 2 (TWO) TIMES A DAY, Disp: 60 tablet, Rfl: 3  No current facility-administered medications for this visit.    Review of Systems   Constitutional: Negative.  Negative for chills and fever.   Respiratory:  Negative for shortness of breath.    Cardiovascular:  Positive for leg swelling (improved). Negative for chest pain and palpitations.   Gastrointestinal:  Negative for nausea and vomiting.   Musculoskeletal:  Negative for gait problem.   Skin:  Positive for wound (RLE).   Psychiatric/Behavioral:  The patient is not nervous/anxious.        Objective:  /82   Pulse 94   Temp (!) 97.3 °F (36.3 °C) (Tympanic)   Resp 18   Pain Score: 0-No pain     Physical Exam  Vitals and nursing note reviewed.   Constitutional:       General: She is not in acute distress.     Appearance: Normal appearance. She is obese. She is not ill-appearing, toxic-appearing or diaphoretic.      Comments: NAD. RN providing translation today   HENT:      Head: Normocephalic and atraumatic.   Eyes:      General: No scleral icterus.  Cardiovascular:      Rate and Rhythm: Normal rate.      Pulses:           Dorsalis pedis pulses are 1+ on the right side and 1+ on the left side.        Posterior tibial pulses are 1+ on the right side and 1+ on the left side.   Pulmonary:      Effort: Pulmonary effort is normal. No respiratory distress.   Musculoskeletal:         General: Normal range of motion.      Right lower leg: Edema present.      Left lower leg: Edema present.      Comments: Stable - mild pitting edema on the right lower extremity, left lower extremity remains around 1+   Skin:     General: Skin is warm and dry.      Findings: Wound present.             Comments: Ulcer: Measurements continue to improve.  Distal skin breakdown epithelialized. Wound bed with granulation and central hypergranulation tissue  requiring chemical cautery.  No malodor, lymphangitic streaking, or induration. Drainage continues to improve, small.   Neurological:      Mental Status: She is alert and oriented to person, place, and time.   Psychiatric:         Mood and Affect: Mood normal.         Wound 11/10/23 Other (comment) Leg Right;Medial (Active)   Wound Image   12/21/23 0924   Wound Description Pink;Granulation tissue 12/21/23 0924   Bobbi-wound Assessment Intact 12/21/23 0924   Wound Length (cm) 1.5 cm 12/21/23 0924   Wound Width (cm) 1 cm 12/21/23 0924   Wound Depth (cm) 0.1 cm 12/21/23 0924   Wound Surface Area (cm^2) 1.5 cm^2 12/21/23 0924   Wound Volume (cm^3) 0.15 cm^3 12/21/23 0924   Calculated Wound Volume (cm^3) 0.15 cm^3 12/21/23 0924   Change in Wound Size % 50 12/21/23 0924   Drainage Amount Moderate 12/21/23 0924   Drainage Description Serosanguineous;Yellow 12/21/23 0924   Non-staged Wound Description Full thickness 12/21/23 0924   Treatments Cleansed 12/21/23 0924   Patient Tolerance Tolerated well 12/21/23 0924   Dressing Status Removed 12/21/23 0924           Chemical Caut Of A Wound     Date/Time  12/21/2023 9:30 AM     Performed by  Caryl Longo MD   Authorized by  Caryl Longo MD     Universal Protocol   Consent: Verbal consent obtained. Written consent obtained.  Risks and benefits: risks, benefits and alternatives were discussed  Consent given by: patient  Patient understanding: patient states understanding of the procedure being performed  Patient identity confirmed: verbally with patient      Local anesthesia used: yes     Anesthesia   Local anesthesia used: yes  Local Anesthetic: topical anesthetic     Procedure Details   Procedure Notes: Chemical cautery of central hypergranulation tissue.  1 stick used and neutralized with saline.  Patient tolerated well.  Patient tolerance: patient tolerated the procedure well with no immediate complications                  Lab Results   Component Value Date    HGBA1C 11.3  "(H) 11/13/2023       Wound Instructions:  Orders Placed This Encounter   Procedures   • Wound cleansing and dressings Other (comment) Right;Medial Leg     Right leg wound      Shower yes, with protection. Your dressings can not get wet.  If they do please contact us ASAP.       Apply zinc oxide to skin surrounding lower portion of wound   Apply Puracol to wound bed.   Cover with silver alginate   Cover this gauze  Secure with rolled gauze and tape   Secure with Coflex Lite   Change dressing weekly in wound center.      Please try to consume 3-4 servings of protein (30g) each day.       Multi-layer compression wrap Instructions -- Coflex lite    Keep compression wrap/wraps clean and dry. If wraps are too tight and you experience numbness/tingling, call the wound center.   If after hours, remove wraps or proceed to nearest E.R. and call wound center in AM.   Avoid prolonged standing in one place.   Elevate leg(s) above the level of the heart when sitting or as much as possible.      Wrap will be changed 1 x weekly     Standing Status:   Future     Standing Expiration Date:   12/21/2024   • Chemical Caut Of A Wound     This order was created via procedure documentation   • Cast Application     This order was created via procedure documentation       Caryl Longo MD    Portions of the record may have been created with voice recognition software. Occasional wrong word or \"sound alike\" substitutions may have occurred due to the inherent limitations of voice recognition software. Read the chart carefully and recognize, using context, where substitutions have occurred.    "

## 2023-12-21 ENCOUNTER — APPOINTMENT (OUTPATIENT)
Dept: LAB | Facility: HOSPITAL | Age: 68
End: 2023-12-21
Payer: COMMERCIAL

## 2023-12-21 ENCOUNTER — OFFICE VISIT (OUTPATIENT)
Dept: WOUND CARE | Facility: CLINIC | Age: 68
End: 2023-12-21
Payer: COMMERCIAL

## 2023-12-21 VITALS
RESPIRATION RATE: 18 BRPM | HEART RATE: 94 BPM | SYSTOLIC BLOOD PRESSURE: 144 MMHG | TEMPERATURE: 97.3 F | DIASTOLIC BLOOD PRESSURE: 82 MMHG

## 2023-12-21 DIAGNOSIS — L97.912 NON-PRESSURE ULCER OF RIGHT LOWER EXTREMITY WITH FAT LAYER EXPOSED (HCC): Primary | ICD-10-CM

## 2023-12-21 DIAGNOSIS — L92.9 HYPERGRANULATION: ICD-10-CM

## 2023-12-21 DIAGNOSIS — E03.9 ACQUIRED HYPOTHYROIDISM: ICD-10-CM

## 2023-12-21 DIAGNOSIS — I73.9 PAD (PERIPHERAL ARTERY DISEASE) (HCC): ICD-10-CM

## 2023-12-21 LAB
T4 FREE SERPL-MCNC: 0.83 NG/DL (ref 0.61–1.12)
TSH SERPL DL<=0.05 MIU/L-ACNC: 3.57 UIU/ML (ref 0.45–4.5)

## 2023-12-21 PROCEDURE — 29581 APPL MULTLAYER CMPRN SYS LEG: CPT

## 2023-12-21 PROCEDURE — 86376 MICROSOMAL ANTIBODY EACH: CPT

## 2023-12-21 PROCEDURE — 84443 ASSAY THYROID STIM HORMONE: CPT

## 2023-12-21 PROCEDURE — 17250 CHEM CAUT OF GRANLTJ TISSUE: CPT | Performed by: FAMILY MEDICINE

## 2023-12-21 PROCEDURE — 86800 THYROGLOBULIN ANTIBODY: CPT

## 2023-12-21 PROCEDURE — 36415 COLL VENOUS BLD VENIPUNCTURE: CPT

## 2023-12-21 PROCEDURE — 84439 ASSAY OF FREE THYROXINE: CPT

## 2023-12-21 RX ORDER — LIDOCAINE 40 MG/G
CREAM TOPICAL ONCE
Status: COMPLETED | OUTPATIENT
Start: 2023-12-21 | End: 2023-12-21

## 2023-12-21 RX ADMIN — LIDOCAINE: 40 CREAM TOPICAL at 09:30

## 2023-12-21 NOTE — PROGRESS NOTES
Cast Application    Date/Time: 12/21/2023 9:30 AM    Performed by: Meme Rosas RN  Authorized by: Caryl Longo MD  Universal Protocol:  Consent: Verbal consent obtained.  Consent given by: patient  Patient understanding: patient states understanding of the procedure being performed  Patient identity confirmed: verbally with patient    Pre-procedure details:     Sensation:  Unchanged  Procedure details:     Laterality:  Right    Location:  Leg    Leg:  R lower leg    Strapping: no  Cast type:  Multi-layer compression short leg        Supplies:  2 layer wrap (puracol, calcium alginate)  Post-procedure details:     Pain:  Improved    Sensation:  Normal    Patient tolerance of procedure:  Tolerated well, no immediate complications  Comments:      Multilayer wrap procedure     Before application, LISA and/or TBI determined to be adequate for healing and application of compression. Lower extremity washed prior to application of compression wrap. With the foot in dorsiflexed position, COFLEX LITE was applied as per physician orders without complications or complaint of pain.  The procedure was tolerated well. Toes warm & pink post application.  Patient provided education & reinforced to observe toes for any discoloration, swelling or tingling and instructed when to report to the Wound Center or to remove compression. Wound care as per providers orders, patient tolerated well.

## 2023-12-21 NOTE — PATIENT INSTRUCTIONS
Orders Placed This Encounter   Procedures    Wound cleansing and dressings Other (comment) Right;Medial Leg     Right leg wound      Shower yes, with protection. Your dressings can not get wet.  If they do please contact us ASAP.       Apply zinc oxide to skin surrounding lower portion of wound   Apply Puracol to wound bed.   Cover with silver alginate   Cover this gauze  Secure with rolled gauze and tape   Secure with Coflex Lite   Change dressing weekly in wound center.      Please try to consume 3-4 servings of protein (30g) each day.       Multi-layer compression wrap Instructions -- Coflex lite    Keep compression wrap/wraps clean and dry. If wraps are too tight and you experience numbness/tingling, call the wound center.   If after hours, remove wraps or proceed to nearest E.R. and call wound center in AM.   Avoid prolonged standing in one place.   Elevate leg(s) above the level of the heart when sitting or as much as possible.      Wrap will be changed 1 x weekly     Standing Status:   Future     Standing Expiration Date:   12/21/2024

## 2023-12-22 LAB
THYROGLOB AB SERPL-ACNC: <1 IU/ML (ref 0–0.9)
THYROPEROXIDASE AB SERPL-ACNC: 23 IU/ML (ref 0–34)

## 2023-12-23 DIAGNOSIS — J45.50 SEVERE PERSISTENT ASTHMA WITHOUT COMPLICATION: ICD-10-CM

## 2023-12-26 RX ORDER — TIOTROPIUM BROMIDE INHALATION SPRAY 3.12 UG/1
2 SPRAY, METERED RESPIRATORY (INHALATION) DAILY
Qty: 4 G | Refills: 3 | Status: SHIPPED | OUTPATIENT
Start: 2023-12-26

## 2023-12-26 NOTE — PROGRESS NOTES
Access port-a-cath     Date/Time 11/29/2019 1:58 PM     Performed by  Cirilo Ellsworth MD     Authorized by Cirilo Ellsworth MD      Universal Protocol Consent: Verbal consent obtained  Written consent obtained  Consent given by: patient  Patient understanding: patient states understanding of the procedure being performed       Procedure Details   Procedure Notes: Consent was obtained  Following this the left port site was identified, then prepped and draped in the usual fashion  25 cc of local anesthesia was administered  Following this the previous scar was reincised  Cautery was used to dissect through the dermis and subcutaneous tissue  The port capsule was then opened with cautery  The port was then removed in its entirety after the anchoring sutures were cut  Any bleeding was controlled with cautery  Closure was then performed using 3-0 Vicryl close the deep layers followed by 3-0 Vicryl close the dermis followed by 4-0 Monocryl to close the skin in subcuticular fashion  Benzoin and Steri-Strips were then applied followed by gauze and Tegaderm  Patient tolerated procedure without difficulty    Patient tolerance: Patient tolerated the procedure well with no immediate complications Encounter Date: 12/26/2023       History     Chief Complaint   Patient presents with    Back Pain     Pt presents to ED c/o left back pain and difficult urination around 2300 last night.  Denies abd pain, n/v, chills, fever or any other symptoms.  Pain 8/10.   Hx of kidney stone.       35-year-old male previous history of right-sided nephrolithiasis without complication presents emergency room today for evaluation of 1 day left-sided flank pain, urinary frequency and burning.  No penile discharge.  No fever or chills.  No abdominal pain.  No testicular pain.  No bowel or bladder incontinence or retention, perineal numbness, gait abnormality.    The history is provided by the patient. No  was used.     Review of patient's allergies indicates:  No Known Allergies  No past medical history on file.  No past surgical history on file.  No family history on file.  Social History     Tobacco Use    Smoking status: Every Day    Smokeless tobacco: Never   Substance Use Topics    Alcohol use: Never    Drug use: Never     Review of Systems   Constitutional:  Negative for chills, fatigue and fever.   HENT:  Negative for congestion, hearing loss, sore throat and trouble swallowing.    Eyes:  Negative for visual disturbance.   Respiratory:  Negative for cough, chest tightness and shortness of breath.    Cardiovascular:  Negative for chest pain.   Gastrointestinal:  Negative for abdominal pain and nausea.   Endocrine: Negative for polyuria.   Genitourinary:  Positive for difficulty urinating, dysuria, flank pain and urgency. Negative for hematuria, penile discharge, penile pain, penile swelling, scrotal swelling and testicular pain.   Musculoskeletal:  Negative for arthralgias and myalgias.   Skin:  Negative for rash.   Neurological:  Negative for dizziness and headaches.   Psychiatric/Behavioral:  The patient is not nervous/anxious.        Physical Exam     Initial Vitals [12/26/23 0304]   BP Pulse Resp Temp  SpO2   129/82 82 18 98.1 °F (36.7 °C) 97 %      MAP       --         Physical Exam    Nursing note and vitals reviewed.  Constitutional: He appears well-developed and well-nourished.   HENT:   Head: Normocephalic and atraumatic.   Eyes: Conjunctivae and EOM are normal.   Neck: Neck supple.   Cardiovascular:  Intact distal pulses.           Pulmonary/Chest: No respiratory distress.   Abdominal: Abdomen is soft. He exhibits no distension. There is no abdominal tenderness.   No right CVA tenderness.  No left CVA tenderness.   Musculoskeletal:      Cervical back: Neck supple.     Neurological: He is alert and oriented to person, place, and time. GCS score is 15. GCS eye subscore is 4. GCS verbal subscore is 5. GCS motor subscore is 6.   Skin: Skin is warm and dry. Capillary refill takes less than 2 seconds.   Psychiatric: He has a normal mood and affect. His behavior is normal. Judgment and thought content normal.         ED Course   Procedures  Labs Reviewed   URINALYSIS, REFLEX TO URINE CULTURE - Abnormal; Notable for the following components:       Result Value    Protein, UA Trace (*)     Occult Blood UA 3+ (*)     All other components within normal limits    Narrative:     Specimen Source->Urine   URINALYSIS MICROSCOPIC - Abnormal; Notable for the following components:    RBC, UA >100 (*)     All other components within normal limits    Narrative:     Specimen Source->Urine   C. TRACHOMATIS/N. GONORRHOEAE BY AMP DNA          Imaging Results               CT Renal Stone Study ABD Pelvis WO (Final result)  Result time 12/26/23 04:01:13      Final result by Chris Govea MD (12/26/23 04:01:13)                   Impression:      Punctate bilateral nonobstructing intrarenal calculi.    A 3 mm calculus projects in the ventral aspect of the left ureterovesical junction, or possibly within the urinary bladder lumen itself.    No hydroureter or hydronephrosis on either side.    Other observations as detailed in the body  of the report.    This report was flagged in Epic as abnormal.      Electronically signed by: Chris Yevgeniykaren  Date:    12/26/2023  Time:    04:01               Narrative:    EXAMINATION:  CT RENAL STONE STUDY ABD PELVIS WO    CLINICAL HISTORY:  Flank pain, kidney stone suspected;left flank pain;    TECHNIQUE:  Low dose axial images, sagittal and coronal reformations were obtained from the lung bases to the pubic symphysis.  Contrast was not administered.    COMPARISON:  None    FINDINGS:  Punctate bilateral nonobstructing intrarenal calculi.    A 3 mm calculus projects in the ventral aspect of the left ureterovesical junction, or possibly within the urinary bladder lumen itself.    No hydroureter or hydronephrosis on either side.    The urinary bladder is poorly distended.    Incidentally visualized portions of the urethra demonstrate no radiopaque calculi.    The visualized portions of the lower thoracic viscera, the liver, poorly distended gallbladder, bile ducts, pancreas, spleen, right adrenal gland, left adrenal gland, partially distended stomach, duodenum, small bowel, colon, appendix, abdominal aorta, IVC, abdominopelvic lymph nodes, prostate gland, seminal vesicles, visualized body wall, and visualized skeletal system demonstrate no acute CT abnormalities.    A small umbilical hernia containing normal appearing fat.    There is a transitional vertebral segment at the lumbosacral junction.  The posterior neural arch of this transitional segment is incompletely fused, on a developmental basis.    An incompletely visualized, irregularly shaped subpleural opacity in the anterior aspect of the right middle lobe, is not fully characterized on this scan but is favored to represent scarring or subsegmental atelectasis.                                       Medications   ketorolac injection 30 mg (has no administration in time range)     Medical Decision Making  Patient presents for emergent evaluation of flank pain,  dysuria. Workup today consistent with likely nephrolithiasis.  Differential includes UTI, nephrolithiasis, myofascial back pain.  Doubt cauda equina, epidural abscess  Patient is nontoxic and well appearing, Afebrile with stable vital signs.  Labs were ordered and documented in the ED course.  Urinalysis with occult blood positive, RBCs greater than 100.  No evidence of urinary tract infection.  Imaging was ordered and documented in the ED course.  CT demonstrates a 3 mm stone in the left UVJ without evidence of hydronephrosis.    The treatment they received in the emergency department included Toradol.  Will discharge with Motrin, Tylenol, Norco, Phenergan, Flomax.  Patient was given urine strainer at bedside.  Consult placed to Urology.    Given patient presentation and workup, doubt emergent or surgical pathology necessitating consultation or admission from the emergency department.  I discussed the findings with the patient.  I discussed strict and strong return precautions. They will be discharged home in stable condition.      Amount and/or Complexity of Data Reviewed  Labs: ordered. Decision-making details documented in ED Course.  Radiology: ordered. Decision-making details documented in ED Course.    Risk  OTC drugs.  Prescription drug management.               ED Course as of 12/26/23 0419   Tue Dec 26, 2023   0407 BP: 129/82 [AN]   0407 Temp: 98.1 °F (36.7 °C) [AN]   0407 Pulse: 82 [AN]   0407 Resp: 18 [AN]   0407 SpO2: 97 % [AN]   0410 RBC, UA(!): >100 [AN]   0410 Occult Blood UA(!): 3+ [AN]   0410 CT Renal Stone Study ABD Pelvis WO(!)  Punctate bilateral nonobstructing intrarenal calculi.     A 3 mm calculus projects in the ventral aspect of the left ureterovesical junction, or possibly within the urinary bladder lumen itself.     No hydroureter or hydronephrosis on either side.   [AN]      ED Course User Index  [AN] Jarek Roblero PA-C                           Clinical Impression:  Final  diagnoses:  [N20.0] Kidney stone (Primary)          ED Disposition Condition    Discharge Stable          ED Prescriptions       Medication Sig Dispense Start Date End Date Auth. Provider    promethazine (PHENERGAN) 25 MG tablet Take 1 tablet (25 mg total) by mouth every 6 (six) hours as needed for Nausea. 20 tablet 12/26/2023 -- Jarek Roblero PA-C    ibuprofen (ADVIL,MOTRIN) 200 MG tablet Take 2 tablets (400 mg total) by mouth every 6 (six) hours as needed for Pain. 30 tablet 12/26/2023 -- Jarek Roblero PA-C    acetaminophen (TYLENOL) 500 MG tablet Take 2 tablets (1,000 mg total) by mouth 3 (three) times daily as needed for Pain. 30 tablet 12/26/2023 -- Jarek Roblero PA-C    HYDROcodone-acetaminophen (NORCO) 5-325 mg per tablet Take 1 tablet by mouth every 6 (six) hours as needed for Pain. 12 tablet 12/26/2023 12/29/2023 Jarek Roblero PA-C    tamsulosin (FLOMAX) 0.4 mg Cap Take 1 capsule (0.4 mg total) by mouth once daily. 10 capsule 12/26/2023 12/25/2024 Jarek Roblero PA-C          Follow-up Information       Follow up With Specialties Details Why Contact Info      In 1 week               Jarek Roblero PA-C  12/26/23 0083

## 2023-12-27 NOTE — PROGRESS NOTES
"Patient ID: Jolie Mulligan is a 68 y.o. female Date of Birth 1955       Chief Complaint   Patient presents with   • Follow Up Wound Care Visit     R medial LE wound. Arrived with Co-Flex intact. Wound is almost closed today.       Allergies:  Codeine and Latex    Diagnosis:      Diagnosis ICD-10-CM Associated Orders   1. Non-pressure ulcer of right lower extremity with fat layer exposed ([facility])  L97.912 Wound cleansing and dressings Other (comment) Right;Medial Leg     Cast Application      2. PAD (peripheral artery disease) ([facility])  I73.9             Assessment & Plan:  Non pressure ulcer RLE in setting of PAD: Continues to measure smaller, almost completely epithelialized.  Small drainage.  Can discontinue Puracol as this was not absorbed.  Can continue silver alginate as when we previously tried to switch to a dressing that would be more moist, she had skin breakdown  Continue Coflex lite  Follow-up in 1 week or sooner if needed    Portions of the record may have been created with voice recognition software. Occasional wrong word or \"sound alike\" substitutions may have occurred due to the inherent limitations of voice recognition software. Read the chart carefully and recognize, using context, where substitutions have occurred.    Subjective:   \"11/10/23: Patient is a 68 year old female who presents to the  wound center as a new patient for an open wound of her right lower leg. Patient reports her wound has been present for approximately 1 month. She reports she woke up one morning with a small bump on her leg which progressively got worse. She has a hx of bilateral lower extremity edema since completing chemo and radiation for breast cancer 1 year ago. She denies wearing any type of compression. Patient has a hx of type 2 DM with her most recent A1c 11.9 as of 10/30. Patient reports her wound drains a small amount of drainage. She keeps her wound MYRANDA when she is at home and keeps it covered with a band " "aid when outside her home. She denies any fevers or chills. \"    11/15/2023: Jolie presents to wound center today for follow-up of right lower extremity wound.  Her sister is present at bedside providing translation.  She denies acute complaints and reports she tolerated Coflex lite well.  Denies fever, chills.    11/22/2023: Jolie presents to wound center today for follow-up of right lower extremity wound.  Her sister is again present at bedside aiding with translation.  After last visit, venous studies completed which showed:  \"RIGHT LIMB: No evidence of deep venous incompetence. The great saphenous vein is competent. The great saphenous vein exits the saphenous compartment in the distal thigh. The small saphenous vein is competent and does not communicate with the popliteal vein. There is no evidence of incompetent perforators in the thigh or calf. There is no evidence of deep vein thrombosis in the CFV, the proximal PFV, the femoral vein and the popliteal vein.\"  She denies fever, chills.  She reports she has no acute complaints today.  She has tolerated the Coflex lite wrap well.    11/30/2023: Follow-up ulceration on the right lower extremity.  LEADS done yesterday:    CONCLUSION:  Impression:  RIGHT LOWER LIMB:  Evidence of <50% stenosis noted at the mid and distal superficial femoral  artery.  Evidence of posterior tibial artery occlusive disease.  Ankle/Brachial index: 0.96, which is in the normal disease category.  PVR/ PPG tracings are normal.  Metatarsal pressure of 141 mmHg.  Great toe pressure of 97 mmHg, within the healing range.     LEFT LOWER LIMB:  Evidence of 50-75% stenosis noted in the mid superficial femoral artery.  Evidence of anterior tibial artery occlusive disease.  Ankle/Brachial index: 0.89, which is in the mild disease category.  PVR/ PPG tracings are normal.  Metatarsal pressure of 176 mmHg.  Great toe pressure of 67 mmHg, within the healing range.    Tolerated Coflex lite but did " express some discomfort.  No fever or chills.    12/7/2023: Jolie presents today for follow-up of ulceration right lower extremity.  Reports this morning felt her Coflex wrap was too tight and cut the top (see a/p).  Otherwise she has had no acute complaints.  She denies fever, chills.    12/14/2023: Jolie presents to wound center today for follow-up of right lower extremity ulceration.  Her daughter is present with her today providing translation.  Reports that she tolerated the Coflex lite wrap well and did not have difficulty as she did at previous visit.  She reports she has no acute complaints and denies fever, chills.    12/21/2023: Jolie presents to wound center today for follow-up of RLE ulceration.  She has no acute complaints today and denies fever, chills.  Has not had any issues with the Coflex lite wrap.    12/28/2023: Jolie presents to wound center today for follow-up of right lower extremity ulceration.  She has continued with Coflex lite and tolerated well.  She has no acute complaints today and denies fever, chills.  Her daughter is present with her at bedside aiding with translation.      The following portions of the patient's history were reviewed and updated as appropriate:   Patient Active Problem List   Diagnosis   • Type 2 diabetes mellitus with complication, with long-term current use of insulin (HCC)   • Severe persistent asthma without complication   • Other specified hypothyroidism   • Chest pain   • Palpitations   • Chronic bilateral low back pain without sciatica   • Neck pain   • Malignant neoplasm of right female breast (HCC)   • Malignant neoplasm of upper-outer quadrant of right breast in female, estrogen receptor positive    • Hiatal hernia   • Screening for colon cancer   • Esophageal dysphagia   • Cellulitis of right axilla   • Chronic pain of right knee   • Hypercholesterolemia   • Hypothyroid   • Essential hypertension   • Bilateral pulmonary embolism (HCC)   •  Radiation pneumonitis (HCC)   • Type 2 diabetes mellitus with hyperglycemia, with long-term current use of insulin (HCC)   • Diabetic polyneuropathy associated with type 2 diabetes mellitus (HCC)   • Use of anastrozole (Arimidex)   • Decreased ROM of right shoulder   • Lump of skin   • Chronic diastolic heart failure (HCC)   • Gastroesophageal reflux disease without esophagitis   • Muscle cramps   • Sebaceous cyst of axilla   • Subcutaneous mass of back   • Overweight with body mass index (BMI) of 28 to 28.9 in adult   • Anxiety   • Right foot pain   • Pelvic pain   • Bone pain   • Type 2 diabetes mellitus with microalbuminuria, with long-term current use of insulin (HCC)   • Depression, recurrent (HCC)   • Vaginal candidiasis   • Diabetes mellitus (HCC)   • Seasonal allergies   • Diarrhea   • COVID-19   • Hypercalcemia   • Bilateral lower extremity edema   • Itchy eyes   • Cellulitis of right lower extremity   • Wound of right leg     Past Medical History:   Diagnosis Date   • Abdominal infection (HCC)     h-pylori   • Abnormal chest x-ray 04/05/2019   • Asthma    • Breast cancer (HCC) 06/26/2018   • Cancer (HCC)     BREAST   • Diabetes mellitus (HCC)    • Hiatal hernia    • History of chemotherapy 2018   • History of radiation therapy 2018   • Hypercholesterolemia    • Hypertension    • Hypothyroid    • Renal disorder    • Rheumatoid arthritis (HCC)      Past Surgical History:   Procedure Laterality Date   • 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   • BREAST LUMPECTOMY Right 6/26/2018    Procedure: RIGHT BREAST NEEDLE LOCALIZATION X2 WITH RIGHT BREAST LUMPECTOMY ( NEEDLE LOC @ 1000);  Surgeon: Familia Barragan MD;  Location: [facility];  Service: Surgical Oncology   • BREAST LUMPECTOMY Right 8/2/2018    Procedure: LYMPHOSCINITGRAPHY INTRAOPERATIVE LYMPHATIC MAPPING , RIGHT SENTINEL NODE BIOPSY, REEXCISION  RIGHT BREAST LUMPECTOMY CAVITY (SUPERIOR MARGIN);  Surgeon: Familia Barragan MD;  Location: [facility];   Service: Surgical Oncology   • BREAST SURGERY Left    • CATARACT EXTRACTION, BILATERAL Bilateral    • COLONOSCOPY     • EGD AND COLONOSCOPY N/A 9/5/2018    Procedure: EGD AND COLONOSCOPY;  Surgeon: José Miguel Rosas MD;  Location: [facility];  Service: Gastroenterology   • FL GUIDED CENTRAL VENOUS ACCESS DEVICE INSERTION  9/13/2018   • KNEE SURGERY Right    • MAMMO NEEDLE LOCALIZATION RIGHT (ALL INC) Right 6/26/2018   • MAMMO STEREOTACTIC BREAST BIOPSY RIGHT (ALL INC) Right 5/23/2018   • RETINAL DETACHMENT SURGERY Right    • TUBAL LIGATION     • TUNNELED VENOUS PORT PLACEMENT Left 9/13/2018    Procedure: INSERTION VENOUS PORT (PORT-A-CATH);  Surgeon: Familia Barragan MD;  Location: [facility];  Service: Surgical Oncology   • UPPER GASTROINTESTINAL ENDOSCOPY       Family History   Problem Relation Age of Onset   • Diabetes Mother    • Hypertension Mother    • Diabetes Father    • Hypertension Father    • No Known Problems Sister    • No Known Problems Sister    • No Known Problems Sister    • No Known Problems Sister    • No Known Problems Sister    • Diabetes Brother    • Diabetes Brother    • Kidney failure Brother    • Diabetes Brother    • Cancer Maternal Grandfather    • No Known Problems Daughter    • No Known Problems Daughter    • No Known Problems Daughter       Social History     Socioeconomic History   • Marital status:      Spouse name: None   • Number of children: None   • Years of education: None   • Highest education level: None   Occupational History   • None   Tobacco Use   • Smoking status: Never   • Smokeless tobacco: Never   Vaping Use   • Vaping status: Never Used   Substance and Sexual Activity   • Alcohol use: No   • Drug use: No   • Sexual activity: Not Currently   Other Topics Concern   • None   Social History Narrative   • None     Social Determinants of Health     Financial Resource Strain: Medium Risk (9/18/2023)    Overall Financial Resource Strain (CARDIA)    • Difficulty of  Paying Living Expenses: Somewhat hard   Food Insecurity: No Food Insecurity (9/18/2023)    Hunger Vital Sign    • Worried About Running Out of Food in the Last Year: Never true    • Ran Out of Food in the Last Year: Never true   Transportation Needs: No Transportation Needs (9/18/2023)    PRAPARE - Transportation    • Lack of Transportation (Medical): No    • Lack of Transportation (Non-Medical): No   Physical Activity: Not on file   Stress: Not on file   Social Connections: Not on file   Intimate Partner Violence: Not on file   Housing Stability: Low Risk  (10/27/2021)    Housing Stability Vital Sign    • Unable to Pay for Housing in the Last Year: No    • Number of Places Lived in the Last Year: 1    • Unstable Housing in the Last Year: No        Current Outpatient Medications:   •  acetaminophen (TYLENOL) 650 mg CR tablet, Take 1 tablet (650 mg total) by mouth every 8 (eight) hours as needed for mild pain, Disp: 30 tablet, Rfl: 0  •  Advair -21 MCG/ACT inhaler, INHALE 2 PUFFS 2 (TWO) TIMES A DAY RINSE MOUTH AFTER USE., Disp: 12 g, Rfl: 3  •  albuterol (2.5 mg/3 mL) 0.083 % nebulizer solution, Take 3 mL (2.5 mg total) by nebulization every 6 (six) hours as needed for wheezing or shortness of breath, Disp: 75 mL, Rfl: 5  •  albuterol (PROVENTIL HFA,VENTOLIN HFA) 90 mcg/act inhaler, INHALE 1 PUFF EVERY 4 (FOUR) HOURS AS NEEDED FOR WHEEZING, Disp: 8.5 g, Rfl: 3  •  anastrozole (ARIMIDEX) 1 mg tablet, TAKE 1 TABLET (1 MG TOTAL) BY MOUTH DAILY, Disp: 90 tablet, Rfl: 3  •  aspirin 81 mg chewable tablet, Chew 81 mg daily, Disp: , Rfl:   •  atorvastatin (LIPITOR) 40 mg tablet, Take 1 tablet (40 mg total) by mouth daily, Disp: 90 tablet, Rfl: 2  •  calcium polycarbophil (FIBERCON) 625 mg tablet, Take 1 tablet (625 mg total) by mouth daily (Patient not taking: Reported on 11/10/2023), Disp: 30 tablet, Rfl: 5  •  Diclofenac Sodium (VOLTAREN) 1 %, Apply 2 g topically 2 (two) times a day, Disp: 150 g, Rfl: 0  •   EPINEPHrine (Auvi-Q) 0.1 mg/0.1 mL SOAJ, Inject 0.3 mL (0.3 mg total) into a muscle once for 1 dose (Patient not taking: Reported on 2/6/2023), Disp: 2 each, Rfl: 5  •  ergocalciferol (VITAMIN D2) 50,000 units, TAKE 1 CAPSULE (50,000 UNITS TOTAL) BY MOUTH ONCE A WEEK, Disp: 12 capsule, Rfl: 0  •  furosemide (LASIX) 20 mg tablet, TAKE 1 TABLET (20 MG TOTAL) BY MOUTH 2 (TWO) TIMES A DAY, Disp: 180 tablet, Rfl: 0  •  insulin degludec (Tresiba FlexTouch) 200 units/mL CONCENTRATED U-200 injection pen, 60 units daily at 5PM (Patient taking differently: Inject 10 Units under the skin daily 60 units daily at 5PM), Disp: 27 mL, Rfl: 1  •  insulin lispro (HumaLOG) 100 units/mL injection pen, 24 units before each meal Plus sliding scale 150-200: 2 units 201-250: 4 units 251-300: 6 units 301-350: 8 units Over 350: 10 units, Disp: 90 mL, Rfl: 1  •  ketotifen (ZADITOR) 0.025 % ophthalmic solution, Administer 1 drop to both eyes 2 (two) times a day as needed (itchy eyes), Disp: 5 mL, Rfl: 0  •  loratadine (CLARITIN) 10 mg tablet, TAKE 1 TABLET (10 MG TOTAL) BY MOUTH DAILY, Disp: 90 tablet, Rfl: 2  •  losartan (COZAAR) 100 MG tablet, Take 1 tablet (100 mg total) by mouth daily, Disp: 90 tablet, Rfl: 1  •  Mag-G 500 (27 Mg) MG tablet, TAKE 1 TABLET (500 MG TOTAL) BY MOUTH DAILY, Disp: 90 tablet, Rfl: 1  •  metoprolol tartrate (LOPRESSOR) 25 mg tablet, Take 0.5 tablets (12.5 mg total) by mouth every 12 (twelve) hours, Disp: 180 tablet, Rfl: 0  •  omeprazole (PriLOSEC) 20 mg delayed release capsule, TAKE 1 CAPSULE (20 MG TOTAL) BY MOUTH DAILY, Disp: 90 capsule, Rfl: 2  •  OneTouch Delica Lancets 33G MISC, USE 3 (THREE) TIMES A DAY, Disp: 300 each, Rfl: 3  •  OneTouch Ultra test strip, USE 1 EACH 3 (THREE) TIMES A DAY USE AS INSTRUCTED, Disp: 300 each, Rfl: 2  •  polyethylene glycol (MIRALAX) 17 g packet, Take 17 g by mouth daily for 14 days, Disp: 28 each, Rfl: 5  •  tiotropium (Spiriva Respimat) 2.5 MCG/ACT AERS inhaler, INHALE 2  PUFFS DAILY, Disp: 4 g, Rfl: 3  •  traZODone (DESYREL) 50 mg tablet, TAKE 0.5 TABLETS (25 MG TOTAL) BY MOUTH DAILY AT BEDTIME (Patient not taking: Reported on 10/30/2023), Disp: 90 tablet, Rfl: 0  •  UltiCare Micro Pen Needles 32G X 4 MM MISC, INJECT UNDER THE SKIN 4 (FOUR) TIMES A DAY, Disp: 400 each, Rfl: 0  •  zileuton 600 MG, TAKE 1 TABLET (600 MG TOTAL) BY MOUTH 2 (TWO) TIMES A DAY, Disp: 60 tablet, Rfl: 3    Review of Systems   Constitutional: Negative.  Negative for chills and fever.   Respiratory:  Negative for shortness of breath.    Cardiovascular:  Positive for leg swelling (improved). Negative for chest pain and palpitations.   Gastrointestinal:  Negative for nausea and vomiting.   Musculoskeletal:  Negative for gait problem.   Skin:  Positive for wound (RLE).   Psychiatric/Behavioral:  The patient is not nervous/anxious.        Objective:  /84   Pulse 72   Temp 97.8 °F (36.6 °C)   Resp 18   Pain Score:   2     Physical Exam  Vitals and nursing note reviewed.   Constitutional:       General: She is not in acute distress.     Appearance: Normal appearance. She is obese. She is not ill-appearing, toxic-appearing or diaphoretic.      Comments: Pleasant, no acute distress daughter present at bedside   HENT:      Head: Normocephalic and atraumatic.   Eyes:      General: No scleral icterus.  Cardiovascular:      Rate and Rhythm: Normal rate.      Pulses:           Dorsalis pedis pulses are 1+ on the right side and 1+ on the left side.        Posterior tibial pulses are 1+ on the right side and 1+ on the left side.   Pulmonary:      Effort: Pulmonary effort is normal. No respiratory distress.   Musculoskeletal:         General: Normal range of motion.      Right lower leg: Edema present.      Left lower leg: Edema present.      Comments: Stable - mild pitting edema on the right lower extremity, left lower extremity remains around 1+   Skin:     General: Skin is warm and dry.      Findings: Wound  present.             Comments: Ulcer: Continues to measure smaller, almost completely epithelialized.  No malodor, lymphangitic streaking, or induration.   Neurological:      Mental Status: She is alert and oriented to person, place, and time.   Psychiatric:         Mood and Affect: Mood normal.         Wound 11/10/23 Other (comment) Leg Right;Medial (Active)   Wound Image   12/28/23 1057   Wound Description Granulation tissue;Epithelialization 12/28/23 1104   Bobbi-wound Assessment Intact;[place] 12/28/23 1104   Wound Length (cm) 0.5 cm 12/28/23 1104   Wound Width (cm) 0.4 cm 12/28/23 1104   Wound Depth (cm) 0.1 cm 12/28/23 1104   Wound Surface Area (cm^2) 0.2 cm^2 12/28/23 1104   Wound Volume (cm^3) 0.02 cm^3 12/28/23 1104   Calculated Wound Volume (cm^3) 0.02 cm^3 12/28/23 1104   Change in Wound Size % 93.33 12/28/23 1104   Drainage Amount Small 12/28/23 1104   Drainage Description Serosanguineous 12/28/23 1104   Non-staged Wound Description Full thickness 12/21/23 0924   Treatments Cleansed 12/21/23 0924   Patient Tolerance Tolerated well 12/21/23 0924   Dressing Status Removed 12/21/23 0924           Lab Results   Component Value Date    HGBA1C 11.3 (H) 11/13/2023       Wound Instructions:  Orders Placed This Encounter   Procedures   • Wound cleansing and dressings Other (comment) Right;Medial Leg     Right leg wound      Shower yes, with protection. Your dressings can not get wet.  If they do please contact us ASAP.       STOP Thin layer zinc oxide    STOP Puracol    Apply silver alginate to wound bed   Cover with gauze   Secure with Coflex Lite   Change dressing weekly in wound center.      Continue 3-4 servings of protein (30g) each day.       Multi-layer compression wrap Instructions -- Coflex lite    Keep compression wrap/wraps clean and dry. If wraps are too tight and you experience numbness/tingling, call the wound center.   If after hours, remove wraps or proceed to nearest E.R. and call wound center in AM.    Avoid prolonged standing in one place.   Elevate leg(s) above the level of the heart when sitting or as much as possible.        Follow up Dr. Longo in 1 week    Today's wound treatment note:  Cleansed with soap and water and saline  Redressed as ordered above     Standing Status:   Future     Standing Expiration Date:   12/28/2024   • Cast Application     This order was created via procedure documentation       Caryl Longo MD

## 2023-12-28 ENCOUNTER — OFFICE VISIT (OUTPATIENT)
Dept: WOUND CARE | Facility: CLINIC | Age: 68
End: 2023-12-28
Payer: COMMERCIAL

## 2023-12-28 VITALS
HEART RATE: 72 BPM | SYSTOLIC BLOOD PRESSURE: 156 MMHG | DIASTOLIC BLOOD PRESSURE: 84 MMHG | TEMPERATURE: 97.8 F | RESPIRATION RATE: 18 BRPM

## 2023-12-28 DIAGNOSIS — L97.912 NON-PRESSURE ULCER OF RIGHT LOWER EXTREMITY WITH FAT LAYER EXPOSED (HCC): Primary | ICD-10-CM

## 2023-12-28 DIAGNOSIS — I73.9 PAD (PERIPHERAL ARTERY DISEASE) (HCC): ICD-10-CM

## 2023-12-28 PROCEDURE — 29581 APPL MULTLAYER CMPRN SYS LEG: CPT

## 2023-12-28 NOTE — PATIENT INSTRUCTIONS
Orders Placed This Encounter   Procedures    Wound cleansing and dressings Other (comment) Right;Medial Leg     Right leg wound      Shower yes, with protection. Your dressings can not get wet.  If they do please contact us ASAP.       STOP Thin layer zinc oxide    STOP Puracol    Apply silver alginate to wound bed   Cover with gauze   Secure with Coflex Lite   Change dressing weekly in wound center.      Continue 3-4 servings of protein (30g) each day.       Multi-layer compression wrap Instructions -- Coflex lite    Keep compression wrap/wraps clean and dry. If wraps are too tight and you experience numbness/tingling, call the wound center.   If after hours, remove wraps or proceed to nearest E.R. and call wound center in AM.   Avoid prolonged standing in one place.   Elevate leg(s) above the level of the heart when sitting or as much as possible.        Follow up Dr. Longo in 1 week    Today's wound treatment note:  Cleansed with soap and water and saline  Redressed as ordered above     Standing Status:   Future     Standing Expiration Date:   12/28/2024

## 2023-12-28 NOTE — PROGRESS NOTES
Cast Application    Date/Time: 12/28/2023 10:45 AM    Performed by: Dayanna Yee RN  Authorized by: Caryl Longo MD  Universal Protocol:  Consent: Verbal consent obtained.  Consent given by: patient  Patient identity confirmed: verbally with patient    Pre-procedure details:     Sensation:  Unchanged  Procedure details:     Laterality:  Right    Location:  Leg    Leg:  R lower leg    Strapping: no  Cast type:  Multi-layer compression short leg        Supplies used: silver alginate, co-flex lite.  Post-procedure details:     Pain:  Improved    Sensation:  Unchanged    Patient tolerance of procedure:  Tolerated well, no immediate complications  Comments:      Multilayer wrap procedure Co-Flex Lite    Before application, LISA and/or TBI determined to be adequate for healing and application of compression. Lower extremity washed prior to application of compression wrap. With the foot in dorsiflexed position, coflex was applied as per physician orders without complications or complaint of pain.  The procedure was tolerated well. Toes warm & pink post application.  Patient provided education & reinforced to observe toes for any discoloration, swelling or tingling and instructed when to report to the Wound Center or to remove compression. Wound care as per providers orders, patient tolerated well.

## 2023-12-30 DIAGNOSIS — J45.50 SEVERE PERSISTENT ASTHMA WITHOUT COMPLICATION: ICD-10-CM

## 2024-01-02 RX ORDER — ALBUTEROL SULFATE 90 UG/1
1 AEROSOL, METERED RESPIRATORY (INHALATION) EVERY 4 HOURS PRN
Qty: 8.5 G | Refills: 2 | Status: SHIPPED | OUTPATIENT
Start: 2024-01-02

## 2024-01-02 RX ORDER — ZILEUTON 600 MG/1
TABLET ORAL
Qty: 60 TABLET | Refills: 2 | Status: SHIPPED | OUTPATIENT
Start: 2024-01-02

## 2024-01-02 RX ORDER — FLUTICASONE PROPIONATE AND SALMETEROL XINAFOATE 230; 21 UG/1; UG/1
2 AEROSOL, METERED RESPIRATORY (INHALATION) 2 TIMES DAILY
Qty: 12 G | Refills: 2 | Status: SHIPPED | OUTPATIENT
Start: 2024-01-02 | End: 2024-01-06

## 2024-01-03 ENCOUNTER — HOSPITAL ENCOUNTER (EMERGENCY)
Facility: HOSPITAL | Age: 69
Discharge: HOME | End: 2024-01-03
Attending: EMERGENCY MEDICINE
Payer: COMMERCIAL

## 2024-01-03 ENCOUNTER — APPOINTMENT (EMERGENCY)
Dept: CT IMAGING | Facility: HOSPITAL | Age: 69
End: 2024-01-03
Payer: COMMERCIAL

## 2024-01-03 VITALS
DIASTOLIC BLOOD PRESSURE: 75 MMHG | BODY MASS INDEX: 30.3 KG/M2 | TEMPERATURE: 97.4 F | RESPIRATION RATE: 18 BRPM | WEIGHT: 182.1 LBS | OXYGEN SATURATION: 98 % | HEART RATE: 88 BPM | SYSTOLIC BLOOD PRESSURE: 167 MMHG

## 2024-01-03 DIAGNOSIS — J45.50 SEVERE PERSISTENT ASTHMA WITHOUT COMPLICATION: Primary | ICD-10-CM

## 2024-01-03 DIAGNOSIS — R10.11 RIGHT UPPER QUADRANT ABDOMINAL PAIN: Primary | ICD-10-CM

## 2024-01-03 LAB
ALBUMIN SERPL BCP-MCNC: 3.7 G/DL (ref 3.5–5)
ALP SERPL-CCNC: 64 U/L (ref 34–104)
ALT SERPL W P-5'-P-CCNC: 17 U/L (ref 7–52)
ANION GAP SERPL CALCULATED.3IONS-SCNC: 6 MMOL/L
AST SERPL W P-5'-P-CCNC: 12 U/L (ref 13–39)
BASOPHILS # BLD AUTO: 0.09 THOUSANDS/ÂΜL (ref 0–0.1)
BASOPHILS NFR BLD AUTO: 1 % (ref 0–1)
BILIRUB SERPL-MCNC: 0.38 MG/DL (ref 0.2–1)
BUN SERPL-MCNC: 22 MG/DL (ref 5–25)
CALCIUM SERPL-MCNC: 9.4 MG/DL (ref 8.4–10.2)
CHLORIDE SERPL-SCNC: 101 MMOL/L (ref 96–108)
CO2 SERPL-SCNC: 28 MMOL/L (ref 21–32)
CREAT SERPL-MCNC: 0.64 MG/DL (ref 0.6–1.3)
EOSINOPHIL # BLD AUTO: 0.21 THOUSAND/ÂΜL (ref 0–0.61)
EOSINOPHIL NFR BLD AUTO: 3 % (ref 0–6)
ERYTHROCYTE [DISTWIDTH] IN BLOOD BY AUTOMATED COUNT: 13.4 % (ref 11.6–15.1)
GFR SERPL CREATININE-BSD FRML MDRD: 92 ML/MIN/1.73SQ M
GLUCOSE SERPL-MCNC: 378 MG/DL (ref 65–140)
HCT VFR BLD AUTO: 35.5 % (ref 34.8–46.1)
HGB BLD-MCNC: 11.5 G/DL (ref 11.5–15.4)
IMM GRANULOCYTES # BLD AUTO: 0.03 THOUSAND/UL (ref 0–0.2)
IMM GRANULOCYTES NFR BLD AUTO: 1 % (ref 0–2)
LIPASE SERPL-CCNC: 7 U/L (ref 11–82)
LYMPHOCYTES # BLD AUTO: 2.03 THOUSANDS/ÂΜL (ref 0.6–4.47)
LYMPHOCYTES NFR BLD AUTO: 31 % (ref 14–44)
MCH RBC QN AUTO: 28.9 PG (ref 26.8–34.3)
MCHC RBC AUTO-ENTMCNC: 32.4 G/DL (ref 31.4–37.4)
MCV RBC AUTO: 89 FL (ref 82–98)
MONOCYTES # BLD AUTO: 0.49 THOUSAND/ÂΜL (ref 0.17–1.22)
MONOCYTES NFR BLD AUTO: 8 % (ref 4–12)
NEUTROPHILS # BLD AUTO: 3.68 THOUSANDS/ÂΜL (ref 1.85–7.62)
NEUTS SEG NFR BLD AUTO: 56 % (ref 43–75)
NRBC BLD AUTO-RTO: 0 /100 WBCS
PLATELET # BLD AUTO: 283 THOUSANDS/UL (ref 149–390)
PMV BLD AUTO: 11.8 FL (ref 8.9–12.7)
POTASSIUM SERPL-SCNC: 4.1 MMOL/L (ref 3.5–5.3)
PROT SERPL-MCNC: 6.8 G/DL (ref 6.4–8.4)
RBC # BLD AUTO: 3.98 MILLION/UL (ref 3.81–5.12)
SODIUM SERPL-SCNC: 135 MMOL/L (ref 135–147)
WBC # BLD AUTO: 6.53 THOUSAND/UL (ref 4.31–10.16)

## 2024-01-03 PROCEDURE — 85025 COMPLETE CBC W/AUTO DIFF WBC: CPT | Performed by: EMERGENCY MEDICINE

## 2024-01-03 PROCEDURE — 36415 COLL VENOUS BLD VENIPUNCTURE: CPT | Performed by: EMERGENCY MEDICINE

## 2024-01-03 PROCEDURE — 80053 COMPREHEN METABOLIC PANEL: CPT | Performed by: EMERGENCY MEDICINE

## 2024-01-03 PROCEDURE — 99284 EMERGENCY DEPT VISIT MOD MDM: CPT | Performed by: EMERGENCY MEDICINE

## 2024-01-03 PROCEDURE — 99284 EMERGENCY DEPT VISIT MOD MDM: CPT

## 2024-01-03 PROCEDURE — 83690 ASSAY OF LIPASE: CPT | Performed by: EMERGENCY MEDICINE

## 2024-01-03 PROCEDURE — 96374 THER/PROPH/DIAG INJ IV PUSH: CPT

## 2024-01-03 PROCEDURE — G1004 CDSM NDSC: HCPCS

## 2024-01-03 PROCEDURE — 74177 CT ABD & PELVIS W/CONTRAST: CPT

## 2024-01-03 RX ORDER — KETOROLAC TROMETHAMINE 30 MG/ML
15 INJECTION, SOLUTION INTRAMUSCULAR; INTRAVENOUS ONCE
Status: COMPLETED | OUTPATIENT
Start: 2024-01-03 | End: 2024-01-03

## 2024-01-03 RX ADMIN — IOHEXOL 100 ML: 350 INJECTION, SOLUTION INTRAVENOUS at 09:01

## 2024-01-03 RX ADMIN — KETOROLAC TROMETHAMINE 15 MG: 30 INJECTION, SOLUTION INTRAMUSCULAR; INTRAVENOUS at 08:01

## 2024-01-03 NOTE — Clinical Note
Chetna Navarro accompanied Jolie Mulligan to the emergency department on 1/3/2024.    Return date if applicable: 01/04/2024        If you have any questions or concerns, please don't hesitate to call.      Chino Rowland PA-C

## 2024-01-03 NOTE — ED PROVIDER NOTES
History  Chief Complaint   Patient presents with    Abdominal Pain     Pt with R side abd pain started last night.  Pt took Tylenol for pain.     Pt is a 68 year old female with a PMH of breast cancer, DM, HTN, RA presenting with abdominal pain x 15 hours. Pt states around 1700 yesterday she had gradual onset of RUQ pain. States the pain is now constant and worsening in severity. Pain does radiate into her right flank and back. Exacerbated by movements. She denies fevers, chills, sweats, chest pain, SOB, n/v/d, urinary symptoms. No prior abdominal surgeries.      History provided by:  Patient and relative   used: No    Abdominal Pain  Pain location:  RUQ  Pain radiates to:  R flank and back  Pain severity:  Severe  Onset quality:  Gradual  Duration:  15 hours  Timing:  Constant  Progression:  Worsening  Chronicity:  New  Context: not previous surgeries, not sick contacts and not suspicious food intake    Relieved by:  Nothing  Worsened by:  Movement and palpation  Ineffective treatments:  OTC medications  Associated symptoms: no constipation, no diarrhea, no nausea and no vomiting    Risk factors: has not had multiple surgeries, not pregnant and no recent hospitalization        Prior to Admission Medications   Prescriptions Last Dose Informant Patient Reported? Taking?   Advair -21 MCG/ACT inhaler   No No   Sig: INHALE 2 PUFFS 2 (TWO) TIMES A DAY RINSE MOUTH AFTER USE.   Diclofenac Sodium (VOLTAREN) 1 %  Self No No   Sig: Apply 2 g topically 2 (two) times a day   EPINEPHrine (Auvi-Q) 0.1 mg/0.1 mL SOAJ   No No   Sig: Inject 0.3 mL (0.3 mg total) into a muscle once for 1 dose   Patient not taking: Reported on 2/6/2023   Mag-G 500 (27 Mg) MG tablet  Self No No   Sig: TAKE 1 TABLET (500 MG TOTAL) BY MOUTH DAILY   OneTouch Delica Lancets 33G MISC  Self No No   Sig: USE 3 (THREE) TIMES A DAY   OneTouch Ultra test strip  Self No No   Sig: USE 1 EACH 3 (THREE) TIMES A DAY USE AS INSTRUCTED    UltiCare Micro Pen Needles 32G X 4 MM MISC  Self No No   Sig: INJECT UNDER THE SKIN 4 (FOUR) TIMES A DAY   acetaminophen (TYLENOL) 650 mg CR tablet  Self No No   Sig: Take 1 tablet (650 mg total) by mouth every 8 (eight) hours as needed for mild pain   albuterol (2.5 mg/3 mL) 0.083 % nebulizer solution  Self No No   Sig: Take 3 mL (2.5 mg total) by nebulization every 6 (six) hours as needed for wheezing or shortness of breath   albuterol (PROVENTIL HFA,VENTOLIN HFA) 90 mcg/act inhaler   No No   Sig: INHALE 1 PUFF EVERY 4 (FOUR) HOURS AS NEEDED FOR WHEEZING   anastrozole (ARIMIDEX) 1 mg tablet  Self No No   Sig: TAKE 1 TABLET (1 MG TOTAL) BY MOUTH DAILY   aspirin 81 mg chewable tablet  Self Yes No   Sig: Chew 81 mg daily   atorvastatin (LIPITOR) 40 mg tablet  Self No No   Sig: Take 1 tablet (40 mg total) by mouth daily   calcium polycarbophil (FIBERCON) 625 mg tablet  Self No No   Sig: Take 1 tablet (625 mg total) by mouth daily   Patient not taking: Reported on 11/10/2023   ergocalciferol (VITAMIN D2) 50,000 units  Self No No   Sig: TAKE 1 CAPSULE (50,000 UNITS TOTAL) BY MOUTH ONCE A WEEK   furosemide (LASIX) 20 mg tablet  Self No No   Sig: TAKE 1 TABLET (20 MG TOTAL) BY MOUTH 2 (TWO) TIMES A DAY   insulin degludec (Tresiba FlexTouch) 200 units/mL CONCENTRATED U-200 injection pen  Self No No   Si units daily at 5PM   Patient taking differently: Inject 10 Units under the skin daily 60 units daily at 5PM   insulin lispro (HumaLOG) 100 units/mL injection pen  Self No No   Si units before each meal Plus sliding scale 150-200: 2 units 201-250: 4 units 251-300: 6 units 301-350: 8 units Over 350: 10 units   ketotifen (ZADITOR) 0.025 % ophthalmic solution  Self No No   Sig: Administer 1 drop to both eyes 2 (two) times a day as needed (itchy eyes)   loratadine (CLARITIN) 10 mg tablet  Self No No   Sig: TAKE 1 TABLET (10 MG TOTAL) BY MOUTH DAILY   losartan (COZAAR) 100 MG tablet  Self No No   Sig: Take 1 tablet  (100 mg total) by mouth daily   metoprolol tartrate (LOPRESSOR) 25 mg tablet  Self No No   Sig: Take 0.5 tablets (12.5 mg total) by mouth every 12 (twelve) hours   omeprazole (PriLOSEC) 20 mg delayed release capsule  Self No No   Sig: TAKE 1 CAPSULE (20 MG TOTAL) BY MOUTH DAILY   polyethylene glycol (MIRALAX) 17 g packet  Self No No   Sig: Take 17 g by mouth daily for 14 days   tiotropium (Spiriva Respimat) 2.5 MCG/ACT AERS inhaler   No No   Sig: INHALE 2 PUFFS DAILY   zileuton 600 MG   No No   Sig: TAKE 1 TABLET (600 MG TOTAL) BY MOUTH 2 (TWO) TIMES A DAY      Facility-Administered Medications: None       Past Medical History:   Diagnosis Date    Abdominal infection (HCC)     h-pylori    Abnormal chest x-ray 04/05/2019    Asthma     Breast cancer (HCC) 06/26/2018    Cancer (HCC)     BREAST    Diabetes mellitus (HCC)     Hiatal hernia     History of chemotherapy 2018    History of radiation therapy 2018    Hypercholesterolemia     Hypertension     Hypothyroid     Renal disorder     Rheumatoid arthritis (HCC)        Past Surgical History:   Procedure Laterality Date    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    BREAST LUMPECTOMY Right 6/26/2018    Procedure: RIGHT BREAST NEEDLE LOCALIZATION X2 WITH RIGHT BREAST LUMPECTOMY ( NEEDLE LOC @ 1000);  Surgeon: Familia Barragan MD;  Location: [facility];  Service: Surgical Oncology    BREAST LUMPECTOMY Right 8/2/2018    Procedure: LYMPHOSCINITGRAPHY INTRAOPERATIVE LYMPHATIC MAPPING , RIGHT SENTINEL NODE BIOPSY, REEXCISION  RIGHT BREAST LUMPECTOMY CAVITY (SUPERIOR MARGIN);  Surgeon: Familia Barragan MD;  Location: [facility];  Service: Surgical Oncology    BREAST SURGERY Left     CATARACT EXTRACTION, BILATERAL Bilateral     COLONOSCOPY      EGD AND COLONOSCOPY N/A 9/5/2018    Procedure: EGD AND COLONOSCOPY;  Surgeon: José Miguel Rosas MD;  Location: [facility];  Service: Gastroenterology    FL GUIDED CENTRAL VENOUS ACCESS DEVICE INSERTION  9/13/2018    KNEE SURGERY Right      MAMMO NEEDLE LOCALIZATION RIGHT (ALL INC) Right 6/26/2018    MAMMO STEREOTACTIC BREAST BIOPSY RIGHT (ALL INC) Right 5/23/2018    RETINAL DETACHMENT SURGERY Right     TUBAL LIGATION      TUNNELED VENOUS PORT PLACEMENT Left 9/13/2018    Procedure: INSERTION VENOUS PORT (PORT-A-CATH);  Surgeon: Familia Barragan MD;  Location: [facility];  Service: Surgical Oncology    UPPER GASTROINTESTINAL ENDOSCOPY         Family History   Problem Relation Age of Onset    Diabetes Mother     Hypertension Mother     Diabetes Father     Hypertension Father     No Known Problems Sister     No Known Problems Sister     No Known Problems Sister     No Known Problems Sister     No Known Problems Sister     Diabetes Brother     Diabetes Brother     Kidney failure Brother     Diabetes Brother     Cancer Maternal Grandfather     No Known Problems Daughter     No Known Problems Daughter     No Known Problems Daughter      I have reviewed and agree with the history as documented.    E-Cigarette/Vaping    E-Cigarette Use Never User      E-Cigarette/Vaping Substances    Nicotine No     THC No     CBD No     Flavoring No     Other No     Unknown No      Social History     Tobacco Use    Smoking status: Never    Smokeless tobacco: Never   Vaping Use    Vaping status: Never Used   Substance Use Topics    Alcohol use: No    Drug use: No       Review of Systems   Constitutional: Negative.    HENT: Negative.     Respiratory: Negative.     Cardiovascular: Negative.    Gastrointestinal:  Positive for abdominal pain. Negative for abdominal distention, blood in stool, constipation, diarrhea, nausea and vomiting.   Genitourinary: Negative.    Musculoskeletal: Negative.    Neurological: Negative.    All other systems reviewed and are negative.      Physical Exam  Physical Exam  Constitutional:       General: She is not in acute distress.     Appearance: She is well-developed. She is not diaphoretic.   HENT:      Head: Normocephalic and atraumatic.      Right  Ear: External ear normal.      Left Ear: External ear normal.      Nose: Nose normal.      Mouth/Throat:      Mouth: Mucous membranes are moist.      Pharynx: Oropharynx is clear.   Eyes:      General: No scleral icterus.     Conjunctiva/sclera: Conjunctivae normal.      Pupils: Pupils are equal, round, and reactive to light.   Cardiovascular:      Rate and Rhythm: Normal rate and regular rhythm.      Heart sounds: Normal heart sounds.   Pulmonary:      Effort: Pulmonary effort is normal.      Breath sounds: Normal breath sounds.   Abdominal:      General: Abdomen is flat. Bowel sounds are normal.      Palpations: Abdomen is soft.      Tenderness: There is abdominal tenderness in the right upper quadrant. There is no guarding or rebound. Negative signs include Mooney's sign.   Musculoskeletal:         General: Normal range of motion.      Cervical back: Normal range of motion and neck supple.   Skin:     General: Skin is warm and dry.   Neurological:      General: No focal deficit present.      Mental Status: She is alert and oriented to person, place, and time.   Psychiatric:         Mood and Affect: Mood normal.         Behavior: Behavior normal.         Vital Signs  ED Triage Vitals [01/03/24 0541]   Temperature Pulse Respirations Blood Pressure SpO2   (!) 97.4 °F (36.3 °C) 87 18 (!) 175/76 98 %      Temp src Heart Rate Source Patient Position - Orthostatic VS BP Location FiO2 (%)   -- -- Sitting -- --      Pain Score       9           Vitals:    01/03/24 0541 01/03/24 0804   BP: (!) 175/76 167/75   Pulse: 87 88   Patient Position - Orthostatic VS: Sitting          Visual Acuity      ED Medications  Medications   ketorolac (TORADOL) injection 15 mg (15 mg Intravenous Given 1/3/24 0801)   iohexol (OMNIPAQUE) 350 MG/ML injection (SINGLE-DOSE) 100 mL (100 mL Intravenous Given 1/3/24 0901)       Diagnostic Studies  Results Reviewed       Procedure Component Value Units Date/Time    CMP [[id number]]  (Abnormal)  Collected: 01/03/24 0801    Lab Status: Final result Specimen: Blood from Arm, Right Updated: 01/03/24 0834     Sodium 135 mmol/L      Potassium 4.1 mmol/L      Chloride 101 mmol/L      CO2 28 mmol/L      ANION GAP 6 mmol/L      BUN 22 mg/dL      Creatinine 0.64 mg/dL      Glucose 378 mg/dL      Calcium 9.4 mg/dL      AST 12 U/L      ALT 17 U/L      Alkaline Phosphatase 64 U/L      Total Protein 6.8 g/dL      Albumin 3.7 g/dL      Total Bilirubin 0.38 mg/dL      eGFR 92 ml/min/1.73sq m     Narrative:      National Kid[employer] Disease Foundation guidelines for Chronic Kidney Disease (CKD):     Stage 1 with normal or high GFR (GFR > 90 mL/min/1.73 square meters)    Stage 2 Mild CKD (GFR = 60-89 mL/min/1.73 square meters)    Stage 3A Moderate CKD (GFR = 45-59 mL/min/1.73 square meters)    Stage 3B Moderate CKD (GFR = 30-44 mL/min/1.73 square meters)    Stage 4 Severe CKD (GFR = 15-29 mL/min/1.73 square meters)    Stage 5 End Stage CKD (GFR <15 mL/min/1.73 square meters)  Note: GFR calculation is accurate only with a steady state creatinine    Lipase [[id number]]  (Abnormal) Collected: 01/03/24 0801    Lab Status: Final result Specimen: Blood from Arm, Right Updated: 01/03/24 0834     Lipase 7 u/L     CBC and differential [[id number]] Collected: 01/03/24 0801    Lab Status: Final result Specimen: Blood from Arm, Right Updated: 01/03/24 0808     WBC 6.53 Thousand/uL      RBC 3.98 Million/uL      Hemoglobin 11.5 g/dL      Hematocrit 35.5 %      MCV 89 fL      MCH 28.9 pg      MCHC 32.4 g/dL      RDW 13.4 %      MPV 11.8 fL      Platelets 283 Thousands/uL      nRBC 0 /100 WBCs      Neutrophils Relative 56 %      Immat GRANS % 1 %      Lymphocytes Relative 31 %      Monocytes Relative 8 %      Eosinophils Relative 3 %      Basophils Relative 1 %      Neutrophils Absolute 3.68 Thousands/µL      Immature Grans Absolute 0.03 Thousand/uL      Lymphocytes Absolute 2.03 Thousands/µL      Monocytes Absolute 0.49 Thousand/µL       Eosinophils Absolute 0.21 Thousand/µL      Basophils Absolute 0.09 Thousands/µL                    CT Abdomen pelvis with contrast   Final Result by Dennys Nelson MD (01/03 0934)      No acute CT findings in the abdomen or pelvis. Additional findings as above.            Workstation performed: [id number]                    Procedures  Procedures         ED Course                                             Medical Decision Making  Abdominal pain workup in the ED was negative for acute findings. Pain is worse with palpation and movement, potentially MS cause for pain. I recommended utilizing OTC analgesics for pain relief and to follow up with her PCP. Reviewed return precautions with daughter and patient.    Amount and/or Complexity of Data Reviewed  Labs: ordered.  Radiology: ordered.    Risk  Prescription drug management.             Disposition  Final diagnoses:   Right upper quadrant abdominal pain     Time reflects when diagnosis was documented in both MDM as applicable and the Disposition within this note       Time User Action Codes Description Comment    1/3/2024 10:02 AM Chino Rowland Add [R10.11] Right upper quadrant abdominal pain           ED Disposition       ED Disposition   Discharge    Condition   Good    Date/Time   Wed Kishore 3, 2024 10:02 AM    Comment   Jolie CruzCintron discharge to home/self care.                   Follow-up Information       Follow up With Specialties Details Why Contact Info    Sandra Serra MD Family Medicine Schedule an appointment as soon as possible for a visit in 3 days  [address] [place] [address]  [phone]              Discharge Medication List as of 1/3/2024 10:02 AM        CONTINUE these medications which have NOT CHANGED    Details   acetaminophen (TYLENOL) 650 mg CR tablet Take 1 tablet (650 mg total) by mouth every 8 (eight) hours as needed for mild pain, Starting Mon 6/13/2022, Normal      Advair -21 MCG/ACT inhaler INHALE 2  PUFFS 2 (TWO) TIMES A DAY RINSE MOUTH AFTER USE., Starting Tue 1/2/2024, Normal      albuterol (2.5 mg/3 mL) 0.083 % nebulizer solution Take 3 mL (2.5 mg total) by nebulization every 6 (six) hours as needed for wheezing or shortness of breath, Starting u 10/26/2023, Normal      albuterol (PROVENTIL HFA,VENTOLIN HFA) 90 mcg/act inhaler INHALE 1 PUFF EVERY 4 (FOUR) HOURS AS NEEDED FOR WHEEZING, Starting Tue 1/2/2024, Normal      anastrozole (ARIMIDEX) 1 mg tablet TAKE 1 TABLET (1 MG TOTAL) BY MOUTH DAILY, Starting Tue 10/31/2023, Normal      aspirin 81 mg chewable tablet Chew 81 mg daily, Historical Med      atorvastatin (LIPITOR) 40 mg tablet Take 1 tablet (40 mg total) by mouth daily, Starting Wed 8/23/2023, Normal      calcium polycarbophil (FIBERCON) 625 mg tablet Take 1 tablet (625 mg total) by mouth daily, Starting Wed 10/11/2023, Normal      Diclofenac Sodium (VOLTAREN) 1 % Apply 2 g topically 2 (two) times a day, Starting Wed 8/23/2023, Normal      EPINEPHrine (Auvi-Q) 0.1 mg/0.1 mL SOAJ Inject 0.3 mL (0.3 mg total) into a muscle once for 1 dose, Starting Wed 11/9/2022, Normal      ergocalciferol (VITAMIN D2) 50,000 units TAKE 1 CAPSULE (50,000 UNITS TOTAL) BY MOUTH ONCE A WEEK, Starting Tue 9/5/2023, Normal      furosemide (LASIX) 20 mg tablet TAKE 1 TABLET (20 MG TOTAL) BY MOUTH 2 (TWO) TIMES A DAY, Starting Mon 7/17/2023, Normal      insulin degludec (Tresiba FlexTouch) 200 units/mL CONCENTRATED U-200 injection pen 60 units daily at 5PM, Normal      insulin lispro (HumaLOG) 100 units/mL injection pen 24 units before each meal Plus sliding scale 150-200: 2 units 201-250: 4 units 251-300: 6 units 301-350: 8 units Over 350: 10 units, Normal      ketotifen (ZADITOR) 0.025 % ophthalmic solution Administer 1 drop to both eyes 2 (two) times a day as needed (itchy eyes), Starting Tue 10/3/2023, Normal      loratadine (CLARITIN) 10 mg tablet TAKE 1 TABLET (10 MG TOTAL) BY MOUTH DAILY, Starting Thu 8/5/2021, Normal       losartan (COZAAR) 100 MG tablet Take 1 tablet (100 mg total) by mouth daily, Starting Wed 8/23/2023, Normal      Mag-G 500 (27 Mg) MG tablet TAKE 1 TABLET (500 MG TOTAL) BY MOUTH DAILY, Normal      metoprolol tartrate (LOPRESSOR) 25 mg tablet Take 0.5 tablets (12.5 mg total) by mouth every 12 (twelve) hours, Starting Tue 5/2/2023, Normal      omeprazole (PriLOSEC) 20 mg delayed release capsule TAKE 1 CAPSULE (20 MG TOTAL) BY MOUTH DAILY, Starting Tue 5/16/2023, Normal      OneTouch Delica Lancets 33G MISC USE 3 (THREE) TIMES A DAY, Normal      OneTouch Ultra test strip USE 1 EACH 3 (THREE) TIMES A DAY USE AS INSTRUCTED, Starting Sat 2/18/2023, Normal      polyethylene glycol (MIRALAX) 17 g packet Take 17 g by mouth daily for 14 days, Starting Wed 10/11/2023, Until Mon 10/30/2023, Normal      tiotropium (Spiriva Respimat) 2.5 MCG/ACT AERS inhaler INHALE 2 PUFFS DAILY, Starting Tue 12/26/2023, Normal      UltiCare Micro Pen Needles 32G X 4 MM MISC INJECT UNDER THE SKIN 4 (FOUR) TIMES A DAY, Normal      zileuton 600 MG TAKE 1 TABLET (600 MG TOTAL) BY MOUTH 2 (TWO) TIMES A DAY, Normal             No discharge procedures on file.    PDMP Review       None            ED Provider  Electronically Signed by             Chino Rowland PA-C  01/03/24 6903

## 2024-01-04 NOTE — PROGRESS NOTES
"Patient ID: Jolie Mulligan is a 68 y.o. female Date of Birth 1955       Chief Complaint   Patient presents with   • Follow Up Wound Care Visit     Follow up of right leg wound.  Pt. Arrived with coflex intact.       Allergies:  Codeine and Latex    Diagnosis:      Diagnosis ICD-10-CM Associated Orders   1. Non-pressure ulcer of right lower extremity with fat layer exposed (Formerly [facility])  L97.912 Wound cleansing and dressings Other (comment) Right;Medial Leg      2. PAD (peripheral artery disease) (Formerly [facility])  I73.9               Assessment & Plan:  Ulcer of right lower extremity in setting of PAD: wound is closed completely epithelialized or healed.   Keep the area covered and protected for about another week   Discussed the importance of long-term edema and use of compression stockings 20 to 30 mmHg.  Also discussed that she can order Tubigrip's if she prefers  Counseled on importance of frequent elevation of leg and increase exercise/walking.  Moisturize skin daily with skin nourishing cream.   Reviewed importance of diabetic control and discussed that this can put her at risk for further wounds should this not be controlled.  Follow up as needed or call with questions or concerns    Portions of the record may have been created with voice recognition software. Occasional wrong word or \"sound alike\" substitutions may have occurred due to the inherent limitations of voice recognition software. Read the chart carefully and recognize, using context, where substitutions have occurred.    Subjective:   \"11/10/23: Patient is a 68 year old female who presents to the  wound center as a new patient for an open wound of her right lower leg. Patient reports her wound has been present for approximately 1 month. She reports she woke up one morning with a small bump on her leg which progressively got worse. She has a hx of bilateral lower extremity edema since completing chemo and radiation for breast cancer 1 year ago. She denies " "wearing any type of compression. Patient has a hx of type 2 DM with her most recent A1c 11.9 as of 10/30. Patient reports her wound drains a small amount of drainage. She keeps her wound MYRANDA when she is at home and keeps it covered with a band aid when outside her home. She denies any fevers or chills. \"    11/15/2023: Jolie presents to wound center today for follow-up of right lower extremity wound.  Her sister is present at bedside providing translation.  She denies acute complaints and reports she tolerated Coflex lite well.  Denies fever, chills.    11/22/2023: Jolie presents to wound center today for follow-up of right lower extremity wound.  Her sister is again present at bedside aiding with translation.  After last visit, venous studies completed which showed:  \"RIGHT LIMB: No evidence of deep venous incompetence. The great saphenous vein is competent. The great saphenous vein exits the saphenous compartment in the distal thigh. The small saphenous vein is competent and does not communicate with the popliteal vein. There is no evidence of incompetent perforators in the thigh or calf. There is no evidence of deep vein thrombosis in the CFV, the proximal PFV, the femoral vein and the popliteal vein.\"  She denies fever, chills.  She reports she has no acute complaints today.  She has tolerated the Coflex lite wrap well.    11/30/2023: Follow-up ulceration on the right lower extremity.  LEADS done yesterday:    CONCLUSION:  Impression:  RIGHT LOWER LIMB:  Evidence of <50% stenosis noted at the mid and distal superficial femoral  artery.  Evidence of posterior tibial artery occlusive disease.  Ankle/Brachial index: 0.96, which is in the normal disease category.  PVR/ PPG tracings are normal.  Metatarsal pressure of 141 mmHg.  Great toe pressure of 97 mmHg, within the healing range.     LEFT LOWER LIMB:  Evidence of 50-75% stenosis noted in the mid superficial femoral artery.  Evidence of anterior tibial artery " occlusive disease.  Ankle/Brachial index: 0.89, which is in the mild disease category.  PVR/ PPG tracings are normal.  Metatarsal pressure of 176 mmHg.  Great toe pressure of 67 mmHg, within the healing range.    Tolerated Coflex lite but did express some discomfort.  No fever or chills.    12/7/2023: Jolie presents today for follow-up of ulceration right lower extremity.  Reports this morning felt her Coflex wrap was too tight and cut the top (see a/p).  Otherwise she has had no acute complaints.  She denies fever, chills.    12/14/2023: Jolie presents to wound center today for follow-up of right lower extremity ulceration.  Her daughter is present with her today providing translation.  Reports that she tolerated the Coflex lite wrap well and did not have difficulty as she did at previous visit.  She reports she has no acute complaints and denies fever, chills.    12/21/2023: Jolie presents to wound center today for follow-up of RLE ulceration.  She has no acute complaints today and denies fever, chills.  Has not had any issues with the Coflex lite wrap.    12/28/2023: Jolie presents to wound center today for follow-up of right lower extremity ulceration.  She has continued with Coflex lite and tolerated well.  She has no acute complaints today and denies fever, chills.  Her daughter is present with her at bedside aiding with translation.    1/5/2024: Jolie presents to wound center today along with her daughter for follow-up of right lower extremity ulceration.  She has been wearing Coflex lite.  She has no acute complaints today and denies fever, chills.  Per chart review patient with recent ER visit for abdominal pain/right upper quadrant pain which she reports has improved.        The following portions of the patient's history were reviewed and updated as appropriate:   Patient Active Problem List   Diagnosis   • Type 2 diabetes mellitus with complication, with long-term current use of insulin (HCC)    • Severe persistent asthma without complication   • Other specified hypothyroidism   • Chest pain   • Palpitations   • Chronic bilateral low back pain without sciatica   • Neck pain   • Malignant neoplasm of right female breast (HCC)   • Malignant neoplasm of upper-outer quadrant of right breast in female, estrogen receptor positive    • Hiatal hernia   • Screening for colon cancer   • Esophageal dysphagia   • Cellulitis of right axilla   • Chronic pain of right knee   • Hypercholesterolemia   • Hypothyroid   • Essential hypertension   • Bilateral pulmonary embolism (HCC)   • Radiation pneumonitis (HCC)   • Type 2 diabetes mellitus with hyperglycemia, with long-term current use of insulin (HCC)   • Diabetic polyneuropathy associated with type 2 diabetes mellitus (HCC)   • Use of anastrozole (Arimidex)   • Decreased ROM of right shoulder   • Lump of skin   • Chronic diastolic heart failure (HCC)   • Gastroesophageal reflux disease without esophagitis   • Muscle cramps   • Sebaceous cyst of axilla   • Subcutaneous mass of back   • Overweight with body mass index (BMI) of 28 to 28.9 in adult   • Anxiety   • Right foot pain   • Pelvic pain   • Bone pain   • Type 2 diabetes mellitus with microalbuminuria, with long-term current use of insulin (HCC)   • Depression, recurrent (HCC)   • Vaginal candidiasis   • Diabetes mellitus (HCC)   • Seasonal allergies   • Diarrhea   • COVID-19   • Hypercalcemia   • Bilateral lower extremity edema   • Itchy eyes   • Cellulitis of right lower extremity   • Wound of right leg     Past Medical History:   Diagnosis Date   • Abdominal infection (HCC)     h-pylori   • Abnormal chest x-ray 04/05/2019   • Asthma    • Breast cancer (HCC) 06/26/2018   • Cancer (HCC)     BREAST   • Diabetes mellitus (HCC)    • Hiatal hernia    • History of chemotherapy 2018   • History of radiation therapy 2018   • Hypercholesterolemia    • Hypertension    • Hypothyroid    • Renal disorder    • Rheumatoid  arthritis (HCC)      Past Surgical History:   Procedure Laterality Date   • 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   • BREAST LUMPECTOMY Right 6/26/2018    Procedure: RIGHT BREAST NEEDLE LOCALIZATION X2 WITH RIGHT BREAST LUMPECTOMY ( NEEDLE LOC @ 1000);  Surgeon: Familia Barragan MD;  Location: [facility];  Service: Surgical Oncology   • BREAST LUMPECTOMY Right 8/2/2018    Procedure: LYMPHOSCINITGRAPHY INTRAOPERATIVE LYMPHATIC MAPPING , RIGHT SENTINEL NODE BIOPSY, REEXCISION  RIGHT BREAST LUMPECTOMY CAVITY (SUPERIOR MARGIN);  Surgeon: Familia Barragan MD;  Location: [facility];  Service: Surgical Oncology   • BREAST SURGERY Left    • CATARACT EXTRACTION, BILATERAL Bilateral    • COLONOSCOPY     • EGD AND COLONOSCOPY N/A 9/5/2018    Procedure: EGD AND COLONOSCOPY;  Surgeon: José Miguel Rosas MD;  Location: [facility];  Service: Gastroenterology   • FL GUIDED CENTRAL VENOUS ACCESS DEVICE INSERTION  9/13/2018   • KNEE SURGERY Right    • MAMMO NEEDLE LOCALIZATION RIGHT (ALL INC) Right 6/26/2018   • MAMMO STEREOTACTIC BREAST BIOPSY RIGHT (ALL INC) Right 5/23/2018   • RETINAL DETACHMENT SURGERY Right    • TUBAL LIGATION     • TUNNELED VENOUS PORT PLACEMENT Left 9/13/2018    Procedure: INSERTION VENOUS PORT (PORT-A-CATH);  Surgeon: Familia Barragan MD;  Location: [facility];  Service: Surgical Oncology   • UPPER GASTROINTESTINAL ENDOSCOPY       Family History   Problem Relation Age of Onset   • Diabetes Mother    • Hypertension Mother    • Diabetes Father    • Hypertension Father    • No Known Problems Sister    • No Known Problems Sister    • No Known Problems Sister    • No Known Problems Sister    • No Known Problems Sister    • Diabetes Brother    • Diabetes Brother    • Kidney failure Brother    • Diabetes Brother    • Cancer Maternal Grandfather    • No Known Problems Daughter    • No Known Problems Daughter    • No Known Problems Daughter       Social History     Socioeconomic History   • Marital status:       Spouse name: None   • Number of children: None   • Years of education: None   • Highest education level: None   Occupational History   • None   Tobacco Use   • Smoking status: Never   • Smokeless tobacco: Never   Vaping Use   • Vaping status: Never Used   Substance and Sexual Activity   • Alcohol use: No   • Drug use: No   • Sexual activity: Not Currently   Other Topics Concern   • None   Social History Narrative   • None     Social Determinants of Health     Financial Resource Strain: Medium Risk (9/18/2023)    Overall Financial Resource Strain (CARDIA)    • Difficulty of Paying Living Expenses: Somewhat hard   Food Insecurity: No Food Insecurity (9/18/2023)    Hunger Vital Sign    • Worried About Running Out of Food in the Last Year: Never true    • Ran Out of Food in the Last Year: Never true   Transportation Needs: No Transportation Needs (9/18/2023)    PRAPARE - Transportation    • Lack of Transportation (Medical): No    • Lack of Transportation (Non-Medical): No   Physical Activity: Not on file   Stress: Not on file   Social Connections: Not on file   Intimate Partner Violence: Not on file   Housing Stability: Low Risk  (10/27/2021)    Housing Stability Vital Sign    • Unable to Pay for Housing in the Last Year: No    • Number of Places Lived in the Last Year: 1    • Unstable Housing in the Last Year: No        Current Outpatient Medications:   •  acetaminophen (TYLENOL) 650 mg CR tablet, Take 1 tablet (650 mg total) by mouth every 8 (eight) hours as needed for mild pain, Disp: 30 tablet, Rfl: 0  •  Advair -21 MCG/ACT inhaler, INHALE 2 PUFFS 2 (TWO) TIMES A DAY RINSE MOUTH AFTER USE., Disp: 12 g, Rfl: 2  •  albuterol (2.5 mg/3 mL) 0.083 % nebulizer solution, Take 3 mL (2.5 mg total) by nebulization every 6 (six) hours as needed for wheezing or shortness of breath, Disp: 75 mL, Rfl: 5  •  albuterol (PROVENTIL HFA,VENTOLIN HFA) 90 mcg/act inhaler, INHALE 1 PUFF EVERY 4 (FOUR) HOURS AS NEEDED FOR  WHEEZING, Disp: 8.5 g, Rfl: 2  •  anastrozole (ARIMIDEX) 1 mg tablet, TAKE 1 TABLET (1 MG TOTAL) BY MOUTH DAILY, Disp: 90 tablet, Rfl: 3  •  aspirin 81 mg chewable tablet, Chew 81 mg daily, Disp: , Rfl:   •  atorvastatin (LIPITOR) 40 mg tablet, Take 1 tablet (40 mg total) by mouth daily, Disp: 90 tablet, Rfl: 2  •  calcium polycarbophil (FIBERCON) 625 mg tablet, Take 1 tablet (625 mg total) by mouth daily (Patient not taking: Reported on 11/10/2023), Disp: 30 tablet, Rfl: 5  •  Diclofenac Sodium (VOLTAREN) 1 %, Apply 2 g topically 2 (two) times a day, Disp: 150 g, Rfl: 0  •  EPINEPHrine (Auvi-Q) 0.1 mg/0.1 mL SOAJ, Inject 0.3 mL (0.3 mg total) into a muscle once for 1 dose (Patient not taking: Reported on 2/6/2023), Disp: 2 each, Rfl: 5  •  ergocalciferol (VITAMIN D2) 50,000 units, TAKE 1 CAPSULE (50,000 UNITS TOTAL) BY MOUTH ONCE A WEEK, Disp: 12 capsule, Rfl: 0  •  furosemide (LASIX) 20 mg tablet, TAKE 1 TABLET (20 MG TOTAL) BY MOUTH 2 (TWO) TIMES A DAY, Disp: 180 tablet, Rfl: 0  •  insulin degludec (Tresiba FlexTouch) 200 units/mL CONCENTRATED U-200 injection pen, 60 units daily at 5PM (Patient taking differently: Inject 10 Units under the skin daily 60 units daily at 5PM), Disp: 27 mL, Rfl: 1  •  insulin lispro (HumaLOG) 100 units/mL injection pen, 24 units before each meal Plus sliding scale 150-200: 2 units 201-250: 4 units 251-300: 6 units 301-350: 8 units Over 350: 10 units, Disp: 90 mL, Rfl: 1  •  ketotifen (ZADITOR) 0.025 % ophthalmic solution, Administer 1 drop to both eyes 2 (two) times a day as needed (itchy eyes), Disp: 5 mL, Rfl: 0  •  loratadine (CLARITIN) 10 mg tablet, TAKE 1 TABLET (10 MG TOTAL) BY MOUTH DAILY, Disp: 90 tablet, Rfl: 2  •  losartan (COZAAR) 100 MG tablet, Take 1 tablet (100 mg total) by mouth daily, Disp: 90 tablet, Rfl: 1  •  Mag-G 500 (27 Mg) MG tablet, TAKE 1 TABLET (500 MG TOTAL) BY MOUTH DAILY, Disp: 90 tablet, Rfl: 1  •  metoprolol tartrate (LOPRESSOR) 25 mg tablet, Take 0.5  tablets (12.5 mg total) by mouth every 12 (twelve) hours, Disp: 180 tablet, Rfl: 0  •  omeprazole (PriLOSEC) 20 mg delayed release capsule, TAKE 1 CAPSULE (20 MG TOTAL) BY MOUTH DAILY, Disp: 90 capsule, Rfl: 2  •  OneTouch Delica Lancets 33G MISC, USE 3 (THREE) TIMES A DAY, Disp: 300 each, Rfl: 3  •  OneTouch Ultra test strip, USE 1 EACH 3 (THREE) TIMES A DAY USE AS INSTRUCTED, Disp: 300 each, Rfl: 2  •  polyethylene glycol (MIRALAX) 17 g packet, Take 17 g by mouth daily for 14 days, Disp: 28 each, Rfl: 5  •  tiotropium (Spiriva Respimat) 2.5 MCG/ACT AERS inhaler, INHALE 2 PUFFS DAILY, Disp: 4 g, Rfl: 3  •  UltiCare Micro Pen Needles 32G X 4 MM MISC, INJECT UNDER THE SKIN 4 (FOUR) TIMES A DAY, Disp: 400 each, Rfl: 0  •  zileuton 600 MG, TAKE 1 TABLET (600 MG TOTAL) BY MOUTH 2 (TWO) TIMES A DAY, Disp: 60 tablet, Rfl: 2    Review of Systems   Constitutional: Negative.  Negative for chills and fever.   Respiratory:  Negative for shortness of breath.    Cardiovascular:  Positive for leg swelling (improved). Negative for chest pain and palpitations.   Gastrointestinal:  Negative for nausea and vomiting.   Musculoskeletal:  Negative for gait problem.   Skin:  Positive for wound (RLE).   Psychiatric/Behavioral:  The patient is not nervous/anxious.        Objective:  /76   Pulse 76   Temp (!) 96.5 °F (35.8 °C)   Resp 16   Pain Score: 0-No pain     Physical Exam  Vitals and nursing note reviewed.   Constitutional:       General: She is not in acute distress.     Appearance: Normal appearance. She is obese. She is not ill-appearing, toxic-appearing or diaphoretic.      Comments: NAD daughter present at bedside   HENT:      Head: Normocephalic and atraumatic.   Eyes:      General: No scleral icterus.  Cardiovascular:      Rate and Rhythm: Normal rate.      Pulses:           Dorsalis pedis pulses are 1+ on the right side and 1+ on the left side.        Posterior tibial pulses are 1+ on the right side and 1+ on the left  side.   Pulmonary:      Effort: Pulmonary effort is normal. No respiratory distress.   Musculoskeletal:         General: Normal range of motion.      Right lower leg: Edema present.      Left lower leg: Edema present.      Comments: Stable - mild pitting edema on the right lower extremity, left lower extremity remains around 1+   Skin:     General: Skin is warm and dry.      Findings: Wound present.             Comments: Ulcer is healed - completely epithelialized superficial overlying eschar.No malodor, lymphangitic streaking, or induration.   Neurological:      Mental Status: She is alert and oriented to person, place, and time.   Psychiatric:         Mood and Affect: Mood normal.           Wound 11/10/23 Other (comment) Leg Right;Medial (Active)   Wound Image   01/05/24 1434   Wound Description Epithelialization;Other (Comment) 01/05/24 1428   Bobbi-wound Assessment Intact 01/05/24 1428   Wound Length (cm) 0 cm 01/05/24 1442   Wound Width (cm) 0 cm 01/05/24 1442   Wound Depth (cm) 0 cm 01/05/24 1442   Wound Surface Area (cm^2) 0 cm^2 01/05/24 1442   Wound Volume (cm^3) 0 cm^3 01/05/24 1442   Calculated Wound Volume (cm^3) 0 cm^3 01/05/24 1442   Change in Wound Size % 100 01/05/24 1442   Drainage Amount None 01/05/24 1428   Drainage Description Serosanguineous 12/28/23 1104   Non-staged Wound Description Full thickness 12/21/23 0924   Treatments Cleansed 01/05/24 1428   Patient Tolerance Tolerated well 01/05/24 1428   Dressing Status Removed 01/05/24 1428       Lab Results   Component Value Date    HGBA1C 11.3 (H) 11/13/2023       Wound Instructions:  Orders Placed This Encounter   Procedures   • Wound cleansing and dressings Other (comment) Right;Medial Leg     You are healed.   You are discharged from the wound center.   Apply lotion to lower extremities.  Apply after shower when skin is still moist.  This will help to keep the moister on you skin.   You need to wear compression stockings 20 -30mmHg at all times.      Today we applied tubi  size F to your legs. Once you obtain the compression stockings you can stop wearing the tubi .  Do not wear both together.     We applied a bordered foam to your closed wound.  Please keep it covered with this for one week then you can leave it open to air.     If you notice any drainage or openings of skin please call the wound center.     Standing Status:   Future     Standing Expiration Date:   1/5/2025         Caryl Longo MD

## 2024-01-05 ENCOUNTER — OFFICE VISIT (OUTPATIENT)
Dept: WOUND CARE | Facility: CLINIC | Age: 69
End: 2024-01-05
Payer: COMMERCIAL

## 2024-01-05 VITALS
TEMPERATURE: 96.5 F | RESPIRATION RATE: 16 BRPM | HEART RATE: 76 BPM | SYSTOLIC BLOOD PRESSURE: 138 MMHG | DIASTOLIC BLOOD PRESSURE: 76 MMHG

## 2024-01-05 DIAGNOSIS — I73.9 PAD (PERIPHERAL ARTERY DISEASE) (HCC): ICD-10-CM

## 2024-01-05 DIAGNOSIS — L97.912 NON-PRESSURE ULCER OF RIGHT LOWER EXTREMITY WITH FAT LAYER EXPOSED (HCC): Primary | ICD-10-CM

## 2024-01-05 PROCEDURE — 99213 OFFICE O/P EST LOW 20 MIN: CPT | Performed by: FAMILY MEDICINE

## 2024-01-05 PROCEDURE — 99212 OFFICE O/P EST SF 10 MIN: CPT | Performed by: FAMILY MEDICINE

## 2024-01-05 NOTE — PATIENT INSTRUCTIONS
Orders Placed This Encounter   Procedures    Wound cleansing and dressings Other (comment) Right;Medial Leg     You are healed.   You are discharged from the wound center.   Apply lotion to lower extremities.  Apply after shower when skin is still moist.  This will help to keep the moister on you skin.   You need to wear compression stockings 20 -30mmHg at all times.     Today we applied tubi  size F to your legs. Once you obtain the compression stockings you can stop wearing the tubi .  Do not wear both together.     We applied a bordered foam to your closed wound.  Please keep it covered with this for one week then you can leave it open to air.     If you notice any drainage or openings of skin please call the wound center.     Standing Status:   Future     Standing Expiration Date:   1/5/2025

## 2024-01-06 DIAGNOSIS — I10 ESSENTIAL HYPERTENSION: ICD-10-CM

## 2024-01-06 DIAGNOSIS — J45.50 SEVERE PERSISTENT ASTHMA WITHOUT COMPLICATION: ICD-10-CM

## 2024-01-06 RX ORDER — FLUTICASONE PROPIONATE AND SALMETEROL XINAFOATE 230; 21 UG/1; UG/1
2 AEROSOL, METERED RESPIRATORY (INHALATION) 2 TIMES DAILY
Qty: 12 G | Refills: 2 | Status: SHIPPED | OUTPATIENT
Start: 2024-01-06

## 2024-01-19 ENCOUNTER — TELEPHONE (OUTPATIENT)
Age: 69
End: 2024-01-19

## 2024-02-02 ENCOUNTER — TELEPHONE (OUTPATIENT)
Dept: PULMONOLOGY | Facility: CLINIC | Age: 69
End: 2024-02-02

## 2024-02-02 NOTE — TELEPHONE ENCOUNTER
Spoke with patient and patient stated is was ok to move her appointment scheduled with [facility] on 2/5/2024 from 4pm to 2pm.

## 2024-02-03 DIAGNOSIS — I10 ESSENTIAL HYPERTENSION: ICD-10-CM

## 2024-02-05 ENCOUNTER — OFFICE VISIT (OUTPATIENT)
Dept: PULMONOLOGY | Facility: CLINIC | Age: 69
End: 2024-02-05
Payer: COMMERCIAL

## 2024-02-05 VITALS
WEIGHT: 182 LBS | SYSTOLIC BLOOD PRESSURE: 126 MMHG | BODY MASS INDEX: 30.32 KG/M2 | OXYGEN SATURATION: 98 % | HEART RATE: 92 BPM | DIASTOLIC BLOOD PRESSURE: 72 MMHG | HEIGHT: 65 IN | TEMPERATURE: 99.2 F

## 2024-02-05 DIAGNOSIS — C50.911 MALIGNANT NEOPLASM OF RIGHT BREAST IN FEMALE, ESTROGEN RECEPTOR POSITIVE, UNSPECIFIED SITE OF BREAST: ICD-10-CM

## 2024-02-05 DIAGNOSIS — J45.41 MODERATE PERSISTENT ASTHMA WITH EXACERBATION: Primary | ICD-10-CM

## 2024-02-05 DIAGNOSIS — Z17.0 MALIGNANT NEOPLASM OF RIGHT BREAST IN FEMALE, ESTROGEN RECEPTOR POSITIVE, UNSPECIFIED SITE OF BREAST: ICD-10-CM

## 2024-02-05 PROCEDURE — 99214 OFFICE O/P EST MOD 30 MIN: CPT | Performed by: INTERNAL MEDICINE

## 2024-02-05 RX ORDER — PREDNISONE 20 MG/1
40 TABLET ORAL DAILY
Qty: 10 TABLET | Refills: 0 | Status: SHIPPED | OUTPATIENT
Start: 2024-02-05 | End: 2024-02-10

## 2024-02-05 RX ORDER — OMEPRAZOLE 20 MG/1
20 CAPSULE, DELAYED RELEASE ORAL DAILY
Qty: 90 CAPSULE | Refills: 1 | Status: SHIPPED | OUTPATIENT
Start: 2024-02-05

## 2024-02-05 RX ORDER — AZITHROMYCIN 250 MG/1
TABLET, FILM COATED ORAL
Qty: 6 TABLET | Refills: 0 | Status: SHIPPED | OUTPATIENT
Start: 2024-02-05 | End: 2024-02-09

## 2024-02-05 NOTE — PROGRESS NOTES
Pulmonary Follow Up Note   Jolie Mulligan 68 y.o. female MRN: [id number]  2/5/2024      Assessment:    1. Moderate persistent asthma with exacerbation  Continue Advair and Spiriva  Trial of prednisone 40 mg a day for 5 days  Complete Z-Kavin  Check chest x-ray PA and lateral  -     XR chest pa & lateral; Future; Expected date: 02/05/2024  -     predniSONE 20 mg tablet; Take 2 tablets (40 mg total) by mouth daily for 5 days  -     azithromycin (ZITHROMAX) 250 mg tablet; Take 2 tablets today then 1 tablet daily x 4 days    2. Malignant neoplasm of right breast in female, estrogen receptor positive, unspecified site of breast   On anastrozole  Continue follow-up with hematology oncology        Plan:    Diagnoses and all orders for this visit:    Moderate persistent asthma with exacerbation  -     XR chest pa & lateral; Future  -     predniSONE 20 mg tablet; Take 2 tablets (40 mg total) by mouth daily for 5 days  -     azithromycin (ZITHROMAX) 250 mg tablet; Take 2 tablets today then 1 tablet daily x 4 days    Malignant neoplasm of right breast in female, estrogen receptor positive, unspecified site of breast         Return in about 3 months (around 5/5/2024).    History of Present Illness   HPI:  Jolie Mulligan is a 68 y.o. female who presents with follow-up of asthma.  Since her last visit she reports recently she developed a productive cough of yellow-green sputum that then improved slightly but she continues to have the cough and wheezing.  She is still using the Advair and Spiriva    Review of Systems   Constitutional:  Negative for chills and fever.   HENT:  Negative for congestion, postnasal drip and rhinorrhea.    Eyes:  Negative for itching.   Respiratory:  Positive for cough and shortness of breath. Negative for wheezing and stridor.    Cardiovascular:  Negative for chest pain, palpitations and leg swelling.   Gastrointestinal:  Negative for abdominal distention, abdominal pain, nausea and vomiting.    Genitourinary:  Negative for dysuria and flank pain.   Musculoskeletal:  Negative for arthralgias and myalgias.   Skin:  Negative for color change.   Neurological:  Negative for dizziness, light-headedness and headaches.   Psychiatric/Behavioral: Negative.         Historical Information   Past Medical History:   Diagnosis Date    Abdominal infection (HCC)     h-pylori    Abnormal chest x-ray 04/05/2019    Asthma     Breast cancer (HCC) 06/26/2018    Cancer (HCC)     BREAST    Depression, recurrent (HCC) 06/13/2022    Diabetes mellitus (HCC)     Hiatal hernia     Hiatal hernia 08/08/2018    History of chemotherapy 2018    History of radiation therapy 2018    Hypercholesterolemia     Hypertension     Hypothyroid     Renal disorder     Rheumatoid arthritis (HCC)      Past Surgical History:   Procedure Laterality Date    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    BREAST LUMPECTOMY Right 6/26/2018    Procedure: RIGHT BREAST NEEDLE LOCALIZATION X2 WITH RIGHT BREAST LUMPECTOMY ( NEEDLE LOC @ 1000);  Surgeon: Familia Barragan MD;  Location:  MAIN OR;  Service: Surgical Oncology    BREAST LUMPECTOMY Right 8/2/2018    Procedure: LYMPHOSCINITGRAPHY INTRAOPERATIVE LYMPHATIC MAPPING , RIGHT SENTINEL NODE BIOPSY, REEXCISION  RIGHT BREAST LUMPECTOMY CAVITY (SUPERIOR MARGIN);  Surgeon: Familia Barragan MD;  Location:  MAIN OR;  Service: Surgical Oncology    BREAST SURGERY Left     CATARACT EXTRACTION, BILATERAL Bilateral     COLONOSCOPY      EGD AND COLONOSCOPY N/A 9/5/2018    Procedure: EGD AND COLONOSCOPY;  Surgeon: José Miguel Rosas MD;  Location: [facility];  Service: Gastroenterology    FL GUIDED CENTRAL VENOUS ACCESS DEVICE INSERTION  9/13/2018    KNEE SURGERY Right     MAMMO NEEDLE LOCALIZATION RIGHT (ALL INC) Right 6/26/2018    MAMMO STEREOTACTIC BREAST BIOPSY RIGHT (ALL INC) Right 5/23/2018    RETINAL DETACHMENT SURGERY Right     TUBAL LIGATION      TUNNELED VENOUS PORT PLACEMENT Left 9/13/2018    Procedure: INSERTION  VENOUS PORT (PORT-A-CATH);  Surgeon: Familia Barragan MD;  Location: [facility];  Service: Surgical Oncology    UPPER GASTROINTESTINAL ENDOSCOPY       Family History   Problem Relation Age of Onset    Diabetes Mother     Hypertension Mother     Diabetes Father     Hypertension Father     No Known Problems Sister     No Known Problems Sister     No Known Problems Sister     No Known Problems Sister     No Known Problems Sister     Diabetes Brother     Diabetes Brother     Kidney failure Brother     Diabetes Brother     Cancer Maternal Grandfather     No Known Problems Daughter     No Known Problems Daughter     No Known Problems Daughter          Meds/Allergies     Current Outpatient Medications:     acetaminophen (TYLENOL) 650 mg CR tablet, Take 1 tablet (650 mg total) by mouth every 8 (eight) hours as needed for mild pain, Disp: 30 tablet, Rfl: 0    Advair -21 MCG/ACT inhaler, INHALE 2 PUFFS 2 (TWO) TIMES A DAY RINSE MOUTH AFTER USE., Disp: 12 g, Rfl: 2    albuterol (2.5 mg/3 mL) 0.083 % nebulizer solution, Take 3 mL (2.5 mg total) by nebulization every 6 (six) hours as needed for wheezing or shortness of breath, Disp: 75 mL, Rfl: 5    albuterol (PROVENTIL HFA,VENTOLIN HFA) 90 mcg/act inhaler, INHALE 1 PUFF EVERY 4 (FOUR) HOURS AS NEEDED FOR WHEEZING, Disp: 8.5 g, Rfl: 2    anastrozole (ARIMIDEX) 1 mg tablet, TAKE 1 TABLET (1 MG TOTAL) BY MOUTH DAILY, Disp: 90 tablet, Rfl: 3    aspirin 81 mg chewable tablet, Chew 81 mg daily, Disp: , Rfl:     atorvastatin (LIPITOR) 40 mg tablet, Take 1 tablet (40 mg total) by mouth daily, Disp: 90 tablet, Rfl: 2    azithromycin (ZITHROMAX) 250 mg tablet, Take 2 tablets today then 1 tablet daily x 4 days, Disp: 6 tablet, Rfl: 0    calcium polycarbophil (FIBERCON) 625 mg tablet, Take 1 tablet (625 mg total) by mouth daily, Disp: 30 tablet, Rfl: 5    Diclofenac Sodium (VOLTAREN) 1 %, Apply 2 g topically 2 (two) times a day, Disp: 150 g, Rfl: 0    ergocalciferol (VITAMIN D2)  50,000 units, TAKE 1 CAPSULE (50,000 UNITS TOTAL) BY MOUTH ONCE A WEEK, Disp: 12 capsule, Rfl: 0    furosemide (LASIX) 20 mg tablet, TAKE 1 TABLET (20 MG TOTAL) BY MOUTH 2 (TWO) TIMES A DAY, Disp: 180 tablet, Rfl: 0    insulin degludec (Tresiba FlexTouch) 200 units/mL CONCENTRATED U-200 injection pen, 60 units daily at 5PM (Patient taking differently: Inject 10 Units under the skin daily 60 units daily at 5PM), Disp: 27 mL, Rfl: 1    insulin lispro (HumaLOG) 100 units/mL injection pen, 24 units before each meal Plus sliding scale 150-200: 2 units 201-250: 4 units 251-300: 6 units 301-350: 8 units Over 350: 10 units, Disp: 90 mL, Rfl: 1    ketotifen (ZADITOR) 0.025 % ophthalmic solution, Administer 1 drop to both eyes 2 (two) times a day as needed (itchy eyes), Disp: 5 mL, Rfl: 0    loratadine (CLARITIN) 10 mg tablet, TAKE 1 TABLET (10 MG TOTAL) BY MOUTH DAILY, Disp: 90 tablet, Rfl: 2    losartan (COZAAR) 100 MG tablet, Take 1 tablet (100 mg total) by mouth daily, Disp: 90 tablet, Rfl: 1    Mag-G 500 (27 Mg) MG tablet, TAKE 1 TABLET (500 MG TOTAL) BY MOUTH DAILY, Disp: 90 tablet, Rfl: 1    metoprolol tartrate (LOPRESSOR) 25 mg tablet, TAKE 0.5 TABLETS (12.5 MG TOTAL) BY MOUTH EVERY 12 (TWELVE) HOURS, Disp: 90 tablet, Rfl: 0    omeprazole (PriLOSEC) 20 mg delayed release capsule, TAKE 1 CAPSULE (20 MG TOTAL) BY MOUTH DAILY, Disp: 90 capsule, Rfl: 1    OneTouch Delica Lancets 33G MISC, USE 3 (THREE) TIMES A DAY, Disp: 300 each, Rfl: 3    OneTouch Ultra test strip, USE 1 EACH 3 (THREE) TIMES A DAY USE AS INSTRUCTED, Disp: 300 each, Rfl: 2    predniSONE 20 mg tablet, Take 2 tablets (40 mg total) by mouth daily for 5 days, Disp: 10 tablet, Rfl: 0    tiotropium (Spiriva Respimat) 2.5 MCG/ACT AERS inhaler, INHALE 2 PUFFS DAILY, Disp: 4 g, Rfl: 3    UltiCare Micro Pen Needles 32G X 4 MM MISC, INJECT UNDER THE SKIN 4 (FOUR) TIMES A DAY, Disp: 400 each, Rfl: 0    zileuton 600 MG, TAKE 1 TABLET (600 MG TOTAL) BY MOUTH 2 (TWO)  "TIMES A DAY, Disp: 60 tablet, Rfl: 2    EPINEPHrine (Auvi-Q) 0.1 mg/0.1 mL SOAJ, Inject 0.3 mL (0.3 mg total) into a muscle once for 1 dose (Patient not taking: Reported on 2/6/2023), Disp: 2 each, Rfl: 5    polyethylene glycol (MIRALAX) 17 g packet, Take 17 g by mouth daily for 14 days, Disp: 28 each, Rfl: 5  Allergies   Allergen Reactions    Codeine Other (See Comments)     unknown    Latex Other (See Comments)     fungus       Vitals: Blood pressure 126/72, pulse 92, temperature 99.2 °F (37.3 °C), temperature source Tympanic, height 5' 5\" (1.651 m), weight 82.6 kg (182 lb), SpO2 98%, not currently breastfeeding. Body mass index is 30.29 kg/m². Oxygen Therapy  SpO2: 98 %  Oxygen Therapy: None (Room air)      Physical Exam  Physical Exam  Constitutional:       General: She is not in acute distress.     Appearance: She is not diaphoretic.   HENT:      Head: Normocephalic and atraumatic.      Nose: Nose normal.      Mouth/Throat:      Pharynx: No oropharyngeal exudate.   Eyes:      General: No scleral icterus.     Conjunctiva/sclera: Conjunctivae normal.      Pupils: Pupils are equal, round, and reactive to light.   Neck:      Thyroid: No thyromegaly.      Vascular: No JVD.      Trachea: No tracheal deviation.   Cardiovascular:      Rate and Rhythm: Normal rate and regular rhythm.      Heart sounds: Normal heart sounds. No murmur heard.     No friction rub. No gallop.   Pulmonary:      Effort: Pulmonary effort is normal. No respiratory distress.      Breath sounds: Normal breath sounds. No stridor. No wheezing or rales.   Abdominal:      General: Bowel sounds are normal. There is no distension.      Palpations: Abdomen is soft.      Tenderness: There is no abdominal tenderness. There is no guarding or rebound.   Musculoskeletal:         General: No deformity. Normal range of motion.      Cervical back: Normal range of motion and neck supple.   Lymphadenopathy:      Cervical: No cervical adenopathy.   Skin:     " General: Skin is warm.      Findings: No erythema or rash.   Neurological:      Mental Status: She is alert and oriented to person, place, and time.      Cranial Nerves: No cranial nerve deficit.      Sensory: No sensory deficit.         Labs: I have personally reviewed pertinent lab results.  Lab Results   Component Value Date    WBC 6.53 2024    HGB 11.5 2024    HCT 35.5 2024    MCV 89 2024     2024     Lab Results   Component Value Date    CALCIUM 9.4 2024    K 4.1 2024    CO2 28 2024     2024    BUN 22 2024    CREATININE 0.64 2024     Lab Results   Component Value Date    IGE 1,057 (H) 2022     Lab Results   Component Value Date    ALT 17 2024    AST 12 (L) 2024    ALKPHOS 64 2024         Imaging and other studies: I have personally reviewed pertinent reports.   and I have personally reviewed pertinent films in PACS  CXR 2022- clear lung fields    Pulmonary function testin2018  Obstructive airflow defect with hyperinflation and air trapping, normal diffusion capacity    EKG, Pathology, and Other Studies: I have personally reviewed pertinent reports.   and I have personally reviewed pertinent films in PACS    Humberto Tai MD  Pulmonary and Critical Care   [facility]

## 2024-02-20 ENCOUNTER — TELEPHONE (OUTPATIENT)
Dept: ENDOCRINOLOGY | Facility: CLINIC | Age: 69
End: 2024-02-20

## 2024-02-20 NOTE — TELEPHONE ENCOUNTER
Daughter called back to state pt is in the hosp and that is reason for no show yest.  Requested we call them back to resched.

## 2024-02-21 PROBLEM — Z12.11 SCREENING FOR COLON CANCER: Status: RESOLVED | Noted: 2018-08-08 | Resolved: 2024-02-21

## 2024-03-04 ENCOUNTER — TELEPHONE (OUTPATIENT)
Dept: HEMATOLOGY ONCOLOGY | Facility: CLINIC | Age: 69
End: 2024-03-04

## 2024-03-04 NOTE — TELEPHONE ENCOUNTER
Lvm to pt regarding upcoming appt needing to be rescheduled due to provider being out of office. New appt scheduled for Friday April 19 at 12:40 with  . Provided hopeline phone number in case of any questions/concerns.

## 2024-03-06 NOTE — PROGRESS NOTES
Patient to triage with c/o chest pressure that started about an hour ago, also states according to his watch his HR would go from the 100s down to the 40s. Also states his BP was slightly elevated as well.    Pt arrived on unit xolair injection  Epi pen at side  Pt tolerated 3 injections to rt and lt arm  Monitored for 30 mins  Ambulated off unit with steady gait

## 2024-03-11 ENCOUNTER — HOSPITAL ENCOUNTER (OUTPATIENT)
Dept: BONE DENSITY | Facility: CLINIC | Age: 69
Discharge: HOME | End: 2024-03-11
Payer: COMMERCIAL

## 2024-03-11 DIAGNOSIS — Z79.811 USE OF ANASTROZOLE (ARIMIDEX): ICD-10-CM

## 2024-03-11 DIAGNOSIS — Z78.0 OSTEOPENIA AFTER MENOPAUSE: ICD-10-CM

## 2024-03-11 DIAGNOSIS — Z13.820 ENCOUNTER FOR SCREENING FOR OSTEOPOROSIS: ICD-10-CM

## 2024-03-11 DIAGNOSIS — M85.80 OSTEOPENIA AFTER MENOPAUSE: ICD-10-CM

## 2024-03-11 PROCEDURE — 77080 DXA BONE DENSITY AXIAL: CPT

## 2024-03-26 LAB
LEFT EYE DIABETIC RETINOPATHY: POSITIVE
RIGHT EYE DIABETIC RETINOPATHY: POSITIVE

## 2024-04-17 ENCOUNTER — TELEPHONE (OUTPATIENT)
Dept: FAMILY MEDICINE CLINIC | Facility: CLINIC | Age: 69
End: 2024-04-17

## 2024-04-17 NOTE — TELEPHONE ENCOUNTER
Daughter left voicemail that she is trying to call our office.       Called back and she was asking for lab results/information that I am unable to provide. Patient is also seeing another pcp. Dr. Serra removed as pcp.

## 2024-04-18 ENCOUNTER — TELEPHONE (OUTPATIENT)
Dept: HEMATOLOGY ONCOLOGY | Facility: CLINIC | Age: 69
End: 2024-04-18

## 2024-04-18 NOTE — TELEPHONE ENCOUNTER
Appointment Change  Cancel, Reschedule, Change to Virtual      Who are you speaking with? Patient & Child   If it is not the patient, is the caller listed on the communication consent form? N/A   Which provider is the appointment scheduled with? Dr. Prater   When was the original appointment scheduled?    Please list date and time 4/19/24 @ 12:40pm   At which location is the appointment scheduled to take place? [place]   Was the appointment rescheduled?     Was the appointment changed from an in person visit to a virtual visit?    If so, please list the details of the change. Yes 6/24/24 @ 2:40pm   What is the reason for the appointment change? Patient daughter is unable to bring her to due work schedule       Was STAR transport scheduled? no   Does STAR transport need to be scheduled for the new visit (if applicable) no   Does the patient need an infusion appointment rescheduled? no   Does the patient have an upcoming infusion appointment scheduled? If so, when? no   Is the patient undergoing chemotherapy? no   For appointments cancelled with less than 24 hours:  Was the no-show policy reviewed? yes

## 2024-05-07 ENCOUNTER — OFFICE VISIT (OUTPATIENT)
Dept: PULMONOLOGY | Facility: CLINIC | Age: 69
End: 2024-05-07
Payer: COMMERCIAL

## 2024-05-07 VITALS
HEART RATE: 96 BPM | WEIGHT: 179.6 LBS | DIASTOLIC BLOOD PRESSURE: 70 MMHG | OXYGEN SATURATION: 97 % | HEIGHT: 65 IN | SYSTOLIC BLOOD PRESSURE: 142 MMHG | BODY MASS INDEX: 29.92 KG/M2

## 2024-05-07 DIAGNOSIS — J45.50 SEVERE PERSISTENT ASTHMA WITHOUT COMPLICATION: ICD-10-CM

## 2024-05-07 PROCEDURE — 99214 OFFICE O/P EST MOD 30 MIN: CPT | Performed by: INTERNAL MEDICINE

## 2024-05-07 RX ORDER — ALBUTEROL SULFATE 90 UG/1
1 AEROSOL, METERED RESPIRATORY (INHALATION) EVERY 4 HOURS PRN
Qty: 8.5 G | Refills: 2 | Status: SHIPPED | OUTPATIENT
Start: 2024-05-07

## 2024-05-07 RX ORDER — ALBUTEROL SULFATE 2.5 MG/3ML
2.5 SOLUTION RESPIRATORY (INHALATION) EVERY 6 HOURS PRN
Qty: 75 ML | Refills: 5 | Status: SHIPPED | OUTPATIENT
Start: 2024-05-07

## 2024-05-07 RX ORDER — TIOTROPIUM BROMIDE INHALATION SPRAY 3.12 UG/1
2 SPRAY, METERED RESPIRATORY (INHALATION) DAILY
Qty: 4 G | Refills: 3 | Status: SHIPPED | OUTPATIENT
Start: 2024-05-07

## 2024-05-07 RX ORDER — LATANOPROST 50 UG/ML
SOLUTION/ DROPS OPHTHALMIC
COMMUNITY
Start: 2024-03-27

## 2024-05-07 RX ORDER — FLUTICASONE PROPIONATE 50 MCG
SPRAY, SUSPENSION (ML) NASAL
COMMUNITY
Start: 2024-02-06

## 2024-05-07 RX ORDER — FLUTICASONE PROPIONATE AND SALMETEROL XINAFOATE 230; 21 UG/1; UG/1
2 AEROSOL, METERED RESPIRATORY (INHALATION) 2 TIMES DAILY
Qty: 12 G | Refills: 2 | Status: SHIPPED | OUTPATIENT
Start: 2024-05-07

## 2024-05-07 NOTE — PROGRESS NOTES
Pulmonary Follow Up Note   Jolie Mulligan 68 y.o. female MRN: [id number]  5/7/2024      Assessment:    1. Severe persistent asthma without complication  Well-controlled on Advair and albuterol as needed along with albuterol nebulizer  Continue current regimen  She is interested in allergy testing, [facility] allergy panel ordered  Will see back in 6 months  -     albuterol (2.5 mg/3 mL) 0.083 % nebulizer solution; Take 3 mL (2.5 mg total) by nebulization every 6 (six) hours as needed for wheezing or shortness of breath  -     albuterol (PROVENTIL HFA,VENTOLIN HFA) 90 mcg/act inhaler; Inhale 1 puff every 4 (four) hours as needed for wheezing  -     tiotropium (Spiriva Respimat) 2.5 MCG/ACT AERS inhaler; Inhale 2 puffs daily  -     fluticasone-salmeterol (Advair HFA) 230-21 MCG/ACT inhaler; Inhale 2 puffs 2 (two) times a day Rinse mouth after use.  -     [facility] Allergy Panel, Adult; Future      Plan:    Diagnoses and all orders for this visit:    Severe persistent asthma without complication  -     albuterol (2.5 mg/3 mL) 0.083 % nebulizer solution; Take 3 mL (2.5 mg total) by nebulization every 6 (six) hours as needed for wheezing or shortness of breath  -     albuterol (PROVENTIL HFA,VENTOLIN HFA) 90 mcg/act inhaler; Inhale 1 puff every 4 (four) hours as needed for wheezing  -     tiotropium (Spiriva Respimat) 2.5 MCG/ACT AERS inhaler; Inhale 2 puffs daily  -     fluticasone-salmeterol (Advair HFA) 230-21 MCG/ACT inhaler; Inhale 2 puffs 2 (two) times a day Rinse mouth after use.  -     [facility] Allergy Panel, Adult; Future        Return in about 6 months (around 11/7/2024).    History of Present Illness   HPI:  Jolie Mulligan is a 68 y.o. female who presents for follow-up of asthma.  Since her last visit she is doing well.  She is using Advair twice a day.  She is only requiring her rescue inhaler once a day.  No cough.  Denies any shortness of breath or new symptoms.    Review of Systems    Constitutional:  Negative for chills and fever.   HENT:  Negative for congestion, postnasal drip and rhinorrhea.    Eyes:  Negative for itching.   Respiratory:  Negative for cough, shortness of breath, wheezing and stridor.    Cardiovascular:  Negative for chest pain, palpitations and leg swelling.   Gastrointestinal:  Negative for abdominal distention, abdominal pain, nausea and vomiting.   Genitourinary:  Negative for dysuria and flank pain.   Musculoskeletal:  Negative for arthralgias and myalgias.   Skin:  Negative for color change.   Neurological:  Negative for dizziness, light-headedness and headaches.   Psychiatric/Behavioral: Negative.         Historical Information   Past Medical History:   Diagnosis Date    Abdominal infection (HCC)     h-pylori    Abnormal chest x-ray 04/05/2019    Asthma     Breast cancer (HCC) 06/26/2018    Cancer (HCC)     BREAST    Depression, recurrent (HCC) 06/13/2022    Diabetes mellitus (HCC)     Hiatal hernia     Hiatal hernia 08/08/2018    History of chemotherapy 2018    History of radiation therapy 2018    Hypercholesterolemia     Hypertension     Hypothyroid     Renal disorder     Rheumatoid arthritis (HCC)      Past Surgical History:   Procedure Laterality Date    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    BREAST LUMPECTOMY Right 6/26/2018    Procedure: RIGHT BREAST NEEDLE LOCALIZATION X2 WITH RIGHT BREAST LUMPECTOMY ( NEEDLE LOC @ 1000);  Surgeon: Familia Barragan MD;  Location: [facility];  Service: Surgical Oncology    BREAST LUMPECTOMY Right 8/2/2018    Procedure: LYMPHOSCINITGRAPHY INTRAOPERATIVE LYMPHATIC MAPPING , RIGHT SENTINEL NODE BIOPSY, REEXCISION  RIGHT BREAST LUMPECTOMY CAVITY (SUPERIOR MARGIN);  Surgeon: Familia Barragan MD;  Location: [facility];  Service: Surgical Oncology    BREAST SURGERY Left     CATARACT EXTRACTION, BILATERAL Bilateral     COLONOSCOPY      EGD AND COLONOSCOPY N/A 9/5/2018    Procedure: EGD AND COLONOSCOPY;  Surgeon: José Miguel Rosas MD;   Location: [facility] GI [facility];  Service: Gastroenterology    FL GUIDED CENTRAL VENOUS ACCESS DEVICE INSERTION  9/13/2018    KNEE SURGERY Right     MAMMO NEEDLE LOCALIZATION RIGHT (ALL INC) Right 6/26/2018    MAMMO STEREOTACTIC BREAST BIOPSY RIGHT (ALL INC) Right 5/23/2018    RETINAL DETACHMENT SURGERY Right     TUBAL LIGATION      TUNNELED VENOUS PORT PLACEMENT Left 9/13/2018    Procedure: INSERTION VENOUS PORT (PORT-A-CATH);  Surgeon: Familia Barragan MD;  Location: [facility];  Service: Surgical Oncology    UPPER GASTROINTESTINAL ENDOSCOPY       Family History   Problem Relation Age of Onset    Diabetes Mother     Hypertension Mother     Diabetes Father     Hypertension Father     No Known Problems Sister     No Known Problems Sister     No Known Problems Sister     No Known Problems Sister     No Known Problems Sister     Diabetes Brother     Diabetes Brother     Kidney failure Brother     Diabetes Brother     Cancer Maternal Grandfather     No Known Problems Daughter     No Known Problems Daughter     No Known Problems Daughter          Meds/Allergies     Current Outpatient Medications:     acetaminophen (TYLENOL) 650 mg CR tablet, Take 1 tablet (650 mg total) by mouth every 8 (eight) hours as needed for mild pain, Disp: 30 tablet, Rfl: 0    albuterol (2.5 mg/3 mL) 0.083 % nebulizer solution, Take 3 mL (2.5 mg total) by nebulization every 6 (six) hours as needed for wheezing or shortness of breath, Disp: 75 mL, Rfl: 5    albuterol (PROVENTIL HFA,VENTOLIN HFA) 90 mcg/act inhaler, Inhale 1 puff every 4 (four) hours as needed for wheezing, Disp: 8.5 g, Rfl: 2    fluticasone-salmeterol (Advair HFA) 230-21 MCG/ACT inhaler, Inhale 2 puffs 2 (two) times a day Rinse mouth after use., Disp: 12 g, Rfl: 2    tiotropium (Spiriva Respimat) 2.5 MCG/ACT AERS inhaler, Inhale 2 puffs daily, Disp: 4 g, Rfl: 3    anastrozole (ARIMIDEX) 1 mg tablet, TAKE 1 TABLET (1 MG TOTAL) BY MOUTH DAILY, Disp: 90 tablet, Rfl: 3    aspirin 81 mg  chewable tablet, Chew 81 mg daily, Disp: , Rfl:     atorvastatin (LIPITOR) 40 mg tablet, Take 1 tablet (40 mg total) by mouth daily, Disp: 90 tablet, Rfl: 2    calcium polycarbophil (FIBERCON) 625 mg tablet, Take 1 tablet (625 mg total) by mouth daily, Disp: 30 tablet, Rfl: 5    Diclofenac Sodium (VOLTAREN) 1 %, Apply 2 g topically 2 (two) times a day, Disp: 150 g, Rfl: 0    EPINEPHrine (Auvi-Q) 0.1 mg/0.1 mL SOAJ, Inject 0.3 mL (0.3 mg total) into a muscle once for 1 dose (Patient not taking: Reported on 2/6/2023), Disp: 2 each, Rfl: 5    ergocalciferol (VITAMIN D2) 50,000 units, TAKE 1 CAPSULE (50,000 UNITS TOTAL) BY MOUTH ONCE A WEEK, Disp: 12 capsule, Rfl: 0    furosemide (LASIX) 20 mg tablet, TAKE 1 TABLET (20 MG TOTAL) BY MOUTH 2 (TWO) TIMES A DAY, Disp: 180 tablet, Rfl: 0    insulin degludec (Tresiba FlexTouch) 200 units/mL CONCENTRATED U-200 injection pen, 60 units daily at 5PM (Patient taking differently: Inject 10 Units under the skin daily 60 units daily at 5PM), Disp: 27 mL, Rfl: 1    insulin lispro (HumaLOG) 100 units/mL injection pen, 24 units before each meal Plus sliding scale 150-200: 2 units 201-250: 4 units 251-300: 6 units 301-350: 8 units Over 350: 10 units, Disp: 90 mL, Rfl: 1    ketotifen (ZADITOR) 0.025 % ophthalmic solution, Administer 1 drop to both eyes 2 (two) times a day as needed (itchy eyes), Disp: 5 mL, Rfl: 0    loratadine (CLARITIN) 10 mg tablet, TAKE 1 TABLET (10 MG TOTAL) BY MOUTH DAILY, Disp: 90 tablet, Rfl: 2    losartan (COZAAR) 100 MG tablet, Take 1 tablet (100 mg total) by mouth daily, Disp: 90 tablet, Rfl: 1    Mag-G 500 (27 Mg) MG tablet, TAKE 1 TABLET (500 MG TOTAL) BY MOUTH DAILY, Disp: 90 tablet, Rfl: 1    metoprolol tartrate (LOPRESSOR) 25 mg tablet, TAKE 0.5 TABLETS (12.5 MG TOTAL) BY MOUTH EVERY 12 (TWELVE) HOURS, Disp: 90 tablet, Rfl: 0    omeprazole (PriLOSEC) 20 mg delayed release capsule, TAKE 1 CAPSULE (20 MG TOTAL) BY MOUTH DAILY, Disp: 90 capsule, Rfl: 1     "OneTouch Delica Lancets 33G MISC, USE 3 (THREE) TIMES A DAY, Disp: 300 each, Rfl: 3    OneTouch Ultra test strip, USE 1 EACH 3 (THREE) TIMES A DAY USE AS INSTRUCTED, Disp: 300 each, Rfl: 2    polyethylene glycol (MIRALAX) 17 g packet, Take 17 g by mouth daily for 14 days, Disp: 28 each, Rfl: 5    UltiCare Micro Pen Needles 32G X 4 MM MISC, INJECT UNDER THE SKIN 4 (FOUR) TIMES A DAY, Disp: 400 each, Rfl: 0    zileuton 600 MG, TAKE 1 TABLET (600 MG TOTAL) BY MOUTH 2 (TWO) TIMES A DAY, Disp: 60 tablet, Rfl: 2  Allergies   Allergen Reactions    Codeine Other (See Comments)     unknown    Latex Other (See Comments)     fungus       Vitals: Blood pressure 142/70, pulse 96, height 5' 5\" (1.651 m), weight 81.5 kg (179 lb 9.6 oz), SpO2 97%, not currently breastfeeding. Body mass index is 29.89 kg/m². Oxygen Therapy  SpO2: 97 %      Physical Exam  Physical Exam  Constitutional:       General: She is not in acute distress.     Appearance: She is not diaphoretic.   HENT:      Head: Normocephalic and atraumatic.      Nose: Nose normal.      Mouth/Throat:      Pharynx: No oropharyngeal exudate.   Eyes:      General: No scleral icterus.     Conjunctiva/sclera: Conjunctivae normal.      Pupils: Pupils are equal, round, and reactive to light.   Neck:      Thyroid: No thyromegaly.      Vascular: No JVD.      Trachea: No tracheal deviation.   Cardiovascular:      Rate and Rhythm: Normal rate and regular rhythm.      Heart sounds: Normal heart sounds. No murmur heard.     No friction rub. No gallop.   Pulmonary:      Effort: Pulmonary effort is normal. No respiratory distress.      Breath sounds: Normal breath sounds. No stridor. No wheezing or rales.   Abdominal:      General: Bowel sounds are normal. There is no distension.      Palpations: Abdomen is soft.      Tenderness: There is no abdominal tenderness. There is no guarding or rebound.   Musculoskeletal:         General: No deformity. Normal range of motion.      Cervical back: " Normal range of motion and neck supple.      Right lower leg: Edema present.      Left lower leg: Edema present.   Lymphadenopathy:      Cervical: No cervical adenopathy.   Skin:     General: Skin is warm.      Findings: No erythema or rash.   Neurological:      Mental Status: She is alert and oriented to person, place, and time.      Cranial Nerves: No cranial nerve deficit.      Sensory: No sensory deficit.         Labs: I have personally reviewed pertinent lab results.  Lab Results   Component Value Date    WBC 6.53 01/03/2024    HGB 11.5 01/03/2024    HCT 35.5 01/03/2024    MCV 89 01/03/2024     01/03/2024     Lab Results   Component Value Date    CALCIUM 9.7 05/06/2024    K 4.4 05/06/2024    CO2 29 05/06/2024     05/06/2024    BUN 22 05/06/2024    CREATININE 0.67 05/06/2024     Lab Results   Component Value Date    IGE 1,057 (H) 07/27/2022     Lab Results   Component Value Date    ALT 17 05/06/2024    AST 15 05/06/2024    ALKPHOS 64 05/06/2024         Imaging and other studies: I have personally reviewed pertinent reports.   and I have personally reviewed pertinent films in PACS  X-ray in February 2024-clear bilateral lung fields    Pulmonary function testing:   December of 2018  Hyperinflation with air trapping normal spirometry    EKG, Pathology, and Other Studies: I have personally reviewed pertinent reports.   and I have personally reviewed pertinent films in PACS      Humberto Tai MD  Pulmonary and Critical Care   [facility]

## 2024-05-08 ENCOUNTER — TELEPHONE (OUTPATIENT)
Dept: HEMATOLOGY ONCOLOGY | Facility: CLINIC | Age: 69
End: 2024-05-08

## 2024-05-08 NOTE — TELEPHONE ENCOUNTER
I called Jolie regarding an appointment that they have scheduled with ARIN Ferris scheduled on  06/07/2024      I left a voicemail explaining to patient that this appointment will need to be rescheduled due to a change in the providers schedule. Patient was advised to call [facility] to reschedule.  Appointment canceled, [employer] message sent if applicable.

## 2024-05-15 NOTE — TELEPHONE ENCOUNTER
Assessment done vital signs stable. Patient progressing according to plan of care. Fundus firm at the umbilicus with light lochia. Patient up voiding without difficulty. Ambulating with steady gait. Claims to have good pain relief with p.o medications. Pumping for infant in NICU. Settings reviewed   Hi, this is Arslan Benavidez. I'm calling for [employer]. Their birth [birth year], Repeat [birth year] to call back at [phone]. Repeat, [phone] is to change the day of the appointment. Thank you.       Patient wanted sooner appointment, patient scheduled for 8/23

## 2024-05-30 DIAGNOSIS — E11.65 TYPE 2 DIABETES MELLITUS WITH HYPERGLYCEMIA, WITH LONG-TERM CURRENT USE OF INSULIN (HCC): ICD-10-CM

## 2024-05-30 DIAGNOSIS — Z79.4 TYPE 2 DIABETES MELLITUS WITH HYPERGLYCEMIA, WITH LONG-TERM CURRENT USE OF INSULIN (HCC): ICD-10-CM

## 2024-05-31 RX ORDER — ISOPROPYL ALCOHOL 0.7 ML/ML
SWAB TOPICAL
Qty: 300 EACH | Refills: 2 | Status: SHIPPED | OUTPATIENT
Start: 2024-05-31

## 2024-05-31 RX ORDER — LANCETS 33 GAUGE
EACH MISCELLANEOUS
Qty: 300 EACH | Refills: 2 | Status: SHIPPED | OUTPATIENT
Start: 2024-05-31

## 2024-06-05 ENCOUNTER — OFFICE VISIT (OUTPATIENT)
Dept: ENDOCRINOLOGY | Facility: CLINIC | Age: 69
End: 2024-06-05
Payer: COMMERCIAL

## 2024-06-05 ENCOUNTER — TELEPHONE (OUTPATIENT)
Dept: ENDOCRINOLOGY | Facility: CLINIC | Age: 69
End: 2024-06-05

## 2024-06-05 VITALS
SYSTOLIC BLOOD PRESSURE: 158 MMHG | TEMPERATURE: 98.2 F | BODY MASS INDEX: 29.82 KG/M2 | OXYGEN SATURATION: 96 % | HEIGHT: 65 IN | HEART RATE: 86 BPM | RESPIRATION RATE: 16 BRPM | DIASTOLIC BLOOD PRESSURE: 84 MMHG | WEIGHT: 179 LBS

## 2024-06-05 DIAGNOSIS — Z79.4 TYPE 2 DIABETES MELLITUS WITH HYPERGLYCEMIA, WITH LONG-TERM CURRENT USE OF INSULIN (HCC): Primary | ICD-10-CM

## 2024-06-05 DIAGNOSIS — R63.5 WEIGHT GAIN: ICD-10-CM

## 2024-06-05 DIAGNOSIS — E78.00 HYPERCHOLESTEROLEMIA: ICD-10-CM

## 2024-06-05 DIAGNOSIS — E83.52 HYPERCALCEMIA: ICD-10-CM

## 2024-06-05 DIAGNOSIS — E11.65 TYPE 2 DIABETES MELLITUS WITH HYPERGLYCEMIA, WITH LONG-TERM CURRENT USE OF INSULIN (HCC): Primary | ICD-10-CM

## 2024-06-05 DIAGNOSIS — E03.8 OTHER SPECIFIED HYPOTHYROIDISM: ICD-10-CM

## 2024-06-05 DIAGNOSIS — E11.42 DIABETIC POLYNEUROPATHY ASSOCIATED WITH TYPE 2 DIABETES MELLITUS (HCC): ICD-10-CM

## 2024-06-05 DIAGNOSIS — Z79.811 USE OF ANASTROZOLE (ARIMIDEX): ICD-10-CM

## 2024-06-05 DIAGNOSIS — I50.32 CHRONIC DIASTOLIC HEART FAILURE (HCC): ICD-10-CM

## 2024-06-05 PROBLEM — G89.29 CHRONIC PAIN OF RIGHT KNEE: Status: RESOLVED | Noted: 2018-09-04 | Resolved: 2024-06-05

## 2024-06-05 PROBLEM — H57.9 ITCHY EYES: Status: RESOLVED | Noted: 2023-10-10 | Resolved: 2024-06-05

## 2024-06-05 PROBLEM — U07.1 COVID-19: Status: RESOLVED | Noted: 2022-11-17 | Resolved: 2024-06-05

## 2024-06-05 PROBLEM — L03.111 CELLULITIS OF RIGHT AXILLA: Status: RESOLVED | Noted: 2018-08-22 | Resolved: 2024-06-05

## 2024-06-05 PROBLEM — R07.9 CHEST PAIN: Status: RESOLVED | Noted: 2018-03-27 | Resolved: 2024-06-05

## 2024-06-05 PROBLEM — R60.0 BILATERAL LOWER EXTREMITY EDEMA: Status: RESOLVED | Noted: 2023-10-10 | Resolved: 2024-06-05

## 2024-06-05 PROBLEM — E66.3 OVERWEIGHT WITH BODY MASS INDEX (BMI) OF 28 TO 28.9 IN ADULT: Status: RESOLVED | Noted: 2021-09-08 | Resolved: 2024-06-05

## 2024-06-05 PROBLEM — M54.2 NECK PAIN: Status: RESOLVED | Noted: 2018-06-13 | Resolved: 2024-06-05

## 2024-06-05 PROBLEM — E11.8 TYPE 2 DIABETES MELLITUS WITH COMPLICATION, WITH LONG-TERM CURRENT USE OF INSULIN (HCC): Status: RESOLVED | Noted: 2018-03-27 | Resolved: 2024-06-05

## 2024-06-05 PROBLEM — M89.8X9 BONE PAIN: Status: RESOLVED | Noted: 2021-09-29 | Resolved: 2024-06-05

## 2024-06-05 PROBLEM — M25.611 DECREASED ROM OF RIGHT SHOULDER: Status: RESOLVED | Noted: 2020-04-20 | Resolved: 2024-06-05

## 2024-06-05 PROBLEM — R19.7 DIARRHEA: Status: RESOLVED | Noted: 2022-11-10 | Resolved: 2024-06-05

## 2024-06-05 PROBLEM — R25.2 MUSCLE CRAMPS: Status: RESOLVED | Noted: 2021-06-09 | Resolved: 2024-06-05

## 2024-06-05 PROBLEM — G89.29 CHRONIC BILATERAL LOW BACK PAIN WITHOUT SCIATICA: Status: RESOLVED | Noted: 2018-05-22 | Resolved: 2024-06-05

## 2024-06-05 PROBLEM — M54.50 CHRONIC BILATERAL LOW BACK PAIN WITHOUT SCIATICA: Status: RESOLVED | Noted: 2018-05-22 | Resolved: 2024-06-05

## 2024-06-05 PROBLEM — J30.2 SEASONAL ALLERGIES: Status: RESOLVED | Noted: 2022-10-31 | Resolved: 2024-06-05

## 2024-06-05 PROBLEM — E11.9 DIABETES MELLITUS (HCC): Status: RESOLVED | Noted: 2022-09-26 | Resolved: 2024-06-05

## 2024-06-05 PROBLEM — M79.671 RIGHT FOOT PAIN: Status: RESOLVED | Noted: 2021-09-15 | Resolved: 2024-06-05

## 2024-06-05 PROBLEM — R00.2 PALPITATIONS: Status: RESOLVED | Noted: 2018-05-09 | Resolved: 2024-06-05

## 2024-06-05 PROBLEM — R10.2 PELVIC PAIN: Status: RESOLVED | Noted: 2021-09-29 | Resolved: 2024-06-05

## 2024-06-05 PROBLEM — M25.561 CHRONIC PAIN OF RIGHT KNEE: Status: RESOLVED | Noted: 2018-09-04 | Resolved: 2024-06-05

## 2024-06-05 PROCEDURE — 99215 OFFICE O/P EST HI 40 MIN: CPT | Performed by: INTERNAL MEDICINE

## 2024-06-05 PROCEDURE — G2211 COMPLEX E/M VISIT ADD ON: HCPCS | Performed by: INTERNAL MEDICINE

## 2024-06-05 RX ORDER — FLASH GLUCOSE SENSOR
1 KIT MISCELLANEOUS CONTINUOUS
Qty: 1 EACH | Refills: 0 | Status: SHIPPED | OUTPATIENT
Start: 2024-06-05

## 2024-06-05 RX ORDER — BLOOD-GLUCOSE SENSOR
1 EACH MISCELLANEOUS
Qty: 2 EACH | Refills: 5 | Status: SHIPPED | OUTPATIENT
Start: 2024-06-05

## 2024-06-05 NOTE — PATIENT INSTRUCTIONS
INSULIN DOSAGE INSTRUCTIONS    Name: Jolie Mulligan                        : 1955  MRN #: [id number]    Your Current Insulin  and dose is: Before Breakfast Before Lunch Before Evening Meal Bedtime     Humalog Insulin   24u   24u   24u    Regular, Apidra, Humalog orNovolog Sliding Scale:   <80              151-200 + 2 +2 +2    201-250 +4 +4 +4 +   251-300 +6 +6 +6 +   301-350 +8 +8 +8 +   >350 +10 +10 +10 +     Tirzepatide 2.5mg weekly       Tresiba 40u   40u     Additional Instructions:   Use libre3  Call if your Blanca Lemus MD  blood sugar is less than _60_ or greater than _400__.    Today's Date: 2024

## 2024-06-05 NOTE — PROGRESS NOTES
"    Follow-up Patient Progress Note      CC: Type 2 diabetes    History of Present Illness:   68 yr female with T2DM, HTN, HLD, Hx adam PE, HFpEF, Hx Br CA 2018 s/p  lumpectomy/ chemoradiation/ Arimidex currently year 6/7y,  Hx radiation pneumonitis, Hypothyroidism, osteoarthritis, vit D deficiency. Last visit was 10/30/23 with PAC.    She seems to be poorly controlled for few years.    SAGM:    Current meds:  Tresiba 10 u qhs  Humalog 24u tidac plus 2u/50 above 150mg/dL    Opthamology:   Podiatry:   vaccination: yes  Dental:  Pancreatitis: no    Ace/ARB: losartan  Statin:lipitor 40  Thyroid issues:    4/9/24 DXA: nml.     Physical Exam:  Body mass index is 29.79 kg/m².  /84 (BP Location: Left arm, Patient Position: Sitting)   Pulse 86   Temp 98.2 °F (36.8 °C) (Tympanic)   Resp 16   Ht 5' 5\" (1.651 m)   Wt 81.2 kg (179 lb)   SpO2 96%   BMI 29.79 kg/m²    Vitals:    06/05/24 1259   Weight: 81.2 kg (179 lb)        Physical Exam  Constitutional:       General: She is not in acute distress.     Appearance: She is well-developed.   HENT:      Head: Normocephalic and atraumatic.      Nose: Nose normal.   Eyes:      Conjunctiva/sclera: Conjunctivae normal.   Pulmonary:      Effort: Pulmonary effort is normal.   Abdominal:      General: There is no distension.   Musculoskeletal:      Cervical back: Normal range of motion and neck supple.   Skin:     Findings: No rash.      Comments: No icterus   Neurological:      Mental Status: She is alert and oriented to person, place, and time.         Labs:   Lab Results   Component Value Date    HGBA1C 13.4 (H) 03/11/2024       Lab Results   Component Value Date    UDF8TQOBKTSD 3.571 12/21/2023    TSH 5.31 03/11/2024       Lab Results   Component Value Date    CREATININE 0.54 06/04/2024    CREATININE 0.55 06/03/2024    CREATININE 0.61 06/02/2024    BUN 11 06/04/2024    K 3.6 06/04/2024     06/04/2024    CO2 27 06/04/2024     GFR, Calculated   Date Value Ref Range " Status   04/30/2019 94 >60 mL/min/1.73m2 Final     Comment:     mL/min per 1.73 square meters                                            Normal Function or Mild Renal    Disease (if clinically at risk):  >or=60  Moderately Decreased:                30-59  Severely Decreased:                  15-29  Renal Failure:                         <15                                            -American GFR: multiply reported GFR by 1.16    Please note that the eGFR is based on the CKD-EPI calculation, and is not intended to be used for drug dosing.                                            Note: Calculated GFR may not be an accurate indicator of renal function if the patient's renal function is not in a steady state.     eGFRcr   Date Value Ref Range Status   06/04/2024 100 >59 Final       Lab Results   Component Value Date    ALT 22 06/04/2024    AST 13 06/04/2024    ALKPHOS 92 06/04/2024       Lab Results   Component Value Date    CHOLESTEROL 166 11/21/2022    CHOLESTEROL 265 (H) 06/22/2022    CHOLESTEROL 262 (H) 03/01/2022     Lab Results   Component Value Date    HDL 44 (L) 11/21/2022    HDL 55 06/22/2022    HDL 47 (L) 03/01/2022     Lab Results   Component Value Date    TRIG 161 (H) 11/21/2022    TRIG 151 (H) 06/22/2022    TRIG 168 (H) 03/01/2022     Lab Results   Component Value Date    NONHDLC 210 06/22/2022    NONHDLC 163 09/22/2021    NONHDLC 194 06/24/2020         Assessment/Plan:    1. Type 2 diabetes mellitus with hyperglycemia, with long-term current use of insulin ([facility])  Assessment & Plan:  She is poorly controlled. She has limited understanding about basics of diabetes.    Today we discussed all aspects of diabetes including pathophysiology, risk factors, complications, SAGM, CGM, diet, lifestyle modifications, medical fitness training, diabetes education, goals of therapy, follow up needs and medications including insulin, metformin, Jardiance and GLP1 agonists.  Advised to maintain goal blood sugars  70-180mg/dL.    Today we agreed to adjust insulin therapy as listed below.  Will add Tirzepatide.    Your Current Insulin  and dose is: Before Breakfast Before Lunch Before Evening Meal Bedtime     Humalog Insulin   24u   24u   24u    Regular, Apidra, Humalog orNovolog Sliding Scale:   <80              151-200 + 2 +2 +2    201-250 +4 +4 +4 +   251-300 +6 +6 +6 +   301-350 +8 +8 +8 +   >350 +10 +10 +10 +     Tirzepatide 2.5mg weekly       Tresiba 40u   40u     Will reestablish education, lifestyle and exercise regimen.  Advised to bring reader for download broderick 2  Follow up in 6 weeks either at [place] or .    Lab Results   Component Value Date    HGBA1C 13.4 (H) 03/11/2024     Orders:  -     Ambulatory Referral to Medical Fitness Exercise Specialist; Future  -     Hemoglobin A1C; Future  -     Albumin / creatinine urine ratio; Future  -     Continuous Glucose Sensor (FreeStyle [employer]ick 3 Sensor) MISC; Use 1 Units every 14 (fourteen) days  -     Continuous Glucose  (FreeStyle [employer]) AARON; Use 1 Units continuous  -     tirzepatide (Mounjaro) 2.5 MG/0.5ML; Inject 0.5 mL (2.5 mg total) under the skin once a week  -     Ambulatory referral to Diabetic Education; Future  2. Other specified hypothyroidism  Assessment & Plan:  She reports some weight gain. Check thyroid profile and continue levothyroxine 50mcg qdaily.  3. Diabetic polyneuropathy associated with type 2 diabetes mellitus (HCC)  4. Hypercholesterolemia  Assessment & Plan:  Continue statin therapy. She has some low back pain and muscle weakness but I suspect hyperglycemia related.  5. Hypercalcemia  Assessment & Plan:  Recent labs were unremarkable. No concern now but may consider a PTH and bone profile next visit.  6. Use of anastrozole (Arimidex)  7. Chronic diastolic heart failure (HCC)  8. Weight gain  -     T4, free; Future  -     TSH, 3rd generation; Future        I have spent a total time of 55 minutes on 06/05/24 in caring for this  patient including greater than 50% of this time was spent in counseling/coordination of care as listed above.       Discussed with the patient and all questioned fully answered. She will contact me with concerns.    Susana Hansen

## 2024-06-05 NOTE — ASSESSMENT & PLAN NOTE
She is poorly controlled. She has limited understanding about basics of diabetes.    Today we discussed all aspects of diabetes including pathophysiology, risk factors, complications, SAGM, CGM, diet, lifestyle modifications, medical fitness training, diabetes education, goals of therapy, follow up needs and medications including insulin, metformin, Jardiance and GLP1 agonists.  Advised to maintain goal blood sugars 70-180mg/dL.    Today we agreed to adjust insulin therapy as listed below.  Will add Tirzepatide.    Your Current Insulin  and dose is: Before Breakfast Before Lunch Before Evening Meal Bedtime     Humalog Insulin   24u   24u   24u    Regular, Apidra, Humalog orNovolog Sliding Scale:   <80              151-200 + 2 +2 +2    201-250 +4 +4 +4 +   251-300 +6 +6 +6 +   301-350 +8 +8 +8 +   >350 +10 +10 +10 +     Tirzepatide 2.5mg weekly       Tresiba 40u   40u     Will reestablish education, lifestyle and exercise regimen.  Advised to bring reader for download adrian 2  Follow up in 6 weeks either at [place] or .    Lab Results   Component Value Date    HGBA1C 13.4 (H) 03/11/2024

## 2024-06-05 NOTE — ASSESSMENT & PLAN NOTE
Continue statin therapy. She has some low back pain and muscle weakness but I suspect hyperglycemia related.

## 2024-06-19 DIAGNOSIS — J45.50 SEVERE PERSISTENT ASTHMA WITHOUT COMPLICATION: Primary | ICD-10-CM

## 2024-06-24 ENCOUNTER — OFFICE VISIT (OUTPATIENT)
Dept: HEMATOLOGY ONCOLOGY | Facility: CLINIC | Age: 69
End: 2024-06-24
Payer: COMMERCIAL

## 2024-06-24 VITALS
HEART RATE: 89 BPM | TEMPERATURE: 98.2 F | RESPIRATION RATE: 18 BRPM | DIASTOLIC BLOOD PRESSURE: 62 MMHG | BODY MASS INDEX: 28.82 KG/M2 | OXYGEN SATURATION: 96 % | HEIGHT: 65 IN | WEIGHT: 173 LBS | SYSTOLIC BLOOD PRESSURE: 118 MMHG

## 2024-06-24 DIAGNOSIS — Z17.0 MALIGNANT NEOPLASM OF UPPER-OUTER QUADRANT OF RIGHT BREAST IN FEMALE, ESTROGEN RECEPTOR POSITIVE (HCC): Primary | ICD-10-CM

## 2024-06-24 DIAGNOSIS — Z79.811 USE OF ANASTROZOLE (ARIMIDEX): ICD-10-CM

## 2024-06-24 DIAGNOSIS — E53.8 VITAMIN B12 DEFICIENCY: ICD-10-CM

## 2024-06-24 DIAGNOSIS — D64.9 ANEMIA, UNSPECIFIED TYPE: ICD-10-CM

## 2024-06-24 DIAGNOSIS — E53.8 FOLIC ACID DEFICIENCY: ICD-10-CM

## 2024-06-24 DIAGNOSIS — C50.411 MALIGNANT NEOPLASM OF UPPER-OUTER QUADRANT OF RIGHT BREAST IN FEMALE, ESTROGEN RECEPTOR POSITIVE (HCC): Primary | ICD-10-CM

## 2024-06-24 PROCEDURE — 99214 OFFICE O/P EST MOD 30 MIN: CPT | Performed by: INTERNAL MEDICINE

## 2024-06-24 PROCEDURE — G2211 COMPLEX E/M VISIT ADD ON: HCPCS | Performed by: INTERNAL MEDICINE

## 2024-06-24 RX ORDER — METHOCARBAMOL 500 MG/1
TABLET, FILM COATED ORAL
COMMUNITY
Start: 2024-06-05

## 2024-06-24 NOTE — PROGRESS NOTES
Hematology/Oncology Outpatient Follow-up  Jolie Mulligan 68 y.o. female 1955 [id number]    Date:  6/24/2024        Assessment and Plan:  1. Malignant neoplasm of upper-outer quadrant of right breast in female, estrogen receptor positive (HCC)  stage IA right breast cancer, grade 2, % positive, MI 45% positive, HER2 3+ disease.  She underwent lumpectomy with sentinel lymph node biopsy, resulting in LILIANA.  She completed adjuvant chemotherapy with weekly paclitaxel and trastuzumab in December 2018 without cardiac toxicity.  She has been on anastrozole as endocrine therapy since August 2018.  The patient stated that she stopped the anastrozole just recently.  The goal was to continue the anastrozole for at least 7 years since she is high risk.  She was asked to go back on the anastrozole and the patient agreed with the plan.  We did also discuss the result of the recent CT scan of the chest abdomen pelvis which was done at [facility] around the end of May.  This showed subcentimeter pulmonary nodules of unknown etiology.  A follow-up CT scan prior to her next visit around the end of August will be done for close monitoring.  - CBC and differential; Future  - Comprehensive metabolic panel; Future  - Magnesium; Future  - Iron Panel (Includes Ferritin, Iron Sat%, Iron, and TIBC); Future  - Ferritin; Future  - Vitamin B12; Future  - Sedimentation rate, automated; Future  - LD,Blood; Future  - C-reactive protein; Future  - Hemolysis Smear; Future  - Haptoglobin; Future  - Folate; Future  - Direct antiglobulin test; Future  - CT chest w contrast; Future    2. Use of anastrozole (Arimidex)  I did discuss with her the result of the recent bone density scan which was read as normal.  She seems to be taking vitamin D and calcium supplements.    3. Anemia, unspecified type  She seems to have normocytic anemia of unknown etiology.  Recent CBC showed hemoglobin of 9.6.  Workup will be done prior to her next visit.  - CBC and  "differential; Future  - Comprehensive metabolic panel; Future  - Magnesium; Future  - Iron Panel (Includes Ferritin, Iron Sat%, Iron, and TIBC); Future  - Ferritin; Future  - Vitamin B12; Future  - Sedimentation rate, automated; Future  - LD,Blood; Future  - C-reactive protein; Future  - Hemolysis Smear; Future  - Haptoglobin; Future  - Folate; Future  - Direct antiglobulin test; Future  - CT chest w contrast; Future      HPI:  The patient came there for follow-up visit accompanied by her daughter who helped with translation during this office visit.  The patient apparently stopped taking the anastrozole since she had multiple symptoms and she wanted to get rid of some of the medication she is taking to decrease the side effects.  She apparently had CT scan of the chest abdomen pelvis with contrast on 5/31/2024 at [facility] which showed:  Impression:   1. No acute abnormality within the chest abdomen or pelvis.   2.  Subpleural fibrosis and consolidation within the right upper lobe   anterolaterally, as well as architectural distortion and skin thickening within   the right breast, all of which is presumably posttreatment related given the   patient's history.   3.  Scattered subcentimeter pulmonary nodules within both lungs measuring up to   4 mm in diameter which are indeterminate. Given the patient's history, a   short-term follow-up examination in 3 months can be performed for further   evaluation.   4. Hepatomegaly.   Recent available blood work on 6/4/2024 showed hemoglobin of 9.6 with normal white cells and platelets.  Liver enzymes were within normal range with creatinine of 0.5 and normal calcium level.    Oncology History   Malignant neoplasm of right female breast (HCC)   Malignant neoplasm of upper-outer quadrant of right breast in female, estrogen receptor positive (HCC)   5/23/2018 Initial Diagnosis    5/15/2018: Breast pain send to dx mammo    \"Targeted sonographic evaluation of the area of pain in the left " "  breast 12 to 3 o'clock position left breast area of pain   demonstrates a prominent island of fibroglandular tissue without   solid mass or abnormal shadowing.\"    ACR BI-RADS® Assessments: BiRad:4 - Suspicious (Overall)  Diag Mammogram: BiRad:4 - suspicious abnormality in the right   breast.  Left breast Left Brst US: Left breast is BiRad:1 - negative.        5/23/2018 Biopsy    Right breast core biopsy:  DCIS, micropapillary type, low-intermediate nuclear grade  ER 90-95% positive,  IA 75-80% positive       6/26/2018 Surgery    RIGHT BREAST NEEDLE LOCALIZATION X2 WITH RIGHT BREAST LUMPECTOMY ( NEEDLE LOC @ 1000) (Right)   ONCOPLASTIC CLOSURE OF LUMPECTOMY CAVITY    Invasive breast carcinoma, NST (aka ductal)  * [place] grade 2 of 3 (total score = 2+2+2 = 6 of 9)   -- tubule formation < 10%, score 3   -- nuclear grade 2 of 3, score 2   -- mitoses ~ 4/mm2, score 2.   * invasive carcinoma is multifocally present (A23-1, A32-1, A84-1, A89-1, A100-1), largest focus is 14 millimeters in greatest dimension (A89-1, this focus is graded).   * superior lumpectomy margin (orange-inked) is POSITIVE for invasive carcinoma (A84-1)   * all other lumpectomy margins are free of invasive carcinoma.   * estrogen, progesterone & Her-2/negrita receptor studies are undertaken, to be described in an addendum report.  - Ductal carcinoma in-situ (DCIS): Present as an extensive component (~85% of total tumor).   * DCIS is co-located with invasive carcinoma surrounding prior needle biopsy site.   * DCIS spans 2.6 cm maximal dimension (A62-1) and is present on 27 of 136 total slides examined.   * DCIS has cribriform & micropapillary patterns, nuclear grade 2 of 3, with central comedo-type necrosis.   * DCIS is present within 0.2 millimeters of the superior lumpectomy margin (orange-inked, A26-1)   * DCIS is present within 0.2 millimeters of the medial lumpectomy margin (yellow-inked, A3-1)   * all other lumpectomy margins are free of DCIS " by > 2 millimeters.  - Lymph-vascular invasion: no lymph-vascular invasion is unequivocally identified.  - Microcalcifications: present in DCIS.    % positive, ER 45-55% positive, HER2 by IHC 3+ positive    - Dr Barragan       8/2/2018 Surgery    Right breast lumpectomy reexcision, right axilla SLN biopsy:  A.  Submitted as “right axillary sentinel node”:  - Seven lymph nodes are identified showing no metastatic tumor.     B. Right breast lumpectomy reexcision:  - Abundant reactive changes are seen around the previous lumpectomy cavity.  - No residual atypia or malignancy is seen.          8/2/2018 -  Cancer Staged    Stage IA - pT1c, pN0, G2.   Cancer Staging   Malignant neoplasm of upper-outer quadrant of right breast in female, estrogen receptor positive   Staging form: Breast, AJCC 8th Edition  - Clinical: No stage assigned - Unsigned  - Pathologic: Stage IA (pT1c, pN0, cM0, G2, ER: Positive, MD: Positive, HER2: Positive) - Signed by Nunu Acosta MD on 8/21/2018  Histologic grading system: 3 grade system  Laterality: Right       9/25/2018 - 8/6/2019 Chemotherapy    Weekly Paclitaxol and trastuzumab x12, until December 11, 2018 followed by trastuzumab monotherapy 6 milligram/kilogram every 3 weeks    - Dr Savage       12/17/2018 - 8/26/2019 Chemotherapy    trastuzumab (HERCEPTIN) 579 mg in sodium chloride 0.9 % 250 mL chemo infusion, 8 mg/kg, Intravenous, Once, 12 of 12 cycles  Administration: 434 mg (5/14/2019), 434 mg (6/4/2019), 450 mg (7/16/2019), 450 mg (8/6/2019), 450 mg (6/25/2019)     1/9/2019 - 2/19/2019 Radiation    Plan ID Energy Fractions Dose per Fraction (cGy) Dose Correction (cGy) Total Dose Delivered (cGy) Elapsed Days   R Boost e 16E 5 / 5 200 0 1,000 6   Right Breast 6X 25 / 25 200 0 5,000 34     - Dr Acosta       2/19/2019 -  Hormone Therapy    Anastrozole (Arimidex) 1 mg PO Daily         Interval history:    ROS: Review of Systems   Constitutional:  Positive for appetite change and fatigue.  Negative for chills and fever.   HENT:  Positive for trouble swallowing. Negative for ear pain and sore throat.    Eyes:  Negative for pain and visual disturbance.   Respiratory:  Negative for cough and shortness of breath.    Cardiovascular:  Negative for chest pain and palpitations.   Gastrointestinal:  Positive for diarrhea and nausea. Negative for abdominal pain and vomiting.   Genitourinary:  Negative for dysuria and hematuria.   Musculoskeletal:  Positive for arthralgias. Negative for back pain.   Skin:  Positive for rash. Negative for color change.   Neurological:  Positive for numbness and headaches. Negative for seizures and syncope.   All other systems reviewed and are negative.      Past Medical History:   Diagnosis Date    Abdominal infection (HCC)     h-pylori    Abnormal chest x-ray 04/05/2019    Asthma     Breast cancer (HCC) 06/26/2018    Cancer (HCC)     BREAST    Depression, recurrent (HCC) 06/13/2022    Diabetes mellitus (HCC)     Hiatal hernia     Hiatal hernia 08/08/2018    History of chemotherapy 2018    History of radiation therapy 2018    Hypercholesterolemia     Hypertension     Hypothyroid     Renal disorder     Rheumatoid arthritis (HCC)        Past Surgical History:   Procedure Laterality Date    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    BREAST LUMPECTOMY Right 6/26/2018    Procedure: RIGHT BREAST NEEDLE LOCALIZATION X2 WITH RIGHT BREAST LUMPECTOMY ( NEEDLE LOC @ 1000);  Surgeon: Familia Barragan MD;  Location: [facility];  Service: Surgical Oncology    BREAST LUMPECTOMY Right 8/2/2018    Procedure: LYMPHOSCINITGRAPHY INTRAOPERATIVE LYMPHATIC MAPPING , RIGHT SENTINEL NODE BIOPSY, REEXCISION  RIGHT BREAST LUMPECTOMY CAVITY (SUPERIOR MARGIN);  Surgeon: Familia Barragan MD;  Location: [facility];  Service: Surgical Oncology    BREAST SURGERY Left     CATARACT EXTRACTION, BILATERAL Bilateral     COLONOSCOPY      EGD AND COLONOSCOPY N/A 9/5/2018    Procedure: EGD AND COLONOSCOPY;  Surgeon:  José Miguel Rosas MD;  Location: [facility];  Service: Gastroenterology    FL GUIDED CENTRAL VENOUS ACCESS DEVICE INSERTION  9/13/2018    KNEE SURGERY Right     MAMMO NEEDLE LOCALIZATION RIGHT (ALL INC) Right 6/26/2018    MAMMO STEREOTACTIC BREAST BIOPSY RIGHT (ALL INC) Right 5/23/2018    RETINAL DETACHMENT SURGERY Right     TUBAL LIGATION      TUNNELED VENOUS PORT PLACEMENT Left 9/13/2018    Procedure: INSERTION VENOUS PORT (PORT-A-CATH);  Surgeon: Familia Barragan MD;  Location: [facility];  Service: Surgical Oncology    UPPER GASTROINTESTINAL ENDOSCOPY         Social History     Socioeconomic History    Marital status:      Spouse name: None    Number of children: None    Years of education: None    Highest education level: None   Occupational History    None   Tobacco Use    Smoking status: Never    Smokeless tobacco: Never   Vaping Use    Vaping status: Never Used   Substance and Sexual Activity    Alcohol use: No    Drug use: No    Sexual activity: Not Currently   Other Topics Concern    None   Social History Narrative    None     Social Determinants of Health     Financial Resource Strain: Medium Risk (6/2/2024)    Received from [facility]    Overall Financial Resource Strain (CARDIA)     Difficulty of Paying Living Expenses: Somewhat hard   Food Insecurity: No Food Insecurity (6/2/2024)    Received from [facility]    Hunger Vital Sign     Worried About Running Out of Food in the Last Year: Never true     Ran Out of Food in the Last Year: Never true   Transportation Needs: No Transportation Needs (6/2/2024)    Received from [facility]    PRAPARE - Transportation     Lack of Transportation (Medical): No     Lack of Transportation (Non-Medical): No   Physical Activity: Not on file   Stress: Not on file   Social Connections: Not on file   Intimate Partner Violence: Not At Risk (6/2/2024)    Received from [facility]    Humiliation,  Afraid, Rape, and Kick questionnaire     Fear of Current or Ex-Partner: No     Emotionally Abused: No     Physically Abused: No     Sexually Abused: No   Housing Stability: Low Risk  (10/27/2021)    Housing Stability Vital Sign     Unable to Pay for Housing in the Last Year: No     Number of Places Lived in the Last Year: 1     Unstable Housing in the Last Year: No       Family History   Problem Relation Age of Onset    Diabetes Mother     Hypertension Mother     Diabetes Father     Hypertension Father     No Known Problems Sister     No Known Problems Sister     No Known Problems Sister     No Known Problems Sister     No Known Problems Sister     Diabetes Brother     Diabetes Brother     Kidney failure Brother     Diabetes Brother     Cancer Maternal Grandfather     No Known Problems Daughter     No Known Problems Daughter     No Known Problems Daughter        Allergies   Allergen Reactions    Codeine Other (See Comments)     unknown    Latex Other (See Comments)     fungus         Current Outpatient Medications:     acetaminophen (TYLENOL) 650 mg CR tablet, Take 1 tablet (650 mg total) by mouth every 8 (eight) hours as needed for mild pain, Disp: 30 tablet, Rfl: 0    albuterol (2.5 mg/3 mL) 0.083 % nebulizer solution, Take 3 mL (2.5 mg total) by nebulization every 6 (six) hours as needed for wheezing or shortness of breath, Disp: 75 mL, Rfl: 5    albuterol (PROVENTIL HFA,VENTOLIN HFA) 90 mcg/act inhaler, Inhale 1 puff every 4 (four) hours as needed for wheezing, Disp: 8.5 g, Rfl: 2    Alcohol Swabs (Pharmacist Choice Alcohol) PADS, CLEAN AREA BEFORE TESTING OR INJECTING FOUR TIMES DAILY, Disp: 300 each, Rfl: 2    anastrozole (ARIMIDEX) 1 mg tablet, TAKE 1 TABLET (1 MG TOTAL) BY MOUTH DAILY, Disp: 90 tablet, Rfl: 3    aspirin 81 mg chewable tablet, Chew 81 mg daily, Disp: , Rfl:     atorvastatin (LIPITOR) 40 mg tablet, Take 1 tablet (40 mg total) by mouth daily, Disp: 90 tablet, Rfl: 2    calcium polycarbophil  (FIBERCON) 625 mg tablet, Take 1 tablet (625 mg total) by mouth daily, Disp: 30 tablet, Rfl: 5    Comfort EZ Pen Needles 32G X 4 MM MISC, USE AS DIRECTED FOUR TIMES DAILY, Disp: 300 each, Rfl: 2    Continuous Glucose  (FreeStyle [place]) [facility], Use 1 Units continuous, Disp: 1 each, Rfl: 0    Continuous Glucose Sensor (FreeStyle [place]ick 3 Sensor) MISC, Use 1 Units every 14 (fourteen) days, Disp: 2 each, Rfl: 5    Diclofenac Sodium (VOLTAREN) 1 %, Apply 2 g topically 2 (two) times a day, Disp: 150 g, Rfl: 0    ergocalciferol (VITAMIN D2) 50,000 units, TAKE 1 CAPSULE (50,000 UNITS TOTAL) BY MOUTH ONCE A WEEK, Disp: 12 capsule, Rfl: 0    fluticasone (FLONASE) 50 mcg/act nasal spray, , Disp: , Rfl:     fluticasone-salmeterol (Advair HFA) 230-21 MCG/ACT inhaler, Inhale 2 puffs 2 (two) times a day Rinse mouth after use., Disp: 12 g, Rfl: 2    furosemide (LASIX) 20 mg tablet, TAKE 1 TABLET (20 MG TOTAL) BY MOUTH 2 (TWO) TIMES A DAY, Disp: 180 tablet, Rfl: 0    insulin degludec (Tresiba FlexTouch) 200 units/mL CONCENTRATED U-200 injection pen, 60 units daily at 5PM (Patient taking differently: Inject 10 Units under the skin daily 60 units daily at 5PM), Disp: 27 mL, Rfl: 1    insulin lispro (HumaLOG) 100 units/mL injection pen, 24 units before each meal Plus sliding scale 150-200: 2 units 201-250: 4 units 251-300: 6 units 301-350: 8 units Over 350: 10 units, Disp: 90 mL, Rfl: 1    latanoprost (XALATAN) 0.005 % ophthalmic solution, , Disp: , Rfl:     loratadine (CLARITIN) 10 mg tablet, TAKE 1 TABLET (10 MG TOTAL) BY MOUTH DAILY, Disp: 90 tablet, Rfl: 2    losartan (COZAAR) 100 MG tablet, Take 1 tablet (100 mg total) by mouth daily, Disp: 90 tablet, Rfl: 1    Mag-G 500 (27 Mg) MG tablet, TAKE 1 TABLET (500 MG TOTAL) BY MOUTH DAILY, Disp: 90 tablet, Rfl: 1    methocarbamol (ROBAXIN) 500 mg tablet, , Disp: , Rfl:     metoprolol tartrate (LOPRESSOR) 25 mg tablet, TAKE 0.5 TABLETS (12.5 MG TOTAL) BY MOUTH EVERY 12  "(TWELVE) HOURS, Disp: 90 tablet, Rfl: 0    omeprazole (PriLOSEC) 20 mg delayed release capsule, TAKE 1 CAPSULE (20 MG TOTAL) BY MOUTH DAILY, Disp: 90 capsule, Rfl: 1    OneTouch Delica Lancets 33G MISC, USE 3 (THREE) TIMES A DAY, Disp: 300 each, Rfl: 3    OneTouch Ultra test strip, USE 1 EACH 3 (THREE) TIMES A DAY USE AS INSTRUCTED, Disp: 300 each, Rfl: 2    tiotropium (Spiriva Respimat) 2.5 MCG/ACT AERS inhaler, Inhale 2 puffs daily, Disp: 4 g, Rfl: 3    tirzepatide (Mounjaro) 2.5 MG/0.5ML, Inject 0.5 mL (2.5 mg total) under the skin once a week, Disp: 2 mL, Rfl: 0    zileuton 600 MG, TAKE 1 TABLET (600 MG TOTAL) BY MOUTH 2 (TWO) TIMES A DAY, Disp: 60 tablet, Rfl: 2    EPINEPHrine (Auvi-Q) 0.1 mg/0.1 mL SOAJ, Inject 0.3 mL (0.3 mg total) into a muscle once for 1 dose (Patient not taking: Reported on 2/6/2023), Disp: 2 each, Rfl: 5    ketotifen (ZADITOR) 0.025 % ophthalmic solution, Administer 1 drop to both eyes 2 (two) times a day as needed (itchy eyes) (Patient not taking: Reported on 6/24/2024), Disp: 5 mL, Rfl: 0    polyethylene glycol (MIRALAX) 17 g packet, Take 17 g by mouth daily for 14 days, Disp: 28 each, Rfl: 5      Physical Exam:  /62 (BP Location: Right arm, Patient Position: Sitting, Cuff Size: Adult)   Pulse 89   Temp 98.2 °F (36.8 °C)   Resp 18   Ht 5' 5\" (1.651 m)   Wt 78.5 kg (173 lb)   SpO2 96%   BMI 28.79 kg/m²     Physical Exam  Constitutional:       General: She is not in acute distress.     Appearance: She is well-developed. She is not diaphoretic.   HENT:      Head: Normocephalic and atraumatic.      Nose: Nose normal.   Eyes:      General: No scleral icterus.        Right eye: No discharge.         Left eye: No discharge.      Conjunctiva/sclera: Conjunctivae normal.      Pupils: Pupils are equal, round, and reactive to light.   Neck:      Thyroid: No thyromegaly.      Vascular: No JVD.      Trachea: No tracheal deviation.   Cardiovascular:      Rate and Rhythm: Normal rate and " regular rhythm.      Heart sounds: Normal heart sounds. No murmur heard.     No friction rub.   Pulmonary:      Effort: Pulmonary effort is normal. No respiratory distress.      Breath sounds: Normal breath sounds. No stridor. No wheezing or rales.   Chest:      Chest wall: No tenderness.   Abdominal:      General: There is no distension.      Palpations: Abdomen is soft. There is no hepatomegaly or splenomegaly.      Tenderness: There is no abdominal tenderness. There is no guarding or rebound.   Musculoskeletal:         General: No tenderness or deformity. Normal range of motion.      Cervical back: Normal range of motion and neck supple.      Right lower leg: Edema present.      Left lower leg: Edema present.   Lymphadenopathy:      Cervical: No cervical adenopathy.   Skin:     General: Skin is warm and dry.      Coloration: Skin is not pale.      Findings: No erythema or rash.   Neurological:      Mental Status: She is alert and oriented to person, place, and time.      Cranial Nerves: No cranial nerve deficit.      Coordination: Coordination normal.      Deep Tendon Reflexes: Reflexes are normal and symmetric.   Psychiatric:         Behavior: Behavior normal.         Thought Content: Thought content normal.         Judgment: Judgment normal.           Labs:  Lab Results   Component Value Date    WBC 6.53 01/03/2024    HGB 11.5 01/03/2024    HCT 35.5 01/03/2024    MCV 89 01/03/2024     01/03/2024     Lab Results   Component Value Date    K 3.6 06/04/2024     06/04/2024    CO2 27 06/04/2024    BUN 11 06/04/2024    CREATININE 0.54 06/04/2024    GLUF 98 11/13/2023    CALCIUM 8.7 06/04/2024    CORRECTEDCA 10.6 (H) 11/21/2022    AST 13 06/04/2024    ALT 22 06/04/2024    ALKPHOS 92 06/04/2024    EGFR 100 06/04/2024     Lab Results   Component Value Date    TSH 5.31 03/11/2024       Patient voiced understanding and agreement in the above discussion. Aware to contact our office with questions/symptoms in  the interim.

## 2024-07-24 ENCOUNTER — TELEPHONE (OUTPATIENT)
Dept: ENDOCRINOLOGY | Facility: CLINIC | Age: 69
End: 2024-07-24

## 2024-07-27 DIAGNOSIS — Z79.4 TYPE 2 DIABETES MELLITUS WITH COMPLICATION, WITH LONG-TERM CURRENT USE OF INSULIN (HCC): ICD-10-CM

## 2024-07-27 DIAGNOSIS — Z79.4 TYPE 2 DIABETES MELLITUS WITH HYPERGLYCEMIA, WITH LONG-TERM CURRENT USE OF INSULIN (HCC): ICD-10-CM

## 2024-07-27 DIAGNOSIS — E11.8 TYPE 2 DIABETES MELLITUS WITH COMPLICATION, WITH LONG-TERM CURRENT USE OF INSULIN (HCC): ICD-10-CM

## 2024-07-27 DIAGNOSIS — E11.65 TYPE 2 DIABETES MELLITUS WITH HYPERGLYCEMIA, WITH LONG-TERM CURRENT USE OF INSULIN (HCC): ICD-10-CM

## 2024-07-28 RX ORDER — TIRZEPATIDE 2.5 MG/.5ML
INJECTION, SOLUTION SUBCUTANEOUS
Qty: 2 ML | Refills: 2 | Status: SHIPPED | OUTPATIENT
Start: 2024-07-28

## 2024-07-28 RX ORDER — INSULIN DEGLUDEC 200 U/ML
INJECTION, SOLUTION SUBCUTANEOUS
Qty: 27 ML | Refills: 2 | Status: SHIPPED | OUTPATIENT
Start: 2024-07-28

## 2024-07-28 RX ORDER — INSULIN LISPRO 100 [IU]/ML
INJECTION, SOLUTION INTRAVENOUS; SUBCUTANEOUS
Qty: 90 ML | Refills: 1 | Status: SHIPPED | OUTPATIENT
Start: 2024-07-28

## 2024-08-09 ENCOUNTER — VBI (OUTPATIENT)
Dept: ADMINISTRATIVE | Facility: OTHER | Age: 69
End: 2024-08-09

## 2024-08-09 NOTE — TELEPHONE ENCOUNTER
08/09/24 10:57 AM     Chart reviewed for Hemoglobin A1c ; nothing is submitted to the patient's insurance at this time.     Opal Hodges MA   [facility]

## 2024-08-28 ENCOUNTER — APPOINTMENT (EMERGENCY)
Dept: CT IMAGING | Facility: HOSPITAL | Age: 69
End: 2024-08-28
Payer: COMMERCIAL

## 2024-08-28 ENCOUNTER — APPOINTMENT (EMERGENCY)
Dept: RADIOLOGY | Facility: HOSPITAL | Age: 69
End: 2024-08-28
Payer: COMMERCIAL

## 2024-08-28 ENCOUNTER — HOSPITAL ENCOUNTER (EMERGENCY)
Facility: HOSPITAL | Age: 69
Discharge: HOME | End: 2024-08-28
Attending: EMERGENCY MEDICINE
Payer: COMMERCIAL

## 2024-08-28 ENCOUNTER — TELEPHONE (OUTPATIENT)
Dept: ENDOCRINOLOGY | Facility: CLINIC | Age: 69
End: 2024-08-28

## 2024-08-28 VITALS
OXYGEN SATURATION: 97 % | DIASTOLIC BLOOD PRESSURE: 60 MMHG | HEART RATE: 80 BPM | RESPIRATION RATE: 16 BRPM | HEIGHT: 65 IN | TEMPERATURE: 100.5 F | SYSTOLIC BLOOD PRESSURE: 129 MMHG | BODY MASS INDEX: 29.09 KG/M2 | WEIGHT: 174.6 LBS

## 2024-08-28 DIAGNOSIS — B34.9 VIRAL SYNDROME: Primary | ICD-10-CM

## 2024-08-28 DIAGNOSIS — R05.9 COUGH: ICD-10-CM

## 2024-08-28 LAB
2HR DELTA HS TROPONIN: -1 NG/L
ALBUMIN SERPL BCG-MCNC: 3.9 G/DL (ref 3.5–5)
ALP SERPL-CCNC: 75 U/L (ref 34–104)
ALT SERPL W P-5'-P-CCNC: 22 U/L (ref 7–52)
ANION GAP SERPL CALCULATED.3IONS-SCNC: 5 MMOL/L (ref 4–13)
APTT PPP: 27 SECONDS (ref 23–34)
AST SERPL W P-5'-P-CCNC: 19 U/L (ref 13–39)
ATRIAL RATE: 102 BPM
ATRIAL RATE: 89 BPM
BACTERIA UR QL AUTO: ABNORMAL /HPF
BASOPHILS # BLD AUTO: 0.07 THOUSANDS/ÂΜL (ref 0–0.1)
BASOPHILS NFR BLD AUTO: 1 % (ref 0–1)
BILIRUB SERPL-MCNC: 0.43 MG/DL (ref 0.2–1)
BILIRUB UR QL STRIP: NEGATIVE
BUN SERPL-MCNC: 16 MG/DL (ref 5–25)
CALCIUM SERPL-MCNC: 9.5 MG/DL (ref 8.4–10.2)
CARDIAC TROPONIN I PNL SERPL HS: 5 NG/L
CARDIAC TROPONIN I PNL SERPL HS: 6 NG/L
CHLORIDE SERPL-SCNC: 101 MMOL/L (ref 96–108)
CLARITY UR: CLEAR
CO2 SERPL-SCNC: 28 MMOL/L (ref 21–32)
COLOR UR: YELLOW
CREAT SERPL-MCNC: 0.66 MG/DL (ref 0.6–1.3)
EOSINOPHIL # BLD AUTO: 0.1 THOUSAND/ÂΜL (ref 0–0.61)
EOSINOPHIL NFR BLD AUTO: 1 % (ref 0–6)
ERYTHROCYTE [DISTWIDTH] IN BLOOD BY AUTOMATED COUNT: 13.8 % (ref 11.6–15.1)
FLUAV RNA RESP QL NAA+PROBE: NEGATIVE
FLUBV RNA RESP QL NAA+PROBE: NEGATIVE
GFR SERPL CREATININE-BSD FRML MDRD: 91 ML/MIN/1.73SQ M
GLUCOSE SERPL-MCNC: 176 MG/DL (ref 65–140)
GLUCOSE UR STRIP-MCNC: NEGATIVE MG/DL
HCT VFR BLD AUTO: 35.9 % (ref 34.8–46.1)
HGB BLD-MCNC: 11.6 G/DL (ref 11.5–15.4)
HGB UR QL STRIP.AUTO: NEGATIVE
IMM GRANULOCYTES # BLD AUTO: 0.03 THOUSAND/UL (ref 0–0.2)
IMM GRANULOCYTES NFR BLD AUTO: 0 % (ref 0–2)
INR PPP: 1.06 (ref 0.85–1.19)
KETONES UR STRIP-MCNC: NEGATIVE MG/DL
LACTATE SERPL-SCNC: 1.8 MMOL/L (ref 0.5–2)
LEUKOCYTE ESTERASE UR QL STRIP: NEGATIVE
LYMPHOCYTES # BLD AUTO: 1.42 THOUSANDS/ÂΜL (ref 0.6–4.47)
LYMPHOCYTES NFR BLD AUTO: 18 % (ref 14–44)
MCH RBC QN AUTO: 28.2 PG (ref 26.8–34.3)
MCHC RBC AUTO-ENTMCNC: 32.3 G/DL (ref 31.4–37.4)
MCV RBC AUTO: 87 FL (ref 82–98)
MONOCYTES # BLD AUTO: 0.65 THOUSAND/ÂΜL (ref 0.17–1.22)
MONOCYTES NFR BLD AUTO: 8 % (ref 4–12)
MUCOUS THREADS UR QL AUTO: ABNORMAL
NEUTROPHILS # BLD AUTO: 5.69 THOUSANDS/ÂΜL (ref 1.85–7.62)
NEUTS SEG NFR BLD AUTO: 72 % (ref 43–75)
NITRITE UR QL STRIP: NEGATIVE
NON-SQ EPI CELLS URNS QL MICRO: ABNORMAL /HPF
NRBC BLD AUTO-RTO: 0 /100 WBCS
P AXIS: 18 DEGREES
P AXIS: 22 DEGREES
PH UR STRIP.AUTO: 7 [PH] (ref 4.5–8)
PLATELET # BLD AUTO: 292 THOUSANDS/UL (ref 149–390)
PMV BLD AUTO: 11.5 FL (ref 8.9–12.7)
POTASSIUM SERPL-SCNC: 3.5 MMOL/L (ref 3.5–5.3)
PR INTERVAL: 154 MS
PR INTERVAL: 154 MS
PROCALCITONIN SERPL-MCNC: 0.18 NG/ML
PROT SERPL-MCNC: 7.5 G/DL (ref 6.4–8.4)
PROT UR STRIP-MCNC: ABNORMAL MG/DL
PROTHROMBIN TIME: 14 SECONDS (ref 12.3–15)
QRS AXIS: 20 DEGREES
QRS AXIS: 7 DEGREES
QRSD INTERVAL: 72 MS
QRSD INTERVAL: 76 MS
QT INTERVAL: 332 MS
QT INTERVAL: 370 MS
QTC INTERVAL: 432 MS
QTC INTERVAL: 450 MS
RBC # BLD AUTO: 4.11 MILLION/UL (ref 3.81–5.12)
RBC #/AREA URNS AUTO: ABNORMAL /HPF
RSV RNA RESP QL NAA+PROBE: NEGATIVE
SARS-COV-2 RNA RESP QL NAA+PROBE: NEGATIVE
SODIUM SERPL-SCNC: 134 MMOL/L (ref 135–147)
SP GR UR STRIP.AUTO: 1.02 (ref 1–1.03)
T WAVE AXIS: 66 DEGREES
T WAVE AXIS: 72 DEGREES
UROBILINOGEN UR QL STRIP.AUTO: 0.2 E.U./DL
VENTRICULAR RATE: 102 BPM
VENTRICULAR RATE: 89 BPM
WBC # BLD AUTO: 7.96 THOUSAND/UL (ref 4.31–10.16)
WBC #/AREA URNS AUTO: ABNORMAL /HPF

## 2024-08-28 PROCEDURE — 80053 COMPREHEN METABOLIC PANEL: CPT | Performed by: PHYSICIAN ASSISTANT

## 2024-08-28 PROCEDURE — 96360 HYDRATION IV INFUSION INIT: CPT

## 2024-08-28 PROCEDURE — 71250 CT THORAX DX C-: CPT

## 2024-08-28 PROCEDURE — 84145 PROCALCITONIN (PCT): CPT | Performed by: PHYSICIAN ASSISTANT

## 2024-08-28 PROCEDURE — 99285 EMERGENCY DEPT VISIT HI MDM: CPT | Performed by: PHYSICIAN ASSISTANT

## 2024-08-28 PROCEDURE — 87040 BLOOD CULTURE FOR BACTERIA: CPT | Performed by: PHYSICIAN ASSISTANT

## 2024-08-28 PROCEDURE — 84484 ASSAY OF TROPONIN QUANT: CPT | Performed by: PHYSICIAN ASSISTANT

## 2024-08-28 PROCEDURE — 85610 PROTHROMBIN TIME: CPT | Performed by: PHYSICIAN ASSISTANT

## 2024-08-28 PROCEDURE — 93010 ELECTROCARDIOGRAM REPORT: CPT | Performed by: INTERNAL MEDICINE

## 2024-08-28 PROCEDURE — 0241U HB NFCT DS VIR RESP RNA 4 TRGT: CPT | Performed by: PHYSICIAN ASSISTANT

## 2024-08-28 PROCEDURE — 83605 ASSAY OF LACTIC ACID: CPT | Performed by: PHYSICIAN ASSISTANT

## 2024-08-28 PROCEDURE — 36415 COLL VENOUS BLD VENIPUNCTURE: CPT | Performed by: PHYSICIAN ASSISTANT

## 2024-08-28 PROCEDURE — 71045 X-RAY EXAM CHEST 1 VIEW: CPT

## 2024-08-28 PROCEDURE — 93005 ELECTROCARDIOGRAM TRACING: CPT

## 2024-08-28 PROCEDURE — 85730 THROMBOPLASTIN TIME PARTIAL: CPT | Performed by: PHYSICIAN ASSISTANT

## 2024-08-28 PROCEDURE — 85025 COMPLETE CBC W/AUTO DIFF WBC: CPT | Performed by: PHYSICIAN ASSISTANT

## 2024-08-28 PROCEDURE — 99284 EMERGENCY DEPT VISIT MOD MDM: CPT

## 2024-08-28 PROCEDURE — 81001 URINALYSIS AUTO W/SCOPE: CPT

## 2024-08-28 RX ORDER — ALBUTEROL SULFATE 0.83 MG/ML
2.5 SOLUTION RESPIRATORY (INHALATION) EVERY 6 HOURS PRN
Qty: 75 ML | Refills: 0 | Status: SHIPPED | OUTPATIENT
Start: 2024-08-28

## 2024-08-28 RX ORDER — ACETAMINOPHEN 325 MG/1
650 TABLET ORAL ONCE
Status: COMPLETED | OUTPATIENT
Start: 2024-08-28 | End: 2024-08-28

## 2024-08-28 RX ORDER — BENZONATATE 100 MG/1
100 CAPSULE ORAL 3 TIMES DAILY PRN
Qty: 21 CAPSULE | Refills: 0 | Status: SHIPPED | OUTPATIENT
Start: 2024-08-28

## 2024-08-28 RX ORDER — BENZONATATE 100 MG/1
100 CAPSULE ORAL ONCE
Status: COMPLETED | OUTPATIENT
Start: 2024-08-28 | End: 2024-08-28

## 2024-08-28 RX ORDER — ALBUTEROL SULFATE 90 UG/1
1-2 AEROSOL, METERED RESPIRATORY (INHALATION) EVERY 6 HOURS PRN
Qty: 8 G | Refills: 0 | Status: SHIPPED | OUTPATIENT
Start: 2024-08-28

## 2024-08-28 RX ORDER — GUAIFENESIN 600 MG/1
600 TABLET, EXTENDED RELEASE ORAL EVERY 12 HOURS PRN
Qty: 14 TABLET | Refills: 0 | Status: SHIPPED | OUTPATIENT
Start: 2024-08-28

## 2024-08-28 RX ORDER — ACETAMINOPHEN 500 MG
500 TABLET ORAL EVERY 6 HOURS PRN
Qty: 30 TABLET | Refills: 0 | Status: SHIPPED | OUTPATIENT
Start: 2024-08-28

## 2024-08-28 RX ADMIN — BENZONATATE 100 MG: 100 CAPSULE ORAL at 07:59

## 2024-08-28 RX ADMIN — ACETAMINOPHEN 650 MG: 325 TABLET ORAL at 07:59

## 2024-08-28 RX ADMIN — SODIUM CHLORIDE 500 ML: 0.9 INJECTION, SOLUTION INTRAVENOUS at 08:30

## 2024-08-28 NOTE — Clinical Note
Rosa Maria Kirkpatrick accompanied Jolie Mulligan to the emergency department on 8/28/2024.    Return date if applicable: 08/29/2024        If you have any questions or concerns, please don't hesitate to call.      Rao Mary PA-C

## 2024-08-28 NOTE — DISCHARGE INSTRUCTIONS
Please refer to the attached information for strict return instructions. If symptoms worsen or new symptoms develop please return to the ER. Please follow up with your primary care physician for re-evaluation if symptoms persist.

## 2024-08-28 NOTE — Clinical Note
Chetna Navarro accompanied Jolie Mulligan to the emergency department on 8/28/2024.    Return date if applicable: 08/29/2024        If you have any questions or concerns, please don't hesitate to call.      Rao Mary PA-C

## 2024-08-28 NOTE — ED PROVIDER NOTES
History  Chief Complaint   Patient presents with    Flu Symptoms     Pt c/o cough, fever, chills, body aches, vomiting, and feeling unbalanced. Pt had similar symptoms in the beginning of June, where she was diagnosed with SIRS and admitted for sepsis.     Jolie is a 67 yo F, history of asthma, HTN, HLD, breast cancer in remission, presenting with 1 day of cough, fevers/chills, fatigue, 1 episode of post-tussive emesis today, headache. Reports gradual onset of symptoms yesterday worsening overnight. Mild dyspnea today although no wheezing noted. No known sick contacts. No meds PTA for symptoms.       History provided by:  Patient   used: No        Prior to Admission Medications   Prescriptions Last Dose Informant Patient Reported? Taking?   Alcohol Swabs (Pharmacist Choice Alcohol) PADS  Self, Child No No   Sig: CLEAN AREA BEFORE TESTING OR INJECTING FOUR TIMES DAILY   Comfort EZ Pen Needles 32G X 4 MM MISC  Self, Child No No   Sig: USE AS DIRECTED FOUR TIMES DAILY   Continuous Glucose  (FreeStyle Broderick [place]) AARON  Self, Child No No   Sig: Use 1 Units continuous   Continuous Glucose Sensor (FreeStyle Broderick 3 Sensor) MISC  Self, Child No No   Sig: Use 1 Units every 14 (fourteen) days   Diclofenac Sodium (VOLTAREN) 1 %  Self, Child No No   Sig: Apply 2 g topically 2 (two) times a day   EPINEPHrine (Auvi-Q) 0.1 mg/0.1 mL SOAJ   No No   Sig: Inject 0.3 mL (0.3 mg total) into a muscle once for 1 dose   Patient not taking: Reported on 2/6/2023   Mag-G 500 (27 Mg) MG tablet  Self, Child No No   Sig: TAKE 1 TABLET (500 MG TOTAL) BY MOUTH DAILY   OneTouch Delica Lancets 33G MISC  Self, Child No Yes   Sig: USE 3 (THREE) TIMES A DAY   OneTouch Ultra test strip  Self, Child No Yes   Sig: USE 1 EACH 3 (THREE) TIMES A DAY USE AS INSTRUCTED   acetaminophen (TYLENOL) 650 mg CR tablet Not Taking Self, Child No No   Sig: Take 1 tablet (650 mg total) by mouth every 8 (eight) hours as needed for mild  pain   Patient not taking: Reported on 8/28/2024   albuterol (2.5 mg/3 mL) 0.083 % nebulizer solution  Self, Child No Yes   Sig: Take 3 mL (2.5 mg total) by nebulization every 6 (six) hours as needed for wheezing or shortness of breath   albuterol (PROVENTIL HFA,VENTOLIN HFA) 90 mcg/act inhaler  Self, Child No Yes   Sig: Inhale 1 puff every 4 (four) hours as needed for wheezing   anastrozole (ARIMIDEX) 1 mg tablet  Self, Child No Yes   Sig: TAKE 1 TABLET (1 MG TOTAL) BY MOUTH DAILY   aspirin 81 mg chewable tablet  Self, Child Yes No   Sig: Chew 81 mg daily   atorvastatin (LIPITOR) 40 mg tablet  Self, Child No Yes   Sig: Take 1 tablet (40 mg total) by mouth daily   calcium polycarbophil (FIBERCON) 625 mg tablet  Self, Child No No   Sig: Take 1 tablet (625 mg total) by mouth daily   ergocalciferol (VITAMIN D2) 50,000 units Not Taking Self, Child No No   Sig: TAKE 1 CAPSULE (50,000 UNITS TOTAL) BY MOUTH ONCE A WEEK   Patient not taking: Reported on 8/28/2024   fluticasone (FLONASE) 50 mcg/act nasal spray  Self, Child Yes Yes   fluticasone-salmeterol (Advair HFA) 230-21 MCG/ACT inhaler  Self, Child No Yes   Sig: Inhale 2 puffs 2 (two) times a day Rinse mouth after use.   furosemide (LASIX) 20 mg tablet Not Taking Self, Child No No   Sig: TAKE 1 TABLET (20 MG TOTAL) BY MOUTH 2 (TWO) TIMES A DAY   Patient not taking: Reported on 8/28/2024   insulin degludec (Tresiba FlexTouch) 200 units/mL CONCENTRATED U-200 injection pen   No Yes   Sig: INJECT 60 UNITS UNDER THE SKIN DAILY AT 5PM   insulin lispro (HumaLOG) 100 units/mL injection pen   No Yes   Sig: INJECT 24 UNITS BEFORE EACH MEAL PLUS SLIDING SCALE 150-200: 2 UNITS 201-250: 4 UNITS 251-300: 6 UNITS 301*   ketotifen (ZADITOR) 0.025 % ophthalmic solution  Self, Child No No   Sig: Administer 1 drop to both eyes 2 (two) times a day as needed (itchy eyes)   Patient not taking: Reported on 6/24/2024   latanoprost (XALATAN) 0.005 % ophthalmic solution  Self, Child Yes Yes    loratadine (CLARITIN) 10 mg tablet  Self, Child No No   Sig: TAKE 1 TABLET (10 MG TOTAL) BY MOUTH DAILY   losartan (COZAAR) 100 MG tablet  Self, Child No Yes   Sig: Take 1 tablet (100 mg total) by mouth daily   methocarbamol (ROBAXIN) 500 mg tablet  Child Yes No   metoprolol tartrate (LOPRESSOR) 25 mg tablet  Self, Child No Yes   Sig: TAKE 0.5 TABLETS (12.5 MG TOTAL) BY MOUTH EVERY 12 (TWELVE) HOURS   omeprazole (PriLOSEC) 20 mg delayed release capsule  Self, Child No Yes   Sig: TAKE 1 CAPSULE (20 MG TOTAL) BY MOUTH DAILY   polyethylene glycol (MIRALAX) 17 g packet  Self No No   Sig: Take 17 g by mouth daily for 14 days   tiotropium (Spiriva Respimat) 2.5 MCG/ACT AERS inhaler  Self, Child No Yes   Sig: Inhale 2 puffs daily   tirzepatide (Mounjaro) 2.5 MG/0.5ML   No Yes   Sig: INJECT 0.5 ML (2.5 MG TOTAL) UNDER THE SKIN ONCE A WEEK   zileuton 600 MG  Self, Child No Yes   Sig: TAKE 1 TABLET (600 MG TOTAL) BY MOUTH 2 (TWO) TIMES A DAY      Facility-Administered Medications: None       Past Medical History:   Diagnosis Date    Abdominal infection (HCC)     h-pylori    Abnormal chest x-ray 04/05/2019    Asthma     Breast cancer (HCC) 06/26/2018    Cancer (HCC)     BREAST    Depression, recurrent (HCC) 06/13/2022    Diabetes mellitus (HCC)     Hiatal hernia     Hiatal hernia 08/08/2018    History of chemotherapy 2018    History of radiation therapy 2018    Hypercholesterolemia     Hypertension     Hypothyroid     Renal disorder     Rheumatoid arthritis (HCC)        Past Surgical History:   Procedure Laterality Date    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    BREAST LUMPECTOMY Right 6/26/2018    Procedure: RIGHT BREAST NEEDLE LOCALIZATION X2 WITH RIGHT BREAST LUMPECTOMY ( NEEDLE LOC @ 1000);  Surgeon: Familia Barragan MD;  Location: [facility];  Service: Surgical Oncology    BREAST LUMPECTOMY Right 8/2/2018    Procedure: LYMPHOSCINITGRAPHY INTRAOPERATIVE LYMPHATIC MAPPING , RIGHT SENTINEL NODE BIOPSY, REEXCISION  RIGHT  BREAST LUMPECTOMY CAVITY (SUPERIOR MARGIN);  Surgeon: Familia Barragan MD;  Location: [facility];  Service: Surgical Oncology    BREAST SURGERY Left     CATARACT EXTRACTION, BILATERAL Bilateral     COLONOSCOPY      EGD AND COLONOSCOPY N/A 9/5/2018    Procedure: EGD AND COLONOSCOPY;  Surgeon: José Miguel Rosas MD;  Location: [facility];  Service: Gastroenterology    FL GUIDED CENTRAL VENOUS ACCESS DEVICE INSERTION  9/13/2018    KNEE SURGERY Right     MAMMO NEEDLE LOCALIZATION RIGHT (ALL INC) Right 6/26/2018    MAMMO STEREOTACTIC BREAST BIOPSY RIGHT (ALL INC) Right 5/23/2018    RETINAL DETACHMENT SURGERY Right     TUBAL LIGATION      TUNNELED VENOUS PORT PLACEMENT Left 9/13/2018    Procedure: INSERTION VENOUS PORT (PORT-A-CATH);  Surgeon: Familia Barragan MD;  Location: [facility];  Service: Surgical Oncology    UPPER GASTROINTESTINAL ENDOSCOPY         Family History   Problem Relation Age of Onset    Diabetes Mother     Hypertension Mother     Diabetes Father     Hypertension Father     No Known Problems Sister     No Known Problems Sister     No Known Problems Sister     No Known Problems Sister     No Known Problems Sister     Diabetes Brother     Diabetes Brother     Kidney failure Brother     Diabetes Brother     Cancer Maternal Grandfather     No Known Problems Daughter     No Known Problems Daughter     No Known Problems Daughter      I have reviewed and agree with the history as documented.    E-Cigarette/Vaping    E-Cigarette Use Never User      E-Cigarette/Vaping Substances    Nicotine No     THC No     CBD No     Flavoring No     Other No     Unknown No      Social History     Tobacco Use    Smoking status: Never    Smokeless tobacco: Never   Vaping Use    Vaping status: Never Used   Substance Use Topics    Alcohol use: No    Drug use: No       Review of Systems   Constitutional:  Positive for chills and fever.   HENT:  Positive for congestion. Negative for rhinorrhea and sore throat.    Eyes:  Negative for  pain and visual disturbance.   Respiratory:  Positive for cough and shortness of breath. Negative for wheezing.    Cardiovascular:  Negative for chest pain and palpitations.   Gastrointestinal:  Positive for vomiting. Negative for abdominal pain, diarrhea and nausea.   Genitourinary:  Negative for dysuria, frequency and urgency.   Musculoskeletal:  Positive for myalgias. Negative for back pain, neck pain and neck stiffness.   Skin:  Negative for rash and wound.   Neurological:  Positive for light-headedness. Negative for dizziness, weakness and numbness.       Physical Exam  Physical Exam  Constitutional:       General: She is not in acute distress.     Appearance: She is well-developed. She is ill-appearing. She is not toxic-appearing or diaphoretic.   HENT:      Head: Normocephalic and atraumatic.      Right Ear: External ear normal.      Left Ear: External ear normal.   Eyes:      Extraocular Movements:      Right eye: Normal extraocular motion.      Left eye: Normal extraocular motion.      Conjunctiva/sclera: Conjunctivae normal.      Pupils: Pupils are equal, round, and reactive to light.   Cardiovascular:      Rate and Rhythm: Regular rhythm. Tachycardia present.   Pulmonary:      Effort: Pulmonary effort is normal. No accessory muscle usage or respiratory distress.      Breath sounds: No wheezing or rales.   Abdominal:      General: Abdomen is flat. There is no distension.   Musculoskeletal:      Cervical back: Normal range of motion. No rigidity.   Skin:     General: Skin is warm and dry.      Capillary Refill: Capillary refill takes less than 2 seconds.      Findings: No erythema or rash.   Neurological:      Mental Status: She is alert and oriented to person, place, and time.      Motor: No abnormal muscle tone.      Coordination: Coordination normal.   Psychiatric:         Behavior: Behavior normal.         Thought Content: Thought content normal.         Judgment: Judgment normal.         Vital  Signs  ED Triage Vitals   Temperature Pulse Respirations Blood Pressure SpO2   08/28/24 0728 08/28/24 0728 08/28/24 0728 08/28/24 0728 08/28/24 0728   100.5 °F (38.1 °C) (!) 114 18 (!) 173/72 96 %      Temp Source Heart Rate Source Patient Position - Orthostatic VS BP Location FiO2 (%)   08/28/24 0728 08/28/24 0728 08/28/24 0728 08/28/24 0728 --   Oral Monitor Sitting Right arm       Pain Score       08/28/24 0759       8           Vitals:    08/28/24 0728 08/28/24 0900 08/28/24 1000 08/28/24 1130   BP: (!) 173/72 161/71 144/63 129/60   Pulse: (!) 114 98 87 80   Patient Position - Orthostatic VS: Sitting  Sitting Sitting         Visual Acuity      ED Medications  Medications   sodium chloride 0.9 % bolus 500 mL (0 mL Intravenous Stopped 8/28/24 0907)   benzonatate (TESSALON PERLES) capsule 100 mg (100 mg Oral Given 8/28/24 0759)   acetaminophen (TYLENOL) tablet 650 mg (650 mg Oral Given 8/28/24 0759)       Diagnostic Studies  Results Reviewed       Procedure Component Value Units Date/Time    HS Troponin I 2hr [[id number]]  (Normal) Collected: 08/28/24 1016    Lab Status: Final result Specimen: Blood from Arm, Left Updated: 08/28/24 1057     hs TnI 2hr 5 ng/L      Delta 2hr hsTnI -1 ng/L     Urine Microscopic [[id number]]  (Abnormal) Collected: 08/28/24 0921    Lab Status: Final result Specimen: Urine, Clean Catch Updated: 08/28/24 0952     RBC, UA None Seen /hpf      WBC, UA 1-2 /hpf      Epithelial Cells Occasional /hpf      Bacteria, UA None Seen /hpf      MUCUS THREADS Occasional    FLU/RSV/COVID - if FLU/RSV clinically relevant [[id number]]  (Normal) Collected: 08/28/24 0818    Lab Status: Final result Specimen: Nares from Nose Updated: 08/28/24 0934     SARS-CoV-2 Negative     INFLUENZA A PCR Negative     INFLUENZA B PCR Negative     RSV PCR Negative    Narrative:      This test has been performed using the CoV-2/Flu/RSV plus assay on the [employer] GeneXpert platform. This test has been validated by the   and verified by the performing laboratory.     This test is designed to amplify and detect the following: nucleocapsid (N), envelope (E), and RNA-dependent RNA polymerase (RdRP) genes of the SARS-CoV-2 genome; matrix (M), basic polymerase (PB2), and acidic protein (PA) segments of the influenza A genome; matrix (M) and non-structural protein (NS) segments of the influenza B genome, and the nucleocapsid genes of RSV A and RSV B.     Positive results are indicative of the presence of Flu A, Flu B, RSV, and/or SARS-CoV-2 RNA. Positive results for SARS-CoV-2 or suspected novel influenza should be reported to state, local, or federal health departments according to local reporting requirements.      All results should be assessed in conjunction with clinical presentation and other laboratory markers for clinical management.     FOR PEDIATRIC PATIENTS - copy/paste COVID Guidelines URL to browser: [email or web address]       Urine Macroscopic, POC [[id number]]  (Abnormal) Collected: 08/28/24 0921    Lab Status: Final result Specimen: Urine Updated: 08/28/24 0922     Color, UA Yellow     Clarity, UA Clear     pH, UA 7.0     Leukocytes, UA Negative     Nitrite, UA Negative     Protein,  (2+) mg/dl      Glucose, UA Negative mg/dl      Ketones, UA Negative mg/dl      Urobilinogen, UA 0.2 E.U./dl      Bilirubin, UA Negative     Occult Blood, UA Negative     Specific Gravity, UA 1.025    Narrative:      CLINITEK RESULT    Procalcitonin [[id number]]  (Normal) Collected: 08/28/24 0818    Lab Status: Final result Specimen: Blood from Arm, Left Updated: 08/28/24 0907     Procalcitonin 0.18 ng/ml     HS Troponin 0hr (reflex protocol) [[id number]]  (Normal) Collected: 08/28/24 0818    Lab Status: Final result Specimen: Blood from Arm, Left Updated: 08/28/24 0904     hs TnI 0hr 6 ng/L     Comprehensive metabolic panel [[id number]]  (Abnormal) Collected: 08/28/24 0818     Lab Status: Final result Specimen: Blood from Arm, Left Updated: 08/28/24 0902     Sodium 134 mmol/L      Potassium 3.5 mmol/L      Chloride 101 mmol/L      CO2 28 mmol/L      ANION GAP 5 mmol/L      BUN 16 mg/dL      Creatinine 0.66 mg/dL      Glucose 176 mg/dL      Calcium 9.5 mg/dL      AST 19 U/L      ALT 22 U/L      Alkaline Phosphatase 75 U/L      Total Protein 7.5 g/dL      Albumin 3.9 g/dL      Total Bilirubin 0.43 mg/dL      eGFR 91 ml/min/1.73sq m     Narrative:      National Kidney Disease Foundation guidelines for Chronic Kidney Disease (CKD):     Stage 1 with normal or high GFR (GFR > 90 mL/min/1.73 square meters)    Stage 2 Mild CKD (GFR = 60-89 mL/min/1.73 square meters)    Stage 3A Moderate CKD (GFR = 45-59 mL/min/1.73 square meters)    Stage 3B Moderate CKD (GFR = 30-44 mL/min/1.73 square meters)    Stage 4 Severe CKD (GFR = 15-29 mL/min/1.73 square meters)    Stage 5 End Stage CKD (GFR <15 mL/min/1.73 square meters)  Note: GFR calculation is accurate only with a steady state creatinine    Lactic acid [[id number]]  (Normal) Collected: 08/28/24 0818    Lab Status: Final result Specimen: Blood from Arm, Left Updated: 08/28/24 0901     LACTIC ACID 1.8 mmol/L     Narrative:      Result may be elevated if tourniquet was used during collection.    Protime-INR [[id number]]  (Normal) Collected: 08/28/24 0818    Lab Status: Final result Specimen: Blood from Arm, Left Updated: 08/28/24 0858     Protime 14.0 seconds      INR 1.06    Narrative:      INR Therapeutic Range    Indication                                             INR Range      Atrial Fibrillation                                               2.0-3.0  Hypercoagulable State                                    2.0.2.3  Left Ventricular Asist Device                            2.0-3.0  Mechanical Heart Valve                                  -    Aortic(with afib, MI, embolism, HF, LA enlargement,    and/or coagulopathy)                                      2.0-3.0 (2.5-3.5)     Mitral                                                             2.5-3.5  Prosthetic/Bioprosthetic Heart Valve               2.0-3.0  Venous thromboembolism (VTE: VT, PE        2.0-3.0    APTT [[id number]]  (Normal) Collected: 08/28/24 0818    Lab Status: Final result Specimen: Blood from Arm, Left Updated: 08/28/24 0858     PTT 27 seconds     Blood culture #2 [[id number]] Collected: 08/28/24 0843    Lab Status: In process Specimen: Blood from Hand, Right Updated: 08/28/24 0846    CBC and differential [[id number]] Collected: 08/28/24 0818    Lab Status: Final result Specimen: Blood from Arm, Left Updated: 08/28/24 0843     WBC 7.96 Thousand/uL      RBC 4.11 Million/uL      Hemoglobin 11.6 g/dL      Hematocrit 35.9 %      MCV 87 fL      MCH 28.2 pg      MCHC 32.3 g/dL      RDW 13.8 %      MPV 11.5 fL      Platelets 292 Thousands/uL      nRBC 0 /100 WBCs      Segmented % 72 %      Immature Grans % 0 %      Lymphocytes % 18 %      Monocytes % 8 %      Eosinophils Relative 1 %      Basophils Relative 1 %      Absolute Neutrophils 5.69 Thousands/µL      Absolute Immature Grans 0.03 Thousand/uL      Absolute Lymphocytes 1.42 Thousands/µL      Absolute Monocytes 0.65 Thousand/µL      Eosinophils Absolute 0.10 Thousand/µL      Basophils Absolute 0.07 Thousands/µL     Blood culture #1 [[id number]] Collected: 08/28/24 0818    Lab Status: In process Specimen: Blood from Arm, Left Updated: 08/28/24 0840                   CT chest without contrast   Final Result by Saira Spears MD (08/28 1110)      No acute consolidation.   Postradiation changes right lung      Stable CT            Workstation performed: [id number]         XR chest portable   Final Result by Manuel Marmolejo MD (08/28 1325)      No acute cardiopulmonary disease.            Resident: Constantine Stearns I, the attending radiologist, have reviewed the images and agree with the final report above.      Workstation performed:  [id number]                    Procedures  ECG 12 Lead Documentation Only    Date/Time: 8/28/2024 8:15 AM    Performed by: Rao Mary PA-C  Authorized by: Rao Mary PA-C    Indications / Diagnosis:  Tachycardia  ECG reviewed by me, the ED Provider: yes    Patient location:  ED  Previous ECG:     Previous ECG:  Compared to current    Similarity:  No change    Comparison to cardiac monitor: Yes    Interpretation:     Interpretation: normal    Rate:     ECG rate:  102    ECG rate assessment: tachycardic    Rhythm:     Rhythm: sinus tachycardia    Ectopy:     Ectopy: none    QRS:     QRS axis:  Normal    QRS intervals:  Normal  Conduction:     Conduction: normal    ST segments:     ST segments:  Normal  T waves:     T waves: normal             ED Course                                 SBIRT 22yo+      Flowsheet Row Most Recent Value   Initial Alcohol Screen: US AUDIT-C     1. How often do you have a drink containing alcohol? 0 Filed at: 08/28/2024 0729   2. How many drinks containing alcohol do you have on a typical day you are drinking?  0 Filed at: 08/28/2024 0729   3b. FEMALE Any Age, or MALE 65+: How often do you have 4 or more drinks on one occassion? 0 Filed at: 08/28/2024 0729   Audit-C Score 0 Filed at: 08/28/2024 0729   MRAGO: How many times in the past year have you...    Used an illegal drug or used a prescription medication for non-medical reasons? Never Filed at: 08/28/2024 0729                      Medical Decision Making  One day of flu like symptoms including cough, congestion, body aches/myalgias, fevers/chills. Slight dyspnea although no wheezing noted. Lungs CTAB on arrival. She is noted to be febrile to 100.5, tachycardic on arrival. Ill appearing but nontoxic. Lab workup including CBC for WBC count, CMP for lytes, lactic, procal WNL grossly. Cultures obtained but pending at time of discharge. XR chest with questionable increased opacities to lung bases, however follow up CT  without acute findings. Viral panel negative.     By time of discharge HR is improved to 80 bpm, resting comfortably. No increased work of breathing. Stable for discharge, likely viral etiology. Will d/c with refills for albuterol, tessalon PRN, tylenol PRN fever. Follow up with PCP recommended. Return to ED indications reviewed.     Amount and/or Complexity of Data Reviewed  Labs: ordered.  Radiology: ordered.    Risk  OTC drugs.  Prescription drug management.                 Disposition  Final diagnoses:   Viral syndrome   Cough     Time reflects when diagnosis was documented in both MDM as applicable and the Disposition within this note       Time User Action Codes Description Comment    8/28/2024 11:39 AM Rao Mary [B34.9] Viral syndrome     8/28/2024 11:39 AM Rao Mary [R05.9] Cough           ED Disposition       ED Disposition   Discharge    Condition   Stable    Date/Time   Wed Aug 28, 2024 1138    Comment   Jolie Mulligan discharge to home/self care.                   Follow-up Information       Follow up With Specialties Details Why Contact Info Additional Information    [facility] Emergency Medicine  If symptoms worsen [address] [place] [address] [phone] [facility], [address], [place], [address]    Blanca Lemus MD  Call   [address] [address] [phone]               Discharge Medication List as of 8/28/2024 11:41 AM        START taking these medications    Details   acetaminophen (TYLENOL) 500 mg tablet Take 1 tablet (500 mg total) by mouth every 6 (six) hours as needed for fever, Starting Wed 8/28/2024, Normal      !! albuterol (2.5 mg/3 mL) 0.083 % nebulizer solution Take 3 mL (2.5 mg total) by nebulization every 6 (six) hours as needed for wheezing or shortness of breath, Starting Wed 8/28/2024, Normal      !! albuterol  (Proventil HFA) 90 mcg/act inhaler Inhale 1-2 puffs every 6 (six) hours as needed for wheezing or shortness of breath, Starting Wed 8/28/2024, Normal      benzonatate (TESSALON PERLES) 100 mg capsule Take 1 capsule (100 mg total) by mouth 3 (three) times a day as needed for cough, Starting Wed 8/28/2024, Normal      guaiFENesin (MUCINEX) 600 mg 12 hr tablet Take 1 tablet (600 mg total) by mouth every 12 (twelve) hours as needed for congestion, Starting Wed 8/28/2024, Normal       !! - Potential duplicate medications found. Please discuss with provider.        CONTINUE these medications which have NOT CHANGED    Details   !! albuterol (2.5 mg/3 mL) 0.083 % nebulizer solution Take 3 mL (2.5 mg total) by nebulization every 6 (six) hours as needed for wheezing or shortness of breath, Starting Tue 5/7/2024, Normal      !! albuterol (PROVENTIL HFA,VENTOLIN HFA) 90 mcg/act inhaler Inhale 1 puff every 4 (four) hours as needed for wheezing, Starting Tue 5/7/2024, Normal      anastrozole (ARIMIDEX) 1 mg tablet TAKE 1 TABLET (1 MG TOTAL) BY MOUTH DAILY, Starting Tue 10/31/2023, Normal      atorvastatin (LIPITOR) 40 mg tablet Take 1 tablet (40 mg total) by mouth daily, Starting Wed 8/23/2023, Normal      fluticasone (FLONASE) 50 mcg/act nasal spray Historical Med      fluticasone-salmeterol (Advair HFA) 230-21 MCG/ACT inhaler Inhale 2 puffs 2 (two) times a day Rinse mouth after use., Starting Tue 5/7/2024, Normal      insulin degludec (Tresiba FlexTouch) 200 units/mL CONCENTRATED U-200 injection pen INJECT 60 UNITS UNDER THE SKIN DAILY AT 5PM, Normal      insulin lispro (HumaLOG) 100 units/mL injection pen INJECT 24 UNITS BEFORE EACH MEAL PLUS SLIDING SCALE 150-200: 2 UNITS 201-250: 4 UNITS 251-300: 6 UNITS 301*, Normal      latanoprost (XALATAN) 0.005 % ophthalmic solution Historical Med      losartan (COZAAR) 100 MG tablet Take 1 tablet (100 mg total) by mouth daily, Starting Wed 8/23/2023, Normal      metoprolol tartrate  (LOPRESSOR) 25 mg tablet TAKE 0.5 TABLETS (12.5 MG TOTAL) BY MOUTH EVERY 12 (TWELVE) HOURS, Starting Mon 1/8/2024, Normal      omeprazole (PriLOSEC) 20 mg delayed release capsule TAKE 1 CAPSULE (20 MG TOTAL) BY MOUTH DAILY, Starting Mon 2/5/2024, Normal      OneTouch Delica Lancets 33G MISC USE 3 (THREE) TIMES A DAY, Normal      OneTouch Ultra test strip USE 1 EACH 3 (THREE) TIMES A DAY USE AS INSTRUCTED, Starting Sat 2/18/2023, Normal      tiotropium (Spiriva Respimat) 2.5 MCG/ACT AERS inhaler Inhale 2 puffs daily, Starting Tue 5/7/2024, Normal      tirzepatide (Mounjaro) 2.5 MG/0.5ML INJECT 0.5 ML (2.5 MG TOTAL) UNDER THE SKIN ONCE A WEEK, Normal      zileuton 600 MG TAKE 1 TABLET (600 MG TOTAL) BY MOUTH 2 (TWO) TIMES A DAY, Normal      acetaminophen (TYLENOL) 650 mg CR tablet Take 1 tablet (650 mg total) by mouth every 8 (eight) hours as needed for mild pain, Starting Mon 6/13/2022, Normal      Alcohol Swabs (Pharmacist Choice Alcohol) PADS CLEAN AREA BEFORE TESTING OR INJECTING FOUR TIMES DAILY, Normal      aspirin 81 mg chewable tablet Chew 81 mg daily, Historical Med      calcium polycarbophil (FIBERCON) 625 mg tablet Take 1 tablet (625 mg total) by mouth daily, Starting Wed 10/11/2023, Normal      Comfort EZ Pen Needles 32G X 4 MM MISC USE AS DIRECTED FOUR TIMES DAILY, Normal      Continuous Glucose  (FreeStyle Broderick [place]) AARON Use 1 Units continuous, Starting Wed 6/5/2024, Normal      Continuous Glucose Sensor (FreeStyle Broderick 3 Sensor) MISC Use 1 Units every 14 (fourteen) days, Starting Wed 6/5/2024, Normal      Diclofenac Sodium (VOLTAREN) 1 % Apply 2 g topically 2 (two) times a day, Starting Wed 8/23/2023, Normal      EPINEPHrine (Auvi-Q) 0.1 mg/0.1 mL SOAJ Inject 0.3 mL (0.3 mg total) into a muscle once for 1 dose, Starting Wed 11/9/2022, Normal      ergocalciferol (VITAMIN D2) 50,000 units TAKE 1 CAPSULE (50,000 UNITS TOTAL) BY MOUTH ONCE A WEEK, Starting Tue 9/5/2023, Normal      furosemide  (LASIX) 20 mg tablet TAKE 1 TABLET (20 MG TOTAL) BY MOUTH 2 (TWO) TIMES A DAY, Starting Mon 7/17/2023, Normal      ketotifen (ZADITOR) 0.025 % ophthalmic solution Administer 1 drop to both eyes 2 (two) times a day as needed (itchy eyes), Starting Tue 10/3/2023, Normal      loratadine (CLARITIN) 10 mg tablet TAKE 1 TABLET (10 MG TOTAL) BY MOUTH DAILY, Starting Thu 8/5/2021, Normal      Mag-G 500 (27 Mg) MG tablet TAKE 1 TABLET (500 MG TOTAL) BY MOUTH DAILY, Normal      methocarbamol (ROBAXIN) 500 mg tablet Historical Med      polyethylene glycol (MIRALAX) 17 g packet Take 17 g by mouth daily for 14 days, Starting Wed 10/11/2023, Until Wed 6/5/2024, Normal       !! - Potential duplicate medications found. Please discuss with provider.          No discharge procedures on file.    PDMP Review       None            ED Provider  Electronically Signed by             Rao Mary PA-C  08/28/24 [id number]

## 2024-09-01 LAB
BACTERIA BLD CULT: NORMAL
BACTERIA BLD CULT: NORMAL

## 2024-09-02 LAB
BACTERIA BLD CULT: NORMAL
BACTERIA BLD CULT: NORMAL

## 2024-09-16 ENCOUNTER — APPOINTMENT (OUTPATIENT)
Dept: LAB | Facility: HOSPITAL | Age: 69
End: 2024-09-16
Payer: COMMERCIAL

## 2024-09-16 DIAGNOSIS — E53.8 FOLIC ACID DEFICIENCY: ICD-10-CM

## 2024-09-16 DIAGNOSIS — R19.7 DIARRHEA, UNSPECIFIED TYPE: ICD-10-CM

## 2024-09-16 DIAGNOSIS — E53.8 VITAMIN B12 DEFICIENCY: ICD-10-CM

## 2024-09-16 DIAGNOSIS — Z17.0 MALIGNANT NEOPLASM OF UPPER-OUTER QUADRANT OF RIGHT BREAST IN FEMALE, ESTROGEN RECEPTOR POSITIVE (HCC): ICD-10-CM

## 2024-09-16 DIAGNOSIS — E11.65 TYPE 2 DIABETES MELLITUS WITH HYPERGLYCEMIA, WITH LONG-TERM CURRENT USE OF INSULIN (HCC): ICD-10-CM

## 2024-09-16 DIAGNOSIS — R63.5 WEIGHT GAIN: ICD-10-CM

## 2024-09-16 DIAGNOSIS — C50.411 MALIGNANT NEOPLASM OF UPPER-OUTER QUADRANT OF RIGHT BREAST IN FEMALE, ESTROGEN RECEPTOR POSITIVE (HCC): ICD-10-CM

## 2024-09-16 DIAGNOSIS — J45.50 SEVERE PERSISTENT ASTHMA WITHOUT COMPLICATION: ICD-10-CM

## 2024-09-16 DIAGNOSIS — D64.9 ANEMIA, UNSPECIFIED TYPE: ICD-10-CM

## 2024-09-16 DIAGNOSIS — Z79.4 TYPE 2 DIABETES MELLITUS WITH HYPERGLYCEMIA, WITH LONG-TERM CURRENT USE OF INSULIN (HCC): ICD-10-CM

## 2024-09-16 LAB
ALBUMIN SERPL BCG-MCNC: 3.9 G/DL (ref 3.5–5)
ALP SERPL-CCNC: 69 U/L (ref 34–104)
ALT SERPL W P-5'-P-CCNC: 14 U/L (ref 7–52)
ANION GAP SERPL CALCULATED.3IONS-SCNC: 7 MMOL/L (ref 4–13)
AST SERPL W P-5'-P-CCNC: 14 U/L (ref 13–39)
BASOPHILS # BLD AUTO: 0.09 THOUSANDS/ΜL (ref 0–0.1)
BASOPHILS NFR BLD AUTO: 1 % (ref 0–1)
BILIRUB SERPL-MCNC: 0.58 MG/DL (ref 0.2–1)
BLD SMEAR INTERP: NORMAL
BUN SERPL-MCNC: 25 MG/DL (ref 5–25)
CALCIUM SERPL-MCNC: 10 MG/DL (ref 8.4–10.2)
CHLORIDE SERPL-SCNC: 101 MMOL/L (ref 96–108)
CO2 SERPL-SCNC: 30 MMOL/L (ref 21–32)
CREAT SERPL-MCNC: 0.64 MG/DL (ref 0.6–1.3)
CREAT UR-MCNC: 412.5 MG/DL
CRP SERPL QL: 5.3 MG/L
DAT POLY-SP REAG RBC QL: NEGATIVE
EOSINOPHIL # BLD AUTO: 0.12 THOUSAND/ΜL (ref 0–0.61)
EOSINOPHIL NFR BLD AUTO: 2 % (ref 0–6)
ERYTHROCYTE [DISTWIDTH] IN BLOOD BY AUTOMATED COUNT: 13.9 % (ref 11.6–15.1)
ERYTHROCYTE [SEDIMENTATION RATE] IN BLOOD: 40 MM/HOUR (ref 0–29)
EST. AVERAGE GLUCOSE BLD GHB EST-MCNC: 278 MG/DL
FERRITIN SERPL-MCNC: 43 NG/ML (ref 11–307)
FOLATE SERPL-MCNC: >22.3 NG/ML
GFR SERPL CREATININE-BSD FRML MDRD: 92 ML/MIN/1.73SQ M
GLUCOSE P FAST SERPL-MCNC: 223 MG/DL (ref 65–99)
HBA1C MFR BLD: 11.3 %
HCT VFR BLD AUTO: 38.2 % (ref 34.8–46.1)
HGB BLD-MCNC: 12.4 G/DL (ref 11.5–15.4)
IGA SERPL-MCNC: 578 MG/DL (ref 66–433)
IMM GRANULOCYTES # BLD AUTO: 0.02 THOUSAND/UL (ref 0–0.2)
IMM GRANULOCYTES NFR BLD AUTO: 0 % (ref 0–2)
IRON SATN MFR SERPL: 19 % (ref 15–50)
IRON SERPL-MCNC: 64 UG/DL (ref 50–212)
LDH SERPL-CCNC: 133 U/L (ref 140–271)
LYMPHOCYTES # BLD AUTO: 1.91 THOUSANDS/ΜL (ref 0.6–4.47)
LYMPHOCYTES NFR BLD AUTO: 27 % (ref 14–44)
MAGNESIUM SERPL-MCNC: 1.3 MG/DL (ref 1.9–2.7)
MCH RBC QN AUTO: 28.2 PG (ref 26.8–34.3)
MCHC RBC AUTO-ENTMCNC: 32.5 G/DL (ref 31.4–37.4)
MCV RBC AUTO: 87 FL (ref 82–98)
MICROALBUMIN UR-MCNC: 542.6 MG/L
MICROALBUMIN/CREAT 24H UR: 132 MG/G CREATININE (ref 0–30)
MONOCYTES # BLD AUTO: 0.36 THOUSAND/ΜL (ref 0.17–1.22)
MONOCYTES NFR BLD AUTO: 5 % (ref 4–12)
NEUTROPHILS # BLD AUTO: 4.52 THOUSANDS/ΜL (ref 1.85–7.62)
NEUTS SEG NFR BLD AUTO: 65 % (ref 43–75)
NRBC BLD AUTO-RTO: 0 /100 WBCS
PLATELET # BLD AUTO: 371 THOUSANDS/UL (ref 149–390)
PMV BLD AUTO: 12 FL (ref 8.9–12.7)
POTASSIUM SERPL-SCNC: 3.9 MMOL/L (ref 3.5–5.3)
PROT SERPL-MCNC: 7.4 G/DL (ref 6.4–8.4)
RBC # BLD AUTO: 4.39 MILLION/UL (ref 3.81–5.12)
SODIUM SERPL-SCNC: 138 MMOL/L (ref 135–147)
T4 FREE SERPL-MCNC: 0.91 NG/DL (ref 0.61–1.12)
TIBC SERPL-MCNC: 340 UG/DL (ref 250–450)
TSH SERPL DL<=0.05 MIU/L-ACNC: 4.21 UIU/ML (ref 0.45–4.5)
UIBC SERPL-MCNC: 276 UG/DL (ref 155–355)
VIT B12 SERPL-MCNC: 524 PG/ML (ref 180–914)
WBC # BLD AUTO: 7.02 THOUSAND/UL (ref 4.31–10.16)

## 2024-09-16 PROCEDURE — 82728 ASSAY OF FERRITIN: CPT

## 2024-09-16 PROCEDURE — 82607 VITAMIN B-12: CPT

## 2024-09-16 PROCEDURE — 82570 ASSAY OF URINE CREATININE: CPT

## 2024-09-16 PROCEDURE — 85025 COMPLETE CBC W/AUTO DIFF WBC: CPT

## 2024-09-16 PROCEDURE — 86003 ALLG SPEC IGE CRUDE XTRC EA: CPT

## 2024-09-16 PROCEDURE — 83550 IRON BINDING TEST: CPT

## 2024-09-16 PROCEDURE — 80053 COMPREHEN METABOLIC PANEL: CPT

## 2024-09-16 PROCEDURE — 86140 C-REACTIVE PROTEIN: CPT

## 2024-09-16 PROCEDURE — 86364 TISS TRNSGLTMNASE EA IG CLAS: CPT

## 2024-09-16 PROCEDURE — 36415 COLL VENOUS BLD VENIPUNCTURE: CPT

## 2024-09-16 PROCEDURE — 86880 COOMBS TEST DIRECT: CPT

## 2024-09-16 PROCEDURE — 82043 UR ALBUMIN QUANTITATIVE: CPT

## 2024-09-16 PROCEDURE — 85652 RBC SED RATE AUTOMATED: CPT

## 2024-09-16 PROCEDURE — 83615 LACTATE (LD) (LDH) ENZYME: CPT

## 2024-09-16 PROCEDURE — 84439 ASSAY OF FREE THYROXINE: CPT

## 2024-09-16 PROCEDURE — 83735 ASSAY OF MAGNESIUM: CPT

## 2024-09-16 PROCEDURE — 82785 ASSAY OF IGE: CPT

## 2024-09-16 PROCEDURE — 82784 ASSAY IGA/IGD/IGG/IGM EACH: CPT

## 2024-09-16 PROCEDURE — 83036 HEMOGLOBIN GLYCOSYLATED A1C: CPT

## 2024-09-16 PROCEDURE — 84443 ASSAY THYROID STIM HORMONE: CPT

## 2024-09-16 PROCEDURE — 83010 ASSAY OF HAPTOGLOBIN QUANT: CPT

## 2024-09-16 PROCEDURE — 83540 ASSAY OF IRON: CPT

## 2024-09-16 PROCEDURE — 82746 ASSAY OF FOLIC ACID SERUM: CPT

## 2024-09-17 LAB
A ALTERNATA IGE QN: <0.1 KUA/I
A FUMIGATUS IGE QN: 0.12 KUA/I
BERMUDA GRASS IGE QN: 2.38 KUA/I
BOXELDER IGE QN: 2.1 KUA/I
C HERBARUM IGE QN: <0.1 KUA/I
CAT DANDER IGE QN: 2.42 KUA/I
CMN PIGWEED IGE QN: 1.67 KUA/I
COMMON RAGWEED IGE QN: 2.19 KUA/I
COTTONWOOD IGE QN: 2.02 KUA/I
D FARINAE IGE QN: 92.2 KUA/I
D PTERONYSS IGE QN: >100 KUA/I
DOG DANDER IGE QN: >100 KUA/I
HAPTOGLOB SERPL-MCNC: 182 MG/DL (ref 37–355)
LONDON PLANE IGE QN: 1.99 KUA/I
MOUSE URINE PROT IGE QN: 0.39 KUA/I
MT JUNIPER IGE QN: 1.53 KUA/I
MUGWORT IGE QN: 1.59 KUA/I
P NOTATUM IGE QN: <0.1 KUA/I
ROACH IGE QN: 4.64 KUA/I
SHEEP SORREL IGE QN: 2.15 KUA/I
SILVER BIRCH IGE QN: 1.26 KUA/I
TIMOTHY IGE QN: 2.09 KUA/I
TOTAL IGE SMQN RAST: 1746 KU/L (ref 0–113)
WALNUT IGE QN: 2.95 KUA/I
WHITE ASH IGE QN: 2.22 KUA/I
WHITE ELM IGE QN: 2.37 KUA/I
WHITE MULBERRY IGE QN: 1.21 KUA/I
WHITE OAK IGE QN: 2.2 KUA/I

## 2024-09-18 ENCOUNTER — TELEPHONE (OUTPATIENT)
Dept: HEMATOLOGY ONCOLOGY | Facility: CLINIC | Age: 69
End: 2024-09-18

## 2024-09-18 DIAGNOSIS — E83.42 HYPOMAGNESEMIA: Primary | ICD-10-CM

## 2024-09-18 LAB — TTG IGA SER-ACNC: <2 U/ML (ref 0–3)

## 2024-09-18 RX ORDER — MAGNESIUM CHLORIDE 71.5 G/G
2 TABLET ORAL DAILY
Qty: 60 TABLET | Refills: 11 | Status: SHIPPED | OUTPATIENT
Start: 2024-09-18

## 2024-09-18 NOTE — TELEPHONE ENCOUNTER
Phoned pt using [employer]  Wendi # [phone] and she was unable to leave a message as pt's voice mail was full. I phoned pt's dtr Chetna at [phone] and left a voice message that unable to contact her mother and that her mom could benefit from some IV replacement mag at [facility].

## 2024-09-18 NOTE — TELEPHONE ENCOUNTER
Phoned pt's dtr chetna to let her know that pt needs I  V mag replacement 4 grams over 2 hrs at  inf. Chetna asked that inf  phone her after 3 so she can get pt scheduled as chetna is the transportation for pt. Also sent in Rx for oral mag to pt's pharmacy       ----- Message from Alon Prater MD sent at 9/16/2024 12:41 PM EDT -----  Magnesium 1.3.  ----- Message -----  From: Lab, Background User  Sent: 9/16/2024   8:27 AM EDT  To: Alon Prater MD

## 2024-09-20 ENCOUNTER — TELEPHONE (OUTPATIENT)
Dept: ENDOCRINOLOGY | Facility: CLINIC | Age: 69
End: 2024-09-20

## 2024-09-23 ENCOUNTER — OFFICE VISIT (OUTPATIENT)
Dept: HEMATOLOGY ONCOLOGY | Facility: CLINIC | Age: 69
End: 2024-09-23
Payer: COMMERCIAL

## 2024-09-23 ENCOUNTER — HOSPITAL ENCOUNTER (OUTPATIENT)
Dept: INFUSION CENTER | Facility: HOSPITAL | Age: 69
Discharge: HOME | End: 2024-09-23
Attending: INTERNAL MEDICINE
Payer: COMMERCIAL

## 2024-09-23 ENCOUNTER — APPOINTMENT (OUTPATIENT)
Dept: LAB | Facility: HOSPITAL | Age: 69
End: 2024-09-23
Payer: COMMERCIAL

## 2024-09-23 VITALS
TEMPERATURE: 97.6 F | RESPIRATION RATE: 20 BRPM | SYSTOLIC BLOOD PRESSURE: 116 MMHG | HEART RATE: 85 BPM | DIASTOLIC BLOOD PRESSURE: 69 MMHG

## 2024-09-23 VITALS
DIASTOLIC BLOOD PRESSURE: 74 MMHG | SYSTOLIC BLOOD PRESSURE: 122 MMHG | BODY MASS INDEX: 28.66 KG/M2 | HEART RATE: 85 BPM | OXYGEN SATURATION: 97 % | RESPIRATION RATE: 18 BRPM | HEIGHT: 65 IN | WEIGHT: 172 LBS | TEMPERATURE: 97.2 F

## 2024-09-23 DIAGNOSIS — N64.4 BREAST PAIN IN FEMALE: Primary | ICD-10-CM

## 2024-09-23 DIAGNOSIS — C50.411 MALIGNANT NEOPLASM OF UPPER-OUTER QUADRANT OF RIGHT BREAST IN FEMALE, ESTROGEN RECEPTOR POSITIVE (HCC): ICD-10-CM

## 2024-09-23 DIAGNOSIS — R19.7 DIARRHEA, UNSPECIFIED TYPE: ICD-10-CM

## 2024-09-23 DIAGNOSIS — Z17.0 MALIGNANT NEOPLASM OF UPPER-OUTER QUADRANT OF RIGHT BREAST IN FEMALE, ESTROGEN RECEPTOR POSITIVE (HCC): ICD-10-CM

## 2024-09-23 DIAGNOSIS — Z79.811 USE OF ANASTROZOLE (ARIMIDEX): ICD-10-CM

## 2024-09-23 DIAGNOSIS — E83.42 HYPOMAGNESEMIA: Primary | ICD-10-CM

## 2024-09-23 PROCEDURE — 99215 OFFICE O/P EST HI 40 MIN: CPT | Performed by: PHYSICIAN ASSISTANT

## 2024-09-23 PROCEDURE — G2211 COMPLEX E/M VISIT ADD ON: HCPCS | Performed by: PHYSICIAN ASSISTANT

## 2024-09-23 PROCEDURE — 96366 THER/PROPH/DIAG IV INF ADDON: CPT

## 2024-09-23 PROCEDURE — 87206 SMEAR FLUORESCENT/ACID STAI: CPT

## 2024-09-23 PROCEDURE — 87177 OVA AND PARASITES SMEARS: CPT

## 2024-09-23 PROCEDURE — 82653 EL-1 FECAL QUANTITATIVE: CPT

## 2024-09-23 PROCEDURE — 87506 IADNA-DNA/RNA PROBE TQ 6-11: CPT

## 2024-09-23 PROCEDURE — 96365 THER/PROPH/DIAG IV INF INIT: CPT

## 2024-09-23 PROCEDURE — 87015 SPECIMEN INFECT AGNT CONCNTJ: CPT

## 2024-09-23 PROCEDURE — 87209 SMEAR COMPLEX STAIN: CPT

## 2024-09-23 RX ORDER — ANASTROZOLE 1 MG/1
1 TABLET ORAL DAILY
Qty: 90 TABLET | Refills: 3 | Status: SHIPPED | OUTPATIENT
Start: 2024-09-23

## 2024-09-23 RX ADMIN — MAGNESIUM SULFATE HEPTAHYDRATE: 500 INJECTION, SOLUTION INTRAMUSCULAR; INTRAVENOUS at 09:28

## 2024-09-23 NOTE — PROGRESS NOTES
Jolie CruzCintron  tolerated infusion well with no complications. AVS declined. Patient left clinic ambulatory.

## 2024-09-23 NOTE — PATIENT INSTRUCTIONS
[facility] Medical Oncology and Hematology Team  [place] - [phone]    Your Team Member:  Advanced Practitioner:  Shaylee Clarke PA-C    Please answer Private and Unavailable Calls - this may be your team(s) contacting you.  If you have medical questions/concerns/issues - contact us either by (1) My Chart (2) [facility]

## 2024-09-23 NOTE — PROGRESS NOTES
[address]  [facility] [place]  [address]  [facility] [id number]  Oncology Progress Note  Jolie Mulligan, 1955, [id number]  9/23/2024        Assessment/Plan:   1. Breast pain in female  Patient has some significant pain along the scarring from lumpectomy on her right breast.  Patient did have some sensitivity upon palpation today.  No palpable masses found.  Patient is overdue for subsequent mammogram.  I have recommended that she follow-up with this.    I also educated the patient that sometimes after surgical interventions, discomfort along the scar line is not uncommon.  It can be something that self resolves or can persist over time.  I believe this to be the case with the origin of her breast pain.  Especially with this deep ache.  Likely neurologic/surgical complication.  No specific recommendations given today beyond that of diagnostic mammogram.  In the future we will consider follow-up with surgical specialty if continues to be a problem.    - Mammo diagnostic bilateral w 3d and cad; Future    2. Malignant neoplasm of upper-outer quadrant of right breast in female, estrogen receptor positive (HCC); 3. Use of anastrozole (Arimidex)  Stage IA (pT1c, pN0, cM0, G2, ER: Positive, IA: Positive, HER2: Positive)     This is a 68-year-old female who presents with history of stage Ia ER/IA positive, HER2 positive breast cancer status post lumpectomy, radiation, chemotherapy and adjuvant HER2 directed therapies as well as estrogen directed therapies.  Patient is presently on Arimidex 1 mg but tells me that she has stopped the medication over the past 3 months.  This was due to concern for rash on her scalp which continues to persist and worsen.  It is unlikely connected to the Arimidex.  Patient's DEXA scan is up-to-date.  I encouraged the patient to continue on Arimidex for the total of 7 years.  Patient is unlikely to consider a longer therapy after the 7 years  "if further research demonstrates a benefit, further discussion with  will be needed.    Patient agreed to continue on Arimidex for the time being refill sent to the pharmacy.    Regimen:  Anastrozole 1 mg p.o. daily    Patient was extensively counseled with the help of the  about the benefits of staying on prolonged anastrozole therapy.  Patient and her daughter voiced understanding.    - anastrozole (ARIMIDEX) 1 mg tablet; Take 1 tablet (1 mg total) by mouth daily  Dispense: 90 tablet; Refill: 3  - Mammo diagnostic bilateral w 3d and cad; Future    The patient is scheduled for follow-up in approximately 6 months.     Patient voiced agreement and understanding to the above.   Patient knows to call the Hematology/Oncology office with any questions and concerns regarding the above.    Goals and Barriers:    Current Goal:   Prolong Survival from Cancer.     Barriers: None.      Patient's Capacity to Self Care:  Patient able to self care.    Shaylee Clarke PA-C  Medical Oncology/Hematology  [facility]  ______________________________________________________________________________________________________________    Subjective     Chief Complaint   Patient presents with    Follow-up       History of present illness/Cancer History:   Oncology History   Malignant neoplasm of right female breast (HCC)   Malignant neoplasm of upper-outer quadrant of right breast in female, estrogen receptor positive (HCC)   5/23/2018 Initial Diagnosis    5/15/2018: Breast pain send to dx mammo    \"Targeted sonographic evaluation of the area of pain in the left   breast 12 to 3 o'clock position left breast area of pain   demonstrates a prominent island of fibroglandular tissue without   solid mass or abnormal shadowing.\"    ACR BI-RADS® Assessments: BiRad:4 - Suspicious (Overall)  Diag Mammogram: BiRad:4 - suspicious abnormality in the right   breast.  Left breast Left Brst US: Left breast is " BiRad:1 - negative.        5/23/2018 Biopsy    Right breast core biopsy:  DCIS, micropapillary type, low-intermediate nuclear grade  ER 90-95% positive,  KS 75-80% positive       6/26/2018 Surgery    RIGHT BREAST NEEDLE LOCALIZATION X2 WITH RIGHT BREAST LUMPECTOMY ( NEEDLE LOC @ 1000) (Right)   ONCOPLASTIC CLOSURE OF LUMPECTOMY CAVITY    Invasive breast carcinoma, NST (aka ductal)  * [place] grade 2 of 3 (total score = 2+2+2 = 6 of 9)   -- tubule formation < 10%, score 3   -- nuclear grade 2 of 3, score 2   -- mitoses ~ 4/mm2, score 2.   * invasive carcinoma is multifocally present (A23-1, A32-1, A84-1, A89-1, A100-1), largest focus is 14 millimeters in greatest dimension (A89-1, this focus is graded).   * superior lumpectomy margin (orange-inked) is POSITIVE for invasive carcinoma (A84-1)   * all other lumpectomy margins are free of invasive carcinoma.   * estrogen, progesterone & Her-2/negrita receptor studies are undertaken, to be described in an addendum report.  - Ductal carcinoma in-situ (DCIS): Present as an extensive component (~85% of total tumor).   * DCIS is co-located with invasive carcinoma surrounding prior needle biopsy site.   * DCIS spans 2.6 cm maximal dimension (A62-1) and is present on 27 of 136 total slides examined.   * DCIS has cribriform & micropapillary patterns, nuclear grade 2 of 3, with central comedo-type necrosis.   * DCIS is present within 0.2 millimeters of the superior lumpectomy margin (orange-inked, A26-1)   * DCIS is present within 0.2 millimeters of the medial lumpectomy margin (yellow-inked, A3-1)   * all other lumpectomy margins are free of DCIS by > 2 millimeters.  - Lymph-vascular invasion: no lymph-vascular invasion is unequivocally identified.  - Microcalcifications: present in DCIS.    % positive, ER 45-55% positive, HER2 by IHC 3+ positive    - Dr Barragan       8/2/2018 Surgery    Right breast lumpectomy reexcision, right axilla SLN biopsy:  A.  Submitted as “right  axillary sentinel node”:  - Seven lymph nodes are identified showing no metastatic tumor.     B. Right breast lumpectomy reexcision:  - Abundant reactive changes are seen around the previous lumpectomy cavity.  - No residual atypia or malignancy is seen.          8/2/2018 -  Cancer Staged    Stage IA - pT1c, pN0, G2.   Cancer Staging   Malignant neoplasm of upper-outer quadrant of right breast in female, estrogen receptor positive   Staging form: Breast, AJCC 8th Edition  - Clinical: No stage assigned - Unsigned  - Pathologic: Stage IA (pT1c, pN0, cM0, G2, ER: Positive, MI: Positive, HER2: Positive) - Signed by Nunu Acosta MD on 8/21/2018  Histologic grading system: 3 grade system  Laterality: Right       9/25/2018 - 8/6/2019 Chemotherapy    Weekly Paclitaxol and trastuzumab x12, until December 11, 2018 followed by trastuzumab monotherapy 6 milligram/kilogram every 3 weeks    - Dr Savage       12/17/2018 - 8/26/2019 Chemotherapy    trastuzumab (HERCEPTIN) 579 mg in sodium chloride 0.9 % 250 mL chemo infusion, 8 mg/kg, Intravenous, Once, 12 of 12 cycles  Administration: 434 mg (5/14/2019), 434 mg (6/4/2019), 450 mg (7/16/2019), 450 mg (8/6/2019), 450 mg (6/25/2019)     1/9/2019 - 2/19/2019 Radiation    Plan ID Energy Fractions Dose per Fraction (cGy) Dose Correction (cGy) Total Dose Delivered (cGy) Elapsed Days   R Boost e 16E 5 / 5 200 0 1,000 6   Right Breast 6X 25 / 25 200 0 5,000 34     - Dr cAosta       2/19/2019 -  Hormone Therapy    Anastrozole (Arimidex) 1 mg PO Daily          Lab Results   Component Value Date    WBC 7.02 09/16/2024    HGB 12.4 09/16/2024    HCT 38.2 09/16/2024    MCV 87 09/16/2024     09/16/2024     Lab Results   Component Value Date    SODIUM 138 09/16/2024    K 3.9 09/16/2024     09/16/2024    CO2 30 09/16/2024    AGAP 7 09/16/2024    BUN 25 09/16/2024    CREATININE 0.64 09/16/2024    GLUC 176 (H) 08/28/2024    GLUF 223 (H) 09/16/2024    CALCIUM 10.0 09/16/2024    AST 14  09/16/2024    ALT 14 09/16/2024    ALKPHOS 69 09/16/2024    TP 7.4 09/16/2024    TBILI 0.58 09/16/2024    EGFR 92 09/16/2024       Interval history: Patient states that she is feeling okay.  Patient has been depressed secondary to other medical issues that she is dealing with.  Patient discontinued Arimidex as she thought this might have been complicating things however, the things included weight gain, scalp lesion have continued to worsen despite coming off of the medication.     Review of Systems   Constitutional:  Negative for appetite change, fatigue, fever and unexpected weight change.   HENT:  Negative for nosebleeds.    Respiratory:  Negative for cough, choking and shortness of breath.         Negative hemoptysis.   Cardiovascular:  Negative for chest pain, palpitations and leg swelling.   Gastrointestinal: Negative.  Negative for abdominal distention, abdominal pain, anal bleeding, blood in stool, constipation, diarrhea, nausea and vomiting.   Endocrine: Negative.  Negative for cold intolerance.   Genitourinary: Negative.  Negative for hematuria, menstrual problem, vaginal bleeding, vaginal discharge and vaginal pain.   Musculoskeletal: Negative.  Negative for arthralgias, myalgias, neck pain and neck stiffness.   Skin: Negative.  Negative for color change, pallor and rash.   Allergic/Immunologic: Negative.  Negative for immunocompromised state.   Neurological: Negative.  Negative for weakness and headaches.   Hematological:  Negative for adenopathy. Does not bruise/bleed easily.   All other systems reviewed and are negative.      Current Outpatient Medications:     acetaminophen (TYLENOL) 500 mg tablet, Take 1 tablet (500 mg total) by mouth every 6 (six) hours as needed for fever, Disp: 30 tablet, Rfl: 0    albuterol (2.5 mg/3 mL) 0.083 % nebulizer solution, Take 3 mL (2.5 mg total) by nebulization every 6 (six) hours as needed for wheezing or shortness of breath, Disp: 75 mL, Rfl: 5    albuterol (2.5 mg/3  mL) 0.083 % nebulizer solution, Take 3 mL (2.5 mg total) by nebulization every 6 (six) hours as needed for wheezing or shortness of breath, Disp: 75 mL, Rfl: 0    albuterol (Proventil HFA) 90 mcg/act inhaler, Inhale 1-2 puffs every 6 (six) hours as needed for wheezing or shortness of breath, Disp: 8 g, Rfl: 0    albuterol (PROVENTIL HFA,VENTOLIN HFA) 90 mcg/act inhaler, Inhale 1 puff every 4 (four) hours as needed for wheezing, Disp: 8.5 g, Rfl: 2    Alcohol Swabs (Pharmacist Choice Alcohol) PADS, CLEAN AREA BEFORE TESTING OR INJECTING FOUR TIMES DAILY, Disp: 300 each, Rfl: 2    anastrozole (ARIMIDEX) 1 mg tablet, Take 1 tablet (1 mg total) by mouth daily, Disp: 90 tablet, Rfl: 3    aspirin 81 mg chewable tablet, Chew 81 mg daily, Disp: , Rfl:     atorvastatin (LIPITOR) 40 mg tablet, Take 1 tablet (40 mg total) by mouth daily, Disp: 90 tablet, Rfl: 2    benzonatate (TESSALON PERLES) 100 mg capsule, Take 1 capsule (100 mg total) by mouth 3 (three) times a day as needed for cough, Disp: 21 capsule, Rfl: 0    calcium polycarbophil (FIBERCON) 625 mg tablet, Take 1 tablet (625 mg total) by mouth daily, Disp: 30 tablet, Rfl: 5    Comfort EZ Pen Needles 32G X 4 MM MISC, USE AS DIRECTED FOUR TIMES DAILY, Disp: 300 each, Rfl: 2    Continuous Glucose  (FreeStyle Broderick Stanton) [facility], Use 1 Units continuous, Disp: 1 each, Rfl: 0    Continuous Glucose Sensor (FreeStyle Broderick 3 Sensor) MISC, Use 1 Units every 14 (fourteen) days, Disp: 2 each, Rfl: 5    Diclofenac Sodium (VOLTAREN) 1 %, Apply 2 g topically 2 (two) times a day, Disp: 150 g, Rfl: 0    fluticasone (FLONASE) 50 mcg/act nasal spray, , Disp: , Rfl:     fluticasone-salmeterol (Advair HFA) 230-21 MCG/ACT inhaler, Inhale 2 puffs 2 (two) times a day Rinse mouth after use., Disp: 12 g, Rfl: 2    guaiFENesin (MUCINEX) 600 mg 12 hr tablet, Take 1 tablet (600 mg total) by mouth every 12 (twelve) hours as needed for congestion, Disp: 14 tablet, Rfl: 0    insulin degludec  (Tresiba FlexTouch) 200 units/mL CONCENTRATED U-200 injection pen, INJECT 60 UNITS UNDER THE SKIN DAILY AT 5PM, Disp: 27 mL, Rfl: 2    insulin lispro (HumaLOG) 100 units/mL injection pen, INJECT 24 UNITS BEFORE EACH MEAL PLUS SLIDING SCALE 150-200: 2 UNITS 201-250: 4 UNITS 251-300: 6 UNITS 301*, Disp: 90 mL, Rfl: 1    latanoprost (XALATAN) 0.005 % ophthalmic solution, , Disp: , Rfl:     loratadine (CLARITIN) 10 mg tablet, TAKE 1 TABLET (10 MG TOTAL) BY MOUTH DAILY, Disp: 90 tablet, Rfl: 2    losartan (COZAAR) 100 MG tablet, Take 1 tablet (100 mg total) by mouth daily, Disp: 90 tablet, Rfl: 1    Mag-G 500 (27 Mg) MG tablet, TAKE 1 TABLET (500 MG TOTAL) BY MOUTH DAILY, Disp: 90 tablet, Rfl: 1    magnesium chloride-calcium (Slow-Mag) 71.5-119 mg, Take 2 tablets by mouth daily, Disp: 60 tablet, Rfl: 11    methocarbamol (ROBAXIN) 500 mg tablet, , Disp: , Rfl:     metoprolol tartrate (LOPRESSOR) 25 mg tablet, TAKE 0.5 TABLETS (12.5 MG TOTAL) BY MOUTH EVERY 12 (TWELVE) HOURS, Disp: 90 tablet, Rfl: 0    omeprazole (PriLOSEC) 20 mg delayed release capsule, TAKE 1 CAPSULE (20 MG TOTAL) BY MOUTH DAILY, Disp: 90 capsule, Rfl: 1    OneTouch Delica Lancets 33G MISC, USE 3 (THREE) TIMES A DAY, Disp: 300 each, Rfl: 3    OneTouch Ultra test strip, USE 1 EACH 3 (THREE) TIMES A DAY USE AS INSTRUCTED, Disp: 300 each, Rfl: 2    tiotropium (Spiriva Respimat) 2.5 MCG/ACT AERS inhaler, Inhale 2 puffs daily, Disp: 4 g, Rfl: 3    tirzepatide (Mounjaro) 2.5 MG/0.5ML, INJECT 0.5 ML (2.5 MG TOTAL) UNDER THE SKIN ONCE A WEEK, Disp: 2 mL, Rfl: 2    zileuton 600 MG, TAKE 1 TABLET (600 MG TOTAL) BY MOUTH 2 (TWO) TIMES A DAY, Disp: 60 tablet, Rfl: 2    acetaminophen (TYLENOL) 650 mg CR tablet, Take 1 tablet (650 mg total) by mouth every 8 (eight) hours as needed for mild pain (Patient not taking: Reported on 8/28/2024), Disp: 30 tablet, Rfl: 0    EPINEPHrine (Auvi-Q) 0.1 mg/0.1 mL SOAJ, Inject 0.3 mL (0.3 mg total) into a muscle once for 1 dose  "(Patient not taking: Reported on 2/6/2023), Disp: 2 each, Rfl: 5    ergocalciferol (VITAMIN D2) 50,000 units, TAKE 1 CAPSULE (50,000 UNITS TOTAL) BY MOUTH ONCE A WEEK (Patient not taking: Reported on 8/28/2024), Disp: 12 capsule, Rfl: 0    furosemide (LASIX) 20 mg tablet, TAKE 1 TABLET (20 MG TOTAL) BY MOUTH 2 (TWO) TIMES A DAY (Patient not taking: Reported on 8/28/2024), Disp: 180 tablet, Rfl: 0    ketotifen (ZADITOR) 0.025 % ophthalmic solution, Administer 1 drop to both eyes 2 (two) times a day as needed (itchy eyes) (Patient not taking: Reported on 6/24/2024), Disp: 5 mL, Rfl: 0    polyethylene glycol (MIRALAX) 17 g packet, Take 17 g by mouth daily for 14 days, Disp: 28 each, Rfl: 5  No current facility-administered medications for this visit.    Facility-Administered Medications Ordered in Other Visits:     alteplase (CATHFLO) injection 2 mg, 2 mg, Intracatheter, Q1MIN PRN, Alon Prater MD  Allergies   Allergen Reactions    Codeine Other (See Comments)     unknown    Latex Other (See Comments)     fungus       Advance Directive and Living Will:            Objective   /74 (BP Location: Right arm, Patient Position: Sitting, Cuff Size: Adult)   Pulse 85   Temp (!) 97.2 °F (36.2 °C)   Resp 18   Ht 5' 5\" (1.651 m)   Wt 78 kg (172 lb)   SpO2 97%   BMI 28.62 kg/m²   Wt Readings from Last 6 Encounters:   09/23/24 78 kg (172 lb)   08/28/24 79.2 kg (174 lb 9.7 oz)   06/24/24 78.5 kg (173 lb)   06/05/24 81.2 kg (179 lb)   05/07/24 81.5 kg (179 lb 9.6 oz)   02/05/24 82.6 kg (182 lb)       Physical Exam  Chest:   Breasts:     Right: No mass.      Left: Normal. No inverted nipple or mass.       Lymphadenopathy:      Upper Body:      Right upper body: No supraclavicular or axillary adenopathy.      Left upper body: No supraclavicular or axillary adenopathy.         Pertinent Laboratory Results and Imaging Review:  Appointment on 09/16/2024   Component Date Value Ref Range Status    A.ALTERNATA 09/16/2024 <0.10  " 0.00 - 0.09 kUA/I Final    A.FUMIGATUS 09/16/2024 0.12 (H)  0.00 - 0.09 kUA/I Final    [place] Grass 09/16/2024 2.38 (H)  0.00 - 0.09 kUA/I Final    [place]  09/16/2024 2.10 (H)  0.00 - 0.09 kUA/I Final    Cat Epithellium-Dander 09/16/2024 2.42 (H)  0.00 - 0.09 kUA/I Final    C.HERBARUM 09/16/2024 <0.10  0.00 - 0.09 kUA/I Final    Cockroach 09/16/2024 4.64 (H)  0.00 - 0.09 kUA/I Final    Common Silver Birch 09/16/2024 1.26 (H)  0.00 - 0.09 kUA/I Final    [place] 09/16/2024 2.02 (H)  0.00 - 0.09 kUA/I Final    D. farinae 09/16/2024 92.20 (H)  0.00 - 0.09 kUA/I Final    D. pteronyssinus 09/16/2024 >100.00 (H)  0.00 - 0.09 kUA/I Final    Dog Dander 09/16/2024 >100.00 (H)  0.00 - 0.09 kUA/I Final    Elm IgE 09/16/2024 2.37 (H)  0.00 - 0.09 kUA/I Final    [place] Tree 09/16/2024 1.53 (H)  0.00 - 0.09 kUA/I Final    Mugwort 09/16/2024 1.59 (H)  0.00 - 0.09 kUA/I Final    [place] Tree 09/16/2024 1.21 (H)  0.00 - 0.09 kUA/I Final    Oak 09/16/2024 2.20 (H)  0.00 - 0.09 kUA/I Final    P.CHRYSOGENUM 09/16/2024 <0.10  0.00 - 0.09 kUA/I Final    Rough Pigweed  IgE 09/16/2024 1.67 (H)  0.00 - 0.09 kUA/I Final    Common Ragweed 09/16/2024 2.19 (H)  0.00 - 0.09 kUA/I Final    Sheep Ernest IgE 09/16/2024 2.15 (H)  0.00 - 0.09 kUA/I Final    [place] Tree 09/16/2024 1.99 (H)  0.00 - 0.09 kUA/I Final    [place] Grass 09/16/2024 2.09 (H)  0.00 - 0.09 kUA/I Final    [place] Tree 09/16/2024 2.95 (H)  0.00 - 0.09 kUA/I Final    [place] Tree 09/16/2024 2.22 (H)  0.00 - 0.09 kUA/I Final    IgE 09/16/2024 1,746 (H)  0 - 113 kU/l Final    MOUSE URINE 09/16/2024 0.39 (H)  0.00 - 0.09 kUA/I Final    Hemoglobin A1C 09/16/2024 11.3 (H)  Normal 4.0-5.6%; PreDiabetic 5.7-6.4%; Diabetic >=6.5%; Glycemic control for adults with diabetes <7.0% % Final    EAG 09/16/2024 278  mg/dl Final    Creatinine, Ur 09/16/2024 412.5  Reference range not established. mg/dL Final    Albumin,U,Random 09/16/2024 542.6 (H)  <20.0 mg/L Final    Albumin  Creat Ratio 09/16/2024 132 (H)  0 - 30 mg/g creatinine Final    Free T4 09/16/2024 0.91  0.61 - 1.12 ng/dL Final    Specimens with biotin concentrations > 10 ng/mL can lead to significant (> 10%) positive bias in result.    TSH 3RD GENERATON 09/16/2024 4.213  0.450 - 4.500 uIU/mL Final    The recommended reference ranges for TSH during pregnancy are as follows:   First trimester 0.100 to 2.500 uIU/mL   Second trimester  0.200 to 3.000 uIU/mL   Third trimester 0.300 to 3.000 uIU/m    Note: Normal ranges may not apply to patients who are transgender, non-binary, or whose legal sex, sex at birth, and gender identity differ.  Adult TSH (3rd generation) reference range follows the recommended guidelines of the American Thyroid Association, January, 2020.    WBC 09/16/2024 7.02  4.31 - 10.16 Thousand/uL Final    RBC 09/16/2024 4.39  3.81 - 5.12 Million/uL Final    Hemoglobin 09/16/2024 12.4  11.5 - 15.4 g/dL Final    Hematocrit 09/16/2024 38.2  34.8 - 46.1 % Final    MCV 09/16/2024 87  82 - 98 fL Final    MCH 09/16/2024 28.2  26.8 - 34.3 pg Final    MCHC 09/16/2024 32.5  31.4 - 37.4 g/dL Final    RDW 09/16/2024 13.9  11.6 - 15.1 % Final    MPV 09/16/2024 12.0  8.9 - 12.7 fL Final    Platelets 09/16/2024 371  149 - 390 Thousands/uL Final    nRBC 09/16/2024 0  /100 WBCs Final    Segmented % 09/16/2024 65  43 - 75 % Final    Immature Grans % 09/16/2024 0  0 - 2 % Final    Lymphocytes % 09/16/2024 27  14 - 44 % Final    Monocytes % 09/16/2024 5  4 - 12 % Final    Eosinophils Relative 09/16/2024 2  0 - 6 % Final    Basophils Relative 09/16/2024 1  0 - 1 % Final    Absolute Neutrophils 09/16/2024 4.52  1.85 - 7.62 Thousands/µL Final    Absolute Immature Grans 09/16/2024 0.02  0.00 - 0.20 Thousand/uL Final    Absolute Lymphocytes 09/16/2024 1.91  0.60 - 4.47 Thousands/µL Final    Absolute Monocytes 09/16/2024 0.36  0.17 - 1.22 Thousand/µL Final    Eosinophils Absolute 09/16/2024 0.12  0.00 - 0.61 Thousand/µL Final    Basophils  Absolute 09/16/2024 0.09  0.00 - 0.10 Thousands/µL Final    Sodium 09/16/2024 138  135 - 147 mmol/L Final    Potassium 09/16/2024 3.9  3.5 - 5.3 mmol/L Final    Chloride 09/16/2024 101  96 - 108 mmol/L Final    CO2 09/16/2024 30  21 - 32 mmol/L Final    ANION GAP 09/16/2024 7  4 - 13 mmol/L Final    BUN 09/16/2024 25  5 - 25 mg/dL Final    Creatinine 09/16/2024 0.64  0.60 - 1.30 mg/dL Final    Standardized to IDMS reference method    Glucose, Fasting 09/16/2024 223 (H)  65 - 99 mg/dL Final    Calcium 09/16/2024 10.0  8.4 - 10.2 mg/dL Final    AST 09/16/2024 14  13 - 39 U/L Final    ALT 09/16/2024 14  7 - 52 U/L Final    Specimen collection should occur prior to Sulfasalazine administration due to the potential for falsely depressed results.     Alkaline Phosphatase 09/16/2024 69  34 - 104 U/L Final    Total Protein 09/16/2024 7.4  6.4 - 8.4 g/dL Final    Albumin 09/16/2024 3.9  3.5 - 5.0 g/dL Final    Total Bilirubin 09/16/2024 0.58  0.20 - 1.00 mg/dL Final    Use of this assay is not recommended for patients undergoing treatment with eltrombopag due to the potential for falsely elevated results.  N-acetyl-p-benzoquinone imine (metabolite of Acetaminophen) will generate erroneously low results in samples for patients that have taken an overdose of Acetaminophen.    eGFR 09/16/2024 92  ml/min/1.73sq m Final    Magnesium 09/16/2024 1.3 (L)  1.9 - 2.7 mg/dL Final    Vitamin B-12 09/16/2024 524  180 - 914 pg/mL Final    Sed Rate 09/16/2024 40 (H)  0 - 29 mm/hour Final    LD 09/16/2024 133 (L)  140 - 271 U/L Final    CRP 09/16/2024 5.3 (H)  <3.0 mg/L Final    Hemolysis Smear 09/16/2024 No Schistocytes or Helmet Cells noted   Final    Haptoglobin 09/16/2024 182  37 - 355 mg/dL Final    Folate 09/16/2024 >22.3  >5.9 ng/mL Final    The World Health Organization has determined deficient folate concentrations are considered to be <4.0 ng/mL.    DIRECT ADITI 09/16/2024 Negative   Final    TISSUE TRANSGLUTAMINASE IGA  09/16/2024 <2  0 - 3 U/mL Final                                  Negative        0 -  3                                Weak Positive   4 - 10                                Positive           >10   Tissue Transglutaminase (tTG) has been identified   as the endomysial antigen.  Studies have demonstr-   ated that endomysial IgA antibodies have over 99%   specificity for gluten sensitive enteropathy.    IGA 09/16/2024 578 (H)  66 - 433 mg/dL Final    Iron Saturation 09/16/2024 19  15 - 50 % Final    TIBC 09/16/2024 340  250 - 450 ug/dL Final    Iron 09/16/2024 64  50 - 212 ug/dL Final    Patients treated with metal-binding drugs (ie. Deferoxamine) may have depressed iron values.    UIBC 09/16/2024 276  155 - 355 ug/dL Final    Ferritin 09/16/2024 43  11 - 307 ng/mL Final         The following historical data was reviewed:  Past Medical History:   Diagnosis Date    Abdominal infection (HCC)     h-pylori    Abnormal chest x-ray 04/05/2019    Asthma     Breast cancer (HCC) 06/26/2018    Cancer (HCC)     BREAST    Depression, recurrent (HCC) 06/13/2022    Diabetes mellitus (HCC)     Hiatal hernia     Hiatal hernia 08/08/2018    History of chemotherapy 2018    History of radiation therapy 2018    Hypercholesterolemia     Hypertension     Hypothyroid     Renal disorder     Rheumatoid arthritis (HCC)      Past Surgical History:   Procedure Laterality Date    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    BREAST LUMPECTOMY Right 6/26/2018    Procedure: RIGHT BREAST NEEDLE LOCALIZATION X2 WITH RIGHT BREAST LUMPECTOMY ( NEEDLE LOC @ 1000);  Surgeon: Familia Barragan MD;  Location: [facility];  Service: Surgical Oncology    BREAST LUMPECTOMY Right 8/2/2018    Procedure: LYMPHOSCINITGRAPHY INTRAOPERATIVE LYMPHATIC MAPPING , RIGHT SENTINEL NODE BIOPSY, REEXCISION  RIGHT BREAST LUMPECTOMY CAVITY (SUPERIOR MARGIN);  Surgeon: Familia Barragan MD;  Location: [facility];  Service: Surgical Oncology    BREAST SURGERY Left     CATARACT  EXTRACTION, BILATERAL Bilateral     COLONOSCOPY      EGD AND COLONOSCOPY N/A 9/5/2018    Procedure: EGD AND COLONOSCOPY;  Surgeon: José Miguel Rosas MD;  Location: [facility];  Service: Gastroenterology    FL GUIDED CENTRAL VENOUS ACCESS DEVICE INSERTION  9/13/2018    KNEE SURGERY Right     MAMMO NEEDLE LOCALIZATION RIGHT (ALL INC) Right 6/26/2018    MAMMO STEREOTACTIC BREAST BIOPSY RIGHT (ALL INC) Right 5/23/2018    RETINAL DETACHMENT SURGERY Right     TUBAL LIGATION      TUNNELED VENOUS PORT PLACEMENT Left 9/13/2018    Procedure: INSERTION VENOUS PORT (PORT-A-CATH);  Surgeon: Familia Barragan MD;  Location: [facility];  Service: Surgical Oncology    UPPER GASTROINTESTINAL ENDOSCOPY       Social History     Socioeconomic History    Marital status:      Spouse name: None    Number of children: None    Years of education: None    Highest education level: None   Occupational History    None   Tobacco Use    Smoking status: Never    Smokeless tobacco: Never   Vaping Use    Vaping status: Never Used   Substance and Sexual Activity    Alcohol use: No    Drug use: No    Sexual activity: Not Currently   Other Topics Concern    None   Social History Narrative    None     Social Determinants of Health     Financial Resource Strain: Medium Risk (6/2/2024)    Received from [facility]    Overall Financial Resource Strain (CARDIA)     Difficulty of Paying Living Expenses: Somewhat hard   Food Insecurity: No Food Insecurity (6/2/2024)    Received from [facility]    Hunger Vital Sign     Worried About Running Out of Food in the Last Year: Never true     Ran Out of Food in the Last Year: Never true   Transportation Needs: No Transportation Needs (6/2/2024)    Received from [facility]    PRAPARE - Transportation     Lack of Transportation (Medical): No     Lack of Transportation (Non-Medical): No   Physical Activity: Not on file   Stress: Not on file   Social Connections:  Not on file   Intimate Partner Violence: Not At Risk (6/2/2024)    Received from [facility]    Humiliation, Afraid, Rape, and Kick questionnaire     Fear of Current or Ex-Partner: No     Emotionally Abused: No     Physically Abused: No     Sexually Abused: No   Housing Stability: Low Risk  (10/27/2021)    Housing Stability Vital Sign     Unable to Pay for Housing in the Last Year: No     Number of Places Lived in the Last Year: 1     Unstable Housing in the Last Year: No     Family History   Problem Relation Age of Onset    Diabetes Mother     Hypertension Mother     Diabetes Father     Hypertension Father     No Known Problems Sister     No Known Problems Sister     No Known Problems Sister     No Known Problems Sister     No Known Problems Sister     Diabetes Brother     Diabetes Brother     Kidney failure Brother     Diabetes Brother     Cancer Maternal Grandfather     No Known Problems Daughter     No Known Problems Daughter     No Known Problems Daughter        Please note:  This report has been generated by a voice recognition software system. Therefore there may be syntax, spelling, and/or grammatical errors. Please call if you have any questions.

## 2024-09-24 LAB
C COLI+JEJUNI TUF STL QL NAA+PROBE: NEGATIVE
C DIFF TOX GENS STL QL NAA+PROBE: NEGATIVE
EC STX1+STX2 GENES STL QL NAA+PROBE: NEGATIVE
ELASTASE PANC STL-MCNT: 233 UG/G
G LAMBLIA AG STL QL IA: NEGATIVE
SALMONELLA SP SPAO STL QL NAA+PROBE: NEGATIVE
SHIGELLA SP+EIEC IPAH STL QL NAA+PROBE: NEGATIVE

## 2024-09-25 LAB
CRYPTOSP STL QL ACID FAST STN: NORMAL
I BELLI SPEC QL ACID FAST MOD KINY STN: NORMAL

## 2024-10-14 ENCOUNTER — HOSPITAL ENCOUNTER (EMERGENCY)
Facility: HOSPITAL | Age: 69
Discharge: HOME | End: 2024-10-14
Attending: EMERGENCY MEDICINE
Payer: COMMERCIAL

## 2024-10-14 ENCOUNTER — APPOINTMENT (EMERGENCY)
Dept: RADIOLOGY | Facility: HOSPITAL | Age: 69
End: 2024-10-14
Payer: COMMERCIAL

## 2024-10-14 VITALS
TEMPERATURE: 97.8 F | DIASTOLIC BLOOD PRESSURE: 73 MMHG | SYSTOLIC BLOOD PRESSURE: 158 MMHG | WEIGHT: 168.65 LBS | OXYGEN SATURATION: 99 % | RESPIRATION RATE: 16 BRPM | BODY MASS INDEX: 28.07 KG/M2 | HEART RATE: 90 BPM

## 2024-10-14 DIAGNOSIS — J45.901 ASTHMA EXACERBATION: Primary | ICD-10-CM

## 2024-10-14 DIAGNOSIS — E11.65 HYPERGLYCEMIA DUE TO DIABETES MELLITUS (HCC): ICD-10-CM

## 2024-10-14 DIAGNOSIS — J06.9 URI (UPPER RESPIRATORY INFECTION): ICD-10-CM

## 2024-10-14 LAB
ANION GAP SERPL CALCULATED.3IONS-SCNC: 7 MMOL/L (ref 4–13)
BASOPHILS # BLD AUTO: 0.09 THOUSANDS/ΜL (ref 0–0.1)
BASOPHILS NFR BLD AUTO: 1 % (ref 0–1)
BUN SERPL-MCNC: 20 MG/DL (ref 5–25)
CALCIUM SERPL-MCNC: 9.4 MG/DL (ref 8.4–10.2)
CHLORIDE SERPL-SCNC: 97 MMOL/L (ref 96–108)
CO2 SERPL-SCNC: 30 MMOL/L (ref 21–32)
CREAT SERPL-MCNC: 0.73 MG/DL (ref 0.6–1.3)
EOSINOPHIL # BLD AUTO: 0.4 THOUSAND/ΜL (ref 0–0.61)
EOSINOPHIL NFR BLD AUTO: 6 % (ref 0–6)
ERYTHROCYTE [DISTWIDTH] IN BLOOD BY AUTOMATED COUNT: 13.5 % (ref 11.6–15.1)
GFR SERPL CREATININE-BSD FRML MDRD: 85 ML/MIN/1.73SQ M
GLUCOSE SERPL-MCNC: 330 MG/DL (ref 65–140)
GLUCOSE SERPL-MCNC: 416 MG/DL (ref 65–140)
HCT VFR BLD AUTO: 35.9 % (ref 34.8–46.1)
HGB BLD-MCNC: 11.8 G/DL (ref 11.5–15.4)
IMM GRANULOCYTES # BLD AUTO: 0.03 THOUSAND/UL (ref 0–0.2)
IMM GRANULOCYTES NFR BLD AUTO: 0 % (ref 0–2)
LYMPHOCYTES # BLD AUTO: 1.88 THOUSANDS/ΜL (ref 0.6–4.47)
LYMPHOCYTES NFR BLD AUTO: 26 % (ref 14–44)
MCH RBC QN AUTO: 28.6 PG (ref 26.8–34.3)
MCHC RBC AUTO-ENTMCNC: 32.9 G/DL (ref 31.4–37.4)
MCV RBC AUTO: 87 FL (ref 82–98)
MONOCYTES # BLD AUTO: 0.37 THOUSAND/ΜL (ref 0.17–1.22)
MONOCYTES NFR BLD AUTO: 5 % (ref 4–12)
NEUTROPHILS # BLD AUTO: 4.49 THOUSANDS/ΜL (ref 1.85–7.62)
NEUTS SEG NFR BLD AUTO: 62 % (ref 43–75)
NRBC BLD AUTO-RTO: 0 /100 WBCS
PLATELET # BLD AUTO: 429 THOUSANDS/UL (ref 149–390)
PMV BLD AUTO: 10.9 FL (ref 8.9–12.7)
POTASSIUM SERPL-SCNC: 4.7 MMOL/L (ref 3.5–5.3)
RBC # BLD AUTO: 4.12 MILLION/UL (ref 3.81–5.12)
SODIUM SERPL-SCNC: 134 MMOL/L (ref 135–147)
WBC # BLD AUTO: 7.26 THOUSAND/UL (ref 4.31–10.16)

## 2024-10-14 PROCEDURE — 82948 REAGENT STRIP/BLOOD GLUCOSE: CPT

## 2024-10-14 PROCEDURE — 94640 AIRWAY INHALATION TREATMENT: CPT

## 2024-10-14 PROCEDURE — 94760 N-INVAS EAR/PLS OXIMETRY 1: CPT

## 2024-10-14 PROCEDURE — 80048 BASIC METABOLIC PNL TOTAL CA: CPT | Performed by: EMERGENCY MEDICINE

## 2024-10-14 PROCEDURE — 96372 THER/PROPH/DIAG INJ SC/IM: CPT

## 2024-10-14 PROCEDURE — 85025 COMPLETE CBC W/AUTO DIFF WBC: CPT | Performed by: EMERGENCY MEDICINE

## 2024-10-14 PROCEDURE — 94644 CONT INHLJ TX 1ST HOUR: CPT

## 2024-10-14 PROCEDURE — 36415 COLL VENOUS BLD VENIPUNCTURE: CPT | Performed by: EMERGENCY MEDICINE

## 2024-10-14 PROCEDURE — 96374 THER/PROPH/DIAG INJ IV PUSH: CPT

## 2024-10-14 PROCEDURE — 99284 EMERGENCY DEPT VISIT MOD MDM: CPT

## 2024-10-14 PROCEDURE — 99291 CRITICAL CARE FIRST HOUR: CPT | Performed by: EMERGENCY MEDICINE

## 2024-10-14 PROCEDURE — 71046 X-RAY EXAM CHEST 2 VIEWS: CPT

## 2024-10-14 RX ORDER — INSULIN LISPRO 100 [IU]/ML
30 INJECTION, SOLUTION INTRAVENOUS; SUBCUTANEOUS ONCE
Status: COMPLETED | OUTPATIENT
Start: 2024-10-14 | End: 2024-10-14

## 2024-10-14 RX ORDER — SODIUM CHLORIDE FOR INHALATION 0.9 %
12 VIAL, NEBULIZER (ML) INHALATION ONCE
Status: COMPLETED | OUTPATIENT
Start: 2024-10-14 | End: 2024-10-14

## 2024-10-14 RX ORDER — METHYLPREDNISOLONE SODIUM SUCCINATE 125 MG/2ML
125 INJECTION, POWDER, LYOPHILIZED, FOR SOLUTION INTRAMUSCULAR; INTRAVENOUS ONCE
Status: COMPLETED | OUTPATIENT
Start: 2024-10-14 | End: 2024-10-14

## 2024-10-14 RX ORDER — ALBUTEROL SULFATE 90 UG/1
1-2 INHALANT RESPIRATORY (INHALATION) EVERY 6 HOURS PRN
Qty: 6.7 G | Refills: 0 | Status: SHIPPED | OUTPATIENT
Start: 2024-10-14

## 2024-10-14 RX ORDER — ALBUTEROL SULFATE 5 MG/ML
10 SOLUTION RESPIRATORY (INHALATION) ONCE
Status: COMPLETED | OUTPATIENT
Start: 2024-10-14 | End: 2024-10-14

## 2024-10-14 RX ORDER — PREDNISONE 20 MG/1
40 TABLET ORAL DAILY
Qty: 8 TABLET | Refills: 0 | Status: SHIPPED | OUTPATIENT
Start: 2024-10-14 | End: 2024-10-18

## 2024-10-14 RX ORDER — ALBUTEROL SULFATE 0.83 MG/ML
2.5 SOLUTION RESPIRATORY (INHALATION) EVERY 6 HOURS PRN
Qty: 75 ML | Refills: 0 | Status: SHIPPED | OUTPATIENT
Start: 2024-10-14

## 2024-10-14 RX ADMIN — ISODIUM CHLORIDE 12 ML: 0.03 SOLUTION RESPIRATORY (INHALATION) at 08:11

## 2024-10-14 RX ADMIN — METHYLPREDNISOLONE SODIUM SUCCINATE 125 MG: 125 INJECTION, POWDER, FOR SOLUTION INTRAMUSCULAR; INTRAVENOUS at 08:13

## 2024-10-14 RX ADMIN — INSULIN LISPRO 30 UNITS: 100 INJECTION, SOLUTION INTRAVENOUS; SUBCUTANEOUS at 08:47

## 2024-10-14 RX ADMIN — ALBUTEROL SULFATE 10 MG: 2.5 SOLUTION RESPIRATORY (INHALATION) at 08:11

## 2024-10-14 RX ADMIN — IPRATROPIUM BROMIDE 1 MG: 0.5 SOLUTION RESPIRATORY (INHALATION) at 08:11

## 2024-10-14 NOTE — DISCHARGE INSTRUCTIONS
La próxima dosis de prednisona (esteroides) se administrará mañana por la mañana, y luego se completará hasta el viernes.  Continúe con los inhaladores de albuterol y los tratamientos con nebulizadores según sea necesario.  Regrese a la darrin de emergencias si los síntomas empeoran.    Controle de cerca pruitt glucosa mientras gilmar esteroides, ya que puede notar niveles elevados de glucosa.  Discuta con pruitt médico de atención primaria si joe leukos siguen sin controlarse.    ----------------------------------    Next dose of prednisone (steroids) due tomorrow morning, then complete through Friday.  Continue albuterol inhalers and nebulizer treatments as needed.  Return to the ED if symptoms worsen.    Closely monitor your glucose while on the steroids, as you may notice elevated glucose.  Discussed with your PCP if your Leukos remains uncontrolled.

## 2024-10-14 NOTE — ED PROVIDER NOTES
Time reflects when diagnosis was documented in both MDM as applicable and the Disposition within this note       Time User Action Codes Description Comment    10/14/2024 10:36 AM Yohana Ellington Add [J45.901] Asthma exacerbation     10/14/2024 10:36 AM Yohana Ellington Add [E11.65] Hyperglycemia due to diabetes mellitus (HCC)     10/14/2024 10:37 AM Yohana Ellington Add [J06.9] URI (upper respiratory infection)           ED Disposition       ED Disposition   Discharge    Condition   Stable    Date/Time   Mon Oct 14, 2024 10:36 AM    Comment   Jolie Mulligan discharge to home/self care.                   Assessment & Plan       Medical Decision Making  A 67 yo female presents with dyspnea, chest tightness, wheezing, cough and congestion over the past five days.  Pt in no acute respiratory distress however does have diminished air entry with wheezing bilaterally.  Will give TOLENTINO neb and steroids.  Will check CXR for PNA.  Will check labs for significant leukocytosis, anemia and STEFANI.    Amount and/or Complexity of Data Reviewed  Labs: ordered. Decision-making details documented in ED Course.  Radiology: ordered and independent interpretation performed.    Risk  Prescription drug management.        ED Course as of 10/14/24 1213   Mon Oct 14, 2024   0837 GLUCOSE(!!): 416  Pt updated on results.  She has not taken her insulin or medications yet this morning.  Will give Humalog 30u based on pt's sliding scale   0841 Pt starting to feel better on TOLENTINO neb, will continue to monitor.   0943 Patient finishing neb, reports to feeling much better.  Will observe in the ED for recurrence of symptoms, likely discharge home.   1034 Patient reports to feeling better at this time.  She was able to ambulate to the bathroom without recurrence of her symptoms.  Will proceed with discharge home on prednisone burst.  Patient does need refills on her albuterol inhaler and nebulizer solution.  Also discussed close monitoring of her glucose  due to the steroids, recommend PCP follow up if hyperglycemia persists.  Pt and family in agreement.       Medications   albuterol inhalation solution 10 mg (10 mg Nebulization Given 10/14/24 0811)   ipratropium (ATROVENT) 0.02 % inhalation solution 1 mg (1 mg Nebulization Given 10/14/24 0811)   sodium chloride 0.9 % inhalation solution 12 mL (12 mL Nebulization Given 10/14/24 0811)   methylPREDNISolone sodium succinate (Solu-MEDROL) injection 125 mg (125 mg Intravenous Given 10/14/24 0813)   insulin lispro (HumALOG/ADMELOG) 100 units/mL subcutaneous injection 30 Units (30 Units Subcutaneous Given 10/14/24 0847)       ED Risk Strat Scores                           SBIRT 22yo+      Flowsheet Row Most Recent Value   Initial Alcohol Screen: US AUDIT-C     1. How often do you have a drink containing alcohol? 0 Filed at: 10/14/2024 0811   2. How many drinks containing alcohol do you have on a typical day you are drinking?  0 Filed at: 10/14/2024 0811   3b. FEMALE Any Age, or MALE 65+: How often do you have 4 or more drinks on one occassion? 0 Filed at: 10/14/2024 0811   Audit-C Score 0 Filed at: 10/14/2024 0811   MARGO: How many times in the past year have you...    Used an illegal drug or used a prescription medication for non-medical reasons? Never Filed at: 10/14/2024 0811                            History of Present Illness       Chief Complaint   Patient presents with    Asthma     Pt reports worsening asthma x5 days unrelieved by albuterol. Reporting SOB with chest tightness       Past Medical History:   Diagnosis Date    Abdominal infection (HCC)     h-pylori    Abnormal chest x-ray 04/05/2019    Asthma     Breast cancer (HCC) 06/26/2018    Cancer (HCC)     BREAST    Depression, recurrent (HCC) 06/13/2022    Diabetes mellitus (HCC)     Hiatal hernia     Hiatal hernia 08/08/2018    History of chemotherapy 2018    History of radiation therapy 2018    Hypercholesterolemia     Hypertension     Hypothyroid     Renal  disorder     Rheumatoid arthritis (HCC)       Past Surgical History:   Procedure Laterality Date    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    BREAST LUMPECTOMY Right 6/26/2018    Procedure: RIGHT BREAST NEEDLE LOCALIZATION X2 WITH RIGHT BREAST LUMPECTOMY ( NEEDLE LOC @ 1000);  Surgeon: Familia Barragan MD;  Location: [facility];  Service: Surgical Oncology    BREAST LUMPECTOMY Right 8/2/2018    Procedure: LYMPHOSCINITGRAPHY INTRAOPERATIVE LYMPHATIC MAPPING , RIGHT SENTINEL NODE BIOPSY, REEXCISION  RIGHT BREAST LUMPECTOMY CAVITY (SUPERIOR MARGIN);  Surgeon: Familia Barragan MD;  Location: [facility];  Service: Surgical Oncology    BREAST SURGERY Left     CATARACT EXTRACTION, BILATERAL Bilateral     COLONOSCOPY      EGD AND COLONOSCOPY N/A 9/5/2018    Procedure: EGD AND COLONOSCOPY;  Surgeon: José Miguel Rosas MD;  Location: [facility];  Service: Gastroenterology    FL GUIDED CENTRAL VENOUS ACCESS DEVICE INSERTION  9/13/2018    KNEE SURGERY Right     MAMMO NEEDLE LOCALIZATION RIGHT (ALL INC) Right 6/26/2018    MAMMO STEREOTACTIC BREAST BIOPSY RIGHT (ALL INC) Right 5/23/2018    RETINAL DETACHMENT SURGERY Right     TUBAL LIGATION      TUNNELED VENOUS PORT PLACEMENT Left 9/13/2018    Procedure: INSERTION VENOUS PORT (PORT-A-CATH);  Surgeon: Familia Barragan MD;  Location: [facility];  Service: Surgical Oncology    UPPER GASTROINTESTINAL ENDOSCOPY        Family History   Problem Relation Age of Onset    Diabetes Mother     Hypertension Mother     Diabetes Father     Hypertension Father     No Known Problems Sister     No Known Problems Sister     No Known Problems Sister     No Known Problems Sister     No Known Problems Sister     Diabetes Brother     Diabetes Brother     Kidney failure Brother     Diabetes Brother     Cancer Maternal Grandfather     No Known Problems Daughter     No Known Problems Daughter     No Known Problems Daughter       Social History     Tobacco Use    Smoking status: Never    Smokeless tobacco:  Never   Vaping Use    Vaping status: Never Used   Substance Use Topics    Alcohol use: No    Drug use: No      E-Cigarette/Vaping    E-Cigarette Use Never User       E-Cigarette/Vaping Substances    Nicotine No     THC No     CBD No     Flavoring No     Other No     Unknown No       I have reviewed and agree with the history as documented.     A 67 yo female with pmhx of breast cancer (s/p lumpectomy, radiation and chemo now prescribed Arimidex although pt reports noncompliance), asthma, hypothyroidism, hypertension, hypercholesterolemia, pulmonary embolism and CHF; presents with dyspnea, chest tightness, wheezing, cough and congestion over the past five days.  Pt has been taking her inhalers at home without relief.  Pt otherwise denies fever, chills, chest pain, abd pain, N/V/D, worsening peripheral edema and rashes.      History provided by:  Patient, medical records and relative   used: Yes (family at bedside)    Asthma  Associated symptoms: congestion, cough, shortness of breath and wheezing        Review of Systems   HENT:  Positive for congestion.    Respiratory:  Positive for cough, chest tightness, shortness of breath and wheezing.    All other systems reviewed and are negative.      Objective       ED Triage Vitals   Temperature Pulse Blood Pressure Respirations SpO2 Patient Position - Orthostatic VS   10/14/24 0746 10/14/24 0746 10/14/24 0746 10/14/24 0746 10/14/24 0746 --   97.8 °F (36.6 °C) 92 158/73 16 94 %       Temp Source Heart Rate Source BP Location FiO2 (%) Pain Score    10/14/24 0746 10/14/24 1015 -- -- --    Oral Monitor         Vitals      Date and Time Temp Pulse SpO2 Resp BP Pain Score FACES Pain Rating User   10/14/24 1015 -- 90 99 % 16 -- -- -- NZ   10/14/24 0812 -- -- 94 % -- -- -- -- ZENY   10/14/24 0746 97.8 °F (36.6 °C) 92 94 % 16 158/73 -- -- JL            Physical Exam  General Appearance: alert and oriented, nad, non toxic appearing  Skin:  Warm, dry, intact.  No  cyanosis  HEENT: Atraumatic, normocephalic.  No eye drainage.  Normal hearing.  Moist mucous membranes.    Neck: Supple, trachea midline  Cardiac: RRR; no murmurs, rub, gallops.  No pedal edema, 2+ pulses  Pulmonary: No acute respiratory distress.  Diminished air entry bilaterally with diffuse inspiratory and expiratory wheezing.  No rhonchi or rales.  Gastrointestinal: abdomen soft, nontender, nondistended; no guarding or rebound tenderness; good bowel sounds, no mass or bruits  Extremities:  No deformities.  No calf tenderness, no clubbing  Neuro:  no focal motor or sensory deficits, CN 2-12 grossly intact  Psych:  Normal mood and affect, normal judgement and insight       Results Reviewed       Procedure Component Value Units Date/Time    Fingerstick Glucose (POCT) [[id number]]  (Abnormal) Collected: 10/14/24 0931    Lab Status: Final result Specimen: Blood Updated: 10/14/24 0932     POC Glucose 330 mg/dl     Basic metabolic panel [[id number]]  (Abnormal) Collected: 10/14/24 0802    Lab Status: Final result Specimen: Blood from Arm, Right Updated: 10/14/24 0833     Sodium 134 mmol/L      Potassium 4.7 mmol/L      Chloride 97 mmol/L      CO2 30 mmol/L      ANION GAP 7 mmol/L      BUN 20 mg/dL      Creatinine 0.73 mg/dL      Glucose 416 mg/dL      Calcium 9.4 mg/dL      eGFR 85 ml/min/1.73sq m     Narrative:      National Kid[employer] Disease Foundation guidelines for Chronic Kidney Disease (CKD):     Stage 1 with normal or high GFR (GFR > 90 mL/min/1.73 square meters)    Stage 2 Mild CKD (GFR = 60-89 mL/min/1.73 square meters)    Stage 3A Moderate CKD (GFR = 45-59 mL/min/1.73 square meters)    Stage 3B Moderate CKD (GFR = 30-44 mL/min/1.73 square meters)    Stage 4 Severe CKD (GFR = 15-29 mL/min/1.73 square meters)    Stage 5 End Stage CKD (GFR <15 mL/min/1.73 square meters)  Note: GFR calculation is accurate only with a steady state creatinine    CBC and differential [[id number]]  (Abnormal) Collected: 10/14/24 0802     Lab Status: Final result Specimen: Blood from Arm, Right Updated: 10/14/24 0807     WBC 7.26 Thousand/uL      RBC 4.12 Million/uL      Hemoglobin 11.8 g/dL      Hematocrit 35.9 %      MCV 87 fL      MCH 28.6 pg      MCHC 32.9 g/dL      RDW 13.5 %      MPV 10.9 fL      Platelets 429 Thousands/uL      nRBC 0 /100 WBCs      Segmented % 62 %      Immature Grans % 0 %      Lymphocytes % 26 %      Monocytes % 5 %      Eosinophils Relative 6 %      Basophils Relative 1 %      Absolute Neutrophils 4.49 Thousands/µL      Absolute Immature Grans 0.03 Thousand/uL      Absolute Lymphocytes 1.88 Thousands/µL      Absolute Monocytes 0.37 Thousand/µL      Eosinophils Absolute 0.40 Thousand/µL      Basophils Absolute 0.09 Thousands/µL             XR chest 2 views   ED Interpretation by Yohana Ellington DO (10/14 0826)   No acute disease    Image independently interpreted by myself            CriticalCare Time    Date/Time: 10/14/2024 9:39 AM    Performed by: Yohana Ellington DO  Authorized by: Yohana Ellington DO    Critical care provider statement:     Critical care time (minutes):  30    Critical care time was exclusive of:  Separately billable procedures and treating other patients and teaching time    Critical care was necessary to treat or prevent imminent or life-threatening deterioration of the following conditions: asthma exacerbation.    Critical care was time spent personally by me on the following activities:  Obtaining history from patient or surrogate, development of treatment plan with patient or surrogate, examination of patient, evaluation of patient's response to treatment, re-evaluation of patient's condition, ordering and review of radiographic studies, review of old charts, ordering and review of laboratory studies and ordering and performing treatments and interventions (TOLENTINO neb, steroids)    I assumed direction of critical care for this patient from another provider in my specialty: no        ED Medication  and Procedure Management   Prior to Admission Medications   Prescriptions Last Dose Informant Patient Reported? Taking?   Alcohol Swabs (Pharmacist Choice Alcohol) PADS  Self, Child No No   Sig: CLEAN AREA BEFORE TESTING OR INJECTING FOUR TIMES DAILY   Comfort EZ Pen Needles 32G X 4 MM MISC  Self, Child No No   Sig: USE AS DIRECTED FOUR TIMES DAILY   Continuous Glucose  (FreeStyle Broderick [place]) AARON  Self, Child No No   Sig: Use 1 Units continuous   Continuous Glucose Sensor (FreeStyle Broderick 3 Sensor) MISC  Self, Child No No   Sig: Use 1 Units every 14 (fourteen) days   Diclofenac Sodium (VOLTAREN) 1 %  Self, Child No No   Sig: Apply 2 g topically 2 (two) times a day   EPINEPHrine (Auvi-Q) 0.1 mg/0.1 mL SOAJ   No No   Sig: Inject 0.3 mL (0.3 mg total) into a muscle once for 1 dose   Patient not taking: Reported on 2/6/2023   Mag-G 500 (27 Mg) MG tablet  Self, Child No No   Sig: TAKE 1 TABLET (500 MG TOTAL) BY MOUTH DAILY   OneTouch Delica Lancets 33G MISC  Self, Child No No   Sig: USE 3 (THREE) TIMES A DAY   OneTouch Ultra test strip  Self, Child No No   Sig: USE 1 EACH 3 (THREE) TIMES A DAY USE AS INSTRUCTED   acetaminophen (TYLENOL) 500 mg tablet  Self, Child No No   Sig: Take 1 tablet (500 mg total) by mouth every 6 (six) hours as needed for fever   acetaminophen (TYLENOL) 650 mg CR tablet  Self, Child No No   Sig: Take 1 tablet (650 mg total) by mouth every 8 (eight) hours as needed for mild pain   Patient not taking: Reported on 8/28/2024   albuterol (2.5 mg/3 mL) 0.083 % nebulizer solution  Self, Child No No   Sig: Take 3 mL (2.5 mg total) by nebulization every 6 (six) hours as needed for wheezing or shortness of breath   albuterol (2.5 mg/3 mL) 0.083 % nebulizer solution  Self, Child No No   Sig: Take 3 mL (2.5 mg total) by nebulization every 6 (six) hours as needed for wheezing or shortness of breath   albuterol (PROVENTIL HFA,VENTOLIN HFA) 90 mcg/act inhaler  Self, Child No No   Sig: Inhale 1 puff  every 4 (four) hours as needed for wheezing   albuterol (Proventil HFA) 90 mcg/act inhaler  Self, Child No No   Sig: Inhale 1-2 puffs every 6 (six) hours as needed for wheezing or shortness of breath   anastrozole (ARIMIDEX) 1 mg tablet   No No   Sig: Take 1 tablet (1 mg total) by mouth daily   aspirin 81 mg chewable tablet  Self, Child Yes No   Sig: Chew 81 mg daily   atorvastatin (LIPITOR) 40 mg tablet  Self, Child No No   Sig: Take 1 tablet (40 mg total) by mouth daily   benzonatate (TESSALON PERLES) 100 mg capsule  Self, Child No No   Sig: Take 1 capsule (100 mg total) by mouth 3 (three) times a day as needed for cough   calcium polycarbophil (FIBERCON) 625 mg tablet  Self, Child No No   Sig: Take 1 tablet (625 mg total) by mouth daily   ergocalciferol (VITAMIN D2) 50,000 units  Self, Child No No   Sig: TAKE 1 CAPSULE (50,000 UNITS TOTAL) BY MOUTH ONCE A WEEK   Patient not taking: Reported on 8/28/2024   fluticasone (FLONASE) 50 mcg/act nasal spray  Self, Child Yes No   fluticasone-salmeterol (Advair HFA) 230-21 MCG/ACT inhaler  Self, Child No No   Sig: Inhale 2 puffs 2 (two) times a day Rinse mouth after use.   furosemide (LASIX) 20 mg tablet  Self, Child No No   Sig: TAKE 1 TABLET (20 MG TOTAL) BY MOUTH 2 (TWO) TIMES A DAY   Patient not taking: Reported on 8/28/2024   guaiFENesin (MUCINEX) 600 mg 12 hr tablet  Self, Child No No   Sig: Take 1 tablet (600 mg total) by mouth every 12 (twelve) hours as needed for congestion   insulin degludec (Tresiba FlexTouch) 200 units/mL CONCENTRATED U-200 injection pen  Self, Child No No   Sig: INJECT 60 UNITS UNDER THE SKIN DAILY AT 5PM   insulin lispro (HumaLOG) 100 units/mL injection pen  Self, Child No No   Sig: INJECT 24 UNITS BEFORE EACH MEAL PLUS SLIDING SCALE 150-200: 2 UNITS 201-250: 4 UNITS 251-300: 6 UNITS 301*   ketotifen (ZADITOR) 0.025 % ophthalmic solution  Self, Child No No   Sig: Administer 1 drop to both eyes 2 (two) times a day as needed (itchy eyes)    Patient not taking: Reported on 6/24/2024   latanoprost (XALATAN) 0.005 % ophthalmic solution  Self, Child Yes No   loratadine (CLARITIN) 10 mg tablet  Self, Child No No   Sig: TAKE 1 TABLET (10 MG TOTAL) BY MOUTH DAILY   losartan (COZAAR) 100 MG tablet  Self, Child No No   Sig: Take 1 tablet (100 mg total) by mouth daily   magnesium chloride-calcium (Slow-Mag) 71.5-119 mg  Self, Child No No   Sig: Take 2 tablets by mouth daily   methocarbamol (ROBAXIN) 500 mg tablet  Child, Self Yes No   metoprolol tartrate (LOPRESSOR) 25 mg tablet  Self, Child No No   Sig: TAKE 0.5 TABLETS (12.5 MG TOTAL) BY MOUTH EVERY 12 (TWELVE) HOURS   omeprazole (PriLOSEC) 20 mg delayed release capsule  Self, Child No No   Sig: TAKE 1 CAPSULE (20 MG TOTAL) BY MOUTH DAILY   polyethylene glycol (MIRALAX) 17 g packet  Self No No   Sig: Take 17 g by mouth daily for 14 days   tiotropium (Spiriva Respimat) 2.5 MCG/ACT AERS inhaler  Self, Child No No   Sig: Inhale 2 puffs daily   tirzepatide (Mounjaro) 2.5 MG/0.5ML  Self, Child No No   Sig: INJECT 0.5 ML (2.5 MG TOTAL) UNDER THE SKIN ONCE A WEEK   zileuton 600 MG  Self, Child No No   Sig: TAKE 1 TABLET (600 MG TOTAL) BY MOUTH 2 (TWO) TIMES A DAY      Facility-Administered Medications: None     Discharge Medication List as of 10/14/2024 10:39 AM        START taking these medications    Details   !! albuterol (2.5 mg/3 mL) 0.083 % nebulizer solution Take 3 mL (2.5 mg total) by nebulization every 6 (six) hours as needed for wheezing or shortness of breath, Starting Mon 10/14/2024, Normal      !! albuterol (Ventolin HFA) 90 mcg/act inhaler Inhale 1-2 puffs every 6 (six) hours as needed for wheezing or shortness of breath, Starting Mon 10/14/2024, Normal      predniSONE 20 mg tablet Take 2 tablets (40 mg total) by mouth daily for 4 days, Starting Mon 10/14/2024, Until Fri 10/18/2024, Normal       !! - Potential duplicate medications found. Please discuss with provider.        CONTINUE these medications  which have NOT CHANGED    Details   acetaminophen (TYLENOL) 500 mg tablet Take 1 tablet (500 mg total) by mouth every 6 (six) hours as needed for fever, Starting Wed 8/28/2024, Normal      acetaminophen (TYLENOL) 650 mg CR tablet Take 1 tablet (650 mg total) by mouth every 8 (eight) hours as needed for mild pain, Starting Mon 6/13/2022, Normal      !! albuterol (2.5 mg/3 mL) 0.083 % nebulizer solution Take 3 mL (2.5 mg total) by nebulization every 6 (six) hours as needed for wheezing or shortness of breath, Starting Tue 5/7/2024, Normal      !! albuterol (2.5 mg/3 mL) 0.083 % nebulizer solution Take 3 mL (2.5 mg total) by nebulization every 6 (six) hours as needed for wheezing or shortness of breath, Starting Wed 8/28/2024, Normal      !! albuterol (Proventil HFA) 90 mcg/act inhaler Inhale 1-2 puffs every 6 (six) hours as needed for wheezing or shortness of breath, Starting Wed 8/28/2024, Normal      !! albuterol (PROVENTIL HFA,VENTOLIN HFA) 90 mcg/act inhaler Inhale 1 puff every 4 (four) hours as needed for wheezing, Starting Tue 5/7/2024, Normal      Alcohol Swabs (Pharmacist Choice Alcohol) PADS CLEAN AREA BEFORE TESTING OR INJECTING FOUR TIMES DAILY, Normal      anastrozole (ARIMIDEX) 1 mg tablet Take 1 tablet (1 mg total) by mouth daily, Starting Mon 9/23/2024, Normal      aspirin 81 mg chewable tablet Chew 81 mg daily, Historical Med      atorvastatin (LIPITOR) 40 mg tablet Take 1 tablet (40 mg total) by mouth daily, Starting Wed 8/23/2023, Normal      benzonatate (TESSALON PERLES) 100 mg capsule Take 1 capsule (100 mg total) by mouth 3 (three) times a day as needed for cough, Starting Wed 8/28/2024, Normal      calcium polycarbophil (FIBERCON) 625 mg tablet Take 1 tablet (625 mg total) by mouth daily, Starting Wed 10/11/2023, Normal      Comfort EZ Pen Needles 32G X 4 MM MISC USE AS DIRECTED FOUR TIMES DAILY, Normal      Continuous Glucose  (FreeStyle Broderick [place]) AARON Use 1 Units continuous,  Starting Wed 6/5/2024, Normal      Continuous Glucose Sensor (FreeStyle Broderick 3 Sensor) MISC Use 1 Units every 14 (fourteen) days, Starting Wed 6/5/2024, Normal      Diclofenac Sodium (VOLTAREN) 1 % Apply 2 g topically 2 (two) times a day, Starting Wed 8/23/2023, Normal      EPINEPHrine (Auvi-Q) 0.1 mg/0.1 mL SOAJ Inject 0.3 mL (0.3 mg total) into a muscle once for 1 dose, Starting Wed 11/9/2022, Normal      ergocalciferol (VITAMIN D2) 50,000 units TAKE 1 CAPSULE (50,000 UNITS TOTAL) BY MOUTH ONCE A WEEK, Starting Tue 9/5/2023, Normal      fluticasone (FLONASE) 50 mcg/act nasal spray Historical Med      fluticasone-salmeterol (Advair HFA) 230-21 MCG/ACT inhaler Inhale 2 puffs 2 (two) times a day Rinse mouth after use., Starting Tue 5/7/2024, Normal      furosemide (LASIX) 20 mg tablet TAKE 1 TABLET (20 MG TOTAL) BY MOUTH 2 (TWO) TIMES A DAY, Starting Mon 7/17/2023, Normal      guaiFENesin (MUCINEX) 600 mg 12 hr tablet Take 1 tablet (600 mg total) by mouth every 12 (twelve) hours as needed for congestion, Starting Wed 8/28/2024, Normal      insulin degludec (Tresiba FlexTouch) 200 units/mL CONCENTRATED U-200 injection pen INJECT 60 UNITS UNDER THE SKIN DAILY AT 5PM, Normal      insulin lispro (HumaLOG) 100 units/mL injection pen INJECT 24 UNITS BEFORE EACH MEAL PLUS SLIDING SCALE 150-200: 2 UNITS 201-250: 4 UNITS 251-300: 6 UNITS 301*, Normal      ketotifen (ZADITOR) 0.025 % ophthalmic solution Administer 1 drop to both eyes 2 (two) times a day as needed (itchy eyes), Starting Tue 10/3/2023, Normal      latanoprost (XALATAN) 0.005 % ophthalmic solution Historical Med      loratadine (CLARITIN) 10 mg tablet TAKE 1 TABLET (10 MG TOTAL) BY MOUTH DAILY, Starting Thu 8/5/2021, Normal      losartan (COZAAR) 100 MG tablet Take 1 tablet (100 mg total) by mouth daily, Starting Wed 8/23/2023, Normal      Mag-G 500 (27 Mg) MG tablet TAKE 1 TABLET (500 MG TOTAL) BY MOUTH DAILY, Normal      magnesium chloride-calcium (Slow-Mag)  71.5-119 mg Take 2 tablets by mouth daily, Starting Wed 9/18/2024, Normal      methocarbamol (ROBAXIN) 500 mg tablet Historical Med      metoprolol tartrate (LOPRESSOR) 25 mg tablet TAKE 0.5 TABLETS (12.5 MG TOTAL) BY MOUTH EVERY 12 (TWELVE) HOURS, Starting Mon 1/8/2024, Normal      omeprazole (PriLOSEC) 20 mg delayed release capsule TAKE 1 CAPSULE (20 MG TOTAL) BY MOUTH DAILY, Starting Mon 2/5/2024, Normal      OneTouch Delica Lancets 33G MISC USE 3 (THREE) TIMES A DAY, Normal      OneTouch Ultra test strip USE 1 EACH 3 (THREE) TIMES A DAY USE AS INSTRUCTED, Starting Sat 2/18/2023, Normal      polyethylene glycol (MIRALAX) 17 g packet Take 17 g by mouth daily for 14 days, Starting Wed 10/11/2023, Until Wed 6/5/2024, Normal      tiotropium (Spiriva Respimat) 2.5 MCG/ACT AERS inhaler Inhale 2 puffs daily, Starting Tue 5/7/2024, Normal      tirzepatide (Mounjaro) 2.5 MG/0.5ML INJECT 0.5 ML (2.5 MG TOTAL) UNDER THE SKIN ONCE A WEEK, Normal      zileuton 600 MG TAKE 1 TABLET (600 MG TOTAL) BY MOUTH 2 (TWO) TIMES A DAY, Normal       !! - Potential duplicate medications found. Please discuss with provider.        No discharge procedures on file.  ED SEPSIS DOCUMENTATION   Time reflects when diagnosis was documented in both MDM as applicable and the Disposition within this note       Time User Action Codes Description Comment    10/14/2024 10:36 AM Yohana Ellington [J45.901] Asthma exacerbation     10/14/2024 10:36 AM Yohana Ellington [E11.65] Hyperglycemia due to diabetes mellitus (HCC)     10/14/2024 10:37 Yohana Garcia [J06.9] URI (upper respiratory infection)                  Yohana Ellington DO  10/14/24 1213

## 2024-11-01 ENCOUNTER — APPOINTMENT (EMERGENCY)
Dept: CT IMAGING | Facility: HOSPITAL | Age: 69
End: 2024-11-01
Payer: COMMERCIAL

## 2024-11-01 ENCOUNTER — APPOINTMENT (EMERGENCY)
Dept: RADIOLOGY | Facility: HOSPITAL | Age: 69
End: 2024-11-01
Payer: COMMERCIAL

## 2024-11-01 ENCOUNTER — HOSPITAL ENCOUNTER (EMERGENCY)
Facility: HOSPITAL | Age: 69
Discharge: HOME | End: 2024-11-01
Attending: EMERGENCY MEDICINE
Payer: COMMERCIAL

## 2024-11-01 VITALS
HEART RATE: 85 BPM | BODY MASS INDEX: 29.02 KG/M2 | TEMPERATURE: 99.3 F | SYSTOLIC BLOOD PRESSURE: 121 MMHG | WEIGHT: 174.38 LBS | RESPIRATION RATE: 17 BRPM | OXYGEN SATURATION: 95 % | DIASTOLIC BLOOD PRESSURE: 53 MMHG

## 2024-11-01 DIAGNOSIS — E87.6 HYPOKALEMIA: ICD-10-CM

## 2024-11-01 DIAGNOSIS — E83.42 HYPOMAGNESEMIA: ICD-10-CM

## 2024-11-01 DIAGNOSIS — R68.83 CHILLS: ICD-10-CM

## 2024-11-01 DIAGNOSIS — R50.9 FEVER: Primary | ICD-10-CM

## 2024-11-01 DIAGNOSIS — R11.10 VOMITING: ICD-10-CM

## 2024-11-01 LAB
ALBUMIN SERPL BCG-MCNC: 4 G/DL (ref 3.5–5)
ALP SERPL-CCNC: 64 U/L (ref 34–104)
ALT SERPL W P-5'-P-CCNC: 19 U/L (ref 7–52)
ANION GAP SERPL CALCULATED.3IONS-SCNC: 7 MMOL/L (ref 4–13)
APTT PPP: 23 SECONDS (ref 23–34)
AST SERPL W P-5'-P-CCNC: 14 U/L (ref 13–39)
BACTERIA UR QL AUTO: ABNORMAL /HPF
BASE EX.OXY STD BLDV CALC-SCNC: 60 % (ref 60–80)
BASE EXCESS BLDV CALC-SCNC: 3 MMOL/L
BASOPHILS # BLD AUTO: 0.08 THOUSANDS/ΜL (ref 0–0.1)
BASOPHILS NFR BLD AUTO: 1 % (ref 0–1)
BILIRUB SERPL-MCNC: 0.59 MG/DL (ref 0.2–1)
BILIRUB UR QL STRIP: NEGATIVE
BUN SERPL-MCNC: 22 MG/DL (ref 5–25)
CALCIUM SERPL-MCNC: 10.1 MG/DL (ref 8.4–10.2)
CHLORIDE SERPL-SCNC: 100 MMOL/L (ref 96–108)
CLARITY UR: CLEAR
CO2 SERPL-SCNC: 29 MMOL/L (ref 21–32)
COLOR UR: ABNORMAL
CREAT SERPL-MCNC: 0.66 MG/DL (ref 0.6–1.3)
EOSINOPHIL # BLD AUTO: 0.22 THOUSAND/ΜL (ref 0–0.61)
EOSINOPHIL NFR BLD AUTO: 2 % (ref 0–6)
ERYTHROCYTE [DISTWIDTH] IN BLOOD BY AUTOMATED COUNT: 13.8 % (ref 11.6–15.1)
FLUAV AG UPPER RESP QL IA.RAPID: NEGATIVE
FLUBV AG UPPER RESP QL IA.RAPID: NEGATIVE
GFR SERPL CREATININE-BSD FRML MDRD: 91 ML/MIN/1.73SQ M
GLUCOSE SERPL-MCNC: 127 MG/DL (ref 65–140)
GLUCOSE SERPL-MCNC: 174 MG/DL (ref 65–140)
GLUCOSE UR STRIP-MCNC: ABNORMAL MG/DL
HCO3 BLDV-SCNC: 28.9 MMOL/L (ref 24–30)
HCT VFR BLD AUTO: 36.9 % (ref 34.8–46.1)
HGB BLD-MCNC: 12 G/DL (ref 11.5–15.4)
HGB UR QL STRIP.AUTO: NEGATIVE
IMM GRANULOCYTES # BLD AUTO: 0.09 THOUSAND/UL (ref 0–0.2)
IMM GRANULOCYTES NFR BLD AUTO: 1 % (ref 0–2)
INR PPP: 0.97 (ref 0.85–1.19)
KETONES UR STRIP-MCNC: NEGATIVE MG/DL
LACTATE SERPL-SCNC: 1.8 MMOL/L (ref 0.5–2)
LEUKOCYTE ESTERASE UR QL STRIP: NEGATIVE
LYMPHOCYTES # BLD AUTO: 1.11 THOUSANDS/ΜL (ref 0.6–4.47)
LYMPHOCYTES NFR BLD AUTO: 8 % (ref 14–44)
MAGNESIUM SERPL-MCNC: 1.3 MG/DL (ref 1.9–2.7)
MCH RBC QN AUTO: 28.5 PG (ref 26.8–34.3)
MCHC RBC AUTO-ENTMCNC: 32.5 G/DL (ref 31.4–37.4)
MCV RBC AUTO: 88 FL (ref 82–98)
MONOCYTES # BLD AUTO: 0.79 THOUSAND/ΜL (ref 0.17–1.22)
MONOCYTES NFR BLD AUTO: 6 % (ref 4–12)
MUCOUS THREADS UR QL AUTO: ABNORMAL
NEUTROPHILS # BLD AUTO: 11.39 THOUSANDS/ΜL (ref 1.85–7.62)
NEUTS SEG NFR BLD AUTO: 82 % (ref 43–75)
NITRITE UR QL STRIP: NEGATIVE
NON-SQ EPI CELLS URNS QL MICRO: ABNORMAL /HPF
NRBC BLD AUTO-RTO: 0 /100 WBCS
O2 CT BLDV-SCNC: 11.1 ML/DL
PCO2 BLDV: 49.3 MM HG (ref 42–50)
PH BLDV: 7.39 [PH] (ref 7.3–7.4)
PH UR STRIP.AUTO: 8 [PH]
PLATELET # BLD AUTO: 259 THOUSANDS/UL (ref 149–390)
PMV BLD AUTO: 11.8 FL (ref 8.9–12.7)
PO2 BLDV: 31 MM HG (ref 35–45)
POTASSIUM SERPL-SCNC: 3.4 MMOL/L (ref 3.5–5.3)
PROCALCITONIN SERPL-MCNC: 0.06 NG/ML
PROT SERPL-MCNC: 7.7 G/DL (ref 6.4–8.4)
PROT UR STRIP-MCNC: ABNORMAL MG/DL
PROTHROMBIN TIME: 13 SECONDS (ref 12.3–15)
RBC # BLD AUTO: 4.21 MILLION/UL (ref 3.81–5.12)
RBC #/AREA URNS AUTO: ABNORMAL /HPF
SARS-COV+SARS-COV-2 AG RESP QL IA.RAPID: NEGATIVE
SODIUM SERPL-SCNC: 136 MMOL/L (ref 135–147)
SP GR UR STRIP.AUTO: 1.02 (ref 1–1.03)
UROBILINOGEN UR STRIP-ACNC: <2 MG/DL
WBC # BLD AUTO: 13.68 THOUSAND/UL (ref 4.31–10.16)
WBC #/AREA URNS AUTO: ABNORMAL /HPF

## 2024-11-01 PROCEDURE — 87804 INFLUENZA ASSAY W/OPTIC: CPT | Performed by: EMERGENCY MEDICINE

## 2024-11-01 PROCEDURE — 85025 COMPLETE CBC W/AUTO DIFF WBC: CPT | Performed by: EMERGENCY MEDICINE

## 2024-11-01 PROCEDURE — 80053 COMPREHEN METABOLIC PANEL: CPT | Performed by: EMERGENCY MEDICINE

## 2024-11-01 PROCEDURE — 96375 TX/PRO/DX INJ NEW DRUG ADDON: CPT

## 2024-11-01 PROCEDURE — 99284 EMERGENCY DEPT VISIT MOD MDM: CPT

## 2024-11-01 PROCEDURE — 87040 BLOOD CULTURE FOR BACTERIA: CPT | Performed by: EMERGENCY MEDICINE

## 2024-11-01 PROCEDURE — 96376 TX/PRO/DX INJ SAME DRUG ADON: CPT

## 2024-11-01 PROCEDURE — 81001 URINALYSIS AUTO W/SCOPE: CPT | Performed by: EMERGENCY MEDICINE

## 2024-11-01 PROCEDURE — 36415 COLL VENOUS BLD VENIPUNCTURE: CPT | Performed by: EMERGENCY MEDICINE

## 2024-11-01 PROCEDURE — 87811 SARS-COV-2 COVID19 W/OPTIC: CPT | Performed by: EMERGENCY MEDICINE

## 2024-11-01 PROCEDURE — 96365 THER/PROPH/DIAG IV INF INIT: CPT

## 2024-11-01 PROCEDURE — 83735 ASSAY OF MAGNESIUM: CPT | Performed by: EMERGENCY MEDICINE

## 2024-11-01 PROCEDURE — 74177 CT ABD & PELVIS W/CONTRAST: CPT

## 2024-11-01 PROCEDURE — 71260 CT THORAX DX C+: CPT

## 2024-11-01 PROCEDURE — 85610 PROTHROMBIN TIME: CPT | Performed by: EMERGENCY MEDICINE

## 2024-11-01 PROCEDURE — 99285 EMERGENCY DEPT VISIT HI MDM: CPT | Performed by: EMERGENCY MEDICINE

## 2024-11-01 PROCEDURE — 83605 ASSAY OF LACTIC ACID: CPT | Performed by: EMERGENCY MEDICINE

## 2024-11-01 PROCEDURE — 96361 HYDRATE IV INFUSION ADD-ON: CPT

## 2024-11-01 PROCEDURE — 82805 BLOOD GASES W/O2 SATURATION: CPT | Performed by: EMERGENCY MEDICINE

## 2024-11-01 PROCEDURE — 82948 REAGENT STRIP/BLOOD GLUCOSE: CPT

## 2024-11-01 PROCEDURE — 84145 PROCALCITONIN (PCT): CPT | Performed by: EMERGENCY MEDICINE

## 2024-11-01 PROCEDURE — 93005 ELECTROCARDIOGRAM TRACING: CPT

## 2024-11-01 PROCEDURE — 85730 THROMBOPLASTIN TIME PARTIAL: CPT | Performed by: EMERGENCY MEDICINE

## 2024-11-01 PROCEDURE — 71045 X-RAY EXAM CHEST 1 VIEW: CPT

## 2024-11-01 RX ORDER — POTASSIUM CHLORIDE 1500 MG/1
20 TABLET, EXTENDED RELEASE ORAL ONCE
Status: COMPLETED | OUTPATIENT
Start: 2024-11-01 | End: 2024-11-01

## 2024-11-01 RX ORDER — SUCRALFATE 1 G/1
1 TABLET ORAL ONCE
Status: COMPLETED | OUTPATIENT
Start: 2024-11-01 | End: 2024-11-01

## 2024-11-01 RX ORDER — KETOROLAC TROMETHAMINE 30 MG/ML
15 INJECTION, SOLUTION INTRAMUSCULAR; INTRAVENOUS ONCE
Status: COMPLETED | OUTPATIENT
Start: 2024-11-01 | End: 2024-11-01

## 2024-11-01 RX ORDER — ONDANSETRON 4 MG/1
4 TABLET, FILM COATED ORAL EVERY 6 HOURS
Qty: 12 TABLET | Refills: 0 | Status: SHIPPED | OUTPATIENT
Start: 2024-11-01

## 2024-11-01 RX ORDER — DEXTROSE MONOHYDRATE 25 G/50ML
25 INJECTION, SOLUTION INTRAVENOUS ONCE
Status: COMPLETED | OUTPATIENT
Start: 2024-11-01 | End: 2024-11-01

## 2024-11-01 RX ORDER — MAGNESIUM SULFATE HEPTAHYDRATE 40 MG/ML
2 INJECTION, SOLUTION INTRAVENOUS ONCE
Status: COMPLETED | OUTPATIENT
Start: 2024-11-01 | End: 2024-11-01

## 2024-11-01 RX ORDER — DEXTROSE MONOHYDRATE 25 G/50ML
INJECTION, SOLUTION INTRAVENOUS
Status: COMPLETED
Start: 2024-11-01 | End: 2024-11-01

## 2024-11-01 RX ORDER — ACETAMINOPHEN 10 MG/ML
1000 INJECTION, SOLUTION INTRAVENOUS ONCE
Status: COMPLETED | OUTPATIENT
Start: 2024-11-01 | End: 2024-11-01

## 2024-11-01 RX ORDER — ONDANSETRON 2 MG/ML
4 INJECTION INTRAMUSCULAR; INTRAVENOUS ONCE
Status: COMPLETED | OUTPATIENT
Start: 2024-11-01 | End: 2024-11-01

## 2024-11-01 RX ORDER — DICYCLOMINE HCL 20 MG
20 TABLET ORAL ONCE
Status: COMPLETED | OUTPATIENT
Start: 2024-11-01 | End: 2024-11-01

## 2024-11-01 RX ORDER — LIDOCAINE 50 MG/G
1 PATCH TOPICAL ONCE
Status: DISCONTINUED | OUTPATIENT
Start: 2024-11-01 | End: 2024-11-02 | Stop reason: HOSPADM

## 2024-11-01 RX ORDER — MAGNESIUM L-LACTATE 84 MG
84 TABLET, EXTENDED RELEASE ORAL 2 TIMES DAILY
Qty: 28 TABLET | Refills: 0 | Status: SHIPPED | OUTPATIENT
Start: 2024-11-01 | End: 2024-11-15

## 2024-11-01 RX ADMIN — ONDANSETRON 4 MG: 2 INJECTION INTRAMUSCULAR; INTRAVENOUS at 18:52

## 2024-11-01 RX ADMIN — IOHEXOL 100 ML: 350 INJECTION, SOLUTION INTRAVENOUS at 20:15

## 2024-11-01 RX ADMIN — DICYCLOMINE HYDROCHLORIDE 20 MG: 20 TABLET ORAL at 22:35

## 2024-11-01 RX ADMIN — SODIUM CHLORIDE 1000 ML: 0.9 INJECTION, SOLUTION INTRAVENOUS at 19:36

## 2024-11-01 RX ADMIN — POTASSIUM CHLORIDE 20 MEQ: 1500 TABLET, EXTENDED RELEASE ORAL at 19:32

## 2024-11-01 RX ADMIN — KETOROLAC TROMETHAMINE 15 MG: 30 INJECTION, SOLUTION INTRAMUSCULAR; INTRAVENOUS at 20:42

## 2024-11-01 RX ADMIN — ACETAMINOPHEN 1000 MG: 10 INJECTION INTRAVENOUS at 18:52

## 2024-11-01 RX ADMIN — SODIUM CHLORIDE 1000 ML: 0.9 INJECTION, SOLUTION INTRAVENOUS at 18:50

## 2024-11-01 RX ADMIN — MAGNESIUM SULFATE HEPTAHYDRATE 2 G: 40 INJECTION, SOLUTION INTRAVENOUS at 19:45

## 2024-11-01 RX ADMIN — DEXTROSE MONOHYDRATE 25 ML: 25 INJECTION, SOLUTION INTRAVENOUS at 21:26

## 2024-11-01 RX ADMIN — SUCRALFATE 1 G: 1 TABLET ORAL at 22:35

## 2024-11-01 RX ADMIN — ONDANSETRON 4 MG: 2 INJECTION INTRAMUSCULAR; INTRAVENOUS at 23:18

## 2024-11-01 RX ADMIN — LIDOCAINE 1 PATCH: 50 PATCH TOPICAL at 20:41

## 2024-11-01 RX ADMIN — SODIUM CHLORIDE 1000 ML: 0.9 INJECTION, SOLUTION INTRAVENOUS at 18:49

## 2024-11-01 RX ADMIN — DEXTROSE MONOHYDRATE 25 ML: 25 INJECTION, SOLUTION INTRAVENOUS at 20:07

## 2024-11-01 RX ADMIN — DEXTROSE 2000 MG: 50 INJECTION, SOLUTION INTRAVENOUS at 18:54

## 2024-11-01 NOTE — ED PROVIDER NOTES
"Time reflects when diagnosis was documented in both MDM as applicable and the Disposition within this note       Time User Action Codes Description Comment    11/1/2024 11:03 PM Bendock, Jerica L Add [R50.9] Fever     11/1/2024 11:03 PM Bendock, Jerica L Add [R68.83] Chills     11/1/2024 11:03 PM Bendock, Jerica L Add [R11.10] Vomiting     11/1/2024 11:03 PM Bendock, Jerica L Add [E83.42] Hypomagnesemia     11/1/2024 11:03 PM Bendock, Jerica L Add [E87.6] Hypokalemia           ED Disposition       ED Disposition   Discharge    Condition   Stable    Date/Time   Fri Nov 1, 2024 11:16 PM    Comment   Jolie Mulligan discharge to home/self care.                   Assessment & Plan       Medical Decision Making  Differential diagnosis includes but not limited to:  Viral etiology, biliary colic, cholecystitis, obstruction, gastritis, diabetic gastroparesis, appendicitis, hepatitis, mi, AFib, torsion, kidney stones, DKA, increased ICP, meningitis, withdrawal from medication, pregnancy, psychogenic, radiation, migraines, labyrinthitis, Meniere's    Based on history and physical concerning for viral etiology versus cholecystitis versus obstruction versus biliary colic versus DKA versus appendicitis versus UTI versus pyelonephritis.    Amount and/or Complexity of Data Reviewed  Independent Historian: guardian     Details:     Daughter at bedside      External Data Reviewed: notes.     Details:   Last colonoscopy and EGD on November 2021 showed normal EGD as well as a 6 mm polyp otherwise normal on colonoscopy    \"Jolie Hodges is 68 y.o. female with a PMHx signifcant for diabetes mellitus type 2, osteoarthritis, hypertension, breast carcinoma s/p chemo and radiation therapy and lumpectomy, hyperlipidemia, hypothyroidism, PE on Xarelto, pulmonary nodule, chronic venous insufficiency who presented to [facility] on 6/1/2024 1:01 PM with complaint of sudden onset of nausea, vomiting and diarrhea " "with some abdominal pain. She was diagnosed with rhinovirus/enterovirus and bacteremia showed Serratia marcescens. Patient was started on IV cefepime. Of note, patient initially presented at [address] [place] [address] for similar symptoms. CT scan abdomen obtained did not show any acute pathology. Blood culture obtained and sent home with supportive care. Blood culture came back positive with Serratia marcescens which prompted her to come in the hospital for evaluation. ID was consulted. Blood culture obtained again. Started on IV cefepime. Transitioned to PO abxs on 6/3 with stop date 6/10/24. \"          Labs: ordered. Decision-making details documented in ED Course.     Details:     Leukocytosis without any anemia or thrombocytopenia  No acute kidney injury.  Hypomagnesia and hypokalemia  Lactic acid within normal range   procalcitonin within normal range  Urine without any evidence of infection is leukocyte nitrite negative  Flu/COVID-negative          Radiology: ordered. Decision-making details documented in ED Course.     Details:   Chest x-ray with haziness on the right side on my read      CT chest on pelvis interpreted by radiologist as IMPRESSION:  Suspect area of soft tissue ulceration on the right posterior inferior gluteal region partially imaged. No collection.  Otherwise no CT evidence of acute findings.         ECG/medicine tests: ordered and independent interpretation performed. Decision-making details documented in ED Course.     Details:       No ischemia or arrhythmia    Risk  OTC drugs.  Prescription drug management.        ED Course as of 11/01/24 0598   Fri Nov 01, 2024 1814 Patient is a 69-year-old female here with daughter helps provide history coming in today with fevers, nausea, vomiting and generalized fatigue.  On exam patient is in no acute distress but meet sepsis criteria.  Septic workup started      Disclosure: Voice to text software was used in the preparation of this document and could have " "resulted in translational errors.      Occasional wrong word or \"sound a like\" substitutions may have occurred due to the inherent limitations of voice recognition software.  Read the chart carefully and recognize, using context, where substitutions have occurred.       I have independently reviewed external records are available to me to the level of detail possible within the time constraints of my patient care responsibilities in the ED.       1901 Sepsis alert called.        1907 FLU/COVID Rapid Antigen (30 min. TAT) - Preferred screening test in ED  FLU/COVID negative       1908 Noted hypomagnesia and hypokalemia and will replace.  Pending chest x-ray         1925 Patient's procalcitonin and lactic acid within normal range.  Patient did report to staff that she has been having left lower quadrant pain will plan for CT abdomen pelvis       1933 Patient's urine without leukocytes, nitrites or bacteria.       2000 Received report that patient was getting alert from her monitor stating that her glucose was dropping and given verbal order for dextrose.  Will repeat temperature while waiting on CT        2205 Suspect area of soft tissue ulceration on the right posterior inferior gluteal region partially imaged. No collection.  Otherwise no CT evidence of acute findings.  3       2210 Patient sleeping.  Patient and daughter updated on labs, urine and CT.  Patient and family were updated about the findings on CT however on physical exam there is no abnormality to the gluteal region.    Patient still has some diffuse abdominal tenderness.  But nonperitoneal.  Will trial p.o., have patient ambulate and repeat temperature.  Patient family agreeable with plan    Also discussed regarding her glucose as she did have 2 drops in her glucose.  Will continue to monitor/observe       2241 Patient ambulated with a walker.  Typically uses a cane however did well with walker no ataxia.  No shortness of breath.  Afebrile and vital " signs are stable.  Tolerating p.o. at this time       2302 Patient has been tolerating p.o. well.  Glucose noted to be 127.  Will continue to observe given patient has had periods of hypoglycemia.  No source of infectious pathology at this time.       2314 Patient and family updated and reviewed labs.  No obvious source of infection with urine was negative, flu COVID, CT abdomen pelvis.  Patient states that she has no elevated procalcitonin and no a lactic acid.  She states it feels similar to her symptoms that she had at [facility].           Discussed with patient and family strict return ER instructions.  Patient was given a dose of antibiotics here.  Feels better no vomiting and fever resolved.  No evidence of severe sepsis.        Medications   lidocaine (LIDODERM) 5 % patch 1 patch (1 patch Topical Medication Applied 11/1/24 2041)   sodium chloride 0.9 % bolus 1,000 mL (0 mL Intravenous Stopped 11/1/24 1937)     Followed by   sodium chloride 0.9 % bolus 1,000 mL (0 mL Intravenous Stopped 11/1/24 2053)     Followed by   sodium chloride 0.9 % bolus 1,000 mL (0 mL Intravenous Stopped 11/1/24 2145)   ceftriaxone (ROCEPHIN) 2 g/50 mL in dextrose IVPB (0 mg Intravenous Stopped 11/1/24 1924)   ondansetron (ZOFRAN) injection 4 mg (4 mg Intravenous Given 11/1/24 1852)   acetaminophen (Ofirmev) injection 1,000 mg (0 mg Intravenous Stopped 11/1/24 1907)   magnesium sulfate 2 g/50 mL IVPB (premix) 2 g (0 g Intravenous Stopped 11/1/24 2045)   potassium chloride (Klor-Con M20) CR tablet 20 mEq (20 mEq Oral Given 11/1/24 1932)   dextrose 50 % IV solution 25 mL (25 mL Intravenous Given 11/1/24 2007)   iohexol (OMNIPAQUE) 350 MG/ML injection (MULTI-DOSE) 100 mL (100 mL Intravenous Given 11/1/24 2015)   ketorolac (TORADOL) injection 15 mg (15 mg Intravenous Given 11/1/24 2042)   dextrose 50 % IV solution 25 mL (25 mL Intravenous Given 11/1/24 2126)   dicyclomine (BENTYL) tablet 20 mg (20 mg Oral Given 11/1/24 2235)    sucralfate (CARAFATE) tablet 1 g (1 g Oral Given 11/1/24 2234)   ondansetron (ZOFRAN) injection 4 mg (4 mg Intravenous Given 11/1/24 2318)       ED Risk Strat Scores                         SBIRT 20yo+      Flowsheet Row Most Recent Value   Initial Alcohol Screen: US AUDIT-C     1. How often do you have a drink containing alcohol? 0 Filed at: 11/01/2024 1741   2. How many drinks containing alcohol do you have on a typical day you are drinking?  0 Filed at: 11/01/2024 1741   3a. Male UNDER 65: How often do you have five or more drinks on one occasion? 0 Filed at: 11/01/2024 1741   3b. FEMALE Any Age, or MALE 65+: How often do you have 4 or more drinks on one occassion? 0 Filed at: 11/01/2024 1741   Audit-C Score 0 Filed at: 11/01/2024 1741   MARGO: How many times in the past year have you...    Used an illegal drug or used a prescription medication for non-medical reasons? Never Filed at: 11/01/2024 1741                            History of Present Illness       Chief Complaint   Patient presents with    Chills     Pt reports eating soup w/ egg and felt abd pain, chills, and vomiting approx 1 hour later.       Past Medical History:   Diagnosis Date    Abdominal infection (HCC)     h-pylori    Abnormal chest x-ray 04/05/2019    Asthma     Breast cancer (HCC) 06/26/2018    Cancer (HCC)     BREAST    Depression, recurrent (HCC) 06/13/2022    Diabetes mellitus (HCC)     Hiatal hernia     Hiatal hernia 08/08/2018    History of chemotherapy 2018    History of radiation therapy 2018    Hypercholesterolemia     Hypertension     Hypothyroid     Renal disorder     Rheumatoid arthritis (HCC)       Past Surgical History:   Procedure Laterality Date    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    BREAST LUMPECTOMY Right 6/26/2018    Procedure: RIGHT BREAST NEEDLE LOCALIZATION X2 WITH RIGHT BREAST LUMPECTOMY ( NEEDLE LOC @ 1000);  Surgeon: Familia Barragan MD;  Location: [facility];  Service: Surgical Oncology    BREAST LUMPECTOMY  Right 8/2/2018    Procedure: LYMPHOSCINITGRAPHY INTRAOPERATIVE LYMPHATIC MAPPING , RIGHT SENTINEL NODE BIOPSY, REEXCISION  RIGHT BREAST LUMPECTOMY CAVITY (SUPERIOR MARGIN);  Surgeon: Familia Barragan MD;  Location: [facility];  Service: Surgical Oncology    BREAST SURGERY Left     CATARACT EXTRACTION, BILATERAL Bilateral     COLONOSCOPY      EGD AND COLONOSCOPY N/A 9/5/2018    Procedure: EGD AND COLONOSCOPY;  Surgeon: José Miguel Rosas MD;  Location: [facility];  Service: Gastroenterology    FL GUIDED CENTRAL VENOUS ACCESS DEVICE INSERTION  9/13/2018    KNEE SURGERY Right     MAMMO NEEDLE LOCALIZATION RIGHT (ALL INC) Right 6/26/2018    MAMMO STEREOTACTIC BREAST BIOPSY RIGHT (ALL INC) Right 5/23/2018    RETINAL DETACHMENT SURGERY Right     TUBAL LIGATION      TUNNELED VENOUS PORT PLACEMENT Left 9/13/2018    Procedure: INSERTION VENOUS PORT (PORT-A-CATH);  Surgeon: Familia Barragan MD;  Location: [facility];  Service: Surgical Oncology    UPPER GASTROINTESTINAL ENDOSCOPY        Family History   Problem Relation Age of Onset    Diabetes Mother     Hypertension Mother     Diabetes Father     Hypertension Father     No Known Problems Sister     No Known Problems Sister     No Known Problems Sister     No Known Problems Sister     No Known Problems Sister     Diabetes Brother     Diabetes Brother     Kidney failure Brother     Diabetes Brother     Cancer Maternal Grandfather     No Known Problems Daughter     No Known Problems Daughter     No Known Problems Daughter       Social History     Tobacco Use    Smoking status: Never    Smokeless tobacco: Never   Vaping Use    Vaping status: Never Used   Substance Use Topics    Alcohol use: No    Drug use: No      E-Cigarette/Vaping    E-Cigarette Use Never User       E-Cigarette/Vaping Substances    Nicotine No     THC No     CBD No     Flavoring No     Other No     Unknown No       I have reviewed and agree with the history as documented.     Patient is a 69-year-old female  Malagasy-speaking only here with daughter helps with interpretation.  Patient has a history of diabetes, hypertension, hypothyroidism, depression, breast cancer status post biopsy and lumpectomy coming in today with sudden onset of vomiting and fevers.  Patient is normal state of health.  Daughter states that she ate something with eggs and soup.  About an hour later she started vomiting.  She has been having fevers and chills.  She had several episodes of vomiting without any hematemesis or coffee-ground emesis.  She has had no diarrhea, melena or bright red blood per rectum.  She has a cough with clear sputum.  She was feeling well before this.  Patient with nausea.  Sugar has been over 200.      History provided by:  Medical records, patient and relative   used: Yes    Vomiting  Severity:  Moderate  Progression:  Improving  Chronicity:  New  Recent urination:  Normal  Context: not post-tussive and not self-induced    Relieved by:  None tried  Worsened by:  Nothing  Ineffective treatments:  None tried  Associated symptoms: abdominal pain, arthralgias, diarrhea, headaches and myalgias    Associated symptoms: no chills, no cough, no fever, no sore throat and no URI    Risk factors: diabetes    Risk factors: no alcohol use, not pregnant, no prior abdominal surgery, no sick contacts, no suspect food intake and no travel to endemic areas        Review of Systems   Constitutional: Negative.  Negative for chills and fever.   HENT: Negative.  Negative for ear pain and sore throat.    Eyes: Negative.  Negative for pain and visual disturbance.   Respiratory: Negative.  Negative for cough and shortness of breath.    Cardiovascular: Negative.  Negative for chest pain and palpitations.   Gastrointestinal:  Positive for abdominal pain, diarrhea and vomiting.   Genitourinary: Negative.  Negative for dysuria and hematuria.   Musculoskeletal:  Positive for arthralgias and myalgias. Negative for back pain.    Skin:  Negative for color change and rash.   Neurological:  Positive for headaches. Negative for seizures and syncope.   Hematological: Negative.    Psychiatric/Behavioral: Negative.     All other systems reviewed and are negative.          Objective       ED Triage Vitals   Temperature Pulse Blood Pressure Respirations SpO2 Patient Position - Orthostatic VS   11/01/24 1731 11/01/24 1731 11/01/24 1731 11/01/24 1731 11/01/24 1731 11/01/24 1731   (!) 101.6 °F (38.7 °C) (!) 110 (!) 184/79 20 97 % Sitting      Temp Source Heart Rate Source BP Location FiO2 (%) Pain Score    11/01/24 1731 11/01/24 1900 11/01/24 1731 -- 11/01/24 1731    Oral Monitor Left arm  9      Vitals      Date and Time Temp Pulse SpO2 Resp BP Pain Score FACES Pain Rating User   11/01/24 2233 -- 85 95 % 17 121/53 No Pain -- KU   11/01/24 2215 -- 84 95 % 19 108/50 -- -- RK   11/01/24 2145 -- 86 97 % 18 122/48 -- -- LAP   11/01/24 2130 -- 88 97 % 20 123/52 -- -- LAP   11/01/24 2115 -- 88 90 % 20 119/48 -- -- LAP   11/01/24 2100 -- 88 96 % 18 118/50 6 -- RK   11/01/24 2045 -- 86 96 % 18 129/52 -- -- LAP   11/01/24 2030 -- 90 96 % 18 128/60 8 -- RK   11/01/24 2023 99.3 °F (37.4 °C) 94 95 % 18 127/46 -- -- RK   11/01/24 2022 -- 94 96 % 15 -- -- -- RK   11/01/24 2000 -- 94 96 % 19 117/49 -- -- RK   11/01/24 1945 -- 94 97 % 20 116/43 -- -- RK   11/01/24 1915 -- 100 95 % 21 136/48 -- -- RK   11/01/24 1900 -- 100 97 % 18 157/62 -- -- RK   11/01/24 1731 101.6 °F (38.7 °C) 110 97 % 20 184/79 9 -- NG            Physical Exam  Vitals and nursing note reviewed.   Constitutional:       General: She is awake. She is not in acute distress.     Appearance: Normal appearance. She is well-developed and overweight.   HENT:      Head: Normocephalic and atraumatic.      Right Ear: External ear normal.      Left Ear: External ear normal.   Eyes:      General: Lids are normal. Gaze aligned appropriately.      Extraocular Movements: Extraocular movements intact.       Conjunctiva/sclera: Conjunctivae normal.      Pupils: Pupils are equal, round, and reactive to light.   Neck:      Trachea: Trachea and phonation normal.   Cardiovascular:      Rate and Rhythm: Regular rhythm. Tachycardia present.      Pulses:           Radial pulses are 2+ on the right side and 2+ on the left side.        Dorsalis pedis pulses are 2+ on the right side and 2+ on the left side.      Heart sounds: Normal heart sounds, S1 normal and S2 normal. No murmur heard.  Pulmonary:      Effort: Pulmonary effort is normal. No respiratory distress.      Breath sounds: Normal breath sounds.   Abdominal:      General: Bowel sounds are normal.      Palpations: Abdomen is soft.      Tenderness: There is no abdominal tenderness. There is no guarding or rebound. Negative signs include Mooney's sign, Rovsing's sign and McBurney's sign.   Musculoskeletal:         General: No swelling.      Cervical back: Normal range of motion and neck supple.      Right lower leg: No edema.      Left lower leg: No edema.   Lymphadenopathy:      Cervical: Cervical adenopathy present.   Skin:     General: Skin is warm and dry.      Capillary Refill: Capillary refill takes less than 2 seconds.   Neurological:      General: No focal deficit present.      Mental Status: She is alert and oriented to person, place, and time.      GCS: GCS eye subscore is 4. GCS verbal subscore is 5. GCS motor subscore is 6.      Cranial Nerves: Cranial nerves 2-12 are intact.      Sensory: Sensation is intact.      Motor: Motor function is intact.      Coordination: Coordination is intact.   Psychiatric:         Mood and Affect: Mood normal.         Behavior: Behavior is cooperative.         Results Reviewed       Procedure Component Value Units Date/Time    Fingerstick Glucose (POCT) [[id number]]  (Normal) Collected: 11/01/24 2257    Lab Status: Final result Specimen: Blood Updated: 11/01/24 2258     POC Glucose 127 mg/dl     Blood culture #1 [[id number]]  Collected: 11/01/24 1831    Lab Status: Preliminary result Specimen: Blood from Arm, Left Updated: 11/01/24 2201     Blood Culture Received in Microbiology Lab. Culture in Progress.    Blood culture #2 [[id number]] Collected: 11/01/24 1821    Lab Status: Preliminary result Specimen: Blood from Arm, Right Updated: 11/01/24 2201     Blood Culture Received in Microbiology Lab. Culture in Progress.    Urine Microscopic [[id number]]  (Abnormal) Collected: 11/01/24 1849    Lab Status: Final result Specimen: Urine, Clean Catch Updated: 11/01/24 1929     RBC, UA 1-2 /hpf      WBC, UA 4-10 /hpf      Epithelial Cells Occasional /hpf      Bacteria, UA None Seen /hpf      MUCUS THREADS Occasional    UA w Reflex to Microscopic w Reflex to Culture [[id number]]  (Abnormal) Collected: 11/01/24 1849    Lab Status: Final result Specimen: Urine, Clean Catch Updated: 11/01/24 1926     Color, UA Light Yellow     Clarity, UA Clear     Specific Gravity, UA 1.021     pH, UA 8.0     Leukocytes, UA Negative     Nitrite, UA Negative     Protein,  (2+) mg/dl      Glucose,  (1/2%) mg/dl      Ketones, UA Negative mg/dl      Urobilinogen, UA <2.0 mg/dl      Bilirubin, UA Negative     Occult Blood, UA Negative    Procalcitonin [[id number]]  (Normal) Collected: 11/01/24 1821    Lab Status: Final result Specimen: Blood from Arm, Right Updated: 11/01/24 1917     Procalcitonin 0.06 ng/ml     Lactic acid [[id number]]  (Normal) Collected: 11/01/24 1821    Lab Status: Final result Specimen: Blood from Arm, Right Updated: 11/01/24 1909     LACTIC ACID 1.8 mmol/L     Narrative:      Result may be elevated if tourniquet was used during collection.    Comprehensive metabolic panel [[id number]]  (Abnormal) Collected: 11/01/24 1821    Lab Status: Final result Specimen: Blood from Arm, Right Updated: 11/01/24 1908     Sodium 136 mmol/L      Potassium 3.4 mmol/L      Chloride 100 mmol/L      CO2 29 mmol/L      ANION GAP 7 mmol/L      BUN 22 mg/dL       Creatinine 0.66 mg/dL      Glucose 174 mg/dL      Calcium 10.1 mg/dL      AST 14 U/L      ALT 19 U/L      Alkaline Phosphatase 64 U/L      Total Protein 7.7 g/dL      Albumin 4.0 g/dL      Total Bilirubin 0.59 mg/dL      eGFR 91 ml/min/1.73sq m     Narrative:      National Kidney Disease Foundation guidelines for Chronic Kidney Disease (CKD):     Stage 1 with normal or high GFR (GFR > 90 mL/min/1.73 square meters)    Stage 2 Mild CKD (GFR = 60-89 mL/min/1.73 square meters)    Stage 3A Moderate CKD (GFR = 45-59 mL/min/1.73 square meters)    Stage 3B Moderate CKD (GFR = 30-44 mL/min/1.73 square meters)    Stage 4 Severe CKD (GFR = 15-29 mL/min/1.73 square meters)    Stage 5 End Stage CKD (GFR <15 mL/min/1.73 square meters)  Note: GFR calculation is accurate only with a steady state creatinine    Magnesium [[id number]]  (Abnormal) Collected: 11/01/24 1821    Lab Status: Final result Specimen: Blood from Arm, Right Updated: 11/01/24 1908     Magnesium 1.3 mg/dL     FLU/COVID Rapid Antigen (30 min. TAT) - Preferred screening test in ED [[id number]]  (Normal) Collected: 11/01/24 1821    Lab Status: Final result Specimen: Nares from Nose Updated: 11/01/24 1906     SARS COV Rapid Antigen Negative     Influenza A Rapid Antigen Negative     Influenza B Rapid Antigen Negative    Narrative:      This test has been performed using the Quidel Reena 2 FLU+SARS Antigen test under the Emergency Use Authorization (EUA). This test has been validated by the  and verified by the performing laboratory. The Reena uses lateral flow immunofluorescent sandwich assay to detect SARS-COV, Influenza A and Influenza B Antigen.     The Quidel Reena 2 SARS Antigen test does not differentiate between SARS-CoV and SARS-CoV-2.     Negative results are presumptive and may be confirmed with a molecular assay, if necessary, for patient management. Negative results do not rule out SARS-CoV-2 or influenza infection and should not be used as the  sole basis for treatment or patient management decisions. A negative test result may occur if the level of antigen in a sample is below the limit of detection of this test.     Positive results are indicative of the presence of viral antigens, but do not rule out bacterial infection or co-infection with other viruses.     All test results should be used as an adjunct to clinical observations and other information available to the provider.    FOR PEDIATRIC PATIENTS - copy/paste COVID Guidelines URL to browser: [email or web address]    Protime-INR [[id number]]  (Normal) Collected: 11/01/24 1821    Lab Status: Final result Specimen: Blood from Arm, Right Updated: 11/01/24 1902     Protime 13.0 seconds      INR 0.97    Narrative:      INR Therapeutic Range    Indication                                             INR Range      Atrial Fibrillation                                               2.0-3.0  Hypercoagulable State                                    2.0.2.3  Left Ventricular Asist Device                            2.0-3.0  Mechanical Heart Valve                                  -    Aortic(with afib, MI, embolism, HF, LA enlargement,    and/or coagulopathy)                                     2.0-3.0 (2.5-3.5)     Mitral                                                             2.5-3.5  Prosthetic/Bioprosthetic Heart Valve               2.0-3.0  Venous thromboembolism (VTE: VT, PE        2.0-3.0    APTT [[id number]]  (Normal) Collected: 11/01/24 1821    Lab Status: Final result Specimen: Blood from Arm, Right Updated: 11/01/24 1902     PTT 23 seconds     Blood gas, Venous [[id number]]  (Abnormal) Collected: 11/01/24 1821    Lab Status: Final result Specimen: Blood from Arm, Right Updated: 11/01/24 1847     pH, Reagan 7.386     pCO2, Reagan 49.3 mm Hg      pO2, Reagan 31.0 mm Hg      HCO3, Reagan 28.9 mmol/L      Base Excess, Reagan 3.0 mmol/L      O2 Content, Reagan 11.1 ml/dL       O2 HGB, VENOUS 60.0 %     CBC and differential [[id number]]  (Abnormal) Collected: 11/01/24 1821    Lab Status: Final result Specimen: Blood from Arm, Right Updated: 11/01/24 1846     WBC 13.68 Thousand/uL      RBC 4.21 Million/uL      Hemoglobin 12.0 g/dL      Hematocrit 36.9 %      MCV 88 fL      MCH 28.5 pg      MCHC 32.5 g/dL      RDW 13.8 %      MPV 11.8 fL      Platelets 259 Thousands/uL      nRBC 0 /100 WBCs      Segmented % 82 %      Immature Grans % 1 %      Lymphocytes % 8 %      Monocytes % 6 %      Eosinophils Relative 2 %      Basophils Relative 1 %      Absolute Neutrophils 11.39 Thousands/µL      Absolute Immature Grans 0.09 Thousand/uL      Absolute Lymphocytes 1.11 Thousands/µL      Absolute Monocytes 0.79 Thousand/µL      Eosinophils Absolute 0.22 Thousand/µL      Basophils Absolute 0.08 Thousands/µL             CT chest abdomen pelvis w contrast   Final Interpretation by Arian Ca DO (11/01 2155)   Suspect area of soft tissue ulceration on the right posterior inferior gluteal region partially imaged. No collection.   Otherwise no CT evidence of acute findings.            Workstation performed: [id number]         XR chest portable    (Results Pending)       ECG 12 Lead Documentation Only    Date/Time: 11/1/2024 6:27 PM    Performed by: Jerica Varags DO  Authorized by: Jerica Vargas DO    Indications / Diagnosis:  Fever  ECG reviewed by me, the ED Provider: yes    Patient location:  ED  Previous ECG:     Previous ECG:  Compared to current    Comparison ECG info:  8/28/24    Similarity:  No change  Interpretation:     Interpretation: non-specific    Quality:     Tracing quality:  Limited by artifact  Rate:     ECG rate:  100    ECG rate assessment: tachycardic    Rhythm:     Rhythm: sinus tachycardia    Ectopy:     Ectopy: none    QRS:     QRS axis:  Normal    QRS intervals:  Normal  Conduction:     Conduction: normal    ST segments:     ST segments:  Non-specific  T waves:     T  waves: non-specific    Comments:      QTC @ 404 ms  Normal axis        ED Medication and Procedure Management   Prior to Admission Medications   Prescriptions Last Dose Informant Patient Reported? Taking?   Alcohol Swabs (Pharmacist Choice Alcohol) PADS  Self, Child No Yes   Sig: CLEAN AREA BEFORE TESTING OR INJECTING FOUR TIMES DAILY   Comfort EZ Pen Needles 32G X 4 MM MISC  Self, Child No Yes   Sig: USE AS DIRECTED FOUR TIMES DAILY   Continuous Glucose  (FreeStyle Broderick [place]) AARON  Self, Child No Yes   Sig: Use 1 Units continuous   Continuous Glucose Sensor (FreeStyle Broderick 3 Sensor) MISC  Self, Child No Yes   Sig: Use 1 Units every 14 (fourteen) days   Diclofenac Sodium (VOLTAREN) 1 %  Self, Child No Yes   Sig: Apply 2 g topically 2 (two) times a day   EPINEPHrine (Auvi-Q) 0.1 mg/0.1 mL SOAJ   No No   Sig: Inject 0.3 mL (0.3 mg total) into a muscle once for 1 dose   Mag-G 500 (27 Mg) MG tablet  Self, Child No Yes   Sig: TAKE 1 TABLET (500 MG TOTAL) BY MOUTH DAILY   OneTouch Delica Lancets 33G MISC  Self, Child No Yes   Sig: USE 3 (THREE) TIMES A DAY   OneTouch Ultra test strip  Self, Child No Yes   Sig: USE 1 EACH 3 (THREE) TIMES A DAY USE AS INSTRUCTED   acetaminophen (TYLENOL) 500 mg tablet  Self, Child No Yes   Sig: Take 1 tablet (500 mg total) by mouth every 6 (six) hours as needed for fever   acetaminophen (TYLENOL) 650 mg CR tablet  Self, Child No No   Sig: Take 1 tablet (650 mg total) by mouth every 8 (eight) hours as needed for mild pain   albuterol (2.5 mg/3 mL) 0.083 % nebulizer solution  Self, Child No Yes   Sig: Take 3 mL (2.5 mg total) by nebulization every 6 (six) hours as needed for wheezing or shortness of breath   albuterol (2.5 mg/3 mL) 0.083 % nebulizer solution  Self, Child No Yes   Sig: Take 3 mL (2.5 mg total) by nebulization every 6 (six) hours as needed for wheezing or shortness of breath   albuterol (2.5 mg/3 mL) 0.083 % nebulizer solution   No Yes   Sig: Take 3 mL (2.5 mg  total) by nebulization every 6 (six) hours as needed for wheezing or shortness of breath   albuterol (PROVENTIL HFA,VENTOLIN HFA) 90 mcg/act inhaler  Self, Child No Yes   Sig: Inhale 1 puff every 4 (four) hours as needed for wheezing   albuterol (Proventil HFA) 90 mcg/act inhaler  Self, Child No Yes   Sig: Inhale 1-2 puffs every 6 (six) hours as needed for wheezing or shortness of breath   albuterol (Ventolin HFA) 90 mcg/act inhaler   No Yes   Sig: Inhale 1-2 puffs every 6 (six) hours as needed for wheezing or shortness of breath   anastrozole (ARIMIDEX) 1 mg tablet   No Yes   Sig: Take 1 tablet (1 mg total) by mouth daily   aspirin 81 mg chewable tablet  Self, Child Yes Yes   Sig: Chew 81 mg daily   atorvastatin (LIPITOR) 40 mg tablet  Self, Child No Yes   Sig: Take 1 tablet (40 mg total) by mouth daily   benzonatate (TESSALON PERLES) 100 mg capsule  Self, Child No Yes   Sig: Take 1 capsule (100 mg total) by mouth 3 (three) times a day as needed for cough   calcium polycarbophil (FIBERCON) 625 mg tablet  Self, Child No Yes   Sig: Take 1 tablet (625 mg total) by mouth daily   ergocalciferol (VITAMIN D2) 50,000 units  Self, Child No No   Sig: TAKE 1 CAPSULE (50,000 UNITS TOTAL) BY MOUTH ONCE A WEEK   fluticasone (FLONASE) 50 mcg/act nasal spray  Self, Child Yes Yes   fluticasone-salmeterol (Advair HFA) 230-21 MCG/ACT inhaler  Self, Child No Yes   Sig: Inhale 2 puffs 2 (two) times a day Rinse mouth after use.   furosemide (LASIX) 20 mg tablet  Self, Child No No   Sig: TAKE 1 TABLET (20 MG TOTAL) BY MOUTH 2 (TWO) TIMES A DAY   guaiFENesin (MUCINEX) 600 mg 12 hr tablet  Self, Child No Yes   Sig: Take 1 tablet (600 mg total) by mouth every 12 (twelve) hours as needed for congestion   insulin degludec (Tresiba FlexTouch) 200 units/mL CONCENTRATED U-200 injection pen  Self, Child No Yes   Sig: INJECT 60 UNITS UNDER THE SKIN DAILY AT 5PM   insulin lispro (HumaLOG) 100 units/mL injection pen  Self, Child No Yes   Sig: INJECT  24 UNITS BEFORE EACH MEAL PLUS SLIDING SCALE 150-200: 2 UNITS 201-250: 4 UNITS 251-300: 6 UNITS 301*   ketotifen (ZADITOR) 0.025 % ophthalmic solution  Self, Child No No   Sig: Administer 1 drop to both eyes 2 (two) times a day as needed (itchy eyes)   latanoprost (XALATAN) 0.005 % ophthalmic solution  Self, Child Yes Yes   loratadine (CLARITIN) 10 mg tablet  Self, Child No Yes   Sig: TAKE 1 TABLET (10 MG TOTAL) BY MOUTH DAILY   losartan (COZAAR) 100 MG tablet  Self, Child No Yes   Sig: Take 1 tablet (100 mg total) by mouth daily   magnesium chloride-calcium (Slow-Mag) 71.5-119 mg  Self, Child No Yes   Sig: Take 2 tablets by mouth daily   methocarbamol (ROBAXIN) 500 mg tablet  Child, Self Yes Yes   metoprolol tartrate (LOPRESSOR) 25 mg tablet  Self, Child No Yes   Sig: TAKE 0.5 TABLETS (12.5 MG TOTAL) BY MOUTH EVERY 12 (TWELVE) HOURS   omeprazole (PriLOSEC) 20 mg delayed release capsule  Self, Child No Yes   Sig: TAKE 1 CAPSULE (20 MG TOTAL) BY MOUTH DAILY   polyethylene glycol (MIRALAX) 17 g packet  Self No No   Sig: Take 17 g by mouth daily for 14 days   tiotropium (Spiriva Respimat) 2.5 MCG/ACT AERS inhaler  Self, Child No Yes   Sig: Inhale 2 puffs daily   tirzepatide (Mounjaro) 2.5 MG/0.5ML  Self, Child No Yes   Sig: INJECT 0.5 ML (2.5 MG TOTAL) UNDER THE SKIN ONCE A WEEK   zileuton 600 MG  Self, Child No Yes   Sig: TAKE 1 TABLET (600 MG TOTAL) BY MOUTH 2 (TWO) TIMES A DAY      Facility-Administered Medications: None     Discharge Medication List as of 11/1/2024 11:16 PM        START taking these medications    Details   magnesium (MAGTAB) 84 MG (7MEQ) TBCR Take 1 tablet (84 mg total) by mouth 2 (two) times a day for 14 days, Starting Fri 11/1/2024, Until Fri 11/15/2024, Normal      ondansetron (ZOFRAN) 4 mg tablet Take 1 tablet (4 mg total) by mouth every 6 (six) hours, Starting Fri 11/1/2024, Normal           CONTINUE these medications which have NOT CHANGED    Details   acetaminophen (TYLENOL) 500 mg tablet  Take 1 tablet (500 mg total) by mouth every 6 (six) hours as needed for fever, Starting Wed 8/28/2024, Normal      !! albuterol (2.5 mg/3 mL) 0.083 % nebulizer solution Take 3 mL (2.5 mg total) by nebulization every 6 (six) hours as needed for wheezing or shortness of breath, Starting Tue 5/7/2024, Normal      !! albuterol (2.5 mg/3 mL) 0.083 % nebulizer solution Take 3 mL (2.5 mg total) by nebulization every 6 (six) hours as needed for wheezing or shortness of breath, Starting Wed 8/28/2024, Normal      !! albuterol (2.5 mg/3 mL) 0.083 % nebulizer solution Take 3 mL (2.5 mg total) by nebulization every 6 (six) hours as needed for wheezing or shortness of breath, Starting Mon 10/14/2024, Normal      !! albuterol (Proventil HFA) 90 mcg/act inhaler Inhale 1-2 puffs every 6 (six) hours as needed for wheezing or shortness of breath, Starting Wed 8/28/2024, Normal      !! albuterol (PROVENTIL HFA,VENTOLIN HFA) 90 mcg/act inhaler Inhale 1 puff every 4 (four) hours as needed for wheezing, Starting Tue 5/7/2024, Normal      !! albuterol (Ventolin HFA) 90 mcg/act inhaler Inhale 1-2 puffs every 6 (six) hours as needed for wheezing or shortness of breath, Starting Mon 10/14/2024, Normal      Alcohol Swabs (Pharmacist Choice Alcohol) PADS CLEAN AREA BEFORE TESTING OR INJECTING FOUR TIMES DAILY, Normal      anastrozole (ARIMIDEX) 1 mg tablet Take 1 tablet (1 mg total) by mouth daily, Starting Mon 9/23/2024, Normal      aspirin 81 mg chewable tablet Chew 81 mg daily, Historical Med      atorvastatin (LIPITOR) 40 mg tablet Take 1 tablet (40 mg total) by mouth daily, Starting Wed 8/23/2023, Normal      benzonatate (TESSALON PERLES) 100 mg capsule Take 1 capsule (100 mg total) by mouth 3 (three) times a day as needed for cough, Starting Wed 8/28/2024, Normal      calcium polycarbophil (FIBERCON) 625 mg tablet Take 1 tablet (625 mg total) by mouth daily, Starting Wed 10/11/2023, Normal      Comfort EZ Pen Needles 32G X 4 MM MISC USE  AS DIRECTED FOUR TIMES DAILY, Normal      Continuous Glucose  (FreeStyle Broderick [place]) AARON Use 1 Units continuous, Starting Wed 6/5/2024, Normal      Continuous Glucose Sensor (FreeStyle Broderick 3 Sensor) MISC Use 1 Units every 14 (fourteen) days, Starting Wed 6/5/2024, Normal      Diclofenac Sodium (VOLTAREN) 1 % Apply 2 g topically 2 (two) times a day, Starting Wed 8/23/2023, Normal      fluticasone (FLONASE) 50 mcg/act nasal spray Historical Med      fluticasone-salmeterol (Advair HFA) 230-21 MCG/ACT inhaler Inhale 2 puffs 2 (two) times a day Rinse mouth after use., Starting Tue 5/7/2024, Normal      guaiFENesin (MUCINEX) 600 mg 12 hr tablet Take 1 tablet (600 mg total) by mouth every 12 (twelve) hours as needed for congestion, Starting Wed 8/28/2024, Normal      insulin degludec (Tresiba FlexTouch) 200 units/mL CONCENTRATED U-200 injection pen INJECT 60 UNITS UNDER THE SKIN DAILY AT 5PM, Normal      insulin lispro (HumaLOG) 100 units/mL injection pen INJECT 24 UNITS BEFORE EACH MEAL PLUS SLIDING SCALE 150-200: 2 UNITS 201-250: 4 UNITS 251-300: 6 UNITS 301*, Normal      latanoprost (XALATAN) 0.005 % ophthalmic solution Historical Med      loratadine (CLARITIN) 10 mg tablet TAKE 1 TABLET (10 MG TOTAL) BY MOUTH DAILY, Starting Thu 8/5/2021, Normal      losartan (COZAAR) 100 MG tablet Take 1 tablet (100 mg total) by mouth daily, Starting Wed 8/23/2023, Normal      Mag-G 500 (27 Mg) MG tablet TAKE 1 TABLET (500 MG TOTAL) BY MOUTH DAILY, Normal      magnesium chloride-calcium (Slow-Mag) 71.5-119 mg Take 2 tablets by mouth daily, Starting Wed 9/18/2024, Normal      methocarbamol (ROBAXIN) 500 mg tablet Historical Med      metoprolol tartrate (LOPRESSOR) 25 mg tablet TAKE 0.5 TABLETS (12.5 MG TOTAL) BY MOUTH EVERY 12 (TWELVE) HOURS, Starting Mon 1/8/2024, Normal      omeprazole (PriLOSEC) 20 mg delayed release capsule TAKE 1 CAPSULE (20 MG TOTAL) BY MOUTH DAILY, Starting Mon 2/5/2024, Normal      OneTouch Delica  Lancets 33G MISC USE 3 (THREE) TIMES A DAY, Normal      OneTouch Ultra test strip USE 1 EACH 3 (THREE) TIMES A DAY USE AS INSTRUCTED, Starting Sat 2/18/2023, Normal      tiotropium (Spiriva Respimat) 2.5 MCG/ACT AERS inhaler Inhale 2 puffs daily, Starting Tue 5/7/2024, Normal      tirzepatide (Mounjaro) 2.5 MG/0.5ML INJECT 0.5 ML (2.5 MG TOTAL) UNDER THE SKIN ONCE A WEEK, Normal      zileuton 600 MG TAKE 1 TABLET (600 MG TOTAL) BY MOUTH 2 (TWO) TIMES A DAY, Normal      acetaminophen (TYLENOL) 650 mg CR tablet Take 1 tablet (650 mg total) by mouth every 8 (eight) hours as needed for mild pain, Starting Mon 6/13/2022, Normal      EPINEPHrine (Auvi-Q) 0.1 mg/0.1 mL SOAJ Inject 0.3 mL (0.3 mg total) into a muscle once for 1 dose, Starting Wed 11/9/2022, Normal      ergocalciferol (VITAMIN D2) 50,000 units TAKE 1 CAPSULE (50,000 UNITS TOTAL) BY MOUTH ONCE A WEEK, Starting Tue 9/5/2023, Normal      furosemide (LASIX) 20 mg tablet TAKE 1 TABLET (20 MG TOTAL) BY MOUTH 2 (TWO) TIMES A DAY, Starting Mon 7/17/2023, Normal      ketotifen (ZADITOR) 0.025 % ophthalmic solution Administer 1 drop to both eyes 2 (two) times a day as needed (itchy eyes), Starting Tue 10/3/2023, Normal      polyethylene glycol (MIRALAX) 17 g packet Take 17 g by mouth daily for 14 days, Starting Wed 10/11/2023, Until Wed 6/5/2024, Normal       !! - Potential duplicate medications found. Please discuss with provider.        No discharge procedures on file.  ED SEPSIS DOCUMENTATION   Time reflects when diagnosis was documented in both MDM as applicable and the Disposition within this note       Time User Action Codes Description Comment    11/1/2024 11:03 PM Jerica Vargas [R50.9] Fever     11/1/2024 11:03 PM Jerica Vargas Add [R68.83] Chills     11/1/2024 11:03 PM Jerica Vargas Add [R11.10] Vomiting     11/1/2024 11:03 PM Jerica Vargas Add [E83.42] Hypomagnesemia     11/1/2024 11:03 PM Jerica Vargas Add [E87.6] Hypokalemia             Initial Sepsis Screening       Row Name 11/01/24 1909 11/01/24 1902             Is the patient's history suggestive of a new or worsening infection? -- Yes (Proceed)  -NB       Suspected source of infection suspect infection, source unknown  -NB suspect infection, source unknown  -NB       Indicate SIRS criteria Hyperthemia > 38.3C (100.9F) OR Hypothermia <36C (96.8F);Tachycardia > 90 bpm;Leukocytosis (WBC > 61427 IJL) OR Leukopenia (WBC <4000 IJL) OR Bandemia (WBC >10% bands)  -NB Hyperthemia > 38.3C (100.9F) OR Hypothermia <36C (96.8F);Leukocytosis (WBC > 92160 IJL) OR Leukopenia (WBC <4000 IJL) OR Bandemia (WBC >10% bands);Tachycardia > 90 bpm  -NB       Are two or more of the above signs & symptoms of infection both present and new to the patient? Yes (Proceed)  -NB Yes (Proceed)  -NB       Assess for evidence of organ dysfunction: Are any of the below criteria present within 6 hours of suspected infection and SIRS criteria that are NOT considered to be chronic conditions? -- --                 User Key  (r) = Recorded By, (t) = Taken By, (c) = Cosigned By      Initials Name Provider Type    NB Jerica Vargas DO Physician                       Jerica Vargas,   11/01/24 2008       Jerica Vargas,   11/01/24 [id number]

## 2024-11-01 NOTE — SEPSIS NOTE
Sepsis Note   Jolie Mulligan 69 y.o. female MRN: [id number]  Unit/Bed#: ED-19 Encounter: [id number]       Initial Sepsis Screening       Row Name 11/01/24 1909 11/01/24 1902             Is the patient's history suggestive of a new or worsening infection? -- Yes (Proceed)  -NB       Suspected source of infection suspect infection, source unknown  -NB suspect infection, source unknown  -NB       Indicate SIRS criteria Hyperthemia > 38.3C (100.9F) OR Hypothermia <36C (96.8F);Tachycardia > 90 bpm;Leukocytosis (WBC > 93710 IJL) OR Leukopenia (WBC <4000 IJL) OR Bandemia (WBC >10% bands)  -NB Hyperthemia > 38.3C (100.9F) OR Hypothermia <36C (96.8F);Leukocytosis (WBC > 82184 IJL) OR Leukopenia (WBC <4000 IJL) OR Bandemia (WBC >10% bands);Tachycardia > 90 bpm  -NB       Are two or more of the above signs & symptoms of infection both present and new to the patient? Yes (Proceed)  -NB Yes (Proceed)  -NB       Assess for evidence of organ dysfunction: Are any of the below criteria present within 6 hours of suspected infection and SIRS criteria that are NOT considered to be chronic conditions? -- --                 User Key  (r) = Recorded By, (t) = Taken By, (c) = Cosigned By      Initials Name Provider Type    IRIS Vargas DO Physician                        There is no height or weight on file to calculate BMI.  Wt Readings from Last 1 Encounters:   10/14/24 76.5 kg (168 lb 10.4 oz)        Ideal body weight: 57 kg (125 lb 10.6 oz)  Adjusted ideal body weight: 64.8 kg (142 lb 13.7 oz)

## 2024-11-02 LAB
ATRIAL RATE: 107 BPM
P AXIS: 48 DEGREES
PR INTERVAL: 150 MS
QRS AXIS: -2 DEGREES
QRSD INTERVAL: 76 MS
QT INTERVAL: 306 MS
QTC INTERVAL: 404 MS
T WAVE AXIS: 47 DEGREES
VENTRICULAR RATE: 105 BPM

## 2024-11-02 PROCEDURE — 93010 ELECTROCARDIOGRAM REPORT: CPT | Performed by: INTERNAL MEDICINE

## 2024-11-02 NOTE — DISCHARGE INSTRUCTIONS
Please alternate Tylenol and Motrin every 6-8 hours for fever and pain control  Please complete full course of antibiotics  To help prevent yeast infection, eat a yogurt a day or take over-the-counter acidophilus while on antibiotics  If you have persistent fevers over the next 24 to 48 hours please do not hesitate to return to the ER

## 2024-11-02 NOTE — ED NOTES
Patient transported to CT     Shailesh Strickland RN  11/01/24 2006    
Pt ambulated to restroom with steady gait using a walker.     Shailesh Strickland RN  11/01/24 0114    
Pt's O2 is 89-90 on room air while sleeping placed on 2 L NC at this time      Lashay Mcclure RN  11/01/24 8312    
hypertension

## 2024-11-03 LAB
BACTERIA BLD CULT: NORMAL
BACTERIA BLD CULT: NORMAL

## 2024-11-06 LAB
BACTERIA BLD CULT: NORMAL
BACTERIA BLD CULT: NORMAL

## 2024-11-15 ENCOUNTER — HOSPITAL ENCOUNTER (OUTPATIENT)
Dept: MAMMOGRAPHY | Facility: CLINIC | Age: 69
Discharge: HOME | End: 2024-11-15
Payer: COMMERCIAL

## 2024-11-15 ENCOUNTER — HOSPITAL ENCOUNTER (OUTPATIENT)
Dept: ULTRASOUND IMAGING | Facility: CLINIC | Age: 69
Discharge: HOME | End: 2024-11-15
Payer: COMMERCIAL

## 2024-11-15 DIAGNOSIS — N64.4 BREAST PAIN IN FEMALE: ICD-10-CM

## 2024-11-15 DIAGNOSIS — C50.411 MALIGNANT NEOPLASM OF UPPER-OUTER QUADRANT OF RIGHT BREAST IN FEMALE, ESTROGEN RECEPTOR POSITIVE (HCC): ICD-10-CM

## 2024-11-15 DIAGNOSIS — Z17.0 MALIGNANT NEOPLASM OF UPPER-OUTER QUADRANT OF RIGHT BREAST IN FEMALE, ESTROGEN RECEPTOR POSITIVE (HCC): ICD-10-CM

## 2024-11-15 PROCEDURE — G0279 TOMOSYNTHESIS, MAMMO: HCPCS

## 2024-11-15 PROCEDURE — 76642 ULTRASOUND BREAST LIMITED: CPT

## 2024-11-15 PROCEDURE — 77066 DX MAMMO INCL CAD BI: CPT

## 2024-11-18 ENCOUNTER — HOSPITAL ENCOUNTER (EMERGENCY)
Facility: HOSPITAL | Age: 69
Discharge: HOME | End: 2024-11-18
Attending: EMERGENCY MEDICINE
Payer: COMMERCIAL

## 2024-11-18 ENCOUNTER — APPOINTMENT (EMERGENCY)
Dept: RADIOLOGY | Facility: HOSPITAL | Age: 69
End: 2024-11-18
Payer: COMMERCIAL

## 2024-11-18 ENCOUNTER — APPOINTMENT (EMERGENCY)
Dept: CT IMAGING | Facility: HOSPITAL | Age: 69
End: 2024-11-18
Payer: COMMERCIAL

## 2024-11-18 VITALS
HEART RATE: 92 BPM | TEMPERATURE: 98.6 F | BODY MASS INDEX: 29.02 KG/M2 | RESPIRATION RATE: 20 BRPM | DIASTOLIC BLOOD PRESSURE: 61 MMHG | HEIGHT: 65 IN | WEIGHT: 174.16 LBS | SYSTOLIC BLOOD PRESSURE: 128 MMHG | OXYGEN SATURATION: 96 %

## 2024-11-18 DIAGNOSIS — K52.9 GASTROENTERITIS: Primary | ICD-10-CM

## 2024-11-18 DIAGNOSIS — R73.9 HYPERGLYCEMIA: ICD-10-CM

## 2024-11-18 DIAGNOSIS — D72.829 LEUKOCYTOSIS: ICD-10-CM

## 2024-11-18 LAB
ALBUMIN SERPL BCG-MCNC: 4.1 G/DL (ref 3.5–5)
ALP SERPL-CCNC: 68 U/L (ref 34–104)
ALT SERPL W P-5'-P-CCNC: 31 U/L (ref 7–52)
ANION GAP SERPL CALCULATED.3IONS-SCNC: 7 MMOL/L (ref 4–13)
AST SERPL W P-5'-P-CCNC: 30 U/L (ref 13–39)
ATRIAL RATE: 111 BPM
BACTERIA UR QL AUTO: ABNORMAL /HPF
BASOPHILS # BLD AUTO: 0.13 THOUSANDS/ÂΜL (ref 0–0.1)
BASOPHILS NFR BLD AUTO: 1 % (ref 0–1)
BILIRUB SERPL-MCNC: 0.87 MG/DL (ref 0.2–1)
BILIRUB UR QL STRIP: NEGATIVE
BUN SERPL-MCNC: 25 MG/DL (ref 5–25)
CALCIUM SERPL-MCNC: 9.9 MG/DL (ref 8.4–10.2)
CARDIAC TROPONIN I PNL SERPL HS: 4 NG/L (ref ?–50)
CHLORIDE SERPL-SCNC: 100 MMOL/L (ref 96–108)
CLARITY UR: CLEAR
CO2 SERPL-SCNC: 28 MMOL/L (ref 21–32)
COLOR UR: YELLOW
CREAT SERPL-MCNC: 0.72 MG/DL (ref 0.6–1.3)
EOSINOPHIL # BLD AUTO: 0.09 THOUSAND/ÂΜL (ref 0–0.61)
EOSINOPHIL NFR BLD AUTO: 0 % (ref 0–6)
ERYTHROCYTE [DISTWIDTH] IN BLOOD BY AUTOMATED COUNT: 13.6 % (ref 11.6–15.1)
FLUAV AG UPPER RESP QL IA.RAPID: NEGATIVE
FLUBV AG UPPER RESP QL IA.RAPID: NEGATIVE
GFR SERPL CREATININE-BSD FRML MDRD: 86 ML/MIN/1.73SQ M
GLUCOSE SERPL-MCNC: 188 MG/DL (ref 65–140)
GLUCOSE SERPL-MCNC: 209 MG/DL (ref 65–140)
GLUCOSE UR STRIP-MCNC: ABNORMAL MG/DL
HCT VFR BLD AUTO: 38.8 % (ref 34.8–46.1)
HGB BLD-MCNC: 12.4 G/DL (ref 11.5–15.4)
HGB UR QL STRIP.AUTO: NEGATIVE
IMM GRANULOCYTES # BLD AUTO: 0.13 THOUSAND/UL (ref 0–0.2)
IMM GRANULOCYTES NFR BLD AUTO: 1 % (ref 0–2)
KETONES UR STRIP-MCNC: NEGATIVE MG/DL
LACTATE SERPL-SCNC: 1.7 MMOL/L (ref 0.5–2)
LACTATE SERPL-SCNC: 2.3 MMOL/L (ref 0.5–2)
LEUKOCYTE ESTERASE UR QL STRIP: NEGATIVE
LIPASE SERPL-CCNC: 6 U/L (ref 11–82)
LYMPHOCYTES # BLD AUTO: 0.84 THOUSANDS/ÂΜL (ref 0.6–4.47)
LYMPHOCYTES NFR BLD AUTO: 4 % (ref 14–44)
MCH RBC QN AUTO: 28.2 PG (ref 26.8–34.3)
MCHC RBC AUTO-ENTMCNC: 32 G/DL (ref 31.4–37.4)
MCV RBC AUTO: 88 FL (ref 82–98)
MONOCYTES # BLD AUTO: 1.11 THOUSAND/ÂΜL (ref 0.17–1.22)
MONOCYTES NFR BLD AUTO: 6 % (ref 4–12)
MUCOUS THREADS UR QL AUTO: ABNORMAL
NEUTROPHILS # BLD AUTO: 18.05 THOUSANDS/ÂΜL (ref 1.85–7.62)
NEUTS SEG NFR BLD AUTO: 88 % (ref 43–75)
NITRITE UR QL STRIP: NEGATIVE
NON-SQ EPI CELLS URNS QL MICRO: ABNORMAL /HPF
NRBC BLD AUTO-RTO: 0 /100 WBCS
P AXIS: 47 DEGREES
PH UR STRIP.AUTO: 7.5 [PH]
PLATELET # BLD AUTO: 369 THOUSANDS/UL (ref 149–390)
PMV BLD AUTO: 11.7 FL (ref 8.9–12.7)
POTASSIUM SERPL-SCNC: 4 MMOL/L (ref 3.5–5.3)
PR INTERVAL: 190 MS
PROCALCITONIN SERPL-MCNC: 0.24 NG/ML
PROT SERPL-MCNC: 7.7 G/DL (ref 6.4–8.4)
PROT UR STRIP-MCNC: ABNORMAL MG/DL
QRS AXIS: 5 DEGREES
QRSD INTERVAL: 74 MS
QT INTERVAL: 312 MS
QTC INTERVAL: 424 MS
RBC # BLD AUTO: 4.4 MILLION/UL (ref 3.81–5.12)
RBC #/AREA URNS AUTO: ABNORMAL /HPF
SARS-COV+SARS-COV-2 AG RESP QL IA.RAPID: NEGATIVE
SODIUM SERPL-SCNC: 135 MMOL/L (ref 135–147)
SP GR UR STRIP.AUTO: >=1.05 (ref 1–1.03)
T WAVE AXIS: 51 DEGREES
UROBILINOGEN UR STRIP-ACNC: <2 MG/DL
VENTRICULAR RATE: 111 BPM
WBC # BLD AUTO: 20.35 THOUSAND/UL (ref 4.31–10.16)
WBC #/AREA URNS AUTO: ABNORMAL /HPF

## 2024-11-18 PROCEDURE — 99285 EMERGENCY DEPT VISIT HI MDM: CPT

## 2024-11-18 PROCEDURE — 96375 TX/PRO/DX INJ NEW DRUG ADDON: CPT

## 2024-11-18 PROCEDURE — 83605 ASSAY OF LACTIC ACID: CPT | Performed by: EMERGENCY MEDICINE

## 2024-11-18 PROCEDURE — 74177 CT ABD & PELVIS W/CONTRAST: CPT

## 2024-11-18 PROCEDURE — 36415 COLL VENOUS BLD VENIPUNCTURE: CPT | Performed by: EMERGENCY MEDICINE

## 2024-11-18 PROCEDURE — 99285 EMERGENCY DEPT VISIT HI MDM: CPT | Performed by: EMERGENCY MEDICINE

## 2024-11-18 PROCEDURE — 87040 BLOOD CULTURE FOR BACTERIA: CPT | Performed by: EMERGENCY MEDICINE

## 2024-11-18 PROCEDURE — 96365 THER/PROPH/DIAG IV INF INIT: CPT

## 2024-11-18 PROCEDURE — 81001 URINALYSIS AUTO W/SCOPE: CPT | Performed by: EMERGENCY MEDICINE

## 2024-11-18 PROCEDURE — 87811 SARS-COV-2 COVID19 W/OPTIC: CPT | Performed by: EMERGENCY MEDICINE

## 2024-11-18 PROCEDURE — 80053 COMPREHEN METABOLIC PANEL: CPT | Performed by: EMERGENCY MEDICINE

## 2024-11-18 PROCEDURE — 96367 TX/PROPH/DG ADDL SEQ IV INF: CPT

## 2024-11-18 PROCEDURE — 87804 INFLUENZA ASSAY W/OPTIC: CPT | Performed by: EMERGENCY MEDICINE

## 2024-11-18 PROCEDURE — 93005 ELECTROCARDIOGRAM TRACING: CPT

## 2024-11-18 PROCEDURE — 82948 REAGENT STRIP/BLOOD GLUCOSE: CPT

## 2024-11-18 PROCEDURE — 83690 ASSAY OF LIPASE: CPT | Performed by: EMERGENCY MEDICINE

## 2024-11-18 PROCEDURE — 71046 X-RAY EXAM CHEST 2 VIEWS: CPT

## 2024-11-18 PROCEDURE — 85025 COMPLETE CBC W/AUTO DIFF WBC: CPT | Performed by: EMERGENCY MEDICINE

## 2024-11-18 PROCEDURE — 84484 ASSAY OF TROPONIN QUANT: CPT | Performed by: EMERGENCY MEDICINE

## 2024-11-18 PROCEDURE — 96361 HYDRATE IV INFUSION ADD-ON: CPT

## 2024-11-18 PROCEDURE — 84145 PROCALCITONIN (PCT): CPT | Performed by: EMERGENCY MEDICINE

## 2024-11-18 PROCEDURE — 93010 ELECTROCARDIOGRAM REPORT: CPT | Performed by: INTERNAL MEDICINE

## 2024-11-18 RX ORDER — DICYCLOMINE HCL 20 MG
20 TABLET ORAL ONCE
Status: COMPLETED | OUTPATIENT
Start: 2024-11-18 | End: 2024-11-18

## 2024-11-18 RX ORDER — ONDANSETRON 4 MG/1
4 TABLET, ORALLY DISINTEGRATING ORAL EVERY 8 HOURS PRN
Qty: 10 TABLET | Refills: 0 | Status: SHIPPED | OUTPATIENT
Start: 2024-11-18

## 2024-11-18 RX ORDER — ACETAMINOPHEN 10 MG/ML
1000 INJECTION, SOLUTION INTRAVENOUS ONCE
Status: COMPLETED | OUTPATIENT
Start: 2024-11-18 | End: 2024-11-18

## 2024-11-18 RX ORDER — ONDANSETRON 2 MG/ML
4 INJECTION INTRAMUSCULAR; INTRAVENOUS ONCE
Status: COMPLETED | OUTPATIENT
Start: 2024-11-18 | End: 2024-11-18

## 2024-11-18 RX ORDER — DICYCLOMINE HCL 20 MG
20 TABLET ORAL 2 TIMES DAILY PRN
Qty: 10 TABLET | Refills: 0 | Status: SHIPPED | OUTPATIENT
Start: 2024-11-18

## 2024-11-18 RX ADMIN — ACETAMINOPHEN 1000 MG: 10 INJECTION INTRAVENOUS at 10:50

## 2024-11-18 RX ADMIN — CEFTRIAXONE 1000 MG: 10 INJECTION, POWDER, FOR SOLUTION INTRAVENOUS at 11:45

## 2024-11-18 RX ADMIN — DICYCLOMINE HYDROCHLORIDE 20 MG: 20 TABLET ORAL at 10:48

## 2024-11-18 RX ADMIN — IOHEXOL 100 ML: 350 INJECTION, SOLUTION INTRAVENOUS at 11:17

## 2024-11-18 RX ADMIN — ONDANSETRON 4 MG: 2 INJECTION INTRAMUSCULAR; INTRAVENOUS at 10:46

## 2024-11-18 RX ADMIN — SODIUM CHLORIDE 500 ML: 0.9 INJECTION, SOLUTION INTRAVENOUS at 10:44

## 2024-11-18 NOTE — DISCHARGE INSTRUCTIONS
Zofran as needed for nausea/vomiting.   Bentyl as needed for diarrhea/abdominal cramping.     Follow up with family doctor.     Return to the ED if new/worsening symptoms including but not limited to intractable nausea/vomiting/pain, dehydration, lightheadedness, etc.     Zofran según sea necesario para las náuseas/vómitos.     Bentyl según sea necesario para la diarrea/calambres abdominales.     Seguimiento con el médico de zachary.     Regrese a la darrin de emergencias si los síntomas aparecen o empeoran, incluidos, entre otros, náuseas/vómitos/dolor intratables, deshidratación, mareos, etc.

## 2024-11-18 NOTE — ED PROVIDER NOTES
Time reflects when diagnosis was documented in both MDM as applicable and the Disposition within this note       Time User Action Codes Description Comment    11/18/2024  2:16 PM Shante Zuñiga Add [K52.9] Gastroenteritis     11/18/2024  2:16 PM Shante Zuñiga Add [D72.829] Leukocytosis     11/18/2024  2:17 PM Shante Zuñiga Add [R73.9] Hyperglycemia           ED Disposition       ED Disposition   Discharge    Condition   Stable    Date/Time   Mon Nov 18, 2024  2:11 PM    Comment   Jolie Mulligan discharge to home/self care.                   Assessment & Plan       Medical Decision Making  68 yo F with N/V/D/abdominal pain- sx management,CBC to assess for leukocytosis/anemia, CMP to assess for elevated LFTs/STEFANI/electrolyte abn, lipase to r/o pancreatitis, CT A/P to r/o obstruction/diverticulitis/other pathology     Sx improved in ER  Given significant leukocytosis and prior history of similar with bacteremia, I offered admission. Pt would prefer to go home. I discussed sx management at home and close return precautions. She understands that if blood culture is positive, she will need to return  Daughter at bedside and understands plan/will monitor pt      Problems Addressed:  Gastroenteritis: acute illness or injury  Hyperglycemia: acute illness or injury  Leukocytosis: acute illness or injury    Amount and/or Complexity of Data Reviewed  Independent Historian:      Details: Patient's daughter  External Data Reviewed: labs, radiology and notes.  Labs: ordered. Decision-making details documented in ED Course.  Radiology: ordered and independent interpretation performed. Decision-making details documented in ED Course.  ECG/medicine tests: ordered and independent interpretation performed. Decision-making details documented in ED Course.    Risk  Prescription drug management.        ED Course as of 11/18/24 1553   Mon Nov 18, 2024   1031 POC Glucose(!): 188   1031 Pulse(!): 121   1039 EKG independently interpreted by  myself:  sinus tachycardia @ 111 bpm, LAD, qtc 424, no sig st changes   1040 WBC(!): 20.35   1045 Discussed results so far with pt/daughter and needing to add additional workup/abx, they are in agreement   1059 GLUCOSE(!): 209   1059 ANION GAP: 7   1108 hs TnI 0hr: 4   1136 Pulse: 101   1138 LACTIC ACID(!): 2.3  Sepsis alert called, but labs/cultures/abx already ordered   1410 Pt reassessed, reviewed all results and offered admission. She would prefer to go home. Daughter lives in apartment [address] and will keep eye on her. Discussed to return if increasing pain/vomiting/weakness/fevers/lightheadedness/etc and that we will call if blood cultures positive and needs to return       Medications   iohexol (OMNIPAQUE) 350 MG/ML injection (SINGLE-DOSE) 100 mL (has no administration in time range)   ondansetron (ZOFRAN) injection 4 mg (4 mg Intravenous Given 11/18/24 1046)   dicyclomine (BENTYL) tablet 20 mg (20 mg Oral Given 11/18/24 1048)   sodium chloride 0.9 % bolus 500 mL (0 mL Intravenous Stopped 11/18/24 1322)   acetaminophen (Ofirmev) injection 1,000 mg (0 mg Intravenous Stopped 11/18/24 1111)   ceftriaxone (ROCEPHIN) 1 g/50 mL in dextrose IVPB (0 mg Intravenous Stopped 11/18/24 1235)   iohexol (OMNIPAQUE) 350 MG/ML injection (SINGLE-DOSE) 100 mL (100 mL Intravenous Given 11/18/24 1117)       ED Risk Strat Scores                           SBIRT 22yo+      Flowsheet Row Most Recent Value   Initial Alcohol Screen: US AUDIT-C     1. How often do you have a drink containing alcohol? 0 Filed at: 11/18/2024 1014   2. How many drinks containing alcohol do you have on a typical day you are drinking?  0 Filed at: 11/18/2024 1014   3a. Male UNDER 65: How often do you have five or more drinks on one occasion? 0 Filed at: 11/18/2024 1014   3b. FEMALE Any Age, or MALE 65+: How often do you have 4 or more drinks on one occassion? 0 Filed at: 11/18/2024 1014   Audit-C Score 0 Filed at: 11/18/2024 1014   MARGO: How many  times in the past year have you...    Used an illegal drug or used a prescription medication for non-medical reasons? Never Filed at: 11/18/2024 1014                            History of Present Illness       Chief Complaint   Patient presents with    Abdominal Pain     Pt reports abd pain, vomiting and diarrhea since today.        Past Medical History:   Diagnosis Date    Abdominal infection (HCC)     h-pylori    Abnormal chest x-ray 04/05/2019    Asthma     Breast cancer (HCC) 06/26/2018    Cancer (HCC)     BREAST    Depression, recurrent (HCC) 06/13/2022    Diabetes mellitus (HCC)     Hiatal hernia     Hiatal hernia 08/08/2018    History of chemotherapy 2018    History of radiation therapy 2018    Hypercholesterolemia     Hypertension     Hypothyroid     Renal disorder     Rheumatoid arthritis (HCC)       Past Surgical History:   Procedure Laterality Date    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    BREAST LUMPECTOMY Right 6/26/2018    Procedure: RIGHT BREAST NEEDLE LOCALIZATION X2 WITH RIGHT BREAST LUMPECTOMY ( NEEDLE LOC @ 1000);  Surgeon: Familia Barragan MD;  Location: [facility];  Service: Surgical Oncology    BREAST LUMPECTOMY Right 8/2/2018    Procedure: LYMPHOSCINITGRAPHY INTRAOPERATIVE LYMPHATIC MAPPING , RIGHT SENTINEL NODE BIOPSY, REEXCISION  RIGHT BREAST LUMPECTOMY CAVITY (SUPERIOR MARGIN);  Surgeon: Familia Barragan MD;  Location: [facility];  Service: Surgical Oncology    BREAST SURGERY Left     CATARACT EXTRACTION, BILATERAL Bilateral     COLONOSCOPY      EGD AND COLONOSCOPY N/A 9/5/2018    Procedure: EGD AND COLONOSCOPY;  Surgeon: José Miguel Rosas MD;  Location: [facility];  Service: Gastroenterology    FL GUIDED CENTRAL VENOUS ACCESS DEVICE INSERTION  9/13/2018    KNEE SURGERY Right     MAMMO NEEDLE LOCALIZATION RIGHT (ALL INC) Right 6/26/2018    MAMMO STEREOTACTIC BREAST BIOPSY RIGHT (ALL INC) Right 5/23/2018    RETINAL DETACHMENT SURGERY Right     TUBAL LIGATION      TUNNELED VENOUS PORT  PLACEMENT Left 9/13/2018    Procedure: INSERTION VENOUS PORT (PORT-A-CATH);  Surgeon: Familia Barragan MD;  Location: [facility];  Service: Surgical Oncology    UPPER GASTROINTESTINAL ENDOSCOPY        Family History   Problem Relation Age of Onset    Diabetes Mother     Hypertension Mother     Diabetes Father     Hypertension Father     No Known Problems Sister     No Known Problems Sister     No Known Problems Sister     No Known Problems Sister     No Known Problems Sister     No Known Problems Daughter     No Known Problems Daughter     No Known Problems Daughter     Cancer Maternal Grandfather     Diabetes Brother     Diabetes Brother     Kidney failure Brother     Diabetes Brother     Breast cancer Neg Hx       Social History     Tobacco Use    Smoking status: Never    Smokeless tobacco: Never   Vaping Use    Vaping status: Never Used   Substance Use Topics    Alcohol use: No    Drug use: No      E-Cigarette/Vaping    E-Cigarette Use Never User       E-Cigarette/Vaping Substances    Nicotine No     THC No     CBD No     Flavoring No     Other No     Unknown No       I have reviewed and agree with the history as documented.     70 yo F h/o DM, osteoarthritis, HTN, breast ca s/p chemo/rad/lumpectomy, HLD, hypothyroidism, PE on Xarelto; presenting with abdominal pain/N/V/D started today    Brought to ER by daughter  Diffuse pain, most painful LUQ. N/V and loose nonbloody stools    No known sick contacts  Reports similar episode earlier this month (seen in ER 11/1) and at [facility] in June was admitted   Pt with 1+ pretibial edema that pt and daughter at bedside report is at baseline    Denies fevers, cough/URI sx, CP, SOB  Cut bottom or R toe yesterday- pain to area, no evidence of infection  No recent abx, no h/o C. diff            Per independent review of external records:   - 11/1/24 (17 days ago)- ER visit- fever, chills, vomiting, hypoK/mag  - June 2024 admission at [facility]: N/V/D- bacteremia (Serratia Marcescens)-  Cefepime, then transitioned to po  - Last colonoscopy and EGD on November 2021 showed normal EGD as well as a 6 mm polyp otherwise normal on colonoscopy   Review of Systems        Objective       ED Triage Vitals [11/18/24 1004]   Temperature Pulse Blood Pressure Respirations SpO2 Patient Position - Orthostatic VS   98.6 °F (37 °C) (!) 121 130/60 20 97 % --      Temp Source Heart Rate Source BP Location FiO2 (%) Pain Score    Oral Monitor Right arm -- --      Vitals      Date and Time Temp Pulse SpO2 Resp BP Pain Score FACES Pain Rating User   11/18/24 1330 -- 92 96 % 20 128/61 -- -- EP   11/18/24 1245 -- 93 92 % 16 116/61 -- -- EP   11/18/24 1235 -- 96 93 % 16 116/61 -- -- EP   11/18/24 1130 -- 101 95 % 12 123/60 -- -- EP   11/18/24 1115 -- 105 -- -- -- -- -- EP   11/18/24 1004 98.6 °F (37 °C) 121 97 % 20 130/60 -- -- HMR            Physical Exam  Vitals and nursing note reviewed.   Constitutional:       General: She is not in acute distress.     Appearance: Normal appearance. She is well-developed.   HENT:      Head: Normocephalic and atraumatic.      Nose: Nose normal.   Eyes:      Extraocular Movements: Extraocular movements intact.      Conjunctiva/sclera: Conjunctivae normal.   Cardiovascular:      Rate and Rhythm: Regular rhythm. Tachycardia present.      Heart sounds: Normal heart sounds.   Pulmonary:      Effort: Pulmonary effort is normal. No respiratory distress.      Breath sounds: Normal breath sounds. No stridor. No wheezing or rales.   Chest:      Chest wall: No tenderness.   Abdominal:      General: There is no distension.      Palpations: Abdomen is soft.      Tenderness: There is abdominal tenderness. There is no guarding or rebound.      Comments: Mild diffuse abdominal ttp, no rebound/guarding   Musculoskeletal:         General: No swelling, tenderness or deformity.      Cervical back: Normal range of motion and neck supple.      Right lower leg: Edema present.      Left lower leg: Edema present.       Comments: B/l pretibial 1+ edema   Skin:     General: Skin is warm and dry.      Findings: No rash.      Comments: Superficial cut to R toe, plantar aspect just under nail, no surrounding erythema/drainage   Neurological:      General: No focal deficit present.      Mental Status: She is alert and oriented to person, place, and time.      Motor: No abnormal muscle tone.      Coordination: Coordination normal.   Psychiatric:         Thought Content: Thought content normal.         Judgment: Judgment normal.         Results Reviewed       Procedure Component Value Units Date/Time    Lactic acid 2 Hours [[id number]]  (Normal) Collected: 11/18/24 1332    Lab Status: Final result Specimen: Blood from Arm, Left Updated: 11/18/24 1401     LACTIC ACID 1.7 mmol/L     Narrative:      Result may be elevated if tourniquet was used during collection.    Urine Microscopic [[id number]]  (Abnormal) Collected: 11/18/24 1144    Lab Status: Final result Specimen: Urine, Clean Catch Updated: 11/18/24 1342     RBC, UA 1-2 /hpf      WBC, UA 1-2 /hpf      Epithelial Cells Occasional /hpf      Bacteria, UA None Seen /hpf      MUCUS THREADS Occasional    FLU/COVID Rapid Antigen (30 min. TAT) - Preferred screening test in ED [[id number]]  (Normal) Collected: 11/18/24 1131    Lab Status: Final result Specimen: Nares from Nose Updated: 11/18/24 1257     SARS COV Rapid Antigen Negative     Influenza A Rapid Antigen Negative     Influenza B Rapid Antigen Negative    Narrative:      This test has been performed using the Quidel Reena 2 FLU+SARS Antigen test under the Emergency Use Authorization (EUA). This test has been validated by the  and verified by the performing laboratory. The Reena uses lateral flow immunofluorescent sandwich assay to detect SARS-COV, Influenza A and Influenza B Antigen.     The Quidel Reena 2 SARS Antigen test does not differentiate between SARS-CoV and SARS-CoV-2.     Negative results are presumptive and  may be confirmed with a molecular assay, if necessary, for patient management. Negative results do not rule out SARS-CoV-2 or influenza infection and should not be used as the sole basis for treatment or patient management decisions. A negative test result may occur if the level of antigen in a sample is below the limit of detection of this test.     Positive results are indicative of the presence of viral antigens, but do not rule out bacterial infection or co-infection with other viruses.     All test results should be used as an adjunct to clinical observations and other information available to the provider.    FOR PEDIATRIC PATIENTS - copy/paste COVID Guidelines URL to browser: [email or web address]    UA w Reflex to Microscopic w Reflex to Culture [[id number]]  (Abnormal) Collected: 11/18/24 1144    Lab Status: Final result Specimen: Urine, Clean Catch Updated: 11/18/24 1240     Color, UA Yellow     Clarity, UA Clear     Specific Gravity, UA >=1.050     pH, UA 7.5     Leukocytes, UA Negative     Nitrite, UA Negative     Protein, UA 70 (1+) mg/dl      Glucose,  (3/20%) mg/dl      Ketones, UA Negative mg/dl      Urobilinogen, UA <2.0 mg/dl      Bilirubin, UA Negative     Occult Blood, UA Negative    Procalcitonin [[id number]]  (Normal) Collected: 11/18/24 1055    Lab Status: Final result Specimen: Blood from Arm, Left Updated: 11/18/24 1146     Procalcitonin 0.24 ng/ml     Lactic acid, plasma (w/reflex if result > 2.0) [[id number]]  (Abnormal) Collected: 11/18/24 1055    Lab Status: Final result Specimen: Blood from Arm, Left Updated: 11/18/24 1136     LACTIC ACID 2.3 mmol/L     Narrative:      Result may be elevated if tourniquet was used during collection.    Blood culture #1 [[id number]] Collected: 11/18/24 1101    Lab Status: In process Specimen: Blood from Hand, Left Updated: 11/18/24 1117    Blood culture #2 [[id number]] Collected: 11/18/24 1110    Lab  Status: In process Specimen: Blood from Hand, Left Updated: 11/18/24 1117    HS Troponin 0hr (reflex protocol) [[id number]]  (Normal) Collected: 11/18/24 1028    Lab Status: Final result Specimen: Blood from Arm, Right Updated: 11/18/24 1103     hs TnI 0hr 4 ng/L     Comprehensive metabolic panel [[id number]]  (Abnormal) Collected: 11/18/24 1028    Lab Status: Final result Specimen: Blood from Arm, Right Updated: 11/18/24 1056     Sodium 135 mmol/L      Potassium 4.0 mmol/L      Chloride 100 mmol/L      CO2 28 mmol/L      ANION GAP 7 mmol/L      BUN 25 mg/dL      Creatinine 0.72 mg/dL      Glucose 209 mg/dL      Calcium 9.9 mg/dL      AST 30 U/L      ALT 31 U/L      Alkaline Phosphatase 68 U/L      Total Protein 7.7 g/dL      Albumin 4.1 g/dL      Total Bilirubin 0.87 mg/dL      eGFR 86 ml/min/1.73sq m     Narrative:      National Kidney Disease Foundation guidelines for Chronic Kidney Disease (CKD):     Stage 1 with normal or high GFR (GFR > 90 mL/min/1.73 square meters)    Stage 2 Mild CKD (GFR = 60-89 mL/min/1.73 square meters)    Stage 3A Moderate CKD (GFR = 45-59 mL/min/1.73 square meters)    Stage 3B Moderate CKD (GFR = 30-44 mL/min/1.73 square meters)    Stage 4 Severe CKD (GFR = 15-29 mL/min/1.73 square meters)    Stage 5 End Stage CKD (GFR <15 mL/min/1.73 square meters)  Note: GFR calculation is accurate only with a steady state creatinine    Lipase [[id number]]  (Abnormal) Collected: 11/18/24 1028    Lab Status: Final result Specimen: Blood from Arm, Right Updated: 11/18/24 1056     Lipase 6 u/L     CBC and differential [[id number]]  (Abnormal) Collected: 11/18/24 1028    Lab Status: Final result Specimen: Blood from Arm, Right Updated: 11/18/24 1039     WBC 20.35 Thousand/uL      RBC 4.40 Million/uL      Hemoglobin 12.4 g/dL      Hematocrit 38.8 %      MCV 88 fL      MCH 28.2 pg      MCHC 32.0 g/dL      RDW 13.6 %      MPV 11.7 fL      Platelets 369 Thousands/uL      nRBC 0 /100 WBCs      Segmented % 88 %       Immature Grans % 1 %      Lymphocytes % 4 %      Monocytes % 6 %      Eosinophils Relative 0 %      Basophils Relative 1 %      Absolute Neutrophils 18.05 Thousands/µL      Absolute Immature Grans 0.13 Thousand/uL      Absolute Lymphocytes 0.84 Thousands/µL      Absolute Monocytes 1.11 Thousand/µL      Eosinophils Absolute 0.09 Thousand/µL      Basophils Absolute 0.13 Thousands/µL     Fingerstick Glucose (POCT) [[id number]]  (Abnormal) Collected: 11/18/24 1030    Lab Status: Final result Specimen: Blood Updated: 11/18/24 1031     POC Glucose 188 mg/dl             CT abdomen pelvis with contrast   ED Interpretation by Shante Zuñiga DO (11/18 1311)   IMPRESSION:     No acute findings in the abdomen or pelvis.        Final Interpretation by Logan Jerome MD (11/18 1300)      No acute findings in the abdomen or pelvis.         Workstation performed: [id number]         XR chest 2 views    (Results Pending)       Procedures    ED Medication and Procedure Management   Prior to Admission Medications   Prescriptions Last Dose Informant Patient Reported? Taking?   Alcohol Swabs (Pharmacist Choice Alcohol) PADS  Self, Child No No   Sig: CLEAN AREA BEFORE TESTING OR INJECTING FOUR TIMES DAILY   Comfort EZ Pen Needles 32G X 4 MM MISC  Self, Child No No   Sig: USE AS DIRECTED FOUR TIMES DAILY   Continuous Glucose  (FreeStyle Broderick [place]) AARON  Self, Child No No   Sig: Use 1 Units continuous   Continuous Glucose Sensor (FreeStyle [place]derick 3 Sensor) MISC  Self, Child No No   Sig: Use 1 Units every 14 (fourteen) days   Diclofenac Sodium (VOLTAREN) 1 %  Self, Child No No   Sig: Apply 2 g topically 2 (two) times a day   EPINEPHrine (Auvi-Q) 0.1 mg/0.1 mL SOAJ   No No   Sig: Inject 0.3 mL (0.3 mg total) into a muscle once for 1 dose   Mag-G 500 (27 Mg) MG tablet  Self, Child No No   Sig: TAKE 1 TABLET (500 MG TOTAL) BY MOUTH DAILY   OneTouch Delica Lancets 33G MISC  Self, Child No No   Sig: USE 3 (THREE) TIMES A DAY    OneTouch Ultra test strip  Self, Child No No   Sig: USE 1 EACH 3 (THREE) TIMES A DAY USE AS INSTRUCTED   acetaminophen (TYLENOL) 500 mg tablet  Self, Child No Yes   Sig: Take 1 tablet (500 mg total) by mouth every 6 (six) hours as needed for fever   albuterol (2.5 mg/3 mL) 0.083 % nebulizer solution   No Yes   Sig: Take 3 mL (2.5 mg total) by nebulization every 6 (six) hours as needed for wheezing or shortness of breath   albuterol (Ventolin HFA) 90 mcg/act inhaler   No No   Sig: Inhale 1-2 puffs every 6 (six) hours as needed for wheezing or shortness of breath   anastrozole (ARIMIDEX) 1 mg tablet   No No   Sig: Take 1 tablet (1 mg total) by mouth daily   aspirin 81 mg chewable tablet  Self, Child Yes Yes   Sig: Chew 81 mg daily   atorvastatin (LIPITOR) 40 mg tablet  Self, Child No Yes   Sig: Take 1 tablet (40 mg total) by mouth daily   benzonatate (TESSALON PERLES) 100 mg capsule  Self, Child No Yes   Sig: Take 1 capsule (100 mg total) by mouth 3 (three) times a day as needed for cough   calcium polycarbophil (FIBERCON) 625 mg tablet  Self, Child No Yes   Sig: Take 1 tablet (625 mg total) by mouth daily   ergocalciferol (VITAMIN D2) 50,000 units  Self, Child No No   Sig: TAKE 1 CAPSULE (50,000 UNITS TOTAL) BY MOUTH ONCE A WEEK   fluticasone (FLONASE) 50 mcg/act nasal spray  Self, Child Yes No   fluticasone-salmeterol (Advair HFA) 230-21 MCG/ACT inhaler  Self, Child No No   Sig: Inhale 2 puffs 2 (two) times a day Rinse mouth after use.   furosemide (LASIX) 20 mg tablet  Self, Child No Yes   Sig: TAKE 1 TABLET (20 MG TOTAL) BY MOUTH 2 (TWO) TIMES A DAY   guaiFENesin (MUCINEX) 600 mg 12 hr tablet  Self, Child No Yes   Sig: Take 1 tablet (600 mg total) by mouth every 12 (twelve) hours as needed for congestion   insulin degludec (Tresiba FlexTouch) 200 units/mL CONCENTRATED U-200 injection pen  Self, Child No Yes   Sig: INJECT 60 UNITS UNDER THE SKIN DAILY AT 5PM   insulin lispro (HumaLOG) 100 units/mL injection pen   Self, Child No Yes   Sig: INJECT 24 UNITS BEFORE EACH MEAL PLUS SLIDING SCALE 150-200: 2 UNITS 201-250: 4 UNITS 251-300: 6 UNITS 301*   ketotifen (ZADITOR) 0.025 % ophthalmic solution  Self, Child No No   Sig: Administer 1 drop to both eyes 2 (two) times a day as needed (itchy eyes)   latanoprost (XALATAN) 0.005 % ophthalmic solution  Self, Child Yes Yes   loratadine (CLARITIN) 10 mg tablet  Self, Child No Yes   Sig: TAKE 1 TABLET (10 MG TOTAL) BY MOUTH DAILY   losartan (COZAAR) 100 MG tablet  Self, Child No No   Sig: Take 1 tablet (100 mg total) by mouth daily   magnesium (MAGTAB) 84 MG (7MEQ) TBCR   No Yes   Sig: Take 1 tablet (84 mg total) by mouth 2 (two) times a day for 14 days   magnesium chloride-calcium (Slow-Mag) 71.5-119 mg  Self, Child No No   Sig: Take 2 tablets by mouth daily   methocarbamol (ROBAXIN) 500 mg tablet  Child, Self Yes No   metoprolol tartrate (LOPRESSOR) 25 mg tablet  Self, Child No Yes   Sig: TAKE 0.5 TABLETS (12.5 MG TOTAL) BY MOUTH EVERY 12 (TWELVE) HOURS   omeprazole (PriLOSEC) 20 mg delayed release capsule  Self, Child No Yes   Sig: TAKE 1 CAPSULE (20 MG TOTAL) BY MOUTH DAILY   ondansetron (ZOFRAN) 4 mg tablet   No No   Sig: Take 1 tablet (4 mg total) by mouth every 6 (six) hours   polyethylene glycol (MIRALAX) 17 g packet  Self No Yes   Sig: Take 17 g by mouth daily for 14 days   tiotropium (Spiriva Respimat) 2.5 MCG/ACT AERS inhaler  Self, Child No Yes   Sig: Inhale 2 puffs daily   tirzepatide (Mounjaro) 2.5 MG/0.5ML  Self, Child No Yes   Sig: INJECT 0.5 ML (2.5 MG TOTAL) UNDER THE SKIN ONCE A WEEK   zileuton 600 MG  Self, Child No Yes   Sig: TAKE 1 TABLET (600 MG TOTAL) BY MOUTH 2 (TWO) TIMES A DAY      Facility-Administered Medications: None     Discharge Medication List as of 11/18/2024  2:19 PM        START taking these medications    Details   dicyclomine (BENTYL) 20 mg tablet Take 1 tablet (20 mg total) by mouth 2 (two) times a day as needed (diarrhea) for up to 10 doses,  Starting Mon 11/18/2024, Normal      ondansetron (ZOFRAN-ODT) 4 mg disintegrating tablet Take 1 tablet (4 mg total) by mouth every 8 (eight) hours as needed for nausea for up to 10 doses, Starting Mon 11/18/2024, Normal           CONTINUE these medications which have NOT CHANGED    Details   acetaminophen (TYLENOL) 500 mg tablet Take 1 tablet (500 mg total) by mouth every 6 (six) hours as needed for fever, Starting Wed 8/28/2024, Normal      albuterol (2.5 mg/3 mL) 0.083 % nebulizer solution Take 3 mL (2.5 mg total) by nebulization every 6 (six) hours as needed for wheezing or shortness of breath, Starting Mon 10/14/2024, Normal      aspirin 81 mg chewable tablet Chew 81 mg daily, Historical Med      atorvastatin (LIPITOR) 40 mg tablet Take 1 tablet (40 mg total) by mouth daily, Starting Wed 8/23/2023, Normal      benzonatate (TESSALON PERLES) 100 mg capsule Take 1 capsule (100 mg total) by mouth 3 (three) times a day as needed for cough, Starting Wed 8/28/2024, Normal      calcium polycarbophil (FIBERCON) 625 mg tablet Take 1 tablet (625 mg total) by mouth daily, Starting Wed 10/11/2023, Normal      furosemide (LASIX) 20 mg tablet TAKE 1 TABLET (20 MG TOTAL) BY MOUTH 2 (TWO) TIMES A DAY, Starting Mon 7/17/2023, Normal      guaiFENesin (MUCINEX) 600 mg 12 hr tablet Take 1 tablet (600 mg total) by mouth every 12 (twelve) hours as needed for congestion, Starting Wed 8/28/2024, Normal      insulin degludec (Tresiba FlexTouch) 200 units/mL CONCENTRATED U-200 injection pen INJECT 60 UNITS UNDER THE SKIN DAILY AT 5PM, Normal      insulin lispro (HumaLOG) 100 units/mL injection pen INJECT 24 UNITS BEFORE EACH MEAL PLUS SLIDING SCALE 150-200: 2 UNITS 201-250: 4 UNITS 251-300: 6 UNITS 301*, Normal      latanoprost (XALATAN) 0.005 % ophthalmic solution Historical Med      loratadine (CLARITIN) 10 mg tablet TAKE 1 TABLET (10 MG TOTAL) BY MOUTH DAILY, Starting Thu 8/5/2021, Normal      magnesium (MAGTAB) 84 MG (7MEQ) TBCR Take  1 tablet (84 mg total) by mouth 2 (two) times a day for 14 days, Starting Fri 11/1/2024, Until Mon 11/18/2024, Normal      metoprolol tartrate (LOPRESSOR) 25 mg tablet TAKE 0.5 TABLETS (12.5 MG TOTAL) BY MOUTH EVERY 12 (TWELVE) HOURS, Starting Mon 1/8/2024, Normal      omeprazole (PriLOSEC) 20 mg delayed release capsule TAKE 1 CAPSULE (20 MG TOTAL) BY MOUTH DAILY, Starting Mon 2/5/2024, Normal      polyethylene glycol (MIRALAX) 17 g packet Take 17 g by mouth daily for 14 days, Starting Wed 10/11/2023, Until Mon 11/18/2024, Normal      tiotropium (Spiriva Respimat) 2.5 MCG/ACT AERS inhaler Inhale 2 puffs daily, Starting Tue 5/7/2024, Normal      tirzepatide (Mounjaro) 2.5 MG/0.5ML INJECT 0.5 ML (2.5 MG TOTAL) UNDER THE SKIN ONCE A WEEK, Normal      zileuton 600 MG TAKE 1 TABLET (600 MG TOTAL) BY MOUTH 2 (TWO) TIMES A DAY, Normal      albuterol (Ventolin HFA) 90 mcg/act inhaler Inhale 1-2 puffs every 6 (six) hours as needed for wheezing or shortness of breath, Starting Mon 10/14/2024, Normal      Alcohol Swabs (Pharmacist Choice Alcohol) PADS CLEAN AREA BEFORE TESTING OR INJECTING FOUR TIMES DAILY, Normal      anastrozole (ARIMIDEX) 1 mg tablet Take 1 tablet (1 mg total) by mouth daily, Starting Mon 9/23/2024, Normal      Comfort EZ Pen Needles 32G X 4 MM MISC USE AS DIRECTED FOUR TIMES DAILY, Normal      Continuous Glucose  (FreeStyle Broderick McWilliams) AARON Use 1 Units continuous, Starting Wed 6/5/2024, Normal      Continuous Glucose Sensor (FreeStyle Broderick 3 Sensor) MISC Use 1 Units every 14 (fourteen) days, Starting Wed 6/5/2024, Normal      Diclofenac Sodium (VOLTAREN) 1 % Apply 2 g topically 2 (two) times a day, Starting Wed 8/23/2023, Normal      EPINEPHrine (Auvi-Q) 0.1 mg/0.1 mL SOAJ Inject 0.3 mL (0.3 mg total) into a muscle once for 1 dose, Starting Wed 11/9/2022, Normal      ergocalciferol (VITAMIN D2) 50,000 units TAKE 1 CAPSULE (50,000 UNITS TOTAL) BY MOUTH ONCE A WEEK, Starting Tue 9/5/2023, Normal       fluticasone (FLONASE) 50 mcg/act nasal spray Historical Med      fluticasone-salmeterol (Advair HFA) 230-21 MCG/ACT inhaler Inhale 2 puffs 2 (two) times a day Rinse mouth after use., Starting Tue 5/7/2024, Normal      ketotifen (ZADITOR) 0.025 % ophthalmic solution Administer 1 drop to both eyes 2 (two) times a day as needed (itchy eyes), Starting Tue 10/3/2023, Normal      losartan (COZAAR) 100 MG tablet Take 1 tablet (100 mg total) by mouth daily, Starting Wed 8/23/2023, Normal      Mag-G 500 (27 Mg) MG tablet TAKE 1 TABLET (500 MG TOTAL) BY MOUTH DAILY, Normal      magnesium chloride-calcium (Slow-Mag) 71.5-119 mg Take 2 tablets by mouth daily, Starting Wed 9/18/2024, Normal      methocarbamol (ROBAXIN) 500 mg tablet Historical Med      ondansetron (ZOFRAN) 4 mg tablet Take 1 tablet (4 mg total) by mouth every 6 (six) hours, Starting Fri 11/1/2024, Normal      OneTouch Delica Lancets 33G MISC USE 3 (THREE) TIMES A DAY, Normal      OneTouch Ultra test strip USE 1 EACH 3 (THREE) TIMES A DAY USE AS INSTRUCTED, Starting Sat 2/18/2023, Normal           No discharge procedures on file.  ED SEPSIS DOCUMENTATION   Time reflects when diagnosis was documented in both MDM as applicable and the Disposition within this note       Time User Action Codes Description Comment    11/18/2024  2:16 PM Shante Zuñiga Add [K52.9] Gastroenteritis     11/18/2024  2:16 PM Shante Zuñiga Add [D72.829] Leukocytosis     11/18/2024  2:17 PM Shante Zuñiga Add [R73.9] Hyperglycemia            Initial Sepsis Screening       Row Name 11/18/24 1138 11/18/24 1055             Is the patient's history suggestive of a new or worsening infection? -- Yes (Proceed)  -KH       Suspected source of infection -- suspect infection, source unknown;acute abdominal infection  -KH       Indicate SIRS criteria -- Tachycardia > 90 bpm;Leukocytosis (WBC > 54925 IJL) OR Leukopenia (WBC <4000 IJL) OR Bandemia (WBC >10% bands)  -KH       Are two or more of the above  "signs & symptoms of infection both present and new to the patient? -- Yes (Proceed)  -       Assess for evidence of organ dysfunction: Are any of the below criteria present within 6 hours of suspected infection and SIRS criteria that are NOT considered to be chronic conditions? Lactate > 2.0  - --       Date of presentation of severe sepsis 11/18/24  -KH --       Time of presentation of severe sepsis 1138  -KH --       Sepsis Note: Click \"NEXT\" below (NOT \"close\") to generate sepsis note based on above information. -- --                 User Key  (r) = Recorded By, (t) = Taken By, (c) = Cosigned By      Initials Name Provider Type    TAMI Zuñiga DO Physician                  Default Flowsheet Data (Last 720 Hours)       Sepsis Reassess       Row Name 11/18/24 1138                   Repeat Volume Status and Tissue Perfusion Assessment Performed    Date of Reassessment: 11/18/24  -        Time of Reassessment: 1138  -KH        Sepsis Reassessment Note: Click \"NEXT\" below (NOT \"close\") to generate sepsis reassessment note. --        Repeat Volume Status and Tissue Perfusion Assessment Performed --                  User Key  (r) = Recorded By, (t) = Taken By, (c) = Cosigned By      Initials Name Provider Type    TAMI Zuñiga DO Physician                     Shante Zuñiga DO  11/18/24 1554    "

## 2024-11-23 LAB
BACTERIA BLD CULT: NORMAL
BACTERIA BLD CULT: NORMAL

## 2025-01-22 NOTE — PROGRESS NOTES
1  Right knee pain, unspecified chronicity  MRI knee right  wo contrast   2  History of breast cancer  MRI knee right  wo contrast     Orders Placed This Encounter   Procedures    MRI knee right  wo contrast        Imaging Studies (I personally reviewed images in PACS and report):    Past diagnostics:  X-ray right knee 10/03/2018:  Mild to moderate medial compartment osteoarthritis  IMPRESSION:  Right knee pain  Past medical history significant for current breast cancer on chemotherapy    Differential diagnosis:  Occult metastasis  Patellofemoral pain syndrome      Return for Follow-up after MRI  Patient Instructions   Explained the patient that due to her history of current breast cancer and right-sided knee pain without any significant arthritis on her x-rays I will order MRI to evaluate for occult metastasis  At this time I recommended against any corticosteroid injection due to unclear etiology of pain but also due to history of elevated blood sugars and due to her current history of cancer  I did explain that corticosteroid injection could result in diminished immune system response and reduced ability to fight cancer  Explained that she will require clearance from Oncology in the future prior to corticosteroid injection  Patient and daughter expressed understanding agreed to plan  CHIEF COMPLAINT:  Right Knee Pain     HPI:  Adithya Erickson is a 59 y o  female  who presents for       Visit  10/3/18  Patient presents with 6 month history of right knee pain located lateral knee associated with swelling but no erythema or warmth  Patient reports knee pain is aggravated when walking up a flight of stairs or getting in and out of a motor vehicle  She also states feeling intermittent catching and locking of the knee most notable after prolonged sitting  Patient reports history of meniscal repair about 30 years ago after an injury sustained while running     Currently patient is applying heat and using gabapentin for pain with minimal relief  10/31/2018: Follow-up evaluation of right knee pain:  Uncontrolled  Patient has persistent pain  Last visit patient was sent to physical therapy but has been noncompliant  Points anterior knee as source of her pain  Patient does have past medical history significant for current breast cancer on chemotherapy  Interpretation today provided by medical assistance Melanee Client    Review of Systems   Constitutional: Negative for chills and fever  Neurological: Positive for numbness (Occasional intermittent associated with her chemotherapy)  Negative for weakness  Patient did have multiple poly review of systems including nausea and dizziness however after clarification is noted that these occur along with her chemotherapy that she is following up with her oncologist for these complaints    Currently patient denied any vomiting, diarrhea, dehydration today    Following history reviewed and update:    Past Medical History:   Diagnosis Date    Abdominal infection [facility] [address] [facility]     h-pylori    Asthma     Breast cancer ([facility] [address] [facility]     Cancer ([facility] [address] [facility]     BREAST    Diabetes mellitus ([facility] [address] )     blood sugar 199 @ 735    Hiatal hernia     Hypercholesterolemia     Hypertension     Hypothyroid     Renal disorder     [facility]      Past Surgical History:   Procedure Laterality Date    BREAST BIOPSY      BREAST LUMPECTOMY Right 6/26/2018    Procedure: RIGHT BREAST NEEDLE LOCALIZATION X2 WITH RIGHT BREAST LUMPECTOMY ( NEEDLE LOC @ 1000); Surgeon: Jordan Nelson MD;  [facility];  Service: Surgical Oncology    BREAST LUMPECTOMY Right 8/2/2018    Procedure: LYMPHOSCINITGRAPHY INTRAOPERATIVE LYMPHATIC MAPPING , RIGHT SENTINEL NODE BIOPSY, REEXCISION  RIGHT BREAST LUMPECTOMY CAVITY (SUPERIOR MARGIN);   Surgeon: Jordan Nelson MD;  [facility];  Service: Surgical Oncology    BREAST SURGERY Left     CATARACT EXTRACTION, BILATERAL Bilateral     EGD AND COLONOSCOPY N/A 9/5/2018    Procedure: EGD AND COLONOSCOPY;  Surgeon: Hawa Obrien MD;  Location: [facility]; Service: Gastroenterology    [facility] GUIDED CENTRAL VENOUS ACCESS REPLACEMENT  9/13/2018    KNEE SURGERY Right     RETINAL DETACHMENT SURGERY Right     TUBAL LIGATION      TUNNELED VENOUS PORT PLACEMENT Left 9/13/2018    Procedure: INSERTION VENOUS PORT (PORT-A-CATH); Surgeon: Jerad Leyva MD;  [facility];  Service: Surgical Oncology     Social History   History   Alcohol Use No     History   Drug Use No     History   Smoking Status    Never Smoker   Smokeless Tobacco    Never Used     Family History   Problem Relation Age of Onset    Diabetes Mother     Hypertension Mother     Diabetes Father     Hypertension Father     Diabetes Brother     Hypertension Brother     Prostate cancer Brother    [address]  in [address] told patient not to take aspirin r/t kidneys     Tylenol With Codeine #3 [Acetaminophen-Codeine] Rash          Physical Exam  Constitutional:  see vital signs  Gen: well-developed, normocephalic/atraumatic, well-groomed  Eyes: No inflammation or discharge of conjunctiva or lids; sclera clear   Pharynx: no inflammation, lesion, or mass of lips  Neck: supple, no masses, non-distended  MSK: no inflammation, lesion, mass, or clubbing of nails and digits except for other than mentioned below  SKIN: no visible rashes or skin lesions  Pulmonary/Chest: Effort normal  No respiratory distress     NEURO: cranial nerves grossly intact  PSYCH:  Alert and oriented to person, place, and time; recent and remote memory intact; mood normal, no depression, anxiety, or agitation, judgment and insight good and intact      Ortho Exam    RIGHT KNEE:  Erythema: no  Swelling: no  Increased Warmth: no  Tenderness: + anterior medial joint line  Flexion: intact  Extension: intact  Patellar Detail Level: Detailed Depth Of Biopsy: dermis Displacement:  Patellar Tilt:  Patellar Apprehension: negative  Patellar Grind Diaz's: +  Lachman's: negative  Drawer: negative  Varus laxity: negative  Valgus laxity: negative  Jessica: negative      Procedures    Total visit time was 25 minutes of which more than 50% was face to face counseling and/or coordination of care with patient regarding their treatment plan as outlined in note  Was A Bandage Applied: Yes Size Of Lesion In Cm: 0 Biopsy Type: H and E Biopsy Method: Dermablade Anesthesia Type: 1% lidocaine with epinephrine Anesthesia Volume In Cc: 0.5 Hemostasis: Drysol Wound Care: Petrolatum Dressing: bandage Destruction After The Procedure: No Type Of Destruction Used: Curettage Curettage Text: The wound bed was treated with curettage after the biopsy was performed. Cryotherapy Text: The wound bed was treated with cryotherapy after the biopsy was performed. Electrodesiccation Text: The wound bed was treated with electrodesiccation after the biopsy was performed. Electrodesiccation And Curettage Text: The wound bed was treated with electrodesiccation and curettage after the biopsy was performed. Silver Nitrate Text: The wound bed was treated with silver nitrate after the biopsy was performed. Lab: [facility] Medical Necessity Information: It is in your best interest to select a reason for this procedure from the list below. All of these items fulfill various CMS LCD requirements except the new and changing color options. Consent: Written consent was obtained and risks were reviewed including but not limited to scarring, infection, bleeding, scabbing, incomplete removal, nerve damage and allergy to anesthesia. Post-Care Instructions: I reviewed with the patient in detail post-care instructions. Patient is to keep the biopsy site dry overnight, and then apply bacitracin twice daily until healed. Patient may apply hydrogen peroxide soaks to remove any crusting. Notification Instructions: Patient will be notified of biopsy results. However, patient instructed to call the office if not contacted within 2 weeks. Billing Type: Third-Party Bill Information: Selecting Yes will display possible errors in your note based on the variables you have selected. This validation is only offered as a suggestion for you. PLEASE NOTE THAT THE VALIDATION TEXT WILL BE REMOVED WHEN YOU FINALIZE YOUR NOTE. IF YOU WANT TO FAX A PRELIMINARY NOTE YOU WILL NEED TO TOGGLE THIS TO 'NO' IF YOU DO NOT WANT IT IN YOUR FAXED NOTE.

## 2025-01-28 DIAGNOSIS — Z79.4 TYPE 2 DIABETES MELLITUS WITH HYPERGLYCEMIA, WITH LONG-TERM CURRENT USE OF INSULIN (HCC): ICD-10-CM

## 2025-01-28 DIAGNOSIS — E11.65 TYPE 2 DIABETES MELLITUS WITH HYPERGLYCEMIA, WITH LONG-TERM CURRENT USE OF INSULIN (HCC): ICD-10-CM

## 2025-01-29 DIAGNOSIS — Z79.4 TYPE 2 DIABETES MELLITUS WITH HYPERGLYCEMIA, WITH LONG-TERM CURRENT USE OF INSULIN (HCC): ICD-10-CM

## 2025-01-29 DIAGNOSIS — E11.65 TYPE 2 DIABETES MELLITUS WITH HYPERGLYCEMIA, WITH LONG-TERM CURRENT USE OF INSULIN (HCC): ICD-10-CM

## 2025-01-29 RX ORDER — ACYCLOVIR 800 MG/1
1 TABLET ORAL
Qty: 2 EACH | Refills: 1 | Status: SHIPPED | OUTPATIENT
Start: 2025-01-29

## 2025-01-30 RX ORDER — KETOROLAC TROMETHAMINE 30 MG/ML
INJECTION, SOLUTION INTRAMUSCULAR; INTRAVENOUS
Qty: 1 EACH | Refills: 0 | Status: SHIPPED | OUTPATIENT
Start: 2025-01-30

## 2025-02-24 ENCOUNTER — OFFICE VISIT (OUTPATIENT)
Dept: PULMONOLOGY | Facility: CLINIC | Age: 70
End: 2025-02-24
Payer: COMMERCIAL

## 2025-02-24 VITALS
WEIGHT: 169.6 LBS | DIASTOLIC BLOOD PRESSURE: 90 MMHG | SYSTOLIC BLOOD PRESSURE: 150 MMHG | HEART RATE: 89 BPM | BODY MASS INDEX: 28.26 KG/M2 | OXYGEN SATURATION: 98 % | HEIGHT: 65 IN

## 2025-02-24 DIAGNOSIS — J45.901 ASTHMA EXACERBATION: ICD-10-CM

## 2025-02-24 DIAGNOSIS — J45.50 SEVERE PERSISTENT ASTHMA WITHOUT COMPLICATION: ICD-10-CM

## 2025-02-24 PROCEDURE — 99214 OFFICE O/P EST MOD 30 MIN: CPT | Performed by: INTERNAL MEDICINE

## 2025-02-24 RX ORDER — FLUTICASONE PROPIONATE AND SALMETEROL XINAFOATE 230; 21 UG/1; UG/1
2 AEROSOL, METERED RESPIRATORY (INHALATION) 2 TIMES DAILY
Qty: 12 G | Refills: 5 | Status: SHIPPED | OUTPATIENT
Start: 2025-02-24

## 2025-02-24 RX ORDER — ALBUTEROL SULFATE 0.83 MG/ML
2.5 SOLUTION RESPIRATORY (INHALATION) EVERY 6 HOURS PRN
Qty: 240 ML | Refills: 1 | Status: SHIPPED | OUTPATIENT
Start: 2025-02-24

## 2025-02-24 RX ORDER — ALBUTEROL SULFATE 90 UG/1
1-2 INHALANT RESPIRATORY (INHALATION) EVERY 6 HOURS PRN
Qty: 18 G | Refills: 5 | Status: SHIPPED | OUTPATIENT
Start: 2025-02-24

## 2025-02-24 NOTE — PROGRESS NOTES
"Office Progress Note - Pulmonary    Jolie Mulligan 69 y.o. female MRN: [id number]    Encounter: [id number]      Assessment:  Severe persistent asthma without complication     Plan:   Continue Advair HFA daily   Continue  Albuterol inhaler and nebulizer as needed.     Discussion:   Jolie Mulligan is a 68 y.o. female who presents for follow-up of asthma She reports her asthma dx in young age in [place] but did not se inhalers back then. PFT done in 208 showing air trapping consistent with asthma diagnosis.    Her recent allergy test is significant and progressive especially for dog dander however patient does not have pets at home   Current medication Advair HFA, and albuterol PRN inhaler and neb. 3-4 times a week, mostly at night due to feeling chest tightness. Based on discussion patient is not using the inhaler with the appropriate technique which could a cause at to why her asthma control is sub optimal.  Adequate inhaler use was demonstrated during visit and patient expresses understanding.   Last steroid use in October 2024.         Subjective:   Jolie presents today to follow up on asthma. She is accompanied by daughter who is helping with translation. Jolie feels her asthma is at baseline but not fully controlled since she feels SOB and chest tightness at night for which she uses albuterol PRN.  She has been using the advair daily but has not been using it appropriately. Lives alone, has no pets but visits daughter who has a cat.   Of note she complains of 2 days of sore throat and some dry cough with recent sick contact her granddaughter has a URI   Review of systems:  A 12 point system review is done and aside from what is stated above the rest of the review of systems is negative.      Family history and social history are reviewed.    Medications list is reviewed.      Vitals: Blood pressure 150/90, pulse 89, height 5' 4.5\" (1.638 m), weight 76.9 kg (169 lb 9.6 oz), SpO2 98%, not currently " breastfeeding.,     Physical Exam  Gen: Awake, alert, oriented x 3, no acute distress  HEENT: Mucous membranes moist, no oral lesions, no thrush, Erythema of the throat, hypertrophic tonsils +1 bilaterally, no exudates.   NECK: No accessory muscle use, JVP not elevated  Cardiac: Regular, single S1, single S2, no murmurs, no rubs, no gallops  Lungs: scattered mild expiratory wheezing   Abdomen: normoactive bowel sounds, soft nontender, nondistended, no rebound or rigidity, no guarding  Extremities: no cyanosis, no clubbing, no edema  Neuro:  Grossly nonfocal.  Skin:  No rash.

## 2025-03-24 ENCOUNTER — OFFICE VISIT (OUTPATIENT)
Dept: HEMATOLOGY ONCOLOGY | Facility: CLINIC | Age: 70
End: 2025-03-24
Payer: COMMERCIAL

## 2025-03-24 VITALS
HEIGHT: 65 IN | SYSTOLIC BLOOD PRESSURE: 122 MMHG | WEIGHT: 170 LBS | OXYGEN SATURATION: 98 % | RESPIRATION RATE: 16 BRPM | TEMPERATURE: 97.5 F | BODY MASS INDEX: 28.32 KG/M2 | DIASTOLIC BLOOD PRESSURE: 68 MMHG | HEART RATE: 74 BPM

## 2025-03-24 DIAGNOSIS — L72.3 SEBACEOUS CYST: ICD-10-CM

## 2025-03-24 DIAGNOSIS — R59.0 SUPRACLAVICULAR ADENOPATHY: Primary | ICD-10-CM

## 2025-03-24 DIAGNOSIS — R13.10 DIFFICULTY SWALLOWING SOLIDS: ICD-10-CM

## 2025-03-24 DIAGNOSIS — C50.411 MALIGNANT NEOPLASM OF UPPER-OUTER QUADRANT OF RIGHT BREAST IN FEMALE, ESTROGEN RECEPTOR POSITIVE (HCC): ICD-10-CM

## 2025-03-24 DIAGNOSIS — Z17.0 MALIGNANT NEOPLASM OF UPPER-OUTER QUADRANT OF RIGHT BREAST IN FEMALE, ESTROGEN RECEPTOR POSITIVE (HCC): ICD-10-CM

## 2025-03-24 PROCEDURE — 99215 OFFICE O/P EST HI 40 MIN: CPT | Performed by: PHYSICIAN ASSISTANT

## 2025-03-24 NOTE — PROGRESS NOTES
Name: Jolie Mulligan      : 1955      MRN: [id number]  Encounter Provider: Shaylee Clarke PA-C  Encounter Date: 3/24/2025   Encounter department: [facility]  :  Assessment & Plan  Supraclavicular adenopathy  Right side 3 cm.  Recent pulmonary infection.    This 69-year-old female with past medical history of right-sided breast cancer status postlumpectomy, radiation therapy and adjuvant chemotherapy who presents to the hematology clinic following flu infection approximately 2 weeks ago.  Axillary adenopathy was negative on the right side however supraclavicular adenopathy was present.  There is a chance that this could be related to recent pulmonology infection however, with patient's history of right-sided breast cancer, additional diagnostics are necessary to see if lymphadenopathy persists.    Recommendation to follow-up with CT scan in approximately 2 weeks.  Based on results, additional recommendations will be made.    Patient's daughter understands to contact the office if she does not hear from our office following the CT scan.    Orders:  •  CBC and differential; Future  •  Comprehensive metabolic panel; Future  •  CT chest w contrast; Future    Malignant neoplasm of upper-outer quadrant of right breast in female, estrogen receptor positive (HCC)  Stage IA (pT1c, pN0, cM0, G2, ER: Positive, DE: Positive, HER2: Positive)     This is a 68-year-old female who presents with history of stage IA breast cancer who underwent lumpectomy followed by radiation and chemotherapy as well as adjuvant HER2 directed therapies and estrogen directed therapies.    Recommendation:  Arimidex 1 mg p.o. daily x 7 years minimum.    Orders:  •  CBC and differential; Future  •  Comprehensive metabolic panel; Future  •  CT chest w contrast; Future  •  Mammo diagnostic bilateral w 3d and cad; Future    Difficulty swallowing solids  More difficulty with steak then with liquids or  "Posta.  Patient has difficulty chewing.  This might be a mechanical issue as far as her dentures are concerned however, with the patient also having a supraclavicular lymphadenopathy, I think it is in the patient's best interest to undergo endoscopic examination of the esophagus.     Orders:  •  Ambulatory referral to Gastroenterology; Future    Sebaceous cyst  Benign condition.  Patient has some tenderness where it is located.  Is not actively draining skin is not red.  Patient should follow-up with general surgery after other issues above are addressed.    Orders:  •  Ambulatory Referral to General Surgery; Future    Return in about 6 months (around 9/24/2025) for [facility] Office Visit, Labs.    History of Present Illness   Chief Complaint   Patient presents with   • Follow-up     Oncology History   Oncology History   Malignant neoplasm of right female breast (HCC)   Malignant neoplasm of upper-outer quadrant of right breast in female, estrogen receptor positive (HCC)   5/23/2018 Initial Diagnosis    5/15/2018: Breast pain send to dx mammo    \"Targeted sonographic evaluation of the area of pain in the left   breast 12 to 3 o'clock position left breast area of pain   demonstrates a prominent island of fibroglandular tissue without   solid mass or abnormal shadowing.\"    ACR BI-RADS® Assessments: BiRad:4 - Suspicious (Overall)  Diag Mammogram: BiRad:4 - suspicious abnormality in the right   breast.  Left breast Left Brst US: Left breast is BiRad:1 - negative.        5/23/2018 Biopsy    Right breast core biopsy:  DCIS, micropapillary type, low-intermediate nuclear grade  ER 90-95% positive,  IN 75-80% positive       6/26/2018 Surgery    RIGHT BREAST NEEDLE LOCALIZATION X2 WITH RIGHT BREAST LUMPECTOMY ( NEEDLE LOC @ 1000) (Right)   ONCOPLASTIC CLOSURE OF LUMPECTOMY CAVITY    Invasive breast carcinoma, NST (aka ductal)  * [place] grade 2 of 3 (total score = 2+2+2 = 6 of 9)   -- tubule formation < 10%, score " 3   -- nuclear grade 2 of 3, score 2   -- mitoses ~ 4/mm2, score 2.   * invasive carcinoma is multifocally present (A23-1, A32-1, A84-1, A89-1, A100-1), largest focus is 14 millimeters in greatest dimension (A89-1, this focus is graded).   * superior lumpectomy margin (orange-inked) is POSITIVE for invasive carcinoma (A84-1)   * all other lumpectomy margins are free of invasive carcinoma.   * estrogen, progesterone & Her-2/negrita receptor studies are undertaken, to be described in an addendum report.  - Ductal carcinoma in-situ (DCIS): Present as an extensive component (~85% of total tumor).   * DCIS is co-located with invasive carcinoma surrounding prior needle biopsy site.   * DCIS spans 2.6 cm maximal dimension (A62-1) and is present on 27 of 136 total slides examined.   * DCIS has cribriform & micropapillary patterns, nuclear grade 2 of 3, with central comedo-type necrosis.   * DCIS is present within 0.2 millimeters of the superior lumpectomy margin (orange-inked, A26-1)   * DCIS is present within 0.2 millimeters of the medial lumpectomy margin (yellow-inked, A3-1)   * all other lumpectomy margins are free of DCIS by > 2 millimeters.  - Lymph-vascular invasion: no lymph-vascular invasion is unequivocally identified.  - Microcalcifications: present in DCIS.    % positive, ER 45-55% positive, HER2 by IHC 3+ positive    - Dr Barragan       8/2/2018 Surgery    Right breast lumpectomy reexcision, right axilla SLN biopsy:  A.  Submitted as “right axillary sentinel node”:  - Seven lymph nodes are identified showing no metastatic tumor.     B. Right breast lumpectomy reexcision:  - Abundant reactive changes are seen around the previous lumpectomy cavity.  - No residual atypia or malignancy is seen.          8/2/2018 -  Cancer Staged    Stage IA - pT1c, pN0, G2.   Cancer Staging   Malignant neoplasm of upper-outer quadrant of right breast in female, estrogen receptor positive   Staging form: Breast, AJCC 8th Edition  -  Clinical: No stage assigned - Unsigned  - Pathologic: Stage IA (pT1c, pN0, cM0, G2, ER: Positive, NC: Positive, HER2: Positive) - Signed by Nunu Acosta MD on 8/21/2018  Histologic grading system: 3 grade system  Laterality: Right       9/25/2018 - 8/6/2019 Chemotherapy    Weekly Paclitaxol and trastuzumab x12, until December 11, 2018 followed by trastuzumab monotherapy 6 milligram/kilogram every 3 weeks    - Dr Savage       12/17/2018 - 8/26/2019 Chemotherapy    trastuzumab (HERCEPTIN) 579 mg in sodium chloride 0.9 % 250 mL chemo infusion, 8 mg/kg, Intravenous, Once, 12 of 12 cycles  Administration: 434 mg (5/14/2019), 434 mg (6/4/2019), 450 mg (7/16/2019), 450 mg (8/6/2019), 450 mg (6/25/2019)     1/9/2019 - 2/19/2019 Radiation    Plan ID Energy Fractions Dose per Fraction (cGy) Dose Correction (cGy) Total Dose Delivered (cGy) Elapsed Days   R Boost e 16E 5 / 5 200 0 1,000 6   Right Breast 6X 25 / 25 200 0 5,000 34     - Dr Acosta       2/19/2019 -  Hormone Therapy    Anastrozole (Arimidex) 1 mg PO Daily        Pertinent Medical History     03/24/25: Patient has several concerns today.  Patient notes that she has had difficulty swallowing meat secondary to difficulty chewing however, the meat seems different as opposed to liquids or Posta's or things that are more easily digestible.  No tobacco history.  Patient notes that she had the flu approximately 2 weeks ago.  Patient notes that she has recovered.       Review of Systems   Constitutional:  Positive for fatigue. Negative for appetite change, fever and unexpected weight change.   HENT:  Negative for nosebleeds.    Respiratory:  Negative for cough, choking and shortness of breath.         Negative hemoptysis.   Cardiovascular:  Negative for chest pain, palpitations and leg swelling.   Gastrointestinal: Negative.  Negative for abdominal distention, abdominal pain, anal bleeding, blood in stool, constipation, diarrhea, nausea and vomiting.   Endocrine: Negative.   "Negative for cold intolerance.   Genitourinary: Negative.  Negative for hematuria, menstrual problem, vaginal bleeding, vaginal discharge and vaginal pain.   Musculoskeletal: Negative.  Negative for arthralgias, myalgias, neck pain and neck stiffness.   Skin: Negative.  Negative for color change, pallor and rash.   Allergic/Immunologic: Negative.  Negative for immunocompromised state.   Neurological: Negative.  Negative for weakness and headaches.   Hematological:  Negative for adenopathy. Does not bruise/bleed easily.   All other systems reviewed and are negative.          Objective   /68 (BP Location: Right arm, Patient Position: Sitting, Cuff Size: Adult)   Pulse 74   Temp 97.5 °F (36.4 °C)   Resp 16   Ht 5' 4.5\" (1.638 m)   Wt 77.1 kg (170 lb)   SpO2 98%   BMI 28.73 kg/m²     Pain Screening:  Pain Score:   5  Physical Exam  Exam conducted with a chaperone present (Chante HINKLE).   Constitutional:       General: She is not in acute distress.     Appearance: She is well-developed.   HENT:      Head: Normocephalic and atraumatic.   Eyes:      General: No scleral icterus.     Conjunctiva/sclera: Conjunctivae normal.   Cardiovascular:      Rate and Rhythm: Normal rate.   Pulmonary:      Effort: Pulmonary effort is normal. No respiratory distress.      Breath sounds: No decreased air movement or transmitted upper airway sounds. No decreased breath sounds, wheezing or rhonchi.       Chest:   Breasts:     Right: No inverted nipple, mass, nipple discharge, skin change or tenderness.      Left: No inverted nipple, mass, nipple discharge, skin change or tenderness.       Lymphadenopathy:      Upper Body:      Right upper body: Supraclavicular adenopathy present. No axillary adenopathy.      Left upper body: No supraclavicular or axillary adenopathy.   Skin:     General: Skin is warm.      Coloration: Skin is not pale.      Findings: No rash.   Neurological:      Mental Status: She is alert and oriented " to person, place, and time.   Psychiatric:         Thought Content: Thought content normal.       Labs: I have reviewed the following labs:  Lab Results   Component Value Date/Time    WBC 20.35 (H) 11/18/2024 10:28 AM    RBC 4.40 11/18/2024 10:28 AM    Hemoglobin 12.4 11/18/2024 10:28 AM    Hematocrit 38.8 11/18/2024 10:28 AM    MCV 88 11/18/2024 10:28 AM    MCH 28.2 11/18/2024 10:28 AM    RDW 13.6 11/18/2024 10:28 AM    Platelets 369 11/18/2024 10:28 AM    Segmented % 88 (H) 11/18/2024 10:28 AM    Lymphocytes % 4 (L) 11/18/2024 10:28 AM    Monocytes % 6 11/18/2024 10:28 AM    Eosinophils Relative 0 11/18/2024 10:28 AM    Basophils Relative 1 11/18/2024 10:28 AM    Immature Grans % 1 11/18/2024 10:28 AM    Absolute Neutrophils 18.05 (H) 11/18/2024 10:28 AM     Lab Results   Component Value Date/Time    Potassium 5.1 03/10/2025 08:32 AM    Chloride 98 (L) 03/10/2025 08:32 AM    Carbon Dioxide 27 03/10/2025 08:32 AM    BUN 25 03/10/2025 08:32 AM    Creatinine 0.91 03/10/2025 08:32 AM    Glucose, Fasting 223 (H) 09/16/2024 07:54 AM    Calcium 9.7 03/10/2025 08:32 AM    AST 17 03/10/2025 08:32 AM    ALT 19 03/10/2025 08:32 AM    Alkaline Phosphatase 70 03/10/2025 08:32 AM    Protein, Total 7.5 03/10/2025 08:32 AM    ALBUMIN 3.9 03/10/2025 08:32 AM    Total Bilirubin 0.5 03/10/2025 08:32 AM    eGFRcr 69 03/10/2025 08:32 AM

## 2025-03-24 NOTE — PATIENT INSTRUCTIONS
[facility] Medical Oncology and Hematology Team  [place] - [phone]    Your Team Member:  Advanced Practitioner:  Shaylee Clarke PA-C    Please answer Private and Unavailable Calls - this may be your team(s) contacting you.  If you have medical questions/concerns/issues - contact us either by (1) My Chart (2) [facility] [place]

## 2025-03-24 NOTE — ASSESSMENT & PLAN NOTE
Stage IA (pT1c, pN0, cM0, G2, ER: Positive, MA: Positive, HER2: Positive)     This is a 68-year-old female who presents with history of stage IA breast cancer who underwent lumpectomy followed by radiation and chemotherapy as well as adjuvant HER2 directed therapies and estrogen directed therapies.    Recommendation:  Arimidex 1 mg p.o. daily x 7 years minimum.    Orders:  •  CBC and differential; Future  •  Comprehensive metabolic panel; Future  •  CT chest w contrast; Future  •  Mammo diagnostic bilateral w 3d and cad; Future

## 2025-03-25 DIAGNOSIS — J45.50 SEVERE PERSISTENT ASTHMA WITHOUT COMPLICATION: ICD-10-CM

## 2025-03-26 RX ORDER — TIOTROPIUM BROMIDE INHALATION SPRAY 3.12 UG/1
2 SPRAY, METERED RESPIRATORY (INHALATION) DAILY
Refills: 2 | OUTPATIENT
Start: 2025-03-26

## 2025-05-06 ENCOUNTER — HOSPITAL ENCOUNTER (EMERGENCY)
Facility: HOSPITAL | Age: 70
Discharge: HOME | End: 2025-05-07
Attending: EMERGENCY MEDICINE
Payer: COMMERCIAL

## 2025-05-06 ENCOUNTER — APPOINTMENT (EMERGENCY)
Dept: CT IMAGING | Facility: HOSPITAL | Age: 70
End: 2025-05-06
Payer: COMMERCIAL

## 2025-05-06 ENCOUNTER — APPOINTMENT (EMERGENCY)
Dept: NON INVASIVE DIAGNOSTICS | Facility: HOSPITAL | Age: 70
End: 2025-05-06
Payer: COMMERCIAL

## 2025-05-06 VITALS
TEMPERATURE: 99 F | DIASTOLIC BLOOD PRESSURE: 55 MMHG | HEART RATE: 100 BPM | OXYGEN SATURATION: 96 % | RESPIRATION RATE: 16 BRPM | SYSTOLIC BLOOD PRESSURE: 116 MMHG

## 2025-05-06 DIAGNOSIS — R10.84 GENERALIZED ABDOMINAL PAIN: Primary | ICD-10-CM

## 2025-05-06 DIAGNOSIS — R50.9 FEVER: ICD-10-CM

## 2025-05-06 DIAGNOSIS — R11.2 NAUSEA AND VOMITING: ICD-10-CM

## 2025-05-06 DIAGNOSIS — R19.7 DIARRHEA: ICD-10-CM

## 2025-05-06 LAB
ALBUMIN SERPL BCG-MCNC: 3.9 G/DL (ref 3.5–5)
ALP SERPL-CCNC: 55 U/L (ref 34–104)
ALT SERPL W P-5'-P-CCNC: 17 U/L (ref 7–52)
ANION GAP SERPL CALCULATED.3IONS-SCNC: 10 MMOL/L (ref 4–13)
APTT PPP: 24 SECONDS (ref 23–34)
AST SERPL W P-5'-P-CCNC: 19 U/L (ref 13–39)
BACTERIA UR QL AUTO: NORMAL /HPF
BASOPHILS # BLD AUTO: 0.08 THOUSANDS/ÂΜL (ref 0–0.1)
BASOPHILS NFR BLD AUTO: 1 % (ref 0–1)
BILIRUB SERPL-MCNC: 0.71 MG/DL (ref 0.2–1)
BILIRUB UR QL STRIP: NEGATIVE
BUN SERPL-MCNC: 26 MG/DL (ref 5–25)
CALCIUM SERPL-MCNC: 9.9 MG/DL (ref 8.4–10.2)
CHLORIDE SERPL-SCNC: 101 MMOL/L (ref 96–108)
CLARITY UR: CLEAR
CO2 SERPL-SCNC: 26 MMOL/L (ref 21–32)
COLOR UR: ABNORMAL
CREAT SERPL-MCNC: 0.65 MG/DL (ref 0.6–1.3)
EOSINOPHIL # BLD AUTO: 0.03 THOUSAND/ÂΜL (ref 0–0.61)
EOSINOPHIL NFR BLD AUTO: 0 % (ref 0–6)
ERYTHROCYTE [DISTWIDTH] IN BLOOD BY AUTOMATED COUNT: 13.5 % (ref 11.6–15.1)
GFR SERPL CREATININE-BSD FRML MDRD: 91 ML/MIN/1.73SQ M
GLUCOSE SERPL-MCNC: 106 MG/DL (ref 65–140)
GLUCOSE UR STRIP-MCNC: NEGATIVE MG/DL
HCT VFR BLD AUTO: 35.3 % (ref 34.8–46.1)
HGB BLD-MCNC: 12 G/DL (ref 11.5–15.4)
HGB UR QL STRIP.AUTO: NEGATIVE
IMM GRANULOCYTES # BLD AUTO: 0.11 THOUSAND/UL (ref 0–0.2)
IMM GRANULOCYTES NFR BLD AUTO: 1 % (ref 0–2)
INR PPP: 1.11 (ref 0.85–1.19)
KETONES UR STRIP-MCNC: NEGATIVE MG/DL
LACTATE SERPL-SCNC: 1.7 MMOL/L (ref 0.5–2)
LEUKOCYTE ESTERASE UR QL STRIP: NEGATIVE
LIPASE SERPL-CCNC: <6 U/L (ref 11–82)
LYMPHOCYTES # BLD AUTO: 0.83 THOUSANDS/ÂΜL (ref 0.6–4.47)
LYMPHOCYTES NFR BLD AUTO: 5 % (ref 14–44)
MCH RBC QN AUTO: 29.7 PG (ref 26.8–34.3)
MCHC RBC AUTO-ENTMCNC: 34 G/DL (ref 31.4–37.4)
MCV RBC AUTO: 87 FL (ref 82–98)
MONOCYTES # BLD AUTO: 1.11 THOUSAND/ÂΜL (ref 0.17–1.22)
MONOCYTES NFR BLD AUTO: 7 % (ref 4–12)
NEUTROPHILS # BLD AUTO: 14.88 THOUSANDS/ÂΜL (ref 1.85–7.62)
NEUTS SEG NFR BLD AUTO: 86 % (ref 43–75)
NITRITE UR QL STRIP: NEGATIVE
NON-SQ EPI CELLS URNS QL MICRO: NORMAL /HPF
NRBC BLD AUTO-RTO: 0 /100 WBCS
PH UR STRIP.AUTO: 7.5 [PH]
PLATELET # BLD AUTO: 300 THOUSANDS/UL (ref 149–390)
PMV BLD AUTO: 11.6 FL (ref 8.9–12.7)
POTASSIUM SERPL-SCNC: 3.5 MMOL/L (ref 3.5–5.3)
PROCALCITONIN SERPL-MCNC: 0.16 NG/ML
PROT SERPL-MCNC: 7.3 G/DL (ref 6.4–8.4)
PROT UR STRIP-MCNC: ABNORMAL MG/DL
PROTHROMBIN TIME: 14.5 SECONDS (ref 12.3–15)
RBC # BLD AUTO: 4.04 MILLION/UL (ref 3.81–5.12)
RBC #/AREA URNS AUTO: NORMAL /HPF
SODIUM SERPL-SCNC: 137 MMOL/L (ref 135–147)
SP GR UR STRIP.AUTO: 1.03 (ref 1–1.03)
UROBILINOGEN UR STRIP-ACNC: <2 MG/DL
WBC # BLD AUTO: 17.04 THOUSAND/UL (ref 4.31–10.16)
WBC #/AREA URNS AUTO: NORMAL /HPF

## 2025-05-06 PROCEDURE — 87040 BLOOD CULTURE FOR BACTERIA: CPT

## 2025-05-06 PROCEDURE — 80053 COMPREHEN METABOLIC PANEL: CPT

## 2025-05-06 PROCEDURE — 85025 COMPLETE CBC W/AUTO DIFF WBC: CPT

## 2025-05-06 PROCEDURE — 96367 TX/PROPH/DG ADDL SEQ IV INF: CPT

## 2025-05-06 PROCEDURE — 83605 ASSAY OF LACTIC ACID: CPT

## 2025-05-06 PROCEDURE — 93970 EXTREMITY STUDY: CPT | Performed by: SURGERY

## 2025-05-06 PROCEDURE — 74177 CT ABD & PELVIS W/CONTRAST: CPT

## 2025-05-06 PROCEDURE — 85610 PROTHROMBIN TIME: CPT

## 2025-05-06 PROCEDURE — 99284 EMERGENCY DEPT VISIT MOD MDM: CPT

## 2025-05-06 PROCEDURE — 99285 EMERGENCY DEPT VISIT HI MDM: CPT

## 2025-05-06 PROCEDURE — 81001 URINALYSIS AUTO W/SCOPE: CPT

## 2025-05-06 PROCEDURE — 83690 ASSAY OF LIPASE: CPT

## 2025-05-06 PROCEDURE — 96375 TX/PRO/DX INJ NEW DRUG ADDON: CPT

## 2025-05-06 PROCEDURE — 36415 COLL VENOUS BLD VENIPUNCTURE: CPT

## 2025-05-06 PROCEDURE — 84145 PROCALCITONIN (PCT): CPT

## 2025-05-06 PROCEDURE — 96365 THER/PROPH/DIAG IV INF INIT: CPT

## 2025-05-06 PROCEDURE — 85730 THROMBOPLASTIN TIME PARTIAL: CPT

## 2025-05-06 PROCEDURE — 93970 EXTREMITY STUDY: CPT

## 2025-05-06 RX ORDER — DICYCLOMINE HCL 20 MG
20 TABLET ORAL 2 TIMES DAILY
Qty: 20 TABLET | Refills: 0 | Status: SHIPPED | OUTPATIENT
Start: 2025-05-06

## 2025-05-06 RX ORDER — KETOROLAC TROMETHAMINE 30 MG/ML
15 INJECTION, SOLUTION INTRAMUSCULAR; INTRAVENOUS ONCE
Status: COMPLETED | OUTPATIENT
Start: 2025-05-06 | End: 2025-05-06

## 2025-05-06 RX ORDER — METOCLOPRAMIDE 10 MG/1
10 TABLET ORAL ONCE
Status: COMPLETED | OUTPATIENT
Start: 2025-05-06 | End: 2025-05-06

## 2025-05-06 RX ORDER — ACETAMINOPHEN 10 MG/ML
1000 INJECTION, SOLUTION INTRAVENOUS ONCE
Status: COMPLETED | OUTPATIENT
Start: 2025-05-06 | End: 2025-05-06

## 2025-05-06 RX ORDER — ONDANSETRON 2 MG/ML
4 INJECTION INTRAMUSCULAR; INTRAVENOUS ONCE
Status: COMPLETED | OUTPATIENT
Start: 2025-05-06 | End: 2025-05-06

## 2025-05-06 RX ORDER — ONDANSETRON 4 MG/1
4 TABLET, ORALLY DISINTEGRATING ORAL EVERY 8 HOURS PRN
Qty: 12 TABLET | Refills: 0 | Status: SHIPPED | OUTPATIENT
Start: 2025-05-06

## 2025-05-06 RX ADMIN — IOHEXOL 80 ML: 350 INJECTION, SOLUTION INTRAVENOUS at 21:55

## 2025-05-06 RX ADMIN — DEXTROSE 2000 MG: 50 INJECTION, SOLUTION INTRAVENOUS at 21:15

## 2025-05-06 RX ADMIN — ONDANSETRON 4 MG: 2 INJECTION INTRAMUSCULAR; INTRAVENOUS at 20:05

## 2025-05-06 RX ADMIN — SODIUM CHLORIDE 500 ML: 0.9 INJECTION, SOLUTION INTRAVENOUS at 20:12

## 2025-05-06 RX ADMIN — METOCLOPRAMIDE 10 MG: 10 TABLET ORAL at 23:15

## 2025-05-06 RX ADMIN — KETOROLAC TROMETHAMINE 15 MG: 30 INJECTION, SOLUTION INTRAMUSCULAR; INTRAVENOUS at 22:18

## 2025-05-06 RX ADMIN — ACETAMINOPHEN 1000 MG: 10 INJECTION INTRAVENOUS at 20:11

## 2025-05-07 NOTE — ED PROVIDER NOTES
Time reflects when diagnosis was documented in both MDM as applicable and the Disposition within this note       Time User Action Codes Description Comment    5/6/2025 11:29 PM Jorge Landa [R10.84] Generalized abdominal pain     5/6/2025 11:29 PM Jorge Landa [R50.9] Fever     5/6/2025 11:29 PM Jorge Landa [R11.2] Nausea and vomiting     5/6/2025 11:29 PM Jorge Landa [R19.7] Diarrhea           ED Disposition       ED Disposition   Discharge    Condition   Stable    Date/Time   Tue May 6, 2025 11:29 PM    Comment   Jolie CruzCintron discharge to home/self care.                   Assessment & Plan       Medical Decision Making  69-year-old female presenting with lower abdominal pain, nausea, vomiting, diarrhea, fevers yesterday.  On exam somewhat ill-appearing, febrile, tachycardic.  Abdomen diffusely tender to palpation, nondistended.  Mild lower extremity edema bilaterally, trace pitting.    Meet SIRS on arrival.  Will conduct sepsis workup.  Will give light fluids.  Will defer antibiotics at this time due to uncertain source of SIRS.  Acetaminophen for pain and fever, Zofran for nausea.    Lab work pertinent for leukocytosis of 17, therefore given dose of Rocephin empirically.  However remainder of lab work was unremarkable including negative procalcitonin and lactic acid.  CT scan with no acute findings in abdomen or pelvis.  Considering the lower extremity edema with trace pitting, only on aspirin, and no recent duplex ultrasounds, was also evaluated for DVT.  Duplex study negative bilateral lower extremities per vascular US tech.    On reevaluation patient is sleeping comfortably, easily arousable.  Symptoms are well-controlled at this point.  With no emergent findings and workup and symptoms controlled, she is stable for discharge home.  Symptoms may be viral in origin i.e. gastroenteritis.  Thoroughly discussed supportive care and expectations.  Given referral to gastroenterology in case  symptoms are persistent.  Return to ED if acutely worsening.  Patient expresses understanding of the condition, treatment plan, follow-up instructions, and return precautions.      Amount and/or Complexity of Data Reviewed  Labs: ordered. Decision-making details documented in ED Course.  Radiology: ordered.    Risk  Prescription drug management.        ED Course as of 05/07/25 0253 Tue May 06, 2025   2039 WBC(!): 17.04       Medications   acetaminophen (Ofirmev) injection 1,000 mg (0 mg Intravenous Stopped 5/6/25 2053)   ondansetron (ZOFRAN) injection 4 mg (4 mg Intravenous Given 5/6/25 2005)   sodium chloride 0.9 % bolus 500 mL (0 mL Intravenous Stopped 5/6/25 2118)   ceftriaxone (ROCEPHIN) 2 g/50 mL in dextrose IVPB (0 mg Intravenous Stopped 5/6/25 2206)   iohexol (OMNIPAQUE) 350 MG/ML injection (MULTI-DOSE) 100 mL (80 mL Intravenous Given 5/6/25 2155)   ketorolac (TORADOL) injection 15 mg (15 mg Intravenous Given 5/6/25 2218)   metoclopramide (REGLAN) tablet 10 mg (10 mg Oral Given 5/6/25 2315)       ED Risk Strat Scores                    No data recorded        SBIRT 22yo+      Flowsheet Row Most Recent Value   Initial Alcohol Screen: US AUDIT-C     1. How often do you have a drink containing alcohol? 0 Filed at: 05/06/2025 1958   2. How many drinks containing alcohol do you have on a typical day you are drinking?  0 Filed at: 05/06/2025 1958   3a. Male UNDER 65: How often do you have five or more drinks on one occasion? 0 Filed at: 05/06/2025 1958   3b. FEMALE Any Age, or MALE 65+: How often do you have 4 or more drinks on one occassion? 0 Filed at: 05/06/2025 1958   Audit-C Score 0 Filed at: 05/06/2025 1958   MARGO: How many times in the past year have you...    Used an illegal drug or used a prescription medication for non-medical reasons? Never Filed at: 05/06/2025 1958                            History of Present Illness       Chief Complaint   Patient presents with    Medical Problem     Patient has  been experiencing fever, abdominal pain, N/V, and pain and swelling in her legs since yesterday.        Past Medical History:   Diagnosis Date    Abdominal infection (HCC)     h-pylori    Abnormal chest x-ray 04/05/2019    Asthma     Breast cancer (HCC) 06/26/2018    Cancer (HCC)     BREAST    Depression, recurrent (HCC) 06/13/2022    Diabetes mellitus (HCC)     Hiatal hernia     Hiatal hernia 08/08/2018    History of chemotherapy 2018    History of radiation therapy 2018    Hypercholesterolemia     Hypertension     Hypothyroid     Renal disorder     Rheumatoid arthritis (HCC)       Past Surgical History:   Procedure Laterality Date    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    BREAST LUMPECTOMY Right 6/26/2018    Procedure: RIGHT BREAST NEEDLE LOCALIZATION X2 WITH RIGHT BREAST LUMPECTOMY ( NEEDLE LOC @ 1000);  Surgeon: Familia Barragan MD;  Location: [facility];  Service: Surgical Oncology    BREAST LUMPECTOMY Right 8/2/2018    Procedure: LYMPHOSCINITGRAPHY INTRAOPERATIVE LYMPHATIC MAPPING , RIGHT SENTINEL NODE BIOPSY, REEXCISION  RIGHT BREAST LUMPECTOMY CAVITY (SUPERIOR MARGIN);  Surgeon: Familia Barragan MD;  Location: [facility];  Service: Surgical Oncology    BREAST SURGERY Left     CATARACT EXTRACTION, BILATERAL Bilateral     COLONOSCOPY      EGD AND COLONOSCOPY N/A 9/5/2018    Procedure: EGD AND COLONOSCOPY;  Surgeon: José Miguel Rosas MD;  Location: [facility];  Service: Gastroenterology    FL GUIDED CENTRAL VENOUS ACCESS DEVICE INSERTION  9/13/2018    KNEE SURGERY Right     MAMMO NEEDLE LOCALIZATION RIGHT (ALL INC) Right 6/26/2018    MAMMO STEREOTACTIC BREAST BIOPSY RIGHT (ALL INC) Right 5/23/2018    RETINAL DETACHMENT SURGERY Right     TUBAL LIGATION      TUNNELED VENOUS PORT PLACEMENT Left 9/13/2018    Procedure: INSERTION VENOUS PORT (PORT-A-CATH);  Surgeon: Familia Barragan MD;  Location: [facility];  Service: Surgical Oncology    UPPER GASTROINTESTINAL ENDOSCOPY        Family History   Problem Relation  Age of Onset    Diabetes Mother     Hypertension Mother     Diabetes Father     Hypertension Father     No Known Problems Sister     No Known Problems Sister     No Known Problems Sister     No Known Problems Sister     No Known Problems Sister     No Known Problems Daughter     No Known Problems Daughter     No Known Problems Daughter     Cancer Maternal Grandfather     Diabetes Brother     Diabetes Brother     Kidney failure Brother     Diabetes Brother     Breast cancer Neg Hx       Social History     Tobacco Use    Smoking status: Never    Smokeless tobacco: Never   Vaping Use    Vaping status: Never Used   Substance Use Topics    Alcohol use: No    Drug use: No      E-Cigarette/Vaping    E-Cigarette Use Never User       E-Cigarette/Vaping Substances    Nicotine No     THC No     CBD No     Flavoring No     Other No     Unknown No       I have reviewed and agree with the history as documented.     69-year-old female with PMH of diabetes, HTN, HLD, hypothyroidism, RA, breast cancer presents for evaluation of abdominal pain, nausea, vomiting, diarrhea, fever.  Symptoms began yesterday.  Primary complaint is pain in her lower abdomen, describes it as generalized.  Does also have lower extremity edema which she states she has chronically.  No chest pain, shortness, hematochezia, hematemesis.        Review of Systems   Constitutional:  Positive for fever. Negative for chills.   HENT:  Negative for ear pain and sore throat.    Eyes:  Negative for pain and visual disturbance.   Respiratory:  Negative for cough and shortness of breath.    Cardiovascular:  Positive for leg swelling. Negative for chest pain and palpitations.   Gastrointestinal:  Positive for abdominal pain, diarrhea, nausea and vomiting.   Genitourinary:  Negative for dysuria and hematuria.   Musculoskeletal:  Negative for arthralgias and back pain.   Skin:  Negative for color change and rash.   Neurological:  Negative for seizures and syncope.   All  other systems reviewed and are negative.          Objective       ED Triage Vitals   Temperature Pulse Blood Pressure Respirations SpO2 Patient Position - Orthostatic VS   05/06/25 1915 05/06/25 1915 05/06/25 1915 05/06/25 1915 05/06/25 1915 05/06/25 1915   (!) 101 °F (38.3 °C) (!) 108 138/64 20 95 % Sitting      Temp Source Heart Rate Source BP Location FiO2 (%) Pain Score    05/06/25 1915 05/06/25 1915 05/06/25 1915 -- 05/06/25 2218    Oral Monitor Right arm  6      Vitals      Date and Time Temp Pulse SpO2 Resp BP Pain Score FACES Pain Rating User   05/06/25 2315 -- 100 96 % 16 116/55 -- -- AA   05/06/25 2218 -- -- -- -- -- 6 -- SR   05/06/25 2118 99 °F (37.2 °C) 102 94 % 18 136/62 -- -- SR   05/06/25 1915 101 °F (38.3 °C) 108 95 % 20 138/64 -- -- AA            Physical Exam  Vitals and nursing note reviewed.   Constitutional:       Appearance: She is well-developed. She is ill-appearing.   HENT:      Head: Normocephalic and atraumatic.   Eyes:      Conjunctiva/sclera: Conjunctivae normal.   Cardiovascular:      Rate and Rhythm: Regular rhythm. Tachycardia present.      Heart sounds: No murmur heard.  Pulmonary:      Effort: Pulmonary effort is normal. No respiratory distress.      Breath sounds: Normal breath sounds.   Abdominal:      Palpations: Abdomen is soft.      Tenderness: There is generalized abdominal tenderness.   Musculoskeletal:         General: No swelling.      Cervical back: Neck supple.   Skin:     General: Skin is warm and dry.      Capillary Refill: Capillary refill takes less than 2 seconds.   Neurological:      Mental Status: She is alert.   Psychiatric:         Mood and Affect: Mood normal.         Results Reviewed       Procedure Component Value Units Date/Time    Blood culture #1 [[id number]] Collected: 05/06/25 1951    Lab Status: Preliminary result Specimen: Blood from Arm, Right Updated: 05/07/25 0001     Blood Culture Received in Microbiology Lab. Culture in Progress.    Blood culture  #2 [[id number]] Collected: 05/06/25 2009    Lab Status: Preliminary result Specimen: Blood from Arm, Left Updated: 05/07/25 0001     Blood Culture Received in Microbiology Lab. Culture in Progress.    Urine Microscopic [[id number]]  (Normal) Collected: 05/06/25 2115    Lab Status: Final result Specimen: Urine, Clean Catch Updated: 05/06/25 2132     RBC, UA None Seen /hpf      WBC, UA 1-2 /hpf      Epithelial Cells Occasional /hpf      Bacteria, UA None Seen /hpf     UA w Reflex to Microscopic w Reflex to Culture [[id number]]  (Abnormal) Collected: 05/06/25 2115    Lab Status: Final result Specimen: Urine, Clean Catch Updated: 05/06/25 2131     Color, UA Light Yellow     Clarity, UA Clear     Specific Gravity, UA 1.027     pH, UA 7.5     Leukocytes, UA Negative     Nitrite, UA Negative     Protein, UA 30 (1+) mg/dl      Glucose, UA Negative mg/dl      Ketones, UA Negative mg/dl      Urobilinogen, UA <2.0 mg/dl      Bilirubin, UA Negative     Occult Blood, UA Negative    Protime-INR [[id number]]  (Normal) Collected: 05/06/25 2108    Lab Status: Final result Specimen: Blood from Arm, Right Updated: 05/06/25 2129     Protime 14.5 seconds      INR 1.11    Narrative:      INR Therapeutic Range    Indication                                             INR Range      Atrial Fibrillation                                               2.0-3.0  Hypercoagulable State                                    2.0.2.3  Left Ventricular Asist Device                            2.0-3.0  Mechanical Heart Valve                                  -    Aortic(with afib, MI, embolism, HF, LA enlargement,    and/or coagulopathy)                                     2.0-3.0 (2.5-3.5)     Mitral                                                             2.5-3.5  Prosthetic/Bioprosthetic Heart Valve               2.0-3.0  Venous thromboembolism (VTE: VT, PE        2.0-3.0    APTT [[id number]]  (Normal) Collected: 05/06/25 2108    Lab Status: Final result  Specimen: Blood from Arm, Right Updated: 05/06/25 2129     PTT 24 seconds     Lactic acid [[id number]]  (Normal) Collected: 05/06/25 2018    Lab Status: Final result Specimen: Blood from Arm, Left Updated: 05/06/25 2112     LACTIC ACID 1.7 mmol/L     Narrative:      Result may be elevated if tourniquet was used during collection.    Comprehensive metabolic panel [[id number]]  (Abnormal) Collected: 05/06/25 2008    Lab Status: Final result Specimen: Blood from Arm, Left Updated: 05/06/25 2104     Sodium 137 mmol/L      Potassium 3.5 mmol/L      Chloride 101 mmol/L      CO2 26 mmol/L      ANION GAP 10 mmol/L      BUN 26 mg/dL      Creatinine 0.65 mg/dL      Glucose 106 mg/dL      Calcium 9.9 mg/dL      AST 19 U/L      ALT 17 U/L      Alkaline Phosphatase 55 U/L      Total Protein 7.3 g/dL      Albumin 3.9 g/dL      Total Bilirubin 0.71 mg/dL      eGFR 91 ml/min/1.73sq m     Narrative:      National Kid[employer] guidelines for Chronic Kidney Disease (CKD):     Stage 1 with normal or high GFR (GFR > 90 mL/min/1.73 square meters)    Stage 2 Mild CKD (GFR = 60-89 mL/min/1.73 square meters)    Stage 3A Moderate CKD (GFR = 45-59 mL/min/1.73 square meters)    Stage 3B Moderate CKD (GFR = 30-44 mL/min/1.73 square meters)    Stage 4 Severe CKD (GFR = 15-29 mL/min/1.73 square meters)    Stage 5 End Stage CKD (GFR <15 mL/min/1.73 square meters)  Note: GFR calculation is accurate only with a steady state creatinine    Lipase [[id number]]  (Abnormal) Collected: 05/06/25 2008    Lab Status: Final result Specimen: Blood from Arm, Left Updated: 05/06/25 2104     Lipase <6 u/L     Procalcitonin [[id number]]  (Normal) Collected: 05/06/25 2008    Lab Status: Final result Specimen: Blood from Arm, Left Updated: 05/06/25 2104     Procalcitonin 0.16 ng/ml     CBC and differential [[id number]]  (Abnormal) Collected: 05/06/25 2008    Lab Status: Final result Specimen: Blood from Arm, Left Updated: 05/06/25 2032     WBC 17.04  Thousand/uL      RBC 4.04 Million/uL      Hemoglobin 12.0 g/dL      Hematocrit 35.3 %      MCV 87 fL      MCH 29.7 pg      MCHC 34.0 g/dL      RDW 13.5 %      MPV 11.6 fL      Platelets 300 Thousands/uL      nRBC 0 /100 WBCs      Segmented % 86 %      Immature Grans % 1 %      Lymphocytes % 5 %      Monocytes % 7 %      Eosinophils Relative 0 %      Basophils Relative 1 %      Absolute Neutrophils 14.88 Thousands/µL      Absolute Immature Grans 0.11 Thousand/uL      Absolute Lymphocytes 0.83 Thousands/µL      Absolute Monocytes 1.11 Thousand/µL      Eosinophils Absolute 0.03 Thousand/µL      Basophils Absolute 0.08 Thousands/µL             CT abdomen pelvis with contrast   Final Interpretation by Jaclyn Thomas MD (05/06 2248)      No acute findings in the abdomen or pelvis.         Workstation performed: [id number]          VAS VENOUS DUPLEX - LOWER LIMB BILATERAL    (Results Pending)       Procedures    ED Medication and Procedure Management   Prior to Admission Medications   Prescriptions Last Dose Informant Patient Reported? Taking?   Alcohol Swabs (Pharmacist Choice Alcohol) PADS  Self, Child No No   Sig: CLEAN AREA BEFORE TESTING OR INJECTING FOUR TIMES DAILY   Comfort EZ Pen Needles 32G X 4 MM MISC  Self, Child No No   Sig: USE AS DIRECTED FOUR TIMES DAILY   Continuous Glucose  (FreeStyle Broderick 3 [place]) AARON  Self, Child No No   Sig: USE AS DIRECTED   Continuous Glucose Sensor (FreeStyle Broderick 3 Sensor) MISC  Self, Child No No   Sig: USE 1 UNITS EVERY 14 (FOURTEEN) DAYS   Diclofenac Sodium (VOLTAREN) 1 %  Self, Child No No   Sig: Apply 2 g topically 2 (two) times a day   EPINEPHrine (Auvi-Q) 0.1 mg/0.1 mL SOAJ   No No   Sig: Inject 0.3 mL (0.3 mg total) into a muscle once for 1 dose   Mag-G 500 (27 Mg) MG tablet  Self, Child No No   Sig: TAKE 1 TABLET (500 MG TOTAL) BY MOUTH DAILY   OneTouch Delica Lancets 33G MISC  Self, Child No No   Sig: USE 3 (THREE) TIMES A DAY   OneTouch Ultra test strip  Self,  Child No No   Sig: USE 1 EACH 3 (THREE) TIMES A DAY USE AS INSTRUCTED   acetaminophen (TYLENOL) 500 mg tablet  Self, Child No No   Sig: Take 1 tablet (500 mg total) by mouth every 6 (six) hours as needed for fever   albuterol (2.5 mg/3 mL) 0.083 % nebulizer solution  Self, Child No No   Sig: Take 3 mL (2.5 mg total) by nebulization every 6 (six) hours as needed for wheezing or shortness of breath   albuterol (Ventolin HFA) 90 mcg/act inhaler  Self, Child No No   Sig: Inhale 1-2 puffs every 6 (six) hours as needed for wheezing or shortness of breath   anastrozole (ARIMIDEX) 1 mg tablet  Self, Child No No   Sig: Take 1 tablet (1 mg total) by mouth daily   aspirin 81 mg chewable tablet  Self, Child Yes No   Sig: Chew 81 mg daily   atorvastatin (LIPITOR) 40 mg tablet  Self, Child No No   Sig: Take 1 tablet (40 mg total) by mouth daily   calcium polycarbophil (FIBERCON) 625 mg tablet  Self, Child No No   Sig: Take 1 tablet (625 mg total) by mouth daily   dicyclomine (BENTYL) 20 mg tablet  Self, Child No No   Sig: Take 1 tablet (20 mg total) by mouth 2 (two) times a day as needed (diarrhea) for up to 10 doses   ergocalciferol (VITAMIN D2) 50,000 units  Self, Child No No   Sig: TAKE 1 CAPSULE (50,000 UNITS TOTAL) BY MOUTH ONCE A WEEK   fluticasone (FLONASE) 50 mcg/act nasal spray  Self, Child Yes No   fluticasone-salmeterol (Advair HFA) 230-21 MCG/ACT inhaler  Self, Child No No   Sig: Inhale 2 puffs 2 (two) times a day Rinse mouth after use.   furosemide (LASIX) 20 mg tablet  Self, Child No No   Sig: TAKE 1 TABLET (20 MG TOTAL) BY MOUTH 2 (TWO) TIMES A DAY   guaiFENesin (MUCINEX) 600 mg 12 hr tablet  Self, Child No No   Sig: Take 1 tablet (600 mg total) by mouth every 12 (twelve) hours as needed for congestion   insulin degludec (Tresiba FlexTouch) 200 units/mL CONCENTRATED U-200 injection pen  Self, Child No No   Sig: INJECT 60 UNITS UNDER THE SKIN DAILY AT 5PM   insulin lispro (HumaLOG) 100 units/mL injection pen  Self,  Child No No   Sig: INJECT 24 UNITS BEFORE EACH MEAL PLUS SLIDING SCALE 150-200: 2 UNITS 201-250: 4 UNITS 251-300: 6 UNITS 301*   ketotifen (ZADITOR) 0.025 % ophthalmic solution  Self, Child No No   Sig: Administer 1 drop to both eyes 2 (two) times a day as needed (itchy eyes)   latanoprost (XALATAN) 0.005 % ophthalmic solution  Self, Child Yes No   loratadine (CLARITIN) 10 mg tablet  Self, Child No No   Sig: TAKE 1 TABLET (10 MG TOTAL) BY MOUTH DAILY   losartan (COZAAR) 100 MG tablet  Self, Child No No   Sig: Take 1 tablet (100 mg total) by mouth daily   magnesium (MAGTAB) 84 MG (7MEQ) TBCR   No No   Sig: Take 1 tablet (84 mg total) by mouth 2 (two) times a day for 14 days   magnesium chloride-calcium (Slow-Mag) 71.5-119 mg  Self, Child No No   Sig: Take 2 tablets by mouth daily   methocarbamol (ROBAXIN) 500 mg tablet  Child, Self Yes No   metoprolol tartrate (LOPRESSOR) 25 mg tablet  Self, Child No No   Sig: TAKE 0.5 TABLETS (12.5 MG TOTAL) BY MOUTH EVERY 12 (TWELVE) HOURS   omeprazole (PriLOSEC) 20 mg delayed release capsule  Self, Child No No   Sig: TAKE 1 CAPSULE (20 MG TOTAL) BY MOUTH DAILY   ondansetron (ZOFRAN) 4 mg tablet  Self, Child No No   Sig: Take 1 tablet (4 mg total) by mouth every 6 (six) hours   ondansetron (ZOFRAN-ODT) 4 mg disintegrating tablet  Self, Child No No   Sig: Take 1 tablet (4 mg total) by mouth every 8 (eight) hours as needed for nausea for up to 10 doses   polyethylene glycol (MIRALAX) 17 g packet  Self No No   Sig: Take 17 g by mouth daily for 14 days   tirzepatide (Mounjaro) 2.5 MG/0.5ML  Self, Child No No   Sig: INJECT 0.5 ML (2.5 MG TOTAL) UNDER THE SKIN ONCE A WEEK   zileuton 600 MG  Self, Child No No   Sig: TAKE 1 TABLET (600 MG TOTAL) BY MOUTH 2 (TWO) TIMES A DAY      Facility-Administered Medications: None     Discharge Medication List as of 5/6/2025 11:34 PM        START taking these medications    Details   !! dicyclomine (BENTYL) 20 mg tablet Take 1 tablet (20 mg total) by  mouth 2 (two) times a day, Starting Tue 5/6/2025, Normal      !! ondansetron (ZOFRAN-ODT) 4 mg disintegrating tablet Take 1 tablet (4 mg total) by mouth every 8 (eight) hours as needed for nausea or vomiting, Starting Tue 5/6/2025, Normal       !! - Potential duplicate medications found. Please discuss with provider.        CONTINUE these medications which have NOT CHANGED    Details   acetaminophen (TYLENOL) 500 mg tablet Take 1 tablet (500 mg total) by mouth every 6 (six) hours as needed for fever, Starting Wed 8/28/2024, Normal      albuterol (2.5 mg/3 mL) 0.083 % nebulizer solution Take 3 mL (2.5 mg total) by nebulization every 6 (six) hours as needed for wheezing or shortness of breath, Starting Mon 2/24/2025, Normal      albuterol (Ventolin HFA) 90 mcg/act inhaler Inhale 1-2 puffs every 6 (six) hours as needed for wheezing or shortness of breath, Starting Mon 2/24/2025, Normal      Alcohol Swabs (Pharmacist Choice Alcohol) PADS CLEAN AREA BEFORE TESTING OR INJECTING FOUR TIMES DAILY, Normal      anastrozole (ARIMIDEX) 1 mg tablet Take 1 tablet (1 mg total) by mouth daily, Starting Mon 9/23/2024, Normal      aspirin 81 mg chewable tablet Chew 81 mg daily, Historical Med      atorvastatin (LIPITOR) 40 mg tablet Take 1 tablet (40 mg total) by mouth daily, Starting Wed 8/23/2023, Normal      calcium polycarbophil (FIBERCON) 625 mg tablet Take 1 tablet (625 mg total) by mouth daily, Starting Wed 10/11/2023, Normal      Comfort EZ Pen Needles 32G X 4 MM MISC USE AS DIRECTED FOUR TIMES DAILY, Normal      Continuous Glucose  (FreeStyle Broderick 3 [place]) AARON USE AS DIRECTED, Normal      Continuous Glucose Sensor (FreeStyle Broderick 3 Sensor) MISC USE 1 UNITS EVERY 14 (FOURTEEN) DAYS, Starting Wed 1/29/2025, Normal      Diclofenac Sodium (VOLTAREN) 1 % Apply 2 g topically 2 (two) times a day, Starting Wed 8/23/2023, Normal      !! dicyclomine (BENTYL) 20 mg tablet Take 1 tablet (20 mg total) by mouth 2 (two) times a  day as needed (diarrhea) for up to 10 doses, Starting Mon 11/18/2024, Normal      EPINEPHrine (Auvi-Q) 0.1 mg/0.1 mL SOAJ Inject 0.3 mL (0.3 mg total) into a muscle once for 1 dose, Starting Wed 11/9/2022, Normal      ergocalciferol (VITAMIN D2) 50,000 units TAKE 1 CAPSULE (50,000 UNITS TOTAL) BY MOUTH ONCE A WEEK, Starting Tue 9/5/2023, Normal      fluticasone (FLONASE) 50 mcg/act nasal spray Historical Med      fluticasone-salmeterol (Advair HFA) 230-21 MCG/ACT inhaler Inhale 2 puffs 2 (two) times a day Rinse mouth after use., Starting Mon 2/24/2025, Normal      furosemide (LASIX) 20 mg tablet TAKE 1 TABLET (20 MG TOTAL) BY MOUTH 2 (TWO) TIMES A DAY, Starting Mon 7/17/2023, Normal      guaiFENesin (MUCINEX) 600 mg 12 hr tablet Take 1 tablet (600 mg total) by mouth every 12 (twelve) hours as needed for congestion, Starting Wed 8/28/2024, Normal      insulin degludec (Tresiba FlexTouch) 200 units/mL CONCENTRATED U-200 injection pen INJECT 60 UNITS UNDER THE SKIN DAILY AT 5PM, Normal      insulin lispro (HumaLOG) 100 units/mL injection pen INJECT 24 UNITS BEFORE EACH MEAL PLUS SLIDING SCALE 150-200: 2 UNITS 201-250: 4 UNITS 251-300: 6 UNITS 301*, Normal      ketotifen (ZADITOR) 0.025 % ophthalmic solution Administer 1 drop to both eyes 2 (two) times a day as needed (itchy eyes), Starting Tue 10/3/2023, Normal      latanoprost (XALATAN) 0.005 % ophthalmic solution Historical Med      loratadine (CLARITIN) 10 mg tablet TAKE 1 TABLET (10 MG TOTAL) BY MOUTH DAILY, Starting Thu 8/5/2021, Normal      losartan (COZAAR) 100 MG tablet Take 1 tablet (100 mg total) by mouth daily, Starting Wed 8/23/2023, Normal      Mag-G 500 (27 Mg) MG tablet TAKE 1 TABLET (500 MG TOTAL) BY MOUTH DAILY, Normal      magnesium (MAGTAB) 84 MG (7MEQ) TBCR Take 1 tablet (84 mg total) by mouth 2 (two) times a day for 14 days, Starting Fri 11/1/2024, Until Mon 11/18/2024, Normal      magnesium chloride-calcium (Slow-Mag) 71.5-119 mg Take 2 tablets by  mouth daily, Starting Wed 9/18/2024, Normal      methocarbamol (ROBAXIN) 500 mg tablet Historical Med      metoprolol tartrate (LOPRESSOR) 25 mg tablet TAKE 0.5 TABLETS (12.5 MG TOTAL) BY MOUTH EVERY 12 (TWELVE) HOURS, Starting Mon 1/8/2024, Normal      omeprazole (PriLOSEC) 20 mg delayed release capsule TAKE 1 CAPSULE (20 MG TOTAL) BY MOUTH DAILY, Starting Mon 2/5/2024, Normal      ondansetron (ZOFRAN) 4 mg tablet Take 1 tablet (4 mg total) by mouth every 6 (six) hours, Starting Fri 11/1/2024, Normal      !! ondansetron (ZOFRAN-ODT) 4 mg disintegrating tablet Take 1 tablet (4 mg total) by mouth every 8 (eight) hours as needed for nausea for up to 10 doses, Starting Mon 11/18/2024, Normal      OneTouch Delica Lancets 33G MISC USE 3 (THREE) TIMES A DAY, Normal      OneTouch Ultra test strip USE 1 EACH 3 (THREE) TIMES A DAY USE AS INSTRUCTED, Starting Sat 2/18/2023, Normal      polyethylene glycol (MIRALAX) 17 g packet Take 17 g by mouth daily for 14 days, Starting Wed 10/11/2023, Until Mon 11/18/2024, Normal      tirzepatide (Mounjaro) 2.5 MG/0.5ML INJECT 0.5 ML (2.5 MG TOTAL) UNDER THE SKIN ONCE A WEEK, Normal      zileuton 600 MG TAKE 1 TABLET (600 MG TOTAL) BY MOUTH 2 (TWO) TIMES A DAY, Normal       !! - Potential duplicate medications found. Please discuss with provider.        No discharge procedures on file.  ED SEPSIS DOCUMENTATION   Time reflects when diagnosis was documented in both MDM as applicable and the Disposition within this note       Time User Action Codes Description Comment    5/6/2025 11:29 PM Jorge Landa [R10.84] Generalized abdominal pain     5/6/2025 11:29 PM Jorge Landa [R50.9] Fever     5/6/2025 11:29 PM Jorge Landa [R11.2] Nausea and vomiting     5/6/2025 11:29 PM Jorge Landa [R19.7] Diarrhea                  Jorge Landa PA-C  05/07/25 0253

## 2025-05-07 NOTE — DISCHARGE INSTRUCTIONS
Your CT scan did not show any specific abnormalities in your abdomen.  Symptoms could be from something such as a virus.  Would expect symptoms to improve on their own over time.  Make sure you are hydrating frequently with water.  You can take Zofran every 6-8 hours for nausea as needed.  Can try Bentyl 1-2 times daily for abdominal cramping.  Can also take other over-the-counter medications like ibuprofen, Tylenol, Pepcid, Maalox.  Follow-up with gastroenterology if your symptoms are persistent, it appears that you have a standing referral and you can call the number listed in your paperwork.  Return to an ER if symptoms are severely worsening.

## 2025-05-11 LAB
BACTERIA BLD CULT: NORMAL
BACTERIA BLD CULT: NORMAL

## 2025-05-12 ENCOUNTER — APPOINTMENT (EMERGENCY)
Dept: RADIOLOGY | Facility: HOSPITAL | Age: 70
End: 2025-05-12
Payer: COMMERCIAL

## 2025-05-12 ENCOUNTER — APPOINTMENT (EMERGENCY)
Dept: NON INVASIVE DIAGNOSTICS | Facility: HOSPITAL | Age: 70
End: 2025-05-12
Payer: COMMERCIAL

## 2025-05-12 ENCOUNTER — HOSPITAL ENCOUNTER (EMERGENCY)
Facility: HOSPITAL | Age: 70
Discharge: HOME | End: 2025-05-12
Attending: EMERGENCY MEDICINE
Payer: COMMERCIAL

## 2025-05-12 VITALS
WEIGHT: 174.38 LBS | TEMPERATURE: 97.9 F | SYSTOLIC BLOOD PRESSURE: 184 MMHG | OXYGEN SATURATION: 99 % | BODY MASS INDEX: 29.47 KG/M2 | HEART RATE: 74 BPM | DIASTOLIC BLOOD PRESSURE: 81 MMHG | RESPIRATION RATE: 16 BRPM

## 2025-05-12 DIAGNOSIS — R73.9 HYPERGLYCEMIA: ICD-10-CM

## 2025-05-12 DIAGNOSIS — I10 HTN (HYPERTENSION): ICD-10-CM

## 2025-05-12 DIAGNOSIS — M79.605 LEFT LEG PAIN: Primary | ICD-10-CM

## 2025-05-12 DIAGNOSIS — E11.9 DIABETES (HCC): ICD-10-CM

## 2025-05-12 DIAGNOSIS — D64.9 ANEMIA: ICD-10-CM

## 2025-05-12 DIAGNOSIS — L03.90 CELLULITIS: ICD-10-CM

## 2025-05-12 LAB
ANION GAP SERPL CALCULATED.3IONS-SCNC: 7 MMOL/L (ref 4–13)
APTT PPP: 25 SECONDS (ref 23–34)
BASOPHILS # BLD AUTO: 0.07 THOUSANDS/ÂΜL (ref 0–0.1)
BASOPHILS NFR BLD AUTO: 1 % (ref 0–1)
BUN SERPL-MCNC: 21 MG/DL (ref 5–25)
CALCIUM SERPL-MCNC: 9.2 MG/DL (ref 8.4–10.2)
CHLORIDE SERPL-SCNC: 101 MMOL/L (ref 96–108)
CO2 SERPL-SCNC: 27 MMOL/L (ref 21–32)
CREAT SERPL-MCNC: 0.71 MG/DL (ref 0.6–1.3)
EOSINOPHIL # BLD AUTO: 0.2 THOUSAND/ÂΜL (ref 0–0.61)
EOSINOPHIL NFR BLD AUTO: 3 % (ref 0–6)
ERYTHROCYTE [DISTWIDTH] IN BLOOD BY AUTOMATED COUNT: 13.2 % (ref 11.6–15.1)
FERRITIN SERPL-MCNC: 76 NG/ML (ref 30–307)
GFR SERPL CREATININE-BSD FRML MDRD: 87 ML/MIN/1.73SQ M
GLUCOSE SERPL-MCNC: 193 MG/DL (ref 65–140)
GLUCOSE SERPL-MCNC: 355 MG/DL (ref 65–140)
HCT VFR BLD AUTO: 28.8 % (ref 34.8–46.1)
HCT VFR BLD AUTO: 30.3 % (ref 34.8–46.1)
HGB BLD-MCNC: 9.6 G/DL (ref 11.5–15.4)
HGB BLD-MCNC: 9.8 G/DL (ref 11.5–15.4)
IMM GRANULOCYTES # BLD AUTO: 0.07 THOUSAND/UL (ref 0–0.2)
IMM GRANULOCYTES NFR BLD AUTO: 1 % (ref 0–2)
INR PPP: 1.07 (ref 0.85–1.19)
IRON SATN MFR SERPL: 13 % (ref 15–50)
IRON SERPL-MCNC: 38 UG/DL (ref 50–212)
LYMPHOCYTES # BLD AUTO: 2.16 THOUSANDS/ÂΜL (ref 0.6–4.47)
LYMPHOCYTES NFR BLD AUTO: 27 % (ref 14–44)
MCH RBC QN AUTO: 29.1 PG (ref 26.8–34.3)
MCHC RBC AUTO-ENTMCNC: 32.3 G/DL (ref 31.4–37.4)
MCV RBC AUTO: 90 FL (ref 82–98)
MONOCYTES # BLD AUTO: 0.63 THOUSAND/ÂΜL (ref 0.17–1.22)
MONOCYTES NFR BLD AUTO: 8 % (ref 4–12)
NEUTROPHILS # BLD AUTO: 4.8 THOUSANDS/ÂΜL (ref 1.85–7.62)
NEUTS SEG NFR BLD AUTO: 60 % (ref 43–75)
NRBC BLD AUTO-RTO: 0 /100 WBCS
PLATELET # BLD AUTO: 398 THOUSANDS/UL (ref 149–390)
PMV BLD AUTO: 11.4 FL (ref 8.9–12.7)
POTASSIUM SERPL-SCNC: 4.3 MMOL/L (ref 3.5–5.3)
PROTHROMBIN TIME: 14.1 SECONDS (ref 12.3–15)
RBC # BLD AUTO: 3.37 MILLION/UL (ref 3.81–5.12)
SODIUM SERPL-SCNC: 135 MMOL/L (ref 135–147)
TIBC SERPL-MCNC: 295.4 UG/DL (ref 250–450)
TRANSFERRIN SERPL-MCNC: 211 MG/DL (ref 203–362)
UIBC SERPL-MCNC: 257 UG/DL (ref 155–355)
WBC # BLD AUTO: 7.93 THOUSAND/UL (ref 4.31–10.16)

## 2025-05-12 PROCEDURE — 36415 COLL VENOUS BLD VENIPUNCTURE: CPT | Performed by: EMERGENCY MEDICINE

## 2025-05-12 PROCEDURE — 96361 HYDRATE IV INFUSION ADD-ON: CPT

## 2025-05-12 PROCEDURE — 80048 BASIC METABOLIC PNL TOTAL CA: CPT | Performed by: EMERGENCY MEDICINE

## 2025-05-12 PROCEDURE — 82728 ASSAY OF FERRITIN: CPT | Performed by: EMERGENCY MEDICINE

## 2025-05-12 PROCEDURE — 93971 EXTREMITY STUDY: CPT

## 2025-05-12 PROCEDURE — 85014 HEMATOCRIT: CPT | Performed by: EMERGENCY MEDICINE

## 2025-05-12 PROCEDURE — 99284 EMERGENCY DEPT VISIT MOD MDM: CPT

## 2025-05-12 PROCEDURE — 83540 ASSAY OF IRON: CPT | Performed by: EMERGENCY MEDICINE

## 2025-05-12 PROCEDURE — 85610 PROTHROMBIN TIME: CPT | Performed by: EMERGENCY MEDICINE

## 2025-05-12 PROCEDURE — 85730 THROMBOPLASTIN TIME PARTIAL: CPT | Performed by: EMERGENCY MEDICINE

## 2025-05-12 PROCEDURE — 82948 REAGENT STRIP/BLOOD GLUCOSE: CPT

## 2025-05-12 PROCEDURE — 99285 EMERGENCY DEPT VISIT HI MDM: CPT | Performed by: EMERGENCY MEDICINE

## 2025-05-12 PROCEDURE — 73610 X-RAY EXAM OF ANKLE: CPT

## 2025-05-12 PROCEDURE — 83550 IRON BINDING TEST: CPT | Performed by: EMERGENCY MEDICINE

## 2025-05-12 PROCEDURE — 96374 THER/PROPH/DIAG INJ IV PUSH: CPT

## 2025-05-12 PROCEDURE — 85025 COMPLETE CBC W/AUTO DIFF WBC: CPT | Performed by: EMERGENCY MEDICINE

## 2025-05-12 PROCEDURE — 96375 TX/PRO/DX INJ NEW DRUG ADDON: CPT

## 2025-05-12 PROCEDURE — 93971 EXTREMITY STUDY: CPT | Performed by: SURGERY

## 2025-05-12 PROCEDURE — 96372 THER/PROPH/DIAG INJ SC/IM: CPT

## 2025-05-12 PROCEDURE — 85018 HEMOGLOBIN: CPT | Performed by: EMERGENCY MEDICINE

## 2025-05-12 RX ORDER — INSULIN LISPRO 100 [IU]/ML
4 INJECTION, SOLUTION INTRAVENOUS; SUBCUTANEOUS ONCE
Status: COMPLETED | OUTPATIENT
Start: 2025-05-12 | End: 2025-05-12

## 2025-05-12 RX ORDER — FENTANYL CITRATE 50 UG/ML
50 INJECTION, SOLUTION INTRAMUSCULAR; INTRAVENOUS ONCE
Refills: 0 | Status: COMPLETED | OUTPATIENT
Start: 2025-05-12 | End: 2025-05-12

## 2025-05-12 RX ORDER — ACETAMINOPHEN 10 MG/ML
1000 INJECTION, SOLUTION INTRAVENOUS ONCE
Status: COMPLETED | OUTPATIENT
Start: 2025-05-12 | End: 2025-05-12

## 2025-05-12 RX ORDER — CEPHALEXIN 500 MG/1
500 CAPSULE ORAL EVERY 6 HOURS SCHEDULED
Qty: 28 CAPSULE | Refills: 0 | Status: SHIPPED | OUTPATIENT
Start: 2025-05-12 | End: 2025-05-19

## 2025-05-12 RX ORDER — KETOROLAC TROMETHAMINE 30 MG/ML
15 INJECTION, SOLUTION INTRAMUSCULAR; INTRAVENOUS ONCE
Status: COMPLETED | OUTPATIENT
Start: 2025-05-12 | End: 2025-05-12

## 2025-05-12 RX ORDER — FENTANYL CITRATE 50 UG/ML
50 INJECTION, SOLUTION INTRAMUSCULAR; INTRAVENOUS
Refills: 0 | Status: DISCONTINUED | OUTPATIENT
Start: 2025-05-12 | End: 2025-05-12 | Stop reason: HOSPADM

## 2025-05-12 RX ADMIN — CEPHALEXIN 500 MG: 250 CAPSULE ORAL at 12:55

## 2025-05-12 RX ADMIN — KETOROLAC TROMETHAMINE 15 MG: 30 INJECTION, SOLUTION INTRAMUSCULAR; INTRAVENOUS at 12:09

## 2025-05-12 RX ADMIN — FENTANYL CITRATE 50 MCG: 50 INJECTION, SOLUTION INTRAMUSCULAR; INTRAVENOUS at 09:30

## 2025-05-12 RX ADMIN — INSULIN LISPRO 4 UNITS: 100 INJECTION, SOLUTION INTRAVENOUS; SUBCUTANEOUS at 10:42

## 2025-05-12 RX ADMIN — Medication 12.5 MG: at 12:08

## 2025-05-12 RX ADMIN — SODIUM CHLORIDE 1000 ML: 0.9 INJECTION, SOLUTION INTRAVENOUS at 09:28

## 2025-05-12 RX ADMIN — ACETAMINOPHEN 1000 MG: 10 INJECTION INTRAVENOUS at 09:33

## 2025-05-12 NOTE — DISCHARGE INSTRUCTIONS
[place] se discutió, pruitt ecografía no reveló un coágulo de teresa.    Sin embargo, joe análisis muestran anemia, que es un recuento bajo de glóbulos rojos.     Por favor, consulte a pruitt médico de zachary al respecto.    Pruitt pierna es preocupante por celulitis, que es felicita infección en las células de la piel. Por favor, complete el curso completo de antibióticos.Mientras esté en antibióticos, también se le enviará felicita receta de probióticos.      Si desarrolla fiebre, aumento del dolor o síntomas, no dude en regresar a la darrin de emergencias, kashif por favor consulte a pruitt médico de zachary.

## 2025-05-12 NOTE — ED PROVIDER NOTES
Time reflects when diagnosis was documented in both MDM as applicable and the Disposition within this note       Time User Action Codes Description Comment    5/12/2025 11:54 AM BendDung mattale L Add [M79.605] Left leg pain     5/12/2025 11:54 AM Bendock, Jerica L Add [I10] HTN (hypertension)     5/12/2025 11:54 AM Bendock, Jerica L Add [E11.9] Diabetes (HCC)     5/12/2025 12:02 PM Bendock, Jerica L Add [D64.9] Anemia     5/12/2025 12:03 PM Bendock, Jerica L Add [L03.90] Cellulitis     5/12/2025 12:48 PM Bendock, Jerica L Add [R73.9] Hyperglycemia           ED Disposition       ED Disposition   Discharge    Condition   Stable    Date/Time   Mon May 12, 2025 12:48 PM    Comment   Jolie Mulligan discharge to home/self care.                   Assessment & Plan       Medical Decision Making      Differential diagnosis includes but not limited to:  Fracture, stress fracture, DVT, superficial thrombophlebitis, cellulitis, renal failure, ruptured Baker cyst, venous insufficiency, lymphadenitis, lymphedema, trauma    Will obtain  CBC to assess for leukocytosis or anemia; CMP to assess for STEFANI versus electrolyte abnormalities versus elevated LFTs.  Will also obtain x-ray rule out fracture or dislocation of left ankle.  Will also obtain vascular study of left lower extremity rule out DVT versus superficial thrombophlebitis.      Throughout the course of stay, patient had negative workup in regards to her left lower extremity pain.  Negative for DVT, superficial thrombophlebitis, fracture, dislocation.  She has no evidence of endorgan damage.  Concern for cellulitis and started on antibiotics.  Also noted patient had a drop in hemoglobin which was confirmed on repeat labs.  MCV was not low however iron panel sent.  She adamantly denied any source of bleeding.  Strict follow-up with PCP was given to patient and daughter.    Also noted patient was hypertensive and hyper like c-Myc.  Patient did not take her morning  medications and were given to her in the emergency department.  She has no evidence of DKA and no chest pain shortness of breath palpitations or headache.  Fingerstick glucose prior to discharge was 190s.  Patient was complaining of darker urine however was unable to provide urine sample.        Amount and/or Complexity of Data Reviewed  Independent Historian: caregiver     Details:     Daughter at bedside      External Data Reviewed: notes.     Details:   Patient was evaluated in the emergency department on May 6.  She had extensive of septic workup including vascular study of lower extremity that did not reveal any evidence of DVT.  Patient was initially seen for abdominal pain and had bilateral lower extremity swelling.        Labs: ordered. Decision-making details documented in ED Course.     Details:   Noted anemia without any thrombocytopenia or leukocytosis.  MCV low.  Will order iron panel  No acute kidney injury or electrolyte abnormality.  Glucose elevated without any anion gap acidosis      Radiology: ordered and independent interpretation performed. Decision-making details documented in ED Course.     Details:   X-ray of left ankle on my read does not reveal any acute findings    Vascular study of left lower extremity interpreted by vascular tech is negative for DVT or superficial thrombophlebitis        Risk  Prescription drug management.        ED Course as of 05/12/25 1347   Mon May 12, 2025   0919 Patient is a 69-year-old female here with her daughter helps translate coming in today for worsening distal leg pain on the left.  On exam she does have erythema, swelling and tenderness throughout without any obvious deformity or injury.  Concern for cellulitis versus DVT.  Will obtain vascular study, labs as well as x-ray.  Will provide pain control as well      Disclosure: Voice to text software was used in the preparation of this document and could have resulted in translational errors.      Occasional  "wrong word or \"sound a like\" substitutions may have occurred due to the inherent limitations of voice recognition software.  Read the chart carefully and recognize, using context, where substitutions have occurred.       I have independently reviewed external records are available to me to the level of detail possible within the time constraints of my patient care responsibilities in the ED.       0953 CBC and differential(!)  Compared to old hemoglobin is low.  Will repeat H&H       0954 XR ankle 3+ views LEFT  X-ray on my read shows no acute finding       1017 Basic metabolic panel(!)  Glucoe 355      1031 Hemoglobin and hematocrit, blood(!)  Noted H/H low.        1034 Patient states that \"she shaking\".  Discussed with patient and she is afebrile and no evidence of septicemia.  She adamantly denies any hematuria, hemoptysis, hematemesis or melena.  Denies any bright red blood per rectum.  Patient did not have her insulin.  Will give IV fluids, check urine as well as give insulin given her glucose is elevated but no evidence of anion gap acidosis    Pending vascular study       1101 Pending UA and vascular study       1126 Pending vascular study.  Patient does have elevated blood pressure and did not take her medications today.  Will give her dose of metoprolol.    Pending vascular study       1202 Per vascular tech negative for DVT.  Given persistent symptoms with worsening erythema and tenderness we will treat empirically with antibiotics for cellulitis.  Will need close follow-up as an outpatient.  Iron panel will be sent as well       1247 Patient resting in bed.  Patient and family updated on need for close follow-up with PCP.  She is hypertensive however no evidence of endorgan damage.  She was just given her Lopressor for in the hour.  Patient appears more comfortable.  Patient and daughter aware that we will treat for cellulitis.  Strict return to ER instructions given and will give first dose of " antibiotics here.      Answered questions at bedside    Counseling: I had a detailed discussion with the patient and/or guardian regarding: the historical points, exam findings, and any diagnostic results supporting the discharge diagnosis, lab results, radiology results, discharge instructions reviewed with patient and/or family/caregiver and understanding was verbalized. Instructions given to return to the emergency department if symptoms worsen or persist, or if there are any questions or concerns that arise at home.     All imaging and/or lab testing discussed with patient, strict return to ED precautions discussed. Patient recommended to follow up promptly with appropriate outpatient provider. Patient and/or family members verbalizes understanding and agrees with plan. Patient and/or family members were given opportunity to ask questions, all questions were answered at this time. Patient is stable for discharge           Medications   fentaNYL injection 50 mcg (has no administration in time range)   fentaNYL injection 50 mcg (50 mcg Intravenous Given 5/12/25 0930)   acetaminophen (Ofirmev) injection 1,000 mg (0 mg Intravenous Stopped 5/12/25 0948)   sodium chloride 0.9 % bolus 1,000 mL (0 mL Intravenous Stopped 5/12/25 1021)   insulin lispro (HumALOG/ADMELOG) 100 units/mL subcutaneous injection 4 Units (4 Units Subcutaneous Given 5/12/25 1042)   ketorolac (TORADOL) injection 15 mg (15 mg Intravenous Given 5/12/25 1209)   metoprolol tartrate (LOPRESSOR) partial tablet 12.5 mg (12.5 mg Oral Given 5/12/25 1208)   cephalexin (KEFLEX) capsule 500 mg (500 mg Oral Given 5/12/25 1255)       ED Risk Strat Scores                  No data recorded        SBIRT 22yo+      Flowsheet Row Most Recent Value   Initial Alcohol Screen: US AUDIT-C     1. How often do you have a drink containing alcohol? 0 Filed at: 05/12/2025 0910   2. How many drinks containing alcohol do you have on a typical day you are drinking?  0 Filed at:  05/12/2025 0910   3b. FEMALE Any Age, or MALE 65+: How often do you have 4 or more drinks on one occassion? 0 Filed at: 05/12/2025 0910   Audit-C Score 0 Filed at: 05/12/2025 0910   MARGO: How many times in the past year have you...    Used an illegal drug or used a prescription medication for non-medical reasons? Never Filed at: 05/12/2025 0910                            History of Present Illness       Chief Complaint   Patient presents with    Leg Pain     Pt reports L leg pain, redness and swelling noted to L ankle, denies injury.        Past Medical History:   Diagnosis Date    Abdominal infection (HCC)     h-pylori    Abnormal chest x-ray 04/05/2019    Asthma     Breast cancer (HCC) 06/26/2018    Cancer (HCC)     BREAST    Depression, recurrent (HCC) 06/13/2022    Diabetes mellitus (HCC)     Hiatal hernia     Hiatal hernia 08/08/2018    History of chemotherapy 2018    History of radiation therapy 2018    Hypercholesterolemia     Hypertension     Hypothyroid     Renal disorder     Rheumatoid arthritis (HCC)       Past Surgical History:   Procedure Laterality Date    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    BREAST LUMPECTOMY Right 6/26/2018    Procedure: RIGHT BREAST NEEDLE LOCALIZATION X2 WITH RIGHT BREAST LUMPECTOMY ( NEEDLE LOC @ 1000);  Surgeon: Familia Barragan MD;  Location: [facility];  Service: Surgical Oncology    BREAST LUMPECTOMY Right 8/2/2018    Procedure: LYMPHOSCINITGRAPHY INTRAOPERATIVE LYMPHATIC MAPPING , RIGHT SENTINEL NODE BIOPSY, REEXCISION  RIGHT BREAST LUMPECTOMY CAVITY (SUPERIOR MARGIN);  Surgeon: Familia Barragan MD;  Location: [facility];  Service: Surgical Oncology    BREAST SURGERY Left     CATARACT EXTRACTION, BILATERAL Bilateral     COLONOSCOPY      EGD AND COLONOSCOPY N/A 9/5/2018    Procedure: EGD AND COLONOSCOPY;  Surgeon: José Miguel Rosas MD;  Location: [facility];  Service: Gastroenterology    FL GUIDED CENTRAL VENOUS ACCESS DEVICE INSERTION  9/13/2018    KNEE SURGERY Right      MAMMO NEEDLE LOCALIZATION RIGHT (ALL INC) Right 6/26/2018    MAMMO STEREOTACTIC BREAST BIOPSY RIGHT (ALL INC) Right 5/23/2018    RETINAL DETACHMENT SURGERY Right     TUBAL LIGATION      TUNNELED VENOUS PORT PLACEMENT Left 9/13/2018    Procedure: INSERTION VENOUS PORT (PORT-A-CATH);  Surgeon: Familia Barragan MD;  Location: [facility];  Service: Surgical Oncology    UPPER GASTROINTESTINAL ENDOSCOPY        Family History   Problem Relation Age of Onset    Diabetes Mother     Hypertension Mother     Diabetes Father     Hypertension Father     No Known Problems Sister     No Known Problems Sister     No Known Problems Sister     No Known Problems Sister     No Known Problems Sister     No Known Problems Daughter     No Known Problems Daughter     No Known Problems Daughter     Cancer Maternal Grandfather     Diabetes Brother     Diabetes Brother     Kidney failure Brother     Diabetes Brother     Breast cancer Neg Hx       Social History     Tobacco Use    Smoking status: Never    Smokeless tobacco: Never   Vaping Use    Vaping status: Never Used   Substance Use Topics    Alcohol use: No    Drug use: No      E-Cigarette/Vaping    E-Cigarette Use Never User       E-Cigarette/Vaping Substances    Nicotine No     THC No     CBD No     Flavoring No     Other No     Unknown No       I have reviewed and agree with the history as documented.     Patient is a 69-year-old female with a history of right-sided breast cancer status postlumpectomy/radiation and chemotherapy, hypertension, hyperlipidemia, PE not on AC,  diabetes, osteoarthritis and hypothyroidism coming in with daughter at bedside who helps translate.  Patient does have a history of clots in the past not currently on anticoagulation.  She states that her mother was here about several days ago for similar symptoms but her symptoms are progressively worsening.  She did have a workup which did not reveal any acute findings.  Patient's daughter states that her mother  does complain of subjective chills but no fevers.  No chest pain, shortness of breath, palpitations.  She denies any nausea vomiting or diarrhea.  She has no back pain, or pain radiating up into the groin.  She has no urinary retention.  She has no focal weakness and or paresthesias throughout the bilateral lower extremities.  She reports that the pain is located to the left lower leg and is progressively worsening.  No trauma.        History provided by:  Patient, medical records and relative   used: Yes    Leg Pain  Location:  Leg  Time since incident:  1 week  Injury: no    Leg location:  L lower leg  Pain details:     Radiates to:  Does not radiate    Severity:  Moderate    Onset quality:  Gradual    Duration:  1 week    Timing:  Constant    Progression:  Partially resolved  Chronicity:  New  Dislocation: no    Foreign body present:  No foreign bodies  Tetanus status:  Up to date  Prior injury to area:  No  Relieved by:  Nothing  Worsened by:  Bearing weight  Ineffective treatments:  NSAIDs  Associated symptoms: stiffness and swelling    Associated symptoms: no back pain, no decreased ROM, no fatigue, no fever, no itching, no muscle weakness, no neck pain, no numbness and no tingling    Risk factors: no concern for non-accidental trauma, no frequent fractures, no known bone disorder, no obesity and no recent illness        Review of Systems   Constitutional: Negative.  Negative for chills, fatigue and fever.   HENT: Negative.  Negative for ear pain and sore throat.    Eyes: Negative.  Negative for pain and visual disturbance.   Respiratory: Negative.  Negative for cough and shortness of breath.    Cardiovascular:  Positive for leg swelling. Negative for chest pain and palpitations.   Gastrointestinal: Negative.  Negative for abdominal pain and vomiting.   Genitourinary: Negative.  Negative for dysuria and hematuria.   Musculoskeletal:  Positive for stiffness. Negative for arthralgias, back  pain and neck pain.   Skin: Negative.  Negative for color change, itching and rash.   Neurological: Negative.  Negative for seizures and syncope.   Hematological: Negative.    Psychiatric/Behavioral: Negative.     All other systems reviewed and are negative.          Objective       ED Triage Vitals   Temperature Pulse Blood Pressure Respirations SpO2 Patient Position - Orthostatic VS   05/12/25 0906 05/12/25 0906 05/12/25 0906 05/12/25 0906 05/12/25 0906 05/12/25 0906   97.9 °F (36.6 °C) 84 142/65 18 99 % Sitting      Temp Source Heart Rate Source BP Location FiO2 (%) Pain Score    05/12/25 0906 05/12/25 0906 05/12/25 0906 -- 05/12/25 0928    Oral Monitor Right arm  10 - Worst Possible Pain      Vitals      Date and Time Temp Pulse SpO2 Resp BP Pain Score FACES Pain Rating User   05/12/25 1245 -- 74 99 % 16 184/81 -- -- LB   05/12/25 1215 -- 76 98 % 18 181/81 -- -- LB   05/12/25 1209 -- -- -- -- -- 7 -- LB   05/12/25 1115 -- 78 100 % 18 177/144 Bendock, DO aware -- -- LB   05/12/25 1045 -- 82 99 % 18  186/85 PT reports not taking lopressor yet today. No Pain -- LB   05/12/25 0928 -- -- -- -- -- 10 - Worst Possible Pain -- LB   05/12/25 0906 97.9 °F (36.6 °C) 84 99 % 18 142/65 -- --             Physical Exam  Vitals and nursing note reviewed.   Constitutional:       General: She is awake. She is not in acute distress.     Appearance: Normal appearance. She is well-developed and normal weight.   HENT:      Head: Normocephalic and atraumatic.      Mouth/Throat:      Lips: Pink.      Mouth: Mucous membranes are moist.   Eyes:      Conjunctiva/sclera: Conjunctivae normal.   Cardiovascular:      Rate and Rhythm: Normal rate and regular rhythm.      Pulses:           Radial pulses are 2+ on the right side and 2+ on the left side.        Popliteal pulses are 2+ on the right side and 2+ on the left side.        Dorsalis pedis pulses are 2+ on the right side and 2+ on the left side.      Heart sounds: Normal heart sounds,  S1 normal and S2 normal. No murmur heard.  Pulmonary:      Effort: Pulmonary effort is normal. No respiratory distress.      Breath sounds: Normal breath sounds.      Comments: No conversational dyspnea  Abdominal:      Palpations: Abdomen is soft.      Tenderness: There is no abdominal tenderness.   Musculoskeletal:         General: No swelling.      Cervical back: Neck supple.      Right lower leg: No edema.      Left lower le+ Edema present.        Legs:       Comments: Patient has full active range of motion of the right hip, knee and ankle pain-free.    Patient has full active range of motion of the left hip, knee and ankle.  Patient only has pain with movement of left ankle.    Compartment of bilateral lower extremities are soft.   Skin:     General: Skin is warm and dry.      Capillary Refill: Capillary refill takes less than 2 seconds.   Neurological:      General: No focal deficit present.      Mental Status: She is alert and oriented to person, place, and time.      GCS: GCS eye subscore is 4. GCS verbal subscore is 5. GCS motor subscore is 6.      Cranial Nerves: Cranial nerves 2-12 are intact.      Sensory: Sensation is intact.      Motor: Motor function is intact.      Coordination: Coordination is intact.   Psychiatric:         Mood and Affect: Mood normal.         Behavior: Behavior is cooperative.         Results Reviewed       Procedure Component Value Units Date/Time    TIBC Panel (incl. Iron, TIBC, % Iron Saturation) [[id number]] Collected: 2534    Lab Status: In process Specimen: Blood Updated: 25 1300    Ferritin [[id number]] Collected: 2534    Lab Status: In process Specimen: Blood Updated: 25 1300    Fingerstick Glucose (POCT) [[id number]]  (Abnormal) Collected: 25 1253    Lab Status: Final result Specimen: Blood Updated: 25 1255     POC Glucose 193 mg/dl     UA w Reflex to Microscopic w Reflex to Culture [[id number]]     Lab Status: No result  Specimen: Urine     Hemoglobin and hematocrit, blood [[id number]]  (Abnormal) Collected: 05/12/25 1020    Lab Status: Final result Specimen: Blood from Arm, Left Updated: 05/12/25 1026     Hemoglobin 9.6 g/dL      Hematocrit 28.8 %     Basic metabolic panel [[id number]]  (Abnormal) Collected: 05/12/25 0934    Lab Status: Final result Specimen: Blood from Arm, Left Updated: 05/12/25 1005     Sodium 135 mmol/L      Potassium 4.3 mmol/L      Chloride 101 mmol/L      CO2 27 mmol/L      ANION GAP 7 mmol/L      BUN 21 mg/dL      Creatinine 0.71 mg/dL      Glucose 355 mg/dL      Calcium 9.2 mg/dL      eGFR 87 ml/min/1.73sq m     Narrative:      National Kid[employer] Disease Foundation guidelines for Chronic Kidney Disease (CKD):     Stage 1 with normal or high GFR (GFR > 90 mL/min/1.73 square meters)    Stage 2 Mild CKD (GFR = 60-89 mL/min/1.73 square meters)    Stage 3A Moderate CKD (GFR = 45-59 mL/min/1.73 square meters)    Stage 3B Moderate CKD (GFR = 30-44 mL/min/1.73 square meters)    Stage 4 Severe CKD (GFR = 15-29 mL/min/1.73 square meters)    Stage 5 End Stage CKD (GFR <15 mL/min/1.73 square meters)  Note: GFR calculation is accurate only with a steady state creatinine    Protime-INR [[id number]]  (Normal) Collected: 05/12/25 0934    Lab Status: Final result Specimen: Blood from Arm, Left Updated: 05/12/25 0953     Protime 14.1 seconds      INR 1.07    Narrative:      INR Therapeutic Range    Indication                                             INR Range      Atrial Fibrillation                                               2.0-3.0  Hypercoagulable State                                    2.0.2.3  Left Ventricular Asist Device                            2.0-3.0  Mechanical Heart Valve                                  -    Aortic(with afib, MI, embolism, HF, LA enlargement,    and/or coagulopathy)                                     2.0-3.0 (2.5-3.5)     Mitral                                                              2.5-3.5  Prosthetic/Bioprosthetic Heart Valve               2.0-3.0  Venous thromboembolism (VTE: VT, PE        2.0-3.0    APTT [[id number]]  (Normal) Collected: 05/12/25 0934    Lab Status: Final result Specimen: Blood from Arm, Left Updated: 05/12/25 0953     PTT 25 seconds     CBC and differential [[id number]]  (Abnormal) Collected: 05/12/25 0934    Lab Status: Final result Specimen: Blood from Arm, Left Updated: 05/12/25 0940     WBC 7.93 Thousand/uL      RBC 3.37 Million/uL      Hemoglobin 9.8 g/dL      Hematocrit 30.3 %      MCV 90 fL      MCH 29.1 pg      MCHC 32.3 g/dL      RDW 13.2 %      MPV 11.4 fL      Platelets 398 Thousands/uL      nRBC 0 /100 WBCs      Segmented % 60 %      Immature Grans % 1 %      Lymphocytes % 27 %      Monocytes % 8 %      Eosinophils Relative 3 %      Basophils Relative 1 %      Absolute Neutrophils 4.80 Thousands/µL      Absolute Immature Grans 0.07 Thousand/uL      Absolute Lymphocytes 2.16 Thousands/µL      Absolute Monocytes 0.63 Thousand/µL      Eosinophils Absolute 0.20 Thousand/µL      Basophils Absolute 0.07 Thousands/µL             VAS lower limb venous duplex study, unilateral/limited   Final Interpretation by Soraida Alanis MD (05/12 0493)      XR ankle 3+ views LEFT   ED Interpretation by Jerica Vargas DO (05/12 0954)   No obvious deformity or injury.  Noted swelling to the lateral aspect          Procedures    ED Medication and Procedure Management   Prior to Admission Medications   Prescriptions Last Dose Informant Patient Reported? Taking?   Alcohol Swabs (Pharmacist Choice Alcohol) PADS  Self, Child No No   Sig: CLEAN AREA BEFORE TESTING OR INJECTING FOUR TIMES DAILY   Comfort EZ Pen Needles 32G X 4 MM MISC  Self, Child No No   Sig: USE AS DIRECTED FOUR TIMES DAILY   Continuous Glucose  (FreeStyle [place] 3 [place]) AARON  Self, Child No No   Sig: USE AS DIRECTED   Continuous Glucose Sensor (FreeStyle Broderick 3 Sensor) MISC  Self, Child No No   Sig: USE 1  UNITS EVERY 14 (FOURTEEN) DAYS   Diclofenac Sodium (VOLTAREN) 1 %  Self, Child No No   Sig: Apply 2 g topically 2 (two) times a day   EPINEPHrine (Auvi-Q) 0.1 mg/0.1 mL SOAJ   No No   Sig: Inject 0.3 mL (0.3 mg total) into a muscle once for 1 dose   Mag-G 500 (27 Mg) MG tablet  Self, Child No No   Sig: TAKE 1 TABLET (500 MG TOTAL) BY MOUTH DAILY   OneTouch Delica Lancets 33G MISC  Self, Child No No   Sig: USE 3 (THREE) TIMES A DAY   OneTouch Ultra test strip  Self, Child No No   Sig: USE 1 EACH 3 (THREE) TIMES A DAY USE AS INSTRUCTED   acetaminophen (TYLENOL) 500 mg tablet  Self, Child No No   Sig: Take 1 tablet (500 mg total) by mouth every 6 (six) hours as needed for fever   albuterol (2.5 mg/3 mL) 0.083 % nebulizer solution  Self, Child No No   Sig: Take 3 mL (2.5 mg total) by nebulization every 6 (six) hours as needed for wheezing or shortness of breath   albuterol (Ventolin HFA) 90 mcg/act inhaler  Self, Child No No   Sig: Inhale 1-2 puffs every 6 (six) hours as needed for wheezing or shortness of breath   anastrozole (ARIMIDEX) 1 mg tablet  Self, Child No No   Sig: Take 1 tablet (1 mg total) by mouth daily   aspirin 81 mg chewable tablet  Self, Child Yes No   Sig: Chew 81 mg daily   atorvastatin (LIPITOR) 40 mg tablet  Self, Child No No   Sig: Take 1 tablet (40 mg total) by mouth daily   calcium polycarbophil (FIBERCON) 625 mg tablet  Self, Child No No   Sig: Take 1 tablet (625 mg total) by mouth daily   dicyclomine (BENTYL) 20 mg tablet  Self, Child No No   Sig: Take 1 tablet (20 mg total) by mouth 2 (two) times a day as needed (diarrhea) for up to 10 doses   dicyclomine (BENTYL) 20 mg tablet   No No   Sig: Take 1 tablet (20 mg total) by mouth 2 (two) times a day   ergocalciferol (VITAMIN D2) 50,000 units  Self, Child No No   Sig: TAKE 1 CAPSULE (50,000 UNITS TOTAL) BY MOUTH ONCE A WEEK   fluticasone (FLONASE) 50 mcg/act nasal spray  Self, Child Yes No   fluticasone-salmeterol (Advair HFA) 230-21 MCG/ACT  inhaler  Self, Child No No   Sig: Inhale 2 puffs 2 (two) times a day Rinse mouth after use.   furosemide (LASIX) 20 mg tablet  Self, Child No No   Sig: TAKE 1 TABLET (20 MG TOTAL) BY MOUTH 2 (TWO) TIMES A DAY   guaiFENesin (MUCINEX) 600 mg 12 hr tablet  Self, Child No No   Sig: Take 1 tablet (600 mg total) by mouth every 12 (twelve) hours as needed for congestion   insulin degludec (Tresiba FlexTouch) 200 units/mL CONCENTRATED U-200 injection pen  Self, Child No No   Sig: INJECT 60 UNITS UNDER THE SKIN DAILY AT 5PM   insulin lispro (HumaLOG) 100 units/mL injection pen  Self, Child No No   Sig: INJECT 24 UNITS BEFORE EACH MEAL PLUS SLIDING SCALE 150-200: 2 UNITS 201-250: 4 UNITS 251-300: 6 UNITS 301*   ketotifen (ZADITOR) 0.025 % ophthalmic solution  Self, Child No No   Sig: Administer 1 drop to both eyes 2 (two) times a day as needed (itchy eyes)   latanoprost (XALATAN) 0.005 % ophthalmic solution  Self, Child Yes No   loratadine (CLARITIN) 10 mg tablet  Self, Child No No   Sig: TAKE 1 TABLET (10 MG TOTAL) BY MOUTH DAILY   losartan (COZAAR) 100 MG tablet  Self, Child No No   Sig: Take 1 tablet (100 mg total) by mouth daily   magnesium (MAGTAB) 84 MG (7MEQ) TBCR   No No   Sig: Take 1 tablet (84 mg total) by mouth 2 (two) times a day for 14 days   magnesium chloride-calcium (Slow-Mag) 71.5-119 mg  Self, Child No No   Sig: Take 2 tablets by mouth daily   methocarbamol (ROBAXIN) 500 mg tablet  Child, Self Yes No   metoprolol tartrate (LOPRESSOR) 25 mg tablet  Self, Child No No   Sig: TAKE 0.5 TABLETS (12.5 MG TOTAL) BY MOUTH EVERY 12 (TWELVE) HOURS   omeprazole (PriLOSEC) 20 mg delayed release capsule  Self, Child No No   Sig: TAKE 1 CAPSULE (20 MG TOTAL) BY MOUTH DAILY   ondansetron (ZOFRAN) 4 mg tablet  Self, Child No No   Sig: Take 1 tablet (4 mg total) by mouth every 6 (six) hours   ondansetron (ZOFRAN-ODT) 4 mg disintegrating tablet  Self, Child No No   Sig: Take 1 tablet (4 mg total) by mouth every 8 (eight) hours  as needed for nausea for up to 10 doses   ondansetron (ZOFRAN-ODT) 4 mg disintegrating tablet   No No   Sig: Take 1 tablet (4 mg total) by mouth every 8 (eight) hours as needed for nausea or vomiting   polyethylene glycol (MIRALAX) 17 g packet  Self No No   Sig: Take 17 g by mouth daily for 14 days   tirzepatide (Mounjaro) 2.5 MG/0.5ML  Self, Child No No   Sig: INJECT 0.5 ML (2.5 MG TOTAL) UNDER THE SKIN ONCE A WEEK   zileuton 600 MG  Self, Child No No   Sig: TAKE 1 TABLET (600 MG TOTAL) BY MOUTH 2 (TWO) TIMES A DAY      Facility-Administered Medications: None     Discharge Medication List as of 5/12/2025 12:49 PM        START taking these medications    Details   cephalexin (KEFLEX) 500 mg capsule Take 1 capsule (500 mg total) by mouth every 6 (six) hours for 7 days, Starting Mon 5/12/2025, Until Mon 5/19/2025, Normal           CONTINUE these medications which have NOT CHANGED    Details   acetaminophen (TYLENOL) 500 mg tablet Take 1 tablet (500 mg total) by mouth every 6 (six) hours as needed for fever, Starting Wed 8/28/2024, Normal      albuterol (2.5 mg/3 mL) 0.083 % nebulizer solution Take 3 mL (2.5 mg total) by nebulization every 6 (six) hours as needed for wheezing or shortness of breath, Starting Mon 2/24/2025, Normal      albuterol (Ventolin HFA) 90 mcg/act inhaler Inhale 1-2 puffs every 6 (six) hours as needed for wheezing or shortness of breath, Starting Mon 2/24/2025, Normal      Alcohol Swabs (Pharmacist Choice Alcohol) PADS CLEAN AREA BEFORE TESTING OR INJECTING FOUR TIMES DAILY, Normal      anastrozole (ARIMIDEX) 1 mg tablet Take 1 tablet (1 mg total) by mouth daily, Starting Mon 9/23/2024, Normal      aspirin 81 mg chewable tablet Chew 81 mg daily, Historical Med      atorvastatin (LIPITOR) 40 mg tablet Take 1 tablet (40 mg total) by mouth daily, Starting Wed 8/23/2023, Normal      calcium polycarbophil (FIBERCON) 625 mg tablet Take 1 tablet (625 mg total) by mouth daily, Starting Wed 10/11/2023,  Normal      Comfort EZ Pen Needles 32G X 4 MM MISC USE AS DIRECTED FOUR TIMES DAILY, Normal      Continuous Glucose  (FreeStyle Broderick 3 [place]) AARON USE AS DIRECTED, Normal      Continuous Glucose Sensor (FreeStyle Broderick 3 Sensor) MISC USE 1 UNITS EVERY 14 (FOURTEEN) DAYS, Starting Wed 1/29/2025, Normal      Diclofenac Sodium (VOLTAREN) 1 % Apply 2 g topically 2 (two) times a day, Starting Wed 8/23/2023, Normal      !! dicyclomine (BENTYL) 20 mg tablet Take 1 tablet (20 mg total) by mouth 2 (two) times a day as needed (diarrhea) for up to 10 doses, Starting Mon 11/18/2024, Normal      !! dicyclomine (BENTYL) 20 mg tablet Take 1 tablet (20 mg total) by mouth 2 (two) times a day, Starting Tue 5/6/2025, Normal      EPINEPHrine (Auvi-Q) 0.1 mg/0.1 mL SOAJ Inject 0.3 mL (0.3 mg total) into a muscle once for 1 dose, Starting Wed 11/9/2022, Normal      ergocalciferol (VITAMIN D2) 50,000 units TAKE 1 CAPSULE (50,000 UNITS TOTAL) BY MOUTH ONCE A WEEK, Starting Tue 9/5/2023, Normal      fluticasone (FLONASE) 50 mcg/act nasal spray Historical Med      fluticasone-salmeterol (Advair HFA) 230-21 MCG/ACT inhaler Inhale 2 puffs 2 (two) times a day Rinse mouth after use., Starting Mon 2/24/2025, Normal      furosemide (LASIX) 20 mg tablet TAKE 1 TABLET (20 MG TOTAL) BY MOUTH 2 (TWO) TIMES A DAY, Starting Mon 7/17/2023, Normal      guaiFENesin (MUCINEX) 600 mg 12 hr tablet Take 1 tablet (600 mg total) by mouth every 12 (twelve) hours as needed for congestion, Starting Wed 8/28/2024, Normal      insulin degludec (Tresiba FlexTouch) 200 units/mL CONCENTRATED U-200 injection pen INJECT 60 UNITS UNDER THE SKIN DAILY AT 5PM, Normal      insulin lispro (HumaLOG) 100 units/mL injection pen INJECT 24 UNITS BEFORE EACH MEAL PLUS SLIDING SCALE 150-200: 2 UNITS 201-250: 4 UNITS 251-300: 6 UNITS 301*, Normal      ketotifen (ZADITOR) 0.025 % ophthalmic solution Administer 1 drop to both eyes 2 (two) times a day as needed (itchy eyes),  Starting Tue 10/3/2023, Normal      latanoprost (XALATAN) 0.005 % ophthalmic solution Historical Med      loratadine (CLARITIN) 10 mg tablet TAKE 1 TABLET (10 MG TOTAL) BY MOUTH DAILY, Starting Thu 8/5/2021, Normal      losartan (COZAAR) 100 MG tablet Take 1 tablet (100 mg total) by mouth daily, Starting Wed 8/23/2023, Normal      Mag-G 500 (27 Mg) MG tablet TAKE 1 TABLET (500 MG TOTAL) BY MOUTH DAILY, Normal      magnesium (MAGTAB) 84 MG (7MEQ) TBCR Take 1 tablet (84 mg total) by mouth 2 (two) times a day for 14 days, Starting Fri 11/1/2024, Until Mon 11/18/2024, Normal      magnesium chloride-calcium (Slow-Mag) 71.5-119 mg Take 2 tablets by mouth daily, Starting Wed 9/18/2024, Normal      methocarbamol (ROBAXIN) 500 mg tablet Historical Med      metoprolol tartrate (LOPRESSOR) 25 mg tablet TAKE 0.5 TABLETS (12.5 MG TOTAL) BY MOUTH EVERY 12 (TWELVE) HOURS, Starting Mon 1/8/2024, Normal      omeprazole (PriLOSEC) 20 mg delayed release capsule TAKE 1 CAPSULE (20 MG TOTAL) BY MOUTH DAILY, Starting Mon 2/5/2024, Normal      ondansetron (ZOFRAN) 4 mg tablet Take 1 tablet (4 mg total) by mouth every 6 (six) hours, Starting Fri 11/1/2024, Normal      !! ondansetron (ZOFRAN-ODT) 4 mg disintegrating tablet Take 1 tablet (4 mg total) by mouth every 8 (eight) hours as needed for nausea for up to 10 doses, Starting Mon 11/18/2024, Normal      !! ondansetron (ZOFRAN-ODT) 4 mg disintegrating tablet Take 1 tablet (4 mg total) by mouth every 8 (eight) hours as needed for nausea or vomiting, Starting Tue 5/6/2025, Normal      OneTouch Delica Lancets 33G MISC USE 3 (THREE) TIMES A DAY, Normal      OneTouch Ultra test strip USE 1 EACH 3 (THREE) TIMES A DAY USE AS INSTRUCTED, Starting Sat 2/18/2023, Normal      polyethylene glycol (MIRALAX) 17 g packet Take 17 g by mouth daily for 14 days, Starting Wed 10/11/2023, Until Mon 11/18/2024, Normal      tirzepatide (Mounjaro) 2.5 MG/0.5ML INJECT 0.5 ML (2.5 MG TOTAL) UNDER THE SKIN ONCE A  WEEK, Normal      zileuton 600 MG TAKE 1 TABLET (600 MG TOTAL) BY MOUTH 2 (TWO) TIMES A DAY, Normal       !! - Potential duplicate medications found. Please discuss with provider.        No discharge procedures on file.  ED SEPSIS DOCUMENTATION   Time reflects when diagnosis was documented in both MDM as applicable and the Disposition within this note       Time User Action Codes Description Comment    5/12/2025 11:54 AM [place] matta L Add [M79.605] Left leg pain     5/12/2025 11:54 AM [place] Jerica L Add [I10] HTN (hypertension)     5/12/2025 11:54 AM [place] Jerica L Add [E11.9] Diabetes (HCC)     5/12/2025 12:02 PM [place] Jerica L Add [D64.9] Anemia     5/12/2025 12:03 PM [place] Jerica L Add [L03.90] Cellulitis     5/12/2025 12:48 PM [place], Jerica L Add [R73.9] Hyperglycemia                  Jerica Vargas, DO  05/12/25 1258       Jerica Vargas, DO  05/12/25 1349

## 2025-05-12 NOTE — ED NOTES
Pt unable to provide urine sample at this time- is aware of needed specimen.      Dalila Watts RN  05/12/25 [id number]

## 2025-05-18 ENCOUNTER — APPOINTMENT (EMERGENCY)
Dept: RADIOLOGY | Facility: HOSPITAL | Age: 70
End: 2025-05-18
Payer: COMMERCIAL

## 2025-05-18 ENCOUNTER — HOSPITAL ENCOUNTER (EMERGENCY)
Facility: HOSPITAL | Age: 70
Discharge: HOME | End: 2025-05-18
Attending: EMERGENCY MEDICINE
Payer: COMMERCIAL

## 2025-05-18 VITALS
OXYGEN SATURATION: 97 % | DIASTOLIC BLOOD PRESSURE: 74 MMHG | TEMPERATURE: 98 F | WEIGHT: 171.96 LBS | HEART RATE: 78 BPM | BODY MASS INDEX: 29.06 KG/M2 | RESPIRATION RATE: 18 BRPM | SYSTOLIC BLOOD PRESSURE: 170 MMHG

## 2025-05-18 DIAGNOSIS — D75.839 THROMBOCYTOSIS: ICD-10-CM

## 2025-05-18 DIAGNOSIS — Z79.4 TYPE 2 DIABETES MELLITUS WITH HYPERGLYCEMIA, WITH LONG-TERM CURRENT USE OF INSULIN (HCC): ICD-10-CM

## 2025-05-18 DIAGNOSIS — I10 ESSENTIAL HYPERTENSION: ICD-10-CM

## 2025-05-18 DIAGNOSIS — M79.605 LEFT LEG PAIN: Primary | ICD-10-CM

## 2025-05-18 DIAGNOSIS — E11.65 TYPE 2 DIABETES MELLITUS WITH HYPERGLYCEMIA, WITH LONG-TERM CURRENT USE OF INSULIN (HCC): ICD-10-CM

## 2025-05-18 LAB
ALBUMIN SERPL BCG-MCNC: 3.7 G/DL (ref 3.5–5)
ALP SERPL-CCNC: 65 U/L (ref 34–104)
ALT SERPL W P-5'-P-CCNC: 12 U/L (ref 7–52)
ANION GAP SERPL CALCULATED.3IONS-SCNC: 6 MMOL/L (ref 4–13)
AST SERPL W P-5'-P-CCNC: 11 U/L (ref 13–39)
BASOPHILS # BLD AUTO: 0.07 THOUSANDS/ÂΜL (ref 0–0.1)
BASOPHILS NFR BLD AUTO: 1 % (ref 0–1)
BILIRUB SERPL-MCNC: 0.36 MG/DL (ref 0.2–1)
BUN SERPL-MCNC: 20 MG/DL (ref 5–25)
CALCIUM SERPL-MCNC: 9.6 MG/DL (ref 8.4–10.2)
CHLORIDE SERPL-SCNC: 99 MMOL/L (ref 96–108)
CO2 SERPL-SCNC: 30 MMOL/L (ref 21–32)
CREAT SERPL-MCNC: 0.63 MG/DL (ref 0.6–1.3)
EOSINOPHIL # BLD AUTO: 0.16 THOUSAND/ÂΜL (ref 0–0.61)
EOSINOPHIL NFR BLD AUTO: 2 % (ref 0–6)
ERYTHROCYTE [DISTWIDTH] IN BLOOD BY AUTOMATED COUNT: 13.2 % (ref 11.6–15.1)
GFR SERPL CREATININE-BSD FRML MDRD: 92 ML/MIN/1.73SQ M
GLUCOSE SERPL-MCNC: 315 MG/DL (ref 65–140)
HCT VFR BLD AUTO: 36 % (ref 34.8–46.1)
HGB BLD-MCNC: 12 G/DL (ref 11.5–15.4)
IMM GRANULOCYTES # BLD AUTO: 0.03 THOUSAND/UL (ref 0–0.2)
IMM GRANULOCYTES NFR BLD AUTO: 0 % (ref 0–2)
LACTATE SERPL-SCNC: 1.9 MMOL/L (ref 0.5–2)
LYMPHOCYTES # BLD AUTO: 2.01 THOUSANDS/ÂΜL (ref 0.6–4.47)
LYMPHOCYTES NFR BLD AUTO: 28 % (ref 14–44)
MCH RBC QN AUTO: 29.4 PG (ref 26.8–34.3)
MCHC RBC AUTO-ENTMCNC: 33.3 G/DL (ref 31.4–37.4)
MCV RBC AUTO: 88 FL (ref 82–98)
MONOCYTES # BLD AUTO: 0.4 THOUSAND/ÂΜL (ref 0.17–1.22)
MONOCYTES NFR BLD AUTO: 6 % (ref 4–12)
NEUTROPHILS # BLD AUTO: 4.45 THOUSANDS/ÂΜL (ref 1.85–7.62)
NEUTS SEG NFR BLD AUTO: 63 % (ref 43–75)
NRBC BLD AUTO-RTO: 0 /100 WBCS
PLATELET # BLD AUTO: 462 THOUSANDS/UL (ref 149–390)
PMV BLD AUTO: 10.7 FL (ref 8.9–12.7)
POTASSIUM SERPL-SCNC: 3.8 MMOL/L (ref 3.5–5.3)
PROT SERPL-MCNC: 7.8 G/DL (ref 6.4–8.4)
RBC # BLD AUTO: 4.08 MILLION/UL (ref 3.81–5.12)
SODIUM SERPL-SCNC: 135 MMOL/L (ref 135–147)
WBC # BLD AUTO: 7.12 THOUSAND/UL (ref 4.31–10.16)

## 2025-05-18 PROCEDURE — 99284 EMERGENCY DEPT VISIT MOD MDM: CPT

## 2025-05-18 PROCEDURE — 36415 COLL VENOUS BLD VENIPUNCTURE: CPT | Performed by: EMERGENCY MEDICINE

## 2025-05-18 PROCEDURE — 85025 COMPLETE CBC W/AUTO DIFF WBC: CPT | Performed by: EMERGENCY MEDICINE

## 2025-05-18 PROCEDURE — 87040 BLOOD CULTURE FOR BACTERIA: CPT | Performed by: EMERGENCY MEDICINE

## 2025-05-18 PROCEDURE — 99285 EMERGENCY DEPT VISIT HI MDM: CPT | Performed by: EMERGENCY MEDICINE

## 2025-05-18 PROCEDURE — 73590 X-RAY EXAM OF LOWER LEG: CPT

## 2025-05-18 PROCEDURE — 80053 COMPREHEN METABOLIC PANEL: CPT | Performed by: EMERGENCY MEDICINE

## 2025-05-18 PROCEDURE — 83605 ASSAY OF LACTIC ACID: CPT | Performed by: EMERGENCY MEDICINE

## 2025-05-18 RX ORDER — HYDROCODONE BITARTRATE AND ACETAMINOPHEN 5; 325 MG/1; MG/1
1 TABLET ORAL ONCE
Refills: 0 | Status: COMPLETED | OUTPATIENT
Start: 2025-05-18 | End: 2025-05-18

## 2025-05-18 RX ORDER — LIDOCAINE 40 MG/G
CREAM TOPICAL AS NEEDED
Qty: 30 G | Refills: 0 | Status: SHIPPED | OUTPATIENT
Start: 2025-05-18

## 2025-05-18 RX ORDER — SULFAMETHOXAZOLE AND TRIMETHOPRIM 800; 160 MG/1; MG/1
1 TABLET ORAL 2 TIMES DAILY
Qty: 14 TABLET | Refills: 0 | Status: SHIPPED | OUTPATIENT
Start: 2025-05-18 | End: 2025-05-25

## 2025-05-18 RX ADMIN — HYDROCODONE BITARTRATE AND ACETAMINOPHEN 1 TABLET: 5; 325 TABLET ORAL at 11:16

## 2025-05-18 NOTE — ED PROVIDER NOTES
Time reflects when diagnosis was documented in both MDM as applicable and the Disposition within this note       Time User Action Codes Description Comment    5/18/2025 11:07 AM Dennys Minor Add [M79.605] Left leg pain     5/18/2025 11:08 AM Dennys Minor Add [E11.65,  Z79.4] Type 2 diabetes mellitus with hyperglycemia, with long-term current use of insulin (HCC)     5/18/2025 11:08 AM Dennys Minor Add [I10] Essential hypertension     5/18/2025 11:09 AM Dennys Minor Add [D75.839] Thrombocytosis           ED Disposition       ED Disposition   Discharge    Condition   Stable    Date/Time   Sun May 18, 2025 11:07 AM    Comment   Jolie CruzCintron discharge to home/self care.                   Assessment & Plan       Medical Decision Making  Persistent left lower leg pain.  There is no history exam findings suggest septic arthritis, gout, abscess formation.  Will check labs to rule out leukocytosis as patient is at risk for sepsis.  Will do x-ray tib-fib to rule out occult fracture, treat symptoms, PCP follow-up    Amount and/or Complexity of Data Reviewed  Labs: ordered. Decision-making details documented in ED Course.  Radiology: ordered and independent interpretation performed.        ED Course as of 05/18/25 1109   [place] May 18, 2025   1058 GLUCOSE(!): 315  Not in dka     1058 Platelet Count(!): 462   1059 Medical work up reviewed and benign, will tx with mrsa coverage, ouptatient podiatry follow up.        Medications - No data to display    ED Risk Strat Scores                    No data recorded        SBIRT 22yo+      Flowsheet Row Most Recent Value   Initial Alcohol Screen: US AUDIT-C     1. How often do you have a drink containing alcohol? 0 Filed at: 05/18/2025 0948   2. How many drinks containing alcohol do you have on a typical day you are drinking?  0 Filed at: 05/18/2025 0948   3b. FEMALE Any Age, or MALE 65+: How often do you have 4 or more drinks on one occassion? 0 Filed at: 05/18/2025 0948    Audit-C Score 0 Filed at: 05/18/2025 0903   MARGO: How many times in the past year have you...    Used an illegal drug or used a prescription medication for non-medical reasons? Never Filed at: 05/18/2025 0906                            History of Present Illness       Chief Complaint   Patient presents with    Leg Pain     Patient reporting LLE pain - recently diagnosed with infection. Currently taking antibiotic since Monday but reports worsening pain. Patient reports fever of 99.        Past Medical History:   Diagnosis Date    Abdominal infection (HCC)     h-pylori    Abnormal chest x-ray 04/05/2019    Asthma     Breast cancer (HCC) 06/26/2018    Cancer (HCC)     BREAST    Depression, recurrent (HCC) 06/13/2022    Diabetes mellitus (HCC)     Hiatal hernia     Hiatal hernia 08/08/2018    History of chemotherapy 2018    History of radiation therapy 2018    Hypercholesterolemia     Hypertension     Hypothyroid     Renal disorder     Rheumatoid arthritis (HCC)       Past Surgical History:   Procedure Laterality Date    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    BREAST LUMPECTOMY Right 6/26/2018    Procedure: RIGHT BREAST NEEDLE LOCALIZATION X2 WITH RIGHT BREAST LUMPECTOMY ( NEEDLE LOC @ 1000);  Surgeon: Familia Barragan MD;  Location: [facility];  Service: Surgical Oncology    BREAST LUMPECTOMY Right 8/2/2018    Procedure: LYMPHOSCINITGRAPHY INTRAOPERATIVE LYMPHATIC MAPPING , RIGHT SENTINEL NODE BIOPSY, REEXCISION  RIGHT BREAST LUMPECTOMY CAVITY (SUPERIOR MARGIN);  Surgeon: Familia Barragan MD;  Location: [facility];  Service: Surgical Oncology    BREAST SURGERY Left     CATARACT EXTRACTION, BILATERAL Bilateral     COLONOSCOPY      EGD AND COLONOSCOPY N/A 9/5/2018    Procedure: EGD AND COLONOSCOPY;  Surgeon: José Miguel Rosas MD;  Location: [facility];  Service: Gastroenterology    FL GUIDED CENTRAL VENOUS ACCESS DEVICE INSERTION  9/13/2018    KNEE SURGERY Right     MAMMO NEEDLE LOCALIZATION RIGHT (ALL INC) Right  6/26/2018    MAMMO STEREOTACTIC BREAST BIOPSY RIGHT (ALL INC) Right 5/23/2018    RETINAL DETACHMENT SURGERY Right     TUBAL LIGATION      TUNNELED VENOUS PORT PLACEMENT Left 9/13/2018    Procedure: INSERTION VENOUS PORT (PORT-A-CATH);  Surgeon: Familia Barragan MD;  Location: [facility];  Service: Surgical Oncology    UPPER GASTROINTESTINAL ENDOSCOPY        Family History   Problem Relation Age of Onset    Diabetes Mother     Hypertension Mother     Diabetes Father     Hypertension Father     No Known Problems Sister     No Known Problems Sister     No Known Problems Sister     No Known Problems Sister     No Known Problems Sister     No Known Problems Daughter     No Known Problems Daughter     No Known Problems Daughter     Cancer Maternal Grandfather     Diabetes Brother     Diabetes Brother     Kidney failure Brother     Diabetes Brother     Breast cancer Neg Hx       Social History[1]   E-Cigarette/Vaping    E-Cigarette Use Never User       E-Cigarette/Vaping Substances    Nicotine No     THC No     CBD No     Flavoring No     Other No     Unknown No       I have reviewed and agree with the history as documented.     61-year-old female presents for evaluation of persistent sharp left lower leg pain.  Patient states the pain feels gets in her Achilles tendon to her mid calf.  The pain is constant, getting progressively worse.  Is worse with bearing weight and ambulation.  Patient states she has a fever as high as 99 oral at home.  Patient was seen and evaluated on May 12 with a negative x-ray, negative venous duplex, started on Keflex.      History provided by:  Patient, relative and medical records  Leg Pain      Review of Systems        Objective       ED Triage Vitals   Temperature Pulse Blood Pressure Respirations SpO2 Patient Position - Orthostatic VS   05/18/25 0915 05/18/25 0914 05/18/25 0914 05/18/25 0914 05/18/25 0914 05/18/25 0914   98 °F (36.7 °C) 94 140/65 18 97 % Lying      Temp Source Heart Rate  Source BP Location FiO2 (%) Pain Score    05/18/25 0915 05/18/25 0914 05/18/25 0914 -- 05/18/25 0914    Oral Monitor Right arm  9      Vitals      Date and Time Temp Pulse SpO2 Resp BP Pain Score FACES Pain Rating User   05/18/25 0915 98 °F (36.7 °C) -- -- -- -- -- -- KR   05/18/25 0914 -- 94 97 % 18 140/65 9 -- KR            Physical Exam  Constitutional:       Appearance: She is well-developed.   HENT:      Head: Normocephalic and atraumatic.     Eyes:      Pupils: Pupils are equal, round, and reactive to light.     Neck:      Vascular: No JVD.      Trachea: No tracheal deviation.     Cardiovascular:      Rate and Rhythm: Normal rate and regular rhythm.   Pulmonary:      Effort: No tachypnea, accessory muscle usage or respiratory distress.   Abdominal:      General: There is no distension.     Musculoskeletal:      Right lower leg: Normal.      Left lower leg: Normal.      Comments: L knee/ankle exam WNL.  Ttp over the L poterior calf/achiles, minimal erythema, warmth, nttp, no crepitus, fluctuance, induration, streaking, NVI LLE       Skin:     General: Skin is warm.      Capillary Refill: Capillary refill takes less than 2 seconds.     Neurological:      Mental Status: She is alert and oriented to person, place, and time.     Psychiatric:         Behavior: Behavior normal.         Results Reviewed       Procedure Component Value Units Date/Time    Lactic acid, plasma (w/reflex if result > 2.0) [[id number]]  (Normal) Collected: 05/18/25 1009    Lab Status: Final result Specimen: Blood from Arm, Left Updated: 05/18/25 1106     LACTIC ACID 1.9 mmol/L     Narrative:      Result may be elevated if tourniquet was used during collection.    Comprehensive metabolic panel [[id number]]  (Abnormal) Collected: 05/18/25 1009    Lab Status: Final result Specimen: Blood from Arm, Left Updated: 05/18/25 1045     Sodium 135 mmol/L      Potassium 3.8 mmol/L      Chloride 99 mmol/L      CO2 30 mmol/L      ANION GAP 6 mmol/L      BUN  20 mg/dL      Creatinine 0.63 mg/dL      Glucose 315 mg/dL      Calcium 9.6 mg/dL      AST 11 U/L      ALT 12 U/L      Alkaline Phosphatase 65 U/L      Total Protein 7.8 g/dL      Albumin 3.7 g/dL      Total Bilirubin 0.36 mg/dL      eGFR 92 ml/min/1.73sq m     Narrative:      National Kidney Disease Foundation guidelines for Chronic Kidney Disease (CKD):     Stage 1 with normal or high GFR (GFR > 90 mL/min/1.73 square meters)    Stage 2 Mild CKD (GFR = 60-89 mL/min/1.73 square meters)    Stage 3A Moderate CKD (GFR = 45-59 mL/min/1.73 square meters)    Stage 3B Moderate CKD (GFR = 30-44 mL/min/1.73 square meters)    Stage 4 Severe CKD (GFR = 15-29 mL/min/1.73 square meters)    Stage 5 End Stage CKD (GFR <15 mL/min/1.73 square meters)  Note: GFR calculation is accurate only with a steady state creatinine    CBC and differential [[id number]]  (Abnormal) Collected: 05/18/25 1009    Lab Status: Final result Specimen: Blood from Arm, Left Updated: 05/18/25 1020     WBC 7.12 Thousand/uL      RBC 4.08 Million/uL      Hemoglobin 12.0 g/dL      Hematocrit 36.0 %      MCV 88 fL      MCH 29.4 pg      MCHC 33.3 g/dL      RDW 13.2 %      MPV 10.7 fL      Platelets 462 Thousands/uL      nRBC 0 /100 WBCs      Segmented % 63 %      Immature Grans % 0 %      Lymphocytes % 28 %      Monocytes % 6 %      Eosinophils Relative 2 %      Basophils Relative 1 %      Absolute Neutrophils 4.45 Thousands/µL      Absolute Immature Grans 0.03 Thousand/uL      Absolute Lymphocytes 2.01 Thousands/µL      Absolute Monocytes 0.40 Thousand/µL      Eosinophils Absolute 0.16 Thousand/µL      Basophils Absolute 0.07 Thousands/µL     Blood culture #1 [[id number]] Collected: 05/18/25 1009    Lab Status: In process Specimen: Blood from Arm, Left Updated: 05/18/25 1017    Blood culture #2 [[id number]] Collected: 05/18/25 1009    Lab Status: In process Specimen: Blood from Hand, Right Updated: 05/18/25 1017            XR tibia fibula 2 views LEFT   ED  Interpretation by Dennys Minor MD (05/18 1828)   Primary reviewed no acute abnormality          Procedures    ED Medication and Procedure Management   Prior to Admission Medications   Prescriptions Last Dose Informant Patient Reported? Taking?   Alcohol Swabs (Pharmacist Choice Alcohol) PADS  Self, Child No No   Sig: CLEAN AREA BEFORE TESTING OR INJECTING FOUR TIMES DAILY   Comfort EZ Pen Needles 32G X 4 MM MISC  Self, Child No No   Sig: USE AS DIRECTED FOUR TIMES DAILY   Continuous Glucose  (FreeStyle Broderick 3 Collins) AARON  Self, Child No No   Sig: USE AS DIRECTED   Continuous Glucose Sensor (FreeStyle Broderick 3 Sensor) MISC  Self, Child No No   Sig: USE 1 UNITS EVERY 14 (FOURTEEN) DAYS   Diclofenac Sodium (VOLTAREN) 1 %  Self, Child No No   Sig: Apply 2 g topically 2 (two) times a day   EPINEPHrine (Auvi-Q) 0.1 mg/0.1 mL SOAJ   No No   Sig: Inject 0.3 mL (0.3 mg total) into a muscle once for 1 dose   Mag-G 500 (27 Mg) MG tablet  Self, Child No No   Sig: TAKE 1 TABLET (500 MG TOTAL) BY MOUTH DAILY   OneTouch Delica Lancets 33G MISC  Self, Child No No   Sig: USE 3 (THREE) TIMES A DAY   OneTouch Ultra test strip  Self, Child No No   Sig: USE 1 EACH 3 (THREE) TIMES A DAY USE AS INSTRUCTED   acetaminophen (TYLENOL) 500 mg tablet  Self, Child No No   Sig: Take 1 tablet (500 mg total) by mouth every 6 (six) hours as needed for fever   albuterol (2.5 mg/3 mL) 0.083 % nebulizer solution  Self, Child No No   Sig: Take 3 mL (2.5 mg total) by nebulization every 6 (six) hours as needed for wheezing or shortness of breath   albuterol (Ventolin HFA) 90 mcg/act inhaler  Self, Child No No   Sig: Inhale 1-2 puffs every 6 (six) hours as needed for wheezing or shortness of breath   anastrozole (ARIMIDEX) 1 mg tablet  Self, Child No No   Sig: Take 1 tablet (1 mg total) by mouth daily   aspirin 81 mg chewable tablet  Self, Child Yes No   Sig: Chew 81 mg daily   atorvastatin (LIPITOR) 40 mg tablet  Self, Child No No   Sig: Take  1 tablet (40 mg total) by mouth daily   calcium polycarbophil (FIBERCON) 625 mg tablet  Self, Child No No   Sig: Take 1 tablet (625 mg total) by mouth daily   cephalexin (KEFLEX) 500 mg capsule   No No   Sig: Take 1 capsule (500 mg total) by mouth every 6 (six) hours for 7 days   dicyclomine (BENTYL) 20 mg tablet  Self, Child No No   Sig: Take 1 tablet (20 mg total) by mouth 2 (two) times a day as needed (diarrhea) for up to 10 doses   dicyclomine (BENTYL) 20 mg tablet   No No   Sig: Take 1 tablet (20 mg total) by mouth 2 (two) times a day   ergocalciferol (VITAMIN D2) 50,000 units  Self, Child No No   Sig: TAKE 1 CAPSULE (50,000 UNITS TOTAL) BY MOUTH ONCE A WEEK   fluticasone (FLONASE) 50 mcg/act nasal spray  Self, Child Yes No   fluticasone-salmeterol (Advair HFA) 230-21 MCG/ACT inhaler  Self, Child No No   Sig: Inhale 2 puffs 2 (two) times a day Rinse mouth after use.   furosemide (LASIX) 20 mg tablet  Self, Child No No   Sig: TAKE 1 TABLET (20 MG TOTAL) BY MOUTH 2 (TWO) TIMES A DAY   guaiFENesin (MUCINEX) 600 mg 12 hr tablet  Self, Child No No   Sig: Take 1 tablet (600 mg total) by mouth every 12 (twelve) hours as needed for congestion   insulin degludec (Tresiba FlexTouch) 200 units/mL CONCENTRATED U-200 injection pen  Self, Child No No   Sig: INJECT 60 UNITS UNDER THE SKIN DAILY AT 5PM   insulin lispro (HumaLOG) 100 units/mL injection pen  Self, Child No No   Sig: INJECT 24 UNITS BEFORE EACH MEAL PLUS SLIDING SCALE 150-200: 2 UNITS 201-250: 4 UNITS 251-300: 6 UNITS 301*   ketotifen (ZADITOR) 0.025 % ophthalmic solution  Self, Child No No   Sig: Administer 1 drop to both eyes 2 (two) times a day as needed (itchy eyes)   latanoprost (XALATAN) 0.005 % ophthalmic solution  Self, Child Yes No   loratadine (CLARITIN) 10 mg tablet  Self, Child No No   Sig: TAKE 1 TABLET (10 MG TOTAL) BY MOUTH DAILY   losartan (COZAAR) 100 MG tablet  Self, Child No No   Sig: Take 1 tablet (100 mg total) by mouth daily   magnesium  (MAGTAB) 84 MG (7MEQ) TBCR   No No   Sig: Take 1 tablet (84 mg total) by mouth 2 (two) times a day for 14 days   magnesium chloride-calcium (Slow-Mag) 71.5-119 mg  Self, Child No No   Sig: Take 2 tablets by mouth daily   methocarbamol (ROBAXIN) 500 mg tablet  Child, Self Yes No   metoprolol tartrate (LOPRESSOR) 25 mg tablet  Self, Child No No   Sig: TAKE 0.5 TABLETS (12.5 MG TOTAL) BY MOUTH EVERY 12 (TWELVE) HOURS   omeprazole (PriLOSEC) 20 mg delayed release capsule  Self, Child No No   Sig: TAKE 1 CAPSULE (20 MG TOTAL) BY MOUTH DAILY   ondansetron (ZOFRAN) 4 mg tablet  Self, Child No No   Sig: Take 1 tablet (4 mg total) by mouth every 6 (six) hours   ondansetron (ZOFRAN-ODT) 4 mg disintegrating tablet  Self, Child No No   Sig: Take 1 tablet (4 mg total) by mouth every 8 (eight) hours as needed for nausea for up to 10 doses   ondansetron (ZOFRAN-ODT) 4 mg disintegrating tablet   No No   Sig: Take 1 tablet (4 mg total) by mouth every 8 (eight) hours as needed for nausea or vomiting   polyethylene glycol (MIRALAX) 17 g packet  Self No No   Sig: Take 17 g by mouth daily for 14 days   tirzepatide (Mounjaro) 2.5 MG/0.5ML  Self, Child No No   Sig: INJECT 0.5 ML (2.5 MG TOTAL) UNDER THE SKIN ONCE A WEEK   zileuton 600 MG  Self, Child No No   Sig: TAKE 1 TABLET (600 MG TOTAL) BY MOUTH 2 (TWO) TIMES A DAY      Facility-Administered Medications: None     Patient's Medications   Discharge Prescriptions    LIDOCAINE (LMX) 4 % CREAM    Apply topically as needed for moderate pain       Start Date: 5/18/2025 End Date: --       Order Dose: --       Quantity: 30 g    Refills: 0    SULFAMETHOXAZOLE-TRIMETHOPRIM (BACTRIM DS) 800-160 MG PER TABLET    Take 1 tablet by mouth 2 (two) times a day for 7 days smx-tmp DS (BACTRIM) 800-160 mg tabs (1tab q12 D10)       Start Date: 5/18/2025 End Date: 5/25/2025       Order Dose: 1 tablet       Quantity: 14 tablet    Refills: 0     No discharge procedures on file.  ED SEPSIS DOCUMENTATION   Time  reflects when diagnosis was documented in both MDM as applicable and the Disposition within this note       Time User Action Codes Description Comment    5/18/2025 11:07 AM Dennys Minor [M79.605] Left leg pain     5/18/2025 11:08 AM Dennys Minor [E11.65,  Z79.4] Type 2 diabetes mellitus with hyperglycemia, with long-term current use of insulin (HCC)     5/18/2025 11:08 AM Dennys Minor [I10] Essential hypertension     5/18/2025 11:09 AM Dennys Minor [D75.839] Thrombocytosis                    [1]   Social History  Tobacco Use    Smoking status: Never    Smokeless tobacco: Never   Vaping Use    Vaping status: Never Used   Substance Use Topics    Alcohol use: No    Drug use: No        Dennys Minor MD  05/18/25 110

## 2025-05-23 LAB
BACTERIA BLD CULT: NORMAL
BACTERIA BLD CULT: NORMAL

## 2025-05-26 ENCOUNTER — HOSPITAL ENCOUNTER (EMERGENCY)
Facility: HOSPITAL | Age: 70
Discharge: HOME | End: 2025-05-26
Attending: EMERGENCY MEDICINE | Admitting: EMERGENCY MEDICINE
Payer: COMMERCIAL

## 2025-05-26 ENCOUNTER — APPOINTMENT (EMERGENCY)
Dept: CT IMAGING | Facility: HOSPITAL | Age: 70
End: 2025-05-26
Payer: COMMERCIAL

## 2025-05-26 VITALS
DIASTOLIC BLOOD PRESSURE: 73 MMHG | OXYGEN SATURATION: 96 % | RESPIRATION RATE: 16 BRPM | TEMPERATURE: 98.2 F | BODY MASS INDEX: 29.14 KG/M2 | HEART RATE: 87 BPM | WEIGHT: 172.4 LBS | SYSTOLIC BLOOD PRESSURE: 142 MMHG

## 2025-05-26 DIAGNOSIS — R73.9 HYPERGLYCEMIA: ICD-10-CM

## 2025-05-26 DIAGNOSIS — R60.0 EDEMA OF LEFT LOWER LEG: Primary | ICD-10-CM

## 2025-05-26 DIAGNOSIS — R79.9 ELEVATED BUN: ICD-10-CM

## 2025-05-26 LAB
ANION GAP SERPL CALCULATED.3IONS-SCNC: 6 MMOL/L (ref 4–13)
BASOPHILS # BLD AUTO: 0.08 THOUSANDS/ÂΜL (ref 0–0.1)
BASOPHILS NFR BLD AUTO: 2 % (ref 0–1)
BUN SERPL-MCNC: 33 MG/DL (ref 5–25)
CALCIUM SERPL-MCNC: 9.5 MG/DL (ref 8.4–10.2)
CHLORIDE SERPL-SCNC: 97 MMOL/L (ref 96–108)
CO2 SERPL-SCNC: 28 MMOL/L (ref 21–32)
CREAT SERPL-MCNC: 0.78 MG/DL (ref 0.6–1.3)
EOSINOPHIL # BLD AUTO: 0.13 THOUSAND/ÂΜL (ref 0–0.61)
EOSINOPHIL NFR BLD AUTO: 3 % (ref 0–6)
ERYTHROCYTE [DISTWIDTH] IN BLOOD BY AUTOMATED COUNT: 13.2 % (ref 11.6–15.1)
GFR SERPL CREATININE-BSD FRML MDRD: 78 ML/MIN/1.73SQ M
GLUCOSE SERPL-MCNC: 332 MG/DL (ref 65–140)
HCT VFR BLD AUTO: 35.4 % (ref 34.8–46.1)
HGB BLD-MCNC: 11.5 G/DL (ref 11.5–15.4)
IMM GRANULOCYTES # BLD AUTO: 0.01 THOUSAND/UL (ref 0–0.2)
IMM GRANULOCYTES NFR BLD AUTO: 0 % (ref 0–2)
LYMPHOCYTES # BLD AUTO: 1.88 THOUSANDS/ÂΜL (ref 0.6–4.47)
LYMPHOCYTES NFR BLD AUTO: 39 % (ref 14–44)
MCH RBC QN AUTO: 29.1 PG (ref 26.8–34.3)
MCHC RBC AUTO-ENTMCNC: 32.5 G/DL (ref 31.4–37.4)
MCV RBC AUTO: 90 FL (ref 82–98)
MONOCYTES # BLD AUTO: 0.39 THOUSAND/ÂΜL (ref 0.17–1.22)
MONOCYTES NFR BLD AUTO: 8 % (ref 4–12)
NEUTROPHILS # BLD AUTO: 2.37 THOUSANDS/ÂΜL (ref 1.85–7.62)
NEUTS SEG NFR BLD AUTO: 48 % (ref 43–75)
NRBC BLD AUTO-RTO: 0 /100 WBCS
PLATELET # BLD AUTO: 375 THOUSANDS/UL (ref 149–390)
PMV BLD AUTO: 11.4 FL (ref 8.9–12.7)
POTASSIUM SERPL-SCNC: 5.5 MMOL/L (ref 3.5–5.3)
RBC # BLD AUTO: 3.95 MILLION/UL (ref 3.81–5.12)
SODIUM SERPL-SCNC: 131 MMOL/L (ref 135–147)
WBC # BLD AUTO: 4.86 THOUSAND/UL (ref 4.31–10.16)

## 2025-05-26 PROCEDURE — 96360 HYDRATION IV INFUSION INIT: CPT

## 2025-05-26 PROCEDURE — 99284 EMERGENCY DEPT VISIT MOD MDM: CPT

## 2025-05-26 PROCEDURE — 36415 COLL VENOUS BLD VENIPUNCTURE: CPT

## 2025-05-26 PROCEDURE — 80048 BASIC METABOLIC PNL TOTAL CA: CPT

## 2025-05-26 PROCEDURE — 85025 COMPLETE CBC W/AUTO DIFF WBC: CPT

## 2025-05-26 PROCEDURE — 73701 CT LOWER EXTREMITY W/DYE: CPT

## 2025-05-26 RX ADMIN — SODIUM CHLORIDE 1000 ML: 0.9 INJECTION, SOLUTION INTRAVENOUS at 10:50

## 2025-05-26 RX ADMIN — IOHEXOL 100 ML: 350 INJECTION, SOLUTION INTRAVENOUS at 11:09

## 2025-05-26 NOTE — ED PROVIDER NOTES
Time reflects when diagnosis was documented in both MDM as applicable and the Disposition within this note       Time User Action Codes Description Comment    5/26/2025 12:34 PM Kristina Haas [R60.0] Edema of left lower leg     5/26/2025  1:21 PM Kristina Haas [R79.9] Elevated BUN     5/26/2025  1:21 PM Kristina Haas [R73.9] Hyperglycemia           ED Disposition       ED Disposition   Discharge    Condition   Stable    Date/Time   Mon May 26, 2025 12:33 PM    Comment   Jolie Gastonomid discharge to home/self care.                   Assessment & Plan       Medical Decision Making  69 year old female with left lower leg swelling, erythema, induration x one month. Seen twice and was on cephalexin, then bactrim without change. No systemic symptoms like fever. No tachycardia. Today CBC without leukocytosis. BMP with elevated BUN and hyperglycemia. Pt reports good compliance to the abx therapy. CT resulted with nonspecific subcutaneous edema. Discussed following up with her PCP regarding this symptom. Pt agrees. Will also need to follow up with the elevated BUN.    Pt stable at time of discharge, vital signs reviewed, questions answered. Strict ER return precautions provided/discussed and were well understood by patient. Patient's vitals, labs and/or imaging results, diagnosis, and treatment plan were discussed with the patient. All new and/or changed medications were discussed - specifically to include route of administration, how often to take, when to take, and the pharmacy they were sent to. Strict return precautions as well as close follow up with PCP was discussed with the patient and the patient was agreeable to my recommendations.  Patient verbally acknowledged understanding. All labs, imaging were reviewed and used in the medical decision making process (if ordered).     Portions of this chart may have been written with voice recognition software.  Occasional grammatical errors, wrong  "word or \"sound a like\" substitutions may have occurred due to software limitations.  Please read carefully and use context to recognize where substitutions have occurred.    Problems Addressed:  Edema of left lower leg: acute illness or injury    Amount and/or Complexity of Data Reviewed  External Data Reviewed: labs, radiology and notes.  Labs: ordered.  Radiology: ordered. Decision-making details documented in ED Course.  Discussion of management or test interpretation with external provider(s): Discussed case with Dr. Castellanos from [facility] for admission, requesting labs. Once labs returned he had low suspicion for cellulitis requiring admission.    Risk  Prescription drug management.  Decision regarding hospitalization.        ED Course as of 05/26/25 1323   Mon May 26, 2025   1232 CT lower extremity w contrast left  IMPRESSION:     Diffuse nonspecific subcutaneous edema involving the mid to distal leg and hindfoot as described. No abnormal mass or fluid collection identified. No acute osseous abnormality.         Medications   sodium chloride 0.9 % bolus 1,000 mL (0 mL Intravenous Stopped 5/26/25 1153)   iohexol (OMNIPAQUE) 350 MG/ML injection (SINGLE-DOSE) 100 mL (100 mL Intravenous Given 5/26/25 1109)       ED Risk Strat Scores                    No data recorded                            History of Present Illness       Chief Complaint   Patient presents with    Wound Check     Pt has closed wound on left calf she would like checked.        Past Medical History[1]   Past Surgical History[2]   Family History[3]   Social History[4]   E-Cigarette/Vaping    E-Cigarette Use Never User       E-Cigarette/Vaping Substances    Nicotine No     THC No     CBD No     Flavoring No     Other No     Unknown No       I have reviewed and agree with the history as documented.     Jolie is a 69 year old female presenting to the ED for left calf swelling, erythema, firmness, pain x one month. Has been seen twice for this in the ED " already. The first time had a negative duplex, prescribed cephalexin for cellulitis. Then returned several days later and was started on Bactrim. The symptoms have persisted since then without any relief from abx therapy.        Review of Systems   Constitutional:  Negative for chills and fever.   Respiratory:  Negative for cough and shortness of breath.    Cardiovascular:  Negative for chest pain and palpitations.   Musculoskeletal:  Negative for arthralgias and joint swelling.   Skin:  Positive for color change and rash.   Neurological:  Negative for light-headedness and headaches.           Objective       ED Triage Vitals [05/26/25 0933]   Temperature Pulse Blood Pressure Respirations SpO2 Patient Position - Orthostatic VS   98.2 °F (36.8 °C) 87 142/73 16 96 % Sitting      Temp Source Heart Rate Source BP Location FiO2 (%) Pain Score    Oral Monitor Left arm -- --      Vitals      Date and Time Temp Pulse SpO2 Resp BP Pain Score FACES Pain Rating User   05/26/25 0933 98.2 °F (36.8 °C) 87 96 % 16 142/73 -- -- JW            Physical Exam  Vitals reviewed.   Constitutional:       General: She is not in acute distress.     Appearance: Normal appearance. She is not ill-appearing or toxic-appearing.     Cardiovascular:      Rate and Rhythm: Normal rate and regular rhythm.      Pulses: Normal pulses.           Dorsalis pedis pulses are 2+ on the right side and 2+ on the left side.        Posterior tibial pulses are 2+ on the right side and 2+ on the left side.   Pulmonary:      Effort: Pulmonary effort is normal. No respiratory distress.     Musculoskeletal:         General: Normal range of motion.      Cervical back: Normal range of motion and neck supple. No rigidity or tenderness.   Lymphadenopathy:      Cervical: No cervical adenopathy.     Skin:     General: Skin is warm and dry.      Capillary Refill: Capillary refill takes less than 2 seconds.      Comments: To the left distal posterior calf there is warmth,  erythema, induration, swelling, pain to palpation, and a small pustule     Neurological:      General: No focal deficit present.      Mental Status: She is alert.     Psychiatric:         Mood and Affect: Mood normal.         Behavior: Behavior normal.         Results Reviewed       Procedure Component Value Units Date/Time    Basic metabolic panel [[id number]]  (Abnormal) Collected: 05/26/25 1020    Lab Status: Final result Specimen: Blood from Arm, Left Updated: 05/26/25 1042     Sodium 131 mmol/L      Potassium 5.5 mmol/L      Chloride 97 mmol/L      CO2 28 mmol/L      ANION GAP 6 mmol/L      BUN 33 mg/dL      Creatinine 0.78 mg/dL      Glucose 332 mg/dL      Calcium 9.5 mg/dL      eGFR 78 ml/min/1.73sq m     Narrative:      National Kid[employer] Disease Foundation guidelines for Chronic Kidney Disease (CKD):     Stage 1 with normal or high GFR (GFR > 90 mL/min/1.73 square meters)    Stage 2 Mild CKD (GFR = 60-89 mL/min/1.73 square meters)    Stage 3A Moderate CKD (GFR = 45-59 mL/min/1.73 square meters)    Stage 3B Moderate CKD (GFR = 30-44 mL/min/1.73 square meters)    Stage 4 Severe CKD (GFR = 15-29 mL/min/1.73 square meters)    Stage 5 End Stage CKD (GFR <15 mL/min/1.73 square meters)  Note: GFR calculation is accurate only with a steady state creatinine    CBC and differential [[id number]]  (Abnormal) Collected: 05/26/25 1020    Lab Status: Final result Specimen: Blood from Arm, Left Updated: 05/26/25 1025     WBC 4.86 Thousand/uL      RBC 3.95 Million/uL      Hemoglobin 11.5 g/dL      Hematocrit 35.4 %      MCV 90 fL      MCH 29.1 pg      MCHC 32.5 g/dL      RDW 13.2 %      MPV 11.4 fL      Platelets 375 Thousands/uL      nRBC 0 /100 WBCs      Segmented % 48 %      Immature Grans % 0 %      Lymphocytes % 39 %      Monocytes % 8 %      Eosinophils Relative 3 %      Basophils Relative 2 %      Absolute Neutrophils 2.37 Thousands/µL      Absolute Immature Grans 0.01 Thousand/uL      Absolute Lymphocytes 1.88  Thousands/µL      Absolute Monocytes 0.39 Thousand/µL      Eosinophils Absolute 0.13 Thousand/µL      Basophils Absolute 0.08 Thousands/µL             CT lower extremity w contrast left   Final Interpretation by Gabriele Gillespie MD (05/26 1230)      Diffuse nonspecific subcutaneous edema involving the mid to distal leg and hindfoot as described. No abnormal mass or fluid collection identified. No acute osseous abnormality.         Workstation performed: [id number]             Procedures    ED Medication and Procedure Management   Prior to Admission Medications   Prescriptions Last Dose Informant Patient Reported? Taking?   Alcohol Swabs (Pharmacist Choice Alcohol) PADS  Self, Child No No   Sig: CLEAN AREA BEFORE TESTING OR INJECTING FOUR TIMES DAILY   Comfort EZ Pen Needles 32G X 4 MM MISC  Self, Child No No   Sig: USE AS DIRECTED FOUR TIMES DAILY   Continuous Glucose  (FreeStyle Broderick 3 [place]) AARON  Self, Child No No   Sig: USE AS DIRECTED   Continuous Glucose Sensor (FreeStyle Broderick 3 Sensor) MISC  Self, Child No No   Sig: USE 1 UNITS EVERY 14 (FOURTEEN) DAYS   Diclofenac Sodium (VOLTAREN) 1 %  Self, Child No No   Sig: Apply 2 g topically 2 (two) times a day   EPINEPHrine (Auvi-Q) 0.1 mg/0.1 mL SOAJ   No No   Sig: Inject 0.3 mL (0.3 mg total) into a muscle once for 1 dose   Mag-G 500 (27 Mg) MG tablet  Self, Child No No   Sig: TAKE 1 TABLET (500 MG TOTAL) BY MOUTH DAILY   OneTouch Delica Lancets 33G MISC  Self, Child No No   Sig: USE 3 (THREE) TIMES A DAY   OneTouch Ultra test strip  Self, Child No No   Sig: USE 1 EACH 3 (THREE) TIMES A DAY USE AS INSTRUCTED   acetaminophen (TYLENOL) 500 mg tablet  Self, Child No No   Sig: Take 1 tablet (500 mg total) by mouth every 6 (six) hours as needed for fever   albuterol (2.5 mg/3 mL) 0.083 % nebulizer solution  Self, Child No No   Sig: Take 3 mL (2.5 mg total) by nebulization every 6 (six) hours as needed for wheezing or shortness of breath   albuterol  (Ventolin HFA) 90 mcg/act inhaler  Self, Child No No   Sig: Inhale 1-2 puffs every 6 (six) hours as needed for wheezing or shortness of breath   anastrozole (ARIMIDEX) 1 mg tablet  Self, Child No No   Sig: Take 1 tablet (1 mg total) by mouth daily   aspirin 81 mg chewable tablet  Self, Child Yes No   Sig: Chew 81 mg daily   atorvastatin (LIPITOR) 40 mg tablet  Self, Child No No   Sig: Take 1 tablet (40 mg total) by mouth daily   calcium polycarbophil (FIBERCON) 625 mg tablet  Self, Child No No   Sig: Take 1 tablet (625 mg total) by mouth daily   dicyclomine (BENTYL) 20 mg tablet  Self, Child No No   Sig: Take 1 tablet (20 mg total) by mouth 2 (two) times a day as needed (diarrhea) for up to 10 doses   dicyclomine (BENTYL) 20 mg tablet   No No   Sig: Take 1 tablet (20 mg total) by mouth 2 (two) times a day   ergocalciferol (VITAMIN D2) 50,000 units  Self, Child No No   Sig: TAKE 1 CAPSULE (50,000 UNITS TOTAL) BY MOUTH ONCE A WEEK   fluticasone (FLONASE) 50 mcg/act nasal spray  Self, Child Yes No   fluticasone-salmeterol (Advair HFA) 230-21 MCG/ACT inhaler  Self, Child No No   Sig: Inhale 2 puffs 2 (two) times a day Rinse mouth after use.   furosemide (LASIX) 20 mg tablet  Self, Child No No   Sig: TAKE 1 TABLET (20 MG TOTAL) BY MOUTH 2 (TWO) TIMES A DAY   guaiFENesin (MUCINEX) 600 mg 12 hr tablet  Self, Child No No   Sig: Take 1 tablet (600 mg total) by mouth every 12 (twelve) hours as needed for congestion   insulin degludec (Tresiba FlexTouch) 200 units/mL CONCENTRATED U-200 injection pen  Self, Child No No   Sig: INJECT 60 UNITS UNDER THE SKIN DAILY AT 5PM   insulin lispro (HumaLOG) 100 units/mL injection pen  Self, Child No No   Sig: INJECT 24 UNITS BEFORE EACH MEAL PLUS SLIDING SCALE 150-200: 2 UNITS 201-250: 4 UNITS 251-300: 6 UNITS 301*   ketotifen (ZADITOR) 0.025 % ophthalmic solution  Self, Child No No   Sig: Administer 1 drop to both eyes 2 (two) times a day as needed (itchy eyes)   latanoprost (XALATAN)  0.005 % ophthalmic solution  Self, Child Yes No   lidocaine (LMX) 4 % cream   No No   Sig: Apply topically as needed for moderate pain   loratadine (CLARITIN) 10 mg tablet  Self, Child No No   Sig: TAKE 1 TABLET (10 MG TOTAL) BY MOUTH DAILY   losartan (COZAAR) 100 MG tablet  Self, Child No No   Sig: Take 1 tablet (100 mg total) by mouth daily   magnesium (MAGTAB) 84 MG (7MEQ) TBCR   No No   Sig: Take 1 tablet (84 mg total) by mouth 2 (two) times a day for 14 days   magnesium chloride-calcium (Slow-Mag) 71.5-119 mg  Self, Child No No   Sig: Take 2 tablets by mouth daily   methocarbamol (ROBAXIN) 500 mg tablet  Child, Self Yes No   metoprolol tartrate (LOPRESSOR) 25 mg tablet  Self, Child No No   Sig: TAKE 0.5 TABLETS (12.5 MG TOTAL) BY MOUTH EVERY 12 (TWELVE) HOURS   omeprazole (PriLOSEC) 20 mg delayed release capsule  Self, Child No No   Sig: TAKE 1 CAPSULE (20 MG TOTAL) BY MOUTH DAILY   ondansetron (ZOFRAN) 4 mg tablet  Self, Child No No   Sig: Take 1 tablet (4 mg total) by mouth every 6 (six) hours   ondansetron (ZOFRAN-ODT) 4 mg disintegrating tablet  Self, Child No No   Sig: Take 1 tablet (4 mg total) by mouth every 8 (eight) hours as needed for nausea for up to 10 doses   ondansetron (ZOFRAN-ODT) 4 mg disintegrating tablet   No No   Sig: Take 1 tablet (4 mg total) by mouth every 8 (eight) hours as needed for nausea or vomiting   polyethylene glycol (MIRALAX) 17 g packet  Self No No   Sig: Take 17 g by mouth daily for 14 days   sulfamethoxazole-trimethoprim (BACTRIM DS) 800-160 mg per tablet   No No   Sig: Take 1 tablet by mouth 2 (two) times a day for 7 days smx-tmp DS (BACTRIM) 800-160 mg tabs (1tab q12 D10)   tirzepatide (Mounjaro) 2.5 MG/0.5ML  Self, Child No No   Sig: INJECT 0.5 ML (2.5 MG TOTAL) UNDER THE SKIN ONCE A WEEK   zileuton 600 MG  Self, Child No No   Sig: TAKE 1 TABLET (600 MG TOTAL) BY MOUTH 2 (TWO) TIMES A DAY      Facility-Administered Medications: None     Discharge Medication List as of  5/26/2025 12:34 PM        CONTINUE these medications which have NOT CHANGED    Details   acetaminophen (TYLENOL) 500 mg tablet Take 1 tablet (500 mg total) by mouth every 6 (six) hours as needed for fever, Starting Wed 8/28/2024, Normal      albuterol (2.5 mg/3 mL) 0.083 % nebulizer solution Take 3 mL (2.5 mg total) by nebulization every 6 (six) hours as needed for wheezing or shortness of breath, Starting Mon 2/24/2025, Normal      albuterol (Ventolin HFA) 90 mcg/act inhaler Inhale 1-2 puffs every 6 (six) hours as needed for wheezing or shortness of breath, Starting Mon 2/24/2025, Normal      Alcohol Swabs (Pharmacist Choice Alcohol) PADS CLEAN AREA BEFORE TESTING OR INJECTING FOUR TIMES DAILY, Normal      anastrozole (ARIMIDEX) 1 mg tablet Take 1 tablet (1 mg total) by mouth daily, Starting Mon 9/23/2024, Normal      aspirin 81 mg chewable tablet Chew 81 mg daily, Historical Med      atorvastatin (LIPITOR) 40 mg tablet Take 1 tablet (40 mg total) by mouth daily, Starting Wed 8/23/2023, Normal      calcium polycarbophil (FIBERCON) 625 mg tablet Take 1 tablet (625 mg total) by mouth daily, Starting Wed 10/11/2023, Normal      Comfort EZ Pen Needles 32G X 4 MM MISC USE AS DIRECTED FOUR TIMES DAILY, Normal      Continuous Glucose  (FreeStyle Broderick 3 [place]) AARON USE AS DIRECTED, Normal      Continuous Glucose Sensor (FreeStyle Broderick 3 Sensor) MISC USE 1 UNITS EVERY 14 (FOURTEEN) DAYS, Starting Wed 1/29/2025, Normal      Diclofenac Sodium (VOLTAREN) 1 % Apply 2 g topically 2 (two) times a day, Starting Wed 8/23/2023, Normal      !! dicyclomine (BENTYL) 20 mg tablet Take 1 tablet (20 mg total) by mouth 2 (two) times a day as needed (diarrhea) for up to 10 doses, Starting Mon 11/18/2024, Normal      !! dicyclomine (BENTYL) 20 mg tablet Take 1 tablet (20 mg total) by mouth 2 (two) times a day, Starting Tue 5/6/2025, Normal      EPINEPHrine (Auvi-Q) 0.1 mg/0.1 mL SOAJ Inject 0.3 mL (0.3 mg total) into a muscle once  for 1 dose, Starting Wed 11/9/2022, Normal      ergocalciferol (VITAMIN D2) 50,000 units TAKE 1 CAPSULE (50,000 UNITS TOTAL) BY MOUTH ONCE A WEEK, Starting Tue 9/5/2023, Normal      fluticasone (FLONASE) 50 mcg/act nasal spray Historical Med      fluticasone-salmeterol (Advair HFA) 230-21 MCG/ACT inhaler Inhale 2 puffs 2 (two) times a day Rinse mouth after use., Starting Mon 2/24/2025, Normal      furosemide (LASIX) 20 mg tablet TAKE 1 TABLET (20 MG TOTAL) BY MOUTH 2 (TWO) TIMES A DAY, Starting Mon 7/17/2023, Normal      guaiFENesin (MUCINEX) 600 mg 12 hr tablet Take 1 tablet (600 mg total) by mouth every 12 (twelve) hours as needed for congestion, Starting Wed 8/28/2024, Normal      insulin degludec (Tresiba FlexTouch) 200 units/mL CONCENTRATED U-200 injection pen INJECT 60 UNITS UNDER THE SKIN DAILY AT 5PM, Normal      insulin lispro (HumaLOG) 100 units/mL injection pen INJECT 24 UNITS BEFORE EACH MEAL PLUS SLIDING SCALE 150-200: 2 UNITS 201-250: 4 UNITS 251-300: 6 UNITS 301*, Normal      ketotifen (ZADITOR) 0.025 % ophthalmic solution Administer 1 drop to both eyes 2 (two) times a day as needed (itchy eyes), Starting Tue 10/3/2023, Normal      latanoprost (XALATAN) 0.005 % ophthalmic solution Historical Med      lidocaine (LMX) 4 % cream Apply topically as needed for moderate pain, Starting Sun 5/18/2025, Normal      loratadine (CLARITIN) 10 mg tablet TAKE 1 TABLET (10 MG TOTAL) BY MOUTH DAILY, Starting Thu 8/5/2021, Normal      losartan (COZAAR) 100 MG tablet Take 1 tablet (100 mg total) by mouth daily, Starting Wed 8/23/2023, Normal      Mag-G 500 (27 Mg) MG tablet TAKE 1 TABLET (500 MG TOTAL) BY MOUTH DAILY, Normal      magnesium (MAGTAB) 84 MG (7MEQ) TBCR Take 1 tablet (84 mg total) by mouth 2 (two) times a day for 14 days, Starting Fri 11/1/2024, Until Mon 11/18/2024, Normal      magnesium chloride-calcium (Slow-Mag) 71.5-119 mg Take 2 tablets by mouth daily, Starting Wed 9/18/2024, Normal      methocarbamol  (ROBAXIN) 500 mg tablet Historical Med      metoprolol tartrate (LOPRESSOR) 25 mg tablet TAKE 0.5 TABLETS (12.5 MG TOTAL) BY MOUTH EVERY 12 (TWELVE) HOURS, Starting Mon 1/8/2024, Normal      omeprazole (PriLOSEC) 20 mg delayed release capsule TAKE 1 CAPSULE (20 MG TOTAL) BY MOUTH DAILY, Starting Mon 2/5/2024, Normal      ondansetron (ZOFRAN) 4 mg tablet Take 1 tablet (4 mg total) by mouth every 6 (six) hours, Starting Fri 11/1/2024, Normal      !! ondansetron (ZOFRAN-ODT) 4 mg disintegrating tablet Take 1 tablet (4 mg total) by mouth every 8 (eight) hours as needed for nausea for up to 10 doses, Starting Mon 11/18/2024, Normal      !! ondansetron (ZOFRAN-ODT) 4 mg disintegrating tablet Take 1 tablet (4 mg total) by mouth every 8 (eight) hours as needed for nausea or vomiting, Starting Tue 5/6/2025, Normal      OneTouch Delica Lancets 33G MISC USE 3 (THREE) TIMES A DAY, Normal      OneTouch Ultra test strip USE 1 EACH 3 (THREE) TIMES A DAY USE AS INSTRUCTED, Starting Sat 2/18/2023, Normal      polyethylene glycol (MIRALAX) 17 g packet Take 17 g by mouth daily for 14 days, Starting Wed 10/11/2023, Until Mon 11/18/2024, Normal      tirzepatide (Mounjaro) 2.5 MG/0.5ML INJECT 0.5 ML (2.5 MG TOTAL) UNDER THE SKIN ONCE A WEEK, Normal      zileuton 600 MG TAKE 1 TABLET (600 MG TOTAL) BY MOUTH 2 (TWO) TIMES A DAY, Normal       !! - Potential duplicate medications found. Please discuss with provider.        STOP taking these medications       sulfamethoxazole-trimethoprim (BACTRIM DS) 800-160 mg per tablet Comments:   Reason for Stopping:             No discharge procedures on file.  ED SEPSIS DOCUMENTATION   Time reflects when diagnosis was documented in both MDM as applicable and the Disposition within this note       Time User Action Codes Description Comment    5/26/2025 12:34 PM Kristina Haas Add [R60.0] Edema of left lower leg     5/26/2025  1:21 PM Kristina Haas Add [R79.9] Elevated BUN     5/26/2025  1:21 PM  Kristina Haas Add [R73.9] Hyperglycemia                    [1]   Past Medical History:  Diagnosis Date    Abdominal infection (HCC)     h-pylori    Abnormal chest x-ray 04/05/2019    Asthma     Breast cancer (HCC) 06/26/2018    Cancer (HCC)     BREAST    Depression, recurrent (HCC) 06/13/2022    Diabetes mellitus (HCC)     Hiatal hernia     Hiatal hernia 08/08/2018    History of chemotherapy 2018    History of radiation therapy 2018    Hypercholesterolemia     Hypertension     Hypothyroid     Renal disorder     Rheumatoid arthritis (HCC)    [2]   Past Surgical History:  Procedure Laterality Date    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    BREAST LUMPECTOMY Right 6/26/2018    Procedure: RIGHT BREAST NEEDLE LOCALIZATION X2 WITH RIGHT BREAST LUMPECTOMY ( NEEDLE LOC @ 1000);  Surgeon: Familia Barragan MD;  Location: [facility];  Service: Surgical Oncology    BREAST LUMPECTOMY Right 8/2/2018    Procedure: LYMPHOSCINITGRAPHY INTRAOPERATIVE LYMPHATIC MAPPING , RIGHT SENTINEL NODE BIOPSY, REEXCISION  RIGHT BREAST LUMPECTOMY CAVITY (SUPERIOR MARGIN);  Surgeon: Familia Barragan MD;  Location: [facility];  Service: Surgical Oncology    BREAST SURGERY Left     CATARACT EXTRACTION, BILATERAL Bilateral     COLONOSCOPY      EGD AND COLONOSCOPY N/A 9/5/2018    Procedure: EGD AND COLONOSCOPY;  Surgeon: José Miguel Rosas MD;  Location: [facility];  Service: Gastroenterology    FL GUIDED CENTRAL VENOUS ACCESS DEVICE INSERTION  9/13/2018    KNEE SURGERY Right     MAMMO NEEDLE LOCALIZATION RIGHT (ALL INC) Right 6/26/2018    MAMMO STEREOTACTIC BREAST BIOPSY RIGHT (ALL INC) Right 5/23/2018    RETINAL DETACHMENT SURGERY Right     TUBAL LIGATION      TUNNELED VENOUS PORT PLACEMENT Left 9/13/2018    Procedure: INSERTION VENOUS PORT (PORT-A-CATH);  Surgeon: Familia Barragan MD;  Location: [facility];  Service: Surgical Oncology    UPPER GASTROINTESTINAL ENDOSCOPY     [3]   Family History  Problem Relation Name Age of Onset    Diabetes  Mother      Hypertension Mother      Diabetes Father      Hypertension Father      No Known Problems Sister paris     No Known Problems Sister ruthie     No Known Problems Sister selvin     No Known Problems Sister gladys     No Known Problems Sister karsten     No Known Problems Daughter elva     No Known Problems Daughter cem     No Known Problems Daughter hayden     Cancer Maternal Grandfather      Diabetes Brother Alfredo     Diabetes Brother Ranjeet     Kidney failure Brother Rock     Diabetes Brother Piotr     Breast cancer Neg Hx     [4]   Social History  Tobacco Use    Smoking status: Never    Smokeless tobacco: Never   Vaping Use    Vaping status: Never Used   Substance Use Topics    Alcohol use: No    Drug use: No        Kristina Haas PA-C  05/26/25 4213

## 2025-05-28 DIAGNOSIS — Z17.0 MALIGNANT NEOPLASM OF UPPER-OUTER QUADRANT OF RIGHT BREAST IN FEMALE, ESTROGEN RECEPTOR POSITIVE (HCC): ICD-10-CM

## 2025-05-28 DIAGNOSIS — C50.411 MALIGNANT NEOPLASM OF UPPER-OUTER QUADRANT OF RIGHT BREAST IN FEMALE, ESTROGEN RECEPTOR POSITIVE (HCC): ICD-10-CM

## 2025-05-29 RX ORDER — ANASTROZOLE 1 MG/1
1 TABLET ORAL DAILY
Qty: 90 TABLET | Refills: 1 | Status: SHIPPED | OUTPATIENT
Start: 2025-05-29

## 2025-06-02 ENCOUNTER — OFFICE VISIT (OUTPATIENT)
Dept: PULMONOLOGY | Facility: CLINIC | Age: 70
End: 2025-06-02
Payer: COMMERCIAL

## 2025-06-02 VITALS
BODY MASS INDEX: 28.82 KG/M2 | OXYGEN SATURATION: 98 % | WEIGHT: 173 LBS | DIASTOLIC BLOOD PRESSURE: 64 MMHG | SYSTOLIC BLOOD PRESSURE: 122 MMHG | HEART RATE: 96 BPM | TEMPERATURE: 97.4 F | HEIGHT: 65 IN

## 2025-06-02 DIAGNOSIS — J30.2 SEASONAL ALLERGIES: ICD-10-CM

## 2025-06-02 DIAGNOSIS — K21.9 GASTROESOPHAGEAL REFLUX DISEASE WITHOUT ESOPHAGITIS: ICD-10-CM

## 2025-06-02 DIAGNOSIS — F41.9 ANXIETY: ICD-10-CM

## 2025-06-02 DIAGNOSIS — J45.50 SEVERE PERSISTENT ASTHMA WITHOUT COMPLICATION: Primary | ICD-10-CM

## 2025-06-02 PROCEDURE — 99214 OFFICE O/P EST MOD 30 MIN: CPT | Performed by: INTERNAL MEDICINE

## 2025-06-02 RX ORDER — PANTOPRAZOLE SODIUM 40 MG/1
40 TABLET, DELAYED RELEASE ORAL DAILY
COMMUNITY

## 2025-06-02 RX ORDER — FLUTICASONE PROPIONATE AND SALMETEROL XINAFOATE 230; 21 UG/1; UG/1
2 AEROSOL, METERED RESPIRATORY (INHALATION) 2 TIMES DAILY
Qty: 12 G | Refills: 5 | Status: SHIPPED | OUTPATIENT
Start: 2025-06-02

## 2025-06-02 NOTE — PROGRESS NOTES
"Office Progress Note - Pulmonary    Jolie Mulligan 69 y.o. female MRN: [id number]    Encounter: [id number]      Assessment:  Bronchial asthma.  Allergic rhinitis.  Gastroesophageal reflux disease.  Anxiety.    Plan:   Advair /21, 2 inhalations twice a day.  Albuterol rescue inhaler 2 elations 4 times a day as needed.  Omeprazole 20 mg once a day.  Fluticasone nasal spray 2 sprays to each nostril once a day.  Follow-up in 6 months.    Discussion:   The patient's bronchial asthma is not very well-controlled due to lack of compliance to use the Advair.  I have emphasized on the importance of using maintenance Advair inhaler on daily basis.  I told her to use 2 inhalations twice a day every day.  I reminded her to rinse her mouth after each use.  She will use albuterol rescue inhaler 2 inhalations 4 times daily as needed.  She will continue to take the omeprazole 20 mg once a day for her GERD symptoms.  She will use the fluticasone nasal spray 2 sprays to each nostril once a day.  I we will see her in 6 months.      Subjective:   The patient is here for a follow-up visit.  She has more cough than before.  She has not been using the Advair on regular basis.  She is using it as needed.  Sometimes she uses the albuterol rescue inhaler.  At night she has times when she will cough and other times when she cannot fall asleep due to other reasons than breathing.  She has anxiety.  She stays mostly indoors and does not go outdoors often.  She has not been using her Flonase on a regular basis.    Review of systems:  A 12 point system review is done and aside from what is stated above the rest of the review of systems is negative.      Family history and social history are reviewed.    Medications list is reviewed.      Vitals: Blood pressure 122/64, pulse 96, temperature (!) 97.4 °F (36.3 °C), temperature source Tympanic, height 5' 5\" (1.651 m), weight 78.5 kg (173 lb), SpO2 98%, not currently breastfeeding., "     Physical Exam  Gen: Awake, alert, oriented x 3, no acute distress  HEENT: Mucous membranes moist, no oral lesions, no thrush  NECK: No accessory muscle use, JVP not elevated  Cardiac: Regular, single S1, single S2, no murmurs, no rubs, no gallops  Lungs: Clear breath sounds.  No wheezing or rhonchi.  Abdomen: normoactive bowel sounds, soft nontender, nondistended, no rebound or rigidity, no guarding  Extremities: no cyanosis, no clubbing, no edema  Neuro:  Grossly nonfocal.  Skin:  No rash.      Lab Results   Component Value Date    WBC 4.86 05/26/2025    HGB 11.5 05/26/2025    HCT 35.4 05/26/2025    MCV 90 05/26/2025     05/26/2025     Lab Results   Component Value Date    SODIUM 131 (L) 05/26/2025    K 5.5 (H) 05/26/2025    CL 97 05/26/2025    CO2 28 05/26/2025    BUN 33 (H) 05/26/2025    CREATININE 0.78 05/26/2025    GLUC 332 (H) 05/26/2025    CALCIUM 9.5 05/26/2025

## 2025-06-10 NOTE — TELEPHONE ENCOUNTER
Called and spoke with patient to offer sooner appointment time today due to multiple cancellations  She declined since she has someone drive her and they can't do earlier  She was agreeable 
Other

## 2025-06-14 ENCOUNTER — HOSPITAL ENCOUNTER (EMERGENCY)
Facility: HOSPITAL | Age: 70
Discharge: HOME | End: 2025-06-14
Attending: EMERGENCY MEDICINE | Admitting: EMERGENCY MEDICINE
Payer: COMMERCIAL

## 2025-06-14 VITALS
SYSTOLIC BLOOD PRESSURE: 163 MMHG | HEART RATE: 89 BPM | RESPIRATION RATE: 18 BRPM | HEIGHT: 65 IN | BODY MASS INDEX: 28.65 KG/M2 | DIASTOLIC BLOOD PRESSURE: 77 MMHG | OXYGEN SATURATION: 100 % | WEIGHT: 171.96 LBS | TEMPERATURE: 100.3 F

## 2025-06-14 DIAGNOSIS — M79.605 CHRONIC PAIN OF LEFT LOWER EXTREMITY: Primary | ICD-10-CM

## 2025-06-14 DIAGNOSIS — R05.9 COUGH: ICD-10-CM

## 2025-06-14 DIAGNOSIS — G89.29 CHRONIC PAIN OF LEFT LOWER EXTREMITY: Primary | ICD-10-CM

## 2025-06-14 LAB
ALBUMIN SERPL BCG-MCNC: 3.9 G/DL (ref 3.5–5)
ALP SERPL-CCNC: 72 U/L (ref 34–104)
ALT SERPL W P-5'-P-CCNC: 13 U/L (ref 7–52)
ANION GAP SERPL CALCULATED.3IONS-SCNC: 7 MMOL/L (ref 4–13)
AST SERPL W P-5'-P-CCNC: 12 U/L (ref 13–39)
BASOPHILS # BLD AUTO: 0.08 THOUSANDS/ÂΜL (ref 0–0.1)
BASOPHILS NFR BLD AUTO: 1 % (ref 0–1)
BILIRUB SERPL-MCNC: 0.44 MG/DL (ref 0.2–1)
BUN SERPL-MCNC: 25 MG/DL (ref 5–25)
CALCIUM SERPL-MCNC: 9.9 MG/DL (ref 8.4–10.2)
CHLORIDE SERPL-SCNC: 100 MMOL/L (ref 96–108)
CO2 SERPL-SCNC: 28 MMOL/L (ref 21–32)
CREAT SERPL-MCNC: 0.66 MG/DL (ref 0.6–1.3)
EOSINOPHIL # BLD AUTO: 0.15 THOUSAND/ÂΜL (ref 0–0.61)
EOSINOPHIL NFR BLD AUTO: 2 % (ref 0–6)
ERYTHROCYTE [DISTWIDTH] IN BLOOD BY AUTOMATED COUNT: 13.3 % (ref 11.6–15.1)
GFR SERPL CREATININE-BSD FRML MDRD: 91 ML/MIN/1.73SQ M
GLUCOSE SERPL-MCNC: 133 MG/DL (ref 65–140)
HCT VFR BLD AUTO: 31.8 % (ref 34.8–46.1)
HGB BLD-MCNC: 10.9 G/DL (ref 11.5–15.4)
IMM GRANULOCYTES # BLD AUTO: 0.03 THOUSAND/UL (ref 0–0.2)
IMM GRANULOCYTES NFR BLD AUTO: 0 % (ref 0–2)
LYMPHOCYTES # BLD AUTO: 2.16 THOUSANDS/ÂΜL (ref 0.6–4.47)
LYMPHOCYTES NFR BLD AUTO: 26 % (ref 14–44)
MCH RBC QN AUTO: 29.5 PG (ref 26.8–34.3)
MCHC RBC AUTO-ENTMCNC: 34.3 G/DL (ref 31.4–37.4)
MCV RBC AUTO: 86 FL (ref 82–98)
MONOCYTES # BLD AUTO: 0.71 THOUSAND/ÂΜL (ref 0.17–1.22)
MONOCYTES NFR BLD AUTO: 9 % (ref 4–12)
NEUTROPHILS # BLD AUTO: 5.2 THOUSANDS/ÂΜL (ref 1.85–7.62)
NEUTS SEG NFR BLD AUTO: 62 % (ref 43–75)
NRBC BLD AUTO-RTO: 0 /100 WBCS
PLATELET # BLD AUTO: 311 THOUSANDS/UL (ref 149–390)
PMV BLD AUTO: 11.3 FL (ref 8.9–12.7)
POTASSIUM SERPL-SCNC: 3.8 MMOL/L (ref 3.5–5.3)
PROCALCITONIN SERPL-MCNC: 0.07 NG/ML
PROT SERPL-MCNC: 7.7 G/DL (ref 6.4–8.4)
RBC # BLD AUTO: 3.69 MILLION/UL (ref 3.81–5.12)
SODIUM SERPL-SCNC: 135 MMOL/L (ref 135–147)
WBC # BLD AUTO: 8.33 THOUSAND/UL (ref 4.31–10.16)

## 2025-06-14 PROCEDURE — 96374 THER/PROPH/DIAG INJ IV PUSH: CPT

## 2025-06-14 PROCEDURE — 99284 EMERGENCY DEPT VISIT MOD MDM: CPT | Performed by: EMERGENCY MEDICINE

## 2025-06-14 PROCEDURE — 80053 COMPREHEN METABOLIC PANEL: CPT | Performed by: PHYSICIAN ASSISTANT

## 2025-06-14 PROCEDURE — 84145 PROCALCITONIN (PCT): CPT | Performed by: PHYSICIAN ASSISTANT

## 2025-06-14 PROCEDURE — 36415 COLL VENOUS BLD VENIPUNCTURE: CPT | Performed by: PHYSICIAN ASSISTANT

## 2025-06-14 PROCEDURE — 85025 COMPLETE CBC W/AUTO DIFF WBC: CPT | Performed by: PHYSICIAN ASSISTANT

## 2025-06-14 PROCEDURE — 99283 EMERGENCY DEPT VISIT LOW MDM: CPT

## 2025-06-14 RX ORDER — BENZONATATE 100 MG/1
100 CAPSULE ORAL EVERY 8 HOURS
Qty: 21 CAPSULE | Refills: 0 | Status: SHIPPED | OUTPATIENT
Start: 2025-06-14

## 2025-06-14 RX ORDER — KETOROLAC TROMETHAMINE 30 MG/ML
15 INJECTION, SOLUTION INTRAMUSCULAR; INTRAVENOUS ONCE
Status: COMPLETED | OUTPATIENT
Start: 2025-06-14 | End: 2025-06-14

## 2025-06-14 RX ADMIN — KETOROLAC TROMETHAMINE 15 MG: 30 INJECTION, SOLUTION INTRAMUSCULAR; INTRAVENOUS at 18:48

## 2025-06-14 NOTE — DISCHARGE INSTRUCTIONS
Please return to the emergency department for worsening symptoms including chest pain, shortness of breath, dizziness, lightheadedness, fever greater than 103, severe pain, inability to walk, fainting episodes, etc..  Please follow-up with your family practice provider as soon as possible.  Use Ace bandage or compression stockings to help compress the area.  I recommend icing, elevating, and resting the left lower extremity.  Follow-up with a podiatrist.  I have given you a referral.

## 2025-06-15 NOTE — ED PROVIDER NOTES
Time reflects when diagnosis was documented in both MDM as applicable and the Disposition within this note       Time User Action Codes Description Comment    6/14/2025  6:36 PM Felice Huddleston [M79.605,  G89.29] Chronic pain of left lower extremity     6/14/2025  6:37 PM Felice Huddleston [R05.9] Cough           ED Disposition       ED Disposition   Discharge    Condition   Stable    Date/Time   Sat Jun 14, 2025  6:36 PM    Comment   Jolie Mulligan discharge to home/self care.                   Assessment & Plan       Medical Decision Making  69-year-old female presenting to the emergency department today for chronic redness and swelling to the left lower extremity.  Recently finished antibiotics.  Currently complains of no systemic signs of infection.  Already has had a venous duplex and a CT of this lower extremity.  Has a podiatry appointment upcoming.  Vital signs are stable.  Afebrile.  On physical examination, the patient has very mild swelling towards the left ankle.  It is not warm to the touch.  No leukocytosis.  Negative procalcitonin.  Case was discussed with Dr. Santiago with podiatry who recommends outpatient follow-up with rest, ice, compression, and elevation in addition to an Ace bandage.  Ace bandage applied here.  Patient given a shot of Toradol while here in the emergency department.  She is requesting Tessalon Perles for cough.  She is stable for discharge at this time.  Follow-up outpatient with podiatry.  Referral placed.  Return to the emergency department for worsening symptoms.  Strict return precautions were given.  Recommend PCP follow-up as soon as possible. The patient and/or patient's proxy verify their understanding and agree to the plan at this time.  All questions answered to the patient and/or their proxy's satisfaction.  All labs reviewed and utilized in the medical decision making process (if labs were ordered).  Portions of the record may have been  "created with voice recognition software.  Occasional wrong word or \"sound a like\" substitutions may have occurred due to the inherent limitations of voice recognition software.  Read the chart carefully and recognize, using context, where substitutions have occurred.    I reviewed prior notes.  I reviewed prior imaging studies.  Case discussed with daughter at bedside.    Problems Addressed:  Chronic pain of left lower extremity: chronic illness or injury  Cough: undiagnosed new problem with uncertain prognosis    Amount and/or Complexity of Data Reviewed  Independent Historian: caregiver  External Data Reviewed: radiology and notes.  Labs: ordered. Decision-making details documented in ED Course.  Discussion of management or test interpretation with external provider(s): Dr. Santiago - Podiatry    Risk  Prescription drug management.        ED Course as of 06/15/25 1931   Sat Jun 14, 2025   1835 Per Dr. Santiago DPM: \"I think she's fine to see her podiatrist and he can work her up for something likely musculoskeletal. She can use compression maybe an ACE wrap if she usually uses socks. RICE protocol, supportive shoes.\"       Medications   ketorolac (TORADOL) injection 15 mg (15 mg Intravenous Given 6/14/25 1848)       ED Risk Strat Scores                    No data recorded                            History of Present Illness       Chief Complaint   Patient presents with    Leg Pain     Patient reports left lower leg pain, erythema and swelling. States was seen for same recently but the erythema has spread.       Past Medical History[1]   Past Surgical History[2]   Family History[3]   Social History[4]   E-Cigarette/Vaping    E-Cigarette Use Never User       E-Cigarette/Vaping Substances    Nicotine No     THC No     CBD No     Flavoring No     Other No     Unknown No       I have reviewed and agree with the history as documented.     69-year-old female with past medical history significant for lower extremity " swelling presenting to the emergency department today for continued left lower extremity swelling with redness.  This has been ongoing for greater than 1 month.  The patient recently finished a course of antibiotics but notes that symptoms have not improved.  She has had venous duplexes and CT scans of the same complaint recently in the past.  She notes an associated cough.  She denies any chest pain or shortness of breath.  She has no nausea or vomiting.  She has no diarrhea or constipation.  She has no fevers or chills.  No injuries to the left lower extremity.  The patient denies other complaints at this time.      History provided by:  Patient   used: No    Leg Pain  Lower extremity pain location: LLE.  Time since incident: greater-than one month.  Injury: no    Chronicity:  Chronic  Dislocation: no    Tetanus status:  Unknown  Prior injury to area:  No  Relieved by:  Nothing  Ineffective treatments: ABX.  Associated symptoms: swelling    Associated symptoms: no back pain, no decreased ROM, no fatigue, no fever, no itching, no muscle weakness, no neck pain, no numbness, no stiffness and no tingling        Review of Systems   Constitutional:  Negative for appetite change, chills, diaphoresis, fatigue and fever.   Eyes:  Negative for visual disturbance.   Respiratory:  Negative for cough, chest tightness, shortness of breath and wheezing.    Cardiovascular:  Negative for chest pain, palpitations and leg swelling.   Gastrointestinal:  Negative for abdominal pain, constipation, diarrhea, nausea and vomiting.   Musculoskeletal:  Negative for back pain, neck pain, neck stiffness and stiffness.   Skin:  Positive for rash. Negative for itching and wound.   Neurological:  Negative for dizziness, seizures, syncope, weakness, light-headedness, numbness and headaches.   Psychiatric/Behavioral:  Negative for confusion.    All other systems reviewed and are negative.          Objective       ED Triage  Vitals   Temperature Pulse Blood Pressure Respirations SpO2 Patient Position - Orthostatic VS   06/14/25 1614 06/14/25 1614 06/14/25 1614 06/14/25 1614 06/14/25 1614 06/14/25 1614   100.3 °F (37.9 °C) 102 132/75 20 97 % Sitting      Temp Source Heart Rate Source BP Location FiO2 (%) Pain Score    06/14/25 1614 06/14/25 1614 06/14/25 1614 -- 06/14/25 1848    Temporal Monitor Left arm  8      Vitals      Date and Time Temp Pulse SpO2 Resp BP Pain Score FACES Pain Rating User   06/14/25 1849 -- 89 100 % 18 163/77 -- -- KG   06/14/25 1848 -- -- -- -- -- 8 -- KG   06/14/25 1614 100.3 °F (37.9 °C) 102 97 % 20 132/75 -- -- AMB            Physical Exam  Vitals and nursing note reviewed.   Constitutional:       General: She is not in acute distress.     Appearance: Normal appearance. She is normal weight. She is not ill-appearing, toxic-appearing or diaphoretic.   HENT:      Head: Normocephalic and atraumatic.      Nose: Nose normal. No congestion or rhinorrhea.      Mouth/Throat:      Mouth: Mucous membranes are moist.      Pharynx: No oropharyngeal exudate or posterior oropharyngeal erythema.     Eyes:      General: No scleral icterus.        Right eye: No discharge.         Left eye: No discharge.      Extraocular Movements: Extraocular movements intact.      Pupils: Pupils are equal, round, and reactive to light.       Cardiovascular:      Rate and Rhythm: Normal rate and regular rhythm.      Pulses: Normal pulses.      Heart sounds: Normal heart sounds. No murmur heard.     No friction rub. No gallop.      Comments: Dopplerable DP pulses to the bilateral lower extremities  Pulmonary:      Effort: Pulmonary effort is normal. No respiratory distress.      Breath sounds: Normal breath sounds. No stridor. No wheezing, rhonchi or rales.   Chest:      Chest wall: No tenderness.     Musculoskeletal:         General: Normal range of motion.      Cervical back: Normal range of motion. No tenderness.      Right lower leg: No  edema.      Left lower leg: No edema.      Comments: Mild left lower extremity swelling towards the left ankle.  It is not warm to the touch.  There is no crepitus.  No red streaking.     Skin:     General: Skin is warm and dry.      Capillary Refill: Capillary refill takes less than 2 seconds.      Coloration: Skin is not jaundiced or pale.     Neurological:      General: No focal deficit present.      Mental Status: She is alert and oriented to person, place, and time. Mental status is at baseline.      Comments: 5 out of 5 strength to the bilateral lower extremities.  Normal sensation to the bilateral lower extremities.   Psychiatric:         Mood and Affect: Mood normal.         Behavior: Behavior normal.               Results Reviewed       Procedure Component Value Units Date/Time    Procalcitonin [[id number]]  (Normal) Collected: 06/14/25 1722    Lab Status: Final result Specimen: Blood from Arm, Left Updated: 06/14/25 1754     Procalcitonin 0.07 ng/ml     Comprehensive metabolic panel [[id number]]  (Abnormal) Collected: 06/14/25 1722    Lab Status: Final result Specimen: Blood from Arm, Left Updated: 06/14/25 1745     Sodium 135 mmol/L      Potassium 3.8 mmol/L      Chloride 100 mmol/L      CO2 28 mmol/L      ANION GAP 7 mmol/L      BUN 25 mg/dL      Creatinine 0.66 mg/dL      Glucose 133 mg/dL      Calcium 9.9 mg/dL      AST 12 U/L      ALT 13 U/L      Alkaline Phosphatase 72 U/L      Total Protein 7.7 g/dL      Albumin 3.9 g/dL      Total Bilirubin 0.44 mg/dL      eGFR 91 ml/min/1.73sq m     Narrative:      National Kid[employer] Foundation guidelines for Chronic Kidney Disease (CKD):     Stage 1 with normal or high GFR (GFR > 90 mL/min/1.73 square meters)    Stage 2 Mild CKD (GFR = 60-89 mL/min/1.73 square meters)    Stage 3A Moderate CKD (GFR = 45-59 mL/min/1.73 square meters)    Stage 3B Moderate CKD (GFR = 30-44 mL/min/1.73 square meters)    Stage 4 Severe CKD (GFR = 15-29 mL/min/1.73 square meters)     Stage 5 End Stage CKD (GFR <15 mL/min/1.73 square meters)  Note: GFR calculation is accurate only with a steady state creatinine    CBC and differential [[id number]]  (Abnormal) Collected: 06/14/25 1722    Lab Status: Final result Specimen: Blood from Arm, Left Updated: 06/14/25 1730     WBC 8.33 Thousand/uL      RBC 3.69 Million/uL      Hemoglobin 10.9 g/dL      Hematocrit 31.8 %      MCV 86 fL      MCH 29.5 pg      MCHC 34.3 g/dL      RDW 13.3 %      MPV 11.3 fL      Platelets 311 Thousands/uL      nRBC 0 /100 WBCs      Segmented % 62 %      Immature Grans % 0 %      Lymphocytes % 26 %      Monocytes % 9 %      Eosinophils Relative 2 %      Basophils Relative 1 %      Absolute Neutrophils 5.20 Thousands/µL      Absolute Immature Grans 0.03 Thousand/uL      Absolute Lymphocytes 2.16 Thousands/µL      Absolute Monocytes 0.71 Thousand/µL      Eosinophils Absolute 0.15 Thousand/µL      Basophils Absolute 0.08 Thousands/µL             No orders to display       Procedures    ED Medication and Procedure Management   Prior to Admission Medications   Prescriptions Last Dose Informant Patient Reported? Taking?   Alcohol Swabs (Pharmacist Choice Alcohol) PADS  Self, Child No No   Sig: CLEAN AREA BEFORE TESTING OR INJECTING FOUR TIMES DAILY   Comfort EZ Pen Needles 32G X 4 MM MISC  Self, Child No No   Sig: USE AS DIRECTED FOUR TIMES DAILY   Continuous Glucose  (FreeStyle [place] 3 [place]) AARON  Self, Child No No   Sig: USE AS DIRECTED   Continuous Glucose Sensor (FreeStyle [place]derick 3 Sensor) MISC  Self, Child No No   Sig: USE 1 UNITS EVERY 14 (FOURTEEN) DAYS   Diclofenac Sodium (VOLTAREN) 1 %  Self, Child No No   Sig: Apply 2 g topically 2 (two) times a day   EPINEPHrine (Auvi-Q) 0.1 mg/0.1 mL SOAJ   No No   Sig: Inject 0.3 mL (0.3 mg total) into a muscle once for 1 dose   Mag-G 500 (27 Mg) MG tablet  Self, Child No No   Sig: TAKE 1 TABLET (500 MG TOTAL) BY MOUTH DAILY   OneTouch Delica Lancets 33G MISC  Self, Child No No    Sig: USE 3 (THREE) TIMES A DAY   OneTouch Ultra test strip  Self, Child No No   Sig: USE 1 EACH 3 (THREE) TIMES A DAY USE AS INSTRUCTED   acetaminophen (TYLENOL) 500 mg tablet  Self, Child No No   Sig: Take 1 tablet (500 mg total) by mouth every 6 (six) hours as needed for fever   albuterol (2.5 mg/3 mL) 0.083 % nebulizer solution  Self, Child No No   Sig: Take 3 mL (2.5 mg total) by nebulization every 6 (six) hours as needed for wheezing or shortness of breath   albuterol (Ventolin HFA) 90 mcg/act inhaler  Self, Child No No   Sig: Inhale 1-2 puffs every 6 (six) hours as needed for wheezing or shortness of breath   anastrozole (ARIMIDEX) 1 mg tablet   No No   Sig: TAKE 1 TABLET (1 MG TOTAL) BY MOUTH DAILY   aspirin 81 mg chewable tablet  Self, Child Yes No   Sig: Chew 81 mg in the morning.   atorvastatin (LIPITOR) 40 mg tablet  Self, Child No No   Sig: Take 1 tablet (40 mg total) by mouth daily   calcium polycarbophil (FIBERCON) 625 mg tablet  Self, Child No No   Sig: Take 1 tablet (625 mg total) by mouth daily   dicyclomine (BENTYL) 20 mg tablet  Self, Child No No   Sig: Take 1 tablet (20 mg total) by mouth 2 (two) times a day as needed (diarrhea) for up to 10 doses   dicyclomine (BENTYL) 20 mg tablet   No No   Sig: Take 1 tablet (20 mg total) by mouth 2 (two) times a day   ergocalciferol (VITAMIN D2) 50,000 units  Self, Child No No   Sig: TAKE 1 CAPSULE (50,000 UNITS TOTAL) BY MOUTH ONCE A WEEK   Patient not taking: Reported on 6/2/2025   fluticasone (FLONASE) 50 mcg/act nasal spray  Self, Child Yes No   Patient not taking: Reported on 6/2/2025   fluticasone-salmeterol (Advair HFA) 230-21 MCG/ACT inhaler   No No   Sig: Inhale 2 puffs 2 (two) times a day Rinse mouth after use.   furosemide (LASIX) 20 mg tablet  Self, Child No No   Sig: TAKE 1 TABLET (20 MG TOTAL) BY MOUTH 2 (TWO) TIMES A DAY   Patient not taking: Reported on 6/2/2025   guaiFENesin (MUCINEX) 600 mg 12 hr tablet  Self, Child No No   Sig: Take 1  tablet (600 mg total) by mouth every 12 (twelve) hours as needed for congestion   insulin degludec (Tresiba FlexTouch) 200 units/mL CONCENTRATED U-200 injection pen  Self, Child No No   Sig: INJECT 60 UNITS UNDER THE SKIN DAILY AT 5PM   insulin lispro (HumaLOG) 100 units/mL injection pen  Self, Child No No   Sig: INJECT 24 UNITS BEFORE EACH MEAL PLUS SLIDING SCALE 150-200: 2 UNITS 201-250: 4 UNITS 251-300: 6 UNITS 301*   ketotifen (ZADITOR) 0.025 % ophthalmic solution  Self, Child No No   Sig: Administer 1 drop to both eyes 2 (two) times a day as needed (itchy eyes)   latanoprost (XALATAN) 0.005 % ophthalmic solution  Self, Child Yes No   lidocaine (LMX) 4 % cream   No No   Sig: Apply topically as needed for moderate pain   loratadine (CLARITIN) 10 mg tablet  Self, Child No No   Sig: TAKE 1 TABLET (10 MG TOTAL) BY MOUTH DAILY   losartan (COZAAR) 100 MG tablet  Self, Child No No   Sig: Take 1 tablet (100 mg total) by mouth daily   Patient not taking: Reported on 6/2/2025   magnesium (MAGTAB) 84 MG (7MEQ) TBCR   No No   Sig: Take 1 tablet (84 mg total) by mouth 2 (two) times a day for 14 days   magnesium chloride-calcium (Slow-Mag) 71.5-119 mg  Self, Child No No   Sig: Take 2 tablets by mouth daily   methocarbamol (ROBAXIN) 500 mg tablet  Child, Self Yes No   Patient not taking: Reported on 6/2/2025   metoprolol tartrate (LOPRESSOR) 25 mg tablet  Self, Child No No   Sig: TAKE 0.5 TABLETS (12.5 MG TOTAL) BY MOUTH EVERY 12 (TWELVE) HOURS   omeprazole (PriLOSEC) 20 mg delayed release capsule  Self, Child No No   Sig: TAKE 1 CAPSULE (20 MG TOTAL) BY MOUTH DAILY   ondansetron (ZOFRAN) 4 mg tablet  Self, Child No No   Sig: Take 1 tablet (4 mg total) by mouth every 6 (six) hours   ondansetron (ZOFRAN-ODT) 4 mg disintegrating tablet  Self, Child No No   Sig: Take 1 tablet (4 mg total) by mouth every 8 (eight) hours as needed for nausea for up to 10 doses   ondansetron (ZOFRAN-ODT) 4 mg disintegrating tablet   No No   Sig:  Take 1 tablet (4 mg total) by mouth every 8 (eight) hours as needed for nausea or vomiting   pantoprazole (PROTONIX) 40 mg tablet   Yes No   Sig: Take 40 mg by mouth daily   polyethylene glycol (MIRALAX) 17 g packet  Self No No   Sig: Take 17 g by mouth daily for 14 days   tirzepatide (Mounjaro) 2.5 MG/0.5ML  Self, Child No No   Sig: INJECT 0.5 ML (2.5 MG TOTAL) UNDER THE SKIN ONCE A WEEK   Patient not taking: Reported on 6/2/2025   zileuton 600 MG  Self, Child No No   Sig: TAKE 1 TABLET (600 MG TOTAL) BY MOUTH 2 (TWO) TIMES A DAY      Facility-Administered Medications: None     Discharge Medication List as of 6/14/2025  6:38 PM        START taking these medications    Details   benzonatate (TESSALON PERLES) 100 mg capsule Take 1 capsule (100 mg total) by mouth every 8 (eight) hours, Starting Sat 6/14/2025, Normal           CONTINUE these medications which have NOT CHANGED    Details   acetaminophen (TYLENOL) 500 mg tablet Take 1 tablet (500 mg total) by mouth every 6 (six) hours as needed for fever, Starting Wed 8/28/2024, Normal      albuterol (2.5 mg/3 mL) 0.083 % nebulizer solution Take 3 mL (2.5 mg total) by nebulization every 6 (six) hours as needed for wheezing or shortness of breath, Starting Mon 2/24/2025, Normal      albuterol (Ventolin HFA) 90 mcg/act inhaler Inhale 1-2 puffs every 6 (six) hours as needed for wheezing or shortness of breath, Starting Mon 2/24/2025, Normal      Alcohol Swabs (Pharmacist Choice Alcohol) PADS CLEAN AREA BEFORE TESTING OR INJECTING FOUR TIMES DAILY, Normal      anastrozole (ARIMIDEX) 1 mg tablet TAKE 1 TABLET (1 MG TOTAL) BY MOUTH DAILY, Starting Thu 5/29/2025, Normal      aspirin 81 mg chewable tablet Chew 81 mg in the morning., Historical Med      atorvastatin (LIPITOR) 40 mg tablet Take 1 tablet (40 mg total) by mouth daily, Starting Wed 8/23/2023, Normal      calcium polycarbophil (FIBERCON) 625 mg tablet Take 1 tablet (625 mg total) by mouth daily, Starting Wed  10/11/2023, Normal      Comfort EZ Pen Needles 32G X 4 MM MISC USE AS DIRECTED FOUR TIMES DAILY, Normal      Continuous Glucose  (FreeStyle Broderick 3 [place]) AARON USE AS DIRECTED, Normal      Continuous Glucose Sensor (FreeStyle [place]der[place] 3 Sensor) MISC USE 1 UNITS EVERY 14 (FOURTEEN) DAYS, Starting Wed 1/29/2025, Normal      Diclofenac Sodium (VOLTAREN) 1 % Apply 2 g topically 2 (two) times a day, Starting Wed 8/23/2023, Normal      !! dicyclomine (BENTYL) 20 mg tablet Take 1 tablet (20 mg total) by mouth 2 (two) times a day as needed (diarrhea) for up to 10 doses, Starting Mon 11/18/2024, Normal      !! dicyclomine (BENTYL) 20 mg tablet Take 1 tablet (20 mg total) by mouth 2 (two) times a day, Starting Tue 5/6/2025, Normal      EPINEPHrine (Auvi-Q) 0.1 mg/0.1 mL SOAJ Inject 0.3 mL (0.3 mg total) into a muscle once for 1 dose, Starting Wed 11/9/2022, Normal      ergocalciferol (VITAMIN D2) 50,000 units TAKE 1 CAPSULE (50,000 UNITS TOTAL) BY MOUTH ONCE A WEEK, Starting Tue 9/5/2023, Normal      fluticasone (FLONASE) 50 mcg/act nasal spray Historical Med      fluticasone-salmeterol (Advair HFA) 230-21 MCG/ACT inhaler Inhale 2 puffs 2 (two) times a day Rinse mouth after use., Starting Mon 6/2/2025, Normal      furosemide (LASIX) 20 mg tablet TAKE 1 TABLET (20 MG TOTAL) BY MOUTH 2 (TWO) TIMES A DAY, Starting Mon 7/17/2023, Normal      guaiFENesin (MUCINEX) 600 mg 12 hr tablet Take 1 tablet (600 mg total) by mouth every 12 (twelve) hours as needed for congestion, Starting Wed 8/28/2024, Normal      insulin degludec (Tresiba FlexTouch) 200 units/mL CONCENTRATED U-200 injection pen INJECT 60 UNITS UNDER THE SKIN DAILY AT 5PM, Normal      insulin lispro (HumaLOG) 100 units/mL injection pen INJECT 24 UNITS BEFORE EACH MEAL PLUS SLIDING SCALE 150-200: 2 UNITS 201-250: 4 UNITS 251-300: 6 UNITS 301*, Normal      ketotifen (ZADITOR) 0.025 % ophthalmic solution Administer 1 drop to both eyes 2 (two) times a day as needed  (itchy eyes), Starting Tue 10/3/2023, Normal      latanoprost (XALATAN) 0.005 % ophthalmic solution Historical Med      lidocaine (LMX) 4 % cream Apply topically as needed for moderate pain, Starting Sun 5/18/2025, Normal      loratadine (CLARITIN) 10 mg tablet TAKE 1 TABLET (10 MG TOTAL) BY MOUTH DAILY, Starting Thu 8/5/2021, Normal      losartan (COZAAR) 100 MG tablet Take 1 tablet (100 mg total) by mouth daily, Starting Wed 8/23/2023, Normal      Mag-G 500 (27 Mg) MG tablet TAKE 1 TABLET (500 MG TOTAL) BY MOUTH DAILY, Normal      magnesium (MAGTAB) 84 MG (7MEQ) TBCR Take 1 tablet (84 mg total) by mouth 2 (two) times a day for 14 days, Starting Fri 11/1/2024, Until Mon 11/18/2024, Normal      magnesium chloride-calcium (Slow-Mag) 71.5-119 mg Take 2 tablets by mouth daily, Starting Wed 9/18/2024, Normal      methocarbamol (ROBAXIN) 500 mg tablet Historical Med      metoprolol tartrate (LOPRESSOR) 25 mg tablet TAKE 0.5 TABLETS (12.5 MG TOTAL) BY MOUTH EVERY 12 (TWELVE) HOURS, Starting Mon 1/8/2024, Normal      omeprazole (PriLOSEC) 20 mg delayed release capsule TAKE 1 CAPSULE (20 MG TOTAL) BY MOUTH DAILY, Starting Mon 2/5/2024, Normal      ondansetron (ZOFRAN) 4 mg tablet Take 1 tablet (4 mg total) by mouth every 6 (six) hours, Starting Fri 11/1/2024, Normal      !! ondansetron (ZOFRAN-ODT) 4 mg disintegrating tablet Take 1 tablet (4 mg total) by mouth every 8 (eight) hours as needed for nausea for up to 10 doses, Starting Mon 11/18/2024, Normal      !! ondansetron (ZOFRAN-ODT) 4 mg disintegrating tablet Take 1 tablet (4 mg total) by mouth every 8 (eight) hours as needed for nausea or vomiting, Starting Tue 5/6/2025, Normal      OneTouch Delica Lancets 33G MISC USE 3 (THREE) TIMES A DAY, Normal      OneTouch Ultra test strip USE 1 EACH 3 (THREE) TIMES A DAY USE AS INSTRUCTED, Starting Sat 2/18/2023, Normal      pantoprazole (PROTONIX) 40 mg tablet Take 40 mg by mouth daily, Historical Med      polyethylene glycol  (MIRALAX) 17 g packet Take 17 g by mouth daily for 14 days, Starting Wed 10/11/2023, Until Mon 11/18/2024, Normal      tirzepatide (Mounjaro) 2.5 MG/0.5ML INJECT 0.5 ML (2.5 MG TOTAL) UNDER THE SKIN ONCE A WEEK, Normal      zileuton 600 MG TAKE 1 TABLET (600 MG TOTAL) BY MOUTH 2 (TWO) TIMES A DAY, Normal       !! - Potential duplicate medications found. Please discuss with provider.          ED SEPSIS DOCUMENTATION   Time reflects when diagnosis was documented in both MDM as applicable and the Disposition within this note       Time User Action Codes Description Comment    6/14/2025  6:36 PM Felice Huddleston [M79.605,  G89.29] Chronic pain of left lower extremity     6/14/2025  6:37 PM Felice Huddleston [R05.9] Cough                    [1]   Past Medical History:  Diagnosis Date    Abdominal infection (HCC)     h-pylori    Abnormal chest x-ray 04/05/2019    Asthma     Breast cancer (HCC) 06/26/2018    Cancer (HCC)     BREAST    Depression, recurrent (HCC) 06/13/2022    Diabetes mellitus (HCC)     Hiatal hernia     Hiatal hernia 08/08/2018    History of chemotherapy 2018    History of radiation therapy 2018    Hypercholesterolemia     Hypertension     Hypothyroid     Renal disorder     Rheumatoid arthritis (HCC)    [2]   Past Surgical History:  Procedure Laterality Date    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    BREAST LUMPECTOMY Right 6/26/2018    Procedure: RIGHT BREAST NEEDLE LOCALIZATION X2 WITH RIGHT BREAST LUMPECTOMY ( NEEDLE LOC @ 1000);  Surgeon: Familia Barragan MD;  Location: [facility];  Service: Surgical Oncology    BREAST LUMPECTOMY Right 8/2/2018    Procedure: LYMPHOSCINITGRAPHY INTRAOPERATIVE LYMPHATIC MAPPING , RIGHT SENTINEL NODE BIOPSY, REEXCISION  RIGHT BREAST LUMPECTOMY CAVITY (SUPERIOR MARGIN);  Surgeon: Familia Barragan MD;  Location: [facility];  Service: Surgical Oncology    BREAST SURGERY Left     CATARACT EXTRACTION, BILATERAL Bilateral     COLONOSCOPY      EGD AND  COLONOSCOPY N/A 9/5/2018    Procedure: EGD AND COLONOSCOPY;  Surgeon: José Miguel Rosas MD;  Location: [facility];  Service: Gastroenterology    FL GUIDED CENTRAL VENOUS ACCESS DEVICE INSERTION  9/13/2018    KNEE SURGERY Right     MAMMO NEEDLE LOCALIZATION RIGHT (ALL INC) Right 6/26/2018    MAMMO STEREOTACTIC BREAST BIOPSY RIGHT (ALL INC) Right 5/23/2018    RETINAL DETACHMENT SURGERY Right     TUBAL LIGATION      TUNNELED VENOUS PORT PLACEMENT Left 9/13/2018    Procedure: INSERTION VENOUS PORT (PORT-A-CATH);  Surgeon: Familia Barragan MD;  Location: [facility] OR;  Service: Surgical Oncology    UPPER GASTROINTESTINAL ENDOSCOPY     [3]   Family History  Problem Relation Name Age of Onset    Diabetes Mother      Hypertension Mother      Diabetes Father      Hypertension Father      No Known Problems Sister paris     No Known Problems Sister ruthie     No Known Problems Sister selvin     No Known Problems Sister gladys     No Known Problems Sister karsten     No Known Problems Daughter elva     No Known Problems Daughter noemiulat     No Known Problems Daughter hayden     Cancer Maternal Grandfather      Diabetes Brother Alfredo     Diabetes Brother Ranjeet     Kidney failure Brother Ranjeet     Diabetes Brother Piotr     Breast cancer Neg Hx     [4]   Social History  Tobacco Use    Smoking status: Never    Smokeless tobacco: Never   Vaping Use    Vaping status: Never Used   Substance Use Topics    Alcohol use: No    Drug use: No        Felice Huddleston PA-C  06/15/25 1931

## 2025-07-08 NOTE — PROGRESS NOTES
Called pt-  He is asking that we send over all his [facility], except [facility]empic[facility].  He did attach the information in an e-advice.  I printed the form and loaded all the medications he would like [facility].  [facility] fax # is [phone].    Dr. Landaverde, can you please sign the loaded prescriptions (if appropriate), print, and sign?  [facility] above.   Pulmonary Follow Up Note   Jolie Kirkland 62 y o  female MRN: [id number]  9/1/2020      Referring provider: Dr Ros Keller and Plan:    Asthma  - symptoms are currently controlled  Continue trilogy daily  Continue albuterol as needed  - continue Xolair every 2 weeks  Prescription for EpiPen provided  - avoid triggers as able  She is very allergic to dogs yet is currently living with one  I reminded her to keep the dog out of her room as able and to wash her hands after she pets it  Radiation pneumonitis ([facility] )  - Not causing current pulmonary symptoms, no treatment warranted at this time  Bilateral pulmonary embolism (HCC)  - continue ASA daily   - No current active malignancy  Malignant neoplasm of right female breast ([facility] )  - continue anastrozole daily  I reviewed the patient's medication list with her and she is taking it  Type 2 diabetes mellitus with hyperglycemia, with long-term current use of insulin (HCC)    Lab Results   Component Value Date    HGBA1C 12 6 (H) 06/24/2020     - no recent steroid used for asthma  Blood sugars are uncontrolled  She has pain at the injection site from her insulin  I encouraged her to discuss this with the primary doctor as I suspect it may help her compliance if she could tolerate the injections better  Hypertension  - I asked her to hold her amlodipine to see for lower extremity edema improves  I instructed her to follow-up with her primary care doctor in 2 weeks for a blood pressure check  Her other antihypertensive doses may needed adjusted  - If she does not have improvement in her lower extremity edema with the discontinuation of Amlodipine, then can restart it  Chronic diastolic heart failure (HCC)  Wt Readings from Last 3 Encounters:   09/01/20 83 6 kg (184 lb 6 4 oz)   07/24/20 81 6 kg (179 lb 12 8 oz)   07/22/20 81 6 kg (180 lb)       - she has grade 1 diastolic dysfunction on her echocardiogram from May 2019   She has ongoing bilateral lower extremity edema  She has not discontinued her amlodipine as was recommended at her last visit here  I advised her to stop the amlodipine and for lower extremity edema does not improve, will need to consider addition of diuretic therapy  Diagnoses and all orders for this visit:    Severe persistent asthma without complication  -     EPINEPHrine (EPIPEN) 0 3 mg/0 3 mL SOAJ; Inject 0 3 mL (0 3 mg total) into a muscle once for 1 dose    Radiation pneumonitis (HCC)    Malignant neoplasm of upper-outer quadrant of right breast in female, estrogen receptor positive ([address] )    Bilateral pulmonary embolism (HCC)    Overweight with body mass index (BMI) of 29 to 29 9 in adult    Type 2 diabetes mellitus with hyperglycemia, with long-term current use of insulin (HCC)    Essential hypertension    Chronic diastolic heart failure (HCC)    Other orders  -     amLODIPine (NORVASC) 10 mg tablet; Take 10 mg by mouth daily     Follow-up in 3 months  History of Present Illness   HPI:  Patience Leone is a 62 y o  female who presents for follow-up of her severe persistent asthma  She was last seen by our practice in late July  She states that she is doing okay  She states that she is short of breath sometime such as with exertion of going up the stairs  She is using her Albuterol as needed, which is about three times weekly  She uses her Trelegy daily  She has a nebulizer to use as needed, which is about twice daily  She has not required Prednisone recently  She has occasional jaw and ear pain related to her Xolair injections  She feels that her breathing is better overall  She has an occasional cough  Her asthma is triggered by dust and allergens  She has lower extremity edema for several months right LE worse than left  Her Amlodipine was discontinued but her edema is unchanged  She is taking a water pill     She is currently living with a small dog who belongs to her daughter  The patient's daughter provided interpretation for this visit  I offered to use our translation phone but she preferred to do it herself  Review of Systems  GEN: no fever or chills  HENT: no sinus congestion, no postnasal drip, no rhinorrhea  EYES: no visual changes  RESP: +shortness of breath, no cough, no wheezing  CARDIO: no chest pain, +leg edema  GI: no abdominal pain, no diarrhea  ENDO:  no polydipsia  : no urgency, no dysuria  MSK: no back pain  ALLERGY: not immunocompromised  NEURO: no dizziness, no seizures  HEME: does not bruise easily  PSYCH: no hallucinations    Historical Information   Past Medical History:   Diagnosis Date    Abdominal infection ([facility] )     h-pylori    Abnormal chest x-ray 4/5/2019    Asthma     Breast cancer ([facility] ) 06/26/2018    Cancer ([place] [address] )     BREAST    Diabetes mellitus ([facility] )     Hiatal hernia     History of chemotherapy     History of radiation therapy     Hypercholesterolemia     Hypertension     Hypothyroid     Renal disorder     Rheumatoid arthritis ([place] [address] )      Past Surgical History:   Procedure Laterality Date    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    BREAST LUMPECTOMY Right 6/26/2018    Procedure: RIGHT BREAST NEEDLE LOCALIZATION X2 WITH RIGHT BREAST LUMPECTOMY ( NEEDLE LOC @ 1000); Surgeon: Sameera Mendoza MD;  [facility];  Service: Surgical Oncology    BREAST LUMPECTOMY Right 8/2/2018    Procedure: LYMPHOSCINITGRAPHY INTRAOPERATIVE LYMPHATIC MAPPING , RIGHT SENTINEL NODE BIOPSY, REEXCISION  RIGHT BREAST LUMPECTOMY CAVITY (SUPERIOR MARGIN); Surgeon: Sameera Mendoza MD;  [facility];  Service: Surgical Oncology    BREAST SURGERY Left     CATARACT EXTRACTION, BILATERAL Bilateral     EGD AND COLONOSCOPY N/A 9/5/2018    Procedure: EGD AND COLONOSCOPY;  Surgeon: Genevieve Sorensen MD;  Location: [facility];   Service: Gastroenterology    [facility] GUIDED CENTRAL VENOUS ACCESS DEVICE INSERTION  9/13/2018    KNEE SURGERY Right     MAMMO NEEDLE LOCALIZATION RIGHT (ALL INC) Right 6/26/2018    MAMMO STEREOTACTIC BREAST BIOPSY RIGHT (ALL INC) Right 5/23/2018    RETINAL DETACHMENT SURGERY Right     TUBAL LIGATION      TUNNELED VENOUS PORT PLACEMENT Left 9/13/2018    Procedure: INSERTION VENOUS PORT (PORT-A-CATH);   Surgeon: Mark Cyr MD;  [facility];  Service: Surgical Oncology     Family History   Problem Relation Age of Onset    Diabetes Mother     Hypertension Mother     Diabetes Father     Hypertension Father     Diabetes Brother     Hypertension Brother     Prostate cancer Brother     Cancer Maternal Grandfather     No Known Problems Sister     No Known Problems Daughter     No Known Problems Sister     No Known Problems Sister     No Known Problems Sister     No Known Problems Sister     No Known Problems Daughter     No Known Problems Daughter          Meds/Allergies     Current Outpatient Medications:     albuterol (2 5 mg/3 mL) 0 083 % nebulizer solution, Take 1 vial (2 5 mg total) by nebulization every 6 (six) hours as needed for wheezing or shortness of breath, Disp: 120 vial, Rfl: 5    albuterol (PROVENTIL HFA,VENTOLIN HFA) 90 mcg/act inhaler, Inhale 1 puff every 4 (four) hours as needed  , Disp: , Rfl:     amLODIPine (NORVASC) 10 mg tablet, Take 10 mg by mouth daily, Disp: , Rfl:     aspirin 81 mg chewable tablet, Chew 81 mg daily, Disp: , Rfl:     atorvastatin (LIPITOR) 10 mg tablet, Take 1 tablet (10 mg total) by mouth daily, Disp: 30 tablet, Rfl: 2    Blood Glucose Monitoring Suppl (ONE TOUCH ULTRA 2) w/Device KIT, by Does not apply route 3 (three) times a day, Disp: 1 each, Rfl: 0    DULoxetine (CYMBALTA) 30 mg delayed release capsule, TAKE 1 CAPSULE BY MOUTH 2 (TWICE)  A DAY, Disp: 180 capsule, Rfl: 1    fluticasone-umeclidinium-vilanterol (TRELEGY) 100-62 5-25 MCG/INH inhaler, Inhale 1 puff daily Rinse mouth after use , Disp: 1 Inhaler, Rfl: 2    glucose blood (ONE TOUCH ULTRA TEST) test strip, 1 each by Other route 3 (three) times a day, Disp: 100 each, Rfl: 5    Insulin Aspart FlexPen 100 UNIT/ML SOPN, Inject 16 Units under the skin 3 (three) times a day before meals, Disp: , Rfl:     insulin detemir (Levemir FlexTouch) 100 Units/mL injection pen, Inject 45 Units under the skin daily at bedtime, Disp: 4 pen, Rfl: 5    insulin lispro (HumaLOG) 100 units/mL injection pen, Inject 16 Units under the skin 3 (three) times a day with meals Inject 16 units TID, Disp: 5 pen, Rfl: 5    Insulin Pen Needle (BD Pen Needle Emily U/F) 32G X 4 MM MISC, USE FOUR (4) TIMES A DAY, Disp: 100 each, Rfl: 5    levothyroxine 50 mcg tablet, Take 1 tablet (50 mcg total) by mouth daily, Disp: 90 tablet, Rfl: 1    losartan (COZAAR) 50 mg tablet, Take 1 tablet (50 mg total) by mouth daily, Disp: 90 tablet, Rfl: 1    metoprolol tartrate (LOPRESSOR) 25 mg tablet, Take 0 5 tablets (12 5 mg total) by mouth every 12 (twelve) hours, Disp: 60 tablet, Rfl: 2    NOVOLOG FLEXPEN 100 units/mL SOPN, Patient injects 16 units three times a day, Disp: 5 pen, Rfl: 3    omeprazole (PriLOSEC) 20 mg delayed release capsule, take 1 capsule by mouth once daily (Patient taking differently: 2 (two) times a day ), Disp: 90 capsule, Rfl: 0    OneTouch Delica Lancets 05M MISC, by Does not apply route 3 (three) times a day, Disp: 100 each, Rfl: 5    anastrozole (ARIMIDEX) 1 mg tablet, Take 1 tablet (1 mg total) by mouth daily (Patient not taking: Reported on 9/1/2020), Disp: 90 tablet, Rfl: 3    capsaicin (ZOSTRIX) 0 025 % cream, Apply 1 application topically 2 (two) times a day Apply to feet at night (Patient not taking: Reported on 4/29/2020), Disp: 60 g, Rfl: 0    chlorhexidine (PERIDEX) 0 12 % solution, Apply 15 mL to the mouth or throat 2 (two) times a day (Patient not taking: Reported on 9/1/2020), Disp: 120 mL, Rfl: 0    cyclobenzaprine (FLEXERIL) 5 mg tablet, Take 1 tablet (5 mg total) by mouth 3 (three) times a day (Patient not taking: Reported on 4/29/2020), Disp: 90 tablet, Rfl: 1    dicyclomine (BENTYL) 10 mg capsule, Take 1 capsule (10 mg total) by mouth 4 (four) times a day (before meals and at bedtime) (Patient not taking: Reported on 6/16/2020), Disp: 120 capsule, Rfl: 1    EPINEPHrine (EPIPEN) 0 3 mg/0 3 mL SOAJ, Inject 0 3 mL (0 3 mg total) into a muscle once for 1 dose, Disp: 0 6 mL, Rfl: 0    gemfibrozil (LOPID) 600 mg tablet, Take 600 mg by mouth daily, Disp: , Rfl:     metoclopramide (REGLAN) 5 mg tablet, Take 1 tablet (5 mg total) by mouth 4 (four) times a day (Patient not taking: Reported on 9/1/2020), Disp: 120 tablet, Rfl: 1    oxyCODONE (ROXICODONE) 5 mg immediate release tablet, Take 5 mg by mouth every 6 (six) hours as needed for moderate pain, Disp: , Rfl:   Allergies   Allergen Reactions    Aspirin Hives     Dr  in [address] told patient not to take aspirin r/t kidneys     Tylenol With Codeine #3 [Acetaminophen-Codeine] Rash       Vitals: Blood pressure 134/70, pulse 76, temperature 97 6 °F (36 4 °C), height 5' 6" (1 676 m), weight 83 6 kg (184 lb 6 4 oz), SpO2 97 %, not currently breastfeeding  Body mass index is 29 76 kg/m²  Oxygen Therapy  SpO2: 97 %  Oxygen Therapy: None (Room air)      Physical Exam  GEN: WDWN, nad, comfortable  HEENT: NCAT, EOMI  CVS: Regular, no m/r/g  LUNGS: CTA b/l, no w/r/r  ABD: soft, nd, nt  EXT: No c/c/e  NEURO: No focal deficits  MS: Moving all extremities  SKIN: warm, dry  PSYCH: calm, cooperative    Labs: I have personally reviewed pertinent lab results    Lab Results   Component Value Date    WBC 6 04 07/28/2020    HGB 11 5 07/28/2020    HCT 35 2 07/28/2020    MCV 90 07/28/2020     07/28/2020     Lab Results   Component Value Date    CALCIUM 9 0 06/24/2020    K 3 6 06/24/2020    CO2 26 06/24/2020     06/24/2020    BUN 16 06/24/2020    CREATININE 0 70 06/24/2020     Lab Results   Component Value Date     (H) 11/26/2018     Lab Results   Component Value Date ALT 20 06/24/2020    AST 15 06/24/2020    ALKPHOS 65 06/24/2020       Imaging and other studies: I have personally reviewed pertinent films in PACS  High-resolution CT scan chest reviewed by me personally shows right upper lobe scarring unchanged, tiny subpleural left upper lobe nodule unchanged  3 mm left lower lobe subpleural nodule unchanged  Calcified left upper lobe granuloma  Atelectasis inferior lingula  Pulmonary function testing:  December 2018 reviewed by me personally shows normal spirometry, hyperinflation and air trapping, elevated DLCO  EKG, Pathology, and Other Studies: I have personally reviewed pertinent reports  Echo May 2019 reveals an EF 24%, grade 1 diastolic dysfunction  [facility]

## 2025-08-19 ENCOUNTER — TELEPHONE (OUTPATIENT)
Dept: HEMATOLOGY ONCOLOGY | Facility: CLINIC | Age: 70
End: 2025-08-19

## (undated) DEVICE — MEDI-VAC YANK SUCT HNDL W/TPRD BULBOUS TIP: Brand: CARDINAL HEALTH

## (undated) DEVICE — SUT MONOCRYL 4-0 PS-2 27 IN Y426H

## (undated) DEVICE — GLOVE SRG BIOGEL ECLIPSE 7

## (undated) DEVICE — SUT MONOCRYL 4-0 PS-2 18 IN Y496G

## (undated) DEVICE — MARGIN MARKER SET

## (undated) DEVICE — SUT VICRYL 2-0 SH 27 IN UNDYED J417H

## (undated) DEVICE — 3M™ STERI-STRIP™ REINFORCED ADHESIVE SKIN CLOSURES, R1547, 1/2 IN X 4 IN (12 MM X 100 MM), 6 STRIPS/ENVELOPE: Brand: 3M™ STERI-STRIP™

## (undated) DEVICE — WORKING LENGTH 235CM, WORKING CHANNEL 2.8MM: Brand: RESOLUTION 360 CLIP

## (undated) DEVICE — SUT VICRYL 3-0 SH 27 IN J416H

## (undated) DEVICE — NEEDLE 25G X 1 1/2

## (undated) DEVICE — CHLORAPREP HI-LITE 26ML ORANGE

## (undated) DEVICE — MAGNETIC INSTRUMENT PAD SMALL

## (undated) DEVICE — GLOVE INDICATOR PI UNDERGLOVE SZ 7 BLUE

## (undated) DEVICE — ENDOCUFF VISION MED BLUE ID 11.0

## (undated) DEVICE — LIGACLIP MCA MULTIPLE CLIP APPLIERS, 20 MEDIUM CLIPS: Brand: LIGACLIP

## (undated) DEVICE — X-RAY DETECTABLE SPONGES,16 PLY: Brand: VISTEC

## (undated) DEVICE — BETHLEHEM UNIVERSAL BREAST PK: Brand: CARDINAL HEALTH

## (undated) DEVICE — BULB SYRINGE,IRRIGATION WITH PROTECTIVE CAP: Brand: DOVER

## (undated) DEVICE — INTENDED FOR TISSUE SEPARATION, AND OTHER PROCEDURES THAT REQUIRE A SHARP SURGICAL BLADE TO PUNCTURE OR CUT.: Brand: BARD-PARKER SAFETY BLADES SIZE 15, STERILE

## (undated) DEVICE — STERILE ACCESS CATHETER PACK: Brand: CARDINAL HEALTH

## (undated) DEVICE — BRA SURGICAL SZ XLGE (39-42)

## (undated) DEVICE — SUT PROLENE 2-0 SH 36 IN 8523H

## (undated) DEVICE — SYRINGE 1ML SLIP TIP

## (undated) DEVICE — MAGNETIC INSTRUMENT PAD 16" X 20"; LARGE; DISPOSABLE: Brand: CARDINAL HEALTH

## (undated) DEVICE — DRAPE PROBE NEO-PROBE/ULTRASOUND

## (undated) DEVICE — ELECTRODE BLADE MOD E-Z CLEAN 2.5IN 6.4CM -0012M

## (undated) DEVICE — GLOVE SRG BIOGEL ECLIPSE 6.5

## (undated) DEVICE — 3M™ STERI-STRIP™ COMPOUND BENZOIN TINCTURE 40 BAGS/CARTON 4 CARTONS/CASE C1544: Brand: 3M™ STERI-STRIP™

## (undated) DEVICE — CHEST/BREAST DRAPE: Brand: CONVERTORS

## (undated) DEVICE — ADHESIVE SKN CLSR HISTOACRYL FLEX 0.5ML LF

## (undated) DEVICE — NEEDLE 18 G X 1 1/2 SAFETY

## (undated) DEVICE — PLUMEPEN PRO 10FT

## (undated) DEVICE — CONMED ACCESSORY ELECTRODE, FLAT BLADE WITH EXTENDED INSULATION: Brand: CONMED

## (undated) DEVICE — 3M™ TEGADERM™ TRANSPARENT FILM DRESSING FRAME STYLE, 1626W, 4 IN X 4-3/4 IN (10 CM X 12 CM), 50/CT 4CT/CASE: Brand: 3M™ TEGADERM™

## (undated) DEVICE — INTENDED FOR TISSUE SEPARATION, AND OTHER PROCEDURES THAT REQUIRE A SHARP SURGICAL BLADE TO PUNCTURE OR CUT.: Brand: BARD-PARKER ® CARBON RIB-BACK BLADES

## (undated) DEVICE — 3000CC GUARDIAN II: Brand: GUARDIAN

## (undated) DEVICE — UTILITY MARKER,BLACK WITH LABELS: Brand: DEVON

## (undated) DEVICE — CAUTERY TIP POLISHER: Brand: DEVON

## (undated) DEVICE — INTENDED FOR TISSUE SEPARATION, AND OTHER PROCEDURES THAT REQUIRE A SHARP SURGICAL BLADE TO PUNCTURE OR CUT.: Brand: BARD-PARKER SAFETY BLADES SIZE 11, STERILE

## (undated) DEVICE — ASTOUND STANDARD SURGICAL GOWN, XL: Brand: CONVERTORS

## (undated) DEVICE — SUT SILK 2-0 18 IN A185H

## (undated) DEVICE — SCD SEQUENTIAL COMPRESSION COMFORT SLEEVE MEDIUM KNEE LENGTH: Brand: KENDALL SCD

## (undated) DEVICE — GAUZE SPONGES,USP TYPE VII GAUZE, 12 PLY: Brand: CURITY

## (undated) DEVICE — PAD GROUNDING ADULT

## (undated) DEVICE — SYRINGE 5ML LL

## (undated) DEVICE — TUBING SUCTION 5MM X 12 FT

## (undated) DEVICE — GLOVE INDICATOR PI UNDERGLOVE SZ 6.5 BLUE